# Patient Record
Sex: FEMALE | Race: WHITE | Employment: UNEMPLOYED | URBAN - METROPOLITAN AREA
[De-identification: names, ages, dates, MRNs, and addresses within clinical notes are randomized per-mention and may not be internally consistent; named-entity substitution may affect disease eponyms.]

---

## 2017-01-04 ENCOUNTER — OFFICE VISIT (OUTPATIENT)
Dept: PAIN MANAGEMENT | Age: 57
End: 2017-01-04

## 2017-01-04 VITALS — HEART RATE: 91 BPM | SYSTOLIC BLOOD PRESSURE: 146 MMHG | RESPIRATION RATE: 19 BRPM | DIASTOLIC BLOOD PRESSURE: 77 MMHG

## 2017-01-04 DIAGNOSIS — M25.512 CHRONIC PAIN OF BOTH SHOULDERS: ICD-10-CM

## 2017-01-04 DIAGNOSIS — G89.29 CHRONIC BACK PAIN, UNSPECIFIED BACK LOCATION, UNSPECIFIED BACK PAIN LATERALITY: Primary | ICD-10-CM

## 2017-01-04 DIAGNOSIS — G89.29 CHRONIC MIDLINE LOW BACK PAIN, WITH SCIATICA PRESENCE UNSPECIFIED: ICD-10-CM

## 2017-01-04 DIAGNOSIS — M54.9 CHRONIC BACK PAIN, UNSPECIFIED BACK LOCATION, UNSPECIFIED BACK PAIN LATERALITY: Primary | ICD-10-CM

## 2017-01-04 DIAGNOSIS — M25.562 CHRONIC PAIN OF BOTH KNEES: ICD-10-CM

## 2017-01-04 DIAGNOSIS — M47.816 SPONDYLOSIS OF LUMBAR REGION WITHOUT MYELOPATHY OR RADICULOPATHY: ICD-10-CM

## 2017-01-04 DIAGNOSIS — G89.29 CHRONIC PAIN OF BOTH SHOULDERS: ICD-10-CM

## 2017-01-04 DIAGNOSIS — M77.8 LEFT SHOULDER TENDINITIS: ICD-10-CM

## 2017-01-04 DIAGNOSIS — G89.29 CHRONIC PAIN OF BOTH KNEES: ICD-10-CM

## 2017-01-04 DIAGNOSIS — M25.511 CHRONIC PAIN OF BOTH SHOULDERS: ICD-10-CM

## 2017-01-04 DIAGNOSIS — M54.5 CHRONIC MIDLINE LOW BACK PAIN, WITH SCIATICA PRESENCE UNSPECIFIED: ICD-10-CM

## 2017-01-04 DIAGNOSIS — M25.512 CHRONIC LEFT SHOULDER PAIN: ICD-10-CM

## 2017-01-04 DIAGNOSIS — G89.4 CHRONIC PAIN SYNDROME: ICD-10-CM

## 2017-01-04 DIAGNOSIS — G25.81 RESTLESS LEG SYNDROME: ICD-10-CM

## 2017-01-04 DIAGNOSIS — G89.29 CHRONIC LEFT SHOULDER PAIN: ICD-10-CM

## 2017-01-04 DIAGNOSIS — M25.561 CHRONIC PAIN OF BOTH KNEES: ICD-10-CM

## 2017-01-04 RX ORDER — GABAPENTIN 300 MG/1
300 CAPSULE ORAL
Qty: 30 CAP | Refills: 2 | Status: SHIPPED | OUTPATIENT
Start: 2017-01-04 | End: 2017-03-23

## 2017-01-04 RX ORDER — HYDROCODONE BITARTRATE AND ACETAMINOPHEN 5; 325 MG/1; MG/1
TABLET ORAL
Qty: 30 TAB | Refills: 0 | Status: SHIPPED | OUTPATIENT
Start: 2017-01-04 | End: 2017-01-11 | Stop reason: SDUPTHER

## 2017-01-04 RX ORDER — DULOXETIN HYDROCHLORIDE 60 MG/1
60 CAPSULE, DELAYED RELEASE ORAL DAILY
Qty: 30 CAP | Refills: 1 | Status: SHIPPED | OUTPATIENT
Start: 2017-01-04 | End: 2017-04-05 | Stop reason: SDUPTHER

## 2017-01-04 RX ORDER — HYDROCODONE BITARTRATE AND ACETAMINOPHEN 5; 325 MG/1; MG/1
TABLET ORAL
Qty: 30 TAB | Refills: 0 | Status: SHIPPED | OUTPATIENT
Start: 2017-02-03 | End: 2017-03-23

## 2017-01-04 RX ORDER — BACLOFEN 10 MG/1
TABLET ORAL
Qty: 30 TAB | Refills: 1 | Status: SHIPPED | OUTPATIENT
Start: 2017-01-04 | End: 2017-04-05 | Stop reason: SDUPTHER

## 2017-01-04 RX ORDER — CYCLOBENZAPRINE HCL 10 MG
10 TABLET ORAL
Qty: 90 TAB | Refills: 1 | Status: SHIPPED | OUTPATIENT
Start: 2017-01-04 | End: 2017-03-01

## 2017-01-04 NOTE — PROGRESS NOTES
HISTORY OF PRESENT ILLNESS  Isela Lozano is a 55 y.o. female. HPI Comments: Meds help with pain control and quality of life. No new side effects reported today. No new medical problems reported today. Visit survey reviewed, and will be scanned. Recent Average pain level (out of 10). --  8  Chief complaint, low back pain, left shoulder pain  Chronic pain  Using hydrocodone 5 mg, one half tablet twice a day as needed  Cymbalta, baclofen which she uses just at night, gabapentin, Flexeril 3 times a day as needed  She reports she will have the MRI of her lumbar spine in about a week. I have asked that she then consult with anesthesiology, Dr. Angelica Clark for possible injections after the MRI. I previously prescribed physical therapy and she reports she will make phone calls and look into this. I did remind her to bring in all her pill bottles for each appointment. Review of Systems   Constitutional: Positive for malaise/fatigue. HENT: Negative for hearing loss. Eyes: Negative for blurred vision and double vision. Respiratory: Positive for shortness of breath. Negative for cough. Cardiovascular: Negative for chest pain and leg swelling. Gastrointestinal: Positive for nausea. Negative for vomiting. Genitourinary: Negative for dysuria and urgency. Musculoskeletal: Positive for back pain, falls and joint pain. Skin: Negative for itching and rash. Neurological: Positive for dizziness, weakness and headaches. Negative for seizures. Endo/Heme/Allergies: Positive for environmental allergies. Does not bruise/bleed easily. Psychiatric/Behavioral: Negative for suicidal ideas. The patient is not nervous/anxious and does not have insomnia. Physical Exam   Constitutional: She appears well-developed and well-nourished. She is cooperative. She does not have a sickly appearance. HENT:   Head: Normocephalic and atraumatic. Right Ear: External ear normal. No drainage.    Left Ear: External ear normal. No drainage. Nose: Nose normal.   Eyes: Lids are normal. Right eye exhibits no discharge. Left eye exhibits no discharge. Right conjunctiva has no hemorrhage. Left conjunctiva has no hemorrhage. Neck: Neck supple. No tracheal deviation present. No thyroid mass present. Pulmonary/Chest: Effort normal. No respiratory distress. Neurological: She is alert. No cranial nerve deficit. Skin: Skin is intact. No rash noted. Psychiatric: Her speech is normal. Her affect is not angry. She does not express inappropriate judgment. Nursing note and vitals reviewed. ASSESSMENT and PLAN  Encounter Diagnoses   Name Primary?  Chronic back pain, unspecified back location, unspecified back pain laterality Yes    Chronic left shoulder pain     Left shoulder tendinitis     Spondylosis of lumbar region without myelopathy or radiculopathy     Chronic pain of both shoulders     Chronic pain of both knees     Chronic pain syndrome     Chronic midline low back pain, with sciatica presence unspecified     Restless leg syndrome    No signs of addiction or diversion. The primary Dxes. ,including pain are controlled. Pain/Pain control/Meds and Quality Of Life have been reviewed. Nonpharmacologic therapy and non-opioid pharmacologic therapy are always considered. If opioid therapy is prescribed, this is only if the expected benefits for both pain and function are anticipated to outweigh risks. Possible changes to treatment plan considered. Support/education given as needed. Today-medications are as listed. No significant changes to medications. Follow up -- 2 months.

## 2017-01-04 NOTE — MR AVS SNAPSHOT
Visit Information Date & Time Provider Department Dept. Phone Encounter #  
 1/4/2017  4:15 PM Ivette Infante MD [facility] [phone] Follow-up Instructions Return in about 2 months (around 3/4/2017). Follow-up and Disposition History Your Appointments 1/6/2017  1:00 PM  
Office Visit with [facility] EDUC CLASS [facility] ([facility] SCHEDULING) Appt Note: return edu [address] [facility] [id number]  
[phone] [address]  
  
    
 1/11/2017  9:45 AM  
Follow Up with Angelito Bravo MD  
[facility] Appt Note: Return in about 2 months (around 1/11/2017), or if symptoms worsen or fail to improve, for dm2, chronic pain. [address] [address] [place] [address]  
  
   
 [address] [facility]  
  
    
 1/26/2017  2:00 PM  
CONSULT with Shruthi Salmon MD  
[facility]) Appt Note: Consult Dr Keeley Torrez LB injections [address] [facility] [address]  
[phone] [address] [id number]  
  
    
 2/15/2017 10:50 AM  
Follow Up with Bailey Singh MD  
[address] [facility], [address] [facility] ([facility]) Appt Note: 2 mo fu  
 [address] [facility], [address] [facility]  
[phone] [address] [place] [facility], [facility] [address]  
  
    
 3/1/2017  3:15 PM  
Follow Up with Ivette Infante MD  
[facility]) Appt Note: 2 mth [address] [facility] [address]  
[phone] [id number] Upcoming Health Maintenance Date Due  
 BREAST CANCER SCRN MAMMOGRAM 4/13/2011 FOBT Q 1 YEAR AGE 50-75 4/13/2011 MICROALBUMIN Q1 11/6/2016 EYE EXAM RETINAL OR DILATED Q1 12/11/2016 HEMOGLOBIN A1C Q6M 2/23/2017 LIPID PANEL Q1 3/17/2017 FOOT EXAM Q1 11/11/2017 PAP AKA CERVICAL CYTOLOGY 11/13/2018 DTaP/Tdap/Td series (2 - Td) 11/11/2026 Allergies as of 1/4/2017  Review Complete On: 1/4/2017 By: Zandra López MD  
  
 Severity Noted Reaction Type Reactions Other Food  03/17/2016    Other (comments) Sweeteners- causes headaches Metformin  11/06/2015    Other (comments) Increase pain in feet and swelling in feet Current Immunizations  Never Reviewed No immunizations on file. Not reviewed this visit You Were Diagnosed With   
  
 Codes Comments Chronic back pain, unspecified back location, unspecified back pain laterality    -  Primary ICD-10-CM: M54.9, G89.29 ICD-9-CM: 724.5, 338.29 Chronic left shoulder pain     ICD-10-CM: M25.512, G89.29 ICD-9-CM: 719.41, 338.29 Left shoulder tendinitis     ICD-10-CM: M75.82 ICD-9-CM: 726.10 Spondylosis of lumbar region without myelopathy or radiculopathy     ICD-10-CM: M47.816 ICD-9-CM: 230. 3 Chronic pain of both shoulders     ICD-10-CM: M25.512, G89.29, M25.511 ICD-9-CM: 719.41, 338.29 Chronic pain of both knees     ICD-10-CM: M25.561, M25.562, G89.29 ICD-9-CM: 719.46, 338.29 Chronic pain syndrome     ICD-10-CM: G89.4 ICD-9-CM: 338. 4 Chronic midline low back pain, with sciatica presence unspecified     ICD-10-CM: M54.5, G89.29 ICD-9-CM: 724.2, 338.29 Restless leg syndrome     ICD-10-CM: G25.81 ICD-9-CM: 333.94 Vitals BP Pulse Resp OB Status Smoking Status 146/77 91 19 Hysterectomy Never Smoker Vitals History Preferred Pharmacy Pharmacy Name Phone [address] [facility], [address] [address] Your Updated Medication List  
  
   
This list is accurate as of: 1/4/17  5:02 PM.  Always use your most recent med list.  
  
  
  
  
 * albuterol 90 mcg/actuation inhaler Commonly known as:  PROVENTIL HFA, VENTOLIN HFA, PROAIR HFA Take 2 Puffs by inhalation every four (4) hours as needed for Wheezing. * albuterol 2.5 mg /3 mL (0.083 %) nebulizer solution Commonly known as:  PROVENTIL VENTOLIN  
3 mL by Nebulization route every four (4) hours as needed for Wheezing. aspirin, buffered 81 mg Tab Take  by mouth. atorvastatin 80 mg tablet Commonly known as:  LIPITOR  
take 1 tablet by mouth once daily * bacitracin 500 unit/gram Oint Commonly known as:  BACITRACIN  
  
 * bacitracin 500 unit/gram Oint Commonly known as:  BACITRACIN  
apply affected area twice a day  
  
 baclofen 10 mg tablet Commonly known as:  LIORESAL  
1  qhs  prn * BD INSULIN SYRINGE ULTRA-FINE 1 mL 31 gauge x 5/16 Syrg Generic drug:  Insulin Syringe-Needle U-100  
use as directed twice a day * BD INSULIN SYRINGE ULTRA-FINE 1 mL 31 gauge x 5/16 Syrg Generic drug:  Insulin Syringe-Needle U-100  
use as directed twice a day * cyclobenzaprine 10 mg tablet Commonly known as:  FLEXERIL  
take 1 tablet by mouth three times a day if needed for muscle spasm * cyclobenzaprine 10 mg tablet Commonly known as:  FLEXERIL Take 1 Tab by mouth three (3) times daily as needed for Muscle Spasm(s). * DULoxetine 30 mg capsule Commonly known as:  CYMBALTA Take 1 Cap by mouth daily. Indications: CHRONIC MUSCULOSKELETAL PAIN  
  
 * DULoxetine 60 mg capsule Commonly known as:  CYMBALTA Take 1 Cap by mouth daily. FREESTYLE LITE STRIPS strip Generic drug:  glucose blood VI test strips TEST twice a day as directed  
  
 gabapentin 300 mg capsule Commonly known as:  NEURONTIN Take 1 Cap by mouth nightly. Indications: Restless Legs Syndrome * HYDROcodone-acetaminophen 5-325 mg per tablet Commonly known as:  Herminia Holguin  
1/2 tab  Bid prn  
  
 * HYDROcodone-acetaminophen 5-325 mg per tablet Commonly known as:  Lenon Gauze  
1/2 tab  Bid prn Start taking on:  2/3/2017 LANTUS 100 unit/mL injection Generic drug:  insulin glargine  
inject 90 units subcutaneously every evening at bedtime  
  
 metoprolol succinate 50 mg XL tablet Commonly known as:  TOPROL-XL  
take 1 tablet by mouth once daily VITAMIN B-12 1,000 mcg sublingual tablet Generic drug:  cyanocobalamin Take 1,000 mcg by mouth daily. * Notice: This list has 12 medication(s) that are the same as other medications prescribed for you. Read the directions carefully, and ask your doctor or other care provider to review them with you. Prescriptions Printed Refills HYDROcodone-acetaminophen (NORCO) 5-325 mg per tablet 0 Si/2 tab  Bid prn Class: Print HYDROcodone-acetaminophen (NORCO) 5-325 mg per tablet 0 Starting on: 2/3/2017 Si/2 tab  Bid prn Class: Print Prescriptions Sent to Pharmacy Refills  
 cyclobenzaprine (FLEXERIL) 10 mg tablet 1 Sig: Take 1 Tab by mouth three (3) times daily as needed for Muscle Spasm(s). Class: Normal  
 Pharmacy: [address] [place] [address] Ph #: [phone] Route: Oral  
 baclofen (LIORESAL) 10 mg tablet 1 Si  qhs  prn Class: Normal  
 Pharmacy: [address] [place] [address] Ph #: [phone] DULoxetine (CYMBALTA) 60 mg capsule 1 Sig: Take 1 Cap by mouth daily. Class: Normal  
 Pharmacy: [address] [place] [address] Ph #: [phone] Route: Oral  
 gabapentin (NEURONTIN) 300 mg capsule 2 Sig: Take 1 Cap by mouth nightly. Indications: Restless Legs Syndrome Class: Normal  
 Pharmacy: [address] [place] [address] Ph #: [phone] Route: Oral  
  
Follow-up Instructions Return in about 2 months (around 3/4/2017). To-Do List   
 01/10/2017 1:30 PM  
 Appointment with [facility] MRI RM 1 at [facility] ([phone]) GENERAL INSTRUCTIONS  Bring information (ID card) if you have any medically implanted devices. You will be required to lie still while the procedure is being performed. Remove any jewelry (including body piercing, hairpins) prior to MRI. If you have had a creatinine level drawn within the past 30 days, please bring most recent results to your appt. Bring any films, CD's, and reports related to your study with you on the day of your exam.  This only includes studies done outside of [address] [place] [address], [facility], [place], and [facility]. Bring a complete list of all medications you are currently taking to include prescriptions, over-the-counter meds, herbals, vitamins & any dietary supplements. If you were given medications for claustrophobia or anxiety, please arrange to have someone drive you to your appointment. QUESTIONS  Notify the MRI Department if you have any questions concerning your study. [place] - [phone] [facility] - [address] [place] - [phone] [facility]! Dear Abby Epstein: Thank you for requesting a "[employer] account. Our records indicate that you already have an active "[employer] account. You can access your account anytime at [email or web address] Did you know that you can access your hospital and ER discharge instructions at any time in "[employer]? You can also review all of your test results from your hospital stay or ER visit. Additional Information If you have questions, please visit the Frequently Asked Questions section of the "[employer] website at [email or web address] Remember, "[employer] is NOT to be used for urgent needs. For medical emergencies, dial 911. Now available from your iPhone and Android! Please provide this summary of care documentation to your next provider. Your primary care clinician is listed as Angelito Perkins. If you have any questions after today's visit, please call [phone].

## 2017-01-04 NOTE — PROGRESS NOTES
Nursing Notes    Patient presents to the office today in follow-up. Ms. Julienne Salazar has a reminder for a \"due or due soon\" health maintenance. I have asked that she contact her primary care provider for follow-up on this health maintenance. Comments: did not bring medications. Counseled. Unable to check . Did not start physical therapy. Order was provided to her. She was asked to call for scheduling. POC UDS was not performed in office today    Any new labs or imaging since last appointment? NO    Have you been to an emergency room (ER) or urgent care clinic since your last visit? NO            Have you been hospitalized since your last visit? NO     If yes, where, when, and reason for visit? Have you seen or consulted any other health care providers outside of the [address] [place] [address]  since your last visit? YES     If yes, where, when, and reason for visit?    Endocrinologist. Dr Lia Contreras

## 2017-01-10 ENCOUNTER — HOSPITAL ENCOUNTER (OUTPATIENT)
Age: 57
Discharge: HOME | End: 2017-01-10
Attending: PHYSICAL MEDICINE & REHABILITATION
Payer: MEDICAID

## 2017-01-10 DIAGNOSIS — G89.29 CHRONIC MIDLINE LOW BACK PAIN, WITH SCIATICA PRESENCE UNSPECIFIED: ICD-10-CM

## 2017-01-10 DIAGNOSIS — M54.5 CHRONIC MIDLINE LOW BACK PAIN, WITH SCIATICA PRESENCE UNSPECIFIED: ICD-10-CM

## 2017-01-10 PROCEDURE — 72148 MRI LUMBAR SPINE W/O DYE: CPT

## 2017-01-11 ENCOUNTER — OFFICE VISIT (OUTPATIENT)
Dept: FAMILY MEDICINE CLINIC | Age: 57
End: 2017-01-11

## 2017-01-11 VITALS
HEART RATE: 64 BPM | SYSTOLIC BLOOD PRESSURE: 132 MMHG | OXYGEN SATURATION: 97 % | HEIGHT: 67 IN | TEMPERATURE: 95.7 F | WEIGHT: 257 LBS | RESPIRATION RATE: 18 BRPM | DIASTOLIC BLOOD PRESSURE: 73 MMHG | BODY MASS INDEX: 40.34 KG/M2

## 2017-01-11 DIAGNOSIS — M75.02 ADHESIVE CAPSULITIS OF LEFT SHOULDER: ICD-10-CM

## 2017-01-11 DIAGNOSIS — Z12.11 SCREEN FOR COLON CANCER: ICD-10-CM

## 2017-01-11 LAB — HBA1C MFR BLD HPLC: 11.3 %

## 2017-01-11 RX ORDER — INSULIN GLARGINE 100 [IU]/ML
INJECTION, SOLUTION SUBCUTANEOUS
Qty: 30 VIAL | Refills: 3 | COMMUNITY
Start: 2017-01-11 | End: 2017-05-08 | Stop reason: SDUPTHER

## 2017-01-11 NOTE — MR AVS SNAPSHOT
Visit Information Date & Time Provider Department Dept. Phone Encounter #  
 1/11/2017 12:45 PM Angelito Frausto, [facility] [phone] [id number] Follow-up Instructions Return in about 3 months (around 4/11/2017), or if symptoms worsen or fail to improve, for DM2, Adhesive capsulitis. Your Appointments 1/13/2017  1:00 PM  
Office Visit with [facility] [address] [place] [address] for Pain Management ([facility] SCHEDULING) Appt Note: return edu; return edu 30 [facility] [address]  
[phone] [phone]  
  
    
 1/26/2017  2:00 PM  
CONSULT with Sindhu Landaverde MD  
[address] [place] [address] for Pain Management [facility]) Appt Note: Consult Dr Aminata Cruz LB injections 30 [facility] [address]  
[phone] [phone]  
  
    
 2/15/2017 10:50 AM  
Follow Up with Kelly Au MD  
[address] [place] [address], [address] [facility] ([facility]) Appt Note: 2 mo fu  
 27 [facility], [address] 200 [facility]  
[phone] [address] [place] [address]  
  
    
 3/1/2017  3:15 PM  
Follow Up with Gregorio Mcallister MD  
[address] [place] [address] for Pain Management [facility] Appt Note: 2 mth 30 [facility] [address]  
[phone] [phone] Upcoming Health Maintenance Date Due  
 BREAST CANCER SCRN MAMMOGRAM 4/13/2011 FOBT Q 1 YEAR AGE 50-75 4/13/2011 MICROALBUMIN Q1 11/6/2016 EYE EXAM RETINAL OR DILATED Q1 12/11/2016 HEMOGLOBIN A1C Q6M 2/23/2017 LIPID PANEL Q1 3/17/2017 FOOT EXAM Q1 11/11/2017 PAP AKA CERVICAL CYTOLOGY 11/13/2018 DTaP/Tdap/Td series (2 - Td) 11/11/2026 Allergies as of 1/11/2017  Review Complete On: 1/11/2017 By: Mike Caba MD  
 Severity Noted Reaction Type Reactions Other Food  03/17/2016    Other (comments) Sweeteners- causes headaches Metformin  11/06/2015    Other (comments) Increase pain in feet and swelling in feet Current Immunizations  Never Reviewed No immunizations on file. Not reviewed this visit You Were Diagnosed With   
  
 Codes Comments Uncontrolled type 2 diabetes mellitus with other specified complication [facility])    -  Primary ICD-10-CM: E11.69, E11.65 Adhesive capsulitis of left shoulder     ICD-10-CM: M75.02 
ICD-9-CM: 726.0 Vitals BP Pulse Temp Resp Height(growth percentile) 132/73 (BP 1 Location: Right arm, BP Patient Position: Sitting) 64 95.7 °F (35.4 °C) (Temporal) 18 5' 7\" (1.702 m) Weight(growth percentile) SpO2 BMI OB Status Smoking Status 257 lb (116.6 kg) 97% 40.25 kg/m2 Hysterectomy Never Smoker BMI and BSA Data Body Mass Index Body Surface Area  
 40.25 kg/m 2 2.35 m 2 Preferred Pharmacy Pharmacy Name Phone [address] [facility], [id number] [place] [address] Your Updated Medication List  
  
   
This list is accurate as of: 1/11/17  1:48 PM.  Always use your most recent med list.  
  
  
  
  
 * albuterol 90 mcg/actuation inhaler Commonly known as:  PROVENTIL HFA, VENTOLIN HFA, PROAIR HFA Take 2 Puffs by inhalation every four (4) hours as needed for Wheezing. * albuterol 2.5 mg /3 mL (0.083 %) nebulizer solution Commonly known as:  PROVENTIL VENTOLIN  
3 mL by Nebulization route every four (4) hours as needed for Wheezing. aspirin, buffered 81 mg Tab Take  by mouth. atorvastatin 80 mg tablet Commonly known as:  LIPITOR  
take 1 tablet by mouth once daily  
  
 bacitracin 500 unit/gram Oint Commonly known as:  BACITRACIN  
apply affected area twice a day  
  
 baclofen 10 mg tablet Commonly known as:  LIORESAL  
1  qhs  prn  
  
 BD INSULIN SYRINGE ULTRA-FINE 1 mL 31 gauge x 5/16 Syrg Generic drug:  Insulin Syringe-Needle U-100  
use as directed twice a day  
  
 cyclobenzaprine 10 mg tablet Commonly known as:  FLEXERIL Take 1 Tab by mouth three (3) times daily as needed for Muscle Spasm(s). DULoxetine 60 mg capsule Commonly known as:  CYMBALTA Take 1 Cap by mouth daily. FREESTYLE LITE STRIPS strip Generic drug:  glucose blood VI test strips TEST twice a day as directed  
  
 gabapentin 300 mg capsule Commonly known as:  NEURONTIN Take 1 Cap by mouth nightly. Indications: Restless Legs Syndrome HYDROcodone-acetaminophen 5-325 mg per tablet Commonly known as:  Breanne Aragon  
1/2 tab  Bid prn Start taking on:  2/3/2017 LANTUS 100 unit/mL injection Generic drug:  insulin glargine  
inject 50 units subcutaneously 2 x day  
  
 metoprolol succinate 50 mg XL tablet Commonly known as:  TOPROL-XL  
take 1 tablet by mouth once daily VITAMIN B-12 1,000 mcg sublingual tablet Generic drug:  cyanocobalamin Take 1,000 mcg by mouth daily. * Notice: This list has 2 medication(s) that are the same as other medications prescribed for you. Read the directions carefully, and ask your doctor or other care provider to review them with you. We Performed the Following AMB POC HEMOGLOBIN A1C [77538 CPT(R)] REFERRAL TO ORTHOPEDICS [DBF698 Custom] Comments:  
 Please evaluate patient for adhesive capsulitis left shoulder. Follow-up Instructions Return in about 3 months (around 4/11/2017), or if symptoms worsen or fail to improve, for DM2, Adhesive capsulitis. To-Do List   
 01/11/2017 Lab:  MICROALBUMIN, UR, RAND W/ MICROALBUMIN/CREA RATIO Referral Information Referral ID Referred By Referred To  
  
 [id number] Mavis WASSERMAN Not Available Visits Status Start Date End Date 1 New Request 1/11/17 1/11/18 If your referral has a status of pending review or denied, additional information will be sent to support the outcome of this decision. Patient Instructions Frozen Shoulder: Exercises Your Care Instructions Here are some examples of typical rehabilitation exercises for your condition. Start each exercise slowly. Ease off the exercise if you start to have pain. Your doctor or physical therapist will tell you when you can start these exercises and which ones will work best for you. How to do the exercises Neck stretches 1. Look straight ahead, and tip your right ear to your right shoulder. Do not let your left shoulder rise up as you tip your head to the right. 2. Hold 15 to 30 seconds. 3. Tilt your head to the left. Do not let your right shoulder rise up as you tip your head to the left. 4. Hold for 15 to 30 seconds. 5. Repeat 2 to 4 times to each side. Shoulder rolls 1. Sit comfortably with your feet shoulder-width apart. You can also do this exercise while standing. 2. Roll your shoulders up, then back, and then down in a smooth, circular motion. 3. Repeat 2 to 4 times. Shoulder flexion (lying down) Note: For this exercise, you will need a wand. To make a wand, use a piece of PVC pipe or a broom handle with the broom removed. Make the wand about a foot wider than your shoulders. 1. Lie on your back, holding a wand with your hands. Your palms should face down as you hold the wand. Place your hands slightly wider than your shoulders. 2. Keeping your elbows straight, slowly raise your arms over your head until you feel a stretch in your shoulders, upper back, and chest. 
3. Hold 15 to 30 seconds. 4. Repeat 2 to 4 times. Shoulder rotation (lying down) Note: To make a wand, use a piece of PVC pipe or a broom handle with the broom removed. Make the wand about a foot wider than your shoulders.  
1. Lie on your back and hold a wand in both hands with your elbows bent and your palms up. 2. Keeping your elbows close to your body, move the wand across your body toward the arm that has pain. 3. Hold for 15 to 30 seconds. 4. Repeat 2 to 4 times. Shoulder internal rotation with towel 1. Roll up a towel lengthwise. Hold the towel above and behind your head with the arm that is not sore. 2. With your sore arm, reach behind your back and grasp the towel. 3. Using the arm above your head, pull the towel upward until you feel a stretch on the front and outside of your sore shoulder. 4. Hold 15 to 30 seconds. 5. Relax and move the towel back down to the starting position. 6. Repeat 2 to 4 times. Shoulder blade squeeze 1. While standing with your arms at your sides, squeeze your shoulder blades together. Do not raise your shoulders up as you are squeezing. 2. Hold for 6 seconds. 3. Repeat 8 to 12 times. Follow-up care is a key part of your treatment and safety. Be sure to make and go to all appointments, and call your doctor if you are having problems. It's also a good idea to know your test results and keep a list of the medicines you take. Where can you learn more? Go to http://nadya-irasema.info/. Enter R144 in the search box to learn more about \"Frozen Shoulder: Exercises. \" Current as of: May 23, 2016 Content Version: 11.1 © 1626-1007 [employer]. Care instructions adapted under license by [employer] (which disclaims liability or warranty for this information). If you have questions about a medical condition or this instruction, always ask your healthcare professional. [place] [address] any warranty or liability for your use of this information. Giving a Single-Dose Insulin Shot: Care Instructions Your Care Instructions Insulin is normally made by the pancreas, a gland behind the stomach.  In people with diabetes, the pancreas no longer makes enough insulin or it stops making it. Without insulin, your blood sugar level rises to dangerous levels. When this happens, you need insulin shots to keep your blood sugar in your target range. You may be nervous giving a shot at first. But soon, giving yourself a shot will become routine. It is quite easy to learn how to draw up insulin into a syringe and give the shot. The needles you use to give the insulin injections are very thin, and most people who have diabetes say they do not even feel the needle enter the skin. Even if you do feel the injection, the sting of the shot is not bad and does not last long. More than half a million people do it every day. You can too. Follow-up care is a key part of your treatment and safety. Be sure to make and go to all appointments, and call your doctor if you are having problems. It's also a good idea to know your test results and keep a list of the medicines you take. How can you care for yourself at home? Getting started If you have poor eyesight, have problems using your hands, or cannot prepare a dose of insulin, you may need someone to prepare your insulin injections ahead of time. · Gather your supplies. You will need an insulin syringe, your bottle of insulin, and an alcohol wipe or a cotton ball dipped in alcohol. Keep your supplies in a bag or kit so you can carry the supplies wherever you go. · Check the insulin bottle label and contents. Read and follow all instructions on the label, including how to store the insulin and how long the insulin will last. 
· Wash your hands with soap and running water. Dry them well. Preparing the shot For a single type of insulin shot: 
4. Roll the bottle gently between your hands. This will warm the insulin if you have kept the bottle in the refrigerator. Roll a bottle of cloudy insulin between your hands until the white powder has dissolved and the solution is mixed.  
5. Wipe the rubber lid of the insulin bottle with an alcohol wipe or a cotton ball dipped in alcohol. (If you are using a bottle for the first time, remove the protective cover over the rubber lid.) Let the top dry before you remove any insulin. 6. Remove the plastic cap from the needle on your insulin syringe. Take care not to touch the needle. 7. Pull the plunger of the syringe back, and draw air into the syringe equal to the number of units of insulin to be given. 8. Insert the needle of the syringe into the rubber lid of the insulin bottle. Push the plunger of the syringe to force the air into the bottle. This equalizes the pressure in the bottle when you remove the dose of insulin. Leave the needle in the bottle. 9. Turn the bottle and syringe upside down, and hold them in one hand. Position the tip of the needle so that it is below the surface of insulin in the bottle. Pull back the plunger to fill the syringe with slightly more than the correct number of units of insulin to be given. 10. Tap the outside (barrel) of the syringe so that trapped air bubbles move into the needle area. Push the air bubbles back into the bottle. Make sure you now have the correct number of units of insulin in your syringe. 11. Remove the needle from the bottle. Now you are ready to give the shot. Giving the shot Before giving your shot: 
5. Use alcohol to clean the skin before you give the shot. Let it dry. 6. Slightly pinch a fold of skin between your fingers and thumb of one hand. 7. Hold the syringe like a pencil close to the site, keeping your fingers off the plunger. It is usually recommended to place the syringe at a 90-degree angle to the shot site, standing straight up from the skin. 8. Bend your wrist, and quickly push the needle all the way into the pinched-up area. 9. Push the plunger of the syringe all the way in so the insulin goes into the fatty tissue. 10. Take the needle out at the same angle that you inserted it.  If you bleed a little, apply pressure over the shot area with your finger, a cotton ball, or a piece of gauze. Do not rub the area. 11. Replace the cover over the needle and dispose of the needle safely. Do not use the same needle more than one time. Where to give the shot You can inject insulin into: · The belly, but at least 2 inches from the belly button. This is considered the best place to inject insulin. · The top outer part of the thighs. Insulin usually is absorbed more slowly from this site, unless you exercise soon after giving the shot. · The outside of the upper arms. You may need help giving yourself shots in this area. · The buttocks. You may need help with injections in the buttocks. Your doctor may advise you to give your shots in different places on your body each day. This is called site rotation. If you are going to rotate sites, check with your doctor to make sure you know how to do it right. Use the same site at the same time of each day. For example, each day: · At breakfast, give the shot in one of your arms. · At lunch, give the shot in one of your legs. · At dinner, give the shot in your belly. Slightly change the spot where you give an insulin shot each time you do it. For example, use five different places on the right upper arm, then use five places on the left upper arm. Using the same spot every time can cause bumps or pits in the skin and make the shots hurt more. It may also slow down how the insulin is absorbed into your body. Where can you learn more? Go to http://nadya-irasema.info/. Enter B194 in the search box to learn more about \"Giving a Single-Dose Insulin Shot: Care Instructions. \" Current as of: May 23, 2016 Content Version: 11.1 © 0705-8036 [employer]. Care instructions adapted under license by [employer] (which disclaims liability or warranty for this information).  If you have questions about a medical condition or this instruction, always ask your healthcare professional. [address]  any warranty or liability for your use of this information. Introducing [address]! Dear Freeman Raygoza: Thank you for requesting a [employer] account. Our records indicate that you already have an active [employer] account. You can access your account anytime at [email or web address]. [employer] Did you know that you can access your hospital and ER discharge instructions at any time in [employer]? You can also review all of your test results from your hospital stay or ER visit. Additional Information If you have questions, please visit the Frequently Asked Questions section of the [employer] website at [email or web address] [employer]/. Remember, [employer] is NOT to be used for urgent needs. For medical emergencies, dial 911. Now available from your iPhone and Android! Please provide this summary of care documentation to your next provider. Your primary care clinician is listed as Angelito Perkins. If you have any questions after today's visit, please call [phone].

## 2017-01-11 NOTE — PATIENT INSTRUCTIONS
Frozen Shoulder: Exercises  Your Care Instructions  Here are some examples of typical rehabilitation exercises for your condition. Start each exercise slowly. Ease off the exercise if you start to have pain. Your doctor or physical therapist will tell you when you can start these exercises and which ones will work best for you. How to do the exercises  Neck stretches    1. Look straight ahead, and tip your right ear to your right shoulder. Do not let your left shoulder rise up as you tip your head to the right. 2. Hold 15 to 30 seconds. 3. Tilt your head to the left. Do not let your right shoulder rise up as you tip your head to the left. 4. Hold for 15 to 30 seconds. 5. Repeat 2 to 4 times to each side. Shoulder rolls    1. Sit comfortably with your feet shoulder-width apart. You can also do this exercise while standing. 2. Roll your shoulders up, then back, and then down in a smooth, circular motion. 3. Repeat 2 to 4 times. Shoulder flexion (lying down)    Note: For this exercise, you will need a wand. To make a wand, use a piece of PVC pipe or a broom handle with the broom removed. Make the wand about a foot wider than your shoulders. 1. Lie on your back, holding a wand with your hands. Your palms should face down as you hold the wand. Place your hands slightly wider than your shoulders. 2. Keeping your elbows straight, slowly raise your arms over your head until you feel a stretch in your shoulders, upper back, and chest.  3. Hold 15 to 30 seconds. 4. Repeat 2 to 4 times. Shoulder rotation (lying down)    Note: To make a wand, use a piece of PVC pipe or a broom handle with the broom removed. Make the wand about a foot wider than your shoulders. 1. Lie on your back and hold a wand in both hands with your elbows bent and your palms up. 2. Keeping your elbows close to your body, move the wand across your body toward the arm that has pain. 3. Hold for 15 to 30 seconds.   4. Repeat 2 to 4 times.  Shoulder internal rotation with towel    1. Roll up a towel lengthwise. Hold the towel above and behind your head with the arm that is not sore. 2. With your sore arm, reach behind your back and grasp the towel. 3. Using the arm above your head, pull the towel upward until you feel a stretch on the front and outside of your sore shoulder. 4. Hold 15 to 30 seconds. 5. Relax and move the towel back down to the starting position. 6. Repeat 2 to 4 times. Shoulder blade squeeze    1. While standing with your arms at your sides, squeeze your shoulder blades together. Do not raise your shoulders up as you are squeezing. 2. Hold for 6 seconds. 3. Repeat 8 to 12 times. Follow-up care is a key part of your treatment and safety. Be sure to make and go to all appointments, and call your doctor if you are having problems. It's also a good idea to know your test results and keep a list of the medicines you take. Where can you learn more? Go to http://nadya-irasema.info/. Enter R144 in the search box to learn more about \"Frozen Shoulder: Exercises. \"  Current as of: May 23, 2016  Content Version: 11.1  © 0819-6813 [employer]. Care instructions adapted under license by [employer] (which disclaims liability or warranty for this information). If you have questions about a medical condition or this instruction, always ask your healthcare professional. [place] [address] any warranty or liability for your use of this information. Giving a Single-Dose Insulin Shot: Care Instructions  Your Care Instructions    Insulin is normally made by the pancreas, a gland behind the stomach. In people with diabetes, the pancreas no longer makes enough insulin or it stops making it. Without insulin, your blood sugar level rises to dangerous levels. When this happens, you need insulin shots to keep your blood sugar in your target range.   You may be nervous giving a shot at first. But soon, giving yourself a shot will become routine. It is quite easy to learn how to draw up insulin into a syringe and give the shot. The needles you use to give the insulin injections are very thin, and most people who have diabetes say they do not even feel the needle enter the skin. Even if you do feel the injection, the sting of the shot is not bad and does not last long. More than half a million people do it every day. You can too. Follow-up care is a key part of your treatment and safety. Be sure to make and go to all appointments, and call your doctor if you are having problems. It's also a good idea to know your test results and keep a list of the medicines you take. How can you care for yourself at home? Getting started  If you have poor eyesight, have problems using your hands, or cannot prepare a dose of insulin, you may need someone to prepare your insulin injections ahead of time. · Gather your supplies. You will need an insulin syringe, your bottle of insulin, and an alcohol wipe or a cotton ball dipped in alcohol. Keep your supplies in a bag or kit so you can carry the supplies wherever you go. · Check the insulin bottle label and contents. Read and follow all instructions on the label, including how to store the insulin and how long the insulin will last.  · Wash your hands with soap and running water. Dry them well. Preparing the shot  For a single type of insulin shot:  4. Roll the bottle gently between your hands. This will warm the insulin if you have kept the bottle in the refrigerator. Roll a bottle of cloudy insulin between your hands until the white powder has dissolved and the solution is mixed. 5. Wipe the rubber lid of the insulin bottle with an alcohol wipe or a cotton ball dipped in alcohol. (If you are using a bottle for the first time, remove the protective cover over the rubber lid.) Let the top dry before you remove any insulin.   6. Remove the plastic cap from the needle on your insulin syringe. Take care not to touch the needle. 7. Pull the plunger of the syringe back, and draw air into the syringe equal to the number of units of insulin to be given. 8. Insert the needle of the syringe into the rubber lid of the insulin bottle. Push the plunger of the syringe to force the air into the bottle. This equalizes the pressure in the bottle when you remove the dose of insulin. Leave the needle in the bottle. 9. Turn the bottle and syringe upside down, and hold them in one hand. Position the tip of the needle so that it is below the surface of insulin in the bottle. Pull back the plunger to fill the syringe with slightly more than the correct number of units of insulin to be given. 10. Tap the outside (barrel) of the syringe so that trapped air bubbles move into the needle area. Push the air bubbles back into the bottle. Make sure you now have the correct number of units of insulin in your syringe. 11. Remove the needle from the bottle. Now you are ready to give the shot. Giving the shot  Before giving your shot:  5. Use alcohol to clean the skin before you give the shot. Let it dry. 6. Slightly pinch a fold of skin between your fingers and thumb of one hand. 7. Hold the syringe like a pencil close to the site, keeping your fingers off the plunger. It is usually recommended to place the syringe at a 90-degree angle to the shot site, standing straight up from the skin. 8. Bend your wrist, and quickly push the needle all the way into the pinched-up area. 9. Push the plunger of the syringe all the way in so the insulin goes into the fatty tissue. 10. Take the needle out at the same angle that you inserted it. If you bleed a little, apply pressure over the shot area with your finger, a cotton ball, or a piece of gauze. Do not rub the area. 11. Replace the cover over the needle and dispose of the needle safely. Do not use the same needle more than one time.   Where to give the shot  You can inject insulin into:  · The belly, but at least 2 inches from the belly button. This is considered the best place to inject insulin. · The top outer part of the thighs. Insulin usually is absorbed more slowly from this site, unless you exercise soon after giving the shot. · The outside of the upper arms. You may need help giving yourself shots in this area. · The buttocks. You may need help with injections in the buttocks. Your doctor may advise you to give your shots in different places on your body each day. This is called site rotation. If you are going to rotate sites, check with your doctor to make sure you know how to do it right. Use the same site at the same time of each day. For example, each day:  · At breakfast, give the shot in one of your arms. · At lunch, give the shot in one of your legs. · At dinner, give the shot in your belly. Slightly change the spot where you give an insulin shot each time you do it. For example, use five different places on the right upper arm, then use five places on the left upper arm. Using the same spot every time can cause bumps or pits in the skin and make the shots hurt more. It may also slow down how the insulin is absorbed into your body. Where can you learn more? Go to http://nadya-irasema.info/. Enter A566 in the search box to learn more about \"Giving a Single-Dose Insulin Shot: Care Instructions. \"  Current as of: May 23, 2016  Content Version: 11.1  © 5936-5379 [employer]. Care instructions adapted under license by [employer] (which disclaims liability or warranty for this information). If you have questions about a medical condition or this instruction, always ask your healthcare professional. [address] any warranty or liability for your use of this information.

## 2017-01-11 NOTE — PROGRESS NOTES
Chief Complaint   Patient presents with    Follow-up     Diabetes and Chronic pain- shoulder pain is a little worse, back pain is about the same     1. Have you been to the ER, urgent care clinic since your last visit? Hospitalized since your last visit? No    2. Have you seen or consulted any other health care providers outside of the [address] [place] [address] since your last visit? Include any pap smears or colon screening. No    Patient would like to talk to Dr. Imelda Murphy about getting a shower chair. HPI  Gladys Laughlin comes in for f/u care. 1) DM2: She does see Dr. Vijay Burton, an endocrinologist. Her diabetes remains uncontrolled. We will do microalbumin and check hba1c today. This was done and her value is 11.3. This has worsened from previous. She recently had her medications adjusted with glargine increased to 50 units 2 x day and she is also on glyburide. Will f/u with endocrinologist and I will request the records. Discussed need to control blood glucose and the dangers of chronic elevations of blood sugar and poor DM2 control. 2) Chronic shoulder pain: patient did see the shoulder specialist. He recommended she does PT. She still has pain in the shoulder and limited ROM. She requests to get second opinion. I will refer her to another orthopedist. She has adhesive capsulitis left shoulder. 3) Chronic pain: patient has chronic low back pain and limited ROM shoulders. 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 This limits her movement and worse when she gets int o the shower. Would like to have a shower chair to help when she is taking her shower. Will give her script for this. She is on hydrocodone, baclofen and flexeril. Also had cymbalta increased to 60 mg daily. 4) HM: Patient did have colonoscopy done in [facility] about 3 years ago and was recommended to get recheck in 10 years. We will try and get the records faxed to us.       Past Medical History  Past Medical History   Diagnosis Date    Arthritis      Lower back     Chronic back pain      Lower back pain    Depression     Diabetes (HCC)     Panic attacks     Sleep apnea        Surgical History  Past Surgical History   Procedure Laterality Date    Hx heart valve surgery       Pericardial Tissue Heart Valve    Hx hysterectomy       Partial Hysterectomy - removed ovary    Hx myomectomy          Medications  Current Outpatient Prescriptions   Medication Sig Dispense Refill    insulin glargine (LANTUS) 100 unit/mL injection inject 50 units subcutaneously 2 x day 30 Vial 3    cyclobenzaprine (FLEXERIL) 10 mg tablet Take 1 Tab by mouth three (3) times daily as needed for Muscle Spasm(s). 90 Tab 1    baclofen (LIORESAL) 10 mg tablet 1  qhs  prn 30 Tab 1    DULoxetine (CYMBALTA) 60 mg capsule Take 1 Cap by mouth daily. 30 Cap 1    gabapentin (NEURONTIN) 300 mg capsule Take 1 Cap by mouth nightly. Indications: Restless Legs Syndrome 30 Cap 2    [START ON 2/3/2017] HYDROcodone-acetaminophen (NORCO) 5-325 mg per tablet 1/2 tab  Bid prn 30 Tab 0    bacitracin (BACITRACIN) 500 unit/gram oint apply affected area twice a day 28 g 0    BD INSULIN SYRINGE ULTRA-FINE 1 mL 31 gauge x 5/16 syrg use as directed twice a day 200 Syringe 3    cyanocobalamin (VITAMIN B-12) 1,000 mcg sublingual tablet Take 1,000 mcg by mouth daily.  FREESTYLE LITE STRIPS strip TEST twice a day as directed 100 Strip 11    atorvastatin (LIPITOR) 80 mg tablet take 1 tablet by mouth once daily 30 Tab 11    metoprolol succinate (TOPROL-XL) 50 mg XL tablet take 1 tablet by mouth once daily 30 Tab 3    albuterol (PROVENTIL HFA, VENTOLIN HFA, PROAIR HFA) 90 mcg/actuation inhaler Take 2 Puffs by inhalation every four (4) hours as needed for Wheezing. 1 Inhaler 3    albuterol (PROVENTIL VENTOLIN) 2.5 mg /3 mL (0.083 %) nebulizer solution 3 mL by Nebulization route every four (4) hours as needed for Wheezing. 24 Each 0    Aspirin, Buffered 81 mg tab Take  by mouth. Allergies  Allergies   Allergen Reactions    Other Food Other (comments)     Sweeteners- causes headaches    Metformin Other (comments)     Increase pain in feet and swelling in feet       Family History  Family History   Problem Relation Age of Onset    Heart Disease Mother     Diabetes Mother     Stroke Mother     Diabetes Father     Heart Disease Father        Social History  Social History     Social History    Marital status:      Spouse name: N/A    Number of children: N/A    Years of education: N/A     Occupational History    Not on file. Social History Main Topics    Smoking status: Never Smoker    Smokeless tobacco: Never Used    Alcohol use No    Drug use: No    Sexual activity: Not Currently     Other Topics Concern     Service No    Blood Transfusions No    Caffeine Concern No    Occupational Exposure No    Hobby Hazards No    Sleep Concern Yes     Sleep apnea     Stress Concern Yes     Due to family medical issues.      Weight Concern No    Special Diet No    Back Care Yes     Patient tries to do back care with streches     Exercise No    Bike Helmet Yes    Seat Belt Yes    Self-Exams Yes     Social History Narrative       Review of Systems  Review of Systems - History obtained from chart review and the patient  General ROS: positive for  - fatigue and malaise  negative for - chills or fever  Psychological ROS: negative  Ophthalmic ROS: negative for - photophobia  ENT ROS: negative  Allergy and Immunology ROS: negative  Hematological and Lymphatic ROS: negative for - bleeding problems or bruising  Endocrine ROS: DM2  Respiratory ROS: no cough, shortness of breath, or wheezing  Cardiovascular ROS: no chest pain or dyspnea on exertion  Gastrointestinal ROS: no abdominal pain, change in bowel habits, or black or bloody stools  Genito-Urinary ROS: no dysuria, trouble voiding, or hematuria  Musculoskeletal ROS: positive for - joint pain, muscle pain and pain in back - lower, neck - lower and shoulder - left  Neurological ROS: negative    Vital Signs  Visit Vitals    /73 (BP 1 Location: Right arm, BP Patient Position: Sitting)    Pulse 64    Temp 95.7 °F (35.4 °C) (Temporal)    Resp 18    Ht 5' 7\" (1.702 m)    Wt 257 lb (116.6 kg)    SpO2 97%    BMI 40.25 kg/m2         Physical Exam  Physical Examination: General appearance - oriented to person, place, and time and acyanotic, in no respiratory distress  Mental status - alert, oriented to person, place, and time, affect appropriate to mood  Eyes - sclera anicteric  Ears - hearing grossly normal bilaterally  Nose - normal and patent, no erythema, discharge or polyps and normal nontender sinuses  Mouth - mucous membranes moist, pharynx normal without lesions  Neck - supple, no significant adenopathy  Lymphatics - no palpable lymphadenopathy  Chest - no tachypnea, retractions or cyanosis  Heart - normal rate, regular rhythm, normal S1, S2, no murmurs, rubs, clicks or gallops  Abdomen - no rebound tenderness noted  Back exam - limited range of motion, pain with motion noted during exam, tenderness noted para lumbar muscles  Neurological - alert, oriented, normal speech, no focal findings or movement disorder noted, motor and sensory grossly normal bilaterally  Musculoskeletal - limited ROM left shoulder due to adhesive capsulitis  Extremities - intact peripheral pulses    Diagnostics  Orders Placed This Encounter    MICROALBUMIN, UR, RAND W/ MICROALBUMIN/CREA RATIO     Standing Status:   Future     Standing Expiration Date:   1/12/2018    REFERRAL TO ORTHOPEDICS     Referral Priority:   Routine     Referral Type:   Consultation     Referral Reason:   Specialty Services Required    AMB POC HEMOGLOBIN A1C    insulin glargine (LANTUS) 100 unit/mL injection     Sig: inject 50 units subcutaneously 2 x day     Dispense:  30 Vial     Refill:  3         Results  Results for orders placed or performed in visit on 01/11/17   AMB POC HEMOGLOBIN A1C   Result Value Ref Range    Hemoglobin A1c (POC) 11.3 %     ASSESSMENT and PLAN    ICD-10-CM ICD-9-CM    1. Uncontrolled type 2 diabetes mellitus with other specified complication (HCC) K77.07  AMB POC HEMOGLOBIN A1C    E11.65  MICROALBUMIN, UR, RAND W/ MICROALBUMIN/CREA RATIO      MICROALBUMIN, UR, RAND W/ MICROALBUMIN/CREA RATIO   2. Adhesive capsulitis of left shoulder M75.02 726.0 REFERRAL TO ORTHOPEDICS   3. Screen for colon cancer Z12.11 V76.51 CANCELED: OCCULT BLOOD, IMMUNOASSAY (FIT)     lab results and schedule of future lab studies reviewed with patient  reviewed diet, exercise and weight control  cardiovascular risk and specific lipid/LDL goals reviewed  reviewed medications and side effects in detail  specific diabetic recommendations: low cholesterol diet, weight control and daily exercise discussed, home glucose monitoring emphasized, all medications, side effects and compliance discussed carefully, annual eye examinations at Ophthalmology discussed, glycohemoglobin and other lab monitoring discussed and long term diabetic complications discussed  use of aspirin to prevent MI and TIA's discussed      I have discussed the diagnosis with the patient and the intended plan of care as seen in the above orders. The patient has received an after-visit summary and questions were answered concerning future plans. I have discussed medication, side effects, and warnings with the patient in detail. The patient verbalized understanding and is in agreement with the plan of care. The patient will contact the office with any additional concerns.     Joselyn Pinto MD

## 2017-01-12 LAB
ALBUMIN/CREAT UR: 10.8 MG/G CREAT (ref 0–30)
CREAT UR-MCNC: 110.2 MG/DL
MICROALBUMIN UR-MCNC: 11.9 UG/ML

## 2017-01-23 RX ORDER — PEN NEEDLE, DIABETIC 29 G X1/2"
NEEDLE, DISPOSABLE MISCELLANEOUS
Qty: 200 SYRINGE | Refills: 3 | Status: SHIPPED | OUTPATIENT
Start: 2017-01-23 | End: 2017-05-08 | Stop reason: SDUPTHER

## 2017-01-25 ENCOUNTER — HOSPITAL ENCOUNTER (EMERGENCY)
Age: 57
Discharge: HOME | End: 2017-01-25
Attending: EMERGENCY MEDICINE
Payer: MEDICAID

## 2017-01-25 ENCOUNTER — APPOINTMENT (OUTPATIENT)
Dept: GENERAL RADIOLOGY | Age: 57
End: 2017-01-25
Attending: PHYSICIAN ASSISTANT
Payer: MEDICAID

## 2017-01-25 ENCOUNTER — TELEPHONE (OUTPATIENT)
Dept: FAMILY MEDICINE CLINIC | Age: 57
End: 2017-01-25

## 2017-01-25 VITALS
HEIGHT: 67 IN | WEIGHT: 254 LBS | OXYGEN SATURATION: 97 % | HEART RATE: 75 BPM | SYSTOLIC BLOOD PRESSURE: 144 MMHG | TEMPERATURE: 98.4 F | DIASTOLIC BLOOD PRESSURE: 76 MMHG | RESPIRATION RATE: 18 BRPM | BODY MASS INDEX: 39.87 KG/M2

## 2017-01-25 DIAGNOSIS — M25.512 PAIN IN JOINT OF LEFT SHOULDER: Primary | ICD-10-CM

## 2017-01-25 PROCEDURE — 74011250636 HC RX REV CODE- 250/636: Performed by: PHYSICIAN ASSISTANT

## 2017-01-25 PROCEDURE — 99281 EMR DPT VST MAYX REQ PHY/QHP: CPT

## 2017-01-25 PROCEDURE — 73030 X-RAY EXAM OF SHOULDER: CPT

## 2017-01-25 PROCEDURE — 96372 THER/PROPH/DIAG INJ SC/IM: CPT

## 2017-01-25 RX ORDER — KETOROLAC TROMETHAMINE 10 MG/1
10 TABLET, FILM COATED ORAL
Qty: 20 TAB | Refills: 0 | Status: SHIPPED | OUTPATIENT
Start: 2017-01-25 | End: 2017-01-30

## 2017-01-25 RX ORDER — KETOROLAC TROMETHAMINE 30 MG/ML
30 INJECTION, SOLUTION INTRAMUSCULAR; INTRAVENOUS
Status: COMPLETED | OUTPATIENT
Start: 2017-01-25 | End: 2017-01-25

## 2017-01-25 RX ADMIN — KETOROLAC TROMETHAMINE 30 MG: 30 INJECTION, SOLUTION INTRAMUSCULAR at 22:10

## 2017-01-25 NOTE — TELEPHONE ENCOUNTER
Patient is established with Dr. Avery Mcdonough. Patient has been seeing Dr. Avery Mcdonough for left shoulder pain. Patient was advise to call office to see if she can be seen due to her being establish with specialist. Patient was also advised if she was not able to get in to call our office to schedule an appt.  Patient verbalized understanding and agreed to .

## 2017-01-25 NOTE — TELEPHONE ENCOUNTER
Patient is having pain on her left shoulder. She stated that she believe her pain triggered when she was moving  her couch at her house. She added that she believe that there is a tear on her shoulder down to her elbow. Patient is asking if she should wait for her appointment with pain management tomorrow or be seen today 1/25/17. Asking to be called back.

## 2017-01-26 ENCOUNTER — OFFICE VISIT (OUTPATIENT)
Dept: PAIN MANAGEMENT | Age: 57
End: 2017-01-26

## 2017-01-26 VITALS — HEART RATE: 84 BPM | DIASTOLIC BLOOD PRESSURE: 78 MMHG | SYSTOLIC BLOOD PRESSURE: 137 MMHG

## 2017-01-26 DIAGNOSIS — M51.36 LUMBAR DEGENERATIVE DISC DISEASE: ICD-10-CM

## 2017-01-26 DIAGNOSIS — G89.4 CHRONIC PAIN SYNDROME: ICD-10-CM

## 2017-01-26 DIAGNOSIS — G89.29 CHRONIC BACK PAIN, UNSPECIFIED BACK LOCATION, UNSPECIFIED BACK PAIN LATERALITY: ICD-10-CM

## 2017-01-26 DIAGNOSIS — M54.9 CHRONIC BACK PAIN, UNSPECIFIED BACK LOCATION, UNSPECIFIED BACK PAIN LATERALITY: ICD-10-CM

## 2017-01-26 DIAGNOSIS — M47.816 LUMBAR FACET ARTHROPATHY: ICD-10-CM

## 2017-01-26 DIAGNOSIS — M47.816 SPONDYLOSIS OF LUMBAR REGION WITHOUT MYELOPATHY OR RADICULOPATHY: Primary | ICD-10-CM

## 2017-01-26 NOTE — ED TRIAGE NOTES
Reports L arm, L shoulder pain. States she had MRI in Dec at [facility]. States \"something is torn\". States she was moving furniture and possibly further injured. States she has been taking vicodin w/o relief.

## 2017-01-26 NOTE — DISCHARGE INSTRUCTIONS
Joint Pain: Care Instructions  Your Care Instructions  Many people have small aches and pains from overuse or injury to muscles and joints. Joint injuries often happen during sports or recreation, work tasks, or projects around the home. An overuse injury can happen when you put too much stress on a joint or when you do an activity that stresses the joint over and over, such as using the computer or rowing a boat. You can take action at home to help your muscles and joints get better. You should feel better in 1 to 2 weeks, but it can take 3 months or more to heal completely. Follow-up care is a key part of your treatment and safety. Be sure to make and go to all appointments, and call your doctor if you are having problems. It's also a good idea to know your test results and keep a list of the medicines you take. How can you care for yourself at home? · Do not put weight on the injured joint for at least a day or two. · For the first day or two after an injury, do not take hot showers or baths, and do not use hot packs. The heat could make swelling worse. · Put ice or a cold pack on the sore joint for 10 to 20 minutes at a time. Try to do this every 1 to 2 hours for the next 3 days (when you are awake) or until the swelling goes down. Put a thin cloth between the ice and your skin. · Wrap the injury in an elastic bandage. Do not wrap it too tightly because this can cause more swelling. · Prop up the sore joint on a pillow when you ice it or anytime you sit or lie down during the next 3 days. Try to keep it above the level of your heart. This will help reduce swelling. · Take an over-the-counter pain medicine, such as acetaminophen (Tylenol), ibuprofen (Advil, Motrin), or naproxen (Aleve). Read and follow all instructions on the label. · After 1 or 2 days of rest, begin moving the joint gently.  While the joint is still healing, you can begin to exercise using activities that do not strain or hurt the painful joint. When should you call for help? Call your doctor now or seek immediate medical care if:  · You have signs of infection, such as:  ¨ Increased pain, swelling, warmth, and redness. ¨ Red streaks leading from the joint. ¨ A fever. Watch closely for changes in your health, and be sure to contact your doctor if:  · Your movement or symptoms are not getting better after 1 to 2 weeks of home treatment. Where can you learn more? Go to http://nadya-irasema.info/. Enter P205 in the search box to learn more about \"Joint Pain: Care Instructions. \"  Current as of: May 23, 2016  Content Version: 11.1  © 1652-4825 [employer]. Care instructions adapted under license by [employer] (which disclaims liability or warranty for this information). If you have questions about a medical condition or this instruction, always ask your healthcare professional. [place] [address] any warranty or liability for your use of this information. Shoulder Pain: Care Instructions  Your Care Instructions    You can hurt your shoulder by using it too much during an activity, such as fishing or baseball. It can also happen as part of the everyday wear and tear of getting older. Shoulder injuries can be slow to heal, but your shoulder should get better with time. Your doctor may recommend a sling to rest your shoulder. If you have injured your shoulder, you may need testing and treatment. Follow-up care is a key part of your treatment and safety. Be sure to make and go to all appointments, and call your doctor if you are having problems. It's also a good idea to know your test results and keep a list of the medicines you take. How can you care for yourself at home? · Take pain medicines exactly as directed. ¨ If the doctor gave you a prescription medicine for pain, take it as prescribed.   ¨ If you are not taking a prescription pain medicine, ask your doctor if you can take an over-the-counter medicine. ¨ Do not take two or more pain medicines at the same time unless the doctor told you to. Many pain medicines contain acetaminophen, which is Tylenol. Too much acetaminophen (Tylenol) can be harmful. · If your doctor recommends that you wear a sling, use it as directed. Do not take it off before your doctor tells you to. · Put ice or a cold pack on the sore area for 10 to 20 minutes at a time. Put a thin cloth between the ice and your skin. · If there is no swelling, you can put moist heat, a heating pad, or a warm cloth on your shoulder. Some doctors suggest alternating between hot and cold. · Rest your shoulder for a few days. If your doctor recommends it, you can then begin gentle exercise of the shoulder, but do not lift anything heavy. When should you call for help? Call 911 anytime you think you may need emergency care. For example, call if:  · You have chest pain or pressure. This may occur with:  ¨ Sweating. ¨ Shortness of breath. ¨ Nausea or vomiting. ¨ Pain that spreads from the chest to the neck, jaw, or one or both shoulders or arms. ¨ Dizziness or lightheadedness. ¨ A fast or uneven pulse. After calling 911, chew 1 adult-strength aspirin. Wait for an ambulance. Do not try to drive yourself. · Your arm or hand is cool or pale or changes color. Call your doctor now or seek immediate medical care if:  · You have signs of infection, such as:  ¨ Increased pain, swelling, warmth, or redness in your shoulder. ¨ Red streaks leading from a place on your shoulder. ¨ Pus draining from an area of your shoulder. ¨ Swollen lymph nodes in your neck, armpits, or groin. ¨ A fever. Watch closely for changes in your health, and be sure to contact your doctor if:  · You cannot use your shoulder. · Your shoulder does not get better as expected. Where can you learn more? Go to http://nadya-irasema.info/.   Enter P824 in the search box to learn more about \"Shoulder Pain: Care Instructions. \"  Current as of: May 23, 2016  Content Version: 11.1  © 4105-6280 [employer]. Care instructions adapted under license by [employer] (which disclaims liability or warranty for this information). If you have questions about a medical condition or this instruction, always ask your healthcare professional. [address] any warranty or liability for your use of this information. Shoulder Blade: Exercises  Your Care Instructions  Here are some examples of typical exercises for your condition. Start each exercise slowly. Ease off the exercise if you start to have pain. Your doctor or physical therapist will tell you when you can start these exercises and which ones will work best for you. How to do the exercises  Shoulder roll    1. Stand tall with your chin slightly tucked. Imagine that a string at the top of your head is pulling you straight up. 2. Keep your arms relaxed. All motion will be in your shoulders. 3. Shrug your shoulders up toward your ears, then up and back. [place] your shoulders down and back, like you're sliding your hands down into your back pants pockets. 4. Repeat the circles at least 2 to 4 times. This exercise is also helpful anytime you want to relax. Lower neck and upper back stretch    1. With your arms about shoulder height, clasp your hands in front of you. 2. Drop your chin toward your chest.  3. Reach straight forward so you are rounding your upper back. Think about pulling your shoulder blades apart. [place] feel a stretch across your upper back and shoulders. Hold for at least 6 seconds. 4. Repeat 2 to 4 times. Triceps stretch    1. Reach your arm straight up. 2. Keeping your elbow in place, bend your arm and reach your hand down behind your back. 3. With your other hand, apply gentle pressure to the bent elbow. [place] feel a stretch at the back of your upper arm and shoulder.  Hold about 6 seconds. 4. Repeat 2 to 4 times with each arm. Shoulder stretch    1. Relax your shoulders. 2. Raise one arm to shoulder height, and reach it across your chest.  3. Pull the arm slightly toward you with your other arm. This will help you get a gentle stretch. Hold for about 6 seconds. 4. Repeat 2 to 4 times. Shoulder blade squeeze    1. Sit or stand up tall with your arms at your sides. 2. Keep your shoulders relaxed and down, not shrugged. 3. Squeeze your shoulder blades together. Hold for 6 seconds, then relax. 4. Repeat 8 to 12 times. Straight-arm shoulder blade squeeze    1. Sit or stand tall. Relax your shoulders. 2. With palms down, hold your elastic tubing or band straight out in front of you. 3. Start with slight tension in the tubing or band, with your hands about shoulder-width apart. 4. Slowly pull straight out to the sides, squeezing your shoulder blades together. Keep your arms straight and at shoulder height. Slowly release. 5. Repeat 8 to 12 times. Rowing    1. [place] your elastic tubing or band at about waist height. Take one end in each hand. 2. Sit or stand with your feet hip-width apart. 3. Hold your arms straight in front of you. Adjust your distance to create slight tension in the tubing or band. 4. Slightly tuck your chin. Relax your shoulders. 5. Without shrugging your shoulders, pull straight back. Your elbows will pass alongside your waist.  Pull-downs    1. [place] your elastic tubing or band in the top of a closed door. Take one end in each hand. 2. Either sit or stand, depending on what is more comfortable. If you feel unsteady, sit on a chair. 3. Start with your arms up and comfortably apart, elbows straight. There should be a slight tension in the tubing or band. 4. Slightly tuck your chin, and look straight ahead. 5. Keeping your back straight, slowly pull down and back, bending your elbows.   6. Stop where your hands are level with your chin, in a \"goalpost\" position. 7. Repeat 8 to 12 times. Chest T stretch    1. Lie on your back. Raise your knees so they are bent. Plant your feet on the floor, hip-width apart. 2. Tuck your chin, and relax your shoulders. 3. Reach your arms straight out to the sides. If you don't feel a mild stretch in your shoulders and across your chest, use a foam roll or a tightly rolled blanket under your spine, from your tailbone to your head. 4. Relax in this position for at least 15 to 30 seconds while you breathe normally. Repeat 2 to 4 times. As you get used to this stretch, keep adding a little more time until you are able relax in this position for 2 or 3 minutes. When you can relax for at least 2 minutes, you only need to do the exercise 1 time per session. Chest goalpost stretch    1. Lie on your back. Raise your knees so they are bent. Plant your feet on the floor, hip-width apart. 2. Tuck your chin, and relax your shoulders. 3. Reach your arms straight out to the sides. 4. Bend your arms at the elbows, with your hands pointed toward the top of your head. Your arms should make an L on either side of your head. Your palms should be facing up. 5. If you don't feel a mild stretch in your shoulders and across your chest, use a foam roll or tightly rolled blanket under your spine, from your tailbone to your head. 6. Relax in this position for at least 15 to 30 seconds while you breathe normally. Repeat 2 to 4 times. Each day you do this exercise, add a little more time until you can relax in this position for 2 or 3 minutes. When you can relax for at least 2 minutes, you only need to do the exercise 1 time per session. Follow-up care is a key part of your treatment and safety. Be sure to make and go to all appointments, and call your doctor if you are having problems. It's also a good idea to know your test results and keep a list of the medicines you take. Where can you learn more?   Go to http://darren.info/. Enter [phone] in the search box to learn more about \"Shoulder Blade: Exercises. \"  Current as of: May 23, 2016  Content Version: 11.1  © 1436-3754 [employer]. Care instructions adapted under license by [employer] (which disclaims liability or warranty for this information). If you have questions about a medical condition or this instruction, always ask your healthcare professional. [address] any warranty or liability for your use of this information.

## 2017-01-26 NOTE — PROGRESS NOTES
Patient is here for procedure consult. Patient states that she went to there ER yesterday and was given a toradol injection at the ER and was sent home with a 5 day prescription of toradol tabs to be taken po.

## 2017-01-26 NOTE — ED PROVIDER NOTES
HPI Comments: 50yo female presenting to ED with left shoulder, arm and elbow pain x few days. Hx of chronic pain is this shoulder. MRI has been done in past at [facility] for pain after seeing ortho- diagnosed with \"something torn\". Pt was moving furniture few days ago when this new pain started. Pain worse with ROM of shoulder and elbow. Taken vicodin without relief. Pt denies headache, dizziness, CP, SOB, fever, chills, NVD, abdominal pain, urinary symptoms, vaginal discharge, vaginal bleeding. Pt +smoking, etoh, denies drug use. Patient is a 55 y.o. female presenting with arm pain. The history is provided by the patient. Arm Pain    Pertinent negatives include no back pain and no neck pain. Past Medical History:   Diagnosis Date    Arthritis      Lower back     Chronic back pain      Lower back pain    Depression     Diabetes ([address]     Panic attacks     Sleep apnea        Past Surgical History:   Procedure Laterality Date    Hx heart valve surgery       Pericardial Tissue Heart Valve    Hx hysterectomy       Partial Hysterectomy - removed ovary    Hx myomectomy           Family History:   Problem Relation Age of Onset    Heart Disease Mother     Diabetes Mother     Stroke Mother     Diabetes Father     Heart Disease Father        Social History     Social History    Marital status:      Spouse name: N/A    Number of children: N/A    Years of education: N/A     Occupational History    Not on file. Social History Main Topics    Smoking status: Never Smoker    Smokeless tobacco: Never Used    Alcohol use No    Drug use: No    Sexual activity: Not Currently     Other Topics Concern     Service No    Blood Transfusions No    Caffeine Concern No    Occupational Exposure No    Hobby Hazards No    Sleep Concern Yes     Sleep apnea     Stress Concern Yes     Due to family medical issues.      Weight Concern No    Special Diet No    Back Care Yes     Patient tries to do back care with streches     Exercise No    Bike Helmet Yes    Seat Belt Yes    Self-Exams Yes     Social History Narrative         ALLERGIES: Other food; Metformin; and Morphine    Review of Systems   Constitutional: Negative for chills and fever. HENT: Negative. Negative for congestion and facial swelling. Eyes: Negative for discharge and redness. Respiratory: Negative for cough and shortness of breath. Cardiovascular: Negative for chest pain. Gastrointestinal: Negative for abdominal pain, nausea and vomiting. Endocrine: Negative. Genitourinary: Negative. Musculoskeletal: Positive for joint swelling. Negative for back pain and neck pain. Skin: Negative for rash and wound. Allergic/Immunologic: Negative. Neurological: Negative for dizziness, light-headedness and headaches. Hematological: Negative. Psychiatric/Behavioral: Negative. Vitals:    01/25/17 1915   Resp: 18            Physical Exam   Constitutional: She is oriented to person, place, and time. She appears well-developed and well-nourished. No distress. HENT:   Head: Normocephalic and atraumatic. Mouth/Throat: Oropharynx is clear and moist.   Eyes: Conjunctivae are normal.   Neck: Normal range of motion. Neck supple. Cardiovascular: Normal rate, regular rhythm and normal heart sounds. Pulmonary/Chest: Effort normal and breath sounds normal. No respiratory distress. She has no wheezes. She exhibits no tenderness. Abdominal: Soft. She exhibits no distension. There is no tenderness. Musculoskeletal:   LT shoulder TTP without localization. limited ROM of shoulder due to pain. Pain with passive ROM- very restricted per patient guarding. Normal cap refill, radial/ulnar pulses 2+. No deformity or crepitus. Neurological: She is alert and oriented to person, place, and time. No cranial nerve deficit. Coordination normal.   Skin: Skin is warm and dry. No rash noted. She is not diaphoretic. Psychiatric: She has a normal mood and affect. Nursing note and vitals reviewed. MDM  Number of Diagnoses or Management Options  Diagnosis management comments: Prelim xray unremarkable for acute processes, no fracture or dislocation noted by self. Previous (10/16)MRI of left elder reveals 1. Abnormal signal in the superior labrum, concerning for tear. 2. Mild/moderate supraspinatus and infraspinatus tendinosis. .    Will have patient f/u with ortho.  +toradol for pain. Discussed proper way to take medications. Discussed treatment plan, return precautions, symptomatic relief, and expected time to improvement. All questions answered. Patient is stable for discharge and outpatient management. Amount and/or Complexity of Data Reviewed  Tests in the radiology section of CPT®: ordered and reviewed  Review and summarize past medical records: yes      ED Course       Procedures    Diagnosis:   1. Pain in joint of left shoulder          Disposition: discharge home    Follow-up Information     Follow up With Details Comments Contact Info    Angelito Loo MD In 1 week  [address] [facility]  [phone]      [facility] EMERGENCY DEPT  As needed, If symptoms worsen [address] [facility]    Fahad Gerardo MD In 1 week  [address] [facility] [address]  [address] [facility]ist [address]  [phone]            Patient's Medications   Start Taking    KETOROLAC (TORADOL) 10 MG TABLET    Take 1 Tab by mouth every six (6) hours as needed for Pain for up to 5 days. Continue Taking    ALBUTEROL (PROVENTIL HFA, VENTOLIN HFA, PROAIR HFA) 90 MCG/ACTUATION INHALER    Take 2 Puffs by inhalation every four (4) hours as needed for Wheezing.     ALBUTEROL (PROVENTIL VENTOLIN) 2.5 MG /3 ML (0.083 %) NEBULIZER SOLUTION    3 mL by Nebulization route every four (4) hours as needed for Wheezing. ASPIRIN, BUFFERED 81 MG TAB    Take  by mouth. ATORVASTATIN (LIPITOR) 80 MG TABLET    take 1 tablet by mouth once daily    BACITRACIN (BACITRACIN) 500 UNIT/GRAM OINT    apply affected area twice a day    BACLOFEN (LIORESAL) 10 MG TABLET    1  qhs  prn    BD INSULIN SYRINGE ULTRA-FINE 1 ML 31 GAUGE X 5/16 SYRG    use as directed twice a day    BD INSULIN SYRINGE ULTRA-FINE 1 ML 31 GAUGE X 5/16 SYRG    use as directed twice a day    CYANOCOBALAMIN (VITAMIN B-12) 1,000 MCG SUBLINGUAL TABLET    Take 1,000 mcg by mouth daily. CYCLOBENZAPRINE (FLEXERIL) 10 MG TABLET    Take 1 Tab by mouth three (3) times daily as needed for Muscle Spasm(s). DULOXETINE (CYMBALTA) 60 MG CAPSULE    Take 1 Cap by mouth daily. FREESTYLE LITE STRIPS STRIP    TEST twice a day as directed    GABAPENTIN (NEURONTIN) 300 MG CAPSULE    Take 1 Cap by mouth nightly.  Indications: Restless Legs Syndrome    HYDROCODONE-ACETAMINOPHEN (NORCO) 5-325 MG PER TABLET    1/2 tab  Bid prn    INSULIN GLARGINE (LANTUS) 100 UNIT/ML INJECTION    inject 50 units subcutaneously 2 x day    METOPROLOL SUCCINATE (TOPROL-XL) 50 MG XL TABLET    take 1 tablet by mouth once daily   These Medications have changed    No medications on file   Stop Taking    No medications on file

## 2017-01-26 NOTE — PROGRESS NOTES
[facility]  Interventional Pain Management Consultation History & Physical    PATIENT NAME:  Jerad Morel OF BIRTH:   1960    DATE OF SERVICE:   1/26/2017      CHIEF COMPLAINT:  Back Pain; Knee Pain; Buttocks pain; Leg Pain; Hip Pain; Shoulder Pain; and Arm Pain      HISTORY OF PRESENT ILLNESS:   John Freire presents to the pain clinic today for initial evaluation and to consider interventional pain management options as indicated for the type and location of the pain the patient is presenting with. John Freire patient presents for initial evaluation and consideration for interventional procedures as indicated. She is referred to us by Dr. Lindy Mccray for evaluation and consideration for lumbar interventional pain procedures as indicated. At today's evaluation patient complains of chronic low back pain of several years duration. She was previously managed by Dr. Gwendolynn Koyanagi at [facility] pain clinic. Apparently patient relates that Dr. Parth Cheatham and/or some of his colleagues provided the patient with shots of some sort. I cannot identify exactly what nature the shots were. Patient also received procedures that burned her nerves, I believe this refers to radiofrequency ablation procedures. Apparently the burning of the nerves took away the patient's pain almost entirely for very long periods of time over 8 months. Patient currently endorses aching and throbbing across her low back. She denies radiating leg pain at today's visit. Her current pain is 6/10 in intensity. She endorses her pain is deep aching throbbing pain, constant pain. Pain interferes with her sleep. She has had the pain for years. She denies the use of current blood thinners. She does however take a baby aspirin daily for previous history of cardiac surgery. She has no history of lumbar spine surgery.      By review of available records albeit limited, patient has a history of a pinched nerve in her low back of some sort in the past.  She received spine injections in the past for this. She also has been managed for chronic low back pain with radial frequency procedures that have been very helpful. She has worked also with physical therapy in the past with some benefit. We will try to obtain these records from her pain clinic in [facility]. We reviewed the patient's lumbar MRI using skeleton spine model. MRI was taken January 10, 2017. This is significant for multilevel chronic degenerative changes without significant central canal or neuroforaminal stenoses. Ligamentum flavum thickening and facet arthropathy noted L3-4, L4-5, L5-S1. Trace retrolisthesis L5 and S1 trace anterolisthesis L4 on L5. Mild canal narrowing L4-5. Mild bilateral foraminal stenosis L5-S1. Bilateral renal cystic lesions suspect simple cyst but not evaluated in detail noted by radiologist.     We reviewed options. Patient is presenting with a history of chronic low back pain as her primary pain complaint today. She denies radiating leg pain today she denies aching throbbing constant pain across her back. She is tried physical therapy as well as medications with poor benefit. Her lumbar MRI demonstrates lumbar facet arthropathy and mild degenerative disc disease. She has trace anterolisthesis as well as retrolisthesis on imaging studies noted by the radiologist.  Patient has had treatments at her previous pain clinic in [facility]. These have included injections of some sort which I believe are for radiating leg pain in the past.  She also relates that she had nerve burning which I believe his lumbar frequency neurotomy procedures. This nerve burning helped relieve her pain tremendously, and she would like to repeat this at our pain clinic as soon as possible.       My impression is that the patient has low back pain which is lumbar facet mediated, and due to lumbar facet arthropathy lumbar facet hypertrophy. She apparently did very well with previous lumbar radiofrequency neurotomy procedures at her pain clinic in [facility]. I discussed the nature of this procedure, including risks benefits, indications contraindications and side effects of procedure with the patient. Patient understands and wishes to proceed. She has no further questions. We will also place a consent for review and release of records from the pain clinic at the patient attended in [facility] prior to being here. I will review these records once they arrive. MRI: Lumbar MRI from January 10, 2017 reviewed using skeleton spine model. PROCEDURES: Discussed lumbar radiofrequency neurotomy procedures    MRI Results (most recent):    Results from [place] encounter on 01/10/17   MRI LUMB SPINE WO CONT   Narrative MRI Lumbar spine    History: Chronic low back pain. BMI of 40. Comparison: No prior studies    Technique: Multiplanar multi sequence MR imaging of the lumbar spine was  performed utilizing sagittal T1, T2, STIR, and axial T2 and T1 sequences. Findings:     Normal lumbar spine vertebral alignment is present without subluxation. Osseous  marrow signal is within normal limits. There are a few scattered T1 and T2  hyperintense lesions most consistent with hemangiomata. A pars defect is not  seen. The conus medullaris is located at the L1 level and has a normal  appearance and signal.    L1/2 level: Vertebral body and disc heights are maintained. There is no central  or foraminal stenosis. L2/3 level: Vertebral body and disc heights are maintained. There is no central  or foraminal stenosis. L3/4 level: Minimal ligamentum flavum thickening without no focal posterior disc  pathology . Mild facet arthropathy, mild ligamentum flavum thickening with some  encroachment on the central canal but no significant stenosis. No foraminal  narrowing.     L4/5 level: Trace anterior listhesis of L4 onto L5. Likely degenerative in  origin. No focal disc pathology. Mild facet arthropathy, anterolisthesis combine  to cause mild central canal narrowing. L5/S1 level: Trace retrolisthesis of L5 on S1. There is desiccation of the disc  with disc height loss and mild bilateral foraminal stenosis, right greater than  left. No central canal stenosis. Bilateral renal cystic lesions, suspected simple cysts but not evaluated in  detail. Impression IMPRESSION:    1. Multilevel chronic degenerative changes without significant central canal or  foraminal stenosis. Details in the body of the report. 2. Bilateral renal cystic lesions, favor benign cysts. Note:  I have personally reviewed the images and the final attending  interpretation is in concordance with the above resident report. PAST MEDICAL HISTORY:   The patient  has a past medical history of Arthritis; Chronic back pain; Depression; Diabetes ([address] Panic attacks; and Sleep apnea. PAST SURGICAL HISTORY:   The patient  has a past surgical history that includes heart valve surgery; hysterectomy; and myomectomy. CURRENT MEDICATIONS:   The patient has a current medication list which includes the following prescription(s): ketorolac, bd insulin syringe ultra-fine, insulin glargine, cyclobenzaprine, baclofen, duloxetine, gabapentin, hydrocodone-acetaminophen, bacitracin, bd insulin syringe ultra-fine, cyanocobalamin, freestyle lite strips, atorvastatin, metoprolol succinate, albuterol, albuterol, and aspirin, buffered. ALLERGIES:     Allergies   Allergen Reactions    Other Food Other (comments)     Sweeteners- causes headaches    Metformin Other (comments)     Increase pain in feet and swelling in feet    Morphine Other (comments)     headache       FAMILY HISTORY:   The patient family history includes Diabetes in her father and mother; Heart Disease in her father and mother; Stroke in her mother.     SOCIAL HISTORY:   The patient  reports that she has never smoked. She has never used smokeless tobacco. The patient  reports that she does not drink alcohol. She also  reports that she does not use illicit drugs. REVIEW OF SYSTEMS:   The patient denies fever, chills, weight loss (Constitutional), rash, itching (Skin), tinnitus, congestion (HENT), blurred vision, photophobia (Eyes), palpitations, orthopnea (Cardiovascular), hemoptysis, wheezing (Respiratory), nausea, vomiting, diarrhea (Gastrointestinal), dysuria, hematuria, urgency (Genitourinary), easy bruising, bleeding abnormalities (Hematologic), bowel or bladder incontinence, loss of consciousness (Neurologic), suicidal or homicidal ideation or hallucinations (Psychiatric). PHYSICAL EXAM:  VS:   Visit Vitals    /78 (BP 1 Location: Right arm, BP Patient Position: Sitting)    Pulse 84     General: Well-developed and well-nourished. Body habitus consistent with recorded height and weight and the calculated BMI. Apparent distress due to low back pain   Head: Normocephalic, atraumatic. Skin: Inspection of the skin reveals no rashes, lesions or infection. CV: Regular rate. No murmurs or rubs noted. No peripheral edema noted. Pulm: Respirations are even and unlabored. Extr: No clubbing, cyanosis, or edema noted. Musculoskeletal:  1. Cervical spine - Full ROM. No paraspinous tenderness at any level. There is no scoliosis, asymmetry, or musculoskeletal defect. 2. Thoracic spine - Full ROM. No paraspinous tenderness at any level. There is no scoliosis, asymmetry, or musculoskeletal defect. 3. Lumbar spine -decreased range of motion all axes . Paraspinous tenderness at bilateral lumbar levels . SI joints are nontender bilaterally. There is no scoliosis, asymmetry, or musculoskeletal defect. 4. Right upper extremity - Full ROM. 5/5 muscle strength in all muscle groups. No pain or tenderness in shoulder, elbow, wrist, or hand. 5. Left upper extremity - Full ROM. 5/5 muscle strength in all muscle groups. No pain or tenderness in shoulder, elbow, wrist, or hand. 6. Right lower extremity - Full ROM. 5/5 muscle strength in all muscle groups. No pain, tenderness, or swelling in the hip, knee, ankle or foot. 7. Left lower extremity - Full ROM. 5/5 muscle strength in all muscle groups. No pain, tenderness, or swelling in the hip, knee, ankle or foot. Neurological:  1. Mental Status - Alert, awake and oriented. Speech is clear and appropriate. 2. Cranial Nerves - Extraocular muscles intact bilaterally. Cranial nerves II-XII grossly intact bilaterally. 3. Gait - antalgic   4. Reflexes - 2+ and symmetric throughout. 5. Sensation - Intact to light touch and pin prick. 6. Provocative Tests - Spurlings negative bilaterally. Straight leg raise negative bilaterally. Psychological:  1. Mood and affect - Appropriate. 2. Speech - Appropriate. 3. Though content - Appropriate. 4. Judgment - Appropriate. ASSESSMENT:      ICD-10-CM ICD-9-CM    1. Spondylosis of lumbar region without myelopathy or radiculopathy M47.816 721.3    2. Lumbar facet arthropathy (HCC) M12.88 721.3    3. Chronic back pain, unspecified back location, unspecified back pain laterality M54.9 724.5     G89.29 338.29    4. Chronic pain syndrome G89.4 338.4    5. Lumbar degenerative disc disease M51.36 722. 52            PLAN:    1. Diagnoses/Plan: Lumbar spondylosis, lumbar facet arthropathy, chronic pain syndrome, lumbar degenerative disc disease. We reviewed options. Patient is presenting with a history of chronic low back pain as her primary pain complaint today. She denies radiating leg pain today she denies aching throbbing constant pain across her back. She is tried physical therapy as well as medications with poor benefit. Her lumbar MRI demonstrates lumbar facet arthropathy and mild degenerative disc disease.   She has trace anterolisthesis as well as retrolisthesis on imaging studies noted by the radiologist.  Patient has had treatments at her previous pain clinic in [facility]. These have included injections of some sort which I believe are for radiating leg pain in the past.  She also relates that she had nerve burning which I believe his lumbar frequency neurotomy procedures. This nerve burning helped relieve her pain tremendously, and she would like to repeat this at our pain clinic as soon as possible. My impression is that the patient has low back pain which is lumbar facet mediated, and due to lumbar facet arthropathy lumbar facet hypertrophy. She apparently did very well with previous lumbar radiofrequency neurotomy procedures at her pain clinic in [facility]. I discussed the nature of this procedure, including risks benefits, indications contraindications and side effects of procedure with the patient. Patient understands and wishes to proceed. She has no further questions. We will also place a consent for review and release of records from the pain clinic at the patient attended in [facility] prior to being here. I will review these records once they arrive. 2.    I have thoroughly discussed the risks and benefits, side effects and complications, of any and all procedures that were mentioned at today's patient visit. I have used a skeleton model for added emphasis and patient education. I have answered all questions, and I have obtained verbal confirmation for all procedures planned with the patient. 3.    I have reviewed in great detail today the patient's MRI and other imaging studies with the patient. I have explained to the patient their condition using both actual recent and relevant images. I have used a skeleton model for added emphasis as well as patient education. 4.    I have advised patient to have a primary care provider to continue care for health maintenance and general medical conditions and support for referral to specialty care as needed.   5.    I have reviewed with patient the treatment plan, goals of treatment plan, and limitations of treatment plan, to include the potential for side effects from medications and procedures. If side effects occur, it is the responsibility of the patient to inform the clinic so that a change in the treatment plan can be made in a safe manner. The patient is advised that stopping prescribed medication may cause an increase in symptoms and possible medication withdrawal symptoms. The patient is informed an emergency room evaluation may be necessary if this occurs. DISPOSITION:   The patients condition and plan were discussed at length and all questions were answered. The patient agrees with the plan. A total of 40 minutes was spent with the patient of which over half of the time was spent counseling the patient. Dimas Bunch MD 1/26/2017 4:34 PM    Note: Although these clinic notes were documented by the provider at the time of the exam, they have not been proofed and are subject to transcription variance.

## 2017-01-26 NOTE — ED NOTES
Yudy Caro is a 55 y.o. female that was discharged in good condition. The patients diagnosis, condition and treatment were explained to  patient and aftercare instructions were given. The patient verbalized understanding. Patient armband removed and shredded.

## 2017-02-10 RX ORDER — DIAZEPAM 5 MG/1
15 TABLET ORAL ONCE
Status: CANCELLED | OUTPATIENT
Start: 2017-02-13 | End: 2017-02-14

## 2017-02-10 RX ORDER — SODIUM CHLORIDE 0.9 % (FLUSH) 0.9 %
5-10 SYRINGE (ML) INJECTION AS NEEDED
Status: CANCELLED | OUTPATIENT
Start: 2017-02-13

## 2017-02-13 ENCOUNTER — APPOINTMENT (OUTPATIENT)
Dept: GENERAL RADIOLOGY | Age: 57
End: 2017-02-13
Attending: PHYSICAL MEDICINE & REHABILITATION
Payer: MEDICAID

## 2017-02-13 ENCOUNTER — HOSPITAL ENCOUNTER (OUTPATIENT)
Age: 57
Setting detail: OUTPATIENT SURGERY
Discharge: HOME | End: 2017-02-13
Attending: PHYSICAL MEDICINE & REHABILITATION | Admitting: PHYSICAL MEDICINE & REHABILITATION
Payer: MEDICAID

## 2017-02-13 VITALS
WEIGHT: 254 LBS | BODY MASS INDEX: 39.87 KG/M2 | TEMPERATURE: 98.9 F | RESPIRATION RATE: 18 BRPM | OXYGEN SATURATION: 99 % | HEART RATE: 89 BPM | HEIGHT: 67 IN | DIASTOLIC BLOOD PRESSURE: 72 MMHG | SYSTOLIC BLOOD PRESSURE: 157 MMHG

## 2017-02-13 DIAGNOSIS — L73.9 FOLLICULITIS: ICD-10-CM

## 2017-02-13 LAB — GLUCOSE BLD STRIP.AUTO-MCNC: 278 MG/DL (ref 70–110)

## 2017-02-13 PROCEDURE — 74011250637 HC RX REV CODE- 250/637: Performed by: PHYSICAL MEDICINE & REHABILITATION

## 2017-02-13 PROCEDURE — 82962 GLUCOSE BLOOD TEST: CPT

## 2017-02-13 PROCEDURE — 76010000009 HC PAIN MGT 0 TO 30 MIN PROC: Performed by: PHYSICAL MEDICINE & REHABILITATION

## 2017-02-13 PROCEDURE — 77030003672 HC NDL SPN HALY -A: Performed by: PHYSICAL MEDICINE & REHABILITATION

## 2017-02-13 PROCEDURE — 77030003665 HC NDL SPN BBMI -A: Performed by: PHYSICAL MEDICINE & REHABILITATION

## 2017-02-13 RX ORDER — SODIUM CHLORIDE 0.9 % (FLUSH) 0.9 %
5-10 SYRINGE (ML) INJECTION AS NEEDED
Status: DISCONTINUED | OUTPATIENT
Start: 2017-02-13 | End: 2017-02-13 | Stop reason: HOSPADM

## 2017-02-13 RX ORDER — LIDOCAINE HYDROCHLORIDE 10 MG/ML
INJECTION, SOLUTION EPIDURAL; INFILTRATION; INTRACAUDAL; PERINEURAL AS NEEDED
Status: DISCONTINUED | OUTPATIENT
Start: 2017-02-13 | End: 2017-02-13 | Stop reason: HOSPADM

## 2017-02-13 RX ORDER — DIAZEPAM 5 MG/1
10 TABLET ORAL ONCE
Status: COMPLETED | OUTPATIENT
Start: 2017-02-13 | End: 2017-02-13

## 2017-02-13 RX ORDER — ROPIVACAINE HYDROCHLORIDE 2 MG/ML
INJECTION, SOLUTION EPIDURAL; INFILTRATION; PERINEURAL AS NEEDED
Status: DISCONTINUED | OUTPATIENT
Start: 2017-02-13 | End: 2017-02-13 | Stop reason: HOSPADM

## 2017-02-13 RX ORDER — DIAZEPAM 5 MG/1
15 TABLET ORAL ONCE
Status: DISCONTINUED | OUTPATIENT
Start: 2017-02-13 | End: 2017-02-13

## 2017-02-13 RX ADMIN — DIAZEPAM 10 MG: 5 TABLET ORAL at 13:21

## 2017-02-13 NOTE — INTERVAL H&P NOTE
H&P Update:  Josafat Perales was seen and examined. History and physical has been reviewed. The patient has been examined.  There have been no significant clinical changes since the completion of the originally dated History and Physical.    Signed By: Krystal Mahmood MD     February 13, 2017 2:28 PM

## 2017-02-13 NOTE — DISCHARGE INSTRUCTIONS
[address] [place] [address] for Pain Management      Post Procedures Instructions    *Resume Diet and Activity as tolerated. Rest for the remainder of the day. *You may fell worse before you feel better as the numbing medications wear off before the steroids take effect if used for your procedures. *Do not use affected extremity until numbness or loss of sensation has completely resolved without assistance. *DO NOT DRIVE, operate machinery/heavey equipment for 24 hours. *DO NOT DRINK ALCOHOL for 24 hours as it may interact with the sedation if you received it and also thins your blood and may cause you to bleed. *WAIT 24 hours before starting back ANY Blood thinning medications:   (Heparin, Coumadin, Warfarin, Lovenox, Plavix, Aggrenox)    *Resume Pre-Procedure Medications as prescribed except Blood Thinners unless directed by your Physician or Cardiologist.     *Avoid Hot tubs and Heating pad for 24 hours to prevent dissipation of medications, you may shower to remove bandages and remaining prep residue on the skin. * If you develop a Headache, drink plenty of fluids including beverages with caffeine (Coffee, Mt. Dew etc.) and rest.  If the headache persists longer than 24 hoursor intensifies - Please call Center [facility] ([facility]) [phone]    * If you are DIABETIC, check your blood sugar three times a day for the next three days, the steroids will increase your blood sugar. If your blood sugar is greater than 400 have someone drive you to the nearest [address]. * If you experience any of the following problems, call the Center [facility] [phone] between 8:00 am - 4:30pm or After Hours [phone].     Shortness of breath    Fever of 101 F or higher    Nausea / Vomiting (not normal to you)    Increasing stiffness in the neck    Weakness or numbness in the arms or legs that is not resolving    Prolonged and increasing pain > than 4 days    ANYTHING OUT of the ORDINARY TO YOU    If YOU are experiencing a severe reaction / complication that you have never had before post procedure, call 911 or go to the nearest emergency room! All patients must have a  for transportation [place] regardless if you do or do not receive sedation. DISCHARGE SUMMARY from Nurse      PATIENT INSTRUCTIONS:    After Oral  or intravenous sedation, for 24 hours or while taking prescription Narcotics:  · Limit your activities  · Do not drive and operate hazardous machinery  · Do not make important personal or business decisions  · Do  not drink alcoholic beverages  · If you have not urinated within 8 hours after discharge, please contact your surgeon on call. Report the following to your surgeon:  · Excessive pain, swelling, redness or odor of or around the surgical area  · Temperature over 101  · Nausea and vomiting lasting longer than 4 hours or if unable to take medications  · Any signs of decreased circulation or nerve impairment to extremity: change in color, persistent  numbness, tingling, coldness or increase pain  · Any questions        What to do at Home:  Recommended activity: Activity as tolerated, NO DRIVING FOR 24 Hours post injection          *  Please give a list of your current medications to your Primary Care Provider. *  Please update this list whenever your medications are discontinued, doses are      changed, or new medications (including over-the-counter products) are added. *  Please carry medication information at all times in case of emergency situations. These are general instructions for a healthy lifestyle:    No smoking/ No tobacco products/ Avoid exposure to second hand smoke    Surgeon General's Warning:  Quitting smoking now greatly reduces serious risk to your health.     Obesity, smoking, and sedentary lifestyle greatly increases your risk for illness    A healthy diet, regular physical exercise & weight monitoring are important for maintaining a healthy lifestyle    You may be retaining fluid if you have a history of heart failure or if you experience any of the following symptoms:  Weight gain of 3 pounds or more overnight or 5 pounds in a week, increased swelling in our hands or feet or shortness of breath while lying flat in bed. Please call your doctor as soon as you notice any of these symptoms; do not wait until your next office visit. Recognize signs and symptoms of STROKE:    F-face looks uneven    A-arms unable to move or move unevenly    S-speech slurred or non-existent    T-time-call 911 as soon as signs and symptoms begin-DO NOT go       Back to bed or wait to see if you get better-TIME IS BRAIN.

## 2017-02-13 NOTE — TELEPHONE ENCOUNTER
2/13/2017  2:32 PM    Chief Complaint   Patient presents with    Medication Refill       Noted refill request for Bacitracin- appears to be associated with diagnosis of folliculitis. Forwarded encounter to clinical staff to call and discuss with patient. If she is having continuous issues with a rash or folliculitis, recommend that she come in for appointment. Need more information before refill completed.

## 2017-02-14 RX ORDER — BACITRACIN 500 [USP'U]/G
OINTMENT TOPICAL
Qty: 28 G | Refills: 0 | OUTPATIENT
Start: 2017-02-14

## 2017-02-16 NOTE — PROCEDURES
[facility]    DIAG LUMBAR FACET INJECTIONS  PROCEDURE REPORT      PATIENT:  Ann Mars OF BIRTH:  1960  DATE OF SERVICE:  2/13/2017  SITE:  [facility] Suite    PRE-PROCEDURE DIAGNOSIS:  See Above    POST-PROCEDURE DIAGNOSIS:  See Above                PROCEDURE:  1. Bilateral diagnostic lumbar medial branch blocks via the L3/L4,  L4/L5,  L5/S1 medial branch nerves ( 15994, 50;  14993, 50;  33734, 50 )  2. Fluoroscopic needle guidance (38322)      LEVELS TREATED:  Bilateral sided  L3/L4,  L4/L5,  L5/S1 medial branch nerves     ANESTHESIA:  See Medication Administration Record    COMPLICATIONS: None. PHYSICIAN:  Azalea Ace MD    PRE-PROCEDURE NOTE:  Pre-procedural assessment of the patient was performed including a limited history and physical examination. The details of the procedure were discussed with the patient, including the risks, benefits and alternative options and an informed consent was obtained. The patients NPO status, if necessary for the specific procedure and/or administration of moderate intravenous sedation, if utilized, and availability of a responsible adult to escort the patient following the procedure were confirmed. PROCEDURE NOTE:  The patient was brought to the procedure suite and positioned on the fluoroscopy table in the prone position. Physiologic monitors were applied and supplemental oxygen was administered via nasal cannula. The skin was prepped in the standard surgical fashion and sterile drapes were applied over the procedure site. Please refer to the Flowsheet for documentation of the patients vital signs and the Medication Administration Record for any oral and/or intravenous sedation administered prior to or during the procedure. 1% Lidocaine was utilized for local anesthesia.  Under AP fluoroscopic guidance a 25-gauge, 3-1/2 inch short bevel spinal needle was advanced to the junction of the superior articular process and transverse process of each vertebral level immediately inferior to the above-mentioned dorsal rami medial branch nerves. A needle was also placed along the sacral ala to block the L5 medial branch nerve. After all needles were placed,  0.5 mL of 0.2% ropivacaine was injected at each location after the negative aspiration of blood, air or CSF. The needles were removed and the stilets were replaced. The procedure was performed on the contralateral side in the same fashion and at the same levels using the same volume of local anesthetic following negative aspiration of blood, air or CSF. The needles were removed intact. The area was thoroughly cleaned and sterile bandages applied as necessary. The patient tolerated the procedure well and vital signs remained stable throughout the procedure. The patient was assessed immediately following the procedure and was noted to have greater than 50% reduction in pain (a reduction from 7/10 to 2/10 in severity of lumbar pain). Based on these results, this procedure will be repeated to assess if the patient is an appropriate candidate for radiofrequency ablation of the above-mentioned medial branch nerves. Based on these results, the patient is considered an appropriate candidate for radiofrequency ablation of the above-mentioned medial branch nerves and will be scheduled for that procedure. The total levels successfully blocked were 3 levels, both sides. POST-PROCEDURE COURSE:  The patient was escorted from the procedure suite in satisfactory condition and recovered per facility protocol based on the type of procedure performed and/or the sedation utilized. The patient did not experience any adverse events and remained hemodynamically stable during the post-procedure period.       DISCHARGE NOTE:  Upon discharge, the patient was able to tolerate fluids and was in no acute distress. The patient was oriented to person, place and time and vital signs were stable. Appropriate post-procedure instructions were provided and explained to the patient in detail and all questions were answered.     Boom Brunson MD 2/16/2017 7:54 AM

## 2017-02-21 RX ORDER — METOPROLOL SUCCINATE 50 MG/1
TABLET, EXTENDED RELEASE ORAL
Qty: 30 TAB | Refills: 3 | Status: SHIPPED | OUTPATIENT
Start: 2017-02-21 | End: 2017-06-20 | Stop reason: SDUPTHER

## 2017-02-22 ENCOUNTER — TELEPHONE (OUTPATIENT)
Dept: PAIN MANAGEMENT | Age: 57
End: 2017-02-22

## 2017-02-22 NOTE — TELEPHONE ENCOUNTER
Called patient to get feedback on most recent procedure. A voicemail was left detailing the information being requested. Direct contact information provided.

## 2017-02-24 RX ORDER — DIAZEPAM 5 MG/1
15 TABLET ORAL ONCE
Status: CANCELLED | OUTPATIENT
Start: 2017-02-27 | End: 2017-02-28

## 2017-02-24 RX ORDER — SODIUM CHLORIDE 0.9 % (FLUSH) 0.9 %
5-10 SYRINGE (ML) INJECTION AS NEEDED
Status: CANCELLED | OUTPATIENT
Start: 2017-02-27

## 2017-02-27 ENCOUNTER — APPOINTMENT (OUTPATIENT)
Dept: GENERAL RADIOLOGY | Age: 57
End: 2017-02-27
Attending: PHYSICAL MEDICINE & REHABILITATION
Payer: MEDICAID

## 2017-02-27 ENCOUNTER — SURGERY (OUTPATIENT)
Age: 57
End: 2017-02-27

## 2017-02-27 ENCOUNTER — HOSPITAL ENCOUNTER (OUTPATIENT)
Age: 57
Setting detail: OUTPATIENT SURGERY
Discharge: HOME | End: 2017-02-27
Attending: PHYSICAL MEDICINE & REHABILITATION | Admitting: PHYSICAL MEDICINE & REHABILITATION
Payer: MEDICAID

## 2017-02-27 VITALS
HEIGHT: 67 IN | SYSTOLIC BLOOD PRESSURE: 134 MMHG | DIASTOLIC BLOOD PRESSURE: 82 MMHG | HEART RATE: 83 BPM | BODY MASS INDEX: 39.87 KG/M2 | OXYGEN SATURATION: 95 % | TEMPERATURE: 98.6 F | WEIGHT: 254 LBS | RESPIRATION RATE: 17 BRPM

## 2017-02-27 LAB — GLUCOSE BLD STRIP.AUTO-MCNC: 188 MG/DL (ref 70–110)

## 2017-02-27 PROCEDURE — 76010000009 HC PAIN MGT 0 TO 30 MIN PROC: Performed by: PHYSICAL MEDICINE & REHABILITATION

## 2017-02-27 PROCEDURE — 74011250637 HC RX REV CODE- 250/637: Performed by: PHYSICAL MEDICINE & REHABILITATION

## 2017-02-27 PROCEDURE — 82962 GLUCOSE BLOOD TEST: CPT

## 2017-02-27 PROCEDURE — 77030003665 HC NDL SPN BBMI -A: Performed by: PHYSICAL MEDICINE & REHABILITATION

## 2017-02-27 RX ORDER — SODIUM CHLORIDE 0.9 % (FLUSH) 0.9 %
5-10 SYRINGE (ML) INJECTION AS NEEDED
Status: DISCONTINUED | OUTPATIENT
Start: 2017-02-27 | End: 2017-02-27 | Stop reason: HOSPADM

## 2017-02-27 RX ORDER — DIAZEPAM 5 MG/1
15 TABLET ORAL ONCE
Status: COMPLETED | OUTPATIENT
Start: 2017-02-27 | End: 2017-02-27

## 2017-02-27 RX ADMIN — DIAZEPAM 15 MG: 5 TABLET ORAL at 13:08

## 2017-02-27 NOTE — H&P
27 Feb 2017, 13:00PM. Patient seen and examined prior to procedure. Chart and data reviewed. No significant interval changes from previous evaluation noted. Stable for procedure as intended and discussed.  Formerly [facility]

## 2017-02-27 NOTE — DISCHARGE INSTRUCTIONS
[address] [facility]      Post Procedures Instructions    *Resume Diet and Activity as tolerated. Rest for the remainder of the day. *You may fell worse before you feel better as the numbing medications wear off before the steroids take effect if used for your procedures. *Do not use affected extremity until numbness or loss of sensation has completely resolved without assistance. *DO NOT DRIVE, operate machinery/heavey equipment for 24 hours. *DO NOT DRINK ALCOHOL for 24 hours as it may interact with the sedation if you received it and also thins your blood and may cause you to bleed. *WAIT 24 hours before starting back ANY Blood thinning medications:   (Heparin, Coumadin, Warfarin, Lovenox, Plavix, Aggrenox)    *Resume Pre-Procedure Medications as prescribed except Blood Thinners unless directed by your Physician or Cardiologist.     *Avoid Hot tubs and Heating pad for 24 hours to prevent dissipation of medications, you may shower to remove bandages and remaining prep residue on the skin. * If you develop a Headache, drink plenty of fluids including beverages with caffeine (Coffee, Mt. Dew etc.) and rest.  If the headache persists longer than 24 hoursor intensifies - Please call [facility] ([facility]) [phone]    * If you are DIABETIC, check your blood sugar three times a day for the next three days, the steroids will increase your blood sugar. If your blood sugar is greater than 400 have someone drive you to the nearest [address]. * If you experience any of the following problems, call the [facility] [phone] between 8:00 am - 4:30pm or After Hours [phone].     Shortness of breath    Fever of 101 F or higher    Nausea / Vomiting (not normal to you)    Increasing stiffness in the neck    Weakness or numbness in the arms or legs that is not resolving    Prolonged and increasing pain > than 4 days    ANYTHING OUT of the ORDINARY TO YOU    If YOU are experiencing a severe reaction / complication that you have never had before post procedure, call 911 or go to the nearest emergency room! All patients must have a  for transportation [place] regardless if you do or do not receive sedation. DISCHARGE SUMMARY from Nurse      PATIENT INSTRUCTIONS:    After Oral  or intravenous sedation, for 24 hours or while taking prescription Narcotics:  · Limit your activities  · Do not drive and operate hazardous machinery  · Do not make important personal or business decisions  · Do  not drink alcoholic beverages  · If you have not urinated within 8 hours after discharge, please contact your surgeon on call. Report the following to your surgeon:  · Excessive pain, swelling, redness or odor of or around the surgical area  · Temperature over 101  · Nausea and vomiting lasting longer than 4 hours or if unable to take medications  · Any signs of decreased circulation or nerve impairment to extremity: change in color, persistent  numbness, tingling, coldness or increase pain  · Any questions        What to do at Home:  Recommended activity: Activity as tolerated, NO DRIVING FOR 24 Hours post injection          *  Please give a list of your current medications to your Primary Care Provider. *  Please update this list whenever your medications are discontinued, doses are      changed, or new medications (including over-the-counter products) are added. *  Please carry medication information at all times in case of emergency situations. These are general instructions for a healthy lifestyle:    No smoking/ No tobacco products/ Avoid exposure to second hand smoke    Surgeon General's Warning:  Quitting smoking now greatly reduces serious risk to your health.     Obesity, smoking, and sedentary lifestyle greatly increases your risk for illness    A healthy diet, regular physical exercise & weight monitoring are important for maintaining a healthy lifestyle    You may be retaining fluid if you have a history of heart failure or if you experience any of the following symptoms:  Weight gain of 3 pounds or more overnight or 5 pounds in a week, increased swelling in our hands or feet or shortness of breath while lying flat in bed. Please call your doctor as soon as you notice any of these symptoms; do not wait until your next office visit. Recognize signs and symptoms of STROKE:    F-face looks uneven    A-arms unable to move or move unevenly    S-speech slurred or non-existent    T-time-call 911 as soon as signs and symptoms begin-DO NOT go       Back to bed or wait to see if you get better-TIME IS BRAIN.

## 2017-02-28 ENCOUNTER — DOCUMENTATION ONLY (OUTPATIENT)
Dept: PAIN MANAGEMENT | Age: 57
End: 2017-02-28

## 2017-02-28 NOTE — PROGRESS NOTES
+++++02/28/2017 12:37 pm Several messages have been left for patient to call us to give feddback about her 2 Lumbar Medial Branch Blocks. Nex procedure may have to be cancelled due to no feedback. it is a requirement by [employer] Hari Rivera ++++++++

## 2017-03-01 ENCOUNTER — DOCUMENTATION ONLY (OUTPATIENT)
Dept: PAIN MANAGEMENT | Age: 57
End: 2017-03-01

## 2017-03-01 ENCOUNTER — OFFICE VISIT (OUTPATIENT)
Dept: FAMILY MEDICINE CLINIC | Age: 57
End: 2017-03-01

## 2017-03-01 VITALS
HEART RATE: 80 BPM | RESPIRATION RATE: 18 BRPM | TEMPERATURE: 98.6 F | HEIGHT: 67 IN | BODY MASS INDEX: 41.09 KG/M2 | SYSTOLIC BLOOD PRESSURE: 143 MMHG | WEIGHT: 261.8 LBS | DIASTOLIC BLOOD PRESSURE: 78 MMHG | OXYGEN SATURATION: 95 %

## 2017-03-01 DIAGNOSIS — Z12.11 SCREEN FOR COLON CANCER: ICD-10-CM

## 2017-03-01 DIAGNOSIS — L73.9 FOLLICULITIS: ICD-10-CM

## 2017-03-01 DIAGNOSIS — E78.5 DYSLIPIDEMIA: ICD-10-CM

## 2017-03-01 DIAGNOSIS — R60.0 PEDAL EDEMA: ICD-10-CM

## 2017-03-01 DIAGNOSIS — J45.31 ASTHMATIC BRONCHITIS, MILD PERSISTENT, WITH ACUTE EXACERBATION: Primary | ICD-10-CM

## 2017-03-01 DIAGNOSIS — Z95.2 H/O AORTIC VALVE REPLACEMENT: ICD-10-CM

## 2017-03-01 DIAGNOSIS — I87.2 VENOUS STASIS DERMATITIS OF BOTH LOWER EXTREMITIES: ICD-10-CM

## 2017-03-01 RX ORDER — FUROSEMIDE 40 MG/1
TABLET ORAL
Qty: 60 TAB | Refills: 0 | Status: SHIPPED | OUTPATIENT
Start: 2017-03-01 | End: 2017-05-17 | Stop reason: SDUPTHER

## 2017-03-01 RX ORDER — POTASSIUM CHLORIDE 750 MG/1
10 TABLET, EXTENDED RELEASE ORAL DAILY
Qty: 30 TAB | Refills: 1 | Status: SHIPPED | OUTPATIENT
Start: 2017-03-01 | End: 2017-05-17 | Stop reason: SDUPTHER

## 2017-03-01 RX ORDER — LEVOFLOXACIN 500 MG/1
500 TABLET, FILM COATED ORAL DAILY
Qty: 7 TAB | Refills: 0 | Status: SHIPPED | OUTPATIENT
Start: 2017-03-01 | End: 2017-03-08

## 2017-03-01 RX ORDER — BACITRACIN 500 [USP'U]/G
OINTMENT TOPICAL
Qty: 28 G | Refills: 0 | Status: SHIPPED | OUTPATIENT
Start: 2017-03-01 | End: 2017-09-18

## 2017-03-01 RX ORDER — ALBUTEROL SULFATE 90 UG/1
2 AEROSOL, METERED RESPIRATORY (INHALATION)
Qty: 1 INHALER | Refills: 3 | Status: SHIPPED | OUTPATIENT
Start: 2017-03-01 | End: 2017-07-28 | Stop reason: SDUPTHER

## 2017-03-01 RX ORDER — FLUTICASONE PROPIONATE AND SALMETEROL 250; 50 UG/1; UG/1
1 POWDER RESPIRATORY (INHALATION) EVERY 12 HOURS
Qty: 1 INHALER | Refills: 0 | Status: SHIPPED | OUTPATIENT
Start: 2017-03-01 | End: 2017-03-08 | Stop reason: ALTCHOICE

## 2017-03-01 NOTE — PROGRESS NOTES
Chief Complaint   Patient presents with    Breathing Problem     Shortness of breath, x1 month, getting worse     Wheezing     1. Have you been to the ER, urgent care clinic since your last visit? Hospitalized since your last visit? No    2. Have you seen or consulted any other health care providers outside of the [address] [place] [address] since your last visit? Include any pap smears or colon screening. No     HPI  Miguel Barnett comes in for f/u care. 1) SOB: Patient has SOB and wheeze, ongoing for a month. She has albuterol inhaler and nebulizer that she has been using with initially transient relief but now not helping much. Occasional fever and chills. No hemoptysis or pleuritic chest pain. Has yellow sputum. Did a CXR that seems to have scattered areas of  Congestion especially bases. ? Asthmatic bronchitis, ? Pneumonia, ? Vascular congestion. Will treat with levaquin, advair and ventolin, give furosemide and f/u in 1 week. 2) DM2: Uncontrolled, was referred to endocrinologist but she has not been compliant with treatment regimen. She is on insulin but using sporadically, says her blood glucose levels been in the 200's. Her last hba1c was 11.3. 3) Pedal edema: patient has bilateral pedal edema with stasis dermatitis and areas where there is excoriation from scratching. This has resulted in folliculitis. She has used bactroban in the past with good result. Would like refill of this. Will give furosemide for the pedal edema. 4) Cardiac: she has a h/o valve replacement, give furosemide, check pro BNP and order echocardiogram.  5) Dyslipidemia: check lipid panel.         Past Medical History  Past Medical History:   Diagnosis Date    Arthritis     Lower back     Chronic back pain     Lower back pain    Depression     Diabetes ([facility] [place] [address]     Panic attacks     Sleep apnea        Surgical History  Past Surgical History:   Procedure Laterality Date    HX HEART VALVE SURGERY      Pericardial Tissue Heart Valve  HX HYSTERECTOMY      Partial Hysterectomy - removed ovary    HX MYOMECTOMY          Medications  Current Outpatient Prescriptions   Medication Sig Dispense Refill    bacitracin (BACITRACIN) 500 unit/gram oint apply affected area twice a day 28 g 0    furosemide (LASIX) 40 mg tablet Take 2 x day for 7 days then daily 60 Tab 0    levoFLOXacin (LEVAQUIN) 500 mg tablet Take 1 Tab by mouth daily for 7 days. 7 Tab 0    fluticasone-salmeterol (ADVAIR) 250-50 mcg/dose diskus inhaler Take 1 Puff by inhalation every twelve (12) hours. 1 Inhaler 0    albuterol (PROVENTIL HFA, VENTOLIN HFA, PROAIR HFA) 90 mcg/actuation inhaler Take 2 Puffs by inhalation every four (4) hours as needed for Wheezing. 1 Inhaler 3    potassium chloride (K-DUR, KLOR-CON) 10 mEq tablet Take 1 Tab by mouth daily. 30 Tab 1    metoprolol succinate (TOPROL-XL) 50 mg XL tablet take 1 tablet by mouth once daily 30 Tab 3    BD INSULIN SYRINGE ULTRA-FINE 1 mL 31 gauge x 5/16 syrg use as directed twice a day 200 Syringe 3    insulin glargine (LANTUS) 100 unit/mL injection inject 50 units subcutaneously 2 x day 30 Vial 3    baclofen (LIORESAL) 10 mg tablet 1  qhs  prn 30 Tab 1    DULoxetine (CYMBALTA) 60 mg capsule Take 1 Cap by mouth daily. 30 Cap 1    gabapentin (NEURONTIN) 300 mg capsule Take 1 Cap by mouth nightly. Indications: Restless Legs Syndrome 30 Cap 2    BD INSULIN SYRINGE ULTRA-FINE 1 mL 31 gauge x 5/16 syrg use as directed twice a day 200 Syringe 3    cyanocobalamin (VITAMIN B-12) 1,000 mcg sublingual tablet Take 1,000 mcg by mouth daily.  FREESTYLE LITE STRIPS strip TEST twice a day as directed 100 Strip 11    atorvastatin (LIPITOR) 80 mg tablet take 1 tablet by mouth once daily 30 Tab 11    albuterol (PROVENTIL VENTOLIN) 2.5 mg /3 mL (0.083 %) nebulizer solution 3 mL by Nebulization route every four (4) hours as needed for Wheezing. 24 Each 0    Aspirin, Buffered 81 mg tab Take  by mouth.       HYDROcodone-acetaminophen (NORCO) 5-325 mg per tablet 1/2 tab  Bid prn 30 Tab 0       Allergies  Allergies   Allergen Reactions    Other Food Other (comments)     Sweeteners- causes headaches    Metformin Other (comments)     Increase pain in feet and swelling in feet    Morphine Other (comments)     headache       Family History  Family History   Problem Relation Age of Onset    Heart Disease Mother     Diabetes Mother     Stroke Mother     Diabetes Father     Heart Disease Father        Social History  Social History     Social History    Marital status:      Spouse name: N/A    Number of children: N/A    Years of education: N/A     Occupational History    Not on file. Social History Main Topics    Smoking status: Never Smoker    Smokeless tobacco: Never Used    Alcohol use No    Drug use: No    Sexual activity: Not Currently     Other Topics Concern     Service No    Blood Transfusions No    Caffeine Concern No    Occupational Exposure No    Hobby Hazards No    Sleep Concern Yes     Sleep apnea     Stress Concern Yes     Due to family medical issues.      Weight Concern No    Special Diet No    Back Care Yes     Patient tries to do back care with streches     Exercise No    Bike Helmet Yes    Seat Belt Yes    Self-Exams Yes     Social History Narrative       Review of Systems  Review of Systems - History obtained from chart review and the patient  General ROS: positive for  - fatigue, fever, malaise, sleep disturbance and weight gain  negative for - night sweats  Psychological ROS: positive for - mood swings  Ophthalmic ROS: negative  ENT ROS: positive for - nasal congestion, nasal discharge and sneezing  Allergy and Immunology ROS: positive for - nasal congestion and postnasal drip  Hematological and Lymphatic ROS: negative  Endocrine ROS: DM2 uncontrolled  Respiratory ROS: positive for - cough, shortness of breath, sputum changes and wheezing  negative for - hemoptysis or pleuritic pain  Cardiovascular ROS: positive for - dyspnea on exertion and shortness of breath  Gastrointestinal ROS: no abdominal pain, change in bowel habits, or black or bloody stools  Genito-Urinary ROS: no dysuria, trouble voiding, or hematuria  Musculoskeletal ROS: positive for - joint pain, muscle pain and pain in back - lower, upper and shoulder - bilateral  Neurological ROS: no TIA or stroke symptoms  Dermatological ROS: stasis dermatitis    Vital Signs  Visit Vitals    /78 (BP 1 Location: Right arm, BP Patient Position: Sitting)    Pulse 80    Temp 98.6 °F (37 °C) (Oral)    Resp 18    Ht 5' 7\" (1.702 m)    Wt 261 lb 12.8 oz (118.8 kg)    SpO2 95%    BMI 41 kg/m2         Physical Exam  Physical Examination: General appearance - oriented to person, place, and time, in mild to moderate distress, chronically ill appearing and audible wheeze  Mental status - alert, oriented to person, place, and time, affect appropriate to mood  Mouth - mucous membranes moist, pharynx normal without lesions  Neck - supple, no significant adenopathy  Lymphatics - no palpable lymphadenopathy  Chest - wheezing noted scattered lung fields, decreased air entry noted bilateral bases  Heart - S1 and S2 normal, systolic murmur 3/6 at lower left sternal border, no JVD  Back exam - limited range of motion, pain with motion noted during exam  Neurological - alert, oriented, normal speech, no focal findings or movement disorder noted  Musculoskeletal - bilateral shoulder pain  Extremities - pedal edema 1 +, intact peripheral pulses, venous stasis dermatitis noted, no ulcers, gangrene or atrophic changes, no calf tenderness    Diagnostics  Orders Placed This Encounter    XR CHEST PA LAT     Standing Status:   Future     Number of Occurrences:   1     Standing Expiration Date:   4/1/2018     Order Specific Question:   Reason for Exam     Answer:   wheeze, cough     Order Specific Question:   Is Patient Allergic to Contrast Dye? Answer:   Unknown    CBC WITH AUTOMATED DIFF     Standing Status:   Future     Standing Expiration Date:   8/7/0815    METABOLIC PANEL, COMPREHENSIVE     Standing Status:   Future     Standing Expiration Date:   3/2/2018    PRO-BNP     Standing Status:   Future     Standing Expiration Date:   3/2/2018    LIPID PANEL     Standing Status:   Future     Standing Expiration Date:   3/2/2018    2D ECHO COMPLETE ADULT (TTE) W OR WO CONTR     Standing Status:   Future     Standing Expiration Date:   9/1/2017     Order Specific Question:   Reason for Exam:     Answer:   pedal edema with h/o aortic valve repair     Order Specific Question:   Contrast Enhancement (Bubble Study, Definity, Optison) may be used if criteria listed in established evidence-based protocol has been identified. Answer: Yes    bacitracin (BACITRACIN) 500 unit/gram oint     Sig: apply affected area twice a day     Dispense:  28 g     Refill:  0    furosemide (LASIX) 40 mg tablet     Sig: Take 2 x day for 7 days then daily     Dispense:  60 Tab     Refill:  0    levoFLOXacin (LEVAQUIN) 500 mg tablet     Sig: Take 1 Tab by mouth daily for 7 days. Dispense:  7 Tab     Refill:  0    fluticasone-salmeterol (ADVAIR) 250-50 mcg/dose diskus inhaler     Sig: Take 1 Puff by inhalation every twelve (12) hours. Dispense:  1 Inhaler     Refill:  0    albuterol (PROVENTIL HFA, VENTOLIN HFA, PROAIR HFA) 90 mcg/actuation inhaler     Sig: Take 2 Puffs by inhalation every four (4) hours as needed for Wheezing. Dispense:  1 Inhaler     Refill:  3    potassium chloride (K-DUR, KLOR-CON) 10 mEq tablet     Sig: Take 1 Tab by mouth daily. Dispense:  30 Tab     Refill:  1         Results  Results for orders placed or performed during the hospital encounter of 02/27/17   GLUCOSE, POC   Result Value Ref Range    Glucose (POC) 188 (H) 70 - 110 mg/dL       ASSESSMENT and PLAN    ICD-10-CM ICD-9-CM    1.  Asthmatic bronchitis, mild persistent, with acute exacerbation J45.31 493.92 XR CHEST PA LAT      levoFLOXacin (LEVAQUIN) 500 mg tablet      fluticasone-salmeterol (ADVAIR) 250-50 mcg/dose diskus inhaler      albuterol (PROVENTIL HFA, VENTOLIN HFA, PROAIR HFA) 90 mcg/actuation inhaler   2. Folliculitis P88.3 505.5 bacitracin (BACITRACIN) 500 unit/gram oint   3. Dyslipidemia E78.5 272.4 LIPID PANEL      LIPID PANEL   4. Uncontrolled type 2 diabetes mellitus with other specified complication (HCC) Y07.95  CBC WITH AUTOMATED DIFF    G83.74  METABOLIC PANEL, COMPREHENSIVE      CBC WITH AUTOMATED DIFF      METABOLIC PANEL, COMPREHENSIVE   5. H/O aortic valve replacement Z95.2 V43.3 2D ECHO COMPLETE ADULT (TTE) W OR WO CONTR   6. Pedal edema R60.0 782.3 PRO-BNP      furosemide (LASIX) 40 mg tablet      potassium chloride (K-DUR, KLOR-CON) 10 mEq tablet      PRO-BNP   7. Screen for colon cancer Z12.11 V76.51 OCCULT BLOOD, IMMUNOASSAY (FIT)   8. Venous stasis dermatitis of both lower extremities I83.11 454.1     I83.12       lab results and schedule of future lab studies reviewed with patient  reviewed diet, exercise and weight control  cardiovascular risk and specific lipid/LDL goals reviewed  reviewed medications and side effects in detail  specific diabetic recommendations: low cholesterol diet, weight control and daily exercise discussed, home glucose monitoring emphasized, all medications, side effects and compliance discussed carefully, glycohemoglobin and other lab monitoring discussed and long term diabetic complications discussed  radiology results and schedule of future radiology studies reviewed with patient  Patient declined to have nebulizer treatment despite being advised to get one while she was here. Says she had just done a treatment prior to coming here and that did not help much. I have discussed the diagnosis with the patient and the intended plan of care as seen in the above orders.  The patient has received an after-visit summary and questions were answered concerning future plans. I have discussed medication, side effects, and warnings with the patient in detail. The patient verbalized understanding and is in agreement with the plan of care. The patient will contact the office with any additional concerns.     Madeleine Guevara MD

## 2017-03-01 NOTE — PATIENT INSTRUCTIONS
Asthma: Your Action Plan  Sample Action Plan  Controller medicine action plan  Fill in the blank spaces and boxes that apply for all sections. · Name of your controller medicine:  ¨ ____________________________________________  · How much of this medicine do you take? ¨ ____________________________________________  · How often do you take this medicine? ¨ ____________________________________________  · Other instructions? ¨ ____________________________________________  Quick-relief medicine action plan  · Name of your quick-relief medicine:  ¨ ____________________________________________  · How much of this medicine do you take? ¨ ____________________________________________  · How often do you take this medicine? ¨ ____________________________________________  Asthma Zones  GREEN ZONE: This is where you want to be! Green zone symptoms  · You have no shortness of breath or chest tightness. You are not coughing or wheezing. · You can do all of your usual activities. · You sleep well at night. Green zone peak flow (if you use a peak flow meter)  · ______ or more (80% or more of your personal best)  Green zone actions (Check the boxes and fill in the blank spaces that apply.)  [ ] You take your controller medicine(s) every day. [ ] Kendy Ortiz are staying away from your asthma triggers. [ ] You take quick-relief medicine (called _____________________) ______ minutes before exercise. YELLOW ZONE: Your asthma is getting worse. Yellow zone symptoms  · You are short of breath or have chest tightness. You are coughing or wheezing. · You have symptoms that keep you up at night. · You can do some, but not all, of your usual activities. Yellow zone peak flow (if you use a peak flow meter)  · ______ to ______ (50% to 79% of your personal best)  Yellow zone actions (Check the boxes and fill in the blank spaces that apply.)  [ ] Take _____ puff(s) of quick-relief medicine called ______________________.  Repeat _____ times.  [ ] If your symptoms don't get better or your peak flow has not returned to the green zone in 1 hour, then:  · [ ] Take _____ puff(s) of medicine called ______________________. Take it ____ times a day. · [ ] Begin or increase treatment with corticosteroid pills. Take ______ mg of medicine called ____________________________ every __________. · [ ] Call your doctor at this number: ____________________. RED ZONE: Danger! Red zone symptoms  · You are very short of breath. · You can't do your usual activities. · Quick-relief medicine doesn't help. Or your symptoms don't get better after 24 hours in the yellow zone. Red zone peak flow (if you use a peak flow meter)  · Less than _______ (less than 50% of your personal best)  Red zone actions (Check the boxes and fill in the blank spaces that apply.)  [ ] Take _____ puff(s) of quick-relief medicine called ____________________________. Repeat ______ times. [ ] Begin or increase treatment with corticosteroid pills. Take ________ mg now. [ ] Call your doctor at this number: _________________. If you can't contact your doctor, go to the emergency department. Call 911 or ___________________. [ ] Other numbers you might call are: ___________________________________. When should you call for help? Call 911 anytime you think you may need emergency care. For example, call if:  · You have severe trouble breathing. Call your doctor now or seek immediate medical care if:  · You are in the red zone of your asthma action plan. · You've used your quick-relief medicine but are still having trouble breathing. · You cough up blood. · You have new or worse trouble breathing. · You cough up dark brown or bloody mucus (sputum). Watch closely for changes in your health, and be sure to contact your doctor if:  · You need to use quick-relief medicine more than 2 days each week (unless it's just for exercise). · Your coughing and wheezing get worse.   Follow-up care is a key part of your treatment and safety. Be sure to make and go to all appointments, and call your doctor if you are having problems. It's also a good idea to know your test results and keep a list of the medicines you take. Where can you learn more? Go to http://nadya-irasema.info/. Enter 13 [id number] in the search box to learn more about \"Asthma: Your Action Plan. \"  Current as of: July 29, 2016  Content Version: 11.1  © 2732-8437 [employer]. Care instructions adapted under license by [employer] (which disclaims liability or warranty for this information). If you have questions about a medical condition or this instruction, always ask your healthcare professional. [address] any warranty or liability for your use of this information. Leg and Ankle Edema: Care Instructions  Your Care Instructions  Swelling in the legs, ankles, and feet is called edema. It is common after you sit or stand for a while. Long plane flights or car rides often cause swelling in the legs and feet. You may also have swelling if you have to stand for long periods of time at your job. Problems with the veins in the legs (varicose veins) and changes in hormones can also cause swelling. Sometimes the swelling in the ankles and feet is caused by a more serious problem, such as heart failure, infection, blood clots, or liver or kidney disease. Follow-up care is a key part of your treatment and safety. Be sure to make and go to all appointments, and call your doctor if you are having problems. Its also a good idea to know your test results and keep a list of the medicines you take. How can you care for yourself at home? · If your doctor gave you medicine, take it as prescribed. Call your doctor if you think you are having a problem with your medicine. · Whenever you are resting, raise your legs up. Try to keep the swollen area higher than the level of your heart.   · Take breaks from standing or sitting in one position. ¨ Walk around to increase the blood flow in your lower legs. ¨ Move your feet and ankles often while you stand, or tighten and relax your leg muscles. · Wear support stockings. Put them on in the morning, before swelling gets worse. · Eat a balanced diet. Lose weight if you need to. · Limit the amount of salt (sodium) in your diet. Salt holds fluid in the body and may increase swelling. When should you call for help? Call 911 anytime you think you may need emergency care. For example, call if:  · You have symptoms of a blood clot in your lung (called a pulmonary embolism). These may include:  ¨ Sudden chest pain. ¨ Trouble breathing. ¨ Coughing up blood. Call your doctor now or seek immediate medical care if:  · You have signs of a blood clot, such as:  ¨ Pain in your calf, back of the knee, thigh, or groin. ¨ Redness and swelling in your leg or groin. · You have symptoms of infection, such as:  ¨ Increased pain, swelling, warmth, or redness. ¨ Red streaks or pus. ¨ A fever. Watch closely for changes in your health, and be sure to contact your doctor if:  · Your swelling is getting worse. · You have new or worsening pain in your legs. · You do not get better as expected. Where can you learn more? Go to http://nadya-irasema.info/. Enter Q979 in the search box to learn more about \"Leg and Ankle Edema: Care Instructions. \"  Current as of: May 27, 2016  Content Version: 11.1  © 7000-2629 [employer]. Care instructions adapted under license by [employer] (which disclaims liability or warranty for this information). If you have questions about a medical condition or this instruction, always ask your healthcare professional. [place] [address] any warranty or liability for your use of this information.

## 2017-03-01 NOTE — PROGRESS NOTES
Ms. Sherrie Solomon  was contacted for follow-up status post B/L L MBB  on 02/13/2017  Ms. Garner Priscilla reports:    Pre-procedure numerical pain score: 10/10  Post-procedure numerical pain score immediately after: 3/10  Duration of relief post-procedure (if applicable): 8 hours  Improvement in functional activities (if applicable): Yes  Percentage of overall improvement: 85    COMMENTS: Patient stated that she was able to walk without her cane, was able to  front of her stove and prepare her meal. There was great improvement.  \"like magic\"

## 2017-03-01 NOTE — PROGRESS NOTES
Ms. Sheri Rush  was contacted for follow-up status post B/L L MBB at L3-L5 on  02/27/201  Ms. Sudhir Esqueda reports:    Pre-procedure numerical pain score: 10/10  Post-procedure numerical pain score immediately after: 3/10  Duration of relief post-procedure (if applicable): 6 hours  Improvement in functional activities (if applicable): Yes  Percentage of overall improvement: 80%    COMMENTS: Ms. Sudhir Esqueda stated that as her first Blocks she was able to walk without her cane. Easier to get up and walk around. She is looking to have her RFA.

## 2017-03-01 NOTE — MR AVS SNAPSHOT
Visit Information Date & Time Provider Department Dept. Phone Encounter #  
 3/1/2017  1:30 PM Hesed Ama Quiros MD [facility] [phone] [id number] Follow-up Instructions Return in about 1 week (around 3/8/2017), or if symptoms worsen or fail to improve, for edema, SOB, wheeze, DM2. Your Appointments 3/2/2017 11:30 AM  
Follow Up with Lisseth Tyler MD  
[facility], [address] [facility] ([facility]) Appt Note: lt shoulder 2 mo fu; 2/14/17*ALL NUMBERS ON FILE FOR THE PT. HAVE BEEN CALLED AND VOICEMAILS HAVE BEEN LEFT TO TRY AND RESCHEDULE THIS APPT- DC; 2/15/17: ALL NUMBERS ON FILE FOR THE PT. HAVE BEEN CALLED AND VOICEMAILS HAVE BEEN LEFT TO TRY AND RESCHEDULE THIS APPT- DC; lt shoulder 2 mo fu  
 [address], [address] [facility]  
[phone] [address], [address]  
  
    
 3/6/2017  1:20 PM  
PROCEDURE with Thompson Hamm MD  
[facility] ([facility] SCHEDULING) Appt Note: LT L RFA @ L3-5 per Radha. ... KU (1 of 2 RFA) [address] [facility] [address] [facility]  
[phone]  
  
   
 [facility] [address]  
  
    
 3/8/2017  4:00 PM  
Follow Up with Karissa Pompa MD  
[facility]) Appt Note: 2 mth; +++++02/28/2017 12:37 pm Several messages have been left for patient to call us to give feddback about her 2 Lumbar Medial Branch Blocks. Nex procedure may have to be cancelled due to no feedback. it is a requirement by Ins[employer].... Penny Rivera ++++++++; pt r/s due not feeling well 30 [facility] [id number] [phone] [facility] [address] [id number]  
  
    
 3/20/2017  1:20 PM  
PROCEDURE with MD JULIA Forrest [facility] ([facility] SCHEDULING) Appt Note: RT L RFA @ L3-5 per Radah. ... DOT (2 of 2 RFA) [address] [facility] [address] [place] [address]  
[phone]  
  
    
 3/28/2017  2:30 PM  
Office Visit with Myke Almeida MD  
[facility]) Appt Note: Post procedure f/u  
 [address] [place] [address]  
[phone] Sharon Pina [address] [address] 4/12/2017 10:15 AM  
ROUTINE CARE with Angelito Paz MD  
[facility] ([facility] Appt Note: rov for DM2, adhesive capsulitis. [address]  
  
   
 [address] [facility] Upcoming Health Maintenance Date Due  
 BREAST CANCER SCRN MAMMOGRAM 4/13/2011 FOBT Q 1 YEAR AGE 50-75 4/13/2011 EYE EXAM RETINAL OR DILATED Q1 12/11/2016 LIPID PANEL Q1 3/17/2017 HEMOGLOBIN A1C Q6M 7/11/2017 FOOT EXAM Q1 12/14/2017 MICROALBUMIN Q1 1/11/2018 PAP AKA CERVICAL CYTOLOGY 11/13/2018 DTaP/Tdap/Td series (2 - Td) 11/11/2026 Allergies as of 3/1/2017  Review Complete On: 3/1/2017 By: Shanika Landeros MD  
  
 Severity Noted Reaction Type Reactions Other Food  03/17/2016    Other (comments) Sweeteners- causes headaches Metformin  11/06/2015    Other (comments) Increase pain in feet and swelling in feet Morphine  01/25/2017    Other (comments)  
 headache Current Immunizations  Never Reviewed No immunizations on file. Not reviewed this visit You Were Diagnosed With   
  
 Codes Comments Asthmatic bronchitis, mild persistent, with acute exacerbation    -  Primary ICD-10-CM: J45.31 
ICD-9-CM: 493.92 Folliculitis     AWK-23-YG: L73.9 ICD-9-CM: 704.8 Dyslipidemia     ICD-10-CM: E78.5 ICD-9-CM: 272.4 Uncontrolled type 2 diabetes mellitus with other specified complication [facility])     PCA-72-XC: E11.69, E11.65   
 H/O aortic valve replacement     ICD-10-CM: Z95.2 ICD-9-CM: V43.3 Pedal edema     ICD-10-CM: R60.0 ICD-9-CM: 291. 3 Vitals BP  
  
  
  
  
  
 143/78 (BP 1 Location: Right arm, BP Patient Position: Sitting) BMI and BSA Data Body Mass Index Body Surface Area 41 kg/m 2 2.37 m 2 Preferred Pharmacy Pharmacy Name Phone [phone], [address] Your Updated Medication List  
  
   
This list is accurate as of: 3/1/17  2:33 PM.  Always use your most recent med list.  
  
  
  
  
 * albuterol 2.5 mg /3 mL (0.083 %) nebulizer solution Commonly known as:  PROVENTIL VENTOLIN  
3 mL by Nebulization route every four (4) hours as needed for Wheezing. * albuterol 90 mcg/actuation inhaler Commonly known as:  PROVENTIL HFA, VENTOLIN HFA, PROAIR HFA Take 2 Puffs by inhalation every four (4) hours as needed for Wheezing. aspirin, buffered 81 mg Tab Take  by mouth. atorvastatin 80 mg tablet Commonly known as:  LIPITOR  
take 1 tablet by mouth once daily  
  
 bacitracin 500 unit/gram Oint Commonly known as:  BACITRACIN  
apply affected area twice a day  
  
 baclofen 10 mg tablet Commonly known as:  LIORESAL  
1  qhs  prn * BD INSULIN SYRINGE ULTRA-FINE 1 mL 31 gauge x 5/16 Syrg Generic drug:  Insulin Syringe-Needle U-100  
use as directed twice a day * BD INSULIN SYRINGE ULTRA-FINE 1 mL 31 gauge x 5/16 Syrg Generic drug:  Insulin Syringe-Needle U-100  
use as directed twice a day DULoxetine 60 mg capsule Commonly known as:  CYMBALTA Take 1 Cap by mouth daily. fluticasone-salmeterol 250-50 mcg/dose diskus inhaler Commonly known as:  ADVAIR Take 1 Puff by inhalation every twelve (12) hours. FREESTYLE LITE STRIPS strip Generic drug:  glucose blood VI test strips TEST twice a day as directed  
  
 furosemide 40 mg tablet Commonly known as:  LASIX Take 2 x day for 7 days then daily  
  
 gabapentin 300 mg capsule Commonly known as:  NEURONTIN  
 Take 1 Cap by mouth nightly. Indications: Restless Legs Syndrome HYDROcodone-acetaminophen 5-325 mg per tablet Commonly known as:  [place]  
1/2 tab  Bid prn LANTUS 100 unit/mL injection Generic drug:  insulin glargine  
inject 50 units subcutaneously 2 x day  
  
 levoFLOXacin 500 mg tablet Commonly known as:  Arlyss Rodriguez Take 1 Tab by mouth daily for 7 days. metoprolol succinate 50 mg XL tablet Commonly known as:  TOPROL-XL  
take 1 tablet by mouth once daily  
  
 potassium chloride 10 mEq tablet Commonly known as:  K-DUR, KLOR-CON Take 1 Tab by mouth daily. VITAMIN B-12 1,000 mcg sublingual tablet Generic drug:  cyanocobalamin Take 1,000 mcg by mouth daily. * Notice: This list has 4 medication(s) that are the same as other medications prescribed for you. Read the directions carefully, and ask your doctor or other care provider to review them with you. Prescriptions Sent to Pharmacy Refills  
 bacitracin (BACITRACIN) 500 unit/gram oint 0 Sig: apply affected area twice a day Class: Normal  
 Pharmacy: [address] [place] [address] Ph #: [phone]  
 furosemide (LASIX) 40 mg tablet 0 Sig: Take 2 x day for 7 days then daily Class: Normal  
 Pharmacy: [address] [place] [address] Ph #: [phone]  
 levoFLOXacin (LEVAQUIN) 500 mg tablet 0 Sig: Take 1 Tab by mouth daily for 7 days. Class: Normal  
 Pharmacy: [address] [place] [address] Ph #: [phone] Route: Oral  
 fluticasone-salmeterol (ADVAIR) 250-50 mcg/dose diskus inhaler 0 Sig: Take 1 Puff by inhalation every twelve (12) hours. Class: Normal  
 Pharmacy: [address] [place] [address] Ph #: [phone]  Route: Inhalation  
 albuterol (PROVENTIL HFA, VENTOLIN HFA, PROAIR HFA) 90 mcg/actuation inhaler 3  
 Sig: Take 2 Puffs by inhalation every four (4) hours as needed for Wheezing. Class: Normal  
 Pharmacy: [address] [facility], [address] [facility] Ph #: [phone] Route: Inhalation  
 potassium chloride (K-DUR, KLOR-CON) 10 mEq tablet 1 Sig: Take 1 Tab by mouth daily. Class: Normal  
 Pharmacy: [address] [facility], [address] [facility] Ph #: [phone] Route: Oral  
  
Follow-up Instructions Return in about 1 week (around 3/8/2017), or if symptoms worsen or fail to improve, for edema, SOB, wheeze, DM2. To-Do List   
 03/01/2017 ECHO:  2D ECHO COMPLETE ADULT (TTE) W OR WO CONTR   
  
 03/01/2017 Lab:  CBC WITH AUTOMATED DIFF   
  
 03/01/2017 Lab:  LIPID PANEL   
  
 03/01/2017 Lab:  METABOLIC PANEL, COMPREHENSIVE   
  
 03/01/2017 Lab:  PRO-BNP   
  
 03/01/2017 Imaging:  XR CHEST PA LAT Patient Instructions Asthma: Your Action Plan Sample Action Plan Controller medicine action plan Fill in the blank spaces and boxes that apply for all sections. · Name of your controller medicine: 
¨ ____________________________________________ · How much of this medicine do you take? ¨ ____________________________________________ · How often do you take this medicine? ¨ ____________________________________________ · Other instructions? ¨ ____________________________________________ Quick-relief medicine action plan · Name of your quick-relief medicine: 
¨ ____________________________________________ · How much of this medicine do you take? ¨ ____________________________________________ · How often do you take this medicine? ¨ ____________________________________________ Asthma Zones GREEN ZONE: This is where you want to be! Green zone symptoms · You have no shortness of breath or chest tightness. You are not coughing or wheezing. · You can do all of your usual activities. · You sleep well at night. Green zone peak flow (if you use a peak flow meter) · ______ or more (80% or more of your personal best) Green zone actions (Check the boxes and fill in the blank spaces that apply.) [ ] You take your controller medicine(s) every day. [ ] Arlen Cisneros are staying away from your asthma triggers. [ ] You take quick-relief medicine (called _____________________) ______ minutes before exercise. YELLOW ZONE: Your asthma is getting worse. Yellow zone symptoms · You are short of breath or have chest tightness. You are coughing or wheezing. · You have symptoms that keep you up at night. · You can do some, but not all, of your usual activities. Yellow zone peak flow (if you use a peak flow meter) · ______ to ______ (50% to 79% of your personal best) Yellow zone actions (Check the boxes and fill in the blank spaces that apply.) [ ] Take _____ puff(s) of quick-relief medicine called ______________________. Repeat _____ times. [ ] If your symptoms don't get better or your peak flow has not returned to the green zone in 1 hour, then: · [ ] Take _____ puff(s) of medicine called ______________________. Take it ____ times a day. · [ ] Begin or increase treatment with corticosteroid pills. Take ______ mg of medicine called ____________________________ every __________. · [ ] Call your doctor at this number: ____________________. RED ZONE: Danger! Red zone symptoms · You are very short of breath. · You can't do your usual activities. · Quick-relief medicine doesn't help. Or your symptoms don't get better after 24 hours in the yellow zone. Red zone peak flow (if you use a peak flow meter) · Less than _______ (less than 50% of your personal best) Red zone actions (Check the boxes and fill in the blank spaces that apply.) [ ] Take _____ puff(s) of quick-relief medicine called ____________________________. Repeat ______ times. [ ] Begin or increase treatment with corticosteroid pills. Take ________ mg now. [ ] Call your doctor at this number: _________________. If you can't contact your doctor, go to the emergency department. Call 911 or ___________________. [ ] Other numbers you might call are: ___________________________________. When should you call for help? Call 911 anytime you think you may need emergency care. For example, call if: 
· You have severe trouble breathing. Call your doctor now or seek immediate medical care if: 
· You are in the red zone of your asthma action plan. · You've used your quick-relief medicine but are still having trouble breathing. · You cough up blood. · You have new or worse trouble breathing. · You cough up dark brown or bloody mucus (sputum). Watch closely for changes in your health, and be sure to contact your doctor if: 
· You need to use quick-relief medicine more than 2 days each week (unless it's just for exercise). · Your coughing and wheezing get worse. Follow-up care is a key part of your treatment and safety. Be sure to make and go to all appointments, and call your doctor if you are having problems. It's also a good idea to know your test results and keep a list of the medicines you take. Where can you learn more? Go to http://nadya-irasema.info/. Enter 24 07 99 in the search box to learn more about \"Asthma: Your Action Plan. \" Current as of: July 29, 2016 Content Version: 11.1 © 6445-8252 [employer]. Care instructions adapted under license by [employer] (which disclaims liability or warranty for this information). If you have questions about a medical condition or this instruction, always ask your healthcare professional. [address] any warranty or liability for your use of this information. Leg and Ankle Edema: Care Instructions Your Care Instructions Swelling in the legs, ankles, and feet is called edema.  It is common after you sit or stand for a while. Long plane flights or car rides often cause swelling in the legs and feet. You may also have swelling if you have to stand for long periods of time at your job. Problems with the veins in the legs (varicose veins) and changes in hormones can also cause swelling. Sometimes the swelling in the ankles and feet is caused by a more serious problem, such as heart failure, infection, blood clots, or liver or kidney disease. Follow-up care is a key part of your treatment and safety. Be sure to make and go to all appointments, and call your doctor if you are having problems. Its also a good idea to know your test results and keep a list of the medicines you take. How can you care for yourself at home? · If your doctor gave you medicine, take it as prescribed. Call your doctor if you think you are having a problem with your medicine. · Whenever you are resting, raise your legs up. Try to keep the swollen area higher than the level of your heart. · Take breaks from standing or sitting in one position. ¨ Walk around to increase the blood flow in your lower legs. ¨ Move your feet and ankles often while you stand, or tighten and relax your leg muscles. · Wear support stockings. Put them on in the morning, before swelling gets worse. · Eat a balanced diet. Lose weight if you need to. · Limit the amount of salt (sodium) in your diet. Salt holds fluid in the body and may increase swelling. When should you call for help? Call 911 anytime you think you may need emergency care. For example, call if: 
· You have symptoms of a blood clot in your lung (called a pulmonary embolism). These may include: 
¨ Sudden chest pain. ¨ Trouble breathing. ¨ Coughing up blood. Call your doctor now or seek immediate medical care if: 
· You have signs of a blood clot, such as: 
¨ Pain in your calf, back of the knee, thigh, or groin. ¨ Redness and swelling in your leg or groin. · You have symptoms of infection, such as: 
¨ Increased pain, swelling, warmth, or redness. ¨ Red streaks or pus. ¨ A fever. Watch closely for changes in your health, and be sure to contact your doctor if: 
· Your swelling is getting worse. · You have new or worsening pain in your legs. · You do not get better as expected. Where can you learn more? Go to http://nadya-irasema.info/. Enter Z660 in the search box to learn more about \"Leg and Ankle Edema: Care Instructions. \" Current as of: May 27, 2016 Content Version: 11.1 © 6779-4484 [employer]. Care instructions adapted under license by [employer] (which disclaims liability or warranty for this information). If you have questions about a medical condition or this instruction, always ask your healthcare professional. [address] any warranty or liability for your use of this information. Introducing [facility]! Dear Babar Colby: Thank you for requesting a [employer] account. Our records indicate that you already have an active [employer] account. You can access your account anytime at [email or web address] Did you know that you can access your hospital and ER discharge instructions at any time in [employer]? You can also review all of your test results from your hospital stay or ER visit. Additional Information If you have questions, please visit the Frequently Asked Questions section of the [employer] website at [email or web address] Remember, [employer] is NOT to be used for urgent needs. For medical emergencies, dial 911. Now available from your iPhone and Android! Please provide this summary of care documentation to your next provider. Your primary care clinician is listed as Angelito Perkins. If you have any questions after today's visit, please call [phone].

## 2017-03-03 LAB
ALBUMIN SERPL-MCNC: 3.9 G/DL (ref 3.5–5.5)
ALBUMIN/GLOB SERPL: 1.3 {RATIO} (ref 1.1–2.5)
ALP SERPL-CCNC: 155 IU/L (ref 39–117)
ALT SERPL-CCNC: 21 IU/L (ref 0–32)
AST SERPL-CCNC: 18 IU/L (ref 0–40)
BASOPHILS # BLD AUTO: 0.1 X10E3/UL (ref 0–0.2)
BASOPHILS NFR BLD AUTO: 1 %
BILIRUB SERPL-MCNC: 0.8 MG/DL (ref 0–1.2)
BUN SERPL-MCNC: 12 MG/DL (ref 6–24)
BUN/CREAT SERPL: 17 (ref 9–23)
CALCIUM SERPL-MCNC: 8.7 MG/DL (ref 8.7–10.2)
CHLORIDE SERPL-SCNC: 96 MMOL/L (ref 96–106)
CHOLEST SERPL-MCNC: 109 MG/DL (ref 100–199)
CO2 SERPL-SCNC: 24 MMOL/L (ref 18–29)
CREAT SERPL-MCNC: 0.69 MG/DL (ref 0.57–1)
EOSINOPHIL # BLD AUTO: 0.1 X10E3/UL (ref 0–0.4)
EOSINOPHIL NFR BLD AUTO: 1 %
ERYTHROCYTE [DISTWIDTH] IN BLOOD BY AUTOMATED COUNT: 14.8 % (ref 12.3–15.4)
GLOBULIN SER CALC-MCNC: 3 G/DL (ref 1.5–4.5)
GLUCOSE SERPL-MCNC: 323 MG/DL (ref 65–99)
HCT VFR BLD AUTO: 44.9 % (ref 34–46.6)
HDLC SERPL-MCNC: 29 MG/DL
HGB BLD-MCNC: 14.4 G/DL (ref 11.1–15.9)
IMM GRANULOCYTES # BLD: 0 X10E3/UL (ref 0–0.1)
IMM GRANULOCYTES NFR BLD: 0 %
INTERPRETATION, 910389: NORMAL
LDLC SERPL CALC-MCNC: 25 MG/DL (ref 0–99)
LYMPHOCYTES # BLD AUTO: 2.1 X10E3/UL (ref 0.7–3.1)
LYMPHOCYTES NFR BLD AUTO: 22 %
Lab: NORMAL
MCH RBC QN AUTO: 29.6 PG (ref 26.6–33)
MCHC RBC AUTO-ENTMCNC: 32.1 G/DL (ref 31.5–35.7)
MCV RBC AUTO: 92 FL (ref 79–97)
MONOCYTES # BLD AUTO: 0.5 X10E3/UL (ref 0.1–0.9)
MONOCYTES NFR BLD AUTO: 5 %
NEUTROPHILS # BLD AUTO: 6.8 X10E3/UL (ref 1.4–7)
NEUTROPHILS NFR BLD AUTO: 71 %
NT-PROBNP SERPL-MCNC: 117 PG/ML (ref 0–287)
PLATELET # BLD AUTO: 244 X10E3/UL (ref 150–379)
POTASSIUM SERPL-SCNC: 4 MMOL/L (ref 3.5–5.2)
PROT SERPL-MCNC: 6.9 G/DL (ref 6–8.5)
RBC # BLD AUTO: 4.86 X10E6/UL (ref 3.77–5.28)
SODIUM SERPL-SCNC: 139 MMOL/L (ref 134–144)
TRIGL SERPL-MCNC: 273 MG/DL (ref 0–149)
VLDLC SERPL CALC-MCNC: 55 MG/DL (ref 5–40)
WBC # BLD AUTO: 9.6 X10E3/UL (ref 3.4–10.8)

## 2017-03-06 RX ORDER — SODIUM CHLORIDE 0.9 % (FLUSH) 0.9 %
5-10 SYRINGE (ML) INJECTION AS NEEDED
Status: CANCELLED | OUTPATIENT
Start: 2017-03-06

## 2017-03-06 RX ORDER — GLUCOSAM/CHONDRO/HERB 149/HYAL 750-100 MG
1000 TABLET ORAL 2 TIMES DAILY
Qty: 60 CAP | Refills: 2 | Status: SHIPPED | OUTPATIENT
Start: 2017-03-06 | End: 2017-06-24 | Stop reason: SDUPTHER

## 2017-03-06 RX ORDER — MIDAZOLAM HYDROCHLORIDE 1 MG/ML
.5-6 INJECTION, SOLUTION INTRAMUSCULAR; INTRAVENOUS
Status: CANCELLED | OUTPATIENT
Start: 2017-03-06

## 2017-03-06 NOTE — PROGRESS NOTES
Please let patient know her blood glucose level is elevated and she does need to see the endocrinologist for better control of this. Also may need to do dietary modification to help bring this under better control. Her triglyceride level is elevated and I will have her take omega 3 fish oil tabs 1000 mg 2 x day to help bring this down. Rest of her labs are stable.   Conception MD Abelino

## 2017-03-06 NOTE — PROGRESS NOTES
Spoke with patient regarding her lab results. Patient also spoke with Dr. Addison Rush regarding her X-ray results. Patient voice understanding. Informed patient that MD prescribe omega 3 fish oil tab to help lower triglyceride level.

## 2017-03-06 NOTE — PROGRESS NOTES
Spoke with patient about her lab results. Patient also talked to Dr. Lashanda Castellon regarding her chest x-ray today. Informed patient that Dr. Lashanda Castellon prescribe Omega 3 fish oil tabs to help lower her triglyceride and to pick it up from her pharmacy. Patient voice understanding at this time.

## 2017-03-08 ENCOUNTER — OFFICE VISIT (OUTPATIENT)
Dept: FAMILY MEDICINE CLINIC | Age: 57
End: 2017-03-08

## 2017-03-08 VITALS
HEART RATE: 79 BPM | DIASTOLIC BLOOD PRESSURE: 68 MMHG | WEIGHT: 260 LBS | TEMPERATURE: 98.6 F | SYSTOLIC BLOOD PRESSURE: 121 MMHG | RESPIRATION RATE: 18 BRPM | OXYGEN SATURATION: 95 % | HEIGHT: 67 IN | BODY MASS INDEX: 40.81 KG/M2

## 2017-03-08 DIAGNOSIS — I87.2 VENOUS STASIS DERMATITIS OF BOTH LOWER EXTREMITIES: ICD-10-CM

## 2017-03-08 DIAGNOSIS — J45.31 ASTHMATIC BRONCHITIS, MILD PERSISTENT, WITH ACUTE EXACERBATION: Primary | ICD-10-CM

## 2017-03-08 RX ORDER — BUDESONIDE AND FORMOTEROL FUMARATE DIHYDRATE 160; 4.5 UG/1; UG/1
2 AEROSOL RESPIRATORY (INHALATION) 2 TIMES DAILY
Qty: 1 INHALER | Refills: 1 | Status: SHIPPED | OUTPATIENT
Start: 2017-03-08 | End: 2017-05-17 | Stop reason: SDUPTHER

## 2017-03-08 NOTE — MR AVS SNAPSHOT
Visit Information Date & Time Provider Department Dept. Phone Encounter #  
 3/8/2017 12:00 PM MD Uriel Garcia [phone] [id number] Follow-up Instructions Return in about 1 month (around 4/8/2017), or if symptoms worsen or fail to improve, for stasis dermatitis, bronchospasm. Your Appointments 3/8/2017  4:00 PM  
Follow Up with Karissa Pompa MD  
[facility]) Appt Note: 2 mth; +++++02/28/2017 12:37 pm Several messages have been left for patient to call us to give feddback about her 2 Lumbar Medial Branch Blocks. Nex procedure may have to be cancelled due to no feedback. it is a requirement by Ins. Co.... Penny Rivera ++++++++; pt r/s due not feeling well [address] [facility] [address]  
[phone] Sharon Pina [address] [address]  
  
    
 3/20/2017  1:20 PM  
PROCEDURE with Thompson Hamm MD  
[facility] (KWESI SCHEDULING) Appt Note: RT L RFA @ L3-5 per Radha. ... [facility] (2 of 2 RFA) [address] [place] [address] [address] [place], [place] [id number]  
  
   
 [place] [address]  
  
    
 3/28/2017  2:30 PM  
Office Visit with Thompson Hamm MD  
[facility]) Appt Note: Post procedure f/u  
 [address] [place] [address]  
[phone] [address] [id number] 4/12/2017 10:15 AM  
ROUTINE CARE with MD Uriel Garcia ([facility]) Appt Note: rov for DM2, adhesive capsulitis. [address]  
  
   
 [address] Upcoming Health Maintenance Date Due  
 BREAST CANCER SCRN MAMMOGRAM 4/13/2011 FOBT Q 1 YEAR AGE 50-75 4/13/2011 EYE EXAM RETINAL OR DILATED Q1 12/11/2016 HEMOGLOBIN A1C Q6M 7/11/2017 FOOT EXAM Q1 12/14/2017 MICROALBUMIN Q1 1/11/2018 LIPID PANEL Q1 3/1/2018 PAP AKA CERVICAL CYTOLOGY 11/13/2018 DTaP/Tdap/Td series (2 - Td) 11/11/2026 Allergies as of 3/8/2017  Review Complete On: 3/8/2017 By: Madeleine Guevara MD  
  
 Severity Noted Reaction Type Reactions Other Food  03/17/2016    Other (comments) Sweeteners- causes headaches Metformin  11/06/2015    Other (comments) Increase pain in feet and swelling in feet Morphine  01/25/2017    Other (comments)  
 headache Current Immunizations  Never Reviewed No immunizations on file. Not reviewed this visit You Were Diagnosed With   
  
 Codes Comments Asthmatic bronchitis, mild persistent, with acute exacerbation    -  Primary ICD-10-CM: J45.31 
ICD-9-CM: 493.92 Venous stasis dermatitis of both lower extremities     ICD-10-CM: I83.11, I83.12 
ICD-9-CM: 454.1 Vitals BP Pulse Temp Resp Height(growth percentile) Weight(growth percentile) 121/68 (BP 1 Location: Left arm, BP Patient Position: Sitting) 79 98.6 °F (37 °C) (Oral) 18 5' 7\" (1.702 m) 260 lb (117.9 kg) SpO2 BMI OB Status Smoking Status 95% 40.72 kg/m2 Hysterectomy Never Smoker BMI and BSA Data Body Mass Index Body Surface Area 40.72 kg/m 2 2.36 m 2 Preferred Pharmacy Pharmacy Name Phone [address] [facility] [address] [facility] Your Updated Medication List  
  
   
This list is accurate as of: 3/8/17 12:49 PM.  Always use your most recent med list.  
  
  
  
  
 * albuterol 2.5 mg /3 mL (0.083 %) nebulizer solution Commonly known as:  PROVENTIL VENTOLIN  
3 mL by Nebulization route every four (4) hours as needed for Wheezing. * albuterol 90 mcg/actuation inhaler Commonly known as:  PROVENTIL HFA, VENTOLIN HFA, PROAIR HFA Take 2 Puffs by inhalation every four (4) hours as needed for Wheezing. aspirin, buffered 81 mg Tab Take  by mouth. atorvastatin 80 mg tablet Commonly known as:  LIPITOR  
take 1 tablet by mouth once daily  
  
 bacitracin 500 unit/gram Oint Commonly known as:  BACITRACIN  
apply affected area twice a day  
  
 baclofen 10 mg tablet Commonly known as:  LIORESAL  
1  qhs  prn * BD INSULIN SYRINGE ULTRA-FINE 1 mL 31 gauge x 5/16 Syrg Generic drug:  Insulin Syringe-Needle U-100  
use as directed twice a day * BD INSULIN SYRINGE ULTRA-FINE 1 mL 31 gauge x 5/16 Syrg Generic drug:  Insulin Syringe-Needle U-100  
use as directed twice a day  
  
 budesonide-formoterol 160-4.5 mcg/actuation HFA inhaler Commonly known as:  SYMBICORT Take 2 Puffs by inhalation two (2) times a day. DULoxetine 60 mg capsule Commonly known as:  CYMBALTA Take 1 Cap by mouth daily. FREESTYLE LITE STRIPS strip Generic drug:  glucose blood VI test strips TEST twice a day as directed  
  
 furosemide 40 mg tablet Commonly known as:  LASIX Take 2 x day for 7 days then daily  
  
 gabapentin 300 mg capsule Commonly known as:  NEURONTIN Take 1 Cap by mouth nightly. Indications: Restless Legs Syndrome HYDROcodone-acetaminophen 5-325 mg per tablet Commonly known as:  Dawna Amarilys  
1/2 tab  Bid prn LANTUS 100 unit/mL injection Generic drug:  insulin glargine  
inject 50 units subcutaneously 2 x day  
  
 levoFLOXacin 500 mg tablet Commonly known as:  Alexander Kyree Take 1 Tab by mouth daily for 7 days. metoprolol succinate 50 mg XL tablet Commonly known as:  TOPROL-XL  
take 1 tablet by mouth once daily [place]-3-DHA-EPA-Fish Oil 1,000 mg (120 mg-180 mg) Cap Commonly known as:  FISH OIL Take 1,000 mg by mouth two (2) times a day. potassium chloride 10 mEq tablet Commonly known as:  K-DUR, KLOR-CON Take 1 Tab by mouth daily. VITAMIN B-12 1,000 mcg sublingual tablet Generic drug:  cyanocobalamin Take 1,000 mcg by mouth daily. * Notice: This list has 4 medication(s) that are the same as other medications prescribed for you. Read the directions carefully, and ask your doctor or other care provider to review them with you. Prescriptions Sent to Pharmacy Refills  
 budesonide-formoterol (SYMBICORT) 160-4.5 mcg/actuation HFA inhaler 1 Sig: Take 2 Puffs by inhalation two (2) times a day. Class: Normal  
 Pharmacy: [id number] [facility], [address] [facility] #: [phone] Route: Inhalation We Performed the Following REFERRAL TO PULMONARY DISEASE [[facility]] Comments:  
 Please evaluate patient for recalcitrant wheeze. Follow-up Instructions Return in about 1 month (around 4/8/2017), or if symptoms worsen or fail to improve, for stasis dermatitis, bronchospasm. To-Do List   
 03/15/2017 12:30 PM  
  Appointment with HBV- IE33 MACHINE (WT ) at [facility] NON-INVASIVE CARD ([phone]) Age Limit for ALL Heart procedures @ [facility]: 18 yrs and older only. Under the age of 25, refer to [facility] ([phone]). Wt Limit: 350lbs. This study requires patient to bring a written physician's order or MD office may fax the order to Central Scheduling at [phone]. Patient needs to bring a current list of all medications. No preparation is required for this study. Patients should report 15 minutes prior to their appointment time to the [facility], [address] [place] [address]/[address]. Referral Information Referral ID Referred By Referred To  
  
 [id number] Jessica Persaud N Not Available Visits Status Start Date End Date 1 New Request 3/8/17 3/8/18 If your referral has a status of pending review or denied, additional information will be sent to support the outcome of this decision. Introducing [facility]! Dear Lacy Thompson: Thank you for requesting a [employer] account. Our records indicate that you already have an active [employer] account. You can access your account anytime at [email or web address]/AMT Did you know that you can access your hospital and ER discharge instructions at any time in [employer]? You can also review all of your test results from your hospital stay or ER visit. Additional Information If you have questions, please visit the Frequently Asked Questions section of the [employer] website at [email or web address] Remember, [employer] is NOT to be used for urgent needs. For medical emergencies, dial 911. Now available from your iPhone and Android! Please provide this summary of care documentation to your next provider. Your primary care clinician is listed as Angelito Perkins. If you have any questions after today's visit, please call [phone].

## 2017-03-08 NOTE — PROGRESS NOTES
Chief Complaint   Patient presents with    Breathing Problem    Wheezing     1. Have you been to the ER, urgent care clinic since your last visit? Hospitalized since your last visit? No    2. Have you seen or consulted any other health care providers outside of the [address] [place] [address] since your last visit? Include any pap smears or colon screening. No     Patient here today to discuss her breathing problem that is not improving. Patient also would like to discuss chest x-ray  Results. [facility]  [place] comes in for f/u care. 1) Cough: patient continues to have cough and SOB with wheeze. Did not get the advair. i will send in symbicort. She continues to take albuterol inhaler as needed for wheeze. No fever or chills. CXR report did show area in mid lung suggestive of atelectasis or scarring. She is concerned about this. I did explain to her about atelectasis. Given persistence of the SOB will refer to pulmonary for evaluation and advise. She is a non smoker, denies being around fine dust or asbestos. She has a h/o wheeze in the past and has been diagnosed with asthma in the past.  2) DM2: Her blood glucose numbers have been out of control. She is seen by the endocrinologist. She has been taking insulin once instead of twice a day. I did emphasize need to be compliant with treatment regimen. 3) Stasis dermatitis: has this on both lower extremities. Still has slight pedal edema. Will have her continue to take lasix once a day and f/u in a month.       Past Medical History  Past Medical History:   Diagnosis Date    Arthritis     Lower back     Chronic back pain     Lower back pain    Depression     Diabetes ([facility] [address]     Panic attacks     Sleep apnea        Surgical History  Past Surgical History:   Procedure Laterality Date    HX HEART VALVE SURGERY      Pericardial Tissue Heart Valve    HX HYSTERECTOMY      Partial Hysterectomy - removed ovary    HX MYOMECTOMY          Medications  Current Outpatient Prescriptions   Medication Sig Dispense Refill    budesonide-formoterol (SYMBICORT) 160-4.5 mcg/actuation HFA inhaler Take 2 Puffs by inhalation two (2) times a day. 1 Inhaler 1    Omega-3-DHA-EPA-Fish Oil (FISH OIL) 1,000 mg (120 mg-180 mg) cap Take 1,000 mg by mouth two (2) times a day. 60 Cap 2    bacitracin (BACITRACIN) 500 unit/gram oint apply affected area twice a day 28 g 0    furosemide (LASIX) 40 mg tablet Take 2 x day for 7 days then daily 60 Tab 0    levoFLOXacin (LEVAQUIN) 500 mg tablet Take 1 Tab by mouth daily for 7 days. 7 Tab 0    albuterol (PROVENTIL HFA, VENTOLIN HFA, PROAIR HFA) 90 mcg/actuation inhaler Take 2 Puffs by inhalation every four (4) hours as needed for Wheezing. 1 Inhaler 3    potassium chloride (K-DUR, KLOR-CON) 10 mEq tablet Take 1 Tab by mouth daily. 30 Tab 1    metoprolol succinate (TOPROL-XL) 50 mg XL tablet take 1 tablet by mouth once daily 30 Tab 3    BD INSULIN SYRINGE ULTRA-FINE 1 mL 31 gauge x 5/16 syrg use as directed twice a day 200 Syringe 3    insulin glargine (LANTUS) 100 unit/mL injection inject 50 units subcutaneously 2 x day 30 Vial 3    baclofen (LIORESAL) 10 mg tablet 1  qhs  prn 30 Tab 1    DULoxetine (CYMBALTA) 60 mg capsule Take 1 Cap by mouth daily. 30 Cap 1    gabapentin (NEURONTIN) 300 mg capsule Take 1 Cap by mouth nightly. Indications: Restless Legs Syndrome 30 Cap 2    HYDROcodone-acetaminophen (NORCO) 5-325 mg per tablet 1/2 tab  Bid prn 30 Tab 0    BD INSULIN SYRINGE ULTRA-FINE 1 mL 31 gauge x 5/16 syrg use as directed twice a day 200 Syringe 3    cyanocobalamin (VITAMIN B-12) 1,000 mcg sublingual tablet Take 1,000 mcg by mouth daily.       FREESTYLE LITE STRIPS strip TEST twice a day as directed 100 Strip 11    atorvastatin (LIPITOR) 80 mg tablet take 1 tablet by mouth once daily 30 Tab 11    albuterol (PROVENTIL VENTOLIN) 2.5 mg /3 mL (0.083 %) nebulizer solution 3 mL by Nebulization route every four (4) hours as needed for Wheezing. 24 Each 0    Aspirin, Buffered 81 mg tab Take  by mouth. Allergies  Allergies   Allergen Reactions    Other Food Other (comments)     Sweeteners- causes headaches    Metformin Other (comments)     Increase pain in feet and swelling in feet    Morphine Other (comments)     headache       Family History  Family History   Problem Relation Age of Onset    Heart Disease Mother     Diabetes Mother     Stroke Mother     Diabetes Father     Heart Disease Father        Social History  Social History     Social History    Marital status:      Spouse name: N/A    Number of children: N/A    Years of education: N/A     Occupational History    Not on file. Social History Main Topics    Smoking status: Never Smoker    Smokeless tobacco: Never Used    Alcohol use No    Drug use: No    Sexual activity: Not Currently     Other Topics Concern     Service No    Blood Transfusions No    Caffeine Concern No    Occupational Exposure No    Hobby Hazards No    Sleep Concern Yes     Sleep apnea     Stress Concern Yes     Due to family medical issues.      Weight Concern No    Special Diet No    Back Care Yes     Patient tries to do back care with streches     Exercise No    Bike Helmet Yes    Seat Belt Yes    Self-Exams Yes     Social History Narrative       Review of Systems  Review of Systems - History obtained from chart review and the patient  General ROS: positive for  - fatigue and malaise  Psychological ROS: negative  ENT ROS: positive for - nasal congestion and nasal discharge  Allergy and Immunology ROS: positive for - nasal congestion and postnasal drip  Endocrine ROS: DM2  Respiratory ROS: positive for - cough, shortness of breath and wheezing  negative for - hemoptysis or pleuritic pain  Cardiovascular ROS: negative  Musculoskeletal ROS: positive for - swelling in leg - bilateral  Neurological ROS: no TIA or stroke symptoms  Dermatological ROS: stasis dermatitis    Vital Signs  Visit Vitals    /68 (BP 1 Location: Left arm, BP Patient Position: Sitting)    Pulse 79    Temp 98.6 °F (37 °C) (Oral)    Resp 18    Ht 5' 7\" (1.702 m)    Wt 260 lb (117.9 kg)    SpO2 95%    BMI 40.72 kg/m2         Physical Exam  Physical Examination: General appearance - oriented to person, place, and time, improved, well hydrated and chronically ill appearing  Mental status - alert, oriented to person, place, and time, affect appropriate to mood  Nose - normal nontender sinuses  Mouth - mucous membranes moist, pharynx normal without lesions  Neck - supple, no significant adenopathy, carotids upstroke normal bilaterally, no bruits  Chest - wheezing noted both lung bases, decreased air entry noted bibasilar zones  Heart - S1 and S2 normal, systolic murmur 2/6 at 2nd right intercostal space  Neurological - alert, oriented, normal speech, no focal findings or movement disorder noted  Extremities - pedal edema 1 +, intact peripheral pulses  Skin - stasis dermatitis both legs    Diagnostics  Orders Placed This Encounter    REFERRAL TO PULMONARY DISEASE     Referral Priority:   Routine     Referral Type:   Consultation     Referral Reason:   Specialty Services Required    budesonide-formoterol (SYMBICORT) 160-4.5 mcg/actuation HFA inhaler     Sig: Take 2 Puffs by inhalation two (2) times a day. Dispense:  1 Inhaler     Refill:  1         Results  Results for orders placed or performed in visit on 03/01/17   CBC WITH AUTOMATED DIFF   Result Value Ref Range    WBC 9.6 3.4 - 10.8 x10E3/uL    RBC 4.86 3.77 - 5.28 x10E6/uL    HGB 14.4 11.1 - 15.9 g/dL    HCT 44.9 34.0 - 46.6 %    MCV 92 79 - 97 fL    MCH 29.6 26.6 - 33.0 pg    MCHC 32.1 31.5 - 35.7 g/dL    RDW 14.8 12.3 - 15.4 %    PLATELET 377 625 - 543 x10E3/uL    NEUTROPHILS 71 %    Lymphocytes 22 %    MONOCYTES 5 %    EOSINOPHILS 1 %    BASOPHILS 1 %    ABS.  NEUTROPHILS 6.8 1.4 - 7.0 x10E3/uL    Abs Lymphocytes 2.1 0.7 - 3.1 x10E3/uL    ABS. MONOCYTES 0.5 0.1 - 0.9 x10E3/uL    ABS. EOSINOPHILS 0.1 0.0 - 0.4 x10E3/uL    ABS. BASOPHILS 0.1 0.0 - 0.2 x10E3/uL    IMMATURE GRANULOCYTES 0 %    ABS. IMM. GRANS. 0.0 0.0 - 0.1 L01M1/EH   METABOLIC PANEL, COMPREHENSIVE   Result Value Ref Range    Glucose 323 (H) 65 - 99 mg/dL    BUN 12 6 - 24 mg/dL    Creatinine 0.69 0.57 - 1.00 mg/dL    GFR est non-AA 98 >59 mL/min/1.73    GFR est  >59 mL/min/1.73    BUN/Creatinine ratio 17 9 - 23    Sodium 139 134 - 144 mmol/L    Potassium 4.0 3.5 - 5.2 mmol/L    Chloride 96 96 - 106 mmol/L    CO2 24 18 - 29 mmol/L    Calcium 8.7 8.7 - 10.2 mg/dL    Protein, total 6.9 6.0 - 8.5 g/dL    Albumin 3.9 3.5 - 5.5 g/dL    GLOBULIN, TOTAL 3.0 1.5 - 4.5 g/dL    A-G Ratio 1.3 1.1 - 2.5    Bilirubin, total 0.8 0.0 - 1.2 mg/dL    Alk. phosphatase 155 (H) 39 - 117 IU/L    AST (SGOT) 18 0 - 40 IU/L    ALT (SGPT) 21 0 - 32 IU/L   PRO-BNP   Result Value Ref Range    proBNP 117 0 - 287 pg/mL   LIPID PANEL   Result Value Ref Range    Cholesterol, total 109 100 - 199 mg/dL    Triglyceride 273 (H) 0 - 149 mg/dL    HDL Cholesterol 29 (L) >39 mg/dL    VLDL, calculated 55 (H) 5 - 40 mg/dL    LDL, calculated 25 0 - 99 mg/dL   CVD REPORT   Result Value Ref Range    INTERPRETATION Note    DIABETES PATIENT EDUCATION   Result Value Ref Range    PDF Image Not applicable        ASSESSMENT and PLAN    ICD-10-CM ICD-9-CM    1. Asthmatic bronchitis, mild persistent, with acute exacerbation J45.31 493.92 budesonide-formoterol (SYMBICORT) 160-4.5 mcg/actuation HFA inhaler      REFERRAL TO PULMONARY DISEASE   2. Venous stasis dermatitis of both lower extremities I83.11 454.1     I83.12     3.  Uncontrolled type 2 diabetes mellitus with other specified complication (HCC) F61.70      E11.65       lab results and schedule of future lab studies reviewed with patient  reviewed diet, exercise and weight control  reviewed medications and side effects in detail  specific diabetic recommendations: low cholesterol diet, weight control and daily exercise discussed, home glucose monitoring emphasized, all medications, side effects and compliance discussed carefully, annual eye examinations at Ophthalmology discussed and glycohemoglobin and other lab monitoring discussed  radiology results and schedule of future radiology studies reviewed with patient      I have discussed the diagnosis with the patient and the intended plan of care as seen in the above orders. The patient has received an after-visit summary and questions were answered concerning future plans. I have discussed medication, side effects, and warnings with the patient in detail. The patient verbalized understanding and is in agreement with the plan of care. The patient will contact the office with any additional concerns.     Roslynn Kehr, MD

## 2017-03-13 ENCOUNTER — TELEPHONE (OUTPATIENT)
Dept: PAIN MANAGEMENT | Age: 57
End: 2017-03-13

## 2017-03-13 NOTE — TELEPHONE ENCOUNTER
03/13/2017 Today around 10:00 am Ms. Vignesh Werner called to find out about when her next procedure will be. I stated that I called [facility] on 03/10/2017 to find out the status of this request and that I was told that it is still pending. I explained to patient that we will have to wait until it is processed. Patient understood. .. Toshia Vargas

## 2017-03-17 RX ORDER — SODIUM CHLORIDE 0.9 % (FLUSH) 0.9 %
5-10 SYRINGE (ML) INJECTION AS NEEDED
Status: CANCELLED | OUTPATIENT
Start: 2017-03-20

## 2017-03-17 RX ORDER — MIDAZOLAM HYDROCHLORIDE 1 MG/ML
.5-6 INJECTION, SOLUTION INTRAMUSCULAR; INTRAVENOUS
Status: CANCELLED | OUTPATIENT
Start: 2017-03-20

## 2017-03-20 ENCOUNTER — HOSPITAL ENCOUNTER (OUTPATIENT)
Age: 57
Setting detail: OUTPATIENT SURGERY
Discharge: HOME | End: 2017-03-20
Attending: PHYSICAL MEDICINE & REHABILITATION | Admitting: PHYSICAL MEDICINE & REHABILITATION
Payer: MEDICAID

## 2017-03-20 ENCOUNTER — APPOINTMENT (OUTPATIENT)
Dept: GENERAL RADIOLOGY | Age: 57
End: 2017-03-20
Attending: PHYSICAL MEDICINE & REHABILITATION
Payer: MEDICAID

## 2017-03-20 ENCOUNTER — SURGERY (OUTPATIENT)
Age: 57
End: 2017-03-20

## 2017-03-20 VITALS
RESPIRATION RATE: 18 BRPM | OXYGEN SATURATION: 94 % | HEART RATE: 80 BPM | TEMPERATURE: 99 F | SYSTOLIC BLOOD PRESSURE: 170 MMHG | DIASTOLIC BLOOD PRESSURE: 92 MMHG

## 2017-03-20 LAB — GLUCOSE BLD STRIP.AUTO-MCNC: 160 MG/DL (ref 70–110)

## 2017-03-20 PROCEDURE — 77030029505: Performed by: PHYSICAL MEDICINE & REHABILITATION

## 2017-03-20 PROCEDURE — 74011000250 HC RX REV CODE- 250: Performed by: PHYSICAL MEDICINE & REHABILITATION

## 2017-03-20 PROCEDURE — 77030020508 HC PD GRND GENRTR BAYL -A: Performed by: PHYSICAL MEDICINE & REHABILITATION

## 2017-03-20 PROCEDURE — 74011250636 HC RX REV CODE- 250/636: Performed by: PHYSICAL MEDICINE & REHABILITATION

## 2017-03-20 PROCEDURE — 82962 GLUCOSE BLOOD TEST: CPT

## 2017-03-20 PROCEDURE — 76010000009 HC PAIN MGT 0 TO 30 MIN PROC: Performed by: PHYSICAL MEDICINE & REHABILITATION

## 2017-03-20 PROCEDURE — 74011000250 HC RX REV CODE- 250

## 2017-03-20 PROCEDURE — 99152 MOD SED SAME PHYS/QHP 5/>YRS: CPT | Performed by: PHYSICAL MEDICINE & REHABILITATION

## 2017-03-20 PROCEDURE — 74011250636 HC RX REV CODE- 250/636

## 2017-03-20 RX ORDER — SODIUM CHLORIDE 0.9 % (FLUSH) 0.9 %
5-10 SYRINGE (ML) INJECTION AS NEEDED
Status: DISCONTINUED | OUTPATIENT
Start: 2017-03-20 | End: 2017-03-20 | Stop reason: HOSPADM

## 2017-03-20 RX ORDER — MIDAZOLAM HYDROCHLORIDE 1 MG/ML
.5-6 INJECTION, SOLUTION INTRAMUSCULAR; INTRAVENOUS
Status: DISCONTINUED | OUTPATIENT
Start: 2017-03-20 | End: 2017-03-20 | Stop reason: HOSPADM

## 2017-03-20 RX ORDER — FENTANYL CITRATE 50 UG/ML
INJECTION, SOLUTION INTRAMUSCULAR; INTRAVENOUS AS NEEDED
Status: DISCONTINUED | OUTPATIENT
Start: 2017-03-20 | End: 2017-03-20 | Stop reason: HOSPADM

## 2017-03-20 RX ORDER — LIDOCAINE HYDROCHLORIDE 20 MG/ML
INJECTION, SOLUTION EPIDURAL; INFILTRATION; INTRACAUDAL; PERINEURAL AS NEEDED
Status: DISCONTINUED | OUTPATIENT
Start: 2017-03-20 | End: 2017-03-20 | Stop reason: HOSPADM

## 2017-03-20 RX ORDER — LIDOCAINE HYDROCHLORIDE 10 MG/ML
INJECTION, SOLUTION EPIDURAL; INFILTRATION; INTRACAUDAL; PERINEURAL AS NEEDED
Status: DISCONTINUED | OUTPATIENT
Start: 2017-03-20 | End: 2017-03-20 | Stop reason: HOSPADM

## 2017-03-20 RX ORDER — DEXAMETHASONE SODIUM PHOSPHATE 100 MG/10ML
INJECTION INTRAMUSCULAR; INTRAVENOUS AS NEEDED
Status: DISCONTINUED | OUTPATIENT
Start: 2017-03-20 | End: 2017-03-20 | Stop reason: HOSPADM

## 2017-03-20 RX ADMIN — DEXAMETHASONE SODIUM PHOSPHATE 6 MG: 10 INJECTION INTRAMUSCULAR; INTRAVENOUS at 13:48

## 2017-03-20 RX ADMIN — MIDAZOLAM HYDROCHLORIDE 1 MG: 1 INJECTION, SOLUTION INTRAMUSCULAR; INTRAVENOUS at 13:46

## 2017-03-20 RX ADMIN — LIDOCAINE HYDROCHLORIDE 3 ML: 20 INJECTION, SOLUTION INTRAVENOUS at 13:48

## 2017-03-20 RX ADMIN — LIDOCAINE HYDROCHLORIDE 10 ML: 10 INJECTION, SOLUTION EPIDURAL; INFILTRATION; INTRACAUDAL; PERINEURAL at 13:47

## 2017-03-20 RX ADMIN — FENTANYL CITRATE 50 MCG: 50 INJECTION INTRAMUSCULAR; INTRAVENOUS at 13:46

## 2017-03-20 RX ADMIN — FENTANYL CITRATE 50 MCG: 50 INJECTION INTRAMUSCULAR; INTRAVENOUS at 13:49

## 2017-03-20 NOTE — PROCEDURES
[facility]    THERMOCOAGULATION OF LUMBAR MEDIAL BRANCH  PROCEDURE REPORT      PATIENT:  Keyur Vigil OF BIRTH:  1960  DATE OF SERVICE:  3/20/2017  [facility]    PRE-PROCEDURE DIAGNOSIS:  See Above    POST-PROCEDURE DIAGNOSIS:  See Above    PROCEDURE:      1. Left radiofrequency thermocoagulation of lumbar medial branch nerves, L3/L4, L4/L5, L5/S1 (32942, 64636 x2)  2. Fluoroscopic needle guidance (spinal) (93584)  3. Supervision of moderate sedation (47315)  4. Additional 15 minutes of supervised moderate sedation (03479)    ANESTHESIA:  Local with moderate IV sedation. See Medication Administration Record for specific medications and dosage. COMPLICATIONS: None. PHYSICIAN:  Sindhu Landaverde MD    PRE-PROCEDURE NOTE:  Pre-procedural assessment of the patient was performed including a limited history and physical examination. The details of the procedure were discussed with the patient, including the risks, benefits and alternative options and an informed consent was obtained. The patients NPO status, if necessary for the specific procedure and/or administration of moderate intravenous sedation, if utilized, and availability of a responsible adult to escort the patient following the procedure were confirmed. A peripheral intravenous cannula was placed without difficulty and lactated Ringers solution administered. See nursing notes for details. PROCEDURE NOTE:  The patient was brought to the procedure suite and positioned on the fluoroscopy table in the prone position. Physiologic monitors were applied and supplemental oxygen was administered via nasal cannula. The skin was prepped in the standard surgical fashion and sterile drapes were applied over the procedure site.  Please refer to the Flowsheet for documentation of the patients vital signs and the Medication Administration Record for any oral and/or intravenous sedation administered prior to or during the procedure. 1% Lidocaine was utilized for local anesthesia. Under 10-15 degree ipsilateral oblique fluoroscopic guidance a 15cm 18gauge radiofrequency needle with a 10 mm curved active tip was advanced to the junction of the superior articular process and transverse process of each vertebral level immediately inferior to the above-mentioned dorsal rami medial branch nerves. Under AP fluoroscopic guidance, a similar needle was then placed over the superior margin of the sacral ala to thermocoagulate the L5 medial branch nerve. After each individual needle was placed, sensory and motor testing, at 50 Hz and 2 Hz, respectively, were performed which elicited ipsilateral deep local back discomfort without evidence of motor stimulation in the ipsilateral gluteal muscles or extremity. Following this, 1 mL of lidocaine 2% was injected after the negative aspiration of blood, air or CSF. Final correct needle placement was then confirmed by viewing each needle in AP and lateral fluoroscopic views in addition to repeat sensory and motor testing which, again, elicited ipsilateral deep local back discomfort without evidence of motor stimulation in the ipsilateral gluteal muscles or extremity. Medial branch nerve radiofrequency thermocoagulation was then performed at each level for 120 seconds at 80° centigrade x 1 cycle. Following this, 1/3ml  to 1/2ml of a mixture of lidocaine 1% admixed with dexamethasone 2mg [10mg/ml] was injected through each radiofrequency needle after negative aspiration and before removing each needle. Then, all needles were removed intact. The area was thoroughly cleaned and sterile bandages applied as necessary. The patient tolerated the procedure well without complication and the vital signs remained stable throughout the procedure.     POST-PROCEDURE COURSE:   The patient was escorted from the procedure suite in satisfactory condition and recovered per facility protocol based on the type of procedure performed and/or the sedation utilized. The patient did not experience any adverse events and remained hemodynamically stable during the post-procedure period. DISCHARGE NOTE:  Upon discharge, the patient was able to tolerate fluids and was in no acute distress. The patient was oriented to person, place and time and vital signs were stable. Appropriate post-procedure instructions were provided and explained to the patient in detail and all questions were answered.                     Afia Storey MD 3/20/2017 6:22 PM

## 2017-03-20 NOTE — H&P (VIEW-ONLY)
Chief Complaint   Patient presents with    Breathing Problem    Wheezing     1. Have you been to the ER, urgent care clinic since your last visit? Hospitalized since your last visit? No    2. Have you seen or consulted any other health care providers outside of the [address] [place] [address] since your last visit? Include any pap smears or colon screening. No     Patient here today to discuss her breathing problem that is not improving. Patient also would like to discuss chest x-ray  Results. HPI  Zehra West comes in for f/u care. 1) Cough: patient continues to have cough and SOB with wheeze. Did not get the advair. i will send in symbicort. She continues to take albuterol inhaler as needed for wheeze. No fever or chills. CXR report did show area in mid lung suggestive of atelectasis or scarring. She is concerned about this. I did explain to her about atelectasis. Given persistence of the SOB will refer to pulmonary for evaluation and advise. She is a non smoker, denies being around fine dust or asbestos. She has a h/o wheeze in the past and has been diagnosed with asthma in the past.  2) DM2: Her blood glucose numbers have been out of control. She is seen by the endocrinologist. She has been taking insulin once instead of twice a day. I did emphasize need to be compliant with treatment regimen. 3) Stasis dermatitis: has this on both lower extremities. Still has slight pedal edema. Will have her continue to take lasix once a day and f/u in a month.       Past Medical History  Past Medical History:   Diagnosis Date    Arthritis     Lower back     Chronic back pain     Lower back pain    Depression     Diabetes ([facility] [address]     Panic attacks     Sleep apnea        Surgical History  Past Surgical History:   Procedure Laterality Date    HX HEART VALVE SURGERY      Pericardial Tissue Heart Valve    HX HYSTERECTOMY      Partial Hysterectomy - removed ovary    HX MYOMECTOMY          Medications  Current Outpatient Prescriptions   Medication Sig Dispense Refill    budesonide-formoterol (SYMBICORT) 160-4.5 mcg/actuation HFA inhaler Take 2 Puffs by inhalation two (2) times a day. 1 Inhaler 1    Omega-3-DHA-EPA-Fish Oil (FISH OIL) 1,000 mg (120 mg-180 mg) cap Take 1,000 mg by mouth two (2) times a day. 60 Cap 2    bacitracin (BACITRACIN) 500 unit/gram oint apply affected area twice a day 28 g 0    furosemide (LASIX) 40 mg tablet Take 2 x day for 7 days then daily 60 Tab 0    levoFLOXacin (LEVAQUIN) 500 mg tablet Take 1 Tab by mouth daily for 7 days. 7 Tab 0    albuterol (PROVENTIL HFA, VENTOLIN HFA, PROAIR HFA) 90 mcg/actuation inhaler Take 2 Puffs by inhalation every four (4) hours as needed for Wheezing. 1 Inhaler 3    potassium chloride (K-DUR, KLOR-CON) 10 mEq tablet Take 1 Tab by mouth daily. 30 Tab 1    metoprolol succinate (TOPROL-XL) 50 mg XL tablet take 1 tablet by mouth once daily 30 Tab 3    BD INSULIN SYRINGE ULTRA-FINE 1 mL 31 gauge x 5/16 syrg use as directed twice a day 200 Syringe 3    insulin glargine (LANTUS) 100 unit/mL injection inject 50 units subcutaneously 2 x day 30 Vial 3    baclofen (LIORESAL) 10 mg tablet 1  qhs  prn 30 Tab 1    DULoxetine (CYMBALTA) 60 mg capsule Take 1 Cap by mouth daily. 30 Cap 1    gabapentin (NEURONTIN) 300 mg capsule Take 1 Cap by mouth nightly. Indications: Restless Legs Syndrome 30 Cap 2    HYDROcodone-acetaminophen (NORCO) 5-325 mg per tablet 1/2 tab  Bid prn 30 Tab 0    BD INSULIN SYRINGE ULTRA-FINE 1 mL 31 gauge x 5/16 syrg use as directed twice a day 200 Syringe 3    cyanocobalamin (VITAMIN B-12) 1,000 mcg sublingual tablet Take 1,000 mcg by mouth daily.       FREESTYLE LITE STRIPS strip TEST twice a day as directed 100 Strip 11    atorvastatin (LIPITOR) 80 mg tablet take 1 tablet by mouth once daily 30 Tab 11    albuterol (PROVENTIL VENTOLIN) 2.5 mg /3 mL (0.083 %) nebulizer solution 3 mL by Nebulization route every four (4) hours as needed for Wheezing. 24 Each 0    Aspirin, Buffered 81 mg tab Take  by mouth. Allergies  Allergies   Allergen Reactions    Other Food Other (comments)     Sweeteners- causes headaches    Metformin Other (comments)     Increase pain in feet and swelling in feet    Morphine Other (comments)     headache       Family History  Family History   Problem Relation Age of Onset    Heart Disease Mother     Diabetes Mother     Stroke Mother     Diabetes Father     Heart Disease Father        Social History  Social History     Social History    Marital status:      Spouse name: N/A    Number of children: N/A    Years of education: N/A     Occupational History    Not on file. Social History Main Topics    Smoking status: Never Smoker    Smokeless tobacco: Never Used    Alcohol use No    Drug use: No    Sexual activity: Not Currently     Other Topics Concern     Service No    Blood Transfusions No    Caffeine Concern No    Occupational Exposure No    Hobby Hazards No    Sleep Concern Yes     Sleep apnea     Stress Concern Yes     Due to family medical issues.      Weight Concern No    Special Diet No    Back Care Yes     Patient tries to do back care with streches     Exercise No    Bike Helmet Yes    Seat Belt Yes    Self-Exams Yes     Social History Narrative       Review of Systems  Review of Systems - History obtained from chart review and the patient  General ROS: positive for  - fatigue and malaise  Psychological ROS: negative  ENT ROS: positive for - nasal congestion and nasal discharge  Allergy and Immunology ROS: positive for - nasal congestion and postnasal drip  Endocrine ROS: DM2  Respiratory ROS: positive for - cough, shortness of breath and wheezing  negative for - hemoptysis or pleuritic pain  Cardiovascular ROS: negative  Musculoskeletal ROS: positive for - swelling in leg - bilateral  Neurological ROS: no TIA or stroke symptoms  Dermatological ROS: stasis dermatitis    Vital Signs  Visit Vitals    /68 (BP 1 Location: Left arm, BP Patient Position: Sitting)    Pulse 79    Temp 98.6 °F (37 °C) (Oral)    Resp 18    Ht 5' 7\" (1.702 m)    Wt 260 lb (117.9 kg)    SpO2 95%    BMI 40.72 kg/m2         Physical Exam  Physical Examination: General appearance - oriented to person, place, and time, improved, well hydrated and chronically ill appearing  Mental status - alert, oriented to person, place, and time, affect appropriate to mood  Nose - normal nontender sinuses  Mouth - mucous membranes moist, pharynx normal without lesions  Neck - supple, no significant adenopathy, carotids upstroke normal bilaterally, no bruits  Chest - wheezing noted both lung bases, decreased air entry noted bibasilar zones  Heart - S1 and S2 normal, systolic murmur 2/6 at 2nd right intercostal space  Neurological - alert, oriented, normal speech, no focal findings or movement disorder noted  Extremities - pedal edema 1 +, intact peripheral pulses  Skin - stasis dermatitis both legs    Diagnostics  Orders Placed This Encounter    REFERRAL TO PULMONARY DISEASE     Referral Priority:   Routine     Referral Type:   Consultation     Referral Reason:   Specialty Services Required    budesonide-formoterol (SYMBICORT) 160-4.5 mcg/actuation HFA inhaler     Sig: Take 2 Puffs by inhalation two (2) times a day. Dispense:  1 Inhaler     Refill:  1         Results  Results for orders placed or performed in visit on 03/01/17   CBC WITH AUTOMATED DIFF   Result Value Ref Range    WBC 9.6 3.4 - 10.8 x10E3/uL    RBC 4.86 3.77 - 5.28 x10E6/uL    HGB 14.4 11.1 - 15.9 g/dL    HCT 44.9 34.0 - 46.6 %    MCV 92 79 - 97 fL    MCH 29.6 26.6 - 33.0 pg    MCHC 32.1 31.5 - 35.7 g/dL    RDW 14.8 12.3 - 15.4 %    PLATELET 139 577 - 822 x10E3/uL    NEUTROPHILS 71 %    Lymphocytes 22 %    MONOCYTES 5 %    EOSINOPHILS 1 %    BASOPHILS 1 %    ABS.  NEUTROPHILS 6.8 1.4 - 7.0 x10E3/uL    Abs Lymphocytes 2.1 0.7 - 3.1 x10E3/uL    ABS. MONOCYTES 0.5 0.1 - 0.9 x10E3/uL    ABS. EOSINOPHILS 0.1 0.0 - 0.4 x10E3/uL    ABS. BASOPHILS 0.1 0.0 - 0.2 x10E3/uL    IMMATURE GRANULOCYTES 0 %    ABS. IMM. GRANS. 0.0 0.0 - 0.1 U31L1/ZB   METABOLIC PANEL, COMPREHENSIVE   Result Value Ref Range    Glucose 323 (H) 65 - 99 mg/dL    BUN 12 6 - 24 mg/dL    Creatinine 0.69 0.57 - 1.00 mg/dL    GFR est non-AA 98 >59 mL/min/1.73    GFR est  >59 mL/min/1.73    BUN/Creatinine ratio 17 9 - 23    Sodium 139 134 - 144 mmol/L    Potassium 4.0 3.5 - 5.2 mmol/L    Chloride 96 96 - 106 mmol/L    CO2 24 18 - 29 mmol/L    Calcium 8.7 8.7 - 10.2 mg/dL    Protein, total 6.9 6.0 - 8.5 g/dL    Albumin 3.9 3.5 - 5.5 g/dL    GLOBULIN, TOTAL 3.0 1.5 - 4.5 g/dL    A-G Ratio 1.3 1.1 - 2.5    Bilirubin, total 0.8 0.0 - 1.2 mg/dL    Alk. phosphatase 155 (H) 39 - 117 IU/L    AST (SGOT) 18 0 - 40 IU/L    ALT (SGPT) 21 0 - 32 IU/L   PRO-BNP   Result Value Ref Range    proBNP 117 0 - 287 pg/mL   LIPID PANEL   Result Value Ref Range    Cholesterol, total 109 100 - 199 mg/dL    Triglyceride 273 (H) 0 - 149 mg/dL    HDL Cholesterol 29 (L) >39 mg/dL    VLDL, calculated 55 (H) 5 - 40 mg/dL    LDL, calculated 25 0 - 99 mg/dL   CVD REPORT   Result Value Ref Range    INTERPRETATION Note    DIABETES PATIENT EDUCATION   Result Value Ref Range    PDF Image Not applicable        ASSESSMENT and PLAN    ICD-10-CM ICD-9-CM    1. Asthmatic bronchitis, mild persistent, with acute exacerbation J45.31 493.92 budesonide-formoterol (SYMBICORT) 160-4.5 mcg/actuation HFA inhaler      REFERRAL TO PULMONARY DISEASE   2. Venous stasis dermatitis of both lower extremities I83.11 454.1     I83.12     3.  Uncontrolled type 2 diabetes mellitus with other specified complication (HCC) N93.93      E11.65       lab results and schedule of future lab studies reviewed with patient  reviewed diet, exercise and weight control  reviewed medications and side effects in detail  specific diabetic recommendations: low cholesterol diet, weight control and daily exercise discussed, home glucose monitoring emphasized, all medications, side effects and compliance discussed carefully, annual eye examinations at Ophthalmology discussed and glycohemoglobin and other lab monitoring discussed  radiology results and schedule of future radiology studies reviewed with patient      I have discussed the diagnosis with the patient and the intended plan of care as seen in the above orders. The patient has received an after-visit summary and questions were answered concerning future plans. I have discussed medication, side effects, and warnings with the patient in detail. The patient verbalized understanding and is in agreement with the plan of care. The patient will contact the office with any additional concerns.     Jelani Salazar MD

## 2017-03-20 NOTE — DISCHARGE INSTRUCTIONS
[facility]      Post Procedures Instructions    *Resume Diet and Activity as tolerated. Rest for the remainder of the day. *You may fell worse before you feel better as the numbing medications wear off before the steroids take effect if used for your procedures. *Do not use affected extremity until numbness or loss of sensation has completely resolved without assistance. *DO NOT DRIVE, operate machinery/heavey equipment for 24 hours. *DO NOT DRINK ALCOHOL for 24 hours as it may interact with the sedation if you received it and also thins your blood and may cause you to bleed. *WAIT 24 hours before starting back ANY Blood thinning medications:   (Heparin, Coumadin, Warfarin, Lovenox, Plavix, Aggrenox)    *Resume Pre-Procedure Medications as prescribed except Blood Thinners unless directed by your Physician or Cardiologist.     *Avoid Hot tubs and Heating pad for 24 hours to prevent dissipation of medications, you may shower to remove bandages and remaining prep residue on the skin. * If you develop a Headache, drink plenty of fluids including beverages with caffeine (Coffee, Mt. Dew etc.) and rest.  If the headache persists longer than 24 hoursor intensifies - Please call [facility] ([facility]) [phone]    * If you are DIABETIC, check your blood sugar three times a day for the next three days, the steroids will increase your blood sugar. If your blood sugar is greater than 400 have someone drive you to the nearest [address]. * If you experience any of the following problems, call the [facility] [phone] between 8:00 am - 4:30pm or After Hours [phone].     Shortness of breath    Fever of 101 F or higher    Nausea / Vomiting (not normal to you)    Increasing stiffness in the neck    Weakness or numbness in the arms or legs that is not resolving    Prolonged and increasing pain > than 4 days    ANYTHING OUT of the ORDINARY TO YOU    If YOU are experiencing a severe reaction / complication that you have never had before post procedure, call 911 or go to the nearest emergency room! All patients must have a  for transportation [place] regardless if you do or do not receive sedation. DISCHARGE SUMMARY from Nurse      PATIENT INSTRUCTIONS:    After Oral  or intravenous sedation, for 24 hours or while taking prescription Narcotics:  · Limit your activities  · Do not drive and operate hazardous machinery  · Do not make important personal or business decisions  · Do  not drink alcoholic beverages  · If you have not urinated within 8 hours after discharge, please contact your surgeon on call. Report the following to your surgeon:  · Excessive pain, swelling, redness or odor of or around the surgical area  · Temperature over 101  · Nausea and vomiting lasting longer than 4 hours or if unable to take medications  · Any signs of decreased circulation or nerve impairment to extremity: change in color, persistent  numbness, tingling, coldness or increase pain  · Any questions        What to do at Home:  Recommended activity: Activity as tolerated, NO DRIVING FOR 24 Hours post injection          *  Please give a list of your current medications to your Primary Care Provider. *  Please update this list whenever your medications are discontinued, doses are      changed, or new medications (including over-the-counter products) are added. *  Please carry medication information at all times in case of emergency situations. These are general instructions for a healthy lifestyle:    No smoking/ No tobacco products/ Avoid exposure to second hand smoke    Surgeon General's Warning:  Quitting smoking now greatly reduces serious risk to your health.     Obesity, smoking, and sedentary lifestyle greatly increases your risk for illness    A healthy diet, regular physical exercise & weight monitoring are important for maintaining a healthy lifestyle    You may be retaining fluid if you have a history of heart failure or if you experience any of the following symptoms:  Weight gain of 3 pounds or more overnight or 5 pounds in a week, increased swelling in our hands or feet or shortness of breath while lying flat in bed. Please call your doctor as soon as you notice any of these symptoms; do not wait until your next office visit. Recognize signs and symptoms of STROKE:    F-face looks uneven    A-arms unable to move or move unevenly    S-speech slurred or non-existent    T-time-call 911 as soon as signs and symptoms begin-DO NOT go       Back to bed or wait to see if you get better-TIME IS BRAIN.

## 2017-03-20 NOTE — INTERVAL H&P NOTE
H&P Update:  Herminio Nash was seen and examined. History and physical has been reviewed. The patient has been examined.  There have been no significant clinical changes since the completion of the originally dated History and Physical.    Signed By: Cas Patrick MD     March 20, 2017 5:30 PM

## 2017-03-22 RX ORDER — INSULIN GLARGINE 100 [IU]/ML
INJECTION, SOLUTION SUBCUTANEOUS
Qty: 30 VIAL | Refills: 3 | Status: SHIPPED | OUTPATIENT
Start: 2017-03-22 | End: 2017-07-20 | Stop reason: SDUPTHER

## 2017-03-23 ENCOUNTER — OFFICE VISIT (OUTPATIENT)
Dept: PAIN MANAGEMENT | Age: 57
End: 2017-03-23

## 2017-03-23 VITALS
DIASTOLIC BLOOD PRESSURE: 72 MMHG | HEIGHT: 67 IN | WEIGHT: 260 LBS | BODY MASS INDEX: 40.81 KG/M2 | SYSTOLIC BLOOD PRESSURE: 137 MMHG | HEART RATE: 72 BPM

## 2017-03-23 DIAGNOSIS — M25.561 CHRONIC PAIN OF BOTH KNEES: ICD-10-CM

## 2017-03-23 DIAGNOSIS — G89.29 CHRONIC LEFT SHOULDER PAIN: ICD-10-CM

## 2017-03-23 DIAGNOSIS — G89.4 CHRONIC PAIN SYNDROME: ICD-10-CM

## 2017-03-23 DIAGNOSIS — M25.512 CHRONIC LEFT SHOULDER PAIN: ICD-10-CM

## 2017-03-23 DIAGNOSIS — G25.81 RESTLESS LEG SYNDROME: ICD-10-CM

## 2017-03-23 DIAGNOSIS — M25.512 CHRONIC PAIN OF BOTH SHOULDERS: ICD-10-CM

## 2017-03-23 DIAGNOSIS — M25.562 CHRONIC PAIN OF BOTH KNEES: ICD-10-CM

## 2017-03-23 DIAGNOSIS — M54.9 CHRONIC BACK PAIN, UNSPECIFIED BACK LOCATION, UNSPECIFIED BACK PAIN LATERALITY: Primary | ICD-10-CM

## 2017-03-23 DIAGNOSIS — G89.29 CHRONIC BACK PAIN, UNSPECIFIED BACK LOCATION, UNSPECIFIED BACK PAIN LATERALITY: Primary | ICD-10-CM

## 2017-03-23 DIAGNOSIS — M25.511 CHRONIC PAIN OF BOTH SHOULDERS: ICD-10-CM

## 2017-03-23 DIAGNOSIS — G89.29 CHRONIC PAIN OF BOTH SHOULDERS: ICD-10-CM

## 2017-03-23 DIAGNOSIS — M77.8 LEFT SHOULDER TENDINITIS: ICD-10-CM

## 2017-03-23 DIAGNOSIS — M51.36 LUMBAR DEGENERATIVE DISC DISEASE: ICD-10-CM

## 2017-03-23 DIAGNOSIS — M47.816 LUMBAR FACET ARTHROPATHY: ICD-10-CM

## 2017-03-23 DIAGNOSIS — M47.816 SPONDYLOSIS OF LUMBAR REGION WITHOUT MYELOPATHY OR RADICULOPATHY: ICD-10-CM

## 2017-03-23 DIAGNOSIS — G89.29 CHRONIC PAIN OF BOTH KNEES: ICD-10-CM

## 2017-03-23 RX ORDER — CYCLOBENZAPRINE HCL 10 MG
10 TABLET ORAL
Qty: 90 TAB | Refills: 2 | Status: SHIPPED | OUTPATIENT
Start: 2017-03-23 | End: 2017-12-19

## 2017-03-23 RX ORDER — GABAPENTIN 300 MG/1
300 CAPSULE ORAL
Qty: 30 CAP | Refills: 2 | Status: SHIPPED | OUTPATIENT
Start: 2017-03-23 | End: 2017-08-02 | Stop reason: SDUPTHER

## 2017-03-23 RX ORDER — CYCLOBENZAPRINE HCL 10 MG
10 TABLET ORAL
Qty: 90 TAB | Refills: 2 | Status: SHIPPED | OUTPATIENT
Start: 2017-03-23 | End: 2017-03-23

## 2017-03-23 RX ORDER — GABAPENTIN 300 MG/1
300 CAPSULE ORAL
Qty: 30 CAP | Refills: 2 | Status: SHIPPED | OUTPATIENT
Start: 2017-03-23 | End: 2017-03-23

## 2017-03-23 RX ORDER — BACLOFEN 10 MG/1
TABLET ORAL
Qty: 30 TAB | Refills: 2 | Status: SHIPPED | OUTPATIENT
Start: 2017-03-23 | End: 2017-12-19 | Stop reason: SDDI

## 2017-03-23 RX ORDER — DULOXETIN HYDROCHLORIDE 60 MG/1
60 CAPSULE, DELAYED RELEASE ORAL DAILY
Qty: 30 CAP | Refills: 2 | Status: SHIPPED | OUTPATIENT
Start: 2017-03-23 | End: 2017-03-23

## 2017-03-23 RX ORDER — DULOXETIN HYDROCHLORIDE 60 MG/1
60 CAPSULE, DELAYED RELEASE ORAL DAILY
Qty: 30 CAP | Refills: 2 | Status: SHIPPED | OUTPATIENT
Start: 2017-03-23 | End: 2017-08-02 | Stop reason: SDUPTHER

## 2017-03-23 RX ORDER — BACLOFEN 10 MG/1
TABLET ORAL
Qty: 30 TAB | Refills: 2 | Status: SHIPPED | OUTPATIENT
Start: 2017-03-23 | End: 2017-03-23

## 2017-03-23 NOTE — MR AVS SNAPSHOT
Visit Information Date & Time Provider Department Dept. Phone Encounter #  
 3/23/2017  3:15 PM Eliceo Bills MD [facility] [phone] Follow-up Instructions Return in about 3 months (around 6/23/2017). Follow-up and Disposition History Your Appointments 4/3/2017 11:00 AM  
PROCEDURE with Simran Powell MD  
[facility] (KWESI SCHEDULING) Appt Note: Rt. L RFA @ L3-5 per Radha. ... KU (2 of 2 RFA) [address] [facility] [address] [address] [facility]  
[phone]  
  
   
 [address] [place] [address]  
  
    
 4/5/2017 12:15 PM  
Follow Up with Angelito Santiago MD  
[facility] ([address]) Appt Note: f/u  
 [address] [facility] [address] [facility]  
[phone]  
  
   
 [address]  
  
    
 4/12/2017 10:15 AM  
ROUTINE CARE with Angelito Santiago MD  
[facility] ([address]) Appt Note: rov for DM2, adhesive capsulitis. [address]  
  
   
 [address] [facility] Upcoming Health Maintenance Date Due  
 BREAST CANCER SCRN MAMMOGRAM 4/13/2011 FOBT Q 1 YEAR AGE 50-75 4/13/2011 EYE EXAM RETINAL OR DILATED Q1 12/11/2016 HEMOGLOBIN A1C Q6M 7/11/2017 FOOT EXAM Q1 12/14/2017 MICROALBUMIN Q1 1/11/2018 LIPID PANEL Q1 3/1/2018 PAP AKA CERVICAL CYTOLOGY 11/13/2018 DTaP/Tdap/Td series (2 - Td) 11/11/2026 Allergies as of 3/23/2017  Review Complete On: 3/23/2017 By: Eliceo Bills MD  
  
 Severity Noted Reaction Type Reactions Other Food  03/17/2016    Other (comments) Sweeteners- causes headaches Metformin  11/06/2015    Other (comments) Increase pain in feet and swelling in feet Morphine  01/25/2017    Other (comments)  
 headache Current Immunizations  Never Reviewed No immunizations on file. Not reviewed this visit You Were Diagnosed With   
  
 Codes Comments Chronic back pain, unspecified back location, unspecified back pain laterality    -  Primary ICD-10-CM: M54.9, G89.29 ICD-9-CM: 724.5, 338.29 Restless leg syndrome     ICD-10-CM: G25.81 ICD-9-CM: 333.94 Chronic left shoulder pain     ICD-10-CM: M25.512, G89.29 ICD-9-CM: 719.41, 338.29 Left shoulder tendinitis     ICD-10-CM: M75.82 ICD-9-CM: 726.10 Spondylosis of lumbar region without myelopathy or radiculopathy     ICD-10-CM: M47.816 ICD-9-CM: 903. 3 Chronic pain of both shoulders     ICD-10-CM: M25.512, G89.29, M25.511 ICD-9-CM: 719.41, 338.29 Chronic pain of both knees     ICD-10-CM: M25.561, M25.562, G89.29 ICD-9-CM: 719.46, 338.29 Chronic pain syndrome     ICD-10-CM: G89.4 ICD-9-CM: 338.4 Lumbar facet arthropathy (HCC)     ICD-10-CM: M12.88 ICD-9-CM: 721.3 Lumbar degenerative disc disease     ICD-10-CM: M51.36 
ICD-9-CM: 722.52 Vitals BP Pulse Height(growth percentile) Weight(growth percentile) BMI OB Status 137/72 72 5' 7\" (1.702 m) 260 lb (117.9 kg) 40.72 kg/m2 Hysterectomy Smoking Status Never Smoker BMI and BSA Data Body Mass Index Body Surface Area 40.72 kg/m 2 2.36 m 2 Preferred Pharmacy Pharmacy Name Phone [facility] [facility], [address] Your Updated Medication List  
  
   
This list is accurate as of: 3/23/17  3:59 PM.  Always use your most recent med list.  
  
  
  
  
 * albuterol 2.5 mg /3 mL (0.083 %) nebulizer solution Commonly known as:  PROVENTIL VENTOLIN  
3 mL by Nebulization route every four (4) hours as needed for Wheezing. * albuterol 90 mcg/actuation inhaler Commonly known as:  PROVENTIL HFA, VENTOLIN HFA, PROAIR HFA Take 2 Puffs by inhalation every four (4) hours as needed for Wheezing. aspirin, buffered 81 mg Tab Take  by mouth. atorvastatin 80 mg tablet Commonly known as:  LIPITOR  
take 1 tablet by mouth once daily  
  
 bacitracin 500 unit/gram Oint Commonly known as:  BACITRACIN  
apply affected area twice a day * baclofen 10 mg tablet Commonly known as:  LIORESAL  
1  qhs  prn  
  
 * baclofen 10 mg tablet Commonly known as:  LIORESAL  
1  qhs  prn * BD INSULIN SYRINGE ULTRA-FINE 1 mL 31 gauge x 5/16 Syrg Generic drug:  Insulin Syringe-Needle U-100  
use as directed twice a day * BD INSULIN SYRINGE ULTRA-FINE 1 mL 31 gauge x 5/16 Syrg Generic drug:  Insulin Syringe-Needle U-100  
use as directed twice a day  
  
 budesonide-formoterol 160-4.5 mcg/actuation HFA inhaler Commonly known as:  SYMBICORT Take 2 Puffs by inhalation two (2) times a day. cyclobenzaprine 10 mg tablet Commonly known as:  FLEXERIL Take 1 Tab by mouth three (3) times daily as needed for Muscle Spasm(s). * DULoxetine 60 mg capsule Commonly known as:  CYMBALTA Take 1 Cap by mouth daily. * DULoxetine 60 mg capsule Commonly known as:  CYMBALTA Take 1 Cap by mouth daily. FREESTYLE LITE STRIPS strip Generic drug:  glucose blood VI test strips TEST twice a day as directed  
  
 furosemide 40 mg tablet Commonly known as:  LASIX Take 2 x day for 7 days then daily  
  
 gabapentin 300 mg capsule Commonly known as:  NEURONTIN Take 1 Cap by mouth nightly. Indications: Restless Legs Syndrome * LANTUS 100 unit/mL injection Generic drug:  insulin glargine  
inject 50 units subcutaneously 2 x day * LANTUS 100 unit/mL injection Generic drug:  insulin glargine INJECT 90 UNITS SUBCUTANEOUSLY EVERY EVENING AT BEDTIME  
  
 metoprolol succinate 50 mg XL tablet Commonly known as:  TOPROL-XL  
take 1 tablet by mouth once daily [place]-3-DHA-EPA-Fish Oil 1,000 mg (120 mg-180 mg) Cap Commonly known as:  FISH OIL  
 Take 1,000 mg by mouth two (2) times a day. potassium chloride 10 mEq tablet Commonly known as:  K-DUR, KLOR-CON Take 1 Tab by mouth daily. VITAMIN B-12 1,000 mcg sublingual tablet Generic drug:  cyanocobalamin Take 1,000 mcg by mouth daily. * Notice: This list has 10 medication(s) that are the same as other medications prescribed for you. Read the directions carefully, and ask your doctor or other care provider to review them with you. Prescriptions Printed Refills  
 gabapentin (NEURONTIN) 300 mg capsule 2 Sig: Take 1 Cap by mouth nightly. Indications: Restless Legs Syndrome Class: Print Route: Oral  
 cyclobenzaprine (FLEXERIL) 10 mg tablet 2 Sig: Take 1 Tab by mouth three (3) times daily as needed for Muscle Spasm(s). Class: Print Route: Oral  
 baclofen (LIORESAL) 10 mg tablet 2 Si  qhs  prn Class: Print DULoxetine (CYMBALTA) 60 mg capsule 2 Sig: Take 1 Cap by mouth daily. Class: Print Route: Oral  
  
Follow-up Instructions Return in about 3 months (around 2017). Introducing hospitals & HEALTH SERVICES! Dear Trell Del Cid: Thank you for requesting a [employer] account. Our records indicate that you already have an active [employer] account. You can access your account anytime at [email or web address] Did you know that you can access your hospital and ER discharge instructions at any time in [employer]? You can also review all of your test results from your hospital stay or ER visit. Additional Information If you have questions, please visit the Frequently Asked Questions section of the [employer] website at [email or web address] Remember, [employer] is NOT to be used for urgent needs. For medical emergencies, dial 911. Now available from your iPhone and Android! Please provide this summary of care documentation to your next provider. Your primary care clinician is listed as Angelito Perkins. If you have any questions after today's visit, please call [phone].

## 2017-03-23 NOTE — PROGRESS NOTES
HISTORY OF PRESENT ILLNESS  Lianet Acosta is a 55 y.o. female. HPI Comments: Visit survey reviewed  Chief complaint low back pain  Chronic pain  She is using gabapentin to help with restless leg syndrome  He continues to use cyclobenzaprine, baclofen, Cymbalta. She does not use the cyclobenzaprine and baclofen at the same time. I previously had prescribed hydrocodone. She is no longer using hydrocodone therefore no prescription for this today. Clinical outcomes sheet-total score of 8. This has been scanned. She reports benefit from left-sided radiofrequency and is undergoing radiofrequency on the right side in about 2 weeks. She is quite happy with the benefit in hopes to be able to decrease and possibly stop her other medications in the future. Review of Systems   Constitutional: Positive for malaise/fatigue. HENT: Negative for hearing loss. Eyes: Negative for blurred vision and double vision. Respiratory: Positive for shortness of breath. Negative for cough. Cardiovascular: Negative for chest pain and leg swelling. Gastrointestinal: Positive for nausea. Negative for vomiting. Genitourinary: Negative for dysuria and urgency. Musculoskeletal: Positive for back pain, falls and joint pain. Skin: Negative for itching and rash. Neurological: Positive for dizziness, weakness and headaches. Negative for seizures. Endo/Heme/Allergies: Positive for environmental allergies. Does not bruise/bleed easily. Psychiatric/Behavioral: Negative for suicidal ideas. The patient is not nervous/anxious and does not have insomnia. Physical Exam   Constitutional: She appears well-developed and well-nourished. She is cooperative. She does not have a sickly appearance. HENT:   Head: Normocephalic and atraumatic. Right Ear: External ear normal. No drainage. Left Ear: External ear normal. No drainage. Nose: Nose normal.   Eyes: Lids are normal. Right eye exhibits no discharge.  Left eye exhibits no discharge. Right conjunctiva has no hemorrhage. Left conjunctiva has no hemorrhage. Neck: Neck supple. No tracheal deviation present. No thyroid mass present. Pulmonary/Chest: Effort normal. No respiratory distress. Neurological: She is alert. No cranial nerve deficit. Skin: Skin is intact. No rash noted. Psychiatric: Her speech is normal. Her affect is not angry. She does not express inappropriate judgment. Nursing note and vitals reviewed. ASSESSMENT and PLAN  Encounter Diagnoses   Name Primary?  Chronic back pain, unspecified back location, unspecified back pain laterality Yes    Restless leg syndrome     Chronic left shoulder pain     Left shoulder tendinitis     Spondylosis of lumbar region without myelopathy or radiculopathy     Chronic pain of both shoulders     Chronic pain of both knees     Chronic pain syndrome     Lumbar facet arthropathy (HCC)     Lumbar degenerative disc disease    No indicators for medication misuse. Pain Meds and Quality Of Life have been reviewed. Nonpharmacologic therapy and non-opioid pharmacologic therapy were considered. Possible changes to treatment plan considered. Support/education given as needed. Today-medications are as listed. Follow up --  3 months.

## 2017-03-23 NOTE — PROGRESS NOTES
Nursing Notes    Patient presents to the office today in follow-up. Patient rates her pain at 5/10 on the numerical pain scale. Reviewed medications with counts as follows:    Rx Date filled Qty Dispensed Pill Count Last Dose Short   Pt is not on any controlled medications. POC UDS was not performed in office today    Any new labs or imaging since last appointment? NO    Have you been to an emergency room (ER) or urgent care clinic since your last visit? NO            Have you been hospitalized since your last visit? NO     If yes, where, when, and reason for visit? Have you seen or consulted any other health care providers outside of the [address] [place] [address]  since your last visit? NO     If yes, where, when, and reason for visit? HM deferred to pcp.

## 2017-03-30 RX ORDER — MIDAZOLAM HYDROCHLORIDE 1 MG/ML
.5-6 INJECTION, SOLUTION INTRAMUSCULAR; INTRAVENOUS
Status: CANCELLED | OUTPATIENT
Start: 2017-04-03

## 2017-04-03 ENCOUNTER — APPOINTMENT (OUTPATIENT)
Dept: GENERAL RADIOLOGY | Age: 57
End: 2017-04-03
Attending: PHYSICAL MEDICINE & REHABILITATION
Payer: MEDICAID

## 2017-04-03 ENCOUNTER — HOSPITAL ENCOUNTER (OUTPATIENT)
Age: 57
Setting detail: OUTPATIENT SURGERY
Discharge: HOME | End: 2017-04-03
Attending: PHYSICAL MEDICINE & REHABILITATION | Admitting: PHYSICAL MEDICINE & REHABILITATION
Payer: MEDICAID

## 2017-04-03 VITALS
SYSTOLIC BLOOD PRESSURE: 137 MMHG | DIASTOLIC BLOOD PRESSURE: 85 MMHG | TEMPERATURE: 98.8 F | RESPIRATION RATE: 16 BRPM | HEIGHT: 67 IN | BODY MASS INDEX: 39.55 KG/M2 | OXYGEN SATURATION: 93 % | WEIGHT: 252 LBS | HEART RATE: 74 BPM

## 2017-04-03 LAB — GLUCOSE BLD STRIP.AUTO-MCNC: 224 MG/DL (ref 70–110)

## 2017-04-03 PROCEDURE — 82962 GLUCOSE BLOOD TEST: CPT

## 2017-04-03 PROCEDURE — 74011000250 HC RX REV CODE- 250

## 2017-04-03 PROCEDURE — 99152 MOD SED SAME PHYS/QHP 5/>YRS: CPT | Performed by: PHYSICAL MEDICINE & REHABILITATION

## 2017-04-03 PROCEDURE — 76010000009 HC PAIN MGT 0 TO 30 MIN PROC: Performed by: PHYSICAL MEDICINE & REHABILITATION

## 2017-04-03 PROCEDURE — 77030020268 HC MISC GENERAL SUPPLY: Performed by: PHYSICAL MEDICINE & REHABILITATION

## 2017-04-03 PROCEDURE — 77030029505: Performed by: PHYSICAL MEDICINE & REHABILITATION

## 2017-04-03 PROCEDURE — 74011250636 HC RX REV CODE- 250/636

## 2017-04-03 PROCEDURE — 77030020508 HC PD GRND GENRTR BAYL -A: Performed by: PHYSICAL MEDICINE & REHABILITATION

## 2017-04-03 RX ORDER — DEXAMETHASONE SODIUM PHOSPHATE 100 MG/10ML
INJECTION INTRAMUSCULAR; INTRAVENOUS AS NEEDED
Status: DISCONTINUED | OUTPATIENT
Start: 2017-04-03 | End: 2017-04-03 | Stop reason: HOSPADM

## 2017-04-03 RX ORDER — MIDAZOLAM HYDROCHLORIDE 1 MG/ML
INJECTION, SOLUTION INTRAMUSCULAR; INTRAVENOUS AS NEEDED
Status: DISCONTINUED | OUTPATIENT
Start: 2017-04-03 | End: 2017-04-03 | Stop reason: HOSPADM

## 2017-04-03 RX ORDER — LIDOCAINE HYDROCHLORIDE 10 MG/ML
INJECTION, SOLUTION EPIDURAL; INFILTRATION; INTRACAUDAL; PERINEURAL AS NEEDED
Status: DISCONTINUED | OUTPATIENT
Start: 2017-04-03 | End: 2017-04-03 | Stop reason: HOSPADM

## 2017-04-03 RX ORDER — FENTANYL CITRATE 50 UG/ML
INJECTION, SOLUTION INTRAMUSCULAR; INTRAVENOUS AS NEEDED
Status: DISCONTINUED | OUTPATIENT
Start: 2017-04-03 | End: 2017-04-03 | Stop reason: HOSPADM

## 2017-04-03 RX ORDER — LIDOCAINE HYDROCHLORIDE 20 MG/ML
INJECTION, SOLUTION EPIDURAL; INFILTRATION; INTRACAUDAL; PERINEURAL AS NEEDED
Status: DISCONTINUED | OUTPATIENT
Start: 2017-04-03 | End: 2017-04-03 | Stop reason: HOSPADM

## 2017-04-03 NOTE — H&P (VIEW-ONLY)
Nursing Notes    Patient presents to the office today in follow-up. Patient rates her pain at 5/10 on the numerical pain scale. Reviewed medications with counts as follows:    Rx Date filled Qty Dispensed Pill Count Last Dose Short   Pt is not on any controlled medications. POC UDS was not performed in office today    Any new labs or imaging since last appointment? NO    Have you been to an emergency room (ER) or urgent care clinic since your last visit? NO            Have you been hospitalized since your last visit? NO     If yes, where, when, and reason for visit? Have you seen or consulted any other health care providers outside of the [address] [place] [address]  since your last visit? NO     If yes, where, when, and reason for visit? HM deferred to pcp.

## 2017-04-03 NOTE — PROCEDURES
[facility]    THERMOCOAGULATION OF LUMBAR MEDIAL BRANCH  PROCEDURE REPORT      PATIENT:  Westley Sacks OF BIRTH:  1960  DATE OF SERVICE:  4/3/2017  [facility]    PRE-PROCEDURE DIAGNOSIS:  See Above    POST-PROCEDURE DIAGNOSIS:  See Above    PROCEDURE:      1. Right radiofrequency thermocoagulation of lumbar medial branch nerves, L3/L4, L4/L5, L5/S1 (30196, 64636 x2)  2. Fluoroscopic needle guidance (spinal) (80242)  3. Supervision of moderate sedation (73918)  4. Additional 15 minutes of supervised moderate sedation (39761)    ANESTHESIA:  Local with moderate IV sedation. See Medication Administration Record for specific medications and dosage. COMPLICATIONS: None. PHYSICIAN:  Valerie Rodriguez MD    PRE-PROCEDURE NOTE:  Pre-procedural assessment of the patient was performed including a limited history and physical examination. The details of the procedure were discussed with the patient, including the risks, benefits and alternative options and an informed consent was obtained. The patients NPO status, if necessary for the specific procedure and/or administration of moderate intravenous sedation, if utilized, and availability of a responsible adult to escort the patient following the procedure were confirmed. A peripheral intravenous cannula was placed without difficulty and lactated Ringers solution administered. See nursing notes for details. PROCEDURE NOTE:  The patient was brought to the procedure suite and positioned on the fluoroscopy table in the prone position. Physiologic monitors were applied and supplemental oxygen was administered via nasal cannula. The skin was prepped in the standard surgical fashion and sterile drapes were applied over the procedure site.  Please refer to the Flowsheet for documentation of the patients vital signs and the Medication Administration Record for any oral and/or intravenous sedation administered prior to or during the procedure. 1% Lidocaine was utilized for local anesthesia. Under 10-15 degree ipsilateral oblique fluoroscopic guidance a 15cm 18gauge radiofrequency needle with a 10 mm curved active tip was advanced to the junction of the superior articular process and transverse process of each vertebral level immediately inferior to the above-mentioned dorsal rami medial branch nerves. Under AP fluoroscopic guidance, a similar needle was then placed over the superior margin of the sacral ala to thermocoagulate the L5 medial branch nerve. After each individual needle was placed, sensory and motor testing, at 50 Hz and 2 Hz, respectively, were performed which elicited ipsilateral deep local back discomfort without evidence of motor stimulation in the ipsilateral gluteal muscles or extremity. Following this, 1 mL of lidocaine 2% was injected after the negative aspiration of blood, air or CSF. Final correct needle placement was then confirmed by viewing each needle in AP and lateral fluoroscopic views in addition to repeat sensory and motor testing which, again, elicited ipsilateral deep local back discomfort without evidence of motor stimulation in the ipsilateral gluteal muscles or extremity. Medial branch nerve radiofrequency thermocoagulation was then performed at each level for 120 seconds at 80° centigrade x 1 cycle. Following this, 1/3ml  to 1/2ml of a mixture of lidocaine 1% admixed with dexamethasone 2mg [10mg/ml] was injected through each radiofrequency needle after negative aspiration and before removing each needle. Then, all needles were removed intact. The area was thoroughly cleaned and sterile bandages applied as necessary. The patient tolerated the procedure well without complication and the vital signs remained stable throughout the procedure.     POST-PROCEDURE COURSE:   The patient was escorted from the procedure suite in satisfactory condition and recovered per facility protocol based on the type of procedure performed and/or the sedation utilized. The patient did not experience any adverse events and remained hemodynamically stable during the post-procedure period. DISCHARGE NOTE:  Upon discharge, the patient was able to tolerate fluids and was in no acute distress. The patient was oriented to person, place and time and vital signs were stable. Appropriate post-procedure instructions were provided and explained to the patient in detail and all questions were answered.                     Fernando Frank MD 4/3/2017 3:53 PM

## 2017-04-05 ENCOUNTER — OFFICE VISIT (OUTPATIENT)
Dept: FAMILY MEDICINE CLINIC | Age: 57
End: 2017-04-05

## 2017-04-05 VITALS
TEMPERATURE: 98.4 F | RESPIRATION RATE: 18 BRPM | SYSTOLIC BLOOD PRESSURE: 127 MMHG | HEIGHT: 67 IN | OXYGEN SATURATION: 97 % | WEIGHT: 259.8 LBS | BODY MASS INDEX: 40.78 KG/M2 | DIASTOLIC BLOOD PRESSURE: 72 MMHG | HEART RATE: 61 BPM

## 2017-04-05 DIAGNOSIS — G89.29 CHRONIC PAIN OF BOTH SHOULDERS: ICD-10-CM

## 2017-04-05 DIAGNOSIS — M25.511 CHRONIC PAIN OF BOTH SHOULDERS: ICD-10-CM

## 2017-04-05 DIAGNOSIS — M77.8 LEFT SHOULDER TENDINITIS: ICD-10-CM

## 2017-04-05 DIAGNOSIS — M25.512 CHRONIC PAIN OF BOTH SHOULDERS: ICD-10-CM

## 2017-04-05 DIAGNOSIS — R06.2 WHEEZE: Primary | ICD-10-CM

## 2017-04-05 NOTE — PROGRESS NOTES
Chief Complaint   Patient presents with    Follow-up     Patient not happy with the pulmonologist she went to see      1. Have you been to the ER, urgent care clinic since your last visit? Hospitalized since your last visit? No    2. Have you seen or consulted any other health care providers outside of the [address] [place] [address] since your last visit? Include any pap smears or colon screening. Yes When: March 30, 2017  Where: Pulmonologist- Dr. Diallo Albert Reason for visit: Shira Maher comes in for f/u care. 1) Pulmonary; she has wheezing that is persistent. Seen by the pulmonologist and she requests referral to a different one. Was not satisfied with the previous physician she says. Will place referral. She has atelectasis seen on cxr and I have put her on symbicort and albuterol. These have helped but she is concerned as she has to use the medication frequently. She was also put on prednisone for a while. 2) DM2: Her blood glucose levels been high. Last hba1c was elevated at 11.5. She is yet to go back to see the endocrinologist. We talked about this at length. She does need to get her blood glucose under control as this is an urgent issue. She at times seems lackadaisical about this. She also has a lot of other things going on that may distract from focusing on the diabetes. I did voice my concern about this and the dangers of uncontrolled hyperglycemia. 3) Back and shoulder pain: she is being seen in the spine clinic and has had local injections given recently. Still has trouble raising her arms more so left one to do overhead motions. 4) Cardiac: Stable on medication. On coreg and lasix.         Past Medical History  Past Medical History:   Diagnosis Date    Arthritis     Lower back     Chronic back pain     Lower back pain    Depression     Diabetes ([address]     Panic attacks     Sleep apnea        Surgical History  Past Surgical History:   Procedure Laterality Date    HX HEART VALVE SURGERY  2013    aortic valve repair    HX HYSTERECTOMY      Partial Hysterectomy - removed ovary    HX MYOMECTOMY          Medications  Current Outpatient Prescriptions   Medication Sig Dispense Refill    gabapentin (NEURONTIN) 300 mg capsule Take 1 Cap by mouth nightly. Indications: Restless Legs Syndrome 30 Cap 2    cyclobenzaprine (FLEXERIL) 10 mg tablet Take 1 Tab by mouth three (3) times daily as needed for Muscle Spasm(s). 90 Tab 2    DULoxetine (CYMBALTA) 60 mg capsule Take 1 Cap by mouth daily. 30 Cap 2    baclofen (LIORESAL) 10 mg tablet 1  qhs  prn 30 Tab 2    LANTUS 100 unit/mL injection INJECT 90 UNITS SUBCUTANEOUSLY EVERY EVENING AT BEDTIME 30 Vial 3    budesonide-formoterol (SYMBICORT) 160-4.5 mcg/actuation HFA inhaler Take 2 Puffs by inhalation two (2) times a day. 1 Inhaler 1    Omega-3-DHA-EPA-Fish Oil (FISH OIL) 1,000 mg (120 mg-180 mg) cap Take 1,000 mg by mouth two (2) times a day. 60 Cap 2    bacitracin (BACITRACIN) 500 unit/gram oint apply affected area twice a day 28 g 0    furosemide (LASIX) 40 mg tablet Take 2 x day for 7 days then daily 60 Tab 0    albuterol (PROVENTIL HFA, VENTOLIN HFA, PROAIR HFA) 90 mcg/actuation inhaler Take 2 Puffs by inhalation every four (4) hours as needed for Wheezing. 1 Inhaler 3    potassium chloride (K-DUR, KLOR-CON) 10 mEq tablet Take 1 Tab by mouth daily. 30 Tab 1    metoprolol succinate (TOPROL-XL) 50 mg XL tablet take 1 tablet by mouth once daily 30 Tab 3    BD INSULIN SYRINGE ULTRA-FINE 1 mL 31 gauge x 5/16 syrg use as directed twice a day 200 Syringe 3    insulin glargine (LANTUS) 100 unit/mL injection inject 50 units subcutaneously 2 x day 30 Vial 3    BD INSULIN SYRINGE ULTRA-FINE 1 mL 31 gauge x 5/16 syrg use as directed twice a day 200 Syringe 3    cyanocobalamin (VITAMIN B-12) 1,000 mcg sublingual tablet Take 1,000 mcg by mouth daily.       FREESTYLE LITE STRIPS strip TEST twice a day as directed 100 Strip 11    atorvastatin (LIPITOR) 80 mg tablet take 1 tablet by mouth once daily 30 Tab 11    albuterol (PROVENTIL VENTOLIN) 2.5 mg /3 mL (0.083 %) nebulizer solution 3 mL by Nebulization route every four (4) hours as needed for Wheezing. 24 Each 0    Aspirin, Buffered 81 mg tab Take  by mouth. Allergies  Allergies   Allergen Reactions    Other Food Other (comments)     Sweeteners- causes headaches    Metformin Other (comments)     Increase pain in feet and swelling in feet    Morphine Other (comments)     headache       Family History  Family History   Problem Relation Age of Onset    Heart Disease Mother     Diabetes Mother     Stroke Mother     Diabetes Father     Heart Disease Father        Social History  Social History     Social History    Marital status:      Spouse name: N/A    Number of children: N/A    Years of education: N/A     Occupational History    Not on file. Social History Main Topics    Smoking status: Never Smoker    Smokeless tobacco: Never Used    Alcohol use No    Drug use: No    Sexual activity: Not Currently     Other Topics Concern     Service No    Blood Transfusions No    Caffeine Concern No    Occupational Exposure No    Hobby Hazards No    Sleep Concern Yes     Sleep apnea     Stress Concern Yes     Due to family medical issues.      Weight Concern No    Special Diet No    Back Care Yes     Patient tries to do back care with streches     Exercise No    Bike Helmet Yes    Seat Belt Yes    Self-Exams Yes     Social History Narrative       Review of Systems  Review of Systems - History obtained from chart review and the patient  General ROS: positive for  - fatigue and malaise  Psychological ROS: positive for - behavioral disorder and depression  Ophthalmic ROS: negative  ENT ROS: negative  Allergy and Immunology ROS: positive for - postnasal drip and seasonal allergies  Endocrine ROS: DM2  Respiratory ROS: positive for - cough, shortness of breath and sputum changes  negative for - hemoptysis or pleuritic pain  Cardiovascular ROS: negative  Gastrointestinal ROS: no abdominal pain, change in bowel habits, or black or bloody stools  Genito-Urinary ROS: no dysuria, trouble voiding, or hematuria  Musculoskeletal ROS: positive for - joint pain, joint stiffness and pain in back - lower, upper and shoulder - left  Neurological ROS: negative    Vital Signs  Visit Vitals    /72 (BP 1 Location: Left arm, BP Patient Position: Sitting)    Pulse 61    Temp 98.4 °F (36.9 °C) (Oral)    Resp 18    Ht 5' 7\" (1.702 m)    Wt 259 lb 12.8 oz (117.8 kg)    SpO2 97%    BMI 40.69 kg/m2         Physical Exam  Physical Examination: General appearance - oriented to person, place, and time and acyanotic, in no respiratory distress  Mental status - alert, oriented to person, place, and time, affect appropriate to mood  Mouth - mucous membranes moist, pharynx normal without lesions  Chest - clear to auscultation, no wheezes, rales or rhonchi, symmetric air entry, no tachypnea, retractions or cyanosis  Heart - normal rate and regular rhythm  Back exam - limited range of motion  Neurological - alert, oriented, normal speech, no focal findings  Musculoskeletal - limited ROM left shoulder with tender joint margins  Extremities - intact peripheral pulses    Diagnostics  Orders Placed This Encounter    REFERRAL TO PULMONARY DISEASE     Referral Priority:   Routine     Referral Type:   Consultation     Referral Reason:   Specialty Services Required         Results  Results for orders placed or performed during the hospital encounter of 04/03/17   GLUCOSE, POC   Result Value Ref Range    Glucose (POC) 224 (H) 70 - 110 mg/dL       ASSESSMENT and PLAN    ICD-10-CM ICD-9-CM    1. Wheeze R06.2 786.07 REFERRAL TO PULMONARY DISEASE   2. Uncontrolled type 2 diabetes mellitus with other specified complication (HCC) Y40.95 250.82     E11.65     3.  Chronic pain of both shoulders M25.512 719.41 G89.29 338.29     M25.511     4. Left shoulder tendinitis M75.82 726.10      reviewed diet, exercise and weight control  reviewed medications and side effects in detail  specific diabetic recommendations: low cholesterol diet, weight control and daily exercise discussed, home glucose monitoring emphasized, all medications, side effects and compliance discussed carefully and glycohemoglobin and other lab monitoring discussed      I have discussed the diagnosis with the patient and the intended plan of care as seen in the above orders. The patient has received an after-visit summary and questions were answered concerning future plans. I have discussed medication, side effects, and warnings with the patient in detail. The patient verbalized understanding and is in agreement with the plan of care. The patient will contact the office with any additional concerns.     Davy Tuttle MD

## 2017-04-05 NOTE — PATIENT INSTRUCTIONS
Counting Carbohydrate When You Take Insulin: Care Instructions  Your Care Instructions  You don't have to eat special foods when you take insulin. You just have to be careful to eat healthy foods. And you have to spread throughout the day the carbohydrate you eat. Carbohydrate raises blood sugar higher and more quickly than any other nutrient. It is found in desserts, breads and cereals, and fruit. It's also found in starchy vegetables such as potatoes and corn, grains such as rice and pasta, and milk and yogurt. The more carbohydrate, or carbs, you eat at one time, the higher your blood sugar will rise. Spreading carbs throughout the day helps keep your blood sugar levels within your target range. Counting carbs is one of the best ways to keep your blood sugar under control when you use insulin. It helps you match the right amount of insulin to the number of grams of carbohydrate in a meal. You need to test your blood sugar several times a day to learn how carbs affect you. Then you can change your diet and insulin dose as needed. A registered dietitian or certified diabetes educator can help you plan meals and snacks. Follow-up care is a key part of your treatment and safety. Be sure to make and go to all appointments, and call your doctor if you are having problems. It's also a good idea to know your test results and keep a list of the medicines you take. How can you care for yourself at home? Know your daily amount of carbohydrate  Your daily amount depends on several things, including your weight, how active you are, which diabetes medicines you take, and what your goals are for your blood sugar levels. A registered dietitian or certified diabetes educator can help you plan how much carbohydrate to include in each meal and snack. For most adults, a guideline for the daily amount of carbohydrate is:  · 45 to 60 grams at each meal. That's about the same as 3 to 4 carbohydrate servings.   · 15 to 20 grams at each snack. That's about the same as 1 carbohydrate serving. Count carbs  If you take insulin, you need to know how many grams of carbohydrate are in a meal. This lets you know how much rapid-acting insulin to take before you eat. If you use an insulin pump, you get a constant rate of insulin during the day. So the pump must be programmed at meals to give you extra insulin to cover the rise in blood sugar after meals. When you know how much carbohydrate you will eat, you can take the right amount of insulin. Or, if you always use the same amount of insulin, you need to make sure that you eat the same amount of carbohydrate at meals. · Learn your own insulin-to-carbohydrate ratio. You and your diabetes health professional will figure out the ratio. You can do this by testing your blood sugar after meals. For example, you may need a certain amount of insulin for every 15 grams of carbohydrate. · Add up the carbohydrate grams in a meal. Then you can figure out how many units of insulin to take based on your insulin-to-carbohydrate ratio. · Look at labels on packaged foods. This can tell you how much carbohydrate is in a serving. You can also use guides from the American Diabetes Association. · Be aware of portions, or serving sizes. If a package has two servings and you eat the whole package, you need to double the number of grams of carbohydrate listed for one serving. · Protein, fat, and fiber do not raise blood sugar as much as carbs do. If you eat a lot of these nutrients in a meal, your blood sugar will rise more slowly than it would otherwise. · Exercise lowers blood sugar. You can use less insulin than you would if you were not doing exercise. Keep in mind that timing matters.  If you exercise within 1 hour after a meal, your body may need less insulin for that meal than it would if you exercised 3 hours after the meal. Test your blood sugar to find out how exercise affects your need for insulin. Eat from all food groups  · Eat at least three meals a day. · Plan meals to include food from all the food groups. ¨ Grains: 1 slice of bread (1 ounce), ½ cup of cooked cereal, and 1/3 cup of cooked pasta or rice. These have about 15 grams of carbohydrate in a serving. Choose whole grains. These include whole wheat bread or crackers, oatmeal, and brown rice. Have them more often than refined grains. ¨ Fruit: 1 small fresh fruit, such as an apple or orange; ½ of a banana; ½ cup of chopped, cooked, or canned fruit; ½ cup of fruit juice; 1 cup of melon or raspberries; and 2 tablespoons of dried fruit. These have about 15 grams of carbohydrate in a serving. ¨ Dairy: 1 cup of nonfat or low-fat milk and 2/3 cup of plain yogurt. These have about 15 grams of carbohydrate in a serving. ¨ Protein foods: Beef, chicken, turkey, fish, eggs, tofu, cheese, cottage cheese, and peanut butter. A serving size of meat is 3 ounces. This is about the size of a deck of cards. Examples of meat substitute serving sizes (equal to 1 ounce of meat) are 1/4 cup of cottage cheese, 1 egg, 1 tablespoon of peanut butter, and ½ cup of tofu. These have very little or no carbohydrate per serving. ¨ Vegetables: Starchy vegetables such as ½ cup of cooked beans, peas, potatoes, or corn have about 15 grams of carbohydrate. Nonstarchy vegetables have very little carbohydrate. These include 1 cup of raw leafy vegetables (such as spinach), ½ cup of other vegetables (cooked or chopped), and 3/4 cup of vegetable juice. · Talk to your dietitian or diabetes educator about ways to add limited amounts of sweets into your meal plan. · If you drink alcohol:  ¨ Limit it to no more than 1 drink a day for women and 2 drinks a day for men. (One drink is 12 fl oz of beer, 5 fl oz of wine, or 1.5 fl oz liquor.)  ¨ Make sure to count drink mixers that have sugar in your total carbohydrate count.  These include cola, tonic water, amy mix, and fruit juice.  ¨ Eat a carbohydrate food along with your alcoholic drink. ¨ Check your blood sugar more often. This is because alcohol can lower your blood sugar too much. This may happen even hours later while you sleep. You may want to eat and adjust your insulin dose when you drink alcohol to prevent severe low blood sugar. ¨ Talk to your doctor. Alcohol may not be recommended when you are taking certain diabetes medicines. Where can you learn more? Go to http://nadya-irasema.info/. Enter Q708 in the search box to learn more about \"Counting Carbohydrate When You Take Insulin: Care Instructions. \"  Current as of: July 5, 2016  Content Version: 11.2  © 2138-2615 [employer]. Care instructions adapted under license by [employer] (which disclaims liability or warranty for this information). If you have questions about a medical condition or this instruction, always ask your healthcare professional. [place] [address] any warranty or liability for your use of this information.

## 2017-04-05 NOTE — MR AVS SNAPSHOT
Visit Information Date & Time Provider Department Dept. Phone Encounter #  
 4/5/2017 12:15 PM Veronica Freeman. [address] Archana Chavarria [phone] [id number] Follow-up Instructions Return in about 3 months (around 7/5/2017), or if symptoms worsen or fail to improve, for wheeze, dm2. Your Appointments 4/12/2017 10:15 AM  
ROUTINE CARE with MD Veronica Freeman. [address] Archana Chavarria ([address] [place] [address]) Appt Note: rov for DM2, adhesive capsulitis. [address] [id number] [address]  
  
   
 [address]  
  
    
 6/19/2017  2:00 PM  
Follow Up with Pranav Santamaria MD  
[facility] [address] [place] [address]) Appt Note: Return in about 3 months (around 6/23/2017). ..per GS. ..[facility]  
 [address] [address]  
[phone] [facility] [id number] Upcoming Health Maintenance Date Due  
 BREAST CANCER SCRN MAMMOGRAM 4/13/2011 FOBT Q 1 YEAR AGE 50-75 4/13/2011 EYE EXAM RETINAL OR DILATED Q1 12/11/2016 HEMOGLOBIN A1C Q6M 7/11/2017 FOOT EXAM Q1 12/14/2017 MICROALBUMIN Q1 1/11/2018 LIPID PANEL Q1 3/1/2018 PAP AKA CERVICAL CYTOLOGY 11/13/2018 DTaP/Tdap/Td series (2 - Td) 11/11/2026 Allergies as of 4/5/2017  Review Complete On: 4/5/2017 By: Ammon Pitts MD  
  
 Severity Noted Reaction Type Reactions Other Food  03/17/2016    Other (comments) Sweeteners- causes headaches Metformin  11/06/2015    Other (comments) Increase pain in feet and swelling in feet Morphine  01/25/2017    Other (comments)  
 headache Current Immunizations  Never Reviewed No immunizations on file. Not reviewed this visit You Were Diagnosed With   
  
 Codes Comments Wheeze    -  Primary ICD-10-CM: R06.2 ICD-9-CM: 786.07   
 Uncontrolled type 2 diabetes mellitus with other specified complication [facility]     LHJ-03-LX: E11.69, E11.65 ICD-9-CM: 250.82 Vitals BP Pulse Temp Resp Height(growth percentile) Weight(growth percentile) 127/72 (BP 1 Location: Left arm, BP Patient Position: Sitting) 61 98.4 °F (36.9 °C) (Oral) 18 5' 7\" (1.702 m) 259 lb 12.8 oz (117.8 kg) SpO2 BMI OB Status Smoking Status 97% 40.69 kg/m2 Hysterectomy Never Smoker Vitals History BMI and BSA Data Body Mass Index Body Surface Area  
 40.69 kg/m 2 2.36 m 2 Preferred Pharmacy Pharmacy Name Phone [address] [facility], [address] [address] Your Updated Medication List  
  
   
This list is accurate as of: 4/5/17  1:14 PM.  Always use your most recent med list.  
  
  
  
  
 * albuterol 2.5 mg /3 mL (0.083 %) nebulizer solution Commonly known as:  PROVENTIL VENTOLIN  
3 mL by Nebulization route every four (4) hours as needed for Wheezing. * albuterol 90 mcg/actuation inhaler Commonly known as:  PROVENTIL HFA, VENTOLIN HFA, PROAIR HFA Take 2 Puffs by inhalation every four (4) hours as needed for Wheezing. aspirin, buffered 81 mg Tab Take  by mouth. atorvastatin 80 mg tablet Commonly known as:  LIPITOR  
take 1 tablet by mouth once daily  
  
 bacitracin 500 unit/gram Oint Commonly known as:  BACITRACIN  
apply affected area twice a day * baclofen 10 mg tablet Commonly known as:  LIORESAL  
1  qhs  prn  
  
 * baclofen 10 mg tablet Commonly known as:  LIORESAL  
1  qhs  prn * BD INSULIN SYRINGE ULTRA-FINE 1 mL 31 gauge x 5/16 Syrg Generic drug:  Insulin Syringe-Needle U-100  
use as directed twice a day * BD INSULIN SYRINGE ULTRA-FINE 1 mL 31 gauge x 5/16 Syrg Generic drug:  Insulin Syringe-Needle U-100  
use as directed twice a day  
  
 budesonide-formoterol 160-4.5 mcg/actuation HFA inhaler Commonly known as:  SYMBICORT  
 Take 2 Puffs by inhalation two (2) times a day. cyclobenzaprine 10 mg tablet Commonly known as:  FLEXERIL Take 1 Tab by mouth three (3) times daily as needed for Muscle Spasm(s). * DULoxetine 60 mg capsule Commonly known as:  CYMBALTA Take 1 Cap by mouth daily. * DULoxetine 60 mg capsule Commonly known as:  CYMBALTA Take 1 Cap by mouth daily. FREESTYLE LITE STRIPS strip Generic drug:  glucose blood VI test strips TEST twice a day as directed  
  
 furosemide 40 mg tablet Commonly known as:  LASIX Take 2 x day for 7 days then daily  
  
 gabapentin 300 mg capsule Commonly known as:  NEURONTIN Take 1 Cap by mouth nightly. Indications: Restless Legs Syndrome * LANTUS 100 unit/mL injection Generic drug:  insulin glargine  
inject 50 units subcutaneously 2 x day * LANTUS 100 unit/mL injection Generic drug:  insulin glargine INJECT 90 UNITS SUBCUTANEOUSLY EVERY EVENING AT BEDTIME  
  
 metoprolol succinate 50 mg XL tablet Commonly known as:  TOPROL-XL  
take 1 tablet by mouth once daily [place]-3-DHA-EPA-Fish Oil 1,000 mg (120 mg-180 mg) Cap Commonly known as:  FISH OIL Take 1,000 mg by mouth two (2) times a day. potassium chloride 10 mEq tablet Commonly known as:  K-DUR, KLOR-CON Take 1 Tab by mouth daily. VITAMIN B-12 1,000 mcg sublingual tablet Generic drug:  cyanocobalamin Take 1,000 mcg by mouth daily. * Notice: This list has 10 medication(s) that are the same as other medications prescribed for you. Read the directions carefully, and ask your doctor or other care provider to review them with you. We Performed the Following REFERRAL TO PULMONARY DISEASE [[id number] Custom] Comments:  
 Please evaluate patient for recalcitrant wheeze. Follow-up Instructions Return in about 3 months (around 7/5/2017), or if symptoms worsen or fail to improve, for wheeze, dm2. Referral Information Referral ID Referred By Referred To  
  
 [id number] Ruslan WASSERMAN Not Available Visits Status Start Date End Date 1 New Request 4/5/17 4/5/18 If your referral has a status of pending review or denied, additional information will be sent to support the outcome of this decision. Patient Instructions Counting Carbohydrate When You Take Insulin: Care Instructions Your Care Instructions You don't have to eat special foods when you take insulin. You just have to be careful to eat healthy foods. And you have to spread throughout the day the carbohydrate you eat. Carbohydrate raises blood sugar higher and more quickly than any other nutrient. It is found in desserts, breads and cereals, and fruit. It's also found in starchy vegetables such as potatoes and corn, grains such as rice and pasta, and milk and yogurt. The more carbohydrate, or carbs, you eat at one time, the higher your blood sugar will rise. Spreading carbs throughout the day helps keep your blood sugar levels within your target range. Counting carbs is one of the best ways to keep your blood sugar under control when you use insulin. It helps you match the right amount of insulin to the number of grams of carbohydrate in a meal. You need to test your blood sugar several times a day to learn how carbs affect you. Then you can change your diet and insulin dose as needed. A registered dietitian or certified diabetes educator can help you plan meals and snacks. Follow-up care is a key part of your treatment and safety. Be sure to make and go to all appointments, and call your doctor if you are having problems. It's also a good idea to know your test results and keep a list of the medicines you take. How can you care for yourself at home? Know your daily amount of carbohydrate Your daily amount depends on several things, including your weight, how active you are, which diabetes medicines you take, and what your goals are for your blood sugar levels. A registered dietitian or certified diabetes educator can help you plan how much carbohydrate to include in each meal and snack. For most adults, a guideline for the daily amount of carbohydrate is: · 45 to 60 grams at each meal. That's about the same as 3 to 4 carbohydrate servings. · 15 to 20 grams at each snack. That's about the same as 1 carbohydrate serving. Count carbs If you take insulin, you need to know how many grams of carbohydrate are in a meal. This lets you know how much rapid-acting insulin to take before you eat. If you use an insulin pump, you get a constant rate of insulin during the day. So the pump must be programmed at meals to give you extra insulin to cover the rise in blood sugar after meals. When you know how much carbohydrate you will eat, you can take the right amount of insulin. Or, if you always use the same amount of insulin, you need to make sure that you eat the same amount of carbohydrate at meals. · Learn your own insulin-to-carbohydrate ratio. You and your diabetes health professional will figure out the ratio. You can do this by testing your blood sugar after meals. For example, you may need a certain amount of insulin for every 15 grams of carbohydrate. · Add up the carbohydrate grams in a meal. Then you can figure out how many units of insulin to take based on your insulin-to-carbohydrate ratio. · Look at labels on packaged foods. This can tell you how much carbohydrate is in a serving. You can also use guides from the American Diabetes Association. · Be aware of portions, or serving sizes. If a package has two servings and you eat the whole package, you need to double the number of grams of carbohydrate listed for one serving. · Protein, fat, and fiber do not raise blood sugar as much as carbs do. If you eat a lot of these nutrients in a meal, your blood sugar will rise more slowly than it would otherwise. · Exercise lowers blood sugar. You can use less insulin than you would if you were not doing exercise. Keep in mind that timing matters. If you exercise within 1 hour after a meal, your body may need less insulin for that meal than it would if you exercised 3 hours after the meal. Test your blood sugar to find out how exercise affects your need for insulin. Eat from all food groups · Eat at least three meals a day. · Plan meals to include food from all the food groups. ¨ Grains: 1 slice of bread (1 ounce), ½ cup of cooked cereal, and 1/3 cup of cooked pasta or rice. These have about 15 grams of carbohydrate in a serving. Choose whole grains. These include whole wheat bread or crackers, oatmeal, and brown rice. Have them more often than refined grains. ¨ Fruit: 1 small fresh fruit, such as an apple or orange; ½ of a banana; ½ cup of chopped, cooked, or canned fruit; ½ cup of fruit juice; 1 cup of melon or raspberries; and 2 tablespoons of dried fruit. These have about 15 grams of carbohydrate in a serving. ¨ Dairy: 1 cup of nonfat or low-fat milk and 2/3 cup of plain yogurt. These have about 15 grams of carbohydrate in a serving. ¨ Protein foods: Beef, chicken, turkey, fish, eggs, tofu, cheese, cottage cheese, and peanut butter. A serving size of meat is 3 ounces. This is about the size of a deck of cards. Examples of meat substitute serving sizes (equal to 1 ounce of meat) are 1/4 cup of cottage cheese, 1 egg, 1 tablespoon of peanut butter, and ½ cup of tofu. These have very little or no carbohydrate per serving. ¨ Vegetables: Starchy vegetables such as ½ cup of cooked beans, peas, potatoes, or corn have about 15 grams of carbohydrate. Nonstarchy vegetables have very little carbohydrate. These include 1 cup of raw leafy vegetables (such as spinach), ½ cup of other vegetables (cooked or chopped), and 3/4 cup of vegetable juice.  
· Talk to your dietitian or diabetes educator about ways to add limited amounts of sweets into your meal plan. · If you drink alcohol: ¨ Limit it to no more than 1 drink a day for women and 2 drinks a day for men. (One drink is 12 fl oz of beer, 5 fl oz of wine, or 1.5 fl oz liquor.) ¨ Make sure to count drink mixers that have sugar in your total carbohydrate count. These include cola, tonic water, amy mix, and fruit juice. ¨ Eat a carbohydrate food along with your alcoholic drink. ¨ Check your blood sugar more often. This is because alcohol can lower your blood sugar too much. This may happen even hours later while you sleep. You may want to eat and adjust your insulin dose when you drink alcohol to prevent severe low blood sugar. ¨ Talk to your doctor. Alcohol may not be recommended when you are taking certain diabetes medicines. Where can you learn more? Go to http://nadya-irasema.info/. Enter A708 in the search box to learn more about \"Counting Carbohydrate When You Take Insulin: Care Instructions. \" Current as of: July 5, 2016 Content Version: 11.2 © 0527-1771 [employer]. Care instructions adapted under license by [employer] (which disclaims liability or warranty for this information). If you have questions about a medical condition or this instruction, always ask your healthcare professional. [address] any warranty or liability for your use of this information. Introducing [facility]! Dear Lonnie Tian: Thank you for requesting a [employer] account. Our records indicate that you already have an active Stock[employer] account. You can access your account anytime at [email or web address] Did you know that you can access your hospital and ER discharge instructions at any time in Stock[employer]? You can also review all of your test results from your hospital stay or ER visit. Additional Information If you have questions, please visit the Frequently Asked Questions section of the [employer] website at [email or web address] Remember, [employer] is NOT to be used for urgent needs. For medical emergencies, dial 911. Now available from your iPhone and Android! Please provide this summary of care documentation to your next provider. Your primary care clinician is listed as Angelito Perkins. If you have any questions after today's visit, please call [phone].

## 2017-04-06 ENCOUNTER — DOCUMENTATION ONLY (OUTPATIENT)
Dept: ORTHOPEDIC SURGERY | Facility: CLINIC | Age: 57
End: 2017-04-06

## 2017-04-26 ENCOUNTER — OFFICE VISIT (OUTPATIENT)
Dept: FAMILY MEDICINE CLINIC | Age: 57
End: 2017-04-26

## 2017-04-26 VITALS
SYSTOLIC BLOOD PRESSURE: 118 MMHG | TEMPERATURE: 97.4 F | HEIGHT: 67 IN | OXYGEN SATURATION: 93 % | HEART RATE: 68 BPM | BODY MASS INDEX: 41.47 KG/M2 | DIASTOLIC BLOOD PRESSURE: 70 MMHG | WEIGHT: 264.2 LBS | RESPIRATION RATE: 18 BRPM

## 2017-04-26 DIAGNOSIS — R06.02 SOB (SHORTNESS OF BREATH): Primary | ICD-10-CM

## 2017-04-26 DIAGNOSIS — R05.9 COUGH: ICD-10-CM

## 2017-04-26 DIAGNOSIS — R13.19 ESOPHAGEAL DYSPHAGIA: ICD-10-CM

## 2017-04-26 RX ORDER — MONTELUKAST SODIUM 10 MG/1
10 TABLET ORAL DAILY
Qty: 30 TAB | Refills: 2 | Status: SHIPPED | OUTPATIENT
Start: 2017-04-26 | End: 2017-06-09

## 2017-04-26 NOTE — MR AVS SNAPSHOT
Visit Information Date & Time Provider Department Dept. Phone Encounter #  
 4/26/2017 11:30 AM [facility] [phone] [id number] Follow-up Instructions Return in about 2 weeks (around 5/10/2017), or if symptoms worsen or fail to improve, for sob, wheeze, cough. Your Appointments 6/19/2017  2:00 PM  
Follow Up with Panfilo Fink MD  
[address] [place] [address] for Pain Management [facility]: Return in about 3 months (around 6/23/2017). ..per GS. ..mom  
 [address] [facility] [address]  
[phone] [address]  
  
    
 7/5/2017  1:00 PM  
Follow Up with MD Uriel Rosa ([facility]) Appt Note: 3 month return [address] [id number] [address]  
  
   
 [address] Upcoming Health Maintenance Date Due  
 BREAST CANCER SCRN MAMMOGRAM 4/13/2011 FOBT Q 1 YEAR AGE 50-75 4/13/2011 EYE EXAM RETINAL OR DILATED Q1 12/11/2016 HEMOGLOBIN A1C Q6M 7/11/2017 FOOT EXAM Q1 12/14/2017 MICROALBUMIN Q1 1/11/2018 LIPID PANEL Q1 3/1/2018 PAP AKA CERVICAL CYTOLOGY 11/13/2018 DTaP/Tdap/Td series (2 - Td) 11/11/2026 Allergies as of 4/26/2017  Review Complete On: 4/26/2017 By: To Tubbs MD  
  
 Severity Noted Reaction Type Reactions Other Food  03/17/2016    Other (comments) Sweeteners- causes headaches Metformin  11/06/2015    Other (comments) Increase pain in feet and swelling in feet Morphine  01/25/2017    Other (comments)  
 headache Current Immunizations  Never Reviewed No immunizations on file. Not reviewed this visit You Were Diagnosed With   
  
 Codes Comments SOB (shortness of breath)    -  Primary ICD-10-CM: R06.02 
ICD-9-CM: 786.05 Cough     ICD-10-CM: R05 ICD-9-CM: 786.2 Esophageal dysphagia     ICD-10-CM: R13.14 ICD-9-CM: 787.24 Vitals BP Pulse Temp Resp Height(growth percentile) Weight(growth percentile) 118/70 (BP 1 Location: Left arm, BP Patient Position: Sitting) 68 97.4 °F (36.3 °C) (Oral) 18 5' 7\" (1.702 m) 264 lb 3.2 oz (119.8 kg) SpO2 BMI OB Status Smoking Status 93% 41.38 kg/m2 Hysterectomy Never Smoker BMI and BSA Data Body Mass Index Body Surface Area  
 41.38 kg/m 2 2.38 m 2 Preferred Pharmacy Pharmacy Name Phone [address] [facility], [address] Your Updated Medication List  
  
   
This list is accurate as of: 4/26/17 12:33 PM.  Always use your most recent med list.  
  
  
  
  
 * albuterol 2.5 mg /3 mL (0.083 %) nebulizer solution Commonly known as:  PROVENTIL VENTOLIN  
3 mL by Nebulization route every four (4) hours as needed for Wheezing. * albuterol 90 mcg/actuation inhaler Commonly known as:  PROVENTIL HFA, VENTOLIN HFA, PROAIR HFA Take 2 Puffs by inhalation every four (4) hours as needed for Wheezing. aspirin, buffered 81 mg Tab Take  by mouth. atorvastatin 80 mg tablet Commonly known as:  LIPITOR  
take 1 tablet by mouth once daily  
  
 bacitracin 500 unit/gram Oint Commonly known as:  BACITRACIN  
apply affected area twice a day  
  
 baclofen 10 mg tablet Commonly known as:  LIORESAL  
1  qhs  prn * BD INSULIN SYRINGE ULTRA-FINE 1 mL 31 gauge x 5/16 Syrg Generic drug:  Insulin Syringe-Needle U-100  
use as directed twice a day * BD INSULIN SYRINGE ULTRA-FINE 1 mL 31 gauge x 5/16 Syrg Generic drug:  Insulin Syringe-Needle U-100  
use as directed twice a day  
  
 budesonide-formoterol 160-4.5 mcg/actuation HFA inhaler Commonly known as:  SYMBICORT Take 2 Puffs by inhalation two (2) times a day. cyclobenzaprine 10 mg tablet Commonly known as:  FLEXERIL  
 Take 1 Tab by mouth three (3) times daily as needed for Muscle Spasm(s). DULoxetine 60 mg capsule Commonly known as:  CYMBALTA Take 1 Cap by mouth daily. FREESTYLE LITE STRIPS strip Generic drug:  glucose blood VI test strips TEST twice a day as directed  
  
 furosemide 40 mg tablet Commonly known as:  LASIX Take 2 x day for 7 days then daily  
  
 gabapentin 300 mg capsule Commonly known as:  NEURONTIN Take 1 Cap by mouth nightly. Indications: Restless Legs Syndrome * LANTUS 100 unit/mL injection Generic drug:  insulin glargine  
inject 50 units subcutaneously 2 x day * LANTUS 100 unit/mL injection Generic drug:  insulin glargine INJECT 90 UNITS SUBCUTANEOUSLY EVERY EVENING AT BEDTIME  
  
 metoprolol succinate 50 mg XL tablet Commonly known as:  TOPROL-XL  
take 1 tablet by mouth once daily  
  
 montelukast 10 mg tablet Commonly known as:  SINGULAIR Take 1 Tab by mouth daily. Omega-3-DHA-EPA-Fish Oil 1,000 mg (120 mg-180 mg) Cap Commonly known as:  FISH OIL Take 1,000 mg by mouth two (2) times a day. potassium chloride 10 mEq tablet Commonly known as:  K-DUR, KLOR-CON Take 1 Tab by mouth daily. VITAMIN B-12 1,000 mcg sublingual tablet Generic drug:  cyanocobalamin Take 1,000 mcg by mouth daily. * Notice: This list has 6 medication(s) that are the same as other medications prescribed for you. Read the directions carefully, and ask your doctor or other care provider to review them with you. Prescriptions Sent to Pharmacy Refills  
 montelukast (SINGULAIR) 10 mg tablet 2 Sig: Take 1 Tab by mouth daily. Class: Normal  
 Pharmacy: [id number], [address] [facility] Ph #: [phone] Route: Oral  
  
Follow-up Instructions Return in about 2 weeks (around 5/10/2017), or if symptoms worsen or fail to improve, for sob, wheeze, cough. To-Do List   
 04/26/2017 ECHO:  2D ECHO COMPLETE ADULT (TTE) W OR WO CONTR   
  
 04/26/2017 Imaging:  CT CHEST W CONT Referral Information Referral ID Referred By Referred To  
  
 [id number] Lannie Mohs LISY Not Available Visits Status Start Date End Date 1 New Request 4/26/17 4/26/18 If your referral has a status of pending review or denied, additional information will be sent to support the outcome of this decision. Patient Instructions Shortness of Breath: Care Instructions Your Care Instructions Shortness of breath has many causes. Sometimes conditions such as anxiety can lead to shortness of breath. Some people get mild shortness of breath when they exercise. Trouble breathing also can be a symptom of a serious problem, such as asthma, lung disease, emphysema, heart problems, and pneumonia. If your shortness of breath continues, you may need tests and treatment. Watch for any changes in your breathing and other symptoms. Follow-up care is a key part of your treatment and safety. Be sure to make and go to all appointments, and call your doctor if you are having problems. Its also a good idea to know your test results and keep a list of the medicines you take. How can you care for yourself at home? · Do not smoke or allow others to smoke around you. If you need help quitting, talk to your doctor about stop-smoking programs and medicines. These can increase your chances of quitting for good. · Get plenty of rest and sleep. · Take your medicines exactly as prescribed. Call your doctor if you think you are having a problem with your medicine. · Find healthy ways to deal with stress. ¨ Exercise daily. ¨ Get plenty of sleep. ¨ Eat regularly and well. When should you call for help? Call 911 anytime you think you may need emergency care. For example, call if: 
· You have severe shortness of breath. · You have symptoms of a heart attack. These may include: ¨ Chest pain or pressure, or a strange feeling in the chest. 
¨ Sweating. ¨ Shortness of breath. ¨ Nausea or vomiting. ¨ Pain, pressure, or a strange feeling in the back, neck, jaw, or upper belly or in one or both shoulders or arms. ¨ Lightheadedness or sudden weakness. ¨ A fast or irregular heartbeat. After you call 911, the  may tell you to chew 1 adult-strength or 2 to 4 low-dose aspirin. Wait for an ambulance. Do not try to drive yourself. Call your doctor now or seek immediate medical care if: 
· Your shortness of breath gets worse or you start to wheeze. Wheezing is a high-pitched sound when you breathe. · You wake up at night out of breath or have to prop your head up on several pillows to breathe. · You are short of breath after only light activity or while at rest. 
Watch closely for changes in your health, and be sure to contact your doctor if: 
· You do not get better over the next 1 to 2 days. Where can you learn more? Go to http://nadyaiOculiirasema.info/. Enter S780 in the search box to learn more about \"Shortness of Breath: Care Instructions. \" Current as of: May 23, 2016 Content Version: 11.2 © 9442-8121 [employer]. Care instructions adapted under license by [employer] (which disclaims liability or warranty for this information). If you have questions about a medical condition or this instruction, always ask your healthcare professional. [place] [address] any warranty or liability for your use of this information. Introducing [facility]! Dear José Miguel Doshi: Thank you for requesting a inSelly account. Our records indicate that you already have an active inSelly account. You can access your account anytime at [email or web address] Did you know that you can access your hospital and ER discharge instructions at any time in inSelly?   You can also review all of your test results from your hospital stay or ER visit. Additional Information If you have questions, please visit the Frequently Asked Questions section of the [employer] website at [email or web address] Remember, [employer] is NOT to be used for urgent needs. For medical emergencies, dial 911. Now available from your iPhone and Android! Please provide this summary of care documentation to your next provider. Your primary care clinician is listed as Angelito Perkins. If you have any questions after today's visit, please call [phone].

## 2017-04-26 NOTE — PROGRESS NOTES
Chief Complaint   Patient presents with    Wheezing     SOB condtion not improving     Cough    Headache     1. Have you been to the ER, urgent care clinic since your last visit? Hospitalized since your last visit? No    2. Have you seen or consulted any other health care providers outside of the [facility] since your last visit? Include any pap smears or colon screening. No     HPI  Marty Perdomo comes in for f/u care. 1) SOB: Patient has SOB with wheeze, cough and chest tightness. No fever or chills. At times is SOB even with talking. She is using the inhalers and we discussed the use of symbicort and of proventil. She has completed antibiotic and prednisone taper recently. Had seen a pulmonologist but requested another opinion thus we await this referral. There is a lot of pollen outside and she could also have an element of allergy. She does have chest congestion. I will send for chest CT scan. Previous chest xray did show atelectasis. Given the SOB and HOUSE will also do echocardiogram. She denies fever or chills. Previously I did pro-BNP test that was within normal. She has no signs of failure, no pedal edema. 2) Cough: patient has cough on intake of fluids. Denies aspirating. Likely bronchospastic cough. 3) Dysphagia: patient has sensation of food getting stuck in the esophagus. Discussed need for EGD. Says this was done previously in [facility]. Will request records. She would prefer to hold off on gastroenterologist referral at the moment. 4) DM2: she is yet to go for f/u with endocrinologist. She is on lantus.       Past Medical History  Past Medical History:   Diagnosis Date    Arthritis     Lower back     Chronic back pain     Lower back pain    Depression     Diabetes ([facility] [address]     Panic attacks     Sleep apnea        Surgical History  Past Surgical History:   Procedure Laterality Date    HX HEART VALVE SURGERY  2013    aortic valve repair    HX HYSTERECTOMY      Partial Hysterectomy - removed ovary    HX MYOMECTOMY          Medications  Current Outpatient Prescriptions   Medication Sig Dispense Refill    montelukast (SINGULAIR) 10 mg tablet Take 1 Tab by mouth daily. 30 Tab 2    gabapentin (NEURONTIN) 300 mg capsule Take 1 Cap by mouth nightly. Indications: Restless Legs Syndrome 30 Cap 2    cyclobenzaprine (FLEXERIL) 10 mg tablet Take 1 Tab by mouth three (3) times daily as needed for Muscle Spasm(s). 90 Tab 2    DULoxetine (CYMBALTA) 60 mg capsule Take 1 Cap by mouth daily. 30 Cap 2    baclofen (LIORESAL) 10 mg tablet 1  qhs  prn 30 Tab 2    LANTUS 100 unit/mL injection INJECT 90 UNITS SUBCUTANEOUSLY EVERY EVENING AT BEDTIME 30 Vial 3    budesonide-formoterol (SYMBICORT) 160-4.5 mcg/actuation HFA inhaler Take 2 Puffs by inhalation two (2) times a day. 1 Inhaler 1    Omega-3-DHA-EPA-Fish Oil (FISH OIL) 1,000 mg (120 mg-180 mg) cap Take 1,000 mg by mouth two (2) times a day. 60 Cap 2    bacitracin (BACITRACIN) 500 unit/gram oint apply affected area twice a day 28 g 0    furosemide (LASIX) 40 mg tablet Take 2 x day for 7 days then daily 60 Tab 0    albuterol (PROVENTIL HFA, VENTOLIN HFA, PROAIR HFA) 90 mcg/actuation inhaler Take 2 Puffs by inhalation every four (4) hours as needed for Wheezing. 1 Inhaler 3    potassium chloride (K-DUR, KLOR-CON) 10 mEq tablet Take 1 Tab by mouth daily. 30 Tab 1    metoprolol succinate (TOPROL-XL) 50 mg XL tablet take 1 tablet by mouth once daily 30 Tab 3    BD INSULIN SYRINGE ULTRA-FINE 1 mL 31 gauge x 5/16 syrg use as directed twice a day 200 Syringe 3    insulin glargine (LANTUS) 100 unit/mL injection inject 50 units subcutaneously 2 x day 30 Vial 3    BD INSULIN SYRINGE ULTRA-FINE 1 mL 31 gauge x 5/16 syrg use as directed twice a day 200 Syringe 3    cyanocobalamin (VITAMIN B-12) 1,000 mcg sublingual tablet Take 1,000 mcg by mouth daily.       FREESTYLE LITE STRIPS strip TEST twice a day as directed 100 Strip 11    atorvastatin (LIPITOR) 80 mg tablet take 1 tablet by mouth once daily 30 Tab 11    albuterol (PROVENTIL VENTOLIN) 2.5 mg /3 mL (0.083 %) nebulizer solution 3 mL by Nebulization route every four (4) hours as needed for Wheezing. 24 Each 0    Aspirin, Buffered 81 mg tab Take  by mouth. Allergies  Allergies   Allergen Reactions    Other Food Other (comments)     Sweeteners- causes headaches    Metformin Other (comments)     Increase pain in feet and swelling in feet    Morphine Other (comments)     headache       Family History  Family History   Problem Relation Age of Onset    Heart Disease Mother     Diabetes Mother     Stroke Mother     Diabetes Father     Heart Disease Father        Social History  Social History     Social History    Marital status:      Spouse name: N/A    Number of children: N/A    Years of education: N/A     Occupational History    Not on file. Social History Main Topics    Smoking status: Never Smoker    Smokeless tobacco: Never Used    Alcohol use No    Drug use: No    Sexual activity: Not Currently     Other Topics Concern     Service No    Blood Transfusions No    Caffeine Concern No    Occupational Exposure No    Hobby Hazards No    Sleep Concern Yes     Sleep apnea     Stress Concern Yes     Due to family medical issues.      Weight Concern No    Special Diet No    Back Care Yes     Patient tries to do back care with streches     Exercise No    Bike Helmet Yes    Seat Belt Yes    Self-Exams Yes     Social History Narrative       Review of Systems  Review of Systems - History obtained from chart review and the patient  General ROS: positive for  - fatigue, malaise and sleep disturbance  negative for - chills or fever  Psychological ROS: positive for - mood swings  Ophthalmic ROS: negative  ENT ROS: positive for - headaches, nasal congestion, nasal discharge, sneezing, sore throat and vocal changes  Allergy and Immunology ROS: positive for - postnasal drip and seasonal allergies  Hematological and Lymphatic ROS: negative  Endocrine ROS: DM2  Respiratory ROS: positive for - cough, shortness of breath and wheezing  negative for - hemoptysis, pleuritic pain or sputum changes  Cardiovascular ROS: positive for - dyspnea on exertion and shortness of breath  negative for - chest pain, edema, murmur or palpitations  Gastrointestinal ROS: no abdominal pain, change in bowel habits, or black or bloody stools  Genito-Urinary ROS: no dysuria, trouble voiding, or hematuria  Musculoskeletal ROS: negative  Neurological ROS: no TIA or stroke symptoms    Vital Signs  Visit Vitals    /70 (BP 1 Location: Left arm, BP Patient Position: Sitting)    Pulse 68    Temp 97.4 °F (36.3 °C) (Oral)    Resp 18    Ht 5' 7\" (1.702 m)    Wt 264 lb 3.2 oz (119.8 kg)    SpO2 93%    BMI 41.38 kg/m2         Physical Exam  Physical Examination: General appearance - oriented to person, place, and time and in mild to moderate distress  Mental status - alert, oriented to person, place, and time, affect appropriate to mood  Eyes - sclera anicteric  Ears - hearing grossly normal bilaterally  Nose - mucosal congestion and mucosal erythema  Mouth - mucous membranes moist, pharynx normal without lesions  Neck - supple, no significant adenopathy  Lymphatics - no palpable lymphadenopathy  Chest - no tachypnea, retractions or cyanosis, decreased air entry noted bibasilar zones  Heart - S1 and S2 normal  Back exam - limited range of motion, pain with motion noted during exam  Neurological - motor and sensory grossly normal bilaterally  Extremities - intact peripheral pulses    Diagnostics  Orders Placed This Encounter    CT CHEST W CONT     Standing Status:   Future     Standing Expiration Date:   5/26/2018     Order Specific Question:   Is Patient Allergic to Contrast Dye?      Answer:   Unknown    2D ECHO COMPLETE ADULT (TTE) W OR WO CONTR     Standing Status:   Future     Standing Expiration Date:   10/26/2017     Order Specific Question:   Reason for Exam:     Answer:   shortness of breath     Order Specific Question:   Contrast Enhancement (Bubble Study, Definity, Optison) may be used if criteria listed in established evidence-based protocol has been identified. Answer: Yes    montelukast (SINGULAIR) 10 mg tablet     Sig: Take 1 Tab by mouth daily. Dispense:  30 Tab     Refill:  2         Results  Results for orders placed or performed during the hospital encounter of 04/03/17   GLUCOSE, POC   Result Value Ref Range    Glucose (POC) 224 (H) 70 - 110 mg/dL       ASSESSMENT and PLAN    ICD-10-CM ICD-9-CM    1. SOB (shortness of breath) R06.02 786.05 CT CHEST W CONT      2D ECHO COMPLETE ADULT (TTE) W OR WO CONTR      montelukast (SINGULAIR) 10 mg tablet   2. Cough R05 786.2 CT CHEST W CONT   3. Esophageal dysphagia R13.14 787.24    4. Uncontrolled type 2 diabetes mellitus with other specified complication (HCC) K45.88 250.82     E11.65       lab results and schedule of future lab studies reviewed with patient  reviewed diet, exercise and weight control  reviewed medications and side effects in detail  radiology results and schedule of future radiology studies reviewed with patient      I have discussed the diagnosis with the patient and the intended plan of care as seen in the above orders. The patient has received an after-visit summary and questions were answered concerning future plans. I have discussed medication, side effects, and warnings with the patient in detail. The patient verbalized understanding and is in agreement with the plan of care. The patient will contact the office with any additional concerns.     Ammon Pitts MD

## 2017-04-26 NOTE — PATIENT INSTRUCTIONS

## 2017-04-27 DIAGNOSIS — R06.2 WHEEZING: Primary | ICD-10-CM

## 2017-04-27 NOTE — PROGRESS NOTES
Verbal Order with read back per Dr. Domo Youssef MD  For PFT smart panel. AMB POC PFT complete w/ bronchodilator  AMB POC PFT complete w/o bronchodilator    Dr. Domo Youssef MD will co-sign the orders.

## 2017-05-05 ENCOUNTER — HOSPITAL ENCOUNTER (OUTPATIENT)
Dept: CT IMAGING | Age: 57
Discharge: HOME | End: 2017-05-05
Attending: FAMILY MEDICINE
Payer: MEDICAID

## 2017-05-05 DIAGNOSIS — R06.02 SOB (SHORTNESS OF BREATH): ICD-10-CM

## 2017-05-05 DIAGNOSIS — R05.9 COUGH: ICD-10-CM

## 2017-05-05 LAB — CREAT UR-MCNC: 0.6 MG/DL (ref 0.6–1.3)

## 2017-05-05 PROCEDURE — 74011636320 HC RX REV CODE- 636/320: Performed by: FAMILY MEDICINE

## 2017-05-05 PROCEDURE — 71260 CT THORAX DX C+: CPT

## 2017-05-05 PROCEDURE — 82565 ASSAY OF CREATININE: CPT

## 2017-05-05 RX ADMIN — IOPAMIDOL 75 ML: 612 INJECTION, SOLUTION INTRAVENOUS at 15:15

## 2017-05-08 ENCOUNTER — OFFICE VISIT (OUTPATIENT)
Dept: PULMONOLOGY | Age: 57
End: 2017-05-08

## 2017-05-08 VITALS
TEMPERATURE: 97.6 F | SYSTOLIC BLOOD PRESSURE: 140 MMHG | WEIGHT: 259 LBS | OXYGEN SATURATION: 96 % | HEIGHT: 67 IN | HEART RATE: 80 BPM | RESPIRATION RATE: 21 BRPM | BODY MASS INDEX: 40.65 KG/M2 | DIASTOLIC BLOOD PRESSURE: 82 MMHG

## 2017-05-08 DIAGNOSIS — J45.21 ASTHMATIC BRONCHITIS, MILD INTERMITTENT, WITH ACUTE EXACERBATION: ICD-10-CM

## 2017-05-08 DIAGNOSIS — R60.0 PEDAL EDEMA: ICD-10-CM

## 2017-05-08 DIAGNOSIS — Z99.89 OSA ON CPAP: ICD-10-CM

## 2017-05-08 DIAGNOSIS — E66.01 MORBID OBESITY WITH BMI OF 40.0-44.9, ADULT (HCC): ICD-10-CM

## 2017-05-08 DIAGNOSIS — Z95.2 H/O AORTIC VALVE REPLACEMENT: ICD-10-CM

## 2017-05-08 DIAGNOSIS — R06.02 SOB (SHORTNESS OF BREATH): Primary | ICD-10-CM

## 2017-05-08 DIAGNOSIS — R06.2 WHEEZING: ICD-10-CM

## 2017-05-08 DIAGNOSIS — G47.33 OSA ON CPAP: ICD-10-CM

## 2017-05-08 DIAGNOSIS — R06.01 ORTHOPNEA: ICD-10-CM

## 2017-05-08 RX ORDER — ALBUTEROL SULFATE 0.83 MG/ML
2.5 SOLUTION RESPIRATORY (INHALATION)
Qty: 24 EACH | Refills: 5 | Status: SHIPPED | OUTPATIENT
Start: 2017-05-08 | End: 2019-11-12 | Stop reason: SDUPTHER

## 2017-05-08 NOTE — PROGRESS NOTES
Chief Complaint   Patient presents with    Wheezing     Pt referred by Dr. Azar Ko for wheezing. Pt had CT at [facility] 5/5/17, CXR done 3/1/17 and spirometry today at office. PATIENT'S O2 SATS:   97% Room Air Rest, 82 Heart Rate                                          97% Room Air Activity, 92 Heart Rate - Patient Walked 220 Ft      Albuterol 3mg / Ipratroprium 0.5mg Lot # [id number], Exp date 8/2017, [facility] [id number], [place]  Given VIA nebulizer. Ordered by [facility] LPN.      Alpha One obtained and mailed to [facility] [place]

## 2017-05-08 NOTE — PROGRESS NOTES
[facility]  Pulmonary, Critical Care, and Sleep Medicine      Pulmonary Office Consult  Notes    Name: Josep Root     : 1960     Date: 2017        Subjective:   17    Patient is a 65 y.o. female is being seen for evaluation of shortness of breatht. HPI obtained from pt and records reviewed. She reports hx OVIDIO-on home CPAP( X6 yr) QHS and PRN-followed by Dr Jazmyn Servin, she notes history of asthma when she was young ( grandson with asthma-she used his inhaler w/some relief with wheezing), but was told no asthma when she had the sleep apnea test.  She report chronic bronchitis yearly even when she was living in [facility], moved to [place] 1 1/2 yr ago to be with his children who lived in the area. She report had US of goiter X2 with biopsy while in [facility], told \" nothing to worry. \" She reports no allergy testing. She reports possible GERD by one Dr in [facility] but not dx, she is not interested having UGI at this time. She had AVR , scheduled for Echo this Friday. She report wheezing started since mid December, got better then worsened again in January. She report since then had intermittent worsening started with wheezing, follow by shortness of breath, dizziness and chest congestion \" I feel congested, someone sitting on my chest like I'm having asthma attack. She reports triggers are humidity and some exposure which she cant get identified. She reports SOB sometimes just walking from living room to kitchen or sometimes just talking. + orthopnea using multiple pillows under her head, multiple awakening but CPAP helps to alleviate some symptoms. She reports occasional cough but no mucus. She reports chronic problem with swallowing certain foods such as [employer] and watermelon, no problem swallowing liquids. She have chronic UE/LE edema. She denies hemoptysis, abdominal pain, nausea and vomiting, chills or fever.     She report compliance with Symbicort 2 puff BID and Proventil PRN, she report symptoms improved since starting on 2 inhalers but still not well controlled. She is also on Singulair 10 mg every day which seems to help. Past Medical History:   Diagnosis Date    Arthritis     Lower back     Chronic back pain     Lower back pain    Depression     Diabetes [facility] [address]     Diabetes mellitus ([facility] [address] [facility]     Panic attacks     Sleep apnea      Social History     Social History    Marital status:      Spouse name: N/A    Number of children: N/A    Years of education: N/A     Occupational History    Not on file. Social History Main Topics    Smoking status: Never Smoker    Smokeless tobacco: Never Used    Alcohol use No    Drug use: No    Sexual activity: Not Currently     Other Topics Concern     Service No    Blood Transfusions No    Caffeine Concern No    Occupational Exposure No    Hobby Hazards No    Sleep Concern Yes     Sleep apnea     Stress Concern Yes     Due to family medical issues.  Weight Concern No    Special Diet No    Back Care Yes     Patient tries to do back care with streches     Exercise No    Bike Helmet Yes    Seat Belt Yes    Self-Exams Yes     Social History Narrative      Past Surgical History:   Procedure Laterality Date    HX HEART VALVE SURGERY  2013    aortic valve repair    HX HYSTERECTOMY      Partial Hysterectomy - removed ovary    HX MYOMECTOMY          Allergies   Allergen Reactions    Other Food Other (comments)     Sweeteners- causes headaches    Metformin Other (comments)     Increase pain in feet and swelling in feet    Morphine Other (comments)     headache       Current Outpatient Prescriptions   Medication Sig Dispense Refill    montelukast (SINGULAIR) 10 mg tablet Take 1 Tab by mouth daily. 30 Tab 2    gabapentin (NEURONTIN) 300 mg capsule Take 1 Cap by mouth nightly.  Indications: Restless Legs Syndrome 30 Cap 2    cyclobenzaprine (FLEXERIL) 10 mg tablet Take 1 Tab by mouth three (3) times daily as needed for Muscle Spasm(s). 90 Tab 2    DULoxetine (CYMBALTA) 60 mg capsule Take 1 Cap by mouth daily. 30 Cap 2    baclofen (LIORESAL) 10 mg tablet 1  qhs  prn 30 Tab 2    LANTUS 100 unit/mL injection INJECT 90 UNITS SUBCUTANEOUSLY EVERY EVENING AT BEDTIME 30 Vial 3    budesonide-formoterol (SYMBICORT) 160-4.5 mcg/actuation HFA inhaler Take 2 Puffs by inhalation two (2) times a day. 1 Inhaler 1    Omega-3-DHA-EPA-Fish Oil (FISH OIL) 1,000 mg (120 mg-180 mg) cap Take 1,000 mg by mouth two (2) times a day. 60 Cap 2    bacitracin (BACITRACIN) 500 unit/gram oint apply affected area twice a day 28 g 0    furosemide (LASIX) 40 mg tablet Take 2 x day for 7 days then daily 60 Tab 0    albuterol (PROVENTIL HFA, VENTOLIN HFA, PROAIR HFA) 90 mcg/actuation inhaler Take 2 Puffs by inhalation every four (4) hours as needed for Wheezing. 1 Inhaler 3    potassium chloride (K-DUR, KLOR-CON) 10 mEq tablet Take 1 Tab by mouth daily. 30 Tab 1    metoprolol succinate (TOPROL-XL) 50 mg XL tablet take 1 tablet by mouth once daily 30 Tab 3    BD INSULIN SYRINGE ULTRA-FINE 1 mL 31 gauge x 5/16 syrg use as directed twice a day 200 Syringe 3    cyanocobalamin (VITAMIN B-12) 1,000 mcg sublingual tablet Take 1,000 mcg by mouth daily.  FREESTYLE LITE STRIPS strip TEST twice a day as directed 100 Strip 11    atorvastatin (LIPITOR) 80 mg tablet take 1 tablet by mouth once daily 30 Tab 11    albuterol (PROVENTIL VENTOLIN) 2.5 mg /3 mL (0.083 %) nebulizer solution 3 mL by Nebulization route every four (4) hours as needed for Wheezing. 24 Each 0    Aspirin, Buffered 81 mg tab Take  by mouth.        Patient Active Problem List   Diagnosis Code    Type II diabetes mellitus, uncontrolled ([facility] [address] E11.65    Sleep apnea G47.30    H/O aortic valve replacement Z95.2    History of bicuspid aortic valve Z87.74    Chronic back pain M54.9, G89.29    Chronic left shoulder pain M25.512, G89.29    Mor[facility] E66.01    Left shoulder tendinitis M75.82    Spondylosis of lumbar region without myelopathy or radiculopathy M47.816    Chronic pain of both shoulders M25.512, G89.29, M25.511    Chronic pain of both knees M25.561, M25.562, G89.29    Chronic pain syndrome G89.4    Encounter for long-term (current) use of medications Z79.899    Chronic midline low back pain M54.5, G89.29    Lumbar facet arthropathy (HCC) M12.88    Lumbar degenerative disc disease M51.36    Venous stasis dermatitis of both lower extremities I87.2        Review of Systems:  Constitutional: No fever, no chills, no weight loss, no night sweats   HEENT: No epistaxis, no nasal drainage, + difficulty in swallowing, no redness in eyes  Respiratory: as above  Cardiovascular: chronic LE/UE edema, no chest pain, no palpitations, no syncope  Gastrointestinal: no abd pain, no vomiting, no diarrhea, no bleeding symptoms  Genitourinary: No urinary symptoms or hematuria  Integument/breast: No ulcers or rashes  Musculoskeletal:Neg  Neurological: No focal weakness, no seizures, no headaches  Behvioral/Psych: No anxiety, no depression     Objective:     Visit Vitals    /82 (BP 1 Location: Left arm, BP Patient Position: At rest)    Pulse 80    Temp 97.6 °F (36.4 °C) (Oral)    Resp 21    Ht 5' 7\" (1.702 m)    Wt 117.5 kg (259 lb)    SpO2 96%    BMI 40.57 kg/m2        Physical Exam:   General: Appears comfortable, no acute distress, BMI 40  HEENT: Pupils reactive, sclera anicteric, EOM intact  Neck: No adenopathy or thyroid swelling, no JVD, supple  CVS: Midsternal scar ( AVR 2013) S1S2, no murmurs  RS: Mod AE bilaterally, no tactile fremitus or egophony, no accessory muscle use, bilateral coarse crackles, exp wheezing.   Abd: Obese, soft, non tender, no hepatosplenomegaly  Neuro: Non focal, awake, alert  Extrm: + leg/hand edema, noclubbing or cyanosis  Skin: UE/LE healing sores, warm and dry    Data review:     Hospital Outpatient Visit on 05/05/2017   Component Date Value Ref Range Status    Creatinine (POC) 05/05/2017 0.6  0.6 - 1.3 MG/DL Final    GFR-AA (POC) 05/05/2017 >60  >60 ml/min/1.73m2 Final    GFR, non-AA (POC) 05/05/2017 >60  >60 ml/min/1.73m2 Final    Comment: Estimated GFR is calculated using the IDMS-traceable Modification of Diet in Renal Disease (MDRD) Study equation, reported for both  Americans (GFRAA) and non- Americans (GFRNA), and normalized to 1.73m2 body surface area. The physician must decide which value applies to the patient. The MDRD study equation should only be used in individuals age 25 or older. It has not been validated for the following: pregnant women, patients with serious comorbid conditions, or on certain medications, or persons with extremes of body size, muscle mass, or nutritional status. Admission on 04/03/2017, Discharged on 04/03/2017   Component Date Value Ref Range Status    Glucose (POC) 04/03/2017 224* 70 - 110 mg/dL Final    Comment: Notified RN or MD immediately by   (NOTE)  The FDA has indicated that no capillary point of care blood glucose   monitoring systems are approved for use in \"critically ill\" patients,   however they have not defined this population. The College of   American Pathologists has recommended that these devices should not   be used in cases such as severe hypotension, dehydration, shock, and   hyperglycemic-hyperosmolar state, amongst others. Venous or arterial   collection is the recommended specimen for testing these patients. Admission on 03/20/2017, Discharged on 03/20/2017   Component Date Value Ref Range Status    Glucose (POC) 03/20/2017 160* 70 - 110 mg/dL Final    Comment: (NOTE)  The FDA has indicated that no capillary point of care blood glucose   monitoring systems are approved for use in \"critically ill\" patients,   however they have not defined this population.  The College of   American Pathologists has recommended that these devices should not   be used in cases such as severe hypotension, dehydration, shock, and   hyperglycemic-hyperosmolar state, amongst others. Venous or arterial   collection is the recommended specimen for testing these patients. Office Visit on 03/01/2017   Component Date Value Ref Range Status    WBC 03/01/2017 9.6  3.4 - 10.8 x10E3/uL Final    RBC 03/01/2017 4.86  3.77 - 5.28 x10E6/uL Final    HGB 03/01/2017 14.4  11.1 - 15.9 g/dL Final    HCT 03/01/2017 44.9  34.0 - 46.6 % Final    MCV 03/01/2017 92  79 - 97 fL Final    MCH 03/01/2017 29.6  26.6 - 33.0 pg Final    MCHC 03/01/2017 32.1  31.5 - 35.7 g/dL Final    RDW 03/01/2017 14.8  12.3 - 15.4 % Final    PLATELET 58/41/6766 767  150 - 379 x10E3/uL Final    NEUTROPHILS 03/01/2017 71  % Final    Lymphocytes 03/01/2017 22  % Final    MONOCYTES 03/01/2017 5  % Final    EOSINOPHILS 03/01/2017 1  % Final    BASOPHILS 03/01/2017 1  % Final    ABS. NEUTROPHILS 03/01/2017 6.8  1.4 - 7.0 x10E3/uL Final    Abs Lymphocytes 03/01/2017 2.1  0.7 - 3.1 x10E3/uL Final    ABS. MONOCYTES 03/01/2017 0.5  0.1 - 0.9 x10E3/uL Final    ABS. EOSINOPHILS 03/01/2017 0.1  0.0 - 0.4 x10E3/uL Final    ABS. BASOPHILS 03/01/2017 0.1  0.0 - 0.2 x10E3/uL Final    IMMATURE GRANULOCYTES 03/01/2017 0  % Final    ABS. IMM.  GRANS. 03/01/2017 0.0  0.0 - 0.1 x10E3/uL Final    Glucose 03/01/2017 323* 65 - 99 mg/dL Final    BUN 03/01/2017 12  6 - 24 mg/dL Final    Creatinine 03/01/2017 0.69  0.57 - 1.00 mg/dL Final    GFR est non-AA 03/01/2017 98  >59 mL/min/1.73 Final    GFR est AA 03/01/2017 113  >59 mL/min/1.73 Final    BUN/Creatinine ratio 03/01/2017 17  9 - 23 Final    Sodium 03/01/2017 139  134 - 144 mmol/L Final    Potassium 03/01/2017 4.0  3.5 - 5.2 mmol/L Final    Chloride 03/01/2017 96  96 - 106 mmol/L Final    CO2 03/01/2017 24  18 - 29 mmol/L Final    Calcium 03/01/2017 8.7  8.7 - 10.2 mg/dL Final    Protein, total 03/01/2017 6.9  6.0 - 8.5 g/dL Final    Albumin 03/01/2017 3.9  3.5 - 5.5 g/dL Final    GLOBULIN, TOTAL 03/01/2017 3.0  1.5 - 4.5 g/dL Final    A-G Ratio 03/01/2017 1.3  1.1 - 2.5 Final    Comment: **Effective March 13, 2017 the reference interval**    for A/G Ratio will be changing to: Age                Male          Female            0 -  7 days       1.1 - 2.3       1.1 - 2.3            8 - 30 days       1.2 - 2.8       1.2 - 2.8            1 -  6 months     1.3 - 3.6       1.3 - 3.6     7 months -  5 years      1.5 - 2.6       1.5 - 2.6               > 5 years      1.2 - 2.2       1.2 - 2.2      Bilirubin, total 03/01/2017 0.8  0.0 - 1.2 mg/dL Final    Alk. phosphatase 03/01/2017 155* 39 - 117 IU/L Final    AST (SGOT) 03/01/2017 18  0 - 40 IU/L Final    ALT (SGPT) 03/01/2017 21  0 - 32 IU/L Final    proBNP 03/01/2017 117  0 - 287 pg/mL Final    Comment: The following cut-points have been suggested for the  use of proBNP for the diagnostic evaluation of heart  failure (HF) in patients with acute dyspnea:  Modality                     Age           Optimal Cut                             (years)            Point  ------------------------------------------------------  Diagnosis (rule in HF)        <50            450 pg/mL                            50 - 75            900 pg/mL                                >75           1800 pg/mL  Exclusion (rule out HF)  Age independent     300 pg/mL      Cholesterol, total 03/01/2017 109  100 - 199 mg/dL Final    Triglyceride 03/01/2017 273* 0 - 149 mg/dL Final    HDL Cholesterol 03/01/2017 29* >39 mg/dL Final    VLDL, calculated 03/01/2017 55* 5 - 40 mg/dL Final    LDL, calculated 03/01/2017 25  0 - 99 mg/dL Final    INTERPRETATION 03/01/2017 Note   Final    Supplement report is available.     PDF Image 65/10/9054 Not applicable   Final   Admission on 02/27/2017, Discharged on 02/27/2017   Component Date Value Ref Range Status    Glucose (POC) 02/27/2017 188* 70 - 110 mg/dL Final Comment: (NOTE)  The FDA has indicated that no capillary point of care blood glucose   monitoring systems are approved for use in \"critically ill\" patients,   however they have not defined this population. The College of   American Pathologists has recommended that these devices should not   be used in cases such as severe hypotension, dehydration, shock, and   hyperglycemic-hyperosmolar state, amongst others. Venous or arterial   collection is the recommended specimen for testing these patients. Admission on 02/13/2017, Discharged on 02/13/2017   Component Date Value Ref Range Status    Glucose (POC) 02/13/2017 278* 70 - 110 mg/dL Final    Comment: (NOTE)  The FDA has indicated that no capillary point of care blood glucose   monitoring systems are approved for use in \"critically ill\" patients,   however they have not defined this population. The College of   American Pathologists has recommended that these devices should not   be used in cases such as severe hypotension, dehydration, shock, and   hyperglycemic-hyperosmolar state, amongst others. Venous or arterial   collection is the recommended specimen for testing these patients.      Abstract on 01/25/2017   Component Date Value Ref Range Status    Creatinine, External 12/14/2016 0.64   Final    Hemoglobin A1c, External 12/14/2016 10.7   Final   Office Visit on 01/11/2017   Component Date Value Ref Range Status    Hemoglobin A1c (POC) 01/11/2017 11.3  % Final    Creatinine, urine 01/11/2017 110.2  Not Estab. mg/dL Final    Microalbumin, urine 01/11/2017 11.9  Not Estab. ug/mL Final    Microalb/Creat ratio (ug/mg creat.) 01/11/2017 10.8  0.0 - 30.0 mg/g creat Final   Office Visit on 12/07/2016   Component Date Value Ref Range Status    AMPHETAMINES UR POC 12/07/2016 Negative   Final    COCAINE UR POC 12/07/2016 Negative   Final    MDMA/ECSTASY UR POC 12/07/2016 Negative   Final    METHADONE UR POC 12/07/2016 Negative   Final    METHAMPHETAMINE UR POC 12/07/2016 Negative   Final    METHYLPHENIDATE UR POC 12/07/2016 Negative   Final    OPIATES UR POC 12/07/2016 Negative   Final    OXYCODONE UR POC 12/07/2016 Negative   Final    PHENCYCLIDINE UR POC 12/07/2016 Negative   Final    TRICYCLICS UR POC 34/42/1585 Negative   Final    BARBITURATES UR POC 12/07/2016 Negative   Final    BENZODIAZEPINES UR POC 12/07/2016 Negative   Final    CANNABINOIDS UR POC 12/07/2016 Negative   Final       PFT's:  Date FEV1/FVC FEV1 Best FVC best TLC RV VC DLCO   5/8/17 69 71 72                                         Imaging:  I have personally reviewed the patients radiographs and have reviewed the reports:  XR Results (most recent):    Results from Hospital Encounter encounter on 04/03/17   [facility] XR TECHNOLOGIST SERVICE   Narrative Fluoroscopy was provided for a bundled exam for documentation purposes. Impression IMPRESSION:    Please see above. FLUORO TIME: 00.03    [facility]         CT Results (most recent):    Results from Hospital Encounter encounter on 05/05/17   CT CHEST W CONT   Narrative CT CHEST WITH ENHANCEMENT    INDICATION: Congestion, productive cough, dyspnea, shortness of breath. TECHNIQUE: Axial images obtained from the thoracic inlet to the level of the  diaphragm following uneventful administration of 75 cc Isovue-300 nonionic  intravenous contrast. Coronal and sagittal reformatted images were obtained. All CT scans are performed using dose optimization techniques as appropriate to  the performed exam including the following: Automated exposure control,  adjustment of mA and/or kV according to patient size, and use of iterative  reconstructive technique. COMPARISON: No prior CT. Chest film 3/1/2017. CHEST FINDINGS:     Lungs: Mild atelectasis at the posterior medial left lower lobe. Pleura: No effusion. Pericardium/ Heart: Aortic valve replacement. No significant effusion. Lymph Nodes: Unremarkable. .  Aorta/ Vessels: No aneurysm or dissection. Thyroid: Multinodular heterogeneous thyroid. Bones/soft tissues: Sternotomy with wires and fixation plate. Mild vertebral  osteophytes. Upper Abdomen: Unremarkable. Impression IMPRESSION:    1. Mild atelectasis throughout medial basilar left lower lobe. No other acute  findings. 2. Heterogeneous multinodular thyroid. This can be followed with ultrasound. 3. Aortic valve replacement. IMPRESSION:   · Worsening intermittent wheezing and SOB likely due to possible asthma, OVIDIO/morbid obesity, Differential includes CHF( diastolic/systolic) with chronic LE edema resulting in \"cardiac asthma\". Doubt that goiter contributes to airway problems reva with normal FV loop  · Bronchospasm exacerbation  · OVIDIO, compliant on home CPAP followed by Dr Maribel Corona. · Possible GERD  · Second hand cigarette exposure (ex  and friends). · Morbid Obesity  · History of Bicuspid Ao valve S/p AVR      RECOMMENDATIONS:   · Continue Symbicort 2 puffs BID as previously px. Inhaler technique demonstrated to pt  · Continue Albuterol Q4hr and PRN, encouraged to used nebulizer when wheezing and when she's home   · Alpha 1antitrypsin test done today  · PFTs done today, d/w pt, plan DLCO and volumes on next visit as restrictive lung disease suspected. · Walked 220 ft in the office, SOB but no desaturations, SpO2 remain 97%. No indication for supplemental O2  · Continue Singulair 10 mg every day. · Imaging and record reviewed, d/w pt  · Per pt schedule for Echo this Friday  · Duo neb via nebulizer treatment given X1 in the office, SOB and wheezing improved after tx. · Discussed distinction between quick-relief and controlled medications. · Discussed medication dosage, use, side effects, and goals of treatment in detail. · Further recommendations will be based on the patient's response to recommended treatment and results of the investigation ordered.   · Encouraged wt loss and discussed healthy lifestyle. Balanced meal and activity as tolerated  · Follow-up in 1-2 months, sooner should new symptoms or problems arise.        Mariano Moreno MD

## 2017-05-08 NOTE — MR AVS SNAPSHOT
Visit Information Date & Time Provider Department Dept. Phone Encounter #  
 5/8/2017  9:00 AM Alberto Thacker MD [facility] [place] [id number] Your Appointments 5/10/2017 11:30 AM  
Follow Up with MD Uriel Oconnor ([facility]) Appt Note: f/u for sob, wheeze,cough [address] [address] [address]  
  
   
 [address]  
  
    
 6/19/2017  2:00 PM  
Follow Up with Matthew Cuadra MD  
[address] for Pain Management [facility]) Appt Note: Return in about 3 months (around 6/23/2017). ..per GS. ..mom  
 [address] [place] [address]  
[phone] [address]  
  
    
 7/5/2017  1:00 PM  
Follow Up with MD Uriel Oconnor ([facility]) Appt Note: 3 month return [address] [facility]  
[phone] Upcoming Health Maintenance Date Due  
 BREAST CANCER SCRN MAMMOGRAM 4/13/2011 FOBT Q 1 YEAR AGE 50-75 4/13/2011 EYE EXAM RETINAL OR DILATED Q1 12/11/2016 HEMOGLOBIN A1C Q6M 7/11/2017 INFLUENZA AGE 9 TO ADULT 8/1/2017 FOOT EXAM Q1 12/14/2017 MICROALBUMIN Q1 1/11/2018 LIPID PANEL Q1 3/1/2018 PAP AKA CERVICAL CYTOLOGY 11/13/2018 DTaP/Tdap/Td series (2 - Td) 11/11/2026 Allergies as of 5/8/2017  Review Complete On: 5/8/2017 By: Naeem Fillers, RT Severity Noted Reaction Type Reactions Other Food  03/17/2016    Other (comments) Sweeteners- causes headaches Metformin  11/06/2015    Other (comments) Increase pain in feet and swelling in feet Morphine  01/25/2017    Other (comments)  
 headache Current Immunizations  Never Reviewed No immunizations on file. Not reviewed this visit You Were Diagnosed With   
  
 Codes Comments SOB (shortness of breath)    -  Primary ICD-10-CM: R06.02 
ICD-9-CM: 786.05 Wheezing     ICD-10-CM: R06.2 ICD-9-CM: 786.07   
 OVIDIO on CPAP     ICD-10-CM: G47.33, Z99.89 ICD-9-CM: 327.23, V46.8 Morbid obesity with BMI of 40.0-44.9, adult (HCC)     ICD-10-CM: E66.01, Z68.41 
ICD-9-CM: 278.01, V85.41   
 H/O aortic valve replacement     ICD-10-CM: Z95.2 ICD-9-CM: V43.3 Orthopnea     ICD-10-CM: R06.01 
ICD-9-CM: 786.02 Pedal edema     ICD-10-CM: R60.0 ICD-9-CM: 034. 3 Vitals BP Pulse Temp Resp Height(growth percentile) Weight(growth percentile) 140/82 (BP 1 Location: Left arm, BP Patient Position: At rest) 80 97.6 °F (36.4 °C) (Oral) 21 5' 7\" (1.702 m) 259 lb (117.5 kg) SpO2 BMI OB Status Smoking Status 96% 40.57 kg/m2 Hysterectomy Never Smoker BMI and BSA Data Body Mass Index Body Surface Area 40.57 kg/m 2 2.36 m 2 Preferred Pharmacy Pharmacy Name Phone [address] [facility], [address] Your Updated Medication List  
  
   
This list is accurate as of: 5/8/17 10:38 AM.  Always use your most recent med list.  
  
  
  
  
 * albuterol 2.5 mg /3 mL (0.083 %) nebulizer solution Commonly known as:  PROVENTIL VENTOLIN  
3 mL by Nebulization route every four (4) hours as needed for Wheezing. * albuterol 90 mcg/actuation inhaler Commonly known as:  PROVENTIL HFA, VENTOLIN HFA, PROAIR HFA Take 2 Puffs by inhalation every four (4) hours as needed for Wheezing. aspirin, buffered 81 mg Tab Take  by mouth. atorvastatin 80 mg tablet Commonly known as:  LIPITOR  
take 1 tablet by mouth once daily  
  
 bacitracin 500 unit/gram Oint Commonly known as:  BACITRACIN  
apply affected area twice a day  
  
 baclofen 10 mg tablet Commonly known as:  LIORESAL  
1  qhs  prn  
  
 BD INSULIN SYRINGE ULTRA-FINE 1 mL 31 gauge x 5/16 Syrg Generic drug:  Insulin Syringe-Needle U-100 use as directed twice a day  
  
 budesonide-formoterol 160-4.5 mcg/actuation HFA inhaler Commonly known as:  SYMBICORT Take 2 Puffs by inhalation two (2) times a day. cyclobenzaprine 10 mg tablet Commonly known as:  FLEXERIL Take 1 Tab by mouth three (3) times daily as needed for Muscle Spasm(s). DULoxetine 60 mg capsule Commonly known as:  CYMBALTA Take 1 Cap by mouth daily. FREESTYLE LITE STRIPS strip Generic drug:  glucose blood VI test strips TEST twice a day as directed  
  
 furosemide 40 mg tablet Commonly known as:  LASIX Take 2 x day for 7 days then daily  
  
 gabapentin 300 mg capsule Commonly known as:  NEURONTIN Take 1 Cap by mouth nightly. Indications: Restless Legs Syndrome LANTUS 100 unit/mL injection Generic drug:  insulin glargine INJECT 90 UNITS SUBCUTANEOUSLY EVERY EVENING AT BEDTIME  
  
 metoprolol succinate 50 mg XL tablet Commonly known as:  TOPROL-XL  
take 1 tablet by mouth once daily  
  
 montelukast 10 mg tablet Commonly known as:  SINGULAIR Take 1 Tab by mouth daily. Omega-3-DHA-EPA-Fish Oil 1,000 mg (120 mg-180 mg) Cap Commonly known as:  FISH OIL Take 1,000 mg by mouth two (2) times a day. potassium chloride 10 mEq tablet Commonly known as:  KLOR-CON Take 1 Tab by mouth daily. VITAMIN B-12 1,000 mcg sublingual tablet Generic drug:  cyanocobalamin Take 1,000 mcg by mouth daily. * Notice: This list has 2 medication(s) that are the same as other medications prescribed for you. Read the directions carefully, and ask your doctor or other care provider to review them with you. We Performed the Following AMB POC SPIROMETRY W/BRONCHODILATOR [15865 CPT(R)] To-Do List   
 05/09/2017 Procedures:  DIFFUSING CAPACITY   
  
 05/09/2017   Procedures:  GAS DILUT/WASHOUT LUNG VOL W/WO DISTRIB VENT&VOL   
  
 05/12/2017 2:00 PM  
 Appointment with [facility] [place] [address] at [facility] NON-INVASIVE [address] [facility] ([phone]) Age Limit for ALL Heart procedures @ all [facility] facilities: 18 yrs and older only. Under the age of 25, refer to [facility] ([phone]). This study requires patient to bring a written physician's order or MD office may fax the order to Central Scheduling at [phone]. Patient needs to bring a current list of all medications. No preparation is required for this study. Patient Instructions Learning About Nebulizers What is a nebulizer? A nebulizer is a tool that delivers liquid medicine as a fine mist. You breathe in the medicine through a mouthpiece or face mask. This sends the medicine directly to your airways and lungs. You breathe in the medicine for a few minutes. What is it used for? A nebulizer may be used to treat respiratory problems. These include asthma and chronic obstructive pulmonary disease (COPD). If you have asthma, it can be used with daily controller medicines or with quick-relief medicine during an attack or flare-up. A nebulizer can make inhaling medicines easier. It may be very helpful if it is hard for you to breathe or use an inhaler. How do you use a nebulizer? 1. Put the medicine into the medicine container. Be sure to measure the right amount. 2. Make sure that the container is connected to the mouthpiece or face mask. 3. Turn on the air compressor. 4. Take deep, slow breaths through the mouthpiece or mask. Hold each breath for about 2 seconds. 5. Continue breathing until the medicine is gone from the container. When the medicine is gone, there will be no more mist coming out. This may take about 10 minutes. 6. Shake the container to make sure you get all the medicine. Where can you learn more? Go to http://nadya-irasema.info/. Enter P917 in the search box to learn more about \"Learning About Nebulizers. \" Current as of: May 23, 2016 Content Version: 11.2 © 7173-1595 Healthwise, Incorporated. Care instructions adapted under license by [employer]Q (which disclaims liability or warranty for this information). If you have questions about a medical condition or this instruction, always ask your healthcare professional. [address] any warranty or liability for your use of this information. Introducing [facility] & HEALTH SERVICES! Dear Philippe Fang: Thank you for requesting a [employer] account. Our records indicate that you already have an active [employer] account. You can access your account anytime at [email or web address] Did you know that you can access your hospital and ER discharge instructions at any time in [employer]? You can also review all of your test results from your hospital stay or ER visit. Additional Information If you have questions, please visit the Frequently Asked Questions section of the [employer] website at [email or web address] Remember, [employer] is NOT to be used for urgent needs. For medical emergencies, dial 911. Now available from your iPhone and Android! Please provide this summary of care documentation to your next provider. Your primary care clinician is listed as Angelito Perkins. If you have any questions after today's visit, please call [phone].

## 2017-05-08 NOTE — PATIENT INSTRUCTIONS
Learning About Nebulizers  What is a nebulizer? A nebulizer is a tool that delivers liquid medicine as a fine mist. You breathe in the medicine through a mouthpiece or face mask. This sends the medicine directly to your airways and lungs. You breathe in the medicine for a few minutes. What is it used for? A nebulizer may be used to treat respiratory problems. These include asthma and chronic obstructive pulmonary disease (COPD). If you have asthma, it can be used with daily controller medicines or with quick-relief medicine during an attack or flare-up. A nebulizer can make inhaling medicines easier. It may be very helpful if it is hard for you to breathe or use an inhaler. How do you use a nebulizer? 1. Put the medicine into the medicine container. Be sure to measure the right amount. 2. Make sure that the container is connected to the mouthpiece or face mask. 3. Turn on the air compressor. 4. Take deep, slow breaths through the mouthpiece or mask. Hold each breath for about 2 seconds. 5. Continue breathing until the medicine is gone from the container. When the medicine is gone, there will be no more mist coming out. This may take about 10 minutes. 6. Shake the container to make sure you get all the medicine. Where can you learn more? Go to http://nadya-irasema.info/. Enter W250 in the search box to learn more about \"Learning About Nebulizers. \"  Current as of: May 23, 2016  Content Version: 11.2  © 4180-9995 [employer] Penvision. Care instructions adapted under license by [employer]â„¢ (which disclaims liability or warranty for this information). If you have questions about a medical condition or this instruction, always ask your healthcare professional. [place] [address] any warranty or liability for your use of this information.

## 2017-05-10 ENCOUNTER — OFFICE VISIT (OUTPATIENT)
Dept: FAMILY MEDICINE CLINIC | Age: 57
End: 2017-05-10

## 2017-05-10 VITALS
RESPIRATION RATE: 16 BRPM | TEMPERATURE: 96 F | SYSTOLIC BLOOD PRESSURE: 136 MMHG | BODY MASS INDEX: 40.84 KG/M2 | OXYGEN SATURATION: 97 % | DIASTOLIC BLOOD PRESSURE: 72 MMHG | HEART RATE: 70 BPM | WEIGHT: 260.2 LBS | HEIGHT: 67 IN

## 2017-05-10 DIAGNOSIS — E66.01 MORBID OBESITY, UNSPECIFIED OBESITY TYPE (HCC): ICD-10-CM

## 2017-05-10 DIAGNOSIS — R06.02 SOB (SHORTNESS OF BREATH): Primary | ICD-10-CM

## 2017-05-10 DIAGNOSIS — G89.4 CHRONIC PAIN SYNDROME: ICD-10-CM

## 2017-05-10 DIAGNOSIS — E04.1 THYROID NODULE: ICD-10-CM

## 2017-05-10 DIAGNOSIS — Z95.2 H/O AORTIC VALVE REPLACEMENT: ICD-10-CM

## 2017-05-10 DIAGNOSIS — J98.11 ATELECTASIS: ICD-10-CM

## 2017-05-10 DIAGNOSIS — R13.10 DYSPHAGIA, UNSPECIFIED TYPE: ICD-10-CM

## 2017-05-10 NOTE — MR AVS SNAPSHOT
Visit Information Date & Time Provider Department Dept. Phone Encounter #  
 5/10/2017 11:30 AM MD Uriel Harper [phone] [id number] Follow-up Instructions Return in about 2 months (around 7/10/2017), or if symptoms worsen or fail to improve, for dm2, SOB. Your Appointments 6/19/2017  2:00 PM  
Follow Up with Evelyn Kerr MD  
[facility] for Pain Management [address] [place] [address]) Appt Note: Return in about 3 months (around 6/23/2017). ..per GS. ..mom  
 [address] [facility] [address]  
[phone] [address]  
  
    
 7/5/2017  1:00 PM  
Follow Up with MD Uriel Harper ([facility]) Appt Note: 3 month return [address] [facility]  
  
   
 [address] [facility] Upcoming Health Maintenance Date Due  
 BREAST CANCER SCRN MAMMOGRAM 4/13/2011 FOBT Q 1 YEAR AGE 50-75 4/13/2011 EYE EXAM RETINAL OR DILATED Q1 12/11/2016 HEMOGLOBIN A1C Q6M 7/11/2017 INFLUENZA AGE 9 TO ADULT 8/1/2017 FOOT EXAM Q1 12/14/2017 MICROALBUMIN Q1 1/11/2018 LIPID PANEL Q1 3/1/2018 PAP AKA CERVICAL CYTOLOGY 11/13/2018 DTaP/Tdap/Td series (2 - Td) 11/11/2026 Allergies as of 5/10/2017  Review Complete On: 5/10/2017 By: Indira Eller LPN Severity Noted Reaction Type Reactions Other Food  03/17/2016    Other (comments) Sweeteners- causes headaches Metformin  11/06/2015    Other (comments) Increase pain in feet and swelling in feet Morphine  01/25/2017    Other (comments)  
 headache Current Immunizations  Never Reviewed No immunizations on file. Not reviewed this visit Vitals BP Pulse Temp Resp Height(growth percentile) Weight(growth percentile) 136/72 (BP 1 Location: Left arm, BP Patient Position: Sitting) 70 96 °F (35.6 °C) (Oral) 16 5' 7\" (1.702 m) 260 lb 3.2 oz (118 kg) SpO2 BMI OB Status Smoking Status 97% 40.75 kg/m2 Hysterectomy Never Smoker BMI and BSA Data Body Mass Index Body Surface Area 40.75 kg/m 2 2.36 m 2 Preferred Pharmacy Pharmacy Name Phone [address], [address] Your Updated Medication List  
  
   
This list is accurate as of: 5/10/17 12:12 PM.  Always use your most recent med list.  
  
  
  
  
 * albuterol 90 mcg/actuation inhaler Commonly known as:  PROVENTIL HFA, VENTOLIN HFA, PROAIR HFA Take 2 Puffs by inhalation every four (4) hours as needed for Wheezing. * albuterol 2.5 mg /3 mL (0.083 %) nebulizer solution Commonly known as:  PROVENTIL VENTOLIN  
3 mL by Nebulization route every four (4) hours as needed for Wheezing. aspirin, buffered 81 mg Tab Take  by mouth. atorvastatin 80 mg tablet Commonly known as:  LIPITOR  
take 1 tablet by mouth once daily  
  
 bacitracin 500 unit/gram Oint Commonly known as:  BACITRACIN  
apply affected area twice a day  
  
 baclofen 10 mg tablet Commonly known as:  LIORESAL  
1  qhs  prn  
  
 BD INSULIN SYRINGE ULTRA-FINE 1 mL 31 gauge x 5/16 Syrg Generic drug:  Insulin Syringe-Needle U-100  
use as directed twice a day  
  
 budesonide-formoterol 160-4.5 mcg/actuation HFA inhaler Commonly known as:  SYMBICORT Take 2 Puffs by inhalation two (2) times a day. cyclobenzaprine 10 mg tablet Commonly known as:  FLEXERIL Take 1 Tab by mouth three (3) times daily as needed for Muscle Spasm(s). DULoxetine 60 mg capsule Commonly known as:  CYMBALTA Take 1 Cap by mouth daily. FREESTYLE LITE STRIPS strip Generic drug:  glucose blood VI test strips TEST twice a day as directed  
  
 furosemide 40 mg tablet Commonly known as:  LASIX Take 2 x day for 7 days then daily  
  
 gabapentin 300 mg capsule Commonly known as:  NEURONTIN Take 1 Cap by mouth nightly. Indications: Restless Legs Syndrome LANTUS 100 unit/mL injection Generic drug:  insulin glargine INJECT 90 UNITS SUBCUTANEOUSLY EVERY EVENING AT BEDTIME  
  
 metoprolol succinate 50 mg XL tablet Commonly known as:  TOPROL-XL  
take 1 tablet by mouth once daily  
  
 montelukast 10 mg tablet Commonly known as:  SINGULAIR Take 1 Tab by mouth daily. Omega-3-DHA-EPA-Fish Oil 1,000 mg (120 mg-180 mg) Cap Commonly known as:  FISH OIL Take 1,000 mg by mouth two (2) times a day. potassium chloride 10 mEq tablet Commonly known as:  KLOR-CON Take 1 Tab by mouth daily. VITAMIN B-12 1,000 mcg sublingual tablet Generic drug:  cyanocobalamin Take 1,000 mcg by mouth daily. * Notice: This list has 2 medication(s) that are the same as other medications prescribed for you. Read the directions carefully, and ask your doctor or other care provider to review them with you. Follow-up Instructions Return in about 2 months (around 7/10/2017), or if symptoms worsen or fail to improve, for dm2, SOB. To-Do List   
 05/12/2017 2:00 PM  
  Appointment with [facility] at [facility] NON-INVASIVE [address] [place] [address] ([phone]) Age Limit for ALL Heart procedures @ [facility] facilities: 18 yrs and older only. Under the age of 25, refer to [address] [place] [address] ([phone]). This study requires patient to bring a written physician's order or MD office may fax the order to Central Scheduling at [phone]. Patient needs to bring a current list of all medications. No preparation is required for this study. Patient Instructions Giving a Single-Dose Insulin Shot: Care Instructions Your Care Instructions Insulin is normally made by the pancreas, a gland behind the stomach.  In people with diabetes, the pancreas no longer makes enough insulin or it stops making it. Without insulin, your blood sugar level rises to dangerous levels. When this happens, you need insulin shots to keep your blood sugar in your target range. You may be nervous giving a shot at first. But soon, giving yourself a shot will become routine. It is quite easy to learn how to draw up insulin into a syringe and give the shot. The needles you use to give the insulin injections are very thin, and most people who have diabetes say they do not even feel the needle enter the skin. Even if you do feel the injection, the sting of the shot is not bad and does not last long. More than half a million people do it every day. You can too. Follow-up care is a key part of your treatment and safety. Be sure to make and go to all appointments, and call your doctor if you are having problems. It's also a good idea to know your test results and keep a list of the medicines you take. How can you care for yourself at home? Getting started If you have poor eyesight, have problems using your hands, or cannot prepare a dose of insulin, you may need someone to prepare your insulin injections ahead of time. · Gather your supplies. You will need an insulin syringe, your bottle of insulin, and an alcohol wipe or a cotton ball dipped in alcohol. Keep your supplies in a bag or kit so you can carry the supplies wherever you go. · Check the insulin bottle label and contents. Read and follow all instructions on the label, including how to store the insulin and how long the insulin will last. 
· Wash your hands with soap and running water. Dry them well. Preparing the shot For a single type of insulin shot: 1. Roll the bottle gently between your hands. This will warm the insulin if you have kept the bottle in the refrigerator. Roll a bottle of cloudy insulin between your hands until the white powder has dissolved and the solution is mixed.  
2. Wipe the rubber lid of the insulin bottle with an alcohol wipe or a cotton ball dipped in alcohol. (If you are using a bottle for the first time, remove the protective cover over the rubber lid.) Let the top dry before you remove any insulin. 3. Remove the plastic cap from the needle on your insulin syringe. Take care not to touch the needle. 4. Pull the plunger of the syringe back, and draw air into the syringe equal to the number of units of insulin to be given. 5. Insert the needle of the syringe into the rubber lid of the insulin bottle. Push the plunger of the syringe to force the air into the bottle. This equalizes the pressure in the bottle when you remove the dose of insulin. Leave the needle in the bottle. 6. Turn the bottle and syringe upside down, and hold them in one hand. Position the tip of the needle so that it is below the surface of insulin in the bottle. Pull back the plunger to fill the syringe with slightly more than the correct number of units of insulin to be given. 7. Tap the outside (barrel) of the syringe so that trapped air bubbles move into the needle area. Push the air bubbles back into the bottle. Make sure you now have the correct number of units of insulin in your syringe. 8. Remove the needle from the bottle. Now you are ready to give the shot. Giving the shot Before giving your shot: 
1. Use alcohol to clean the skin before you give the shot. Let it dry. 2. Slightly pinch a fold of skin between your fingers and thumb of one hand. 3. Hold the syringe like a pencil close to the site, keeping your fingers off the plunger. It is usually recommended to place the syringe at a 90-degree angle to the shot site, standing straight up from the skin. 4. Bend your wrist, and quickly push the needle all the way into the pinched-up area. 5. Push the plunger of the syringe all the way in so the insulin goes into the fatty tissue. 6. Take the needle out at the same angle that you inserted it.  If you bleed a little, apply pressure over the shot area with your finger, a cotton ball, or a piece of gauze. Do not rub the area. 7. Replace the cover over the needle and dispose of the needle safely. Do not use the same needle more than one time. Where to give the shot You can inject insulin into: · The belly, but at least 2 inches from the belly button. This is considered the best place to inject insulin. · The top outer part of the thighs. Insulin usually is absorbed more slowly from this site, unless you exercise soon after giving the shot. · The outside of the upper arms. You may need help giving yourself shots in this area. · The buttocks. You may need help with injections in the buttocks. Your doctor may advise you to give your shots in different places on your body each day. This is called site rotation. If you are going to rotate sites, check with your doctor to make sure you know how to do it right. Use the same site at the same time of each day. For example, each day: · At breakfast, give the shot in one of your arms. · At lunch, give the shot in one of your legs. · At dinner, give the shot in your belly. Slightly change the spot where you give an insulin shot each time you do it. For example, use five different places on the right upper arm, then use five places on the left upper arm. Using the same spot every time can cause bumps or pits in the skin and make the shots hurt more. It may also slow down how the insulin is absorbed into your body. Where can you learn more? Go to http://nadya-irasema.info/. Enter X110 in the search box to learn more about \"Giving a Single-Dose Insulin Shot: Care Instructions. \" Current as of: May 23, 2016 Content Version: 11.2 © 9336-5146 [employer] Technologies Inc.. Care instructions adapted under license by Tweekaboo (which disclaims liability or warranty for this information).  If you have questions about a medical condition or this instruction, always ask your healthcare professional. [address] any warranty or liability for your use of this information. Introducing [facility] & HEALTH SERVICES! Dear Azeb Barroso: Thank you for requesting a [employer] account. Our records indicate that you already have an active [employer] account. You can access your account anytime at [email or web address] Did you know that you can access your hospital and ER discharge instructions at any time in [employer]? You can also review all of your test results from your hospital stay or ER visit. Additional Information If you have questions, please visit the Frequently Asked Questions section of the [employer] website at [email or web address] Remember, [employer] is NOT to be used for urgent needs. For medical emergencies, dial 911. Now available from your iPhone and Android! Please provide this summary of care documentation to your next provider. Your primary care clinician is listed as Angelito Perkins. If you have any questions after today's visit, please call [phone].

## 2017-05-10 NOTE — PROGRESS NOTES
Chief Complaint   Patient presents with    Follow-up     SOB, wheezing, cough- better with the medication      1. Have you been to the ER, urgent care clinic since your last visit? Hospitalized since your last visit? No    2. Have you seen or consulted any other health care providers outside of the [address] [place] [address] since your last visit? Include any pap smears or colon screening. Yes When: May 8th, 2017 Where: [facility]  Reason for visit: SOB, Wheeze, cough      HPI  Woodroe Amass comes in fo rf/u care. 1) Pulm/SOB: Patient was sent to see the pulmonologist. Had various tests done and the pulmonologist did discuss medication management and further work up for her symptoms. The SOB and wheeze has improved slightly. She is using her inhalers and singulair. She continues to have easy fatiguability and runs out of breath with simplest of tasks. She still would like an explanation as to why this is happening. I did explain that we are still running tests to see why she has the symptoms. She is scheduled to have an echocardiogram on Friday. She had the chest CT scan done that showed atelectasis. Patient does have wheeze and cough on and off. No fever or chills. No hemoptysis. 2) Cardiac/ALBERT: Patient has dyspnea with slightest exertion. Even talking on the phone makes her breathless and worn out. No chest pain. Will refer to cardiology for opinion. Awaits echocardiogram. She has a h/o aortic valve replacement. No obvious signs of failure like JVD elevation or pedal edema. Patient is on metoprolol. Wonders about this as she was told by the pulmonologist that the metoprolol could aggravate asthma. She will not discontinue the medication. I will however look for a B1 /Cardioselective medication. 3) DM2: This remains uncontrolled. I asked patient what her numbers are.  She is noncommital. Albert snot want to talk about this and prefers to focus on her breathing which she says is her main concern. She has been referred to the endocrinologist but has never gone back for a f/u visit. She is taking insulin 90 units in the evening. Given the lack of clarity with what she is doing for her diabetes she should call her endocrinologist and set up a visit. 4) HM: Discussed pending health maintenance but she has declined these. 5) Gastrointestinal: patient still has cough spells when she eats some foods including water melon or drinks some liquids. Declines referral to GI for now to evaluate for any abnormality or the esophagus. 6) Patient did have CT scan that showed enlarged and multinodular goiter. Patient has had this noted in the past and did have aspiration biopsy done that was normal she says. Will order us of thyroid. Consider referral to ENT. 7) Chronic pain: patient seen in pain management. She has chronic back pain. On medication for this. Past Medical History  Past Medical History:   Diagnosis Date    Arthritis     Lower back     Chronic back pain     Lower back pain    Depression     Diabetes ([address]     Diabetes mellitus ([address]     Panic attacks     Sleep apnea        Surgical History  Past Surgical History:   Procedure Laterality Date    HX HEART VALVE SURGERY  2013    aortic valve repair    HX HYSTERECTOMY      Partial Hysterectomy - removed ovary    HX MYOMECTOMY          Medications  Current Outpatient Prescriptions   Medication Sig Dispense Refill    montelukast (SINGULAIR) 10 mg tablet Take 1 Tab by mouth daily. 30 Tab 2    gabapentin (NEURONTIN) 300 mg capsule Take 1 Cap by mouth nightly. Indications: Restless Legs Syndrome 30 Cap 2    cyclobenzaprine (FLEXERIL) 10 mg tablet Take 1 Tab by mouth three (3) times daily as needed for Muscle Spasm(s). 90 Tab 2    DULoxetine (CYMBALTA) 60 mg capsule Take 1 Cap by mouth daily.  30 Cap 2    baclofen (LIORESAL) 10 mg tablet 1  qhs  prn 30 Tab 2    LANTUS 100 unit/mL injection INJECT 90 UNITS SUBCUTANEOUSLY EVERY EVENING AT BEDTIME 30 Vial 3    budesonide-formoterol (SYMBICORT) 160-4.5 mcg/actuation HFA inhaler Take 2 Puffs by inhalation two (2) times a day. 1 Inhaler 1    Omega-3-DHA-EPA-Fish Oil (FISH OIL) 1,000 mg (120 mg-180 mg) cap Take 1,000 mg by mouth two (2) times a day. 60 Cap 2    bacitracin (BACITRACIN) 500 unit/gram oint apply affected area twice a day 28 g 0    furosemide (LASIX) 40 mg tablet Take 2 x day for 7 days then daily 60 Tab 0    albuterol (PROVENTIL HFA, VENTOLIN HFA, PROAIR HFA) 90 mcg/actuation inhaler Take 2 Puffs by inhalation every four (4) hours as needed for Wheezing. 1 Inhaler 3    potassium chloride (K-DUR, KLOR-CON) 10 mEq tablet Take 1 Tab by mouth daily. 30 Tab 1    metoprolol succinate (TOPROL-XL) 50 mg XL tablet take 1 tablet by mouth once daily 30 Tab 3    BD INSULIN SYRINGE ULTRA-FINE 1 mL 31 gauge x 5/16 syrg use as directed twice a day 200 Syringe 3    cyanocobalamin (VITAMIN B-12) 1,000 mcg sublingual tablet Take 1,000 mcg by mouth daily.  FREESTYLE LITE STRIPS strip TEST twice a day as directed 100 Strip 11    atorvastatin (LIPITOR) 80 mg tablet take 1 tablet by mouth once daily 30 Tab 11    Aspirin, Buffered 81 mg tab Take  by mouth.  albuterol (PROVENTIL VENTOLIN) 2.5 mg /3 mL (0.083 %) nebulizer solution 3 mL by Nebulization route every four (4) hours as needed for Wheezing.  24 Each 5       Allergies  Allergies   Allergen Reactions    Other Food Other (comments)     Sweeteners- causes headaches    Metformin Other (comments)     Increase pain in feet and swelling in feet    Morphine Other (comments)     headache       Family History  Family History   Problem Relation Age of Onset    Heart Disease Mother     Diabetes Mother     Stroke Mother     Diabetes Father     Heart Disease Father        Social History  Social History     Social History    Marital status:      Spouse name: N/A    Number of children: N/A    Years of education: N/A     Occupational History    Not on file. Social History Main Topics    Smoking status: Never Smoker    Smokeless tobacco: Never Used    Alcohol use No    Drug use: No    Sexual activity: Not Currently     Other Topics Concern     Service No    Blood Transfusions No    Caffeine Concern No    Occupational Exposure No    Hobby Hazards No    Sleep Concern Yes     Sleep apnea     Stress Concern Yes     Due to family medical issues.      Weight Concern No    Special Diet No    Back Care Yes     Patient tries to do back care with streches     Exercise No    Bike Helmet Yes    Seat Belt Yes    Self-Exams Yes     Social History Narrative       Review of Systems  Review of Systems - History obtained from chart review and the patient  General ROS: positive for  - fatigue and malaise  Psychological ROS: positive for - anxiety  Ophthalmic ROS: negative  Endocrine ROS: DM2  Respiratory ROS: positive for - cough, shortness of breath and wheezing  negative for - hemoptysis  Cardiovascular ROS: positive for - dyspnea on exertion  Gastrointestinal ROS: positive for - swallowing difficulty/pain  Genito-Urinary ROS: negative  Musculoskeletal ROS: negative  Neurological ROS: negative    Vital Signs  Visit Vitals    /72 (BP 1 Location: Left arm, BP Patient Position: Sitting)    Pulse 70    Temp 96 °F (35.6 °C) (Oral)    Resp 16    Ht 5' 7\" (1.702 m)    Wt 260 lb 3.2 oz (118 kg)    SpO2 97%    BMI 40.75 kg/m2         Physical Exam  Physical Examination: General appearance - oriented to person, place, and time, well hydrated and in mild to moderate distress  Mental status - affect appropriate to mood  Mouth - mucous membranes moist, pharynx normal without lesions  Neck - supple, no significant adenopathy, thyroid exam: thyroid enlarged  Lymphatics - no palpable lymphadenopathy  Chest - wheezing noted scattered bilateral lung fields, decreased air entry noted bibasialr lung zones  Heart - S1 and S2 normal, systolic murmur 2/6 at 2nd left intercostal space  Back exam - limited range of motion, pain with motion noted during exam  Neurological - motor and sensory grossly normal bilaterally  Extremities - no pedal edema noted    Diagnostics  Orders Placed This Encounter    US THYROID/PARATHYROID/SOFT TISS     Standing Status:   Future     Standing Expiration Date:   6/10/2018    REFERRAL TO CARDIOLOGY     Referral Priority:   Routine     Referral Type:   Consultation     Referral Reason:   Specialty Services Required         Results  Results for orders placed or performed during the hospital encounter of 05/05/17   POC CREATININE   Result Value Ref Range    Creatinine (POC) 0.6 0.6 - 1.3 MG/DL    GFR-AA (POC) >60 >60 ml/min/1.73m2    GFR, non-AA (POC) >60 >60 ml/min/1.73m2     ASSESSMENT and PLAN    ICD-10-CM ICD-9-CM    1. SOB (shortness of breath) R06.02 786.05 REFERRAL TO CARDIOLOGY   2. Thyroid nodule E04.1 241.0 US THYROID/PARATHYROID/SOFT TISS   3. Atelectasis J98.11 518.0    4. Uncontrolled type 2 diabetes mellitus with other specified complication (HCC) U06.78 250.82     E11.65     5. H/O aortic valve replacement Z95.2 V43.3 REFERRAL TO CARDIOLOGY   6. Dysphagia, unspecified type R13.10 787.20    7. Morbid obesity, unspecified obesity type E66.01 278.01    8.  Chronic pain syndrome G89.4 338.4      lab results and schedule of future lab studies reviewed with patient  reviewed diet, exercise and weight control  reviewed medications and side effects in detail  specific diabetic recommendations: low cholesterol diet, weight control and daily exercise discussed, home glucose monitoring emphasized, all medications, side effects and compliance discussed carefully, glycohemoglobin and other lab monitoring discussed and Strongly advised to f/u with endocrinologist.  radiology results and schedule of future radiology studies reviewed with patient    I have discussed the diagnosis with the patient and the intended plan of care as seen in the above orders. The patient has received an after-visit summary and questions were answered concerning future plans. I have discussed medication, side effects, and warnings with the patient in detail. The patient verbalized understanding and is in agreement with the plan of care. The patient will contact the office with any additional concerns.     Mohini Urrutia MD

## 2017-05-10 NOTE — PATIENT INSTRUCTIONS
Giving a Single-Dose Insulin Shot: Care Instructions  Your Care Instructions    Insulin is normally made by the pancreas, a gland behind the stomach. In people with diabetes, the pancreas no longer makes enough insulin or it stops making it. Without insulin, your blood sugar level rises to dangerous levels. When this happens, you need insulin shots to keep your blood sugar in your target range. You may be nervous giving a shot at first. But soon, giving yourself a shot will become routine. It is quite easy to learn how to draw up insulin into a syringe and give the shot. The needles you use to give the insulin injections are very thin, and most people who have diabetes say they do not even feel the needle enter the skin. Even if you do feel the injection, the sting of the shot is not bad and does not last long. More than half a million people do it every day. You can too. Follow-up care is a key part of your treatment and safety. Be sure to make and go to all appointments, and call your doctor if you are having problems. It's also a good idea to know your test results and keep a list of the medicines you take. How can you care for yourself at home? Getting started  If you have poor eyesight, have problems using your hands, or cannot prepare a dose of insulin, you may need someone to prepare your insulin injections ahead of time. · Gather your supplies. You will need an insulin syringe, your bottle of insulin, and an alcohol wipe or a cotton ball dipped in alcohol. Keep your supplies in a bag or kit so you can carry the supplies wherever you go. · Check the insulin bottle label and contents. Read and follow all instructions on the label, including how to store the insulin and how long the insulin will last.  · Wash your hands with soap and running water. Dry them well. Preparing the shot  For a single type of insulin shot:  1. Roll the bottle gently between your hands.  This will warm the insulin if you have kept the bottle in the refrigerator. Roll a bottle of cloudy insulin between your hands until the white powder has dissolved and the solution is mixed. 2. Wipe the rubber lid of the insulin bottle with an alcohol wipe or a cotton ball dipped in alcohol. (If you are using a bottle for the first time, remove the protective cover over the rubber lid.) Let the top dry before you remove any insulin. 3. Remove the plastic cap from the needle on your insulin syringe. Take care not to touch the needle. 4. Pull the plunger of the syringe back, and draw air into the syringe equal to the number of units of insulin to be given. 5. Insert the needle of the syringe into the rubber lid of the insulin bottle. Push the plunger of the syringe to force the air into the bottle. This equalizes the pressure in the bottle when you remove the dose of insulin. Leave the needle in the bottle. 6. Turn the bottle and syringe upside down, and hold them in one hand. Position the tip of the needle so that it is below the surface of insulin in the bottle. Pull back the plunger to fill the syringe with slightly more than the correct number of units of insulin to be given. 7. Tap the outside (barrel) of the syringe so that trapped air bubbles move into the needle area. Push the air bubbles back into the bottle. Make sure you now have the correct number of units of insulin in your syringe. 8. Remove the needle from the bottle. Now you are ready to give the shot. Giving the shot  Before giving your shot:  1. Use alcohol to clean the skin before you give the shot. Let it dry. 2. Slightly pinch a fold of skin between your fingers and thumb of one hand. 3. Hold the syringe like a pencil close to the site, keeping your fingers off the plunger. It is usually recommended to place the syringe at a 90-degree angle to the shot site, standing straight up from the skin.   4. Bend your wrist, and quickly push the needle all the way into the pinched-up area. 5. Push the plunger of the syringe all the way in so the insulin goes into the fatty tissue. 6. Take the needle out at the same angle that you inserted it. If you bleed a little, apply pressure over the shot area with your finger, a cotton ball, or a piece of gauze. Do not rub the area. 7. Replace the cover over the needle and dispose of the needle safely. Do not use the same needle more than one time. Where to give the shot  You can inject insulin into:  · The belly, but at least 2 inches from the belly button. This is considered the best place to inject insulin. · The top outer part of the thighs. Insulin usually is absorbed more slowly from this site, unless you exercise soon after giving the shot. · The outside of the upper arms. You may need help giving yourself shots in this area. · The buttocks. You may need help with injections in the buttocks. Your doctor may advise you to give your shots in different places on your body each day. This is called site rotation. If you are going to rotate sites, check with your doctor to make sure you know how to do it right. Use the same site at the same time of each day. For example, each day:  · At breakfast, give the shot in one of your arms. · At lunch, give the shot in one of your legs. · At dinner, give the shot in your belly. Slightly change the spot where you give an insulin shot each time you do it. For example, use five different places on the right upper arm, then use five places on the left upper arm. Using the same spot every time can cause bumps or pits in the skin and make the shots hurt more. It may also slow down how the insulin is absorbed into your body. Where can you learn more? Go to http://nadya-irasema.info/. Enter Q716 in the search box to learn more about \"Giving a Single-Dose Insulin Shot: Care Instructions. \"  Current as of: May 23, 2016  Content Version: 11.2  © 0334-1478 [employer]. Care instructions adapted under license by [employer] (which disclaims liability or warranty for this information). If you have questions about a medical condition or this instruction, always ask your healthcare professional. [address] any warranty or liability for your use of this information.

## 2017-05-12 ENCOUNTER — HOSPITAL ENCOUNTER (OUTPATIENT)
Dept: NON INVASIVE DIAGNOSTICS | Age: 57
Discharge: HOME | End: 2017-05-12
Attending: FAMILY MEDICINE
Payer: MEDICAID

## 2017-05-12 DIAGNOSIS — R06.02 SOB (SHORTNESS OF BREATH): ICD-10-CM

## 2017-05-12 PROCEDURE — 93306 TTE W/DOPPLER COMPLETE: CPT

## 2017-05-12 NOTE — PROGRESS NOTES
Completed 2D Echocardiogram. Report to follow. I removed the band off and placed in shred box.      Harleen Riojas, RCS, RDCS

## 2017-05-17 ENCOUNTER — OFFICE VISIT (OUTPATIENT)
Dept: CARDIOLOGY CLINIC | Age: 57
End: 2017-05-17

## 2017-05-17 VITALS
SYSTOLIC BLOOD PRESSURE: 128 MMHG | HEIGHT: 67 IN | BODY MASS INDEX: 41.75 KG/M2 | WEIGHT: 266 LBS | DIASTOLIC BLOOD PRESSURE: 60 MMHG | HEART RATE: 90 BPM

## 2017-05-17 DIAGNOSIS — E03.4 HYPOTHYROIDISM DUE TO ACQUIRED ATROPHY OF THYROID: ICD-10-CM

## 2017-05-17 DIAGNOSIS — J45.31 ASTHMATIC BRONCHITIS, MILD PERSISTENT, WITH ACUTE EXACERBATION: ICD-10-CM

## 2017-05-17 DIAGNOSIS — E78.5 DYSLIPIDEMIA: ICD-10-CM

## 2017-05-17 DIAGNOSIS — R60.0 PEDAL EDEMA: ICD-10-CM

## 2017-05-17 DIAGNOSIS — R06.02 SOB (SHORTNESS OF BREATH): Primary | ICD-10-CM

## 2017-05-17 DIAGNOSIS — E11.9 TYPE 2 DIABETES MELLITUS WITHOUT COMPLICATION, UNSPECIFIED LONG TERM INSULIN USE STATUS: ICD-10-CM

## 2017-05-17 DIAGNOSIS — Z87.74 HISTORY OF BICUSPID AORTIC VALVE: ICD-10-CM

## 2017-05-17 DIAGNOSIS — I10 ESSENTIAL HYPERTENSION: ICD-10-CM

## 2017-05-17 DIAGNOSIS — G47.33 OBSTRUCTIVE SLEEP APNEA SYNDROME: ICD-10-CM

## 2017-05-17 DIAGNOSIS — Z95.2 H/O AORTIC VALVE REPLACEMENT: ICD-10-CM

## 2017-05-17 NOTE — PROGRESS NOTES
Please let patient know echocardiogram showed no abnormalities. Will await cardiologist evaluation.  She should also f/u with pulmonologist.  Juvenal Doe MD

## 2017-05-17 NOTE — MR AVS SNAPSHOT
Visit Information Date & Time Provider Department Dept. Phone Encounter #  
 5/17/2017  2:00 PM Ann Gentile MD [facility] [place] [phone] Follow-up Instructions Return in about 1 month (around 6/17/2017). Your Appointments 6/19/2017  2:00 PM  
Follow Up with Timothy Trujillo MD  
[facility] Appt Note: Return in about 3 months (around 6/23/2017). ..per GS. ..mom  
 [address] [facility] [address]  
[phone] [address]  
  
    
 7/5/2017  1:00 PM  
Follow Up with Angelito Harris MD  
[facility][1] ([facility]) Appt Note: 3 month return [address] [facility]  
  
   
 [address] [facility] Upcoming Health Maintenance Date Due  
 BREAST CANCER SCRN MAMMOGRAM 4/13/2011 FOBT Q 1 YEAR AGE 50-75 4/13/2011 EYE EXAM RETINAL OR DILATED Q1 12/11/2016 HEMOGLOBIN A1C Q6M 7/11/2017 INFLUENZA AGE 9 TO ADULT 8/1/2017 FOOT EXAM Q1 12/14/2017 MICROALBUMIN Q1 1/11/2018 LIPID PANEL Q1 3/1/2018 PAP AKA CERVICAL CYTOLOGY 11/13/2018 DTaP/Tdap/Td series (2 - Td) 11/11/2026 Allergies as of 5/17/2017  Review Complete On: 5/17/2017 By: Marylene Pollen, LPN Severity Noted Reaction Type Reactions Other Food  03/17/2016    Other (comments) Sweeteners- causes headaches Metformin  11/06/2015    Other (comments) Increase pain in feet and swelling in feet Morphine  01/25/2017    Other (comments)  
 headache Current Immunizations  Never Reviewed No immunizations on file. Not reviewed this visit You Were Diagnosed With   
  
 Codes Comments SOB (shortness of breath)    -  Primary ICD-10-CM: R06.02 
ICD-9-CM: 786.05  Obstructive sleep apnea syndrome     ICD-10-CM: G47.33 
ICD-9-CM: 327.23   
 H/O aortic valve replacement     ICD-10-CM: Z95.2 ICD-9-CM: V43.3 History of bicuspid aortic valve     ICD-10-CM: Z87.74 ICD-9-CM: V13.65 Type 2 diabetes mellitus without complication, unspecified long term insulin use status     ICD-10-CM: E11.9 ICD-9-CM: 250.00 Hypothyroidism due to acquired atrophy of thyroid     ICD-10-CM: E03.4 ICD-9-CM: 244.8, 246.8 Essential hypertension     ICD-10-CM: I10 
ICD-9-CM: 401.9 Dyslipidemia     ICD-10-CM: E78.5 ICD-9-CM: 272.4 Vitals BP Pulse Height(growth percentile) Weight(growth percentile) BMI OB Status 128/60 90 5' 7\" (1.702 m) 266 lb (120.7 kg) 41.66 kg/m2 Hysterectomy Smoking Status Never Smoker BMI and BSA Data Body Mass Index Body Surface Area  
 41.66 kg/m 2 2.39 m 2 Preferred Pharmacy Pharmacy Name Phone [address] [facility], [id number] [place] [address] Your Updated Medication List  
  
   
This list is accurate as of: 5/17/17  2:13 PM.  Always use your most recent med list.  
  
  
  
  
 * albuterol 90 mcg/actuation inhaler Commonly known as:  PROVENTIL HFA, VENTOLIN HFA, PROAIR HFA Take 2 Puffs by inhalation every four (4) hours as needed for Wheezing. * albuterol 2.5 mg /3 mL (0.083 %) nebulizer solution Commonly known as:  PROVENTIL VENTOLIN  
3 mL by Nebulization route every four (4) hours as needed for Wheezing. aspirin, buffered 81 mg Tab Take  by mouth. atorvastatin 80 mg tablet Commonly known as:  LIPITOR  
take 1 tablet by mouth once daily  
  
 bacitracin 500 unit/gram Oint Commonly known as:  BACITRACIN  
apply affected area twice a day  
  
 baclofen 10 mg tablet Commonly known as:  LIORESAL  
1  qhs  prn  
  
 BD INSULIN SYRINGE ULTRA-FINE 1 mL 31 gauge x 5/16 Syrg Generic drug:  Insulin Syringe-Needle U-100  
use as directed twice a day  
  
 budesonide-formoterol 160-4.5 mcg/actuation HFA inhaler Commonly known as:  SYMBICORT Take 2 Puffs by inhalation two (2) times a day. cyclobenzaprine 10 mg tablet Commonly known as:  FLEXERIL Take 1 Tab by mouth three (3) times daily as needed for Muscle Spasm(s). DULoxetine 60 mg capsule Commonly known as:  CYMBALTA Take 1 Cap by mouth daily. FREESTYLE LITE STRIPS strip Generic drug:  glucose blood VI test strips TEST twice a day as directed  
  
 furosemide 40 mg tablet Commonly known as:  LASIX Take 2 x day for 7 days then daily  
  
 gabapentin 300 mg capsule Commonly known as:  NEURONTIN Take 1 Cap by mouth nightly. Indications: Restless Legs Syndrome LANTUS 100 unit/mL injection Generic drug:  insulin glargine INJECT 90 UNITS SUBCUTANEOUSLY EVERY EVENING AT BEDTIME  
  
 metoprolol succinate 50 mg XL tablet Commonly known as:  TOPROL-XL  
take 1 tablet by mouth once daily  
  
 montelukast 10 mg tablet Commonly known as:  SINGULAIR Take 1 Tab by mouth daily. Omega-3-DHA-EPA-Fish Oil 1,000 mg (120 mg-180 mg) Cap Commonly known as:  FISH OIL Take 1,000 mg by mouth two (2) times a day. potassium chloride 10 mEq tablet Commonly known as:  KLOR-CON Take 1 Tab by mouth daily. VITAMIN B-12 1,000 mcg sublingual tablet Generic drug:  cyanocobalamin Take 1,000 mcg by mouth daily. * Notice: This list has 2 medication(s) that are the same as other medications prescribed for you. Read the directions carefully, and ask your doctor or other care provider to review them with you. We Performed the Following AMB POC EKG ROUTINE W/ 12 LEADS, INTER & REP [59538 CPT(R)] Follow-up Instructions Return in about 1 month (around 6/17/2017). Patient Instructions Shortness of Breath: Care Instructions Your Care Instructions Shortness of breath has many causes.  Sometimes conditions such as anxiety can lead to shortness of breath. Some people get mild shortness of breath when they exercise. Trouble breathing also can be a symptom of a serious problem, such as asthma, lung disease, emphysema, heart problems, and pneumonia. If your shortness of breath continues, you may need tests and treatment. Watch for any changes in your breathing and other symptoms. Follow-up care is a key part of your treatment and safety. Be sure to make and go to all appointments, and call your doctor if you are having problems. Its also a good idea to know your test results and keep a list of the medicines you take. How can you care for yourself at home? · Do not smoke or allow others to smoke around you. If you need help quitting, talk to your doctor about stop-smoking programs and medicines. These can increase your chances of quitting for good. · Get plenty of rest and sleep. · Take your medicines exactly as prescribed. Call your doctor if you think you are having a problem with your medicine. · Find healthy ways to deal with stress. ¨ Exercise daily. ¨ Get plenty of sleep. ¨ Eat regularly and well. When should you call for help? Call 911 anytime you think you may need emergency care. For example, call if: 
· You have severe shortness of breath. · You have symptoms of a heart attack. These may include: ¨ Chest pain or pressure, or a strange feeling in the chest. 
¨ Sweating. ¨ Shortness of breath. ¨ Nausea or vomiting. ¨ Pain, pressure, or a strange feeling in the back, neck, jaw, or upper belly or in one or both shoulders or arms. ¨ Lightheadedness or sudden weakness. ¨ A fast or irregular heartbeat. After you call 911, the  may tell you to chew 1 adult-strength or 2 to 4 low-dose aspirin. Wait for an ambulance. Do not try to drive yourself. Call your doctor now or seek immediate medical care if: 
· Your shortness of breath gets worse or you start to wheeze.  Wheezing is a high-pitched sound when you breathe. · You wake up at night out of breath or have to prop your head up on several pillows to breathe. · You are short of breath after only light activity or while at rest. 
Watch closely for changes in your health, and be sure to contact your doctor if: 
· You do not get better over the next 1 to 2 days. Where can you learn more? Go to http://nadya-irasema.info/. Enter S780 in the search box to learn more about \"Shortness of Breath: Care Instructions. \" Current as of: May 23, 2016 Content Version: 11.2 © 7659-9477 [employer]. Care instructions adapted under license by [employer] (which disclaims liability or warranty for this information). If you have questions about a medical condition or this instruction, always ask your healthcare professional. [address] any warranty or liability for your use of this information. Introducing [facility] & HEALTH SERVICES! Dear Shilpa Brenner: Thank you for requesting a [employer] account. Our records indicate that you already have an active [employer] account. You can access your account anytime at [email or web address] Did you know that you can access your hospital and ER discharge instructions at any time in [employer]? You can also review all of your test results from your hospital stay or ER visit. Additional Information If you have questions, please visit the Frequently Asked Questions section of the [employer] website at [email or web address] Remember, [employer] is NOT to be used for urgent needs. For medical emergencies, dial 911. Now available from your iPhone and Android! Please provide this summary of care documentation to your next provider. Your primary care clinician is listed as Angelito Perkins. If you have any questions after today's visit, please call [phone].

## 2017-05-17 NOTE — PROGRESS NOTES
HISTORY OF PRESENT ILLNESS  Marielena Leonard is a 65 y.o. female. New Patient   The history is provided by the patient. This is a chronic problem. The current episode started more than 1 week ago. The problem occurs every several days. The problem has not changed since onset. Associated symptoms include shortness of breath. Pertinent negatives include no chest pain, no abdominal pain and no headaches. Shortness of Breath   The history is provided by the patient. This is a chronic problem. The problem occurs intermittently. The current episode started more than 1 week ago. The problem has been gradually worsening. Associated symptoms include cough, wheezing and leg swelling. Pertinent negatives include no fever, no headaches, no ear pain, no neck pain, no sputum production, no hemoptysis, no PND, no orthopnea, no chest pain, no syncope, no vomiting, no abdominal pain, no rash and no claudication. Associated medical issues include asthma. Associated medical issues do not include CAD or heart failure. Review of Systems   Constitutional: Negative for chills, diaphoresis, fever, malaise/fatigue and weight loss. HENT: Negative for ear discharge, ear pain, hearing loss, nosebleeds and tinnitus. Eyes: Negative for blurred vision. Respiratory: Positive for cough, shortness of breath and wheezing. Negative for hemoptysis, sputum production and stridor. Cardiovascular: Positive for leg swelling. Negative for chest pain, palpitations, orthopnea, claudication, syncope and PND. Gastrointestinal: Negative for abdominal pain, heartburn, nausea and vomiting. Musculoskeletal: Negative for myalgias and neck pain. Skin: Negative for itching and rash. Neurological: Negative for dizziness, tingling, tremors, focal weakness, loss of consciousness, weakness and headaches. Psychiatric/Behavioral: Negative for depression and suicidal ideas.      Family History   Problem Relation Age of Onset    Heart Disease Mother  Diabetes Mother     Stroke Mother     Diabetes Father     Heart Disease Father        Past Medical History:   Diagnosis Date    Arthritis     Lower back     Chronic back pain     Lower back pain    Depression     Diabetes ([address]     Diabetes mellitus ([address]     Panic attacks     Sleep apnea     Thyroid disease        Past Surgical History:   Procedure Laterality Date    HX HEART VALVE SURGERY  2013    aortic valve repair    HX HYSTERECTOMY      Partial Hysterectomy - removed ovary    HX MYOMECTOMY         Social History   Substance Use Topics    Smoking status: Never Smoker    Smokeless tobacco: Never Used    Alcohol use No       Allergies   Allergen Reactions    Other Food Other (comments)     Sweeteners- causes headaches    Metformin Other (comments)     Increase pain in feet and swelling in feet    Morphine Other (comments)     headache       Outpatient Prescriptions Marked as Taking for the 5/17/17 encounter (Office Visit) with Jonathan Harris MD   Medication Sig Dispense Refill    albuterol (PROVENTIL VENTOLIN) 2.5 mg /3 mL (0.083 %) nebulizer solution 3 mL by Nebulization route every four (4) hours as needed for Wheezing. 24 Each 5    montelukast (SINGULAIR) 10 mg tablet Take 1 Tab by mouth daily. 30 Tab 2    gabapentin (NEURONTIN) 300 mg capsule Take 1 Cap by mouth nightly. Indications: Restless Legs Syndrome 30 Cap 2    cyclobenzaprine (FLEXERIL) 10 mg tablet Take 1 Tab by mouth three (3) times daily as needed for Muscle Spasm(s). 90 Tab 2    DULoxetine (CYMBALTA) 60 mg capsule Take 1 Cap by mouth daily. 30 Cap 2    baclofen (LIORESAL) 10 mg tablet 1  qhs  prn 30 Tab 2    LANTUS 100 unit/mL injection INJECT 90 UNITS SUBCUTANEOUSLY EVERY EVENING AT BEDTIME 30 Vial 3    budesonide-formoterol (SYMBICORT) 160-4.5 mcg/actuation HFA inhaler Take 2 Puffs by inhalation two (2) times a day.  1 Inhaler 1    Omega-3-DHA-EPA-Fish Oil (FISH OIL) 1,000 mg (120 mg-180 mg) cap Take 1,000 mg by mouth two (2) times a day. 60 Cap 2    bacitracin (BACITRACIN) 500 unit/gram oint apply affected area twice a day 28 g 0    furosemide (LASIX) 40 mg tablet Take 2 x day for 7 days then daily 60 Tab 0    albuterol (PROVENTIL HFA, VENTOLIN HFA, PROAIR HFA) 90 mcg/actuation inhaler Take 2 Puffs by inhalation every four (4) hours as needed for Wheezing. 1 Inhaler 3    potassium chloride (K-DUR, KLOR-CON) 10 mEq tablet Take 1 Tab by mouth daily. 30 Tab 1    metoprolol succinate (TOPROL-XL) 50 mg XL tablet take 1 tablet by mouth once daily 30 Tab 3    BD INSULIN SYRINGE ULTRA-FINE 1 mL 31 gauge x 5/16 syrg use as directed twice a day 200 Syringe 3    cyanocobalamin (VITAMIN B-12) 1,000 mcg sublingual tablet Take 1,000 mcg by mouth daily.  FREESTYLE LITE STRIPS strip TEST twice a day as directed 100 Strip 11    atorvastatin (LIPITOR) 80 mg tablet take 1 tablet by mouth once daily 30 Tab 11    Aspirin, Buffered 81 mg tab Take  by mouth. Visit Vitals    /60    Pulse 90    Ht 5' 7\" (1.702 m)    Wt 120.7 kg (266 lb)    BMI 41.66 kg/m2       Physical Exam   Constitutional: She is oriented to person, place, and time. She appears well-developed and well-nourished. No distress. obese   HENT:   Head: Atraumatic. Mouth/Throat: No oropharyngeal exudate. Eyes: Conjunctivae are normal. Right eye exhibits no discharge. Left eye exhibits no discharge. No scleral icterus. Neck: Normal range of motion. Neck supple. No tracheal deviation present. No thyromegaly present. Cardiovascular: Normal rate and regular rhythm. Exam reveals no gallop. No murmur heard. Pulmonary/Chest: Effort normal and breath sounds normal. No stridor. No respiratory distress. She has no wheezes. She has no rales. She exhibits no tenderness. Abdominal: Soft. There is no tenderness. There is no rebound and no guarding. Musculoskeletal: Normal range of motion. She exhibits edema (trace edema).  She exhibits no tenderness. Lymphadenopathy:     She has no cervical adenopathy. Neurological: She is alert and oriented to person, place, and time. She exhibits normal muscle tone. Skin: Skin is warm. She is not diaphoretic. Psychiatric: She has a normal mood and affect. Her behavior is normal.     ekg sinus rhythm with no acute st-t changes    Echo 04/2017:  SUMMARY:  Procedure information: Image quality was suboptimal.    Left ventricle: Size was at the upper limits of normal. Systolic function  was at the lower limits of normal by visual assessment. Ejection fraction  was estimated to be 50 %. Suboptimal endocardial visualization limits wall  motion analysis. Wall thickness was mildly increased. Aortic valve: A bioprosthesis was present. No obvious abnormalities. Valve  peak gradient was 13 mmHg. Valve mean gradient was 7 mmHg. Estimated  aortic valve area (by Vmax) was 1.9 cm-sq. ASSESSMENT and PLAN    ICD-10-CM ICD-9-CM    1. SOB (shortness of breath) R06.02 786.05 AMB POC EKG ROUTINE W/ 12 LEADS, INTER & REP   2. Obstructive sleep apnea syndrome G47.33 327.23    3. H/O aortic valve replacement Z95.2 V43.3    4. History of bicuspid aortic valve Z87.74 V13.65    5. Type 2 diabetes mellitus without complication, unspecified long term insulin use status E11.9 250.00    6. Hypothyroidism due to acquired atrophy of thyroid E03.4 244.8      246.8    7. Essential hypertension I10 401.9    8. Dyslipidemia E78.5 272.4      Orders Placed This Encounter    AMB POC EKG ROUTINE W/ 12 LEADS, INTER & REP     Order Specific Question:   Reason for Exam:     Answer:   new patient     Follow-up Disposition:  Return in about 1 month (around 6/17/2017). current treatment plan is effective, no change in therapy  reviewed diet, exercise and weight control  cardiovascular risk and specific lipid/LDL goals reviewed  use of aspirin to prevent MI and TIA's discussed.     Patient with bicuspid aortic valve- s/p valve replacement in 2013 now seen for follow up. Has mild stable dyspnea. Use lasix prn. Continue lopressor for now -- will consider changing after her follow up with pulmonary. If she is diagnosed with asthma and has bronchospasm from BB then will change to CCB.

## 2017-05-17 NOTE — PATIENT INSTRUCTIONS

## 2017-05-17 NOTE — PROGRESS NOTES
1. Have you been to the ER, urgent care clinic since your last visit? Hospitalized since your last visit?    no    2. Have you seen or consulted any other health care providers outside of the [address] [place] [address] since your last visit? Include any pap smears or colon screening. Yes,pcp  3. Since your last visit, have you had any of the following symptoms? Sob, dizziness, swelling in legs         4. Have you had any blood work, X-rays or cardiac testing? Yes, pcp      5. Where do you normally have your labs drawn?   pcp    6. Do you need any refills today?    no

## 2017-05-18 RX ORDER — FUROSEMIDE 40 MG/1
TABLET ORAL
Qty: 60 TAB | Refills: 0 | Status: SHIPPED | OUTPATIENT
Start: 2017-05-18 | End: 2018-02-28

## 2017-05-18 RX ORDER — BUDESONIDE AND FORMOTEROL FUMARATE DIHYDRATE 160; 4.5 UG/1; UG/1
AEROSOL RESPIRATORY (INHALATION)
Qty: 10.2 INHALER | Refills: 1 | Status: SHIPPED | OUTPATIENT
Start: 2017-05-18 | End: 2017-07-27 | Stop reason: SINTOL

## 2017-05-18 RX ORDER — POTASSIUM CHLORIDE 750 MG/1
TABLET, EXTENDED RELEASE ORAL
Qty: 30 TAB | Refills: 1 | Status: SHIPPED | OUTPATIENT
Start: 2017-05-18 | End: 2018-02-28

## 2017-05-24 NOTE — PROGRESS NOTES
Spoke with patient at this time. Informed patient that there was no abnormalities showed at this time.

## 2017-05-30 ENCOUNTER — HOSPITAL ENCOUNTER (OUTPATIENT)
Dept: ULTRASOUND IMAGING | Age: 57
Discharge: HOME | End: 2017-05-30
Attending: FAMILY MEDICINE
Payer: MEDICAID

## 2017-05-30 DIAGNOSIS — E04.1 THYROID NODULE: ICD-10-CM

## 2017-05-30 PROCEDURE — 76536 US EXAM OF HEAD AND NECK: CPT

## 2017-06-01 DIAGNOSIS — E04.2 MULTIPLE THYROID NODULES: Primary | ICD-10-CM

## 2017-06-01 NOTE — PROGRESS NOTES
Please let patient know her thyroid us shows nodules and I will refer to ENT for further evaluation.   Scooby Fernandes MD

## 2017-06-06 NOTE — PROGRESS NOTES
Informed patient her thyroid US shows nodules. Dr. Jessie Joy will refer her to ENT for further evaluation. Patient verbalized understanding.

## 2017-06-09 ENCOUNTER — OFFICE VISIT (OUTPATIENT)
Dept: FAMILY MEDICINE CLINIC | Age: 57
End: 2017-06-09

## 2017-06-09 VITALS
DIASTOLIC BLOOD PRESSURE: 71 MMHG | OXYGEN SATURATION: 97 % | SYSTOLIC BLOOD PRESSURE: 104 MMHG | BODY MASS INDEX: 41.65 KG/M2 | TEMPERATURE: 98.7 F | HEIGHT: 67 IN | HEART RATE: 77 BPM | WEIGHT: 265.4 LBS | RESPIRATION RATE: 18 BRPM

## 2017-06-09 DIAGNOSIS — G47.00 INSOMNIA, UNSPECIFIED TYPE: Primary | ICD-10-CM

## 2017-06-09 DIAGNOSIS — E07.9 THYROID DISORDER: ICD-10-CM

## 2017-06-09 DIAGNOSIS — R41.3 MEMORY DIFFICULTIES: ICD-10-CM

## 2017-06-09 DIAGNOSIS — R06.2 WHEEZING: ICD-10-CM

## 2017-06-09 DIAGNOSIS — G47.33 OBSTRUCTIVE SLEEP APNEA SYNDROME: ICD-10-CM

## 2017-06-09 DIAGNOSIS — I10 ESSENTIAL HYPERTENSION: ICD-10-CM

## 2017-06-09 NOTE — MR AVS SNAPSHOT
Visit Information Date & Time Provider Department Dept. Phone Encounter #  
 6/9/2017  3:15 PM Angelito Hand MD [facility] [phone] [id number] Follow-up Instructions Return if symptoms worsen or fail to improve. Your Appointments 6/19/2017  2:00 PM  
Follow Up with Dacia Kate MD  
[facility] Appt Note: Return in about 3 months (around 6/23/2017). ..per GS. ..mom  
 [address] [facility] [address]  
[phone] [address] [address]  
  
    
 6/28/2017 12:30 PM  
Follow Up with Cam Tubbs MD  
[facility] ([facility]) Appt Note: 1 month follow up  
 [address] [facility] [address]  
  
   
 [address] [address] [address] [address]  
  
    
 7/5/2017  1:00 PM  
Follow Up with MD Uriel Brooks ([facility]) Appt Note: 3 month return [address] [id number] [address]  
  
   
 [address] [facility] Upcoming Health Maintenance Date Due  
 BREAST CANCER SCRN MAMMOGRAM 4/13/2011 FOBT Q 1 YEAR AGE 50-75 4/13/2011 EYE EXAM RETINAL OR DILATED Q1 12/11/2016 HEMOGLOBIN A1C Q6M 7/11/2017 INFLUENZA AGE 9 TO ADULT 8/1/2017 FOOT EXAM Q1 12/14/2017 MICROALBUMIN Q1 1/11/2018 LIPID PANEL Q1 3/1/2018 PAP AKA CERVICAL CYTOLOGY 11/13/2018 DTaP/Tdap/Td series (2 - Td) 11/11/2026 Allergies as of 6/9/2017  Review Complete On: 5/17/2017 By: Elvin Quinteros LPN Severity Noted Reaction Type Reactions Other Food  03/17/2016    Other (comments) Sweeteners- causes headaches Metformin  11/06/2015    Other (comments) Increase pain in feet and swelling in feet Morphine  01/25/2017    Other (comments)  
 headache Current Immunizations  Never Reviewed No immunizations on file. Not reviewed this visit You Were Diagnosed With   
  
 Codes Comments Insomnia, unspecified type    -  Primary ICD-10-CM: G47.00 ICD-9-CM: 780.52 Memory difficulties     ICD-10-CM: R41.3 ICD-9-CM: 780.93 Vitals BP Pulse Temp Resp Height(growth percentile) Weight(growth percentile) 104/71 (BP 1 Location: Left arm, BP Patient Position: Sitting) 77 98.7 °F (37.1 °C) (Oral) 18 5' 7\" (1.702 m) 265 lb 6.4 oz (120.4 kg) SpO2 BMI OB Status Smoking Status 97% 41.57 kg/m2 Hysterectomy Never Smoker BMI and BSA Data Body Mass Index Body Surface Area 41.57 kg/m 2 2.39 m 2 Preferred Pharmacy Pharmacy Name Phone [address] [facility], [address] [address] Your Updated Medication List  
  
   
This list is accurate as of: 6/9/17  3:55 PM.  Always use your most recent med list.  
  
  
  
  
 * albuterol 90 mcg/actuation inhaler Commonly known as:  PROVENTIL HFA, VENTOLIN HFA, PROAIR HFA Take 2 Puffs by inhalation every four (4) hours as needed for Wheezing. * albuterol 2.5 mg /3 mL (0.083 %) nebulizer solution Commonly known as:  PROVENTIL VENTOLIN  
3 mL by Nebulization route every four (4) hours as needed for Wheezing. aspirin, buffered 81 mg Tab Take  by mouth. atorvastatin 80 mg tablet Commonly known as:  LIPITOR  
take 1 tablet by mouth once daily  
  
 bacitracin 500 unit/gram Oint Commonly known as:  BACITRACIN  
apply affected area twice a day  
  
 baclofen 10 mg tablet Commonly known as:  LIORESAL  
1  qhs  prn  
  
 BD INSULIN SYRINGE ULTRA-FINE 1 mL 31 gauge x 5/16 Syrg Generic drug:  Insulin Syringe-Needle U-100  
use as directed twice a day  
  
 cyclobenzaprine 10 mg tablet Commonly known as:  FLEXERIL Take 1 Tab by mouth three (3) times daily as needed for Muscle Spasm(s). DULoxetine 60 mg capsule Commonly known as:  CYMBALTA Take 1 Cap by mouth daily. FREESTYLE LITE STRIPS strip Generic drug:  glucose blood VI test strips TEST twice a day as directed  
  
 furosemide 40 mg tablet Commonly known as:  LASIX  
take 1 tablet by mouth twice a day for 7 days  
  
 gabapentin 300 mg capsule Commonly known as:  NEURONTIN Take 1 Cap by mouth nightly. Indications: Restless Legs Syndrome LANTUS 100 unit/mL injection Generic drug:  insulin glargine INJECT 90 UNITS SUBCUTANEOUSLY EVERY EVENING AT BEDTIME  
  
 metoprolol succinate 50 mg XL tablet Commonly known as:  TOPROL-XL  
take 1 tablet by mouth once daily Henderson-3-DHA-EPA-Fish Oil 1,000 mg (120 mg-180 mg) Cap Commonly known as:  FISH OIL Take 1,000 mg by mouth two (2) times a day. potassium chloride 10 mEq tablet Commonly known as:  KLOR-CON  
take 1 tablet by mouth once daily SYMBICORT 160-4.5 mcg/actuation HFA inhaler Generic drug:  budesonide-formoterol  
inhale 2 puffs by mouth twice a day VITAMIN B-12 1,000 mcg sublingual tablet Generic drug:  cyanocobalamin Take 1,000 mcg by mouth daily. * Notice: This list has 2 medication(s) that are the same as other medications prescribed for you. Read the directions carefully, and ask your doctor or other care provider to review them with you. Follow-up Instructions Return if symptoms worsen or fail to improve. Patient Instructions Insomnia: Care Instructions Your Care Instructions Insomnia is the inability to sleep well. It is a common problem for most people at some time. Insomnia may make it hard for you to get to sleep, stay asleep, or sleep as long as you need to. This can make you tired and grouchy during the day. It can also make you forgetful, less effective at work, and unhappy. Insomnia can be caused by conditions such as depression or anxiety. Pain can also affect your ability to sleep.  When these problems are solved, the insomnia usually clears up. But sometimes bad sleep habits can cause insomnia. If insomnia is affecting your work or your enjoyment of life, you can take steps to improve your sleep. Follow-up care is a key part of your treatment and safety. Be sure to make and go to all appointments, and call your doctor if you are having problems. It's also a good idea to know your test results and keep a list of the medicines you take. How can you care for yourself at home? What to avoid · Do not have drinks with caffeine, such as coffee or black tea, for 8 hours before bed. · Do not smoke or use other types of tobacco near bedtime. Nicotine is a stimulant and can keep you awake. · Avoid drinking alcohol late in the evening, because it can cause you to wake in the middle of the night. · Do not eat a big meal close to bedtime. If you are hungry, eat a light snack. · Do not drink a lot of water close to bedtime, because the need to urinate may wake you up during the night. · Do not read or watch TV in bed. Use the bed only for sleeping and sexual activity. What to try · Go to bed at the same time every night, and wake up at the same time every morning. Do not take naps during the day. · Keep your bedroom quiet, dark, and cool. · Sleep on a comfortable pillow and mattress. · If watching the clock makes you anxious, turn it facing away from you so you cannot see the time. · If you worry when you lie down, start a worry book. Well before bedtime, write down your worries, and then set the book and your concerns aside. · Try meditation or other relaxation techniques before you go to bed. · If you cannot fall asleep, get up and go to another room until you feel sleepy. Do something relaxing. Repeat your bedtime routine before you go to bed again. · Make your house quiet and calm about an hour before bedtime.  Turn down the lights, turn off the TV, log off the computer, and turn down the volume on music. This can help you relax after a busy day. When should you call for help? Watch closely for changes in your health, and be sure to contact your doctor if: 
· Your efforts to improve your sleep do not work. · Your insomnia gets worse. · You have been feeling down, depressed, or hopeless or have lost interest in things that you usually enjoy. Where can you learn more? Go to http://nadya-irasema.info/. Enter P513 in the search box to learn more about \"Insomnia: Care Instructions. \" Current as of: July 26, 2016 Content Version: 11.2 © 9456-8014 [employer]. Care instructions adapted under license by "[employer]" (which disclaims liability or warranty for this information). If you have questions about a medical condition or this instruction, always ask your healthcare professional. [address] any warranty or liability for your use of this information. Introducing [facility]! Dear Ismael García: Thank you for requesting a [employer] account. Our records indicate that you already have an active [employer] account. You can access your account anytime at [email or web address]. [employer] Did you know that you can access your hospital and ER discharge instructions at any time in [employer]? You can also review all of your test results from your hospital stay or ER visit. Additional Information If you have questions, please visit the Frequently Asked Questions section of the [employer] website at [email or web address] Remember, [employer] is NOT to be used for urgent needs. For medical emergencies, dial 911. Now available from your iPhone and Android! Please provide this summary of care documentation to your next provider. Your primary care clinician is listed as Angelito Perkins. If you have any questions after today's visit, please call [phone].

## 2017-06-09 NOTE — PATIENT INSTRUCTIONS
Insomnia: Care Instructions  Your Care Instructions  Insomnia is the inability to sleep well. It is a common problem for most people at some time. Insomnia may make it hard for you to get to sleep, stay asleep, or sleep as long as you need to. This can make you tired and grouchy during the day. It can also make you forgetful, less effective at work, and unhappy. Insomnia can be caused by conditions such as depression or anxiety. Pain can also affect your ability to sleep. When these problems are solved, the insomnia usually clears up. But sometimes bad sleep habits can cause insomnia. If insomnia is affecting your work or your enjoyment of life, you can take steps to improve your sleep. Follow-up care is a key part of your treatment and safety. Be sure to make and go to all appointments, and call your doctor if you are having problems. It's also a good idea to know your test results and keep a list of the medicines you take. How can you care for yourself at home? What to avoid  · Do not have drinks with caffeine, such as coffee or black tea, for 8 hours before bed. · Do not smoke or use other types of tobacco near bedtime. Nicotine is a stimulant and can keep you awake. · Avoid drinking alcohol late in the evening, because it can cause you to wake in the middle of the night. · Do not eat a big meal close to bedtime. If you are hungry, eat a light snack. · Do not drink a lot of water close to bedtime, because the need to urinate may wake you up during the night. · Do not read or watch TV in bed. Use the bed only for sleeping and sexual activity. What to try  · Go to bed at the same time every night, and wake up at the same time every morning. Do not take naps during the day. · Keep your bedroom quiet, dark, and cool. · Sleep on a comfortable pillow and mattress. · If watching the clock makes you anxious, turn it facing away from you so you cannot see the time.   · If you worry when you lie down, start a worry book. Well before bedtime, write down your worries, and then set the book and your concerns aside. · Try meditation or other relaxation techniques before you go to bed. · If you cannot fall asleep, get up and go to another room until you feel sleepy. Do something relaxing. Repeat your bedtime routine before you go to bed again. · Make your house quiet and calm about an hour before bedtime. Turn down the lights, turn off the TV, log off the computer, and turn down the volume on music. This can help you relax after a busy day. When should you call for help? Watch closely for changes in your health, and be sure to contact your doctor if:  · Your efforts to improve your sleep do not work. · Your insomnia gets worse. · You have been feeling down, depressed, or hopeless or have lost interest in things that you usually enjoy. Where can you learn more? Go to http://nadya-irasema.info/. Enter P513 in the search box to learn more about \"Insomnia: Care Instructions. \"  Current as of: July 26, 2016  Content Version: 11.2  © 7578-8694 [employer]. Care instructions adapted under license by [employer] (which disclaims liability or warranty for this information). If you have questions about a medical condition or this instruction, always ask your healthcare professional. [address] any warranty or liability for your use of this information.

## 2017-06-09 NOTE — PROGRESS NOTES
Chief Complaint   Patient presents with    Sleep Problem     trouble falling alseep    Memory Loss     forgetting routine activities      1. Have you been to the ER, urgent care clinic since your last visit? Hospitalized since your last visit? No    2. Have you seen or consulted any other health care providers outside of the [address] [place] [address] since your last visit? Include any pap smears or colon screening. No    HPI  Xiomara Bonilla comes in for f/u care. 1) Memory issues: Patient comes in with complaint of being forgetful. She has been having problems with her memory and the been more forgetful lately. She cannot remember where she has placed things and at times cannot even remember her hospital appointments. This has been ongoing for the past few weeks. She relates this to having started the using Singulair. I did look up some of the reported effects of Singulair and memory loss has been reported in some instances. Patient believes this is what is going on. I discussed about memory loss. May need to have a CT scan of the head done and be seen by the neurologist.  Patient would like to hold off on this for now. She would prefer to discontinue the Singulair and then follow-up in about 2-3 weeks. 2) Insomnia: Patient has insomnia. She relates this also to the Singulair. She does have CPAP machine which she uses for sleep apnea. States despite this has had an alteration in her sleep pattern where she is awake most of the night. Finds herself being more sleepy during the day. At night then she has to stay awake. States this is not usual for her. We discussed ways of trying to alter this. She believes this is due to the Singulair. She will try and not to sleep during the day to see whether or not at night she will get more sleep but will follow up with me at next visit. 3) DM2: Patient's diabetes has been uncontrolled in the past.  She is on insulin.   I have referred her to the endocrinologist but has not been seen for a long time. She is noncompliant with her treatment plan in as far as diabetes is concerned. She is on insulin but I am not sure she is taking this. I did want to do her labs today but she has declined. Would like to have them done at next visit. I did explain that the uncontrolled blood sugar can also cause alterations in her physical well-being and the mentation. 4) Respiratory: Patient has history of long standing wheezing. She is on inhaler medication. She is followed up with the pulmonary clinic. She does not want to take the Singulair as she feels this is causing insomnia and memory loss. She does admit that the singular did make her symptoms improve. For now she will continue with her inhalers until she sees the pulmonologist.  She had an alpha antitrypsin test done. I did look up the results. She has a genotype MM. She should discuss this further with the pulmonologist.  5) Thyroid disorder: Patient has a history of goiter. She also has thyroid nodules. She has been referred to the ENT doctor. She has an appointment to be seen next week. I will follow-up with those results. We last checked her thyroid labs in December and these were normal.  Would benefit from a recheck of this given she is also having memory loss and insomnia. Will recheck next time she comes in.       Past Medical History  Past Medical History:   Diagnosis Date    Arthritis     Lower back     Chronic back pain     Lower back pain    Depression     Diabetes ([address]     Diabetes mellitus ([address]     Panic attacks     Sleep apnea     Thyroid disease        Surgical History  Past Surgical History:   Procedure Laterality Date    HX HEART VALVE SURGERY  2013    aortic valve repair    HX HYSTERECTOMY      Partial Hysterectomy - removed ovary    HX MYOMECTOMY          Medications  Current Outpatient Prescriptions   Medication Sig Dispense Refill    potassium chloride (KLOR-CON) 10 mEq tablet take 1 tablet by mouth once daily 30 Tab 1    SYMBICORT 160-4.5 mcg/actuation HFA inhaler inhale 2 puffs by mouth twice a day 10.2 Inhaler 1    furosemide (LASIX) 40 mg tablet take 1 tablet by mouth twice a day for 7 days 60 Tab 0    albuterol (PROVENTIL VENTOLIN) 2.5 mg /3 mL (0.083 %) nebulizer solution 3 mL by Nebulization route every four (4) hours as needed for Wheezing. 24 Each 5    gabapentin (NEURONTIN) 300 mg capsule Take 1 Cap by mouth nightly. Indications: Restless Legs Syndrome 30 Cap 2    cyclobenzaprine (FLEXERIL) 10 mg tablet Take 1 Tab by mouth three (3) times daily as needed for Muscle Spasm(s). 90 Tab 2    DULoxetine (CYMBALTA) 60 mg capsule Take 1 Cap by mouth daily. 30 Cap 2    baclofen (LIORESAL) 10 mg tablet 1  qhs  prn 30 Tab 2    LANTUS 100 unit/mL injection INJECT 90 UNITS SUBCUTANEOUSLY EVERY EVENING AT BEDTIME 30 Vial 3    Omega-3-DHA-EPA-Fish Oil (FISH OIL) 1,000 mg (120 mg-180 mg) cap Take 1,000 mg by mouth two (2) times a day. 60 Cap 2    bacitracin (BACITRACIN) 500 unit/gram oint apply affected area twice a day 28 g 0    albuterol (PROVENTIL HFA, VENTOLIN HFA, PROAIR HFA) 90 mcg/actuation inhaler Take 2 Puffs by inhalation every four (4) hours as needed for Wheezing. 1 Inhaler 3    metoprolol succinate (TOPROL-XL) 50 mg XL tablet take 1 tablet by mouth once daily 30 Tab 3    BD INSULIN SYRINGE ULTRA-FINE 1 mL 31 gauge x 5/16 syrg use as directed twice a day 200 Syringe 3    cyanocobalamin (VITAMIN B-12) 1,000 mcg sublingual tablet Take 1,000 mcg by mouth daily.  FREESTYLE LITE STRIPS strip TEST twice a day as directed 100 Strip 11    atorvastatin (LIPITOR) 80 mg tablet take 1 tablet by mouth once daily 30 Tab 11    Aspirin, Buffered 81 mg tab Take  by mouth.          Allergies  Allergies   Allergen Reactions    Other Food Other (comments)     Sweeteners- causes headaches    Metformin Other (comments)     Increase pain in feet and swelling in feet    Morphine Other (comments)     headache       Family History  Family History   Problem Relation Age of Onset    Heart Disease Mother     Diabetes Mother     Stroke Mother     Diabetes Father     Heart Disease Father        Social History  Social History     Social History    Marital status:      Spouse name: N/A    Number of children: N/A    Years of education: N/A     Occupational History    Not on file. Social History Main Topics    Smoking status: Never Smoker    Smokeless tobacco: Never Used    Alcohol use No    Drug use: No    Sexual activity: Not Currently     Other Topics Concern     Service No    Blood Transfusions No    Caffeine Concern No    Occupational Exposure No    Hobby Hazards No    Sleep Concern Yes     Sleep apnea     Stress Concern Yes     Due to family medical issues.      Weight Concern No    Special Diet No    Back Care Yes     Patient tries to do back care with streches     Exercise No    Bike Helmet Yes    Seat Belt Yes    Self-Exams Yes     Social History Narrative       Review of Systems  Review of Systems - History obtained from chart review and the patient  General ROS: positive for  - fatigue, malaise and sleep disturbance  negative for - chills or fever  Psychological ROS: positive for - anxiety, behavioral disorder, memory difficulties, mood swings and sleep disturbances  Ophthalmic ROS: positive for - decreased vision  ENT ROS: positive for - nasal congestion  Allergy and Immunology ROS: positive for - nasal congestion  Hematological and Lymphatic ROS: negative  Endocrine ROS: DM2  Respiratory ROS: positive for - cough, shortness of breath and wheezing  negative for - hemoptysis or sputum changes  Cardiovascular ROS: positive for - dyspnea on exertion and shortness of breath  Gastrointestinal ROS: no abdominal pain, change in bowel habits, or black or bloody stools  Genito-Urinary ROS: no dysuria, trouble voiding, or hematuria  Musculoskeletal ROS: positive for - joint pain, joint stiffness and muscle pain  Neurological ROS: positive for - memory loss    Vital Signs  Visit Vitals    /71 (BP 1 Location: Left arm, BP Patient Position: Sitting)    Pulse 77    Temp 98.7 °F (37.1 °C) (Oral)    Resp 18    Ht 5' 7\" (1.702 m)    Wt 265 lb 6.4 oz (120.4 kg)    SpO2 97%    BMI 41.57 kg/m2         Physical Exam  Physical Examination: General appearance - oriented to person, place, and time, overweight, well hydrated and anxious  Mental status - alert, oriented to person, place, and time, affect appropriate to mood  Mouth - mucous membranes moist, pharynx normal without lesions  Neck - supple, no significant adenopathy  Chest - no tachypnea, retractions or cyanosis, wheezing noted bilateral lung fields, decreased air entry noted bibasilar zones  Heart - S1 and S2 normal, systolic murmur 2/6 at lower left sternal border  Back exam - limited range of motion, pain with motion noted during exam  Neurological - alert, oriented, normal speech, no focal findings or movement disorder noted  Musculoskeletal - osteoarthritic changes noted in both hands  Extremities - intact peripheral pulses    Diagnostics  No orders of the defined types were placed in this encounter. Results  Results for orders placed or performed during the hospital encounter of 05/05/17   POC CREATININE   Result Value Ref Range    Creatinine (POC) 0.6 0.6 - 1.3 MG/DL    GFR-AA (POC) >60 >60 ml/min/1.73m2    GFR, non-AA (POC) >60 >60 ml/min/1.73m2       ASSESSMENT and PLAN    ICD-10-CM ICD-9-CM    1. Insomnia, unspecified type G47.00 780.52    2. Memory difficulties R41.3 780.93    3. Uncontrolled type 2 diabetes mellitus with other specified complication (HCC) S14.20 250.82     E11.65     4. Essential hypertension I10 401.9    5. Wheezing R06.2 786.07    6. Obstructive sleep apnea syndrome G47.33 327.23    7.  Thyroid disorder E07.9 246.9      reviewed medications and side effects in detail  specific diabetic recommendations: low cholesterol diet, weight control and daily exercise discussed, home glucose monitoring emphasized, all medications, side effects and compliance discussed carefully, glycohemoglobin and other lab monitoring discussed and long term diabetic complications discussed      I have discussed the diagnosis with the patient and the intended plan of care as seen in the above orders. The patient has received an after-visit summary and questions were answered concerning future plans. I have discussed medication, side effects, and warnings with the patient in detail. The patient verbalized understanding and is in agreement with the plan of care. The patient will contact the office with any additional concerns.     Indira Aragon MD

## 2017-06-20 RX ORDER — METOPROLOL SUCCINATE 50 MG/1
TABLET, EXTENDED RELEASE ORAL
Qty: 30 TAB | Refills: 3 | Status: SHIPPED | OUTPATIENT
Start: 2017-06-20 | End: 2017-10-18 | Stop reason: SDUPTHER

## 2017-06-26 RX ORDER — GLUCOSAM/CHONDRO/HERB 149/HYAL 750-100 MG
TABLET ORAL
Qty: 60 CAP | Refills: 2 | Status: SHIPPED | OUTPATIENT
Start: 2017-06-26 | End: 2018-01-04 | Stop reason: SDUPTHER

## 2017-07-20 RX ORDER — INSULIN GLARGINE 100 [IU]/ML
INJECTION, SOLUTION SUBCUTANEOUS
Qty: 30 VIAL | Refills: 3 | Status: SHIPPED | OUTPATIENT
Start: 2017-07-20 | End: 2017-11-17 | Stop reason: SDUPTHER

## 2017-07-27 ENCOUNTER — OFFICE VISIT (OUTPATIENT)
Dept: PULMONOLOGY | Age: 57
End: 2017-07-27

## 2017-07-27 VITALS
HEIGHT: 67 IN | DIASTOLIC BLOOD PRESSURE: 70 MMHG | BODY MASS INDEX: 41.28 KG/M2 | OXYGEN SATURATION: 97 % | RESPIRATION RATE: 20 BRPM | TEMPERATURE: 98.8 F | WEIGHT: 263 LBS | SYSTOLIC BLOOD PRESSURE: 120 MMHG | HEART RATE: 89 BPM

## 2017-07-27 DIAGNOSIS — G47.33 OSA ON CPAP: ICD-10-CM

## 2017-07-27 DIAGNOSIS — M54.9 CHRONIC BACK PAIN, UNSPECIFIED BACK LOCATION, UNSPECIFIED BACK PAIN LATERALITY: ICD-10-CM

## 2017-07-27 DIAGNOSIS — J45.30 MILD PERSISTENT ASTHMA WITHOUT COMPLICATION: Primary | ICD-10-CM

## 2017-07-27 DIAGNOSIS — G89.29 CHRONIC BACK PAIN, UNSPECIFIED BACK LOCATION, UNSPECIFIED BACK PAIN LATERALITY: ICD-10-CM

## 2017-07-27 DIAGNOSIS — E66.01 MORBID OBESITY WITH BMI OF 40.0-44.9, ADULT (HCC): ICD-10-CM

## 2017-07-27 DIAGNOSIS — T50.905A MEDICATION SIDE EFFECT, INITIAL ENCOUNTER: ICD-10-CM

## 2017-07-27 DIAGNOSIS — Z99.89 OSA ON CPAP: ICD-10-CM

## 2017-07-27 DIAGNOSIS — Z95.2 H/O AORTIC VALVE REPLACEMENT: ICD-10-CM

## 2017-07-27 RX ORDER — FLUTICASONE FUROATE AND VILANTEROL 100; 25 UG/1; UG/1
1 POWDER RESPIRATORY (INHALATION) DAILY
Qty: 1 INHALER | Refills: 5 | Status: SHIPPED | OUTPATIENT
Start: 2017-07-27 | End: 2017-09-18

## 2017-07-27 RX ORDER — FLUTICASONE FUROATE AND VILANTEROL 200; 25 UG/1; UG/1
1 POWDER RESPIRATORY (INHALATION) DAILY
Qty: 1 INHALER | Refills: 0 | Status: SHIPPED | COMMUNITY
Start: 2017-07-27 | End: 2017-09-18 | Stop reason: SDUPTHER

## 2017-07-27 NOTE — MR AVS SNAPSHOT
Visit Information Date & Time Provider Department Dept. Phone Encounter #  
 7/27/2017  2:30 PM [facility] [id number] Follow-up Instructions Return in about 8 months (around 3/27/2018). Your Appointments 8/16/2017 11:30 AM  
Follow Up with Brittany Manuel MD  
[facility] ([address]) Appt Note: 1 month follow up; UTR/aj; 1 month follow up  
 [address] [facility] [address]  
  
   
 [address] [facility] [address] Upcoming Health Maintenance Date Due  
 BREAST CANCER SCRN MAMMOGRAM 4/13/2011 FOBT Q 1 YEAR AGE 50-75 4/13/2011 EYE EXAM RETINAL OR DILATED Q1 12/11/2016 HEMOGLOBIN A1C Q6M 7/11/2017 INFLUENZA AGE 9 TO ADULT 8/1/2017 FOOT EXAM Q1 12/14/2017 MICROALBUMIN Q1 1/11/2018 LIPID PANEL Q1 3/1/2018 PAP AKA CERVICAL CYTOLOGY 11/13/2018 DTaP/Tdap/Td series (2 - Td) 11/11/2026 Allergies as of 7/27/2017  Review Complete On: 7/27/2017 By: Neelima Lopez MD  
  
 Severity Noted Reaction Type Reactions Other Food  03/17/2016    Other (comments) Sweeteners- causes headaches Metformin  11/06/2015    Other (comments) Increase pain in feet and swelling in feet Morphine  01/25/2017    Other (comments)  
 headache Singulair [Montelukast]  07/27/2017    Other (comments)  
 hallucinations Current Immunizations  Never Reviewed No immunizations on file. Not reviewed this visit You Were Diagnosed With   
  
 Codes Comments Mild persistent asthma without complication    -  Primary ICD-10-CM: J45.30 ICD-9-CM: 493.90 Medication side effect, initial encounter     ICD-10-CM: T88. 7XXA ICD-9-CM: E947.9 Morbid obesity with BMI of 40.0-44.9, adult (HCC)     ICD-10-CM: E66.01, Z68.41 
ICD-9-CM: 278.01, V85.41   
 OVIDIO on CPAP     ICD-10-CM: G47.33, Z99.89 ICD-9-CM: 327.23, V46.8 H/O aortic valve replacement     ICD-10-CM: Z95.2 ICD-9-CM: V43.3 Chronic back pain, unspecified back location, unspecified back pain laterality     ICD-10-CM: M54.9, G89.29 ICD-9-CM: 724.5, 338.29 Vitals BP Pulse Temp Resp Height(growth percentile) Weight(growth percentile) 120/70 (BP 1 Location: Left arm, BP Patient Position: Sitting) 89 98.8 °F (37.1 °C) (Oral) 20 5' 7\" (1.702 m) 263 lb (119.3 kg) SpO2 BMI OB Status Smoking Status 97% 41.19 kg/m2 Hysterectomy Never Smoker BMI and BSA Data Body Mass Index Body Surface Area  
 41.19 kg/m 2 2.37 m 2 Preferred Pharmacy Pharmacy Name Phone [phone] [facility], [address] [address] Your Updated Medication List  
  
   
This list is accurate as of: 7/27/17  3:05 PM.  Always use your most recent med list.  
  
  
  
  
 * albuterol 90 mcg/actuation inhaler Commonly known as:  PROVENTIL HFA, VENTOLIN HFA, PROAIR HFA Take 2 Puffs by inhalation every four (4) hours as needed for Wheezing. * albuterol 2.5 mg /3 mL (0.083 %) nebulizer solution Commonly known as:  PROVENTIL VENTOLIN  
3 mL by Nebulization route every four (4) hours as needed for Wheezing. aspirin, buffered 81 mg Tab Take  by mouth. atorvastatin 80 mg tablet Commonly known as:  LIPITOR  
take 1 tablet by mouth once daily  
  
 bacitracin 500 unit/gram Oint Commonly known as:  BACITRACIN  
apply affected area twice a day  
  
 baclofen 10 mg tablet Commonly known as:  LIORESAL  
1  qhs  prn  
  
 BD INSULIN SYRINGE ULTRA-FINE 1 mL 31 gauge x 5/16 Syrg Generic drug:  Insulin Syringe-Needle U-100  
use as directed twice a day  
  
 cyclobenzaprine 10 mg tablet Commonly known as:  FLEXERIL Take 1 Tab by mouth three (3) times daily as needed for Muscle Spasm(s). DULoxetine 60 mg capsule Commonly known as:  CYMBALTA Take 1 Cap by mouth daily. * fluticasone-vilanterol 200-25 mcg/dose inhaler Commonly known as:  BREO ELLIPTA Take 1 Puff by inhalation daily. * fluticasone-vilanterol 100-25 mcg/dose inhaler Commonly known as:  BREO ELLIPTA Take 1 Puff by inhalation daily. FREESTYLE LITE STRIPS strip Generic drug:  glucose blood VI test strips TEST twice a day as directed  
  
 furosemide 40 mg tablet Commonly known as:  LASIX  
take 1 tablet by mouth twice a day for 7 days  
  
 gabapentin 300 mg capsule Commonly known as:  NEURONTIN Take 1 Cap by mouth nightly. Indications: Restless Legs Syndrome LANTUS 100 unit/mL injection Generic drug:  insulin glargine  
inject 90 units subcutaneously every evening at bedtime  
  
 metoprolol succinate 50 mg XL tablet Commonly known as:  TOPROL-XL  
take 1 tablet by mouth once daily [place]-3-DHA-EPA-Fish Oil 1,000 mg (120 mg-180 mg) Cap  
take 1 capsule by mouth twice a day  
  
 potassium chloride 10 mEq tablet Commonly known as:  KLOR-CON  
take 1 tablet by mouth once daily VITAMIN B-12 1,000 mcg sublingual tablet Generic drug:  cyanocobalamin Take 1,000 mcg by mouth daily. * Notice: This list has 4 medication(s) that are the same as other medications prescribed for you. Read the directions carefully, and ask your doctor or other care provider to review them with you. Prescriptions Sent to Pharmacy Refills  
 fluticasone-vilanterol (BREO ELLIPTA) 100-25 mcg/dose inhaler 5 Sig: Take 1 Puff by inhalation daily. Class: Normal  
 Pharmacy: [id number] [facility], [address] [facility] Ph #: [phone] Route: Inhalation Follow-up Instructions Return in about 8 months (around 3/27/2018). Introducing \[facility]\"" & HEALTH SERVICES! Dear Dinora Crowder: Thank you for requesting a [employer] account. Our records indicate that you already have an active [employer] account.   You can access your account anytime at [email or web address] Did you know that you can access your hospital and ER discharge instructions at any time in [employer]? You can also review all of your test results from your hospital stay or ER visit. Additional Information If you have questions, please visit the Frequently Asked Questions section of the [employer] website at [email or web address] Remember, [employer] is NOT to be used for urgent needs. For medical emergencies, dial 911. Now available from your iPhone and Android! Please provide this summary of care documentation to your next provider. Your primary care clinician is listed as Angelito Perkins. If you have any questions after today's visit, please call [phone].

## 2017-07-27 NOTE — PROGRESS NOTES
Chief Complaint   Patient presents with    Follow-up     wheezing     Patient here for follow up for wheezing    Pt had ECHO 5/12/17 at [facility].

## 2017-07-27 NOTE — PROGRESS NOTES
HISTORY OF PRESENT ILLNESS  Annie Baer is a 65 y.o. female. HPI Comments: Follow up for episodic SOB due to bronchospasm. Pt notes initial response to Symbicort with much less shortness of breath and wheezing. However, since being on Singulair pt has been experiencing severe hallucinations and had to stop taking this. No other new respiratory symptoms noted. Pt has OVIDIO and uses her CPAP nightly. She is being followed by an outside sleep MD.      Review of Systems   Constitutional: Positive for malaise/fatigue. Negative for chills, diaphoresis, fever and weight loss. HENT: Negative for congestion, ear discharge, ear pain, hearing loss, nosebleeds, sore throat and tinnitus. Eyes: Negative for blurred vision, double vision, photophobia and pain. Respiratory: Positive for shortness of breath and wheezing. Negative for cough, hemoptysis, sputum production and stridor. Cardiovascular: Negative for chest pain, palpitations, orthopnea, claudication, leg swelling and PND. Gastrointestinal: Negative for abdominal pain, blood in stool, constipation, diarrhea, heartburn, melena, nausea and vomiting. Genitourinary: Negative for dysuria, flank pain, frequency, hematuria and urgency. Musculoskeletal: Positive for back pain and joint pain. Negative for falls and myalgias. Skin: Negative for itching and rash. Neurological: Negative for dizziness, tingling, tremors, sensory change, speech change, focal weakness, seizures, loss of consciousness, weakness and headaches. Endo/Heme/Allergies: Negative for environmental allergies and polydipsia. Does not bruise/bleed easily. Psychiatric/Behavioral: Positive for depression. Negative for memory loss, substance abuse and suicidal ideas. Hallucinations: resolved. The patient is not nervous/anxious and does not have insomnia.       Past Medical History:   Diagnosis Date    Arthritis     Lower back     Chronic back pain     Lower back pain    Depression     Diabetes ([facility] [address] [facility]     Diabetes mellitus ([facility]     Panic attacks     Sleep apnea     Thyroid disease      Past Surgical History:   Procedure Laterality Date    HX HEART VALVE SURGERY  2013    aortic valve repair    HX HYSTERECTOMY      Partial Hysterectomy - removed ovary    HX MYOMECTOMY       Current Outpatient Prescriptions on File Prior to Visit   Medication Sig Dispense Refill    LANTUS 100 unit/mL injection inject 90 units subcutaneously every evening at bedtime 30 Vial 3    Omega-3-DHA-EPA-Fish Oil 1,000 mg (120 mg-180 mg) cap take 1 capsule by mouth twice a day 60 Cap 2    metoprolol succinate (TOPROL-XL) 50 mg XL tablet take 1 tablet by mouth once daily 30 Tab 3    potassium chloride (KLOR-CON) 10 mEq tablet take 1 tablet by mouth once daily 30 Tab 1    furosemide (LASIX) 40 mg tablet take 1 tablet by mouth twice a day for 7 days 60 Tab 0    albuterol (PROVENTIL VENTOLIN) 2.5 mg /3 mL (0.083 %) nebulizer solution 3 mL by Nebulization route every four (4) hours as needed for Wheezing. 24 Each 5    gabapentin (NEURONTIN) 300 mg capsule Take 1 Cap by mouth nightly. Indications: Restless Legs Syndrome 30 Cap 2    cyclobenzaprine (FLEXERIL) 10 mg tablet Take 1 Tab by mouth three (3) times daily as needed for Muscle Spasm(s). 90 Tab 2    DULoxetine (CYMBALTA) 60 mg capsule Take 1 Cap by mouth daily. 30 Cap 2    baclofen (LIORESAL) 10 mg tablet 1  qhs  prn 30 Tab 2    bacitracin (BACITRACIN) 500 unit/gram oint apply affected area twice a day 28 g 0    albuterol (PROVENTIL HFA, VENTOLIN HFA, PROAIR HFA) 90 mcg/actuation inhaler Take 2 Puffs by inhalation every four (4) hours as needed for Wheezing. 1 Inhaler 3    BD INSULIN SYRINGE ULTRA-FINE 1 mL 31 gauge x 5/16 syrg use as directed twice a day 200 Syringe 3    cyanocobalamin (VITAMIN B-12) 1,000 mcg sublingual tablet Take 1,000 mcg by mouth daily.       FREESTYLE LITE STRIPS strip TEST twice a day as directed 100 Strip 11    atorvastatin (LIPITOR) 80 mg tablet take 1 tablet by mouth once daily 30 Tab 11    Aspirin, Buffered 81 mg tab Take  by mouth. No current facility-administered medications on file prior to visit. Allergies   Allergen Reactions    Other Food Other (comments)     Sweeteners- causes headaches    Metformin Other (comments)     Increase pain in feet and swelling in feet    Morphine Other (comments)     headache    Singulair [Montelukast] Other (comments)     hallucinations     Social History     Social History    Marital status:      Spouse name: N/A    Number of children: N/A    Years of education: N/A     Occupational History    Not on file. Social History Main Topics    Smoking status: Never Smoker    Smokeless tobacco: Never Used    Alcohol use No    Drug use: No    Sexual activity: Not Currently     Other Topics Concern     Service No    Blood Transfusions No    Caffeine Concern No    Occupational Exposure No    Hobby Hazards No    Sleep Concern Yes     Sleep apnea     Stress Concern Yes     Due to family medical issues.  Weight Concern No    Special Diet No    Back Care Yes     Patient tries to do back care with streches     Exercise No    Bike Helmet Yes    Seat Belt Yes    Self-Exams Yes     Social History Narrative     Blood pressure 120/70, pulse 89, temperature 98.8 °F (37.1 °C), temperature source Oral, resp. rate 20, height 5' 7\" (1.702 m), weight 119.3 kg (263 lb), SpO2 97 %. Physical Exam   Constitutional: She is oriented to person, place, and time. She appears well-developed. No distress. Obese    HENT:   Head: Normocephalic and atraumatic. Nose: Nose normal.   Mouth/Throat: No oropharyngeal exudate. Eyes: Conjunctivae and EOM are normal. Pupils are equal, round, and reactive to light. Right eye exhibits no discharge. Left eye exhibits no discharge. No scleral icterus. Neck: No JVD present. No tracheal deviation present. No thyromegaly present. Cardiovascular: Normal rate, regular rhythm, normal heart sounds and intact distal pulses. Exam reveals no gallop. No murmur heard. Pulmonary/Chest: Effort normal and breath sounds normal. No stridor. No respiratory distress. She has no wheezes. She has no rales. She exhibits no tenderness. Abdominal: Soft. She exhibits no mass. There is no tenderness. Musculoskeletal: She exhibits no tenderness. Edema: trace. Lymphadenopathy:     She has no cervical adenopathy. Neurological: She is alert and oriented to person, place, and time. Skin: Skin is warm and dry. No rash noted. She is not diaphoretic. No erythema. Psychiatric: She has a normal mood and affect. Her behavior is normal. Judgment and thought content normal.       ASSESSMENT and PLAN  Encounter Diagnoses   Name Primary?  Mild persistent asthma without complication Yes    Medication side effect, initial encounter     Morbid obesity with BMI of 40.0-44.9, adult (HCC)     OVIDIO on CPAP     H/O aortic valve replacement     Chronic back pain, unspecified back location, unspecified back pain laterality      Pt with partial response to LABA/ICS, will try Breo and monitor response. Would consider addition of LAMA if needed. Will d/c Singulair from [facility] Counseled on weight loss and healthy lifestyle, reva as this impacts Asthma and OVIDIO. RTC 8 months.   Flu vaccine per PCP

## 2017-07-28 ENCOUNTER — OFFICE VISIT (OUTPATIENT)
Dept: FAMILY MEDICINE CLINIC | Age: 57
End: 2017-07-28

## 2017-07-28 VITALS
DIASTOLIC BLOOD PRESSURE: 72 MMHG | SYSTOLIC BLOOD PRESSURE: 137 MMHG | HEIGHT: 67 IN | RESPIRATION RATE: 18 BRPM | OXYGEN SATURATION: 96 % | TEMPERATURE: 98.2 F | BODY MASS INDEX: 41.15 KG/M2 | HEART RATE: 77 BPM | WEIGHT: 262.2 LBS

## 2017-07-28 DIAGNOSIS — R06.02 SOB (SHORTNESS OF BREATH): ICD-10-CM

## 2017-07-28 DIAGNOSIS — F39 MOOD DISORDER (HCC): ICD-10-CM

## 2017-07-28 DIAGNOSIS — Z79.4 UNCONTROLLED TYPE 2 DIABETES MELLITUS WITH HYPERGLYCEMIA, WITH LONG-TERM CURRENT USE OF INSULIN (HCC): ICD-10-CM

## 2017-07-28 DIAGNOSIS — J45.31 ASTHMATIC BRONCHITIS, MILD PERSISTENT, WITH ACUTE EXACERBATION: ICD-10-CM

## 2017-07-28 DIAGNOSIS — R21 SKIN RASH: Primary | ICD-10-CM

## 2017-07-28 DIAGNOSIS — E11.65 UNCONTROLLED TYPE 2 DIABETES MELLITUS WITH HYPERGLYCEMIA, WITH LONG-TERM CURRENT USE OF INSULIN (HCC): ICD-10-CM

## 2017-07-28 DIAGNOSIS — Z12.31 ENCOUNTER FOR SCREENING MAMMOGRAM FOR BREAST CANCER: ICD-10-CM

## 2017-07-28 RX ORDER — HYDROXYZINE 25 MG/1
25 TABLET, FILM COATED ORAL
Qty: 30 TAB | Refills: 0 | Status: SHIPPED | OUTPATIENT
Start: 2017-07-28 | End: 2018-07-25 | Stop reason: SDUPTHER

## 2017-07-28 RX ORDER — SULFAMETHOXAZOLE AND TRIMETHOPRIM 800; 160 MG/1; MG/1
1 TABLET ORAL 2 TIMES DAILY
Qty: 14 TAB | Refills: 0 | Status: SHIPPED | OUTPATIENT
Start: 2017-07-28 | End: 2017-08-04

## 2017-07-28 RX ORDER — TRIAMCINOLONE ACETONIDE 0.25 MG/G
CREAM TOPICAL 2 TIMES DAILY
Qty: 80 G | Refills: 0 | Status: SHIPPED | OUTPATIENT
Start: 2017-07-28 | End: 2017-09-18

## 2017-07-28 RX ORDER — ALBUTEROL SULFATE 90 UG/1
AEROSOL, METERED RESPIRATORY (INHALATION)
Qty: 8.5 INHALER | Refills: 3 | Status: SHIPPED | OUTPATIENT
Start: 2017-07-28 | End: 2019-02-08 | Stop reason: SDUPTHER

## 2017-07-28 NOTE — PROGRESS NOTES
Chief Complaint   Patient presents with    Skin Problem     left arm      1. Have you been to the ER, urgent care clinic since your last visit? Hospitalized since your last visit? No    2. Have you seen or consulted any other health care providers outside of the [address] [place] [address] since your last visit? Include any pap smears or colon screening. No     HPI  Bhavana Pedroza comes in for follow-up care. 1) Rash: Patient has rash on her upper and lower extremities. Rash is itchy. It is small patchy areas that she has each to the point of loss of overlying skin. She has spots that healing and others drying out all over her upper and lower extremities. Rash has been noted for some weeks now. These started when the weather became hot and humid. Yesterday she did apply triamcinolone and this made things better. The itching is worse at night. This seems like an urticarial rash. Has had some improvement with triamcinolone. I will prescribe some hydroxyzine for the itching and the refill her triamcinolone cream.  She does not think that she has bedbugs still had to proceed this could be the cause of the rash. We discussed about bedbugs and wants to do especially with clothing and  beddings. 2) DM2: Patient has a history of type 2 diabetes mellitus. She has been noncompliant with treatment plan. I have referred her to the endocrinologist in the past but she has not gone. States that she is not interested in seeing the endocrinologist at the moment since she has wheezing and respiratory problems and would like these addressed first.  She does see a pulmonologist for this. We did want to check her HbA1c today but the unable to do so due to machine antra. We will recheck at next visit. I did advise that she needs to check her blood glucose at home which she is not doing. Did emphasize that she needs to see the endocrinologist.  She may benefit from an insulin pump given previous elevated HbA1c at 11.3. We will refer her for diabetes eye exam.  3) Mood disorder: Patient has been having a hard time at home with her daughter. The daughter does make a lot of comments about patient's weight and the lifestyle. Tends to commence at the patient needs to be more active. The patient is frustrated about this. I did discuss ways of coping with the situation. She is on Cymbalta and will continue with the medication. 4) HM: Patient has had a colonoscopy done in 2015. This was at [facility]. Recheck advised after 10 years. We will get the records faxed to us. She does have  chart access to her [facility] records and the this did show she was recommended to get  next colonoscopy in 10 years. Will refer patient for mammogram.  Last was done in 2014 and this was normal.  5) Respiratory: Patient is followed up by the pulmonologist.  She is on [place]. This she was to use instead of her Symbicort but feels that the [facility] elliptical does not help as much as the Symbicort. She would like to get back on her Symbicort. I did advise her to discuss with the pulmonologist about this. She continues with the rest of her medications. She wonders about a different medication from Singulair. States that this does help her but the would like to try something different that might help better.       Past Medical History  Past Medical History:   Diagnosis Date    Arthritis     Lower back     Chronic back pain     Lower back pain    Depression     Diabetes ([address]     Diabetes mellitus ([address]     Panic attacks     Sleep apnea     Thyroid disease        Surgical History  Past Surgical History:   Procedure Laterality Date    HX HEART VALVE SURGERY  2013    aortic valve repair    HX HYSTERECTOMY      Partial Hysterectomy - removed ovary    HX MYOMECTOMY          Medications  Current Outpatient Prescriptions   Medication Sig Dispense Refill    PROAIR HFA 90 mcg/actuation inhaler inhale 2 puffs by mouth every 4 hours if needed for wheezing 8.5 Inhaler 3    fluticasone-vilanterol (BREO ELLIPTA) 200-25 mcg/dose inhaler Take 1 Puff by inhalation daily. 1 Inhaler 0    fluticasone-vilanterol (BREO ELLIPTA) 100-25 mcg/dose inhaler Take 1 Puff by inhalation daily. 1 Inhaler 5    LANTUS 100 unit/mL injection inject 90 units subcutaneously every evening at bedtime 30 Vial 3    Omega-3-DHA-EPA-Fish Oil 1,000 mg (120 mg-180 mg) cap take 1 capsule by mouth twice a day 60 Cap 2    metoprolol succinate (TOPROL-XL) 50 mg XL tablet take 1 tablet by mouth once daily 30 Tab 3    potassium chloride (KLOR-CON) 10 mEq tablet take 1 tablet by mouth once daily 30 Tab 1    furosemide (LASIX) 40 mg tablet take 1 tablet by mouth twice a day for 7 days 60 Tab 0    albuterol (PROVENTIL VENTOLIN) 2.5 mg /3 mL (0.083 %) nebulizer solution 3 mL by Nebulization route every four (4) hours as needed for Wheezing. 24 Each 5    gabapentin (NEURONTIN) 300 mg capsule Take 1 Cap by mouth nightly. Indications: Restless Legs Syndrome 30 Cap 2    cyclobenzaprine (FLEXERIL) 10 mg tablet Take 1 Tab by mouth three (3) times daily as needed for Muscle Spasm(s). 90 Tab 2    DULoxetine (CYMBALTA) 60 mg capsule Take 1 Cap by mouth daily. 30 Cap 2    baclofen (LIORESAL) 10 mg tablet 1  qhs  prn 30 Tab 2    bacitracin (BACITRACIN) 500 unit/gram oint apply affected area twice a day 28 g 0    BD INSULIN SYRINGE ULTRA-FINE 1 mL 31 gauge x 5/16 syrg use as directed twice a day 200 Syringe 3    cyanocobalamin (VITAMIN B-12) 1,000 mcg sublingual tablet Take 1,000 mcg by mouth daily.  FREESTYLE LITE STRIPS strip TEST twice a day as directed 100 Strip 11    atorvastatin (LIPITOR) 80 mg tablet take 1 tablet by mouth once daily 30 Tab 11    Aspirin, Buffered 81 mg tab Take  by mouth.          Allergies  Allergies   Allergen Reactions    Other Food Other (comments)     Sweeteners- causes headaches    Metformin Other (comments)     Increase pain in feet and swelling in feet    Morphine Other (comments)     headache    Singulair [Montelukast] Other (comments)     hallucinations       Family History  Family History   Problem Relation Age of Onset    Heart Disease Mother     Diabetes Mother     Stroke Mother     Diabetes Father     Heart Disease Father        Social History  Social History     Social History    Marital status:      Spouse name: N/A    Number of children: N/A    Years of education: N/A     Occupational History    Not on file. Social History Main Topics    Smoking status: Never Smoker    Smokeless tobacco: Never Used    Alcohol use No    Drug use: No    Sexual activity: Not Currently     Other Topics Concern     Service No    Blood Transfusions No    Caffeine Concern No    Occupational Exposure No    Hobby Hazards No    Sleep Concern Yes     Sleep apnea     Stress Concern Yes     Due to family medical issues.  Weight Concern No    Special Diet No    Back Care Yes     Patient tries to do back care with streches     Exercise No    Bike Helmet Yes    Seat Belt Yes    Self-Exams Yes     Social History Narrative       Review of Systems  Review of Systems - History obtained from chart review and the patient  General ROS: positive for  - fatigue and malaise  Psychological ROS: positive for - anxiety, behavioral disorder, concentration difficulties and depression  Ophthalmic ROS: positive for - uses glasses  ENT ROS: negative  Allergy and Immunology ROS: positive for - nasal congestion and seasonal allergies  Hematological and Lymphatic ROS: negative  Endocrine ROS: Uncontrolled diabetes mellitus.   Breast ROS: negative for breast lumps  Respiratory ROS: positive for - cough, shortness of breath and wheezing  negative for - hemoptysis or pleuritic pain  Cardiovascular ROS: negative  Gastrointestinal ROS: no abdominal pain, change in bowel habits, or black or bloody stools  Genito-Urinary ROS: no dysuria, trouble voiding, or hematuria  Musculoskeletal ROS: positive for - joint pain and muscle pain  Neurological ROS: no TIA or stroke symptoms  Dermatological ROS: positive for - pruritus and rash    Vital Signs  Visit Vitals    /72 (BP 1 Location: Left arm, BP Patient Position: Sitting)    Pulse 77    Temp 98.2 °F (36.8 °C) (Oral)    Resp 18    Ht 5' 7\" (1.702 m)    Wt 262 lb 3.2 oz (118.9 kg)    SpO2 96%    BMI 41.07 kg/m2         Physical Exam  Physical Examination: General appearance - oriented to person, place, and time, acyanotic, in no respiratory distress and chronically ill appearing  Mental status - alert, oriented to person, place, and time, affect appropriate to mood  Mouth - mucous membranes moist, pharynx normal without lesions  Neck - supple, no significant adenopathy  Lymphatics - no palpable lymphadenopathy  Chest - no tachypnea, retractions or cyanosis  Heart - S1 and S2 normal  Neurological - alert, oriented, normal speech, no focal findings or movement disorder noted  Extremities - no pedal edema noted, intact peripheral pulses  Skin -patient has generalized rash over upper extremities and lower extremities. These are small macular and papular lesions with skin exfoliation and there is some slight darkening. Seems more like an urticarial rash. Diagnostics  No orders of the defined types were placed in this encounter. Results  Results for orders placed or performed during the hospital encounter of 05/05/17   POC CREATININE   Result Value Ref Range    Creatinine, POC 0.6 0.6 - 1.3 MG/DL    GFRAA, POC >60 >60 ml/min/1.73m2    GFRNA, POC >60 >60 ml/min/1.73m2     ASSESSMENT and PLAN    ICD-10-CM ICD-9-CM    1. Skin rash R21 782.1 hydrOXYzine HCl (ATARAX) 25 mg tablet      triamcinolone acetonide (KENALOG) 0.025 % topical cream   2.  Uncontrolled type 2 diabetes mellitus with hyperglycemia, with long-term current use of insulin (HCC) E11.65 250.02 AMB POC HEMOGLOBIN A1C    Z79.4 V58.67 REFERRAL TO OPHTHALMOLOGY   3. Encounter for screening mammogram for breast cancer Z12.31 V76.12 CORNELIO MAMMO BI SCREENING INCL CAD   4. Mood disorder (HCC) F39 296.90    5. SOB (shortness of breath) R06.02 786.05      reviewed diet, exercise and weight control  reviewed medications and side effects in detail    I have discussed the diagnosis with the patient and the intended plan of care as seen in the above orders. The patient has received an after-visit summary and questions were answered concerning future plans. I have discussed medication, side effects, and warnings with the patient in detail. The patient verbalized understanding and is in agreement with the plan of care. The patient will contact the office with any additional concerns.     Armani Iqbal MD

## 2017-07-28 NOTE — PATIENT INSTRUCTIONS
Rash: Care Instructions  Your Care Instructions  A rash is any irritation or inflammation of the skin. Rashes have many possible causes, including allergy, infection, illness, heat, and emotional stress. Follow-up care is a key part of your treatment and safety. Be sure to make and go to all appointments, and call your doctor if you are having problems. Its also a good idea to know your test results and keep a list of the medicines you take. How can you care for yourself at home? · Wash the area with water only. Soap can make dryness and itching worse. Pat dry. · Put cold, wet cloths on the rash to reduce itching. · Keep cool, and stay out of the sun. · Leave the rash open to the air as much of the time as possible. · Sometimes petroleum jelly (Vaseline) can help relieve the discomfort caused by a rash. A moisturizing lotion, such as Cetaphil, also may help. Calamine lotion may help for rashes caused by contact with something (such as a plant or soap) that irritated the skin. Use it 3 or 4 times a day. · If your doctor prescribed a cream, use it as directed. If your doctor prescribed medicine, take it exactly as directed. · If your rash itches so badly that it interferes with your normal activities, take an over-the-counter antihistamine, such as diphenhydramine (Benadryl) or loratadine (Claritin). Read and follow all instructions on the label. When should you call for help? Call your doctor now or seek immediate medical care if:  · You have signs of infection, such as:  ¨ Increased pain, swelling, warmth, or redness. ¨ Red streaks leading from the area. ¨ Pus draining from the area. ¨ A fever. · You have joint pain along with the rash. Watch closely for changes in your health, and be sure to contact your doctor if:  · Your rash is changing or getting worse. For example, call if you have pain along with the rash, the rash is spreading, or you have new blisters.   · You do not get better after 1 week.  Where can you learn more? Go to http://nadya-irasema.info/. Enter Y834 in the search box to learn more about \"Rash: Care Instructions. \"  Current as of: October 13, 2016  Content Version: 11.3  © 7480-8436 [employer] [place]. Care instructions adapted under license by [employer] (which disclaims liability or warranty for this information). If you have questions about a medical condition or this instruction, always ask your healthcare professional. [address] any warranty or liability for your use of this information.

## 2017-07-28 NOTE — MR AVS SNAPSHOT
Visit Information Date & Time Provider Department Dept. Phone Encounter #  
 7/28/2017 12:00 PM Angelito Tompkins MD [address] [place]av[place] [phone] [id number] Follow-up Instructions Return in about 2 months (around 9/28/2017), or if symptoms worsen or fail to improve, for dm2, rash. Your Appointments 8/16/2017 11:30 AM  
Follow Up with Fuad Clemons MD  
[facility] [place] [facility] Appt Note: 1 month follow up; UTR/aj; 1 month follow up  
 [address] [place] [address] [facility] [address]  
  
   
 [address] [place] [address] Upcoming Health Maintenance Date Due  
 BREAST CANCER SCRN MAMMOGRAM 4/13/2011 FOBT Q 1 YEAR AGE 50-75 4/13/2011 EYE EXAM RETINAL OR DILATED Q1 12/11/2016 HEMOGLOBIN A1C Q6M 7/11/2017 INFLUENZA AGE 9 TO ADULT 8/1/2017 FOOT EXAM Q1 12/14/2017 MICROALBUMIN Q1 1/11/2018 LIPID PANEL Q1 3/1/2018 PAP AKA CERVICAL CYTOLOGY 11/13/2018 DTaP/Tdap/Td series (2 - Td) 11/11/2026 Allergies as of 7/28/2017  Review Complete On: 7/28/2017 By: Eugene Oliver MD  
  
 Severity Noted Reaction Type Reactions Other Food  03/17/2016    Other (comments) Sweeteners- causes headaches Metformin  11/06/2015    Other (comments) Increase pain in feet and swelling in feet Morphine  01/25/2017    Other (comments)  
 headache Singulair [Montelukast]  07/27/2017    Other (comments)  
 hallucinations Current Immunizations  Never Reviewed No immunizations on file. Not reviewed this visit You Were Diagnosed With   
  
 Codes Comments Skin rash    -  Primary ICD-10-CM: R21 
ICD-9-CM: 782.1 Uncontrolled type 2 diabetes mellitus with hyperglycemia, with long-term current use of insulin (HCC)     ICD-10-CM: E11.65, Z79.4 ICD-9-CM: 250.02, V58.67 Vitals BP Pulse Temp Resp Height(growth percentile) Weight(growth percentile) 137/72 (BP 1 Location: Left arm, BP Patient Position: Sitting) 77 98.2 °F (36.8 °C) (Oral) 18 5' 7\" (1.702 m) 262 lb 3.2 oz (118.9 kg) SpO2 BMI OB Status Smoking Status 96% 41.07 kg/m2 Hysterectomy Never Smoker BMI and BSA Data Body Mass Index Body Surface Area 41.07 kg/m 2 2.37 m 2 Preferred Pharmacy Pharmacy Name Phone [address] [facility], [address] Your Updated Medication List  
  
   
This list is accurate as of: 7/28/17 12:43 PM.  Always use your most recent med list.  
  
  
  
  
 * albuterol 2.5 mg /3 mL (0.083 %) nebulizer solution Commonly known as:  PROVENTIL VENTOLIN  
3 mL by Nebulization route every four (4) hours as needed for Wheezing. * PROAIR HFA 90 mcg/actuation inhaler Generic drug:  albuterol  
inhale 2 puffs by mouth every 4 hours if needed for wheezing  
  
 aspirin, buffered 81 mg Tab Take  by mouth. atorvastatin 80 mg tablet Commonly known as:  LIPITOR  
take 1 tablet by mouth once daily  
  
 bacitracin 500 unit/gram Oint Commonly known as:  BACITRACIN  
apply affected area twice a day  
  
 baclofen 10 mg tablet Commonly known as:  LIORESAL  
1  qhs  prn  
  
 BD INSULIN SYRINGE ULTRA-FINE 1 mL 31 gauge x 5/16 Syrg Generic drug:  Insulin Syringe-Needle U-100  
use as directed twice a day  
  
 cyclobenzaprine 10 mg tablet Commonly known as:  FLEXERIL Take 1 Tab by mouth three (3) times daily as needed for Muscle Spasm(s). DULoxetine 60 mg capsule Commonly known as:  CYMBALTA Take 1 Cap by mouth daily. * fluticasone-vilanterol 200-25 mcg/dose inhaler Commonly known as:  BREO ELLIPTA Take 1 Puff by inhalation daily. * fluticasone-vilanterol 100-25 mcg/dose inhaler Commonly known as:  BREO ELLIPTA Take 1 Puff by inhalation daily. FREESTYLE LITE STRIPS strip Generic drug:  glucose blood VI test strips TEST twice a day as directed furosemide 40 mg tablet Commonly known as:  LASIX  
take 1 tablet by mouth twice a day for 7 days  
  
 gabapentin 300 mg capsule Commonly known as:  NEURONTIN Take 1 Cap by mouth nightly. Indications: Restless Legs Syndrome  
  
 hydrOXYzine HCl 25 mg tablet Commonly known as:  ATARAX Take 1 Tab by mouth three (3) times daily as needed for Itching for up to 10 days. Indications: PRURITUS OF SKIN  
  
 LANTUS 100 unit/mL injection Generic drug:  insulin glargine  
inject 90 units subcutaneously every evening at bedtime  
  
 metoprolol succinate 50 mg XL tablet Commonly known as:  TOPROL-XL  
take 1 tablet by mouth once daily [place]-3-DHA-EPA-Fish Oil 1,000 mg (120 mg-180 mg) Cap  
take 1 capsule by mouth twice a day  
  
 potassium chloride 10 mEq tablet Commonly known as:  KLOR-CON  
take 1 tablet by mouth once daily  
  
 triamcinolone acetonide 0.025 % topical cream  
Commonly known as:  KENALOG Apply  to affected area two (2) times a day. use thin layer  
  
 trimethoprim-sulfamethoxazole 160-800 mg per tablet Commonly known as:  BACTRIM DS, SEPTRA DS Take 1 Tab by mouth two (2) times a day for 7 days. VITAMIN B-12 1,000 mcg sublingual tablet Generic drug:  cyanocobalamin Take 1,000 mcg by mouth daily. * Notice: This list has 4 medication(s) that are the same as other medications prescribed for you. Read the directions carefully, and ask your doctor or other care provider to review them with you. Prescriptions Sent to Pharmacy Refills  
 hydrOXYzine HCl (ATARAX) 25 mg tablet 0 Sig: Take 1 Tab by mouth three (3) times daily as needed for Itching for up to 10 days. Indications: PRURITUS OF SKIN Class: Normal  
 Pharmacy: [address] [place] [address], [address] [place] [address] Ph #: [phone] Route: Oral  
 triamcinolone acetonide (KENALOG) 0.025 % topical cream 0 Sig: Apply  to affected area two (2) times a day. use thin layer Class: Normal  
 Pharmacy: [address] [facility], [address] [facility] Ph #: [phone] Route: Topical  
 trimethoprim-sulfamethoxazole (BACTRIM DS, SEPTRA DS) 160-800 mg per tablet 0 Sig: Take 1 Tab by mouth two (2) times a day for 7 days. Class: Normal  
 Pharmacy: [address] [facility], [address] [facility] Ph #: [phone] Route: Oral  
  
We Performed the Following AMB POC HEMOGLOBIN A1C [70184 CPT(R)] Follow-up Instructions Return in about 2 months (around 9/28/2017), or if symptoms worsen or fail to improve, for dm2, rash. Patient Instructions Rash: Care Instructions Your Care Instructions A rash is any irritation or inflammation of the skin. Rashes have many possible causes, including allergy, infection, illness, heat, and emotional stress. Follow-up care is a key part of your treatment and safety. Be sure to make and go to all appointments, and call your doctor if you are having problems. Its also a good idea to know your test results and keep a list of the medicines you take. How can you care for yourself at home? · Wash the area with water only. Soap can make dryness and itching worse. Pat dry. · Put cold, wet cloths on the rash to reduce itching. · Keep cool, and stay out of the sun. · Leave the rash open to the air as much of the time as possible. · Sometimes petroleum jelly (Vaseline) can help relieve the discomfort caused by a rash. A moisturizing lotion, such as Cetaphil, also may help. Calamine lotion may help for rashes caused by contact with something (such as a plant or soap) that irritated the skin. Use it 3 or 4 times a day. · If your doctor prescribed a cream, use it as directed. If your doctor prescribed medicine, take it exactly as directed.  
· If your rash itches so badly that it interferes with your normal activities, take an over-the-counter antihistamine, such as diphenhydramine (Benadryl) or loratadine (Claritin). Read and follow all instructions on the label. When should you call for help? Call your doctor now or seek immediate medical care if: 
· You have signs of infection, such as: 
¨ Increased pain, swelling, warmth, or redness. ¨ Red streaks leading from the area. ¨ Pus draining from the area. ¨ A fever. · You have joint pain along with the rash. Watch closely for changes in your health, and be sure to contact your doctor if: 
· Your rash is changing or getting worse. For example, call if you have pain along with the rash, the rash is spreading, or you have new blisters. · You do not get better after 1 week. Where can you learn more? Go to http://nadya-irasema.info/. Enter J744 in the search box to learn more about \"Rash: Care Instructions. \" Current as of: October 13, 2016 Content Version: 11.3 © 3622-9943 [employer]. Care instructions adapted under license by [employer] (which disclaims liability or warranty for this information). If you have questions about a medical condition or this instruction, always ask your healthcare professional. [place] [address] any warranty or liability for your use of this information. Introducing hospitals & HEALTH SERVICES! Dear Sharri Montoya: Thank you for requesting a [employer] account. Our records indicate that you already have an active Sabakat account. You can access your account anytime at [email or web address] Did you know that you can access your hospital and ER discharge instructions at any time in Sabakat? You can also review all of your test results from your hospital stay or ER visit. Additional Information If you have questions, please visit the Frequently Asked Questions section of the [employer]at website at [email or web address] Remember, Sabakat is NOT to be used for urgent needs.  For medical emergencies, dial 911. Now available from your iPhone and Android! Please provide this summary of care documentation to your next provider. Your primary care clinician is listed as Angelito Perkins. If you have any questions after today's visit, please call [phone].

## 2017-08-02 DIAGNOSIS — G25.81 RESTLESS LEG SYNDROME: ICD-10-CM

## 2017-08-07 RX ORDER — GABAPENTIN 300 MG/1
CAPSULE ORAL
Qty: 30 CAP | Refills: 2 | Status: SHIPPED | OUTPATIENT
Start: 2017-08-07 | End: 2017-11-24 | Stop reason: SDUPTHER

## 2017-08-07 RX ORDER — DULOXETIN HYDROCHLORIDE 60 MG/1
CAPSULE, DELAYED RELEASE ORAL
Qty: 30 CAP | Refills: 2 | Status: SHIPPED | OUTPATIENT
Start: 2017-08-07 | End: 2017-11-24 | Stop reason: SDUPTHER

## 2017-08-28 ENCOUNTER — OFFICE VISIT (OUTPATIENT)
Dept: FAMILY MEDICINE CLINIC | Age: 57
End: 2017-08-28

## 2017-08-28 VITALS
SYSTOLIC BLOOD PRESSURE: 136 MMHG | DIASTOLIC BLOOD PRESSURE: 74 MMHG | HEART RATE: 68 BPM | OXYGEN SATURATION: 95 % | RESPIRATION RATE: 18 BRPM | HEIGHT: 67 IN | WEIGHT: 265.2 LBS | BODY MASS INDEX: 41.62 KG/M2 | TEMPERATURE: 98.5 F

## 2017-08-28 DIAGNOSIS — E03.4 HYPOTHYROIDISM DUE TO ACQUIRED ATROPHY OF THYROID: ICD-10-CM

## 2017-08-28 DIAGNOSIS — R06.02 SOB (SHORTNESS OF BREATH): ICD-10-CM

## 2017-08-28 DIAGNOSIS — E11.65 UNCONTROLLED TYPE 2 DIABETES MELLITUS WITH HYPERGLYCEMIA, WITH LONG-TERM CURRENT USE OF INSULIN (HCC): ICD-10-CM

## 2017-08-28 DIAGNOSIS — E55.9 HYPOVITAMINOSIS D: ICD-10-CM

## 2017-08-28 DIAGNOSIS — Z79.4 UNCONTROLLED TYPE 2 DIABETES MELLITUS WITH HYPERGLYCEMIA, WITH LONG-TERM CURRENT USE OF INSULIN (HCC): ICD-10-CM

## 2017-08-28 DIAGNOSIS — R53.83 MALAISE AND FATIGUE: Primary | ICD-10-CM

## 2017-08-28 DIAGNOSIS — Z12.31 ENCOUNTER FOR SCREENING MAMMOGRAM FOR BREAST CANCER: ICD-10-CM

## 2017-08-28 DIAGNOSIS — R53.81 MALAISE AND FATIGUE: Primary | ICD-10-CM

## 2017-08-28 LAB — HBA1C MFR BLD HPLC: 10.2 %

## 2017-08-28 NOTE — PROGRESS NOTES
Chief Complaint   Patient presents with    Shortness of Breath     1. Have you been to the ER, urgent care clinic since your last visit? Hospitalized since your last visit? No    2. Have you seen or consulted any other health care providers outside of the [address] [place] [address] [place] since your last visit? Include any pap smears or colon screening. No   hospitals  Joe Atkinson comes in for follow-up care. Malaise and fatigue: Patient has generalized malaise and fatigue. She gets extremely fatigued with minimal exertion. She can barely walk more than 6 feet without stopping and getting short of breath. This has been ongoing for a long time. She is concerned about this. We will run some labs. She does have a myriad of illnesses including asthma and diabetes mellitus. I suspect part of her malaise and fatigue is multifactorial.  We will review with the results. Diabetes mellitus type 2: Patient's diabetes is poorly controlled. She has been referred to the endocrinologist in the past but has not been compliant with her follow-up. She does not keep a blood glucose log. She is on insulin. We did do an A1c that is 10.2. This is still elevated though improved from previous. Suspect much of the improvement is due to have being unwell and not eating. I discussed my concerns about her poor diabetes control which could be  causing some of her symptoms. I do want her to follow-up with the endocrinologist.  Hypothyroidism: Patient does have thyroid disorder. We will check labs. This could also cause fatigue and malaise. Shortness of breath: Patient has been followed up by the pulmonologist and the noted to have  wheezing that is chronic. She has been put on inhaler medications. She was on Singulair in the past.  She did call the pulmonologist office at some time and stated that she felt she was not tolerating the Singulair. Currently states that the Singulair was helping. This medication was discontinued.   We will have her follow up with the pulmonologist.      Past Medical History  Past Medical History:   Diagnosis Date    Arthritis     Lower back     Chronic back pain     Lower back pain    Depression     Diabetes ([address]     Diabetes mellitus ([address]     Panic attacks     Sleep apnea     Thyroid disease        Surgical History  Past Surgical History:   Procedure Laterality Date    HX HEART VALVE SURGERY  2013    aortic valve repair    HX HYSTERECTOMY      Partial Hysterectomy - removed ovary    HX MYOMECTOMY          Medications  Current Outpatient Prescriptions   Medication Sig Dispense Refill    DULoxetine (CYMBALTA) 60 mg capsule take 1 capsule by mouth once daily 30 Cap 2    gabapentin (NEURONTIN) 300 mg capsule take 1 capsule by mouth at bedtime 30 Cap 2    PROAIR HFA 90 mcg/actuation inhaler inhale 2 puffs by mouth every 4 hours if needed for wheezing 8.5 Inhaler 3    triamcinolone acetonide (KENALOG) 0.025 % topical cream Apply  to affected area two (2) times a day. use thin layer 80 g 0    fluticasone-vilanterol (BREO ELLIPTA) 200-25 mcg/dose inhaler Take 1 Puff by inhalation daily. 1 Inhaler 0    fluticasone-vilanterol (BREO ELLIPTA) 100-25 mcg/dose inhaler Take 1 Puff by inhalation daily. 1 Inhaler 5    LANTUS 100 unit/mL injection inject 90 units subcutaneously every evening at bedtime 30 Vial 3    Omega-3-DHA-EPA-Fish Oil 1,000 mg (120 mg-180 mg) cap take 1 capsule by mouth twice a day 60 Cap 2    metoprolol succinate (TOPROL-XL) 50 mg XL tablet take 1 tablet by mouth once daily 30 Tab 3    potassium chloride (KLOR-CON) 10 mEq tablet take 1 tablet by mouth once daily 30 Tab 1    furosemide (LASIX) 40 mg tablet take 1 tablet by mouth twice a day for 7 days 60 Tab 0    albuterol (PROVENTIL VENTOLIN) 2.5 mg /3 mL (0.083 %) nebulizer solution 3 mL by Nebulization route every four (4) hours as needed for Wheezing.  24 Each 5    cyclobenzaprine (FLEXERIL) 10 mg tablet Take 1 Tab by mouth three (3) times daily as needed for Muscle Spasm(s). 90 Tab 2    baclofen (LIORESAL) 10 mg tablet 1  qhs  prn 30 Tab 2    bacitracin (BACITRACIN) 500 unit/gram oint apply affected area twice a day 28 g 0    BD INSULIN SYRINGE ULTRA-FINE 1 mL 31 gauge x 5/16 syrg use as directed twice a day 200 Syringe 3    cyanocobalamin (VITAMIN B-12) 1,000 mcg sublingual tablet Take 1,000 mcg by mouth daily.  FREESTYLE LITE STRIPS strip TEST twice a day as directed 100 Strip 11    atorvastatin (LIPITOR) 80 mg tablet take 1 tablet by mouth once daily 30 Tab 11    Aspirin, Buffered 81 mg tab Take  by mouth. Allergies  Allergies   Allergen Reactions    Other Food Other (comments)     Sweeteners- causes headaches    Metformin Other (comments)     Increase pain in feet and swelling in feet    Morphine Other (comments)     headache    Singulair [Montelukast] Other (comments)     hallucinations       Family History  Family History   Problem Relation Age of Onset    Heart Disease Mother     Diabetes Mother     Stroke Mother     Diabetes Father     Heart Disease Father        Social History  Social History     Social History    Marital status:      Spouse name: N/A    Number of children: N/A    Years of education: N/A     Occupational History    Not on file. Social History Main Topics    Smoking status: Never Smoker    Smokeless tobacco: Never Used    Alcohol use No    Drug use: No    Sexual activity: Not Currently     Other Topics Concern     Service No    Blood Transfusions No    Caffeine Concern No    Occupational Exposure No    Hobby Hazards No    Sleep Concern Yes     Sleep apnea     Stress Concern Yes     Due to family medical issues.      Weight Concern No    Special Diet No    Back Care Yes     Patient tries to do back care with streches     Exercise No    Bike Helmet Yes    Seat Belt Yes    Self-Exams Yes     Social History Narrative       Review of Systems  Review of Systems - History obtained from chart review and the patient  General ROS: positive for  - fatigue and malaise  negative for - chills or fever  Psychological ROS: positive for - concentration difficulties and mood swings  Ophthalmic ROS: negative  ENT ROS: negative  Allergy and Immunology ROS: positive for - seasonal allergies  Hematological and Lymphatic ROS: negative for - bleeding problems or bruising  Endocrine ROS: DM2, Hypothyroidism  Respiratory ROS: positive for - cough, shortness of breath and wheezing  negative for - hemoptysis or pleuritic pain  Cardiovascular ROS: negative  Gastrointestinal ROS: no abdominal pain, change in bowel habits, or black or bloody stools  Genito-Urinary ROS: negative  Musculoskeletal ROS: positive for - joint pain, joint stiffness and muscle pain  Neurological ROS: no TIA or stroke symptoms    Vital Signs  Visit Vitals    /74 (BP 1 Location: Left arm, BP Patient Position: Sitting)    Pulse 68    Temp 98.5 °F (36.9 °C) (Oral)    Resp 18    Ht 5' 7\" (1.702 m)    Wt 265 lb 3.2 oz (120.3 kg)    SpO2 95%    BMI 41.54 kg/m2         Physical Exam  Physical Examination: General appearance - acyanotic, in no respiratory distress and chronically ill appearing  Mental status - alert, oriented to person, place, and time, affect appropriate to mood  Eyes - sclera anicteric  Mouth - mucous membranes moist, pharynx normal without lesions  Neck - supple, no significant adenopathy  Chest - decreased air entry noted bibasilar zones  Heart - S1 and S2 normal  Neurological - normal muscle tone, no tremors, strength 5/5  Musculoskeletal - osteoarthritic changes noted in both hands  Extremities - no pedal edema noted, intact peripheral pulses    Diagnostics  Orders Placed This Encounter    CBC WITH AUTOMATED DIFF     Standing Status:   Future     Number of Occurrences:   1     Standing Expiration Date:   8/29/2018    LIPID PANEL     Standing Status:   Future     Number of Occurrences: 1     Standing Expiration Date:   7/02/3721    METABOLIC PANEL, COMPREHENSIVE     Standing Status:   Future     Number of Occurrences:   1     Standing Expiration Date:   8/29/2018    TSH 3RD GENERATION     Standing Status:   Future     Number of Occurrences:   1     Standing Expiration Date:   8/29/2018    VITAMIN D, 25 HYDROXY     Standing Status:   Future     Number of Occurrences:   1     Standing Expiration Date:   8/29/2018    REFERRAL TO PULMONARY DISEASE     Referral Priority:   Routine     Referral Type:   Consultation     Referral Reason:   Specialty Services Required    AMB POC HEMOGLOBIN A1C    NV COLLECTION VENOUS BLOOD,VENIPUNCTURE         Results  Results for orders placed or performed in visit on 08/28/17   AMB POC HEMOGLOBIN A1C   Result Value Ref Range    Hemoglobin A1c (POC) 10.2 %     ASSESSMENT and PLAN    ICD-10-CM ICD-9-CM    1. Malaise and fatigue R53.81 780.79 CBC WITH AUTOMATED DIFF    L91.50  METABOLIC PANEL, COMPREHENSIVE      NV COLLECTION VENOUS BLOOD,VENIPUNCTURE      CBC WITH AUTOMATED DIFF      METABOLIC PANEL, COMPREHENSIVE   2. Hypovitaminosis D E55.9 268.9 VITAMIN D, 25 HYDROXY      NV COLLECTION VENOUS BLOOD,VENIPUNCTURE      VITAMIN D, 25 HYDROXY   3. Uncontrolled type 2 diabetes mellitus with hyperglycemia, with long-term current use of insulin (HCC) E11.65 250.02 LIPID PANEL    Y08.9 I91.20 METABOLIC PANEL, COMPREHENSIVE      AMB POC HEMOGLOBIN A1C      NV COLLECTION VENOUS BLOOD,VENIPUNCTURE      LIPID PANEL      METABOLIC PANEL, COMPREHENSIVE      CANCELED: HEMOGLOBIN A1C WITH EAG   4. Hypothyroidism due to acquired atrophy of thyroid E03.4 244.8 TSH 3RD GENERATION     246.8 NV COLLECTION VENOUS BLOOD,VENIPUNCTURE      TSH 3RD GENERATION   5. SOB (shortness of breath) R06.02 786.05 REFERRAL TO PULMONARY DISEASE   6.  Encounter for screening mammogram for breast cancer Z12.31 V76.12 [facility] MAMMO BI SCREENING DIGTL     lab results and schedule of future lab studies reviewed with patient  reviewed diet, exercise and weight control  reviewed medications and side effects in detail  specific diabetic recommendations: low cholesterol diet, weight control and daily exercise discussed, home glucose monitoring emphasized, all medications, side effects and compliance discussed carefully, annual eye examinations at Ophthalmology discussed, glycohemoglobin and other lab monitoring discussed and long term diabetic complications discussed    I have discussed the diagnosis with the patient and the intended plan of care as seen in the above orders. The patient has received an after-visit summary and questions were answered concerning future plans. I have discussed medication, side effects, and warnings with the patient in detail. The patient verbalized understanding and is in agreement with the plan of care. The patient will contact the office with any additional concerns.     Gina Parekh MD

## 2017-08-30 LAB
25(OH)D3+25(OH)D2 SERPL-MCNC: 7.6 NG/ML (ref 30–100)
ALBUMIN SERPL-MCNC: 4 G/DL (ref 3.5–5.5)
ALBUMIN/GLOB SERPL: 1.1 {RATIO} (ref 1.2–2.2)
ALP SERPL-CCNC: 156 IU/L (ref 39–117)
ALT SERPL-CCNC: 18 IU/L (ref 0–32)
AST SERPL-CCNC: 16 IU/L (ref 0–40)
BASOPHILS # BLD AUTO: 0.1 X10E3/UL (ref 0–0.2)
BASOPHILS NFR BLD AUTO: 1 %
BILIRUB SERPL-MCNC: 0.9 MG/DL (ref 0–1.2)
BUN SERPL-MCNC: 14 MG/DL (ref 6–24)
BUN/CREAT SERPL: 20 (ref 9–23)
CALCIUM SERPL-MCNC: 9.4 MG/DL (ref 8.7–10.2)
CHLORIDE SERPL-SCNC: 98 MMOL/L (ref 96–106)
CHOLEST SERPL-MCNC: 131 MG/DL (ref 100–199)
CO2 SERPL-SCNC: 27 MMOL/L (ref 18–29)
CREAT SERPL-MCNC: 0.69 MG/DL (ref 0.57–1)
EOSINOPHIL # BLD AUTO: 0.1 X10E3/UL (ref 0–0.4)
EOSINOPHIL NFR BLD AUTO: 1 %
ERYTHROCYTE [DISTWIDTH] IN BLOOD BY AUTOMATED COUNT: 15.3 % (ref 12.3–15.4)
GLOBULIN SER CALC-MCNC: 3.5 G/DL (ref 1.5–4.5)
GLUCOSE SERPL-MCNC: 251 MG/DL (ref 65–99)
HCT VFR BLD AUTO: 45.4 % (ref 34–46.6)
HDLC SERPL-MCNC: 45 MG/DL
HGB BLD-MCNC: 15.2 G/DL (ref 11.1–15.9)
IMM GRANULOCYTES # BLD: 0 X10E3/UL (ref 0–0.1)
IMM GRANULOCYTES NFR BLD: 0 %
INTERPRETATION, 910389: NORMAL
LDLC SERPL CALC-MCNC: 42 MG/DL (ref 0–99)
LYMPHOCYTES # BLD AUTO: 1.8 X10E3/UL (ref 0.7–3.1)
LYMPHOCYTES NFR BLD AUTO: 16 %
Lab: NORMAL
MCH RBC QN AUTO: 29.6 PG (ref 26.6–33)
MCHC RBC AUTO-ENTMCNC: 33.5 G/DL (ref 31.5–35.7)
MCV RBC AUTO: 89 FL (ref 79–97)
MONOCYTES # BLD AUTO: 0.7 X10E3/UL (ref 0.1–0.9)
MONOCYTES NFR BLD AUTO: 6 %
NEUTROPHILS # BLD AUTO: 8.5 X10E3/UL (ref 1.4–7)
NEUTROPHILS NFR BLD AUTO: 76 %
PLATELET # BLD AUTO: 268 X10E3/UL (ref 150–379)
POTASSIUM SERPL-SCNC: 4.6 MMOL/L (ref 3.5–5.2)
PROT SERPL-MCNC: 7.5 G/DL (ref 6–8.5)
RBC # BLD AUTO: 5.13 X10E6/UL (ref 3.77–5.28)
SODIUM SERPL-SCNC: 141 MMOL/L (ref 134–144)
TRIGL SERPL-MCNC: 221 MG/DL (ref 0–149)
TSH SERPL DL<=0.005 MIU/L-ACNC: 3.06 UIU/ML (ref 0.45–4.5)
VLDLC SERPL CALC-MCNC: 44 MG/DL (ref 5–40)
WBC # BLD AUTO: 11.1 X10E3/UL (ref 3.4–10.8)

## 2017-09-18 ENCOUNTER — OFFICE VISIT (OUTPATIENT)
Dept: FAMILY MEDICINE CLINIC | Age: 57
End: 2017-09-18

## 2017-09-18 VITALS
SYSTOLIC BLOOD PRESSURE: 128 MMHG | RESPIRATION RATE: 16 BRPM | TEMPERATURE: 98.3 F | BODY MASS INDEX: 42.06 KG/M2 | HEART RATE: 70 BPM | OXYGEN SATURATION: 95 % | WEIGHT: 268 LBS | HEIGHT: 67 IN | DIASTOLIC BLOOD PRESSURE: 67 MMHG

## 2017-09-18 DIAGNOSIS — E11.65 UNCONTROLLED TYPE 2 DIABETES MELLITUS WITH HYPERGLYCEMIA, WITH LONG-TERM CURRENT USE OF INSULIN (HCC): Primary | ICD-10-CM

## 2017-09-18 DIAGNOSIS — G47.30 SLEEP APNEA, UNSPECIFIED TYPE: ICD-10-CM

## 2017-09-18 DIAGNOSIS — Z79.4 UNCONTROLLED TYPE 2 DIABETES MELLITUS WITH HYPERGLYCEMIA, WITH LONG-TERM CURRENT USE OF INSULIN (HCC): Primary | ICD-10-CM

## 2017-09-18 DIAGNOSIS — R06.02 SOB (SHORTNESS OF BREATH): ICD-10-CM

## 2017-09-18 DIAGNOSIS — R53.83 MALAISE AND FATIGUE: ICD-10-CM

## 2017-09-18 DIAGNOSIS — R53.81 MALAISE AND FATIGUE: ICD-10-CM

## 2017-09-18 DIAGNOSIS — E55.9 HYPOVITAMINOSIS D: ICD-10-CM

## 2017-09-18 RX ORDER — ERGOCALCIFEROL 1.25 MG/1
50000 CAPSULE ORAL
Qty: 12 CAP | Refills: 0 | Status: SHIPPED | OUTPATIENT
Start: 2017-09-18 | End: 2018-02-19 | Stop reason: SDUPTHER

## 2017-09-18 RX ORDER — BUDESONIDE AND FORMOTEROL FUMARATE DIHYDRATE 160; 4.5 UG/1; UG/1
2 AEROSOL RESPIRATORY (INHALATION) 2 TIMES DAILY
Qty: 1 INHALER | COMMUNITY
Start: 2017-09-18 | End: 2017-12-19 | Stop reason: SDUPTHER

## 2017-09-18 NOTE — PROGRESS NOTES
Chief Complaint   Patient presents with    Diabetes    Shortness of Breath    Hypothyroidism     Patient in today for follow-up. 1. Have you been to the ER, urgent care clinic since your last visit? Hospitalized since your last visit? No    2. Have you seen or consulted any other health care providers outside of the [address] [place] [address] since your last visit? Include any pap smears or colon screening. No     HPI  Channing Dominguez comes in for follow-up care. Diabetes mellitus type 2: Patient's HbA1c is 10.2. Her diabetes still uncontrolled. Her blood sugar remains high. She has been referred to the endocrinologist in the past but that she has not yet been seen by the endocrinologist.  I did emphasize the need to set up an appointment to be seen by the endocrinologist.  Currently she is on 90 units of Lantus daily. She would like to have her test strips sent in. Hypovitaminosis D: Patient had low vitamin D level. Will give replacement therapy and recheck in 3 months. Hypertension: Her blood pressure is stable. We will continue with the current treatment plan. Malaise and fatigue: Patient has persistent malaise and fatigue. She gets very tired 12 point where she is sleepy during the day. At night however she is not able to get to sleep. I suspect this is multifactorial including because of sleep apnea, uncontrolled diabetes, thyroid disorder and a low vitamin D. Currently I will send in a prescription for vitamin D replacement therapy. Sleep disorder: Patient has insomnia at night. During the day however she ends up being very sleepy and having daytime somnolence. She does snore at night. Has tried to use her CPAP machine with no avail. She was scheduled to see the sleep specialist but could not make that appointment. She is rescheduled for a few weeks later. We will await to see their recommendations. May benefit from melatonin.   Shortness of breath: Patient has long-standing shortness of breath with wheezing. Her oxygenation has always been above 90. She has been seen by the pulmonologist.  She was put on inhaler medication. At times feels that the inhalers do not help much. We will have her continue with these inhalers. I will put in a referral for the pulmonologist for her. She does need to get follow-up visit. She has a lot of questions about when to use inhalers and they did try to explain to her but she would like to talk to the pulmonologist about this. Hypothyroidism: Patient thyroid labs are stable. We will continue with the current treatment plan. Past Medical History  Past Medical History:   Diagnosis Date    Arthritis     Lower back     Chronic back pain     Lower back pain    Depression     Diabetes ([address]     Diabetes mellitus ([address]     Panic attacks     Sleep apnea     Thyroid disease        Surgical History  Past Surgical History:   Procedure Laterality Date    HX HEART VALVE SURGERY  2013    aortic valve repair    HX HYSTERECTOMY      Partial Hysterectomy - removed ovary    HX MYOMECTOMY          Medications  Current Outpatient Prescriptions   Medication Sig Dispense Refill    budesonide-formoterol (SYMBICORT) 160-4.5 mcg/actuation HFA inhaler Take 2 Puffs by inhalation two (2) times a day.  1 Inhaler     DULoxetine (CYMBALTA) 60 mg capsule take 1 capsule by mouth once daily 30 Cap 2    gabapentin (NEURONTIN) 300 mg capsule take 1 capsule by mouth at bedtime 30 Cap 2    PROAIR HFA 90 mcg/actuation inhaler inhale 2 puffs by mouth every 4 hours if needed for wheezing 8.5 Inhaler 3    LANTUS 100 unit/mL injection inject 90 units subcutaneously every evening at bedtime 30 Vial 3    Omega-3-DHA-EPA-Fish Oil 1,000 mg (120 mg-180 mg) cap take 1 capsule by mouth twice a day 60 Cap 2    metoprolol succinate (TOPROL-XL) 50 mg XL tablet take 1 tablet by mouth once daily 30 Tab 3    potassium chloride (KLOR-CON) 10 mEq tablet take 1 tablet by mouth once daily 30 Tab 1    furosemide (LASIX) 40 mg tablet take 1 tablet by mouth twice a day for 7 days 60 Tab 0    albuterol (PROVENTIL VENTOLIN) 2.5 mg /3 mL (0.083 %) nebulizer solution 3 mL by Nebulization route every four (4) hours as needed for Wheezing. 24 Each 5    cyclobenzaprine (FLEXERIL) 10 mg tablet Take 1 Tab by mouth three (3) times daily as needed for Muscle Spasm(s). 90 Tab 2    baclofen (LIORESAL) 10 mg tablet 1  qhs  prn 30 Tab 2    BD INSULIN SYRINGE ULTRA-FINE 1 mL 31 gauge x 5/16 syrg use as directed twice a day 200 Syringe 3    cyanocobalamin (VITAMIN B-12) 1,000 mcg sublingual tablet Take 1,000 mcg by mouth daily.  FREESTYLE LITE STRIPS strip TEST twice a day as directed 100 Strip 11    atorvastatin (LIPITOR) 80 mg tablet take 1 tablet by mouth once daily 30 Tab 11    Aspirin, Buffered 81 mg tab Take  by mouth. Allergies  Allergies   Allergen Reactions    Other Food Other (comments)     Sweeteners- causes headaches    Metformin Other (comments)     Increase pain in feet and swelling in feet    Morphine Other (comments)     headache    Singulair [Montelukast] Other (comments)     hallucinations       Family History  Family History   Problem Relation Age of Onset    Heart Disease Mother     Diabetes Mother     Stroke Mother     Diabetes Father     Heart Disease Father        Social History  Social History     Social History    Marital status:      Spouse name: N/A    Number of children: N/A    Years of education: N/A     Occupational History    Not on file. Social History Main Topics    Smoking status: Never Smoker    Smokeless tobacco: Never Used    Alcohol use No    Drug use: No    Sexual activity: Not Currently     Other Topics Concern     Service No    Blood Transfusions No    Caffeine Concern No    Occupational Exposure No    Hobby Hazards No    Sleep Concern Yes     Sleep apnea     Stress Concern Yes     Due to family medical issues.      Weight Concern No    Special Diet No    Back Care Yes     Patient tries to do back care with streches     Exercise No    Bike Helmet Yes    Seat Belt Yes    Self-Exams Yes     Social History Narrative       Review of Systems  Review of Systems - History obtained from chart review and the patient  General ROS: positive for  - fatigue, malaise and sleep disturbance  negative for - chills or fever  Psychological ROS: positive for - concentration difficulties, mood swings and sleep disturbances  Ophthalmic ROS: negative  ENT ROS: negative  Allergy and Immunology ROS: negative  Hematological and Lymphatic ROS: negative  Endocrine ROS: DM2  Respiratory ROS: positive for - cough, shortness of breath and wheezing  negative for - hemoptysis or pleuritic pain  Cardiovascular ROS: no chest pain or dyspnea on exertion  Gastrointestinal ROS: no abdominal pain, change in bowel habits, or black or bloody stools  Genito-Urinary ROS: no dysuria, trouble voiding, or hematuria  Musculoskeletal ROS: positive for - joint pain and pain in back - lower  Neurological ROS: no TIA or stroke symptoms  Dermatological ROS: positive for - pruritus and rash    Vital Signs  Visit Vitals    /67 (BP 1 Location: Left arm, BP Patient Position: Sitting)    Pulse 70    Temp 98.3 °F (36.8 °C) (Oral)    Resp 16    Ht 5' 7\" (1.702 m)    Wt 268 lb (121.6 kg)    SpO2 95%    BMI 41.97 kg/m2         Physical Exam  Physical Examination: General appearance - oriented to person, place, and time, well hydrated and chronically ill appearing  Mental status - alert, oriented to person, place, and time, affect appropriate to mood  Eyes - sclera anicteric  Mouth - mucous membranes moist, pharynx normal without lesions  Neck - supple, no significant adenopathy  Lymphatics - no palpable lymphadenopathy  Chest - no tachypnea, retractions or cyanosis, decreased air entry noted bibasilar zones  Heart - normal rate and regular rhythm, S1 and S2 normal  Back exam - limited range of motion  Neurological - neck supple without rigidity  Musculoskeletal - osteoarthritic changes noted in both hands  Extremities - no pedal edema noted    Diagnostics  Orders Placed This Encounter    budesonide-formoterol (SYMBICORT) 160-4.5 mcg/actuation HFA inhaler     Sig: Take 2 Puffs by inhalation two (2) times a day. Dispense:  1 Inhaler         Results  Results for orders placed or performed in visit on 08/28/17   CBC WITH AUTOMATED DIFF   Result Value Ref Range    WBC 11.1 (H) 3.4 - 10.8 x10E3/uL    RBC 5.13 3.77 - 5.28 x10E6/uL    HGB 15.2 11.1 - 15.9 g/dL    HCT 45.4 34.0 - 46.6 %    MCV 89 79 - 97 fL    MCH 29.6 26.6 - 33.0 pg    MCHC 33.5 31.5 - 35.7 g/dL    RDW 15.3 12.3 - 15.4 %    PLATELET 947 094 - 926 x10E3/uL    NEUTROPHILS 76 %    Lymphocytes 16 %    MONOCYTES 6 %    EOSINOPHILS 1 %    BASOPHILS 1 %    ABS. NEUTROPHILS 8.5 (H) 1.4 - 7.0 x10E3/uL    Abs Lymphocytes 1.8 0.7 - 3.1 x10E3/uL    ABS. MONOCYTES 0.7 0.1 - 0.9 x10E3/uL    ABS. EOSINOPHILS 0.1 0.0 - 0.4 x10E3/uL    ABS. BASOPHILS 0.1 0.0 - 0.2 x10E3/uL    IMMATURE GRANULOCYTES 0 %    ABS. IMM.  GRANS. 0.0 0.0 - 0.1 x10E3/uL   LIPID PANEL   Result Value Ref Range    Cholesterol, total 131 100 - 199 mg/dL    Triglyceride 221 (H) 0 - 149 mg/dL    HDL Cholesterol 45 >39 mg/dL    VLDL, calculated 44 (H) 5 - 40 mg/dL    LDL, calculated 42 0 - 99 mg/dL   METABOLIC PANEL, COMPREHENSIVE   Result Value Ref Range    Glucose 251 (H) 65 - 99 mg/dL    BUN 14 6 - 24 mg/dL    Creatinine 0.69 0.57 - 1.00 mg/dL    GFR est non-AA 98 >59 mL/min/1.73    GFR est  >59 mL/min/1.73    BUN/Creatinine ratio 20 9 - 23    Sodium 141 134 - 144 mmol/L    Potassium 4.6 3.5 - 5.2 mmol/L    Chloride 98 96 - 106 mmol/L    CO2 27 18 - 29 mmol/L    Calcium 9.4 8.7 - 10.2 mg/dL    Protein, total 7.5 6.0 - 8.5 g/dL    Albumin 4.0 3.5 - 5.5 g/dL    GLOBULIN, TOTAL 3.5 1.5 - 4.5 g/dL    A-G Ratio 1.1 (L) 1.2 - 2.2    Bilirubin, total 0.9 0.0 - 1.2 mg/dL    Alk. phosphatase 156 (H) 39 - 117 IU/L    AST (SGOT) 16 0 - 40 IU/L    ALT (SGPT) 18 0 - 32 IU/L   TSH 3RD GENERATION   Result Value Ref Range    TSH 3.060 0.450 - 4.500 uIU/mL   VITAMIN D, 25 HYDROXY   Result Value Ref Range    VITAMIN D, 25-HYDROXY 7.6 (L) 30.0 - 100.0 ng/mL   CVD REPORT   Result Value Ref Range    INTERPRETATION Note    DIABETES PATIENT EDUCATION   Result Value Ref Range    PDF Image Not applicable    AMB POC HEMOGLOBIN A1C   Result Value Ref Range    Hemoglobin A1c (POC) 10.2 %     ASSESSMENT and PLAN    ICD-10-CM ICD-9-CM    1. Uncontrolled type 2 diabetes mellitus with hyperglycemia, with long-term current use of insulin (HCC) E11.65 250.02     Z79.4 V58.67    2. Malaise and fatigue R53.81 780.79     R53.83     3. SOB (shortness of breath) R06.02 786.05 REFERRAL TO PULMONARY DISEASE   4. Hypovitaminosis D E55.9 268.9 ergocalciferol (ERGOCALCIFEROL) 50,000 unit capsule   5. Sleep apnea, unspecified type G47.30 780.57      lab results and schedule of future lab studies reviewed with patient  reviewed diet, exercise and weight control  reviewed medications and side effects in detail  specific diabetic recommendations: low cholesterol diet, weight control and daily exercise discussed, home glucose monitoring emphasized, all medications, side effects and compliance discussed carefully, glycohemoglobin and other lab monitoring discussed and long term diabetic complications discussed  use of aspirin to prevent MI and TIA's discussed    I have discussed the diagnosis with the patient and the intended plan of care as seen in the above orders. The patient has received an after-visit summary and questions were answered concerning future plans. I have discussed medication, side effects, and warnings with the patient in detail. The patient verbalized understanding and is in agreement with the plan of care. The patient will contact the office with any additional concerns.     Angelito WASSERMAN Diogenes Roblero MD

## 2017-09-18 NOTE — MR AVS SNAPSHOT
Visit Information Date & Time Provider Department Dept. Phone Encounter #  
 9/18/2017  1:30 PM Tigreed Xander Mirza MD [facility] [id number] Follow-up Instructions Return in about 3 months (around 12/18/2017), or if symptoms worsen or fail to improve, for SOB, dm2. Your Appointments 9/20/2017 11:30 AM  
Follow Up with Mirna Barrett MD  
[facility] ([address] [facility]) Appt Note: 1 month follow up; UTR/aj; 1 month follow up; 1 month follow up  
 [address] [facility] [address]  
  
   
 [address] [place] [id number] Upcoming Health Maintenance Date Due  
 BREAST CANCER SCRN MAMMOGRAM 4/13/2011 FOBT Q 1 YEAR AGE 50-75 4/13/2011 EYE EXAM RETINAL OR DILATED Q1 12/11/2016 FOOT EXAM Q1 12/14/2017 MICROALBUMIN Q1 1/11/2018 HEMOGLOBIN A1C Q6M 2/28/2018 LIPID PANEL Q1 8/28/2018 PAP AKA CERVICAL CYTOLOGY 11/13/2018 DTaP/Tdap/Td series (2 - Td) 11/11/2026 Allergies as of 9/18/2017  Review Complete On: 9/18/2017 By: Sylvia Rose MD  
  
 Severity Noted Reaction Type Reactions Other Food  03/17/2016    Other (comments) Sweeteners- causes headaches Metformin  11/06/2015    Other (comments) Increase pain in feet and swelling in feet Morphine  01/25/2017    Other (comments)  
 headache Singulair [Montelukast]  07/27/2017    Other (comments)  
 hallucinations Current Immunizations  Never Reviewed No immunizations on file. Not reviewed this visit You Were Diagnosed With   
  
 Codes Comments Malaise and fatigue    -  Primary ICD-10-CM: R53.81, R53.83 ICD-9-CM: 780.79 Uncontrolled type 2 diabetes mellitus with hyperglycemia, with long-term current use of insulin (HCC)     ICD-10-CM: E11.65, Z79.4 ICD-9-CM: 250.02, V58.67   
 SOB (shortness of breath)     ICD-10-CM: R06.02 
ICD-9-CM: 786.05 Vitals BP Pulse Temp Resp Height(growth percentile) Weight(growth percentile) 128/67 (BP 1 Location: Left arm, BP Patient Position: Sitting) 70 98.3 °F (36.8 °C) (Oral) 16 5' 7\" (1.702 m) 268 lb (121.6 kg) SpO2 BMI OB Status Smoking Status 95% 41.97 kg/m2 Hysterectomy Never Smoker BMI and BSA Data Body Mass Index Body Surface Area 41.97 kg/m 2 2.4 m 2 Preferred Pharmacy Pharmacy Name Phone [address] [facility], [address] Your Updated Medication List  
  
   
This list is accurate as of: 9/18/17  2:30 PM.  Always use your most recent med list.  
  
  
  
  
 * albuterol 2.5 mg /3 mL (0.083 %) nebulizer solution Commonly known as:  PROVENTIL VENTOLIN  
3 mL by Nebulization route every four (4) hours as needed for Wheezing. * PROAIR HFA 90 mcg/actuation inhaler Generic drug:  albuterol  
inhale 2 puffs by mouth every 4 hours if needed for wheezing  
  
 aspirin, buffered 81 mg Tab Take  by mouth. atorvastatin 80 mg tablet Commonly known as:  LIPITOR  
take 1 tablet by mouth once daily  
  
 baclofen 10 mg tablet Commonly known as:  LIORESAL  
1  qhs  prn  
  
 BD INSULIN SYRINGE ULTRA-FINE 1 mL 31 gauge x 5/16 Syrg Generic drug:  Insulin Syringe-Needle U-100  
use as directed twice a day  
  
 budesonide-formoterol 160-4.5 mcg/actuation HFA inhaler Commonly known as:  SYMBICORT Take 2 Puffs by inhalation two (2) times a day. cyclobenzaprine 10 mg tablet Commonly known as:  FLEXERIL Take 1 Tab by mouth three (3) times daily as needed for Muscle Spasm(s). DULoxetine 60 mg capsule Commonly known as:  CYMBALTA  
take 1 capsule by mouth once daily FREESTYLE LITE STRIPS strip Generic drug:  glucose blood VI test strips TEST twice a day as directed  
  
 furosemide 40 mg tablet Commonly known as:  LASIX  
take 1 tablet by mouth twice a day for 7 days gabapentin 300 mg capsule Commonly known as:  NEURONTIN  
take 1 capsule by mouth at bedtime LANTUS 100 unit/mL injection Generic drug:  insulin glargine  
inject 90 units subcutaneously every evening at bedtime  
  
 metoprolol succinate 50 mg XL tablet Commonly known as:  TOPROL-XL  
take 1 tablet by mouth once daily Franklin-3-DHA-EPA-Fish Oil 1,000 mg (120 mg-180 mg) Cap  
take 1 capsule by mouth twice a day  
  
 potassium chloride 10 mEq tablet Commonly known as:  KLOR-CON  
take 1 tablet by mouth once daily VITAMIN B-12 1,000 mcg sublingual tablet Generic drug:  cyanocobalamin Take 1,000 mcg by mouth daily. * Notice: This list has 2 medication(s) that are the same as other medications prescribed for you. Read the directions carefully, and ask your doctor or other care provider to review them with you. We Performed the Following REFERRAL TO PULMONARY DISEASE [ZFB00 Custom] Follow-up Instructions Return in about 3 months (around 12/18/2017), or if symptoms worsen or fail to improve, for SOB, dm2. To-Do List   
 10/11/2017 11:15 AM  
  Appointment with TERRI GRIMES  1 at [address] [place] [address] ([phone]) OUTSIDE FILMS  - Any outside films related to the study being scheduled should be brought with you on the day of the exam.  If this cannot be done there may be a delay in the reading of the study. MEDICATIONS  - Patient must bring a complete list of all medications currently taking to include prescriptions, over-the-counter meds, herbals, vitamins & any dietary supplements  GENERAL INSTRUCTIONS  - On the day of your exam do not use any bath powder, deodorant or lotions on the armpit area. -Tenderness of breasts may cause an increase of discomfort during procedure. If you are experiencing breast tenderness on the day of your appointment and would like to reschedule, please call [phone]. Referral Information Referral ID Referred By Referred To  
  
 [id number] Jose Angel Lewis [address] Cora Busby MD   
   [address] [place] [employer] [facility] [place], [address],[place] [address] Phone: [phone] Fax: [phone] Visits Status Start Date End Date 1 New Request 9/18/17 9/18/18 If your referral has a status of pending review or denied, additional information will be sent to support the outcome of this decision. Introducing [facility]! Dear Alejandra Amador: Thank you for requesting a [employer] account. Our records indicate that you already have an active [employer] account. You can access your account anytime at [email or web address] Did you know that you can access your hospital and ER discharge instructions at any time in [employer]RST? You can also review all of your test results from your hospital stay or ER visit. Additional Information If you have questions, please visit the Frequently Asked Questions section of the [employer] website at [email or web address] Remember, [employer] is NOT to be used for urgent needs. For medical emergencies, dial 911. Now available from your iPhone and Android! Please provide this summary of care documentation to your next provider. Your primary care clinician is listed as Angelito Perkins. If you have any questions after today's visit, please call [phone].

## 2017-10-03 ENCOUNTER — OFFICE VISIT (OUTPATIENT)
Dept: PULMONOLOGY | Age: 57
End: 2017-10-03

## 2017-10-03 VITALS
WEIGHT: 268 LBS | DIASTOLIC BLOOD PRESSURE: 62 MMHG | BODY MASS INDEX: 42.06 KG/M2 | HEART RATE: 71 BPM | OXYGEN SATURATION: 98 % | RESPIRATION RATE: 16 BRPM | TEMPERATURE: 98.5 F | SYSTOLIC BLOOD PRESSURE: 108 MMHG | HEIGHT: 67 IN

## 2017-10-03 DIAGNOSIS — J45.31 ASTHMATIC BRONCHITIS, MILD PERSISTENT, WITH ACUTE EXACERBATION: ICD-10-CM

## 2017-10-03 DIAGNOSIS — I10 ESSENTIAL HYPERTENSION: ICD-10-CM

## 2017-10-03 DIAGNOSIS — E11.9 TYPE 2 DIABETES MELLITUS WITHOUT COMPLICATION, WITHOUT LONG-TERM CURRENT USE OF INSULIN (HCC): ICD-10-CM

## 2017-10-03 DIAGNOSIS — J45.40 MODERATE PERSISTENT ASTHMA WITHOUT COMPLICATION: Primary | ICD-10-CM

## 2017-10-03 DIAGNOSIS — Z99.89 OSA ON CPAP: ICD-10-CM

## 2017-10-03 DIAGNOSIS — E66.01 MORBID OBESITY (HCC): ICD-10-CM

## 2017-10-03 DIAGNOSIS — G47.33 OSA ON CPAP: ICD-10-CM

## 2017-10-03 NOTE — PROGRESS NOTES
HISTORY OF PRESENT ILLNESS  Vahe Parry is a 65 y.o. female. HPI Comments: Follow up for episodic SOB due to bronchospasm. Pt notes initial response to Symbicort with much less shortness of breath and wheezing. However, since being on Singulair pt has been experiencing severe hallucinations and had to stop taking this. No other new respiratory symptoms noted. Pt has OVIDIO and uses her CPAP nightly. She is being followed by an outside sleep MD.  Pt with almost daily use of rescue inhaler at home. She also had a bad exacerbation when exposed to ambient cigarette smoke for which she used 7 rescue inhalations. Pt also admits to using Proair every night as a scheduled inhaler. Pt had multiple questions about Asthma and treatment which were answered to her satisfaction. Asthma   Associated symptoms include shortness of breath. Pertinent negatives include no chest pain, no abdominal pain and no headaches. Review of Systems   Constitutional: Positive for malaise/fatigue. Negative for chills, diaphoresis, fever and weight loss. HENT: Negative for congestion, ear discharge, ear pain, hearing loss, nosebleeds, sore throat and tinnitus. Eyes: Negative for blurred vision, double vision, photophobia and pain. Respiratory: Positive for shortness of breath and wheezing. Negative for cough, hemoptysis, sputum production and stridor. Cardiovascular: Negative for chest pain, palpitations, orthopnea, claudication, leg swelling and PND. Gastrointestinal: Negative for abdominal pain, blood in stool, constipation, diarrhea, heartburn, melena, nausea and vomiting. Genitourinary: Negative for dysuria, flank pain, frequency, hematuria and urgency. Musculoskeletal: Positive for back pain and joint pain. Negative for falls and myalgias. Skin: Negative for itching and rash. Neurological: Positive for tremors and weakness.  Negative for dizziness, tingling, sensory change, speech change, focal weakness, seizures, loss of consciousness and headaches. Endo/Heme/Allergies: Negative for environmental allergies and polydipsia. Does not bruise/bleed easily. Psychiatric/Behavioral: Positive for depression. Negative for memory loss, substance abuse and suicidal ideas. Hallucinations: resolved  The patient is not nervous/anxious and does not have insomnia. Past Medical History:   Diagnosis Date    Arthritis     Lower back     Chronic back pain     Lower back pain    Depression     Diabetes [facility]     Diabetes mellitus ([facility]     Panic attacks     Sleep apnea     Thyroid disease      Past Surgical History:   Procedure Laterality Date    HX HEART VALVE SURGERY  2013    aortic valve repair    HX HYSTERECTOMY      Partial Hysterectomy - removed ovary    HX MYOMECTOMY       Current Outpatient Prescriptions on File Prior to Visit   Medication Sig Dispense Refill    budesonide-formoterol (SYMBICORT) 160-4.5 mcg/actuation HFA inhaler Take 2 Puffs by inhalation two (2) times a day. 1 Inhaler     ergocalciferol (ERGOCALCIFEROL) 50,000 unit capsule Take 1 Cap by mouth every seven (7) days.  12 Cap 0    DULoxetine (CYMBALTA) 60 mg capsule take 1 capsule by mouth once daily 30 Cap 2    gabapentin (NEURONTIN) 300 mg capsule take 1 capsule by mouth at bedtime 30 Cap 2    PROAIR HFA 90 mcg/actuation inhaler inhale 2 puffs by mouth every 4 hours if needed for wheezing 8.5 Inhaler 3    LANTUS 100 unit/mL injection inject 90 units subcutaneously every evening at bedtime 30 Vial 3    Omega-3-DHA-EPA-Fish Oil 1,000 mg (120 mg-180 mg) cap take 1 capsule by mouth twice a day 60 Cap 2    metoprolol succinate (TOPROL-XL) 50 mg XL tablet take 1 tablet by mouth once daily 30 Tab 3    potassium chloride (KLOR-CON) 10 mEq tablet take 1 tablet by mouth once daily 30 Tab 1    furosemide (LASIX) 40 mg tablet take 1 tablet by mouth twice a day for 7 days 60 Tab 0    albuterol (PROVENTIL VENTOLIN) 2.5 mg /3 mL (0.083 %) nebulizer solution 3 mL by Nebulization route every four (4) hours as needed for Wheezing. 24 Each 5    cyclobenzaprine (FLEXERIL) 10 mg tablet Take 1 Tab by mouth three (3) times daily as needed for Muscle Spasm(s). 90 Tab 2    baclofen (LIORESAL) 10 mg tablet 1  qhs  prn 30 Tab 2    cyanocobalamin (VITAMIN B-12) 1,000 mcg sublingual tablet Take 1,000 mcg by mouth daily.  atorvastatin (LIPITOR) 80 mg tablet take 1 tablet by mouth once daily 30 Tab 11    Aspirin, Buffered 81 mg tab Take  by mouth.  BD INSULIN SYRINGE ULTRA-FINE 1 mL 31 gauge x 5/16 syrg use as directed twice a day 200 Syringe 3    FREESTYLE LITE STRIPS strip TEST twice a day as directed 100 Strip 11     No current facility-administered medications on file prior to visit. Allergies   Allergen Reactions    Other Food Other (comments)     Sweeteners- causes headaches    Metformin Other (comments)     Increase pain in feet and swelling in feet    Morphine Other (comments)     headache    Singulair [Montelukast] Other (comments)     hallucinations     Social History     Social History    Marital status:      Spouse name: N/A    Number of children: N/A    Years of education: N/A     Occupational History    Not on file. Social History Main Topics    Smoking status: Never Smoker    Smokeless tobacco: Never Used    Alcohol use No    Drug use: No    Sexual activity: Not Currently     Other Topics Concern     Service No    Blood Transfusions No    Caffeine Concern No    Occupational Exposure No    Hobby Hazards No    Sleep Concern Yes     Sleep apnea     Stress Concern Yes     Due to family medical issues.  Weight Concern No    Special Diet No    Back Care Yes     Patient tries to do back care with streches     Exercise No    Bike Helmet Yes    Seat Belt Yes    Self-Exams Yes     Social History Narrative     Blood pressure 108/62, pulse 71, temperature 98.5 °F (36.9 °C), temperature source Oral, resp. rate 16, height 5' 7\" (1.702 m), weight 121.6 kg (268 lb), SpO2 98 %. Physical Exam   Constitutional: She is oriented to person, place, and time. She appears well-developed. No distress. Obese , uses 2 canes to ambulate   HENT:   Head: Normocephalic and atraumatic. Nose: Nose normal.   Mouth/Throat: No oropharyngeal exudate. Eyes: Conjunctivae and EOM are normal. Pupils are equal, round, and reactive to light. Right eye exhibits no discharge. Left eye exhibits no discharge. No scleral icterus. Neck: No JVD present. No tracheal deviation present. No thyromegaly present. Cardiovascular: Normal rate, regular rhythm, normal heart sounds and intact distal pulses. Exam reveals no gallop. No murmur heard. Pulmonary/Chest: Effort normal and breath sounds normal. No stridor. No respiratory distress. She has no wheezes. She has no rales. She exhibits no tenderness. Abdominal: Soft. She exhibits no mass. There is no tenderness. Musculoskeletal: She exhibits no tenderness. Edema: trace. Lymphadenopathy:     She has no cervical adenopathy. Neurological: She is alert and oriented to person, place, and time. Skin: Skin is warm and dry. No rash noted. She is not diaphoretic. No erythema. Psychiatric: She has a normal mood and affect. Her behavior is normal. Judgment and thought content normal.       ASSESSMENT and PLAN  Encounter Diagnoses   Name Primary?  Moderate persistent asthma without complication Yes    OVIDIO on CPAP     Morbid obesity (HCC)     Type 2 diabetes mellitus without complication, without long-term current use of insulin (HCC)     Essential hypertension      Pt with good response to Symbicort, will continue and monitor response. Would consider addition of LAMA if needed. Pt to use Proair only as needed. Check IgE to evaluate for possible Xolair. Pt is not a candidate for Nucala as there is no Eosinophilia.   Counseled on weight loss and healthy lifestyle, reva as this impacts Asthma and OVIDIO. Also answered numerous pt questions and discussed benefits of Pulmonary rehab. RTC 6 months.   Pt refused flu vaccination  More than 50% of this 40 minute visit was spent in face to face counseling

## 2017-10-03 NOTE — MR AVS SNAPSHOT
Visit Information Date & Time Provider Department Dept. Phone Encounter #  
 10/3/2017 11:45 AM [facility] [id number] Your Appointments 10/9/2017 11:00 AM  
Follow Up with Cristine Hassan MD  
[facility] ([address]) Appt Note: 1 month follow up; UTR/aj; 1 month follow up; 1 month follow up; 1 month follow up  
 [address] [facility] [address]  
  
   
 [address] [address] [address] [address]  
  
    
 12/18/2017  1:30 PM  
Follow Up with Angelito Mcgraw MD  
[facility] ([address]) Appt Note: f/u for SOB, dm2  
 [address] [facility] 107 [id number] [address]  
  
   
 [address] [facility] Upcoming Health Maintenance Date Due  
 BREAST CANCER SCRN MAMMOGRAM 4/13/2011 FOBT Q 1 YEAR AGE 50-75 4/13/2011 EYE EXAM RETINAL OR DILATED Q1 12/11/2016 FOOT EXAM Q1 12/14/2017 MICROALBUMIN Q1 1/11/2018 HEMOGLOBIN A1C Q6M 2/28/2018 LIPID PANEL Q1 8/28/2018 PAP AKA CERVICAL CYTOLOGY 11/13/2018 DTaP/Tdap/Td series (2 - Td) 11/11/2026 Allergies as of 10/3/2017  Review Complete On: 10/3/2017 By: Shanna Garland MD  
  
 Severity Noted Reaction Type Reactions Other Food  03/17/2016    Other (comments) Sweeteners- causes headaches Metformin  11/06/2015    Other (comments) Increase pain in feet and swelling in feet Morphine  01/25/2017    Other (comments)  
 headache Singulair [Montelukast]  07/27/2017    Other (comments)  
 hallucinations Current Immunizations  Never Reviewed No immunizations on file. Not reviewed this visit You Were Diagnosed With   
  
 Codes Comments Moderate persistent asthma without complication    -  Primary ICD-10-CM: J45.40 ICD-9-CM: 493.90 OVIDIO on CPAP     ICD-10-CM: G47.33, Z99.89 ICD-9-CM: 327.23, V46.8 Morbid obesity ([facility] [address]     ICD-10-CM: E66.01 
ICD-9-CM: 278.01 Type 2 diabetes mellitus without complication, without long-term current use of insulin (HCC)     ICD-10-CM: E11.9 ICD-9-CM: 250.00 Essential hypertension     ICD-10-CM: I10 
ICD-9-CM: 401.9 Vitals BP Pulse Temp Resp Height(growth percentile) Weight(growth percentile) 108/62 (BP 1 Location: Left arm, BP Patient Position: Sitting) 71 98.5 °F (36.9 °C) (Oral) 16 5' 7\" (1.702 m) 268 lb (121.6 kg) SpO2 BMI OB Status Smoking Status 98% 41.97 kg/m2 Hysterectomy Never Smoker Vitals History BMI and BSA Data Body Mass Index Body Surface Area 41.97 kg/m 2 2.4 m 2 Preferred Pharmacy Pharmacy Name Phone [address] [facility], [address] Your Updated Medication List  
  
   
This list is accurate as of: 10/3/17 12:53 PM.  Always use your most recent med list.  
  
  
  
  
 * albuterol 2.5 mg /3 mL (0.083 %) nebulizer solution Commonly known as:  PROVENTIL VENTOLIN  
3 mL by Nebulization route every four (4) hours as needed for Wheezing. * PROAIR HFA 90 mcg/actuation inhaler Generic drug:  albuterol  
inhale 2 puffs by mouth every 4 hours if needed for wheezing  
  
 aspirin, buffered 81 mg Tab Take  by mouth. atorvastatin 80 mg tablet Commonly known as:  LIPITOR  
take 1 tablet by mouth once daily  
  
 baclofen 10 mg tablet Commonly known as:  LIORESAL  
1  qhs  prn  
  
 BD INSULIN SYRINGE ULTRA-FINE 1 mL 31 gauge x 5/16 Syrg Generic drug:  Insulin Syringe-Needle U-100  
use as directed twice a day  
  
 budesonide-formoterol 160-4.5 mcg/actuation Hfaa Commonly known as:  SYMBICORT Take 2 Puffs by inhalation two (2) times a day. cyclobenzaprine 10 mg tablet Commonly known as:  FLEXERIL Take 1 Tab by mouth three (3) times daily as needed for Muscle Spasm(s). DULoxetine 60 mg capsule Commonly known as:  CYMBALTA  
take 1 capsule by mouth once daily  
  
 ergocalciferol 50,000 unit capsule Commonly known as:  ERGOCALCIFEROL Take 1 Cap by mouth every seven (7) days. FREESTYLE LITE STRIPS strip Generic drug:  glucose blood VI test strips TEST twice a day as directed  
  
 furosemide 40 mg tablet Commonly known as:  LASIX  
take 1 tablet by mouth twice a day for 7 days  
  
 gabapentin 300 mg capsule Commonly known as:  NEURONTIN  
take 1 capsule by mouth at bedtime LANTUS 100 unit/mL injection Generic drug:  insulin glargine  
inject 90 units subcutaneously every evening at bedtime  
  
 metoprolol succinate 50 mg XL tablet Commonly known as:  TOPROL-XL  
take 1 tablet by mouth once daily Ariel-3-DHA-EPA-Fish Oil 1,000 mg (120 mg-180 mg) Cap  
take 1 capsule by mouth twice a day  
  
 potassium chloride 10 mEq tablet Commonly known as:  KLOR-CON  
take 1 tablet by mouth once daily VITAMIN B-12 1,000 mcg sublingual tablet Generic drug:  cyanocobalamin Take 1,000 mcg by mouth daily. * Notice: This list has 2 medication(s) that are the same as other medications prescribed for you. Read the directions carefully, and ask your doctor or other care provider to review them with you. We Performed the Following REFERRAL TO PULMONARY REHAB [EXI127 Custom] To-Do List   
 10/04/2017 Lab:  IMMUNOGLOBULIN E, QT   
  
 10/11/2017 11:15 AM  
  Appointment with TERRI GRIMES  1 at [address] [place] [address] ([phone]) OUTSIDE FILMS  - Any outside films related to the study being scheduled should be brought with you on the day of the exam.  If this cannot be done there may be a delay in the reading of the study.   MEDICATIONS  - Patient must bring a complete list of all medications currently taking to include prescriptions, over-the-counter meds, herbals, vitamins & any dietary supplements  GENERAL INSTRUCTIONS  - On the day of your exam do not use any bath powder, deodorant or lotions on the armpit area. -Tenderness of breasts may cause an increase of discomfort during procedure. If you are experiencing breast tenderness on the day of your appointment and would like to reschedule, please call [phone]. Referral Information Referral ID Referred By Referred To  
  
 [id number] Ab Morrison [place] Not Available Visits Status Start Date End Date 1 New Request 10/3/17 10/3/18 If your referral has a status of pending review or denied, additional information will be sent to support the outcome of this decision. Introducing [address]! Dear Cale Robertson: Thank you for requesting a [employer] account. Our records indicate that you already have an active [employer] account. You can access your account anytime at [email or web address] Did you know that you can access your hospital and ER discharge instructions at any time in [employer]? You can also review all of your test results from your hospital stay or ER visit. Additional Information If you have questions, please visit the Frequently Asked Questions section of the [employer] website at [email or web address] Remember, [employer] is NOT to be used for urgent needs. For medical emergencies, dial 911. Now available from your iPhone and Android! Please provide this summary of care documentation to your next provider. Your primary care clinician is listed as Angelito Perkins. If you have any questions after today's visit, please call [phone].

## 2017-10-04 DIAGNOSIS — J45.40 MODERATE PERSISTENT ASTHMA WITHOUT COMPLICATION: ICD-10-CM

## 2017-10-04 RX ORDER — BUDESONIDE AND FORMOTEROL FUMARATE DIHYDRATE 160; 4.5 UG/1; UG/1
AEROSOL RESPIRATORY (INHALATION)
Qty: 10.2 INHALER | Refills: 1 | Status: SHIPPED | OUTPATIENT
Start: 2017-10-04 | End: 2017-12-29 | Stop reason: SDUPTHER

## 2017-10-09 ENCOUNTER — OFFICE VISIT (OUTPATIENT)
Dept: FAMILY MEDICINE CLINIC | Age: 57
End: 2017-10-09

## 2017-10-09 VITALS
SYSTOLIC BLOOD PRESSURE: 129 MMHG | TEMPERATURE: 96.9 F | RESPIRATION RATE: 18 BRPM | DIASTOLIC BLOOD PRESSURE: 72 MMHG | BODY MASS INDEX: 43 KG/M2 | HEIGHT: 67 IN | HEART RATE: 68 BPM | OXYGEN SATURATION: 95 % | WEIGHT: 274 LBS

## 2017-10-09 DIAGNOSIS — J45.40 MODERATE PERSISTENT ASTHMA WITHOUT COMPLICATION: ICD-10-CM

## 2017-10-09 DIAGNOSIS — E11.65 UNCONTROLLED TYPE 2 DIABETES MELLITUS WITH HYPERGLYCEMIA, WITH LONG-TERM CURRENT USE OF INSULIN (HCC): ICD-10-CM

## 2017-10-09 DIAGNOSIS — Z79.4 UNCONTROLLED TYPE 2 DIABETES MELLITUS WITH HYPERGLYCEMIA, WITH LONG-TERM CURRENT USE OF INSULIN (HCC): ICD-10-CM

## 2017-10-09 DIAGNOSIS — I10 ESSENTIAL HYPERTENSION: ICD-10-CM

## 2017-10-09 DIAGNOSIS — G47.30 SLEEP APNEA, UNSPECIFIED TYPE: ICD-10-CM

## 2017-10-09 DIAGNOSIS — R41.3 MEMORY IMPAIRMENT: ICD-10-CM

## 2017-10-09 DIAGNOSIS — R25.1 TREMORS OF NERVOUS SYSTEM: Primary | ICD-10-CM
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
## 2017-10-09 NOTE — MR AVS SNAPSHOT
Visit Information Date & Time Provider Department Dept. Phone Encounter #  
 10/9/2017 12:00 PM Uriel Orozco [phone] [id number] Follow-up Instructions Return if symptoms worsen or fail to improve. Your Appointments 10/12/2017  2:00 PM  
Follow Up with Deangelo Vega MD  
[facility] ([facility]) Appt Note: 1 month follow up; UTR/aj; 1 month follow up; 1 month follow up; 1 month follow up; 1 month follow up  
 [address] [facility] [address]  
  
   
 [address] [address] [address] [address]  
  
    
 12/18/2017  1:30 PM  
Follow Up with MD Uriel Orozco ([facility]) Appt Note: f/u for SOB, dm2  
 [address] [facility] [address] [id number] [address]  
  
   
 [address] [facility] Upcoming Health Maintenance Date Due  
 BREAST CANCER SCRN MAMMOGRAM 4/13/2011 FOBT Q 1 YEAR AGE 50-75 4/13/2011 EYE EXAM RETINAL OR DILATED Q1 12/11/2016 FOOT EXAM Q1 12/14/2017 MICROALBUMIN Q1 1/11/2018 HEMOGLOBIN A1C Q6M 2/28/2018 LIPID PANEL Q1 8/28/2018 PAP AKA CERVICAL CYTOLOGY 11/13/2018 DTaP/Tdap/Td series (2 - Td) 11/11/2026 Allergies as of 10/9/2017  Review Complete On: 10/9/2017 By: Mike Caba MD  
  
 Severity Noted Reaction Type Reactions Other Food  03/17/2016    Other (comments) Sweeteners- causes headaches Metformin  11/06/2015    Other (comments) Increase pain in feet and swelling in feet Morphine  01/25/2017    Other (comments)  
 headache Singulair [Montelukast]  07/27/2017    Other (comments)  
 hallucinations Current Immunizations  Never Reviewed No immunizations on file. Not reviewed this visit You Were Diagnosed With   
  
 Codes Comments Tremors of nervous system    -  Primary ICD-10-CM: R25.1 ICD-9-CM: 781.0 Memory impairment     ICD-10-CM: R41.3 ICD-9-CM: 780.93 Uncontrolled type 2 diabetes mellitus with hyperglycemia, with long-term current use of insulin (HCC)     ICD-10-CM: E11.65, Z79.4 ICD-9-CM: 250.02, V58.67 Essential hypertension     ICD-10-CM: I10 
ICD-9-CM: 401.9 Sleep apnea, unspecified type     ICD-10-CM: G47.30 ICD-9-CM: 780.57 Vitals BP Pulse Temp Resp Height(growth percentile) Weight(growth percentile) 129/72 (BP 1 Location: Left arm, BP Patient Position: Sitting) 68 96.9 °F (36.1 °C) (Oral) 18 5' 7\" (1.702 m) 274 lb (124.3 kg) SpO2 BMI OB Status Smoking Status 95% 42.91 kg/m2 Hysterectomy Never Smoker BMI and BSA Data Body Mass Index Body Surface Area 42.91 kg/m 2 2.42 m 2 Preferred Pharmacy Pharmacy Name Phone [phone], [address] [place] [address] Your Updated Medication List  
  
   
This list is accurate as of: 10/9/17 12:42 PM.  Always use your most recent med list.  
  
  
  
  
 * albuterol 2.5 mg /3 mL (0.083 %) nebulizer solution Commonly known as:  PROVENTIL VENTOLIN  
3 mL by Nebulization route every four (4) hours as needed for Wheezing. * PROAIR HFA 90 mcg/actuation inhaler Generic drug:  albuterol  
inhale 2 puffs by mouth every 4 hours if needed for wheezing  
  
 aspirin, buffered 81 mg Tab Take  by mouth. atorvastatin 80 mg tablet Commonly known as:  LIPITOR  
take 1 tablet by mouth once daily  
  
 baclofen 10 mg tablet Commonly known as:  LIORESAL  
1  qhs  prn  
  
 BD INSULIN SYRINGE ULTRA-FINE 1 mL 31 gauge x 5/16 Syrg Generic drug:  Insulin Syringe-Needle U-100  
use as directed twice a day * budesonide-formoterol 160-4.5 mcg/actuation Hfaa Commonly known as:  SYMBICORT Take 2 Puffs by inhalation two (2) times a day. * SYMBICORT 160-4.5 mcg/actuation Hfaa Generic drug:  budesonide-formoterol inhale 2 puffs by mouth twice a day  
  
 cyclobenzaprine 10 mg tablet Commonly known as:  FLEXERIL Take 1 Tab by mouth three (3) times daily as needed for Muscle Spasm(s). DULoxetine 60 mg capsule Commonly known as:  CYMBALTA  
take 1 capsule by mouth once daily  
  
 ergocalciferol 50,000 unit capsule Commonly known as:  ERGOCALCIFEROL Take 1 Cap by mouth every seven (7) days. FREESTYLE LITE STRIPS strip Generic drug:  glucose blood VI test strips TEST twice a day as directed  
  
 furosemide 40 mg tablet Commonly known as:  LASIX  
take 1 tablet by mouth twice a day for 7 days  
  
 gabapentin 300 mg capsule Commonly known as:  NEURONTIN  
take 1 capsule by mouth at bedtime LANTUS 100 unit/mL injection Generic drug:  insulin glargine  
inject 90 units subcutaneously every evening at bedtime  
  
 metoprolol succinate 50 mg XL tablet Commonly known as:  TOPROL-XL  
take 1 tablet by mouth once daily [place]-3-DHA-EPA-Fish Oil 1,000 mg (120 mg-180 mg) Cap  
take 1 capsule by mouth twice a day  
  
 potassium chloride 10 mEq tablet Commonly known as:  KLOR-CON  
take 1 tablet by mouth once daily VITAMIN B-12 1,000 mcg sublingual tablet Generic drug:  cyanocobalamin Take 1,000 mcg by mouth daily. * Notice: This list has 4 medication(s) that are the same as other medications prescribed for you. Read the directions carefully, and ask your doctor or other care provider to review them with you. We Performed the Following REFERRAL TO NEUROLOGY [IBK24 Custom] Comments:  
 Patient seen Dr Megan Del Valle. Follow-up Instructions Return if symptoms worsen or fail to improve. To-Do List   
 10/11/2017 11:15 AM  
  Appointment with TERRI GRIMES  1 at [address] [place] [address] ([phone]) OUTSIDE FILMS  - Any outside films related to the study being scheduled should be brought with you on the day of the exam.  If this cannot be done there may be a delay in the reading of the study. MEDICATIONS  - Patient must bring a complete list of all medications currently taking to include prescriptions, over-the-counter meds, herbals, vitamins & any dietary supplements  GENERAL INSTRUCTIONS  - On the day of your exam do not use any bath powder, deodorant or lotions on the armpit area. -Tenderness of breasts may cause an increase of discomfort during procedure. If you are experiencing breast tenderness on the day of your appointment and would like to reschedule, please call [phone]. Referral Information Referral ID Referred By Referred To  
  
 [id number] Ann WASSERMAN Not Available Visits Status Start Date End Date 1 New Request 10/9/17 10/9/18 If your referral has a status of pending review or denied, additional information will be sent to support the outcome of this decision. Introducing [facility] & HEALTH SERVICES! Dear [place] city: Thank you for requesting a A[employer] account. Our records indicate that you already have an active Aviac[employer] account. You can access your account anytime at [email or web address] Did you know that you can access your hospital and ER discharge instructions at any time in [employer]ac[employer]? You can also review all of your test results from your hospital stay or ER visit. Additional Information If you have questions, please visit the Frequently Asked Questions section of the Aviac[employer] website at [email or web address] Remember, Avi[employer]ode is NOT to be used for urgent needs. For medical emergencies, dial 911. Now available from your iPhone and Android! Please provide this summary of care documentation to your next provider. Your primary care clinician is listed as Angelito Perkins. If you have any questions after today's visit, please call [phone].

## 2017-10-17 ENCOUNTER — TELEPHONE (OUTPATIENT)
Dept: PULMONOLOGY | Age: 57
End: 2017-10-17

## 2017-10-17 NOTE — TELEPHONE ENCOUNTER
Pulmonary Rehab called patient and spoke to her about the program. She is interested and would like to know what her insurance covers. Will call back with insurance benefits.      Thank you,  Wilma Sommer

## 2017-10-18 ENCOUNTER — TELEPHONE (OUTPATIENT)
Dept: PULMONOLOGY | Age: 57
End: 2017-10-18

## 2017-10-18 RX ORDER — METOPROLOL SUCCINATE 50 MG/1
TABLET, EXTENDED RELEASE ORAL
Qty: 30 TAB | Refills: 3 | Status: SHIPPED | OUTPATIENT
Start: 2017-10-18 | End: 2018-02-15 | Stop reason: SDUPTHER

## 2017-10-18 NOTE — TELEPHONE ENCOUNTER
Pulmonary Rehab called patient and spoke to her about her insurance benefits. She understands and is ready to begin. Will follow up to schedule.     Thank you,  Mady Fent

## 2017-10-26 ENCOUNTER — HOSPITAL ENCOUNTER (OUTPATIENT)
Dept: MAMMOGRAPHY | Age: 57
Discharge: HOME | End: 2017-10-26
Attending: FAMILY MEDICINE
Payer: MEDICAID

## 2017-10-26 DIAGNOSIS — Z12.31 ENCOUNTER FOR SCREENING MAMMOGRAM FOR BREAST CANCER: ICD-10-CM

## 2017-10-26 PROCEDURE — 77067 SCR MAMMO BI INCL CAD: CPT

## 2017-11-17 ENCOUNTER — HOSPITAL ENCOUNTER (OUTPATIENT)
Dept: PULMONOLOGY | Age: 57
Discharge: HOME | End: 2017-11-17
Payer: MEDICAID

## 2017-11-17 DIAGNOSIS — E78.5 DYSLIPIDEMIA: ICD-10-CM

## 2017-11-17 PROCEDURE — 94620 HC PULMONARY STRESS TEST SIMPLE: CPT

## 2017-11-20 RX ORDER — BLOOD-GLUCOSE METER
KIT MISCELLANEOUS
Qty: 100 STRIP | Refills: 11 | Status: SHIPPED | OUTPATIENT
Start: 2017-11-20

## 2017-11-20 RX ORDER — ATORVASTATIN CALCIUM 80 MG/1
TABLET, FILM COATED ORAL
Qty: 30 TAB | Refills: 11 | Status: SHIPPED | OUTPATIENT
Start: 2017-11-20 | End: 2018-11-28 | Stop reason: SDUPTHER

## 2017-11-20 RX ORDER — PEN NEEDLE, DIABETIC 29 G X1/2"
NEEDLE, DISPOSABLE MISCELLANEOUS
Qty: 200 SYRINGE | Refills: 3 | Status: SHIPPED | OUTPATIENT
Start: 2017-11-20 | End: 2018-09-28 | Stop reason: SDUPTHER

## 2017-11-20 RX ORDER — INSULIN GLARGINE 100 [IU]/ML
INJECTION, SOLUTION SUBCUTANEOUS
Qty: 30 VIAL | Refills: 3 | Status: SHIPPED | OUTPATIENT
Start: 2017-11-20 | End: 2018-02-28 | Stop reason: SDUPTHER

## 2017-11-21 ENCOUNTER — TELEPHONE (OUTPATIENT)
Dept: PULMONOLOGY | Age: 57
End: 2017-11-21

## 2017-11-21 NOTE — TELEPHONE ENCOUNTER
Pulmonary Rehab has called and left a second message on patients voicemail attempting to schedule her Pulmonary Rehab visits. Will continue to follow up.     Thank you,  Nayely Hickey

## 2017-11-24 DIAGNOSIS — G25.81 RESTLESS LEG SYNDROME: ICD-10-CM

## 2017-11-24 RX ORDER — DULOXETIN HYDROCHLORIDE 60 MG/1
CAPSULE, DELAYED RELEASE ORAL
Qty: 30 CAP | Refills: 2 | Status: SHIPPED | OUTPATIENT
Start: 2017-11-24 | End: 2018-02-15 | Stop reason: SDUPTHER

## 2017-11-24 RX ORDER — GABAPENTIN 300 MG/1
CAPSULE ORAL
Qty: 30 CAP | Refills: 2 | Status: SHIPPED | OUTPATIENT
Start: 2017-11-24 | End: 2018-02-28 | Stop reason: SDUPTHER

## 2017-11-27 ENCOUNTER — TELEPHONE (OUTPATIENT)
Dept: FAMILY MEDICINE CLINIC | Age: 57
End: 2017-11-27

## 2017-11-29 ENCOUNTER — HOSPITAL ENCOUNTER (OUTPATIENT)
Dept: PULMONOLOGY | Age: 57
Discharge: HOME | End: 2017-11-29
Payer: MEDICAID

## 2017-11-29 PROCEDURE — G0237 THERAPEUTIC PROCD STRG ENDUR: HCPCS

## 2017-11-29 RX ORDER — INSULIN PUMP SYRINGE, 3 ML
EACH MISCELLANEOUS
Qty: 1 KIT | Refills: 0 | Status: SHIPPED | OUTPATIENT
Start: 2017-11-29 | End: 2017-12-19

## 2017-11-29 NOTE — PROGRESS NOTES
[address] [place] [address] Dr  Respiratory Therapy Flowsheet      Mo Goodman  1960       Patient's carry-over of treatment delivered by: Day one of therapy     Objective Daily Findings    Comments:    Respiratory Muscle Functioning/Exercise Conditioning/Strengthening Session:    Time In:   Time Out::142  Session Number: 1  O2 with Therapy: 0 L/min    Pre SPO2 98  Pre HR:72  Pre BP: 133/83    RR: 16    Wt: 266  Temp: not taken by patient    Post SPO2: 95 Post HR: 80  Post BP: 128/80    Self Care Home Management Instruction/Education    Patient Self Monitored Activity Time In: Time Out:1420     Self Care Home Management Instruction Education: Medication & Side Effects and Metered Dose Inhaler (MDI). Patient's Response to Education: Patient actively participated in education and Able to demonstrate. Comments: Individual Therapy         Goal    Actual                      During Therapy                 Post-Therapy     % SpO2 HR RPD 1 - 4 RPE 6-20 Pain  1 - 10 % SpO2 HR RPD 1 - 4 RPE 6-20 Pain 0 - 10   Treadmill                 Bike               Stepper L1  30 min L1  30 min 97 81 1 12 1 98 80 1 6 1   Arm Ergometer 15 callahan  20 min 10w,15m  15w,15m 96 84 1 12 1 96 82 1 6 1   Rower               Weight Training               Therabands               Weighted DB 2 lb  20 min 2 lb  20 min 98 82 1 12 1 98 82 1 6 1   IMT               IS 2000 ml  25 min 1750 ml  25 min   98 81 2 13 1 98 80 1 6 1                    Patient's response to therapy: Increase fatique, Good effort and Good motivation  Comments  :    Assessment    Patient's response: Patient progressing towardsd LTGs evident by: Increased PLB and Increased DB.     Plan: Continue as written    IIQE:    Billin Minutes of Individualized Treatment ()        Physician supervision provided this date of service by: Dr. Ellen Mayes       Therapist Signature:Lori Karyle Beaver, RT    Therapist PRINTED Name and Credential: Garima Smith RT    11/29/2017  12:30 PM

## 2017-12-01 ENCOUNTER — HOSPITAL ENCOUNTER (OUTPATIENT)
Dept: PULMONOLOGY | Age: 57
Discharge: HOME | End: 2017-12-01
Payer: MEDICAID

## 2017-12-01 PROCEDURE — G0237 THERAPEUTIC PROCD STRG ENDUR: HCPCS

## 2017-12-01 NOTE — PROGRESS NOTES
[address] [place] [address]   Respiratory Therapy Flowsheet      Josafat Perales  1960       Patient's carry-over of treatment delivered by: Patient reports that she felt good after her first day of exercising. She reports some soreness in her shoulders. Objective Daily Findings    Comments:Blodd Sugar pre-exercise, 234      Respiratory Muscle Functioning/Exercise Conditioning/Strengthening Session:    Time In: 1240  Time Out::1430  Session Number: 2  O2 with Therapy: 0 L/min    Pre SPO2 98  Pre HR:88  Pre BP: 110/78    RR: 18    Wt: 272  Temp: 96.4   Post SPO2: 97 Post HR: 89  Post BP: 110/60    Self Care Home Management Instruction/Education    Patient Self Monitored Activity Time In:1245 Time Out:1325     Self Care Home Management Instruction Education: Exercise Concepts and Secretion Clearance and Home Managment. Patient's Response to Education: Patient actively participated in education. Comments: Individual Therapy         Goal    Actual                      During Therapy                 Post-Therapy     % SpO2 HR RPD 1 - 4 RPE 6-20 Pain  1 - 10 % SpO2 HR RPD 1 - 4 RPE 6-20 Pain 0 - 10   Treadmill                 Bike               Stepper L2  30 min L2  25 min 97 87 1 11 1 96 88 1 6 1   Arm Ergometer 15 callahan  30 min 10 callahan  30 min 96 86 2 13 1 97 89 1 6 1   Rower               Weight Training               Therabands               Weighted DB               IMT 7 cm  20 min 6 cm  15 min 98 80 1 11 1 98 86 1 6 1   IS 2000 ml  25 min 1750 ml  20 min 99 81 2 13 1 99 80 1 6 1   PEP 7 cm  20 min 6 cm  10 min 98 84 1 12 1 98 80 1 6 1     Patient's response to therapy: Increase fatique, Good effort and Good motivation  Comments  :    Assessment    Patient's response: Patient progressing towardsd LTGs evident by: Increased PLB.     Plan: Continue as written    RVTB:    [facility] Minutes of Individualized Treatment ()        Physician supervision provided this date of service by: Dr. Suzette Bravo        Therapist Signature:Bruna Tam RT    Therapist PRINTED Name and Credential: RT Virgen    12/1/2017  12:46 PM

## 2017-12-06 ENCOUNTER — TELEPHONE (OUTPATIENT)
Dept: PULMONOLOGY | Age: 57
End: 2017-12-06

## 2017-12-06 NOTE — TELEPHONE ENCOUNTER
Ms. Paola Madsen called and left a message on Pulmonary Rehab's voicemail stating that she was out Monday, 12/4/17 and will be out today, 12/6/17 due to illness.      Thank you,  Ruddy Landaverde

## 2017-12-08 ENCOUNTER — TELEPHONE (OUTPATIENT)
Dept: CARDIAC REHAB | Age: 57
End: 2017-12-08

## 2017-12-08 NOTE — TELEPHONE ENCOUNTER
Ms. Paola Madsen left a message on Pulmonary Rehab voicemail stating she is still sick but plans to return on Monday, 12/11/17.

## 2017-12-11 ENCOUNTER — HOSPITAL ENCOUNTER (OUTPATIENT)
Dept: PULMONOLOGY | Age: 57
Discharge: HOME | End: 2017-12-11
Payer: MEDICAID

## 2017-12-11 PROCEDURE — G0237 THERAPEUTIC PROCD STRG ENDUR: HCPCS

## 2017-12-11 NOTE — PROGRESS NOTES
[address] [place] [address]   Respiratory Therapy Flowsheet      Aliyah Salgueroer  1960       Patient's carry-over of treatment delivered by: Patient reports that she is glad to be back to class after being sick last week. Objective Daily Findings    Comments:Patient arrives on time, ready to exercise    Respiratory Muscle Functioning/Exercise Conditioning/Strengthening Session:    Time In: 1230  Time Out::1425  Session Number: 3  O2 with Therapy: 0 L/min    Pre SPO2 98  Pre HR:75  Pre BP: 120/70    RR: 18    Wt: 272  Temp: 95.5   Post SPO2: 96 Post HR: 80  Post BP: 118/70    Self Care Home Management Instruction/Education    Patient Self Monitored Activity Time In:1235 Time Out:1320     Self Care Home Management Instruction Education: Collaborative Self Managment. Patient's Response to Education: Patient actively participated in education. Comments: Individual Therapy         Goal    Actual                      During Therapy                 Post-Therapy     % SpO2 HR RPD 1 - 4 RPE 6-20 Pain  1 - 10 % SpO2 HR RPD 1 - 4 RPE 6-20 Pain 0 - 10   Treadmill                 Bike               Stepper L3  30 min L2   96 87 1 11 1        Arm Ergometer 10 callahan  30 min 5w,15m  10w,15m 99 76 1 12 2 96 85 1 6 1   Rower               Weight Training               Therabands               Weighted DB               IMT 8 cm  20 min 7 cm  10 cm 98 74 1 11 1 99 71 1 6 1   IS 1500 ml  25 min 1500 ml  20 min 99 72 1 11 1 98 73 1 6 1   PEP 8 cm  15 min 7cm  10 min 99 71 1 12 1 99 72 1 6 1     Patient's response to therapy: Increase fatique, Good effort and Good motivation  Comments  : 30 day PHQ-9 score: 15    Assessment    Patient's response: Patient progressing towardsd LTGs evident by: Increased PLB.     Plan: Continue as written    RKQP:    [facility] Minutes of Individualized Treatment ()        Physician supervision provided this date of service by: Dr. Allyn Aguero       Therapist Signature:Bruna Hammond RT    Therapist PRINTED Name and Credential: RT Elias    12/11/2017  12:36 PM

## 2017-12-13 ENCOUNTER — HOSPITAL ENCOUNTER (OUTPATIENT)
Dept: PULMONOLOGY | Age: 57
Discharge: HOME | End: 2017-12-13
Payer: MEDICAID

## 2017-12-13 PROCEDURE — G0237 THERAPEUTIC PROCD STRG ENDUR: HCPCS

## 2017-12-13 NOTE — PROGRESS NOTES
[facility]  Respiratory Therapy Flowsheet      Josafat Perales  1960       Patient's carry-over of treatment delivered by: Patient reports that feels ready to try doing some low-impact exercises at home. Objective Daily Findings    Comments:    Respiratory Muscle Functioning/Exercise Conditioning/Strengthening Session:    Time In: 1230  Time Out::1425  Session Number: 4  O2 with Therapy: 0 L/min    Pre SPO2 96  Pre HR:77  Pre BP: 136/87    RR: 18    Wt: 276  Temp: 94.3   Post SPO2: 99 Post HR: 70  Post BP: 137/89    Self Care Home Management Instruction/Education    Patient Self Monitored Activity Time In:1330 Time AJZ:[id number]     Self Care Home Management Instruction Education: COPD & Lung Disease. Patient's Response to Education: Patient actively participated in education. Comments: Individual Therapy         Goal    Actual                      During Therapy                 Post-Therapy     % SpO2 HR RPD 1 - 4 RPE 6-20 Pain  1 - 10 % SpO2 HR RPD 1 - 4 RPE 6-20 Pain 0 - 10   Treadmill                 Bike               Stepper L3  30 min L3  30 min 96 73 1 12 1 98 74 1 6 1   Arm Ergometer 15 callahan  30 min 5w,15m  10w,15m 96 79 2 15 1 97 78 1 6 1   Rower               Weight Training               Therabands               Weighted DB 2 lb  10 min 2 lb  10 min 99 71 1 11 1 99 70 1 6 1   IMT 10 cm  20 min 8 cm  15 min 99 70 1 11 1 99 69 1 6 1   IS 1500 ml  25 min 1250 ml  20 min 98 72 1 12 1 98 71 1 6 1   PEP 10 cm  20 min 8 cm  10 min 98 74 2 11 1 98 75 1 6 1     Patient's response to therapy: Good effort and Good motivation  Comments  :    Assessment    Patient's response: Patient progressing towardsd LTGs evident by: Increased PLB.     Plan: Continue as written    FBUE:    [facility] Minutes of Individualized Treatment ()        Physician supervision provided this date of service by: Dr. Annie Mcdonough       Therapist Signature:RT Daniel    Therapist PRINTED Name and Credential: RT Beata    12/13/2017  12:35 PM

## 2017-12-15 ENCOUNTER — HOSPITAL ENCOUNTER (OUTPATIENT)
Dept: PULMONOLOGY | Age: 57
Discharge: HOME | End: 2017-12-15
Payer: MEDICAID

## 2017-12-15 PROCEDURE — G0237 THERAPEUTIC PROCD STRG ENDUR: HCPCS

## 2017-12-15 NOTE — PROGRESS NOTES
[address] [place] [address] Dr  Respiratory Therapy Flowsheet      Thalia Prader  1960       Patient's carry-over of treatment delivered by:     Objective Daily Findings    Comments:    Respiratory Muscle Functioning/Exercise Conditioning/Strengthening Session:    Time In:   Time Out::1425  Session Number: 5  O2 with Therapy: 0 L/min    Pre SPO2 97  Pre HR:78  Pre BP: 115/60    RR: 18    Wt: 274  Temp: 95.4   Post SPO2: 95 Post HR: 75  Post BP: 107/65    Self Care Home Management Instruction/Education    Patient Self Monitored Activity Time In:1335 Time EAW:1529     Self Care Home Management Instruction Education: Exercise Concepts. Patient's Response to Education: Patient actively participated in education. Comments: Individual Therapy         Goal    Actual                      During Therapy                 Post-Therapy     % SpO2 HR RPD 1 - 4 RPE 6-20 Pain  1 - 10 % SpO2 HR RPD 1 - 4 RPE 6-20 Pain 0 - 10   Treadmill                 Bike               Stepper L3  30 min L2,20min  L3,10min 96 77 1 11 1 97 79 1 6 1   Arm Ergometer 15 callahan  30 min 10 callahan  25 min   98 78 1 14 1 98 77 1 7 1   Rower               Weight Training 2 lb  10 min 2 lb  5 min   98 77 2 14 4 98 76 1 6 1   Therabands               Weighted DB               IMT 10 cm  20 min 9 cm  15 min 95 70 1 12 1 96 72 1 6 1   IS 1500 ml  25 min 1000 ml  25 min 96 76 1 12 1 96 79 1 6 1   PEP 10 cm  20 min 9 cm  10 min 96 79 1 12 1 95 82 1 6 1     Patient's response to therapy: Good effort and Good motivation  Comments  :    Assessment    Patient's response: Patient progressing towardsd LTGs evident by: Increased PLB.     Plan: Continue as written    JJBZ:    [facility] Minutes of Individualized Treatment ()        Physician supervision provided this date of service by: Dr. Lupillo Funes       Therapist Signature:Bruna Piña RT    Therapist PRINTED Name and Credential: RT Bryan    12/15/2017  12:37 PM

## 2017-12-18 ENCOUNTER — TELEPHONE (OUTPATIENT)
Dept: PULMONOLOGY | Age: 57
End: 2017-12-18

## 2017-12-18 ENCOUNTER — APPOINTMENT (OUTPATIENT)
Dept: PULMONOLOGY | Age: 57
End: 2017-12-18
Payer: MEDICAID

## 2017-12-18 NOTE — TELEPHONE ENCOUNTER
Ms. Patsy Hollingsworthchucho informed me on Friday that she would not be able to keep her appointment with us for Pulmonary Rehab on 12/18/17 due to her son having surgery on that day. She plans to return to class on 12/20/17.

## 2017-12-18 NOTE — TELEPHONE ENCOUNTER
Ms. Terrence Sommer informs me that her son is having surgery today and she will not be in Pulmonary Rehab today. She plans to return to class on Wednesday.

## 2017-12-19 ENCOUNTER — OFFICE VISIT (OUTPATIENT)
Dept: FAMILY MEDICINE CLINIC | Age: 57
End: 2017-12-19

## 2017-12-19 VITALS
BODY MASS INDEX: 42.53 KG/M2 | OXYGEN SATURATION: 97 % | WEIGHT: 271 LBS | DIASTOLIC BLOOD PRESSURE: 69 MMHG | TEMPERATURE: 98.4 F | HEIGHT: 67 IN | RESPIRATION RATE: 18 BRPM | HEART RATE: 76 BPM | SYSTOLIC BLOOD PRESSURE: 131 MMHG

## 2017-12-19 DIAGNOSIS — E11.65 UNCONTROLLED TYPE 2 DIABETES MELLITUS WITH HYPERGLYCEMIA, WITH LONG-TERM CURRENT USE OF INSULIN (HCC): Primary | ICD-10-CM

## 2017-12-19 DIAGNOSIS — Z79.4 UNCONTROLLED TYPE 2 DIABETES MELLITUS WITH HYPERGLYCEMIA, WITH LONG-TERM CURRENT USE OF INSULIN (HCC): Primary | ICD-10-CM

## 2017-12-19 DIAGNOSIS — J45.901 MODERATE ASTHMA WITH ACUTE EXACERBATION, UNSPECIFIED WHETHER PERSISTENT: ICD-10-CM

## 2017-12-19 DIAGNOSIS — R25.1 TREMORS OF NERVOUS SYSTEM: ICD-10-CM

## 2017-12-19 LAB — GLUCOSE POC: 210 MG/DL

## 2017-12-19 NOTE — PROGRESS NOTES
Chief Complaint   Patient presents with    Referral Request     new referral request to see Neurology (Dr. Xochitl Yoo)   [address] [place] [address] Follow-up     Patient in for follow-up care. She is requesting new neurology referral to see Dr. Xochitl Yoo. 1. Have you been to the ER, urgent care clinic since your last visit? Hospitalized since your last visit? No    2. Have you seen or consulted any other health care providers outside of the [address] [place] [address] since your last visit? Include any pap smears or colon screening. No     HPI  Miguel Barnett comes in for follow-up care. Diabetes mellitus type 2: Patient has type 2 diabetes mellitus that is uncontrolled. She is taking Lantus. Takes 100 units daily. I have referred her to an endocrinologist but she has yet to go. This is due to the uncontrolled nature of her diabetes. We have talked several about things that need to be done to get some of these have not been accomplished. Today we do need to do a foot exam and she will need to have a microalbumin done soon but she declines this. She states she will do this at her next visit. .  She does state that her blood glucose was high yesterday due to dietary indiscretion. She would like to get her blood glucose checked. We did check it and it was 210. I did bring the suggestion of adding another medication like Januvia to her treatment but the patient is reluctant to do this. States that she we can talk about this at the next visit. She does not want to adjust her medications for diabetes. Her HbA1c has been elevated at 10.2. This does need to come down. Patient also does need to see endocrinologist as I am unable to have her work with me on trying to bring the diabetes under better control. Tremors: Patient has tremors of bilateral upper extremities. These are spontaneous and her come on and off. At times has difficulty with using a pen or gripping on objects due to the shaking nature of the tremor.   We had referred her to her neurologist.  She now comes in and states that she was to be referred to Dr. Rachid Tanner. Referral has been placed. Respiratory: Patient has moderate persistent asthma and has been followed up with the pulmonologist.  She is using her inhaler medications. She has been having pulmonary rehab. We will encourage her to continue with these efforts and follow-up with the pulmonologist as scheduled. Past Medical History  Past Medical History:   Diagnosis Date    Arthritis     Lower back     Chronic back pain     Lower back pain    Depression     Diabetes [facility]     Diabetes mellitus ([facility]     Panic attacks     Sleep apnea     Thyroid disease        Surgical History  Past Surgical History:   Procedure Laterality Date    HX HEART VALVE SURGERY  2013    aortic valve repair    HX HYSTERECTOMY      Partial Hysterectomy - removed ovary    HX MYOMECTOMY          Medications  Current Outpatient Prescriptions   Medication Sig Dispense Refill    Blood-Glucose Meter monitoring kit Check blood glucose 2 x day 1 Kit 0    DULoxetine (CYMBALTA) 60 mg capsule take 1 capsule by mouth once daily 30 Cap 2    gabapentin (NEURONTIN) 300 mg capsule take 1 capsule by mouth at bedtime 30 Cap 2    FREESTYLE LITE STRIPS strip TEST twice a day as directed 100 Strip 11    atorvastatin (LIPITOR) 80 mg tablet take 1 tablet by mouth once daily 30 Tab 11    LANTUS 100 unit/mL injection INJECT 90 UNITS SUBCUTANEOUSLY AT BEDTIME 30 Vial 3    BD INSULIN SYRINGE ULTRA-FINE 1 mL 31 gauge x 5/16 syrg use as directed twice a day 200 Syringe 3    metoprolol succinate (TOPROL-XL) 50 mg XL tablet take 1 tablet by mouth once daily 30 Tab 3    SYMBICORT 160-4.5 mcg/actuation HFAA inhale 2 puffs by mouth twice a day 10.2 Inhaler 1    budesonide-formoterol (SYMBICORT) 160-4.5 mcg/actuation HFA inhaler Take 2 Puffs by inhalation two (2) times a day.  1 Inhaler     ergocalciferol (ERGOCALCIFEROL) 50,000 unit capsule Take 1 Cap by mouth every seven (7) days. 12 Cap 0    PROAIR HFA 90 mcg/actuation inhaler inhale 2 puffs by mouth every 4 hours if needed for wheezing 8.5 Inhaler 3    Omega-3-DHA-EPA-Fish Oil 1,000 mg (120 mg-180 mg) cap take 1 capsule by mouth twice a day 60 Cap 2    potassium chloride (KLOR-CON) 10 mEq tablet take 1 tablet by mouth once daily 30 Tab 1    furosemide (LASIX) 40 mg tablet take 1 tablet by mouth twice a day for 7 days 60 Tab 0    albuterol (PROVENTIL VENTOLIN) 2.5 mg /3 mL (0.083 %) nebulizer solution 3 mL by Nebulization route every four (4) hours as needed for Wheezing. 24 Each 5    cyclobenzaprine (FLEXERIL) 10 mg tablet Take 1 Tab by mouth three (3) times daily as needed for Muscle Spasm(s). 90 Tab 2    baclofen (LIORESAL) 10 mg tablet 1  qhs  prn 30 Tab 2    BD INSULIN SYRINGE ULTRA-FINE 1 mL 31 gauge x 5/16 syrg use as directed twice a day 200 Syringe 3    cyanocobalamin (VITAMIN B-12) 1,000 mcg sublingual tablet Take 1,000 mcg by mouth daily.  Aspirin, Buffered 81 mg tab Take  by mouth. Allergies  Allergies   Allergen Reactions    Other Food Other (comments)     Sweeteners- causes headaches    Metformin Other (comments)     Increase pain in feet and swelling in feet    Morphine Other (comments)     headache    Singulair [Montelukast] Other (comments)     hallucinations       Family History  Family History   Problem Relation Age of Onset    Heart Disease Mother     Diabetes Mother     Stroke Mother     Diabetes Father     Heart Disease Father        Social History  Social History     Social History    Marital status:      Spouse name: N/A    Number of children: N/A    Years of education: N/A     Occupational History    Not on file.      Social History Main Topics    Smoking status: Never Smoker    Smokeless tobacco: Never Used    Alcohol use No    Drug use: No    Sexual activity: Not Currently     Other Topics Concern     Service No    Blood Transfusions No    Caffeine Concern No    Occupational Exposure No    Hobby Hazards No    Sleep Concern Yes     Sleep apnea     Stress Concern Yes     Due to family medical issues.      Weight Concern No    Special Diet No    Back Care Yes     Patient tries to do back care with streches     Exercise No    Bike Helmet Yes    Seat Belt Yes    Self-Exams Yes     Social History Narrative       Review of Systems  Review of Systems - History obtained from chart review and the patient  General ROS: positive for  - fatigue, malaise and sleep disturbance  Psychological ROS: positive for - mood swings  Ophthalmic ROS: positive for - decreased vision  ENT ROS: negative  Allergy and Immunology ROS: negative  Hematological and Lymphatic ROS: negative  Endocrine ROS: dm2  Respiratory ROS: positive for - cough, shortness of breath and wheezing  negative for - hemoptysis, pleuritic pain or sputum changes  Cardiovascular ROS: negative  Gastrointestinal ROS: no abdominal pain, change in bowel habits, or black or bloody stools  Genito-Urinary ROS: negative  Musculoskeletal ROS: positive for - joint pain, joint stiffness and muscle pain  Neurological ROS: positive for - gait disturbance, tremors and weakness    Vital Signs  Visit Vitals    /69 (BP 1 Location: Left arm, BP Patient Position: Sitting)    Pulse 76    Temp 98.4 °F (36.9 °C) (Oral)    Resp 18    Ht 5' 7\" (1.702 m)    Wt 271 lb (122.9 kg)    SpO2 97%    BMI 42.44 kg/m2         Physical Exam  Physical Examination: General appearance - oriented to person, place, and time, overweight, acyanotic, in no respiratory distress and well hydrated  Mental status - affect appropriate to mood  Mouth - mucous membranes moist, pharynx normal without lesions  Chest - decreased air entry noted bilateral bases  Heart - S1 and S2 normal  Back exam - limited range of motion  Musculoskeletal - osteoarthritic changes noted in both hands  Extremities - intact peripheral pulses    Results  Results for orders placed or performed in visit on 08/28/17   CBC WITH AUTOMATED DIFF   Result Value Ref Range    WBC 11.1 (H) 3.4 - 10.8 x10E3/uL    RBC 5.13 3.77 - 5.28 x10E6/uL    HGB 15.2 11.1 - 15.9 g/dL    HCT 45.4 34.0 - 46.6 %    MCV 89 79 - 97 fL    MCH 29.6 26.6 - 33.0 pg    MCHC 33.5 31.5 - 35.7 g/dL    RDW 15.3 12.3 - 15.4 %    PLATELET 429 864 - 313 x10E3/uL    NEUTROPHILS 76 %    Lymphocytes 16 %    MONOCYTES 6 %    EOSINOPHILS 1 %    BASOPHILS 1 %    ABS. NEUTROPHILS 8.5 (H) 1.4 - 7.0 x10E3/uL    Abs Lymphocytes 1.8 0.7 - 3.1 x10E3/uL    ABS. MONOCYTES 0.7 0.1 - 0.9 x10E3/uL    ABS. EOSINOPHILS 0.1 0.0 - 0.4 x10E3/uL    ABS. BASOPHILS 0.1 0.0 - 0.2 x10E3/uL    IMMATURE GRANULOCYTES 0 %    ABS. IMM. GRANS. 0.0 0.0 - 0.1 x10E3/uL   LIPID PANEL   Result Value Ref Range    Cholesterol, total 131 100 - 199 mg/dL    Triglyceride 221 (H) 0 - 149 mg/dL    HDL Cholesterol 45 >39 mg/dL    VLDL, calculated 44 (H) 5 - 40 mg/dL    LDL, calculated 42 0 - 99 mg/dL   METABOLIC PANEL, COMPREHENSIVE   Result Value Ref Range    Glucose 251 (H) 65 - 99 mg/dL    BUN 14 6 - 24 mg/dL    Creatinine 0.69 0.57 - 1.00 mg/dL    GFR est non-AA 98 >59 mL/min/1.73    GFR est  >59 mL/min/1.73    BUN/Creatinine ratio 20 9 - 23    Sodium 141 134 - 144 mmol/L    Potassium 4.6 3.5 - 5.2 mmol/L    Chloride 98 96 - 106 mmol/L    CO2 27 18 - 29 mmol/L    Calcium 9.4 8.7 - 10.2 mg/dL    Protein, total 7.5 6.0 - 8.5 g/dL    Albumin 4.0 3.5 - 5.5 g/dL    GLOBULIN, TOTAL 3.5 1.5 - 4.5 g/dL    A-G Ratio 1.1 (L) 1.2 - 2.2    Bilirubin, total 0.9 0.0 - 1.2 mg/dL    Alk.  phosphatase 156 (H) 39 - 117 IU/L    AST (SGOT) 16 0 - 40 IU/L    ALT (SGPT) 18 0 - 32 IU/L   TSH 3RD GENERATION   Result Value Ref Range    TSH 3.060 0.450 - 4.500 uIU/mL   VITAMIN D, 25 HYDROXY   Result Value Ref Range    VITAMIN D, 25-HYDROXY 7.6 (L) 30.0 - 100.0 ng/mL   CVD REPORT   Result Value Ref Range    INTERPRETATION Note    DIABETES PATIENT EDUCATION   Result Value Ref Range    PDF Image Not applicable    AMB POC HEMOGLOBIN A1C   Result Value Ref Range    Hemoglobin A1c (POC) 10.2 %       ASSESSMENT and PLAN  There are no diagnoses linked to this encounter. ICD-10-CM ICD-9-CM    1. Uncontrolled type 2 diabetes mellitus with hyperglycemia, with long-term current use of insulin ([facility]) E11.65 250.02 AMB POC GLUCOSE BLOOD, BY GLUCOSE MONITORING DEVICE    Z79.4 V58.67    2. Tremors of nervous system R25.1 781.0 REFERRAL TO NEUROLOGY     reviewed diet, exercise and weight control  reviewed medications and side effects in detail  specific diabetic recommendations: low cholesterol diet, weight control and daily exercise discussed, home glucose monitoring emphasized, all medications, side effects and compliance discussed carefully, annual eye examinations at Ophthalmology discussed, glycohemoglobin and other lab monitoring discussed and long term diabetic complications discussed    I have discussed the diagnosis with the patient and the intended plan of care as seen in the above orders. The patient has received an after-visit summary and questions were answered concerning future plans. I have discussed medication, side effects, and warnings with the patient in detail. The patient verbalized understanding and is in agreement with the plan of care. The patient will contact the office with any additional concerns.     Bard Nageotte, MD

## 2017-12-19 NOTE — MR AVS SNAPSHOT
Visit Information Date & Time Provider Department Dept. Phone Encounter #  
 12/19/2017 11:00 AM Angelito Martinez, [facility] [phone] [id number] Follow-up Instructions Return in about 2 months (around 2/19/2018), or if symptoms worsen or fail to improve, for dm2, htn. Upcoming Health Maintenance Date Due FOBT Q 1 YEAR AGE 50-75 4/13/2011 EYE EXAM RETINAL OR DILATED Q1 12/11/2016 FOOT EXAM Q1 12/14/2017 MICROALBUMIN Q1 1/11/2018 HEMOGLOBIN A1C Q6M 2/28/2018 LIPID PANEL Q1 8/28/2018 PAP AKA CERVICAL CYTOLOGY 11/13/2018 DTaP/Tdap/Td series (2 - Td) 11/11/2026 Allergies as of 12/19/2017  Review Complete On: 12/19/2017 By: Shira Henriquez MD  
  
 Severity Noted Reaction Type Reactions Other Food  03/17/2016    Other (comments) Sweeteners- causes headaches Metformin  11/06/2015    Other (comments) Increase pain in feet and swelling in feet Morphine  01/25/2017    Other (comments)  
 headache Singulair [Montelukast]  07/27/2017    Other (comments)  
 hallucinations Current Immunizations  Never Reviewed No immunizations on file. Not reviewed this visit You Were Diagnosed With   
  
 Codes Comments Uncontrolled type 2 diabetes mellitus with hyperglycemia, with long-term current use of insulin (HCC)    -  Primary ICD-10-CM: E11.65, Z79.4 ICD-9-CM: 250.02, V58.67 Tremors of nervous system     ICD-10-CM: R25.1 ICD-9-CM: 383. 0 Vitals BP Pulse Temp Resp Height(growth percentile) Weight(growth percentile) 131/69 (BP 1 Location: Left arm, BP Patient Position: Sitting) 76 98.4 °F (36.9 °C) (Oral) 18 5' 7\" (1.702 m) 271 lb (122.9 kg) SpO2 BMI OB Status Smoking Status 97% 42.44 kg/m2 Hysterectomy Never Smoker BMI and BSA Data Body Mass Index Body Surface Area  
 42.44 kg/m 2 2.41 m 2 Preferred Pharmacy Pharmacy Name Phone [address] [facility], [address] Your Updated Medication List  
  
   
This list is accurate as of: 12/19/17 11:34 AM.  Always use your most recent med list.  
  
  
  
  
 * albuterol 2.5 mg /3 mL (0.083 %) nebulizer solution Commonly known as:  PROVENTIL VENTOLIN  
3 mL by Nebulization route every four (4) hours as needed for Wheezing. * PROAIR HFA 90 mcg/actuation inhaler Generic drug:  albuterol  
inhale 2 puffs by mouth every 4 hours if needed for wheezing  
  
 aspirin, buffered 81 mg Tab Take  by mouth. atorvastatin 80 mg tablet Commonly known as:  LIPITOR  
take 1 tablet by mouth once daily * BD INSULIN SYRINGE ULTRA-FINE 1 mL 31 gauge x 5/16 Syrg Generic drug:  Insulin Syringe-Needle U-100  
use as directed twice a day * BD INSULIN SYRINGE ULTRA-FINE 1 mL 31 gauge x 5/16 Syrg Generic drug:  Insulin Syringe-Needle U-100  
use as directed twice a day DULoxetine 60 mg capsule Commonly known as:  CYMBALTA  
take 1 capsule by mouth once daily  
  
 ergocalciferol 50,000 unit capsule Commonly known as:  ERGOCALCIFEROL Take 1 Cap by mouth every seven (7) days. FREESTYLE LITE STRIPS strip Generic drug:  glucose blood VI test strips TEST twice a day as directed  
  
 furosemide 40 mg tablet Commonly known as:  LASIX  
take 1 tablet by mouth twice a day for 7 days  
  
 gabapentin 300 mg capsule Commonly known as:  NEURONTIN  
take 1 capsule by mouth at bedtime LANTUS 100 unit/mL injection Generic drug:  insulin glargine INJECT 90 UNITS SUBCUTANEOUSLY AT BEDTIME  
  
 metoprolol succinate 50 mg XL tablet Commonly known as:  TOPROL-XL  
take 1 tablet by mouth once daily [place]-3-DHA-EPA-Fish Oil 1,000 mg (120 mg-180 mg) Cap  
take 1 capsule by mouth twice a day  
  
 potassium chloride 10 mEq tablet Commonly known as:  KLOR-CON  
take 1 tablet by mouth once daily SYMBICORT 160-4.5 mcg/actuation Hfaa Generic drug:  budesonide-formoterol  
inhale 2 puffs by mouth twice a day VITAMIN B-12 1,000 mcg sublingual tablet Generic drug:  cyanocobalamin Take 1,000 mcg by mouth daily. * Notice: This list has 4 medication(s) that are the same as other medications prescribed for you. Read the directions carefully, and ask your doctor or other care provider to review them with you. We Performed the Following AMB POC GLUCOSE BLOOD, BY GLUCOSE MONITORING DEVICE [91919 CPT(R)] REFERRAL TO NEUROLOGY [LBC36 Custom] Comments:  
 Patient has tremors, requests referral to Dr Aylin Nicole Follow-up Instructions Return in about 2 months (around 2/19/2018), or if symptoms worsen or fail to improve, for dm2, htn. To-Do List   
 12/20/2017 12:30 PM  
  Appointment with [address] at [place] [address] ([phone])  
  
 12/22/2017 12:30 PM  
  Appointment with [address] at [place] [address] ([phone])  
  
 12/25/2017 12:30 PM  
  Appointment with [address] at [place] [address] ([phone])  
  
 12/27/2017 12:30 PM  
  Appointment with [address] at [place] [address] ([phone])  
  
 12/29/2017 12:30 PM  
  Appointment with [address] at [place] [address] ([phone])  
  
 01/01/2018 12:30 PM  
  Appointment with [address] at [place] [address] ([phone])  
  
 01/03/2018 12:30 PM  
  Appointment with [address] at [place] [address] ([phone])  
  
 01/05/2018 12:30 PM  
  Appointment with [address] at [place] [address] ([phone])  01/08/2018 12:30 PM  
  Appointment with [address] at [place] [address] ([phone])  
  
 01/10/2018 12:30 PM  
  Appointment with [address] at [place] [address] ([phone])  
  
 01/12/2018 12:30 PM  
  Appointment with [address] at [place] [address] ([phone])  
  
 01/15/2018 12:30 PM  
 Appointment with [address] at [place] [address] ([phone])  
  
 01/17/2018 12:30 PM  
  Appointment with [address] at [place] [address] ([phone])  
  
 01/19/2018 12:30 PM  
  Appointment with [address] at [place] [address] ([phone])  
  
 01/22/2018 12:30 PM  
  Appointment with [address] at [place] [address] ([phone]) Referral Information Referral ID Referred By Referred To  
  
 [id number] Thad Gonzalez N Not Available Visits Status Start Date End Date 1 New Request 12/19/17 12/19/18 If your referral has a status of pending review or denied, additional information will be sent to support the outcome of this decision. Patient Instructions Benign Essential Tremor: Care Instructions Your Care Instructions Benign essential tremor is a medical term for shaking that you can't control. Your hand or fingers may shake when you lift a cup or point at something. Or your voice may shake when you speak. This type of tremor is not harmful. It is not caused by a stroke or Parkinson's disease. Some things can affect how much you shake. For example, drinking or eating something with caffeine may make tremors worse for a while. Some medicines also can increase tremors. These include antidepressants and too much thyroid replacement. Talk to your doctor if you think one of your medicines makes your tremors worse. If you are self-conscious about your tremors, there are some things you can do to reduce them or make them less noticeable. This includes taking medicine. Follow-up care is a key part of your treatment and safety. Be sure to make and go to all appointments, and call your doctor if you are having problems. It's also a good idea to know your test results and keep a list of the medicines you take. How can you care for yourself at home? · Take your medicines exactly as prescribed.  Call your doctor if you think you are having a problem with your medicine. Some medicines that help control tremors have to be taken every day, even if you are not having tremors. You will get more details on the specific medicines your doctor prescribes. · Get plenty of rest. 
· Eat a balanced, healthy diet. · Try to reduce stress. Regular exercise and massages may help. · Limit alcohol. Heavy drinking can make your tremors worse. · Avoid drinks or foods with caffeine if they make your tremors worse. These include tea, cola, coffee, and chocolate. · Wear a heavy bracelet or watch. This adds a little weight to your hand. The extra weight may reduce tremors. · Drink from cups or glasses that are only half full. You may also want to try drinking with a straw. When should you call for help? Watch closely for changes in your health, and be sure to contact your doctor if: 
? · You notice your tremors are getting worse. ? · You can't do your everyday activities because of your tremors. ? · You are sad and embarrassed about your shaking. Where can you learn more? Go to http://nadya-irasema.info/. Enter B746 in the search box to learn more about \"Benign Essential Tremor: Care Instructions. \" Current as of: October 14, 2016 Content Version: 11.4 © 5940-4136 [employer]. Care instructions adapted under license by [employer] (which disclaims liability or warranty for this information). If you have questions about a medical condition or this instruction, always ask your healthcare professional. [place] [address] any warranty or liability for your use of this information. Introducing [facility]! Dear Jordan Aragon: Thank you for requesting a [employer] account. Our records indicate that you already have an active [employer] account. You can access your account anytime at [email or web address] Did you know that you can access your hospital and ER discharge instructions at any time in [employer]? You can also review all of your test results from your hospital stay or ER visit. Additional Information If you have questions, please visit the Frequently Asked Questions section of the [employer] website at [email or web address] Remember, [employer] is NOT to be used for urgent needs. For medical emergencies, dial 911. Now available from your iPhone and Android! Please provide this summary of care documentation to your next provider. Your primary care clinician is listed as Angelito Perkins. If you have any questions after today's visit, please call [phone].

## 2017-12-20 ENCOUNTER — HOSPITAL ENCOUNTER (OUTPATIENT)
Dept: PULMONOLOGY | Age: 57
Discharge: HOME | End: 2017-12-20
Payer: MEDICAID

## 2017-12-20 NOTE — PROGRESS NOTES
[facility]  Respiratory Therapy Flowsheet      Gladys Laughlin  1960       Patient's carry-over of treatment delivered by: Patient reports that she is not feeling well today and only wishes to participate in Patient Self Monitored Activities. Objective Daily Findings    Comments    Respiratory Muscle Functioning/Exercise Conditioning/Strengthening Session:    Time In: 1300  Time Out::1420  Session Number:   O2 with Therapy: 0 L/min    Pre SPO2 95  Pre HR:74  Pre BP: 142/81    RR: 18    Wt: 272  Temp: 95.6   Post SPO2: 96 Post HR: 72  Post BP: 118/78    Self Care Home Management Instruction/Education    Patient Self Monitored Activity Time In:1305 Time Out:1400     Self Care Home Management Instruction Education: Recognizing signs and symptoms of a lung infection. Patient's Response to Education: Patient actively participated in education. Comments: Individual Therapy         Goal    Actual                      During Therapy                 Post-Therapy     % SpO2 HR RPD 1 - 4 RPE 6-20 Pain  1 - 10 % SpO2 HR RPD 1 - 4 RPE 6-20 Pain 0 - 10   Treadmill                 Bike               Stepper               Arm Ergometer               Rower               Weight Training               Therabands               Weighted DB               IMT 10 cm  20 min 10 cm  20 min 96 80 1 11 1 96 82 1 6 1   IS 1500 ml  25 min   1500 ml  25 min 97 78 1 11 1 97 79 1 6 1   PEP 10 cm  20 min 10 cm  15 min 96 79 1 11 1 96 81 1 6 1     Patient's response to therapy: Good effort and Good motivation  Comments  :    Assessment    Patient's response: Patient progressing towardsd LTGs evident by: Improved ventilatory muscle (diaphragm) strength and endurance.     Plan: Continue as written    Code:none    Billing: non-billable         Physician supervision provided this date of service by: Dr. Jonny Carver       Therapist Signature:RT Viktoriya    Therapist PRINTED Name and Credential: Edita Kebede RT    12/20/2017  1:00 PM

## 2017-12-25 ENCOUNTER — APPOINTMENT (OUTPATIENT)
Dept: PULMONOLOGY | Age: 57
End: 2017-12-25
Payer: MEDICAID

## 2017-12-27 ENCOUNTER — OFFICE VISIT (OUTPATIENT)
Dept: FAMILY MEDICINE CLINIC | Age: 57
End: 2017-12-27

## 2017-12-27 VITALS
OXYGEN SATURATION: 96 % | HEART RATE: 66 BPM | RESPIRATION RATE: 18 BRPM | BODY MASS INDEX: 42.53 KG/M2 | HEIGHT: 67 IN | WEIGHT: 271 LBS | DIASTOLIC BLOOD PRESSURE: 69 MMHG | SYSTOLIC BLOOD PRESSURE: 115 MMHG | TEMPERATURE: 97.5 F

## 2017-12-27 DIAGNOSIS — R42 VERTIGO: Primary | ICD-10-CM

## 2017-12-27 RX ORDER — MECLIZINE HCL 12.5 MG 12.5 MG/1
12.5 TABLET ORAL
Qty: 30 TAB | Refills: 0 | Status: SHIPPED | OUTPATIENT
Start: 2017-12-27 | End: 2018-01-06

## 2017-12-27 NOTE — PROGRESS NOTES
Chief Complaint   Patient presents with    Dizziness     vertigo follow-up. 1. Have you been to the ER, urgent care clinic since your last visit? Hospitalized since your last visit? No    2. Have you seen or consulted any other health care providers outside of the [address] [place] [address] since your last visit? Include any pap smears or colon screening. No     \[facility]\""  Rider Huey in for follow-up care. Vertigo: Patient has been having sensation of vertigo. She feels like the room is spinning around when she gets up from laying down position but also when she just moves her head. At times when she is admitted she does get the sensation. Associated nausea and vomiting. No fever or chills. She does have plugged up sensation both ears with changes in her hearing. Occasional tinnitus. Denies headache. I will put on some meclizine for symptomatic relief. Given the change in hearing sensation plus vertigo I will refer her to an ENT physician for evaluation, management and advice. She may need further workup for Meniere disease.     Past Medical History  Past Medical History:   Diagnosis Date    Arthritis     Lower back     Chronic back pain     Lower back pain    Depression     Diabetes ([address]     Diabetes mellitus ([facility] [address]     Panic attacks     Sleep apnea     Thyroid disease        Surgical History  Past Surgical History:   Procedure Laterality Date    HX HEART VALVE SURGERY  2013    aortic valve repair    HX HYSTERECTOMY      Partial Hysterectomy - removed ovary    HX MYOMECTOMY          Medications  Current Outpatient Prescriptions   Medication Sig Dispense Refill    DULoxetine (CYMBALTA) 60 mg capsule take 1 capsule by mouth once daily 30 Cap 2    gabapentin (NEURONTIN) 300 mg capsule take 1 capsule by mouth at bedtime 30 Cap 2    FREESTYLE LITE STRIPS strip TEST twice a day as directed 100 Strip 11    atorvastatin (LIPITOR) 80 mg tablet take 1 tablet by mouth once daily 30 Tab 11  LANTUS 100 unit/mL injection INJECT 90 UNITS SUBCUTANEOUSLY AT BEDTIME 30 Vial 3    BD INSULIN SYRINGE ULTRA-FINE 1 mL 31 gauge x 5/16 syrg use as directed twice a day 200 Syringe 3    metoprolol succinate (TOPROL-XL) 50 mg XL tablet take 1 tablet by mouth once daily 30 Tab 3    SYMBICORT 160-4.5 mcg/actuation HFAA inhale 2 puffs by mouth twice a day 10.2 Inhaler 1    ergocalciferol (ERGOCALCIFEROL) 50,000 unit capsule Take 1 Cap by mouth every seven (7) days. 12 Cap 0    PROAIR HFA 90 mcg/actuation inhaler inhale 2 puffs by mouth every 4 hours if needed for wheezing 8.5 Inhaler 3    Omega-3-DHA-EPA-Fish Oil 1,000 mg (120 mg-180 mg) cap take 1 capsule by mouth twice a day 60 Cap 2    potassium chloride (KLOR-CON) 10 mEq tablet take 1 tablet by mouth once daily 30 Tab 1    furosemide (LASIX) 40 mg tablet take 1 tablet by mouth twice a day for 7 days 60 Tab 0    albuterol (PROVENTIL VENTOLIN) 2.5 mg /3 mL (0.083 %) nebulizer solution 3 mL by Nebulization route every four (4) hours as needed for Wheezing. 24 Each 5    BD INSULIN SYRINGE ULTRA-FINE 1 mL 31 gauge x 5/16 syrg use as directed twice a day 200 Syringe 3    cyanocobalamin (VITAMIN B-12) 1,000 mcg sublingual tablet Take 1,000 mcg by mouth daily.  Aspirin, Buffered 81 mg tab Take  by mouth.          Allergies  Allergies   Allergen Reactions    Other Food Other (comments)     Sweeteners- causes headaches    Metformin Other (comments)     Increase pain in feet and swelling in feet    Morphine Other (comments)     headache    Singulair [Montelukast] Other (comments)     hallucinations       Family History  Family History   Problem Relation Age of Onset    Heart Disease Mother     Diabetes Mother     Stroke Mother     Diabetes Father     Heart Disease Father        Social History  Social History     Social History    Marital status:      Spouse name: N/A    Number of children: N/A    Years of education: N/A     Occupational History    Not on file. Social History Main Topics    Smoking status: Never Smoker    Smokeless tobacco: Never Used    Alcohol use No    Drug use: No    Sexual activity: Not Currently     Other Topics Concern     Service No    Blood Transfusions No    Caffeine Concern No    Occupational Exposure No    Hobby Hazards No    Sleep Concern Yes     Sleep apnea     Stress Concern Yes     Due to family medical issues.      Weight Concern No    Special Diet No    Back Care Yes     Patient tries to do back care with streches     Exercise No    Bike Helmet Yes    Seat Belt Yes    Self-Exams Yes     Social History Narrative       Review of Systems  Review of Systems - History obtained from chart review and the patient  General ROS: positive for  - fatigue and malaise  Psychological ROS: negative  Ophthalmic ROS: negative  ENT ROS: positive for - hearing change, tinnitus and vertigo  Allergy and Immunology ROS: negative  Hematological and Lymphatic ROS: negative  Endocrine ROS: dm2  Respiratory ROS: no cough, shortness of breath, or wheezing  Cardiovascular ROS: no chest pain or dyspnea on exertion  Gastrointestinal ROS: no abdominal pain, change in bowel habits, or black or bloody stools  Genito-Urinary ROS: negative  Musculoskeletal ROS: positive for - joint pain and muscle pain  Neurological ROS: negative    Vital Signs  Visit Vitals    /69 (BP 1 Location: Left arm, BP Patient Position: Sitting)    Pulse 66    Temp 97.5 °F (36.4 °C) (Oral)    Resp 18    Ht 5' 7\" (1.702 m)    Wt 271 lb (122.9 kg)    SpO2 96%    BMI 42.44 kg/m2         Physical Exam  Physical Examination: General appearance - oriented to person, place, and time and acyanotic, in no respiratory distress  Mental status - alert, oriented to person, place, and time  Eyes - sclera anicteric  Ears - bilateral TM's and external ear canals normal  Nose - normal and patent, no erythema, discharge or polyps and normal nontender sinuses  Mouth - mucous membranes moist, pharynx normal without lesions  Neck - supple, no significant adenopathy  Lymphatics - no palpable lymphadenopathy  Chest - no tachypnea, retractions or cyanosis  Heart - normal rate and regular rhythm, S1 and S2 normal  Back exam - limited range of motion  Neurological - motor and sensory grossly normal bilaterally  Musculoskeletal - osteoarthritic changes noted in both hands  Extremities - intact peripheral pulses    Results  Results for orders placed or performed in visit on 12/19/17   AMB POC GLUCOSE BLOOD, BY GLUCOSE MONITORING DEVICE   Result Value Ref Range    Glucose  mg/dL       ASSESSMENT and PLAN  There are no diagnoses linked to this encounter. ICD-10-CM ICD-9-CM    1. Vertigo R42 780.4 meclizine (ANTIVERT) 12.5 mg tablet      REFERRAL TO ENT-OTOLARYNGOLOGY     reviewed diet, exercise and weight control  reviewed medications and side effects in detail    I have discussed the diagnosis with the patient and the intended plan of care as seen in the above orders. The patient has received an after-visit summary and questions were answered concerning future plans. I have discussed medication, side effects, and warnings with the patient in detail. The patient verbalized understanding and is in agreement with the plan of care. The patient will contact the office with any additional concerns.     Guillermina Peace MD

## 2017-12-27 NOTE — MR AVS SNAPSHOT
Visit Information Date & Time Provider Department Dept. Phone Encounter #  
 12/27/2017 11:30 AM MD Uriel Eaton Junior, Ma [phone] Follow-up Instructions Return if symptoms worsen or fail to improve. Your Appointments 12/27/2017 11:30 AM  
Follow Up with MD Uriel Eaton Junior, Ma ([facility]) Appt Note: vertigo [address]  
  
   
 [address] [facility]  
  
    
 2/20/2018 11:15 AM  
Follow Up with MD Uriel Eaton Junior, Ma ([facility] Appt Note: for dm2, htn  
 [address] [facility] [address] [facility]  
[phone] Upcoming Health Maintenance Date Due FOBT Q 1 YEAR AGE 50-75 4/13/2011 EYE EXAM RETINAL OR DILATED Q1 12/11/2016 FOOT EXAM Q1 12/14/2017 MICROALBUMIN Q1 1/11/2018 HEMOGLOBIN A1C Q6M 2/28/2018 LIPID PANEL Q1 8/28/2018 PAP AKA CERVICAL CYTOLOGY 11/13/2018 DTaP/Tdap/Td series (2 - Td) 11/11/2026 Allergies as of 12/27/2017  Review Complete On: 12/27/2017 By: Candis Bautista MD  
  
 Severity Noted Reaction Type Reactions Other Food  03/17/2016    Other (comments) Sweeteners- causes headaches Metformin  11/06/2015    Other (comments) Increase pain in feet and swelling in feet Morphine  01/25/2017    Other (comments)  
 headache Singulair [Montelukast]  07/27/2017    Other (comments)  
 hallucinations Current Immunizations  Never Reviewed No immunizations on file. Not reviewed this visit You Were Diagnosed With   
  
 Codes Comments Vertigo    -  Primary ICD-10-CM: E50 ICD-9-CM: 780.4 Vitals  BP Pulse Temp Resp Height(growth percentile) Weight(growth percentile)  
 115/69 (BP 1 Location: Left arm, BP Patient Position: Sitting) 66 97.5 °F (36.4 °C) (Oral) 18 5' 7\" (1.702 m) 271 lb (122.9 kg) SpO2 BMI OB Status Smoking Status 96% 42.44 kg/m2 Hysterectomy Never Smoker BMI and BSA Data Body Mass Index Body Surface Area  
 42.44 kg/m 2 2.41 m 2 Preferred Pharmacy Pharmacy Name Phone [address] [facility], [address] Your Updated Medication List  
  
   
This list is accurate as of: 12/27/17 11:10 AM.  Always use your most recent med list.  
  
  
  
  
 * albuterol 2.5 mg /3 mL (0.083 %) nebulizer solution Commonly known as:  PROVENTIL VENTOLIN  
3 mL by Nebulization route every four (4) hours as needed for Wheezing. * PROAIR HFA 90 mcg/actuation inhaler Generic drug:  albuterol  
inhale 2 puffs by mouth every 4 hours if needed for wheezing  
  
 aspirin, buffered 81 mg Tab Take  by mouth. atorvastatin 80 mg tablet Commonly known as:  LIPITOR  
take 1 tablet by mouth once daily * BD INSULIN SYRINGE ULTRA-FINE 1 mL 31 gauge x 5/16 Syrg Generic drug:  Insulin Syringe-Needle U-100  
use as directed twice a day * BD INSULIN SYRINGE ULTRA-FINE 1 mL 31 gauge x 5/16 Syrg Generic drug:  Insulin Syringe-Needle U-100  
use as directed twice a day DULoxetine 60 mg capsule Commonly known as:  CYMBALTA  
take 1 capsule by mouth once daily  
  
 ergocalciferol 50,000 unit capsule Commonly known as:  ERGOCALCIFEROL Take 1 Cap by mouth every seven (7) days. FREESTYLE LITE STRIPS strip Generic drug:  glucose blood VI test strips TEST twice a day as directed  
  
 furosemide 40 mg tablet Commonly known as:  LASIX  
take 1 tablet by mouth twice a day for 7 days  
  
 gabapentin 300 mg capsule Commonly known as:  NEURONTIN  
take 1 capsule by mouth at bedtime LANTUS 100 unit/mL injection Generic drug:  insulin glargine INJECT 90 UNITS SUBCUTANEOUSLY AT BEDTIME  
  
 meclizine 12.5 mg tablet Commonly known as:  ANTIVERT Take 1 Tab by mouth three (3) times daily as needed for up to 10 days. Indications: Vertigo  
  
 metoprolol succinate 50 mg XL tablet Commonly known as:  TOPROL-XL  
take 1 tablet by mouth once daily Genoa-3-DHA-EPA-Fish Oil 1,000 mg (120 mg-180 mg) Cap  
take 1 capsule by mouth twice a day  
  
 potassium chloride 10 mEq tablet Commonly known as:  KLOR-CON  
take 1 tablet by mouth once daily SYMBICORT 160-4.5 mcg/actuation Hfaa Generic drug:  budesonide-formoterol  
inhale 2 puffs by mouth twice a day VITAMIN B-12 1,000 mcg sublingual tablet Generic drug:  cyanocobalamin Take 1,000 mcg by mouth daily. * Notice: This list has 4 medication(s) that are the same as other medications prescribed for you. Read the directions carefully, and ask your doctor or other care provider to review them with you. Prescriptions Sent to Pharmacy Refills  
 meclizine (ANTIVERT) 12.5 mg tablet 0 Sig: Take 1 Tab by mouth three (3) times daily as needed for up to 10 days. Indications: Vertigo Class: Normal  
 Pharmacy: [id number] [facility], [address] [facility] Ph #: [phone] Route: Oral  
  
We Performed the Following REFERRAL TO ENT-OTOLARYNGOLOGY [AXB41 Custom] Comments:  
 Vertigo and hearing changes Follow-up Instructions Return if symptoms worsen or fail to improve. To-Do List   
 12/27/2017 12:30 PM  
  Appointment with [address] at [place] [address] ([phone])  
  
 12/29/2017 12:30 PM  
  Appointment with [address] at [place] [address] ([phone])  
  
 01/03/2018 12:30 PM  
  Appointment with [address] at [place] [address] ([phone])  
  
 01/05/2018 12:30 PM  
  Appointment with [address] [facility] [address] at [place] [address] ([phone])  01/08/2018 12:30 PM  
  Appointment with [address] [facility] [address] [facility] at [place] [address] ([phone])  
  
 01/10/2018 12:30 PM  
 Appointment with [address] at [place] [address] ([phone])  
  
 01/12/2018 12:30 PM  
  Appointment with [address] at [place] [address] ([phone])  
  
 01/15/2018 12:30 PM  
  Appointment with [address] at [place] [address] ([phone])  
  
 01/17/2018 12:30 PM  
  Appointment with [address] at [place] [address] ([phone])  
  
 01/19/2018 12:30 PM  
  Appointment with [address] at [place] [address] ([phone])  
  
 01/22/2018 12:30 PM  
  Appointment with [address] at [place] [address] ([phone]) Referral Information Referral ID Referred By Referred To  
  
 [id number] [facility] [address] [place], [address] Phone: [phone] Fax: [phone] Visits Status Start Date End Date 1 New Request 12/27/17 12/27/18 If your referral has a status of pending review or denied, additional information will be sent to support the outcome of this decision. Introducing [facility]! Dear Alexis Shelley: Thank you for requesting a [employer] account. Our records indicate that you already have an active [employer] account. You can access your account anytime at [email or web address] Did you know that you can access your hospital and ER discharge instructions at any time in [employer]? You can also review all of your test results from your hospital stay or ER visit. Additional Information If you have questions, please visit the Frequently Asked Questions section of the [employer] website at [email or web address] Remember, [employer] is NOT to be used for urgent needs. For medical emergencies, dial 911. Now available from your iPhone and Android! Please provide this summary of care documentation to your next provider. Your primary care clinician is listed as Angelito Perkins.  If you have any questions after today's visit, please call [phone].

## 2017-12-29 ENCOUNTER — TELEPHONE (OUTPATIENT)
Dept: PULMONOLOGY | Age: 57
End: 2017-12-29

## 2017-12-29 DIAGNOSIS — J45.31 ASTHMATIC BRONCHITIS, MILD PERSISTENT, WITH ACUTE EXACERBATION: ICD-10-CM

## 2017-12-29 RX ORDER — BUDESONIDE AND FORMOTEROL FUMARATE DIHYDRATE 160; 4.5 UG/1; UG/1
AEROSOL RESPIRATORY (INHALATION)
Qty: 1 INHALER | Refills: 1 | Status: SHIPPED | OUTPATIENT
Start: 2017-12-29 | End: 2018-02-15 | Stop reason: SDUPTHER

## 2017-12-29 NOTE — TELEPHONE ENCOUNTER
Pulmonary Rehab received a return call from patient. She stated that since her last visit, she has been feeling vertigo and has a specialist visit on 1/3/18. Will follow up after that visit to see if patient can return to rehab.     Thank you,  Elizabeth Campa

## 2017-12-29 NOTE — TELEPHONE ENCOUNTER
Pulmonary Rehab called patient and left a message about attendance. Will follow up if we do not hear from or see patient at her next appointment.      Thank you,  Beryl Chino

## 2018-01-01 ENCOUNTER — APPOINTMENT (OUTPATIENT)
Dept: PULMONOLOGY | Age: 58
End: 2018-01-01

## 2018-01-04 ENCOUNTER — TELEPHONE (OUTPATIENT)
Dept: PULMONOLOGY | Age: 58
End: 2018-01-04

## 2018-01-04 NOTE — TELEPHONE ENCOUNTER
Pulmonary Rehab called patient and spoke to her about her appointment. She is getting set up with a date to get an MRI early next week, call at that time.      Thank you,  Julianna Rai

## 2018-01-05 RX ORDER — GLUCOSAM/CHONDRO/HERB 149/HYAL 750-100 MG
TABLET ORAL
Qty: 60 CAP | Refills: 1 | Status: SHIPPED | OUTPATIENT
Start: 2018-01-05 | End: 2018-02-28 | Stop reason: SDUPTHER

## 2018-01-05 NOTE — TELEPHONE ENCOUNTER
1/5/2018  1:21 PM    Chief Complaint   Patient presents with    Medication Refill       Noted refill request for Fish Oil. PCP Brandon Jin MD. Last office visit 12/2017 and upcoming 2/2018. Refill completed.

## 2018-01-08 ENCOUNTER — TELEPHONE (OUTPATIENT)
Dept: PULMONOLOGY | Age: 58
End: 2018-01-08

## 2018-01-10 ENCOUNTER — TELEPHONE (OUTPATIENT)
Dept: PULMONOLOGY | Age: 58
End: 2018-01-10

## 2018-01-10 NOTE — TELEPHONE ENCOUNTER
Pulmonary Rehab called patient and left a message to follow up with her. Additional attempts to contact will be made.     Thank you,  Dinorah Ruano

## 2018-01-12 ENCOUNTER — TELEPHONE (OUTPATIENT)
Dept: PULMONOLOGY | Age: 58
End: 2018-01-12

## 2018-01-12 NOTE — TELEPHONE ENCOUNTER
Pulmonary Rehab called patient and left a message about d/c from program due to lack of attendance and no return calls.      Thank you,  Yvan Mendieta

## 2018-01-15 ENCOUNTER — APPOINTMENT (OUTPATIENT)
Dept: PULMONOLOGY | Age: 58
End: 2018-01-15

## 2018-01-17 ENCOUNTER — APPOINTMENT (OUTPATIENT)
Dept: PULMONOLOGY | Age: 58
End: 2018-01-17

## 2018-01-19 ENCOUNTER — APPOINTMENT (OUTPATIENT)
Dept: PULMONOLOGY | Age: 58
End: 2018-01-19

## 2018-01-22 ENCOUNTER — APPOINTMENT (OUTPATIENT)
Dept: PULMONOLOGY | Age: 58
End: 2018-01-22

## 2018-02-14 ENCOUNTER — OFFICE VISIT (OUTPATIENT)
Dept: PAIN MANAGEMENT | Age: 58
End: 2018-02-14

## 2018-02-14 VITALS
SYSTOLIC BLOOD PRESSURE: 129 MMHG | HEIGHT: 67 IN | TEMPERATURE: 99.5 F | WEIGHT: 271 LBS | BODY MASS INDEX: 42.53 KG/M2 | RESPIRATION RATE: 14 BRPM | DIASTOLIC BLOOD PRESSURE: 72 MMHG | HEART RATE: 79 BPM

## 2018-02-14 DIAGNOSIS — M54.5 CHRONIC MIDLINE LOW BACK PAIN, WITH SCIATICA PRESENCE UNSPECIFIED: ICD-10-CM

## 2018-02-14 DIAGNOSIS — M51.36 LUMBAR DEGENERATIVE DISC DISEASE: ICD-10-CM

## 2018-02-14 DIAGNOSIS — G89.4 CHRONIC PAIN SYNDROME: ICD-10-CM

## 2018-02-14 DIAGNOSIS — E66.01 MORBID OBESITY (HCC): ICD-10-CM

## 2018-02-14 DIAGNOSIS — M47.816 SPONDYLOSIS OF LUMBAR REGION WITHOUT MYELOPATHY OR RADICULOPATHY: Primary | ICD-10-CM

## 2018-02-14 DIAGNOSIS — Z79.4 UNCONTROLLED TYPE 2 DIABETES MELLITUS WITH HYPERGLYCEMIA, WITH LONG-TERM CURRENT USE OF INSULIN (HCC): ICD-10-CM

## 2018-02-14 DIAGNOSIS — Z95.2 H/O AORTIC VALVE REPLACEMENT: ICD-10-CM

## 2018-02-14 DIAGNOSIS — G89.29 CHRONIC MIDLINE LOW BACK PAIN, WITH SCIATICA PRESENCE UNSPECIFIED: ICD-10-CM

## 2018-02-14 DIAGNOSIS — E11.65 UNCONTROLLED TYPE 2 DIABETES MELLITUS WITH HYPERGLYCEMIA, WITH LONG-TERM CURRENT USE OF INSULIN (HCC): ICD-10-CM

## 2018-02-14 DIAGNOSIS — M47.816 LUMBAR FACET ARTHROPATHY: ICD-10-CM

## 2018-02-14 NOTE — PATIENT INSTRUCTIONS

## 2018-02-15 DIAGNOSIS — J45.31 ASTHMATIC BRONCHITIS, MILD PERSISTENT, WITH ACUTE EXACERBATION: ICD-10-CM

## 2018-02-15 RX ORDER — GLUCOSAM/CHONDRO/HERB 149/HYAL 750-100 MG
TABLET ORAL
Qty: 60 CAP | Refills: 2 | Status: SHIPPED | OUTPATIENT
Start: 2018-02-15 | End: 2018-04-18 | Stop reason: SDUPTHER

## 2018-02-15 RX ORDER — METOPROLOL SUCCINATE 50 MG/1
TABLET, EXTENDED RELEASE ORAL
Qty: 30 TAB | Refills: 3 | Status: SHIPPED | OUTPATIENT
Start: 2018-02-15 | End: 2018-05-16 | Stop reason: SDUPTHER

## 2018-02-15 RX ORDER — BUDESONIDE AND FORMOTEROL FUMARATE DIHYDRATE 160; 4.5 UG/1; UG/1
AEROSOL RESPIRATORY (INHALATION)
Qty: 1 INHALER | Refills: 1 | Status: SHIPPED | OUTPATIENT
Start: 2018-02-15 | End: 2018-03-22 | Stop reason: SDUPTHER

## 2018-02-15 RX ORDER — DULOXETIN HYDROCHLORIDE 60 MG/1
CAPSULE, DELAYED RELEASE ORAL
Qty: 30 CAP | Refills: 2 | Status: SHIPPED | OUTPATIENT
Start: 2018-02-15 | End: 2018-04-18 | Stop reason: SDUPTHER

## 2018-02-16 NOTE — PROGRESS NOTES
[address] [facility]  Interventional Pain Management Consultation History & Physical    PATIENT NAME:  Luke Manzo OF BIRTH:   1960    DATE OF SERVICE:   2/14/2018      CHIEF COMPLAINT:  Back Pain      REASON FOR VISIT:   Isacc Darnell presents to the pain clinic today for follow on evaluation and to consider interventional pain management options as indicated for the type and location of the pain the patient is presenting with. HISTORY OF PRESENT ILLNESS:    Hurtis Little for follow on evaluation and to discuss further interventional procedures as may be indicated. I originally saw this very pleasant lady on January 26, 2017. I found her to have signs and symptoms suggestive of low back pain that was lumbar facet mediated due to lumbar facet hypertrophy and lumbar facet arthropathy. She underwent a series of lumbar radiofrequency neurotomy procedures at bilateral L3-4, L4-5, L5-S1 levels with IV conscious sedation. These helped to relieve her low back pain tremendously, she had almost no back pain whatsoever for many months. Pain has begun to return, it is still nowhere near as bad as what I originally saw her in January 2017. She endorses aching across her low back. She denies radicular or radiating lower extremity symptoms. Pain is increased with lumbar facet loading maneuvers including lumbar twisting and turning, lumbar extension. Again her lumbar MRI imaging from January 10, 2017. This is significant for multilevel chronic degenerative changes without neuroforaminal or central canal impingement or compression. Ligamentum flavum thickening, lumbar facet arthropathy and lumbar facet hypertrophy are noted. ASSESSMENT/OPTIONS: as follows. We discussed options. Patient has recurrence of her low back pain that is again lumbar facet mediated.   I will schedule her for repeat lumbar radiofrequency neurotomy procedures at bilateral L3-4, L4-5, L5-S1 levels with IV conscious sedation. She has had these procedures before, they have helped her significantly with resolution of nearly 100% of her low back pain. She should do well again. I have discussed the risks and benefits, indications, contraindications, and side effects of intended procedure with the patient. I have used skeleton spine model to describe and discuss the procedure with the patient. I have answered all questions relating to the procedure. Patient understands the nature of the procedure and wishes to proceed. Patient has no further questions. From initial encounter January 26, 2017 as follows-   HISTORY OF PRESENT ILLNESS:   Haider Givens presents to the pain clinic today for initial evaluation and to consider interventional pain management options as indicated for the type and location of the pain the patient is presenting with.           Haider Givens patient presents for initial evaluation and consideration for interventional procedures as indicated. She is referred to us by Dr. Sheila López for evaluation and consideration for lumbar interventional pain procedures as indicated. At today's evaluation patient complains of chronic low back pain of several years duration. She was previously managed by Dr. Araceli Arechiga at [facility] pain clinic. Apparently patient relates that Dr. Ladonna Yepez and/or some of his colleagues provided the patient with shots of some sort. I cannot identify exactly what nature the shots were. Patient also received procedures that burned her nerves, I believe this refers to radiofrequency ablation procedures. Apparently the burning of the nerves took away the patient's pain almost entirely for very long periods of time over 8 months. Patient currently endorses aching and throbbing across her low back. She denies radiating leg pain at today's visit. Her current pain is 6/10 in intensity.   She endorses her pain is deep aching throbbing pain, constant pain. Pain interferes with her sleep. She has had the pain for years. She denies the use of current blood thinners. She does however take a baby aspirin daily for previous history of cardiac surgery. She has no history of lumbar spine surgery. By review of available records albeit limited, patient has a history of a pinched nerve in her low back of some sort in the past.  She received spine injections in the past for this. She also has been managed for chronic low back pain with radial frequency procedures that have been very helpful. She has worked also with physical therapy in the past with some benefit. We will try to obtain these records from her pain clinic in [facility]. We reviewed the patient's lumbar MRI using skeleton spine model. MRI was taken January 10, 2017. This is significant for multilevel chronic degenerative changes without significant central canal or neuroforaminal stenoses. Ligamentum flavum thickening and facet arthropathy noted L3-4, L4-5, L5-S1. Trace retrolisthesis L5 and S1 trace anterolisthesis L4 on L5. Mild canal narrowing L4-5. Mild bilateral foraminal stenosis L5-S1. Bilateral renal cystic lesions suspect simple cyst but not evaluated in detail noted by radiologist.     We reviewed options. Patient is presenting with a history of chronic low back pain as her primary pain complaint today. She denies radiating leg pain today she denies aching throbbing constant pain across her back. She is tried physical therapy as well as medications with poor benefit. Her lumbar MRI demonstrates lumbar facet arthropathy and mild degenerative disc disease. She has trace anterolisthesis as well as retrolisthesis on imaging studies noted by the radiologist.  Patient has had treatments at her previous pain clinic in [facility].   These have included injections of some sort which I believe are for radiating leg pain in the past.  She also relates that she had nerve burning which I believe his lumbar frequency neurotomy procedures. This nerve burning helped relieve her pain tremendously, and she would like to repeat this at our pain clinic as soon as possible. My impression is that the patient has low back pain which is lumbar facet mediated, and due to lumbar facet arthropathy lumbar facet hypertrophy. She apparently did very well with previous lumbar radiofrequency neurotomy procedures at her pain clinic in [facility]. I discussed the nature of this procedure, including risks benefits, indications contraindications and side effects of procedure with the patient. Patient understands and wishes to proceed. She has no further questions. We will also place a consent for review and release of records from the pain clinic at the patient attended in [facility] prior to being here. I will review these records once they arrive. MRI Results (most recent):    Results from [place] encounter on 01/10/17   MRI LUMB SPINE WO CONT   Narrative MRI Lumbar spine    History: Chronic low back pain. BMI of 40. Comparison: No prior studies    Technique: Multiplanar multi sequence MR imaging of the lumbar spine was  performed utilizing sagittal T1, T2, STIR, and axial T2 and T1 sequences. Findings:     Normal lumbar spine vertebral alignment is present without subluxation. Osseous  marrow signal is within normal limits. There are a few scattered T1 and T2  hyperintense lesions most consistent with hemangiomata. A pars defect is not  seen. The conus medullaris is located at the L1 level and has a normal  appearance and signal.    L1/2 level: Vertebral body and disc heights are maintained. There is no central  or foraminal stenosis. L2/3 level: Vertebral body and disc heights are maintained. There is no central  or foraminal stenosis. L3/4 level: Minimal ligamentum flavum thickening without no focal posterior disc  pathology .  Mild facet arthropathy, mild ligamentum flavum thickening with some  encroachment on the central canal but no significant stenosis. No foraminal  narrowing. L4/5 level: Trace anterior listhesis of L4 onto L5. Likely degenerative in  origin. No focal disc pathology. Mild facet arthropathy, anterolisthesis combine  to cause mild central canal narrowing. L5/S1 level: Trace retrolisthesis of L5 on S1. There is desiccation of the disc  with disc height loss and mild bilateral foraminal stenosis, right greater than  left. No central canal stenosis. Bilateral renal cystic lesions, suspected simple cysts but not evaluated in  detail. Impression IMPRESSION:    1. Multilevel chronic degenerative changes without significant central canal or  foraminal stenosis. Details in the body of the report. 2. Bilateral renal cystic lesions, favor benign cysts. Note:  I have personally reviewed the images and the final attending  interpretation is in concordance with the above resident report. PAST MEDICAL HISTORY:   The patient  has a past medical history of Arthritis; Chronic back pain; Depression; Diabetes ([address] Diabetes mellitus ([address] Panic attacks; Sleep apnea; and Thyroid disease. PAST SURGICAL HISTORY:   The patient  has a past surgical history that includes hx hysterectomy; hx myomectomy; and hx heart valve surgery (2013). CURRENT MEDICATIONS:   The patient has a current medication list which includes the following prescription(s): omega-3-dha-epa-fish oil, gabapentin, freestyle lite strips, atorvastatin, lantus u-100 insulin, bd insulin syringe ultra-fine, ergocalciferol, proair hfa, potassium chloride, furosemide, albuterol, bd insulin syringe ultra-fine, cyanocobalamin, aspirin, buffered, metoprolol succinate, duloxetine, symbicort, and omega-3-dha-epa-fish oil.     ALLERGIES:     Allergies   Allergen Reactions    Other Food Other (comments)     Sweeteners- causes headaches    Metformin Other (comments)     Increase pain in feet and swelling in feet    Morphine Other (comments)     headache    Singulair [Montelukast] Other (comments)     hallucinations       FAMILY HISTORY:   The patient family history includes Diabetes in her father and mother; Heart Disease in her father and mother; Stroke in her mother. SOCIAL HISTORY:   The patient  reports that she has never smoked. She has never used smokeless tobacco. The patient  reports that she does not drink alcohol. She      REVIEW OF SYSTEMS:    The patient denies fever, chills, weight loss (Constitutional), rash, itching (Skin), tinnitus, congestion (HENT), blurred vision, photophobia (Eyes), palpitations, orthopnea (Cardiovascular), hemoptysis, wheezing (Respiratory), nausea, vomiting, diarrhea (Gastrointestinal), dysuria, hematuria, urgency (Genitourinary), bowel or bladder incontinence, loss of consciousness (Neurologic), suicidal or homicidal ideation or hallucinations (Psychiatric). Denies swelling, axillary or groin masses (Lymphatic). PHYSICAL EXAM:  VS:   Visit Vitals    /72 (BP 1 Location: Left arm, BP Patient Position: Sitting)    Pulse 79    Temp 99.5 °F (37.5 °C) (Oral)    Resp 14    Ht 5' 7\" (1.702 m)    Wt 122.9 kg (271 lb)    BMI 42.44 kg/m2     General: Well-developed and well-nourished. Body habitus consistent with recorded height and weight and the calculated BMI. Apparent distress back pain. Head: Normocephalic, atraumatic. Skin: Inspection of the skin reveals no rashes, lesions or infection. CV: Regular rate. No murmurs or rubs noted. No peripheral edema noted. Pulm: Respirations are even and unlabored. Extr: No clubbing, cyanosis, or edema noted. Musculoskeletal:  1. Cervical spine - Full ROM. No paraspinous tenderness at any level. There is no scoliosis, asymmetry, or musculoskeletal defect. 2. Thoracic spine - Full ROM. No paraspinous tenderness at any level.   There is no scoliosis, asymmetry, or musculoskeletal defect. 3. Lumbar spine -decreased range of motion all axes . Paraspinous tenderness bilaterally . SI joints are nontender bilaterally. There is no scoliosis, asymmetry, or musculoskeletal defect. 4. Right upper extremity - Full ROM. 5/5 muscle strength in all muscle groups. No pain or tenderness in shoulder, elbow, wrist, or hand. 5. Left upper extremity - Full ROM. 5/5 muscle strength in all muscle groups. No pain or tenderness in shoulder, elbow, wrist, or hand. 6. Right lower extremity - Full ROM. 5/5 muscle strength in all muscle groups. No pain, tenderness, or swelling in the hip, knee, ankle or foot. 7. Left lower extremity - Full ROM. 5/5 muscle strength in all muscle groups. No pain, tenderness, or swelling in the hip, knee, ankle or foot. Neurological:  1. Mental Status - Alert, awake and oriented. Speech is clear and appropriate. 2. Cranial Nerves - Extraocular muscles intact bilaterally. Cranial nerves II-XII grossly intact bilaterally. 3. Gait - antalgic   4. Reflexes - 2+ and symmetric throughout. 5. Sensation - Intact to light touch and pin prick. 6. Provocative Tests - Straight leg raise negative bilaterally. Psychological:  1. Mood and affect - Appropriate. 2. Speech - Appropriate. 3. Though content - Appropriate. 4. Judgment - Appropriate. ASSESSMENT:      ICD-10-CM ICD-9-CM    1. Spondylosis of lumbar region without myelopathy or radiculopathy M47.816 721.3    2. Lumbar facet arthropathy M12.88 721.3    3. Lumbar degenerative disc disease M51.36 722.52    4. Chronic pain syndrome G89.4 338.4    5. Chronic midline low back pain, with sciatica presence unspecified M54.5 724.2     G89.29 338.29    6. Morbid obesity ([facility] E66.01 278.01    7. Uncontrolled type 2 diabetes mellitus with hyperglycemia, with long-term current use of insulin ([facility]) E11.65 250.02     Z79.4 V58.67    8.  H/O aortic valve replacement Z95.2 V43.3            PLAN:    1.    I have thoroughly discussed the risks and benefits, indications, contraindications, and side effects of any and procedures that were mentioned at today's patient visit. I have used a skeleton model to explain all procedures, as well as to provide added emphasis regarding procedures and as well for patient education purposes. I have answered all questions in great detail, and I have obtained verbal confirmation for all procedures planned with the patient. 3.    I have reviewed in great detail today, when indicated, the patient's MRI and other imaging studies with the patient. I have explained to the patient, when indicated, their condition using both actual recent and relevant images insofar as I am able to obtain actual images. I have used a skeleton model for added emphasis as well as patient education. 4.    I have advised patient to have a primary care provider continue to care for their health maintenance and general medical conditions. 5,    I have placed appropriate referrals to specialty care providers as I have deemed necessary through today's clinical consultation with the patient. 5.    I have explained to the patient that if any significant side effects, issues, problems, concerns, or perceived complications may arise at around the time of the patient's procedures, they should either call the pain management clinic or go to the emergency room immediately for medical provider evaluation. 6.   I have encouraged all patients to call the pain management clinic with any questions or concerns that they may have pertaining to their procedures. DISPOSITION:   The patients condition and plan were discussed at length and all questions were answered. The patient agrees with the plan. A total of 25 minutes was spent with the patient of which over half of the time was spent counseling the patient.      Tennille Charles MD 2/16/2018 8:36 AM    Note: Although these clinic notes were documented by the provider at the time of the exam, they have not been proofed and are subject to transcription variance.

## 2018-02-19 DIAGNOSIS — E55.9 HYPOVITAMINOSIS D: ICD-10-CM

## 2018-02-19 RX ORDER — ERGOCALCIFEROL 1.25 MG/1
CAPSULE ORAL
Qty: 12 CAP | Refills: 0 | Status: SHIPPED | OUTPATIENT
Start: 2018-02-19 | End: 2018-06-13 | Stop reason: SDUPTHER

## 2018-02-19 RX ORDER — SODIUM CHLORIDE 0.9 % (FLUSH) 0.9 %
5-10 SYRINGE (ML) INJECTION AS NEEDED
Status: CANCELLED | OUTPATIENT
Start: 2018-02-21

## 2018-02-19 RX ORDER — MIDAZOLAM HYDROCHLORIDE 1 MG/ML
.5-6 INJECTION, SOLUTION INTRAMUSCULAR; INTRAVENOUS
Status: CANCELLED | OUTPATIENT
Start: 2018-02-21

## 2018-02-21 ENCOUNTER — HOSPITAL ENCOUNTER (OUTPATIENT)
Age: 58
Setting detail: OUTPATIENT SURGERY
Discharge: HOME | End: 2018-02-21
Attending: PHYSICAL MEDICINE & REHABILITATION | Admitting: PHYSICAL MEDICINE & REHABILITATION
Payer: MEDICAID

## 2018-02-21 ENCOUNTER — APPOINTMENT (OUTPATIENT)
Dept: GENERAL RADIOLOGY | Age: 58
End: 2018-02-21
Attending: PHYSICAL MEDICINE & REHABILITATION
Payer: MEDICAID

## 2018-02-21 VITALS
WEIGHT: 271 LBS | SYSTOLIC BLOOD PRESSURE: 143 MMHG | BODY MASS INDEX: 42.53 KG/M2 | HEART RATE: 82 BPM | RESPIRATION RATE: 18 BRPM | HEIGHT: 67 IN | TEMPERATURE: 99 F | OXYGEN SATURATION: 94 % | DIASTOLIC BLOOD PRESSURE: 84 MMHG

## 2018-02-21 LAB
GLUCOSE BLD STRIP.AUTO-MCNC: 277 MG/DL (ref 70–110)
GLUCOSE BLD STRIP.AUTO-MCNC: 293 MG/DL (ref 70–110)

## 2018-02-21 PROCEDURE — 77030020508 HC PD GRND GENRTR BAYL -A: Performed by: PHYSICAL MEDICINE & REHABILITATION

## 2018-02-21 PROCEDURE — 74011000250 HC RX REV CODE- 250

## 2018-02-21 PROCEDURE — 82962 GLUCOSE BLOOD TEST: CPT

## 2018-02-21 PROCEDURE — 99152 MOD SED SAME PHYS/QHP 5/>YRS: CPT | Performed by: PHYSICAL MEDICINE & REHABILITATION

## 2018-02-21 PROCEDURE — 77030029505: Performed by: PHYSICAL MEDICINE & REHABILITATION

## 2018-02-21 PROCEDURE — 74011250636 HC RX REV CODE- 250/636: Performed by: PHYSICAL MEDICINE & REHABILITATION

## 2018-02-21 PROCEDURE — 76010000009 HC PAIN MGT 0 TO 30 MIN PROC: Performed by: PHYSICAL MEDICINE & REHABILITATION

## 2018-02-21 PROCEDURE — 74011250636 HC RX REV CODE- 250/636

## 2018-02-21 PROCEDURE — 74011000250 HC RX REV CODE- 250: Performed by: PHYSICAL MEDICINE & REHABILITATION

## 2018-02-21 RX ORDER — MIDAZOLAM HYDROCHLORIDE 1 MG/ML
.5-6 INJECTION, SOLUTION INTRAMUSCULAR; INTRAVENOUS
Status: DISCONTINUED | OUTPATIENT
Start: 2018-02-21 | End: 2018-02-21 | Stop reason: HOSPADM

## 2018-02-21 RX ORDER — LIDOCAINE HYDROCHLORIDE 10 MG/ML
INJECTION, SOLUTION EPIDURAL; INFILTRATION; INTRACAUDAL; PERINEURAL AS NEEDED
Status: DISCONTINUED | OUTPATIENT
Start: 2018-02-21 | End: 2018-02-21 | Stop reason: HOSPADM

## 2018-02-21 RX ORDER — FENTANYL CITRATE 50 UG/ML
INJECTION, SOLUTION INTRAMUSCULAR; INTRAVENOUS AS NEEDED
Status: DISCONTINUED | OUTPATIENT
Start: 2018-02-21 | End: 2018-02-21 | Stop reason: HOSPADM

## 2018-02-21 RX ORDER — LIDOCAINE HYDROCHLORIDE 20 MG/ML
INJECTION, SOLUTION EPIDURAL; INFILTRATION; INTRACAUDAL; PERINEURAL AS NEEDED
Status: DISCONTINUED | OUTPATIENT
Start: 2018-02-21 | End: 2018-02-21 | Stop reason: HOSPADM

## 2018-02-21 RX ORDER — SODIUM CHLORIDE 0.9 % (FLUSH) 0.9 %
5-10 SYRINGE (ML) INJECTION AS NEEDED
Status: DISCONTINUED | OUTPATIENT
Start: 2018-02-21 | End: 2018-02-21 | Stop reason: HOSPADM

## 2018-02-21 NOTE — PROCEDURES
[facility] [address] [facility]    THERMOCOAGULATION OF LUMBAR MEDIAL BRANCH  PROCEDURE REPORT      PATIENT:  Syl Hdz OF BIRTH:  1960  DATE OF SERVICE:  2/21/2018  [facility]    PRE-PROCEDURE DIAGNOSIS:  See Above    POST-PROCEDURE DIAGNOSIS:  See Above    PROCEDURE:      1. Left radiofrequency thermocoagulation of lumbar medial branch nerves,  L3/L4, L4/L5, L5/S1 (59016, 64636 x2)  2. Fluoroscopic needle guidance (spinal) (88131)  3. Supervision of moderate sedation (05370)  4. Additional 15 minutes of supervised moderate sedation (88933)    ANESTHESIA:  Local with moderate IV sedation. See Medication Administration Record for specific medications and dosage. COMPLICATIONS: None. PHYSICIAN:  Juventino Prasad MD    PRE-PROCEDURE NOTE:  Pre-procedural assessment of the patient was performed including a limited history and physical examination. The details of the procedure were discussed with the patient, including the risks, benefits and alternative options and an informed consent was obtained. The patients NPO status, if necessary for the specific procedure and/or administration of moderate intravenous sedation, if utilized, and availability of a responsible adult to escort the patient following the procedure were confirmed. A peripheral intravenous cannula was placed without difficulty and lactated Ringers solution administered. See nursing notes for details. PROCEDURE NOTE:  The patient was brought to the procedure suite and positioned on the fluoroscopy table in the prone position. Physiologic monitors were applied and supplemental oxygen was administered via nasal cannula. The skin was prepped in the standard surgical fashion and sterile drapes were applied over the procedure site.  Please refer to the Flowsheet for documentation of the patients vital signs and the Medication Administration Record for any oral and/or intravenous sedation administered prior to or during the procedure. 1% Lidocaine was utilized for local anesthesia. Under 10-15 degree ipsilateral oblique fluoroscopic guidance a 15cm 18gauge radiofrequency needle with a 10 mm curved active tip was advanced to the junction of the superior articular process and transverse process of each vertebral level immediately inferior to the above-mentioned dorsal rami medial branch nerves. Under AP fluoroscopic guidance, a similar needle was then placed over the superior margin of the sacral ala to thermocoagulate the L5 medial branch nerve. After each individual needle was placed, sensory and motor testing, at 50 Hz and 2 Hz, respectively, were performed which elicited ipsilateral deep local back discomfort without evidence of motor stimulation in the ipsilateral gluteal muscles or extremity. Following this, 1 mL of lidocaine 2% was injected after the negative aspiration of blood, air or CSF. Final correct needle placement was then confirmed by viewing each needle in AP and lateral fluoroscopic views in addition to repeat sensory and motor testing which, again, elicited ipsilateral deep local back discomfort without evidence of motor stimulation in the ipsilateral gluteal muscles or extremity. Medial branch nerve radiofrequency thermocoagulation was then performed at each level for 120 seconds at 80° centigrade x 1 cycle. Following this, 1/2ml to 1ml of a mixture of lidocaine 1% was injected through each radiofrequency needle after negative aspiration and before removing each needle. Then, all needles were removed intact. The area was thoroughly cleaned and sterile bandages applied as necessary. The patient tolerated the procedure well without complication and the vital signs remained stable throughout the procedure.     POST-PROCEDURE COURSE:   The patient was escorted from the procedure suite in satisfactory condition and recovered per facility protocol based on the type of procedure performed and/or the sedation utilized. The patient did not experience any adverse events and remained hemodynamically stable during the post-procedure period. DISCHARGE NOTE:  Upon discharge, the patient was able to tolerate fluids and was in no acute distress. The patient was oriented to person, place and time and vital signs were stable. Appropriate post-procedure instructions were provided and explained to the patient in detail and all questions were answered.                     Marce Menjivar MD 2/21/2018 10:40 AM

## 2018-02-21 NOTE — H&P (VIEW-ONLY)
The pt is here today to discuss procedure options with Dr. Sandra Bolanos. Patient rates pain as 8/10.

## 2018-02-21 NOTE — INTERVAL H&P NOTE
H&P Update:  Ana Rosa Smith was seen and examined. History and physical has been reviewed. The patient has been examined.  There have been no significant clinical changes since the completion of the originally dated History and Physical.    Signed By: Eusebio Coronel MD     February 21, 2018 8:27 AM

## 2018-02-21 NOTE — DISCHARGE INSTRUCTIONS
[address] [place] [address] for Pain Management      Post Procedures Instructions    *Resume Diet and Activity as tolerated. Rest for the remainder of the day. *You may fell worse before you feel better as the numbing medications wear off before the steroids take effect if used for your procedures. *Do not use affected extremity until numbness or loss of sensation has completely resolved without assistance. *DO NOT DRIVE, operate machinery/heavey equipment for 24 hours. *DO NOT DRINK ALCOHOL for 24 hours as it may interact with the sedation if you received it and also thins your blood and may cause you to bleed. *WAIT 24 hours before starting back ANY Blood thinning medications:   (Heparin, Coumadin, Warfarin, Lovenox, Plavix, Aggrenox)    *Resume Pre-Procedure Medications as prescribed except Blood Thinners unless directed by your Physician or Cardiologist.     *Avoid Hot tubs and Heating pad for 24 hours to prevent dissipation of medications, you may shower to remove bandages and remaining prep residue on the skin. * If you develop a Headache, drink plenty of fluids including beverages with caffeine (Coffee, Mt. Dew etc.) and rest.  If the headache persists longer than 24 hoursor intensifies - Please call [facility] [phone]    * If you are DIABETIC, check your blood sugar three times a day for the next three days, the steroids will increase your blood sugar. If your blood sugar is greater than 400 have someone drive you to the nearest [address]. * If you experience any of the following problems, call the [facility] [phone] between 8:00 am - 4:30pm or After Hours [phone].     Shortness of breath    Fever of 101 F or higher    Nausea / Vomiting (not normal to you)    Increasing stiffness in the neck    Weakness or numbness in the arms or legs that is not resolving    Prolonged and increasing pain > than 4 days    ANYTHING OUT of the ORDINARY TO YOU    If YOU are experiencing a severe reaction / complication that you have never had before post procedure, call 911 or go to the nearest emergency room! All patients must have a  for transportation [place] regardless if you do or do not receive sedation. DISCHARGE SUMMARY from Nurse      PATIENT INSTRUCTIONS:    After Oral  or intravenous sedation, for 24 hours or while taking prescription Narcotics:  · Limit your activities  · Do not drive and operate hazardous machinery  · Do not make important personal or business decisions  · Do  not drink alcoholic beverages  · If you have not urinated within 8 hours after discharge, please contact your surgeon on call. Report the following to your surgeon:  · Excessive pain, swelling, redness or odor of or around the surgical area  · Temperature over 101  · Nausea and vomiting lasting longer than 4 hours or if unable to take medications  · Any signs of decreased circulation or nerve impairment to extremity: change in color, persistent  numbness, tingling, coldness or increase pain  · Any questions        What to do at Home:  Recommended activity: Activity as tolerated, NO DRIVING FOR 24 Hours post injection          *  Please give a list of your current medications to your Primary Care Provider. *  Please update this list whenever your medications are discontinued, doses are      changed, or new medications (including over-the-counter products) are added. *  Please carry medication information at all times in case of emergency situations. These are general instructions for a healthy lifestyle:    No smoking/ No tobacco products/ Avoid exposure to second hand smoke    Surgeon General's Warning:  Quitting smoking now greatly reduces serious risk to your health.     Obesity, smoking, and sedentary lifestyle greatly increases your risk for illness    A healthy diet, regular physical exercise & weight monitoring are important for maintaining a healthy lifestyle    You may be retaining fluid if you have a history of heart failure or if you experience any of the following symptoms:  Weight gain of 3 pounds or more overnight or 5 pounds in a week, increased swelling in our hands or feet or shortness of breath while lying flat in bed. Please call your doctor as soon as you notice any of these symptoms; do not wait until your next office visit. Recognize signs and symptoms of STROKE:    F-face looks uneven    A-arms unable to move or move unevenly    S-speech slurred or non-existent    T-time-call 911 as soon as signs and symptoms begin-DO NOT go       Back to bed or wait to see if you get better-TIME IS BRAIN.

## 2018-02-26 RX ORDER — SODIUM CHLORIDE 0.9 % (FLUSH) 0.9 %
5-10 SYRINGE (ML) INJECTION AS NEEDED
Status: CANCELLED | OUTPATIENT
Start: 2018-03-07

## 2018-02-26 RX ORDER — MIDAZOLAM HYDROCHLORIDE 1 MG/ML
.5-6 INJECTION, SOLUTION INTRAMUSCULAR; INTRAVENOUS
Status: CANCELLED | OUTPATIENT
Start: 2018-03-07

## 2018-02-28 ENCOUNTER — OFFICE VISIT (OUTPATIENT)
Dept: FAMILY MEDICINE CLINIC | Age: 58
End: 2018-02-28

## 2018-02-28 ENCOUNTER — HOSPITAL ENCOUNTER (OUTPATIENT)
Dept: MRI IMAGING | Age: 58
Discharge: HOME | End: 2018-02-28
Attending: PHYSICIAN ASSISTANT
Payer: MEDICAID

## 2018-02-28 VITALS
RESPIRATION RATE: 20 BRPM | DIASTOLIC BLOOD PRESSURE: 66 MMHG | HEART RATE: 74 BPM | TEMPERATURE: 98 F | BODY MASS INDEX: 43.32 KG/M2 | SYSTOLIC BLOOD PRESSURE: 114 MMHG | WEIGHT: 276 LBS | OXYGEN SATURATION: 95 % | HEIGHT: 67 IN

## 2018-02-28 DIAGNOSIS — G25.81 RESTLESS LEG SYNDROME: ICD-10-CM

## 2018-02-28 DIAGNOSIS — E11.65 UNCONTROLLED TYPE 2 DIABETES MELLITUS WITH HYPERGLYCEMIA, WITH LONG-TERM CURRENT USE OF INSULIN (HCC): Primary | ICD-10-CM

## 2018-02-28 DIAGNOSIS — R42 VERTIGO: ICD-10-CM

## 2018-02-28 DIAGNOSIS — R25.1 TREMOR: ICD-10-CM

## 2018-02-28 DIAGNOSIS — H83.09 LABYRINTHITIS: ICD-10-CM

## 2018-02-28 DIAGNOSIS — Z79.4 UNCONTROLLED TYPE 2 DIABETES MELLITUS WITH HYPERGLYCEMIA, WITH LONG-TERM CURRENT USE OF INSULIN (HCC): Primary | ICD-10-CM

## 2018-02-28 DIAGNOSIS — G62.9 NEUROPATHY: ICD-10-CM

## 2018-02-28 DIAGNOSIS — I10 ESSENTIAL HYPERTENSION: ICD-10-CM

## 2018-02-28 LAB — CREAT UR-MCNC: 0.7 MG/DL (ref 0.6–1.3)

## 2018-02-28 PROCEDURE — A9575 INJ GADOTERATE MEGLUMI 0.1ML: HCPCS | Performed by: RADIOLOGY

## 2018-02-28 PROCEDURE — 70553 MRI BRAIN STEM W/O & W/DYE: CPT

## 2018-02-28 PROCEDURE — 74011636320 HC RX REV CODE- 636/320: Performed by: RADIOLOGY

## 2018-02-28 PROCEDURE — 82565 ASSAY OF CREATININE: CPT

## 2018-02-28 RX ORDER — GABAPENTIN 300 MG/1
CAPSULE ORAL
Qty: 30 CAP | Refills: 2 | Status: SHIPPED | OUTPATIENT
Start: 2018-02-28 | End: 2018-06-13 | Stop reason: SDUPTHER

## 2018-02-28 RX ORDER — INSULIN GLARGINE 100 [IU]/ML
INJECTION, SOLUTION SUBCUTANEOUS
Qty: 30 VIAL | Refills: 3 | COMMUNITY
Start: 2018-02-28 | End: 2018-08-14

## 2018-02-28 RX ORDER — MECLIZINE HCL 12.5 MG 12.5 MG/1
12.5 TABLET ORAL
Qty: 30 TAB | Refills: 1 | Status: SHIPPED | OUTPATIENT
Start: 2018-02-28 | End: 2018-05-12 | Stop reason: SDUPTHER

## 2018-02-28 RX ORDER — INSULIN GLARGINE 100 [IU]/ML
INJECTION, SOLUTION SUBCUTANEOUS
Qty: 30 ML | Refills: 3 | Status: SHIPPED | OUTPATIENT
Start: 2018-02-28 | End: 2018-05-24

## 2018-02-28 RX ADMIN — GADOTERATE MEGLUMINE 20 ML: 376.9 INJECTION INTRAVENOUS at 15:33
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
## 2018-02-28 NOTE — MR AVS SNAPSHOT
[facility] [address] [facility] 
[phone] Patient: Sidney Zapata MRN: [id number] Q[facility]:[id number] Visit Information Date & Time Provider Department Dept. Phone Encounter #  
 2/28/2018 11:00 AM Uriel Oropeza [phone] [id number] Follow-up Instructions Return in about 2 months (around 4/28/2018), or if symptoms worsen or fail to improve. Your Appointments 3/7/2018  9:20 AM  
PROCEDURE with Rosaria Nyhan, MD  
[facility] NEURO (KWESI SCHEDULING) Appt Note: Rt. L RFA @ L3-5 per Radha. Mónica Wharton Mónica Wharton [place] Wharton Repeat . ... Mónica Wharton Mónica Wharton [place] [address] [place] [address]  
[phone]  
  
   
 [address] [place] [address] Upcoming Health Maintenance Date Due FOBT Q 1 YEAR AGE 50-75 4/13/2011 EYE EXAM RETINAL OR DILATED Q1 12/11/2016 FOOT EXAM Q1 12/14/2017 MICROALBUMIN Q1 1/11/2018 HEMOGLOBIN A1C Q6M 2/28/2018 LIPID PANEL Q1 8/28/2018 PAP AKA CERVICAL CYTOLOGY 11/13/2018 BREAST CANCER SCRN MAMMOGRAM 10/26/2019 DTaP/Tdap/Td series (2 - Td) 11/11/2026 Allergies as of 2/28/2018  Review Complete On: 2/28/2018 By: Gil Huerta MD  
  
 Severity Noted Reaction Type Reactions Other Food  03/17/2016    Other (comments) Sweeteners- causes headaches Metformin  11/06/2015    Other (comments) Increase pain in feet and swelling in feet Morphine  01/25/2017    Other (comments)  
 headache Singulair [Montelukast]  07/27/2017    Other (comments)  
 hallucinations Current Immunizations  Never Reviewed No immunizations on file. Not reviewed this visit You Were Diagnosed With   
  
 Codes Comments Uncontrolled type 2 diabetes mellitus with hyperglycemia, with long-term current use of insulin ([facility])    -  Primary ICD-10-CM: E11.65, Z79.4 ICD-9-CM: 250.02, V58.67  Vertigo     ICD-10-CM: M92 
 ICD-9-CM: 780.4 Tremor     ICD-10-CM: R25.1 ICD-9-CM: 886. 0 Vitals BP Pulse Temp Resp Height(growth percentile) Weight(growth percentile) 114/66 (BP 1 Location: Left arm, BP Patient Position: Sitting) 74 98 °F (36.7 °C) (Oral) 20 5' 7\" (1.702 m) 276 lb (125.2 kg) SpO2 BMI OB Status Smoking Status 95% 43.23 kg/m2 Hysterectomy Never Smoker Vitals History BMI and BSA Data Body Mass Index Body Surface Area  
 43.23 kg/m 2 2.43 m 2 Preferred Pharmacy Pharmacy Name Phone [address] [facility], [address] Your Updated Medication List  
  
   
This list is accurate as of 2/28/18 11:59 AM.  Always use your most recent med list.  
  
  
  
  
 * albuterol 2.5 mg /3 mL (0.083 %) nebulizer solution Commonly known as:  PROVENTIL VENTOLIN  
3 mL by Nebulization route every four (4) hours as needed for Wheezing. * PROAIR HFA 90 mcg/actuation inhaler Generic drug:  albuterol  
inhale 2 puffs by mouth every 4 hours if needed for wheezing  
  
 aspirin, buffered 81 mg Tab Take  by mouth. atorvastatin 80 mg tablet Commonly known as:  LIPITOR  
take 1 tablet by mouth once daily BD INSULIN SYRINGE ULTRA-FINE 1 mL 31 gauge x 5/16 Syrg Generic drug:  Insulin Syringe-Needle U-100  
use as directed twice a day DULoxetine 60 mg capsule Commonly known as:  CYMBALTA  
take 1 capsule by mouth once daily FREESTYLE LITE STRIPS strip Generic drug:  glucose blood VI test strips TEST twice a day as directed  
  
 gabapentin 300 mg capsule Commonly known as:  NEURONTIN  
take 1 capsule by mouth at bedtime LANTUS U-100 INSULIN 100 unit/mL injection Generic drug:  insulin glargine INJECT 100 UNITS SUBCUTANEOUSLY AT BEDTIME  
  
 meclizine 12.5 mg tablet Commonly known as:  ANTIVERT Take 1 Tab by mouth three (3) times daily as needed for up to 10 days. metoprolol succinate 50 mg XL tablet Commonly known as:  TOPROL-XL  
take 1 tablet by mouth once daily [place]-3-DHA-EPA-Fish Oil 1,000 mg (120 mg-180 mg) Cap  
take 1 capsule by mouth twice a day SYMBICORT 160-4.5 mcg/actuation Hfaa Generic drug:  budesonide-formoterol  
inhale 2 puffs by mouth twice a day VITAMIN B-12 1,000 mcg sublingual tablet Generic drug:  cyanocobalamin Take 1,000 mcg by mouth daily. VITAMIN D2 50,000 unit capsule Generic drug:  ergocalciferol  
take 1 capsule by mouth 7 days * Notice: This list has 2 medication(s) that are the same as other medications prescribed for you. Read the directions carefully, and ask your doctor or other care provider to review them with you. Prescriptions Sent to Pharmacy Refills  
 meclizine (ANTIVERT) 12.5 mg tablet 1 Sig: Take 1 Tab by mouth three (3) times daily as needed for up to 10 days. Class: Normal  
 Pharmacy: [id number] [facility], [address] [facility] Ph #: [phone] Route: Oral  
  
We Performed the Following AMB POC HEMOGLOBIN A1C [46866 CPT(R)] REFERRAL TO ENDOCRINOLOGY [[facility]27 Custom] Comments:  
 Uncontrolled DM2 Follow-up Instructions Return in about 2 months (around 4/28/2018), or if symptoms worsen or fail to improve. To-Do List   
 02/28/2018 2:30 PM  
  Appointment with [facility] MRI RM 1 at [facility]  Raya Vallejo ([phone]) GENERAL INSTRUCTIONS  Bring information (ID card) if you have any medically implanted devices. You will be required to lie still while the procedure is being performed. Remove any jewelry (including body piercing, hairpins) prior to MRI. If you have had a creatinine level drawn within the past 30 days, please bring most recent results to your appt.   Bring any films, CD's, and reports related to your study with you on the day of your exam.  This only includes studies done outside of [facility], [address], Parviz Patel, and Ellis Pang. Bring a complete list of all medications you are currently taking to include prescriptions, over-the-counter meds, herbals, vitamins & any dietary supplements. If you were given medications for claustrophobia or anxiety, please arrange to have someone drive you to your appointment. QUESTIONS  Notify the MRI Department if you have any questions concerning your study. Parviz Patel - [phone] [facility] [address] [facility] Ellis Pang - [phone] Referral Information Referral ID Referred By Referred To  
  
 [id number] Mnoique Pathak N Not Available Visits Status Start Date End Date 1 New Request 2/28/18 2/28/19 If your referral has a status of pending review or denied, additional information will be sent to support the outcome of this decision. Introducing [facility]! Dear Ricarda Santamaria: Thank you for requesting a [employer] account. Our records indicate that you already have an active [employer] account. You can access your account anytime at [email or web address] Did you know that you can access your hospital and ER discharge instructions at any time in [employer]? You can also review all of your test results from your hospital stay or ER visit. Additional Information If you have questions, please visit the Frequently Asked Questions section of the [employer] website at [email or web address] Remember, [employer] is NOT to be used for urgent needs. For medical emergencies, dial 911. Now available from your iPhone and Android! Please provide this summary of care documentation to your next provider. Your primary care clinician is listed as Angelito Perkins. If you have any questions after today's visit, please call [phone].

## 2018-03-22 DIAGNOSIS — J45.31 ASTHMATIC BRONCHITIS, MILD PERSISTENT, WITH ACUTE EXACERBATION: ICD-10-CM

## 2018-03-23 RX ORDER — BUDESONIDE AND FORMOTEROL FUMARATE DIHYDRATE 160; 4.5 UG/1; UG/1
AEROSOL RESPIRATORY (INHALATION)
Qty: 1 INHALER | Refills: 1 | Status: SHIPPED | OUTPATIENT
Start: 2018-03-23 | End: 2018-07-27 | Stop reason: SDUPTHER

## 2018-03-27 ENCOUNTER — PATIENT OUTREACH (OUTPATIENT)
Dept: FAMILY MEDICINE CLINIC | Age: 58
End: 2018-03-27

## 2018-03-28 ENCOUNTER — TELEPHONE (OUTPATIENT)
Dept: FAMILY MEDICINE CLINIC | Age: 58
End: 2018-03-28

## 2018-03-28 NOTE — TELEPHONE ENCOUNTER
Patient called stating she was recently seen by her Endocrinologist recently. They recommended for her to contact PCP about getting a Vascular referral, as her legs turn purple sometimes. She thinks something may be clogged. Please advise. **When you call patient back, can you find out where she's going for Endo?  No notes in Open referral**

## 2018-03-29 DIAGNOSIS — I73.9 INTERMITTENT CLAUDICATION (HCC): Primary | ICD-10-CM

## 2018-04-10 ENCOUNTER — HOSPITAL ENCOUNTER (OUTPATIENT)
Dept: CT IMAGING | Age: 58
Discharge: HOME | End: 2018-04-10
Attending: PHYSICIAN ASSISTANT
Payer: MEDICAID

## 2018-04-10 DIAGNOSIS — J32.0 CHRONIC MAXILLARY SINUSITIS: ICD-10-CM

## 2018-04-10 PROCEDURE — 70486 CT MAXILLOFACIAL W/O DYE: CPT

## 2018-04-12 ENCOUNTER — PATIENT OUTREACH (OUTPATIENT)
Dept: FAMILY MEDICINE CLINIC | Age: 58
End: 2018-04-12

## 2018-04-12 NOTE — PROGRESS NOTES
health screening:    Ms Lisette Vivas returned my call and stated she had a colonoscopy done in [facility] a few years ago. I noted a medical release form was faxed to [address] [place] [address] in 2016 and again 2017 but still no reports on chart. I will attempt to obtain the reports.

## 2018-04-12 NOTE — PROGRESS NOTES
[facility] health screenings:    I have asked Dr. Ann Velazquez office to fax a medical release form to [facility] @ [phone] for her colonoscopy report.

## 2018-04-19 RX ORDER — DULOXETIN HYDROCHLORIDE 60 MG/1
CAPSULE, DELAYED RELEASE ORAL
Qty: 30 CAP | Refills: 2 | Status: SHIPPED | OUTPATIENT
Start: 2018-04-19 | End: 2018-07-27 | Stop reason: SDUPTHER

## 2018-04-19 RX ORDER — GLUCOSAM/CHONDRO/HERB 149/HYAL 750-100 MG
TABLET ORAL
Qty: 60 CAP | Refills: 2 | Status: SHIPPED | OUTPATIENT
Start: 2018-04-19 | End: 2018-05-24

## 2018-05-12 DIAGNOSIS — R42 VERTIGO: ICD-10-CM

## 2018-05-14 RX ORDER — GLUCOSAM/CHONDRO/HERB 149/HYAL 750-100 MG
TABLET ORAL
Qty: 60 CAP | Refills: 2 | Status: SHIPPED | OUTPATIENT
Start: 2018-05-14 | End: 2018-09-28 | Stop reason: SDUPTHER

## 2018-05-14 RX ORDER — MECLIZINE HCL 12.5 MG 12.5 MG/1
TABLET ORAL
Qty: 30 TAB | Refills: 0 | Status: SHIPPED | OUTPATIENT
Start: 2018-05-14 | End: 2018-07-26 | Stop reason: SDUPTHER

## 2018-05-16 DIAGNOSIS — J45.31 ASTHMATIC BRONCHITIS, MILD PERSISTENT, WITH ACUTE EXACERBATION: ICD-10-CM

## 2018-05-16 RX ORDER — METOPROLOL SUCCINATE 50 MG/1
TABLET, EXTENDED RELEASE ORAL
Qty: 30 TAB | Refills: 0 | Status: SHIPPED | OUTPATIENT
Start: 2018-05-16 | End: 2018-07-12 | Stop reason: SDUPTHER

## 2018-05-16 RX ORDER — GLUCOSAM/CHONDRO/HERB 149/HYAL 750-100 MG
TABLET ORAL
Qty: 60 CAP | Refills: 2 | Status: SHIPPED | OUTPATIENT
Start: 2018-05-16 | End: 2018-05-24 | Stop reason: SDUPTHER

## 2018-05-16 RX ORDER — BUDESONIDE AND FORMOTEROL FUMARATE DIHYDRATE 160; 4.5 UG/1; UG/1
AEROSOL RESPIRATORY (INHALATION)
Qty: 1 INHALER | Refills: 1 | Status: SHIPPED | OUTPATIENT
Start: 2018-05-16 | End: 2018-05-24

## 2018-05-17 ENCOUNTER — PATIENT OUTREACH (OUTPATIENT)
Dept: FAMILY MEDICINE CLINIC | Age: 58
End: 2018-05-17

## 2018-05-24 ENCOUNTER — PATIENT OUTREACH (OUTPATIENT)
Dept: FAMILY MEDICINE CLINIC | Age: 58
End: 2018-05-24

## 2018-05-24 ENCOUNTER — OFFICE VISIT (OUTPATIENT)
Dept: FAMILY MEDICINE CLINIC | Age: 58
End: 2018-05-24

## 2018-05-24 VITALS
HEART RATE: 74 BPM | OXYGEN SATURATION: 93 % | SYSTOLIC BLOOD PRESSURE: 131 MMHG | WEIGHT: 266 LBS | BODY MASS INDEX: 41.75 KG/M2 | TEMPERATURE: 97.2 F | RESPIRATION RATE: 18 BRPM | HEIGHT: 67 IN | DIASTOLIC BLOOD PRESSURE: 77 MMHG

## 2018-05-24 DIAGNOSIS — Z79.4 UNCONTROLLED TYPE 2 DIABETES MELLITUS WITH HYPERGLYCEMIA, WITH LONG-TERM CURRENT USE OF INSULIN (HCC): Primary | ICD-10-CM

## 2018-05-24 DIAGNOSIS — E11.65 UNCONTROLLED TYPE 2 DIABETES MELLITUS WITH HYPERGLYCEMIA, WITH LONG-TERM CURRENT USE OF INSULIN (HCC): Primary | ICD-10-CM

## 2018-05-24 DIAGNOSIS — H57.89 REDNESS, EYE: ICD-10-CM

## 2018-05-24 LAB
GLUCOSE POC: 146 MG/DL
HBA1C MFR BLD HPLC: 9.5 %

## 2018-05-24 RX ORDER — POLYMYXIN B SULFATE AND TRIMETHOPRIM 1; 10000 MG/ML; [USP'U]/ML
1 SOLUTION OPHTHALMIC EVERY 6 HOURS
Qty: 1 BOTTLE | Refills: 0 | Status: SHIPPED | OUTPATIENT
Start: 2018-05-24 | End: 2018-05-29

## 2018-05-24 RX ORDER — CEFUROXIME AXETIL 250 MG/1
250 TABLET ORAL 2 TIMES DAILY
COMMUNITY
End: 2018-08-14

## 2018-05-24 NOTE — PATIENT INSTRUCTIONS
Body Mass Index: Care Instructions  Your Care Instructions    Body mass index (BMI) can help you see if your weight is raising your risk for health problems. It uses a formula to compare how much you weigh with how tall you are. · A BMI lower than 18.5 is considered underweight. · A BMI between 18.5 and 24.9 is considered healthy. · A BMI between 25 and 29.9 is considered overweight. A BMI of 30 or higher is considered obese. If your BMI is in the normal range, it means that you have a lower risk for weight-related health problems. If your BMI is in the overweight or obese range, you may be at increased risk for weight-related health problems, such as high blood pressure, heart disease, stroke, arthritis or joint pain, and diabetes. If your BMI is in the underweight range, you may be at increased risk for health problems such as fatigue, lower protection (immunity) against illness, muscle loss, bone loss, hair loss, and hormone problems. BMI is just one measure of your risk for weight-related health problems. You may be at higher risk for health problems if you are not active, you eat an unhealthy diet, or you drink too much alcohol or use tobacco products. Follow-up care is a key part of your treatment and safety. Be sure to make and go to all appointments, and call your doctor if you are having problems. It's also a good idea to know your test results and keep a list of the medicines you take. How can you care for yourself at home? · Practice healthy eating habits. This includes eating plenty of fruits, vegetables, whole grains, lean protein, and low-fat dairy. · If your doctor recommends it, get more exercise. Walking is a good choice. Bit by bit, increase the amount you walk every day. Try for at least 30 minutes on most days of the week. · Do not smoke. Smoking can increase your risk for health problems. If you need help quitting, talk to your doctor about stop-smoking programs and medicines. These can increase your chances of quitting for good. · Limit alcohol to 2 drinks a day for men and 1 drink a day for women. Too much alcohol can cause health problems. If you have a BMI higher than 25  · Your doctor may do other tests to check your risk for weight-related health problems. This may include measuring the distance around your waist. A waist measurement of more than 40 inches in men or 35 inches in women can increase the risk of weight-related health problems. · Talk with your doctor about steps you can take to stay healthy or improve your health. You may need to make lifestyle changes to lose weight and stay healthy, such as changing your diet and getting regular exercise. If you have a BMI lower than 18.5  · Your doctor may do other tests to check your risk for health problems. · Talk with your doctor about steps you can take to stay healthy or improve your health. You may need to make lifestyle changes to gain or maintain weight and stay healthy, such as getting more healthy foods in your diet and doing exercises to build muscle. Where can you learn more? Go to http://nadya-irasema.info/. Enter S176 in the search box to learn more about \"Body Mass Index: Care Instructions. \"  Current as of: October 13, 2016  Content Version: 11.4  © 2982-9115 [employer]. Care instructions adapted under license by [employer] (which disclaims liability or warranty for this information). If you have questions about a medical condition or this instruction, always ask your healthcare professional. [address] any warranty or liability for your use of this information.

## 2018-05-24 NOTE — PROGRESS NOTES
Chief Complaint   Patient presents with    Red Eye     right eye      1. Have you been to the ER, urgent care clinic since your last visit? Hospitalized since your last visit? No    2. Have you seen or consulted any other health care providers outside of the [address] [place] [address] since your last visit? Include any pap smears or colon screening. No     HPI  Jonah Fuchs comes in for follow-up care. Right eye: Patient has right eye redness. She is here with her grandson. They were interacting a few days ago when he accidentally poked her right eye. It was red and felt irritable and painful. Gradually has improved. The redness is clearing to it is still red. She denies blurry vision or any loss of vision. The pain has improved somewhat. Denies any discharge from the eyes. Now has irritation in both eyes with itchiness in the tearing. Comes in for evaluation. She is due to be seen by the ophthalmologist.  She did have an appointment yesterday but did not make it. I did encourage her to  call her ophthalmologist and reschedule the appointment. I will put her on Polytrim eyedrops. Diabetes mellitus type 2: Patient has type 2 diabetes mellitus. She has been seen by the endocrinologist.  She is taking 100 units of Lantus daily. She is on jardiance. Her HbA1c is 9.5.   have blood glucose today is 146. We will continue with the current treatment plan. Recheck HbA1c in 3 months. She should continue following up with endocrinologist.  Morbid obesity: Patient's BMI is 41.66. She has lost 10 pounds since he was last here. She is doing lifestyle and dietary modification. ENT: Patient was recently seen by the ENT physician for sinus congestion. She did have balloon rhinoplasty done. She is on cefuroxime. I will have her continue with the medication to completion. Health maintenance: Patient has had a colonoscopy done. This was done in 2015.   Our records to have the 2008 colonoscopy that was due for recheck in 5 years. In 2015 she did have another one done in [facility] and this was clear. Was recommended for recheck in 10 years. We will try and obtain the records sent to us for this. Neuropathy: Patient is on gabapentin. This does help her neuropathic pain. We will continue with the current treatment plan. Hypertension: Patient is on metoprolol daily. Stable at this dose. We will continue with the current treatment plan. Respiratory: Patient using her inhalers. She is on Symbicort and pro-air. Stable. No wheeze, shortness of breath or cough. Mood disorder: Patient is on Cymbalta daily. Stable on the medication. We will continue with the current treatment plan. Past Medical History  Past Medical History:   Diagnosis Date    Arthritis     Lower back     Chronic back pain     Lower back pain    Depression     Diabetes ([address]     Diabetes mellitus ([address]     Left ear hearing loss     pt states nerve damage--- 25% hearing loss, described as \"muffled\"    Panic attacks     Sleep apnea     Thyroid disease     Vertigo        Surgical History  Past Surgical History:   Procedure Laterality Date    HX HEART VALVE SURGERY  2013    aortic valve repair    HX HYSTERECTOMY      Partial Hysterectomy - removed ovary    HX MYOMECTOMY      HX ORTHOPAEDIC  02/2018    had nerves burned on her right side of back        Medications  Current Outpatient Prescriptions   Medication Sig Dispense Refill    empagliflozin (JARDIANCE) 25 mg tablet Take  by mouth daily.  cefUROXime (CEFTIN) 250 mg tablet Take 250 mg by mouth two (2) times a day.  trimethoprim-polymyxin b (POLYTRIM) ophthalmic solution Administer 1 Drop to both eyes every six (6) hours for 5 days.  1 Bottle 0    metoprolol succinate (TOPROL-XL) 50 mg XL tablet take 1 tablet by mouth once daily 30 Tab 0    omega 3-DHA-EPA-fish oil 1,000 mg (120 mg-180 mg) capsule take 1 capsule by mouth twice a day 60 Cap 2    meclizine (ANTIVERT) 12.5 mg tablet take 1 tablet by mouth three times a day if needed for 10 days 30 Tab 0    DULoxetine (CYMBALTA) 60 mg capsule take 1 capsule by mouth once daily 30 Cap 2    SYMBICORT 160-4.5 mcg/actuation HFAA inhale 2 puffs by mouth twice a day 1 Inhaler 1    insulin glargine (LANTUS U-100 INSULIN) 100 unit/mL injection INJECT 100 UNITS SUBCUTANEOUSLY AT BEDTIME 30 Vial 3    gabapentin (NEURONTIN) 300 mg capsule take 1 capsule by mouth once daily at bedtime 30 Cap 2    VITAMIN D2 50,000 unit capsule take 1 capsule by mouth 7 days 12 Cap 0    FREESTYLE LITE STRIPS strip TEST twice a day as directed 100 Strip 11    atorvastatin (LIPITOR) 80 mg tablet take 1 tablet by mouth once daily 30 Tab 11    BD INSULIN SYRINGE ULTRA-FINE 1 mL 31 gauge x 5/16 syrg use as directed twice a day 200 Syringe 3    PROAIR HFA 90 mcg/actuation inhaler inhale 2 puffs by mouth every 4 hours if needed for wheezing 8.5 Inhaler 3    albuterol (PROVENTIL VENTOLIN) 2.5 mg /3 mL (0.083 %) nebulizer solution 3 mL by Nebulization route every four (4) hours as needed for Wheezing. 24 Each 5    cyanocobalamin (VITAMIN B-12) 1,000 mcg sublingual tablet Take 1,000 mcg by mouth daily.  Aspirin, Buffered 81 mg tab Take  by mouth. Allergies  Allergies   Allergen Reactions    Other Food Other (comments)     Sweeteners- causes headaches    Metformin Other (comments)     Increase pain in feet and swelling in feet    Morphine Other (comments)     headache    Singulair [Montelukast] Other (comments)     hallucinations       Family History  Family History   Problem Relation Age of Onset    Heart Disease Mother     Diabetes Mother     Stroke Mother     Diabetes Father     Heart Disease Father        Social History  Social History     Social History    Marital status:      Spouse name: N/A    Number of children: N/A    Years of education: N/A     Occupational History    Not on file.      Social History Main Topics    Smoking status: Never Smoker    Smokeless tobacco: Never Used    Alcohol use No    Drug use: No    Sexual activity: Not Currently     Other Topics Concern     Service No    Blood Transfusions No    Caffeine Concern No    Occupational Exposure No    Hobby Hazards No    Sleep Concern Yes     Sleep apnea     Stress Concern Yes     Due to family medical issues.  Weight Concern No    Special Diet No    Back Care Yes     Patient tries to do back care with streches     Exercise No    Bike Helmet Yes    Seat Belt Yes    Self-Exams Yes     Social History Narrative       Review of Systems  Review of Systems - History obtained from chart review and the patient  General ROS: positive for  - fatigue, malaise and weight loss  Psychological ROS: positive for - mood swings  Ophthalmic ROS: positive for - eye pain  ENT ROS: positive for - nasal congestion and sinus pain  Hematological and Lymphatic ROS: negative  Endocrine ROS: dm2  Respiratory ROS: positive for - wheezing  negative for - cough, hemoptysis, pleuritic pain or sputum changes  Cardiovascular ROS: negative  Gastrointestinal ROS: no abdominal pain, change in bowel habits, or black or bloody stools  Genito-Urinary ROS: no dysuria, trouble voiding, or hematuria  Musculoskeletal ROS: positive for - joint pain and muscle pain  Neurological ROS: no TIA or stroke symptoms    Vital Signs  Visit Vitals    /77 (BP 1 Location: Left arm, BP Patient Position: Sitting)    Pulse 74    Temp 97.2 °F (36.2 °C) (Oral)    Resp 18    Ht 5' 7\" (1.702 m)    Wt 266 lb (120.7 kg)    SpO2 93%    BMI 41.66 kg/m2         Physical Exam  Physical Examination: General appearance - oriented to person, place, and time, overweight, acyanotic, in no respiratory distress and well hydrated  Mental status - alert, oriented to person, place, and time, affect appropriate to mood  Eyes -right eye with redness lateral aspect.   Pupils are equal, round, reactive to light bilaterally. Nose - normal and patent, no erythema, discharge or polyps and normal nontender sinuses  Mouth - mucous membranes moist, pharynx normal without lesions  Neck - supple, no significant adenopathy  Lymphatics - no palpable lymphadenopathy  Chest - decreased air entry noted bilateral bases  Heart - normal rate and regular rhythm, S1 and S2 normal  Back exam - limited range of motion  Neurological - neck supple without rigidity  Musculoskeletal - osteoarthritic changes noted in both hands  Extremities - intact peripheral pulses    Results  Results for orders placed or performed in visit on 05/24/18   AMB POC HEMOGLOBIN A1C   Result Value Ref Range    Hemoglobin A1c (POC) 9.5 %   AMB POC GLUCOSE BLOOD, BY GLUCOSE MONITORING DEVICE   Result Value Ref Range    Glucose  mg/dL       ASSESSMENT and PLAN    ICD-10-CM ICD-9-CM    1. Uncontrolled type 2 diabetes mellitus with hyperglycemia, with long-term current use of insulin ([facility]) E11.65 250.02 empagliflozin (JARDIANCE) 25 mg tablet    Z79.4 V58.67 cefUROXime (CEFTIN) 250 mg tablet      AMB POC HEMOGLOBIN A1C      AMB POC GLUCOSE BLOOD, BY GLUCOSE MONITORING DEVICE   2. Redness, eye H57.8 379.93 trimethoprim-polymyxin b (POLYTRIM) ophthalmic solution     lab results and schedule of future lab studies reviewed with patient  reviewed diet, exercise and weight control  cardiovascular risk and specific lipid/LDL goals reviewed  reviewed medications and side effects in detail  specific diabetic recommendations: low cholesterol diet, weight control and daily exercise discussed, home glucose monitoring emphasized, all medications, side effects and compliance discussed carefully, annual eye examinations at Ophthalmology discussed, glycohemoglobin and other lab monitoring discussed and long term diabetic complications discussed  Discussed the patient's BMI with her.   The BMI follow up plan is as follows:   dietary management education, guidance, and counseling  encourage exercise  monitor weight    I have discussed the diagnosis with the patient and the intended plan of care as seen in the above orders. The patient has received an after-visit summary and questions were answered concerning future plans. I have discussed medication, side effects, and warnings with the patient in detail. The patient verbalized understanding and is in agreement with the plan of care. The patient will contact the office with any additional concerns.     Huy Key MD

## 2018-05-24 NOTE — MR AVS SNAPSHOT
[facility] [address] [facility] 
[phone] Patient: Laurel Alvarez MRN: [id number] WMN:[id number] Visit Information Date & Time Provider Department Dept. Phone Encounter #  
 5/24/2018 10:45 AM Angelito Jeronimo MD [facility] [phone] [id number] Follow-up Instructions Return in about 3 months (around 8/24/2018), or if symptoms worsen or fail to improve, for dm2, htn. Upcoming Health Maintenance Date Due FOBT Q 1 YEAR AGE 50-75 4/13/2011 Influenza Age 5 to Adult 8/1/2018 LIPID PANEL Q1 8/28/2018 HEMOGLOBIN A1C Q6M 11/24/2018 EYE EXAM RETINAL OR DILATED Q1 1/24/2019 FOOT EXAM Q1 2/28/2019 MICROALBUMIN Q1 2/28/2019 BREAST CANCER SCRN MAMMOGRAM 10/26/2019 DTaP/Tdap/Td series (2 - Td) 11/11/2026 Allergies as of 5/24/2018  Review Complete On: 5/24/2018 By: Bia Ruvalcaba MD  
  
 Severity Noted Reaction Type Reactions Other Food  03/17/2016    Other (comments) Sweeteners- causes headaches Metformin  11/06/2015    Other (comments) Increase pain in feet and swelling in feet Morphine  01/25/2017    Other (comments)  
 headache Singulair [Montelukast]  07/27/2017    Other (comments)  
 hallucinations Current Immunizations  Never Reviewed No immunizations on file. Not reviewed this visit You Were Diagnosed With   
  
 Codes Comments Uncontrolled type 2 diabetes mellitus with hyperglycemia, with long-term current use of insulin (HCC)    -  Primary ICD-10-CM: E11.65, Z79.4 ICD-9-CM: 250.02, V58.67 Redness, eye     ICD-10-CM: H57.8 ICD-9-CM: 379.93 Vitals BP Pulse Temp Resp Height(growth percentile) Weight(growth percentile) 131/77 (BP 1 Location: Left arm, BP Patient Position: Sitting) 74 97.2 °F (36.2 °C) (Oral) 18 5' 7\" (1.702 m) 266 lb (120.7 kg) SpO2 BMI OB Status Smoking Status To improve blood sugars:  Diet:  Keep total carbohydrates at 45-60 grams per meal.  More fiber, less sugars/sweets.  Exercise DAILY.  Aerobic exercise 4-5 times per week.  Lose weight.  Eat 3 meals a day.  If you are very active have a snack.        Acid reflux, heartburn    Less spicy or acidic foods.  Less tomato based foods.  Less gassy foods, A lot less carbonated beverages.  Avoid late night foods.  Should avoid food for 3 hours before bed.  Consider elevating the head of your bed.  Sleeping with your throat above your stomach will help keep the acid down overnight.  Less caffeine.  Less alcohol  Avoid large meals.    Acid medications:    Antacids (Tums)  Work quickly, strength is lowest.    H2 blockers (Pepcid, Zantac, Ranitidine) Work in about 30 minutes. Good strength. Can be used before eating foods that often cause you trouble.    Allergies    Control the air you are breathing.  Do this with good vacuuming, dusting and mopping. Close the windows in the spring through fall, and maybe air out the house in the winter time.  Avoid fans, they just blow allergens around so you can breathe them in.  Consider air filters; sometimes they help, sometimes they are just expensive.    Control exposure to animals.    Use nasal saline to rinse out the nose.  Sometimes really helps to rinse out allergens after exposure (such as after mowing the lawn).    Antihistamines can help decrease the amount of snot and can help with itching.    Non-sedating antihistamines are Claritin (loratidine), allegra (fexofenadine) and zyrtec (cetirizine).  Zyrtec is the strongest but can be sedating for 1 out of 10 people.    Benadryl (diphenhydramine) is the strongest but is sedating and should be only used in the evening.      Cough  Control the drainage from the nose  Gargling  Lozenges to soothe the throat  Avoid acid reflux  Smaller meals    Call if wheezing or short of breath  Find good stress relieversjg    Voice box issue - speech  93% 41.66 kg/m2 Hysterectomy Never Smoker BMI and BSA Data Body Mass Index Body Surface Area  
 41.66 kg/m 2 2.39 m 2 Preferred Pharmacy Pharmacy Name Phone [address], [address] [place] [address] [phone] Your Updated Medication List  
  
   
This list is accurate as of 5/24/18 11:13 AM.  Always use your most recent med list.  
  
  
  
  
 * albuterol 2.5 mg /3 mL (0.083 %) nebulizer solution Commonly known as:  PROVENTIL VENTOLIN  
3 mL by Nebulization route every four (4) hours as needed for Wheezing. * PROAIR HFA 90 mcg/actuation inhaler Generic drug:  albuterol  
inhale 2 puffs by mouth every 4 hours if needed for wheezing  
  
 aspirin, buffered 81 mg Tab Take  by mouth. atorvastatin 80 mg tablet Commonly known as:  LIPITOR  
take 1 tablet by mouth once daily BD INSULIN SYRINGE ULTRA-FINE 1 mL 31 gauge x 5/16 Syrg Generic drug:  Insulin Syringe-Needle U-100  
use as directed twice a day  
  
 cefUROXime 250 mg tablet Commonly known as:  CEFTIN Take 250 mg by mouth two (2) times a day. DULoxetine 60 mg capsule Commonly known as:  CYMBALTA  
take 1 capsule by mouth once daily FREESTYLE LITE STRIPS strip Generic drug:  glucose blood VI test strips TEST twice a day as directed  
  
 gabapentin 300 mg capsule Commonly known as:  NEURONTIN  
take 1 capsule by mouth once daily at bedtime JARDIANCE 25 mg tablet Generic drug:  empagliflozin Take  by mouth daily. LANTUS U-100 INSULIN 100 unit/mL injection Generic drug:  insulin glargine INJECT 100 UNITS SUBCUTANEOUSLY AT BEDTIME  
  
 meclizine 12.5 mg tablet Commonly known as:  ANTIVERT  
take 1 tablet by mouth three times a day if needed for 10 days  
  
 metoprolol succinate 50 mg XL tablet Commonly known as:  TOPROL-XL  
take 1 tablet by mouth once daily  
  
 omega 3-DHA-EPA-fish oil 1,000 mg (120 mg-180 mg) capsule therapist.         take 1 capsule by mouth twice a day SYMBICORT 160-4.5 mcg/actuation Hfaa Generic drug:  budesonide-formoterol  
inhale 2 puffs by mouth twice a day  
  
 trimethoprim-polymyxin b ophthalmic solution Commonly known as:  POLYTRIM Administer 1 Drop to both eyes every six (6) hours for 5 days. VITAMIN B-12 1,000 mcg sublingual tablet Generic drug:  cyanocobalamin Take 1,000 mcg by mouth daily. VITAMIN D2 50,000 unit capsule Generic drug:  ergocalciferol  
take 1 capsule by mouth 7 days * Notice: This list has 2 medication(s) that are the same as other medications prescribed for you. Read the directions carefully, and ask your doctor or other care provider to review them with you. Prescriptions Sent to Pharmacy Refills  
 trimethoprim-polymyxin b (POLYTRIM) ophthalmic solution 0 Sig: Administer 1 Drop to both eyes every six (6) hours for 5 days. Class: Normal  
 Pharmacy: [facility] #: [phone] Route: Both Eyes We Performed the Following AMB POC GLUCOSE BLOOD, BY GLUCOSE MONITORING DEVICE [62155 CPT(R)] AMB POC HEMOGLOBIN A1C [50047 CPT(R)] Follow-up Instructions Return in about 3 months (around 8/24/2018), or if symptoms worsen or fail to improve, for dm2, htn. Patient Instructions Body Mass Index: Care Instructions Your Care Instructions Body mass index (BMI) can help you see if your weight is raising your risk for health problems. It uses a formula to compare how much you weigh with how tall you are. · A BMI lower than 18.5 is considered underweight. · A BMI between 18.5 and 24.9 is considered healthy. · A BMI between 25 and 29.9 is considered overweight. A BMI of 30 or higher is considered obese. If your BMI is in the normal range, it means that you have a lower risk for weight-related health problems.  If your BMI is in the overweight or obese range, you may be at increased risk for weight-related health problems, such as high blood pressure, heart disease, stroke, arthritis or joint pain, and diabetes. If your BMI is in the underweight range, you may be at increased risk for health problems such as fatigue, lower protection (immunity) against illness, muscle loss, bone loss, hair loss, and hormone problems. BMI is just one measure of your risk for weight-related health problems. You may be at higher risk for health problems if you are not active, you eat an unhealthy diet, or you drink too much alcohol or use tobacco products. Follow-up care is a key part of your treatment and safety. Be sure to make and go to all appointments, and call your doctor if you are having problems. It's also a good idea to know your test results and keep a list of the medicines you take. How can you care for yourself at home? · Practice healthy eating habits. This includes eating plenty of fruits, vegetables, whole grains, lean protein, and low-fat dairy. · If your doctor recommends it, get more exercise. Walking is a good choice. Bit by bit, increase the amount you walk every day. Try for at least 30 minutes on most days of the week. · Do not smoke. Smoking can increase your risk for health problems. If you need help quitting, talk to your doctor about stop-smoking programs and medicines. These can increase your chances of quitting for good. · Limit alcohol to 2 drinks a day for men and 1 drink a day for women. Too much alcohol can cause health problems. If you have a BMI higher than 25 · Your doctor may do other tests to check your risk for weight-related health problems. This may include measuring the distance around your waist. A waist measurement of more than 40 inches in men or 35 inches in women can increase the risk of weight-related health problems.  
· Talk with your doctor about steps you can take to stay healthy or improve your health. You may need to make lifestyle changes to lose weight and stay healthy, such as changing your diet and getting regular exercise. If you have a BMI lower than 18.5 · Your doctor may do other tests to check your risk for health problems. · Talk with your doctor about steps you can take to stay healthy or improve your health. You may need to make lifestyle changes to gain or maintain weight and stay healthy, such as getting more healthy foods in your diet and doing exercises to build muscle. Where can you learn more? Go to http://nadya-irasema.info/. Enter S176 in the search box to learn more about \"Body Mass Index: Care Instructions. \" Current as of: October 13, 2016 Content Version: 11.4 © 6788-5840 "[employer]". Care instructions adapted under license by [employer] (which disclaims liability or warranty for this information). If you have questions about a medical condition or this instruction, always ask your healthcare professional. [place] [address] any warranty or liability for your use of this information. Introducing [facility] & HEALTH SERVICES! Dear Estrellita Handler: Thank you for requesting a Telelogos account. Our records indicate that you already have an active Telelogos account. You can access your account anytime at [email or web address] Did you know that you can access your hospital and ER discharge instructions at any time in Telelogos? You can also review all of your test results from your hospital stay or ER visit. Additional Information If you have questions, please visit the Frequently Asked Questions section of the Telelogos website at [email or web address] Remember, Telelogos is NOT to be used for urgent needs. For medical emergencies, dial 911. Now available from your iPhone and Android! Please provide this summary of care documentation to your next provider. Your primary care clinician is listed as Angelito Perkins. If you have any questions after today's visit, please call [phone].

## 2018-05-31 NOTE — MR AVS SNAPSHOT
Visit Information Date & Time Provider Department Dept. Phone Encounter #  
 8/28/2017  2:00 PM [facility] [phone] [id number] Follow-up Instructions Return in about 3 weeks (around 9/18/2017), or if symptoms worsen or fail to improve, for dm2, sob, hypothyroidism. Your Appointments 9/20/2017 11:30 AM  
Follow Up with Henrry Friend MD  
[facility] [place] ([facility]) Appt Note: 1 month follow up; UTR/aj; 1 month follow up; 1 month follow up  
 [address] [place] [address] [facility] [address]  
  
   
 [address] [address] [address] [address]  
  
    
 9/28/2017 11:00 AM  
Follow Up with MD Uriel Perez ([address]) Appt Note: f/u for dm2, rash [address] [address] [address]  
  
   
 [address] [facility] Upcoming Health Maintenance Date Due  
 BREAST CANCER SCRN MAMMOGRAM 4/13/2011 FOBT Q 1 YEAR AGE 50-75 4/13/2011 EYE EXAM RETINAL OR DILATED Q1 12/11/2016 HEMOGLOBIN A1C Q6M 7/11/2017 FOOT EXAM Q1 12/14/2017 MICROALBUMIN Q1 1/11/2018 LIPID PANEL Q1 3/1/2018 PAP AKA CERVICAL CYTOLOGY 11/13/2018 DTaP/Tdap/Td series (2 - Td) 11/11/2026 Allergies as of 8/28/2017  Review Complete On: 8/28/2017 By: Emiliano Woodson MD  
  
 Severity Noted Reaction Type Reactions Other Food  03/17/2016    Other (comments) Sweeteners- causes headaches Metformin  11/06/2015    Other (comments) Increase pain in feet and swelling in feet Morphine  01/25/2017    Other (comments)  
 headache Singulair [Montelukast]  07/27/2017    Other (comments)  
 hallucinations Current Immunizations  Never Reviewed No immunizations on file. Not reviewed this visit You Were Diagnosed With   
  
 Codes Comments Malaise and fatigue    -  Primary ICD-10-CM: R53.81, R53.83 ICD-9-CM: 780.79 Hypovitaminosis D     ICD-10-CM: E55.9 ICD-9-CM: 268.9 Uncontrolled type 2 diabetes mellitus with hyperglycemia, with long-term current use of insulin (HCC)     ICD-10-CM: E11.65, Z79.4 ICD-9-CM: 250.02, V58.67 Hypothyroidism due to acquired atrophy of thyroid     ICD-10-CM: E03.4 ICD-9-CM: 244.8, 246.8 SOB (shortness of breath)     ICD-10-CM: R06.02 
ICD-9-CM: 786.05 Vitals BP Pulse Temp Resp Height(growth percentile) Weight(growth percentile) 136/74 (BP 1 Location: Left arm, BP Patient Position: Sitting) 68 98.5 °F (36.9 °C) (Oral) 18 5' 7\" (1.702 m) 265 lb 3.2 oz (120.3 kg) SpO2 BMI OB Status Smoking Status 95% 41.54 kg/m2 Hysterectomy Never Smoker BMI and BSA Data Body Mass Index Body Surface Area 41.54 kg/m 2 2.38 m 2 Preferred Pharmacy Pharmacy Name Phone [address] [facility], [address] Your Updated Medication List  
  
   
This list is accurate as of: 8/28/17  3:03 PM.  Always use your most recent med list.  
  
  
  
  
 * albuterol 2.5 mg /3 mL (0.083 %) nebulizer solution Commonly known as:  PROVENTIL VENTOLIN  
3 mL by Nebulization route every four (4) hours as needed for Wheezing. * PROAIR HFA 90 mcg/actuation inhaler Generic drug:  albuterol  
inhale 2 puffs by mouth every 4 hours if needed for wheezing  
  
 aspirin, buffered 81 mg Tab Take  by mouth. atorvastatin 80 mg tablet Commonly known as:  LIPITOR  
take 1 tablet by mouth once daily  
  
 bacitracin 500 unit/gram Oint Commonly known as:  BACITRACIN  
apply affected area twice a day  
  
 baclofen 10 mg tablet Commonly known as:  LIORESAL  
1  qhs  prn  
  
 BD INSULIN SYRINGE ULTRA-FINE 1 mL 31 gauge x 5/16 Syrg Generic drug:  Insulin Syringe-Needle U-100  
use as directed twice a day cyclobenzaprine 10 mg tablet Commonly known as:  FLEXERIL Take 1 Tab by mouth three (3) times daily as needed for Muscle Spasm(s). DULoxetine 60 mg capsule Commonly known as:  CYMBALTA  
take 1 capsule by mouth once daily * fluticasone-vilanterol 200-25 mcg/dose inhaler Commonly known as:  BREO ELLIPTA Take 1 Puff by inhalation daily. * fluticasone-vilanterol 100-25 mcg/dose inhaler Commonly known as:  BREO ELLIPTA Take 1 Puff by inhalation daily. FREESTYLE LITE STRIPS strip Generic drug:  glucose blood VI test strips TEST twice a day as directed  
  
 furosemide 40 mg tablet Commonly known as:  LASIX  
take 1 tablet by mouth twice a day for 7 days  
  
 gabapentin 300 mg capsule Commonly known as:  NEURONTIN  
take 1 capsule by mouth at bedtime LANTUS 100 unit/mL injection Generic drug:  insulin glargine  
inject 90 units subcutaneously every evening at bedtime  
  
 metoprolol succinate 50 mg XL tablet Commonly known as:  TOPROL-XL  
take 1 tablet by mouth once daily [place]-3-DHA-EPA-Fish Oil 1,000 mg (120 mg-180 mg) Cap  
take 1 capsule by mouth twice a day  
  
 potassium chloride 10 mEq tablet Commonly known as:  KLOR-CON  
take 1 tablet by mouth once daily  
  
 triamcinolone acetonide 0.025 % topical cream  
Commonly known as:  KENALOG Apply  to affected area two (2) times a day. use thin layer VITAMIN B-12 1,000 mcg sublingual tablet Generic drug:  cyanocobalamin Take 1,000 mcg by mouth daily. * Notice: This list has 4 medication(s) that are the same as other medications prescribed for you. Read the directions carefully, and ask your doctor or other care provider to review them with you. We Performed the Following AMB POC HEMOGLOBIN A1C [05515 CPT(R)] REFERRAL TO PULMONARY DISEASE [TSE70 Custom] Comments:  
 Please evaluate patient for chronic SOB. Follow-up Instructions Return in about 3 weeks (around 9/18/2017), or if symptoms worsen or fail to improve, for dm2, sob, hypothyroidism. To-Do List   
 08/28/2017 Lab:  CBC WITH AUTOMATED DIFF   
  
 08/28/2017 Lab:  LIPID PANEL   
  
 08/28/2017 Lab:  METABOLIC PANEL, COMPREHENSIVE   
  
 08/28/2017 Lab:  TSH 3RD GENERATION   
  
 08/28/2017 Lab:  VITAMIN D, 25 HYDROXY   
  
 10/11/2017 11:15 AM  
  Appointment with TERRI GRIMES RM 1 at [address] [place] [address] ([phone]) OUTSIDE FILMS  - Any outside films related to the study being scheduled should be brought with you on the day of the exam.  If this cannot be done there may be a delay in the reading of the study. MEDICATIONS  - Patient must bring a complete list of all medications currently taking to include prescriptions, over-the-counter meds, herbals, vitamins & any dietary supplements  GENERAL INSTRUCTIONS  - On the day of your exam do not use any bath powder, deodorant or lotions on the armpit area. -Tenderness of breasts may cause an increase of discomfort during procedure. If you are experiencing breast tenderness on the day of your appointment and would like to reschedule, please call [phone]. Referral Information Referral ID Referred By Referred To  
  
 [id number] Lee WASSERMAN Not Available Visits Status Start Date End Date 1 New Request 8/28/17 8/28/18 If your referral has a status of pending review or denied, additional information will be sent to support the outcome of this decision. Introducing [facility] & HEALTH SERVICES! Dear Rasheeda Pryor: Thank you for requesting a WeTOWNS account. Our records indicate that you already have an active WeTOWNS account. You can access your account anytime at [email or web address] Did you know that you can access your hospital and ER discharge instructions at any time in WeTOWNS?   You can also review all of your test results from your hospital stay or ER visit. Additional Information If you have questions, please visit the Frequently Asked Questions section of the Wistone website at [email or web address] Remember, Wistone is NOT to be used for urgent needs. For medical emergencies, dial 911. Now available from your iPhone and Android! Please provide this summary of care documentation to your next provider. Your primary care clinician is listed as Angelito Perkins. If you have any questions after today's visit, please call [phone]. detailed exam detailed exam detailed exam detailed exam detailed exam detailed exam detailed exam

## 2018-06-13 DIAGNOSIS — G25.81 RESTLESS LEG SYNDROME: ICD-10-CM

## 2018-06-13 DIAGNOSIS — E55.9 HYPOVITAMINOSIS D: ICD-10-CM

## 2018-06-14 RX ORDER — ERGOCALCIFEROL 1.25 MG/1
CAPSULE ORAL
Qty: 8 CAP | Refills: 0 | Status: SHIPPED | OUTPATIENT
Start: 2018-06-14 | End: 2018-08-19 | Stop reason: SDUPTHER

## 2018-06-14 RX ORDER — GABAPENTIN 300 MG/1
CAPSULE ORAL
Qty: 30 CAP | Refills: 1 | Status: SHIPPED | OUTPATIENT
Start: 2018-06-14 | End: 2018-07-27 | Stop reason: SDUPTHER

## 2018-06-14 RX ORDER — GABAPENTIN 300 MG/1
CAPSULE ORAL
Qty: 30 CAP | Refills: 0 | OUTPATIENT
Start: 2018-06-14

## 2018-06-14 NOTE — TELEPHONE ENCOUNTER
6/14/2018  9:02 AM    Chief Complaint   Patient presents with    Medication Refill       Noted refill request for below medications. PCP Angelito Tompkins MD who is currently out of office. Refill on Gabapentin already completed. Vit D refilled.        Requested Prescriptions     Pending Prescriptions Disp Refills    VITAMIN D2 50,000 unit capsule [Pharmacy Med Name: VIT D2 1.25 MG (50,000 UNIT)] 4 Cap 0     Sig: take 1 capsule by mouth every 7 days    gabapentin (NEURONTIN) 300 mg capsule [Pharmacy Med Name: GABAPENTIN 300 MG CAPSULE] 30 Cap 0     Sig: take 1 capsule by mouth at bedtime

## 2018-06-14 NOTE — TELEPHONE ENCOUNTER
6/14/2018  9:01 AM    Chief Complaint   Patient presents with    Medication Refill       Noted refill request for Gabapentin. PCP Angelito Guo MD who is currently out of office. Last office visit 5/2018 and upcoming 8/2018. Refill completed.

## 2018-07-05 ENCOUNTER — PATIENT OUTREACH (OUTPATIENT)
Dept: FAMILY MEDICINE CLINIC | Age: 58
End: 2018-07-05

## 2018-07-05 NOTE — PROGRESS NOTES
NN health screenings:    HM up to date with mammogram. Noted colonoscopy report from 2015 is now in chart and in process of updating HM with the report. Not due again for 10 years. Requested office staff to have conversation regarding pap smear that is overdue as pt wishes to discuss with Dr. Treva Romo before scheduling. She has OV scheduled for August 27th.

## 2018-07-12 RX ORDER — METOPROLOL SUCCINATE 50 MG/1
TABLET, EXTENDED RELEASE ORAL
Qty: 30 TAB | Refills: 0 | Status: SHIPPED | OUTPATIENT
Start: 2018-07-12 | End: 2018-08-18 | Stop reason: SDUPTHER

## 2018-07-16 RX ORDER — INSULIN GLARGINE 100 [IU]/ML
INJECTION, SOLUTION SUBCUTANEOUS
Qty: 30 ML | Refills: 1 | Status: SHIPPED | OUTPATIENT
Start: 2018-07-16 | End: 2018-08-23 | Stop reason: SDUPTHER

## 2018-07-20 NOTE — TELEPHONE ENCOUNTER
Pulmonary Rehab left a message for patient letting her know that we are closed today due to inclement weather.     Thank you,  Korina John
0

## 2018-07-25 DIAGNOSIS — R21 SKIN RASH: ICD-10-CM

## 2018-07-26 DIAGNOSIS — R42 VERTIGO: ICD-10-CM

## 2018-07-26 RX ORDER — MECLIZINE HCL 12.5 MG 12.5 MG/1
TABLET ORAL
Qty: 30 TAB | Refills: 0 | Status: SHIPPED | OUTPATIENT
Start: 2018-07-26 | End: 2018-08-19 | Stop reason: SDUPTHER

## 2018-07-26 RX ORDER — HYDROXYZINE 25 MG/1
TABLET, FILM COATED ORAL
Qty: 30 TAB | Refills: 0 | Status: SHIPPED | OUTPATIENT
Start: 2018-07-26 | End: 2018-08-14

## 2018-07-27 DIAGNOSIS — J45.31 ASTHMATIC BRONCHITIS, MILD PERSISTENT, WITH ACUTE EXACERBATION: ICD-10-CM

## 2018-07-27 DIAGNOSIS — G25.81 RESTLESS LEG SYNDROME: ICD-10-CM

## 2018-07-30 RX ORDER — DULOXETIN HYDROCHLORIDE 60 MG/1
CAPSULE, DELAYED RELEASE ORAL
Qty: 30 CAP | Refills: 0 | Status: SHIPPED | OUTPATIENT
Start: 2018-07-30 | End: 2018-08-23 | Stop reason: SDUPTHER

## 2018-07-30 RX ORDER — BUDESONIDE AND FORMOTEROL FUMARATE DIHYDRATE 160; 4.5 UG/1; UG/1
AEROSOL RESPIRATORY (INHALATION)
Qty: 1 INHALER | Refills: 0 | Status: SHIPPED | OUTPATIENT
Start: 2018-07-30 | End: 2018-08-23 | Stop reason: SDUPTHER

## 2018-07-30 RX ORDER — GABAPENTIN 300 MG/1
CAPSULE ORAL
Qty: 30 CAP | Refills: 0 | Status: SHIPPED | OUTPATIENT
Start: 2018-07-30 | End: 2018-09-28 | Stop reason: SDUPTHER

## 2018-08-03 ENCOUNTER — HOSPITAL ENCOUNTER (OUTPATIENT)
Dept: CT IMAGING | Age: 58
Discharge: HOME | End: 2018-08-03
Attending: PHYSICIAN ASSISTANT
Payer: MEDICAID

## 2018-08-03 ENCOUNTER — HOSPITAL ENCOUNTER (OUTPATIENT)
Dept: ULTRASOUND IMAGING | Age: 58
Discharge: HOME | End: 2018-08-03
Attending: PHYSICIAN ASSISTANT
Payer: MEDICAID

## 2018-08-03 DIAGNOSIS — E04.1 NONTOXIC SINGLE THYROID NODULE: ICD-10-CM

## 2018-08-03 DIAGNOSIS — J32.0 CHRONIC MAXILLARY SINUSITIS: ICD-10-CM

## 2018-08-03 PROCEDURE — 76536 US EXAM OF HEAD AND NECK: CPT

## 2018-08-03 PROCEDURE — 70486 CT MAXILLOFACIAL W/O DYE: CPT

## 2018-08-14 ENCOUNTER — OFFICE VISIT (OUTPATIENT)
Dept: FAMILY MEDICINE CLINIC | Age: 58
End: 2018-08-14

## 2018-08-14 VITALS
HEART RATE: 78 BPM | TEMPERATURE: 98 F | BODY MASS INDEX: 42.85 KG/M2 | HEIGHT: 67 IN | WEIGHT: 273 LBS | SYSTOLIC BLOOD PRESSURE: 121 MMHG | RESPIRATION RATE: 18 BRPM | OXYGEN SATURATION: 97 % | DIASTOLIC BLOOD PRESSURE: 66 MMHG

## 2018-08-14 DIAGNOSIS — R94.31 ABNORMAL EKG: ICD-10-CM

## 2018-08-14 DIAGNOSIS — I10 ESSENTIAL HYPERTENSION: ICD-10-CM

## 2018-08-14 DIAGNOSIS — E11.9 TYPE 2 DIABETES MELLITUS WITHOUT COMPLICATION, WITHOUT LONG-TERM CURRENT USE OF INSULIN (HCC): ICD-10-CM

## 2018-08-14 DIAGNOSIS — E03.4 HYPOTHYROIDISM DUE TO ACQUIRED ATROPHY OF THYROID: ICD-10-CM

## 2018-08-14 DIAGNOSIS — R06.02 SOB (SHORTNESS OF BREATH): Primary | ICD-10-CM

## 2018-08-14 DIAGNOSIS — R60.0 PEDAL EDEMA: ICD-10-CM

## 2018-08-14 LAB — GLUCOSE POC: 126 MG/DL

## 2018-08-14 RX ORDER — FUROSEMIDE 40 MG/1
40 TABLET ORAL 2 TIMES DAILY
Qty: 60 TAB | Refills: 0 | Status: SHIPPED | OUTPATIENT
Start: 2018-08-14 | End: 2019-07-12

## 2018-08-14 RX ORDER — POTASSIUM CHLORIDE 750 MG/1
10 TABLET, EXTENDED RELEASE ORAL DAILY
Qty: 30 TAB | Refills: 0 | Status: SHIPPED | OUTPATIENT
Start: 2018-08-14 | End: 2019-07-12

## 2018-08-14 NOTE — MR AVS SNAPSHOT
[facility] [facility] 
[phone] Patient: Mo Goodman MRN: [id number] DFA:[birth year] Visit Information Date & Time Provider Department Dept. Phone Encounter #  
 8/14/2018 11:15 AM MD Uriel Vaca [id number] Follow-up Instructions Return in about 1 week (around 8/21/2018), or if symptoms worsen or fail to improve. Your Appointments 8/27/2018 11:00 AM  
ROUTINE CARE with MD Uriel Vaca ([address] [facility]) Appt Note: for dm2, htn. [address] [facility] 49  
  
   
 [address] Upcoming Health Maintenance Date Due FOBT Q 1 YEAR AGE 50-75 4/13/2011 Influenza Age 5 to Adult 8/1/2018 LIPID PANEL Q1 8/28/2018 PAP AKA CERVICAL CYTOLOGY 11/13/2018 HEMOGLOBIN A1C Q6M 11/24/2018 EYE EXAM RETINAL OR DILATED Q1 1/24/2019 FOOT EXAM Q1 2/28/2019 MICROALBUMIN Q1 2/28/2019 BREAST CANCER SCRN MAMMOGRAM 10/26/2019 DTaP/Tdap/Td series (2 - Td) 11/11/2026 Allergies as of 8/14/2018  Review Complete On: 8/14/2018 By: Ronda Caballero MD  
  
 Severity Noted Reaction Type Reactions Other Food  03/17/2016    Other (comments) Sweeteners- causes headaches Metformin  11/06/2015    Other (comments) Increase pain in feet and swelling in feet Morphine  01/25/2017    Other (comments)  
 headache Singulair [Montelukast]  07/27/2017    Other (comments)  
 hallucinations Current Immunizations  Never Reviewed No immunizations on file. Not reviewed this visit You Were Diagnosed With   
  
 Codes Comments Pedal edema    -  Primary ICD-10-CM: R60.0 ICD-9-CM: 782.3 Essential hypertension     ICD-10-CM: I10 
ICD-9-CM: 401.9 Type 2 diabetes mellitus without complication, without long-term current use of insulin (HCC)     ICD-10-CM: E11.9 ICD-9-CM: 250.00 SOB (shortness of breath)     ICD-10-CM: R06.02 
ICD-9-CM: 786.05 Hypothyroidism due to acquired atrophy of thyroid     ICD-10-CM: E03.4 ICD-9-CM: 244.8, 246.8 Vitals BP Pulse Temp Resp Height(growth percentile) Weight(growth percentile) 121/66 (BP 1 Location: Left arm, BP Patient Position: Sitting) 78 98 °F (36.7 °C) (Oral) 18 5' 7\" (1.702 m) 273 lb (123.8 kg) SpO2 BMI OB Status Smoking Status 97% 42.76 kg/m2 Hysterectomy Never Smoker BMI and BSA Data Body Mass Index Body Surface Area 42.76 kg/m 2 2.42 m 2 Preferred Pharmacy Pharmacy Name Phone [phone] [facility], [address] Your Updated Medication List  
  
   
This list is accurate as of 8/14/18 11:44 AM.  Always use your most recent med list.  
  
  
  
  
 * albuterol 2.5 mg /3 mL (0.083 %) nebulizer solution Commonly known as:  PROVENTIL VENTOLIN  
3 mL by Nebulization route every four (4) hours as needed for Wheezing. * PROAIR HFA 90 mcg/actuation inhaler Generic drug:  albuterol  
inhale 2 puffs by mouth every 4 hours if needed for wheezing  
  
 aspirin, buffered 81 mg Tab Take  by mouth. atorvastatin 80 mg tablet Commonly known as:  LIPITOR  
take 1 tablet by mouth once daily BD INSULIN SYRINGE ULTRA-FINE 1 mL 31 gauge x 5/16 Syrg Generic drug:  Insulin Syringe-Needle U-100  
use as directed twice a day DULoxetine 60 mg capsule Commonly known as:  CYMBALTA  
take 1 capsule by mouth once daily FREESTYLE LITE STRIPS strip Generic drug:  glucose blood VI test strips TEST twice a day as directed  
  
 furosemide 40 mg tablet Commonly known as:  LASIX Take 1 Tab by mouth two (2) times a day.  
  
 gabapentin 300 mg capsule Commonly known as:  NEURONTIN  
 take 1 capsule by mouth at bedtime JARDIANCE 25 mg tablet Generic drug:  empagliflozin Take  by mouth daily. LANTUS U-100 INSULIN 100 unit/mL injection Generic drug:  insulin glargine INJECT 90 UNITS BENEATH SKIN AT BEDTIME  
  
 meclizine 12.5 mg tablet Commonly known as:  ANTIVERT  
take 1 tablet by mouth three times a day if needed for 10 days  
  
 metoprolol succinate 50 mg XL tablet Commonly known as:  TOPROL-XL  
take 1 tablet by mouth once daily  
  
 omega 3-DHA-EPA-fish oil 1,000 mg (120 mg-180 mg) capsule  
take 1 capsule by mouth twice a day  
  
 potassium chloride 10 mEq tablet Commonly known as:  KLOR-CON Take 1 Tab by mouth daily. SYMBICORT 160-4.5 mcg/actuation Hfaa Generic drug:  budesonide-formoterol  
inhale 2 puffs by mouth twice a day VITAMIN B-12 1,000 mcg sublingual tablet Generic drug:  cyanocobalamin Take 1,000 mcg by mouth daily. VITAMIN D2 50,000 unit capsule Generic drug:  ergocalciferol  
take 1 capsule by mouth every 7 days * Notice: This list has 2 medication(s) that are the same as other medications prescribed for you. Read the directions carefully, and ask your doctor or other care provider to review them with you. Prescriptions Sent to Pharmacy Refills  
 furosemide (LASIX) 40 mg tablet 0 Sig: Take 1 Tab by mouth two (2) times a day. Class: Normal  
 Pharmacy: [address] [place] [address], [address] [place] [address] Ph #: [phone] Route: Oral  
 potassium chloride (KLOR-CON) 10 mEq tablet 0 Sig: Take 1 Tab by mouth daily. Class: Normal  
 Pharmacy: [address] [place] [address] Ph #: [phone] Route: Oral  
  
We Performed the Following AMB POC EKG ROUTINE W/ 12 LEADS, INTER & REP [21551 CPT(R)] Follow-up Instructions Return in about 1 week (around 8/21/2018), or if symptoms worsen or fail to improve.   
  
To-Do List   
 08/14/2018 Lab:  CBC WITH AUTOMATED DIFF   
  
 08/14/2018 Lab:  LIPID PANEL   
  
 08/14/2018 Lab:  METABOLIC PANEL, COMPREHENSIVE   
  
 08/14/2018 Lab:  NT-PRO BNP   
  
 08/14/2018 Lab:  TSH 3RD GENERATION   
  
 08/14/2018 Imaging:  XR CHEST PA LAT Introducing \[facility]\"" & HEALTH SERVICES! Dear Abby Epstein: Thank you for requesting a [employer] account. Our records indicate that you already have an active [employer] account. You can access your account anytime at [email or web address] Did you know that you can access your hospital and ER discharge instructions at any time in [employer]? You can also review all of your test results from your hospital stay or ER visit. Additional Information If you have questions, please visit the Frequently Asked Questions section of the [employer] website at [email or web address] Remember, [employer] is NOT to be used for urgent needs. For medical emergencies, dial 911. Now available from your iPhone and Android! Please provide this summary of care documentation to your next provider. Your primary care clinician is listed as Angelito Perkins. If you have any questions after today's visit, please call [phone].
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
## 2018-08-15 LAB
ALBUMIN SERPL-MCNC: 3.6 G/DL (ref 3.5–5.5)
ALBUMIN/GLOB SERPL: 1.2 {RATIO} (ref 1.2–2.2)
ALP SERPL-CCNC: 131 IU/L (ref 39–117)
ALT SERPL-CCNC: 13 IU/L (ref 0–32)
AST SERPL-CCNC: 26 IU/L (ref 0–40)
BASOPHILS # BLD AUTO: 0.1 X10E3/UL (ref 0–0.2)
BASOPHILS NFR BLD AUTO: 1 %
BILIRUB SERPL-MCNC: 0.9 MG/DL (ref 0–1.2)
BUN SERPL-MCNC: 8 MG/DL (ref 6–24)
BUN/CREAT SERPL: 13 (ref 9–23)
CALCIUM SERPL-MCNC: 8.7 MG/DL (ref 8.7–10.2)
CHLORIDE SERPL-SCNC: 105 MMOL/L (ref 96–106)
CHOLEST SERPL-MCNC: 119 MG/DL (ref 100–199)
CO2 SERPL-SCNC: 24 MMOL/L (ref 20–29)
CREAT SERPL-MCNC: 0.61 MG/DL (ref 0.57–1)
EOSINOPHIL # BLD AUTO: 0.1 X10E3/UL (ref 0–0.4)
EOSINOPHIL NFR BLD AUTO: 1 %
ERYTHROCYTE [DISTWIDTH] IN BLOOD BY AUTOMATED COUNT: 18.2 % (ref 12.3–15.4)
EST. AVERAGE GLUCOSE BLD GHB EST-MCNC: 140 MG/DL
GLOBULIN SER CALC-MCNC: 3.1 G/DL (ref 1.5–4.5)
GLUCOSE SERPL-MCNC: 133 MG/DL (ref 65–99)
HBA1C MFR BLD: 6.5 % (ref 4.8–5.6)
HCT VFR BLD AUTO: 40.2 % (ref 34–46.6)
HDLC SERPL-MCNC: 19 MG/DL
HGB BLD-MCNC: 12.1 G/DL (ref 11.1–15.9)
IMM GRANULOCYTES # BLD: 0 X10E3/UL (ref 0–0.1)
IMM GRANULOCYTES NFR BLD: 0 %
INTERPRETATION, 910389: NORMAL
LDLC SERPL CALC-MCNC: ABNORMAL MG/DL (ref 0–99)
LYMPHOCYTES # BLD AUTO: 3.3 X10E3/UL (ref 0.7–3.1)
LYMPHOCYTES NFR BLD AUTO: 52 %
Lab: NORMAL
MCH RBC QN AUTO: 26.9 PG (ref 26.6–33)
MCHC RBC AUTO-ENTMCNC: 30.1 G/DL (ref 31.5–35.7)
MCV RBC AUTO: 90 FL (ref 79–97)
MONOCYTES # BLD AUTO: 0.5 X10E3/UL (ref 0.1–0.9)
MONOCYTES NFR BLD AUTO: 8 %
NEUTROPHILS # BLD AUTO: 2.5 X10E3/UL (ref 1.4–7)
NEUTROPHILS NFR BLD AUTO: 38 %
NT-PROBNP SERPL-MCNC: 476 PG/ML (ref 0–287)
PLATELET # BLD AUTO: 217 X10E3/UL (ref 150–379)
POTASSIUM SERPL-SCNC: 4.7 MMOL/L (ref 3.5–5.2)
PROT SERPL-MCNC: 6.7 G/DL (ref 6–8.5)
RBC # BLD AUTO: 4.49 X10E6/UL (ref 3.77–5.28)
SODIUM SERPL-SCNC: 141 MMOL/L (ref 134–144)
TRIGL SERPL-MCNC: 505 MG/DL (ref 0–149)
TSH SERPL DL<=0.005 MIU/L-ACNC: 5.12 UIU/ML (ref 0.45–4.5)
VLDLC SERPL CALC-MCNC: ABNORMAL MG/DL (ref 5–40)
WBC # BLD AUTO: 6.4 X10E3/UL (ref 3.4–10.8)

## 2018-08-16 NOTE — PROCEDURES
Pt here for Nutrition Education for Weight Loss, High Cholesterol, and Hypertension.    Time spend: 60 minutes.  Relevant medical history reviewed.  Pt is at office visit today by self, with educator Sharmila Hensley RD and gives permission to discuss personal information during appointment.  Latex allergy: none    Weight Hx:  Gained about 40 lbs while caring for mom about 4 years ago. Goal now is to get down to around 130 lbs.     Meal pattern:  Breakfast: Kashi cereal  with 1% milk. Medium banana if still hungry as snack.  Weekends may have [place] pancakes, eggs and durbin, biscuits in gravy, but once in a blue moon.   Lunch: Carrots, broccoli, tomatoes. More like a snack, if there isn't something there that is appealing to her she won't eat. Normal basis would have a salad, anna, kale, spinach, with vinaigrette dressing. Side of fruit, peaches, plums, nectarines, apples, oranges. Sometimes adds flaxseeds or sunflower seeds to salads. Uses whole grain bread, has been doing lunch meats recently.   Dinner: Pork chops, chicken breast, fish, shrimp, salmon.  Cheeseburgers, brats occasionally. Uses olive oil for cooking. Side of salad or grilled vegetables. Tries not to do a starch side.  Sometimes make a blended smoothie with vegetables and fruit.   Desserts: cakes, treats, cookies, are more rare just for holidays and celebrations. Tries to make desserts     Snacks:  Almonds (6)   Guy bites   Granola bar ([facility])     Dining Out:  Avoids fast food. Tries to pick healthier restaurants.     Beverages:  Unsweetened tea  Aloe water  1 can of regular soda only on holidays and celebrations.     ETOH:  Glass or two of wine, maybe once a month.     Physical activity:  Ride the stable bike once in a while for about 30 minutes. Sits on balance ball while watching TV. Stretches regularly.     Supplements:  Not at the moment    Other:  Currently in the process of moving to [place].      Follow up visit:   Encouraged  [facility]    DIAG LUMBAR FACET INJECTIONS  PROCEDURE REPORT      PATIENT:  Ann Mars OF BIRTH:  1960  DATE OF SERVICE:  2/27/2017  [facility]    PRE-PROCEDURE DIAGNOSIS:  See Above    POST-PROCEDURE DIAGNOSIS:  See Above                PROCEDURE:  1. Bilateral diagnostic lumbar medial branch blocks via the L3/L4,  L4/L5,  L5/S1 medial branch nerves ( 86670, 50;  35405, 50;  30027, 50 )  2. Fluoroscopic needle guidance (79270)      LEVELS TREATED:  Bilateral sided  L3/L4,  L4/L5,  L5/S1 medial branch nerves     ANESTHESIA:  See Medication Administration Record    COMPLICATIONS: None. PHYSICIAN:  Azalea Ace MD    PRE-PROCEDURE NOTE:  Pre-procedural assessment of the patient was performed including a limited history and physical examination. The details of the procedure were discussed with the patient, including the risks, benefits and alternative options and an informed consent was obtained. The patients NPO status, if necessary for the specific procedure and/or administration of moderate intravenous sedation, if utilized, and availability of a responsible adult to escort the patient following the procedure were confirmed. PROCEDURE NOTE:  The patient was brought to the procedure suite and positioned on the fluoroscopy table in the prone position. Physiologic monitors were applied and supplemental oxygen was administered via nasal cannula. The skin was prepped in the standard surgical fashion and sterile drapes were applied over the procedure site. Please refer to the Flowsheet for documentation of the patients vital signs and the Medication Administration Record for any oral and/or intravenous sedation administered prior to or during the procedure. 1% Lidocaine was utilized for local anesthesia.  Under AP fluoroscopic guidance a 25-gauge, 3-1/2 inch short bevel spinal needle was advanced to the junction of the superior articular process and transverse process of each vertebral level immediately inferior to the above-mentioned dorsal rami medial branch nerves. A needle was also placed along the sacral ala to block the L5 medial branch nerve. After all needles were placed,  0.5 mL of 0.2% ropivacaine was injected at each location after the negative aspiration of blood, air or CSF. The needles were removed and the stilets were replaced. The procedure was performed on the contralateral side in the same fashion and at the same levels using the same volume of local anesthetic following negative aspiration of blood, air or CSF. The needles were removed intact. The area was thoroughly cleaned and sterile bandages applied as necessary. The patient tolerated the procedure well and vital signs remained stable throughout the procedure. The patient was assessed immediately following the procedure and was noted to have greater than 50% reduction in pain (a reduction from 7/10 to 3/10 in severity of lumbar pain). Based on these results, the patient is considered an appropriate candidate for radiofrequency ablation of the above-mentioned medial branch nerves and will be scheduled for that procedure. The total levels successfully blocked were 3 levels, both sides. POST-PROCEDURE COURSE:  The patient was escorted from the procedure suite in satisfactory condition and recovered per facility protocol based on the type of procedure performed and/or the sedation utilized. The patient did not experience any adverse events and remained hemodynamically stable during the post-procedure period. DISCHARGE NOTE:  Upon discharge, the patient was able to tolerate fluids and was in no acute distress. The patient was oriented to person, place and time and vital signs were stable.  Appropriate post-procedure instructions were provided and explained to the patient to follow up with a phone call or via [facility] if she had any further questions.       Resources Issued:   Plate Method, Shop Smart with Food Label,  Know Your Fats, Insulin Resistance, Dietary Fiber,   Dietary Fat  - What's Healthy and What's Not, Spice it Up, Ways to cut down on added sodium, AVS,       Nutrition Diagnosis:  Food-and-nutrition-related knowledge deficit  related to inadequate knowledge on dietary recommendation for weight reduction for health and cholesterol, low cholesterol/low saturated fat and reduce sugar intakes as evidenced by patient dietary recall and questions/concerns presented during visit.    Altered nutrition-related laboratory values related to elevated cholesterol and A1C values as evidenced by patient dietary intakes, possible genetic predisposition and history of lack of planned purposeful physical activity.      Nutrition Prescription:   Low cholesterol/low fat diet   [place] Healthy Plate Method for portion control and carbohydrate weight management     Physical Activity Prescription:   Focus on 30 active minutes daily     Interventions:  ? Comprehensive Nutrition Education focused on skill development of identification of food groups, standard portions from each food group, label reading and recommended modifications in current eating habits    Monitoring/Evaluation:  ? Verbalized readiness to change nutrition related behaviors   ? Evaluate weight change of 1-2 pounds/week   patient in detail and all questions were answered.     Willam Russo MD 2/27/2017 6:05 PM

## 2018-08-19 DIAGNOSIS — R42 VERTIGO: ICD-10-CM

## 2018-08-19 DIAGNOSIS — E55.9 HYPOVITAMINOSIS D: ICD-10-CM

## 2018-08-20 RX ORDER — MECLIZINE HCL 12.5 MG 12.5 MG/1
TABLET ORAL
Qty: 30 TAB | Refills: 0 | Status: SHIPPED | OUTPATIENT
Start: 2018-08-20 | End: 2019-02-08 | Stop reason: SDUPTHER

## 2018-08-20 RX ORDER — METOPROLOL SUCCINATE 50 MG/1
TABLET, EXTENDED RELEASE ORAL
Qty: 30 TAB | Refills: 0 | Status: SHIPPED | OUTPATIENT
Start: 2018-08-20 | End: 2018-09-17 | Stop reason: SDUPTHER

## 2018-08-22 ENCOUNTER — HOSPITAL ENCOUNTER (OUTPATIENT)
Dept: NON INVASIVE DIAGNOSTICS | Age: 58
Discharge: HOME | End: 2018-08-22
Attending: FAMILY MEDICINE
Payer: MEDICAID

## 2018-08-22 VITALS — WEIGHT: 273 LBS | HEIGHT: 67 IN | BODY MASS INDEX: 42.85 KG/M2

## 2018-08-22 DIAGNOSIS — R94.31 ABNORMAL EKG: ICD-10-CM

## 2018-08-22 DIAGNOSIS — R06.02 SOB (SHORTNESS OF BREATH): ICD-10-CM

## 2018-08-22 DIAGNOSIS — R60.0 PEDAL EDEMA: ICD-10-CM

## 2018-08-22 LAB
ECHO AO ROOT DIAM: 3.42 CM
ECHO AV AREA PEAK VELOCITY: 1.8 CM2
ECHO AV AREA VTI: 1.9 CM2
ECHO AV AREA/BSA PEAK VELOCITY: 0.7 CM2/M2
ECHO AV AREA/BSA VTI: 0.8 CM2/M2
ECHO AV MEAN GRADIENT: 9.9 MMHG
ECHO AV PEAK GRADIENT: 17.3 MMHG
ECHO AV PEAK VELOCITY: 207.78 CM/S
ECHO AV VTI: 43.67 CM
ECHO LA AREA 4C: 20.5 CM2
ECHO LA VOL 2C: 76.53 ML (ref 22–52)
ECHO LA VOL 4C: 57.28 ML (ref 22–52)
ECHO LA VOL BP: 75.49 ML (ref 22–52)
ECHO LA VOL/BSA BIPLANE: 32.71 ML/M2
ECHO LA VOLUME INDEX A2C: 33.16 ML/M2
ECHO LA VOLUME INDEX A4C: 24.82 ML/M2
ECHO LV INTERNAL DIMENSION DIASTOLIC: 4.3 CM (ref 3.9–5.3)
ECHO LV INTERNAL DIMENSION SYSTOLIC: 2.7 CM
ECHO LV IVSD: 1.22 CM (ref 0.6–0.9)
ECHO LV MASS 2D: 251.3 G (ref 67–162)
ECHO LV MASS INDEX 2D: 108.9 G/M2
ECHO LV POSTERIOR WALL DIASTOLIC: 1.42 CM (ref 0.6–0.9)
ECHO LVOT DIAM: 1.99 CM
ECHO LVOT PEAK GRADIENT: 5.5 MMHG
ECHO LVOT PEAK VELOCITY: 117.7 CM/S
ECHO LVOT VTI: 26.29 CM
ECHO MV A VELOCITY: 84.62 CM/S
ECHO MV E DECELERATION TIME (DT): 221.6 MS
ECHO MV E VELOCITY: 1.2 CM/S
ECHO MV E/A RATIO: 0.01
ECHO PVEIN A DURATION: 140.7 MS
ECHO PVEIN A VELOCITY: 21.25 CM/S
ECHO RV INTERNAL DIMENSION: 2.96 CM

## 2018-08-22 PROCEDURE — 93306 TTE W/DOPPLER COMPLETE: CPT

## 2018-08-23 ENCOUNTER — OFFICE VISIT (OUTPATIENT)
Dept: FAMILY MEDICINE CLINIC | Age: 58
End: 2018-08-23

## 2018-08-23 VITALS
TEMPERATURE: 98.5 F | SYSTOLIC BLOOD PRESSURE: 118 MMHG | HEIGHT: 67 IN | DIASTOLIC BLOOD PRESSURE: 74 MMHG | BODY MASS INDEX: 41.28 KG/M2 | WEIGHT: 263 LBS | RESPIRATION RATE: 18 BRPM | HEART RATE: 66 BPM | OXYGEN SATURATION: 96 %

## 2018-08-23 DIAGNOSIS — I87.2 VENOUS STASIS DERMATITIS OF BOTH LOWER EXTREMITIES: ICD-10-CM

## 2018-08-23 DIAGNOSIS — J45.31 ASTHMATIC BRONCHITIS, MILD PERSISTENT, WITH ACUTE EXACERBATION: ICD-10-CM

## 2018-08-23 DIAGNOSIS — E03.4 HYPOTHYROIDISM DUE TO ACQUIRED ATROPHY OF THYROID: ICD-10-CM

## 2018-08-23 DIAGNOSIS — F39 MOOD DISORDER (HCC): ICD-10-CM

## 2018-08-23 DIAGNOSIS — I10 ESSENTIAL HYPERTENSION: ICD-10-CM

## 2018-08-23 DIAGNOSIS — Z95.2 H/O AORTIC VALVE REPLACEMENT: ICD-10-CM

## 2018-08-23 DIAGNOSIS — R60.0 PEDAL EDEMA: ICD-10-CM

## 2018-08-23 DIAGNOSIS — R06.02 SOB (SHORTNESS OF BREATH): ICD-10-CM

## 2018-08-23 DIAGNOSIS — E11.65 UNCONTROLLED TYPE 2 DIABETES MELLITUS WITH HYPERGLYCEMIA, WITH LONG-TERM CURRENT USE OF INSULIN (HCC): Primary | ICD-10-CM

## 2018-08-23 DIAGNOSIS — Z79.4 UNCONTROLLED TYPE 2 DIABETES MELLITUS WITH HYPERGLYCEMIA, WITH LONG-TERM CURRENT USE OF INSULIN (HCC): Primary | ICD-10-CM

## 2018-08-23 RX ORDER — ERGOCALCIFEROL 1.25 MG/1
CAPSULE ORAL
Qty: 4 CAP | Refills: 0 | Status: SHIPPED | OUTPATIENT
Start: 2018-08-23 | End: 2018-09-26 | Stop reason: SDUPTHER

## 2018-08-23 RX ORDER — LEVOTHYROXINE SODIUM 25 UG/1
25 TABLET ORAL
Qty: 30 TAB | Refills: 2 | Status: SHIPPED | OUTPATIENT
Start: 2018-08-23 | End: 2018-11-23 | Stop reason: SDUPTHER

## 2018-08-23 NOTE — LETTER
8/23/2018 4:43 PM 
 
Ms. John Freire [address] [place] [address] Dear John Freire: 
 
Please find your most recent results below. Resulted Orders XR CHEST PA LAT Narrative  
 TWO VIEW CHEST RADIOGRAPH  
 
CPT CODE: 97149 INDICATION: Dyspnea, shortness of breath. COMPARISON: 3/1/2017 FINDINGS:  
 
Status post median sternotomy and valvuloplasty. Medial apices are secured by 
patient positioning. The cardiomediastinal silhouette is borderline enlarged, 
similar to prior exam.  The pulmonary vascular markings are unremarkable. There 
is no evidence of focal consolidation, pleural effusion or pneumothorax. Degenerative disc disease. Audelia Goldman Impression IMPRESSION: 
 
Borderline enlarged cardiomediastinal silhouette. Results discussed with patient during visit. Please call me if you have any questions: [phone] Sincerely, William Winkler MD
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
## 2018-08-23 NOTE — MR AVS SNAPSHOT
[facility] 107 200 [facility] 
[phone] Patient: Andria Nassar MRN: [id number] HFT:5/35/1245 Visit Information Date & Time Provider Department Dept. Phone Encounter #  
 8/23/2018  5:15 PM Angelito Jaime [facility] [place]. [phone] [id number] Follow-up Instructions Return in about 3 months (around 11/23/2018), or if symptoms worsen or fail to improve, for htn. Your Appointments 8/23/2018  5:15 PM  
Follow Up with Angelito Jaime MD  
[facility] [place] ([address]) Appt Note: follow up visit; lm to confirm; pt r/s; pt r/s fr 08/29/2018  
 148 [facility] 107 [id number] [address]  
  
   
 [facility] [address] [facility]  
  
    
 8/27/2018  1:15 PM  
New Patient with Ailyn Cheema MD  
[facility] ([address] [place] [address]) Appt Note: Per Dr Ramona Pichardo to be seen for Edema/SOB/Abn EKG  
 [address] [facility] [address]  
  
   
 [address] [address] [address] [address] Upcoming Health Maintenance Date Due FOBT Q 1 YEAR AGE 50-75 4/13/2011 Influenza Age 5 to Adult 8/1/2018 PAP AKA CERVICAL CYTOLOGY 11/13/2018 EYE EXAM RETINAL OR DILATED Q1 1/24/2019 HEMOGLOBIN A1C Q6M 2/14/2019 FOOT EXAM Q1 2/28/2019 MICROALBUMIN Q1 2/28/2019 LIPID PANEL Q1 8/14/2019 BREAST CANCER SCRN MAMMOGRAM 10/26/2019 DTaP/Tdap/Td series (2 - Td) 11/11/2026 Allergies as of 8/23/2018  Review Complete On: 8/23/2018 By: Jacobo Miles Severity Noted Reaction Type Reactions Other Food  03/17/2016    Other (comments) Sweeteners- causes headaches Metformin  11/06/2015    Other (comments) Increase pain in feet and swelling in feet Morphine  01/25/2017    Other (comments)  
 headache Singulair [Montelukast]  07/27/2017    Other (comments) hallucinations Current Immunizations  Never Reviewed No immunizations on file. Not reviewed this visit You Were Diagnosed With   
  
 Codes Comments Pedal edema    -  Primary ICD-10-CM: R60.0 ICD-9-CM: 215. 3 Vitals BP Pulse Temp Resp Height(growth percentile) Weight(growth percentile) 118/74 (BP 1 Location: Left arm, BP Patient Position: Sitting) 66 98.5 °F (36.9 °C) (Oral) 18 5' 7\" (1.702 m) 263 lb (119.3 kg) SpO2 BMI OB Status Smoking Status 96% 41.19 kg/m2 Hysterectomy Never Smoker BMI and BSA Data Body Mass Index Body Surface Area  
 41.19 kg/m 2 2.37 m 2 Preferred Pharmacy Pharmacy Name Phone [address] [facility], [address] Your Updated Medication List  
  
   
This list is accurate as of 8/23/18  5:00 PM.  Always use your most recent med list.  
  
  
  
  
 * albuterol 2.5 mg /3 mL (0.083 %) nebulizer solution Commonly known as:  PROVENTIL VENTOLIN  
3 mL by Nebulization route every four (4) hours as needed for Wheezing. * PROAIR HFA 90 mcg/actuation inhaler Generic drug:  albuterol  
inhale 2 puffs by mouth every 4 hours if needed for wheezing  
  
 aspirin, buffered 81 mg Tab Take  by mouth. atorvastatin 80 mg tablet Commonly known as:  LIPITOR  
take 1 tablet by mouth once daily BD INSULIN SYRINGE ULTRA-FINE 1 mL 31 gauge x 5/16 Syrg Generic drug:  Insulin Syringe-Needle U-100  
use as directed twice a day DULoxetine 60 mg capsule Commonly known as:  CYMBALTA  
take 1 capsule by mouth once daily FREESTYLE LITE STRIPS strip Generic drug:  glucose blood VI test strips TEST twice a day as directed  
  
 furosemide 40 mg tablet Commonly known as:  LASIX Take 1 Tab by mouth two (2) times a day.  
  
 gabapentin 300 mg capsule Commonly known as:  NEURONTIN  
take 1 capsule by mouth at bedtime JARDIANCE 25 mg tablet Generic drug:  empagliflozin Take  by mouth daily. LANTUS U-100 INSULIN 100 unit/mL injection Generic drug:  insulin glargine INJECT 90 UNITS BENEATH SKIN AT BEDTIME  
  
 levothyroxine 25 mcg tablet Commonly known as:  SYNTHROID Take 1 Tab by mouth Daily (before breakfast). meclizine 12.5 mg tablet Commonly known as:  ANTIVERT  
take 1 tablet by mouth three times a day if needed for 10 days  
  
 metoprolol succinate 50 mg XL tablet Commonly known as:  TOPROL-XL  
take 1 tablet by mouth once daily  
  
 omega 3-DHA-EPA-fish oil 1,000 mg (120 mg-180 mg) capsule  
take 1 capsule by mouth twice a day  
  
 potassium chloride 10 mEq tablet Commonly known as:  KLOR-CON Take 1 Tab by mouth daily. SYMBICORT 160-4.5 mcg/actuation Hfaa Generic drug:  budesonide-formoterol  
inhale 2 puffs by mouth twice a day VITAMIN B-12 1,000 mcg sublingual tablet Generic drug:  cyanocobalamin Take 1,000 mcg by mouth daily. VITAMIN D2 50,000 unit capsule Generic drug:  ergocalciferol  
take 1 capsule by mouth every 7 days * Notice: This list has 2 medication(s) that are the same as other medications prescribed for you. Read the directions carefully, and ask your doctor or other care provider to review them with you. Prescriptions Sent to Pharmacy Refills  
 levothyroxine (SYNTHROID) 25 mcg tablet 2 Sig: Take 1 Tab by mouth Daily (before breakfast). Class: Normal  
 Pharmacy: [address] [place] [address], [address] [place] [address] #: [phone] Route: Oral  
  
Follow-up Instructions Return in about 3 months (around 11/23/2018), or if symptoms worsen or fail to improve, for htn. Introducing [facility] & HEALTH SERVICES! Dear [place]: Thank you for requesting a [employer] account. Our records indicate that you already have an active [employer] account. You can access your account anytime at [email or web address] Did you know that you can access your hospital and ER discharge instructions at any time in [employer]? You can also review all of your test results from your hospital stay or ER visit. Additional Information If you have questions, please visit the Frequently Asked Questions section of the [employer] website at [email or web address] Remember, [employer] is NOT to be used for urgent needs. For medical emergencies, dial 911. Now available from your iPhone and Android! Please provide this summary of care documentation to your next provider. Your primary care clinician is listed as Angelito Perkins. If you have any questions after today's visit, please call [phone].

## 2018-08-24 PROBLEM — F39 MOOD DISORDER (HCC): Status: ACTIVE | Noted: 2018-08-24

## 2018-08-24 RX ORDER — BUDESONIDE AND FORMOTEROL FUMARATE DIHYDRATE 160; 4.5 UG/1; UG/1
AEROSOL RESPIRATORY (INHALATION)
Qty: 1 INHALER | Refills: 0 | Status: SHIPPED | OUTPATIENT
Start: 2018-08-24 | End: 2018-09-28 | Stop reason: SDUPTHER

## 2018-08-24 RX ORDER — DULOXETIN HYDROCHLORIDE 60 MG/1
CAPSULE, DELAYED RELEASE ORAL
Qty: 30 CAP | Refills: 0 | Status: SHIPPED | OUTPATIENT
Start: 2018-08-24 | End: 2018-09-28 | Stop reason: SDUPTHER

## 2018-08-24 RX ORDER — INSULIN GLARGINE 100 [IU]/ML
INJECTION, SOLUTION SUBCUTANEOUS
Qty: 30 ML | Refills: 1 | Status: SHIPPED | OUTPATIENT
Start: 2018-08-24 | End: 2018-11-28 | Stop reason: SDUPTHER

## 2018-08-27 ENCOUNTER — OFFICE VISIT (OUTPATIENT)
Dept: CARDIOLOGY CLINIC | Age: 58
End: 2018-08-27

## 2018-08-27 VITALS
SYSTOLIC BLOOD PRESSURE: 125 MMHG | DIASTOLIC BLOOD PRESSURE: 52 MMHG | BODY MASS INDEX: 41.28 KG/M2 | HEIGHT: 67 IN | WEIGHT: 263 LBS | HEART RATE: 67 BPM

## 2018-08-27 DIAGNOSIS — E03.4 HYPOTHYROIDISM DUE TO ACQUIRED ATROPHY OF THYROID: ICD-10-CM

## 2018-08-27 DIAGNOSIS — E78.5 DYSLIPIDEMIA: ICD-10-CM

## 2018-08-27 DIAGNOSIS — Z87.74 HISTORY OF BICUSPID AORTIC VALVE: ICD-10-CM

## 2018-08-27 DIAGNOSIS — E11.9 TYPE 2 DIABETES MELLITUS WITHOUT COMPLICATION, UNSPECIFIED WHETHER LONG TERM INSULIN USE (HCC): ICD-10-CM

## 2018-08-27 DIAGNOSIS — Z95.2 H/O AORTIC VALVE REPLACEMENT: ICD-10-CM

## 2018-08-27 DIAGNOSIS — G47.33 OBSTRUCTIVE SLEEP APNEA SYNDROME: ICD-10-CM

## 2018-08-27 DIAGNOSIS — I50.32 CHRONIC DIASTOLIC CONGESTIVE HEART FAILURE (HCC): Primary | ICD-10-CM

## 2018-08-27 DIAGNOSIS — I10 ESSENTIAL HYPERTENSION: ICD-10-CM

## 2018-08-27 NOTE — MR AVS SNAPSHOT
[address] [facility] 
 
 
 [address] [facility] 
[phone] Patient: Franco Trimble MRN: [id number] HPK:[birth year] Visit Information Date & Time Provider Department Dept. Phone Encounter #  
 8/27/2018  1:15 PM Bharathi Lam MD [facility] [phone] [id number] Your Appointments 10/4/2018  1:30 PM  
Follow Up with Angelito Villa MD  
[address] ([address] [facility]) Appt Note: htn  
 148 [facility] 107 200 [facility]  
[phone]  
  
   
 [address]  
  
    
 2/13/2019  1:15 PM  
Office Visit with Bharathi Lam MD  
[facility] ([address] [facility]) Appt Note: 6 month follow up  
 [address] [facility] [address]  
  
   
 [address] [address] [place] [address] Upcoming Health Maintenance Date Due FOBT Q 1 YEAR AGE 50-75 4/13/2011 Influenza Age 5 to Adult 8/1/2018 PAP AKA CERVICAL CYTOLOGY 11/13/2018 EYE EXAM RETINAL OR DILATED Q1 1/24/2019 HEMOGLOBIN A1C Q6M 2/14/2019 FOOT EXAM Q1 2/28/2019 MICROALBUMIN Q1 2/28/2019 LIPID PANEL Q1 8/14/2019 BREAST CANCER SCRN MAMMOGRAM 10/26/2019 DTaP/Tdap/Td series (2 - Td) 11/11/2026 Allergies as of 8/27/2018  Review Complete On: 8/27/2018 By: Armani Carlson Severity Noted Reaction Type Reactions Other Food  03/17/2016    Other (comments) Sweeteners- causes headaches Metformin  11/06/2015    Other (comments) Increase pain in feet and swelling in feet Morphine  01/25/2017    Other (comments)  
 headache Singulair [Montelukast]  07/27/2017    Other (comments)  
 hallucinations Current Immunizations  Never Reviewed No immunizations on file. Not reviewed this visit Vitals BP Pulse Height(growth percentile) Weight(growth percentile) BMI OB Status 125/52 67 5' 7\" (1.702 m) 263 lb (119.3 kg) 41.19 kg/m2 Hysterectomy Smoking Status Never Smoker Vitals History BMI and BSA Data Body Mass Index Body Surface Area  
 41.19 kg/m 2 2.37 m 2 Preferred Pharmacy Pharmacy Name Phone [address], [address] Your Updated Medication List  
  
   
This list is accurate as of 8/27/18  2:17 PM.  Always use your most recent med list.  
  
  
  
  
 * albuterol 2.5 mg /3 mL (0.083 %) nebulizer solution Commonly known as:  PROVENTIL VENTOLIN  
3 mL by Nebulization route every four (4) hours as needed for Wheezing. * PROAIR HFA 90 mcg/actuation inhaler Generic drug:  albuterol  
inhale 2 puffs by mouth every 4 hours if needed for wheezing  
  
 aspirin, buffered 81 mg Tab Take  by mouth. atorvastatin 80 mg tablet Commonly known as:  LIPITOR  
take 1 tablet by mouth once daily BD INSULIN SYRINGE ULTRA-FINE 1 mL 31 gauge x 5/16 Syrg Generic drug:  Insulin Syringe-Needle U-100  
use as directed twice a day DULoxetine 60 mg capsule Commonly known as:  CYMBALTA  
take 1 capsule by mouth once daily FREESTYLE LITE STRIPS strip Generic drug:  glucose blood VI test strips TEST twice a day as directed  
  
 furosemide 40 mg tablet Commonly known as:  LASIX Take 1 Tab by mouth two (2) times a day.  
  
 gabapentin 300 mg capsule Commonly known as:  NEURONTIN  
take 1 capsule by mouth at bedtime JARDIANCE 25 mg tablet Generic drug:  empagliflozin Take  by mouth daily. LANTUS U-100 INSULIN 100 unit/mL injection Generic drug:  insulin glargine  
inject 90 units subcutaneously at bedtime  
  
 levothyroxine 25 mcg tablet Commonly known as:  SYNTHROID Take 1 Tab by mouth Daily (before breakfast). meclizine 12.5 mg tablet Commonly known as:  ANTIVERT  
 take 1 tablet by mouth three times a day if needed for 10 days  
  
 metoprolol succinate 50 mg XL tablet Commonly known as:  TOPROL-XL  
take 1 tablet by mouth once daily  
  
 omega 3-DHA-EPA-fish oil 1,000 mg (120 mg-180 mg) capsule  
take 1 capsule by mouth twice a day  
  
 potassium chloride 10 mEq tablet Commonly known as:  KLOR-CON Take 1 Tab by mouth daily. SYMBICORT 160-4.5 mcg/actuation Hfaa Generic drug:  budesonide-formoterol  
inhale 2 puffs by mouth twice a day VITAMIN B-12 1,000 mcg sublingual tablet Generic drug:  cyanocobalamin Take 1,000 mcg by mouth daily. VITAMIN D2 50,000 unit capsule Generic drug:  ergocalciferol  
take 1 capsule by mouth every 7 days * Notice: This list has 2 medication(s) that are the same as other medications prescribed for you. Read the directions carefully, and ask your doctor or other care provider to review them with you. Introducing [facility] & HEALTH SERVICES! Dear [place] Persons: Thank you for requesting a [employer] account. Our records indicate that you already have an active [employer] account. You can access your account anytime at [email or web address] Did you know that you can access your hospital and ER discharge instructions at any time in [employer]? You can also review all of your test results from your hospital stay or ER visit. Additional Information If you have questions, please visit the Frequently Asked Questions section of the [employer] website at [email or web address] Remember, [employer] is NOT to be used for urgent needs. For medical emergencies, dial 911. Now available from your iPhone and Android! Please provide this summary of care documentation to your next provider. Your primary care clinician is listed as Angelito Perkins. If you have any questions after today's visit, please call [phone].
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
## 2018-08-28 ENCOUNTER — PATIENT OUTREACH (OUTPATIENT)
Dept: FAMILY MEDICINE CLINIC | Age: 58
End: 2018-08-28

## 2018-08-28 NOTE — PROGRESS NOTES
[facility] health screening:    Ms Sho Cummins requested information on scheduling an appointment with vascular services. I informed her there was an order placed in March of this year by Dr. Imelda Murphy, the office of Dr. Diana Bradford had difficulty contacting her so the referral has since been closed. I've given her Dr. Vijaya Hanks phone number so she can f/u on this.

## 2018-09-17 RX ORDER — METOPROLOL SUCCINATE 50 MG/1
TABLET, EXTENDED RELEASE ORAL
Qty: 30 TAB | Refills: 0 | Status: SHIPPED | OUTPATIENT
Start: 2018-09-17 | End: 2018-10-17 | Stop reason: SDUPTHER

## 2018-09-26 DIAGNOSIS — E55.9 HYPOVITAMINOSIS D: ICD-10-CM

## 2018-09-27 RX ORDER — ERGOCALCIFEROL 1.25 MG/1
CAPSULE ORAL
Qty: 4 CAP | Refills: 0 | Status: SHIPPED | OUTPATIENT
Start: 2018-09-27 | End: 2018-10-24 | Stop reason: SDUPTHER

## 2018-09-28 DIAGNOSIS — G25.81 RESTLESS LEG SYNDROME: ICD-10-CM

## 2018-09-28 DIAGNOSIS — J45.31 ASTHMATIC BRONCHITIS, MILD PERSISTENT, WITH ACUTE EXACERBATION: ICD-10-CM

## 2018-09-28 RX ORDER — PEN NEEDLE, DIABETIC 29 G X1/2"
NEEDLE, DISPOSABLE MISCELLANEOUS
Qty: 200 SYRINGE | Refills: 3 | Status: SHIPPED | OUTPATIENT
Start: 2018-09-28 | End: 2018-10-22 | Stop reason: SDUPTHER

## 2018-09-28 RX ORDER — DULOXETIN HYDROCHLORIDE 60 MG/1
CAPSULE, DELAYED RELEASE ORAL
Qty: 30 CAP | Refills: 0 | Status: SHIPPED | OUTPATIENT
Start: 2018-09-28 | End: 2018-11-16 | Stop reason: SDUPTHER

## 2018-09-28 RX ORDER — GLUCOSAM/CHONDRO/HERB 149/HYAL 750-100 MG
TABLET ORAL
Qty: 60 CAP | Refills: 1 | Status: SHIPPED | OUTPATIENT
Start: 2018-09-28 | End: 2018-12-17 | Stop reason: SDUPTHER

## 2018-09-28 RX ORDER — BUDESONIDE AND FORMOTEROL FUMARATE DIHYDRATE 160; 4.5 UG/1; UG/1
AEROSOL RESPIRATORY (INHALATION)
Qty: 1 INHALER | Refills: 0 | Status: SHIPPED | OUTPATIENT
Start: 2018-09-28 | End: 2018-11-28 | Stop reason: SDUPTHER

## 2018-10-01 RX ORDER — GABAPENTIN 300 MG/1
CAPSULE ORAL
Qty: 30 CAP | Refills: 0 | Status: SHIPPED | OUTPATIENT
Start: 2018-10-01 | End: 2018-10-17 | Stop reason: SDUPTHER

## 2018-10-08 ENCOUNTER — OFFICE VISIT (OUTPATIENT)
Dept: VASCULAR SURGERY | Age: 58
End: 2018-10-08

## 2018-10-08 VITALS
WEIGHT: 263 LBS | DIASTOLIC BLOOD PRESSURE: 76 MMHG | RESPIRATION RATE: 18 BRPM | HEART RATE: 80 BPM | BODY MASS INDEX: 41.28 KG/M2 | HEIGHT: 67 IN | SYSTOLIC BLOOD PRESSURE: 120 MMHG

## 2018-10-08 DIAGNOSIS — I87.2 VENOUS STASIS DERMATITIS OF BOTH LOWER EXTREMITIES: Primary | ICD-10-CM

## 2018-10-08 DIAGNOSIS — I50.32 CHRONIC DIASTOLIC CONGESTIVE HEART FAILURE (HCC): ICD-10-CM

## 2018-10-08 DIAGNOSIS — Z95.2 H/O AORTIC VALVE REPLACEMENT: ICD-10-CM

## 2018-10-08 DIAGNOSIS — E66.01 MORBID OBESITY (HCC): ICD-10-CM

## 2018-10-08 NOTE — MR AVS SNAPSHOT
[address] [facility] [address] 
 
 
 [address] [place] [address] 843 200 [facility] [address] 
[phone] Patient: Gregorio Milner MRN: [id number] GC Visit Information Date & Time Provider Department Dept. Phone Encounter #  
 10/8/2018 10:45 AM Vinayak Stone MD [facility] [phone] Follow-up Instructions Return in about 3 months (around 2019). Your Appointments 10/22/2018 12:15 PM  
Follow Up with Angelito Vivar MD  
[facility] ([address] [place] [address]) Appt Note: htn; Left message for reminder of appointment SSmith; pt r/s from 10/4/18  
 148 [facility] 107 [id number] [address]  
  
   
 [address]  
  
    
 2019 12:45 PM  
PROCEDURE with [facility] 2 [facility] ([address] [place] [address]) Appt Note: Caleb Reflux. 3 months. Brielle Sport; 3 months [address] 035 200 [facility] [address]  
[phone] [address] 47 [facility]  
  
    
 2019 11:45 AM  
Follow Up with Vinayak Stone MD  
[address] [place] [address] and [facility]ascular Specialists [address] [place] [address]) Appt Note: 3 months fup with studies [address] 266 200 [facility] [address]  
[phone] [address]  
  
    
 2019  1:15 PM  
Office Visit with Ofelia Valdes MD  
[facility] ([address] [place] [address]) Appt Note: 6 month follow up  
 [address] [facility] [address]  
  
   
 [address] [address] [address] [address] Upcoming Health Maintenance Date Due Shingrix Vaccine Age 50> (1 of 2) 2011 FOBT Q 1 YEAR AGE 50-75 2011 Influenza Age 5 to Adult 2018 EYE EXAM RETINAL OR DILATED Q1 2019 HEMOGLOBIN A1C Q6M 2019 FOOT EXAM Q1 2019 MICROALBUMIN Q1 2/28/2019 LIPID PANEL Q1 8/14/2019 BREAST CANCER SCRN MAMMOGRAM 10/26/2019 DTaP/Tdap/Td series (2 - Td) 11/11/2026 Allergies as of 10/8/2018  Review Complete On: 10/8/2018 By: Carie Breaux MD  
  
 Severity Noted Reaction Type Reactions Other Food  03/17/2016    Other (comments) Sweeteners- causes headaches Metformin  11/06/2015    Other (comments) Increase pain in feet and swelling in feet Morphine  01/25/2017    Other (comments)  
 headache Singulair [Montelukast]  07/27/2017    Other (comments)  
 hallucinations Current Immunizations  Never Reviewed No immunizations on file. Not reviewed this visit You Were Diagnosed With   
  
 Codes Comments Venous stasis dermatitis of both lower extremities    -  Primary ICD-10-CM: I87.2 ICD-9-CM: 454.1 Chronic diastolic congestive heart failure (HCC)     ICD-10-CM: I50.32 
ICD-9-CM: 428.32, 428.0 Morbid obesity (HCC)     ICD-10-CM: E66.01 
ICD-9-CM: 278.01   
 H/O aortic valve replacement     ICD-10-CM: Z95.2 ICD-9-CM: V43.3 Vitals BP Pulse Resp Height(growth percentile) Weight(growth percentile) BMI  
 120/76 (BP 1 Location: Left arm, BP Patient Position: Sitting) 80 18 5' 7\" (1.702 m) 263 lb (119.3 kg) 41.19 kg/m2 OB Status Smoking Status Hysterectomy Never Smoker Vitals History BMI and BSA Data Body Mass Index Body Surface Area  
 41.19 kg/m 2 2.37 m 2 Preferred Pharmacy Pharmacy Name Phone [address], [address] [place] [address] [phone] Your Updated Medication List  
  
   
This list is accurate as of 10/8/18 11:42 AM.  Always use your most recent med list.  
  
  
  
  
 * albuterol 2.5 mg /3 mL (0.083 %) nebulizer solution Commonly known as:  PROVENTIL VENTOLIN  
3 mL by Nebulization route every four (4) hours as needed for Wheezing. * PROAIR HFA 90 mcg/actuation inhaler Generic drug:  albuterol  
inhale 2 puffs by mouth every 4 hours if needed for wheezing  
  
 aspirin, buffered 81 mg Tab Take  by mouth. atorvastatin 80 mg tablet Commonly known as:  LIPITOR  
take 1 tablet by mouth once daily BD INSULIN SYRINGE ULTRA-FINE 1 mL 31 gauge x 5/16 Syrg Generic drug:  Insulin Syringe-Needle U-100  
use as directed twice a day DULoxetine 60 mg capsule Commonly known as:  CYMBALTA  
take 1 capsule by mouth once daily FREESTYLE LITE STRIPS strip Generic drug:  glucose blood VI test strips TEST twice a day as directed  
  
 furosemide 40 mg tablet Commonly known as:  LASIX Take 1 Tab by mouth two (2) times a day.  
  
 gabapentin 300 mg capsule Commonly known as:  NEURONTIN  
take 1 capsule by mouth at bedtime JARDIANCE 25 mg tablet Generic drug:  empagliflozin Take  by mouth daily. LANTUS U-100 INSULIN 100 unit/mL injection Generic drug:  insulin glargine  
inject 90 units subcutaneously at bedtime  
  
 levothyroxine 25 mcg tablet Commonly known as:  SYNTHROID Take 1 Tab by mouth Daily (before breakfast). meclizine 12.5 mg tablet Commonly known as:  ANTIVERT  
take 1 tablet by mouth three times a day if needed for 10 days  
  
 metoprolol succinate 50 mg XL tablet Commonly known as:  TOPROL-XL  
take 1 tablet by mouth once daily  
  
 omega 3-DHA-EPA-fish oil 1,000 mg (120 mg-180 mg) capsule  
take 1 capsule by mouth twice a day  
  
 potassium chloride 10 mEq tablet Commonly known as:  KLOR-CON Take 1 Tab by mouth daily. SYMBICORT 160-4.5 mcg/actuation Hfaa Generic drug:  budesonide-formoterol  
inhale 2 puffs by mouth twice a day Rinse mouth after use VITAMIN B-12 1,000 mcg sublingual tablet Generic drug:  cyanocobalamin Take 1,000 mcg by mouth daily. VITAMIN D2 50,000 unit capsule Generic drug:  ergocalciferol  
take 1 capsule by mouth every week * Notice: This list has 2 medication(s) that are the same as other medications prescribed for you. Read the directions carefully, and ask your doctor or other care provider to review them with you. Follow-up Instructions Return in about 3 months (around 1/8/2019). To-Do List   
 01/08/2019 Vascular/US:  DUPLEX LOWER EXT VENOUS BILAT Introducing [facility]! Dear April Quarles: Thank you for requesting a flaveit account. Our records indicate that you already have an active flaveit account. You can access your account anytime at [email or web address]. [email or web address] Did you know that you can access your hospital and ER discharge instructions at any time in flaveit? You can also review all of your test results from your hospital stay or ER visit. Additional Information If you have questions, please visit the Frequently Asked Questions section of the flaveit website at [email or web address] Remember, flaveit is NOT to be used for urgent needs. For medical emergencies, dial 911. Now available from your iPhone and Android! Please provide this summary of care documentation to your next provider. Your primary care clinician is listed as Angelito Perkins. If you have any questions after today's visit, please call [phone].

## 2018-10-08 NOTE — PROGRESS NOTES
1. Have you been to an emergency room or urgent care clinic since your last visit? no    Hospitalized since your last visit? If yes, where, when, and reason for visit? no  2. Have you seen or consulted any other health care providers outside of the [facility] since your last visit including any procedures, health maintenance items.  If yes, where, when and reason for visit? no
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
## 2018-10-17 DIAGNOSIS — G25.81 RESTLESS LEG SYNDROME: ICD-10-CM

## 2018-10-18 RX ORDER — METOPROLOL SUCCINATE 50 MG/1
TABLET, EXTENDED RELEASE ORAL
Qty: 30 TAB | Refills: 0 | Status: SHIPPED | OUTPATIENT
Start: 2018-10-18 | End: 2018-11-28 | Stop reason: SDUPTHER

## 2018-10-18 RX ORDER — PEN NEEDLE, DIABETIC 29 G X1/2"
NEEDLE, DISPOSABLE MISCELLANEOUS
Qty: 200 SYRINGE | Refills: 3 | Status: SHIPPED | OUTPATIENT
Start: 2018-10-18

## 2018-10-18 RX ORDER — GABAPENTIN 300 MG/1
CAPSULE ORAL
Qty: 30 CAP | Refills: 0 | Status: SHIPPED | OUTPATIENT
Start: 2018-10-18 | End: 2018-11-16 | Stop reason: SDUPTHER

## 2018-10-22 ENCOUNTER — OFFICE VISIT (OUTPATIENT)
Dept: FAMILY MEDICINE CLINIC | Age: 58
End: 2018-10-22

## 2018-10-22 VITALS
RESPIRATION RATE: 18 BRPM | SYSTOLIC BLOOD PRESSURE: 125 MMHG | HEART RATE: 65 BPM | BODY MASS INDEX: 40.81 KG/M2 | WEIGHT: 260 LBS | TEMPERATURE: 97.9 F | OXYGEN SATURATION: 97 % | DIASTOLIC BLOOD PRESSURE: 70 MMHG | HEIGHT: 67 IN

## 2018-10-22 DIAGNOSIS — I10 ESSENTIAL HYPERTENSION: ICD-10-CM

## 2018-10-22 DIAGNOSIS — Z79.4 UNCONTROLLED TYPE 2 DIABETES MELLITUS WITH HYPERGLYCEMIA, WITH LONG-TERM CURRENT USE OF INSULIN (HCC): Primary | ICD-10-CM

## 2018-10-22 DIAGNOSIS — R25.1 TREMOR: ICD-10-CM

## 2018-10-22 DIAGNOSIS — E11.65 UNCONTROLLED TYPE 2 DIABETES MELLITUS WITH HYPERGLYCEMIA, WITH LONG-TERM CURRENT USE OF INSULIN (HCC): Primary | ICD-10-CM

## 2018-10-22 DIAGNOSIS — I87.2 VENOUS STASIS DERMATITIS OF BOTH LOWER EXTREMITIES: ICD-10-CM

## 2018-10-22 NOTE — PROGRESS NOTES
BETHEL  Sumanth  comes in for follow-up care. Patient has diabetes mellitus type 2. She is followed up by the endocrinologist.  Her last HbA1c was good at 6.5. She has not been taking her medications as prescribed given her insurance company does not cover some of her medication. She was on Jardiance and would prefer to continue this medication as it has helped control her blood glucose. However insurance company is not paying for this. She is on insulin. Her endocrinologist try to give a different medication which she felt she could not tolerate. I have advised that she reaches out to her endocrinologist to set up an appointment and discuss any medication adjustments. I also did try to explain to her that at times insurance companies have preferred medications that need to be tried out before a medication that is not covered is given and that might require extra costs usually out-of-pocket. Patient also has peripheral arterial disease. She has been followed up by the vascular specialist.  Advised to buy compression stockings. We discussed this at length. She does need to follow instructions as given by her specialist.  She does get bilateral lower extremity swelling. There is no redness or erythema or tenderness. Patient has tremor. This is a non-intention tremor. It occurs at rest and at times when she is holding objects. States that at times the cup falls from her hand when she is holding objects that she is gripping do fall due to the tremor. Her  strength is slightly diminished. I had referred her to the neurologist.  Will follow up with their recommendations. Patient has neuropathy and takes gabapentin at bedtime. Occasional numbness and tingling of the extremities. We will continue with the current treatment plan. Suspect this is due to diabetes. For a long time she had uncontrolled diabetes mellitus. Patient has history of wheezing.   She has been seen by the pulmonologist. She is on inhaler medication. She has Symbicort, albuterol nebulizer and the pro-air inhaler. Uses these as needed. Continue current treatment plan. This is stable. Patient has hypertension. We will continue with the current treatment plan. Blood pressure is stable. She is on metoprolol.       Past Medical History  Past Medical History:   Diagnosis Date    Arthritis     Lower back     Chronic back pain     Lower back pain    Depression     Diabetes ([address]     Diabetes mellitus ([address]     Left ear hearing loss     pt states nerve damage--- 25% hearing loss, described as \"muffled\"    Panic attacks     Sleep apnea     Thyroid disease     Vertigo        Surgical History  Past Surgical History:   Procedure Laterality Date    HX HEART VALVE SURGERY  2013    aortic valve repair    HX HYSTERECTOMY      Partial Hysterectomy - removed ovary    HX MYOMECTOMY      HX ORTHOPAEDIC  02/2018    had nerves burned on her right side of back        Medications  Current Outpatient Medications   Medication Sig Dispense Refill    metoprolol succinate (TOPROL-XL) 50 mg XL tablet take 1 tablet by mouth once daily 30 Tab 0    gabapentin (NEURONTIN) 300 mg capsule take 1 capsule by mouth at bedtime 30 Cap 0    BD INSULIN SYRINGE ULTRA-FINE 1 mL 31 gauge x 5/16 syrg use as directed twice a day 200 Syringe 3    BD INSULIN SYRINGE ULTRA-FINE 1 mL 31 gauge x 5/16 syrg use as directed twice a day 200 Syringe 3    DULoxetine (CYMBALTA) 60 mg capsule take 1 capsule by mouth once daily 30 Cap 0    SYMBICORT 160-4.5 mcg/actuation HFAA inhale 2 puffs by mouth twice a day Rinse mouth after use 1 Inhaler 0    omega 3-DHA-EPA-fish oil 1,000 mg (120 mg-180 mg) capsule take 1 capsule by mouth twice a day 60 Cap 1    VITAMIN D2 50,000 unit capsule take 1 capsule by mouth every week 4 Cap 0    LANTUS U-100 INSULIN 100 unit/mL injection inject 90 units subcutaneously at bedtime 30 mL 1    levothyroxine (SYNTHROID) 25 mcg tablet Take 1 Tab by mouth Daily (before breakfast). 30 Tab 2    meclizine (ANTIVERT) 12.5 mg tablet take 1 tablet by mouth three times a day if needed for 10 days 30 Tab 0    furosemide (LASIX) 40 mg tablet Take 1 Tab by mouth two (2) times a day. 60 Tab 0    potassium chloride (KLOR-CON) 10 mEq tablet Take 1 Tab by mouth daily. 30 Tab 0    FREESTYLE LITE STRIPS strip TEST twice a day as directed 100 Strip 11    atorvastatin (LIPITOR) 80 mg tablet take 1 tablet by mouth once daily 30 Tab 11    PROAIR HFA 90 mcg/actuation inhaler inhale 2 puffs by mouth every 4 hours if needed for wheezing 8.5 Inhaler 3    albuterol (PROVENTIL VENTOLIN) 2.5 mg /3 mL (0.083 %) nebulizer solution 3 mL by Nebulization route every four (4) hours as needed for Wheezing. 24 Each 5    cyanocobalamin (VITAMIN B-12) 1,000 mcg sublingual tablet Take 1,000 mcg by mouth daily.  Aspirin, Buffered 81 mg tab Take  by mouth.  empagliflozin (JARDIANCE) 25 mg tablet Take  by mouth daily.          Allergies  Allergies   Allergen Reactions    Other Food Other (comments)     Sweeteners- causes headaches    Metformin Other (comments)     Increase pain in feet and swelling in feet    Morphine Other (comments)     headache    Singulair [Montelukast] Other (comments)     hallucinations       Family History  Family History   Problem Relation Age of Onset    Heart Disease Mother     Diabetes Mother     Stroke Mother     Diabetes Father     Heart Disease Father        Social History  Social History     Socioeconomic History    Marital status:      Spouse name: Not on file    Number of children: Not on file    Years of education: Not on file    Highest education level: Not on file   Social Needs    Financial resource strain: Not on file    Food insecurity - worry: Not on file    Food insecurity - inability: Not on file   [employer] needs - medical: Not on file   [employer] needs - non-medical: Not on file Occupational History    Not on file   Tobacco Use    Smoking status: Never Smoker    Smokeless tobacco: Never Used   Substance and Sexual Activity    Alcohol use: No    Drug use: No    Sexual activity: Not Currently   Other Topics Concern     Service No    Blood Transfusions No    Caffeine Concern No    Occupational Exposure No    Hobby Hazards No    Sleep Concern Yes     Comment: Sleep apnea     Stress Concern Yes     Comment: Due to family medical issues.      Weight Concern No    Special Diet No    Back Care Yes     Comment: Patient tries to do back care with streches     Exercise No    Bike Helmet Yes    Seat Belt Yes    Self-Exams Yes   Social History Narrative    Not on file       Review of Systems  Review of Systems - History obtained from chart review and the patient  General ROS: positive for  - fatigue and malaise  Psychological ROS: positive for - mood swings  Ophthalmic ROS: negative  ENT ROS: negative  Allergy and Immunology ROS: negative  Hematological and Lymphatic ROS: negative  Endocrine ROS: dm2  Respiratory ROS: positive for - shortness of breath and wheezing  negative for - hemoptysis or pleuritic pain  Cardiovascular ROS: negative  Gastrointestinal ROS: no abdominal pain, change in bowel habits, or black or bloody stools  Genito-Urinary ROS: negative  Musculoskeletal ROS: positive for - joint pain and muscle pain  Neurological ROS: positive for - numbness/tingling, tremor    Vital Signs  Visit Vitals  /70 (BP 1 Location: Left arm, BP Patient Position: Sitting)   Pulse 65   Temp 97.9 °F (36.6 °C) (Oral)   Resp 18   Ht 5' 7\" (1.702 m)   Wt 260 lb (117.9 kg)   SpO2 97%   BMI 40.72 kg/m²     Physical Exam  Physical Examination: General appearance - oriented to person, place, and time, overweight and acyanotic, in no respiratory distress  Mental status - affect appropriate to mood  Neck - supple, no significant adenopathy  Lymphatics - no palpable lymphadenopathy  Chest - decreased air entry noted bilateral lung bases  Heart - S1 and S2 normal  Neurological - abnormal neurological exam unchanged from prior examinations  Musculoskeletal - osteoarthritic changes noted in both hands  Extremities - intact peripheral pulses    Results  Results for orders placed or performed during the hospital encounter of 08/22/18   ECHO ADULT COMPLETE   Result Value Ref Range    LA Volume 75.49 22 - 52 mL    Ao Root D 3.42 cm    Aortic Valve Systolic Peak Velocity 160.07 cm/s    AoV VTI 43.67 cm    Aortic Valve Area by Continuity of Peak Velocity 1.8 cm2    Aortic Valve Area by Continuity of VTI 1.9 cm2    AoV PG 17.3 mmHg    LVIDd 4.30 3.9 - 5.3 cm    LVPWd 1.42 (A) 0.6 - 0.9 cm    LVIDs 2.70 cm    IVSd 1.22 (A) 0.6 - 0.9 cm    LVOT d 1.99 cm    LVOT Peak Velocity 117.70 cm/s    LVOT Peak Gradient 5.5 mmHg    LVOT VTI 26.29 cm    MV A Jerry 84.62 cm/s    MV E Jerry 1.20 cm/s    MV E/A 0.01     RVIDd 2.96 cm    Aortic Valve Systolic Mean Gradient 9.9 mmHg    LA Vol 4C 57.28 (A) 22 - 52 mL    LA Vol 2C 76.53 (A) 22 - 52 mL    LA Area 4C 20.5 cm2    LV Mass .3 (A) 67 - 162 g    LV Mass AL Index 108.9 g/m2    Mitral Valve E Wave Deceleration Time 221.6 ms    Pulmonary Vein \"A\" Wave Velocity 21.25 cm/s    P Vein A Dur 140.7 ms    LA Vol Index 32.71 ml/m2    LA Vol Index 33.16 ml/m2    LA Vol Index 24.82 ml/m2    LORE/BSA Pk Jerry 0.7 cm2/m2    LORE/BSA VTI 0.8 cm2/m2       ASSESSMENT and PLAN    ICD-10-CM ICD-9-CM    1. Uncontrolled type 2 diabetes mellitus with hyperglycemia, with long-term current use of insulin (HCC) E11.65 250.02     Z79.4 V58.67    2. Venous stasis dermatitis of both lower extremities I87.2 454.1    3.  Tremor R25.1 781.0 REFERRAL TO NEUROLOGY   4. Essential hypertension I10 401.9      lab results and schedule of future lab studies reviewed with patient  reviewed diet, exercise and weight control  reviewed medications and side effects in detail  specific diabetic recommendations: low cholesterol diet, weight control and daily exercise discussed, home glucose monitoring emphasized, all medications, side effects and compliance discussed carefully, glycohemoglobin and other lab monitoring discussed and long term diabetic complications discussed    I have discussed the diagnosis with the patient and the intended plan of care as seen in the above orders. The patient has received an after-visit summary and questions were answered concerning future plans. I have discussed medication, side effects, and warnings with the patient in detail. The patient verbalized understanding and is in agreement with the plan of care. The patient will contact the office with any additional concerns.     Viviana Ansari MD

## 2018-10-24 DIAGNOSIS — E55.9 HYPOVITAMINOSIS D: ICD-10-CM

## 2018-10-24 RX ORDER — ERGOCALCIFEROL 1.25 MG/1
CAPSULE ORAL
Qty: 4 CAP | Refills: 0 | Status: SHIPPED | OUTPATIENT
Start: 2018-10-24 | End: 2018-11-23 | Stop reason: SDUPTHER

## 2018-11-16 DIAGNOSIS — G25.81 RESTLESS LEG SYNDROME: ICD-10-CM

## 2018-11-18 RX ORDER — DULOXETIN HYDROCHLORIDE 60 MG/1
CAPSULE, DELAYED RELEASE ORAL
Qty: 30 CAP | Refills: 0 | Status: SHIPPED | OUTPATIENT
Start: 2018-11-18 | End: 2018-12-16 | Stop reason: SDUPTHER

## 2018-11-18 RX ORDER — GABAPENTIN 300 MG/1
CAPSULE ORAL
Qty: 30 CAP | Refills: 0 | Status: SHIPPED | OUTPATIENT
Start: 2018-11-18 | End: 2018-11-28 | Stop reason: DRUGHIGH

## 2018-11-23 DIAGNOSIS — E55.9 HYPOVITAMINOSIS D: ICD-10-CM

## 2018-11-24 NOTE — INTERVAL H&P NOTE
H&P Update:  Jessenia rGant was seen and examined. History and physical has been reviewed. The patient has been examined.  There have been no significant clinical changes since the completion of the originally dated History and Physical.    Signed By: Nidia Castaneda MD     April 3, 2017 10:06 AM
PAIN SCALE 8 OF 10.

## 2018-11-26 RX ORDER — LEVOTHYROXINE SODIUM 25 UG/1
TABLET ORAL
Qty: 30 TAB | Refills: 0 | Status: SHIPPED | OUTPATIENT
Start: 2018-11-26 | End: 2018-12-30 | Stop reason: SDUPTHER

## 2018-11-26 RX ORDER — ERGOCALCIFEROL 1.25 MG/1
CAPSULE ORAL
Qty: 4 CAP | Refills: 0 | Status: SHIPPED | OUTPATIENT
Start: 2018-11-26 | End: 2018-12-30 | Stop reason: SDUPTHER

## 2018-11-27 ENCOUNTER — DOCUMENTATION ONLY (OUTPATIENT)
Dept: NEUROLOGY | Age: 58
End: 2018-11-27

## 2018-11-27 NOTE — PROGRESS NOTES
Chart review for scheduled new patient visit reveals appointment to discuss tremors; referred by Tamy Lyons MD.

## 2018-11-28 ENCOUNTER — OFFICE VISIT (OUTPATIENT)
Dept: FAMILY MEDICINE CLINIC | Age: 58
End: 2018-11-28

## 2018-11-28 VITALS
RESPIRATION RATE: 18 BRPM | TEMPERATURE: 98.5 F | HEART RATE: 69 BPM | BODY MASS INDEX: 41.44 KG/M2 | SYSTOLIC BLOOD PRESSURE: 131 MMHG | WEIGHT: 264 LBS | HEIGHT: 67 IN | OXYGEN SATURATION: 95 % | DIASTOLIC BLOOD PRESSURE: 72 MMHG

## 2018-11-28 DIAGNOSIS — E04.1 THYROID NODULE: ICD-10-CM

## 2018-11-28 DIAGNOSIS — E66.01 MORBID OBESITY (HCC): ICD-10-CM

## 2018-11-28 DIAGNOSIS — R25.1 TREMOR: ICD-10-CM

## 2018-11-28 DIAGNOSIS — Z79.4 UNCONTROLLED TYPE 2 DIABETES MELLITUS WITH HYPERGLYCEMIA, WITH LONG-TERM CURRENT USE OF INSULIN (HCC): ICD-10-CM

## 2018-11-28 DIAGNOSIS — J45.31 ASTHMATIC BRONCHITIS, MILD PERSISTENT, WITH ACUTE EXACERBATION: ICD-10-CM

## 2018-11-28 DIAGNOSIS — R42 DIZZINESS: Primary | ICD-10-CM

## 2018-11-28 DIAGNOSIS — E78.5 DYSLIPIDEMIA: ICD-10-CM

## 2018-11-28 DIAGNOSIS — J45.901 MODERATE ASTHMA WITH ACUTE EXACERBATION, UNSPECIFIED WHETHER PERSISTENT: ICD-10-CM

## 2018-11-28 DIAGNOSIS — E11.65 UNCONTROLLED TYPE 2 DIABETES MELLITUS WITH HYPERGLYCEMIA, WITH LONG-TERM CURRENT USE OF INSULIN (HCC): ICD-10-CM
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
## 2018-11-28 DIAGNOSIS — I10 ESSENTIAL HYPERTENSION: ICD-10-CM

## 2018-11-28 RX ORDER — GABAPENTIN 400 MG/1
400 CAPSULE ORAL 3 TIMES DAILY
Qty: 30 CAP | Refills: 1 | Status: SHIPPED | OUTPATIENT
Start: 2018-11-28 | End: 2019-02-08 | Stop reason: SDUPTHER

## 2018-11-28 NOTE — PROGRESS NOTES
Chief Complaint   Patient presents with    Follow-up     Balance Issues fell 2 weeks ago    Dizziness       1. Have you been to the ER, urgent care clinic since your last visit? Hospitalized since your last visit? No    2. Have you seen or consulted any other health care providers outside of the [address] [place] [address] since your last visit? Include any pap smears or colon screening. No     HPI  Tonya Acuna comes in for follow-up care. Patient has been having dizziness and balance problems for weeks now. States that she lost balance and fell about 2 weeks ago. Her dizziness occurs sporadically. It can come when she is seated at rest and when she is moving. Feels like she is going to fall or pass out. She denies chest pain, chest tightness or palpitations. Denies tinnitus or ringing of the ears. She takes meclizine but does not notice much of a difference. No fever or chills. She does have occasional tremors. I have referred her to the neurologist and she does have an appointment tomorrow for follow-up care. May also help in the ear abnormality. May need to have a head CT scan done. I will await radiologist recommendation on this. We did do an EKG that is similar to one done previously. She has been seen by the cardiologist.  I will check carotid ultrasound. Patient has diabetes mellitus type 2. Her blood glucose numbers have been stable until she was off the Jardiance. Just restarted this medication. Her blood glucose numbers have gone up since. Her last HbA1c was 6.5. She does take [employer] products which do contain a lot of calories. She is on insulin. I have in the past referred her to the endocrinologist.  Given the dizziness I will do CBC and CMP to check her electrolytes and hemoglobin level. Patient has hypertension. She is on Toprol-XL. Blood pressure has overall been stable. Patient has neuropathy. This is related to diabetes. She is on gabapentin.   Patient has a history of thyroid nodules. We have sent her to an ENT doctor in the past for follow-up of his thyroid nodules. She was referred for biopsy but this was not done due to scheduling problems. She would prefer to be referred to a different ENT physician. I will place referral for follow-up care on this. Patient has a BMI of 41. This is morbid obesity. She will do lifestyle and dietary modification in an effort to bring her weight down. Past Medical History  Past Medical History:   Diagnosis Date    Arthritis     Lower back     Chronic back pain     Lower back pain    Depression     Diabetes ([address]     Diabetes mellitus ([address]     Left ear hearing loss     pt states nerve damage--- 25% hearing loss, described as \"muffled\"    Panic attacks     Sleep apnea     Thyroid disease     Vertigo        Surgical History  Past Surgical History:   Procedure Laterality Date    HX HEART VALVE SURGERY  2013    aortic valve repair    HX HYSTERECTOMY      Partial Hysterectomy - removed ovary    HX MYOMECTOMY      HX ORTHOPAEDIC  02/2018    had nerves burned on her right side of back        Medications  Current Outpatient Medications   Medication Sig Dispense Refill    gabapentin (NEURONTIN) 400 mg capsule Take 1 Cap by mouth three (3) times daily.  30 Cap 1    levothyroxine (SYNTHROID) 25 mcg tablet take 1 tablet by mouth once daily before BREAKFAST 30 Tab 0    VITAMIN D2 50,000 unit capsule take 1 capsule by mouth every week 4 Cap 0    DULoxetine (CYMBALTA) 60 mg capsule take 1 capsule by mouth once daily 30 Cap 0    metoprolol succinate (TOPROL-XL) 50 mg XL tablet take 1 tablet by mouth once daily 30 Tab 0    BD INSULIN SYRINGE ULTRA-FINE 1 mL 31 gauge x 5/16 syrg use as directed twice a day 200 Syringe 3    SYMBICORT 160-4.5 mcg/actuation HFAA inhale 2 puffs by mouth twice a day Rinse mouth after use 1 Inhaler 0    omega 3-DHA-EPA-fish oil 1,000 mg (120 mg-180 mg) capsule take 1 capsule by mouth twice a day 60 Cap 1    LANTUS U-100 INSULIN 100 unit/mL injection inject 90 units subcutaneously at bedtime 30 mL 1    meclizine (ANTIVERT) 12.5 mg tablet take 1 tablet by mouth three times a day if needed for 10 days 30 Tab 0    furosemide (LASIX) 40 mg tablet Take 1 Tab by mouth two (2) times a day. (Patient taking differently: Take 40 mg by mouth daily.) 60 Tab 0    potassium chloride (KLOR-CON) 10 mEq tablet Take 1 Tab by mouth daily. 30 Tab 0    empagliflozin (JARDIANCE) 25 mg tablet Take  by mouth daily.  FREESTYLE LITE STRIPS strip TEST twice a day as directed 100 Strip 11    atorvastatin (LIPITOR) 80 mg tablet take 1 tablet by mouth once daily 30 Tab 11    PROAIR HFA 90 mcg/actuation inhaler inhale 2 puffs by mouth every 4 hours if needed for wheezing 8.5 Inhaler 3    albuterol (PROVENTIL VENTOLIN) 2.5 mg /3 mL (0.083 %) nebulizer solution 3 mL by Nebulization route every four (4) hours as needed for Wheezing. 24 Each 5    cyanocobalamin (VITAMIN B-12) 1,000 mcg sublingual tablet Take 1,000 mcg by mouth daily.  Aspirin, Buffered 81 mg tab Take  by mouth.          Allergies  Allergies   Allergen Reactions    Other Food Other (comments)     Sweeteners- causes headaches    Metformin Other (comments)     Increase pain in feet and swelling in feet    Morphine Other (comments)     headache    Singulair [Montelukast] Other (comments)     hallucinations       Family History  Family History   Problem Relation Age of Onset    Heart Disease Mother     Diabetes Mother     Stroke Mother     Diabetes Father     Heart Disease Father        Social History  Social History     Socioeconomic History    Marital status:      Spouse name: Not on file    Number of children: Not on file    Years of education: Not on file    Highest education level: Not on file   Social Needs    Financial resource strain: Not on file    Food insecurity - worry: Not on file    Food insecurity - inability: Not on file   [employer]arek [employer] needs - medical: Not on file   [employer] needs - non-medical: Not on file   Occupational History    Not on file   Tobacco Use    Smoking status: Never Smoker    Smokeless tobacco: Never Used   Substance and Sexual Activity    Alcohol use: No    Drug use: No    Sexual activity: Not Currently   Other Topics Concern     Service No    Blood Transfusions No    Caffeine Concern No    Occupational Exposure No    Hobby Hazards No    Sleep Concern Yes     Comment: Sleep apnea     Stress Concern Yes     Comment: Due to family medical issues.      Weight Concern No    Special Diet No    Back Care Yes     Comment: Patient tries to do back care with streches     Exercise No    Bike Helmet Yes    Seat Belt Yes    Self-Exams Yes   Social History Narrative    Not on file       Review of Systems  Review of Systems - History obtained from son, chart review and the patient  General ROS: positive for  - fatigue and malaise  Psychological ROS: negative  Ophthalmic ROS: negative  ENT ROS: negative  Allergy and Immunology ROS: negative  Hematological and Lymphatic ROS: negative  Endocrine ROS:  diabetes mellitus type 2  Respiratory ROS: Occasional wheeze  Cardiovascular ROS: negative for - chest pain or dyspnea on exertion  Gastrointestinal ROS: no abdominal pain, change in bowel habits, or black or bloody stools  Genito-Urinary ROS: negative  Musculoskeletal ROS: positive for - joint pain, joint stiffness and muscle pain  Neurological ROS: positive for - dizziness, impaired coordination/balance and tremors    Vital Signs  Visit Vitals  /72 (BP 1 Location: Left arm, BP Patient Position: Sitting)   Pulse 69   Temp 98.5 °F (36.9 °C) (Oral)   Resp 18   Ht 5' 7\" (1.702 m)   Wt 264 lb (119.7 kg)   SpO2 95%   BMI 41.35 kg/m²         Physical Exam  Physical Examination: General appearance - oriented to person, place, and time, overweight and acyanotic, in no respiratory distress  Mental status - alert, oriented to person, place, and time, affect appropriate to mood  Ears - bilateral TM's and external ear canals normal  Nose - normal and patent, no erythema, discharge or polyps  Mouth - mucous membranes moist, pharynx normal without lesions  Neck - supple, no significant adenopathy  Lymphatics - no palpable lymphadenopathy  Chest - clear to auscultation, no wheezes, rales or rhonchi, symmetric air entry  Heart - S1 and S2 normal  Back exam - limited range of motion  Neurological - normal muscle tone, no tremors, strength 5/5  Musculoskeletal - osteoarthritic changes noted in both hands  Extremities - intact peripheral pulses    Results  Results for orders placed or performed during the hospital encounter of 08/22/18   ECHO ADULT COMPLETE   Result Value Ref Range    LA Volume 75.49 22 - 52 mL    Ao Root D 3.42 cm    Aortic Valve Systolic Peak Velocity 581.42 cm/s    AoV VTI 43.67 cm    Aortic Valve Area by Continuity of Peak Velocity 1.8 cm2    Aortic Valve Area by Continuity of VTI 1.9 cm2    AoV PG 17.3 mmHg    LVIDd 4.30 3.9 - 5.3 cm    LVPWd 1.42 (A) 0.6 - 0.9 cm    LVIDs 2.70 cm    IVSd 1.22 (A) 0.6 - 0.9 cm    LVOT d 1.99 cm    LVOT Peak Velocity 117.70 cm/s    LVOT Peak Gradient 5.5 mmHg    LVOT VTI 26.29 cm    MV A Jerry 84.62 cm/s    MV E Jerry 1.20 cm/s    MV E/A 0.01     RVIDd 2.96 cm    Aortic Valve Systolic Mean Gradient 9.9 mmHg    LA Vol 4C 57.28 (A) 22 - 52 mL    LA Vol 2C 76.53 (A) 22 - 52 mL    LA Area 4C 20.5 cm2    LV Mass .3 (A) 67 - 162 g    LV Mass AL Index 108.9 g/m2    Mitral Valve E Wave Deceleration Time 221.6 ms    Pulmonary Vein \"A\" Wave Velocity 21.25 cm/s    P Vein A Dur 140.7 ms    LA Vol Index 32.71 ml/m2    LA Vol Index 33.16 ml/m2    LA Vol Index 24.82 ml/m2    LORE/BSA Pk Jerry 0.7 cm2/m2    LORE/BSA VTI 0.8 cm2/m2       ASSESSMENT and PLAN    ICD-10-CM ICD-9-CM    1. Dizziness R42 780.4 DUPLEX CAROTID BILATERAL      CBC WITH AUTOMATED DIFF      METABOLIC PANEL, COMPREHENSIVE      AMB POC EKG ROUTINE W/ 12 LEADS, INTER & REP   2. Fall, initial encounter [address] XXXA E888.9 DUPLEX CAROTID BILATERAL   3. Thyroid nodule E04.1 241.0 REFERRAL TO GENERAL SURGERY   4. Uncontrolled type 2 diabetes mellitus with hyperglycemia, with long-term current use of insulin (HCC) E11.65 250.02     Z79.4 V58.67    5. Tremor R25.1 781.0    6. Essential hypertension I10 401.9    7. Moderate asthma with acute exacerbation, unspecified whether persistent J45.901 493.92    8. Morbid obesity ([facility] [address] E66.01 278.01    Discussed the patient's BMI with her. The BMI follow up plan is as follows:   dietary management education, guidance, and counseling  encourage exercise  monitor weight  lab results and schedule of future lab studies reviewed with patient  reviewed diet, exercise and weight control  cardiovascular risk and specific lipid/LDL goals reviewed  reviewed medications and side effects in detail  specific diabetic recommendations: low cholesterol diet, weight control and daily exercise discussed, home glucose monitoring emphasized, all medications, side effects and compliance discussed carefully, annual eye examinations at Ophthalmology discussed, glycohemoglobin and other lab monitoring discussed and long term diabetic complications discussed  radiology results and schedule of future radiology studies reviewed with patient    I have discussed the diagnosis with the patient and the intended plan of care as seen in the above orders. The patient has received an after-visit summary and questions were answered concerning future plans. I have discussed medication, side effects, and warnings with the patient in detail. The patient verbalized understanding and is in agreement with the plan of care. The patient will contact the office with any additional concerns.  More than 50% of the 45-minute visit spent counseling and coordinating care with patient face to face on diabetes mellitus, thyroid disorders and dizziness with balance problems. We discussed ways to manage the same. Discussed need for compliance with treatment regimen, follow-up, and taking responsibility for her disease process.     Kelly Darden MD

## 2018-11-29 ENCOUNTER — OFFICE VISIT (OUTPATIENT)
Dept: NEUROLOGY | Age: 58
End: 2018-11-29

## 2018-11-29 VITALS
TEMPERATURE: 98.9 F | HEIGHT: 67 IN | DIASTOLIC BLOOD PRESSURE: 92 MMHG | WEIGHT: 261.8 LBS | HEART RATE: 80 BPM | SYSTOLIC BLOOD PRESSURE: 142 MMHG | BODY MASS INDEX: 41.09 KG/M2 | OXYGEN SATURATION: 97 % | RESPIRATION RATE: 20 BRPM

## 2018-11-29 DIAGNOSIS — H81.90 VERTIGINOUS SYNDROME AND LABYRINTHINE DISORDER: Primary | ICD-10-CM

## 2018-11-29 DIAGNOSIS — R55 VASOVAGAL SYNCOPE: ICD-10-CM

## 2018-11-29 DIAGNOSIS — R25.1 TREMOR: ICD-10-CM

## 2018-11-29 RX ORDER — INSULIN GLARGINE 100 [IU]/ML
INJECTION, SOLUTION SUBCUTANEOUS
Qty: 30 ML | Refills: 1 | Status: SHIPPED | OUTPATIENT
Start: 2018-11-29 | End: 2019-03-11 | Stop reason: SDUPTHER

## 2018-11-29 RX ORDER — BUDESONIDE AND FORMOTEROL FUMARATE DIHYDRATE 160; 4.5 UG/1; UG/1
AEROSOL RESPIRATORY (INHALATION)
Qty: 1 INHALER | Refills: 0 | Status: SHIPPED | OUTPATIENT
Start: 2018-11-29 | End: 2019-01-15 | Stop reason: SDUPTHER

## 2018-11-29 RX ORDER — METOPROLOL SUCCINATE 50 MG/1
TABLET, EXTENDED RELEASE ORAL
Qty: 30 TAB | Refills: 0 | Status: SHIPPED | OUTPATIENT
Start: 2018-11-29 | End: 2019-01-15 | Stop reason: SDUPTHER

## 2018-11-29 RX ORDER — ATORVASTATIN CALCIUM 80 MG/1
TABLET, FILM COATED ORAL
Qty: 30 TAB | Refills: 3 | Status: SHIPPED | OUTPATIENT
Start: 2018-11-29 | End: 2021-02-21 | Stop reason: ALTCHOICE

## 2018-11-29 NOTE — PROGRESS NOTES
Hannah Arango is a 63 y.o. female new patient in today to discuss tremors. Learning assessment previously completed 10/8/2018; primary language is [place].

## 2018-11-29 NOTE — PATIENT INSTRUCTIONS
Body Mass Index: Care Instructions  Your Care Instructions    Body mass index (BMI) can help you see if your weight is raising your risk for health problems. It uses a formula to compare how much you weigh with how tall you are. · A BMI lower than 18.5 is considered underweight. · A BMI between 18.5 and 24.9 is considered healthy. · A BMI between 25 and 29.9 is considered overweight. A BMI of 30 or higher is considered obese. If your BMI is in the normal range, it means that you have a lower risk for weight-related health problems. If your BMI is in the overweight or obese range, you may be at increased risk for weight-related health problems, such as high blood pressure, heart disease, stroke, arthritis or joint pain, and diabetes. If your BMI is in the underweight range, you may be at increased risk for health problems such as fatigue, lower protection (immunity) against illness, muscle loss, bone loss, hair loss, and hormone problems. BMI is just one measure of your risk for weight-related health problems. You may be at higher risk for health problems if you are not active, you eat an unhealthy diet, or you drink too much alcohol or use tobacco products. Follow-up care is a key part of your treatment and safety. Be sure to make and go to all appointments, and call your doctor if you are having problems. It's also a good idea to know your test results and keep a list of the medicines you take. How can you care for yourself at home? · Practice healthy eating habits. This includes eating plenty of fruits, vegetables, whole grains, lean protein, and low-fat dairy. · If your doctor recommends it, get more exercise. Walking is a good choice. Bit by bit, increase the amount you walk every day. Try for at least 30 minutes on most days of the week. · Do not smoke. Smoking can increase your risk for health problems. If you need help quitting, talk to your doctor about stop-smoking programs and medicines. These can increase your chances of quitting for good. · Limit alcohol to 2 drinks a day for men and 1 drink a day for women. Too much alcohol can cause health problems. If you have a BMI higher than 25  · Your doctor may do other tests to check your risk for weight-related health problems. This may include measuring the distance around your waist. A waist measurement of more than 40 inches in men or 35 inches in women can increase the risk of weight-related health problems. · Talk with your doctor about steps you can take to stay healthy or improve your health. You may need to make lifestyle changes to lose weight and stay healthy, such as changing your diet and getting regular exercise. If you have a BMI lower than 18.5  · Your doctor may do other tests to check your risk for health problems. · Talk with your doctor about steps you can take to stay healthy or improve your health. You may need to make lifestyle changes to gain or maintain weight and stay healthy, such as getting more healthy foods in your diet and doing exercises to build muscle. Where can you learn more? Go to http://nadya-irasema.info/. Enter S176 in the search box to learn more about \"Body Mass Index: Care Instructions. \"  Current as of: October 13, 2016  Content Version: 11.4  © 7271-8394 [employer]. Care instructions adapted under license by [employer] (which disclaims liability or warranty for this information). If you have questions about a medical condition or this instruction, always ask your healthcare professional. [address] any warranty or liability for your use of this information.

## 2018-11-29 NOTE — PROGRESS NOTES
Eileen Lam is a 63 y.o., right handed female, with an established history of diabetes, hypercholesterolemia, post tricuspid valve repair 2013 who comes in complaining of tremor. She states that for the last year or more she's noticed a tremor of the hands. She states that when she tries to eat or do anything fine with her hands she shakes. She's also noted some tremor of the lips. There's no shaking of the legs. She uses canes for balance and to help to ambulate because of significant back pain and nerve pain. She's not had any overt difficulty with walking getting worse, but has noted a couple of falls recently. She just seems to lose her balance. She gets lightheaded and nauseated and feels as if she might pass out. She's actually lost consciousness on three occassions. She's been out for up to 10 minutes. 2 times she was sitting and the third she was walking. The spells are associated with significant sweating. She didn't go to the hospital for any of these, but has stopped driving. She gets spells of nausea and lightheadedness every day, but has lost consciousness only on the 3 occassions. She's never been witnessed to have a seizure. She's been getting these spells for greater than 2 years ago. They have been getting more frequent. If she looks to the left too quickly she'll get lightheaded and nauseated. She does get ringing in her ears. She has some hearing lose in the left ear. She saw an ENT doctor who didn't find any acute abnormalities. Social History; she's , lives with her son and brother. She denies tobacco, alcohol or illicit drug use. Used to work as an , currently disabled. Family History; mother  from heart failure, had both diabetes and hypertension. Father cause of death unknown, had diabetes and heart disease.       Current Outpatient Medications   Medication Sig Dispense Refill    LANTUS U-100 INSULIN 100 unit/mL injection inject 90 unit subcutaneously at bedtime 30 mL 1    atorvastatin (LIPITOR) 80 mg tablet take 1 tablet by mouth once daily 30 Tab 3    SYMBICORT 160-4.5 mcg/actuation HFAA inhale 2 puffs by mouth twice a day Rinse mouth after use 1 Inhaler 0    metoprolol succinate (TOPROL-XL) 50 mg XL tablet take 1 tablet by mouth once daily 30 Tab 0    gabapentin (NEURONTIN) 400 mg capsule Take 1 Cap by mouth three (3) times daily. 30 Cap 1    levothyroxine (SYNTHROID) 25 mcg tablet take 1 tablet by mouth once daily before BREAKFAST 30 Tab 0    VITAMIN D2 50,000 unit capsule take 1 capsule by mouth every week 4 Cap 0    DULoxetine (CYMBALTA) 60 mg capsule take 1 capsule by mouth once daily 30 Cap 0    omega 3-DHA-EPA-fish oil 1,000 mg (120 mg-180 mg) capsule take 1 capsule by mouth twice a day 60 Cap 1    furosemide (LASIX) 40 mg tablet Take 1 Tab by mouth two (2) times a day. (Patient taking differently: Take 40 mg by mouth daily.) 60 Tab 0    potassium chloride (KLOR-CON) 10 mEq tablet Take 1 Tab by mouth daily. 30 Tab 0    empagliflozin (JARDIANCE) 25 mg tablet Take  by mouth daily.  PROAIR HFA 90 mcg/actuation inhaler inhale 2 puffs by mouth every 4 hours if needed for wheezing 8.5 Inhaler 3    cyanocobalamin (VITAMIN B-12) 1,000 mcg sublingual tablet Take 1,000 mcg by mouth daily.  Aspirin, Buffered 81 mg tab Take  by mouth.  BD INSULIN SYRINGE ULTRA-FINE 1 mL 31 gauge x 5/16 syrg use as directed twice a day 200 Syringe 3    meclizine (ANTIVERT) 12.5 mg tablet take 1 tablet by mouth three times a day if needed for 10 days 30 Tab 0    FREESTYLE LITE STRIPS strip TEST twice a day as directed 100 Strip 11    albuterol (PROVENTIL VENTOLIN) 2.5 mg /3 mL (0.083 %) nebulizer solution 3 mL by Nebulization route every four (4) hours as needed for Wheezing.  24 Each 5       Past Medical History:   Diagnosis Date    Arthritis     Lower back     Chronic back pain     Lower back pain    Depression  Diabetes [facility]     Diabetes mellitus ([facility]     Left ear hearing loss     pt states nerve damage--- 25% hearing loss, described as \"muffled\"    Panic attacks     Sleep apnea     Thyroid disease     Vertigo        Past Surgical History:   Procedure Laterality Date    HX HEART VALVE SURGERY  2013    aortic valve repair    HX HYSTERECTOMY      Partial Hysterectomy - removed ovary    HX MYOMECTOMY      HX ORTHOPAEDIC  02/2018    had nerves burned on her right side of back       Allergies   Allergen Reactions    Other Food Other (comments)     Sweeteners- causes headaches    Metformin Other (comments)     Increase pain in feet and swelling in feet    Morphine Other (comments)     headache    Singulair [Montelukast] Other (comments)     hallucinations       Patient Active Problem List   Diagnosis Code    [facility], [facility] E11.65    Sleep apnea G47.30    H/O aortic valve replacement Z95.2    History of bicuspid aortic valve Z87.74    Chronic back pain M54.9, G89.29    Chronic left shoulder pain M25.512, G89.29    Morbid obesity ([facility]) E66.01    Left shoulder tendinitis M75.82    Spondylosis of lumbar region without myelopathy or radiculopathy M47.816    Chronic pain of both shoulders M25.511, G89.29, M25.512    Chronic pain of both knees M25.561, M25.562, G89.29    Chronic pain syndrome G89.4    Encounter for long-term (current) use of medications Z79.899    Chronic midline low back pain M54.5, G89.29    Lumbar facet arthropathy M47.816    Lumbar degenerative disc disease M51.36    Venous stasis dermatitis of both lower extremities I87.2    SOB (shortness of breath) R06.02    Type 2 diabetes mellitus without complication ([facility]) K93.2    Hypothyroidism due to acquired atrophy of thyroid E03.4    Essential hypertension I10    Dyslipidemia E78.5    Mood disorder ([facility]) F39    Chronic diastolic congestive heart failure (HCC) I50.32         Review of Systems: As above otherwise 11 point review of systems negative including;   Constitutional no fever or chills  Skin denies rash or itching  HENT  Denies tinnitus, hearing lose  Eyes denies diplopia vision lose  Respiratory denies shortness of breath  Cardiovascular denies chest pain, dyspnea on exertion  Gastrointestinal denies nausea, vomiting, diarrhea, constipation  Genitourinary denies incontinence  Musculoskeletal denies joint pain or swelling  Endocrine denies weight change  Hematology denies easy bruising or bleeding   Neurological as above in HPI      PHYSICAL EXAMINATION:      VITAL SIGNS:    Visit Vitals  /80 (BP 1 Location: Left arm, BP Patient Position: Sitting)   Pulse 72   Temp 98.9 °F (37.2 °C) (Oral)   Resp 20   Ht 5' 7\" (1.702 m)   Wt 118.8 kg (261 lb 12.8 oz)   SpO2 96%   BMI 41.00 kg/m²     She had no orthostatic changes in her BP but was very nauseated after sitting up. She feels extremely lightheaded also. GENERAL: The patient is well developed, well nourished, and in some distress. EXTREMITIES: No clubbing, cyanosis, or edema is identified. Pulses 2+ and symmetrical.  Muscle tone is normal.  HEAD:   Ear, nose, and throat appear to be without trauma. The patient is normocephalic. NEUROLOGIC EXAMINATION    MENTAL STATUS: The patient is awake, alert, and oriented x 4. Fund of knowledge is adequate. Speech is fluent and memory appears to be intact, both long and short term. CRANIAL NERVES: II - Visual fields are full to confrontation. Funduscopic examination reveals flat disks bilaterally. Pupils are both 4 mm and briskly reactive to light and accommodation. III, IV, VI - Extraocular movements are intact and there is  Nystagmus that is prolonged when she looks to the left more than the right. V - Facial sensation is intact to pinprick and light touch. VII - Face is symmetrical.   VIII - Hearing is present. IX, X, XII- Palate rises symmetrically. Gag is present.  Tongue is in the midline. XI - Shoulder shrugging and head turning intact  MOTOR:  The patient is 5/5 in all four limbs without any drift. Fine finger movements are symmetrical.  Isolated motor group testing reveals no focal abnormalities. Tone is normal.  Sensory examination is intact to pinprick, light touch and position sense testing. Reflexes are 2+ and symmetrical. Plantars are down going. Cerebellar examination reveals a significant intention tremor, worse on the right than on the left side. There's no cogwheel rigidity. Gait is normal and the patient can tandem walk without any difficulty. Final result (Exam End: 2/28/2018 15:59) Provider Status: Open   Study Result     MRI of IACs and CP angles and including whole brain without and with  gadolinium  contrast:  ----------------------------------------------------------------------        INDICATIONS:     [    ]     TECHNIQUE:     Sagittal, axial and[coronal] multisequence MR images of brain are obtained using  T1 and T2 contrast informations, diffusion images, FLAIR images,  and gadolinium  contrast enhanced images with intravenous administration of [20]  ml of [Dotarem  contrast.     Additional sequence of imaging:  [Images of IACs and CP angles includes high  resolution precontrast axial and coronal T1 and T2-weighted images,, post  contrast axial and coronal T1-weighted images.  The postcontrast images also  includes axial T1-weighted images with fat suppression of entire brain.     The study also includes precontrast axial 3-D fiesta T2-weighted images of IACs  and CP angles and including internal ear structures.     COMPARISON STUDY: None        --------------------------------------------------------------------------------     --------  FINDINGS:  --------------------------------------------------------------------------------     ---------  [    ]     [     ]     Diffusion images:     [No evidence of acute infarction, acute ischemic process or restricted diffusion  in brain.]  [   ]     Gradient-echo images:     [No evidence of intra-axial or extra-axial hemorrhage.] [     ]     Cerebral cortex:[No definable focal abnormality]. [    ]           Cerebral cortical atrophy:[ Mild cerebral cortical atrophy in the frontal  lobes bilaterally. [    ]     Cerebral ventricles:     Size of lateral ventricles and third ventricle:[  Normal]. [   ]     Midline shift:[None.]     Cerebral white matter:     No focal abnormality. [    ]     Chronic microvascular ischemic changes:   [Mild chronic microvascular ischemic  changes in periventricular and subcortical white matter bilaterally. ]     Bilateral basal ganglia structures:     [No definable abnormality]. [     ].     Bilateral thalami:     [No definable abnormality.]   [     ]     Brainstem:     [No definable abnormality.]  [   ]     Cerebellum:     [No definable abnormality].  [    ]     Sella and suprasellar cistern:     [No  diagnostic finding.] [    By  Partially empty sella.]     Bilateral orbits:     [No demonstrable abnormality.] [   ]  --------------------------------------------------------------------  IACs and CP angles:  [No evidence of mass, abnormal signal or abnormal enhancement in the internal  ear structures, the internal auditory canals and CP angles of both sides.     On 3-D fiesta images, there is no evidence of any mass or nodularity or anatomic  distortion in the internal ear structures, internal auditory canals and CP  angles of both sides.     No definable abnormal signal or abnormal enhancement in the right temporal bone.     Evidence of moderate left-sided mastoiditis, which may be chronic or subacute,  with heterogeneous T2 hyperintense signals and mild to moderate heterogeneous  enhancement with contras.     --------------------------------------------------------            WITH GADOLINIUM CONTRAST:     [No abnormal intra-axial or extra-axial enhancement with IV  gadolinium  contrast.]    [ ]     Any intracranial mass lesion ?:     [No evidence of intracranial mass lesion or mass effect.] [    ]     Intracranial vascular structures:     [Signal voids, indicating vascular flow in all major intracranial arteries.] [    ]     Position of cerebellar tonsils:  [Normal position of cerebellar tonsils]. [   ]     Calvarium , base of skull and scalp:     [No demonstrable abnormality]. [   ]     Soft tissues  at base of skull:     [No demonstrable abnormality.] [   ]     Visualized portions of paranasal sinuses:     [The study demonstrates moderate to marked mucosal thickenings in the right  maxillary sinus and bilateral ethmoidal sinuses. Confluent mucosal thickenings  are present in the frontal sinuses bilaterally. In the left maxillary sinus,  there is large amount of retained fluid with surrounding mucosal thickening,  indicating suspected mucocele involving entire sinus.                 --------------------------------------------------     IMPRESSIONS:        1. Normal MRI of bilateral CP angles, IACs and internal ear structures.     2. Evidence of mastoiditis in the left temporal bone and may be chronic or  subacute.     3.     Evidence of chronic or subacute significant sinusitis in bilateral maxillary  sinuses, bilateral ethmoidal sinuses and bilateral frontal sinuses. Retained  fluid/mucocele in the left maxillary sinus with complete opacification.     4. Only minimal to mild chronic microvascular ischemic changes in white matter  of cerebrum. Otherwise, there is no evidence of intra-axial or extra-axial  abnormality. No focal abnormality in brain.     . On the diffusion images, there is no evidence of acute infarction or acute  ischemic process in brain. I have reviewed the above imagines myself.        CBC:   Lab Results   Component Value Date/Time    WBC 6.4 08/14/2018 01:10 AM    RBC 4.49 08/14/2018 01:10 AM    HGB 12.1 08/14/2018 01:10 AM    HCT 40.2 08/14/2018 01:10 AM    PLATELET 762 08/14/2018 01:10 AM     BMP:   Lab Results   Component Value Date/Time    Glucose 133 (H) 08/14/2018 01:10 AM    Sodium 141 08/14/2018 01:10 AM    Potassium 4.7 08/14/2018 01:10 AM    Chloride 105 08/14/2018 01:10 AM    CO2 24 08/14/2018 01:10 AM    BUN 8 08/14/2018 01:10 AM    Creatinine 0.61 08/14/2018 01:10 AM    Calcium 8.7 08/14/2018 01:10 AM     CMP:   Lab Results   Component Value Date/Time    Glucose 133 (H) 08/14/2018 01:10 AM    Sodium 141 08/14/2018 01:10 AM    Potassium 4.7 08/14/2018 01:10 AM    Chloride 105 08/14/2018 01:10 AM    CO2 24 08/14/2018 01:10 AM    BUN 8 08/14/2018 01:10 AM    Creatinine 0.61 08/14/2018 01:10 AM    Calcium 8.7 08/14/2018 01:10 AM    BUN/Creatinine ratio 13 08/14/2018 01:10 AM    Alk. phosphatase 131 (H) 08/14/2018 01:10 AM    Protein, total 6.7 08/14/2018 01:10 AM    Albumin 3.6 08/14/2018 01:10 AM    A-G Ratio 1.2 08/14/2018 01:10 AM     Coagulation: No results found for: PTP, INR, APTT, PTTT  Cardiac markers: No results found for: CPK, CKND1, SANDY       Impression: This patient seems to have symptomatic vertigo with some tinnitus as well as hearing loss on the left side. It is likely that she has a mild case of Ménière's disease. She has no changes in her orthostatic vital signs and became very symptomatic when she was brought from the lying to the sitting position. She also has significant nystagmus and a negative MRI scan for any mass lesions or strokes in the brainstem. Additionally she has symptomatic tremor right upper extremity worse in the left upper extremity. She does not have any signs or symptoms of Parkinson's disease. I think this is a benign essential tremor. Plan: At this time am going to send her for some vestibular rehabilitation for her vertigo. I am going to get an EEG because on a couple of occasions when she stood up she had syncope.   No seizures witnessed but she did have one episode of syncope without standing up and this is a little worrisome. I am not to treat the tremor area, would like to see how she does with the vertigo first but will definitely work on that as soon as she is less symptomatic from her vertigo. I do believe that she was reassured that this was a benign tremor and that she is not demanding any treatment at this time. I will see her back in about 6 weeks. PLEASE NOTE:   This document has been produced using voice recognition software. Unrecognized errors in transcription may be present.

## 2018-11-29 NOTE — LETTER
11/29/2018 10:05 AM 
 
Patient:  Victoria Allen YOB: 1960 Date of Visit: 11/29/2018 Dear MD Andreas [address] [place] [address] VIA In Basket 
 : Thank you for referring Ms. Aarti Patel to me for evaluation/treatment. Below are the relevant portions of my assessment and plan of care. If you have questions, please do not hesitate to call me. I look forward to following Ms. Luke Quiros along with you.  
 
 
 
Sincerely, 
 
 
Riya Maldonado MD 
 

Statement Selected

## 2018-12-03 ENCOUNTER — HOSPITAL ENCOUNTER (OUTPATIENT)
Dept: VASCULAR SURGERY | Age: 58
Discharge: HOME | End: 2018-12-03
Attending: FAMILY MEDICINE
Payer: MEDICAID
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
## 2018-12-03 DIAGNOSIS — R42 DIZZINESS: ICD-10-CM

## 2018-12-03 PROCEDURE — 93880 EXTRACRANIAL BILAT STUDY: CPT

## 2018-12-04 ENCOUNTER — APPOINTMENT (OUTPATIENT)
Dept: PHYSICAL THERAPY | Age: 58
End: 2018-12-04

## 2018-12-04 LAB
LEFT CCA DIST DIAS: 13.31 CM/S
LEFT CCA DIST SYS: 63.38 CM/S
LEFT CCA MID DIAS: 16.97 CM/S
LEFT CCA MID SYS: 89.02 CM/S
LEFT CCA PROX DIAS: 13.56 CM/S
LEFT CCA PROX SYS: 71.37 CM/S
LEFT ECA DIAS: 4.76 CM/S
LEFT ECA SYS: 76.81 CM/S
LEFT ICA DIST DIAS: 19.67 CM/S
LEFT ICA DIST SYS: 59.24 CM/S
LEFT ICA MID DIAS: 16.98 CM/S
LEFT ICA MID SYS: 78.83 CM/S
LEFT ICA PROX DIAS: 19.42 CM/S
LEFT ICA PROX SYS: 56.05 CM/S
LEFT ICA/CCA SYS: 1.24
LEFT SUBCLAVIAN SYS: 126.4 CM/S
LEFT VERTEBRAL DIAS: 14.18 CM/S
LEFT VERTEBRAL SYS: 64.74 CM/S
RIGHT CCA DIST DIAS: 10.38 CM/S
RIGHT CCA DIST SYS: 56.61 CM/S
RIGHT CCA MID DIAS: 9 CM/S
RIGHT CCA MID SYS: 63.79 CM/S
RIGHT CCA PROX DIAS: 12.91 CM/S
RIGHT CCA PROX SYS: 83.35 CM/S
RIGHT ECA DIAS: 7.92 CM/S
RIGHT ECA SYS: 101.61 CM/S
RIGHT ICA DIST DIAS: 20.58 CM/S
RIGHT ICA DIST SYS: 76.23 CM/S
RIGHT ICA MID DIAS: 16.49 CM/S
RIGHT ICA MID SYS: 49.63 CM/S
RIGHT ICA PROX DIAS: 10.38 CM/S
RIGHT ICA PROX SYS: 46.15 CM/S
RIGHT ICA/CCA SYS: 1.35
RIGHT SUBCLAVIAN SYS: 141.5 CM/S
RIGHT VERTEBRAL DIAS: 18.61 CM/S
RIGHT VERTEBRAL SYS: 73.51 CM/S

## 2018-12-05 ENCOUNTER — HOSPITAL ENCOUNTER (OUTPATIENT)
Dept: PHYSICAL THERAPY | Age: 58
Discharge: HOME | End: 2018-12-05
Payer: MEDICAID

## 2018-12-05 PROCEDURE — 97110 THERAPEUTIC EXERCISES: CPT

## 2018-12-05 PROCEDURE — 97162 PT EVAL MOD COMPLEX 30 MIN: CPT

## 2018-12-05 NOTE — PROGRESS NOTES
PT DAILY TREATMENT NOTE/VESTIBULAR EVAL 10-18    Patient Name: Divya Canseco  Date:2018  : 1960  [x]  Patient  Verified  Payor: [facility] MEDICAID / Plan: [facility] PLUS / Product Type: Managed Care Medicaid /    In time: 10:35  Out time: 11:25  Total Treatment Time (min): 50  Visit #: 1 of 10    Medicare/BCBS Only   Total Timed Codes (min): 8 1:1 Treatment Time:  50     Treatment Area: Dizziness and giddiness [R42]    SUBJECTIVE  Pain Level (0-10 scale):  8/10  []constant []intermittent []improving []worsening []no change since onset    Any medication changes, allergies to medications, adverse drug reactions, diagnosis change, or new procedure performed?: [x] No    [] Yes (see summary sheet for update)  Subjective functional status/changes:     Mechanism of Injury: dizziness began about 3 years ago. Worsened over time. Normally feels light headed but did have spinning dizziness the other day. Has had a couple episodes of fainting beginning 3 years. Has been to cardiologist and neurologist.25% hearing loss in left ear. Symptoms come on randomly. Notices looses balance to left when walking. Turning too far left gets dizzy. Passing trees when driving. Hides eyes when feeling symptoms come on and that helps. Is taking Meclizine as needed. Denies numbness/tingling in feet. . Uses 2 canes for community ambulation and chairs at home. Uses canes more for back vs. Balance. Pt. Reports she has fallen a few times. lives with brother and son. Helps with chores. Ind with getting dressed  Pt Goals: to start feeling better and to stop falling.      OBJECTIVE/EXAMINATION    8 min Therapeutic Exercise:  [] See flow sheet : HEP   Rationale: increase strength, improve coordination and improve balance to improve the patients ability to increase ease of ADLs          With   [x] TE   [] TA   [] neuro   [] other: Patient Education: [x] Review HEP    [] Progressed/Changed HEP based on:   [] positioning   [] body mechanics   [] transfers   [] heat/ice application    [] other:      Physical Therapy Evaluation - Vestibular    C/S ROM: [] WFL    [x] Limited mild decrease in B rotation   Describe:    Oculomotor Tests: (Fixation Not Blocked)       Ocular ROM:   [] WFL    [] Limited    Describe:       Spontaneous Nystag. [x] Neg     [] Pos    [] Left    [] Right       Gaze Holding Nystag. [x] Neg     [] Pos    [] Left    [] Right        Smooth Pursuit  [x] Neg     [] Pos    [] Left    [] Right        Saccades   [x] Neg     [] Pos    [] Left    [] Right  Increased symptoms       VOR - Slow Head Mvmt [x] Neg     [] Pos    [] Left    [] Right  Increased symptoms       VOR - Fast Head Mvmt [x] Neg     [] Pos    [] Left    [] Right  Increased symptoms       Head Thrust  [] Neg     [] Pos    [] Left    [] Right        Static Visual Acuity [] Neg     [] Pos    [] Left    [] Right        Dynamic Visual Acuity [] Neg     [] Pos    [] Left    [] Right     Other Special Tests:       Vertebral Artery Testing [x] Neg     [] Pos    [x] Left    [x] Right  seated       Hallpike-Remy Maneuver [] Neg     [] Pos    [] Left    [] Right       Roll Test   [] Neg     [] Pos    [] Left    [] Right       Ko Balance Scale [] Neg     [] Pos    Score:       Dynamic Gait Index [] Neg     [] Pos    Score:       Functional Gait Assess. [] Neg     [] Pos    Score:    Balance Standard Testing (Eyes Open/Eyes Closed - EO/EC)       Romberg   [] WFL    [x] Pos    Describe: LOB with eyes open LOB with eyes closed and feet apart          Stand on Foam  [] WFL    [] Pos    Describe:        Standing on Rail  [] WFL    [] Pos    Describe:        Sharpened Romberg [] WFL    [] Pos    Describe:        Single Leg Stand  [] WFL    [] Pos    Describe: Motion Sensitivity:  [] Neg     [] Pos    Describe:    Computerized Dynamic Posturography:        [] Not Tested    [] WFL    Score:     Other Tests:  Unable to complete FGA testing secondary to significant difficulty with test         Pain Level (0-10 scale) post treatment: 8/10    ASSESSMENT/Changes in Function:      [x]  See Plan of Care  []  See progress note/recertification  []  See Discharge Summary         Progress towards goals / Updated goals:  See POC    PLAN  []  Upgrade activities as tolerated     [x]  Continue plan of care  []  Update interventions per flow sheet       []  Discharge due to:_  []  Other:_      Nathaniel Calabrese, PT 12/5/2018  10:35 AM

## 2018-12-05 NOTE — PROGRESS NOTES
[facility] [place] [employer] [facility]  [address] [place] [address]  [phone] fax    Plan of Care/ Statement of Necessity for Physical Therapy Services    Patient name: Elisha Kay Start of Care: 2018   Referral source: Roe Dowell MD : 1960    Medical Diagnosis: Dizziness and giddiness [R42]  Payor: [facility] MEDICAID / Plan: Felicita Romero / Product Type: Managed Care Medicaid /  Onset Date: 3 years ago    Treatment Diagnosis: difficulty with ambulation    Prior Hospitalization: see medical history Provider#: [id number]   Medications: Verified on Patient summary List    Comorbidities: diabetes, asthma, arthritis, LBP   Prior Level of Function: Ind with ADLs but assistance with household chores, ambulates with B SPC     The Plan of Care and following information is based on the information from the initial evaluation. Assessment/ key information:  Pt. Is a 63year old female c/o dizziness and decreased balance that began with insidious onset 3 years ago and has gradually worsened. She has also had episodes of fainting during this period. She has seen a cardiovascular physician about this issue as well. She describes dizziness as feeling light headed and randomly occurs. Negative smooth pursuits. Negative saccades and VOR testing but these increased her symptoms. Negative head thrust test. She has significant decrease in her balance with LOB during eyes open Rhomberg. She also had LOB with eyes closed with feet apart. Attempted to perform FGA but had significant limitations with testing so did not complete test. Skilled PT is medically necessary in order to improve balance and dizziness symptoms in order to increase safety during ambulation.      Evaluation Complexity History MEDIUM  Complexity : 1-2 comorbidities / personal factors will impact the outcome/ POC ; Examination MEDIUM Complexity : 3 Standardized tests and measures addressing body structure, function, activity limitation and / or participation in recreation  ;Presentation MEDIUM Complexity : Evolving with changing characteristics  ; Clinical Decision Making MEDIUM Complexity : FOTO score of 26-74  Overall Complexity Rating: MEDIUM  Problem List: pain affecting function, decrease ROM, decrease strength, impaired gait/ balance, decrease ADL/ functional abilitiies, decrease activity tolerance, decrease flexibility/ joint mobility and decrease transfer abilities   Treatment Plan may include any combination of the following: Therapeutic exercise, Therapeutic activities, Neuromuscular re-education, Physical agent/modality, Gait/balance training, Manual therapy, Patient education, Self Care training, Functional mobility training and Home safety training  Patient / Family readiness to learn indicated by: asking questions  Persons(s) to be included in education: patient (P)  Barriers to Learning/Limitations: None  Patient Goal (s): to have less dizziness  Patient Self Reported Health Status: good  Rehabilitation Potential: fair    Short Term Goals: To be accomplished in 1 weeks:  1. Patient will demonstrate compliance with HEP in order to improve balance    Long Term Goals: To be accomplished in 10 treatments:  1. Patient will improve FOTO score by 20 points in order to demonstrate a significant improvement in function. 2. Patient will perform Rhomberg eyes open and eyes closed balance for 30 seconds without LOB in order to demonstrate improving balance. 3. Patient will score a 2 on \"gait with horizontal head turns\" portion of FGA in order to increase ease of ADLs. 4. Patient will report a 50% improvement in dizziness symptoms since [facility] in order to improve quality of life. Frequency / Duration: Patient to be seen 2 times per week for 10 treatments.     Patient/ CarPatient/ Caregiver education and instruction: Diagnosis, prognosis, exercises   [x]  Plan of care has been reviewed with EFRAIN Diallo, PT 12/5/2018 12:44 PM    ________________________________________________________________________    I certify that the above Therapy Services are being furnished while the patient is under my care. I agree with the treatment plan and certify that this therapy is necessary.     Physician's Signature:____________Date:_________TIME:________    ** Signature, Date and Time must be completed for valid certification **    Please sign and return to [facility]  [address] [place] [address]  [phone] fax

## 2018-12-06 NOTE — PROGRESS NOTES
Please let patient know that the carotid ultrasound report is reassuring. Carotid arteries. Mild stenosis of which we would not do anything and continue to monitor.   Shelby Martell MD

## 2018-12-12 ENCOUNTER — HOSPITAL ENCOUNTER (OUTPATIENT)
Dept: PHYSICAL THERAPY | Age: 58
Discharge: HOME | End: 2018-12-12
Payer: MEDICAID

## 2018-12-12 PROCEDURE — 97110 THERAPEUTIC EXERCISES: CPT

## 2018-12-12 PROCEDURE — 97112 NEUROMUSCULAR REEDUCATION: CPT

## 2018-12-12 NOTE — PROGRESS NOTES
PT DAILY TREATMENT NOTE 10-18    Patient Name: Jimmy Johnson  Date:2018  : 1960  [x]  Patient  Verified  Payor: [facility] MEDICAID / Plan: [facility] [employer] PLUS / Product Type: Managed Care Medicaid /    In time: 3:00  Out time: 3:30  Total Treatment Time (min): 30  Visit #: 2 of 10    Medicare/BCBS Only   Total Timed Codes (min):  30 1:1 Treatment Time:  30       Treatment Area: Dizziness and giddiness [R42]    SUBJECTIVE  Pain Level (0-10 scale):  4/10  Any medication changes, allergies to medications, adverse drug reactions, diagnosis change, or new procedure performed?: [x] No    [] Yes (see summary sheet for update)  Subjective functional status/changes:   [] No changes reported  Pt. Reports he balance continues to be off. She tried to do some of her HEP but had trouble with her balance exercises    OBJECTIVE     10 min Therapeutic Exercise:  [x] See flow sheet :   Rationale: increase ROM and increase strength to improve the patients ability to increase ease of ADLs     20 min Neuromuscular Re-education:  []  See flow sheet : standing balance activities, VOR activities   Rationale: increase strength, improve coordination and improve balance  to improve the patients ability to increase ease of ADLs          With   [x] TE   [] TA   [] neuro   [] other: Patient Education: [x] Review HEP    [] Progressed/Changed HEP based on:   [] positioning   [] body mechanics   [] transfers   [] heat/ice application    [] other:      Other Objective/Functional Measures:   Pt. Had dizziness with most activities  She required frequent breaks secondary to back pain and dizziness  She had multiple LOB with eyes closed balance with feet apart     Pain Level (0-10 scale) post treatment:  4/10    ASSESSMENT/Changes in Function:  Pt. Initiated PT and is semi-compliant with her HEP.     Patient will continue to benefit from skilled PT services to modify and progress therapeutic interventions, address functional mobility deficits, address ROM deficits, address strength deficits, analyze and address soft tissue restrictions, analyze and cue movement patterns, analyze and modify body mechanics/ergonomics and address imbalance/dizziness to attain remaining goals. Progress towards goals / Updated goals:  Short Term Goals: To be accomplished in 1 weeks:  1. Patient will demonstrate compliance with HEP in order to improve balance  Not met: semi-compliant (12/12/18)     Long Term Goals: To be accomplished in 10 treatments:  1. Patient will improve FOTO score by 20 points in order to demonstrate a significant improvement in function. 2. Patient will perform Rhomberg eyes open and eyes closed balance for 30 seconds without LOB in order to demonstrate improving balance. 3. Patient will score a 2 on \"gait with horizontal head turns\" portion of FGA in order to increase ease of ADLs. 4. Patient will report a 50% improvement in dizziness symptoms since [facility] in order to improve quality of life.          PLAN  []  Upgrade activities as tolerated     [x]  Continue plan of care  []  Update interventions per flow sheet       []  Discharge due to:_  []  Other:_      Jaylan Adams PT 12/12/2018  3:01 PM    Future Appointments   Date Time Provider Mary Alice Randolph   12/14/2018  9:30 AM [facility] EEG RM 1 MMCEEG [facility]   12/17/2018  2:00 PM Danielito Reyes, PT MMCPTPB [facility]   12/20/2018  2:30 PM Danielito Reyes PT MMCPTPB [facility]   12/24/2018 12:00 PM Danielito Reyes, PT CHBDFOD [facility]   12/27/2018  2:00 PM Chyna Harrington, PT [id number] [facility]   12/27/2018  3:45 PM Анна Perkins MD [facility]ESI SCHED   12/31/2018 10:30 AM Chyna Harrington PT MMCPTPB [facility]   1/2/2019 12:00 PM Danielito Reyes, PT [id number] [facility]   1/7/2019  3:00 PM Chyna Harrington, PT MMCPTPB [facility]   1/9/2019 12:45 PM BSVVS IMAGING 2 2VV K[facility]ES[facility]   1/10/2019 10:30 AM Danielito Reyes, PT [id number] [facility]   1/14/2019 12:00 PM Danielito Reyes, PT [id number] [facility]   1/16/2019  8:40 AM Kavitha Garcia  [address]   1/16/2019 11:45 AM Renetta Mcgarry MD [address] [address]   1/22/2019 12:15 PM Quita Perkins MD [facility]   2/13/2019  1:15 PM Jessica Arthur  [facility]

## 2018-12-14 ENCOUNTER — HOSPITAL ENCOUNTER (OUTPATIENT)
Dept: NEUROLOGY | Age: 58
Discharge: HOME | End: 2018-12-14
Attending: PSYCHIATRY & NEUROLOGY
Payer: MEDICAID

## 2018-12-14 DIAGNOSIS — R55 VASOVAGAL SYNCOPE: ICD-10-CM

## 2018-12-14 DIAGNOSIS — H81.90 VERTIGINOUS SYNDROME AND LABYRINTHINE DISORDER: ICD-10-CM

## 2018-12-14 PROCEDURE — 95819 EEG AWAKE AND ASLEEP: CPT

## 2018-12-15 NOTE — PROCEDURES
[address] [place] [address] Dr PAINTER    Eve Blanco  MR#: [id number]  : 1960  ACCOUNT #: [de-identified]   DATE OF SERVICE: 2018    REFERRING PHYSICIAN:  Nancy Park MD    HISTORY:  A 50-year-old female with history of syncope. MEDICATIONS:  Lantus, atorvastatin, Symbicort, Synthroid, Lasix, Jardiance, aspirin. EEG REPORT:  This is a 16-channel routine EEG was done using the International 10-20 electrode placement system. The predominant background consists of symmetric and regular posterior predominant 9 Hz rhythms which attenuate with eye opening. There were no abnormal photoparoxysmal responses observed. No epileptiform abnormalities were observed. Electroencephalographic sleep was not observed. The patient did feel dizzy during the test at one point after photic stimulation, but no abnormal EEG correlates were observed. IMPRESSION:  This is a normal awake EEG.       MD ARIN Khan / CHRISTINE  D: 2018 14:18     T: 2018 20:12  JOB #: [id number]  CC: Janette Benjamin MD

## 2018-12-16 DIAGNOSIS — G25.81 RESTLESS LEG SYNDROME: ICD-10-CM

## 2018-12-17 ENCOUNTER — HOSPITAL ENCOUNTER (OUTPATIENT)
Dept: PHYSICAL THERAPY | Age: 58
Discharge: HOME | End: 2018-12-17
Payer: MEDICAID

## 2018-12-17 PROCEDURE — 97112 NEUROMUSCULAR REEDUCATION: CPT

## 2018-12-17 PROCEDURE — 97110 THERAPEUTIC EXERCISES: CPT

## 2018-12-17 RX ORDER — DULOXETIN HYDROCHLORIDE 60 MG/1
CAPSULE, DELAYED RELEASE ORAL
Qty: 30 CAP | Refills: 0 | Status: SHIPPED | OUTPATIENT
Start: 2018-12-17 | End: 2019-01-15 | Stop reason: SDUPTHER

## 2018-12-17 RX ORDER — GLUCOSAM/CHONDRO/HERB 149/HYAL 750-100 MG
TABLET ORAL
Qty: 60 CAP | Refills: 0 | Status: SHIPPED | OUTPATIENT
Start: 2018-12-17 | End: 2018-12-30 | Stop reason: SDUPTHER

## 2018-12-17 RX ORDER — GABAPENTIN 300 MG/1
CAPSULE ORAL
Qty: 30 CAP | Refills: 0 | Status: SHIPPED | OUTPATIENT
Start: 2018-12-17 | End: 2019-01-25 | Stop reason: SDUPTHER

## 2018-12-17 NOTE — PROGRESS NOTES
PT DAILY TREATMENT NOTE 10-18    Patient Name: Fabrizio Cavanaugh  Date:2018  : 1960  [x]  Patient  Verified  Payor: [facility] MEDICAID / Plan: [facility] [place] / Product Type: Managed Care Medicaid /    In time: 2:00  Out time: 2:35  Total Treatment Time (min): 35  Visit #: 3 of 10    Medicare/BCBS Only   Total Timed Codes (min):  35 1:1 Treatment Time:  35       Treatment Area: Dizziness and giddiness [R42]    SUBJECTIVE  Pain Level (0-10 scale): 3/10  Any medication changes, allergies to medications, adverse drug reactions, diagnosis change, or new procedure performed?: [x] No    [] Yes (see summary sheet for update)  Subjective functional status/changes:   [] No changes reported  Pt. Reports she has been pretty dizzy over the past couple of days. OBJECTIVE    10 min Therapeutic Exercise:  [x] See flow sheet :   Rationale: increase ROM and increase strength to improve the patients ability to increase ease of ADLs    20 min Neuromuscular Re-education:  []  See flow sheet : VOR activities, standing balance activities, side stepping   Rationale: increase ROM and increase strength  to improve the patients ability to increase ease of ambulation           With   [x] TE   [] TA   [] neuro   [] other: Patient Education: [x] Review HEP    [] Progressed/Changed HEP based on:   [] positioning   [] body mechanics   [] transfers   [] heat/ice application    [] other:      Other Objective/Functional Measures:   Pt. Has increased dizziness with ball toss  She required multiple seated breaks secondary to back pain   She continues to have LOB with Rhomberg eyes open and require feet apart for eyes closed    Pain Level (0-10 scale) post treatment: -6/10    ASSESSMENT/Changes in Function: pt. Is making limited progress towards goals. She continues to have significant dizziness symptoms and has decreased standing balance.  She continues to require B canes for safe ambulation     Patient will continue to benefit from skilled PT services to modify and progress therapeutic interventions, address functional mobility deficits, address ROM deficits, address strength deficits, analyze and address soft tissue restrictions, analyze and cue movement patterns, analyze and modify body mechanics/ergonomics, assess and modify postural abnormalities and address imbalance/dizziness to attain remaining goals. Progress towards goals / Updated goals:  Short Term Goals: To be accomplished in 1 weeks:  1. Patient will demonstrate compliance with HEP in order to improve balance  Not met: semi-compliant (12/12/18)     Long Term Goals: To be accomplished in 10 treatments:  1. Patient will improve FOTO score by 20 points in order to demonstrate a significant improvement in function. 2. Patient will perform Rhomberg eyes open and eyes closed balance for 30 seconds without LOB in order to demonstrate improving balance. Not met: LOB with Rhomberg eyes open (12/17/18)  3. Patient will score a 2 on \"gait with horizontal head turns\" portion of FGA in order to increase ease of ADLs.    4. Patient will report a 50% improvement in dizziness symptoms since [facility] in order to improve quality of life.         PLAN  []  Upgrade activities as tolerated     [x]  Continue plan of care  []  Update interventions per flow sheet       []  Discharge due to:_  []  Other:_      Bill Ding PT 12/17/2018  2:04 PM    Future Appointments   Date Time Provider Mary Alice Randolph   12/20/2018  2:30 PM Kyle Nazario PT MMCPTPB [facility]   12/24/2018 12:00 PM Kyle Nazario PT MMCPTPB [facility]   12/27/2018  2:00 PM Mika Garibay PT VBRIOUO [facility]   12/27/2018  3:45 PM Joshua Elizalde MD [facility] SCHED   12/31/2018 10:30 AM Mika Garibay PT RXEEFEI [facility]   1/2/2019 12:00 PM Kyle Nazario PT MMCPTPB [facility]   1/7/2019  3:00 PM Mika Garibay PT MMCPTPB [facility]   1/9/2019 12:45 PM BSVVS IMAGING 2 2VV [facility]WESI NAMAN   1/10/2019 10:30 AM Amanda Dickson, PT MMCPTPB [facility]   1/14/2019 12:00 PM Amanda Dickson PT MMCPTPB [facility]   1/16/2019  8:40 AM Shannan Jacobson MD [address]   1/16/2019 11:45 AM Ana Vásquez MD [address] [address]   1/22/2019 12:15 PM Jose L Perkins MD [facility] KWESI [facility]   2/13/2019  1:15 PM Ankur Adkins MD [address] [place] [address]

## 2018-12-19 NOTE — PROGRESS NOTES
Spoke with patient (19) and verified name and . Advised of previous message. She expresses understanding.

## 2018-12-20 ENCOUNTER — HOSPITAL ENCOUNTER (OUTPATIENT)
Dept: PHYSICAL THERAPY | Age: 58
Discharge: HOME | End: 2018-12-20
Payer: MEDICAID

## 2018-12-20 PROCEDURE — 97112 NEUROMUSCULAR REEDUCATION: CPT

## 2018-12-20 PROCEDURE — 97110 THERAPEUTIC EXERCISES: CPT

## 2018-12-20 NOTE — PROGRESS NOTES
PT DAILY TREATMENT NOTE 10-18    Patient Name: Ben Garcia  Date:2018  : 1960  [x]  Patient  Verified  Payor: [facility] MEDICAID / Plan: [facility] [employer] PLUS / Product Type: Managed Care Medicaid /    In time: 2:34  Out time: 3:00  Total Treatment Time (min): 26  Visit #: 4 of 10    Treatment Area: Dizziness and giddiness [R42]    SUBJECTIVE  Pain Level (0-10 scale):  3/10  Any medication changes, allergies to medications, adverse drug reactions, diagnosis change, or new procedure performed?: [x] No    [] Yes (see summary sheet for update)  Subjective functional status/changes:   [] No changes reported  Pt. Reports she is feeling about the same. OBJECTIVE    10 min Therapeutic Exercise:  [x] See flow sheet :   Rationale: increase ROM and increase strength to improve the patients ability to increase ease of ADLs     16 min Neuromuscular Re-education:  [x]  See flow sheet : standing balance activities VORx1   Rationale: increase ROM and increase strength  to improve the patients ability to increase ease of ambulation           With   [x] TE   [] TA   [] neuro   [] other: Patient Education: [x] Review HEP    [] Progressed/Changed HEP based on:   [] positioning   [] body mechanics   [] transfers   [] heat/ice application    [] other:      Other Objective/Functional Measures:   Pt. Continues to require frequent breaks secondary to nausea and back pain  She continues to require feet apart for EC Rhomberg   Pt. Was challenged with 2# resistance during hipx3    Pain Level (0-10 scale) post treatment: 3/10    ASSESSMENT/Changes in Function:  Pt. Is progressing slowly towards goals. She continues to have significant dizziness and decreased balance. She has poor tolerance to activities so far, requiring frequent breaks.  She continues to require B canes for ambulation     Patient will continue to benefit from skilled PT services to modify and progress therapeutic interventions, address functional mobility deficits, address ROM deficits, address strength deficits, analyze and address soft tissue restrictions, analyze and cue movement patterns, analyze and modify body mechanics/ergonomics and assess and modify postural abnormalities to attain remaining goals. Progress towards goals / Updated goals:  Short Term Goals: To be accomplished in 1 weeks:  1. Patient will demonstrate compliance with HEP in order to improve balance  Not met: semi-compliant (12/12/18)     Long Term Goals: To be accomplished in 10 treatments:  1. Patient will improve FOTO score by 20 points in order to demonstrate a significant improvement in function. 2. Patient will perform Rhomberg eyes open and eyes closed balance for 30 seconds without LOB in order to demonstrate improving balance. Not met: LOB with Rhomberg eyes open (12/17/18)  3. Patient will score a 2 on \"gait with horizontal head turns\" portion of FGA in order to increase ease of ADLs.    4. Patient will report a 50% improvement in dizziness symptoms since [facility] in order to improve quality of life.         PLAN  []  Upgrade activities as tolerated     [x]  Continue plan of care  []  Update interventions per flow sheet       []  Discharge due to:_  []  Other:_      Magda Matias, LIAT 12/20/2018  2:35 PM    Future Appointments   Date Time Provider Mary Alice Tomasa   12/24/2018 12:00 PM Rabia Mejia PT [facility]   12/27/2018  2:00 PM Chelsie Da Silva PT [facility]   12/27/2018  3:45 PM Edvin Gallegos MD [facility] SCH[facility]   12/31/2018 10:30 AM Chelsie Da Silva PT [facility]   1/2/2019 12:00 PM Rabia Mejia PT [facility]   1/7/2019  3:00 PM Chelsie Da Silva PT [facility]   1/9/2019 12:45 PM [facility] 2 2VV [facility] SCHED   1/10/2019 10:30 AM Rabia Mejia PT [facility]   1/14/2019 12:00 PM Rabia Mejia, PT [facility]   1/16/2019  8:40 AM Loreta Lyons MD [address]   1/16/2019 11:45 AM Michelle Alexander MD 2VV [facility] SCHED   1/22/2019 12:15 PM Shannon Perkins MD [facility] SCHED   2/13/2019  1:15 PM Eileen Cardona MD [id number] [place] [address]

## 2018-12-24 ENCOUNTER — HOSPITAL ENCOUNTER (OUTPATIENT)
Dept: PHYSICAL THERAPY | Age: 58
End: 2018-12-24
Payer: MEDICAID

## 2018-12-27 ENCOUNTER — APPOINTMENT (OUTPATIENT)
Dept: PHYSICAL THERAPY | Age: 58
End: 2018-12-27
Payer: MEDICAID

## 2018-12-30 DIAGNOSIS — E55.9 HYPOVITAMINOSIS D: ICD-10-CM

## 2018-12-31 ENCOUNTER — HOSPITAL ENCOUNTER (OUTPATIENT)
Dept: PHYSICAL THERAPY | Age: 58
Discharge: HOME | End: 2018-12-31
Payer: MEDICAID

## 2018-12-31 PROCEDURE — 97112 NEUROMUSCULAR REEDUCATION: CPT

## 2018-12-31 PROCEDURE — 97110 THERAPEUTIC EXERCISES: CPT

## 2018-12-31 RX ORDER — ERGOCALCIFEROL 1.25 MG/1
CAPSULE ORAL
Qty: 4 CAP | Refills: 0 | Status: SHIPPED | OUTPATIENT
Start: 2018-12-31 | End: 2019-02-08 | Stop reason: SDUPTHER

## 2018-12-31 RX ORDER — GLUCOSAM/CHONDRO/HERB 149/HYAL 750-100 MG
TABLET ORAL
Qty: 60 CAP | Refills: 0 | Status: SHIPPED | OUTPATIENT
Start: 2018-12-31 | End: 2019-02-08 | Stop reason: SDUPTHER

## 2018-12-31 RX ORDER — LEVOTHYROXINE SODIUM 25 UG/1
TABLET ORAL
Qty: 30 TAB | Refills: 0 | Status: SHIPPED | OUTPATIENT
Start: 2018-12-31 | End: 2019-02-08 | Stop reason: SDUPTHER

## 2018-12-31 NOTE — PROGRESS NOTES
PT DAILY TREATMENT NOTE - [facility]     Patient Name: Marlon Rajan  Date:2018  : 1960  [x]  Patient  Verified  Payor: [facility] MEDICAID / Plan: [facility] PL[facility] / Product Type: Managed Care Medicaid /    In time:1030  Out time:1102  Total Treatment Time (min): 32    Visit #: 5 of 10    Treatment Area: Dizziness and giddiness [R42]    SUBJECTIVE   Pain Level (0-10 scale): 4/10  Any medication changes, allergies to medications, adverse drug reactions, diagnosis change, or new procedure performed?: [x] No    [] Yes (see summary sheet for update)  Subjective functional status/changes:   [] No changes reported  Reports the light bothers her a lot and she has difficulty with any activity. Takes meclazine 1-3 x day since this past summer and it helps with nausea and dizziness. OBJECTIVE      12 min Therapeutic Exercise:  [x] See flow sheet :   Rationale: increase ROM and increase strength to improve the patients ability to increase ease of ADLs     18 min Neuromuscular Re-education:  []  See flow sheet : VOR activities, standing balance activities, side stepping   Rationale: increase ROM and increase strength  to improve the patients ability to increase ease of ambulation and lessen dizziness. With   [] TE   [] TA   [] neuro   [] other: Patient Education: [x] Review HEP    [] Progressed/Changed HEP based on:   [] positioning   [] body mechanics   [] transfers   [] heat/ice application    [] other:      Other Objective/Functional Measures: Rest breaks required due to nausea and dizziness. Dizziness comes on with or without head movement. Pain Level (0-10 scale) post treatment: 4/10    ASSESSMENT/Changes in Function: Slow progression. Pt agrees to attempt hallpike gulshan test as appropriate. She reported increased LS pain today and dizziness with not much movement. She looses her balance easily but does not fall.      Patient will continue to benefit from skilled PT services to modify and progress therapeutic interventions, address functional mobility deficits, address ROM deficits, address strength deficits, analyze and address soft tissue restrictions, analyze and cue movement patterns, analyze and modify body mechanics/ergonomics, assess and modify postural abnormalities and address imbalance/dizziness to attain remaining goals. []  See Plan of Care  []  See progress note/recertification  []  See Discharge Summary         Progress towards goals / Updated goals:  1. Patient will demonstrate compliance with HEP in order to improve balance  Not met: semi-compliant (12/12/18)     Long Term Goals: To be accomplished in 10 treatments:  1. Patient will improve FOTO score by 20 points in order to demonstrate a significant improvement in function. 2. Patient will perform Rhomberg eyes open and eyes closed balance for 30 seconds without LOB in order to demonstrate improving balance.   Not met: LOB with Rhomberg eyes open (12/17/18)  3. Patient will score a 2 on \"gait with horizontal head turns\" portion of FGA in order to increase ease of ADLs.    4. Patient will report a 50% improvement in dizziness symptoms since [facility] in order to improve quality of life.         PLAN  [x]  Upgrade activities as tolerated     [x]  Continue plan of care  []  Update interventions per flow sheet       []  Discharge due to:_  []  Other:_      Meghan Heath PT 12/31/2018  10:40 AM    Future Appointments   Date Time Provider Mary Alice Randolph   1/2/2019 12:00 PM Shai Negron, PT [facility]   1/7/2019  3:00 PM Ruslan Reilly PT [facility]   1/9/2019 12:45 PM [facility] 2 2VV [facility] SCHED   1/10/2019 10:30 AM Shai Negron, PT [facility]   1/14/2019 12:00 PM Shai Negron, PT [facility]   1/16/2019  8:40 AM Per Mirza MD [address]   1/16/2019 11:45 AM Ellen Hope MD [id number] [address]   1/22/2019 12:15 PM Maddie Tino Workman MD [facility] SCHED   2/13/2019  1:15 PM Domonique Araya MD [id number] [facility]

## 2019-01-02 ENCOUNTER — HOSPITAL ENCOUNTER (OUTPATIENT)
Dept: PHYSICAL THERAPY | Age: 59
Discharge: HOME | End: 2019-01-02
Payer: MEDICAID

## 2019-01-02 PROCEDURE — 97110 THERAPEUTIC EXERCISES: CPT

## 2019-01-02 NOTE — PROGRESS NOTES
PT DAILY TREATMENT NOTE 10-18    Patient Name: Ed Perez  Date:2019  : 1960  [x]  Patient  Verified  Payor: [facility] MEDICAID / Plan: Carlie Voss / Product Type: Managed Care Medicaid /    In time: 12:03  Out time: 12:30  Total Treatment Time (min): 27  Visit #: 6 of 10    Treatment Area: Dizziness and giddiness [R42]    SUBJECTIVE  Pain Level (0-10 scale): 2-3/10  Any medication changes, allergies to medications, adverse drug reactions, diagnosis change, or new procedure performed?: [x] No    [] Yes (see summary sheet for update)  Subjective functional status/changes:   [] No changes reported  Pt. Reports she is feeling about the same today. OBJECTIVE    27 min Therapeutic Exercise:  [x] See flow sheet :   Rationale: increase strength, improve coordination and improve balance to improve the patients ability to increase ease of ADLs          With   [x] TE   [] TA   [] neuro   [] other: Patient Education: [x] Review HEP    [] Progressed/Changed HEP based on:   [] positioning   [] body mechanics   [] transfers   [] heat/ice application    [] other:      Other Objective/Functional Measures:   Pt. Has significant dizziness following Remy-Hallpike but no nystagmus was noted  She has increased dizziness on left compared to right side  Pt. Continues to be challenged with Rhomberg     Pain Level (0-10 scale) post treatment:  2-310    ASSESSMENT/Changes in Function:  Pt. Is making limited progress towards goals. She continues to have significant dizziness with positional changes and during static standing balance. Patient will continue to benefit from skilled PT services to modify and progress therapeutic interventions, address functional mobility deficits, address ROM deficits, address strength deficits, analyze and address soft tissue restrictions, analyze and cue movement patterns and analyze and modify body mechanics/ergonomics to attain remaining goals.      Progress towards goals / Updated goals:  1. Patient will demonstrate compliance with HEP in order to improve balance  Not met: semi-compliant (12/12/18)     Long Term Goals: To be accomplished in 10 treatments:  1. Patient will improve FOTO score by 20 points in order to demonstrate a significant improvement in function. 2. Patient will perform Rhomberg eyes open and eyes closed balance for 30 seconds without LOB in order to demonstrate improving balance.   Not met: LOB with Rhomberg eyes open (12/17/18)  3. Patient will score a 2 on \"gait with horizontal head turns\" portion of FGA in order to increase ease of ADLs.    4. Patient will report a 50% improvement in dizziness symptoms since [facility] in order to improve quality of life.   Not met: no significant change in dizziness (1/2/19)        PLAN  []  Upgrade activities as tolerated     [x]  Continue plan of care  []  Update interventions per flow sheet       []  Discharge due to:_  []  Other:_      Ness Dunn, PT 1/2/2019  1:42 PM    Future Appointments   Date Time Provider Mary Alice Randolph   1/7/2019  3:00 PM Charleen Mackey PT [facility]   1/9/2019 12:45 PM [facility] 2 2VV [facility]   1/10/2019 10:30 AM Kevin Ling PT [facility]   1/14/2019 12:00 PM [facility] Ling PT [facility]   1/16/2019  8:40 AM Jean Marie López  [address]   1/16/2019 11:45 AM Ming Brambila MD 2VV [facility] SCHED   1/22/2019 12:15 PM Titus Perkins MD [facility] SCHED   2/13/2019  1:15 PM August Couch MD [id number] [place] [address]

## 2019-01-07 ENCOUNTER — HOSPITAL ENCOUNTER (OUTPATIENT)
Dept: PHYSICAL THERAPY | Age: 59
Discharge: HOME | End: 2019-01-07
Payer: MEDICAID

## 2019-01-07 PROCEDURE — 97530 THERAPEUTIC ACTIVITIES: CPT

## 2019-01-07 NOTE — PROGRESS NOTES
PT DAILY TREATMENT NOTE 10-18    Patient Name: Grazyna Brownlee  Date:2019  : 1960  [x]  Patient  Verified  Payor: [facility] MEDICAID / Plan: [facility]  ELITE PLUS / Product Type: Managed Care Medicaid /    In time:3:01  Out time:3:31  Total Treatment Time (min): 31  Visit #: 7 of 10      Treatment Area: Dizziness and giddiness [R42]    SUBJECTIVE  Pain Level (0-10 scale): 5/10 back pain, 7/10 dizzy   Any medication changes, allergies to medications, adverse drug reactions, diagnosis change, or new procedure performed?: [x] No    [] Yes (see summary sheet for update)  Subjective functional status/changes:   [] No changes reported  Pt reports that she is feeling \"off\" today and she is not sure she feels up to therapy. Her BP drops at times. Her MD is aware, and they are not sure why her BP drops per pt. She felt like her BP was really low earlier today. Now, it still feels like her BP is low and her vertigo is acting up. OBJECTIVE      31 min Therapeutic Activity:  []  See flow sheet :   Rationale: Educated patient regarding 3 systems of balance, anatomy/function of inner ear, VOR, purpose of Remy-Hallpike/Roll Test, purpose of therapy, and therapy plan to re-test positional testing next session             With   [] TE   [] TA   [] neuro   [] other: Patient Education: [x] Review HEP    [] Progressed/Changed HEP based on:   [] positioning   [] body mechanics   [] transfers   [] heat/ice application    [] other:      Other Objective/Functional Measures:     Pt had a lot of questions regarding vestibular system and continued dizziness  Educated patient extensively regarding anatomy/function of inner ear and how it contributes to balance    FOTO 40      Pain Level (0-10 scale) post treatment: 5/10 back, 7/10 dizzy     ASSESSMENT/Changes in Function:     Did not perform physical interventions this visit as pt reported she did not feel up to participating in therapy.   Completed FOTO and when pt was asked about feedback regarding her treatment, pt had a lot of questions regarding purpose of therapy interventions and her continued dizzy sx. Pt was extensively educated regarding anatomy/function of inner ear and purpose of therapy interventions. Will resume treatment per POC next session. Patient will continue to benefit from skilled PT services to modify and progress therapeutic interventions, address functional mobility deficits, address ROM deficits, address strength deficits, analyze and address soft tissue restrictions, analyze and cue movement patterns, assess and modify postural abnormalities, address imbalance/dizziness and instruct in home and community integration to attain remaining goals. Progress towards goals / Updated goals:  1. Patient will demonstrate compliance with HEP in order to improve balance  Not met: semi-compliant (18)     Long Term Goals: To be accomplished in 10 treatments:  1. Patient will improve FOTO score by 20 points in order to demonstrate a significant improvement in function. Not met. No change 19  2. Patient will perform Rhomberg eyes open and eyes closed balance for 30 seconds without LOB in order to demonstrate improving balance.   Not met: LOB with Rhomberg eyes open (18)  3. Patient will score a 2 on \"gait with horizontal head turns\" portion of FGA in order to increase ease of ADLs.    4. Patient will report a 50% improvement in dizziness symptoms since [facility] in order to improve quality of life. Not met: no significant change in dizziness ()    PLAN  []  Upgrade activities as tolerated     [x]  Continue plan of care  []  Update interventions per flow sheet       []  Discharge due to:_  []  Other:_      Raza Winter PT 2019  3:08 PM    Future Appointments   Date Time Provider Mary Alice Randolph   2019 12:45 PM [facility] 2 2VV [facility]   1/10/2019 10:30 AM Iván Tirado, PT [facility]   2019 12:00 [facility] [address]   1/16/2019  8:40 AM Gertrude Paul  [address]   1/16/2019 11:45 AM Jerry Carter MD [id number] [address]   1/22/2019 12:15 PM Gloria Perkins MD [facility] SCHED   2/13/2019  1:15 PM Raeann Laws MD [id number] [facility]

## 2019-01-10 ENCOUNTER — HOSPITAL ENCOUNTER (OUTPATIENT)
Dept: PHYSICAL THERAPY | Age: 59
Discharge: HOME | End: 2019-01-10
Payer: MEDICAID

## 2019-01-10 PROCEDURE — 97110 THERAPEUTIC EXERCISES: CPT

## 2019-01-10 PROCEDURE — 97140 MANUAL THERAPY 1/> REGIONS: CPT

## 2019-01-10 NOTE — PROGRESS NOTES
[facility] - Donnal Prim  [address] [facility]  [phone] fax    Physician Update  [x] Progress Note  [x] Discharge Summary  Patient name: Dylan Harris Start of Care:  18   Referral source: Jean Marie López MD : 1960   Medical/Treatment Diagnosis: Dizziness and giddiness [R42]  Payor: [facility] MEDICAID / Plan: [employer] / Product Type: Managed Care Medicaid /  Onset Date: 3 years ago     Prior Hospitalization: see medical history Provider#: [id number]   Medications: Verified on Patient Summary List    Comorbidities:  diabetes, asthma, arthritis, LBP  Prior Level of Function:  Ind with ADLs but assistance with household chores, ambulates with B [address]         Visits from Start of Care: 8    Missed Visits: 1    Status at Evaluation/Last Progress Note: unable to maintain Rhomberg balance. Unable to complete FGA secondary to significant dizziness, ambulation with head turns score was 1/3. FOTO:40 points    Progress towards Goals:  Pt. Has made no significant progress with physical. She has poor tolerance to most activities with significant increase in dizziness and instability. FOTO score remains 40 points. She is unable to maintain Rhomberg balance with eyes open and ambulation with head turns score was 1/3. Remy-Hallpike test was negative for nystagmus but she does having increased dizziness worse on left compared to right. Epley maneuver was performed and she had significant dizziness and instability following this maneuver. Advised pt. To hold PT secondary to lack or progress and to follow up with physician. Goals: to be achieved in 10 visits  1. Patient will improve FOTO score by 20 points in order to demonstrate a significant improvement in function. 2. Patient will perform Rhomberg eyes open and eyes closed balance for 30 seconds without LOB in order to demonstrate improving balance.    3. Patient will score a 2 on \"gait with horizontal head turns\" portion of FGA in order to increase ease of ADLs. 4. Patient will report a 50% improvement in dizziness symptoms since [facility] in order to improve quality of life. ASSESSMENT/RECOMMENDATIONS:  []Continue therapy per initial plan/protocol at a frequency of  2 x per week for 10 v[]Continue therapy with the following recommended changes:_____________________      _____________________________________________________________________  []Discontinue therapy progressing towards or have reached established goals  []Discontinue therapy due to lack of appreciable progress towards goals  []Discontinue therapy due to lack of attendance or compliance  [x]Await Physician's recommendations/decisions regarding therapy  []Other:________________________________________________________________    Thank you for this referral.   Aleisha Acosta, PT 1/10/2019 11:36 AM  NOTE TO PHYSICIAN:  Rhiannon Krishnamurthy 172   FAX TO [facility]: [phone]  If you are unable to process this request in 24 hours please contact our office: [phone] I have read the above report and request that my patient continue as recommended. ? I have read the above report and request that my patient continue therapy with the following changes/special instructions:____________________________________  ? I have read the above report and request that my patient be discharged from therapy.     [de-identified] Signature:____________Date:_________TIME:________    [employer], Date and Time must be completed for valid certification **

## 2019-01-10 NOTE — PROGRESS NOTES
PT DAILY TREATMENT NOTE 10-18    Patient Name: Adelso Bai  Date:1/10/2019  : 1960  [x]  Patient  Verified  Payor: [facility] MEDICAID / Plan: [facility] [employer] PLUS / Product Type: Managed Care Medicaid /    In time: 10:30  Out time: 11:10  Total Treatment Time (min): 40  Visit #: 8 of 10    Treatment Area: Dizziness and giddiness [R42]    SUBJECTIVE  Pain Level (0-10 scale):  10  Any medication changes, allergies to medications, adverse drug reactions, diagnosis change, or new procedure performed?: [x] No    [] Yes (see summary sheet for update)  Subjective functional status/changes:   [] No changes reported  Pt. Reports she feels a little better than last time she was here. OBJECTIVE    15 min Therapeutic Exercise:  [x] See flow sheet :   Rationale: increase ROM and increase strength to improve the patients ability to increase ease of ADLs    8 min Manual Therapy:  Epley manuever   Rationale: decrease pain, increase ROM and increase tissue extensibility to increase ease of ADLs          With   [x] TE   [] TA   [] neuro   [] other: Patient Education: [x] Review HEP    [] Progressed/Changed HEP based on:   [] positioning   [] body mechanics   [] transfers   [] heat/ice application    [] other:      Other Objective/Functional Measures:   Rhomberg eyes open: 25 seconds eyes closed feet apart: 8 seconds  Gait with horizontal head turns: 1 on FGA   Pt. Has dizziness with gulshan-Hallpike maneuver to left side but no nystagmus was noted. Epley maneuver was performed. Pt. Had significant dizziness following Epley maneuver and also had nausea. When she was sitting after maneuver she reports feeling like she was unable to move for a couple minutes but then that sensation went away  Discussed holding PT secondary to lack of progress towards goals.  She has a follow up with neurologist on 19       Pain Level (0-10 scale) post treatment:  5/10    ASSESSMENT/Changes in Function:      []  See Plan of Care  [x]  See progress note/recertification  []  See Discharge Summary         Progress towards goals / Updated goals:  See progress note    PLAN  []  Upgrade activities as tolerated     [x]  Continue plan of care  []  Update interventions per flow sheet       []  Discharge due to:_  []  Other:_      Arben Serna, PT 1/10/2019  10:29 AM    Future Appointments   Date Time Provider Mary Alice Randolph   1/10/2019 10:30 AM Kathy Mahoney, PT [facility]   1/14/2019 12:00 PM Kathy Mahoney PT [facility]   1/16/2019  8:40 AM Kailey Coyle  [facility]   1/16/2019 11:45 AM To Cesar [place] [facility] [place] [address]   1/22/2019 12:15 PM Edith Perkins MD [facility] SCHED   2/13/2019  1:15 PM Claudia Rausch MD [id number] [facility]

## 2019-01-14 ENCOUNTER — APPOINTMENT (OUTPATIENT)
Dept: PHYSICAL THERAPY | Age: 59
End: 2019-01-14
Payer: MEDICAID

## 2019-01-15 DIAGNOSIS — J45.31 ASTHMATIC BRONCHITIS, MILD PERSISTENT, WITH ACUTE EXACERBATION: ICD-10-CM

## 2019-01-15 RX ORDER — BUDESONIDE AND FORMOTEROL FUMARATE DIHYDRATE 160; 4.5 UG/1; UG/1
AEROSOL RESPIRATORY (INHALATION)
Qty: 1 INHALER | Refills: 0 | Status: SHIPPED | OUTPATIENT
Start: 2019-01-15 | End: 2019-02-14 | Stop reason: SDUPTHER

## 2019-01-15 RX ORDER — DULOXETIN HYDROCHLORIDE 60 MG/1
CAPSULE, DELAYED RELEASE ORAL
Qty: 30 CAP | Refills: 0 | Status: SHIPPED | OUTPATIENT
Start: 2019-01-15 | End: 2019-02-14 | Stop reason: SDUPTHER

## 2019-01-15 RX ORDER — METOPROLOL SUCCINATE 50 MG/1
TABLET, EXTENDED RELEASE ORAL
Qty: 30 TAB | Refills: 0 | Status: SHIPPED | OUTPATIENT
Start: 2019-01-15 | End: 2019-02-14 | Stop reason: SDUPTHER

## 2019-01-16 ENCOUNTER — OFFICE VISIT (OUTPATIENT)
Dept: NEUROLOGY | Age: 59
End: 2019-01-16

## 2019-01-16 ENCOUNTER — OFFICE VISIT (OUTPATIENT)
Dept: VASCULAR SURGERY | Age: 59
End: 2019-01-16

## 2019-01-16 VITALS
WEIGHT: 271 LBS | RESPIRATION RATE: 17 BRPM | DIASTOLIC BLOOD PRESSURE: 74 MMHG | BODY MASS INDEX: 42.53 KG/M2 | SYSTOLIC BLOOD PRESSURE: 124 MMHG | HEIGHT: 67 IN | HEART RATE: 70 BPM

## 2019-01-16 VITALS
SYSTOLIC BLOOD PRESSURE: 112 MMHG | RESPIRATION RATE: 22 BRPM | DIASTOLIC BLOOD PRESSURE: 72 MMHG | HEART RATE: 72 BPM | BODY MASS INDEX: 42.63 KG/M2 | HEIGHT: 67 IN | TEMPERATURE: 98.8 F | OXYGEN SATURATION: 95 % | WEIGHT: 271.6 LBS

## 2019-01-16 DIAGNOSIS — R55 VASOVAGAL SYNCOPE: ICD-10-CM

## 2019-01-16 DIAGNOSIS — I50.32 CHRONIC DIASTOLIC CONGESTIVE HEART FAILURE (HCC): Primary | ICD-10-CM

## 2019-01-16 DIAGNOSIS — H81.90 VERTIGINOUS SYNDROME AND LABYRINTHINE DISORDER: Primary | ICD-10-CM

## 2019-01-16 DIAGNOSIS — R25.1 TREMOR: ICD-10-CM

## 2019-01-16 DIAGNOSIS — E66.01 MORBID OBESITY (HCC): ICD-10-CM

## 2019-01-16 DIAGNOSIS — E11.40 TYPE 2 DIABETES MELLITUS WITH DIABETIC NEUROPATHY, UNSPECIFIED WHETHER LONG TERM INSULIN USE (HCC): ICD-10-CM

## 2019-01-16 RX ORDER — PRIMIDONE 50 MG/1
50 TABLET ORAL 3 TIMES DAILY
Qty: 90 TAB | Refills: 5 | Status: SHIPPED | OUTPATIENT
Start: 2019-01-16 | End: 2019-04-02

## 2019-01-16 NOTE — PROGRESS NOTES
Channing Dominguez    Chief Complaint   Patient presents with    Claudication       History and Physical    Channing Dominguez is a 63 y.o. female with bilateral lower extremity swelling. She also has dependent rubor bilaterally. She has known congestive diastolic heart failure as well as diabetes with neuropathy. Although this is not worsened since last time I saw her. She has not been able to use her compression stockings as she has a lot of difficulty with bending over secondary to her back. As well as her obesity. No ulcerations or fevers or chills.     Past Medical History:   Diagnosis Date    Arthritis     Lower back     Asthma     Chronic back pain     Lower back pain    Depression     Diabetes ([address]     Diabetes mellitus ([address]     Hearing loss     Left ear hearing loss     pt states nerve damage--- 25% hearing loss, described as \"muffled\"    Memory difficulty     Panic attacks     Ringing of ears     Severe headache     Sleep apnea     SOB (shortness of breath) on exertion     w and w/out exertion    Stomach pain     Thyroid disease     Vertigo      Past Surgical History:   Procedure Laterality Date    CARDIAC SURG PROCEDURE UNLIST  10/31/2013    open heart    HX HEART VALVE SURGERY  2013    aortic valve repair    HX HYSTERECTOMY      Partial Hysterectomy - removed ovary    HX MYOMECTOMY      HX ORTHOPAEDIC  02/2018    had nerves burned on her right side of back     Patient Active Problem List   Diagnosis Code    Type II diabetes mellitus, uncontrolled ([address] E11.65    Sleep apnea G47.30    H/O aortic valve replacement Z95.2    History of bicuspid aortic valve Z87.74    Chronic back pain M54.9, G89.29    Chronic left shoulder pain M25.512, G89.29    Morbid obesity ([address] E66.01    Left shoulder tendinitis M75.82    Spondylosis of lumbar region without myelopathy or radiculopathy M47.816    Chronic pain of both shoulders M25.511, G89.29, M25.512    Chronic pain of both knees M25.561, M25.562, G89.29    Chronic pain syndrome G89.4    Encounter for long-term (current) use of medications Z79.899    Chronic midline low back pain M54.5, G89.29    Lumbar facet arthropathy M47.816    Lumbar degenerative disc disease M51.36    Venous stasis dermatitis of both lower extremities I87.2    SOB (shortness of breath) R06.02    Type 2 diabetes mellitus without complication ([facility]) Q55.6    Hypothyroidism due to acquired atrophy of thyroid E03.4    Essential hypertension I10    Dyslipidemia E78.5    Mood disorder (HCC) F39    Chronic diastolic congestive heart failure (HCC) I50.32    Type 2 diabetes mellitus with diabetic neuropathy ([facility]) E11.40     Current Outpatient Medications   Medication Sig Dispense Refill    primidone (MYSOLINE) 50 mg tablet Take 1 Tab by mouth three (3) times daily. 90 Tab 5    metoprolol succinate (TOPROL-XL) 50 mg XL tablet take 1 tablet by mouth once daily 30 Tab 0    DULoxetine (CYMBALTA) 60 mg capsule take 1 capsule by mouth once daily 30 Cap 0    SYMBICORT 160-4.5 mcg/actuation HFAA inhale 2 puffs by mouth twice a day Rinse mouth after use 1 Inhaler 0    omega 3-DHA-EPA-fish oil 1,000 mg (120 mg-180 mg) capsule take 1 capsule by mouth twice a day 60 Cap 0    VITAMIN D2 50,000 unit capsule take 1 capsule by mouth every week 4 Cap 0    levothyroxine (SYNTHROID) 25 mcg tablet take 1 tablet by mouth once daily BEFORE BREAKFAST 30 Tab 0    gabapentin (NEURONTIN) 300 mg capsule take 1 capsule by mouth at bedtime 30 Cap 0    LANTUS U-100 INSULIN 100 unit/mL injection inject 90 unit subcutaneously at bedtime 30 mL 1    atorvastatin (LIPITOR) 80 mg tablet take 1 tablet by mouth once daily 30 Tab 3    gabapentin (NEURONTIN) 400 mg capsule Take 1 Cap by mouth three (3) times daily.  30 Cap 1    BD INSULIN SYRINGE ULTRA-FINE 1 mL 31 gauge x 5/16 syrg use as directed twice a day 200 Syringe 3    meclizine (ANTIVERT) 12.5 mg tablet take 1 tablet by mouth three times a day if needed for 10 days 30 Tab 0    furosemide (LASIX) 40 mg tablet Take 1 Tab by mouth two (2) times a day. (Patient taking differently: Take 40 mg by mouth daily.) 60 Tab 0    potassium chloride (KLOR-CON) 10 mEq tablet Take 1 Tab by mouth daily. 30 Tab 0    empagliflozin (JARDIANCE) 25 mg tablet Take  by mouth daily.  FREESTYLE LITE STRIPS strip TEST twice a day as directed 100 Strip 11    PROAIR HFA 90 mcg/actuation inhaler inhale 2 puffs by mouth every 4 hours if needed for wheezing 8.5 Inhaler 3    albuterol (PROVENTIL VENTOLIN) 2.5 mg /3 mL (0.083 %) nebulizer solution 3 mL by Nebulization route every four (4) hours as needed for Wheezing. 24 Each 5    cyanocobalamin (VITAMIN B-12) 1,000 mcg sublingual tablet Take 1,000 mcg by mouth daily.  Aspirin, Buffered 81 mg tab Take  by mouth.        Allergies   Allergen Reactions    Other Food Other (comments)     Sweeteners- causes headaches    Metformin Other (comments)     Increase pain in feet and swelling in feet    Morphine Other (comments)     headache    Singulair [Montelukast] Other (comments)     hallucinations     Social History     Socioeconomic History    Marital status:      Spouse name: Not on file    Number of children: Not on file    Years of education: Not on file    Highest education level: Not on file   Social Needs    Financial resource strain: Not on file    Food insecurity - worry: Not on file    Food insecurity - inability: Not on file   [employer] needs - medical: Not on file   [employer] needs - non-medical: Not on file   Occupational History    Not on file   Tobacco Use    Smoking status: Never Smoker    Smokeless tobacco: Never Used   Substance and Sexual Activity    Alcohol use: No    Drug use: No    Sexual activity: Not Currently   Other Topics Concern     Service No    Blood Transfusions No    Caffeine Concern No    Occupational Exposure No    Hobby Hazards No    Sleep Concern Yes     Comment: Sleep apnea     Stress Concern Yes     Comment: Due to family medical issues.  Weight Concern No    Special Diet No    Back Care Yes     Comment: Patient tries to do back care with streches     Exercise No    Bike Helmet Yes   [address] [facility] Yes    Self-Exams Yes   Social History Narrative    Not on file      Family History   Problem Relation Age of Onset    Heart Disease Mother     Diabetes Mother     Stroke Mother     Hypertension Mother     Diabetes Father     Heart Disease Father     Hypertension Father     Stroke Father     Diabetes Brother     Cancer Brother     Hypertension Brother     Stroke Brother     Heart Disease Brother     Diabetes Brother     Hypertension Brother     Stroke Brother     Heart Disease Brother     Diabetes Brother     Hypertension Brother     Hypertension Brother        Physical Exam:    Visit Vitals  /74 (BP 1 Location: Left arm, BP Patient Position: Sitting)   Pulse 70   Resp 17   Ht 5' 7\" (1.702 m)   Wt 271 lb (122.9 kg)   BMI 42.44 kg/m²      General: Well-appearing female in no acute distress  HEENT: EOMI no scleral icterus is noted  Pulmonary: No increased work of breathing is noted  Extremities: Warm and well-perfused bilaterally patient does have 1-2+ edema of the left lower extremity as well as the right lower extremity although just slightly worse than the left than the right. She has dependent rubor bilaterally but no visible ulcerations. Neuro: Cranial nerves II through XII are grossly intact female with bilateral lower extremity edema. Impression and Plan:  Eleni Yun is a 63 y.o. I believe that the majority of this is secondary to her chronic diastolic heart failure. I reviewed her ultrasounds in clinic today showing no evidence of DVT bilaterally. Her right lower extremity has absolutely no venous reflux.   On the left side newer data points to saying that any reflux within the deep system that is less than 1 second is more normal than not. So I would not necessarily say that she has any true venous reflux within her left common femoral vein. She has half a second reflux within the left greater saphenous vein and it is quite large in diameter but this is not enough to warrant any ablation therapy at this current time for her left lower extremity as I do not believe that that would be extraordinarily beneficial to her swelling. I believe that more than likely her swelling is just secondary to her heart issues. I have recommended continued with compression therapy as best she can and exercise therapy. Otherwise at this current time there is no major needs for vascular surgeon for her currently. Patient can follow-up as needed    We reviewed the plan with the patient and the patient understands. We also gave the patient appropriate instructions on their disease process and when to call back. Greater than 50% of this visit was spent with face to face discussion. Follow-up Disposition:  Return if symptoms worsen or fail to improve. Alexandra Bourne MD    PLEASE NOTE:  This document has been produced using voice recognition software. Unrecognized errors in transcription may be present.

## 2019-01-16 NOTE — PROGRESS NOTES
1. Have you been to an emergency room or urgent care clinic since your last visit? no    Hospitalized since your last visit? If yes, where, when, and reason for visit? no  2. Have you seen or consulted any other health care providers outside of the [facility] since your last visit including any procedures, health maintenance items.  If yes, where, when and reason for visit? no

## 2019-01-16 NOTE — PROGRESS NOTES
Re:  Yumiko Cristina up visit     1/16/2019 8:57 AM    SSN: [id number]    Subjective:   Humberto Hastings returns for follow up. She's had no passing out spells. Her vertigo continues to be persistent as is the tremor. Medications:    Current Outpatient Medications   Medication Sig Dispense Refill    metoprolol succinate (TOPROL-XL) 50 mg XL tablet take 1 tablet by mouth once daily 30 Tab 0    DULoxetine (CYMBALTA) 60 mg capsule take 1 capsule by mouth once daily 30 Cap 0    SYMBICORT 160-4.5 mcg/actuation HFAA inhale 2 puffs by mouth twice a day Rinse mouth after use 1 Inhaler 0    omega 3-DHA-EPA-fish oil 1,000 mg (120 mg-180 mg) capsule take 1 capsule by mouth twice a day 60 Cap 0    VITAMIN D2 50,000 unit capsule take 1 capsule by mouth every week 4 Cap 0    levothyroxine (SYNTHROID) 25 mcg tablet take 1 tablet by mouth once daily BEFORE BREAKFAST 30 Tab 0    LANTUS U-100 INSULIN 100 unit/mL injection inject 90 unit subcutaneously at bedtime 30 mL 1    atorvastatin (LIPITOR) 80 mg tablet take 1 tablet by mouth once daily 30 Tab 3    gabapentin (NEURONTIN) 400 mg capsule Take 1 Cap by mouth three (3) times daily. 30 Cap 1    meclizine (ANTIVERT) 12.5 mg tablet take 1 tablet by mouth three times a day if needed for 10 days 30 Tab 0    furosemide (LASIX) 40 mg tablet Take 1 Tab by mouth two (2) times a day. (Patient taking differently: Take 40 mg by mouth daily.) 60 Tab 0    potassium chloride (KLOR-CON) 10 mEq tablet Take 1 Tab by mouth daily. 30 Tab 0    empagliflozin (JARDIANCE) 25 mg tablet Take  by mouth daily.  PROAIR HFA 90 mcg/actuation inhaler inhale 2 puffs by mouth every 4 hours if needed for wheezing 8.5 Inhaler 3    cyanocobalamin (VITAMIN B-12) 1,000 mcg sublingual tablet Take 1,000 mcg by mouth daily.  Aspirin, Buffered 81 mg tab Take  by mouth.       gabapentin (NEURONTIN) 300 mg capsule take 1 capsule by mouth at bedtime 30 Cap 0    BD INSULIN SYRINGE ULTRA-FINE 1 mL 31 gauge x 5/16 syrg use as directed twice a day 200 Syringe 3    FREESTYLE LITE STRIPS strip TEST twice a day as directed 100 Strip 11    albuterol (PROVENTIL VENTOLIN) 2.5 mg /3 mL (0.083 %) nebulizer solution 3 mL by Nebulization route every four (4) hours as needed for Wheezing.  24 Each 5       Vital signs:    Visit Vitals  /72 (BP 1 Location: Left arm, BP Patient Position: Sitting)   Pulse 72   Temp 98.8 °F (37.1 °C) (Oral)   Resp 22   Ht 5' 7\" (1.702 m)   Wt 123.2 kg (271 lb 9.6 oz)   SpO2 95%   BMI 42.54 kg/m²       Review of Systems:   As above otherwise 11 point review of systems negative including;   Constitutional no fever or chills  Skin denies rash or itching  HEENT  Denies tinnitus, hearing lose  Eyes denies diplopia vision lose  Respiratory denies sortness of breath  Cardiovascular denies chest pain, dyspnea on exertion  Gastrointestinal denies nausea, vomiting, diarrhea, constipation  Genitourinary denies incontinence  Musculoskeletal denies joint pain or swelling  Endocrine denies weight change  Hematology denies easy bruising or bleeding   Neurological as above in HPI      Patient Active Problem List   Diagnosis Code    Type II diabetes mellitus, uncontrolled ([facility]) E11.65    Sleep apnea G47.30    H/O aortic valve replacement Z95.2    History of bicuspid aortic valve Z87.74    Chronic back pain M54.9, G89.29    Chronic left shoulder pain M25.512, G89.29    Morbid obesity ([facility]) E66.01    Left shoulder tendinitis M75.82    Spondylosis of lumbar region without myelopathy or radiculopathy M47.816    Chronic pain of both shoulders M25.511, G89.29, M25.512    Chronic pain of both knees M25.561, M25.562, G89.29    Chronic pain syndrome G89.4    Encounter for long-term (current) use of medications Z79.899    Chronic midline low back pain M54.5, G89.29    Lumbar facet arthropathy M47.816    Lumbar degenerative disc disease M51.36    Venous stasis dermatitis of both lower extremities I87.2    SOB (shortness of breath) R06.02    Type 2 diabetes mellitus without complication ([facility]) G30.6    Hypothyroidism due to acquired atrophy of thyroid E03.4    Essential hypertension I10    Dyslipidemia E78.5    Mood disorder ([facility]) F39    Chronic diastolic congestive heart failure ([facility]) I50.32         Objective: The patient is awake, alert, and oriented x 4. Fund of knowledge is adequate. Speech is fluent and memory is intact. Cranial Nerves: II - Visual fields are full to confrontation. III, IV, VI - Extraocular movements are intact. There is no nystagmus. V - Facial sensation is intact to pinprick. VII - Face is symmetrical.  VIII - Hearing is present. IX, X, XII - Palate is symmetrical.   XI - Shoulder shrugging and head turning intact  Motor: The patient moves all four limbs fairly well and symmetrically. Tone is normal. Reflexes are 2+ and symmetrical. Plantars are down going. Gait is abnormal, she staggers from side to side. She has right greater than left intention tremor. No cog wheel rigidity. [address] [place] [address] Dr INOCENCIO Mayer  MR#: [id number]  : 1960  ACCOUNT #: [de-identified]   DATE OF SERVICE: 2018     REFERRING PHYSICIAN:  Krista Escobedo MD     HISTORY:  A 60-year-old female with history of syncope.     MEDICATIONS:  Lantus, atorvastatin, Symbicort, Synthroid, Lasix, Jardiance, aspirin.       EEG REPORT:  This is a 16-channel routine EEG was done using the International 10-20 electrode placement system. The predominant background consists of symmetric and regular posterior predominant 9 Hz rhythms which attenuate with eye opening. There were no abnormal photoparoxysmal responses observed. No epileptiform abnormalities were observed. Electroencephalographic sleep was not observed.   The patient did feel dizzy during the test at one point after photic stimulation, but no abnormal EEG correlates were observed.     IMPRESSION:  This is a normal awake EEG. Renae Wasserman MD        I have reviewed the above imagines myself. CBC:   Lab Results   Component Value Date/Time    WBC 6.4 08/14/2018 01:10 AM    RBC 4.49 08/14/2018 01:10 AM    HGB 12.1 08/14/2018 01:10 AM    HCT 40.2 08/14/2018 01:10 AM    PLATELET 220 55/63/7644 01:10 AM     BMP:   Lab Results   Component Value Date/Time    Glucose 133 (H) 08/14/2018 01:10 AM    Sodium 141 08/14/2018 01:10 AM    Potassium 4.7 08/14/2018 01:10 AM    Chloride 105 08/14/2018 01:10 AM    CO2 24 08/14/2018 01:10 AM    BUN 8 08/14/2018 01:10 AM    Creatinine 0.61 08/14/2018 01:10 AM    Calcium 8.7 08/14/2018 01:10 AM     CMP:   Lab Results   Component Value Date/Time    Glucose 133 (H) 08/14/2018 01:10 AM    Sodium 141 08/14/2018 01:10 AM    Potassium 4.7 08/14/2018 01:10 AM    Chloride 105 08/14/2018 01:10 AM    CO2 24 08/14/2018 01:10 AM    BUN 8 08/14/2018 01:10 AM    Creatinine 0.61 08/14/2018 01:10 AM    Calcium 8.7 08/14/2018 01:10 AM    BUN/Creatinine ratio 13 08/14/2018 01:10 AM    Alk. phosphatase 131 (H) 08/14/2018 01:10 AM    Protein, total 6.7 08/14/2018 01:10 AM    Albumin 3.6 08/14/2018 01:10 AM    A-G Ratio 1.2 08/14/2018 01:10 AM     Coagulation: No results found for: PTP, INR, APTT, PTTT  Cardiac markers: No results found for: CPK, CKND1, SANDY    Assessment:  Complex situation in this patient with intention tremor, gait instability, syncopal spells, persistent nystagmus and tinnitus as well aa decreased hearing. I suspect she has Meniere's disease in addition to the tremor. Can't increase her beta blocker since her BP is already on the low side. Plan: Will start Mysoline 50 mg BID for tremor. Recommend she bring her syncopal spells to the attention of her cardiologist.  Continue with vestibular therapy. Return here in 1 months. Sincerely,        Hermilo Vital.  Dragan Urrutia M.D.

## 2019-01-25 DIAGNOSIS — G25.81 RESTLESS LEG SYNDROME: ICD-10-CM

## 2019-01-27 RX ORDER — GABAPENTIN 300 MG/1
CAPSULE ORAL
Qty: 30 CAP | Refills: 0 | Status: SHIPPED | OUTPATIENT
Start: 2019-01-27 | End: 2019-03-13 | Stop reason: SDUPTHER

## 2019-02-08 DIAGNOSIS — E55.9 HYPOVITAMINOSIS D: ICD-10-CM

## 2019-02-08 DIAGNOSIS — R42 VERTIGO: ICD-10-CM

## 2019-02-08 DIAGNOSIS — J45.31 ASTHMATIC BRONCHITIS, MILD PERSISTENT, WITH ACUTE EXACERBATION: ICD-10-CM

## 2019-02-08 RX ORDER — GABAPENTIN 400 MG/1
CAPSULE ORAL
Qty: 30 CAP | Refills: 1 | Status: SHIPPED | OUTPATIENT
Start: 2019-02-08 | End: 2019-03-20 | Stop reason: SDUPTHER

## 2019-02-08 RX ORDER — MECLIZINE HCL 12.5 MG 12.5 MG/1
TABLET ORAL
Qty: 30 TAB | Refills: 0 | Status: SHIPPED | OUTPATIENT
Start: 2019-02-08 | End: 2019-03-07 | Stop reason: SDUPTHER

## 2019-02-08 RX ORDER — GLUCOSAM/CHONDRO/HERB 149/HYAL 750-100 MG
TABLET ORAL
Qty: 60 CAP | Refills: 0 | Status: SHIPPED | OUTPATIENT
Start: 2019-02-08 | End: 2019-02-14 | Stop reason: SDUPTHER

## 2019-02-08 RX ORDER — LEVOTHYROXINE SODIUM 25 UG/1
TABLET ORAL
Qty: 30 TAB | Refills: 0 | Status: SHIPPED | OUTPATIENT
Start: 2019-02-08 | End: 2019-03-07 | Stop reason: SDUPTHER

## 2019-02-08 RX ORDER — ERGOCALCIFEROL 1.25 MG/1
CAPSULE ORAL
Qty: 4 CAP | Refills: 0 | Status: SHIPPED | OUTPATIENT
Start: 2019-02-08 | End: 2019-03-07 | Stop reason: SDUPTHER

## 2019-02-11 ENCOUNTER — OFFICE VISIT (OUTPATIENT)
Dept: SURGERY | Age: 59
End: 2019-02-11

## 2019-02-11 VITALS
HEIGHT: 67 IN | DIASTOLIC BLOOD PRESSURE: 60 MMHG | OXYGEN SATURATION: 97 % | SYSTOLIC BLOOD PRESSURE: 120 MMHG | RESPIRATION RATE: 18 BRPM | WEIGHT: 271 LBS | BODY MASS INDEX: 42.53 KG/M2 | HEART RATE: 77 BPM | TEMPERATURE: 98 F

## 2019-02-11 DIAGNOSIS — E04.2 MULTINODULAR GOITER: Primary | ICD-10-CM

## 2019-02-11 RX ORDER — ALBUTEROL SULFATE 90 UG/1
AEROSOL, METERED RESPIRATORY (INHALATION)
Qty: 1 INHALER | Refills: 3 | Status: SHIPPED | OUTPATIENT
Start: 2019-02-11 | End: 2019-07-12

## 2019-02-11 NOTE — PROGRESS NOTES
Progress Note    Patient: Kale Ramirez  MRN: [id number]  SSN: [id number]   YOB: 1960  Age: 63 y.o. Sex: female     Chief Complaint   Patient presents with    Thyroid Problem     thyroid nodules       HPI    Ms. Jl Britt is a morbidly obese 60-year-old woman who presents with at least a 15-year history of a multinodular goiter. This is been biopsied before and was negative. She has 7-10 sizable nodules on each side of her thyroid. She complains of an occasional episode of difficulty swallowing. She describes this with watermelon. She has mild hypothyroidism and is taking a very low dose I think it is 25 mcg of Synthroid a day her latest TSH which was last year was 5.1 which is slightly elevated. She has multiple other modalities including depression diabetes asthma sleep apnea and chronic back pain for which she walks with 2 canes.     Past Medical History:   Diagnosis Date    Arthritis     Lower back     Asthma     Chronic back pain     Lower back pain    Depression     Diabetes ([address]     Diabetes mellitus ([address]     Hearing loss     Left ear hearing loss     pt states nerve damage--- 25% hearing loss, described as \"muffled\"    Memory difficulty     Panic attacks     Ringing of ears     Severe headache     Sleep apnea     SOB (shortness of breath) on exertion     w and w/out exertion    Stomach pain     Thyroid disease     Vertigo      Past Surgical History:   Procedure Laterality Date    CARDIAC SURG PROCEDURE UNLIST  10/31/2013    open heart    HX HEART VALVE SURGERY  2013    aortic valve repair    HX HYSTERECTOMY      Partial Hysterectomy - removed ovary    HX MYOMECTOMY      HX ORTHOPAEDIC  02/2018    had nerves burned on her right side of back     Allergies   Allergen Reactions    Other Food Other (comments)     Sweeteners- causes headaches    Metformin Other (comments)     Increase pain in feet and swelling in feet    Morphine Other (comments)     headache    Singulair [Montelukast] Other (comments)     hallucinations     Current Outpatient Medications   Medication Sig Dispense Refill    meclizine (ANTIVERT) 12.5 mg tablet take 1 tablet by mouth three times a day if needed for 10 days 30 Tab 0    omega 3-DHA-EPA-fish oil 1,000 mg (120 mg-180 mg) capsule take 1 capsule by mouth twice a day 60 Cap 0    gabapentin (NEURONTIN) 400 mg capsule take 1 capsule by mouth three times a day 30 Cap 1    VITAMIN D2 50,000 unit capsule take 1 capsule by mouth every week 4 Cap 0    levothyroxine (SYNTHROID) 25 mcg tablet take 1 tablet by mouth every morning BEFORE BREAKFAST 30 Tab 0    gabapentin (NEURONTIN) 300 mg capsule take 1 capsule by mouth at bedtime 30 Cap 0    metoprolol succinate (TOPROL-XL) 50 mg XL tablet take 1 tablet by mouth once daily 30 Tab 0    DULoxetine (CYMBALTA) 60 mg capsule take 1 capsule by mouth once daily 30 Cap 0    SYMBICORT 160-4.5 mcg/actuation HFAA inhale 2 puffs by mouth twice a day Rinse mouth after use 1 Inhaler 0    LANTUS U-100 INSULIN 100 unit/mL injection inject 90 unit subcutaneously at bedtime 30 mL 1    BD INSULIN SYRINGE ULTRA-FINE 1 mL 31 gauge x 5/16 syrg use as directed twice a day 200 Syringe 3    furosemide (LASIX) 40 mg tablet Take 1 Tab by mouth two (2) times a day. (Patient taking differently: Take 40 mg by mouth daily.) 60 Tab 0    potassium chloride (KLOR-CON) 10 mEq tablet Take 1 Tab by mouth daily. 30 Tab 0    empagliflozin (JARDIANCE) 25 mg tablet Take  by mouth daily.  FREESTYLE LITE STRIPS strip TEST twice a day as directed 100 Strip 11    PROAIR HFA 90 mcg/actuation inhaler inhale 2 puffs by mouth every 4 hours if needed for wheezing 8.5 Inhaler 3    albuterol (PROVENTIL VENTOLIN) 2.5 mg /3 mL (0.083 %) nebulizer solution 3 mL by Nebulization route every four (4) hours as needed for Wheezing. 24 Each 5    cyanocobalamin (VITAMIN B-12) 1,000 mcg sublingual tablet Take 1,000 mcg by mouth daily.       Aspirin, Buffered 81 mg tab Take  by mouth.  primidone (MYSOLINE) 50 mg tablet Take 1 Tab by mouth three (3) times daily. 90 Tab 5    atorvastatin (LIPITOR) 80 mg tablet take 1 tablet by mouth once daily 30 Tab 3     Social History     Socioeconomic History    Marital status:      Spouse name: Not on file    Number of children: Not on file    Years of education: Not on file    Highest education level: Not on file   Social Needs    Financial resource strain: Not on file    Food insecurity - worry: Not on file    Food insecurity - inability: Not on file   [employer] needs - medical: Not on file   [employer] needs - non-medical: Not on file   Occupational History    Not on file   Tobacco Use    Smoking status: Never Smoker    Smokeless tobacco: Never Used   Substance and Sexual Activity    Alcohol use: No    Drug use: No    Sexual activity: Not Currently   Other Topics Concern     Service No    Blood Transfusions No    Caffeine Concern No    Occupational Exposure No    Hobby Hazards No    Sleep Concern Yes     Comment: Sleep apnea     Stress Concern Yes     Comment: Due to family medical issues.      Weight Concern No    Special Diet No    Back Care Yes     Comment: Patient tries to do back care with streches     Exercise No    Bike Helmet Yes   [address] [facility] Yes    Self-Exams Yes   Social History Narrative    Not on file     Family History   Problem Relation Age of Onset    Heart Disease Mother     Diabetes Mother     Stroke Mother     Hypertension Mother     Diabetes Father     Heart Disease Father     Hypertension Father     Stroke Father     Diabetes Brother     Cancer Brother     Hypertension Brother     Stroke Brother     Heart Disease Brother     Diabetes Brother     Hypertension Brother     Stroke Brother     Heart Disease Brother     Diabetes Brother     Hypertension Brother     Hypertension Brother          Review of systems:  Patient denies any reflux, emesis, abdominal pain, change in bowel habits, hematochezia, melena, fever, weight loss, fatigue chills, dermatitis, abnormal moles, change in vision, vertigo, epistaxis, dysphagia, hoarseness, chest pain, palpitations, hypertension, edema, cough, shortness of breath, wheezing, hemoptysis, snoring, hematuria, diabetes, thyroid disease, anemia, bruising, history of blood transfusion, dizziness, headache, or fainting. Physical Examination    Well developed well nourished female in no apparent distress  Visit Vitals  /60   Pulse 77   Temp 98 °F (36.7 °C) (Oral)   Resp 18   Ht 5' 7\" (1.702 m)   Wt 122.9 kg (271 lb)   SpO2 97%   BMI 42.44 kg/m²      Head: normocephalic, atraumatic  Mouth: Clear, no overt lesions, oral mucosa pink and moist  Neck: supple, no masses, no adenopathy or carotid bruits, trachea midline  Resp: clear to auscultation bilaterally, no wheeze, rhonchi or rales, excursions normal and symmetrical  Cardio: Regular rate and rhythm, no murmurs, clicks, gallops or rubs, no edema or varicosities  Abdomen: soft, nontender, nondistended, normoactive bowel sounds, no hernias, no hepatosplenomegaly,   Back: Deferred  Extremeties: warm, well-perfused, no tenderness or swelling, normal gait/station  Neuro: sensation and strength grossly intact and symmetrical  Psych: alert and oriented to person, place and time  Breast exam deferred    IMPRESSION  Nonpalpable but multinodular goiter of long-standing duration. Mild hypothyroidism. PLAN  No orders of the defined types were placed in this encounter.     CT neck with follow-up  Erna Ying MD

## 2019-02-12 ENCOUNTER — DOCUMENTATION ONLY (OUTPATIENT)
Dept: NEUROLOGY | Age: 59
End: 2019-02-12

## 2019-02-14 DIAGNOSIS — J45.31 ASTHMATIC BRONCHITIS, MILD PERSISTENT, WITH ACUTE EXACERBATION: ICD-10-CM

## 2019-02-16 RX ORDER — GLUCOSAM/CHONDRO/HERB 149/HYAL 750-100 MG
TABLET ORAL
Qty: 60 CAP | Refills: 0 | Status: ON HOLD | OUTPATIENT
Start: 2019-02-16 | End: 2019-07-11 | Stop reason: CLARIF

## 2019-02-16 RX ORDER — DULOXETIN HYDROCHLORIDE 60 MG/1
CAPSULE, DELAYED RELEASE ORAL
Qty: 30 CAP | Refills: 0 | Status: SHIPPED | OUTPATIENT
Start: 2019-02-16 | End: 2019-07-12

## 2019-02-16 RX ORDER — BUDESONIDE AND FORMOTEROL FUMARATE DIHYDRATE 160; 4.5 UG/1; UG/1
AEROSOL RESPIRATORY (INHALATION)
Qty: 1 INHALER | Refills: 0 | Status: SHIPPED | OUTPATIENT
Start: 2019-02-16 | End: 2019-07-12

## 2019-02-16 RX ORDER — METOPROLOL SUCCINATE 50 MG/1
TABLET, EXTENDED RELEASE ORAL
Qty: 30 TAB | Refills: 0 | Status: ON HOLD | OUTPATIENT
Start: 2019-02-16 | End: 2019-04-13 | Stop reason: SDUPTHER

## 2019-02-19 ENCOUNTER — OFFICE VISIT (OUTPATIENT)
Dept: NEUROLOGY | Age: 59
End: 2019-02-19

## 2019-02-19 VITALS
SYSTOLIC BLOOD PRESSURE: 120 MMHG | TEMPERATURE: 98.7 F | RESPIRATION RATE: 22 BRPM | HEIGHT: 67 IN | DIASTOLIC BLOOD PRESSURE: 68 MMHG | HEART RATE: 79 BPM | WEIGHT: 265.6 LBS | OXYGEN SATURATION: 96 % | BODY MASS INDEX: 41.69 KG/M2

## 2019-02-19 DIAGNOSIS — R25.1 TREMOR: ICD-10-CM

## 2019-02-19 DIAGNOSIS — H81.90 VERTIGINOUS SYNDROME AND LABYRINTHINE DISORDER: Primary | ICD-10-CM

## 2019-02-19 DIAGNOSIS — R55 VASOVAGAL SYNCOPE: ICD-10-CM

## 2019-02-19 NOTE — LETTER
2/19/2019 10:23 AM 
 
Patient:  Dov Carrillo YOB: 1960 Date of Visit: 2/19/2019 Dear Renetta Robison MD 
[place] [address]. [place] [address] VIA In Basket 
 : Thank you for referring Ms. Mag Perez to me for evaluation/treatment. Below are the relevant portions of my assessment and plan of care. If you have questions, please do not hesitate to call me. I look forward to following Ms. Maty Urrutia along with you.  
 
 
 
Sincerely, 
 
 
Artie Schmitz MD

## 2019-02-19 NOTE — PROGRESS NOTES
Re:  Miguel Rae up visit     2/19/2019 10:17 AM    SSN: [id number]    Subjective:   Annalisa Potter returns for follow up. She's had no passing out spells. Her vertigo continues to be persistent as is the tremor. She didn't start the mysoline, worried about side effects. The tremor has decreased recently to the point where it's not a problem. Medications:    Current Outpatient Medications   Medication Sig Dispense Refill    metoprolol succinate (TOPROL-XL) 50 mg XL tablet take 1 tablet by mouth once daily 30 Tab 0    DULoxetine (CYMBALTA) 60 mg capsule take 1 capsule by mouth once daily 30 Cap 0    SYMBICORT 160-4.5 mcg/actuation HFAA inhale 2 puffs by mouth twice a day RINSE MOUTH AFTER USE 1 Inhaler 0    omega 3-DHA-EPA-fish oil 1,000 mg (120 mg-180 mg) capsule take 1 capsule by mouth twice a day 60 Cap 0    PROAIR HFA 90 mcg/actuation inhaler inhale 2 puffs by mouth every 4 hours if needed for wheezing 1 Inhaler 3    meclizine (ANTIVERT) 12.5 mg tablet take 1 tablet by mouth three times a day if needed for 10 days 30 Tab 0    gabapentin (NEURONTIN) 400 mg capsule take 1 capsule by mouth three times a day 30 Cap 1    VITAMIN D2 50,000 unit capsule take 1 capsule by mouth every week 4 Cap 0    levothyroxine (SYNTHROID) 25 mcg tablet take 1 tablet by mouth every morning BEFORE BREAKFAST 30 Tab 0    LANTUS U-100 INSULIN 100 unit/mL injection inject 90 unit subcutaneously at bedtime 30 mL 1    furosemide (LASIX) 40 mg tablet Take 1 Tab by mouth two (2) times a day. (Patient taking differently: Take 40 mg by mouth daily.) 60 Tab 0    potassium chloride (KLOR-CON) 10 mEq tablet Take 1 Tab by mouth daily. 30 Tab 0    empagliflozin (JARDIANCE) 25 mg tablet Take  by mouth daily.  albuterol (PROVENTIL VENTOLIN) 2.5 mg /3 mL (0.083 %) nebulizer solution 3 mL by Nebulization route every four (4) hours as needed for Wheezing.  24 Each 5    cyanocobalamin (VITAMIN B-12) 1,000 mcg sublingual tablet Take 1,000 mcg by mouth daily.  Aspirin, Buffered 81 mg tab Take  by mouth.  gabapentin (NEURONTIN) 300 mg capsule take 1 capsule by mouth at bedtime 30 Cap 0    primidone (MYSOLINE) 50 mg tablet Take 1 Tab by mouth three (3) times daily.  90 Tab 5    atorvastatin (LIPITOR) 80 mg tablet take 1 tablet by mouth once daily 30 Tab 3    BD INSULIN SYRINGE ULTRA-FINE 1 mL 31 gauge x 5/16 syrg use as directed twice a day 200 Syringe 3    FREESTYLE LITE STRIPS strip TEST twice a day as directed 100 Strip 11       Vital signs:    Visit Vitals  /68 (BP 1 Location: Left arm, BP Patient Position: Sitting)   Pulse 79   Temp 98.7 °F (37.1 °C) (Oral)   Resp 22   Ht 5' 7\" (1.702 m)   Wt 120.5 kg (265 lb 9.6 oz)   SpO2 96%   BMI 41.60 kg/m²       Review of Systems:   As above otherwise 11 point review of systems negative including;   Constitutional no fever or chills  Skin denies rash or itching  HEENT  Denies tinnitus, hearing lose  Eyes denies diplopia vision lose  Respiratory denies sortness of breath  Cardiovascular denies chest pain, dyspnea on exertion  Gastrointestinal denies nausea, vomiting, diarrhea, constipation  Genitourinary denies incontinence  Musculoskeletal denies joint pain or swelling  Endocrine denies weight change  Hematology denies easy bruising or bleeding   Neurological as above in HPI      Patient Active Problem List   Diagnosis Code    Type II diabetes mellitus, uncontrolled ([facility]) E11.65    Sleep apnea G47.30    H/O aortic valve replacement Z95.2    History of bicuspid aortic valve Z87.74    Chronic back pain M54.9, G89.29    Chronic left shoulder pain M25.512, G89.29    Morbid obesity ([facility]) E66.01    Left shoulder tendinitis M75.82    Spondylosis of lumbar region without myelopathy or radiculopathy M47.816    Chronic pain of both shoulders M25.511, G89.29, M25.512    Chronic pain of both knees M25.561, M25.562, G89.29    Chronic pain syndrome G89.4  Encounter for long-term (current) use of medications Z79.899    Chronic midline low back pain M54.5, G89.29    Lumbar facet arthropathy M47.816    Lumbar degenerative disc disease M51.36    Venous stasis dermatitis of both lower extremities I87.2    SOB (shortness of breath) R06.02    Type 2 diabetes mellitus without complication ([facility]) X97.2    Hypothyroidism due to acquired atrophy of thyroid E03.4    Essential hypertension I10    Dyslipidemia E78.5    Mood disorder ([facility]) F39    Chronic diastolic congestive heart failure ([facility]) I50.32    Type 2 diabetes mellitus with diabetic neuropathy ([facility]) E11.40         Objective: The patient is awake, alert, and oriented x 4. Fund of knowledge is adequate. Speech is fluent and memory is intact. Cranial Nerves: II - Visual fields are full to confrontation. III, IV, VI - Extraocular movements are intact. There is no nystagmus. V - Facial sensation is intact to pinprick. VII - Face is symmetrical.  VIII - Hearing is present. IX, X, XII - Palate is symmetrical.   XI - Shoulder shrugging and head turning intact  Motor: The patient moves all four limbs fairly well and symmetrically. Tone is normal. Reflexes are 2+ and symmetrical. Plantars are down going. Gait is abnormal, she staggers from side to side. She has right greater than left intention tremor, much less than previously. No cog wheel rigidity.     CBC:   Lab Results   Component Value Date/Time    WBC 6.4 08/14/2018 01:10 AM    RBC 4.49 08/14/2018 01:10 AM    HGB 12.1 08/14/2018 01:10 AM    HCT 40.2 08/14/2018 01:10 AM    PLATELET 011 88/77/4579 01:10 AM     BMP:   Lab Results   Component Value Date/Time    Glucose 133 (H) 08/14/2018 01:10 AM    Sodium 141 08/14/2018 01:10 AM    Potassium 4.7 08/14/2018 01:10 AM    Chloride 105 08/14/2018 01:10 AM    CO2 24 08/14/2018 01:10 AM    BUN 8 08/14/2018 01:10 AM    Creatinine 0.61 08/14/2018 01:10 AM    Calcium 8.7 08/14/2018 01:10 AM     CMP:   Lab Results Component Value Date/Time    Glucose 133 (H) 08/14/2018 01:10 AM    Sodium 141 08/14/2018 01:10 AM    Potassium 4.7 08/14/2018 01:10 AM    Chloride 105 08/14/2018 01:10 AM    CO2 24 08/14/2018 01:10 AM    BUN 8 08/14/2018 01:10 AM    Creatinine 0.61 08/14/2018 01:10 AM    Calcium 8.7 08/14/2018 01:10 AM    BUN/Creatinine ratio 13 08/14/2018 01:10 AM    Alk. phosphatase 131 (H) 08/14/2018 01:10 AM    Protein, total 6.7 08/14/2018 01:10 AM    Albumin 3.6 08/14/2018 01:10 AM    A-G Ratio 1.2 08/14/2018 01:10 AM     Coagulation: No results found for: PTP, INR, APTT, PTTT  Cardiac markers: No results found for: CPK, CKND1, SANDY    Assessment:  Complex situation in this patient with intention tremor, gait instability, syncopal spells, persistent nystagmus and tinnitus as well aa decreased hearing. I suspect she has Meniere's disease in addition to the tremor. Can't increase her beta blocker since her BP is already on the low side. Plan: Will hold off the Mysoline 50 mg BID for tremor, which seems better. Recommend she bring her syncopal spells to the attention of her cardiologist.  Continue with vestibular therapy. Return here in 6 months. Sincerely,        Celi Smith.  Boyd Luna M.D.

## 2019-02-19 NOTE — PROGRESS NOTES
Dylan Harris is a 63 y.o. female in today for follow-up on tremors. Learning assessment previously completed 2/11/2019; primary language is [place]. 1. Have you been to the ER, urgent care clinic since your last visit? Hospitalized since your last visit? No    2. Have you seen or consulted any other health care providers outside of the [address] [place] [address] since your last visit? Include any pap smears or colon screening.  No

## 2019-02-21 ENCOUNTER — HOSPITAL ENCOUNTER (OUTPATIENT)
Dept: CT IMAGING | Age: 59
Discharge: HOME | End: 2019-02-21
Payer: MEDICAID

## 2019-02-21 DIAGNOSIS — E04.2 MULTINODULAR GOITER: ICD-10-CM

## 2019-02-21 PROCEDURE — 74011636320 HC RX REV CODE- 636/320

## 2019-02-21 PROCEDURE — 82565 ASSAY OF CREATININE: CPT

## 2019-02-21 PROCEDURE — 70491 CT SOFT TISSUE NECK W/DYE: CPT

## 2019-02-21 RX ADMIN — IOPAMIDOL 80 ML: 612 INJECTION, SOLUTION INTRAVENOUS at 15:40

## 2019-02-25 LAB — CREAT UR-MCNC: 0.8 MG/DL (ref 0.6–1.3)

## 2019-02-26 ENCOUNTER — OFFICE VISIT (OUTPATIENT)
Dept: FAMILY MEDICINE CLINIC | Age: 59
End: 2019-02-26

## 2019-02-26 VITALS
DIASTOLIC BLOOD PRESSURE: 76 MMHG | HEART RATE: 72 BPM | HEIGHT: 67 IN | SYSTOLIC BLOOD PRESSURE: 131 MMHG | RESPIRATION RATE: 20 BRPM | WEIGHT: 267 LBS | TEMPERATURE: 97.9 F | BODY MASS INDEX: 41.91 KG/M2 | OXYGEN SATURATION: 95 %

## 2019-02-26 DIAGNOSIS — F39 MOOD DISORDER (HCC): ICD-10-CM

## 2019-02-26 DIAGNOSIS — G47.9 SLEEP DISORDER: ICD-10-CM

## 2019-02-26 DIAGNOSIS — E78.5 DYSLIPIDEMIA: ICD-10-CM

## 2019-02-26 DIAGNOSIS — E03.4 HYPOTHYROIDISM DUE TO ACQUIRED ATROPHY OF THYROID: ICD-10-CM

## 2019-02-26 DIAGNOSIS — E11.9 TYPE 2 DIABETES MELLITUS WITHOUT COMPLICATION, UNSPECIFIED WHETHER LONG TERM INSULIN USE (HCC): ICD-10-CM

## 2019-02-26 DIAGNOSIS — R25.1 OCCASIONAL TREMORS: Primary | ICD-10-CM

## 2019-02-26 DIAGNOSIS — I10 ESSENTIAL HYPERTENSION: ICD-10-CM

## 2019-02-26 LAB
HBA1C MFR BLD HPLC: 8.7 %
MICROALBUMIN UR TEST STR-MCNC: 30 MG/L
MICROALBUMIN/CREAT RATIO POC: NORMAL MG/G

## 2019-02-26 NOTE — PROGRESS NOTES
Please let patient know her HbA1c is up from previous 6.5 to 8.7. She should intensify lifestyle and dietary modification.   She should follow-up with her  Endocrinologist.  Amie Moody MD

## 2019-02-27 LAB
ALBUMIN SERPL-MCNC: 4.1 G/DL (ref 3.5–5.5)
ALBUMIN/GLOB SERPL: 1.3 {RATIO} (ref 1.2–2.2)
ALP SERPL-CCNC: 97 IU/L (ref 39–117)
ALT SERPL-CCNC: 22 IU/L (ref 0–32)
AST SERPL-CCNC: 14 IU/L (ref 0–40)
BASOPHILS # BLD AUTO: 0 X10E3/UL (ref 0–0.2)
BASOPHILS NFR BLD AUTO: 1 %
BILIRUB SERPL-MCNC: 0.4 MG/DL (ref 0–1.2)
BUN SERPL-MCNC: 14 MG/DL (ref 6–24)
BUN/CREAT SERPL: 20 (ref 9–23)
CALCIUM SERPL-MCNC: 9.2 MG/DL (ref 8.7–10.2)
CHLORIDE SERPL-SCNC: 103 MMOL/L (ref 96–106)
CHOLEST SERPL-MCNC: 267 MG/DL (ref 100–199)
CO2 SERPL-SCNC: 23 MMOL/L (ref 20–29)
CREAT SERPL-MCNC: 0.69 MG/DL (ref 0.57–1)
EOSINOPHIL # BLD AUTO: 0.1 X10E3/UL (ref 0–0.4)
EOSINOPHIL NFR BLD AUTO: 1 %
ERYTHROCYTE [DISTWIDTH] IN BLOOD BY AUTOMATED COUNT: 15.8 % (ref 12.3–15.4)
GLOBULIN SER CALC-MCNC: 3.1 G/DL (ref 1.5–4.5)
GLUCOSE SERPL-MCNC: 149 MG/DL (ref 65–99)
HCT VFR BLD AUTO: 46.6 % (ref 34–46.6)
HDLC SERPL-MCNC: 32 MG/DL
HGB BLD-MCNC: 15.6 G/DL (ref 11.1–15.9)
IMM GRANULOCYTES # BLD AUTO: 0 X10E3/UL (ref 0–0.1)
IMM GRANULOCYTES NFR BLD AUTO: 1 %
INTERPRETATION, 910389: NORMAL
LDLC SERPL CALC-MCNC: ABNORMAL MG/DL (ref 0–99)
LYMPHOCYTES # BLD AUTO: 2.1 X10E3/UL (ref 0.7–3.1)
LYMPHOCYTES NFR BLD AUTO: 34 %
MCH RBC QN AUTO: 30.4 PG (ref 26.6–33)
MCHC RBC AUTO-ENTMCNC: 33.5 G/DL (ref 31.5–35.7)
MCV RBC AUTO: 91 FL (ref 79–97)
MONOCYTES # BLD AUTO: 0.5 X10E3/UL (ref 0.1–0.9)
MONOCYTES NFR BLD AUTO: 8 %
NEUTROPHILS # BLD AUTO: 3.5 X10E3/UL (ref 1.4–7)
NEUTROPHILS NFR BLD AUTO: 55 %
PLATELET # BLD AUTO: 223 X10E3/UL (ref 150–379)
POTASSIUM SERPL-SCNC: 4.6 MMOL/L (ref 3.5–5.2)
PROT SERPL-MCNC: 7.2 G/DL (ref 6–8.5)
RBC # BLD AUTO: 5.13 X10E6/UL (ref 3.77–5.28)
SODIUM SERPL-SCNC: 144 MMOL/L (ref 134–144)
T4 FREE SERPL-MCNC: 1.15 NG/DL (ref 0.82–1.77)
TRIGL SERPL-MCNC: 804 MG/DL (ref 0–149)
TSH SERPL DL<=0.005 MIU/L-ACNC: 1.91 UIU/ML (ref 0.45–4.5)
VLDLC SERPL CALC-MCNC: ABNORMAL MG/DL (ref 5–40)
WBC # BLD AUTO: 6.3 X10E3/UL (ref 3.4–10.8)

## 2019-02-27 NOTE — PROGRESS NOTES
BETHEL  Eleni Yun comes in for follow-up care. Patient is concerned about thyroid abnormality and would like to have her thyroid levels checked. States that she has been having tremors and shaking spells. This has been ongoing for days. She has increased appetite. She does feel that she is sweating a lot in the past sleep pattern has changed. She feels restless. States that she has changes in her mood. She denies chest pain or shortness of breath. She is on levothyroxine. States that she does have a previous history of Hashimoto's thyroiditis. She felt the same when this was diagnosed. States also that she had been reading about Hashimoto's thyroiditis and she has all the symptoms. She does would like to have her thyroid levels checked. Patient has diabetes mellitus type 2. She has been off her Jardiance and her blood glucose numbers did go back up. Previously her A1c had come down to 6.5. We did recheck this and it has gone up to 8.7. She is seen by the endocrinologist.  She is now back on her Jardiance and she feels that the this will help her blood sugars come down. She is on insulin taking Lantus 90 units at bedtime. She will continue with lifestyle and dietary modification. Patient has hypertension. Her blood pressure is stable. She is on Toprol-XL. We will continue with this medication. Patient has a history of tremor right upper extremity. This comes on and off. Has been seen by the neurologist who has been running tests. She was put on primidone. Patient has dyslipidemia. She is on atorvastatin. We will check patient's labs today. She declines to have a foot exam done today. We will also do a microalbumin. I will follow-up with her once I get her results.       Past Medical History  Past Medical History:   Diagnosis Date    Arthritis     Lower back     Asthma     Chronic back pain     Lower back pain    Depression     Diabetes ([facility]     Diabetes mellitus ([address]     Hearing loss     Left ear hearing loss     pt states nerve damage--- 25% hearing loss, described as \"muffled\"    Memory difficulty     Panic attacks     Ringing of ears     Severe headache     Sleep apnea     SOB (shortness of breath) on exertion     w and w/out exertion    Stomach pain     Thyroid disease     Vertigo        Surgical History  Past Surgical History:   Procedure Laterality Date    CARDIAC SURG PROCEDURE UNLIST  10/31/2013    open heart    HX HEART VALVE SURGERY  2013    aortic valve repair    HX HYSTERECTOMY      Partial Hysterectomy - removed ovary    HX MYOMECTOMY      HX ORTHOPAEDIC  02/2018    had nerves burned on her right side of back        Medications  Current Outpatient Medications   Medication Sig Dispense Refill    metoprolol succinate (TOPROL-XL) 50 mg XL tablet take 1 tablet by mouth once daily 30 Tab 0    SYMBICORT 160-4.5 mcg/actuation HFAA inhale 2 puffs by mouth twice a day RINSE MOUTH AFTER USE 1 Inhaler 0    omega 3-DHA-EPA-fish oil 1,000 mg (120 mg-180 mg) capsule take 1 capsule by mouth twice a day 60 Cap 0    PROAIR HFA 90 mcg/actuation inhaler inhale 2 puffs by mouth every 4 hours if needed for wheezing 1 Inhaler 3    meclizine (ANTIVERT) 12.5 mg tablet take 1 tablet by mouth three times a day if needed for 10 days 30 Tab 0    VITAMIN D2 50,000 unit capsule take 1 capsule by mouth every week 4 Cap 0    levothyroxine (SYNTHROID) 25 mcg tablet take 1 tablet by mouth every morning BEFORE BREAKFAST 30 Tab 0    primidone (MYSOLINE) 50 mg tablet Take 1 Tab by mouth three (3) times daily. 90 Tab 5    LANTUS U-100 INSULIN 100 unit/mL injection inject 90 unit subcutaneously at bedtime 30 mL 1    BD INSULIN SYRINGE ULTRA-FINE 1 mL 31 gauge x 5/16 syrg use as directed twice a day 200 Syringe 3    potassium chloride (KLOR-CON) 10 mEq tablet Take 1 Tab by mouth daily. 30 Tab 0    empagliflozin (JARDIANCE) 25 mg tablet Take  by mouth daily.       cyanocobalamin (VITAMIN B-12) 1,000 mcg sublingual tablet Take 1,000 mcg by mouth daily.  Aspirin, Buffered 81 mg tab Take  by mouth.  DULoxetine (CYMBALTA) 60 mg capsule take 1 capsule by mouth once daily 30 Cap 0    gabapentin (NEURONTIN) 400 mg capsule take 1 capsule by mouth three times a day 30 Cap 1    gabapentin (NEURONTIN) 300 mg capsule take 1 capsule by mouth at bedtime 30 Cap 0    atorvastatin (LIPITOR) 80 mg tablet take 1 tablet by mouth once daily 30 Tab 3    furosemide (LASIX) 40 mg tablet Take 1 Tab by mouth two (2) times a day. (Patient taking differently: Take 40 mg by mouth daily.) 60 Tab 0    FREESTYLE LITE STRIPS strip TEST twice a day as directed 100 Strip 11    albuterol (PROVENTIL VENTOLIN) 2.5 mg /3 mL (0.083 %) nebulizer solution 3 mL by Nebulization route every four (4) hours as needed for Wheezing. 24 Each 5       Allergies  Allergies   Allergen Reactions    Other Food Other (comments)     Sweeteners- causes headaches    Metformin Other (comments)     Increase pain in feet and swelling in feet  Increased hunger,tired and bilat foot pain    Morphine Other (comments)     headache    Singulair [Montelukast] Other (comments)     hallucinations    Sucrose Other (comments)     Pt. Is not allergic to sucrose. She develops headaches with artificial sweeteners.        Family History  Family History   Problem Relation Age of Onset    Heart Disease Mother     Diabetes Mother     Stroke Mother     Hypertension Mother     Diabetes Father     Heart Disease Father     Hypertension Father     Stroke Father     Diabetes Brother     Cancer Brother     Hypertension Brother     Stroke Brother     Heart Disease Brother     Diabetes Brother     Hypertension Brother     Stroke Brother     Heart Disease Brother     Diabetes Brother     Hypertension Brother     Hypertension Brother        Social History  Social History     Socioeconomic History    Marital status:      Spouse name: Not on file    Number of children: Not on file    Years of education: Not on file    Highest education level: Not on file   Social Needs    Financial resource strain: Not on file    Food insecurity - worry: Not on file    Food insecurity - inability: Not on file    Transportation needs - medical: Not on file   [employer] needs - non-medical: Not on file   Occupational History    Not on file   Tobacco Use    Smoking status: Never Smoker    Smokeless tobacco: Never Used   Substance and Sexual Activity    Alcohol use: No    Drug use: No    Sexual activity: Not Currently   Other Topics Concern     Service No    Blood Transfusions No    Caffeine Concern No    Occupational Exposure No    Hobby Hazards No    Sleep Concern Yes     Comment: Sleep apnea     Stress Concern Yes     Comment: Due to family medical issues.  Weight Concern No    Special Diet No    Back Care Yes     Comment: Patient tries to do back care with streches     Exercise No    Bike Helmet Yes    Seat Belt Yes    Self-Exams Yes   Social History Narrative    Not on file       Review of Systems  Review of Systems - Review of all systems is negative except as noted above in the HPI.     Vital Signs  Visit Vitals  /76 (BP 1 Location: Left arm, BP Patient Position: Sitting)   Pulse 72   Temp 97.9 °F (36.6 °C) (Oral)   Resp 20   Ht 5' 7\" (1.702 m)   Wt 267 lb (121.1 kg)   SpO2 95%   BMI 41.82 kg/m²         Physical Exam  Physical Examination: General appearance - oriented to person, place, and time, overweight, acyanotic, in no respiratory distress and well hydrated  Mental status - alert, oriented to person, place, and time, affect appropriate to mood  Mouth - mucous membranes moist, pharynx normal without lesions  Neck - supple, no significant adenopathy  Lymphatics - no palpable lymphadenopathy  Chest - clear to auscultation, no wheezes, rales or rhonchi, symmetric air entry, no tachypnea, retractions or cyanosis  Heart - normal rate and regular rhythm, S1 and S2 normal  Neurological - abnormal neurological exam unchanged from prior examinations  Musculoskeletal - osteoarthritic changes noted in both hands  Extremities - intact peripheral pulses    Results  Results for orders placed or performed in visit on 02/26/19   AMB POC HEMOGLOBIN A1C   Result Value Ref Range    Hemoglobin A1c (POC) 8.7 %   AMB POC URINE, MICROALBUMIN, SEMIQUANTITATIVE   Result Value Ref Range    Microalbumin urine (POC) 30 MG/L    Microalbumin/creat ratio (POC)  <30 MG/G       ASSESSMENT and PLAN    ICD-10-CM ICD-9-CM    1. Occasional tremors R25.1 781.0    2. Sleep disorder G47.9 780.50    3. Type 2 diabetes mellitus without complication, unspecified whether long term insulin use (HCC) E11.9 250.00 AMB POC HEMOGLOBIN A1C      AMB POC URINE, MICROALBUMIN, SEMIQUANTITATIVE      CBC WITH AUTOMATED DIFF      COLLECTION VENOUS BLOOD,VENIPUNCTURE   4. Essential hypertension I10 401.9 CBC WITH AUTOMATED DIFF      METABOLIC PANEL, COMPREHENSIVE      COLLECTION VENOUS BLOOD,VENIPUNCTURE   5. Dyslipidemia E78.5 272.4 LIPID PANEL      COLLECTION VENOUS BLOOD,VENIPUNCTURE   6. Hypothyroidism due to acquired atrophy of thyroid E03.4 244.8 TSH 3RD GENERATION     246.8 T4, FREE      COLLECTION VENOUS BLOOD,VENIPUNCTURE   7.  Mood disorder ([facility] F39 296.90      lab results and schedule of future lab studies reviewed with patient  reviewed diet, exercise and weight control  cardiovascular risk and specific lipid/LDL goals reviewed  reviewed medications and side effects in detail  specific diabetic recommendations: low cholesterol diet, weight control and daily exercise discussed, home glucose monitoring emphasized, all medications, side effects and compliance discussed carefully, foot care discussed and Podiatry visits discussed, glycohemoglobin and other lab monitoring discussed and long term diabetic complications discussed    I have discussed the diagnosis with the patient and the intended plan of care as seen in the above orders. The patient has received an after-visit summary and questions were answered concerning future plans. I have discussed medication, side effects, and warnings with the patient in detail. The patient verbalized understanding and is in agreement with the plan of care. The patient will contact the office with any additional concerns.     Amie Moody MD

## 2019-03-04 NOTE — PROGRESS NOTES
Please let patient know her thyroid level is within normal.  Her lipid panel is abnormal with an elevated triglyceride level. She is on fish oil and Lipitor. I would recommend recheck for fasting lipid level. She should continue with the current medications. She should follow-up with me in the clinic as previously discussed. Her shaking might be due to the tremors. She is being seen by the neurologist for this.   Robles Sanchez MD

## 2019-03-06 ENCOUNTER — OFFICE VISIT (OUTPATIENT)
Dept: SURGERY | Age: 59
End: 2019-03-06

## 2019-03-06 VITALS
SYSTOLIC BLOOD PRESSURE: 140 MMHG | DIASTOLIC BLOOD PRESSURE: 69 MMHG | WEIGHT: 267 LBS | TEMPERATURE: 97.9 F | BODY MASS INDEX: 41.91 KG/M2 | HEIGHT: 67 IN | HEART RATE: 71 BPM | RESPIRATION RATE: 18 BRPM | OXYGEN SATURATION: 98 %

## 2019-03-06 DIAGNOSIS — E04.9 GOITER: Primary | ICD-10-CM

## 2019-03-06 NOTE — PROGRESS NOTES
Progress Note    Patient: Kale Ramirez  MRN: [id number]  SSN: [id number]   YOB: 1960  Age: 63 y.o. Sex: female     Chief Complaint   Patient presents with   [place] HUGO    Ms. Jl Britt returns with her CT scan of her neck and thyroid. She has multiple bilateral calcified nodules in each lobe. We discussed the risk of cancer for calcified nodules. I told her she really needed a total thyroidectomy and went over how that is done and its risks and benefits including voice change and bilateral neck nerve injury. I have provided her some literature for thyroid disease and given her some websites to read about calcified nodules. She is apparently had 1 of these nodules biopsied in the past and it was negative. It is the calcifications that bother me. She would like to wait till April to do this and I think that is okay, she will be back after April 13.     Past Medical History:   Diagnosis Date    Arthritis     Lower back     Asthma     Chronic back pain     Lower back pain    Depression     Diabetes ([address]     Diabetes mellitus ([address]     Hearing loss     Left ear hearing loss     pt states nerve damage--- 25% hearing loss, described as \"muffled\"    Memory difficulty     Panic attacks     Ringing of ears     Severe headache     Sleep apnea     SOB (shortness of breath) on exertion     w and w/out exertion    Stomach pain     Thyroid disease     Vertigo      Past Surgical History:   Procedure Laterality Date    CARDIAC SURG PROCEDURE UNLIST  10/31/2013    open heart    HX HEART VALVE SURGERY  2013    aortic valve repair    HX HYSTERECTOMY      Partial Hysterectomy - removed ovary    HX MYOMECTOMY      HX ORTHOPAEDIC  02/2018    had nerves burned on her right side of back     Allergies   Allergen Reactions    Other Food Other (comments)     Sweeteners- causes headaches    Metformin Other (comments)     Increase pain in feet and swelling in feet  Increased hunger,tired and bilat foot pain    Morphine Other (comments)     headache    Singulair [Montelukast] Other (comments)     hallucinations    Sucrose Other (comments)     Pt. Is not allergic to sucrose. She develops headaches with artificial sweeteners. Current Outpatient Medications   Medication Sig Dispense Refill    metoprolol succinate (TOPROL-XL) 50 mg XL tablet take 1 tablet by mouth once daily 30 Tab 0    DULoxetine (CYMBALTA) 60 mg capsule take 1 capsule by mouth once daily 30 Cap 0    SYMBICORT 160-4.5 mcg/actuation HFAA inhale 2 puffs by mouth twice a day RINSE MOUTH AFTER USE 1 Inhaler 0    omega 3-DHA-EPA-fish oil 1,000 mg (120 mg-180 mg) capsule take 1 capsule by mouth twice a day 60 Cap 0    PROAIR HFA 90 mcg/actuation inhaler inhale 2 puffs by mouth every 4 hours if needed for wheezing 1 Inhaler 3    meclizine (ANTIVERT) 12.5 mg tablet take 1 tablet by mouth three times a day if needed for 10 days 30 Tab 0    gabapentin (NEURONTIN) 400 mg capsule take 1 capsule by mouth three times a day 30 Cap 1    VITAMIN D2 50,000 unit capsule take 1 capsule by mouth every week 4 Cap 0    levothyroxine (SYNTHROID) 25 mcg tablet take 1 tablet by mouth every morning BEFORE BREAKFAST 30 Tab 0    gabapentin (NEURONTIN) 300 mg capsule take 1 capsule by mouth at bedtime 30 Cap 0    primidone (MYSOLINE) 50 mg tablet Take 1 Tab by mouth three (3) times daily. 90 Tab 5    LANTUS U-100 INSULIN 100 unit/mL injection inject 90 unit subcutaneously at bedtime 30 mL 1    atorvastatin (LIPITOR) 80 mg tablet take 1 tablet by mouth once daily 30 Tab 3    BD INSULIN SYRINGE ULTRA-FINE 1 mL 31 gauge x 5/16 syrg use as directed twice a day 200 Syringe 3    furosemide (LASIX) 40 mg tablet Take 1 Tab by mouth two (2) times a day. (Patient taking differently: Take 40 mg by mouth daily.) 60 Tab 0    potassium chloride (KLOR-CON) 10 mEq tablet Take 1 Tab by mouth daily.  30 Tab 0    empagliflozin (JARDIANCE) 25 mg tablet Take  by mouth daily.  FREESTYLE LITE STRIPS strip TEST twice a day as directed 100 Strip 11    albuterol (PROVENTIL VENTOLIN) 2.5 mg /3 mL (0.083 %) nebulizer solution 3 mL by Nebulization route every four (4) hours as needed for Wheezing. 24 Each 5    cyanocobalamin (VITAMIN B-12) 1,000 mcg sublingual tablet Take 1,000 mcg by mouth daily.  Aspirin, Buffered 81 mg tab Take  by mouth. Social History     Socioeconomic History    Marital status:      Spouse name: Not on file    Number of children: Not on file    Years of education: Not on file    Highest education level: Not on file   Social Needs    Financial resource strain: Not on file    Food insecurity - worry: Not on file    Food insecurity - inability: Not on file    Transportation needs - medical: Not on file   [employer] needs - non-medical: Not on file   Occupational History    Not on file   Tobacco Use    Smoking status: Never Smoker    Smokeless tobacco: Never Used   Substance and Sexual Activity    Alcohol use: No    Drug use: No    Sexual activity: Not Currently   Other Topics Concern     Service No    Blood Transfusions No    Caffeine Concern No    Occupational Exposure No    Hobby Hazards No    Sleep Concern Yes     Comment: Sleep apnea     Stress Concern Yes     Comment: Due to family medical issues.      Weight Concern No    Special Diet No    Back Care Yes     Comment: Patient tries to do back care with streches     Exercise No    Bike Helmet Yes   [address] [facility] Yes    Self-Exams Yes   Social History Narrative    Not on file     Family History   Problem Relation Age of Onset    Heart Disease Mother     Diabetes Mother     Stroke Mother     Hypertension Mother     Diabetes Father     Heart Disease Father     Hypertension Father     Stroke Father     Diabetes Brother     Cancer Brother     Hypertension Brother     Stroke Brother     Heart Disease Brother     Diabetes Brother     Hypertension Brother     Stroke Brother     Heart Disease Brother     Diabetes Brother     Hypertension Brother     Hypertension Brother          Review of systems:  Patient denies any reflux, emesis, abdominal pain, change in bowel habits, hematochezia, melena, fever, weight loss, fatigue chills, dermatitis, abnormal moles, change in vision, vertigo, epistaxis, dysphagia, hoarseness, chest pain, palpitations, hypertension, edema, cough, shortness of breath, wheezing, hemoptysis, snoring, hematuria, diabetes, thyroid disease, anemia, bruising, history of blood transfusion, dizziness, headache, or fainting. Physical Examination    Well developed well nourished female in no apparent distress  Visit Vitals  /69   Pulse 71   Temp 97.9 °F (36.6 °C) (Oral)   Resp 18   Ht 5' 7\" (1.702 m)   Wt 121.1 kg (267 lb)   SpO2 98%   BMI 41.82 kg/m²      Head: normocephalic, atraumatic  Mouth: Clear, no overt lesions, oral mucosa pink and moist  Neck: supple, no masses, no adenopathy or carotid bruits, trachea midline  Resp: clear to auscultation bilaterally, no wheeze, rhonchi or rales, excursions normal and symmetrical  Cardio: Regular rate and rhythm, no murmurs, clicks, gallops or rubs, no edema or varicosities  Abdomen: soft, nontender, nondistended, normoactive bowel sounds, no hernias, no hepatosplenomegaly,   Back: Deferred  Extremeties: warm, well-perfused, no tenderness or swelling, normal gait/station  Neuro: sensation and strength grossly intact and symmetrical  Psych: alert and oriented to person, place and time  Breast exam deferred    IMPRESSION  Multiple bilateral calcified nodules    PLAN  No orders of the defined types were placed in this encounter.     Follow-up in April for workup to total thyroidectomy  Nani Brambila MD

## 2019-03-06 NOTE — PROGRESS NOTES
Chief Complaint   Patient presents with    Follow-up     CT     1. Have you been to the ER, urgent care clinic since your last visit? Hospitalized since your last visit? No    2. Have you seen or consulted any other health care providers outside of the [facility] since your last visit? Include any pap smears or colon screening.  No

## 2019-03-11 NOTE — PROGRESS NOTES
Spoke with patient at this time. Informed patient of lab results. Patient verbalized understanding at this time.

## 2019-03-12 RX ORDER — INSULIN GLARGINE 100 [IU]/ML
INJECTION, SOLUTION SUBCUTANEOUS
Qty: 30 ML | Refills: 0 | Status: ON HOLD | OUTPATIENT
Start: 2019-03-12 | End: 2019-07-11 | Stop reason: SDUPTHER

## 2019-03-13 ENCOUNTER — OFFICE VISIT (OUTPATIENT)
Dept: CARDIOLOGY CLINIC | Age: 59
End: 2019-03-13

## 2019-03-13 VITALS
DIASTOLIC BLOOD PRESSURE: 66 MMHG | HEIGHT: 67 IN | SYSTOLIC BLOOD PRESSURE: 141 MMHG | BODY MASS INDEX: 42.06 KG/M2 | HEART RATE: 82 BPM | WEIGHT: 268 LBS

## 2019-03-13 DIAGNOSIS — E11.9 TYPE 2 DIABETES MELLITUS WITHOUT COMPLICATION, UNSPECIFIED WHETHER LONG TERM INSULIN USE (HCC): ICD-10-CM

## 2019-03-13 DIAGNOSIS — G47.33 OBSTRUCTIVE SLEEP APNEA SYNDROME: ICD-10-CM

## 2019-03-13 DIAGNOSIS — Z95.2 H/O AORTIC VALVE REPLACEMENT: ICD-10-CM

## 2019-03-13 DIAGNOSIS — E03.4 HYPOTHYROIDISM DUE TO ACQUIRED ATROPHY OF THYROID: ICD-10-CM

## 2019-03-13 DIAGNOSIS — Z87.74 HISTORY OF BICUSPID AORTIC VALVE: ICD-10-CM

## 2019-03-13 DIAGNOSIS — E78.5 DYSLIPIDEMIA: ICD-10-CM

## 2019-03-13 DIAGNOSIS — I50.32 CHRONIC DIASTOLIC CONGESTIVE HEART FAILURE (HCC): ICD-10-CM

## 2019-03-13 DIAGNOSIS — I10 ESSENTIAL HYPERTENSION: Primary | ICD-10-CM
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
## 2019-03-13 NOTE — PATIENT INSTRUCTIONS
High Blood Pressure: Care Instructions  Overview    It's normal for blood pressure to go up and down throughout the day. But if it stays up, you have high blood pressure. Another name for high blood pressure is hypertension. Despite what a lot of people think, high blood pressure usually doesn't cause headaches or make you feel dizzy or lightheaded. It usually has no symptoms. But it does increase your risk of stroke, heart attack, and other problems. You and your doctor will talk about your risks of these problems based on your blood pressure. Your doctor will give you a goal for your blood pressure. Your goal will be based on your health and your age. Lifestyle changes, such as eating healthy and being active, are always important to help lower blood pressure. You might also take medicine to reach your blood pressure goal.  Follow-up care is a key part of your treatment and safety. Be sure to make and go to all appointments, and call your doctor if you are having problems. It's also a good idea to know your test results and keep a list of the medicines you take. How can you care for yourself at home? Medical treatment  · If you stop taking your medicine, your blood pressure will go back up. You may take one or more types of medicine to lower your blood pressure. Be safe with medicines. Take your medicine exactly as prescribed. Call your doctor if you think you are having a problem with your medicine. · Talk to your doctor before you start taking aspirin every day. Aspirin can help certain people lower their risk of a heart attack or stroke. But taking aspirin isn't right for everyone, because it can cause serious bleeding. · See your doctor regularly. You may need to see the doctor more often at first or until your blood pressure comes down. · If you are taking blood pressure medicine, talk to your doctor before you take decongestants or anti-inflammatory medicine, such as ibuprofen.  Some of these medicines can raise blood pressure. · Learn how to check your blood pressure at home. Lifestyle changes  · Stay at a healthy weight. This is especially important if you put on weight around the waist. Losing even 10 pounds can help you lower your blood pressure. · If your doctor recommends it, get more exercise. Walking is a good choice. Bit by bit, increase the amount you walk every day. Try for at least 30 minutes on most days of the week. You also may want to swim, bike, or do other activities. · Avoid or limit alcohol. Talk to your doctor about whether you can drink any alcohol. · Try to limit how much sodium you eat to less than 2,300 milligrams (mg) a day. Your doctor may ask you to try to eat less than 1,500 mg a day. · Eat plenty of fruits (such as bananas and oranges), vegetables, legumes, whole grains, and low-fat dairy products. · Lower the amount of saturated fat in your diet. Saturated fat is found in animal products such as milk, cheese, and meat. Limiting these foods may help you lose weight and also lower your risk for heart disease. · Do not smoke. Smoking increases your risk for heart attack and stroke. If you need help quitting, talk to your doctor about stop-smoking programs and medicines. These can increase your chances of quitting for good. When should you call for help? Call 911 anytime you think you may need emergency care. This may mean having symptoms that suggest that your blood pressure is causing a serious heart or blood vessel problem. Your blood pressure may be over 180/120.   For example, call 911 if:    · You have symptoms of a heart attack. These may include:  ? Chest pain or pressure, or a strange feeling in the chest.  ? Sweating. ? Shortness of breath. ? Nausea or vomiting. ? Pain, pressure, or a strange feeling in the back, neck, jaw, or upper belly or in one or both shoulders or arms. ? Lightheadedness or sudden weakness.   ? A fast or irregular heartbeat.     · You have symptoms of a stroke. These may include:  ? Sudden numbness, tingling, weakness, or loss of movement in your face, arm, or leg, especially on only one side of your body. ? Sudden vision changes. ? Sudden trouble speaking. ? Sudden confusion or trouble understanding simple statements. ? Sudden problems with walking or balance. ? A sudden, severe headache that is different from past headaches.     · You have severe back or belly pain.    Do not wait until your blood pressure comes down on its own. Get help right away.   Call your doctor now or seek immediate care if:    · Your blood pressure is much higher than normal (such as 180/120 or higher), but you don't have symptoms.     · You think high blood pressure is causing symptoms, such as:  ? Severe headache.  ? Blurry vision.    Watch closely for changes in your health, and be sure to contact your doctor if:    · Your blood pressure measures higher than your doctor recommends at least 2 times. That means the top number is higher or the bottom number is higher, or both.     · You think you may be having side effects from your blood pressure medicine. Where can you learn more? Go to http://nadya-irasema.info/. Enter N574 in the search box to learn more about \"High Blood Pressure: Care Instructions. \"  Current as of: July 22, 2018  Content Version: 11.9  © 0314-7193 [employer]. Care instructions adapted under license by [employer] (which disclaims liability or warranty for this information). If you have questions about a medical condition or this instruction, always ask your healthcare professional. [place] [address] any warranty or liability for your use of this information.

## 2019-03-13 NOTE — PROGRESS NOTES
1. Have you been to the ER, urgent care clinic since your last visit? Hospitalized since your last visit? No     2. Have you seen or consulted any other health care providers outside of the [address] [place] [address] since your last visit? Include any pap smears or colon screening. Yes Where: pcp/surgeon/neurologist     3. Since your last visit, have you had any of the following symptoms? shortness of breath. 4.  Have you had any blood work, X-rays or cardiac testing? Yes Where: Dr Shiraz Carson Reason for visit: Labs       5. Where do you normally have your labs drawn? Dr Shiraz Carson PCP    6. Do you need any refills today?    No

## 2019-03-15 DIAGNOSIS — E03.4 HYPOTHYROIDISM DUE TO ACQUIRED ATROPHY OF THYROID: ICD-10-CM

## 2019-03-15 DIAGNOSIS — E11.9 TYPE 2 DIABETES MELLITUS WITHOUT COMPLICATION, UNSPECIFIED WHETHER LONG TERM INSULIN USE (HCC): ICD-10-CM

## 2019-03-15 DIAGNOSIS — E78.5 DYSLIPIDEMIA: ICD-10-CM

## 2019-03-15 DIAGNOSIS — I10 ESSENTIAL HYPERTENSION: ICD-10-CM

## 2019-03-20 ENCOUNTER — OFFICE VISIT (OUTPATIENT)
Dept: FAMILY MEDICINE CLINIC | Age: 59
End: 2019-03-20

## 2019-03-20 VITALS
RESPIRATION RATE: 20 BRPM | SYSTOLIC BLOOD PRESSURE: 134 MMHG | HEART RATE: 83 BPM | HEIGHT: 67 IN | DIASTOLIC BLOOD PRESSURE: 68 MMHG | TEMPERATURE: 97.1 F | WEIGHT: 270 LBS | OXYGEN SATURATION: 95 % | BODY MASS INDEX: 42.38 KG/M2

## 2019-03-20 DIAGNOSIS — E04.2 MULTIPLE THYROID NODULES: Primary | ICD-10-CM

## 2019-03-20 DIAGNOSIS — F41.9 ANXIETY: ICD-10-CM

## 2019-03-20 DIAGNOSIS — I10 ESSENTIAL HYPERTENSION: ICD-10-CM

## 2019-03-20 DIAGNOSIS — F39 MOOD DISORDER (HCC): ICD-10-CM

## 2019-03-20 DIAGNOSIS — E11.9 TYPE 2 DIABETES MELLITUS WITHOUT COMPLICATION, UNSPECIFIED WHETHER LONG TERM INSULIN USE (HCC): ICD-10-CM

## 2019-03-20 RX ORDER — GABAPENTIN 400 MG/1
CAPSULE ORAL
Qty: 30 CAP | Refills: 1 | Status: SHIPPED | OUTPATIENT
Start: 2019-03-20 | End: 2019-04-23 | Stop reason: SDUPTHER

## 2019-03-20 NOTE — PROGRESS NOTES
Chief Complaint   Patient presents with    Thyroid Problem     wants to discuss thyroid surgery     1. Have you been to the ER, urgent care clinic since your last visit? Hospitalized since your last visit? No    2. Have you seen or consulted any other health care providers outside of the [address] [place] [address] since your last visit? Include any pap smears or colon screening. Yes, Dr. Alie Tompkins and Dr. Randolph Garcia comes in for f/u care. Patient has diabetes mellitus type 2. Her blood glucose numbers have recently been elevated. States that the she has been taking some of her medication but not taking the evening medications due to stress. Blood glucose numbers sometimes up in the 200s. Advised that she does need to stay compliant with her treatment plan. She is followed up by the endocrinologist.  He is on Lantus and Jardiance. Patient has hypertension. Her blood pressure is stable. She takes metoprolol. We will continue with this medication. She does have a history of chronic lung disease and the previous x-ray did show some atelectasis. She is worried about this getting worse. Has anxiety about the same. I did try and reassure her. She has been followed up by the pulmonologist.  She will continue with her inhaler medications. She denies hemoptysis. She does have occasional wheeze and shortness of breath. Patient has a history of hypothyroidism and in the past did have Hashimoto's thyroiditis. He has multiple thyroid nodules and has been seen by Dr. Alie Tompkins. Recommended to have thyroidectomy. She has been looking at various films on YouTube and what she has seen has scared her. She is tearful about what might happen. This is basically anxiety. I did discuss with her and try to reassure her. She is on Cymbalta and this he takes for mood disorder. She has been taking her medication.   Discussed the need to contact the specialist in case she has any doubts with the procedure. He does not have any doubts but just feels anxious about it. We talked at length and she will follow-up with the surgeon to discuss the procedure further.     Past Medical History  Past Medical History:   Diagnosis Date    Arthritis     Lower back     Asthma     Chronic back pain     Lower back pain    Depression     Diabetes ([address]     Diabetes mellitus ([address]     Hearing loss     Left ear hearing loss     pt states nerve damage--- 25% hearing loss, described as \"muffled\"    Memory difficulty     Panic attacks     Ringing of ears     Severe headache     Sleep apnea     SOB (shortness of breath) on exertion     w and w/out exertion    Stomach pain     Thyroid disease     Vertigo        Surgical History  Past Surgical History:   Procedure Laterality Date    CARDIAC SURG PROCEDURE UNLIST  10/31/2013    open heart    HX HEART VALVE SURGERY  2013    aortic valve repair    HX HYSTERECTOMY      Partial Hysterectomy - removed ovary    HX MYOMECTOMY      HX ORTHOPAEDIC  02/2018    had nerves burned on her right side of back        Medications  Current Outpatient Medications   Medication Sig Dispense Refill    LANTUS U-100 INSULIN 100 unit/mL injection inject 90 units subcutaneously at bedtime 30 mL 0    VITAMIN D2 50,000 unit capsule take 1 capsule by mouth every week 4 Cap 2    levothyroxine (SYNTHROID) 25 mcg tablet take 1 tablet by mouth every morning BEFORE BREAKFAST 30 Tab 2    meclizine (ANTIVERT) 12.5 mg tablet take 1 tablet by mouth three times a day if needed for 10 days 30 Tab 2    metoprolol succinate (TOPROL-XL) 50 mg XL tablet take 1 tablet by mouth once daily 30 Tab 0    DULoxetine (CYMBALTA) 60 mg capsule take 1 capsule by mouth once daily 30 Cap 0    SYMBICORT 160-4.5 mcg/actuation HFAA inhale 2 puffs by mouth twice a day RINSE MOUTH AFTER USE 1 Inhaler 0    omega 3-DHA-EPA-fish oil 1,000 mg (120 mg-180 mg) capsule take 1 capsule by mouth twice a day 60 Cap 0    PROAIR HFA 90 mcg/actuation inhaler inhale 2 puffs by mouth every 4 hours if needed for wheezing 1 Inhaler 3    gabapentin (NEURONTIN) 400 mg capsule take 1 capsule by mouth three times a day 30 Cap 1    atorvastatin (LIPITOR) 80 mg tablet take 1 tablet by mouth once daily 30 Tab 3    BD INSULIN SYRINGE ULTRA-FINE 1 mL 31 gauge x 5/16 syrg use as directed twice a day 200 Syringe 3    furosemide (LASIX) 40 mg tablet Take 1 Tab by mouth two (2) times a day. (Patient taking differently: Take 40 mg by mouth daily.) 60 Tab 0    potassium chloride (KLOR-CON) 10 mEq tablet Take 1 Tab by mouth daily. 30 Tab 0    empagliflozin (JARDIANCE) 25 mg tablet Take  by mouth daily.  FREESTYLE LITE STRIPS strip TEST twice a day as directed 100 Strip 11    albuterol (PROVENTIL VENTOLIN) 2.5 mg /3 mL (0.083 %) nebulizer solution 3 mL by Nebulization route every four (4) hours as needed for Wheezing. 24 Each 5    cyanocobalamin (VITAMIN B-12) 1,000 mcg sublingual tablet Take 1,000 mcg by mouth daily.  Aspirin, Buffered 81 mg tab Take  by mouth.  primidone (MYSOLINE) 50 mg tablet Take 1 Tab by mouth three (3) times daily. 90 Tab 5       Allergies  Allergies   Allergen Reactions    Other Food Other (comments)     Sweeteners- causes headaches    Metformin Other (comments)     Increase pain in feet and swelling in feet  Increased hunger,tired and bilat foot pain    Morphine Other (comments)     headache    Singulair [Montelukast] Other (comments)     hallucinations    Sucrose Other (comments)     Pt. Is not allergic to sucrose. She develops headaches with artificial sweeteners.        Family History  Family History   Problem Relation Age of Onset    Heart Disease Mother     Diabetes Mother     Stroke Mother     Hypertension Mother     Diabetes Father     Heart Disease Father     Hypertension Father     Stroke Father     Diabetes Brother     Cancer Brother     Hypertension Brother     Stroke Brother  Heart Disease Brother     Diabetes Brother     Hypertension Brother     Stroke Brother     Heart Disease Brother     Diabetes Brother     Hypertension Brother     Hypertension Brother        Social History  Social History     Socioeconomic History    Marital status:      Spouse name: Not on file    Number of children: Not on file    Years of education: Not on file    Highest education level: Not on file   Occupational History    Not on file   Social Needs    Financial resource strain: Not on file    Food insecurity:     Worry: Not on file     Inability: Not on file    Transportation needs:     Medical: Not on file     Non-medical: Not on file   Tobacco Use    Smoking status: Never Smoker    Smokeless tobacco: Never Used   Substance and Sexual Activity    Alcohol use: No    Drug use: No    Sexual activity: Not Currently   Lifestyle    Physical activity:     Days per week: Not on file     Minutes per session: Not on file    Stress: Not on file   Relationships    Social connections:     Talks on phone: Not on file     Gets together: Not on file     Attends [employer] service: Not on file     Active member of club or organization: Not on file     Attends meetings of clubs or organizations: Not on file     Relationship status: Not on file    Intimate partner violence:     Fear of current or ex partner: Not on file     Emotionally abused: Not on file     Physically abused: Not on file     Forced sexual activity: Not on file   Other Topics Concern     Service No    Blood Transfusions No    Caffeine Concern No    Occupational Exposure No    Hobby Hazards No    Sleep Concern Yes     Comment: Sleep apnea     Stress Concern Yes     Comment: Due to family medical issues.      Weight Concern No    Special Diet No    Back Care Yes     Comment: Patient tries to do back care with streches     Exercise No    Bike Helmet Yes    Seat Belt Yes    Self-Exams Yes   Social History Narrative    Not on file       Review of Systems  Review of Systems -review of all systems is negative except as noted above in the HPI. Vital Signs  Visit Vitals  /68 (BP 1 Location: Left arm, BP Patient Position: Sitting)   Pulse 83   Temp 97.1 °F (36.2 °C) (Oral)   Resp 20   Ht 5' 7\" (1.702 m)   Wt 270 lb (122.5 kg)   SpO2 95%   BMI 42.29 kg/m²         Physical Exam  Physical Examination: General appearance - oriented to person, place, and time, overweight, acyanotic, in no respiratory distress, anxious and crying  Mental status - anxious  Nose - normal and patent, no erythema, discharge or polyps  Mouth - mucous membranes moist, pharynx normal without lesions  Neck - thyroid exam: multinodular goiter  Lymphatics - no palpable lymphadenopathy  Chest - decreased air entry noted bilateral lung bases  Heart - S1 and S2 normal  Neurological - neck supple without rigidity, normal muscle tone, no tremors, strength 5/5  Musculoskeletal - no muscular tenderness noted  Extremities - intact peripheral pulses    Results  Results for orders placed or performed in visit on 02/26/19   CBC WITH AUTOMATED DIFF   Result Value Ref Range    WBC 6.3 3.4 - 10.8 x10E3/uL    RBC 5.13 3.77 - 5.28 x10E6/uL    HGB 15.6 11.1 - 15.9 g/dL    HCT 46.6 34.0 - 46.6 %    MCV 91 79 - 97 fL    MCH 30.4 26.6 - 33.0 pg    MCHC 33.5 31.5 - 35.7 g/dL    RDW 15.8 (H) 12.3 - 15.4 %    PLATELET 732 805 - 830 x10E3/uL    NEUTROPHILS 55 Not Estab. %    Lymphocytes 34 Not Estab. %    MONOCYTES 8 Not Estab. %    EOSINOPHILS 1 Not Estab. %    BASOPHILS 1 Not Estab. %    ABS. NEUTROPHILS 3.5 1.4 - 7.0 x10E3/uL    Abs Lymphocytes 2.1 0.7 - 3.1 x10E3/uL    ABS. MONOCYTES 0.5 0.1 - 0.9 x10E3/uL    ABS. EOSINOPHILS 0.1 0.0 - 0.4 x10E3/uL    ABS. BASOPHILS 0.0 0.0 - 0.2 x10E3/uL    IMMATURE GRANULOCYTES 1 Not Estab. %    ABS. IMM.  GRANS. 0.0 0.0 - 0.1 [id number]   METABOLIC PANEL, COMPREHENSIVE   Result Value Ref Range    Glucose 149 (H) 65 - 99 mg/dL BUN 14 6 - 24 mg/dL    Creatinine 0.69 0.57 - 1.00 mg/dL    GFR est non-AA 97 >59 mL/min/1.73    GFR est  >59 mL/min/1.73    BUN/Creatinine ratio 20 9 - 23    Sodium 144 134 - 144 mmol/L    Potassium 4.6 3.5 - 5.2 mmol/L    Chloride 103 96 - 106 mmol/L    CO2 23 20 - 29 mmol/L    Calcium 9.2 8.7 - 10.2 mg/dL    Protein, total 7.2 6.0 - 8.5 g/dL    Albumin 4.1 3.5 - 5.5 g/dL    GLOBULIN, TOTAL 3.1 1.5 - 4.5 g/dL    A-G Ratio 1.3 1.2 - 2.2    Bilirubin, total 0.4 0.0 - 1.2 mg/dL    Alk. phosphatase 97 39 - 117 IU/L    AST (SGOT) 14 0 - 40 IU/L    ALT (SGPT) 22 0 - 32 IU/L   LIPID PANEL   Result Value Ref Range    Cholesterol, total 267 (H) 100 - 199 mg/dL    Triglyceride 804 (HH) 0 - 149 mg/dL    HDL Cholesterol 32 (L) >39 mg/dL    VLDL, calculated Comment 5 - 40 mg/dL    LDL, calculated Comment 0 - 99 mg/dL   T4, FREE   Result Value Ref Range    T4, Free 1.15 0.82 - 1.77 ng/dL   TSH 3RD GENERATION   Result Value Ref Range    TSH 1.910 0.450 - 4.500 uIU/mL   CVD REPORT   Result Value Ref Range    INTERPRETATION Note    AMB POC HEMOGLOBIN A1C   Result Value Ref Range    Hemoglobin A1c (POC) 8.7 %   AMB POC URINE, MICROALBUMIN, SEMIQUANTITATIVE   Result Value Ref Range    Microalbumin urine (POC) 30 MG/L    Microalbumin/creat ratio (POC)  <30 MG/G       ASSESSMENT and PLAN    ICD-10-CM ICD-9-CM    1. Multiple thyroid nodules E04.2 241.1    2. Mood disorder (HCC) F39 296.90    3. Anxiety F41.9 300.00    4. Type 2 diabetes mellitus without complication, unspecified whether long term insulin use (HCC) E11.9 250.00    5. Essential hypertension I10 401.9      reviewed diet, exercise and weight control  reviewed medications and side effects in detail  radiology results and schedule of future radiology studies reviewed with patient    I have discussed the diagnosis with the patient and the intended plan of care as seen in the above orders.  The patient has received an after-visit summary and questions were answered concerning future plans. I have discussed medication, side effects, and warnings with the patient in detail. The patient verbalized understanding and is in agreement with the plan of care. The patient will contact the office with any additional concerns. More than 50% of visit spent counseling and coordinating care with patient face to face on anxiety, mood disorder. Patient also discussed what she thought of having her thyroid removed and I talked to her also about this. Did reassure her about the procedure. Discussed need for compliance with treatment regimen, follow-up, and taking responsibility for her disease process.     Cole Meyers MD

## 2019-04-01 ENCOUNTER — OFFICE VISIT (OUTPATIENT)
Dept: SURGERY | Age: 59
End: 2019-04-01

## 2019-04-01 ENCOUNTER — HOSPITAL ENCOUNTER (OUTPATIENT)
Dept: PREADMISSION TESTING | Age: 59
Discharge: HOME | DRG: 404 | End: 2019-04-01
Payer: MEDICAID

## 2019-04-01 VITALS
SYSTOLIC BLOOD PRESSURE: 138 MMHG | RESPIRATION RATE: 18 BRPM | HEART RATE: 80 BPM | DIASTOLIC BLOOD PRESSURE: 80 MMHG | HEIGHT: 67 IN | BODY MASS INDEX: 42.38 KG/M2 | OXYGEN SATURATION: 96 % | WEIGHT: 270 LBS

## 2019-04-01 DIAGNOSIS — E04.9 GOITER: Primary | ICD-10-CM

## 2019-04-01 DIAGNOSIS — E04.9 GOITER: ICD-10-CM

## 2019-04-01 LAB
ANION GAP SERPL CALC-SCNC: 12 MMOL/L (ref 3–18)
BASOPHILS # BLD: 0.1 K/UL (ref 0–0.06)
BASOPHILS NFR BLD: 1 % (ref 0–3)
BUN SERPL-MCNC: 19 MG/DL (ref 7–18)
BUN/CREAT SERPL: 29 (ref 12–20)
CALCIUM SERPL-MCNC: 7.9 MG/DL (ref 8.5–10.1)
CHLORIDE SERPL-SCNC: 95 MMOL/L (ref 100–108)
CO2 SERPL-SCNC: 24 MMOL/L (ref 21–32)
CREAT SERPL-MCNC: 0.65 MG/DL (ref 0.6–1.3)
DIFFERENTIAL METHOD BLD: ABNORMAL
EOSINOPHIL # BLD: 0.1 K/UL (ref 0–0.4)
EOSINOPHIL NFR BLD: 1 % (ref 0–5)
ERYTHROCYTE [DISTWIDTH] IN BLOOD BY AUTOMATED COUNT: 15 % (ref 11.6–14.5)
GLUCOSE SERPL-MCNC: 311 MG/DL (ref 74–99)
HCT VFR BLD AUTO: 48.4 % (ref 35–45)
HGB BLD-MCNC: 16.8 G/DL (ref 12–16)
LYMPHOCYTES # BLD: 2.6 K/UL (ref 0.8–3.5)
LYMPHOCYTES NFR BLD: 38 % (ref 20–51)
MCH RBC QN AUTO: 31.1 PG (ref 24–34)
MCHC RBC AUTO-ENTMCNC: 34.7 G/DL (ref 31–37)
MCV RBC AUTO: 89.6 FL (ref 74–97)
MONOCYTES # BLD: 0.4 K/UL (ref 0–1)
MONOCYTES NFR BLD: 6 % (ref 2–9)
NEUTS SEG # BLD: 3.7 K/UL (ref 1.8–8)
NEUTS SEG NFR BLD: 54 % (ref 42–75)
PLATELET # BLD AUTO: 220 K/UL (ref 135–420)
PLATELET COMMENTS,PCOM: ABNORMAL
PMV BLD AUTO: 10 FL (ref 9.2–11.8)
POTASSIUM SERPL-SCNC: 4.6 MMOL/L (ref 3.5–5.5)
RBC # BLD AUTO: 5.4 M/UL (ref 4.2–5.3)
RBC MORPH BLD: ABNORMAL
SODIUM SERPL-SCNC: 131 MMOL/L (ref 136–145)
WBC # BLD AUTO: 6.9 K/UL (ref 4.6–13.2)

## 2019-04-01 PROCEDURE — 85025 COMPLETE CBC W/AUTO DIFF WBC: CPT

## 2019-04-01 PROCEDURE — 80048 BASIC METABOLIC PNL TOTAL CA: CPT

## 2019-04-01 PROCEDURE — 36415 COLL VENOUS BLD VENIPUNCTURE: CPT

## 2019-04-01 NOTE — PROGRESS NOTES
Chief Complaint   Patient presents with    Follow-up     schedule total thyroidectomy     1. Have you been to the ER, urgent care clinic since your last visit? Hospitalized since your last visit? No    2. Have you seen or consulted any other health care providers outside of the [address] [place] [address] since your last visit? Include any pap smears or colon screening.  No

## 2019-04-01 NOTE — H&P (VIEW-ONLY)
Progress Note    Patient: Andrés Alcazar  MRN: [id number]  SSN: [id number]   YOB: 1960  Age: 63 y.o. Sex: female     Chief Complaint   Patient presents with    Follow-up     schedule total thyroidectomy       HPI    Karyn Negro returns to schedule her total thyroidectomy. She has bilateral multiple calcified nodules highly suspicious for malignancy. These have been biopsied in the past and found to be benign. Nevertheless I think they are at high risk. She understands all this we have again gone over the thyroid surgery as well as the risks of recurrent nerve injury etc. she understands and is anxious to proceed. She has been cleared by cardiology with an ejection fraction of 60%. Past Medical History:   Diagnosis Date    Arthritis     Lower back     Asthma     Chronic back pain     Lower back pain    Depression     Diabetes ([address]     Diabetes mellitus ([address]     Hearing loss     Left ear hearing loss     pt states nerve damage--- 25% hearing loss, described as \"muffled\"    Memory difficulty     Panic attacks     Ringing of ears     Severe headache     Sleep apnea     SOB (shortness of breath) on exertion     w and w/out exertion    Stomach pain     Thyroid disease     Vertigo      Past Surgical History:   Procedure Laterality Date    CARDIAC SURG PROCEDURE UNLIST  10/31/2013    open heart    HX HEART VALVE SURGERY  2013    aortic valve repair    HX HYSTERECTOMY      Partial Hysterectomy - removed ovary    HX MYOMECTOMY      HX ORTHOPAEDIC  02/2018    had nerves burned on her right side of back     Allergies   Allergen Reactions    Other Food Other (comments)     Sweeteners- causes headaches    Metformin Other (comments)     Increase pain in feet and swelling in feet  Increased hunger,tired and bilat foot pain    Morphine Other (comments)     headache    Singulair [Montelukast] Other (comments)     hallucinations    Sucrose Other (comments)     Pt.  Is not allergic to sucrose. She develops headaches with artificial sweeteners. Current Outpatient Medications   Medication Sig Dispense Refill    gabapentin (NEURONTIN) 400 mg capsule take 1 capsule by mouth three times a day 30 Cap 1    LANTUS U-100 INSULIN 100 unit/mL injection inject 90 units subcutaneously at bedtime 30 mL 0    VITAMIN D2 50,000 unit capsule take 1 capsule by mouth every week 4 Cap 2    levothyroxine (SYNTHROID) 25 mcg tablet take 1 tablet by mouth every morning BEFORE BREAKFAST 30 Tab 2    meclizine (ANTIVERT) 12.5 mg tablet take 1 tablet by mouth three times a day if needed for 10 days 30 Tab 2    metoprolol succinate (TOPROL-XL) 50 mg XL tablet take 1 tablet by mouth once daily 30 Tab 0    DULoxetine (CYMBALTA) 60 mg capsule take 1 capsule by mouth once daily 30 Cap 0    SYMBICORT 160-4.5 mcg/actuation HFAA inhale 2 puffs by mouth twice a day RINSE MOUTH AFTER USE 1 Inhaler 0    omega 3-DHA-EPA-fish oil 1,000 mg (120 mg-180 mg) capsule take 1 capsule by mouth twice a day 60 Cap 0    PROAIR HFA 90 mcg/actuation inhaler inhale 2 puffs by mouth every 4 hours if needed for wheezing 1 Inhaler 3    primidone (MYSOLINE) 50 mg tablet Take 1 Tab by mouth three (3) times daily. 90 Tab 5    atorvastatin (LIPITOR) 80 mg tablet take 1 tablet by mouth once daily 30 Tab 3    BD INSULIN SYRINGE ULTRA-FINE 1 mL 31 gauge x 5/16 syrg use as directed twice a day 200 Syringe 3    furosemide (LASIX) 40 mg tablet Take 1 Tab by mouth two (2) times a day. (Patient taking differently: Take 40 mg by mouth daily.) 60 Tab 0    potassium chloride (KLOR-CON) 10 mEq tablet Take 1 Tab by mouth daily. 30 Tab 0    empagliflozin (JARDIANCE) 25 mg tablet Take  by mouth daily.  FREESTYLE LITE STRIPS strip TEST twice a day as directed 100 Strip 11    albuterol (PROVENTIL VENTOLIN) 2.5 mg /3 mL (0.083 %) nebulizer solution 3 mL by Nebulization route every four (4) hours as needed for Wheezing.  24 Each 5    cyanocobalamin (VITAMIN B-12) 1,000 mcg sublingual tablet Take 1,000 mcg by mouth daily.  Aspirin, Buffered 81 mg tab Take  by mouth. Social History     Socioeconomic History    Marital status:      Spouse name: Not on file    Number of children: Not on file    Years of education: Not on file    Highest education level: Not on file   Occupational History    Not on file   Social Needs    Financial resource strain: Not on file    Food insecurity:     Worry: Not on file     Inability: Not on file    Transportation needs:     Medical: Not on file     Non-medical: Not on file   Tobacco Use    Smoking status: Never Smoker    Smokeless tobacco: Never Used   Substance and Sexual Activity    Alcohol use: No    Drug use: No    Sexual activity: Not Currently   Lifestyle    Physical activity:     Days per week: Not on file     Minutes per session: Not on file    Stress: Not on file   Relationships    Social connections:     Talks on phone: Not on file     Gets together: Not on file     Attends Holiness service: Not on file     Active member of club or organization: Not on file     Attends meetings of clubs or organizations: Not on file     Relationship status: Not on file    Intimate partner violence:     Fear of current or ex partner: Not on file     Emotionally abused: Not on file     Physically abused: Not on file     Forced sexual activity: Not on file   Other Topics Concern     Service No    Blood Transfusions No    Caffeine Concern No    Occupational Exposure No    Hobby Hazards No    Sleep Concern Yes     Comment: Sleep apnea     Stress Concern Yes     Comment: Due to family medical issues.      Weight Concern No    Special Diet No    Back Care Yes     Comment: Patient tries to do back care with streches     Exercise No    Bike Helmet Yes    Seat Belt Yes    Self-Exams Yes   Social History Narrative    Not on file     Family History   Problem Relation Age of Onset    Heart Disease Mother     Diabetes Mother     Stroke Mother     Hypertension Mother     Diabetes Father     Heart Disease Father     Hypertension Father     Stroke Father     Diabetes Brother     Cancer Brother     Hypertension Brother     Stroke Brother     Heart Disease Brother     Diabetes Brother     Hypertension Brother     Stroke Brother     Heart Disease Brother     Diabetes Brother     Hypertension Brother     Hypertension Brother          Review of systems:  Patient denies any reflux, emesis, abdominal pain, change in bowel habits, hematochezia, melena, fever, weight loss, fatigue chills, dermatitis, abnormal moles, change in vision, vertigo, epistaxis, dysphagia, hoarseness, chest pain, palpitations, hypertension, edema, cough, shortness of breath, wheezing, hemoptysis, snoring, hematuria, diabetes, thyroid disease, anemia, bruising, history of blood transfusion, dizziness, headache, or fainting.     Physical Examination    Well developed well nourished female in no apparent distress  Visit Vitals  /80   Pulse 80   Resp 18   Ht 5' 7\" (1.702 m)   Wt 122.5 kg (270 lb)   SpO2 96%   BMI 42.29 kg/m²      Head: normocephalic, atraumatic  Mouth: Clear, no overt lesions, oral mucosa pink and moist  Neck: supple, no masses, no adenopathy or carotid bruits, trachea midline  Resp: clear to auscultation bilaterally, no wheeze, rhonchi or rales, excursions normal and symmetrical  Cardio: Regular rate and rhythm, no murmurs, clicks, gallops or rubs, no edema or varicosities  Abdomen: soft, nontender, nondistended, normoactive bowel sounds, no hernias, no hepatosplenomegaly,   Back: Deferred  Extremeties: warm, well-perfused, no tenderness or swelling, normal gait/station  Neuro: sensation and strength grossly intact and symmetrical  Psych: alert and oriented to person, place and time  Breast exam deferred    IMPRESSION  Bilateral multiple calcified nodules    PLAN  No orders of the defined types were placed in this encounter.     Total thyroidectomy  Silvino Burnett MD

## 2019-04-01 NOTE — PROGRESS NOTES
Progress Note    Patient: Nick Law  MRN: [id number]  SSN: [id number]   YOB: 1960  Age: 63 y.o. Sex: female     Chief Complaint   Patient presents with    Follow-up     schedule total thyroidectomy       HPI    Hector Veras returns to schedule her total thyroidectomy. She has bilateral multiple calcified nodules highly suspicious for malignancy. These have been biopsied in the past and found to be benign. Nevertheless I think they are at high risk. She understands all this we have again gone over the thyroid surgery as well as the risks of recurrent nerve injury etc. she understands and is anxious to proceed. She has been cleared by cardiology with an ejection fraction of 60%. Past Medical History:   Diagnosis Date    Arthritis     Lower back     Asthma     Chronic back pain     Lower back pain    Depression     Diabetes ([address]     Diabetes mellitus ([address]     Hearing loss     Left ear hearing loss     pt states nerve damage--- 25% hearing loss, described as \"muffled\"    Memory difficulty     Panic attacks     Ringing of ears     Severe headache     Sleep apnea     SOB (shortness of breath) on exertion     w and w/out exertion    Stomach pain     Thyroid disease     Vertigo      Past Surgical History:   Procedure Laterality Date    CARDIAC SURG PROCEDURE UNLIST  10/31/2013    open heart    HX HEART VALVE SURGERY  2013    aortic valve repair    HX HYSTERECTOMY      Partial Hysterectomy - removed ovary    HX MYOMECTOMY      HX ORTHOPAEDIC  02/2018    had nerves burned on her right side of back     Allergies   Allergen Reactions    Other Food Other (comments)     Sweeteners- causes headaches    Metformin Other (comments)     Increase pain in feet and swelling in feet  Increased hunger,tired and bilat foot pain    Morphine Other (comments)     headache    Singulair [Montelukast] Other (comments)     hallucinations    Sucrose Other (comments)     Pt.  Is not allergic to sucrose. She develops headaches with artificial sweeteners. Current Outpatient Medications   Medication Sig Dispense Refill    gabapentin (NEURONTIN) 400 mg capsule take 1 capsule by mouth three times a day 30 Cap 1    LANTUS U-100 INSULIN 100 unit/mL injection inject 90 units subcutaneously at bedtime 30 mL 0    VITAMIN D2 50,000 unit capsule take 1 capsule by mouth every week 4 Cap 2    levothyroxine (SYNTHROID) 25 mcg tablet take 1 tablet by mouth every morning BEFORE BREAKFAST 30 Tab 2    meclizine (ANTIVERT) 12.5 mg tablet take 1 tablet by mouth three times a day if needed for 10 days 30 Tab 2    metoprolol succinate (TOPROL-XL) 50 mg XL tablet take 1 tablet by mouth once daily 30 Tab 0    DULoxetine (CYMBALTA) 60 mg capsule take 1 capsule by mouth once daily 30 Cap 0    SYMBICORT 160-4.5 mcg/actuation HFAA inhale 2 puffs by mouth twice a day RINSE MOUTH AFTER USE 1 Inhaler 0    omega 3-DHA-EPA-fish oil 1,000 mg (120 mg-180 mg) capsule take 1 capsule by mouth twice a day 60 Cap 0    PROAIR HFA 90 mcg/actuation inhaler inhale 2 puffs by mouth every 4 hours if needed for wheezing 1 Inhaler 3    primidone (MYSOLINE) 50 mg tablet Take 1 Tab by mouth three (3) times daily. 90 Tab 5    atorvastatin (LIPITOR) 80 mg tablet take 1 tablet by mouth once daily 30 Tab 3    BD INSULIN SYRINGE ULTRA-FINE 1 mL 31 gauge x 5/16 syrg use as directed twice a day 200 Syringe 3    furosemide (LASIX) 40 mg tablet Take 1 Tab by mouth two (2) times a day. (Patient taking differently: Take 40 mg by mouth daily.) 60 Tab 0    potassium chloride (KLOR-CON) 10 mEq tablet Take 1 Tab by mouth daily. 30 Tab 0    empagliflozin (JARDIANCE) 25 mg tablet Take  by mouth daily.  FREESTYLE LITE STRIPS strip TEST twice a day as directed 100 Strip 11    albuterol (PROVENTIL VENTOLIN) 2.5 mg /3 mL (0.083 %) nebulizer solution 3 mL by Nebulization route every four (4) hours as needed for Wheezing.  24 Each 5    cyanocobalamin (VITAMIN B-12) 1,000 mcg sublingual tablet Take 1,000 mcg by mouth daily.  Aspirin, Buffered 81 mg tab Take  by mouth. Social History     Socioeconomic History    Marital status:      Spouse name: Not on file    Number of children: Not on file    Years of education: Not on file    Highest education level: Not on file   Occupational History    Not on file   Social Needs    Financial resource strain: Not on file    Food insecurity:     Worry: Not on file     Inability: Not on file    Transportation needs:     Medical: Not on file     Non-medical: Not on file   Tobacco Use    Smoking status: Never Smoker    Smokeless tobacco: Never Used   Substance and Sexual Activity    Alcohol use: No    Drug use: No    Sexual activity: Not Currently   Lifestyle    Physical activity:     Days per week: Not on file     Minutes per session: Not on file    Stress: Not on file   Relationships    Social connections:     Talks on phone: Not on file     Gets together: Not on file     Attends Zoroastrian service: Not on file     Active member of club or organization: Not on file     Attends meetings of clubs or organizations: Not on file     Relationship status: Not on file    Intimate partner violence:     Fear of current or ex partner: Not on file     Emotionally abused: Not on file     Physically abused: Not on file     Forced sexual activity: Not on file   Other Topics Concern     Service No    Blood Transfusions No    Caffeine Concern No    Occupational Exposure No    Hobby Hazards No    Sleep Concern Yes     Comment: Sleep apnea     Stress Concern Yes     Comment: Due to family medical issues.      Weight Concern No    Special Diet No    Back Care Yes     Comment: Patient tries to do back care with streches     Exercise No    Bike Helmet Yes    Seat Belt Yes    Self-Exams Yes   Social History Narrative    Not on file     Family History   Problem Relation Age of Onset    Heart Disease Mother     Diabetes Mother     Stroke Mother     Hypertension Mother     Diabetes Father     Heart Disease Father     Hypertension Father     Stroke Father     Diabetes Brother     Cancer Brother     Hypertension Brother     Stroke Brother     Heart Disease Brother     Diabetes Brother     Hypertension Brother     Stroke Brother     Heart Disease Brother     Diabetes Brother     Hypertension Brother     Hypertension Brother          Review of systems:  Patient denies any reflux, emesis, abdominal pain, change in bowel habits, hematochezia, melena, fever, weight loss, fatigue chills, dermatitis, abnormal moles, change in vision, vertigo, epistaxis, dysphagia, hoarseness, chest pain, palpitations, hypertension, edema, cough, shortness of breath, wheezing, hemoptysis, snoring, hematuria, diabetes, thyroid disease, anemia, bruising, history of blood transfusion, dizziness, headache, or fainting.     Physical Examination    Well developed well nourished female in no apparent distress  Visit Vitals  /80   Pulse 80   Resp 18   Ht 5' 7\" (1.702 m)   Wt 122.5 kg (270 lb)   SpO2 96%   BMI 42.29 kg/m²      Head: normocephalic, atraumatic  Mouth: Clear, no overt lesions, oral mucosa pink and moist  Neck: supple, no masses, no adenopathy or carotid bruits, trachea midline  Resp: clear to auscultation bilaterally, no wheeze, rhonchi or rales, excursions normal and symmetrical  Cardio: Regular rate and rhythm, no murmurs, clicks, gallops or rubs, no edema or varicosities  Abdomen: soft, nontender, nondistended, normoactive bowel sounds, no hernias, no hepatosplenomegaly,   Back: Deferred  Extremeties: warm, well-perfused, no tenderness or swelling, normal gait/station  Neuro: sensation and strength grossly intact and symmetrical  Psych: alert and oriented to person, place and time  Breast exam deferred    IMPRESSION  Bilateral multiple calcified nodules    PLAN  No orders of the defined types were placed in this encounter.     Total thyroidectomy  Anais Scott MD

## 2019-04-03 ENCOUNTER — ANESTHESIA EVENT (OUTPATIENT)
Dept: SURGERY | Age: 59
DRG: 404 | End: 2019-04-03
Payer: MEDICAID

## 2019-04-04 ENCOUNTER — ANESTHESIA (OUTPATIENT)
Dept: SURGERY | Age: 59
DRG: 404 | End: 2019-04-04
Payer: MEDICAID

## 2019-04-04 ENCOUNTER — HOSPITAL ENCOUNTER (INPATIENT)
Age: 59
LOS: 12 days | Discharge: HOME | DRG: 404 | End: 2019-04-16
Payer: MEDICAID

## 2019-04-04 ENCOUNTER — APPOINTMENT (OUTPATIENT)
Dept: GENERAL RADIOLOGY | Age: 59
DRG: 404 | End: 2019-04-04
Attending: PHYSICIAN ASSISTANT
Payer: MEDICAID

## 2019-04-04 DIAGNOSIS — E89.0 S/P THYROIDECTOMY: Primary | ICD-10-CM

## 2019-04-04 LAB
ANION GAP SERPL CALC-SCNC: 13 MMOL/L (ref 3–18)
ARTERIAL PATENCY WRIST A: YES
BASE DEFICIT BLD-SCNC: 3 MMOL/L
BASOPHILS # BLD: 0 K/UL (ref 0–0.1)
BASOPHILS NFR BLD: 0 % (ref 0–2)
BDY SITE: ABNORMAL
BUN SERPL-MCNC: 14 MG/DL (ref 7–18)
BUN/CREAT SERPL: 34 (ref 12–20)
CA-I SERPL-SCNC: 0.88 MMOL/L (ref 1.12–1.32)
CALCIUM SERPL-MCNC: 8 MG/DL (ref 8.5–10.1)
CHLORIDE SERPL-SCNC: 103 MMOL/L (ref 100–108)
CO2 SERPL-SCNC: 21 MMOL/L (ref 21–32)
CREAT SERPL-MCNC: 0.41 MG/DL (ref 0.6–1.3)
DIFFERENTIAL METHOD BLD: ABNORMAL
EOSINOPHIL # BLD: 0 K/UL (ref 0–0.4)
EOSINOPHIL NFR BLD: 0 % (ref 0–5)
ERYTHROCYTE [DISTWIDTH] IN BLOOD BY AUTOMATED COUNT: 15.2 % (ref 11.6–14.5)
EST. AVERAGE GLUCOSE BLD GHB EST-MCNC: 194 MG/DL
GAS FLOW.O2 O2 DELIVERY SYS: ABNORMAL L/MIN
GAS FLOW.O2 SETTING OXYMISER: 14 BPM
GLUCOSE BLD STRIP.AUTO-MCNC: 204 MG/DL (ref 70–110)
GLUCOSE BLD STRIP.AUTO-MCNC: 214 MG/DL (ref 70–110)
GLUCOSE BLD STRIP.AUTO-MCNC: 230 MG/DL (ref 70–110)
GLUCOSE SERPL-MCNC: 226 MG/DL (ref 74–99)
HBA1C MFR BLD: 8.4 % (ref 4.2–5.6)
HCO3 BLD-SCNC: 23.3 MMOL/L (ref 22–26)
HCT VFR BLD AUTO: 44.4 % (ref 35–45)
HGB BLD-MCNC: 15.4 G/DL (ref 12–16)
INSPIRATION.DURATION SETTING TIME VENT: 1 SEC
LYMPHOCYTES # BLD: 0.9 K/UL (ref 0.9–3.6)
LYMPHOCYTES NFR BLD: 9 % (ref 21–52)
MAGNESIUM SERPL-MCNC: 2.2 MG/DL (ref 1.6–2.6)
MCH RBC QN AUTO: 31.2 PG (ref 24–34)
MCHC RBC AUTO-ENTMCNC: 34.7 G/DL (ref 31–37)
MCV RBC AUTO: 90.1 FL (ref 74–97)
MONOCYTES # BLD: 0.2 K/UL (ref 0.05–1.2)
MONOCYTES NFR BLD: 3 % (ref 3–10)
NEUTS SEG # BLD: 8.2 K/UL (ref 1.8–8)
NEUTS SEG NFR BLD: 88 % (ref 40–73)
O2/TOTAL GAS SETTING VFR VENT: 50 %
PCO2 BLD: 45.8 MMHG (ref 35–45)
PEEP RESPIRATORY: 5 CMH2O
PH BLD: 7.31 [PH] (ref 7.35–7.45)
PHOSPHATE SERPL-MCNC: 3.7 MG/DL (ref 2.5–4.9)
PLATELET # BLD AUTO: 234 K/UL (ref 135–420)
PMV BLD AUTO: 11.1 FL (ref 9.2–11.8)
PO2 BLD: 165 MMHG (ref 80–100)
POTASSIUM SERPL-SCNC: 3.9 MMOL/L (ref 3.5–5.5)
RBC # BLD AUTO: 4.93 M/UL (ref 4.2–5.3)
SAO2 % BLD: 99 % (ref 92–97)
SERVICE CMNT-IMP: ABNORMAL
SODIUM SERPL-SCNC: 137 MMOL/L (ref 136–145)
SPECIMEN TYPE: ABNORMAL
TOTAL RESP. RATE, ITRR: 24
VENTILATION MODE VENT: ABNORMAL
VOLUME CONTROL PLUS IVLCP: YES
VT SETTING VENT: 450 ML
WBC # BLD AUTO: 9.4 K/UL (ref 4.6–13.2)

## 2019-04-04 PROCEDURE — 82803 BLOOD GASES ANY COMBINATION: CPT

## 2019-04-04 PROCEDURE — 84100 ASSAY OF PHOSPHORUS: CPT

## 2019-04-04 PROCEDURE — 94640 AIRWAY INHALATION TREATMENT: CPT

## 2019-04-04 PROCEDURE — 77030020782 HC GWN BAIR PAWS FLX 3M -B

## 2019-04-04 PROCEDURE — 77030018836 HC SOL IRR NACL ICUM -A

## 2019-04-04 PROCEDURE — 65610000006 HC RM INTENSIVE CARE

## 2019-04-04 PROCEDURE — 74011250636 HC RX REV CODE- 250/636: Performed by: PHYSICIAN ASSISTANT

## 2019-04-04 PROCEDURE — 77030031753 HC SHR ENDO COAG HARM J&J -E

## 2019-04-04 PROCEDURE — 36600 WITHDRAWAL OF ARTERIAL BLOOD: CPT

## 2019-04-04 PROCEDURE — 93005 ELECTROCARDIOGRAM TRACING: CPT

## 2019-04-04 PROCEDURE — 0GTH0ZZ RESECTION OF RIGHT THYROID GLAND LOBE, OPEN APPROACH: ICD-10-PCS

## 2019-04-04 PROCEDURE — 76010000134 HC OR TIME 3.5 TO 4 HR

## 2019-04-04 PROCEDURE — 77030008467 HC STPLR SKN COVD -B

## 2019-04-04 PROCEDURE — 74011250636 HC RX REV CODE- 250/636

## 2019-04-04 PROCEDURE — 36415 COLL VENOUS BLD VENIPUNCTURE: CPT

## 2019-04-04 PROCEDURE — 85025 COMPLETE CBC W/AUTO DIFF WBC: CPT

## 2019-04-04 PROCEDURE — 82330 ASSAY OF CALCIUM: CPT

## 2019-04-04 PROCEDURE — 77030013079 HC BLNKT BAIR HGGR 3M -A: Performed by: ANESTHESIOLOGY

## 2019-04-04 PROCEDURE — 88311 DECALCIFY TISSUE: CPT

## 2019-04-04 PROCEDURE — 83036 HEMOGLOBIN GLYCOSYLATED A1C: CPT

## 2019-04-04 PROCEDURE — 77030018390 HC SPNG HEMSTAT2 J&J -B

## 2019-04-04 PROCEDURE — 82962 GLUCOSE BLOOD TEST: CPT

## 2019-04-04 PROCEDURE — 0GTG0ZZ RESECTION OF LEFT THYROID GLAND LOBE, OPEN APPROACH: ICD-10-PCS

## 2019-04-04 PROCEDURE — 77030010512 HC APPL CLP LIG J&J -C

## 2019-04-04 PROCEDURE — 77030002933 HC SUT MCRYL J&J -A

## 2019-04-04 PROCEDURE — 77030018846 HC SOL IRR STRL H20 ICUM -A

## 2019-04-04 PROCEDURE — 88307 TISSUE EXAM BY PATHOLOGIST: CPT

## 2019-04-04 PROCEDURE — 74011000250 HC RX REV CODE- 250: Performed by: PHYSICIAN ASSISTANT

## 2019-04-04 PROCEDURE — 77030008683 HC TU ET CUF COVD -A: Performed by: ANESTHESIOLOGY

## 2019-04-04 PROCEDURE — 80048 BASIC METABOLIC PNL TOTAL CA: CPT

## 2019-04-04 PROCEDURE — 74011250637 HC RX REV CODE- 250/637: Performed by: NURSE ANESTHETIST, CERTIFIED REGISTERED

## 2019-04-04 PROCEDURE — 83735 ASSAY OF MAGNESIUM: CPT

## 2019-04-04 PROCEDURE — 77030010514 HC APPL CLP LIG COVD -B

## 2019-04-04 PROCEDURE — 76060000038 HC ANESTHESIA 3.5 TO 4 HR

## 2019-04-04 PROCEDURE — 74011636637 HC RX REV CODE- 636/637: Performed by: PHYSICIAN ASSISTANT

## 2019-04-04 PROCEDURE — 74011636637 HC RX REV CODE- 636/637: Performed by: ANESTHESIOLOGY

## 2019-04-04 PROCEDURE — 76210000000 HC OR PH I REC 2 TO 2.5 HR

## 2019-04-04 PROCEDURE — 77030031139 HC SUT VCRL2 J&J -A

## 2019-04-04 PROCEDURE — 77030002996 HC SUT SLK J&J -A

## 2019-04-04 PROCEDURE — 74011250636 HC RX REV CODE- 250/636: Performed by: NURSE ANESTHETIST, CERTIFIED REGISTERED

## 2019-04-04 PROCEDURE — 77030032490 HC SLV COMPR SCD KNE COVD -B

## 2019-04-04 PROCEDURE — 71045 X-RAY EXAM CHEST 1 VIEW: CPT

## 2019-04-04 PROCEDURE — 77030005515 HC CATH URETH FOL14 BARD -B

## 2019-04-04 PROCEDURE — 74011636637 HC RX REV CODE- 636/637: Performed by: NURSE ANESTHETIST, CERTIFIED REGISTERED

## 2019-04-04 PROCEDURE — 94002 VENT MGMT INPAT INIT DAY: CPT

## 2019-04-04 PROCEDURE — 77030026438 HC STYL ET INTUB CARD -A: Performed by: ANESTHESIOLOGY

## 2019-04-04 PROCEDURE — C1765 ADHESION BARRIER: HCPCS

## 2019-04-04 PROCEDURE — 74011000258 HC RX REV CODE- 258: Performed by: PHYSICIAN ASSISTANT

## 2019-04-04 PROCEDURE — 74011000250 HC RX REV CODE- 250

## 2019-04-04 RX ORDER — SODIUM CHLORIDE, SODIUM LACTATE, POTASSIUM CHLORIDE, CALCIUM CHLORIDE 600; 310; 30; 20 MG/100ML; MG/100ML; MG/100ML; MG/100ML
75 INJECTION, SOLUTION INTRAVENOUS CONTINUOUS
Status: DISCONTINUED | OUTPATIENT
Start: 2019-04-04 | End: 2019-04-04 | Stop reason: HOSPADM

## 2019-04-04 RX ORDER — INSULIN LISPRO 100 [IU]/ML
INJECTION, SOLUTION INTRAVENOUS; SUBCUTANEOUS ONCE
Status: COMPLETED | OUTPATIENT
Start: 2019-04-04 | End: 2019-04-04

## 2019-04-04 RX ORDER — MIDAZOLAM HYDROCHLORIDE 1 MG/ML
2 INJECTION, SOLUTION INTRAMUSCULAR; INTRAVENOUS ONCE
Status: COMPLETED | OUTPATIENT
Start: 2019-04-04 | End: 2019-04-04

## 2019-04-04 RX ORDER — SODIUM CHLORIDE 0.9 % (FLUSH) 0.9 %
5-40 SYRINGE (ML) INJECTION AS NEEDED
Status: DISCONTINUED | OUTPATIENT
Start: 2019-04-04 | End: 2019-04-16 | Stop reason: HOSPADM

## 2019-04-04 RX ORDER — ROCURONIUM BROMIDE 10 MG/ML
INJECTION, SOLUTION INTRAVENOUS AS NEEDED
Status: DISCONTINUED | OUTPATIENT
Start: 2019-04-04 | End: 2019-04-04 | Stop reason: HOSPADM

## 2019-04-04 RX ORDER — CEFAZOLIN SODIUM 2 G/50ML
2 SOLUTION INTRAVENOUS ONCE
Status: COMPLETED | OUTPATIENT
Start: 2019-04-04 | End: 2019-04-04

## 2019-04-04 RX ORDER — MIDAZOLAM HYDROCHLORIDE 1 MG/ML
INJECTION, SOLUTION INTRAMUSCULAR; INTRAVENOUS
Status: COMPLETED
Start: 2019-04-04 | End: 2019-04-04

## 2019-04-04 RX ORDER — SUCCINYLCHOLINE CHLORIDE 20 MG/ML
INJECTION INTRAMUSCULAR; INTRAVENOUS AS NEEDED
Status: DISCONTINUED | OUTPATIENT
Start: 2019-04-04 | End: 2019-04-04 | Stop reason: HOSPADM

## 2019-04-04 RX ORDER — SODIUM CHLORIDE 0.9 % (FLUSH) 0.9 %
5-40 SYRINGE (ML) INJECTION EVERY 8 HOURS
Status: DISCONTINUED | OUTPATIENT
Start: 2019-04-04 | End: 2019-04-04 | Stop reason: HOSPADM

## 2019-04-04 RX ORDER — LIDOCAINE HYDROCHLORIDE 10 MG/ML
0.1 INJECTION, SOLUTION EPIDURAL; INFILTRATION; INTRACAUDAL; PERINEURAL AS NEEDED
Status: DISCONTINUED | OUTPATIENT
Start: 2019-04-04 | End: 2019-04-04 | Stop reason: HOSPADM

## 2019-04-04 RX ORDER — MIDAZOLAM HYDROCHLORIDE 1 MG/ML
INJECTION, SOLUTION INTRAMUSCULAR; INTRAVENOUS AS NEEDED
Status: DISCONTINUED | OUTPATIENT
Start: 2019-04-04 | End: 2019-04-04 | Stop reason: HOSPADM

## 2019-04-04 RX ORDER — FENTANYL CITRATE 50 UG/ML
50 INJECTION, SOLUTION INTRAMUSCULAR; INTRAVENOUS
Status: DISCONTINUED | OUTPATIENT
Start: 2019-04-04 | End: 2019-04-05

## 2019-04-04 RX ORDER — NEOSTIGMINE METHYLSULFATE 5 MG/5 ML
SYRINGE (ML) INTRAVENOUS AS NEEDED
Status: DISCONTINUED | OUTPATIENT
Start: 2019-04-04 | End: 2019-04-04 | Stop reason: HOSPADM

## 2019-04-04 RX ORDER — IPRATROPIUM BROMIDE AND ALBUTEROL SULFATE 2.5; .5 MG/3ML; MG/3ML
3 SOLUTION RESPIRATORY (INHALATION)
Status: DISCONTINUED | OUTPATIENT
Start: 2019-04-04 | End: 2019-04-14

## 2019-04-04 RX ORDER — DEXAMETHASONE SODIUM PHOSPHATE 4 MG/ML
INJECTION, SOLUTION INTRA-ARTICULAR; INTRALESIONAL; INTRAMUSCULAR; INTRAVENOUS; SOFT TISSUE AS NEEDED
Status: DISCONTINUED | OUTPATIENT
Start: 2019-04-04 | End: 2019-04-04 | Stop reason: HOSPADM

## 2019-04-04 RX ORDER — SODIUM CHLORIDE 0.9 % (FLUSH) 0.9 %
5-40 SYRINGE (ML) INJECTION AS NEEDED
Status: DISCONTINUED | OUTPATIENT
Start: 2019-04-04 | End: 2019-04-04 | Stop reason: HOSPADM

## 2019-04-04 RX ORDER — FENTANYL CITRATE 50 UG/ML
INJECTION, SOLUTION INTRAMUSCULAR; INTRAVENOUS AS NEEDED
Status: DISCONTINUED | OUTPATIENT
Start: 2019-04-04 | End: 2019-04-04 | Stop reason: HOSPADM

## 2019-04-04 RX ORDER — DEXTROSE 50 % IN WATER (D50W) INTRAVENOUS SYRINGE
25-50 AS NEEDED
Status: DISCONTINUED | OUTPATIENT
Start: 2019-04-04 | End: 2019-04-16 | Stop reason: HOSPADM

## 2019-04-04 RX ORDER — MIDAZOLAM HYDROCHLORIDE 1 MG/ML
2 INJECTION, SOLUTION INTRAMUSCULAR; INTRAVENOUS
Status: DISCONTINUED | OUTPATIENT
Start: 2019-04-04 | End: 2019-04-05

## 2019-04-04 RX ORDER — GLYCOPYRROLATE 0.2 MG/ML
INJECTION INTRAMUSCULAR; INTRAVENOUS AS NEEDED
Status: DISCONTINUED | OUTPATIENT
Start: 2019-04-04 | End: 2019-04-04 | Stop reason: HOSPADM

## 2019-04-04 RX ORDER — DEXTROSE 50 % IN WATER (D50W) INTRAVENOUS SYRINGE
25-50 AS NEEDED
Status: DISCONTINUED | OUTPATIENT
Start: 2019-04-04 | End: 2019-04-04 | Stop reason: HOSPADM

## 2019-04-04 RX ORDER — CEFAZOLIN SODIUM 2 G/50ML
2 SOLUTION INTRAVENOUS ONCE
Status: DISCONTINUED | OUTPATIENT
Start: 2019-04-04 | End: 2019-04-04 | Stop reason: DRUGHIGH

## 2019-04-04 RX ORDER — CHLORHEXIDINE GLUCONATE 1.2 MG/ML
10 RINSE ORAL EVERY 12 HOURS
Status: DISCONTINUED | OUTPATIENT
Start: 2019-04-04 | End: 2019-04-05

## 2019-04-04 RX ORDER — MAGNESIUM SULFATE 100 %
4 CRYSTALS MISCELLANEOUS AS NEEDED
Status: DISCONTINUED | OUTPATIENT
Start: 2019-04-04 | End: 2019-04-16 | Stop reason: HOSPADM

## 2019-04-04 RX ORDER — SODIUM CHLORIDE 0.9 % (FLUSH) 0.9 %
5-40 SYRINGE (ML) INJECTION EVERY 8 HOURS
Status: DISCONTINUED | OUTPATIENT
Start: 2019-04-04 | End: 2019-04-16 | Stop reason: HOSPADM

## 2019-04-04 RX ORDER — LABETALOL HCL 20 MG/4 ML
10 SYRINGE (ML) INTRAVENOUS
Status: DISCONTINUED | OUTPATIENT
Start: 2019-04-04 | End: 2019-04-04 | Stop reason: HOSPADM

## 2019-04-04 RX ORDER — ONDANSETRON 2 MG/ML
INJECTION INTRAMUSCULAR; INTRAVENOUS AS NEEDED
Status: DISCONTINUED | OUTPATIENT
Start: 2019-04-04 | End: 2019-04-04 | Stop reason: HOSPADM

## 2019-04-04 RX ORDER — FAMOTIDINE 10 MG/ML
20 INJECTION INTRAVENOUS EVERY 12 HOURS
Status: DISCONTINUED | OUTPATIENT
Start: 2019-04-04 | End: 2019-04-08

## 2019-04-04 RX ORDER — ALBUTEROL SULFATE 1.25 MG/3ML
1.25 SOLUTION RESPIRATORY (INHALATION)
Status: DISCONTINUED | OUTPATIENT
Start: 2019-04-04 | End: 2019-04-14

## 2019-04-04 RX ORDER — PROPOFOL 10 MG/ML
INJECTION, EMULSION INTRAVENOUS AS NEEDED
Status: DISCONTINUED | OUTPATIENT
Start: 2019-04-04 | End: 2019-04-04 | Stop reason: HOSPADM

## 2019-04-04 RX ORDER — LABETALOL HCL 20 MG/4 ML
SYRINGE (ML) INTRAVENOUS
Status: COMPLETED
Start: 2019-04-04 | End: 2019-04-04

## 2019-04-04 RX ORDER — FAMOTIDINE 20 MG/1
20 TABLET, FILM COATED ORAL ONCE
Status: COMPLETED | OUTPATIENT
Start: 2019-04-04 | End: 2019-04-04

## 2019-04-04 RX ORDER — INSULIN LISPRO 100 [IU]/ML
INJECTION, SOLUTION INTRAVENOUS; SUBCUTANEOUS EVERY 6 HOURS
Status: DISCONTINUED | OUTPATIENT
Start: 2019-04-04 | End: 2019-04-05

## 2019-04-04 RX ORDER — PROPOFOL 10 MG/ML
0-50 VIAL (ML) INTRAVENOUS
Status: DISCONTINUED | OUTPATIENT
Start: 2019-04-04 | End: 2019-04-05

## 2019-04-04 RX ORDER — MAGNESIUM SULFATE 100 %
4 CRYSTALS MISCELLANEOUS AS NEEDED
Status: DISCONTINUED | OUTPATIENT
Start: 2019-04-04 | End: 2019-04-04 | Stop reason: HOSPADM

## 2019-04-04 RX ADMIN — Medication 3 MG: at 15:49

## 2019-04-04 RX ADMIN — IPRATROPIUM BROMIDE AND ALBUTEROL SULFATE 3 ML: .5; 3 SOLUTION RESPIRATORY (INHALATION) at 23:17

## 2019-04-04 RX ADMIN — IPRATROPIUM BROMIDE AND ALBUTEROL SULFATE 3 ML: .5; 3 SOLUTION RESPIRATORY (INHALATION) at 19:53

## 2019-04-04 RX ADMIN — PROPOFOL 150 MG: 10 INJECTION, EMULSION INTRAVENOUS at 16:25

## 2019-04-04 RX ADMIN — SODIUM CHLORIDE, SODIUM LACTATE, POTASSIUM CHLORIDE, AND CALCIUM CHLORIDE 75 ML/HR: 600; 310; 30; 20 INJECTION, SOLUTION INTRAVENOUS at 11:51

## 2019-04-04 RX ADMIN — SUCCINYLCHOLINE CHLORIDE 200 MG: 20 INJECTION INTRAMUSCULAR; INTRAVENOUS at 13:18

## 2019-04-04 RX ADMIN — Medication 10 MG: at 18:26

## 2019-04-04 RX ADMIN — PROPOFOL 25 MCG/KG/MIN: 10 INJECTION, EMULSION INTRAVENOUS at 20:00

## 2019-04-04 RX ADMIN — GLYCOPYRROLATE 0.1 MG: 0.2 INJECTION INTRAMUSCULAR; INTRAVENOUS at 13:33

## 2019-04-04 RX ADMIN — MIDAZOLAM HYDROCHLORIDE 2 MG: 1 INJECTION, SOLUTION INTRAMUSCULAR; INTRAVENOUS at 18:50

## 2019-04-04 RX ADMIN — SUCCINYLCHOLINE CHLORIDE 140 MG: 20 INJECTION INTRAMUSCULAR; INTRAVENOUS at 16:25

## 2019-04-04 RX ADMIN — INSULIN LISPRO 6 UNITS: 100 INJECTION, SOLUTION INTRAVENOUS; SUBCUTANEOUS at 18:27

## 2019-04-04 RX ADMIN — MIDAZOLAM HYDROCHLORIDE 2 MG: 1 INJECTION, SOLUTION INTRAMUSCULAR; INTRAVENOUS at 22:35

## 2019-04-04 RX ADMIN — PROPOFOL 200 MG: 10 INJECTION, EMULSION INTRAVENOUS at 13:17

## 2019-04-04 RX ADMIN — DEXAMETHASONE SODIUM PHOSPHATE 4 MG: 4 INJECTION, SOLUTION INTRA-ARTICULAR; INTRALESIONAL; INTRAMUSCULAR; INTRAVENOUS; SOFT TISSUE at 13:53

## 2019-04-04 RX ADMIN — ROCURONIUM BROMIDE 20 MG: 10 INJECTION, SOLUTION INTRAVENOUS at 13:20

## 2019-04-04 RX ADMIN — INSULIN LISPRO 6 UNITS: 100 INJECTION, SOLUTION INTRAVENOUS; SUBCUTANEOUS at 11:52

## 2019-04-04 RX ADMIN — FENTANYL CITRATE 100 MCG: 50 INJECTION, SOLUTION INTRAMUSCULAR; INTRAVENOUS at 13:15

## 2019-04-04 RX ADMIN — FENTANYL CITRATE 100 MCG: 50 INJECTION, SOLUTION INTRAMUSCULAR; INTRAVENOUS at 14:02

## 2019-04-04 RX ADMIN — PROPOFOL 50 MCG/KG/MIN: 10 INJECTION, EMULSION INTRAVENOUS at 22:45

## 2019-04-04 RX ADMIN — FAMOTIDINE 20 MG: 20 TABLET ORAL at 11:51

## 2019-04-04 RX ADMIN — PROPOFOL 50 MG: 10 INJECTION, EMULSION INTRAVENOUS at 14:02

## 2019-04-04 RX ADMIN — MIDAZOLAM HYDROCHLORIDE 2 MG: 1 INJECTION, SOLUTION INTRAMUSCULAR; INTRAVENOUS at 17:58

## 2019-04-04 RX ADMIN — GLYCOPYRROLATE 0.4 MG: 0.2 INJECTION INTRAMUSCULAR; INTRAVENOUS at 15:49

## 2019-04-04 RX ADMIN — MIDAZOLAM HYDROCHLORIDE 2 MG: 2 INJECTION, SOLUTION INTRAMUSCULAR; INTRAVENOUS at 22:35

## 2019-04-04 RX ADMIN — Medication 10 ML: at 21:31

## 2019-04-04 RX ADMIN — MIDAZOLAM HYDROCHLORIDE 2 MG: 2 INJECTION, SOLUTION INTRAMUSCULAR; INTRAVENOUS at 17:58

## 2019-04-04 RX ADMIN — ROCURONIUM BROMIDE 20 MG: 10 INJECTION, SOLUTION INTRAVENOUS at 13:30

## 2019-04-04 RX ADMIN — LABETALOL HYDROCHLORIDE 10 MG: 5 INJECTION, SOLUTION INTRAVENOUS at 18:26

## 2019-04-04 RX ADMIN — CHLORHEXIDINE GLUCONATE 0.12% ORAL RINSE 10 ML: 1.2 LIQUID ORAL at 20:11

## 2019-04-04 RX ADMIN — FAMOTIDINE 20 MG: 10 INJECTION INTRAVENOUS at 20:11

## 2019-04-04 RX ADMIN — FENTANYL CITRATE 50 MCG: 50 INJECTION, SOLUTION INTRAMUSCULAR; INTRAVENOUS at 21:57

## 2019-04-04 RX ADMIN — CEFAZOLIN 1 G: 10 INJECTION, POWDER, FOR SOLUTION INTRAVENOUS at 13:40

## 2019-04-04 RX ADMIN — MIDAZOLAM HYDROCHLORIDE 2 MG: 1 INJECTION, SOLUTION INTRAMUSCULAR; INTRAVENOUS at 13:11

## 2019-04-04 RX ADMIN — FENTANYL CITRATE 100 MCG: 50 INJECTION, SOLUTION INTRAMUSCULAR; INTRAVENOUS at 15:23

## 2019-04-04 RX ADMIN — SODIUM CHLORIDE, SODIUM LACTATE, POTASSIUM CHLORIDE, AND CALCIUM CHLORIDE: 600; 310; 30; 20 INJECTION, SOLUTION INTRAVENOUS at 14:11

## 2019-04-04 RX ADMIN — FENTANYL CITRATE 100 MCG: 50 INJECTION, SOLUTION INTRAMUSCULAR; INTRAVENOUS at 13:26

## 2019-04-04 RX ADMIN — CEFAZOLIN 2 G: 10 INJECTION, POWDER, FOR SOLUTION INTRAVENOUS at 13:22

## 2019-04-04 RX ADMIN — INSULIN LISPRO 4 UNITS: 100 INJECTION, SOLUTION INTRAVENOUS; SUBCUTANEOUS at 23:50

## 2019-04-04 RX ADMIN — DEXMEDETOMIDINE HYDROCHLORIDE 0.4 MCG/KG/HR: 100 INJECTION, SOLUTION INTRAVENOUS at 23:24

## 2019-04-04 RX ADMIN — ONDANSETRON 4 MG: 2 INJECTION INTRAMUSCULAR; INTRAVENOUS at 15:49

## 2019-04-04 RX ADMIN — MIDAZOLAM HYDROCHLORIDE 2 MG: 2 INJECTION, SOLUTION INTRAMUSCULAR; INTRAVENOUS at 18:50

## 2019-04-04 NOTE — PROGRESS NOTES
Surgery  Pt required reintubation on emergence from anesthesia for poor SATs. Glide scope exam shows normal cords. No neck hematoma. Will try extubation in RR. If possible.

## 2019-04-04 NOTE — ANESTHESIA PREPROCEDURE EVALUATION
Relevant Problems   No relevant active problems       Anesthetic History   No history of anesthetic complications            Review of Systems / Medical History  Patient summary reviewed and pertinent labs reviewed    Pulmonary        Sleep apnea: No treatment    Asthma : well controlled       Neuro/Psych         Psychiatric history (Depression)     Cardiovascular                  Exercise tolerance: <4 METS: Uses 2 walking sticks due to back issues     GI/Hepatic/Renal  Within defined limits              Endo/Other    Diabetes: well controlled, type 2  Hypothyroidism: well controlled  Morbid obesity and arthritis     Other Findings   Comments: H/O Hashimotos disease           Physical Exam    Airway  Mallampati: III  TM Distance: < 4 cm  Neck ROM: normal range of motion   Mouth opening: Diminished (comment)     Cardiovascular  Regular rate and rhythm,  S1 and S2 normal,  no murmur, click, rub, or gallop             Dental  No notable dental hx       Pulmonary  Breath sounds clear to auscultation               Abdominal  GI exam deferred       Other Findings            Anesthetic Plan    ASA: 3  Anesthesia type: general          Induction: Intravenous  Anesthetic plan and risks discussed with: Patient

## 2019-04-04 NOTE — ANESTHESIA POSTPROCEDURE EVALUATION
Procedure(s):  TOTAL THYROIDECTOMY. general    Anesthesia Post Evaluation      Multimodal analgesia: multimodal analgesia used between 6 hours prior to anesthesia start to PACU discharge  Patient location during evaluation: bedside  Patient participation: complete - patient participated  Level of consciousness: responsive to physical stimuli  Pain management: satisfactory to patient  Airway patency: Intubated. Anesthetic complications: no  Cardiovascular status: stable  Respiratory status: intubated and ETT  Hydration status: acceptable  Comments: Patient became stridorous after extubation in the OR,reintubated. In PACU,responsive intermittently. D/W surgeon will keep patient intubated and transfer to ICU. D/W Dr Mary Lou Crowley. ICU MD on call. Post anesthesia nausea and vomiting:  controlled      Vitals Value Taken Time   /76 4/4/2019  5:52 PM   Temp 36.8 °C (98.2 °F) 4/4/2019  4:41 PM   Pulse 89 4/4/2019  6:00 PM   Resp 21 4/4/2019  6:00 PM   SpO2 96 % 4/4/2019  6:00 PM   Vitals shown include unvalidated device data.

## 2019-04-04 NOTE — BRIEF OP NOTE
BRIEF OPERATIVE NOTE    Date of Procedure: 4/4/2019   Preoperative Diagnosis: E04.9 GOITER  Postoperative Diagnosis: E04.9 GOITER    Procedure(s):  TOTAL THYROIDECTOMY  Surgeon(s) and Role:     Dedra Lowe MD - Primary         Surgical Assistant: Reyes Thakkar    Surgical Staff:  Circ-1: Domingo Packre, RN  Circ-2: Fernando SANDERS  Scrub Tech-1: Barbra Betancourt  Scrub Tech-Relief: Jayashree VERGARA  Surg Asst-1: January Jimenez  Event Time In Time Out   Incision Start 1342    Incision Close       Anesthesia: General   Estimated Blood Loss: 50cc  Specimens:   ID Type Source Tests Collected by Time Destination   1 : right thyroid lobe Preservative Thyroid lobe  Nohelia Bingham MD 4/4/2019 1448 Pathology   2 : left thyroid lobe Preservative Thyroid lobe  Nohelia Bingham MD 4/4/2019 1523 Pathology      Findings: very inflammed stuck thyroid with difficult anatomy     Complications: 0  Implants: * No implants in log *

## 2019-04-04 NOTE — PERIOP NOTES
TRANSFER - OUT REPORT:    Verbal report given to [facility] RN(name) on Yudy Caro  being transferred to ICU(unit) for routine post - op       Report consisted of patients Situation, Background, Assessment and   Recommendations(SBAR). Information from the following report(s) SBAR, OR Summary, Procedure Summary, Intake/Output and MAR was reviewed with the receiving nurse. Lines:   Peripheral IV 04/04/19 Right Arm (Active)   Site Assessment Clean, dry, & intact 4/4/2019  4:41 PM   Phlebitis Assessment 0 4/4/2019  4:41 PM   Infiltration Assessment 0 4/4/2019  4:41 PM   Dressing Status Clean, dry, & intact 4/4/2019  4:41 PM   Dressing Type Transparent;Tape 4/4/2019  4:41 PM   Hub Color/Line Status Pink; Infusing 4/4/2019  4:41 PM   Alcohol Cap Used No 4/4/2019 11:56 AM        Opportunity for questions and clarification was provided.       Patient transported with:   Monitor

## 2019-04-04 NOTE — PROGRESS NOTES
Called to PACU, to set up vent on patient. Pt is sleepy and not waking up a lot. 7.0 Ett, secured with Holister. ABG drawn and shown to Cristofer [facility].   Pt is to be admitted to ICU

## 2019-04-05 ENCOUNTER — APPOINTMENT (OUTPATIENT)
Dept: GENERAL RADIOLOGY | Age: 59
DRG: 404 | End: 2019-04-05
Attending: PHYSICIAN ASSISTANT
Payer: MEDICAID

## 2019-04-05 LAB
ANION GAP SERPL CALC-SCNC: 16 MMOL/L (ref 3–18)
ARTERIAL PATENCY WRIST A: YES
ARTERIAL PATENCY WRIST A: YES
ATRIAL RATE: 69 BPM
ATRIAL RATE: 80 BPM
ATRIAL RATE: 87 BPM
BASE DEFICIT BLD-SCNC: 3 MMOL/L
BASE DEFICIT BLD-SCNC: 8 MMOL/L
BASOPHILS # BLD: 0 K/UL (ref 0–0.1)
BASOPHILS NFR BLD: 0 % (ref 0–2)
BDY SITE: ABNORMAL
BDY SITE: ABNORMAL
BODY TEMPERATURE: 98.6
BUN SERPL-MCNC: 12 MG/DL (ref 7–18)
BUN/CREAT SERPL: 16 (ref 12–20)
CA-I SERPL-SCNC: 1.03 MMOL/L (ref 1.12–1.32)
CA-I SERPL-SCNC: 1.1 MMOL/L (ref 1.12–1.32)
CA-I SERPL-SCNC: 1.1 MMOL/L (ref 1.12–1.32)
CALCIUM SERPL-MCNC: 7.9 MG/DL (ref 8.5–10.1)
CALCULATED P AXIS, ECG09: 49 DEGREES
CALCULATED P AXIS, ECG09: 55 DEGREES
CALCULATED P AXIS, ECG09: 73 DEGREES
CALCULATED R AXIS, ECG10: 102 DEGREES
CALCULATED R AXIS, ECG10: 58 DEGREES
CALCULATED R AXIS, ECG10: 65 DEGREES
CALCULATED T AXIS, ECG11: 17 DEGREES
CALCULATED T AXIS, ECG11: 26 DEGREES
CALCULATED T AXIS, ECG11: 47 DEGREES
CHLORIDE SERPL-SCNC: 101 MMOL/L (ref 100–108)
CHOLEST SERPL-MCNC: 347 MG/DL
CO2 SERPL-SCNC: 17 MMOL/L (ref 21–32)
CREAT SERPL-MCNC: 0.75 MG/DL (ref 0.6–1.3)
DIAGNOSIS, 93000: NORMAL
DIFFERENTIAL METHOD BLD: ABNORMAL
EOSINOPHIL # BLD: 0 K/UL (ref 0–0.4)
EOSINOPHIL NFR BLD: 0 % (ref 0–5)
ERYTHROCYTE [DISTWIDTH] IN BLOOD BY AUTOMATED COUNT: 15 % (ref 11.6–14.5)
GAS FLOW.O2 O2 DELIVERY SYS: ABNORMAL L/MIN
GAS FLOW.O2 O2 DELIVERY SYS: ABNORMAL L/MIN
GAS FLOW.O2 SETTING OXYMISER: 14 BPM
GLUCOSE BLD STRIP.AUTO-MCNC: 176 MG/DL (ref 70–110)
GLUCOSE BLD STRIP.AUTO-MCNC: 186 MG/DL (ref 70–110)
GLUCOSE BLD STRIP.AUTO-MCNC: 225 MG/DL (ref 70–110)
GLUCOSE BLD STRIP.AUTO-MCNC: 252 MG/DL (ref 70–110)
GLUCOSE SERPL-MCNC: 270 MG/DL (ref 74–99)
HCO3 BLD-SCNC: 18.1 MMOL/L (ref 22–26)
HCO3 BLD-SCNC: 22.3 MMOL/L (ref 22–26)
HCT VFR BLD AUTO: 42.2 % (ref 35–45)
HDLC SERPL-MCNC: 26 MG/DL (ref 40–60)
HDLC SERPL: 13.3 {RATIO} (ref 0–5)
HGB BLD-MCNC: 14.6 G/DL (ref 12–16)
INSPIRATION.DURATION SETTING TIME VENT: 1 SEC
LACTATE SERPL-SCNC: NORMAL MMOL/L (ref 0.4–2)
LDLC SERPL CALC-MCNC: ABNORMAL MG/DL (ref 0–100)
LIPASE SERPL-CCNC: 83 U/L (ref 73–393)
LIPID PROFILE,FLP: ABNORMAL
LYMPHOCYTES # BLD: 1.5 K/UL (ref 0.9–3.6)
LYMPHOCYTES NFR BLD: 17 % (ref 21–52)
MAGNESIUM SERPL-MCNC: 2.2 MG/DL (ref 1.6–2.6)
MCH RBC QN AUTO: 31.3 PG (ref 24–34)
MCHC RBC AUTO-ENTMCNC: 34.6 G/DL (ref 31–37)
MCV RBC AUTO: 90.6 FL (ref 74–97)
MONOCYTES # BLD: 0.6 K/UL (ref 0.05–1.2)
MONOCYTES NFR BLD: 6 % (ref 3–10)
NEUTS SEG # BLD: 6.8 K/UL (ref 1.8–8)
NEUTS SEG NFR BLD: 77 % (ref 40–73)
O2/TOTAL GAS SETTING VFR VENT: 35 %
O2/TOTAL GAS SETTING VFR VENT: 35 %
P-R INTERVAL, ECG05: 216 MS
P-R INTERVAL, ECG05: 230 MS
P-R INTERVAL, ECG05: 230 MS
PCO2 BLD: 34 MMHG (ref 35–45)
PCO2 BLD: 38.8 MMHG (ref 35–45)
PEEP RESPIRATORY: 5 CMH2O
PEEP RESPIRATORY: 5 CMH2O
PH BLD: 7.33 [PH] (ref 7.35–7.45)
PH BLD: 7.37 [PH] (ref 7.35–7.45)
PHOSPHATE SERPL-MCNC: 3.4 MG/DL (ref 2.5–4.9)
PLATELET # BLD AUTO: 187 K/UL (ref 135–420)
PMV BLD AUTO: 9.9 FL (ref 9.2–11.8)
PO2 BLD: 91 MMHG (ref 80–100)
PO2 BLD: 99 MMHG (ref 80–100)
POTASSIUM SERPL-SCNC: 4.1 MMOL/L (ref 3.5–5.5)
PRESSURE SUPPORT SETTING VENT: 7 CMH2O
Q-T INTERVAL, ECG07: 402 MS
Q-T INTERVAL, ECG07: 562 MS
Q-T INTERVAL, ECG07: 644 MS
QRS DURATION, ECG06: 86 MS
QRS DURATION, ECG06: 86 MS
QRS DURATION, ECG06: 88 MS
QTC CALCULATION (BEZET), ECG08: 430 MS
QTC CALCULATION (BEZET), ECG08: 676 MS
QTC CALCULATION (BEZET), ECG08: 742 MS
RBC # BLD AUTO: 4.66 M/UL (ref 4.2–5.3)
SAO2 % BLD: 97 % (ref 92–97)
SAO2 % BLD: 98 % (ref 92–97)
SERVICE CMNT-IMP: ABNORMAL
SERVICE CMNT-IMP: ABNORMAL
SODIUM SERPL-SCNC: 134 MMOL/L (ref 136–145)
SPECIMEN TYPE: ABNORMAL
SPECIMEN TYPE: ABNORMAL
TOTAL RESP. RATE, ITRR: 18
TOTAL RESP. RATE, ITRR: 25
TRIGL SERPL-MCNC: 2579 MG/DL (ref ?–150)
VENTILATION MODE VENT: ABNORMAL
VENTILATION MODE VENT: ABNORMAL
VENTRICULAR RATE, ECG03: 69 BPM
VENTRICULAR RATE, ECG03: 80 BPM
VENTRICULAR RATE, ECG03: 87 BPM
VLDLC SERPL CALC-MCNC: ABNORMAL MG/DL
VOLUME CONTROL PLUS IVLCP: YES
VT SETTING VENT: 450 ML
WBC # BLD AUTO: 8.8 K/UL (ref 4.6–13.2)

## 2019-04-05 PROCEDURE — 74011000258 HC RX REV CODE- 258: Performed by: INTERNAL MEDICINE

## 2019-04-05 PROCEDURE — 82962 GLUCOSE BLOOD TEST: CPT

## 2019-04-05 PROCEDURE — 36415 COLL VENOUS BLD VENIPUNCTURE: CPT

## 2019-04-05 PROCEDURE — 74011000250 HC RX REV CODE- 250

## 2019-04-05 PROCEDURE — 82330 ASSAY OF CALCIUM: CPT

## 2019-04-05 PROCEDURE — 84100 ASSAY OF PHOSPHORUS: CPT

## 2019-04-05 PROCEDURE — 94003 VENT MGMT INPAT SUBQ DAY: CPT

## 2019-04-05 PROCEDURE — 74011000258 HC RX REV CODE- 258: Performed by: PHYSICIAN ASSISTANT

## 2019-04-05 PROCEDURE — 80048 BASIC METABOLIC PNL TOTAL CA: CPT

## 2019-04-05 PROCEDURE — 74011636637 HC RX REV CODE- 636/637: Performed by: INTERNAL MEDICINE

## 2019-04-05 PROCEDURE — 80061 LIPID PANEL: CPT

## 2019-04-05 PROCEDURE — 93005 ELECTROCARDIOGRAM TRACING: CPT

## 2019-04-05 PROCEDURE — 74011250636 HC RX REV CODE- 250/636: Performed by: INTERNAL MEDICINE

## 2019-04-05 PROCEDURE — 83690 ASSAY OF LIPASE: CPT

## 2019-04-05 PROCEDURE — 65610000006 HC RM INTENSIVE CARE

## 2019-04-05 PROCEDURE — 5A09557 ASSISTANCE WITH RESPIRATORY VENTILATION, GREATER THAN 96 CONSECUTIVE HOURS, CONTINUOUS POSITIVE AIRWAY PRESSURE: ICD-10-PCS | Performed by: INTERNAL MEDICINE

## 2019-04-05 PROCEDURE — 74011000250 HC RX REV CODE- 250: Performed by: PHYSICIAN ASSISTANT

## 2019-04-05 PROCEDURE — 85025 COMPLETE CBC W/AUTO DIFF WBC: CPT

## 2019-04-05 PROCEDURE — 77030013140 HC MSK NEB VYRM -A

## 2019-04-05 PROCEDURE — 94640 AIRWAY INHALATION TREATMENT: CPT

## 2019-04-05 PROCEDURE — 83605 ASSAY OF LACTIC ACID: CPT

## 2019-04-05 PROCEDURE — 36600 WITHDRAWAL OF ARTERIAL BLOOD: CPT

## 2019-04-05 PROCEDURE — 83735 ASSAY OF MAGNESIUM: CPT

## 2019-04-05 PROCEDURE — 74011250636 HC RX REV CODE- 250/636: Performed by: PHYSICIAN ASSISTANT

## 2019-04-05 PROCEDURE — 82803 BLOOD GASES ANY COMBINATION: CPT

## 2019-04-05 PROCEDURE — 71045 X-RAY EXAM CHEST 1 VIEW: CPT

## 2019-04-05 RX ORDER — MORPHINE SULFATE 10 MG/ML
5 INJECTION, SOLUTION INTRAMUSCULAR; INTRAVENOUS
Status: DISCONTINUED | OUTPATIENT
Start: 2019-04-05 | End: 2019-04-05

## 2019-04-05 RX ORDER — CALCIUM CARBONATE 200(500)MG
400 TABLET,CHEWABLE ORAL
Status: DISCONTINUED | OUTPATIENT
Start: 2019-04-06 | End: 2019-04-16 | Stop reason: HOSPADM

## 2019-04-05 RX ORDER — HYDROMORPHONE HYDROCHLORIDE 1 MG/ML
0.5 INJECTION, SOLUTION INTRAMUSCULAR; INTRAVENOUS; SUBCUTANEOUS ONCE
Status: COMPLETED | OUTPATIENT
Start: 2019-04-05 | End: 2019-04-05

## 2019-04-05 RX ORDER — DEXTROSE, SODIUM CHLORIDE, AND POTASSIUM CHLORIDE 5; .45; .15 G/100ML; G/100ML; G/100ML
75 INJECTION INTRAVENOUS CONTINUOUS
Status: DISCONTINUED | OUTPATIENT
Start: 2019-04-05 | End: 2019-04-05

## 2019-04-05 RX ORDER — INSULIN GLARGINE 100 [IU]/ML
20 INJECTION, SOLUTION SUBCUTANEOUS DAILY
Status: DISCONTINUED | OUTPATIENT
Start: 2019-04-05 | End: 2019-04-06

## 2019-04-05 RX ORDER — ENOXAPARIN SODIUM 100 MG/ML
40 INJECTION SUBCUTANEOUS EVERY 24 HOURS
Status: DISCONTINUED | OUTPATIENT
Start: 2019-04-05 | End: 2019-04-16 | Stop reason: HOSPADM

## 2019-04-05 RX ORDER — HYDROMORPHONE HYDROCHLORIDE 1 MG/ML
0.5 INJECTION, SOLUTION INTRAMUSCULAR; INTRAVENOUS; SUBCUTANEOUS
Status: DISCONTINUED | OUTPATIENT
Start: 2019-04-05 | End: 2019-04-16 | Stop reason: HOSPADM

## 2019-04-05 RX ORDER — GEMFIBROZIL 600 MG/1
600 TABLET, FILM COATED ORAL
Status: DISCONTINUED | OUTPATIENT
Start: 2019-04-05 | End: 2019-04-16 | Stop reason: HOSPADM

## 2019-04-05 RX ORDER — INSULIN LISPRO 100 [IU]/ML
INJECTION, SOLUTION INTRAVENOUS; SUBCUTANEOUS EVERY 6 HOURS
Status: DISCONTINUED | OUTPATIENT
Start: 2019-04-05 | End: 2019-04-11

## 2019-04-05 RX ORDER — OXYCODONE AND ACETAMINOPHEN 5; 325 MG/1; MG/1
1 TABLET ORAL
Status: DISCONTINUED | OUTPATIENT
Start: 2019-04-05 | End: 2019-04-11

## 2019-04-05 RX ORDER — ACETAMINOPHEN 325 MG/1
650 TABLET ORAL
Status: DISCONTINUED | OUTPATIENT
Start: 2019-04-05 | End: 2019-04-16 | Stop reason: HOSPADM

## 2019-04-05 RX ORDER — DEXTROSE MONOHYDRATE AND SODIUM CHLORIDE 5; .45 G/100ML; G/100ML
30 INJECTION, SOLUTION INTRAVENOUS CONTINUOUS
Status: DISCONTINUED | OUTPATIENT
Start: 2019-04-05 | End: 2019-04-11

## 2019-04-05 RX ORDER — ONDANSETRON 2 MG/ML
6 INJECTION INTRAMUSCULAR; INTRAVENOUS
Status: DISCONTINUED | OUTPATIENT
Start: 2019-04-06 | End: 2019-04-16 | Stop reason: HOSPADM

## 2019-04-05 RX ADMIN — DEXMEDETOMIDINE HYDROCHLORIDE 0.5 MCG/KG/HR: 100 INJECTION, SOLUTION INTRAVENOUS at 05:57

## 2019-04-05 RX ADMIN — INSULIN LISPRO 9 UNITS: 100 INJECTION, SOLUTION INTRAVENOUS; SUBCUTANEOUS at 05:20

## 2019-04-05 RX ADMIN — PROPOFOL 20 MCG/KG/MIN: 10 INJECTION, EMULSION INTRAVENOUS at 07:32

## 2019-04-05 RX ADMIN — FAMOTIDINE 20 MG: 10 INJECTION INTRAVENOUS at 08:23

## 2019-04-05 RX ADMIN — IPRATROPIUM BROMIDE AND ALBUTEROL SULFATE 3 ML: .5; 3 SOLUTION RESPIRATORY (INHALATION) at 12:40

## 2019-04-05 RX ADMIN — INSULIN GLARGINE 20 UNITS: 100 INJECTION, SOLUTION SUBCUTANEOUS at 14:58

## 2019-04-05 RX ADMIN — HYDROMORPHONE HYDROCHLORIDE 0.5 MG: 1 INJECTION, SOLUTION INTRAMUSCULAR; INTRAVENOUS; SUBCUTANEOUS at 18:40

## 2019-04-05 RX ADMIN — ENOXAPARIN SODIUM 40 MG: 100 INJECTION SUBCUTANEOUS at 11:49

## 2019-04-05 RX ADMIN — INSULIN LISPRO 6 UNITS: 100 INJECTION, SOLUTION INTRAVENOUS; SUBCUTANEOUS at 11:52

## 2019-04-05 RX ADMIN — PROPOFOL 50 MCG/KG/MIN: 10 INJECTION, EMULSION INTRAVENOUS at 04:10

## 2019-04-05 RX ADMIN — Medication 10 ML: at 05:25

## 2019-04-05 RX ADMIN — HYDROMORPHONE HYDROCHLORIDE 0.5 MG: 1 INJECTION, SOLUTION INTRAMUSCULAR; INTRAVENOUS; SUBCUTANEOUS at 22:54

## 2019-04-05 RX ADMIN — INSULIN LISPRO 3 UNITS: 100 INJECTION, SOLUTION INTRAVENOUS; SUBCUTANEOUS at 18:32

## 2019-04-05 RX ADMIN — CALCIUM GLUCONATE 3 G: 98 INJECTION, SOLUTION INTRAVENOUS at 00:13

## 2019-04-05 RX ADMIN — CHLORHEXIDINE GLUCONATE 0.12% ORAL RINSE 10 ML: 1.2 LIQUID ORAL at 08:23

## 2019-04-05 RX ADMIN — PROPOFOL 50 MCG/KG/MIN: 10 INJECTION, EMULSION INTRAVENOUS at 01:33

## 2019-04-05 RX ADMIN — IPRATROPIUM BROMIDE AND ALBUTEROL SULFATE 3 ML: .5; 3 SOLUTION RESPIRATORY (INHALATION) at 08:00

## 2019-04-05 RX ADMIN — CALCIUM GLUCONATE 3 G: 94 INJECTION, SOLUTION INTRAVENOUS at 06:09

## 2019-04-05 RX ADMIN — IPRATROPIUM BROMIDE AND ALBUTEROL SULFATE 3 ML: .5; 3 SOLUTION RESPIRATORY (INHALATION) at 16:00

## 2019-04-05 RX ADMIN — FAMOTIDINE 20 MG: 10 INJECTION INTRAVENOUS at 20:46

## 2019-04-05 RX ADMIN — RACEPINEPHRINE HYDROCHLORIDE 0.5 ML: 11.25 SOLUTION RESPIRATORY (INHALATION) at 12:51

## 2019-04-05 RX ADMIN — CALCIUM GLUCONATE 2 G: 98 INJECTION, SOLUTION INTRAVENOUS at 19:05

## 2019-04-05 RX ADMIN — Medication 10 ML: at 22:01

## 2019-04-05 RX ADMIN — INSULIN LISPRO 3 UNITS: 100 INJECTION, SOLUTION INTRAVENOUS; SUBCUTANEOUS at 23:30

## 2019-04-05 RX ADMIN — Medication 10 ML: at 14:59

## 2019-04-05 RX ADMIN — IPRATROPIUM BROMIDE AND ALBUTEROL SULFATE 3 ML: .5; 3 SOLUTION RESPIRATORY (INHALATION) at 03:23

## 2019-04-05 RX ADMIN — IPRATROPIUM BROMIDE AND ALBUTEROL SULFATE 3 ML: .5; 3 SOLUTION RESPIRATORY (INHALATION) at 23:02

## 2019-04-05 RX ADMIN — IPRATROPIUM BROMIDE AND ALBUTEROL SULFATE 3 ML: .5; 3 SOLUTION RESPIRATORY (INHALATION) at 20:09

## 2019-04-05 RX ADMIN — DEXTROSE MONOHYDRATE AND SODIUM CHLORIDE 75 ML/HR: 5; .45 INJECTION, SOLUTION INTRAVENOUS at 14:58

## 2019-04-05 NOTE — CDMP QUERY
Pt admitted s/p  thyroidectomy  and developed stridor and hypoxia. If possible, please document in the progress notes and d/c summary if you are evaluating and / or treating any of the following: ? Acute pulmonary insufficiency following surgery ? Acute Respiratory failure due to ?, unrelated to surgery ? Acute Respiratory Failure due to the surgery ? Other, please specify ? Clinically unable to determine ? The medical record reflects the following: 
 
   Risk Factors: post op general anesthesia;  obesity;  
   Clinical Indicators: \" O2 saturations were trending down and stridor was noted on auscultation\" Treatment: required reintubation  soon after   extubation Thank you,   Brit Florentino RN   CCDS   x [phone]

## 2019-04-05 NOTE — PROGRESS NOTES
04/05/19 0944   Weaning Parameters   Spontaneous Breathing Trial Complete Yes   Resp Rate Observed 21   Ve 8      RSBI 23   will continue to monitor

## 2019-04-05 NOTE — ROUTINE PROCESS
1235: Tolerated spontaneous breathing trial without complication. ABG: pH: 7.367, pCO2: 38.8, pO2: 99, HCO3: 22.3, sO2: 98%. Ok to extubate, per Dr. Donald Howard. Airway cart, ambu bag, C-MAC, and RSI kit available at bedside if needed. Dr. Donald Howard, RN, and RT present for extubation. Extubated to 5 LPM NC by RT without complication. No immediate stridor or wheezing noted. Voice weak, whisper like. O2 sat 97%. Respirations even and unlabored. Continue to monitor closely. [id number]: Audible stridor noted. O2 sat 97-98%. RT and Dr. Donald Howard notified. Racemic epinephrine and albuterol treatments administered. Patient placed on BIPAP 12/5, FIO2: 40%. Rapid improvement of stridor noted. Anesthesia and Dr. Kal Denise notified and both assessed patient at bedside. Continue BIPAP therapy for now. Continue to monitor closely.

## 2019-04-05 NOTE — PROGRESS NOTES
NUTRITION    Nutrition Screen      RECOMMENDATIONS / PLAN:     - Monitor readiness for diet initiation versus need for enteral nutrition support. - Continue RD inpatient monitoring and evaluation. NUTRITION INTERVENTIONS & DIAGNOSIS:     [] Meals/snacks: modified composition, NPO   [x] Collaboration and referral of nutrition care: interdisciplinary rounds     Nutrition Diagnosis: Inadequate oral intake related to respiratory status as evidenced by pt NPO, intubated. ASSESSMENT:     S/p thyroidectomy and re-intubated postop for hypoxia, no OG/NGT placed in anticipation for weaning trial and extubation today. Plan for swallow evaluation prior to starting diet once pt extubated per MD. Propofol stopped for elevated triglyceride- 2579 mg/dL and total cholesterol of 347 mg/dL. Average po intake adequate to meet patients estimated nutritional needs:   [] Yes     [x] No   [] Unable to determine at this time    Diet: DIET NPO      Food Allergies: sweeteners and sucrose (cause headaches)  Current Appetite:   [] Good     [] Fair     [] Poor     [x] Other: NPO  Appetite/meal intake prior to admission:   [] Good     [] Fair     [] Poor     [x] Other: unknown  Feeding Limitations:  [] Swallowing difficulty    [] Chewing difficulty    [x] Other: respiratory status  Current Meal Intake: No data found.     BM: 4/3  Skin Integrity: neck surgical incision   Edema:   [x] No     [] Yes   Pertinent Medications: Reviewed: 3 gm Ca, pepcod, SSI, propofol stopped     Recent Labs     04/05/19  0346 04/04/19  1941   * 137   K 4.1 3.9    103   CO2 17* 21   * 226*   BUN 12 14   CREA 0.75 0.41*   CA 7.9* 8.0*   MG 2.2 2.2   PHOS 3.4 3.7       Intake/Output Summary (Last 24 hours) at 4/5/2019 1041  Last data filed at 4/5/2019 0940  Gross per 24 hour   Intake 2250.88 ml   Output 3095 ml   Net -844.12 ml       Anthropometrics:  Ht Readings from Last 1 Encounters:   04/04/19 5' 7\" (1.702 m)     Last 3 Recorded Weights in this Encounter    04/02/19 1517 04/04/19 1152   Weight: 119.3 kg (263 lb) 121 kg (266 lb 12.8 oz)     Body mass index is 41.79 kg/m². Obese, Class III     Weight History:   Weight Metrics 4/4/2019 4/1/2019 3/20/2019 3/13/2019 3/6/2019 2/26/2019 2/19/2019   Weight 266 lb 12.8 oz 270 lb 270 lb 268 lb 267 lb 267 lb 265 lb 9.6 oz   BMI 41.79 kg/m2 42.29 kg/m2 42.29 kg/m2 41.97 kg/m2 41.82 kg/m2 41.82 kg/m2 41.6 kg/m2        Admitting Diagnosis: S/P thyroidectomy [Z98.890]  Pertinent PMHx: DM, HLD, heart failure, asthma, thyroid disease     Education Needs:        [x] None identified  [] Identified - Not appropriate at this time  []  Identified and addressed - refer to education log  Learning Limitations:   [] None identified  [x] Identified: altered mentation   Cultural, Congregation & ethnic food preferences:  [x] None identified    [] Identified and addressed     ESTIMATED NUTRITION NEEDS:     Calories: 9210-3030 kcal (11-14 kcal/kg) based on  [x] Actual  kg     [] IBW   Protein: 153-183 gm (2.5-3 gm/kg) based on  [] Actual BW      [x] IBW 61 kg  Fluid: 1 mL/kcal     MONITORING & EVALUATION:     Nutrition Goal(s):   1. Nutritional needs will be met through adequate oral intake or nutrition support within the next 7 days.   Outcome:  [] Met/Ongoing    [] Progressing towards goal    [] Not progressing towards goal    [x] New/Initial goal     Monitoring:   [] Food and beverage intake   [x] Diet order   [x] Nutrition-focused physical findings   [x] Treatment/therapy   [] Weight   [] Enteral nutrition intake        Previous Recommendations (for follow-up assessments only):     []   Implemented       []   Not Implemented (RD to address)      [] No Longer Appropriate     [] No Recommendation Made     Discharge Planning: pending ability to tolerate oral diet and goals of care   [x] Participated in care planning, discharge planning, & interdisciplinary rounds as appropriate      Blaise Gowers, RD, [address]    Pager: [phone]

## 2019-04-05 NOTE — ROUTINE PROCESS
Bedside and Verbal shift change report given to Manish Palmer RN (oncoming nurse) by Jarad Man RN (offgoing nurse). Report included the following information SBAR, Kardex, ED Summary, OR Summary, Procedure Summary, Intake/Output, MAR, Accordion, Recent Results, Cardiac Rhythm SR, Alarm Parameters  and Quality Measures.

## 2019-04-05 NOTE — PROGRESS NOTES
Surgery  Restless in unit but pulm function good  Requiring sedation for comfort  Calcium pending  Hope to extubate tomorrow

## 2019-04-05 NOTE — PROGRESS NOTES
[facility]. Pulmonary, Critical Care, and Sleep Medicine    Name: Daniel Dominguez MRN: [id number]   : 1960 Hospital: [address] [place] [address] Dr   Date: 2019  Admission Date: 2019     Chart and notes reviewed. Data reviewed. I have evaluated all findings. [x]I have reviewed the flowsheet and previous days notes. [x]The patient is unable to give any meaningful history or review of systems because the patient is:  [x]Intubated [x]Sedated   []Unresponsive      [x]The patient is critically ill on      [x]Mechanical ventilation []Pressors   []BiPAP []       Interval HPI    Patient is a 63 y.o. female w/ pmhx of DM S/P thyroidectomy by Dr. Fransisca Espana, who presented to the ICU S/P procedure on 19. Patient has history of a multinodular goiter suspicious for malignancy, however recent biopsies have been negative. Procedure uneventful. Was extubated in PACU, but developed stridor and hypoxia. Was reintubated at that time without difficulty using the glidescope per chart review. Chords visualized and without signs of edema. Patient transferred to ICU for further management. Upon her arrival to the ICU, notably her QTc was 750 on the monitor. QTc on EKG done at that time was 676 w/ lytes post operatively WNL. Following up with repeat EKG this AM.     19   No events over night. Mentation: sedated. RASS 0 to -1  Respiratory/ Secretions: minimal  Hemodynamics: stable  Urine output: 2L over night  Need for procedures: N/A              ROS:Review of systems not obtained due to patient factors. Events and notes from last 24 hours reviewed. Care plan discussed on multidisciplinary rounds.   Patient Active Problem List   Diagnosis Code    Type II diabetes mellitus, uncontrolled ([facility] [address] E11.65    Sleep apnea G47.30    H/O aortic valve replacement Z95.2    History of bicuspid aortic valve Z87.74    Chronic back pain M54.9, G89.29    Chronic left shoulder pain M25.512, G89.29    Morbid obesity (HCC) E66.01    Left shoulder tendinitis M75.82    Spondylosis of lumbar region without myelopathy or radiculopathy M47.816    Chronic pain of both shoulders M25.511, G89.29, M25.512    Chronic pain of both knees M25.561, M25.562, G89.29    Chronic pain syndrome G89.4    Encounter for long-term (current) use of medications Z79.899    Chronic midline low back pain M54.5, G89.29    Lumbar facet arthropathy M47.816    Lumbar degenerative disc disease M51.36    Venous stasis dermatitis of both lower extremities I87.2    SOB (shortness of breath) R06.02    Type 2 diabetes mellitus without complication ([facility]) G52.6    Hypothyroidism due to acquired atrophy of thyroid E03.4    Essential hypertension I10    Dyslipidemia E78.5    Mood disorder ([facility]) F39    Chronic diastolic congestive heart failure ([facility]) I50.32    Type 2 diabetes mellitus with diabetic neuropathy ([facility]) E11.40    S/P thyroidectomy Z98.890       Vital Signs:  Visit Vitals  /59   Pulse 71   Temp 98.9 °F (37.2 °C)   Resp 19   Ht 5' 7\" (1.702 m)   Wt 121 kg (266 lb 12.8 oz)   SpO2 97%   BMI 41.79 kg/m²       O2 Device: Ventilator, Endotracheal tube       Temp (24hrs), Av.8 °F (37.1 °C), Min:97.8 °F (36.6 °C), Max:99.7 °F (37.6 °C)       Intake/Output:   Last shift:      1901 -  0700  In: 465.1 [I.V.:465.1]  Out: 950 [Urine:950]  Last 3 shifts:  07 -  190  In: 1500 [I.V.:1500]  Out: 750 [Urine:600]    Intake/Output Summary (Last 24 hours) at 2019 0530  Last data filed at 2019 0400  Gross per 24 hour   Intake 1965.08 ml   Output 1700 ml   Net 265.08 ml        Ventilator Settings:  Ventilator Mode: Assist control, VC+  Respiratory Rate  Resp Rate Observed: (Pt just intubated)  Back-Up Rate: 14  Insp Time (sec): 1 sec  I:E Ratio: 1;3.3  Ventilator Volumes  Vt Set (ml): 450 ml  Vt Exhaled (Machine Breath) (ml): 460 ml  Ve Observed (l/min): 10.22 l/min  Ventilator Pressures  PIP Observed (cm H2O): 22 cm H2O  Plateau Pressure (cm H2O): 20 cm H2O  MAP (cm H2O): 10  PEEP/VENT (cm H2O): 5 cm H20  Auto PEEP Observed (cm H2O): 0 cm H2O    Current Facility-Administered Medications   Medication Dose Route Frequency    calcium gluconate 3 g in 0.9% sodium chloride 100 mL IVPB  3 g IntraVENous ONCE    insulin lispro (HUMALOG) injection   SubCUTAneous Q6H    chlorhexidine (PERIDEX) 0.12 % mouthwash 10 mL  10 mL Oral Q12H    propofol (DIPRIVAN) infusion  0-50 mcg/kg/min IntraVENous TITRATE    famotidine (PF) (PEPCID) injection 20 mg  20 mg IntraVENous Q12H    albuterol-ipratropium (DUO-NEB) 2.5 MG-0.5 MG/3 ML  3 mL Nebulization Q4H RT    sodium chloride (NS) flush 5-40 mL  5-40 mL IntraVENous Q8H    dexmedeTOMidine (PRECEDEX) 400 mcg in 0.9% sodium chloride 100 mL infusion  0.2-0.7 mcg/kg/hr IntraVENous TITRATE       Telemetry: NSR    Physical Exam:      General:  Alert, cooperative, no distress. Head:  Normocephalic, without obvious abnormality, atraumatic. Eyes:  Conjunctivae/corneas clear. PERRL,   Throat: Lips, mucosa, and tongue normal. Teeth and gums normal.   Neck: Dressing overlying incision site   Lungs:   Diffuse crackles, diminished breath sounds bibasilarly. Chest wall:  No tenderness or deformity. Heart:  Regular rate and rhythm, S1, S2 normal, no murmur, click, rub or gallop. Abdomen:   Soft, non-tender. Hypoactive bowel sounds. No masses,  No organomegaly. Extremities: Extremities normal, atraumatic, no cyanosis or edema. Pulses: 2+ and symmetric all extremities.    Skin: Skin color, texture, turgor normal. No rashes or lesions   Neurologic: sedated         DATA:  MAR reviewed and pertinent medications noted or modified as needed    Labs:  Recent Labs     04/05/19 0346 04/04/19 1941   WBC 8.8 9.4   HGB 14.6 15.4   HCT 42.2 44.4    234     Recent Labs     04/05/19 0346 04/04/19 1941   NA PENDING 137   K PENDING 3.9   CL PENDING 103   CO2 PENDING 21   * 226*   BUN 12 14   CREA PENDING 0.41*   CA PENDING 8.0*   MG  --  2.2   PHOS  --  3.7     No results for input(s): PH, PCO2, PO2, HCO3, FIO2 in the last 72 hours. Recent Labs     04/05/19  0435 04/04/19  1738   FIO2I 35 50   HCO3I 18.1* 23.3   PCO2I 34.0* 45.8*   PHI 7.333* 7.314*   PO2I 91 165*       Imaging:  [x]   I have personally reviewed the patients radiographs and reports  XR Results (most recent):  Results from Appointment encounter on 08/14/18   XR CHEST PA LAT    Narrative TWO VIEW CHEST RADIOGRAPH     CPT CODE: 86208    INDICATION: Dyspnea, shortness of breath. COMPARISON: 3/1/2017    FINDINGS:     Status post median sternotomy and valvuloplasty. Medial apices are secured by  patient positioning. The cardiomediastinal silhouette is borderline enlarged,  similar to prior exam.  The pulmonary vascular markings are unremarkable. There  is no evidence of focal consolidation, pleural effusion or pneumothorax. Degenerative disc disease. .      Impression IMPRESSION:    Borderline enlarged cardiomediastinal silhouette. CT Results (most recent):  Results from Hospital Encounter encounter on 02/21/19   CT NECK SOFT TISSUE W CONT    Narrative Neck CT With Contrast    CPT CODE: 24934    HISTORY: Multinodular goiter. COMPARISON: Thyroid ultrasound 8/3/2018. CT chest 5/5/2017    FINDINGS:     After the intravenous administration of iodinated IV contrast, a scan was  obtained from low head down to upper chest.  Patient received 80 cc Isovue 300  IV contrast. CT dose reduction was achieved through use of a standardized  protocol tailored for this examination and automatic exposure control for dose  modulation. There is some chronic appearing dental disease with periapical lucency  surrounding right mandibular molar tooth roots. Visualized lower brain appears normal.     Mucosa of the upper aerodigestive tract:  No mass lesion seen in the mucosa of  the upper aerodigestive tract.  4 mm hyperdensity in the vallecula along the  surface of the base of tongue or uvula (axial image 52). More regular than  expected for metal pellet and appears to be a calcification. Lymph Nodes: No enlarged lymph nodes in the cervical chains. Major salivary glands:  Parotid glands are relatively low density, some fatty  replacement. Both parotid glands are relatively enlarged, but there is no mass  lesion. Submandibular glands appear normal.    Thyroid: Thyroid gland is heterogeneous with multiple relatively low-attenuation  nodular densities plus multiple calcifications. Several rim calcified nodules  bilaterally, the largest is on the right, 2.3 x 1.8 cm. Posteriorly, both  thyroid lobes extend into the tracheoesophageal groove. The inferior margin of  the right thyroid lobe is at the level of the top of the right clavicular head. The left thyroid lobe extends to the level of the top of the sternal notch. No  retrosternal extension. The right thyroid lobe measures approximately 4.6 cm AP, 3.8 cm transverse, 7.4  cm SI. The left thyroid lobe is 3.6 cm AP, 3.0 cm transverse and 6.7 cm SI. Upper mediastinum: No lymphadenopathy seen in the upper mediastinum. Visualized lung apices: are clear. Bones: Mild degenerative changes in the cervical spine. Mucus retention cysts in the maxillary sinuses. Impression IMPRESSION:    1. Heterogeneous enlargement of the thyroid gland with multiple low-attenuation  and rim calcified nodules bilaterally. Extension of thyroid tissue posteriorly  into the left and right tracheoesophageal groove. 2. The right thyroid lobe extends to the level of the reticular head. Left  thyroid lobe extends to the level of the sternal notch. 3. No neck masses. No lymphadenopathy. Thank you for this referral.       IMPRESSION:   Acute hypoxic respiratory failure - extubated in PACU however O2 saturations were trending down and stridor was noted on auscultation.  Patient reintubated at that time.  S/P thyroidectomy - for hx of multinodular goiter. Performed by Dr. Vern Wong 4/4/19. QTc prolongation - Noted on EKG yesterday AM to be 742. Electrolytes WNL. Improving. Hypocalcemia - likely secondary to transient hypoparathyroidism S/P thyroidectomy. Multinodular goiter  Hx of DM         Patient Active Problem List   Diagnosis Code    Type II diabetes mellitus, uncontrolled ([facility] [address] E11.65    Sleep apnea G47.30    H/O aortic valve replacement Z95.2    History of bicuspid aortic valve Z87.74    Chronic back pain M54.9, G89.29    Chronic left shoulder pain M25.512, G89.29    Morbid obesity (HCC) E66.01    Left shoulder tendinitis M75.82    Spondylosis of lumbar region without myelopathy or radiculopathy M47.816    Chronic pain of both shoulders M25.511, G89.29, M25.512    Chronic pain of both knees M25.561, M25.562, G89.29    Chronic pain syndrome G89.4    Encounter for long-term (current) use of medications Z79.899    Chronic midline low back pain M54.5, G89.29    Lumbar facet arthropathy M47.816    Lumbar degenerative disc disease M51.36    Venous stasis dermatitis of both lower extremities I87.2    SOB (shortness of breath) R06.02    Type 2 diabetes mellitus without complication ([facility]) F91.4    Hypothyroidism due to acquired atrophy of thyroid E03.4    Essential hypertension I10    Dyslipidemia E78.5    Mood disorder ([facility]) F39    Chronic diastolic congestive heart failure (HCC) I50.32    Type 2 diabetes mellitus with diabetic neuropathy ([facility]) E11.40    S/P thyroidectomy Z98.890          RECOMMENDATIONS:   · Resp: Titrate FiO2 for SpO2 >90%; strict aspiration precautions, HOB >30'. SBT in AM to assess readiness for extubation. · I/D: Afebrile; aleukocytosis; no issues currently. Monitor WBC and temp curve. · Hem/Onc: Daily CBC; H/H, and plts are stable  · CVS: HD stable; not currently requiring vasopressors. Repeat EKG this AM to monitor QTc.  Monitor for signs of cardiac arrhythmia. · Metabolic: Daily BMP; monitor e-lytes; replace PRN. Trend ionized Ca. · Renal: Trend Renal indices; Mueller to BSD  · Endocrine: POC Glucose q6, SSI, avoid hypoglycemia. S/P thyroidectomy, surgery following. · GI: SUP, NPO for now. · Musc/Skin: No acute issues, wound care  · Neuro: propofol and precedex gtt for RASS goal 0 to -1. PRN fentanyl for analgesia. Sedation holiday. · Fluids: N/A  · Code Status: full code     Best practice :    Glycemic control  IHI ICU bundles: Mueller Bundle Followed and Vent Bundle Followed, Vent Day 2    [facility] Vent patients- VAP bundle, aim to keep peak plateau pressure 33-45HI H2O  Sress ulcer prophylaxis. DVT prophylaxis. Need for Lines, mueller assessed. Restraints need. Palliative care evaluation. High complexity decision making was performed during this consultation and evaluation. [x]       Pt is at high risk for further organ failure and dysfunction. Biju Beard PA-C   04/05/19   Pulmonary, Critical Care Medicine  [facility]         [facility] Staff Addendum     I have independently evaluated the patient and reviewed the patient's chart. I have discussed the findings and care plan with ICU Care Team. Care Plan reviewed on ICU milti-D rounds. I agree with the above evaluation, assessment and recommendations along with the following comments and observations. Patient resting comfortably in ICU bed. Cuff leak present. Patient extubated. Initially no stridor present. However, called to bedside to see patient for stridor about 15 minutes post extubation. Racemic epinephrine given. Patient is able to speak softly. Placed on BIPAP 12/5 with good Vts in the 600 range. Respiratory rate is non-labored. Continue with BIPAP for now. Anesthesia and Surgeon, Dr. Jen Durham notified and have both come to bedside to assess patient. Continue ICU status with BIPAP for . NPO.  Further recommendations to follow.       Critical  Care and time spent coordinating care, minus procedure time: 30 min    Deya Gil DO, [facility]  Pulmonary, Sleep and Critical Care Medicine  1:34 PM
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
## 2019-04-05 NOTE — DIABETES MGMT
GLYCEMIC CONTROL AND NUTRITION    Assessment/Recommendations:  Lab glucose this am 270 mg/dl   Noted triglycerides 2579  May benefit from insulin gtt. If gtt not started, recommend starting low dose Lantus insulin. pta med list states she takes Lantus 90 units every night at home. Unable to verify at this time. Will continue inpatient monitoring. Most recent blood glucose values:  Results for Jimmy Bryant (MRN [id number]) as of 4/5/2019 11:20   Ref. Range 4/4/2019 17:15 4/4/2019 21:53 4/4/2019 23:46 4/5/2019 03:46 4/5/2019 05:16   GLUCOSE,FAST - POC Latest Ref Range: 70 - 110 mg/dL 230 (H)  204 (H)  252 (H)     Current A1C of 8.4 % is equivalent to average blood glucose of 194 mg/dl over the past 2-3 months.     Current hospital diabetes medications:   Lispro corrective insulin coverage every 6 hours  Home diabetes medications:  Per pta med list  Lantus 90 units every night  Diet:    NPO  Education:  ____Refer to Diabetes Education Record             __x_Education not indicated at this time  Pt intubated    [facility] CDE  Ext [phone]

## 2019-04-06 LAB
ANION GAP SERPL CALC-SCNC: 9 MMOL/L (ref 3–18)
ANION GAP SERPL CALC-SCNC: 9 MMOL/L (ref 3–18)
ATRIAL RATE: 87 BPM
BASOPHILS # BLD: 0 K/UL (ref 0–0.1)
BASOPHILS NFR BLD: 0 % (ref 0–2)
BUN SERPL-MCNC: 7 MG/DL (ref 7–18)
BUN SERPL-MCNC: 7 MG/DL (ref 7–18)
BUN/CREAT SERPL: 10 (ref 12–20)
BUN/CREAT SERPL: 12 (ref 12–20)
CA-I SERPL-SCNC: 1 MMOL/L (ref 1.12–1.32)
CA-I SERPL-SCNC: 1.11 MMOL/L (ref 1.12–1.32)
CALCIUM SERPL-MCNC: 8.4 MG/DL (ref 8.5–10.1)
CALCIUM SERPL-MCNC: 8.4 MG/DL (ref 8.5–10.1)
CALCULATED P AXIS, ECG09: 65 DEGREES
CALCULATED R AXIS, ECG10: 77 DEGREES
CALCULATED T AXIS, ECG11: -6 DEGREES
CHLORIDE SERPL-SCNC: 103 MMOL/L (ref 100–108)
CHLORIDE SERPL-SCNC: 104 MMOL/L (ref 100–108)
CO2 SERPL-SCNC: 26 MMOL/L (ref 21–32)
CO2 SERPL-SCNC: 26 MMOL/L (ref 21–32)
CREAT SERPL-MCNC: 0.58 MG/DL (ref 0.6–1.3)
CREAT SERPL-MCNC: 0.68 MG/DL (ref 0.6–1.3)
DIAGNOSIS, 93000: NORMAL
DIFFERENTIAL METHOD BLD: ABNORMAL
EOSINOPHIL # BLD: 0 K/UL (ref 0–0.4)
EOSINOPHIL NFR BLD: 0 % (ref 0–5)
ERYTHROCYTE [DISTWIDTH] IN BLOOD BY AUTOMATED COUNT: 15.4 % (ref 11.6–14.5)
GLUCOSE BLD STRIP.AUTO-MCNC: 169 MG/DL (ref 70–110)
GLUCOSE BLD STRIP.AUTO-MCNC: 174 MG/DL (ref 70–110)
GLUCOSE BLD STRIP.AUTO-MCNC: 183 MG/DL (ref 70–110)
GLUCOSE SERPL-MCNC: 189 MG/DL (ref 74–99)
GLUCOSE SERPL-MCNC: 195 MG/DL (ref 74–99)
HCT VFR BLD AUTO: 44 % (ref 35–45)
HGB BLD-MCNC: 14.7 G/DL (ref 12–16)
LIPASE SERPL-CCNC: 50 U/L (ref 73–393)
LYMPHOCYTES # BLD: 1.2 K/UL (ref 0.9–3.6)
LYMPHOCYTES NFR BLD: 13 % (ref 21–52)
MAGNESIUM SERPL-MCNC: 2.1 MG/DL (ref 1.6–2.6)
MCH RBC QN AUTO: 30.8 PG (ref 24–34)
MCHC RBC AUTO-ENTMCNC: 33.4 G/DL (ref 31–37)
MCV RBC AUTO: 92.1 FL (ref 74–97)
MONOCYTES # BLD: 0.9 K/UL (ref 0.05–1.2)
MONOCYTES NFR BLD: 9 % (ref 3–10)
NEUTS SEG # BLD: 7.3 K/UL (ref 1.8–8)
NEUTS SEG NFR BLD: 78 % (ref 40–73)
P-R INTERVAL, ECG05: 212 MS
PHOSPHATE SERPL-MCNC: 3.4 MG/DL (ref 2.5–4.9)
PLATELET # BLD AUTO: 164 K/UL (ref 135–420)
PMV BLD AUTO: 9.5 FL (ref 9.2–11.8)
POTASSIUM SERPL-SCNC: 3.4 MMOL/L (ref 3.5–5.5)
POTASSIUM SERPL-SCNC: 3.6 MMOL/L (ref 3.5–5.5)
Q-T INTERVAL, ECG07: 350 MS
QRS DURATION, ECG06: 84 MS
QTC CALCULATION (BEZET), ECG08: 421 MS
RBC # BLD AUTO: 4.78 M/UL (ref 4.2–5.3)
SODIUM SERPL-SCNC: 138 MMOL/L (ref 136–145)
SODIUM SERPL-SCNC: 139 MMOL/L (ref 136–145)
TRIGL SERPL-MCNC: 1607 MG/DL (ref ?–150)
VENTRICULAR RATE, ECG03: 87 BPM
WBC # BLD AUTO: 9.3 K/UL (ref 4.6–13.2)

## 2019-04-06 PROCEDURE — 74011636637 HC RX REV CODE- 636/637: Performed by: PHYSICIAN ASSISTANT

## 2019-04-06 PROCEDURE — 74011250637 HC RX REV CODE- 250/637

## 2019-04-06 PROCEDURE — 93005 ELECTROCARDIOGRAM TRACING: CPT

## 2019-04-06 PROCEDURE — 94660 CPAP INITIATION&MGMT: CPT

## 2019-04-06 PROCEDURE — 94640 AIRWAY INHALATION TREATMENT: CPT

## 2019-04-06 PROCEDURE — 84478 ASSAY OF TRIGLYCERIDES: CPT

## 2019-04-06 PROCEDURE — 82330 ASSAY OF CALCIUM: CPT

## 2019-04-06 PROCEDURE — 83690 ASSAY OF LIPASE: CPT

## 2019-04-06 PROCEDURE — 74011250636 HC RX REV CODE- 250/636: Performed by: PHYSICIAN ASSISTANT

## 2019-04-06 PROCEDURE — 80048 BASIC METABOLIC PNL TOTAL CA: CPT

## 2019-04-06 PROCEDURE — 74011000250 HC RX REV CODE- 250: Performed by: PHYSICIAN ASSISTANT

## 2019-04-06 PROCEDURE — 83735 ASSAY OF MAGNESIUM: CPT

## 2019-04-06 PROCEDURE — 74011000258 HC RX REV CODE- 258: Performed by: PHYSICIAN ASSISTANT

## 2019-04-06 PROCEDURE — 82962 GLUCOSE BLOOD TEST: CPT

## 2019-04-06 PROCEDURE — 74011000258 HC RX REV CODE- 258: Performed by: INTERNAL MEDICINE

## 2019-04-06 PROCEDURE — 74011636637 HC RX REV CODE- 636/637: Performed by: INTERNAL MEDICINE

## 2019-04-06 PROCEDURE — 85025 COMPLETE CBC W/AUTO DIFF WBC: CPT

## 2019-04-06 PROCEDURE — 65610000006 HC RM INTENSIVE CARE

## 2019-04-06 PROCEDURE — 74011250636 HC RX REV CODE- 250/636: Performed by: INTERNAL MEDICINE

## 2019-04-06 PROCEDURE — 36415 COLL VENOUS BLD VENIPUNCTURE: CPT

## 2019-04-06 PROCEDURE — 84100 ASSAY OF PHOSPHORUS: CPT

## 2019-04-06 PROCEDURE — 92610 EVALUATE SWALLOWING FUNCTION: CPT

## 2019-04-06 PROCEDURE — 74011250637 HC RX REV CODE- 250/637: Performed by: INTERNAL MEDICINE

## 2019-04-06 RX ORDER — INSULIN GLARGINE 100 [IU]/ML
25 INJECTION, SOLUTION SUBCUTANEOUS DAILY
Status: DISCONTINUED | OUTPATIENT
Start: 2019-04-06 | End: 2019-04-16 | Stop reason: HOSPADM

## 2019-04-06 RX ADMIN — HYDROMORPHONE HYDROCHLORIDE 0.5 MG: 1 INJECTION, SOLUTION INTRAMUSCULAR; INTRAVENOUS; SUBCUTANEOUS at 22:28

## 2019-04-06 RX ADMIN — Medication 10 ML: at 05:59

## 2019-04-06 RX ADMIN — POTASSIUM CHLORIDE: 2 INJECTION, SOLUTION, CONCENTRATE INTRAVENOUS at 06:45

## 2019-04-06 RX ADMIN — CALCIUM CARBONATE (ANTACID) CHEW TAB 500 MG 400 MG: 500 CHEW TAB at 08:30

## 2019-04-06 RX ADMIN — ENOXAPARIN SODIUM 40 MG: 100 INJECTION SUBCUTANEOUS at 13:41

## 2019-04-06 RX ADMIN — Medication 10 ML: at 14:00

## 2019-04-06 RX ADMIN — HYDROMORPHONE HYDROCHLORIDE 0.5 MG: 1 INJECTION, SOLUTION INTRAMUSCULAR; INTRAVENOUS; SUBCUTANEOUS at 18:27

## 2019-04-06 RX ADMIN — ONDANSETRON 6 MG: 2 INJECTION INTRAMUSCULAR; INTRAVENOUS at 08:32

## 2019-04-06 RX ADMIN — HYDROMORPHONE HYDROCHLORIDE 0.5 MG: 1 INJECTION, SOLUTION INTRAMUSCULAR; INTRAVENOUS; SUBCUTANEOUS at 04:07

## 2019-04-06 RX ADMIN — DEXTROSE MONOHYDRATE AND SODIUM CHLORIDE 75 ML/HR: 5; .45 INJECTION, SOLUTION INTRAVENOUS at 18:27

## 2019-04-06 RX ADMIN — INSULIN LISPRO 3 UNITS: 100 INJECTION, SOLUTION INTRAVENOUS; SUBCUTANEOUS at 06:02

## 2019-04-06 RX ADMIN — GEMFIBROZIL 600 MG: 600 TABLET ORAL at 08:31

## 2019-04-06 RX ADMIN — HYDROMORPHONE HYDROCHLORIDE 0.5 MG: 1 INJECTION, SOLUTION INTRAMUSCULAR; INTRAVENOUS; SUBCUTANEOUS at 13:40

## 2019-04-06 RX ADMIN — INSULIN LISPRO 3 UNITS: 100 INJECTION, SOLUTION INTRAVENOUS; SUBCUTANEOUS at 18:33

## 2019-04-06 RX ADMIN — INSULIN GLARGINE 25 UNITS: 100 INJECTION, SOLUTION SUBCUTANEOUS at 10:50

## 2019-04-06 RX ADMIN — IPRATROPIUM BROMIDE AND ALBUTEROL SULFATE 3 ML: .5; 3 SOLUTION RESPIRATORY (INHALATION) at 21:39

## 2019-04-06 RX ADMIN — IPRATROPIUM BROMIDE AND ALBUTEROL SULFATE 3 ML: .5; 3 SOLUTION RESPIRATORY (INHALATION) at 08:00

## 2019-04-06 RX ADMIN — POTASSIUM CHLORIDE: 2 INJECTION, SOLUTION, CONCENTRATE INTRAVENOUS at 10:30

## 2019-04-06 RX ADMIN — INSULIN LISPRO 3 UNITS: 100 INJECTION, SOLUTION INTRAVENOUS; SUBCUTANEOUS at 12:34

## 2019-04-06 RX ADMIN — DEXTROSE MONOHYDRATE AND SODIUM CHLORIDE 75 ML/HR: 5; .45 INJECTION, SOLUTION INTRAVENOUS at 04:13

## 2019-04-06 RX ADMIN — CALCIUM GLUCONATE 2 G: 98 INJECTION, SOLUTION INTRAVENOUS at 06:52

## 2019-04-06 RX ADMIN — POTASSIUM CHLORIDE: 2 INJECTION, SOLUTION, CONCENTRATE INTRAVENOUS at 08:38

## 2019-04-06 RX ADMIN — POTASSIUM CHLORIDE: 2 INJECTION, SOLUTION, CONCENTRATE INTRAVENOUS at 07:45

## 2019-04-06 RX ADMIN — FAMOTIDINE 20 MG: 10 INJECTION INTRAVENOUS at 11:29

## 2019-04-06 RX ADMIN — CALCIUM GLUCONATE 2 G: 98 INJECTION, SOLUTION INTRAVENOUS at 00:10

## 2019-04-06 RX ADMIN — ONDANSETRON 6 MG: 2 INJECTION INTRAMUSCULAR; INTRAVENOUS at 04:03

## 2019-04-06 RX ADMIN — POTASSIUM CHLORIDE: 2 INJECTION, SOLUTION, CONCENTRATE INTRAVENOUS at 09:30

## 2019-04-06 RX ADMIN — FAMOTIDINE 20 MG: 10 INJECTION INTRAVENOUS at 22:14

## 2019-04-06 RX ADMIN — HYDROMORPHONE HYDROCHLORIDE 0.5 MG: 1 INJECTION, SOLUTION INTRAMUSCULAR; INTRAVENOUS; SUBCUTANEOUS at 08:33

## 2019-04-06 RX ADMIN — IPRATROPIUM BROMIDE AND ALBUTEROL SULFATE 3 ML: .5; 3 SOLUTION RESPIRATORY (INHALATION) at 03:35

## 2019-04-06 RX ADMIN — Medication 10 ML: at 05:58

## 2019-04-06 RX ADMIN — Medication 10 ML: at 22:00

## 2019-04-06 RX ADMIN — IPRATROPIUM BROMIDE AND ALBUTEROL SULFATE 3 ML: .5; 3 SOLUTION RESPIRATORY (INHALATION) at 12:00

## 2019-04-06 NOTE — PROGRESS NOTES
Surgery  Extubated and improved.  Remains on CPAP which she requires at home  AF VSS  Voice soft   Wound ok  Ca ++ 8.4  Triglycerides 1607  Await path  resp failure likely just slow to wake after propofol not related to surgery  Cords during reintubation normal  S/p Total thyroidectomy, cont CPAP

## 2019-04-06 NOTE — ROUTINE PROCESS
Bedside and Verbal shift change report given to Constanza Shanks RN (oncoming nurse) by Mila Barr RN (offgoing nurse). Report included the following information SBAR, Kardex, Intake/Output, MAR, Recent Results and Cardiac Rhythm is SR, 1st degree AVB on telemetry.

## 2019-04-06 NOTE — ROUTINE PROCESS
Bedside and Verbal shift change report given to [address] (oncoming nurse) by Jabari Neff RN (offgoing nurse). Report included the following information SBAR, Kardex, MAR and Recent Results. SITUATION:    Code Status: Full Code   Reason for Admission: S/P thyroidectomy [Z98.890]    [facility] day: 2   Problem List:       Hospital Problems  Date Reviewed: 4/1/2019          Codes Class Noted POA    S/P thyroidectomy ICD-10-CM: Z98.890  ICD-9-CM: V45.89  4/4/2019 Unknown              BACKGROUND:    Past Medical History:   Past Medical History:   Diagnosis Date    Arthritis     Lower back     Asthma     Chronic back pain     Lower back pain    Depression     Diabetes ([facility]     Diabetes mellitus ([address]     Hearing loss     Heart failure (HCC)     chronic diastolic heart failure    Left ear hearing loss     pt states nerve damage--- 25% hearing loss, described as \"muffled\"    Memory difficulty     Panic attacks     Ringing of ears     Severe headache     Sleep apnea     SOB (shortness of breath) on exertion     w and w/out exertion    Stomach pain     Thyroid disease     Vertigo          Patient taking anticoagulants yes     ASSESSMENT:    Changes in Assessment Throughout Shift: Pt continued on bipap , s/p thyroidectomy , continued on ASP precautions. Pt c/o of nausea new order for Zofran 6 mg Iv .       Patient has Central Line: no Reasons if yes: no   Patient has Barker Cath: yes Reasons if yes: strict I & o      Last Vitals:     Vitals:    04/06/19 0600 04/06/19 0630 04/06/19 0700 04/06/19 0800   BP: 136/62  125/61    Pulse: 89 87 86    Resp: 17 18 16    Temp:    99.9 °F (37.7 °C)   SpO2: 98% 98% 98%    Weight: 121 kg (266 lb 12.1 oz)      Height:            IV and DRAINS (will only show if present)   Peripheral IV 04/04/19 Right Arm-Site Assessment: Clean, dry, & intact  [REMOVED] Airway - Endotracheal Tube 04/04/19 Oral-Site Assessment: Clean, dry, & intact  Peripheral IV 04/04/19 Left;Upper Arm-Site Assessment: Clean, dry, & intact  Peripheral IV 04/04/19 Right; Lower Forearm-Site Assessment: Clean, dry, & intact     WOUND (if present)   Wound Type:  none   Dressing present Dressing Present : Yes   Wound Concerns/Notes:  none     PAIN    Pain Assessment    Pain Intensity 1: 3 (04/06/19 0933)    Pain Location 1: Incisional    Pain Intervention(s) 1: Medication (see MAR)    Patient Stated Pain Goal: 3  o Interventions for Pain: medicated per [facility]  o Intervention effective: yes  o Time of last intervention: 0933   o Reassessment Completed: yes      Last 3 Weights:  Last 3 Recorded Weights in this Encounter    04/02/19 1517 04/04/19 1152 04/06/19 0600   Weight: 119.3 kg (263 lb) 121 kg (266 lb 12.8 oz) 121 kg (266 lb 12.1 oz)     Weight change: -0.02 kg (-0.7 oz)     INTAKE/OUPUT    Current Shift: 04/06 0701 - 04/06 1900  In: 200 [I.V.:200]  Out: -     Last three shifts: 04/04 1901 - 04/06 0700  In: 2281.7 [I.V.:2281.7]  Out: 4850 [Urine:4850]     LAB RESULTS     Recent Labs     04/06/19  0300 04/05/19  0346 04/04/19 1941   WBC 9.3 8.8 9.4   HGB 14.7 14.6 15.4   HCT 44.0 42.2 44.4    187 234        Recent Labs     04/06/19  0300 04/05/19  0346 04/04/19  1941    134* 137   K 3.4* 4.1 3.9   * 270* 226*   BUN 7 12 14   CREA 0.68 0.75 0.41*   CA 8.4* 7.9* 8.0*   MG 2.1 2.2 2.2       RECOMMENDATIONS AND DISCHARGE PLANNING     1. Pending tests/procedures/ Plan of Care or Other Needs: Ionized kathryn q 6 hours     2. Discharge plan for patient and Needs/Barriers: pending    3. Estimated Discharge Date: 04/11/2019 Posted on Whiteboard in [address] [place] [address] Room: yes      4. The patient's care plan was reviewed with the oncoming nurse.        \"HEALS\" SAFETY CHECK      Fall Risk    Total Score: 2    Safety Measures: Safety Measures: Bed/Chair-Wheels locked, Bed in low position, Call light within reach, Drug code sheet, Emergency bedside equipment, Fall prevention (comment), Side rails X 3    A safety check occurred in the patient's room between off going nurse and oncoming nurse listed above. The safety check included the below items  Area Items   H  High Alert Medications - Verify all high alert medication drips (heparin, PCA, etc.)   E  Equipment - Suction is set up for ALL patients (with chaitanya)  - Red plugs utilized for all equipment (IV pumps, etc.)  - WOWs wiped down at end of shift.  - Room stocked with oxygen, suction, and other unit-specific supplies   A  Alarms - Bed alarm is set for fall risk patients  - Ensure chair alarm is in place and activated if patient is up in a chair   L  Lines - Check IV for any infiltration  - Barker bag is empty if patient has a Barker   - Tubing and IV bags are labeled   S  Safety   - Room is clean, patient is clean, and equipment is clean. - Hallways are clear from equipment besides carts. - Fall bracelet on for fall risk patients  - Ensure room is clear and free of clutter  - Suction is set up for ALL patients (with chaitanya)  - Hallways are clear from equipment besides carts.    - Isolation precautions followed, supplies available outside room, sign posted     Pamela Colon RN

## 2019-04-06 NOTE — ROUTINE PROCESS
Bedside shift change report given to [id number] [address] (oncoming nurse) by Nicky Godinez RN (offgoing nurse). Report included the following information SBAR, Kardex, Intake/Output, MAR and Recent Results.
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
## 2019-04-06 NOTE — PROGRESS NOTES
[facility]. Pulmonary, Critical Care, and Sleep Medicine    Name: King Adrien MRN: [id number]   : 1960 Hospital: [facility]   Date: 2019  Admission Date: 2019     Chart and notes reviewed. Data reviewed. I have evaluated all findings. [x]I have reviewed the flowsheet and previous days notes. Interval HPI: Patient is a 62 y. o. female w/ pmhx of DM S/P thyroidectomy, by Dr. Kalani Ray, on 19. Patient has history of a multinodular goiter suspicious for malignancy, however recent biopsies have been negative. Procedure uneventful. Was extubated in PACU, but developed stridor and hypoxia, subsequently re intubated. ICU stay notable for QTc 676 on , repeat 4.5 QTc 430. Triglycerides also noted to be in 2,000's. Patient has history of hypertriglyceridemia. Extubated on  w/ some stridor that resolved w/ racemic epi and bipap. Patient utilizes bipap QHS at baseline. Subjective 19  Hospital Day:3  Overnight events: none. Also c/o of discoloration of left hand since surgery. No pain, pruritus, sensation abnormalities associate w/ color change. Brief c/o nausea. Resolved w/ zofran. Bipap overnight. Mentation/Activity: follows commands. Alert, oriented  Respiratory/ Secretions: protecting airway, managing secretions w/o any issues. Hemodynamics: VSS  Urine output, bowel: 1L UOP overnight  Diet: eval w/ SLP today  Need for procedures: n/a              ROS:Review of systems not obtained due to patient factors. Events and notes from last 24 hours reviewed. Care plan discussed on multidisciplinary rounds.   Patient Active Problem List   Diagnosis Code    Type II diabetes mellitus, uncontrolled ([facility] [address] E11.65    Sleep apnea G47.30    H/O aortic valve replacement Z95.2    History of bicuspid aortic valve Z87.74    Chronic back pain M54.9, G89.29    Chronic left shoulder pain M25.512, G89.29    Morbid obesity (HCC) E66.01    Left shoulder tendinitis M75.82    Spondylosis of lumbar region without myelopathy or radiculopathy M47.816    Chronic pain of both shoulders M25.511, G89.29, M25.512    Chronic pain of both knees M25.561, M25.562, G89.29    Chronic pain syndrome G89.4    Encounter for long-term (current) use of medications Z79.899    Chronic midline low back pain M54.5, G89.29    Lumbar facet arthropathy M47.816    Lumbar degenerative disc disease M51.36    Venous stasis dermatitis of both lower extremities I87.2    SOB (shortness of breath) R06.02    Type 2 diabetes mellitus without complication (HCC) Y32.7    Hypothyroidism due to acquired atrophy of thyroid E03.4    Essential hypertension I10    Dyslipidemia E78.5    Mood disorder (HCC) F39    Chronic diastolic congestive heart failure (HCC) I50.32    Type 2 diabetes mellitus with diabetic neuropathy (HCC) E11.40    S/P thyroidectomy Z98.890       Vital Signs:  Visit Vitals  /64   Pulse 93   Temp 99.4 °F (37.4 °C)   Resp 15   Ht 5' 7\" (1.702 m)   Wt 121 kg (266 lb 12.8 oz)   SpO2 99%   Breastfeeding?  No   BMI 41.79 kg/m²       O2 Device: BIPAP   O2 Flow Rate (L/min): 5 l/min   Temp (24hrs), Av.3 °F (37.4 °C), Min:98.7 °F (37.1 °C), Max:100 °F (37.8 °C)       Intake/Output:   Last shift:      1901 -  0700  In: 100 [I.V.:100]  Out: 1075 [Urine:1075]  Last 3 shifts:  0701 -  1900  In: 2681.7 [I.V.:2681.7]  Out: 4792 [Urine:4025]    Intake/Output Summary (Last 24 hours) at 2019 0409  Last data filed at 2019 0300  Gross per 24 hour   Intake 816.64 ml   Output 3550 ml   Net -2733.36 ml        Ventilator Settings:  Ventilator Mode: Spontaneous  Respiratory Rate  Resp Rate Observed: 21  Back-Up Rate: 14  Insp Time (sec): 1 sec  I:E Ratio: 1;3.3  Ventilator Volumes  Vt Set (ml): 450 ml  Vt Exhaled (Machine Breath) (ml): 589 ml  Vt Spont (ml): 500 ml  Ve Observed (l/min): 8.9 l/min  Ventilator Pressures  Pressure Support (cm H2O): 7 cm H2O  PIP Observed (cm H2O): 21 cm H2O  Plateau Pressure (cm H2O): 20 cm H2O  MAP (cm H2O): 11  PEEP/VENT (cm H2O): 5 cm H20  Auto PEEP Observed (cm H2O): 0 cm H2O    Current Facility-Administered Medications   Medication Dose Route Frequency    calcium carbonate (TUMS) chewable tablet 400 mg [elemental]  400 mg Oral TID WITH MEALS    insulin lispro (HUMALOG) injection   SubCUTAneous Q6H    gemfibrozil (LOPID) tablet 600 mg  600 mg Oral ACB&D    enoxaparin (LOVENOX) injection 40 mg  40 mg SubCUTAneous Q24H    insulin glargine (LANTUS) injection 20 Units  20 Units SubCUTAneous DAILY    dextrose 5 % - 0.45% NaCl infusion  75 mL/hr IntraVENous CONTINUOUS    famotidine (PF) (PEPCID) injection 20 mg  20 mg IntraVENous Q12H    albuterol-ipratropium (DUO-NEB) 2.5 MG-0.5 MG/3 ML  3 mL Nebulization Q4H RT    sodium chloride (NS) flush 5-40 mL  5-40 mL IntraVENous Q8H         Telemetry: [x]Sinus []A-flutter []Paced    []A-fib []Multiple PVCs                  Physical Exam:      General: Awake, alert, NAD, bipap  HEENT:  Anicteric sclerae; pink palpebral conjunctivae; mucosa moist. Surgical site sutured- warm, dry, intact. No significant swelling. Resp:  Symmetrical chest expansion, no accessory muscle use; good airway entry; decreased air movement bibasilar. CV:  S1, S2 present; regular rate and rhythm  GI:  Abdomen soft, non-tender; (+) active bowel sounds. Obese. Extremities:  +2 pulses on all extremities; no edema/ cyanosis/ clubbing noted. Left hand w/ non blanching erythema over dorsal aspect.  Warm, dry, sensation/strength intact  Skin:  Warm; no rashes/ lesions noted, normal turgor/cap refill   Neurologic:  Non-focal  Devices:  mueller      DATA:  MAR reviewed and pertinent medications noted or modified as needed    Labs:  Recent Labs     04/06/19  0300 04/05/19  0346 04/04/19  1941   WBC 9.3 8.8 9.4   HGB 14.7 14.6 15.4   HCT 44.0 42.2 44.4    187 234     Recent Labs     04/06/19  0300 04/05/19  8367 04/04/19  1941    134* 137   K 3.4* 4.1 3.9    101 103   CO2 26 17* 21   * 270* 226*   BUN 7 12 14   CREA 0.68 0.75 0.41*   CA 8.4* 7.9* 8.0*   MG 2.1 2.2 2.2   PHOS 3.4 3.4 3.7     No results for input(s): PH, PCO2, PO2, HCO3, FIO2 in the last 72 hours. Recent Labs     04/05/19  1223 04/05/19  0435 04/04/19  1738   FIO2I 35 35 50   HCO3I 22.3 18.1* 23.3   PCO2I 38.8 34.0* 45.8*   PHI 7.367 7.333* 7.314*   PO2I 99 91 165*       Imaging:  [x]   I have personally reviewed the patients radiographs and reports  XR Results (most recent):  Results from Hospital Encounter encounter on 04/04/19   XR CHEST PORT    Narrative EXAM:  Chest Portable. INDICATION:  ET tube placement     COMPARISON:  04/04/19     TECHNIQUE:  Portable AP chest study    FINDINGS:      - ET tube distal tip observed at 2.4 cm above the phi.   - Both lungs are hypoinflated. - Persistent retrocardiac opacity, similar to recent prior exam.  Subtle streaky  density in the right infrahilar region. Again similar to recent prior exam.   - No pneumothorax. - Moderate cardiac silhouette enlargement with prior CABG and aortic valve  replacement. Impression IMPRESSION:    1. Borderline low-lying ET tube. 2.  No significant interval change in lung aeration pattern, with subtle streaky  densities in the lower lung zones. CT Results (most recent):  Results from Hospital Encounter encounter on 02/21/19   CT NECK SOFT TISSUE W CONT    Narrative Neck CT With Contrast    CPT CODE: 14148    HISTORY: Multinodular goiter. COMPARISON: Thyroid ultrasound 8/3/2018. CT chest 5/5/2017    FINDINGS:     After the intravenous administration of iodinated IV contrast, a scan was  obtained from low head down to upper chest.  Patient received 80 cc Isovue 300  IV contrast. CT dose reduction was achieved through use of a standardized  protocol tailored for this examination and automatic exposure control for dose  modulation.      There is some chronic appearing dental disease with periapical lucency  surrounding right mandibular molar tooth roots. Visualized lower brain appears normal.     Mucosa of the upper aerodigestive tract:  No mass lesion seen in the mucosa of  the upper aerodigestive tract. 4 mm hyperdensity in the vallecula along the  surface of the base of tongue or uvula (axial image 52). More regular than  expected for metal pellet and appears to be a calcification. Lymph Nodes: No enlarged lymph nodes in the cervical chains. Major salivary glands:  Parotid glands are relatively low density, some fatty  replacement. Both parotid glands are relatively enlarged, but there is no mass  lesion. Submandibular glands appear normal.    Thyroid: Thyroid gland is heterogeneous with multiple relatively low-attenuation  nodular densities plus multiple calcifications. Several rim calcified nodules  bilaterally, the largest is on the right, 2.3 x 1.8 cm. Posteriorly, both  thyroid lobes extend into the tracheoesophageal groove. The inferior margin of  the right thyroid lobe is at the level of the top of the right clavicular head. The left thyroid lobe extends to the level of the top of the sternal notch. No  retrosternal extension. The right thyroid lobe measures approximately 4.6 cm AP, 3.8 cm transverse, 7.4  cm SI. The left thyroid lobe is 3.6 cm AP, 3.0 cm transverse and 6.7 cm SI. Upper mediastinum: No lymphadenopathy seen in the upper mediastinum. Visualized lung apices: are clear. Bones: Mild degenerative changes in the cervical spine. Mucus retention cysts in the maxillary sinuses. Impression IMPRESSION:    1. Heterogeneous enlargement of the thyroid gland with multiple low-attenuation  and rim calcified nodules bilaterally. Extension of thyroid tissue posteriorly  into the left and right tracheoesophageal groove. 2. The right thyroid lobe extends to the level of the reticular head.  Left  thyroid lobe extends to the level of the sternal notch. 3. No neck masses. No lymphadenopathy. Thank you for this referral.         IMPRESSION:   · S/p thyroidectomy - for hx of multinodular goiter. Performed by Dr. Zambrano Holding 4/4/19. · Hypoxic respiratory failure now resolved, extubated 4/5  · Stridor- improving/ appears resolved   · QTc prolongation- appears resolved/ transient  · Hypocalcemia- s/p thyroidectomy- on replacement protocol  · Hypertriglyceridemia improving - off Propofol but elevated on prior labs  · Hyperglycemia in the setting of DMII  · OVIDIO- On home BIPAP  · Morbid Obesity: Body mass index is 41.78 kg/m². Patient Active Problem List   Diagnosis Code    Type II diabetes mellitus, uncontrolled ([facility] [address] E11.65    Sleep apnea G47.30    H/O aortic valve replacement Z95.2    History of bicuspid aortic valve Z87.74    Chronic back pain M54.9, G89.29    Chronic left shoulder pain M25.512, G89.29    Morbid obesity ([facility]) E66.01    Left shoulder tendinitis M75.82    Spondylosis of lumbar region without myelopathy or radiculopathy M47.816    Chronic pain of both shoulders M25.511, G89.29, M25.512    Chronic pain of both knees M25.561, M25.562, G89.29    Chronic pain syndrome G89.4    Encounter for long-term (current) use of medications Z79.899    Chronic midline low back pain M54.5, G89.29    Lumbar facet arthropathy M47.816    Lumbar degenerative disc disease M51.36    Venous stasis dermatitis of both lower extremities I87.2    SOB (shortness of breath) R06.02    Type 2 diabetes mellitus without complication (HCC) N67.8    Hypothyroidism due to acquired atrophy of thyroid E03.4    Essential hypertension I10    Dyslipidemia E78.5    Mood disorder (HCC) F39    Chronic diastolic congestive heart failure (HCC) I50.32    Type 2 diabetes mellitus with diabetic neuropathy (HCC) E11.40    S/P thyroidectomy Z98.890        RECOMMENDATIONS:   Neuro:avoid sedating medications.  Patient on  Neurontin, cymbalta at home, eval for restarting   Pulm: Aspiration precautions, HOB>30'. Cont Bipap QHS and PRN. Scheduled DuoNebs   CVS:Monitor HD, MAP goal >65. Patient on daily lasix, toprol-XL at home, eval for restarting when tolerating PO.  GI: NPO. Diet per SLP recs. Renal: Trend Cr, UOP. Mueller- possible voiding trial today. D51/2NS- d/c once patient tolerating PO. Hem/Onc: Trend H/H, monitor for s/o active bleeding. Daily CBC. I/D:Trend WBCs and temperature curve. Metabolic: Daily BMP, mag, phos. Trend lytes and replace per protocol. I kathryn Q6 hours. Endocrine:Q6 glucoses. SSI, lantus- increased today. Avoid hypoglycemia. Lopid for triglycerides. Trend triglycerides. Musc/Skin: (+) wound care, PT/OT  Full Code  Discussed in interdisciplinary rounds         Best practice :    Glycemic control  IHI ICU bundles: Mueller Bundle Followed    [facility] Vent patients- VAP bundle, aim to keep peak plateau pressure 71-30HV H2O  Sress ulcer prophylaxis. DVT prophylaxis. Need for Lines, mueller assessed. Restraints need. Palliative care evaluation. High complexity decision making was performed during this consultation and evaluation. [x]       Pt is at high risk for further organ failure and dysfunction.          [facility]  Pulmonary, Critical Care, and Sleep Medicine              Rationale  Recommendations    DVT Proph:   Is DVT prophylaxis still   required? Ulcer Prophylaxis  PPI / H2 required after transfer? Reason?   lovenox, famotidine      Renal/  hepatic Medications need adjustment:  n/a   Cardiac Pressors DC from [facility]? AMI: (ASA, ACEI/ARB, Statin, B-blocker):  Reviewed- daily lasix, toprol-XL at home   ID Antibiotics listed:          Coverage Appropriate? Candidate for Streamlining? Recommendations for duration of therapy: n/a       Endocrine  Insulin coverage/thyroid appropriate?  Reviewed, restart statin when able    Pain  medications  Are current pain medications appropriate for the floor? reviewed   Home   Medications  Recommendations for revision: Reviewed- important medications listed above, restarting pending SLP eval   Central lines   n/a   Barker   Possible d/c today    Other Devices   reviewed               Quintin Parham PA-C  04/06/19  Pulmonary, Critical Care Medicine  [facility]         [facility] Staff Addendum     I have independently evaluated the patient and reviewed the patient's chart. I have discussed the findings and care plan with ICU Care Team. Care Plan reviewed on ICU milti-D rounds. I agree with the above evaluation, assessment and recommendations along with the following comments and observations. Patient did well overnight. Still on BIPAP. Able to take off intermittently. She is able to speak a few words but voice is weak. Still requiring calcium replacement. PRN Dilaudid for post op pain control. Plan to work more at liberating patient from [address] [place] [address]. Excellent Vts >600 on 10/5. OVIDIO precautions. Monitor opioid use. Once off BIPAP swallowing evaluation with plan to advance PO. Continue ICU for at least one more day.       Critical  Care and time spent coordinating care, minus procedure time:  30min    Deya Gil DO, [facility]  Pulmonary, Sleep and Critical Care Medicine  10:19 AM

## 2019-04-07 LAB
ANION GAP SERPL CALC-SCNC: 8 MMOL/L (ref 3–18)
BASOPHILS # BLD: 0 K/UL (ref 0–0.1)
BASOPHILS NFR BLD: 0 % (ref 0–2)
BUN SERPL-MCNC: 9 MG/DL (ref 7–18)
BUN/CREAT SERPL: 20 (ref 12–20)
CA-I SERPL-SCNC: 0.98 MMOL/L (ref 1.12–1.32)
CA-I SERPL-SCNC: 1.08 MMOL/L (ref 1.12–1.32)
CALCIUM SERPL-MCNC: 8.1 MG/DL (ref 8.5–10.1)
CHLORIDE SERPL-SCNC: 101 MMOL/L (ref 100–108)
CO2 SERPL-SCNC: 25 MMOL/L (ref 21–32)
CREAT SERPL-MCNC: 0.45 MG/DL (ref 0.6–1.3)
DIFFERENTIAL METHOD BLD: ABNORMAL
EOSINOPHIL # BLD: 0 K/UL (ref 0–0.4)
EOSINOPHIL NFR BLD: 1 % (ref 0–5)
ERYTHROCYTE [DISTWIDTH] IN BLOOD BY AUTOMATED COUNT: 15.6 % (ref 11.6–14.5)
GLUCOSE BLD STRIP.AUTO-MCNC: 170 MG/DL (ref 70–110)
GLUCOSE BLD STRIP.AUTO-MCNC: 179 MG/DL (ref 70–110)
GLUCOSE BLD STRIP.AUTO-MCNC: 184 MG/DL (ref 70–110)
GLUCOSE BLD STRIP.AUTO-MCNC: 189 MG/DL (ref 70–110)
GLUCOSE SERPL-MCNC: 194 MG/DL (ref 74–99)
HCT VFR BLD AUTO: 43.1 % (ref 35–45)
HGB BLD-MCNC: 14 G/DL (ref 12–16)
LYMPHOCYTES # BLD: 0.9 K/UL (ref 0.9–3.6)
LYMPHOCYTES NFR BLD: 12 % (ref 21–52)
MAGNESIUM SERPL-MCNC: 2.1 MG/DL (ref 1.6–2.6)
MAGNESIUM SERPL-MCNC: 2.1 MG/DL (ref 1.6–2.6)
MCH RBC QN AUTO: 30.3 PG (ref 24–34)
MCHC RBC AUTO-ENTMCNC: 32.5 G/DL (ref 31–37)
MCV RBC AUTO: 93.3 FL (ref 74–97)
MONOCYTES # BLD: 0.6 K/UL (ref 0.05–1.2)
MONOCYTES NFR BLD: 8 % (ref 3–10)
NEUTS SEG # BLD: 5.9 K/UL (ref 1.8–8)
NEUTS SEG NFR BLD: 79 % (ref 40–73)
PHOSPHATE SERPL-MCNC: 2.5 MG/DL (ref 2.5–4.9)
PHOSPHATE SERPL-MCNC: 2.8 MG/DL (ref 2.5–4.9)
PLATELET # BLD AUTO: 175 K/UL (ref 135–420)
PMV BLD AUTO: 9.5 FL (ref 9.2–11.8)
POTASSIUM SERPL-SCNC: 3.5 MMOL/L (ref 3.5–5.5)
POTASSIUM SERPL-SCNC: 3.7 MMOL/L (ref 3.5–5.5)
RBC # BLD AUTO: 4.62 M/UL (ref 4.2–5.3)
SODIUM SERPL-SCNC: 134 MMOL/L (ref 136–145)
TRIGL SERPL-MCNC: 1051 MG/DL (ref ?–150)
WBC # BLD AUTO: 7.4 K/UL (ref 4.6–13.2)

## 2019-04-07 PROCEDURE — 74011000258 HC RX REV CODE- 258: Performed by: INTERNAL MEDICINE

## 2019-04-07 PROCEDURE — 84100 ASSAY OF PHOSPHORUS: CPT

## 2019-04-07 PROCEDURE — 84132 ASSAY OF SERUM POTASSIUM: CPT

## 2019-04-07 PROCEDURE — 74011250636 HC RX REV CODE- 250/636

## 2019-04-07 PROCEDURE — 74011250636 HC RX REV CODE- 250/636: Performed by: PHYSICIAN ASSISTANT

## 2019-04-07 PROCEDURE — 74011636637 HC RX REV CODE- 636/637: Performed by: INTERNAL MEDICINE

## 2019-04-07 PROCEDURE — 74011000250 HC RX REV CODE- 250: Performed by: PHYSICIAN ASSISTANT

## 2019-04-07 PROCEDURE — 94640 AIRWAY INHALATION TREATMENT: CPT

## 2019-04-07 PROCEDURE — 74011000258 HC RX REV CODE- 258: Performed by: PHYSICIAN ASSISTANT

## 2019-04-07 PROCEDURE — 94660 CPAP INITIATION&MGMT: CPT

## 2019-04-07 PROCEDURE — 74011250636 HC RX REV CODE- 250/636: Performed by: INTERNAL MEDICINE

## 2019-04-07 PROCEDURE — 85025 COMPLETE CBC W/AUTO DIFF WBC: CPT

## 2019-04-07 PROCEDURE — 65610000006 HC RM INTENSIVE CARE

## 2019-04-07 PROCEDURE — 83735 ASSAY OF MAGNESIUM: CPT

## 2019-04-07 PROCEDURE — 74011250637 HC RX REV CODE- 250/637: Performed by: INTERNAL MEDICINE

## 2019-04-07 PROCEDURE — 36415 COLL VENOUS BLD VENIPUNCTURE: CPT

## 2019-04-07 PROCEDURE — 84478 ASSAY OF TRIGLYCERIDES: CPT

## 2019-04-07 PROCEDURE — 82962 GLUCOSE BLOOD TEST: CPT

## 2019-04-07 PROCEDURE — 97162 PT EVAL MOD COMPLEX 30 MIN: CPT

## 2019-04-07 PROCEDURE — 82330 ASSAY OF CALCIUM: CPT

## 2019-04-07 PROCEDURE — 92610 EVALUATE SWALLOWING FUNCTION: CPT

## 2019-04-07 PROCEDURE — 74011636637 HC RX REV CODE- 636/637: Performed by: PHYSICIAN ASSISTANT

## 2019-04-07 PROCEDURE — 92526 ORAL FUNCTION THERAPY: CPT

## 2019-04-07 RX ORDER — DIPHENHYDRAMINE HYDROCHLORIDE 50 MG/ML
INJECTION, SOLUTION INTRAMUSCULAR; INTRAVENOUS
Status: DISPENSED
Start: 2019-04-07 | End: 2019-04-08

## 2019-04-07 RX ORDER — DOCUSATE SODIUM 100 MG/1
100 CAPSULE, LIQUID FILLED ORAL DAILY
Status: DISCONTINUED | OUTPATIENT
Start: 2019-04-08 | End: 2019-04-16 | Stop reason: HOSPADM

## 2019-04-07 RX ORDER — DIPHENHYDRAMINE HYDROCHLORIDE 50 MG/ML
25 INJECTION, SOLUTION INTRAMUSCULAR; INTRAVENOUS ONCE
Status: COMPLETED | OUTPATIENT
Start: 2019-04-07 | End: 2019-04-07

## 2019-04-07 RX ORDER — DIPHENHYDRAMINE HYDROCHLORIDE 50 MG/ML
INJECTION, SOLUTION INTRAMUSCULAR; INTRAVENOUS
Status: COMPLETED
Start: 2019-04-07 | End: 2019-04-07

## 2019-04-07 RX ADMIN — ENOXAPARIN SODIUM 40 MG: 100 INJECTION SUBCUTANEOUS at 13:41

## 2019-04-07 RX ADMIN — HYDROMORPHONE HYDROCHLORIDE 0.5 MG: 1 INJECTION, SOLUTION INTRAMUSCULAR; INTRAVENOUS; SUBCUTANEOUS at 18:56

## 2019-04-07 RX ADMIN — Medication 10 ML: at 21:02

## 2019-04-07 RX ADMIN — CALCIUM GLUCONATE 2 G: 98 INJECTION, SOLUTION INTRAVENOUS at 20:30

## 2019-04-07 RX ADMIN — Medication 10 ML: at 05:54

## 2019-04-07 RX ADMIN — LIDOCAINE HYDROCHLORIDE: 10 INJECTION, SOLUTION EPIDURAL; INFILTRATION; INTRACAUDAL; PERINEURAL at 10:45

## 2019-04-07 RX ADMIN — LIDOCAINE HYDROCHLORIDE: 10 INJECTION, SOLUTION EPIDURAL; INFILTRATION; INTRACAUDAL; PERINEURAL at 09:43

## 2019-04-07 RX ADMIN — INSULIN LISPRO 3 UNITS: 100 INJECTION, SOLUTION INTRAVENOUS; SUBCUTANEOUS at 18:58

## 2019-04-07 RX ADMIN — HYDROMORPHONE HYDROCHLORIDE 0.5 MG: 1 INJECTION, SOLUTION INTRAMUSCULAR; INTRAVENOUS; SUBCUTANEOUS at 09:42

## 2019-04-07 RX ADMIN — DIPHENHYDRAMINE HYDROCHLORIDE 25 MG: 50 INJECTION INTRAMUSCULAR; INTRAVENOUS at 22:48

## 2019-04-07 RX ADMIN — IPRATROPIUM BROMIDE AND ALBUTEROL SULFATE 3 ML: .5; 3 SOLUTION RESPIRATORY (INHALATION) at 12:42

## 2019-04-07 RX ADMIN — LIDOCAINE HYDROCHLORIDE: 10 INJECTION, SOLUTION EPIDURAL; INFILTRATION; INTRACAUDAL; PERINEURAL at 12:00

## 2019-04-07 RX ADMIN — INSULIN LISPRO 3 UNITS: 100 INJECTION, SOLUTION INTRAVENOUS; SUBCUTANEOUS at 06:01

## 2019-04-07 RX ADMIN — IPRATROPIUM BROMIDE AND ALBUTEROL SULFATE 3 ML: .5; 3 SOLUTION RESPIRATORY (INHALATION) at 21:29

## 2019-04-07 RX ADMIN — CALCIUM GLUCONATE 2 G: 98 INJECTION, SOLUTION INTRAVENOUS at 08:32

## 2019-04-07 RX ADMIN — DIPHENHYDRAMINE HYDROCHLORIDE 25 MG: 50 INJECTION INTRAMUSCULAR; INTRAVENOUS at 00:44

## 2019-04-07 RX ADMIN — GEMFIBROZIL 600 MG: 600 TABLET ORAL at 16:30

## 2019-04-07 RX ADMIN — INSULIN LISPRO 3 UNITS: 100 INJECTION, SOLUTION INTRAVENOUS; SUBCUTANEOUS at 11:57

## 2019-04-07 RX ADMIN — HYDROMORPHONE HYDROCHLORIDE 0.5 MG: 1 INJECTION, SOLUTION INTRAMUSCULAR; INTRAVENOUS; SUBCUTANEOUS at 18:58

## 2019-04-07 RX ADMIN — DIPHENHYDRAMINE HYDROCHLORIDE 25 MG: 50 INJECTION, SOLUTION INTRAMUSCULAR; INTRAVENOUS at 00:44

## 2019-04-07 RX ADMIN — DEXTROSE MONOHYDRATE AND SODIUM CHLORIDE 75 ML/HR: 5; .45 INJECTION, SOLUTION INTRAVENOUS at 06:53

## 2019-04-07 RX ADMIN — IPRATROPIUM BROMIDE AND ALBUTEROL SULFATE 3 ML: .5; 3 SOLUTION RESPIRATORY (INHALATION) at 00:12

## 2019-04-07 RX ADMIN — DEXTROSE MONOHYDRATE AND SODIUM CHLORIDE 75 ML/HR: 5; .45 INJECTION, SOLUTION INTRAVENOUS at 22:00

## 2019-04-07 RX ADMIN — INSULIN LISPRO 3 UNITS: 100 INJECTION, SOLUTION INTRAVENOUS; SUBCUTANEOUS at 00:49

## 2019-04-07 RX ADMIN — LIDOCAINE HYDROCHLORIDE: 10 INJECTION, SOLUTION EPIDURAL; INFILTRATION; INTRACAUDAL; PERINEURAL at 12:40

## 2019-04-07 RX ADMIN — HYDROMORPHONE HYDROCHLORIDE 0.5 MG: 1 INJECTION, SOLUTION INTRAMUSCULAR; INTRAVENOUS; SUBCUTANEOUS at 13:39

## 2019-04-07 RX ADMIN — DIPHENHYDRAMINE HYDROCHLORIDE 25 MG: 50 INJECTION, SOLUTION INTRAMUSCULAR; INTRAVENOUS at 05:50

## 2019-04-07 RX ADMIN — IPRATROPIUM BROMIDE AND ALBUTEROL SULFATE 3 ML: .5; 3 SOLUTION RESPIRATORY (INHALATION) at 07:48

## 2019-04-07 RX ADMIN — IPRATROPIUM BROMIDE AND ALBUTEROL SULFATE 3 ML: .5; 3 SOLUTION RESPIRATORY (INHALATION) at 03:17

## 2019-04-07 RX ADMIN — IPRATROPIUM BROMIDE AND ALBUTEROL SULFATE 3 ML: .5; 3 SOLUTION RESPIRATORY (INHALATION) at 16:07

## 2019-04-07 RX ADMIN — IPRATROPIUM BROMIDE AND ALBUTEROL SULFATE 3 ML: .5; 3 SOLUTION RESPIRATORY (INHALATION) at 03:16

## 2019-04-07 RX ADMIN — Medication 10 ML: at 19:00

## 2019-04-07 RX ADMIN — FAMOTIDINE 20 MG: 10 INJECTION INTRAVENOUS at 20:44

## 2019-04-07 RX ADMIN — DIPHENHYDRAMINE HYDROCHLORIDE 25 MG: 50 INJECTION INTRAMUSCULAR; INTRAVENOUS at 05:50

## 2019-04-07 RX ADMIN — FAMOTIDINE 20 MG: 10 INJECTION INTRAVENOUS at 09:57

## 2019-04-07 RX ADMIN — INSULIN LISPRO 3 UNITS: 100 INJECTION, SOLUTION INTRAVENOUS; SUBCUTANEOUS at 23:01

## 2019-04-07 RX ADMIN — INSULIN GLARGINE 25 UNITS: 100 INJECTION, SOLUTION SUBCUTANEOUS at 09:44

## 2019-04-07 NOTE — PROGRESS NOTES
9716 pt again states she's having a panic attack. Requested another fan, request granted. Pt indicates she uses fans & home. Vitals as before and no change in neck size, no drooling, color pink. Benadryl given and pt able to resp with eyes closed resp. Remaining regular.

## 2019-04-07 NOTE — PROGRESS NOTES
1 pt states she is too hot & is having a \"panic attack\". o2 sat 100% resp teens. No change in size of neck, no drooling. Fan placed per pt request near face & benadryl given as ordered.   0100 pt with eyes closed resp regular vitals same

## 2019-04-07 NOTE — PROGRESS NOTES
Surgery  Postop day 3 status post total thyroidectomy. Complicated by somnolence requiring reintubation. Was extubated 2 days ago and has been on BiPAP doing reasonably well.   Afebrile vital signs stable  Voice is stronger and improved this morning  Wound normal without hematoma or seroma  Calciums okay 8.1  Continue BiPAP for now  Diet soon

## 2019-04-07 NOTE — ROUTINE PROCESS
Placed on 4L NC. Oxygen saturation 100%. RR=18/min. Dr. Cervantes Monday present. Pt \"feels like throat is closing. \"  Returned to Bi Pap.

## 2019-04-07 NOTE — ROUTINE PROCESS
Bedside and Verbal shift change report given to lucie palmer rn (oncoming nurse) by Vincent Hastings RN (offgoing nurse). Report included the following information SBAR, Kardex, MAR and Recent Results. SITUATION:    Code Status: Full Code   Reason for Admission: S/P thyroidectomy [Z98.890]    [facility] day: 3   Problem List:       Hospital Problems  Date Reviewed: 4/1/2019          Codes Class Noted POA    S/P thyroidectomy ICD-10-CM: Z98.890  ICD-9-CM: V45.89  4/4/2019 Unknown              BACKGROUND:    Past Medical History:   Past Medical History:   Diagnosis Date    Arthritis     Lower back     Asthma     Chronic back pain     Lower back pain    Depression     Diabetes ([facility] [address]     Diabetes mellitus ([address]     Hearing loss     Heart failure (HCC)     chronic diastolic heart failure    Left ear hearing loss     pt states nerve damage--- 25% hearing loss, described as \"muffled\"    Memory difficulty     Panic attacks     Ringing of ears     Severe headache     Sleep apnea     SOB (shortness of breath) on exertion     w and w/out exertion    Stomach pain     Thyroid disease     Vertigo          Patient taking anticoagulants yes     ASSESSMENT:    Changes in Assessment Throughout Shift: remains on bipap, mueller DC     Patient has Central Line: no Reasons if yes: .  Patient has Mueller Cath: no Reasons if yes: .  Last Vitals:     Vitals:    04/07/19 0316 04/07/19 0330 04/07/19 0400 04/07/19 0748   BP:   153/79    Pulse:  78 79    Resp:  16 18    Temp:   97.8 °F (36.6 °C)    SpO2: 100% 100% 100% 100%   Weight:       Height:            IV and DRAINS (will only show if present)   Peripheral IV 04/04/19 Right Arm-Site Assessment: Clean, dry, & intact  [REMOVED] Airway - Endotracheal Tube 04/04/19 Oral-Site Assessment: Clean, dry, & intact  Peripheral IV 04/04/19 Left;Upper Arm-Site Assessment: Clean, dry, & intact  Peripheral IV 04/04/19 Right; Lower Forearm-Site Assessment: Clean, dry, & intact     WOUND (if present)   Wound Type:  none   Dressing present Dressing Present : Yes   Wound Concerns/Notes:  none     PAIN    Pain Assessment    Pain Intensity 1: 0 (04/07/19 0400)    Pain Location 1: Incisional    Pain Intervention(s) 1: Medication (see MAR)    Patient Stated Pain Goal: 3  o Interventions for Pain:  med  o Intervention effective: yes  o Time of last intervention: 2228   o Reassessment Completed: yes      Last 3 Weights:  Last 3 Recorded Weights in this Encounter    04/04/19 1152 04/06/19 0600 04/06/19 2230   Weight: 121 kg (266 lb 12.8 oz) 121 kg (266 lb 12.1 oz) 125.5 kg (276 lb 10.8 oz)     Weight change: 4.5 kg (9 lb 14.7 oz)     INTAKE/OUPUT    Current Shift: No intake/output data recorded. Last three shifts: 04/05 1901 - 04/07 0700  In: 3100 [I.V.:3100]  Out: 4025 [Urine:4025]     LAB RESULTS     Recent Labs     04/07/19  0225 04/06/19  0300 04/05/19  0346   WBC 7.4 9.3 8.8   HGB 14.0 14.7 14.6   HCT 43.1 44.0 42.2    164 187        Recent Labs     04/07/19  0225 04/06/19  1556 04/06/19  0300 04/05/19  0346   * 139 138 134*   K 3.5 3.6 3.4* 4.1   * 195* 189* 270*   BUN 9 7 7 12   CREA 0.45* 0.58* 0.68 0.75   CA 8.1* 8.4* 8.4* 7.9*   MG 2.1  --  2.1 2.2       RECOMMENDATIONS AND DISCHARGE PLANNING     1. Pending tests/procedures/ Plan of Care or Other Needs: DC bipap, speak and eat without difficulty     2. Discharge plan for patient and Needs/Barriers: home    3. Estimated Discharge Date: pending Posted on Whiteboard in Patients Room: yes      4. The patient's care plan was reviewed with the oncoming nurse. \"HEALS\" SAFETY CHECK      Fall Risk    Total Score: 2    Safety Measures: Safety Measures: Bed/Chair-Wheels locked, Bed in low position, Call light within reach    A safety check occurred in the patient's room between off going nurse and oncoming nurse listed above.     The safety check included the below items  Area Items   H  High Alert Medications - Verify all high alert medication drips (heparin, PCA, etc.)   E  Equipment - Suction is set up for ALL patients (with chaitanya)  - Red plugs utilized for all equipment (IV pumps, etc.)  - WOWs wiped down at end of shift.  - Room stocked with oxygen, suction, and other unit-specific supplies   A  Alarms - Bed alarm is set for fall risk patients  - Ensure chair alarm is in place and activated if patient is up in a chair   L  Lines - Check IV for any infiltration  - Barker bag is empty if patient has a Barker   - Tubing and IV bags are labeled   S  Safety   - Room is clean, patient is clean, and equipment is clean. - Hallways are clear from equipment besides carts. - Fall bracelet on for fall risk patients  - Ensure room is clear and free of clutter  - Suction is set up for ALL patients (with chaitanya)  - Hallways are clear from equipment besides carts.    - Isolation precautions followed, supplies available outside room, sign posted     Cole MARKOS Mcgee

## 2019-04-07 NOTE — ROUTINE PROCESS
Bedside shift change report given to [address] [place] [address] (oncoming nurse) by Shakir Clark (offgoing nurse). Report included the following information SBAR, Kardex, Procedure Summary, Intake/Output, MAR and Recent Results.
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
## 2019-04-07 NOTE — PROGRESS NOTES
[facility]. Pulmonary, Critical Care, and Sleep Medicine    Name: Josafat Perales MRN: [id number]   : 1960 Hospital: [address] [place] [address] Dr   Date: 2019  Admission Date: 2019     Chart and notes reviewed. Data reviewed. I have evaluated all findings. [x]I have reviewed the flowsheet and previous days notes. Interval HPI: Patient is a 62 y. o. female w/ pmhx of DM S/P thyroidectomy, by Dr. Raji Starr, on 19. Patient has history of a multinodular goiter suspicious for malignancy, however recent biopsies have been negative. Procedure uneventful. Was extubated in PACU, but developed stridor and hypoxia, subsequently re intubated. ICU stay notable for QTc 676 on , repeat 4.5 QTc 430. Triglycerides also noted to be in 2,000's. Patient has history of hypertriglyceridemia. Extubated on  w/ some stridor that resolved w/ racemic epi and bipap. Patient utilizes bipap QHS at baseline. Subjective 19  Hospital Day:4  POD 3    Overnight events:  Bipap overnight. Intermittent nausea. \"panic attack\" overnight w/ patient c/o of \"can't breathe\" however patient's VSS and there were no s/o respiratory distress. Mild stridor on exam. Patient settled after a few minutes. Does have RLS. Moderate pain at incision site and slightly increased swelling. 3285- patient w/ worsening feelings of SOB, cont' w/ mild stridor. Patient stating it is getting more difficult to breathe and that she is very hot. Patient is showing no s/o respiratory distress. All VSS. Mentation/Activity: follows commands. Alert, oriented  Respiratory/ Secretions: protecting airway, managing secretions w/o any issues. Mild stridor  Hemodynamics: VSS  Urine output, bowel: 2.150 L / 24 H  Diet: eval w/ SLP today  Need for procedures: n/a              ROS:Review of systems not obtained due to patient factors. Events and notes from last 24 hours reviewed.  Care plan discussed on multidisciplinary rounds. Patient Active Problem List   Diagnosis Code    Type II diabetes mellitus, uncontrolled [facility] E11.65    Sleep apnea G47.30    H/O aortic valve replacement Z95.2    History of bicuspid aortic valve Z87.74    Chronic back pain M54.9, G89.29    Chronic left shoulder pain M25.512, G89.29    Morbid obesity (HCC) E66.01    Left shoulder tendinitis M75.82    Spondylosis of lumbar region without myelopathy or radiculopathy M47.816    Chronic pain of both shoulders M25.511, G89.29, M25.512    Chronic pain of both knees M25.561, M25.562, G89.29    Chronic pain syndrome G89.4    Encounter for long-term (current) use of medications Z79.899    Chronic midline low back pain M54.5, G89.29    Lumbar facet arthropathy M47.816    Lumbar degenerative disc disease M51.36    Venous stasis dermatitis of both lower extremities I87.2    SOB (shortness of breath) R06.02    Type 2 diabetes mellitus without complication ([facility]) G61.3    Hypothyroidism due to acquired atrophy of thyroid E03.4    Essential hypertension I10    Dyslipidemia E78.5    Mood disorder ([facility]) F39    Chronic diastolic congestive heart failure ([facility]) I50.32    Type 2 diabetes mellitus with diabetic neuropathy ([facility]) E11.40    S/P thyroidectomy Z98.890       Vital Signs:  Visit Vitals  /67   Pulse 81   Temp 97.8 °F (36.6 °C)   Resp 16   Ht 5' 7\" (1.702 m)   Wt 125.5 kg (276 lb 10.8 oz)   SpO2 100%   Breastfeeding?  No   BMI 43.33 kg/m²       O2 Device: BIPAP   O2 Flow Rate (L/min): 5 l/min   Temp (24hrs), Av.1 °F (37.3 °C), Min:97.8 °F (36.6 °C), Max:99.9 °F (37.7 °C)       Intake/Output:   Last shift:      1901 -  07  In: -   Out: 600 [Urine:600]  Last 3 shifts: 701 - 1900  In: 1730.8 [I.V.:1730.8]  Out: 8060 [Urine:4375]    Intake/Output Summary (Last 24 hours) at 2019 0329  Last data filed at 2019 0100  Gross per 24 hour   Intake 600 ml   Output 2500 ml   Net -1900 ml        Ventilator Settings:  Ventilator Mode: Spontaneous  Respiratory Rate  Resp Rate Observed: 21  Back-Up Rate: 14  Insp Time (sec): 1 sec  I:E Ratio: 1;3.3  Ventilator Volumes  Vt Set (ml): 450 ml  Vt Exhaled (Machine Breath) (ml): 589 ml  Vt Spont (ml): 431 ml  Ve Observed (l/min): 6.9 l/min  Ventilator Pressures  Pressure Support (cm H2O): 7 cm H2O  PIP Observed (cm H2O): 21 cm H2O  Plateau Pressure (cm H2O): 20 cm H2O  MAP (cm H2O): 11  PEEP/VENT (cm H2O): 5 cm H20  Auto PEEP Observed (cm H2O): 0 cm H2O    Current Facility-Administered Medications   Medication Dose Route Frequency    insulin glargine (LANTUS) injection 25 Units  25 Units SubCUTAneous DAILY    calcium carbonate (TUMS) chewable tablet 400 mg [elemental]  400 mg Oral TID WITH MEALS    insulin lispro (HUMALOG) injection   SubCUTAneous Q6H    gemfibrozil (LOPID) tablet 600 mg  600 mg Oral ACB&D    enoxaparin (LOVENOX) injection 40 mg  40 mg SubCUTAneous Q24H    dextrose 5 % - 0.45% NaCl infusion  75 mL/hr IntraVENous CONTINUOUS    famotidine (PF) (PEPCID) injection 20 mg  20 mg IntraVENous Q12H    albuterol-ipratropium (DUO-NEB) 2.5 MG-0.5 MG/3 ML  3 mL Nebulization Q4H RT    sodium chloride (NS) flush 5-40 mL  5-40 mL IntraVENous Q8H         Telemetry: [x]Sinus []A-flutter []Paced    []A-fib []Multiple PVCs                  Physical Exam:      General: Awake, alert, NAD, bipap  HEENT:  Anicteric sclerae; pink palpebral conjunctivae; mucosa moist. Surgical site sutured- warm, dry, intact. Swelling surrounding incision site and bruising on the the caudal side of incision. Tender to touch. Resp:  Symmetrical chest expansion, no accessory muscle use; diffuse rhonchi. Mild stridor. CV:  S1, S2 present; regular rate and rhythm  GI:  Abdomen soft, non-tender; (+) active bowel sounds. Obese. Extremities:  +2 pulses on all extremities; no edema/ cyanosis/ clubbing noted. Left hand w/ non blanching erythema over dorsal aspect.  Warm, dry, sensation/strength intact  Skin:  Warm; no rashes/ lesions noted, normal turgor/cap refill   Neurologic:  Non-focal   Devices:  mueller      DATA:  MAR reviewed and pertinent medications noted or modified as needed    Labs:  Recent Labs     04/07/19  0225 04/06/19  0300 04/05/19  0346   WBC 7.4 9.3 8.8   HGB 14.0 14.7 14.6   HCT 43.1 44.0 42.2    164 187     Recent Labs     04/06/19  1556 04/06/19  0300 04/05/19  0346 04/04/19  1941    138 134* 137   K 3.6 3.4* 4.1 3.9    103 101 103   CO2 26 26 17* 21   * 189* 270* 226*   BUN 7 7 12 14   CREA 0.58* 0.68 0.75 0.41*   CA 8.4* 8.4* 7.9* 8.0*   MG  --  2.1 2.2 2.2   PHOS  --  3.4 3.4 3.7     No results for input(s): PH, PCO2, PO2, HCO3, FIO2 in the last 72 hours. Recent Labs     04/05/19  1223 04/05/19  0435 04/04/19  1738   FIO2I 35 35 50   HCO3I 22.3 18.1* 23.3   PCO2I 38.8 34.0* 45.8*   PHI 7.367 7.333* 7.314*   PO2I 99 91 165*       Imaging:  [x]   I have personally reviewed the patients radiographs and reports  XR Results (most recent):  Results from Hospital Encounter encounter on 04/04/19   XR CHEST PORT    Narrative EXAM:  Chest Portable. INDICATION:  ET tube placement     COMPARISON:  04/04/19     TECHNIQUE:  Portable AP chest study    FINDINGS:      - ET tube distal tip observed at 2.4 cm above the phi.   - Both lungs are hypoinflated. - Persistent retrocardiac opacity, similar to recent prior exam.  Subtle streaky  density in the right infrahilar region. Again similar to recent prior exam.   - No pneumothorax. - Moderate cardiac silhouette enlargement with prior CABG and aortic valve  replacement. Impression IMPRESSION:    1. Borderline low-lying ET tube. 2.  No significant interval change in lung aeration pattern, with subtle streaky  densities in the lower lung zones.            CT Results (most recent):  Results from Hospital Encounter encounter on 02/21/19   CT NECK SOFT TISSUE W CONT    Narrative Neck CT With Contrast    CPT CODE: 39201    HISTORY: Multinodular goiter. COMPARISON: Thyroid ultrasound 8/3/2018. CT chest 5/5/2017    FINDINGS:     After the intravenous administration of iodinated IV contrast, a scan was  obtained from low head down to upper chest.  Patient received 80 cc Isovue 300  IV contrast. CT dose reduction was achieved through use of a standardized  protocol tailored for this examination and automatic exposure control for dose  modulation. There is some chronic appearing dental disease with periapical lucency  surrounding right mandibular molar tooth roots. Visualized lower brain appears normal.     Mucosa of the upper aerodigestive tract:  No mass lesion seen in the mucosa of  the upper aerodigestive tract. 4 mm hyperdensity in the vallecula along the  surface of the base of tongue or uvula (axial image 52). More regular than  expected for metal pellet and appears to be a calcification. Lymph Nodes: No enlarged lymph nodes in the cervical chains. Major salivary glands:  Parotid glands are relatively low density, some fatty  replacement. Both parotid glands are relatively enlarged, but there is no mass  lesion. Submandibular glands appear normal.    Thyroid: Thyroid gland is heterogeneous with multiple relatively low-attenuation  nodular densities plus multiple calcifications. Several rim calcified nodules  bilaterally, the largest is on the right, 2.3 x 1.8 cm. Posteriorly, both  thyroid lobes extend into the tracheoesophageal groove. The inferior margin of  the right thyroid lobe is at the level of the top of the right clavicular head. The left thyroid lobe extends to the level of the top of the sternal notch. No  retrosternal extension. The right thyroid lobe measures approximately 4.6 cm AP, 3.8 cm transverse, 7.4  cm SI. The left thyroid lobe is 3.6 cm AP, 3.0 cm transverse and 6.7 cm SI. Upper mediastinum: No lymphadenopathy seen in the upper mediastinum.     Visualized lung apices: are clear.    Bones: Mild degenerative changes in the cervical spine. Mucus retention cysts in the maxillary sinuses. Impression IMPRESSION:    1. Heterogeneous enlargement of the thyroid gland with multiple low-attenuation  and rim calcified nodules bilaterally. Extension of thyroid tissue posteriorly  into the left and right tracheoesophageal groove. 2. The right thyroid lobe extends to the level of the reticular head. Left  thyroid lobe extends to the level of the sternal notch. 3. No neck masses. No lymphadenopathy. Thank you for this referral.         IMPRESSION:   · S/p thyroidectomy POD 3- for hx of multinodular goiter. Performed by Dr. Attila Manzo 4/4/19. · Hypoxic respiratory failure now resolved, extubated 4/5/19  · Stridor- improving but still persistent and requiring BIPAP for stenting  · QTc prolongation- appears resolved/ transient  · Hypocalcemia- s/p thyroidectomy- on replacement protocol  · Hypertriglyceridemia improving - off Propofol but elevated on prior labs- monitoring for now  · Hyperglycemia in the setting of DMII  · OVIDIO- On home BIPAP  · Morbid Obesity: Body mass index is 43.33 kg/m².        Patient Active Problem List   Diagnosis Code    Type II diabetes mellitus, uncontrolled ([facility] E11.65    Sleep apnea G47.30    H/O aortic valve replacement Z95.2    History of bicuspid aortic valve Z87.74    Chronic back pain M54.9, G89.29    Chronic left shoulder pain M25.512, G89.29    Morbid obesity (HCC) E66.01    Left shoulder tendinitis M75.82    Spondylosis of lumbar region without myelopathy or radiculopathy M47.816    Chronic pain of both shoulders M25.511, G89.29, M25.512    Chronic pain of both knees M25.561, M25.562, G89.29    Chronic pain syndrome G89.4    Encounter for long-term (current) use of medications Z79.899    Chronic midline low back pain M54.5, G89.29    Lumbar facet arthropathy M47.816    Lumbar degenerative disc disease M51.36    Venous stasis dermatitis of both lower extremities I87.2    SOB (shortness of breath) R06.02    Type 2 diabetes mellitus without complication (HCC) D72.8    Hypothyroidism due to acquired atrophy of thyroid E03.4    Essential hypertension I10    Dyslipidemia E78.5    Mood disorder (HCC) F39    Chronic diastolic congestive heart failure (HCC) I50.32    Type 2 diabetes mellitus with diabetic neuropathy (HCC) E11.40    S/P thyroidectomy Z98.890        RECOMMENDATIONS:   Neuro:avoid sedating medications. Patient on Neurontin, cymbalta at home, eval for restarting once cleared by SLP. Patient is using meclizine at home for anxiety. Not a candidate for benzos. Pulm: Aspiration precautions, HOB>30'. Cont Bipap QHS and PRN. Scheduled DuoNebs. Trial off bipap today. CVS:Monitor HD, MAP goal >65. Patient on daily lasix, toprol-XL at home, eval for restarting when tolerating PO.  GI: NPO. Diet per SLP recs. Renal: Trend Cr, UOP. Mueller- possible voiding trial today. D51/2NS @75mL/hr- d/c once patient tolerating PO. Hem/Onc: Trend H/H, monitor for s/o active bleeding. Daily CBC. I/D: Trend WBCs and temperature curve. Metabolic: Daily BMP, mag, phos. Trend lytes and replace per protocol. Daily ionized calcium. Endocrine:Q6 glucoses. SSI, lantus. Avoid hypoglycemia. Lopid for triglycerides when can take PO. Trend triglycerides. Musc/Skin: (+) wound care, PT/OT  Full Code  Discussed in interdisciplinary rounds         Best practice :    Glycemic control  IHI ICU bundles: Mueller Bundle Followed    [facility] Vent patients- VAP bundle, aim to keep peak plateau pressure 34-02UT H2O  Sress ulcer prophylaxis. DVT prophylaxis. Need for Lines, mueller assessed. Restraints need. Palliative care evaluation. High complexity decision making was performed during this consultation and evaluation.   [x]       Pt is at high risk for further organ failure and dysfunction.          [employer] [facility]  Pulmonary, Critical Care, and Sleep Medicine              Rationale  Recommendations    DVT Proph:   Is DVT prophylaxis still   required? Ulcer Prophylaxis  PPI / H2 required after transfer? Reason?   lovenox, famotidine      Renal/  hepatic Medications need adjustment:  n/a   Cardiac Pressors DC from [facility]? AMI: (ASA, ACEI/ARB, Statin, B-blocker):  Reviewed- daily lasix, toprol-XL at home   ID Antibiotics listed:          Coverage Appropriate? Candidate for Streamlining? Recommendations for duration of therapy: n/a       Endocrine  Insulin coverage/thyroid appropriate? Reviewed, restart statin when able    Pain  medications  Are current pain medications appropriate for the floor? reviewed   Home   Medications  Recommendations for revision: Reviewed- important medications listed above, restarting pending SLP eval   Central lines   n/a   Barker   Possible d/c today    Other Devices   reviewed               Tessy Connors PA-C  04/07/19  Pulmonary, Critical Care Medicine  [facility]           [employer] [facility] Staff Addendum     I have independently evaluated the patient and reviewed the patient's chart. I have discussed the findings and care plan with ICU Care Team.      I agree with the above evaluation, assessment and recommendations along with the following comments and observations. Patient resting comfortably in ICU. Has been able to come off BIPAP for 10-15 minutes but then becomes more stridorous and needs to back on BIPAP. Only requiring low-level BIPAP 10-12/5 to splint open vocal cords. Given Morbidly obese body habitus, prior need for BIPAP as OP for OVIDIO, need for pain meds, stridor, and NPO status will continue to keep patient ICU status. Avoid oversedation. Keep HOB elevated to minimize OVIDIO physiology. Continue with IVF. Monitor TGs. I have discussed case with Dr. Carol Castillo.        Critical Care and time spent coordinating care, minus procedure time:  30 min    Boone Martinez DO [place]  Pulmonary, Sleep and Critical Care Medicine  2:37 PM

## 2019-04-07 NOTE — PROGRESS NOTES
conducted a Follow up consultation and Spiritual Assessment for Thalia Prader, who is a 63 y.o.,female. The  provided the following Interventions:  Continued the relationship of care and support. Listened empathically. Offered prayer and assurance of continued prayer on patients behalf. Chart reviewed. The following outcomes were achieved:  Patient expressed gratitude for 's visit. Assessment:  There are no further spiritual or [employer] issues which require Spiritual Care Services interventions at this time. Plan:  Chaplains will continue to follow and will provide pastoral care on an as needed/requested basis.  recommends bedside caregivers page  on duty if patient shows signs of acute spiritual or emotional distress.        [address]   [phone]

## 2019-04-07 NOTE — PROGRESS NOTES
conducted an initial consultation and Spiritual Assessment for Vahe Parry, who is a 63 y.o.,female. Patients Primary Language is: [place]. According to the patients EMR Jainism Affiliation is: [place]. The reason the Patient came to the hospital is:   Patient Active Problem List    Diagnosis Date Noted    S/P thyroidectomy 04/04/2019    Type 2 diabetes mellitus with diabetic neuropathy ([address] 01/16/2019    Chronic diastolic congestive heart failure ([address] 08/27/2018    Mood disorder ([address] 08/24/2018    SOB (shortness of breath) 05/17/2017    Type 2 diabetes mellitus without complication ([address] 85/02/4446    Hypothyroidism due to acquired atrophy of thyroid 05/17/2017    Essential hypertension 05/17/2017    Dyslipidemia 05/17/2017    Venous stasis dermatitis of both lower extremities 03/01/2017    Lumbar facet arthropathy 01/26/2017    Lumbar degenerative disc disease 01/26/2017    Left shoulder tendinitis 12/07/2016    Spondylosis of lumbar region without myelopathy or radiculopathy 12/07/2016    Chronic pain of both shoulders 12/07/2016    Chronic pain of both knees 12/07/2016    Chronic pain syndrome 12/07/2016    Encounter for long-term (current) use of medications 12/07/2016    Chronic midline low back pain 12/07/2016    Chronic left shoulder pain 11/11/2016    Morbid obesity ([address] 11/11/2016    Type II diabetes mellitus, uncontrolled ([address] 11/06/2015    Sleep apnea 11/06/2015    H/O aortic valve replacement 11/06/2015    History of bicuspid aortic valve 11/06/2015    Chronic back pain 11/06/2015        The  provided the following Interventions:  Initiated a relationship of care and support. Listened empathically. Provided chaplaincy education. Provided information about Spiritual Care Services. Offered assurance of continued prayers on patient's behalf. Chart reviewed.     The following outcomes where achieved:  Patient's daughter shared limited information about her mothers medical narrative. Patient's daughter processed feeling about current hospitalization. Patient's daughter expressed gratitude for 's visit. Assessment:  Patient does not have any Buddhist/cultural needs that will affect patients preferences in health care. There are no spiritual or Buddhist issues which require intervention at this time. Plan:  Chaplains will continue to follow and will provide pastoral care on an as needed/requested basis.  recommends bedside caregivers page  on duty if patient shows signs of acute spiritual or emotional distress.         [address] [facility].   [phone]
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
## 2019-04-08 LAB
ANION GAP SERPL CALC-SCNC: 6 MMOL/L (ref 3–18)
ANION GAP SERPL CALC-SCNC: 9 MMOL/L (ref 3–18)
BASOPHILS # BLD: 0 K/UL (ref 0–0.1)
BASOPHILS NFR BLD: 0 % (ref 0–2)
BUN SERPL-MCNC: 10 MG/DL (ref 7–18)
BUN SERPL-MCNC: 9 MG/DL (ref 7–18)
BUN/CREAT SERPL: 21 (ref 12–20)
BUN/CREAT SERPL: 22 (ref 12–20)
CA-I SERPL-SCNC: 0.94 MMOL/L (ref 1.12–1.32)
CA-I SERPL-SCNC: 0.96 MMOL/L (ref 1.12–1.32)
CALCIUM SERPL-MCNC: 7.9 MG/DL (ref 8.5–10.1)
CALCIUM SERPL-MCNC: 8.2 MG/DL (ref 8.5–10.1)
CHLORIDE SERPL-SCNC: 102 MMOL/L (ref 100–108)
CHLORIDE SERPL-SCNC: 103 MMOL/L (ref 100–108)
CO2 SERPL-SCNC: 27 MMOL/L (ref 21–32)
CO2 SERPL-SCNC: 30 MMOL/L (ref 21–32)
CREAT SERPL-MCNC: 0.43 MG/DL (ref 0.6–1.3)
CREAT SERPL-MCNC: 0.46 MG/DL (ref 0.6–1.3)
DIFFERENTIAL METHOD BLD: ABNORMAL
EOSINOPHIL # BLD: 0.1 K/UL (ref 0–0.4)
EOSINOPHIL NFR BLD: 1 % (ref 0–5)
ERYTHROCYTE [DISTWIDTH] IN BLOOD BY AUTOMATED COUNT: 15.3 % (ref 11.6–14.5)
GLUCOSE BLD STRIP.AUTO-MCNC: 136 MG/DL (ref 70–110)
GLUCOSE BLD STRIP.AUTO-MCNC: 150 MG/DL (ref 70–110)
GLUCOSE BLD STRIP.AUTO-MCNC: 159 MG/DL (ref 70–110)
GLUCOSE BLD STRIP.AUTO-MCNC: 163 MG/DL (ref 70–110)
GLUCOSE BLD STRIP.AUTO-MCNC: 175 MG/DL (ref 70–110)
GLUCOSE SERPL-MCNC: 160 MG/DL (ref 74–99)
GLUCOSE SERPL-MCNC: 170 MG/DL (ref 74–99)
HCT VFR BLD AUTO: 43.2 % (ref 35–45)
HGB BLD-MCNC: 14.1 G/DL (ref 12–16)
LYMPHOCYTES # BLD: 1.6 K/UL (ref 0.9–3.6)
LYMPHOCYTES NFR BLD: 28 % (ref 21–52)
MAGNESIUM SERPL-MCNC: 1.8 MG/DL (ref 1.6–2.6)
MAGNESIUM SERPL-MCNC: 2 MG/DL (ref 1.6–2.6)
MCH RBC QN AUTO: 30.3 PG (ref 24–34)
MCHC RBC AUTO-ENTMCNC: 32.6 G/DL (ref 31–37)
MCV RBC AUTO: 92.7 FL (ref 74–97)
MONOCYTES # BLD: 0.5 K/UL (ref 0.05–1.2)
MONOCYTES NFR BLD: 9 % (ref 3–10)
NEUTS SEG # BLD: 3.6 K/UL (ref 1.8–8)
NEUTS SEG NFR BLD: 62 % (ref 40–73)
PHOSPHATE SERPL-MCNC: 2.9 MG/DL (ref 2.5–4.9)
PLATELET # BLD AUTO: 178 K/UL (ref 135–420)
PMV BLD AUTO: 9.2 FL (ref 9.2–11.8)
POTASSIUM SERPL-SCNC: 3.2 MMOL/L (ref 3.5–5.5)
POTASSIUM SERPL-SCNC: 3.4 MMOL/L (ref 3.5–5.5)
RBC # BLD AUTO: 4.66 M/UL (ref 4.2–5.3)
SODIUM SERPL-SCNC: 138 MMOL/L (ref 136–145)
SODIUM SERPL-SCNC: 139 MMOL/L (ref 136–145)
TRIGL SERPL-MCNC: 734 MG/DL (ref ?–150)
WBC # BLD AUTO: 5.8 K/UL (ref 4.6–13.2)

## 2019-04-08 PROCEDURE — 74011000258 HC RX REV CODE- 258: Performed by: PHYSICIAN ASSISTANT

## 2019-04-08 PROCEDURE — 94762 N-INVAS EAR/PLS OXIMTRY CONT: CPT

## 2019-04-08 PROCEDURE — 94640 AIRWAY INHALATION TREATMENT: CPT

## 2019-04-08 PROCEDURE — 74011000258 HC RX REV CODE- 258: Performed by: INTERNAL MEDICINE

## 2019-04-08 PROCEDURE — 74011250636 HC RX REV CODE- 250/636: Performed by: PHYSICIAN ASSISTANT

## 2019-04-08 PROCEDURE — 77010033678 HC OXYGEN DAILY

## 2019-04-08 PROCEDURE — 80048 BASIC METABOLIC PNL TOTAL CA: CPT

## 2019-04-08 PROCEDURE — 97166 OT EVAL MOD COMPLEX 45 MIN: CPT

## 2019-04-08 PROCEDURE — 84478 ASSAY OF TRIGLYCERIDES: CPT

## 2019-04-08 PROCEDURE — 83735 ASSAY OF MAGNESIUM: CPT

## 2019-04-08 PROCEDURE — 77030038269 HC DRN EXT URIN PURWCK BARD -A

## 2019-04-08 PROCEDURE — 94660 CPAP INITIATION&MGMT: CPT

## 2019-04-08 PROCEDURE — 65610000006 HC RM INTENSIVE CARE

## 2019-04-08 PROCEDURE — 74011000250 HC RX REV CODE- 250: Performed by: INTERNAL MEDICINE

## 2019-04-08 PROCEDURE — 74011636637 HC RX REV CODE- 636/637: Performed by: PHYSICIAN ASSISTANT

## 2019-04-08 PROCEDURE — 84100 ASSAY OF PHOSPHORUS: CPT

## 2019-04-08 PROCEDURE — 74011636637 HC RX REV CODE- 636/637: Performed by: INTERNAL MEDICINE

## 2019-04-08 PROCEDURE — 74011250636 HC RX REV CODE- 250/636: Performed by: INTERNAL MEDICINE

## 2019-04-08 PROCEDURE — 36415 COLL VENOUS BLD VENIPUNCTURE: CPT

## 2019-04-08 PROCEDURE — 82330 ASSAY OF CALCIUM: CPT

## 2019-04-08 PROCEDURE — 74011000250 HC RX REV CODE- 250: Performed by: PHYSICIAN ASSISTANT

## 2019-04-08 PROCEDURE — 82962 GLUCOSE BLOOD TEST: CPT

## 2019-04-08 PROCEDURE — 85025 COMPLETE CBC W/AUTO DIFF WBC: CPT

## 2019-04-08 RX ORDER — MAGNESIUM SULFATE HEPTAHYDRATE 40 MG/ML
2 INJECTION, SOLUTION INTRAVENOUS ONCE
Status: COMPLETED | OUTPATIENT
Start: 2019-04-08 | End: 2019-04-08

## 2019-04-08 RX ORDER — METOPROLOL TARTRATE 5 MG/5ML
INJECTION INTRAVENOUS
Status: DISPENSED
Start: 2019-04-08 | End: 2019-04-09

## 2019-04-08 RX ADMIN — DEXTROSE MONOHYDRATE AND SODIUM CHLORIDE 75 ML/HR: 5; .45 INJECTION, SOLUTION INTRAVENOUS at 17:09

## 2019-04-08 RX ADMIN — INSULIN GLARGINE 25 UNITS: 100 INJECTION, SOLUTION SUBCUTANEOUS at 11:43

## 2019-04-08 RX ADMIN — HYDROMORPHONE HYDROCHLORIDE 0.5 MG: 1 INJECTION, SOLUTION INTRAMUSCULAR; INTRAVENOUS; SUBCUTANEOUS at 17:09

## 2019-04-08 RX ADMIN — HYDROMORPHONE HYDROCHLORIDE 0.5 MG: 1 INJECTION, SOLUTION INTRAMUSCULAR; INTRAVENOUS; SUBCUTANEOUS at 02:21

## 2019-04-08 RX ADMIN — INSULIN LISPRO 3 UNITS: 100 INJECTION, SOLUTION INTRAVENOUS; SUBCUTANEOUS at 05:09

## 2019-04-08 RX ADMIN — ENOXAPARIN SODIUM 40 MG: 100 INJECTION SUBCUTANEOUS at 10:09

## 2019-04-08 RX ADMIN — MAGNESIUM SULFATE HEPTAHYDRATE 2 G: 40 INJECTION, SOLUTION INTRAVENOUS at 21:00

## 2019-04-08 RX ADMIN — HYDROMORPHONE HYDROCHLORIDE 0.5 MG: 1 INJECTION, SOLUTION INTRAMUSCULAR; INTRAVENOUS; SUBCUTANEOUS at 07:55

## 2019-04-08 RX ADMIN — IPRATROPIUM BROMIDE AND ALBUTEROL SULFATE 3 ML: .5; 3 SOLUTION RESPIRATORY (INHALATION) at 16:08

## 2019-04-08 RX ADMIN — IPRATROPIUM BROMIDE AND ALBUTEROL SULFATE 3 ML: .5; 3 SOLUTION RESPIRATORY (INHALATION) at 05:19

## 2019-04-08 RX ADMIN — POTASSIUM CHLORIDE: 2 INJECTION, SOLUTION, CONCENTRATE INTRAVENOUS at 21:57

## 2019-04-08 RX ADMIN — POTASSIUM CHLORIDE: 2 INJECTION, SOLUTION, CONCENTRATE INTRAVENOUS at 07:55

## 2019-04-08 RX ADMIN — POTASSIUM CHLORIDE: 2 INJECTION, SOLUTION, CONCENTRATE INTRAVENOUS at 23:01

## 2019-04-08 RX ADMIN — IPRATROPIUM BROMIDE AND ALBUTEROL SULFATE 3 ML: .5; 3 SOLUTION RESPIRATORY (INHALATION) at 07:57

## 2019-04-08 RX ADMIN — INSULIN LISPRO 3 UNITS: 100 INJECTION, SOLUTION INTRAVENOUS; SUBCUTANEOUS at 11:53

## 2019-04-08 RX ADMIN — FAMOTIDINE 20 MG: 10 INJECTION INTRAVENOUS at 09:09

## 2019-04-08 RX ADMIN — HYDROMORPHONE HYDROCHLORIDE 0.5 MG: 1 INJECTION, SOLUTION INTRAMUSCULAR; INTRAVENOUS; SUBCUTANEOUS at 11:43

## 2019-04-08 RX ADMIN — POTASSIUM CHLORIDE: 2 INJECTION, SOLUTION, CONCENTRATE INTRAVENOUS at 09:08

## 2019-04-08 RX ADMIN — Medication 10 ML: at 21:09

## 2019-04-08 RX ADMIN — CALCIUM GLUCONATE 2 G: 98 INJECTION, SOLUTION INTRAVENOUS at 07:55

## 2019-04-08 RX ADMIN — HYDROMORPHONE HYDROCHLORIDE 0.5 MG: 1 INJECTION, SOLUTION INTRAMUSCULAR; INTRAVENOUS; SUBCUTANEOUS at 21:17

## 2019-04-08 RX ADMIN — Medication 10 ML: at 05:11

## 2019-04-08 RX ADMIN — POTASSIUM CHLORIDE: 2 INJECTION, SOLUTION, CONCENTRATE INTRAVENOUS at 10:04

## 2019-04-08 RX ADMIN — POTASSIUM CHLORIDE: 2 INJECTION, SOLUTION, CONCENTRATE INTRAVENOUS at 11:50

## 2019-04-08 RX ADMIN — CALCIUM GLUCONATE 4 G: 98 INJECTION, SOLUTION INTRAVENOUS at 21:57

## 2019-04-08 RX ADMIN — FAMOTIDINE 20 MG: 10 INJECTION INTRAVENOUS at 20:09

## 2019-04-08 RX ADMIN — IPRATROPIUM BROMIDE AND ALBUTEROL SULFATE 3 ML: .5; 3 SOLUTION RESPIRATORY (INHALATION) at 12:17

## 2019-04-08 RX ADMIN — INSULIN LISPRO 3 UNITS: 100 INJECTION, SOLUTION INTRAVENOUS; SUBCUTANEOUS at 17:15

## 2019-04-08 RX ADMIN — POTASSIUM CHLORIDE: 2 INJECTION, SOLUTION, CONCENTRATE INTRAVENOUS at 13:47

## 2019-04-08 RX ADMIN — Medication 10 ML: at 13:48

## 2019-04-08 RX ADMIN — IPRATROPIUM BROMIDE AND ALBUTEROL SULFATE 3 ML: .5; 3 SOLUTION RESPIRATORY (INHALATION) at 20:29

## 2019-04-08 NOTE — ROUTINE PROCESS
1911:  Rec'd Danielbear Dominguez  from Phuong Simental RN. SBAR reviewed patient-side with two-nurse check-offs done of meds, dressings, wounds, LDA's & revelant assessment findings including neuro status, vent settings, rhythm & pulses, diet. Bed in lowest position with noted SR up, wheels locked and exit alarm on. Tonight:  Occasional \"panic attacks;\" per Ms. O'Chris, diphenhydramine tried to good effect. Staying on BiPAP. Voids. Denies additional concerns. 5320:  Verbal Patient-side shift change report given to Tati Way RN, (oncoming nurse) by Conrad Laguerre RN  (offgoing nurse). Report included the following information SBAR, Kardex, ED Summary, Procedure Summary, Intake/Output, MAR, Recent Results and Med Rec Status. Included:  Intro, hx, two-RN eval of relevant assessment findings, LDAs, skin, diagnostics and infusions.

## 2019-04-08 NOTE — PROGRESS NOTES
Patient off Bipap for a trial, on 4 lpm NC. Pt does still have some upper airway wheezing. Will watch and moniter.

## 2019-04-08 NOTE — PROGRESS NOTES
attended the interdisciplinary rounds for Josafat Perales, who is a 63 y.o.,female. Patients Primary Language is: [place]. According to the patients EMR [place] Affiliation is: [place]. The reason the Patient came to the hospital is:   Patient Active Problem List    Diagnosis Date Noted    S/P thyroidectomy 04/04/2019    Type 2 diabetes mellitus with diabetic neuropathy ([address] 01/16/2019    Chronic diastolic congestive heart failure ([address] 08/27/2018    Mood disorder ([address] 08/24/2018    SOB (shortness of breath) 05/17/2017    Type 2 diabetes mellitus without complication ([address] 21/94/3617    Hypothyroidism due to acquired atrophy of thyroid 05/17/2017    Essential hypertension 05/17/2017    Dyslipidemia 05/17/2017    Venous stasis dermatitis of both lower extremities 03/01/2017    Lumbar facet arthropathy 01/26/2017    Lumbar degenerative disc disease 01/26/2017    Left shoulder tendinitis 12/07/2016    Spondylosis of lumbar region without myelopathy or radiculopathy 12/07/2016    Chronic pain of both shoulders 12/07/2016    Chronic pain of both knees 12/07/2016    Chronic pain syndrome 12/07/2016    Encounter for long-term (current) use of medications 12/07/2016    Chronic midline low back pain 12/07/2016    Chronic left shoulder pain 11/11/2016    Morbid obesity ([address] 11/11/2016    Type II diabetes mellitus, uncontrolled ([address] 11/06/2015    Sleep apnea 11/06/2015    H/O aortic valve replacement 11/06/2015    History of bicuspid aortic valve 11/06/2015    Chronic back pain 11/06/2015        Plan:  Chaplains will continue to follow and will provide pastoral care on an as needed/requested basis.  recommends bedside caregivers page  on duty if patient shows signs of acute spiritual or emotional distress.     [address] [facility]  Board Certified [facility]   [phone]

## 2019-04-08 NOTE — PROGRESS NOTES
Patient still having some issues with upper airway swelling. Pt feels like she needs more air. Bipap was changed to match her home machine. Pt much more comfortable.

## 2019-04-08 NOTE — PROGRESS NOTES
Lucy Warren[facility] left a message with Case Management office that she is able to assist with discharge needs. Called and left message for Lucy Warren.     JACK Soto RN  Care Management  Pager: [phone]
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
## 2019-04-08 NOTE — PROGRESS NOTES
[address] [place] [address] [facility]. Pulmonary, Critical Care, and Sleep Medicine    Name: Carmita Pichardo MRN: [id number]   : 1960 Hospital: [facility]   Date: 2019  Admission Date: 2019     Chart and notes reviewed. Data reviewed. I have evaluated all findings. [x]I have reviewed the flowsheet and previous days notes. Interval HPI: Patient is a 62 y. o. female w/ pmhx of DM S/P thyroidectomy, by Dr. Kanika Hairston, on 19. Patient has history of a multinodular goiter suspicious for malignancy, however recent biopsies have been negative. Procedure uneventful. Was extubated in PACU, but developed stridor and hypoxia, subsequently re intubated. ICU stay notable for QTc 676 on , repeat 4.5 QTc 430. Triglycerides also noted to be in 2,000's. Patient has history of hypertriglyceridemia. Extubated on  w/ some stridor that resolved w/ racemic epi and bipap. Patient utilizes bipap QHS at baseline. Subjective 19  Hospital Day:5  POD 4    Overnight events:  No events overnight, pt still requiring BIPAP, was given benadryl for anxiety. Mentation/Activity: follows commands. Alert, oriented  Respiratory/ Secretions: protecting airway, managing secretions w/o any issues. Mild stridor  Hemodynamics: VSS  Urine output, bowel: 1.4 L / 24 H    ROS:Review of systems not obtained due to patient factors. Events and notes from last 24 hours reviewed. Care plan discussed on multidisciplinary rounds.   Patient Active Problem List   Diagnosis Code    Type II diabetes mellitus, uncontrolled ([facility] [address] [facility] E11.65    Sleep apnea G47.30    H/O aortic valve replacement Z95.2    History of bicuspid aortic valve Z87.74    Chronic back pain M54.9, G89.29    Chronic left shoulder pain M25.512, G89.29    Morbid obesity (HCC) E66.01    Left shoulder tendinitis M75.82    Spondylosis of lumbar region without myelopathy or radiculopathy M47.816    Chronic pain of both shoulders M25.511, G89.29, M25.512  Chronic pain of both knees M25.561, M25.562, G89.29    Chronic pain syndrome G89.4    Encounter for long-term (current) use of medications Z79.899    Chronic midline low back pain M54.5, G89.29    Lumbar facet arthropathy M47.816    Lumbar degenerative disc disease M51.36    Venous stasis dermatitis of both lower extremities I87.2    SOB (shortness of breath) R06.02    Type 2 diabetes mellitus without complication (HCC) P68.0    Hypothyroidism due to acquired atrophy of thyroid E03.4    Essential hypertension I10    Dyslipidemia E78.5    Mood disorder (HCC) F39    Chronic diastolic congestive heart failure (HCC) I50.32    Type 2 diabetes mellitus with diabetic neuropathy ([facility]) E11.40    S/P thyroidectomy Z98.890     Vital Signs:  Visit Vitals  /84 (BP 1 Location: Right arm, BP Patient Position: At rest)   Pulse 72   Temp 98.2 °F (36.8 °C)   Resp 18   Ht 5' 7\" (1.702 m)   Wt 125.4 kg (276 lb 7.3 oz)   SpO2 100%   Breastfeeding?  No   BMI 43.30 kg/m²       O2 Device: BIPAP   O2 Flow Rate (L/min): 5 l/min   Temp (24hrs), Av.3 °F (36.8 °C), Min:97.5 °F (36.4 °C), Max:99 °F (37.2 °C)       Intake/Output:   Last shift:       07 - 1900  In: 3314 [I.V.:1245]  Out: 400 [Urine:400]  Last 3 shifts: 1901 -  0700  In: 3888 [P.O.:60; I.V.:3720]  Out: 2450 [Urine:2450]    Intake/Output Summary (Last 24 hours) at 2019 1730  Last data filed at 2019 1600  Gross per 24 hour   Intake 3165 ml   Output 1400 ml   Net 1765 ml     Current Facility-Administered Medications   Medication Dose Route Frequency    famotidine (PF) (PEPCID) 20 mg in sodium chloride 0.9% 10 mL injection  20 mg IntraVENous Q12H    docusate sodium (COLACE) capsule 100 mg  100 mg Oral DAILY    insulin glargine (LANTUS) injection 25 Units  25 Units SubCUTAneous DAILY    calcium carbonate (TUMS) chewable tablet 400 mg [elemental]  400 mg Oral TID WITH MEALS    insulin lispro (HUMALOG) injection SubCUTAneous Q6H    gemfibrozil (LOPID) tablet 600 mg  600 mg Oral ACB&D    enoxaparin (LOVENOX) injection 40 mg  40 mg SubCUTAneous Q24H    dextrose 5 % - 0.45% NaCl infusion  75 mL/hr IntraVENous CONTINUOUS    albuterol-ipratropium (DUO-NEB) 2.5 MG-0.5 MG/3 ML  3 mL Nebulization Q4H RT    sodium chloride (NS) flush 5-40 mL  5-40 mL IntraVENous Q8H     Telemetry: [x]Sinus []A-flutter []Paced    []A-fib []Multiple PVCs                  Physical Exam:      General: Awake, alert, NAD, bipap  HEENT:  Anicteric sclerae; pink palpebral conjunctivae; mucosa moist. Surgical site sutured- warm, dry, intact. Swelling surrounding incision site and bruising on the the caudal side of incision. Tender to touch. Resp:  Symmetrical chest expansion, no accessory muscle use; diffuse rhonchi. Mild stridor. CV:  S1, S2 present; regular rate and rhythm  GI:  Abdomen soft, non-tender; (+) active bowel sounds. Obese. Extremities:  +2 pulses on all extremities; no edema/ cyanosis/ clubbing noted. Left hand w/ non blanching erythema over dorsal aspect. Warm, dry, sensation/strength intact  Skin:  Warm; no rashes/ lesions noted, normal turgor/cap refill   Neurologic:  Non-focal   Devices:  Purewick, PIVs     DATA:  MAR reviewed and pertinent medications noted or modified as needed    Labs:  Recent Labs     04/08/19 0207 04/07/19 0225 04/06/19  0300   WBC 5.8 7.4 9.3   HGB 14.1 14.0 14.7   HCT 43.2 43.1 44.0    175 164     Recent Labs     04/08/19  0207 04/07/19  1933 04/07/19  1452 04/07/19 0225 04/06/19  1556     --   --  134* 139   K 3.2* 3.7  --  3.5 3.6     --   --  101 104   CO2 27  --   --  25 26   *  --   --  194* 195*   BUN 9  --   --  9 7   CREA 0.43*  --   --  0.45* 0.58*   CA 8.2*  --   --  8.1* 8.4*   MG 2.0  --  2.1 2.1  --    PHOS 2.9  --  2.8 2.5  --      No results for input(s): PH, PCO2, PO2, HCO3, FIO2 in the last 72 hours.   No results for input(s): FIO2I, IFO2, HCO3I, IHCO3, HCOPOC, PCO2I, PCOPOC, IPHI, PHI, PHPOC, PO2I, PO2POC in the last 72 hours. No lab exists for component: IPOC2    Imaging:  [x]   I have personally reviewed the patients radiographs and reports  XR Results (most recent):  Results from Hospital Encounter encounter on 04/04/19   XR CHEST PORT    Narrative EXAM:  Chest Portable. INDICATION:  ET tube placement     COMPARISON:  04/04/19     TECHNIQUE:  Portable AP chest study    FINDINGS:      - ET tube distal tip observed at 2.4 cm above the phi.   - Both lungs are hypoinflated. - Persistent retrocardiac opacity, similar to recent prior exam.  Subtle streaky  density in the right infrahilar region. Again similar to recent prior exam.   - No pneumothorax. - Moderate cardiac silhouette enlargement with prior CABG and aortic valve  replacement. Impression IMPRESSION:    1. Borderline low-lying ET tube. 2.  No significant interval change in lung aeration pattern, with subtle streaky  densities in the lower lung zones. CT Results (most recent):  Results from Hospital Encounter encounter on 02/21/19   CT NECK SOFT TISSUE W CONT    Narrative Neck CT With Contrast    CPT CODE: 78760    HISTORY: Multinodular goiter. COMPARISON: Thyroid ultrasound 8/3/2018. CT chest 5/5/2017    FINDINGS:     After the intravenous administration of iodinated IV contrast, a scan was  obtained from low head down to upper chest.  Patient received 80 cc Isovue 300  IV contrast. CT dose reduction was achieved through use of a standardized  protocol tailored for this examination and automatic exposure control for dose  modulation. There is some chronic appearing dental disease with periapical lucency  surrounding right mandibular molar tooth roots. Visualized lower brain appears normal.     Mucosa of the upper aerodigestive tract:  No mass lesion seen in the mucosa of  the upper aerodigestive tract.  4 mm hyperdensity in the vallecula along the  surface of the base of tongue or uvula (axial image 52). More regular than  expected for metal pellet and appears to be a calcification. Lymph Nodes: No enlarged lymph nodes in the cervical chains. Major salivary glands:  Parotid glands are relatively low density, some fatty  replacement. Both parotid glands are relatively enlarged, but there is no mass  lesion. Submandibular glands appear normal.    Thyroid: Thyroid gland is heterogeneous with multiple relatively low-attenuation  nodular densities plus multiple calcifications. Several rim calcified nodules  bilaterally, the largest is on the right, 2.3 x 1.8 cm. Posteriorly, both  thyroid lobes extend into the tracheoesophageal groove. The inferior margin of  the right thyroid lobe is at the level of the top of the right clavicular head. The left thyroid lobe extends to the level of the top of the sternal notch. No  retrosternal extension. The right thyroid lobe measures approximately 4.6 cm AP, 3.8 cm transverse, 7.4  cm SI. The left thyroid lobe is 3.6 cm AP, 3.0 cm transverse and 6.7 cm SI. Upper mediastinum: No lymphadenopathy seen in the upper mediastinum. Visualized lung apices: are clear. Bones: Mild degenerative changes in the cervical spine. Mucus retention cysts in the maxillary sinuses. Impression IMPRESSION:    1. Heterogeneous enlargement of the thyroid gland with multiple low-attenuation  and rim calcified nodules bilaterally. Extension of thyroid tissue posteriorly  into the left and right tracheoesophageal groove. 2. The right thyroid lobe extends to the level of the reticular head. Left  thyroid lobe extends to the level of the sternal notch. 3. No neck masses. No lymphadenopathy. Thank you for this referral.     IMPRESSION:   · S/p thyroidectomy POD 4- for hx of multinodular goiter. Performed by Dr. Miranda Bai 4/4/19.   · Hypoxic respiratory failure, extubated 4/5/19  · Stridor- improving but still persistent and requiring BIPAP for stenting  · QTc prolongation- improving   · Hypocalcemia- s/p thyroidectomy- on replacement protocol  · Hypertriglyceridemia improving - improving   · Hyperglycemia in the setting of DMII  · OVIDIO- On home BIPAP  · Morbid Obesity: Body mass index is 43.3 kg/m². Patient Active Problem List   Diagnosis Code    [facility], [facility] E11.65    Sleep apnea G47.30    H/O aortic valve replacement Z95.2    History of bicuspid aortic valve Z87.74    Chronic back pain M54.9, G89.29    Chronic left shoulder pain M25.512, G89.29    Morbid obesity ([facility]) E66.01    Left shoulder tendinitis M75.82    Spondylosis of lumbar region without myelopathy or radiculopathy M47.816    Chronic pain of both shoulders M25.511, G89.29, M25.512    Chronic pain of both knees M25.561, M25.562, G89.29    Chronic pain syndrome G89.4    Encounter for long-term (current) use of medications Z79.899    Chronic midline low back pain M54.5, G89.29    Lumbar facet arthropathy M47.816    Lumbar degenerative disc disease M51.36    Venous stasis dermatitis of both lower extremities I87.2    SOB (shortness of breath) R06.02    Type 2 diabetes mellitus without complication ([facility]) E74.8    Hypothyroidism due to acquired atrophy of thyroid E03.4    Essential hypertension I10    Dyslipidemia E78.5    Mood disorder ([facility]) F39    Chronic diastolic congestive heart failure ([facility]) I50.32    Type 2 diabetes mellitus with diabetic neuropathy ([facility]) E11.40    S/P thyroidectomy Z98.890      RECOMMENDATIONS:   Neuro: Avoid sedating medications. Patient on Neurontin, cymbalta at home, eval for restarting once cleared by SLP. Patient is using meclizine at home for anxiety. Not a candidate for benzos. Pulm: Aspiration precautions, HOB > 30'. Cont Bipap QHS and PRN. CVS: Monitor HD, MAP goal >65. Patient on daily lasix, toprol-XL at home, eval for restarting when tolerating PO.  GI: NPO.  D5 ?12 NS @ 75 ml/hr   Renal: Trend Cr, Solmon Barrier. Hem/Onc: Trend H/H, monitor for s/o active bleeding. Daily CBC. I/D: Trend WBCs and temperature curve. Metabolic: Daily BMP, mag, phos. Trend lytes and replace per protocol. Daily ionized calcium. Endocrine: Q6 glucoses. SSI, lantus. Avoid hypoglycemia. Lipid for triglycerides when can take PO. Musc/Skin: (+) wound care, PT/OT  Full Code  Discussed in interdisciplinary rounds         Best practice :  Glycemic control  IHI ICU bundles: Mueller Bundle Followed    [facility] Vent patients- VAP bundle, aim to keep peak plateau pressure 62-90PE H2O  Sress ulcer prophylaxis. DVT prophylaxis. Need for Lines, mueller assessed. Restraints need. Palliative care evaluation. High complexity decision making was performed during this consultation and evaluation. [x]       Pt is at high risk for further organ failure and dysfunction.          [facility]  Pulmonary, Critical Care, and Sleep Medicine             Rationale  Recommendations    DVT Proph:   Is DVT prophylaxis still   required? Ulcer Prophylaxis  PPI / H2 required after transfer? Reason?   lovenox, famotidine      Renal/  hepatic Medications need adjustment:  n/a   Cardiac Pressors DC from [facility]? AMI: (ASA, ACEI/ARB, Statin, B-blocker):  Reviewed- daily lasix, toprol-XL at home   ID Antibiotics listed:          Coverage Appropriate? Candidate for Streamlining? Recommendations for duration of therapy: n/a       Endocrine  Insulin coverage/thyroid appropriate? Reviewed, restart statin when able    Pain  medications  Are current pain medications appropriate for the floor?  reviewed   Home   Medications Recommendations for revision: NPO    Central lines   n/a   Mueller   Purewick    Other Devices   reviewed             Signed By: Angelica Richmond PA-C     April 8, 2019 5:32 PM   Pulmonary, Critical Care Medicine  [employer] [facility]         Late entry for date of service 4/8/19:  I saw and evaluated the patient, performing the key elements of the service. I discussed the findings, assessment and plan with the PA and agree with the PA's findings and plan as documented in the PA's note. All edits and changes made above or are mentioned in my addendum. Total of 37 min critical care time spent at bedside during the course of care providing evaluation,management and care decisions and ordering appropriate treatment related to critical care problems exclusive of procedures. The reason for providing this level of medical care for this critically ill patient was due a critical illness that impaired one or more vital organ systems such that there was a high probability of imminent or life threatening deterioration in the patients condition. This care involved high complexity decision making to assess, manipulate, and support vital system functions, to treat this degree vital organ system failure and to prevent further life threatening deterioration of the patients condition. 36-year-old morbidly obese [facility] of [facility] suspicious for malignancy, status post thyroidectomy by Dr. Missael Monroe on 4/5/19. Immediately postoperatively patient had respiratory failure with stridor, patient was emergently reintubated. A few days later the patient was extubated and had stridor again. Patient was treated with racemic epi, but steroids were held off due to recent surgery and patient's history of diabetes, resulting in concerns for poor wound healing in the setting of surgery. Patient was treated with noninvasive positive pressure ventilation with BiPAP, which the patient has been on for the last few days. She has tolerated BiPAP well, but becomes very anxious and tachypneic when she comes off of positive pressure ventilation. Patient unable to tolerate p.o. due to wearing BiPAP constantly.   Unfortunately the patient has multifactorial reasons for being dyspneic, including OVIDIO with OHV. Patient's home BiPAP settings are 20/12 which we will increase to today, patient was on 10/5. I believe the etiology of the patient's voice hoarseness along with stridor and poor phonation are likely from her recent surgery, suspect they are most likely postoperative but may include possible recurrent laryngeal nerve damage. Treatment includes conservative measures and supportive care, which we will continue. Pathology specimen resulted today, shows no evidence of malignancy. Patient is at high reintubation risk which was discussed with the patient.       Aretha Leyden MD/MPH     Pulmonary, Critical Care Medicine  [facility]

## 2019-04-08 NOTE — PROGRESS NOTES
NUTRITION    Nutrition Screen      RECOMMENDATIONS / PLAN:     - Monitor diet tolerance versus need for enteral nutrition support. - Continue IV fluid as medically appropriate.   - Continue RD inpatient monitoring and evaluation. NUTRITION INTERVENTIONS & DIAGNOSIS:     [x] Meals/snacks: modified composition, full liquids off bipap   [x] IV fluid: D5 1/2NS at 75 mL/hr (90 gm dextrose, 306 kcal per day)  [x] Collaboration and referral of nutrition care: interdisciplinary rounds     Nutrition Diagnosis: Inadequate oral intake related to respiratory status as evidenced by pt on liquid diet, unable to eat while on bipap. ASSESSMENT:     4/8: Pt extubated and started on full liquids after swallow evaluation- SLP following. Requiring bipap and SOB- unable to eat and may require re-intubation per MD. Oral medications held, receiving IV fluid and electrolyte replacement. 4/5: S/p thyroidectomy and re-intubated postop for hypoxia, no OG/NGT placed in anticipation for weaning trial and extubation today. Plan for swallow evaluation prior to starting diet once pt extubated per MD. Propofol stopped for elevated triglyceride- 2579 mg/dL and total cholesterol of 347 mg/dL. Average po intake adequate to meet patients estimated nutritional needs:   [] Yes     [x] No   [] Unable to determine at this time    Diet: DIET FULL LIQUID      Food Allergies: sweeteners and sucrose (cause headaches)  Current Appetite:   [] Good     [] Fair     [] Poor     [x] Other: unable to eat on bipap  Appetite/meal intake prior to admission:   [] Good     [] Fair     [] Poor     [x] Other: unknown  Feeding Limitations:  [x] Swallowing difficulty: SLP following    [] Chewing difficulty    [x] Other: respiratory status  Current Meal Intake: No data found.     BM: 4/3  Skin Integrity: neck surgical incision   Edema:   [x] No     [] Yes   Pertinent Medications: Reviewed: 2 gm Ca, pepcid, SSI, colace, lantus, zofran, 50 mEq KCl, electrolyte replacement protocol      Recent Labs     04/08/19  0207 04/07/19  1933 04/07/19  1452 04/07/19  0225 04/06/19  1556     --   --  134* 139   K 3.2* 3.7  --  3.5 3.6     --   --  101 104   CO2 27  --   --  25 26   *  --   --  194* 195*   BUN 9  --   --  9 7   CREA 0.43*  --   --  0.45* 0.58*   CA 8.2*  --   --  8.1* 8.4*   MG 2.0  --  2.1 2.1  --    PHOS 2.9  --  2.8 2.5  --        Intake/Output Summary (Last 24 hours) at 4/8/2019 1222  Last data filed at 4/8/2019 1200  Gross per 24 hour   Intake 2840 ml   Output 1300 ml   Net 1540 ml       Anthropometrics:  Ht Readings from Last 1 Encounters:   04/04/19 5' 7\" (1.702 m)     Last 3 Recorded Weights in this Encounter    04/06/19 0600 04/06/19 2230 04/08/19 0600   Weight: 121 kg (266 lb 12.1 oz) 125.5 kg (276 lb 10.8 oz) 125.4 kg (276 lb 7.3 oz)     Body mass index is 43.3 kg/m². Obese, Class III     Weight History:   Weight Metrics 4/8/2019 4/4/2019 4/1/2019 3/20/2019 3/13/2019 3/6/2019 2/26/2019   Weight 276 lb 7.3 oz - 270 lb 270 lb 268 lb 267 lb 267 lb   BMI - 43.3 kg/m2 42.29 kg/m2 42.29 kg/m2 41.97 kg/m2 41.82 kg/m2 41.82 kg/m2        Admitting Diagnosis: S/P thyroidectomy [Z98.890]  Pertinent PMHx: DM, HLD, heart failure, asthma, thyroid disease     Education Needs:        [x] None identified  [] Identified - Not appropriate at this time  []  Identified and addressed - refer to education log  Learning Limitations:   [] None identified  [x] Identified: altered mentation   Cultural, Mandaeism & ethnic food preferences:  [x] None identified    [] Identified and addressed     ESTIMATED NUTRITION NEEDS:     Calories: 5459-9767 kcal (11-14 kcal/kg) based on  [x] Actual  kg     [] IBW   Protein: 153-183 gm (2.5-3 gm/kg) based on  [] Actual BW      [x] IBW 61 kg  Fluid: 1 mL/kcal     MONITORING & EVALUATION:     Nutrition Goal(s):   1. Nutritional needs will be met through adequate oral intake or nutrition support within the next 7 days. Outcome:  [] Met/Ongoing    [] Progressing towards goal    [x] Not progressing towards goal    [] New/Initial goal     Monitoring:   [x] Food and beverage intake   [x] Diet order   [x] Nutrition-focused physical findings   [x] Treatment/therapy   [] Weight   [] Enteral nutrition intake        Previous Recommendations (for follow-up assessments only):     []   Implemented       []   Not Implemented (RD to address)      [x] No Longer Appropriate     [] No Recommendation Made     Discharge Planning: pending ability to tolerate oral diet and goals of care   [x] Participated in care planning, discharge planning, & interdisciplinary rounds as appropriate      Anne Porras, [address]    Pager: [phone]

## 2019-04-08 NOTE — ROUTINE PROCESS
Bedside and Verbal shift change report given to Julien Hernandez RN  (oncoming nurse) by Kim Waddell (offgoing nurse). Report included the following information SBAR, Kardex, MAR and Recent Results.     SITUATION:    Code Status: Full Code   Reason for Admission: S/P thyroidectomy [Z98.890]    [facility] day: 4   Problem List:       Hospital Problems  Date Reviewed: 4/1/2019          Codes Class Noted POA    S/P thyroidectomy ICD-10-CM: Z98.890  ICD-9-CM: V45.89  4/4/2019 Unknown              BACKGROUND:    Past Medical History:   Past Medical History:   Diagnosis Date    Arthritis     Lower back     Asthma     Chronic back pain     Lower back pain    Depression     Diabetes ([facility] [address]     Diabetes mellitus ([address]     Hearing loss     Heart failure (HCC)     chronic diastolic heart failure    Left ear hearing loss     pt states nerve damage--- 25% hearing loss, described as \"muffled\"    Memory difficulty     Panic attacks     Ringing of ears     Severe headache     Sleep apnea     SOB (shortness of breath) on exertion     w and w/out exertion    Stomach pain     Thyroid disease     Vertigo          Patient taking anticoagulants yes     ASSESSMENT:    Changes in Assessment Throughout Shift: none     Patient has Central Line: no Reasons if yes: n/a   Patient has Barker Cath: no Reasons if yes: n/a      Last Vitals:     Vitals:    04/08/19 1600 04/08/19 1609 04/08/19 1700 04/08/19 1800   BP: 137/84  (!) 146/91 138/70   Pulse: 72  74 78   Resp: 18  21 14   Temp: 98.2 °F (36.8 °C)      SpO2: 100% 100% 100% 100%   Weight:       Height:            IV and DRAINS (will only show if present)   External Female Catheter 04/08/19-Site Assessment: Clean, dry, & intact  Peripheral IV 04/04/19 Right Arm-Site Assessment: Clean, dry, & intact  [REMOVED] Airway - Endotracheal Tube 04/04/19 Oral-Site Assessment: Clean, dry, & intact  Peripheral IV 04/04/19 Left;Upper Arm-Site Assessment: Clean, dry, & intact  [REMOVED] Peripheral IV 04/04/19 Right; Lower Forearm-Site Assessment: Clean, dry, & intact     WOUND (if present)   Wound Type:  none   Dressing present Dressing Present : Yes   Wound Concerns/Notes:  none     PAIN    Pain Assessment    Pain Intensity 1: 0 (04/08/19 1730)    Pain Location 1: Neck, Throat    Pain Intervention(s) 1: Medication (see MAR)    Patient Stated Pain Goal: 0  o Interventions for Pain:  yes  o Intervention effective: yes  o Time of last intervention: See MAR   o Reassessment Completed: yes      Last 3 Weights:  Last 3 Recorded Weights in this Encounter    04/06/19 0600 04/06/19 2230 04/08/19 0600   Weight: 121 kg (266 lb 12.1 oz) 125.5 kg (276 lb 10.8 oz) 125.4 kg (276 lb 7.3 oz)     Weight change: -0.1 kg (-3.5 oz)     INTAKE/OUPUT    Current Shift: 04/08 0701 - 04/08 1900  In: 3650 [I.V.:1245]  Out: 400 [Urine:400]    Last three shifts: 04/06 1901 - 04/08 0700  In: 3780 [P.O.:60; I.V.:3720]  Out: 2450 [Urine:2450]     LAB RESULTS     Recent Labs     04/08/19  0207 04/07/19  0225 04/06/19  0300   WBC 5.8 7.4 9.3   HGB 14.1 14.0 14.7   HCT 43.2 43.1 44.0    175 164        Recent Labs     04/08/19  0207 04/07/19  1933 04/07/19  1452 04/07/19  0225 04/06/19  1556     --   --  134* 139   K 3.2* 3.7  --  3.5 3.6   *  --   --  194* 195*   BUN 9  --   --  9 7   CREA 0.43*  --   --  0.45* 0.58*   CA 8.2*  --   --  8.1* 8.4*   MG 2.0  --  2.1 2.1  --        RECOMMENDATIONS AND DISCHARGE PLANNING     1. Pending tests/procedures/ Plan of Care or Other Needs: continue to monitor     2. Discharge plan for patient and Needs/Barriers: TBD    3. Estimated Discharge Date: TBD Posted on Whiteboard in Patients Room: no      4. The patient's care plan was reviewed with the oncoming nurse.        \"HEALS\" SAFETY CHECK      Fall Risk    Total Score: 2    Safety Measures: Safety Measures: Bed/Chair-Wheels locked, Bed in low position, Call light within reach    A safety check occurred in the patient's room between off going nurse and oncoming nurse listed above. The safety check included the below items  Area Items   H  High Alert Medications - Verify all high alert medication drips (heparin, PCA, etc.)   E  Equipment - Suction is set up for ALL patients (with chaitanya)  - Red plugs utilized for all equipment (IV pumps, etc.)  - WOWs wiped down at end of shift.  - Room stocked with oxygen, suction, and other unit-specific supplies   A  Alarms - Bed alarm is set for fall risk patients  - Ensure chair alarm is in place and activated if patient is up in a chair   L  Lines - Check IV for any infiltration  - Barker bag is empty if patient has a Barker   - Tubing and IV bags are labeled   S  Safety   - Room is clean, patient is clean, and equipment is clean. - Hallways are clear from equipment besides carts. - Fall bracelet on for fall risk patients  - Ensure room is clear and free of clutter  - Suction is set up for ALL patients (with chaitanya)  - Hallways are clear from equipment besides carts.    - Isolation precautions followed, supplies available outside room, sign posted     Shirley Joel

## 2019-04-08 NOTE — PROGRESS NOTES
Surgery  Stable in ICU, tolerating BiPAP, voice hoarse but moving air without stridor  Afebrile vital signs stable  Neck wound okay  Needs to sit up in bed today and try some p.o.  Nutrition  Labs reviewed, calcium okay  Status post total thyroidectomy, slowly improving voice, no stridor

## 2019-04-08 NOTE — PROGRESS NOTES
Speech Therapy:    0902: Pt currently on BiPAP and inappropriate for PO.     1350: Second attempt. Pt continues to be on BiPAP. Per RN: she is at a high risk for re-intubation. Would hold off on any type of feeding until respiratory status is improved. ST will attempt next date or as medical condition permits. Will continue to follow.      Thank you for this referral.    Judy Mayberry M.S. CCC-SLP/L  Speech-Language Pathologist

## 2019-04-08 NOTE — PROGRESS NOTES
Problem: Pressure Injury - Risk of  Goal: *Prevention of pressure injury  Description  Document Omer Scale and appropriate interventions in the flowsheet.   Outcome: Progressing Towards Goal  Note:   Pressure Injury Interventions:  Sensory Interventions: Assess changes in LOC, Keep linens dry and wrinkle-free, Maintain/enhance activity level, Minimize linen layers         Activity Interventions: Pressure redistribution bed/mattress(bed type), PT/OT evaluation    Mobility Interventions: PT/OT evaluation, Pressure redistribution bed/mattress (bed type)    Nutrition Interventions: Document food/fluid/supplement intake, Discuss nutritional consult with provider    Friction and Shear Interventions: Foam dressings/transparent film/skin sealants, Lift sheet, Minimize layers

## 2019-04-09 ENCOUNTER — TELEPHONE (OUTPATIENT)
Dept: FAMILY MEDICINE CLINIC | Age: 59
End: 2019-04-09

## 2019-04-09 LAB
ANION GAP SERPL CALC-SCNC: 10 MMOL/L (ref 3–18)
BASOPHILS # BLD: 0 K/UL (ref 0–0.1)
BASOPHILS NFR BLD: 0 % (ref 0–2)
BUN SERPL-MCNC: 10 MG/DL (ref 7–18)
BUN/CREAT SERPL: 24 (ref 12–20)
CA-I SERPL-SCNC: 0.98 MMOL/L (ref 1.12–1.32)
CA-I SERPL-SCNC: 0.98 MMOL/L (ref 1.12–1.32)
CA-I SERPL-SCNC: 1.01 MMOL/L (ref 1.12–1.32)
CALCIUM SERPL-MCNC: 8.3 MG/DL (ref 8.5–10.1)
CHLORIDE SERPL-SCNC: 104 MMOL/L (ref 100–108)
CO2 SERPL-SCNC: 26 MMOL/L (ref 21–32)
CREAT SERPL-MCNC: 0.41 MG/DL (ref 0.6–1.3)
DIFFERENTIAL METHOD BLD: ABNORMAL
EOSINOPHIL # BLD: 0.1 K/UL (ref 0–0.4)
EOSINOPHIL NFR BLD: 2 % (ref 0–5)
ERYTHROCYTE [DISTWIDTH] IN BLOOD BY AUTOMATED COUNT: 15.2 % (ref 11.6–14.5)
GLUCOSE BLD STRIP.AUTO-MCNC: 172 MG/DL (ref 70–110)
GLUCOSE BLD STRIP.AUTO-MCNC: 177 MG/DL (ref 70–110)
GLUCOSE BLD STRIP.AUTO-MCNC: 199 MG/DL (ref 70–110)
GLUCOSE SERPL-MCNC: 150 MG/DL (ref 74–99)
HCT VFR BLD AUTO: 44.3 % (ref 35–45)
HGB BLD-MCNC: 14.5 G/DL (ref 12–16)
LYMPHOCYTES # BLD: 1.3 K/UL (ref 0.9–3.6)
LYMPHOCYTES NFR BLD: 26 % (ref 21–52)
MAGNESIUM SERPL-MCNC: 2.1 MG/DL (ref 1.6–2.6)
MCH RBC QN AUTO: 30.4 PG (ref 24–34)
MCHC RBC AUTO-ENTMCNC: 32.7 G/DL (ref 31–37)
MCV RBC AUTO: 92.9 FL (ref 74–97)
MONOCYTES # BLD: 0.5 K/UL (ref 0.05–1.2)
MONOCYTES NFR BLD: 9 % (ref 3–10)
NEUTS SEG # BLD: 3.2 K/UL (ref 1.8–8)
NEUTS SEG NFR BLD: 63 % (ref 40–73)
PHOSPHATE SERPL-MCNC: 3.4 MG/DL (ref 2.5–4.9)
PLATELET # BLD AUTO: 167 K/UL (ref 135–420)
PMV BLD AUTO: 9 FL (ref 9.2–11.8)
POTASSIUM SERPL-SCNC: 3.6 MMOL/L (ref 3.5–5.5)
POTASSIUM SERPL-SCNC: 3.7 MMOL/L (ref 3.5–5.5)
RBC # BLD AUTO: 4.77 M/UL (ref 4.2–5.3)
SODIUM SERPL-SCNC: 140 MMOL/L (ref 136–145)
TRIGL SERPL-MCNC: 678 MG/DL (ref ?–150)
WBC # BLD AUTO: 5.1 K/UL (ref 4.6–13.2)

## 2019-04-09 PROCEDURE — 84132 ASSAY OF SERUM POTASSIUM: CPT

## 2019-04-09 PROCEDURE — 74011250637 HC RX REV CODE- 250/637

## 2019-04-09 PROCEDURE — 74011250636 HC RX REV CODE- 250/636: Performed by: INTERNAL MEDICINE

## 2019-04-09 PROCEDURE — 82330 ASSAY OF CALCIUM: CPT

## 2019-04-09 PROCEDURE — 65610000006 HC RM INTENSIVE CARE

## 2019-04-09 PROCEDURE — 36415 COLL VENOUS BLD VENIPUNCTURE: CPT

## 2019-04-09 PROCEDURE — 77010033711 HC HIGH FLOW OXYGEN

## 2019-04-09 PROCEDURE — 97535 SELF CARE MNGMENT TRAINING: CPT

## 2019-04-09 PROCEDURE — 82962 GLUCOSE BLOOD TEST: CPT

## 2019-04-09 PROCEDURE — 74011000258 HC RX REV CODE- 258: Performed by: INTERNAL MEDICINE

## 2019-04-09 PROCEDURE — 97530 THERAPEUTIC ACTIVITIES: CPT

## 2019-04-09 PROCEDURE — 74011636637 HC RX REV CODE- 636/637: Performed by: INTERNAL MEDICINE

## 2019-04-09 PROCEDURE — 74011636637 HC RX REV CODE- 636/637: Performed by: PHYSICIAN ASSISTANT

## 2019-04-09 PROCEDURE — 85025 COMPLETE CBC W/AUTO DIFF WBC: CPT

## 2019-04-09 PROCEDURE — 74011000250 HC RX REV CODE- 250: Performed by: INTERNAL MEDICINE

## 2019-04-09 PROCEDURE — 97116 GAIT TRAINING THERAPY: CPT

## 2019-04-09 PROCEDURE — 74011250637 HC RX REV CODE- 250/637: Performed by: INTERNAL MEDICINE

## 2019-04-09 PROCEDURE — 74011250636 HC RX REV CODE- 250/636: Performed by: PHYSICIAN ASSISTANT

## 2019-04-09 PROCEDURE — 74011000250 HC RX REV CODE- 250: Performed by: PHYSICIAN ASSISTANT

## 2019-04-09 PROCEDURE — 94640 AIRWAY INHALATION TREATMENT: CPT

## 2019-04-09 PROCEDURE — 83735 ASSAY OF MAGNESIUM: CPT

## 2019-04-09 PROCEDURE — 74011000258 HC RX REV CODE- 258: Performed by: PHYSICIAN ASSISTANT

## 2019-04-09 PROCEDURE — 77030037870 HC GLD SHT PREVALON SAGE -B

## 2019-04-09 PROCEDURE — 94660 CPAP INITIATION&MGMT: CPT

## 2019-04-09 PROCEDURE — 84478 ASSAY OF TRIGLYCERIDES: CPT

## 2019-04-09 PROCEDURE — 84100 ASSAY OF PHOSPHORUS: CPT

## 2019-04-09 RX ORDER — LORAZEPAM 2 MG/ML
0.5 INJECTION INTRAMUSCULAR
Status: COMPLETED | OUTPATIENT
Start: 2019-04-09 | End: 2019-04-09

## 2019-04-09 RX ADMIN — HYDROMORPHONE HYDROCHLORIDE 0.5 MG: 1 INJECTION, SOLUTION INTRAMUSCULAR; INTRAVENOUS; SUBCUTANEOUS at 05:00

## 2019-04-09 RX ADMIN — INSULIN LISPRO 3 UNITS: 100 INJECTION, SOLUTION INTRAVENOUS; SUBCUTANEOUS at 12:48

## 2019-04-09 RX ADMIN — Medication 10 ML: at 21:00

## 2019-04-09 RX ADMIN — Medication 10 ML: at 17:14

## 2019-04-09 RX ADMIN — HYDROMORPHONE HYDROCHLORIDE 0.5 MG: 1 INJECTION, SOLUTION INTRAMUSCULAR; INTRAVENOUS; SUBCUTANEOUS at 21:04

## 2019-04-09 RX ADMIN — IPRATROPIUM BROMIDE AND ALBUTEROL SULFATE 3 ML: .5; 3 SOLUTION RESPIRATORY (INHALATION) at 07:09

## 2019-04-09 RX ADMIN — ENOXAPARIN SODIUM 40 MG: 100 INJECTION SUBCUTANEOUS at 12:49

## 2019-04-09 RX ADMIN — Medication 10 ML: at 05:59

## 2019-04-09 RX ADMIN — LORAZEPAM 0.5 MG: 2 INJECTION INTRAMUSCULAR; INTRAVENOUS at 17:14

## 2019-04-09 RX ADMIN — HYDROMORPHONE HYDROCHLORIDE 0.5 MG: 1 INJECTION, SOLUTION INTRAMUSCULAR; INTRAVENOUS; SUBCUTANEOUS at 12:49

## 2019-04-09 RX ADMIN — IPRATROPIUM BROMIDE AND ALBUTEROL SULFATE 3 ML: .5; 3 SOLUTION RESPIRATORY (INHALATION) at 19:54

## 2019-04-09 RX ADMIN — GEMFIBROZIL 600 MG: 600 TABLET ORAL at 17:15

## 2019-04-09 RX ADMIN — POTASSIUM CHLORIDE: 2 INJECTION, SOLUTION, CONCENTRATE INTRAVENOUS at 01:01

## 2019-04-09 RX ADMIN — POTASSIUM CHLORIDE: 2 INJECTION, SOLUTION, CONCENTRATE INTRAVENOUS at 06:51

## 2019-04-09 RX ADMIN — CALCIUM CARBONATE (ANTACID) CHEW TAB 500 MG 400 MG: 500 CHEW TAB at 17:15

## 2019-04-09 RX ADMIN — INSULIN LISPRO 3 UNITS: 100 INJECTION, SOLUTION INTRAVENOUS; SUBCUTANEOUS at 05:57

## 2019-04-09 RX ADMIN — IPRATROPIUM BROMIDE AND ALBUTEROL SULFATE 3 ML: .5; 3 SOLUTION RESPIRATORY (INHALATION) at 04:17

## 2019-04-09 RX ADMIN — POTASSIUM CHLORIDE: 2 INJECTION, SOLUTION, CONCENTRATE INTRAVENOUS at 00:01

## 2019-04-09 RX ADMIN — CALCIUM GLUCONATE 2 G: 98 INJECTION, SOLUTION INTRAVENOUS at 17:13

## 2019-04-09 RX ADMIN — FAMOTIDINE 20 MG: 10 INJECTION INTRAVENOUS at 10:06

## 2019-04-09 RX ADMIN — DEXTROSE MONOHYDRATE AND SODIUM CHLORIDE 75 ML/HR: 5; .45 INJECTION, SOLUTION INTRAVENOUS at 09:56

## 2019-04-09 RX ADMIN — IPRATROPIUM BROMIDE AND ALBUTEROL SULFATE 3 ML: .5; 3 SOLUTION RESPIRATORY (INHALATION) at 23:22

## 2019-04-09 RX ADMIN — IPRATROPIUM BROMIDE AND ALBUTEROL SULFATE 3 ML: .5; 3 SOLUTION RESPIRATORY (INHALATION) at 00:00

## 2019-04-09 RX ADMIN — POTASSIUM CHLORIDE: 2 INJECTION, SOLUTION, CONCENTRATE INTRAVENOUS at 05:30

## 2019-04-09 RX ADMIN — INSULIN LISPRO 3 UNITS: 100 INJECTION, SOLUTION INTRAVENOUS; SUBCUTANEOUS at 18:00

## 2019-04-09 RX ADMIN — FAMOTIDINE 20 MG: 10 INJECTION INTRAVENOUS at 21:00

## 2019-04-09 RX ADMIN — CALCIUM GLUCONATE 2 G: 98 INJECTION, SOLUTION INTRAVENOUS at 05:30

## 2019-04-09 RX ADMIN — IPRATROPIUM BROMIDE AND ALBUTEROL SULFATE 3 ML: .5; 3 SOLUTION RESPIRATORY (INHALATION) at 15:00

## 2019-04-09 RX ADMIN — HYDROMORPHONE HYDROCHLORIDE 0.5 MG: 1 INJECTION, SOLUTION INTRAMUSCULAR; INTRAVENOUS; SUBCUTANEOUS at 01:56

## 2019-04-09 RX ADMIN — INSULIN GLARGINE 25 UNITS: 100 INJECTION, SOLUTION SUBCUTANEOUS at 09:55

## 2019-04-09 NOTE — PROGRESS NOTES
[facility]. Pulmonary, Critical Care, and Sleep Medicine    Name: Daniel Dominguez MRN: [id number]   : 1960 Hospital: [facility]   Date: 2019  Admission Date: 2019     Chart and notes reviewed. Data reviewed. I have evaluated all findings. [x]I have reviewed the flowsheet and previous days notes. Interval HPI: Patient is a 62 y. o. female w/ pmhx of DM S/P thyroidectomy, by Dr. Fransisca Espana, on 19. Patient has history of a multinodular goiter suspicious for malignancy, however recent biopsies have been negative. Procedure uneventful. Was extubated in PACU, but developed stridor and hypoxia, subsequently re intubated. ICU stay notable for QTc 676 on , repeat 4.5 QTc 430. Triglycerides also noted to be in 2,000's. Patient has history of hypertriglyceridemia. Extubated on  w/ some stridor that resolved w/ racemic epi and bipap. Patient utilizes bipap QHS at baseline. Subjective 19  Hospital Day:6  POD 5    Overnight events:  No events overnight, pt still requiring BIPAP, pt was transitioned to HiFlow NC for two hours today, but then insisted on being placed back in BIPAP due to discomfort. Mentation/Activity: follows commands. Alert, oriented, out of bed to chair today. Respiratory/ Secretions: protecting airway, managing secretions w/o any issues  Hemodynamics: VSS  Urine output, bowel: 1.3 L / 24 H    ROS:Review of systems not obtained due to patient factors. Events and notes from last 24 hours reviewed. Care plan discussed on multidisciplinary rounds.   Patient Active Problem List   Diagnosis Code    Type II diabetes mellitus, uncontrolled ([facility] [address] E11.65    Sleep apnea G47.30    H/O aortic valve replacement Z95.2    History of bicuspid aortic valve Z87.74    Chronic back pain M54.9, G89.29    Chronic left shoulder pain M25.512, G89.29    Morbid obesity (HCC) E66.01    Left shoulder tendinitis M75.82    Spondylosis of lumbar region without myelopathy or radiculopathy M47.816    Chronic pain of both shoulders M25.511, G89.29, M25.512    Chronic pain of both knees M25.561, M25.562, G89.29    Chronic pain syndrome G89.4    Encounter for long-term (current) use of medications Z79.899    Chronic midline low back pain M54.5, G89.29    Lumbar facet arthropathy M47.816    Lumbar degenerative disc disease M51.36    Venous stasis dermatitis of both lower extremities I87.2    SOB (shortness of breath) R06.02    Type 2 diabetes mellitus without complication (HCC) W98.8    Hypothyroidism due to acquired atrophy of thyroid E03.4    Essential hypertension I10    Dyslipidemia E78.5    Mood disorder (HCC) F39    Chronic diastolic congestive heart failure (HCC) I50.32    Type 2 diabetes mellitus with diabetic neuropathy (HCC) E11.40    S/P thyroidectomy Z98.890     Vital Signs:  Visit Vitals  BP (!) 124/101   Pulse 76   Temp 97.8 °F (36.6 °C)   Resp 10   Ht 5' 7\" (1.702 m)   Wt 128.1 kg (282 lb 6.6 oz)   SpO2 100%   Breastfeeding?  No   BMI 44.23 kg/m²       O2 Device: BIPAP   O2 Flow Rate (L/min): 50 l/min   Temp (24hrs), Av.3 °F (36.8 °C), Min:97.8 °F (36.6 °C), Max:99 °F (37.2 °C)       Intake/Output:   Last shift:      701 - 1900  In: 75 [I.V.:75]  Out: 400 [Urine:400]  Last 3 shifts: 1901 -  0700  In: 4405 [I.V.:3860]  Out: 2300 [Urine:2300]    Intake/Output Summary (Last 24 hours) at 2019 1431  Last data filed at 2019 0800  Gross per 24 hour   Intake 1820 ml   Output 1400 ml   Net 420 ml     Current Facility-Administered Medications   Medication Dose Route Frequency    famotidine (PF) (PEPCID) 20 mg in sodium chloride 0.9% 10 mL injection  20 mg IntraVENous Q12H    docusate sodium (COLACE) capsule 100 mg  100 mg Oral DAILY    insulin glargine (LANTUS) injection 25 Units  25 Units SubCUTAneous DAILY    calcium carbonate (TUMS) chewable tablet 400 mg [elemental]  400 mg Oral TID WITH MEALS    insulin lispro (HUMALOG) injection   SubCUTAneous Q6H    gemfibrozil (LOPID) tablet 600 mg  600 mg Oral ACB&D    enoxaparin (LOVENOX) injection 40 mg  40 mg SubCUTAneous Q24H    dextrose 5 % - 0.45% NaCl infusion  75 mL/hr IntraVENous CONTINUOUS    albuterol-ipratropium (DUO-NEB) 2.5 MG-0.5 MG/3 ML  3 mL Nebulization Q4H RT    sodium chloride (NS) flush 5-40 mL  5-40 mL IntraVENous Q8H     Telemetry: [x]Sinus []A-flutter []Paced    []A-fib []Multiple PVCs                  Physical Exam:      General: Awake, alert, NAD, bipap  HEENT:  Anicteric sclerae; pink palpebral conjunctivae; mucosa moist. Surgical site sutured- warm, dry, intact. Swelling surrounding incision site with dried blood. Tender to touch. Resp:  Symmetrical chest expansion, no accessory muscle use while on BIPAP, soft upper airway expiratory wheezing   CV:  S1, S2 present; regular rate and rhythm  GI:  Abdomen soft, non-tender; (+) active bowel sounds. Obese. Extremities:  +2 pulses on all extremities; no edema/ cyanosis/ clubbing noted. Left hand w/ non blanching erythema over dorsal aspect. Warm, dry, sensation/strength intact  Skin:  Warm; no rashes/ lesions noted, normal turgor/cap refill   Neurologic:  Non-focal   Devices:  Purewick, PIVs     DATA:  MAR reviewed and pertinent medications noted or modified as needed    Labs:  Recent Labs     04/09/19  0235 04/08/19  0207 04/07/19  0225   WBC 5.1 5.8 7.4   HGB 14.5 14.1 14.0   HCT 44.3 43.2 43.1    178 175     Recent Labs     04/09/19  1230 04/09/19  0235 04/08/19  1921 04/08/19  0207  04/07/19  1452   NA  --  140 138 139  --   --    K 3.6 3.7 3.4* 3.2*   < >  --    CL  --  104 102 103  --   --    CO2  --  26 30 27  --   --    GLU  --  150* 170* 160*  --   --    BUN  --  10 10 9  --   --    CREA  --  0.41* 0.46* 0.43*  --   --    CA  --  8.3* 7.9* 8.2*  --   --    MG  --  2.1 1.8 2.0  --  2.1   PHOS  --  3.4  --  2.9  --  2.8    < > = values in this interval not displayed.      No results for input(s): PH, PCO2, PO2, HCO3, FIO2 in the last 72 hours. No results for input(s): FIO2I, IFO2, HCO3I, IHCO3, HCOPOC, PCO2I, PCOPOC, IPHI, PHI, PHPOC, PO2I, PO2POC in the last 72 hours. No lab exists for component: IPOC2    Imaging:  [x]   I have personally reviewed the patients radiographs and reports  XR Results (most recent):  Results from Hospital Encounter encounter on 04/04/19   XR CHEST PORT    Narrative EXAM:  Chest Portable. INDICATION:  ET tube placement     COMPARISON:  04/04/19     TECHNIQUE:  Portable AP chest study    FINDINGS:      - ET tube distal tip observed at 2.4 cm above the phi.   - Both lungs are hypoinflated. - Persistent retrocardiac opacity, similar to recent prior exam.  Subtle streaky  density in the right infrahilar region. Again similar to recent prior exam.   - No pneumothorax. - Moderate cardiac silhouette enlargement with prior CABG and aortic valve  replacement. Impression IMPRESSION:    1. Borderline low-lying ET tube. 2.  No significant interval change in lung aeration pattern, with subtle streaky  densities in the lower lung zones. CT Results (most recent):  Results from Hospital Encounter encounter on 02/21/19   CT NECK SOFT TISSUE W CONT    Narrative Neck CT With Contrast    CPT CODE: 58550    HISTORY: Multinodular goiter. COMPARISON: Thyroid ultrasound 8/3/2018. CT chest 5/5/2017    FINDINGS:     After the intravenous administration of iodinated IV contrast, a scan was  obtained from low head down to upper chest.  Patient received 80 cc Isovue 300  IV contrast. CT dose reduction was achieved through use of a standardized  protocol tailored for this examination and automatic exposure control for dose  modulation. There is some chronic appearing dental disease with periapical lucency  surrounding right mandibular molar tooth roots.     Visualized lower brain appears normal.     Mucosa of the upper aerodigestive tract:  No mass lesion seen in the mucosa of  the upper aerodigestive tract. 4 mm hyperdensity in the vallecula along the  surface of the base of tongue or uvula (axial image 52). More regular than  expected for metal pellet and appears to be a calcification. Lymph Nodes: No enlarged lymph nodes in the cervical chains. Major salivary glands:  Parotid glands are relatively low density, some fatty  replacement. Both parotid glands are relatively enlarged, but there is no mass  lesion. Submandibular glands appear normal.    Thyroid: Thyroid gland is heterogeneous with multiple relatively low-attenuation  nodular densities plus multiple calcifications. Several rim calcified nodules  bilaterally, the largest is on the right, 2.3 x 1.8 cm. Posteriorly, both  thyroid lobes extend into the tracheoesophageal groove. The inferior margin of  the right thyroid lobe is at the level of the top of the right clavicular head. The left thyroid lobe extends to the level of the top of the sternal notch. No  retrosternal extension. The right thyroid lobe measures approximately 4.6 cm AP, 3.8 cm transverse, 7.4  cm SI. The left thyroid lobe is 3.6 cm AP, 3.0 cm transverse and 6.7 cm SI. Upper mediastinum: No lymphadenopathy seen in the upper mediastinum. Visualized lung apices: are clear. Bones: Mild degenerative changes in the cervical spine. Mucus retention cysts in the maxillary sinuses. Impression IMPRESSION:    1. Heterogeneous enlargement of the thyroid gland with multiple low-attenuation  and rim calcified nodules bilaterally. Extension of thyroid tissue posteriorly  into the left and right tracheoesophageal groove. 2. The right thyroid lobe extends to the level of the reticular head. Left  thyroid lobe extends to the level of the sternal notch. 3. No neck masses. No lymphadenopathy. Thank you for this referral.     IMPRESSION:   · S/p thyroidectomy POD 5- for hx of multinodular goiter.  Performed by Dr. Guillermo Montez 4/4/19. · Acute Hypoxic Respiratory Failure, extubated 4/5/19  · Stridor- improving but still persistent and requiring BIPAP for stenting  · QTc prolongation- improving   · Hypocalcemia- s/p thyroidectomy- on replacement protocol  · Hypertriglyceridemia improving - improving   · Hyperglycemia in the setting of DMII  · OVIDIO- On home BIPAP 20/12   · Morbid Obesity: Body mass index is 44.23 kg/m². Patient Active Problem List   Diagnosis Code    [facility], [facility] E11.65    Sleep apnea G47.30    H/O aortic valve replacement Z95.2    History of bicuspid aortic valve Z87.74    Chronic back pain M54.9, G89.29    Chronic left shoulder pain M25.512, G89.29    Morbid obesity ([facility]) E66.01    Left shoulder tendinitis M75.82    Spondylosis of lumbar region without myelopathy or radiculopathy M47.816    Chronic pain of both shoulders M25.511, G89.29, M25.512    Chronic pain of both knees M25.561, M25.562, G89.29    Chronic pain syndrome G89.4    Encounter for long-term (current) use of medications Z79.899    Chronic midline low back pain M54.5, G89.29    Lumbar facet arthropathy M47.816    Lumbar degenerative disc disease M51.36    Venous stasis dermatitis of both lower extremities I87.2    SOB (shortness of breath) R06.02    Type 2 diabetes mellitus without complication ([facility]) O31.0    Hypothyroidism due to acquired atrophy of thyroid E03.4    Essential hypertension I10    Dyslipidemia E78.5    Mood disorder ([facility]) F39    Chronic diastolic congestive heart failure ([facility]) I50.32    Type 2 diabetes mellitus with diabetic neuropathy ([facility]) E11.40    S/P thyroidectomy Z98.890      RECOMMENDATIONS:   Neuro: Avoid sedating medications. Patient on Neurontin, cymbalta at home, eval for restarting once cleared by SLP. Patient is using meclizine at home for anxiety. Pulm: Aspiration precautions, HOB > 30'. Cont Bipap QHS 20/12 (home settings) and PRN. CVS: Monitor HD, MAP goal >65. Pt on daily lasix, toprol-XL at home, eval for restarting when tolerating PO.  GI: NPO. D5 1/2 NS @ 75 ml/hr   Renal: Trend Cr, UOP. Purewick. Hem/Onc: Trend H/H, monitor for s/o active bleeding. Daily CBC. I/D: Trend WBCs and temperature curve. Metabolic: Daily BMP, mag, phos. Trend lytes and replace per protocol. Daily ionized calcium. Endocrine: Q6 glucoses. SSI, lantus. Avoid hypoglycemia. Lipid for triglycerides when can take PO. Musc/Skin: (+) wound care, PT/OT  Full Code  Discussed in interdisciplinary rounds       Best practice :  Glycemic control  IHI ICU bundles: Mueller Bundle Followed    [facility] Vent patients- VAP bundle, aim to keep peak plateau pressure 54-10KZ H2O  Sress ulcer prophylaxis. DVT prophylaxis. Need for Lines, mueller assessed. Restraints need. Palliative care evaluation. High complexity decision making was performed during this consultation and evaluation. [x]       Pt is at high risk for further organ failure and dysfunction.          [facility]  Pulmonary, Critical Care, and Sleep Medicine        Rationale  Recommendations    DVT Proph:   Is DVT prophylaxis still   required? Ulcer Prophylaxis  PPI / H2 required after transfer? Reason?   lovenox, famotidine      Renal/  hepatic Medications need adjustment:  n/a   Cardiac Pressors DC from [facility]? AMI: (ASA, ACEI/ARB, Statin, B-blocker):  Reviewed- daily lasix, toprol-XL at home   ID Antibiotics listed:          Coverage Appropriate? Candidate for Streamlining? Recommendations for duration of therapy: n/a       Endocrine  Insulin coverage/thyroid appropriate? Reviewed, restart statin when able    Pain  medications  Are current pain medications appropriate for the floor?  reviewed   Home   Medications Recommendations for revision: NPO    Central lines   n/a   Mueller   Purewick    Other Devices   reviewed           Signed By: Magnolia Durham PA-C     April 9, 2019 2:31 PM   Pulmonary, Critical Care [facility]       Late entry for date of service is 4/9/19:  I saw and evaluated the patient, performing the key elements of the service. I discussed the findings, assessment and plan with the PA and agree with the PA's findings and plan as documented in the PA's note. All edits and changes made above or are mentioned in my addendum. Total of 33 min critical care time spent at bedside during the course of care providing evaluation,management and care decisions and ordering appropriate treatment related to critical care problems exclusive of procedures. The reason for providing this level of medical care for this critically ill patient was due a critical illness that impaired one or more vital organ systems such that there was a high probability of imminent or life threatening deterioration in the patients condition. This care involved high complexity decision making to assess, manipulate, and support vital system functions, to treat this degree vital organ system failure and to prevent further life threatening deterioration of the patients condition.     52-year-old morbidly obese female who [facility] for history of multinodular goiter suspicious for malignancy, status post thyroidectomy by Dr. Jeanmarie Luciano on 4/5/19. Immediately postoperatively patient had respiratory failure with stridor, patient was emergently reintubated. A few days later the patient was extubated and had stridor again. Patient was treated with racemic epi, but steroids were held off due to recent surgery and patient's history of diabetes, resulting in concerns for poor wound healing in the setting of surgery. Patient was treated with noninvasive positive pressure ventilation with BiPAP, which the patient has been on for the last few days since her second extubation.   She has tolerated BiPAP well, but becomes very anxious and tachypneic when she comes off of positive pressure ventilation. Patient unable to tolerate p.o. due to wearing BiPAP constantly. Unfortunately the patient has multifactorial reasons for being dyspneic, including OVIDIO with OHV, and I suspect the main contributor is VCD. I believe the etiology of the patient's voice hoarseness along with stridor and poor phonation are likely from her recent surgery, suspect they are most likely postoperative but may include possible recurrent laryngeal nerve damage. Treatment includes conservative measures and supportive care, which we will continue. Patient was able to tolerate nasal cannula at 4 L and sat 100%. We will start the patient on anti-anxiolytics as needed. We will also consult speech-language pathology for assistance with VCD exercises. Pathology specimen shows no evidence of malignancy. Patient is at high reintubation risk which was discussed with the patient. Also of note the patient has had hypertriglyceridemia which was elevated up to 2500, trended down to 600 by yesterday.       Hannah Ramsey MD/MPH     Pulmonary, Critical Care Medicine  [facility]

## 2019-04-09 NOTE — PROGRESS NOTES
Bedside and Verbal shift change report given to Susie Nava RN (oncoming nurse) by Emma Zavala (offgoing nurse). Report included the following information SBAR, ED Summary, Procedure Summary, Intake/Output, MAR and Recent Results.

## 2019-04-09 NOTE — PROGRESS NOTES
Problem: Self Care Deficits Care Plan (Adult)  Goal: *Acute Goals and Plan of Care (Insert Text)  Description  Occupational Therapy Goals  Initiated 4/8/2019 within 7 day(s). 1.  Patient will perform grooming with supervision/set-up while seated on EOB with supervision for balance. 2.  Patient will perform upper body dressing with supervision/set-up. 3.  Patient will perform lower body dressing with minimal assistance/contact guard assist.  4.  Patient will perform toilet transfers with minimal assistance/contact guard assist.  5.  Patient will participate in upper extremity therapeutic exercise/activities with supervision/set-up for 8 minutes to increase strength/endurance for ADLs. Outcome: Progressing Towards Goal   OCCUPATIONAL THERAPY TREATMENT    Patient: Miguel Barnett (58 y.o. female)  Date: 4/9/2019  Diagnosis: S/P thyroidectomy [Z98.890] <principal problem not specified>  Procedure(s) (LRB):  TOTAL THYROIDECTOMY (N/A) 5 Days Post-Op  Precautions: Fall(BiPAP)  PLOF: Pt was modified independent with basic self care tasks and used two canes for functional mobility PTA. She lives with her brother and son at home. Chart, occupational therapy assessment, plan of care, and goals were reviewed. ASSESSMENT:  Pt is requesting to use BSC, was able to maneuver to EOB, followed by [facility] txfr w/CGA for safety w/o AD. Following urinating pt was able to perform her toileting hygiene w/CGA for std balance during task. Pt assisted to EOB, then to the chair. Pt participated in ADL grooming task seated in the chair w/set-up. Pt communicates using smart-phone, reports feeling upset that she is not able to vocalize her wishes. Pt is notably anxious during all functional mobility, pt shares that she is afraid she would have difficulty breathing.  Therapeutic use of self provided for the pt to acknowledge the importance of cont to get OOB and to participate in therapy to improve strength and endurance for carryover w/ADLs, pt demonstrated understanding. Pt educated on mult energy conservation techniques to utilize in home environment, including pacing and deep breathing to prevent SOB and fatigue, and increase activity tolerance and safety w/ADLs and functional mobility, pt demos understanding, required Min VCs to follow throughout tasks. Progression toward goals:  ?          Improving appropriately and progressing toward goals  ? Improving slowly and progressing toward goals  ? Not making progress toward goals and plan of care will be adjusted     PLAN:  Patient continues to benefit from skilled intervention to address the above impairments. Continue treatment per established plan of care. Discharge Recommendations:  Home Health w/Supervision   Further Equipment Recommendations for Discharge:  BSC     SUBJECTIVE:   Patient typed  I can move I just can't breathe very well    OBJECTIVE DATA SUMMARY:   Cognitive/Behavioral Status:  Neurologic State: Alert  Orientation Level: Oriented X4  Cognition: Follows commands  Safety/Judgement: Fall prevention    Functional Mobility and Transfers for ADLs:   Bed Mobility:     Supine to Sit: Stand-by assistance    Transfers:  Sit to Stand: Contact guard assistance    Toilet Transfer : Contact guard assistance(to [facility])      Balance:  Sitting: Intact  Standing: Impaired; Without support  Standing - Static: Fair  Standing - Dynamic : Fair    ADL Intervention:   Grooming  Grooming Assistance: Set-up(seated)  Washing Hands: Supervision/set-up     Lower Body Dressing Assistance  Socks: Stand-by assistance    Toileting  Bladder Hygiene: Contact guard assistance  Bowel Hygiene: Contact guard assistance std  Pain:  Pt didn't c/o pain    Activity Tolerance:    Fair  Please refer to the flowsheet for vital signs taken during this treatment. After treatment:   ?  Patient left in no apparent distress sitting up in chair  ? Patient left in no apparent distress in bed  ? Call bell left within reach  ? Nursing notified  ? Caregiver present  ? Bed alarm activated    COMMUNICATION/EDUCATION:   ? Role of Occupational Therapy in the acute care setting  ? Home safety education was provided and the patient/caregiver indicated understanding. ? Patient/family have participated as able in working towards goals and plan of care. ? Patient/family agree to work toward stated goals and plan of care. ? Patient understands intent and goals of therapy, but is neutral about his/her participation. ? Patient is unable to participate in goal setting and plan of care.       Thank you for this referral.  VANDANA Wade  Time Calculation: 31 mins

## 2019-04-09 NOTE — PROGRESS NOTES
Surgery  Resting comfortable on CPAP  AF VSS  Wound fine  Must get up and eat today, needs to wear compression devices  Push po and OOB if poss.   Path neg for malignancy  Post Total Thyroid

## 2019-04-09 NOTE — PROGRESS NOTES
Bedside and Verbal shift change report given to [facility] RICHARD (oncoming nurse) by Katharina Lopez (offgoing nurse). Report included the following information SBAR, ED Summary, Procedure Summary, Intake/Output, MAR and Recent Results.

## 2019-04-09 NOTE — PROGRESS NOTES
Encouraged coughing, deep breathing, and utilizing the nasal cannula per Dr. Dottie Garcia. Despite encouragement, verbal redirection, and explanation pt. Was resistant to therapy and non-compliant to reccomendations. Pt. Verbalized feeling like the hospital staff was \"out to get her\"- despite encouragement and support from staff throughout day. When asked to further explain what she meant, the patient rolled her eyes and motioned for nurse to leave room.

## 2019-04-09 NOTE — PROGRESS NOTES
Pt. Up in chair resting. Pt. Currently using BIPAP. High-flow nasal cannula encouraged- Pt. Refused.

## 2019-04-09 NOTE — OP NOTES
[address] [place] [address]   OPERATIVE REPORT    Name:  Shannan Leung  MR#:   [id number]  :  1960  ACCOUNT #:  [de-identified]  DATE OF SERVICE:  2019    PREOPERATIVE DIAGNOSIS:  Bilateral multinodular goiter with calcified nodules. POSTOPERATIVE DIAGNOSIS:  Bilateral multinodular goiter with calcified nodules. PROCEDURE PERFORMED:  Total thyroidectomy. SURGEON:  Jonatan Kilpatrick MD    ASSISTANT:  .    ANESTHESIA:  General.    COMPLICATIONS:  None. SPECIMENS REMOVED:  Right and left thyroid lobes. IMPLANTS:  None. ESTIMATED BLOOD LOSS:  100. INDICATION:  The patient is a 60-year-old woman who is morbidly obese and has a reasonably large thyroid; however, more importantly, she has multiple bilateral calcified nodules. She is here for total thyroidectomy. PROCEDURE:  After appropriate antibiotics and sequential compression stockings, the patient was taken to the operating room, placed in the supine position on the OR table. After adequate general anesthesia, a shoulder roll was placed, and her head was placed in extension and her neck taped up. She was placed in a semi-sitting position. A standard thyroidectomy incision was created, and Bovie electrocautery was used to take down the subcutaneous tissue. Multiple large veins were encountered, and they were tied with 2-0 silk. A subplatysmal flap was formed, which was superiorly based by grasping the platysma with Allis clamps and using the Bovie and blunt dissection to form the flap. An inferior-based subplatysmal flap was also handled in the same manner. Gelpi self-retaining retractors were placed bilaterally, and using harmonic scalpel the right strap muscles were taken down in their mid section. This freed the anterior wall of the thyroid, which was somewhat stuck and fibrous. Using the harmonic scalpel, the anterior wall was completely freed, and dissection laterally was performed both digitally and with [employer]. No energy device was used in the tracheoesophageal groove. Pushing the tissue posteriorly, the thyroid was mobilized anteriorly, and some low level harmonic scalpel was used in the medial aspect of the superior lobe. Once the medial aspect was freed up, blunt dissection laterally isolated the superior lobe, and using right angle clamps, the superior thyroidal artery and vein was isolated in continuity, tied with 2-0 silks and a clip on the patient's side and cut. This freed up the superior lobe. Laterally, the lobe further down into the thoracic inlet was discovered, and this was digitally dissected pushing the tissue posteriorly and elevating the thyroid anteriorly. The thyroid had a rubbery consistency and it was quite firm. The inferior vessels was then dissected with a tonsil clamp and taken in continuity with 2-0 silk suture and a clip. Harmonic scalpel was used on top of the trachea to take down the ligament of Abron Chimes and further free the thyroid. Again, tissue was pushed inferiorly making every effort to stay out of the tracheoesophageal groove, and this finally freed the thyroid and it was removed finally with the harmonic scalpel on top of the trachea. It as marked for superior lobe with a single silk stitch. That side of the neck was irrigated and then packed with a 4x4. Attention was then turned to the left side, and in the same manner the thyroid was freed using a harmonic scalpel medially at the superior lobe as well as laterally. Tissue was pushed off the thyroid using Kittner pushers down into the side of the trachea, and the thyroid was slowly mobilized. Small clips were placed on the middle thyroid vein, and this was taken in continuity with the tenotomy scissors. This significantly freed the thyroid, and it was rolled anteriorly. A very difficult dissection here due to the extensive adhesions and inflammatory state of the thyroid.   The inferior lobe vessels were isolated with a right-angle clamp and taken in continuity with 2-0 silk. A clip was placed on the patient's side. This allowed the thyroid to roll on top of the trachea, and it was taken off the trachea using harmonic scalpel. Hemostasis was good bilaterally. Copious irrigation was carried out. During these irrigation, the patient was given a Valsalva, and no bubbling was noted. The esophagus was inspected, and no injury was noted. At that point, the strap muscles were reapproximated with 3-0 Vicryl bilaterally. The platysma muscles were reapproximated with 3-0 Vicryl bilaterally, and the skin was closed with 3-0 Vicryl and 4-0 Monocryl. Steri-Strips and sterile dressings were applied. 0.25% Marcaine with epinephrine was used around the wound. She tolerated the procedure well, taken to ICU in stable condition because of her poor awakening.       Ginger Hammond MD      [id number]  D:  04/08/2019 16:21  T:  04/08/2019 16:26  JOB #:  [id number]

## 2019-04-09 NOTE — TELEPHONE ENCOUNTER
Patient brother alona called to inform Dr Yuki Chau that she is in ICU still and is complaining about the treatment she is given, the brother thinks they are doing an ok job but thinks that Dr Yuki Chau can calm her down.  So the brother is requesting for Dr Yuki Chau to contact her son Jose Mares at [phone]

## 2019-04-09 NOTE — PROGRESS NOTES
attended the interdisciplinary rounds for Rhona Austin, who is a 63 y.o.,female. Patients Primary Language is: [place]. According to the patients EMR Roman Catholic Affiliation is: [place]. The reason the Patient came to the hospital is:   Patient Active Problem List    Diagnosis Date Noted    S/P thyroidectomy 04/04/2019    Type 2 diabetes mellitus with diabetic neuropathy ([address] 01/16/2019    Chronic diastolic congestive heart failure ([address] 08/27/2018    Mood disorder ([address] 08/24/2018    SOB (shortness of breath) 05/17/2017    Type 2 diabetes mellitus without complication ([address] 35/34/9671    Hypothyroidism due to acquired atrophy of thyroid 05/17/2017    Essential hypertension 05/17/2017    Dyslipidemia 05/17/2017    Venous stasis dermatitis of both lower extremities 03/01/2017    Lumbar facet arthropathy 01/26/2017    Lumbar degenerative disc disease 01/26/2017    Left shoulder tendinitis 12/07/2016    Spondylosis of lumbar region without myelopathy or radiculopathy 12/07/2016    Chronic pain of both shoulders 12/07/2016    Chronic pain of both knees 12/07/2016    Chronic pain syndrome 12/07/2016    Encounter for long-term (current) use of medications 12/07/2016    Chronic midline low back pain 12/07/2016    Chronic left shoulder pain 11/11/2016    Morbid obesity ([address] 11/11/2016    Type II diabetes mellitus, uncontrolled ([address] 11/06/2015    Sleep apnea 11/06/2015    H/O aortic valve replacement 11/06/2015    History of bicuspid aortic valve 11/06/2015    Chronic back pain 11/06/2015          Plan:  Chaplains will continue to follow and will provide pastoral care on an as needed/requested basis.  recommends bedside caregivers page  on duty if patient shows signs of acute spiritual or emotional distress.     [address]  Board Certified [facility]   [phone]

## 2019-04-09 NOTE — PROGRESS NOTES
Speech Therapy:     0930: Pt taken off of CPAP. Will wait ~1 hr for PO trials with pt requesting chicken bullion. 1045: Second attempt. Brought pt multiple PO trials including chicken bullion. She refused PO despite max encouragement from SLP stating, \"Can't you see, I can't breathe. \" Pt educated that O2 was remaining at 100%. She asked SLP to leave room.       Thank you for this referral.    Mario Mathis M.S. CCC-SLP/L  Speech-Language Pathologist

## 2019-04-09 NOTE — ROUTINE PROCESS
1911:  Rec'd John Freire  from Nancie Valadez RN. SBAR reviewed patient-side with two-nurse check-offs done of meds, dressings, wounds, LDA's & revelant assessment findings including neuro status, vent settings, rhythm & pulses, diet. Bed in lowest position with noted SR up, wheels locked and exit alarm on. Tonight:  Awake all night typing on phone. States she can't breathe without BiPAP so is leaving it on.  'lytes replaced. Later, able to rest.  More frequent requests for analgesic. Took off BiPAP x 10 minutes, said \"I need it,\" put mask back on. Also, unable to void with Purewick, tried bedpan:  200ml into purewick, 800+ into bedpan plus overflow. Refused to have purewick replaced. 2657:  Verbal Patient-side shift change report given to BRANDON Moreno RN, (oncoming nurse) by Pratik Hernandez RN  (offgoing nurse). Report included the following information SBAR, Kardex, ED Summary, Procedure Summary, Intake/Output, MAR, Recent Results and Med Rec Status. Included:  Intro, hx, two-RN eval of relevant assessment findings, LDAs, skin, diagnostics and infusions.

## 2019-04-09 NOTE — PROGRESS NOTES
Pt. Transitioned from CPAP to high flow nasal cannula. Dr. Gila Ventura at bedside during this time. Due to CPAP pt. Unable to take morning PO meds- Dr. Gila Ventura notified. 0950: Pt. Banging on siderail requesting CPAP be put back on. No signs of respiratory distress noted as evidenced by SpO2 100% and respirations 18. Verbal redirection and coaching used to calm patient down. Nurse at bedside to monitor patient.

## 2019-04-09 NOTE — PROGRESS NOTES
Reason for Admission:   S/P thyroidectomy [Z98.890]               RRAT Score:     20             Resources/supports as identified by patient/family:   Patient has family support system. Top Challenges facing patient (as identified by patient/family and CM): Finances/Medication cost?    NO    Transportation      Patient's brother Darion Jefferson will transport home. Support system or lack thereof? Patient has family supports. Living arrangements? Patient resides with her brother Darion Jefferson and her son Yamilka Flor. Self-care/ADLs/Cognition? Alert and oriented. Communicating through typing on her cell phone. Current Advanced Directive/Advance Care Plan:   no                          Plan for utilizing home health:   Patient has no home health orders in place at this time. Care manager will continue to monitor for potential home health needs and orders. Likelihood of readmission:   HIGH    Transition of Care Plan:         Patient will return home with help from her family. Her brother Darion Jefferson will transport home at the time of discharge. Initial assessment completed with patient and her brother Darion Jefferson. Cognitive status of patient: Alert and oriented. She is communicating through typing on her phone. Face sheet information confirmed:  yes. The patient designates her brother Darion Jefferson and her son Yamilka Flor to participate in her discharge plan and to receive any needed information. This patient lives in a house with her brother Darion Jefferson and her son Yamilka Flor. Patient is able to navigate steps as needed. Prior to hospitalization, patient was considered to be independent with ADLs/IADLS : yes . Patient has a current ACP document on file: no     Patient's brother Darion Jefferson will be available to transport patient home upon discharge. The patient already has 2 canes and a BIPAP available in the home. Patient is not currently active with home health.  Patient has not stayed in a skilled nursing facility or rehab. Currently, the discharge plan is to return home with help from her family. Family will assist with care, as needed, post discharge. Patient's brother Melvin Holder) will transport home at the time of discharge. Patient's brother is (Dotty Pichardo, SADE#[phone]). Patient's son is Yina Chakraborty, [place]#[phone]). Patient's PCP is Angelito Perkins. Patient is insured through [email or web address]. The patient states that she can obtain her medications from the pharmacy, and take her medications as directed. Care manager will continue to closely monitor for discharge planning to ensure a safe discharge home from [facility]. Care Management Interventions  PCP Verified by CM: Yes(Angelito Perkins)  Palliative Care Criteria Met (RRAT>21 & CHF Dx)?: No  Mode of Transport at Discharge: Other (see comment)(Brother Melvin Holder) will transport home.)  Transition of Care Consult (CM Consult): Discharge Planning  Current Support Network: Own Home(Brother and her son both reside with her.)  Confirm Follow Up Transport: Family  Plan discussed with Pt/Family/Caregiver: Yes  Discharge Location  Discharge Placement: Home with family assistance        Iain Ta MSW  Care Manager  Pager#: [phone]

## 2019-04-10 LAB
ANION GAP SERPL CALC-SCNC: 8 MMOL/L (ref 3–18)
BASOPHILS # BLD: 0 K/UL (ref 0–0.1)
BASOPHILS NFR BLD: 1 % (ref 0–2)
BUN SERPL-MCNC: 8 MG/DL (ref 7–18)
BUN/CREAT SERPL: 17 (ref 12–20)
CA-I SERPL-SCNC: 0.99 MMOL/L (ref 1.12–1.32)
CA-I SERPL-SCNC: 1.05 MMOL/L (ref 1.12–1.32)
CALCIUM SERPL-MCNC: 8.1 MG/DL (ref 8.5–10.1)
CHLORIDE SERPL-SCNC: 102 MMOL/L (ref 100–108)
CO2 SERPL-SCNC: 28 MMOL/L (ref 21–32)
CREAT SERPL-MCNC: 0.47 MG/DL (ref 0.6–1.3)
DIFFERENTIAL METHOD BLD: ABNORMAL
EOSINOPHIL # BLD: 0.1 K/UL (ref 0–0.4)
EOSINOPHIL NFR BLD: 3 % (ref 0–5)
ERYTHROCYTE [DISTWIDTH] IN BLOOD BY AUTOMATED COUNT: 14.8 % (ref 11.6–14.5)
GLUCOSE BLD STRIP.AUTO-MCNC: 156 MG/DL (ref 70–110)
GLUCOSE BLD STRIP.AUTO-MCNC: 157 MG/DL (ref 70–110)
GLUCOSE BLD STRIP.AUTO-MCNC: 159 MG/DL (ref 70–110)
GLUCOSE BLD STRIP.AUTO-MCNC: 163 MG/DL (ref 70–110)
GLUCOSE SERPL-MCNC: 144 MG/DL (ref 74–99)
HCT VFR BLD AUTO: 44.3 % (ref 35–45)
HGB BLD-MCNC: 14.1 G/DL (ref 12–16)
LYMPHOCYTES # BLD: 1.5 K/UL (ref 0.9–3.6)
LYMPHOCYTES NFR BLD: 34 % (ref 21–52)
MAGNESIUM SERPL-MCNC: 1.9 MG/DL (ref 1.6–2.6)
MCH RBC QN AUTO: 29.3 PG (ref 24–34)
MCHC RBC AUTO-ENTMCNC: 31.8 G/DL (ref 31–37)
MCV RBC AUTO: 92.1 FL (ref 74–97)
MONOCYTES # BLD: 0.4 K/UL (ref 0.05–1.2)
MONOCYTES NFR BLD: 8 % (ref 3–10)
NEUTS SEG # BLD: 2.5 K/UL (ref 1.8–8)
NEUTS SEG NFR BLD: 54 % (ref 40–73)
PHOSPHATE SERPL-MCNC: 3.8 MG/DL (ref 2.5–4.9)
PLATELET # BLD AUTO: 181 K/UL (ref 135–420)
PMV BLD AUTO: 8.8 FL (ref 9.2–11.8)
POTASSIUM SERPL-SCNC: 3.1 MMOL/L (ref 3.5–5.5)
RBC # BLD AUTO: 4.81 M/UL (ref 4.2–5.3)
SODIUM SERPL-SCNC: 138 MMOL/L (ref 136–145)
TRIGL SERPL-MCNC: 598 MG/DL (ref ?–150)
WBC # BLD AUTO: 4.5 K/UL (ref 4.6–13.2)

## 2019-04-10 PROCEDURE — 97530 THERAPEUTIC ACTIVITIES: CPT

## 2019-04-10 PROCEDURE — 83735 ASSAY OF MAGNESIUM: CPT

## 2019-04-10 PROCEDURE — 80048 BASIC METABOLIC PNL TOTAL CA: CPT

## 2019-04-10 PROCEDURE — 74011250637 HC RX REV CODE- 250/637

## 2019-04-10 PROCEDURE — 74011250637 HC RX REV CODE- 250/637: Performed by: PHYSICIAN ASSISTANT

## 2019-04-10 PROCEDURE — 74011250636 HC RX REV CODE- 250/636: Performed by: PHYSICIAN ASSISTANT

## 2019-04-10 PROCEDURE — 65610000006 HC RM INTENSIVE CARE

## 2019-04-10 PROCEDURE — 97116 GAIT TRAINING THERAPY: CPT

## 2019-04-10 PROCEDURE — 36415 COLL VENOUS BLD VENIPUNCTURE: CPT

## 2019-04-10 PROCEDURE — 94640 AIRWAY INHALATION TREATMENT: CPT

## 2019-04-10 PROCEDURE — 74011250636 HC RX REV CODE- 250/636: Performed by: INTERNAL MEDICINE

## 2019-04-10 PROCEDURE — 74011000250 HC RX REV CODE- 250: Performed by: INTERNAL MEDICINE

## 2019-04-10 PROCEDURE — 85025 COMPLETE CBC W/AUTO DIFF WBC: CPT

## 2019-04-10 PROCEDURE — 94660 CPAP INITIATION&MGMT: CPT

## 2019-04-10 PROCEDURE — 84478 ASSAY OF TRIGLYCERIDES: CPT

## 2019-04-10 PROCEDURE — 74011000250 HC RX REV CODE- 250: Performed by: PHYSICIAN ASSISTANT

## 2019-04-10 PROCEDURE — 74011250637 HC RX REV CODE- 250/637: Performed by: INTERNAL MEDICINE

## 2019-04-10 PROCEDURE — 84100 ASSAY OF PHOSPHORUS: CPT

## 2019-04-10 PROCEDURE — 74011636637 HC RX REV CODE- 636/637: Performed by: INTERNAL MEDICINE

## 2019-04-10 PROCEDURE — 92526 ORAL FUNCTION THERAPY: CPT

## 2019-04-10 PROCEDURE — 74011000258 HC RX REV CODE- 258: Performed by: PHYSICIAN ASSISTANT

## 2019-04-10 PROCEDURE — 74011636637 HC RX REV CODE- 636/637: Performed by: PHYSICIAN ASSISTANT

## 2019-04-10 PROCEDURE — 74011000258 HC RX REV CODE- 258: Performed by: INTERNAL MEDICINE

## 2019-04-10 PROCEDURE — 92610 EVALUATE SWALLOWING FUNCTION: CPT

## 2019-04-10 PROCEDURE — 97535 SELF CARE MNGMENT TRAINING: CPT

## 2019-04-10 PROCEDURE — 82330 ASSAY OF CALCIUM: CPT

## 2019-04-10 PROCEDURE — 82962 GLUCOSE BLOOD TEST: CPT

## 2019-04-10 RX ORDER — OXYMETAZOLINE HCL 0.05 %
2 SPRAY, NON-AEROSOL (ML) NASAL 2 TIMES DAILY
Status: COMPLETED | OUTPATIENT
Start: 2019-04-10 | End: 2019-04-12

## 2019-04-10 RX ADMIN — Medication 10 ML: at 06:37

## 2019-04-10 RX ADMIN — GEMFIBROZIL 600 MG: 600 TABLET ORAL at 17:00

## 2019-04-10 RX ADMIN — INSULIN LISPRO 3 UNITS: 100 INJECTION, SOLUTION INTRAVENOUS; SUBCUTANEOUS at 02:13

## 2019-04-10 RX ADMIN — POTASSIUM CHLORIDE: 2 INJECTION, SOLUTION, CONCENTRATE INTRAVENOUS at 12:38

## 2019-04-10 RX ADMIN — CALCIUM CHLORIDE 0.6 G: 100 INJECTION, SOLUTION INTRAVENOUS at 19:30

## 2019-04-10 RX ADMIN — IPRATROPIUM BROMIDE AND ALBUTEROL SULFATE 3 ML: .5; 3 SOLUTION RESPIRATORY (INHALATION) at 20:11

## 2019-04-10 RX ADMIN — HYDROMORPHONE HYDROCHLORIDE 0.5 MG: 1 INJECTION, SOLUTION INTRAMUSCULAR; INTRAVENOUS; SUBCUTANEOUS at 02:12

## 2019-04-10 RX ADMIN — SODIUM CHLORIDE 1 G: 900 INJECTION, SOLUTION INTRAVENOUS at 08:58

## 2019-04-10 RX ADMIN — OXYMETAZOLINE HYDROCHLORIDE 2 SPRAY: 5 SPRAY NASAL at 17:00

## 2019-04-10 RX ADMIN — IPRATROPIUM BROMIDE AND ALBUTEROL SULFATE 3 ML: .5; 3 SOLUTION RESPIRATORY (INHALATION) at 16:15

## 2019-04-10 RX ADMIN — Medication 10 ML: at 21:41

## 2019-04-10 RX ADMIN — INSULIN LISPRO 3 UNITS: 100 INJECTION, SOLUTION INTRAVENOUS; SUBCUTANEOUS at 23:26

## 2019-04-10 RX ADMIN — Medication 10 ML: at 14:30

## 2019-04-10 RX ADMIN — ENOXAPARIN SODIUM 40 MG: 100 INJECTION SUBCUTANEOUS at 10:42

## 2019-04-10 RX ADMIN — GEMFIBROZIL 600 MG: 600 TABLET ORAL at 10:19

## 2019-04-10 RX ADMIN — POTASSIUM CHLORIDE: 2 INJECTION, SOLUTION, CONCENTRATE INTRAVENOUS at 10:38

## 2019-04-10 RX ADMIN — HYDROMORPHONE HYDROCHLORIDE 0.5 MG: 1 INJECTION, SOLUTION INTRAMUSCULAR; INTRAVENOUS; SUBCUTANEOUS at 09:54

## 2019-04-10 RX ADMIN — DEXTROSE MONOHYDRATE AND SODIUM CHLORIDE 75 ML/HR: 5; .45 INJECTION, SOLUTION INTRAVENOUS at 02:23

## 2019-04-10 RX ADMIN — IPRATROPIUM BROMIDE AND ALBUTEROL SULFATE 3 ML: .5; 3 SOLUTION RESPIRATORY (INHALATION) at 07:11

## 2019-04-10 RX ADMIN — IPRATROPIUM BROMIDE AND ALBUTEROL SULFATE 3 ML: .5; 3 SOLUTION RESPIRATORY (INHALATION) at 04:01

## 2019-04-10 RX ADMIN — POTASSIUM CHLORIDE: 2 INJECTION, SOLUTION, CONCENTRATE INTRAVENOUS at 14:38

## 2019-04-10 RX ADMIN — INSULIN LISPRO 3 UNITS: 100 INJECTION, SOLUTION INTRAVENOUS; SUBCUTANEOUS at 18:00

## 2019-04-10 RX ADMIN — POTASSIUM CHLORIDE: 2 INJECTION, SOLUTION, CONCENTRATE INTRAVENOUS at 15:38

## 2019-04-10 RX ADMIN — POTASSIUM CHLORIDE: 2 INJECTION, SOLUTION, CONCENTRATE INTRAVENOUS at 11:38

## 2019-04-10 RX ADMIN — POTASSIUM CHLORIDE: 2 INJECTION, SOLUTION, CONCENTRATE INTRAVENOUS at 13:38

## 2019-04-10 RX ADMIN — FAMOTIDINE 20 MG: 10 INJECTION INTRAVENOUS at 08:57

## 2019-04-10 RX ADMIN — INSULIN GLARGINE 25 UNITS: 100 INJECTION, SOLUTION SUBCUTANEOUS at 08:58

## 2019-04-10 RX ADMIN — OXYCODONE HYDROCHLORIDE AND ACETAMINOPHEN 1 TABLET: 5; 325 TABLET ORAL at 23:30

## 2019-04-10 NOTE — PROGRESS NOTES
Surgery  Feels better voice unchanged  AF VSS  Wound ok  Needs oob today with feeding trial  S/p total Thyroidectomy, slowly improving

## 2019-04-10 NOTE — PROGRESS NOTES
Problem: Mobility Impaired (Adult and Pediatric)  Goal: *Acute Goals and Plan of Care (Insert Text)  Description  Physical Therapy Goals  Initiated 4/7/2019 and to be accomplished within 7 day(s)  1. Patient will move from supine to sit and sit to supine , scoot up and down and roll side to side in bed with supervision/set-up. 2.  Patient will transfer from bed to chair and chair to bed with supervision/set-up using the least restrictive device. 3.  Patient will perform sit to stand with supervision/set-up. 4.  Patient will ambulate with supervision/set-up for  feet with the least restrictive device. 5.  Patient will ascend/descend 4 stairs with 1-2 handrail(s) with minimal assistance/contact guard assist.     Prior Level of Function: Independent with mobility including gait using 2 canes. Outcome: Progressing Towards Goal   PHYSICAL THERAPY TREATMENT    Patient: Herminio Nash (8 y.o. female)  Date: 4/9/2019  Diagnosis: S/P thyroidectomy [Z98.890] <principal problem not specified>  Procedure(s) (LRB):  TOTAL THYROIDECTOMY (N/A) 5 Days Post-Op  Precautions: Fall(BiPAP)    ASSESSMENT:  Patient is cleared by nursing for PT, and patient consents to therapy. Performed co-treatment with occupational therapy to increase safety and functional mobility. Pt performing supine to sit with [facility] riased standby assistance. Sit to stands from bed and [facility] contact guard assistance for safety. Gait in room total 6 feet to/from [facility] and to the chair contact guard assistance. Pt kept BIPAP donned throughout therapy with SpO2 at 100%. Pt is very fearful of having the BIPAP off at this time. Educated pt on role and importance of therapy. Educated on mobility with breathing. Pt ended therapy sitting in recliner with all needs met. Pt reports she feels like people are rushing with her. She would like people to go slower with her and listen to her. She verbalizes through writing at this time.      Progression toward goals:    ?      Improving appropriately and progressing toward goals  ? Improving slowly and progressing toward goals  ? Not making progress toward goals and plan of care will be adjusted     PLAN:  Patient continues to benefit from skilled intervention to address the above impairments. Continue treatment per established plan of care. Discharge Recommendations:  Home Health  Further Equipment Recommendations for Discharge:  N/A     SUBJECTIVE:   Patient stated I can't breath through my nose.     OBJECTIVE DATA SUMMARY:   Critical Behavior:  Neurologic State: Alert  Orientation Level: Oriented X4  Cognition: Follows commands  Safety/Judgement: Fall prevention  Functional Mobility Training:  Bed Mobility:     Supine to Sit: Stand-by assistance    Transfers:  Sit to Stand: Contact guard assistance  Stand to Sit: Contact guard assistance          Balance:  Sitting: Intact  Standing: Impaired; Without support  Standing - Static: Fair  Standing - Dynamic : Fair     Ambulation/Gait Training:  Distance (ft): 6 Feet (ft)  Assistive Device: (none but at home 2 canes may benefit from trial of walker)  Ambulation - Level of Assistance: Contact guard assistance  Gait Abnormalities: Decreased step clearance  Base of Support: Center of gravity altered; Widened  Speed/Yolis: Fluctuations    Therapeutic Exercises:   Reviewed and performed ankle pumps. Pain:  No pain noted before, during, or at end of session. Activity Tolerance:   fair  Please refer to the flowsheet for vital signs taken during this treatment. After treatment:   ? Patient left in no apparent distress sitting up in chair  ? Patient left in no apparent distress in bed  ? Call bell left within reach  ? Nursing notified   ? Personal items in reach  ? Caregiver present  ? Bed alarm activated  ? SCDs applied      COMMUNICATION/EDUCATION:   ?         Role of Physical Therapy in the acute care setting.   ?         Fall prevention education was provided and the patient/caregiver indicated understanding. ? Patient/family have participated as able in working toward goals and plan of care. ?         Patient/family agree to work toward stated goals and plan of care. ?         Patient understands intent and goals of therapy, but is neutral about his/her participation. ? Patient is unable to participate in stated goals/plan of care: ongoing with therapy staff. ?         Out of bed at least 3-5 times a day with nursing assistance. ?          Other:        Neville Flower, PT, DPT   Time Calculation: 34 mins

## 2019-04-10 NOTE — PROGRESS NOTES
NUTRITION    Nutrition Screen      RECOMMENDATIONS / PLAN:     - Add supplements: Magic Cup BID.   - Monitor po intake and diet tolerance. - Continue IV fluid as medically appropriate.   - Continue RD inpatient monitoring and evaluation. NUTRITION INTERVENTIONS & DIAGNOSIS:     [x] Meals/snacks: modified composition  [x] Medical food supplement therapy: initiate   [x] IV fluid: D5 1/2NS at 75 mL/hr (90 gm dextrose, 306 kcal per day)  [x] Collaboration and referral of nutrition care: interdisciplinary rounds     Nutrition Diagnosis: Inadequate oral intake related to respiratory status and decreased appetite as evidenced by pt on liquid diet, unable to eat while on bipap, not hungry and not motivated to eat postop. ASSESSMENT:     4/10: Honey thick liquid diet started after follow up swallow evaluation today. Pt off bipap with poor appetite and no motivation to eat. Encouraged po intake and discussed importance of nutrition for postop healing and recovery. 4/8: Pt extubated and started on full liquids after swallow evaluation- SLP following. Requiring bipap and SOB- unable to eat and may require re-intubation per MD. Oral medications held, receiving IV fluid and electrolyte replacement. 4/5: S/p thyroidectomy and re-intubated postop for hypoxia, no OG/NGT placed in anticipation for weaning trial and extubation today. Plan for swallow evaluation prior to starting diet once pt extubated per MD. Propofol stopped for elevated triglyceride- 2579 mg/dL and total cholesterol of 347 mg/dL.      Average po intake adequate to meet patients estimated nutritional needs:   [] Yes     [x] No   [] Unable to determine at this time    Diet: DIET FULL LIQUID 3 Honey/3 Moderately Thick      Food Allergies: sweeteners and sucrose (cause headaches)  Current Appetite:   [] Good     [] Fair     [x] Poor     [] Other:   Appetite/meal intake prior to admission:   [x] Good     [] Fair     [] Poor     [] Other:   Feeding Limitations:  [x] Swallowing difficulty: SLP following    [] Chewing difficulty    [x] Other: SLP following   Current Meal Intake:   Patient Vitals for the past 100 hrs:   % Diet Eaten   04/10/19 0858 0 %       BM: 4/3  Skin Integrity: neck surgical incision   Edema:   [x] No     [] Yes   Pertinent Medications: Reviewed: pepcid, SSI, colace, lantus, zofran, 60 mEq KCl, 1 gm Ca, electrolyte replacement protocol      Recent Labs     04/10/19  0529 04/09/19  1230 04/09/19  0235 04/08/19  1921 04/08/19  0207     --  140 138 139   K 3.1* 3.6 3.7 3.4* 3.2*     --  104 102 103   CO2 28  --  26 30 27   *  --  150* 170* 160*   BUN 8  --  10 10 9   CREA 0.47*  --  0.41* 0.46* 0.43*   CA 8.1*  --  8.3* 7.9* 8.2*   MG 1.9  --  2.1 1.8 2.0   PHOS 3.8  --  3.4  --  2.9       Intake/Output Summary (Last 24 hours) at 4/10/2019 1453  Last data filed at 4/10/2019 0858  Gross per 24 hour   Intake 1300.5 ml   Output 950 ml   Net 350.5 ml       Anthropometrics:  Ht Readings from Last 1 Encounters:   04/04/19 5' 7\" (1.702 m)     Last 3 Recorded Weights in this Encounter    04/08/19 0600 04/09/19 0600 04/10/19 0600   Weight: 125.4 kg (276 lb 7.3 oz) 128.1 kg (282 lb 6.6 oz) 125.5 kg (276 lb 10.8 oz)     Body mass index is 43.33 kg/m².   Obese, Class III     Weight History: patient denies change in weight PTA   Weight Metrics 4/10/2019 4/4/2019 4/1/2019 3/20/2019 3/13/2019 3/6/2019 2/26/2019   Weight 276 lb 10.8 oz - 270 lb 270 lb 268 lb 267 lb 267 lb   BMI - 43.33 kg/m2 42.29 kg/m2 42.29 kg/m2 41.97 kg/m2 41.82 kg/m2 41.82 kg/m2        Admitting Diagnosis: S/P thyroidectomy [Z98.890]  Pertinent PMHx: DM, HLD, heart failure, asthma, thyroid disease     Education Needs:        [x] None identified  [] Identified - Not appropriate at this time  []  Identified and addressed - refer to education log  Learning Limitations:   [x] None identified  [] Identified:   Cultural, Muslim & ethnic food preferences:  [x] None identified    [] Identified and addressed     ESTIMATED NUTRITION NEEDS:     Calories: 2998-7987 kcal (11-14 kcal/kg) based on  [x] Actual  kg     [] IBW   Protein: 153-183 gm (2.5-3 gm/kg) based on  [] Actual BW      [x] IBW 61 kg  Fluid: 1 mL/kcal     MONITORING & EVALUATION:     Nutrition Goal(s): goal modified   1. Po intake of meals will meet >75% of patient estimated nutritional needs within the next 7 days.   Outcome:  [] Met/Ongoing    [] Progressing towards goal    [] Not progressing towards goal    [x] New/Initial goal      Monitoring:   [x] Food and beverage intake   [x] Diet order   [x] Nutrition-focused physical findings   [x] Treatment/therapy   [] Weight   [] Enteral nutrition intake        Previous Recommendations (for follow-up assessments only):     [x]   Implemented       []   Not Implemented (RD to address)      [] No Longer Appropriate     [] No Recommendation Made     Discharge Planning: diabetic diet as tolerated, consistency per SLP   [x] Participated in care planning, discharge planning, & interdisciplinary rounds as appropriate      [facility] Staff, [address] [place] [address]    Pager: [phone]

## 2019-04-10 NOTE — PROGRESS NOTES
[address] [place] [address] [facility]. Pulmonary, Critical Care, and Sleep Medicine    Name: Vahe Parry MRN: [id number]   : 1960 Hospital: [address] [place] [address] Dr   Date: 4/10/2019  Admission Date: 2019     Chart and notes reviewed. Data reviewed. I have evaluated all findings. [x]I have reviewed the flowsheet and previous days notes. [x]The patient is unable to give any meaningful history or review of systems because the patient is:  []Intubated []Sedated   []Unresponsive      [x]The patient is critically ill on      []Mechanical ventilation []Pressors   [x]BiPAP []     HPI  Patient is a 62 y. o. female w/ pmhx of DM S/P thyroidectomy, by Dr. Monty Bearden, on 19. Patient has history of a multinodular goiter suspicious for malignancy, however recent biopsies have been negative. Procedure uneventful. Was extubated in PACU, but developed stridor and hypoxia, subsequently re intubated. ICU stay notable for QTc 676 on , repeat 4.5 QTc 430. Triglycerides also noted to be in 2,000's. Patient has history of hypertriglyceridemia. Extubated on  w/ some stridor that resolved w/ racemic epi and bipap. Patient utilizes bipap QHS at baseline. 04/10/19  Hospital Day: 7  POD total thyroidectomy:  6    No acute events overnight, Pt seems to be anxious when she comes off BiPAP for SLP or when switched to High flow for PT/OT. Pt has periodic episodes of anxiety when off BIPAP. Mentation: AOX3 RASS 0- 1   Respiratory/ Secretions: normal respiratory effort when off BiPAP, no issues while on BiPAP. Scant secretions not issues. Hemodynamics: Stable   Urine output: 1.4 L UOP  Need for procedures: none                 ROS:Review of systems not obtained due to patient factors. Events and notes from last 24 hours reviewed. Care plan discussed on multidisciplinary rounds.   Patient Active Problem List   Diagnosis Code    Type II diabetes mellitus, uncontrolled ([facility] [address] E11.65    Sleep apnea G47.30    H/O aortic valve replacement Z95.2    History of bicuspid aortic valve Z87.74    Chronic back pain M54.9, G89.29    Chronic left shoulder pain M25.512, G89.29    Morbid obesity (HCC) E66.01    Left shoulder tendinitis M75.82    Spondylosis of lumbar region without myelopathy or radiculopathy M47.816    Chronic pain of both shoulders M25.511, G89.29, M25.512    Chronic pain of both knees M25.561, M25.562, G89.29    Chronic pain syndrome G89.4    Encounter for long-term (current) use of medications Z79.899    Chronic midline low back pain M54.5, G89.29    Lumbar facet arthropathy M47.816    Lumbar degenerative disc disease M51.36    Venous stasis dermatitis of both lower extremities I87.2    SOB (shortness of breath) R06.02    Type 2 diabetes mellitus without complication ([facility]) Y25.3    Hypothyroidism due to acquired atrophy of thyroid E03.4    Essential hypertension I10    Dyslipidemia E78.5    Mood disorder ([facility]) F39    Chronic diastolic congestive heart failure ([facility]) I50.32    Type 2 diabetes mellitus with diabetic neuropathy ([facility]) E11.40    S/P thyroidectomy Z98.890     Vital Signs:  Visit Vitals  /81 (BP 1 Location: Left arm, BP Patient Position: At rest;Sitting)   Pulse 70   Temp 97.7 °F (36.5 °C)   Resp 16   Ht 5' 7\" (1.702 m)   Wt 125.5 kg (276 lb 10.8 oz)   SpO2 98%   Breastfeeding? No   BMI 43.33 kg/m²       O2 Device: Room air   O2 Flow Rate (L/min): 4 l/min   Temp (24hrs), Av.8 °F (36.6 °C), Min:97.6 °F (36.4 °C), Max:98.1 °F (36.7 °C)       Intake/Output:   Last shift:      No intake/output data recorded.   Last 3 shifts:  1901 - 04/10 0700  In: 3045.5 [I.V.:3045.5]  Out: 2350 [Urine:2350]    Intake/Output Summary (Last 24 hours) at 4/10/2019 1335  Last data filed at 4/10/2019 0858  Gross per 24 hour   Intake 1300.5 ml   Output 950 ml   Net 350.5 ml      Current Facility-Administered Medications   Medication Dose Route Frequency    potassium chloride 10 mEq, lidocaine (PF) (XYLOCAINE) 10 mg/mL (1 %) 1 mL in 0.9% sodium chloride 100 mL IVPB   IntraVENous Q1H    docusate sodium (COLACE) capsule 100 mg  100 mg Oral DAILY    insulin glargine (LANTUS) injection 25 Units  25 Units SubCUTAneous DAILY    [Held by provider] calcium carbonate (TUMS) chewable tablet 400 mg [elemental]  400 mg Oral TID WITH MEALS    insulin lispro (HUMALOG) injection   SubCUTAneous Q6H    gemfibrozil (LOPID) tablet 600 mg  600 mg Oral ACB&D    enoxaparin (LOVENOX) injection 40 mg  40 mg SubCUTAneous Q24H    dextrose 5 % - 0.45% NaCl infusion  30 mL/hr IntraVENous CONTINUOUS    albuterol-ipratropium (DUO-NEB) 2.5 MG-0.5 MG/3 ML  3 mL Nebulization Q4H RT    sodium chloride (NS) flush 5-40 mL  5-40 mL IntraVENous Q8H     Telemetry: NSR    Physical Exam:    General: in no apparent distress, well developed and well nourished, in no respiratory distress and acyanotic, alert, oriented times 3, normal vitals, cooperative and improved   HEENT: Normal, PERRLA, EOMI, fundi benign   Neck: large neck with incision and steri-strips, some blood, but otherwise clean and dry   Chest: normal equal bilateral chest wall movement. Lungs: normal air entry and mild inspiratory and expiratory stridor in upper airway only, otherwise CTAB   Heart: Regular rate and rhythm, S1S2 present or without murmur or extra heart sounds   Abdomen: protuberant.  non distended, bowel sounds normoactive, tympanic, abdomen is soft without significant tenderness, masses, organomegaly or guarding   Extremity: 2+ edema   Neuro: alert   Skin: Skin color, texture, turgor normal. No rashes or lesions     DATA:  MAR reviewed and pertinent medications noted or modified as needed    Labs:  Recent Labs     04/10/19  0529 04/09/19  0235 04/08/19  0207   WBC 4.5* 5.1 5.8   HGB 14.1 14.5 14.1   HCT 44.3 44.3 43.2    167 178     Recent Labs     04/10/19  0529 04/09/19  1230 04/09/19  0235 04/08/19  1921 04/08/19  0207     --  140 138 139 K 3.1* 3.6 3.7 3.4* 3.2*     --  104 102 103   CO2 28  --  26 30 27   *  --  150* 170* 160*   BUN 8  --  10 10 9   CREA 0.47*  --  0.41* 0.46* 0.43*   CA 8.1*  --  8.3* 7.9* 8.2*   MG 1.9  --  2.1 1.8 2.0   PHOS 3.8  --  3.4  --  2.9     No results for input(s): PH, PCO2, PO2, HCO3, FIO2 in the last 72 hours. No results for input(s): FIO2I, IFO2, HCO3I, IHCO3, HCOPOC, PCO2I, PCOPOC, IPHI, PHI, PHPOC, PO2I, PO2POC in the last 72 hours. No lab exists for component: IPOC2    Imaging:  [x]   I have personally reviewed the patients radiographs and reports  XR Results (most recent):  Results from Hospital Encounter encounter on 04/04/19   XR CHEST PORT    Narrative EXAM:  Chest Portable. INDICATION:  ET tube placement     COMPARISON:  04/04/19     TECHNIQUE:  Portable AP chest study    FINDINGS:      - ET tube distal tip observed at 2.4 cm above the phi.   - Both lungs are hypoinflated. - Persistent retrocardiac opacity, similar to recent prior exam.  Subtle streaky  density in the right infrahilar region. Again similar to recent prior exam.   - No pneumothorax. - Moderate cardiac silhouette enlargement with prior CABG and aortic valve  replacement. Impression IMPRESSION:    1. Borderline low-lying ET tube. 2.  No significant interval change in lung aeration pattern, with subtle streaky  densities in the lower lung zones. CT Results (most recent):  Results from Hospital Encounter encounter on 02/21/19   CT NECK SOFT TISSUE W CONT    Narrative Neck CT With Contrast    CPT CODE: 79614    HISTORY: Multinodular goiter. COMPARISON: Thyroid ultrasound 8/3/2018.  CT chest 5/5/2017    FINDINGS:     After the intravenous administration of iodinated IV contrast, a scan was  obtained from low head down to upper chest.  Patient received 80 cc Isovue 300  IV contrast. CT dose reduction was achieved through use of a standardized  protocol tailored for this examination and automatic exposure control for dose  modulation. There is some chronic appearing dental disease with periapical lucency  surrounding right mandibular molar tooth roots. Visualized lower brain appears normal.     Mucosa of the upper aerodigestive tract:  No mass lesion seen in the mucosa of  the upper aerodigestive tract. 4 mm hyperdensity in the vallecula along the  surface of the base of tongue or uvula (axial image 52). More regular than  expected for metal pellet and appears to be a calcification. Lymph Nodes: No enlarged lymph nodes in the cervical chains. Major salivary glands:  Parotid glands are relatively low density, some fatty  replacement. Both parotid glands are relatively enlarged, but there is no mass  lesion. Submandibular glands appear normal.    Thyroid: Thyroid gland is heterogeneous with multiple relatively low-attenuation  nodular densities plus multiple calcifications. Several rim calcified nodules  bilaterally, the largest is on the right, 2.3 x 1.8 cm. Posteriorly, both  thyroid lobes extend into the tracheoesophageal groove. The inferior margin of  the right thyroid lobe is at the level of the top of the right clavicular head. The left thyroid lobe extends to the level of the top of the sternal notch. No  retrosternal extension. The right thyroid lobe measures approximately 4.6 cm AP, 3.8 cm transverse, 7.4  cm SI. The left thyroid lobe is 3.6 cm AP, 3.0 cm transverse and 6.7 cm SI. Upper mediastinum: No lymphadenopathy seen in the upper mediastinum. Visualized lung apices: are clear. Bones: Mild degenerative changes in the cervical spine. Mucus retention cysts in the maxillary sinuses. Impression IMPRESSION:    1. Heterogeneous enlargement of the thyroid gland with multiple low-attenuation  and rim calcified nodules bilaterally. Extension of thyroid tissue posteriorly  into the left and right tracheoesophageal groove.   2. The right thyroid lobe extends to the level of the reticular head. Left  thyroid lobe extends to the level of the sternal notch. 3. No neck masses. No lymphadenopathy. Thank you for this referral.       IMPRESSION:   · S/p thyroidectomy POD 6- for hx of multinodular goiter. Performed by Dr. Gila Ventura 4/4/19. · Acute Hypoxic Respiratory Failure, extubated 4/5/19  · Stridor- improving but still persistent and requiring BIPAP for stenting  · QTc prolongation- resolved   · Hypocalcemia- s/p thyroidectomy- on replacement protocol  · Hypertriglyceridemia - improving   · Hyperglycemia in the setting of DMII - on Sliding scale insulin   · OVIDIO- On home BIPAP 20/12   · Morbid Obesity: Body mass index is 44.23 kg/m².      Patient Active Problem List   Diagnosis Code    Type II diabetes mellitus, uncontrolled ([facility] [address] E11.65    Sleep apnea G47.30    H/O aortic valve replacement Z95.2    History of bicuspid aortic valve Z87.74    Chronic back pain M54.9, G89.29    Chronic left shoulder pain M25.512, G89.29    Morbid obesity (HCC) E66.01    Left shoulder tendinitis M75.82    Spondylosis of lumbar region without myelopathy or radiculopathy M47.816    Chronic pain of both shoulders M25.511, G89.29, M25.512    Chronic pain of both knees M25.561, M25.562, G89.29    Chronic pain syndrome G89.4    Encounter for long-term (current) use of medications Z79.899    Chronic midline low back pain M54.5, G89.29    Lumbar facet arthropathy M47.816    Lumbar degenerative disc disease M51.36    Venous stasis dermatitis of both lower extremities I87.2    SOB (shortness of breath) R06.02    Type 2 diabetes mellitus without complication ([facility]) P00.6    Hypothyroidism due to acquired atrophy of thyroid E03.4    Essential hypertension I10    Dyslipidemia E78.5    Mood disorder (HCC) F39    Chronic diastolic congestive heart failure (HCC) I50.32    Type 2 diabetes mellitus with diabetic neuropathy (HCC) E11.40    S/P thyroidectomy Z98.890      RECOMMENDATIONS:   Neuro: Avoid sedating medications. Patient on Neurontin, cymbalta at home. Patient is using meclizine at home for anxiety. PRN Dilaudid while in the hospital.   Pulm: Aspiration precautions, HOB > 30'. Cont Bipap QHS and PRN. CVS: Monitor HD, MAP goal >65. Hold PO meds. GI: thick honey liquids, meds still IV  Renal: Trend Cr, UOP. Purewick. Hem/Onc: Trend H/H, monitor for s/o active bleeding. Daily CBC. I/D: Trend WBCs and temperature curve. Metabolic: Daily BMP, mag, phos. Trend lytes and replace per protocol. ionized calcium every 12 hours. Replete calcium aggressive-pt unable to tolerate PO at this time. Endocrine: Q6 glucoses. SSI, lantus. Avoid hypoglycemia. Lipid for triglycerides when can take PO. Musc/Skin: (+) wound care, PT/OT, Out of Bed   Full Code     Best practice :  Glycemic control  IHI ICU bundles:     Mech Vent patients- VAP bundle, aim to keep peak plateau pressure 10-83PB H2O  Sress ulcer prophylaxis. DVT prophylaxis. Need for Lines, mueller assessed. Restraints need. Palliative care evaluation. High complexity decision making was performed during this consultation and evaluation. [x]       Pt is at high risk for further organ failure and dysfunction. Signed By: Carly Knowles PA-C     April 10, 2019 1:44 PM       I saw and evaluated the patient, performing the key elements of the service. I discussed the findings, assessment and plan with the PA and agree with the PA's findings and plan as documented in the PA's note. All edits and changes made above or are mentioned in my addendum. Total of 31 min critical care time spent at bedside during the course of care providing evaluation,management and care decisions and ordering appropriate treatment related to critical care problems exclusive of procedures.   The reason for providing this level of medical care for this critically ill patient was due a critical illness that impaired one or more vital organ systems such that there was a high probability of imminent or life threatening deterioration in the patients condition. This care involved high complexity decision making to assess, manipulate, and support vital system functions, to treat this degree vital organ system failure and to prevent further life threatening deterioration of the patients condition.     45-year-old morbidly obese female [facility] of [facility] suspicious for malignancy, status post thyroidectomy by Dr. Beronica Padgett on 4/5/19.  Immediately postoperatively patient had respiratory failure with stridor, patient was emergently reintubated.  A few days later the patient was extubated and had stridor again. Delroy Dumont was treated with racemic epi, but steroids were held off due to recent surgery and patient's history of diabetes, resulting in concerns for poor wound healing in the setting of surgery.  Patient was treated with noninvasive positive pressure ventilation with BiPAP, which the patient has been on for the last few days since her second extubation.  She has tolerated BiPAP well, but became very anxious and tachypneic when she came off of positive pressure ventilation.  Patient unable to tolerate p.o. due to wearing BiPAP constantly until today.  Unfortunately the patient has multifactorial reasons for being dyspneic, including OVIDIO with OHV, and I suspect the main contributor is VCD. I believe the etiology of the patient's voice hoarseness along with stridor and poor phonation are likely from her recent surgery, suspect they are most likely postoperative but may include possible recurrent laryngeal nerve damage.  I discussed the case with Dr. Sangeeta Holguin, no laryngoscopy needed. Treatment includes conservative measures and supportive care, which we will continue.    Patient today weaned to room air by nursing and RT. Patient initially refusing to work with speech therapy as well as respiratory therapy and physical therapy. Patient later worked with speech therapy and they recommended honey thickened liquids. Speech therapy also consulted for assistance with VCD exercises.  Patient is at high reintubation risk which was discussed with the patient. Also of note the patient has had hypertriglyceridemia which was elevated up to 2500, trended down to 600.         Hannah Ramsey MD/MPH     Pulmonary, Critical Care Medicine  [facility]

## 2019-04-10 NOTE — ROUTINE PROCESS
Bedside shift change report given to Jenna (oncoming nurse) by Deangelo Pichardo RN   (offgoing nurse).  Report included the following information SBAR, ED Summary, Procedure Summary, Intake/Output, MAR, Recent Results and Cardiac Rhythm NSR. ] PO/OT at bedside at this time

## 2019-04-10 NOTE — PROGRESS NOTES
Problem: Self Care Deficits Care Plan (Adult)  Goal: *Acute Goals and Plan of Care (Insert Text)  Description  Occupational Therapy Goals  Initiated 4/8/2019 within 7 day(s). 1.  Patient will perform grooming with supervision/set-up while seated on EOB with supervision for balance. 2.  Patient will perform upper body dressing with supervision/set-up. 3.  Patient will perform lower body dressing with minimal assistance/contact guard assist.  4.  Patient will perform toilet transfers with minimal assistance/contact guard assist.  5.  Patient will participate in upper extremity therapeutic exercise/activities with supervision/set-up for 8 minutes to increase strength/endurance for ADLs. Outcome: Progressing Towards Goal   OCCUPATIONAL THERAPY TREATMENT    Patient: Andria Nassar (85 y.o. female)  Date: 4/10/2019  Diagnosis: S/P thyroidectomy [Z98.890] <principal problem not specified>  Procedure(s) (LRB):  TOTAL THYROIDECTOMY (N/A) 6 Days Post-Op  Precautions: Fall(BiPAP)  Chart, occupational therapy assessment, plan of care, and goals were reviewed. PLOF: Independent    ASSESSMENT:  Pt co-treated w/PT to maximize participation 2/2 increase anxiety and decrease activity tolerance. Pt requires encouragement for EOB/OOB activity w/o bi-pap. O2 sats > 97% on RA at rest. Pt requires seated rest break at EOB. Pt demonstrates good dynamic standing balance w/toileting ADL. Pt does not tolerate standing for hand hygiene and requires seated rest break s/p toileting ADL. Pt w/very brief desat to 89% on RA w/conversation. Pt appears brighter at chair level, SpO2%  on RA s/p activity. Reinforced importance of calling for assistance. EDUCATION Pt educated on importance of pacing w/transitions and energy conservation techniques w/ADLs. Progression toward goals:  ?          Improving appropriately and progressing toward goals  ? Improving slowly and progressing toward goals  ?           Not making progress toward goals and plan of care will be adjusted     PLAN:  Patient continues to benefit from skilled intervention to address the above impairments. Continue treatment per established plan of care. Discharge Recommendations:  Home Health  Further Equipment Recommendations for Discharge:  N/A, pt reports she has DME     SUBJECTIVE:   Patient stated ? I get panic attacks. ?    OBJECTIVE DATA SUMMARY:       Cognitive/Behavioral Status:  Neurologic State: Alert  Orientation Level: Oriented X4  Cognition: Follows commands  Safety/Judgement: Fall prevention  Functional Mobility and Transfers for ADLs:   Bed Mobility:  Supine to Sit: Stand-by assistance; Additional time   Transfers:  Sit to Stand: Stand-by assistance  Bed to Chair: Stand-by assistance   Toilet Transfer : Stand-by assistance(EOB--> BSC)   Balance:  Sitting: Intact  Standing: Intact  ADL Intervention:  Grooming  Washing Hands: Supervision/set-up    Toileting  Toileting Assistance: Stand-by assistance  Bladder Hygiene: Stand-by assistance  Bowel Hygiene: Stand-by assistance  Clothing Management: Stand-by assistance    Pain:  Pre Treatment:  Post Treatment:  Pain Scale 1: Numeric (0 - 10)  Pain Intensity 1: 5  Pain Location 1: Incisional;Neck  Pain Intervention(s) 1: Rest    Activity Tolerance:    Fair 2/2 increase anxiety    Please refer to the flowsheet for vital signs taken during this treatment. After treatment:   ?  Patient left in no apparent distress sitting up in chair  ? Patient left in no apparent distress in bed  ? Call bell left within reach  ? Nursing notified  ? Caregiver present  ?   Bed alarm activated    SHIVANI Ortiz  Time Calculation: 32 mins
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
## 2019-04-10 NOTE — PROGRESS NOTES
Problem: Mobility Impaired (Adult and Pediatric)  Goal: *Acute Goals and Plan of Care (Insert Text)  Description  Physical Therapy Goals  Initiated 4/7/2019 and to be accomplished within 7 day(s)  1. Patient will move from supine to sit and sit to supine , scoot up and down and roll side to side in bed with supervision/set-up. 2.  Patient will transfer from bed to chair and chair to bed with supervision/set-up using the least restrictive device. 3.  Patient will perform sit to stand with supervision/set-up. 4.  Patient will ambulate with supervision/set-up for  feet with the least restrictive device. 5.  Patient will ascend/descend 4 stairs with 1-2 handrail(s) with minimal assistance/contact guard assist.     Prior Level of Function: Independent with mobility including gait using 2 canes. 4/10/2019 1011 by Cristy White, PT  Outcome: Progressing Towards Goal  4/9/2019 2115 by Cristy White, PT  Outcome: Progressing Towards Goal   PHYSICAL THERAPY TREATMENT    Patient: Herminio Nash (30 y.o. female)  Date: 4/10/2019  Diagnosis: S/P thyroidectomy [Z98.890] <principal problem not specified>  Procedure(s) (LRB):  TOTAL THYROIDECTOMY (N/A) 6 Days Post-Op  Precautions: Fall(BiPAP)    ASSESSMENT:  Patient is cleared by nursing for PT, and patient consents to therapy. Performed co-treatment with occupational therapy to increase safety and functional mobility. Nursing reports pt has still been using BIPAP and not using hiflow O2. Nursing present in beginning of session due to switching pt from BIPAP to hiflow. Pt refused to use hiflow and wanted to use BIPAP or possible regular nasal canal for O2. BIPAP was turned off and pt participate in therapy with no O2 donned. Pt's SpO2 throughout session mostly >95% during transfers, bed mobility, standing, and gait. Pt had one decrease to 89% when she was talking sitting in the chair but quickly recovered back to 99%.  Pt required standby assistance with bed mobility and transfers. Needed contact guard assistance for gait 8 feet in room. Discussed plan for increasing gait next visit and asked for her family/friends to bring in her 2 canes to use. Pt ended therapy sitting in recliner with all needs met. Progression toward goals:    ?      Improving appropriately and progressing toward goals  ? Improving slowly and progressing toward goals  ? Not making progress toward goals and plan of care will be adjusted     PLAN:  Patient continues to benefit from skilled intervention to address the above impairments. Continue treatment per established plan of care. Discharge Recommendations:  Home Health  Further Equipment Recommendations for Discharge:  N/A     SUBJECTIVE:   Patient stated ? I'm not using that (hiflow. ?    OBJECTIVE DATA SUMMARY:   Critical Behavior:  Neurologic State: Alert  Orientation Level: Oriented X4  Cognition: Follows commands  Safety/Judgement: Fall prevention  Functional Mobility Training:  Bed Mobility:     Supine to Sit: Stand-by assistance; Additional time               Transfers:  Sit to Stand: Stand-by assistance  Bed to Chair: Stand-by assistance       Balance:  Sitting: Intact  Standing: Intact     Ambulation/Gait Training:  Distance (ft): 8 Feet (ft)  Assistive Device: (none, pt uses 2 canes at home)  Ambulation - Level of Assistance: Contact guard assistance  Gait Abnormalities: Decreased step clearance  Base of Support: Center of gravity altered; Widened  Speed/[place]: Slow  Step Length: Right shortened;Left shortened    Therapeutic Exercises:   Reviewed and performed ankle pumps. Pain:  No pain noted before, during, or at end of session. Activity Tolerance:   fair  Please refer to the flowsheet for vital signs taken during this treatment. After treatment:   ? Patient left in no apparent distress sitting up in chair  ? Patient left in no apparent distress in bed  ? Call bell left within reach  ?  Nursing notified Sherry  ? Personal items in reach  ? Caregiver present  ? Bed alarm activated  ? SCDs applied      COMMUNICATION/EDUCATION:   ?         Role of Physical Therapy in the acute care setting. ?         Fall prevention education was provided and the patient/caregiver indicated understanding. ? Patient/family have participated as able in working toward goals and plan of care. ?         Patient/family agree to work toward stated goals and plan of care. ?         Patient understands intent and goals of therapy, but is neutral about his/her participation. ? Patient is unable to participate in stated goals/plan of care: ongoing with therapy staff. ?         Out of bed at least 3-5 times a day with nursing assistance. ?          Other:        Chaparro Osborn, PT, DPT   Time Calculation: 31 mins

## 2019-04-10 NOTE — PROGRESS NOTES
*ATTENTION: This note has been created by a medical student for educational purposes only. Please do not refer to the content of this note for clinical decision-making, billing, or other purposes. Please see attending physicians note to obtain clinical information on this patient. *          [facility]. Pulmonary, Critical Care, and Sleep Medicine    Name: Thalia Prader MRN: [id number]   : 1960 Hospital: [address] [place] [address] Dr   Date: 4/10/2019  Admission Date: 2019     Chart and notes reviewed. Data reviewed. I have evaluated all findings. [x]I have reviewed the flowsheet and previous days notes. [x]The patient is unable to give any meaningful history or review of systems because the patient is:  []Intubated []Sedated   []Unresponsive      [x]The patient is critically ill on      []Mechanical ventilation []Pressors   [x]BiPAP []     HPI  Patient is a 62 y. o. female w/ pmhx of DM S/P thyroidectomy, by Dr. Marilu Tilley, on 19. Patient has history of a multinodular goiter suspicious for malignancy, however recent biopsies have been negative. Procedure uneventful. Was extubated in PACU, but developed stridor and hypoxia, subsequently re intubated. ICU stay notable for QTc 676 on , repeat 4.5 QTc 430. Triglycerides also noted to be in 2,000's. Patient has history of hypertriglyceridemia. Extubated on  w/ some stridor that resolved w/ racemic epi and bipap. Patient utilizes bipap QHS at baseline. 04/10/19  Hospital Day: 7  POD total thyroidectomy:  6    No acute events overnight, Pt seems to be anxious when she comes off BiPAP for SLP or when switched to High flow for PT/OT. Mentation: AOX3 RASS 0- 1   Respiratory/ Secretions: normal respiratory effort when off BiPAP, no issues while on BiPAP. Scant secretions not issues.    Hemodynamics: Stable   Urine output: 280 ml   Need for procedures: none                 ROS:Review of systems not obtained due to patient factors. Events and notes from last 24 hours reviewed. Care plan discussed on multidisciplinary rounds. Patient Active Problem List   Diagnosis Code    [facility] E11.65    Sleep apnea G47.30    H/O aortic valve replacement Z95.2    History of bicuspid aortic valve Z87.74    Chronic back pain M54.9, G89.29    Chronic left shoulder pain M25.512, G89.29    Morbid obesity (HCC) E66.01    Left shoulder tendinitis M75.82    Spondylosis of lumbar region without myelopathy or radiculopathy M47.816    Chronic pain of both shoulders M25.511, G89.29, M25.512    Chronic pain of both knees M25.561, M25.562, G89.29    Chronic pain syndrome G89.4    Encounter for long-term (current) use of medications Z79.899    Chronic midline low back pain M54.5, G89.29    Lumbar facet arthropathy M47.816    Lumbar degenerative disc disease M51.36    Venous stasis dermatitis of both lower extremities I87.2    SOB (shortness of breath) R06.02    Type 2 diabetes mellitus without complication ([facility]) Q70.4    Hypothyroidism due to acquired atrophy of thyroid E03.4    Essential hypertension I10    Dyslipidemia E78.5    Mood disorder ([facility]) F39    Chronic diastolic congestive heart failure ([facility]) I50.32    Type 2 diabetes mellitus with diabetic neuropathy ([facility]) E11.40    S/P thyroidectomy Z98.890       Vital Signs:  Visit Vitals  /69   Pulse 65   Temp 97.9 °F (36.6 °C)   Resp 12   Ht 5' 7\" (1.702 m)   Wt 125.5 kg (276 lb 10.8 oz)   SpO2 99%   Breastfeeding? No   BMI 43.33 kg/m²       O2 Device: BIPAP   O2 Flow Rate (L/min): 4 l/min   Temp (24hrs), Av.9 °F (36.6 °C), Min:97.6 °F (36.4 °C), Max:98.1 °F (36.7 °C)       Intake/Output:   Last shift:      No intake/output data recorded.   Last 3 shifts:  1901 - 04/10 0700  In: 3045.5 [I.V.:3045.5]  Out: 2350 [Urine:2350]    Intake/Output Summary (Last 24 hours) at 4/10/2019 1122  Last data filed at 4/10/2019 0858  Gross per 24 hour Intake 1600.5 ml   Output 950 ml   Net 650.5 ml        Ventilator Settings:  Ventilator Mode: Spontaneous  Respiratory Rate  Resp Rate Observed: 21  Back-Up Rate: 14  Insp Time (sec): 1 sec  I:E Ratio: 1;3.3  Ventilator Volumes  Vt Set (ml): 450 ml  Vt Exhaled (Machine Breath) (ml): 589 ml  Vt Spont (ml): 572 ml  Ve Observed (l/min): 7.4 l/min  Ventilator Pressures  Pressure Support (cm H2O): 7 cm H2O  PIP Observed (cm H2O): 21 cm H2O  Plateau Pressure (cm H2O): 20 cm H2O  MAP (cm H2O): 11  PEEP/VENT (cm H2O): 5 cm H20  Auto PEEP Observed (cm H2O): 0 cm H2O    Current Facility-Administered Medications   Medication Dose Route Frequency    potassium chloride 10 mEq, lidocaine (PF) (XYLOCAINE) 10 mg/mL (1 %) 1 mL in 0.9% sodium chloride 100 mL IVPB   IntraVENous Q1H    docusate sodium (COLACE) capsule 100 mg  100 mg Oral DAILY    insulin glargine (LANTUS) injection 25 Units  25 Units SubCUTAneous DAILY    [Held by provider] calcium carbonate (TUMS) chewable tablet 400 mg [elemental]  400 mg Oral TID WITH MEALS    insulin lispro (HUMALOG) injection   SubCUTAneous Q6H    gemfibrozil (LOPID) tablet 600 mg  600 mg Oral ACB&D    enoxaparin (LOVENOX) injection 40 mg  40 mg SubCUTAneous Q24H    dextrose 5 % - 0.45% NaCl infusion  30 mL/hr IntraVENous CONTINUOUS    albuterol-ipratropium (DUO-NEB) 2.5 MG-0.5 MG/3 ML  3 mL Nebulization Q4H RT    sodium chloride (NS) flush 5-40 mL  5-40 mL IntraVENous Q8H       Telemetry:     Physical Exam:    General: in no apparent distress, well developed and well nourished, in no respiratory distress and acyanotic, alert, oriented times 3, normal vitals, cooperative and improved   HEENT: Normal, PERRLA, EOMI, fundi benign   Neck: No abnormally enlarged lymph nodes. Trachea midline. Chest: normal equal bilateral chest wall movement.     Lungs: normal air entry and mild stridor, no dullness/ tenderness/ rash   Heart: Regular rate and rhythm, S1S2 present or without murmur or extra heart sounds   Abdomen: non distended, bowel sounds normoactive, tympanic, abdomen is soft without significant tenderness, masses, organomegaly or guarding   Extremity: 2+ edema   Neuro: alert   Skin: Skin color, texture, turgor normal. No rashes or lesions       DATA:  MAR reviewed and pertinent medications noted or modified as needed    Labs:  Recent Labs     04/10/19  0529 04/09/19  0235 04/08/19  0207   WBC 4.5* 5.1 5.8   HGB 14.1 14.5 14.1   HCT 44.3 44.3 43.2    167 178     Recent Labs     04/10/19  0529 04/09/19  1230 04/09/19  0235 04/08/19  1921 04/08/19  0207     --  140 138 139   K 3.1* 3.6 3.7 3.4* 3.2*     --  104 102 103   CO2 28  --  26 30 27   *  --  150* 170* 160*   BUN 8  --  10 10 9   CREA 0.47*  --  0.41* 0.46* 0.43*   CA 8.1*  --  8.3* 7.9* 8.2*   MG 1.9  --  2.1 1.8 2.0   PHOS 3.8  --  3.4  --  2.9     No results for input(s): PH, PCO2, PO2, HCO3, FIO2 in the last 72 hours. No results for input(s): FIO2I, IFO2, HCO3I, IHCO3, HCOPOC, PCO2I, PCOPOC, IPHI, PHI, PHPOC, PO2I, PO2POC in the last 72 hours. No lab exists for component: IPOC2    Imaging:  [x]   I have personally reviewed the patients radiographs and reports  XR Results (most recent):  Results from Hospital Encounter encounter on 04/04/19   XR CHEST PORT    Narrative EXAM:  Chest Portable. INDICATION:  ET tube placement     COMPARISON:  04/04/19     TECHNIQUE:  Portable AP chest study    FINDINGS:      - ET tube distal tip observed at 2.4 cm above the phi.   - Both lungs are hypoinflated. - Persistent retrocardiac opacity, similar to recent prior exam.  Subtle streaky  density in the right infrahilar region. Again similar to recent prior exam.   - No pneumothorax. - Moderate cardiac silhouette enlargement with prior CABG and aortic valve  replacement. Impression IMPRESSION:    1. Borderline low-lying ET tube.   2.  No significant interval change in lung aeration pattern, with subtle streaky  densities in the lower lung zones. CT Results (most recent):  Results from Hospital Encounter encounter on 02/21/19   CT NECK SOFT TISSUE W CONT    Narrative Neck CT With Contrast    CPT CODE: 64232    HISTORY: Multinodular goiter. COMPARISON: Thyroid ultrasound 8/3/2018. CT chest 5/5/2017    FINDINGS:     After the intravenous administration of iodinated IV contrast, a scan was  obtained from low head down to upper chest.  Patient received 80 cc Isovue 300  IV contrast. CT dose reduction was achieved through use of a standardized  protocol tailored for this examination and automatic exposure control for dose  modulation. There is some chronic appearing dental disease with periapical lucency  surrounding right mandibular molar tooth roots. Visualized lower brain appears normal.     Mucosa of the upper aerodigestive tract:  No mass lesion seen in the mucosa of  the upper aerodigestive tract. 4 mm hyperdensity in the vallecula along the  surface of the base of tongue or uvula (axial image 52). More regular than  expected for metal pellet and appears to be a calcification. Lymph Nodes: No enlarged lymph nodes in the cervical chains. Major salivary glands:  Parotid glands are relatively low density, some fatty  replacement. Both parotid glands are relatively enlarged, but there is no mass  lesion. Submandibular glands appear normal.    Thyroid: Thyroid gland is heterogeneous with multiple relatively low-attenuation  nodular densities plus multiple calcifications. Several rim calcified nodules  bilaterally, the largest is on the right, 2.3 x 1.8 cm. Posteriorly, both  thyroid lobes extend into the tracheoesophageal groove. The inferior margin of  the right thyroid lobe is at the level of the top of the right clavicular head. The left thyroid lobe extends to the level of the top of the sternal notch. No  retrosternal extension.   The right thyroid lobe measures approximately 4.6 cm AP, 3.8 cm transverse, 7.4  cm SI. The left thyroid lobe is 3.6 cm AP, 3.0 cm transverse and 6.7 cm SI. Upper mediastinum: No lymphadenopathy seen in the upper mediastinum. Visualized lung apices: are clear. Bones: Mild degenerative changes in the cervical spine. Mucus retention cysts in the maxillary sinuses. Impression IMPRESSION:    1. Heterogeneous enlargement of the thyroid gland with multiple low-attenuation  and rim calcified nodules bilaterally. Extension of thyroid tissue posteriorly  into the left and right tracheoesophageal groove. 2. The right thyroid lobe extends to the level of the reticular head. Left  thyroid lobe extends to the level of the sternal notch. 3. No neck masses. No lymphadenopathy. Thank you for this referral.         IMPRESSION:   · S/p thyroidectomy POD 6- for hx of multinodular goiter. Performed by Dr. Zambrano Holding 4/4/19. · Acute Hypoxic Respiratory Failure, extubated 4/5/19  · Stridor- improving but still persistent and requiring BIPAP for stenting  · QTc prolongation- resolved   · Hypocalcemia- s/p thyroidectomy- on replacement protocol  · Hypertriglyceridemia - improving   · Hyperglycemia in the setting of DMII - on Sliding scale insulin   · OVIDIO- On home BIPAP 20/12   · Morbid Obesity: Body mass index is 44.23 kg/m².      Patient Active Problem List   Diagnosis Code    Type II diabetes mellitus, uncontrolled ([facility] E11.65    Sleep apnea G47.30    H/O aortic valve replacement Z95.2    History of bicuspid aortic valve Z87.74    Chronic back pain M54.9, G89.29    Chronic left shoulder pain M25.512, G89.29    Morbid obesity (HCC) E66.01    Left shoulder tendinitis M75.82    Spondylosis of lumbar region without myelopathy or radiculopathy M47.816    Chronic pain of both shoulders M25.511, G89.29, M25.512    Chronic pain of both knees M25.561, M25.562, G89.29    Chronic pain syndrome G89.4    Encounter for long-term (current) use of medications Z79.894    Chronic midline low back pain M54.5, G89.29    Lumbar facet arthropathy M47.816    Lumbar degenerative disc disease M51.36    Venous stasis dermatitis of both lower extremities I87.2    SOB (shortness of breath) R06.02    Type 2 diabetes mellitus without complication (HCC) A70.7    Hypothyroidism due to acquired atrophy of thyroid E03.4    Essential hypertension I10    Dyslipidemia E78.5    Mood disorder (HCC) F39    Chronic diastolic congestive heart failure (HCC) I50.32    Type 2 diabetes mellitus with diabetic neuropathy (HCC) E11.40    S/P thyroidectomy Z98.890        RECOMMENDATIONS:   Neuro: Avoid sedating medications. Patient on Neurontin, cymbalta at home, eval for restarting once cleared by SLP. Patient is using meclizine at home for anxiety. PRN Dilaudid while in the hospital.   Pulm: Aspiration precautions, HOB > 30'. Cont Bipap QHS and PRN. CVS: Monitor HD, MAP goal >65. Pt on daily lasix, toprol-XL at home, eval for restarting when tolerating PO.  GI: thick honey liquids, meds still IV  Renal: Trend Cr, UOP. Purewick. Hem/Onc: Trend H/H, monitor for s/o active bleeding. Daily CBC. I/D: Trend WBCs and temperature curve. Metabolic: Daily BMP, mag, phos. Trend lytes and replace per protocol. Daily ionized calcium. Replete calcium with calcium chloride 1 G IV. Replete potassium  10 mEq x 6 doses. Endocrine: Q6 glucoses. SSI, lantus. Avoid hypoglycemia. Lipid for triglycerides when can take PO. Musc/Skin: (+) wound care, PT/OT, Out of Bed   Full Code     Best practice :    Glycemic control  IHI ICU bundles:     [facility] Vent patients- VAP bundle, aim to keep peak plateau pressure [id number] H2O  Sress ulcer prophylaxis. DVT prophylaxis. Need for Lines, mueller assessed. Restraints need. Palliative care evaluation. High complexity decision making was performed during this consultation and evaluation. [x]       Pt is at high risk for further organ failure and dysfunction.      Critical care time spent:    minutes with patient exclusive of procedures.       Andres Rodriguez, PA-S

## 2019-04-11 LAB
ANION GAP SERPL CALC-SCNC: 8 MMOL/L (ref 3–18)
BASOPHILS # BLD: 0 K/UL (ref 0–0.1)
BASOPHILS NFR BLD: 1 % (ref 0–2)
BUN SERPL-MCNC: 8 MG/DL (ref 7–18)
BUN/CREAT SERPL: 15 (ref 12–20)
CA-I SERPL-SCNC: 1.04 MMOL/L (ref 1.12–1.32)
CA-I SERPL-SCNC: 1.1 MMOL/L (ref 1.12–1.32)
CALCIUM SERPL-MCNC: 8.6 MG/DL (ref 8.5–10.1)
CHLORIDE SERPL-SCNC: 102 MMOL/L (ref 100–108)
CO2 SERPL-SCNC: 27 MMOL/L (ref 21–32)
CREAT SERPL-MCNC: 0.52 MG/DL (ref 0.6–1.3)
DIFFERENTIAL METHOD BLD: ABNORMAL
EOSINOPHIL # BLD: 0.1 K/UL (ref 0–0.4)
EOSINOPHIL NFR BLD: 2 % (ref 0–5)
ERYTHROCYTE [DISTWIDTH] IN BLOOD BY AUTOMATED COUNT: 14.5 % (ref 11.6–14.5)
GLUCOSE BLD STRIP.AUTO-MCNC: 157 MG/DL (ref 70–110)
GLUCOSE BLD STRIP.AUTO-MCNC: 178 MG/DL (ref 70–110)
GLUCOSE BLD STRIP.AUTO-MCNC: 194 MG/DL (ref 70–110)
GLUCOSE BLD STRIP.AUTO-MCNC: 206 MG/DL (ref 70–110)
GLUCOSE BLD STRIP.AUTO-MCNC: 210 MG/DL (ref 70–110)
GLUCOSE SERPL-MCNC: 126 MG/DL (ref 74–99)
HCT VFR BLD AUTO: 44.5 % (ref 35–45)
HGB BLD-MCNC: 14.8 G/DL (ref 12–16)
LYMPHOCYTES # BLD: 2.1 K/UL (ref 0.9–3.6)
LYMPHOCYTES NFR BLD: 36 % (ref 21–52)
MAGNESIUM SERPL-MCNC: 1.9 MG/DL (ref 1.6–2.6)
MCH RBC QN AUTO: 30.2 PG (ref 24–34)
MCHC RBC AUTO-ENTMCNC: 33.3 G/DL (ref 31–37)
MCV RBC AUTO: 90.8 FL (ref 74–97)
MONOCYTES # BLD: 0.5 K/UL (ref 0.05–1.2)
MONOCYTES NFR BLD: 8 % (ref 3–10)
NEUTS SEG # BLD: 3.2 K/UL (ref 1.8–8)
NEUTS SEG NFR BLD: 53 % (ref 40–73)
PHOSPHATE SERPL-MCNC: 4.1 MG/DL (ref 2.5–4.9)
PLATELET # BLD AUTO: 190 K/UL (ref 135–420)
PMV BLD AUTO: 8.8 FL (ref 9.2–11.8)
POTASSIUM SERPL-SCNC: 3.2 MMOL/L (ref 3.5–5.5)
RBC # BLD AUTO: 4.9 M/UL (ref 4.2–5.3)
SODIUM SERPL-SCNC: 137 MMOL/L (ref 136–145)
TRIGL SERPL-MCNC: 550 MG/DL (ref ?–150)
WBC # BLD AUTO: 5.9 K/UL (ref 4.6–13.2)

## 2019-04-11 PROCEDURE — 82330 ASSAY OF CALCIUM: CPT

## 2019-04-11 PROCEDURE — 74011250636 HC RX REV CODE- 250/636: Performed by: INTERNAL MEDICINE

## 2019-04-11 PROCEDURE — 84478 ASSAY OF TRIGLYCERIDES: CPT

## 2019-04-11 PROCEDURE — 74011250637 HC RX REV CODE- 250/637: Performed by: PHYSICIAN ASSISTANT

## 2019-04-11 PROCEDURE — 97116 GAIT TRAINING THERAPY: CPT

## 2019-04-11 PROCEDURE — 94640 AIRWAY INHALATION TREATMENT: CPT

## 2019-04-11 PROCEDURE — 74011636637 HC RX REV CODE- 636/637: Performed by: INTERNAL MEDICINE

## 2019-04-11 PROCEDURE — 74011250636 HC RX REV CODE- 250/636: Performed by: PHYSICIAN ASSISTANT

## 2019-04-11 PROCEDURE — 92526 ORAL FUNCTION THERAPY: CPT

## 2019-04-11 PROCEDURE — 36415 COLL VENOUS BLD VENIPUNCTURE: CPT

## 2019-04-11 PROCEDURE — 65660000004 HC RM CVT STEPDOWN

## 2019-04-11 PROCEDURE — 80048 BASIC METABOLIC PNL TOTAL CA: CPT

## 2019-04-11 PROCEDURE — 74011250637 HC RX REV CODE- 250/637: Performed by: INTERNAL MEDICINE

## 2019-04-11 PROCEDURE — 94660 CPAP INITIATION&MGMT: CPT

## 2019-04-11 PROCEDURE — 74011000258 HC RX REV CODE- 258: Performed by: PHYSICIAN ASSISTANT

## 2019-04-11 PROCEDURE — 85025 COMPLETE CBC W/AUTO DIFF WBC: CPT

## 2019-04-11 PROCEDURE — 77030027138 HC INCENT SPIROMETER -A

## 2019-04-11 PROCEDURE — 74011250637 HC RX REV CODE- 250/637

## 2019-04-11 PROCEDURE — 83735 ASSAY OF MAGNESIUM: CPT

## 2019-04-11 PROCEDURE — 74011636637 HC RX REV CODE- 636/637

## 2019-04-11 PROCEDURE — 84100 ASSAY OF PHOSPHORUS: CPT

## 2019-04-11 PROCEDURE — 74011000250 HC RX REV CODE- 250: Performed by: PHYSICIAN ASSISTANT

## 2019-04-11 PROCEDURE — 82962 GLUCOSE BLOOD TEST: CPT

## 2019-04-11 PROCEDURE — 74011636637 HC RX REV CODE- 636/637: Performed by: PHYSICIAN ASSISTANT

## 2019-04-11 RX ORDER — CALCIUM CHLORIDE INJECTION 100 MG/ML
1 INJECTION, SOLUTION INTRAVENOUS ONCE
Status: DISCONTINUED | OUTPATIENT
Start: 2019-04-11 | End: 2019-04-11

## 2019-04-11 RX ORDER — POTASSIUM CHLORIDE 1.5 G/1.77G
40 POWDER, FOR SOLUTION ORAL
Status: DISCONTINUED | OUTPATIENT
Start: 2019-04-11 | End: 2019-04-11

## 2019-04-11 RX ORDER — INSULIN LISPRO 100 [IU]/ML
INJECTION, SOLUTION INTRAVENOUS; SUBCUTANEOUS
Status: DISCONTINUED | OUTPATIENT
Start: 2019-04-11 | End: 2019-04-16 | Stop reason: HOSPADM

## 2019-04-11 RX ORDER — BUDESONIDE AND FORMOTEROL FUMARATE DIHYDRATE 160; 4.5 UG/1; UG/1
2 AEROSOL RESPIRATORY (INHALATION)
Status: DISCONTINUED | OUTPATIENT
Start: 2019-04-11 | End: 2019-04-13

## 2019-04-11 RX ORDER — POTASSIUM CHLORIDE 20 MEQ/1
40 TABLET, EXTENDED RELEASE ORAL
Status: COMPLETED | OUTPATIENT
Start: 2019-04-11 | End: 2019-04-11

## 2019-04-11 RX ORDER — GABAPENTIN 400 MG/1
400 CAPSULE ORAL 3 TIMES DAILY
Status: DISCONTINUED | OUTPATIENT
Start: 2019-04-11 | End: 2019-04-16 | Stop reason: HOSPADM

## 2019-04-11 RX ORDER — MECLIZINE HCL 12.5 MG 12.5 MG/1
12.5 TABLET ORAL
Status: DISCONTINUED | OUTPATIENT
Start: 2019-04-11 | End: 2019-04-16 | Stop reason: HOSPADM

## 2019-04-11 RX ORDER — FLUTICASONE PROPIONATE 50 MCG
2 SPRAY, SUSPENSION (ML) NASAL DAILY
Status: DISCONTINUED | OUTPATIENT
Start: 2019-04-11 | End: 2019-04-16 | Stop reason: HOSPADM

## 2019-04-11 RX ORDER — DULOXETIN HYDROCHLORIDE 30 MG/1
60 CAPSULE, DELAYED RELEASE ORAL DAILY
Status: DISCONTINUED | OUTPATIENT
Start: 2019-04-11 | End: 2019-04-11

## 2019-04-11 RX ADMIN — IPRATROPIUM BROMIDE AND ALBUTEROL SULFATE 3 ML: .5; 3 SOLUTION RESPIRATORY (INHALATION) at 07:36

## 2019-04-11 RX ADMIN — INSULIN LISPRO 3 UNITS: 100 INJECTION, SOLUTION INTRAVENOUS; SUBCUTANEOUS at 05:16

## 2019-04-11 RX ADMIN — ACETAMINOPHEN 650 MG: 650 SOLUTION ORAL at 16:39

## 2019-04-11 RX ADMIN — IPRATROPIUM BROMIDE AND ALBUTEROL SULFATE 3 ML: .5; 3 SOLUTION RESPIRATORY (INHALATION) at 16:05

## 2019-04-11 RX ADMIN — IPRATROPIUM BROMIDE AND ALBUTEROL SULFATE 3 ML: .5; 3 SOLUTION RESPIRATORY (INHALATION) at 01:13

## 2019-04-11 RX ADMIN — GEMFIBROZIL 600 MG: 600 TABLET ORAL at 16:39

## 2019-04-11 RX ADMIN — GEMFIBROZIL 600 MG: 600 TABLET ORAL at 07:51

## 2019-04-11 RX ADMIN — ONDANSETRON 6 MG: 2 INJECTION INTRAMUSCULAR; INTRAVENOUS at 04:30

## 2019-04-11 RX ADMIN — GABAPENTIN 400 MG: 400 CAPSULE ORAL at 10:54

## 2019-04-11 RX ADMIN — DULOXETINE HYDROCHLORIDE 60 MG: 30 CAPSULE, DELAYED RELEASE ORAL at 10:54

## 2019-04-11 RX ADMIN — Medication 10 ML: at 22:00

## 2019-04-11 RX ADMIN — INSULIN LISPRO 3 UNITS: 100 INJECTION, SOLUTION INTRAVENOUS; SUBCUTANEOUS at 12:42

## 2019-04-11 RX ADMIN — ONDANSETRON 6 MG: 2 INJECTION INTRAMUSCULAR; INTRAVENOUS at 14:05

## 2019-04-11 RX ADMIN — OXYMETAZOLINE HYDROCHLORIDE 2 SPRAY: 5 SPRAY NASAL at 09:32

## 2019-04-11 RX ADMIN — INSULIN LISPRO 3 UNITS: 100 INJECTION, SOLUTION INTRAVENOUS; SUBCUTANEOUS at 23:06

## 2019-04-11 RX ADMIN — ACETAMINOPHEN 650 MG: 650 SOLUTION ORAL at 12:44

## 2019-04-11 RX ADMIN — OXYCODONE HYDROCHLORIDE AND ACETAMINOPHEN 1 TABLET: 5; 325 TABLET ORAL at 05:11

## 2019-04-11 RX ADMIN — Medication 10 ML: at 14:05

## 2019-04-11 RX ADMIN — IPRATROPIUM BROMIDE AND ALBUTEROL SULFATE 3 ML: .5; 3 SOLUTION RESPIRATORY (INHALATION) at 22:59

## 2019-04-11 RX ADMIN — DOCUSATE SODIUM 100 MG: 100 CAPSULE, LIQUID FILLED ORAL at 09:32

## 2019-04-11 RX ADMIN — ACETAMINOPHEN 650 MG: 650 SOLUTION ORAL at 20:42

## 2019-04-11 RX ADMIN — IPRATROPIUM BROMIDE AND ALBUTEROL SULFATE 3 ML: .5; 3 SOLUTION RESPIRATORY (INHALATION) at 04:08

## 2019-04-11 RX ADMIN — HYDROMORPHONE HYDROCHLORIDE 0.5 MG: 1 INJECTION, SOLUTION INTRAMUSCULAR; INTRAVENOUS; SUBCUTANEOUS at 12:44

## 2019-04-11 RX ADMIN — CALCIUM CARBONATE (ANTACID) CHEW TAB 500 MG 400 MG: 500 CHEW TAB at 16:40

## 2019-04-11 RX ADMIN — IPRATROPIUM BROMIDE AND ALBUTEROL SULFATE 3 ML: .5; 3 SOLUTION RESPIRATORY (INHALATION) at 11:53

## 2019-04-11 RX ADMIN — Medication 10 ML: at 05:17

## 2019-04-11 RX ADMIN — OXYMETAZOLINE HYDROCHLORIDE 2 SPRAY: 5 SPRAY NASAL at 17:01

## 2019-04-11 RX ADMIN — FLUTICASONE PROPIONATE 2 SPRAY: 50 SPRAY, METERED NASAL at 10:54

## 2019-04-11 RX ADMIN — INSULIN LISPRO 6 UNITS: 100 INJECTION, SOLUTION INTRAVENOUS; SUBCUTANEOUS at 17:01

## 2019-04-11 RX ADMIN — ENOXAPARIN SODIUM 40 MG: 100 INJECTION SUBCUTANEOUS at 10:54

## 2019-04-11 RX ADMIN — INSULIN GLARGINE 25 UNITS: 100 INJECTION, SOLUTION SUBCUTANEOUS at 09:32

## 2019-04-11 RX ADMIN — CALCIUM CARBONATE (ANTACID) CHEW TAB 500 MG 400 MG: 500 CHEW TAB at 12:44

## 2019-04-11 RX ADMIN — POTASSIUM CHLORIDE 40 MEQ: 1500 TABLET, EXTENDED RELEASE ORAL at 06:36

## 2019-04-11 RX ADMIN — CALCIUM GLUCONATE 3 G: 98 INJECTION, SOLUTION INTRAVENOUS at 12:45

## 2019-04-11 NOTE — PROGRESS NOTES
Problem: Dysphagia (Adult)  Goal: *Acute Goals and Plan of Care (Insert Text)  Description  Patient will:  1. Tolerate diet upgrade without overt s/sx of aspiration under SLP supervision. 2. Utilize compensatory swallow strategies of small bite/sip, alternate liquid/solid with min cues in 4/5 trials. 3. Perform oral-motor/laryngeal elevation exercises 10 reps/each to increase oropharyngeal swallow function with min cues. 4. Complete an objective swallow study (i.e., MBSS) to assess swallow integrity, r/o aspiration, and determine of safest LRD, min A.      Rec:   Puree diet with nectar thick liquids  Meds crushed in puree  Strict Aspiration precautions  Oral care TID        Outcome: Progressing Towards Goal  SPEECH LANGUAGE PATHOLOGY DYSPHAGIA TREATMENT    Patient: Mary Lou Cheema (89 y.o. female)  Date: 4/11/2019  Diagnosis: S/P thyroidectomy [Z98.890] <principal problem not specified>  Procedure(s) (LRB):  TOTAL THYROIDECTOMY (N/A) 7 Days Post-Op  Precautions: Aspiration, Fall(BiPAP)  PLOF: As per H&P     ASSESSMENT:  Pt was seen at bedside for follow up dysphagia management. Pt alert and able to follow all commands. Pt with complaints of breathing difficulty, specifically not being able to breathe through her nose. She noted eating only a couple bites of her dinner/breakfast (preferred applesauce), but not reporting any difficulty with PO, with the exception of pills getting stuck when taken with applesauce. Pt tolerated honey thick and nectar thick liquid via cup sip with slightly delayed swallow initiation, however, no overt s/sx of aspiration. Pt tolerated magic cup with adequate bolus manipulation, A-P transfer, slight swallow initiation delay, and no overt s/sx of aspiration, however, pt gagged to the taste. Rec advancement to puree diet with nectar thick liquids, meds crushed in puree, aspiration precautions, and oral care TID. Discussed with pt, verbalized understanding. SLP to follow.      Progression toward goals:  ?         Improving appropriately and progressing toward goals  ? Improving slowly and progressing toward goals  ? Not making progress toward goals and plan of care will be adjusted     PLAN:  Recommendations and Planned Interventions: See above  Patient continues to benefit from skilled intervention to address the above impairments. Continue treatment per established plan of care. Discharge Recommendations: To Be Determined     SUBJECTIVE:   Patient stated ? I would love some mashed potatoes and gravy? .    OBJECTIVE:   Cognitive and Communication Status:  Neurologic State: Alert  Orientation Level: Appropriate for age  Cognition: Follows commands  Perception: Appears intact  Perseveration: No perseveration noted  Safety/Judgement: Fall prevention  Dysphagia Treatment:  Oral Assessment:  Oral Assessment  Labial: No impairment  Dentition: Natural, Intact  Oral Hygiene: adequate  Lingual: No impairment  Velum: No impairment  Mandible: No impairment  P.O. Trials:   Patient Position: 90 in chair   Vocal quality prior to P.O.: Low volume, Hoarse   Consistency Presented: Honey thick liquid, Nectar thick liquid   How Presented: Self-fed/presented   How Much: 6   Bolus Acceptance: No impairment   Bolus Formation/Control: No impairment   Type of Impairment: Anterior, Delayed   Propulsion: No impairment   Oral Residue: None   Initiation of Swallow: Delayed (# of seconds)   Laryngeal Elevation: (unable to palpate due to complete thyroidectomy)   Aspiration Signs/Symptoms: None   Pharyngeal Phase Characteristics: Feeling of discomfort   Effective Modifications: Small sips and bites   Cues for Modifications: None   Oral Phase Severity: No impairment   Pharyngeal Phase Severity : Mild      PAIN:  Start of Tx: 0  End of Tx: 0     After treatment:   ?              Patient left in no apparent distress sitting up in chair  ? Patient left in no apparent distress in bed  ?               Call agosto left within reach  ? Nursing notified  ? Family present  ? Caregiver present  ? Bed alarm activated      COMMUNICATION/EDUCATION:   ? Aspiration precautions; swallow safety; compensatory techniques  ? Patient unable to participate in education; education ongoing with staff  ? Posted safety precautions in patient's room.   ? Oral-motor/laryngeal strengthening exercises      Babita Tatum, SLP intern

## 2019-04-11 NOTE — ROUTINE PROCESS
TRANSFER - OUT REPORT:    Verbal report given to Josef Chaney RN(name) on Jovanna Andujar  being transferred to CVT Stepdown(unit) for routine progression of care       Report consisted of patients Situation, Background, Assessment and   Recommendations(SBAR). Information from the following report(s) SBAR, Kardex, Intake/Output, MAR, Recent Results, Cardiac Rhythm NSR and Quality Measures was reviewed with the receiving nurse. Lines:   Peripheral IV 04/11/19 Left;Upper Cephalic (Active)   Site Assessment Clean, dry, & intact 4/11/2019  4:00 PM   Phlebitis Assessment 0 4/11/2019  4:00 PM   Infiltration Assessment 0 4/11/2019  4:00 PM   Dressing Status Clean, dry, & intact 4/11/2019  4:00 PM   Dressing Type Transparent;Tape 4/11/2019  4:00 PM   Hub Color/Line Status Flushed;Patent;Capped;Green 4/11/2019  4:00 PM   Action Taken Open ports on tubing capped 4/11/2019  4:00 PM   Alcohol Cap Used Yes 4/11/2019  4:00 PM        Opportunity for questions and clarification was provided.       Patient transported with:   Monitor  Registered Nurse   Patient Transport  Bipap

## 2019-04-11 NOTE — PROGRESS NOTES
Occupational Therapy Note    Patient: Juan Shearer (89 y.o. female)  Date: 4/11/2019  Diagnosis: S/P thyroidectomy [Z98.890] <principal problem not specified>  Procedure(s) (LRB):  TOTAL THYROIDECTOMY (N/A) 7 Days Post-Op  Precautions: Fall(BiPAP)  Chart, occupational therapy assessment, plan of care, and goals were reviewed. Occupational Therapy treatment attempted. Patient is unable to participate due to:  []  Nausea/vomiting  []  Eating  []  Pain  []  Pt lethargic  []  Off Unit  [x] Other:  Attempt: [id number]- Pt in deep sleep    Will f/u later as schedule allows. Thank you. Meron Simental.  SHIVANI Mckeon/CAROLEE

## 2019-04-11 NOTE — PROGRESS NOTES
Problem: Mobility Impaired (Adult and Pediatric)  Goal: *Acute Goals and Plan of Care (Insert Text)  Description  Physical Therapy Goals  Initiated 4/7/2019 and to be accomplished within 7 day(s)  1. Patient will move from supine to sit and sit to supine , scoot up and down and roll side to side in bed with supervision/set-up. 2.  Patient will transfer from bed to chair and chair to bed with supervision/set-up using the least restrictive device. 3.  Patient will perform sit to stand with supervision/set-up. 4.  Patient will ambulate with supervision/set-up for  feet with the least restrictive device. 5.  Patient will ascend/descend 4 stairs with 1-2 handrail(s) with minimal assistance/contact guard assist.     Prior Level of Function: Independent with mobility including gait using 2 canes. Outcome: Progressing Towards Goal   PHYSICAL THERAPY TREATMENT    Patient: Daniel Dominguez (50 y.o. female)  Date: 4/11/2019  Diagnosis: S/P thyroidectomy [Z98.890] <principal problem not specified>  Procedure(s) (LRB):  TOTAL THYROIDECTOMY (N/A) 7 Days Post-Op  Precautions: Fall(BiPAP)    ASSESSMENT:  Patient is cleared by nursing for PT, and patient consents to therapy. PT was asked by MD PATY, and nursing about seeing pt for mobility today. Pt performed sit to stands standby assistance. Gait training today 75 feet with 1 seated rest break using 2 canes with contact guard assistance and chair follow for initial gait. Pt has good balance using canes and good balance ambulating in room. Pt continues to progress toward goals. Pt did have pain medication prior to therapy with PT providing contact guard assistance for increased safety. Pt reported some vertigo and dizziness at times as well as hot flashes. Pt did very well with gait even with some of her symptoms with increased time and rest breaks. Pt ended therapy sitting in the recliner with all needs met.      Progression toward goals:    ?      Improving appropriately and progressing toward goals  ? Improving slowly and progressing toward goals  ? Not making progress toward goals and plan of care will be adjusted     PLAN:  Patient continues to benefit from skilled intervention to address the above impairments. Continue treatment per established plan of care. Discharge Recommendations:  Home Health  Further Equipment Recommendations for Discharge:  N/A     SUBJECTIVE:   Patient stated ? I just had pain medication. ?    OBJECTIVE DATA SUMMARY:   Critical Behavior:  Neurologic State: Alert  Orientation Level: Appropriate for age  Cognition: Follows commands  Safety/Judgement: Fall prevention  Functional Mobility Training:  Transfers:  Sit to Stand: Stand-by assistance  Stand to Sit: Stand-by assistance  Bed to Chair: Stand-by assistance          Balance:  Sitting: Intact  Standing: Intact; With support     Ambulation/Gait Training:  Distance (ft): 75 Feet (ft)  Assistive Device: Cane, straight(2 canes)  Ambulation - Level of Assistance: Contact guard assistance  Gait Abnormalities: Decreased step clearance  Base of Support: Widened  Speed/Yolis: Pace decreased (<100 feet/min)  Step Length: Right shortened;Left shortened    Therapeutic Exercises:   Reviewed and performed ankle pumps. Pain:  Pain level pre-treatment: mild/moderate  Pain level post-treatment: mild/moderate  Pain Intervention(s): Medication (see MAR); Rest, Ice, Repositioning   Response to intervention: Nurse notified, See doc flow    Activity Tolerance:   fair  Please refer to the flowsheet for vital signs taken during this treatment. After treatment:   ? Patient left in no apparent distress sitting up in chair  ? Patient left in no apparent distress in bed  ? Call bell left within reach  ? Nursing notified Frank Gorman  ? Personal items in reach  ? Caregiver present  ? Bed alarm activated  ?  SCDs applied      COMMUNICATION/EDUCATION:   ?         Role of Physical Therapy in the acute care setting. ?         Fall prevention education was provided and the patient/caregiver indicated understanding. ? Patient/family have participated as able in working toward goals and plan of care. ?         Patient/family agree to work toward stated goals and plan of care. ?         Patient understands intent and goals of therapy, but is neutral about his/her participation. ? Patient is unable to participate in stated goals/plan of care: ongoing with therapy staff. ?         Out of bed at least 3-5 times a day with nursing assistance. ?          Other:        Paresh Nix, PT, DPT   Time Calculation: 29 mins

## 2019-04-11 NOTE — ROUTINE PROCESS
Bedside and Verbal shift change report given to Afia Currie RN (oncoming nurse) by Vini Hayden RN   (offgoing nurse). Report included the following information SBAR, ED Summary, Procedure Summary, Intake/Output, MAR, Recent Results, Med Rec Status and Cardiac Rhythm NSR.

## 2019-04-11 NOTE — ROUTINE PROCESS
1930:  Rec'd Nick Law  from [facility] Cuevas RN. SBAR reviewed patient-side with two-nurse check-offs done of meds, dressings, wounds, LDA's & revelant assessment findings including neuro status, vent settings, rhythm & pulses, diet. Bed in lowest position with noted SR up, wheels locked and exit alarm on. Tonight:  Up in chair, used BSC, got self in bed without assist.  Put self on BiPAP then took self off shortly thereafter, comfortable on RA.      0000: After analgesic, put self back on BiPAP, now asleep. On BiPAP most of the night. Less stridor. Per Ms. Jack, will not replete Ca at this time. 0715:  Verbal Patient-side shift change report given to TONY Avilez, (oncoming nurse) by Ibrahima Ralph RN  (offgoing nurse). Report included the following information SBAR, Kardex, ED Summary, Procedure Summary, Intake/Output, MAR, Recent Results and Med Rec Status. Included:  Intro, hx, two-RN eval of relevant assessment findings, LDAs, skin, diagnostics and infusions.

## 2019-04-11 NOTE — PROGRESS NOTES
Pt concerned about breathing through nose and cannot breathe when she swallows, states she cannot, lung sounds clear/diminished, o2 100% RA, Dr Consuelo Doll at bedside, made aware, provided with education by Dr Consuelo Doll

## 2019-04-11 NOTE — PROGRESS NOTES
[facility]. Pulmonary, Critical Care, and Sleep Medicine    Name: King Adrien MRN: [id number]   : 1960 Hospital: [address] [place] [address] Dr   Date: 2019  Admission Date: 2019     Chart and notes reviewed. Data reviewed. I have evaluated all findings. [x]I have reviewed the flowsheet and previous days notes. HPI  Patient is a 62 y. o. female w/ pmhx of DM S/P thyroidectomy, by Dr. Kalani Ray, on 19. Patient has history of a multinodular goiter suspicious for malignancy, however recent biopsies have been negative. Procedure uneventful. Was extubated in PACU, but developed stridor and hypoxia, subsequently re intubated. ICU stay notable for QTc 676 on , repeat 4.5 QTc 430. Triglycerides also noted to be in 2,000's. Patient has history of hypertriglyceridemia. Extubated on  w/ some stridor that resolved w/ racemic epi and bipap. Patient utilizes bipap QHS at baseline. 19  Hospital Day: 7  POD total thyroidectomy:  6    - No acute events overnight, pt a little less anxious, eating more than she was able to tolerate RA, only going on BIPAP when she sleeps. Pt to be transferred to [facility]. Mentation: AOX3 complaining of intermittent throat pain  Respiratory/ Secretions: normal respiratory effort when off BiPAP, no issues while on BiPAP. Scant secretions not issues. Hemodynamics: Stable   Urine output: 1.9 L UOP  Need for procedures: none                 ROS:Review of systems not obtained due to patient factors. Events and notes from last 24 hours reviewed. Care plan discussed on multidisciplinary rounds.   Patient Active Problem List   Diagnosis Code    Type II diabetes mellitus, uncontrolled ([facility] [address] E11.65    Sleep apnea G47.30    H/O aortic valve replacement Z95.2    History of bicuspid aortic valve Z87.74    Chronic back pain M54.9, G89.29    Chronic left shoulder pain M25.512, G89.29    Morbid obesity (HCC) E66.01    Left shoulder tendinitis M75.82    Spondylosis of lumbar region without myelopathy or radiculopathy M47.816    Chronic pain of both shoulders M25.511, G89.29, M25.512    Chronic pain of both knees M25.561, M25.562, G89.29    Chronic pain syndrome G89.4    Encounter for long-term (current) use of medications Z79.899    Chronic midline low back pain M54.5, G89.29    Lumbar facet arthropathy M47.816    Lumbar degenerative disc disease M51.36    Venous stasis dermatitis of both lower extremities I87.2    SOB (shortness of breath) R06.02    Type 2 diabetes mellitus without complication (HCC) I73.1    Hypothyroidism due to acquired atrophy of thyroid E03.4    Essential hypertension I10    Dyslipidemia E78.5    Mood disorder (HCC) F39    Chronic diastolic congestive heart failure (HCC) I50.32    Type 2 diabetes mellitus with diabetic neuropathy (HCC) E11.40    S/P thyroidectomy Z98.890     Vital Signs:  Visit Vitals  /60   Pulse 73   Temp 98 °F (36.7 °C)   Resp 15   Ht 5' 7\" (1.702 m)   Wt 125.9 kg (277 lb 9 oz)   SpO2 97%   Breastfeeding? No   BMI 43.47 kg/m²       O2 Device: Room air   O2 Flow Rate (L/min): 5 l/min   Temp (24hrs), Av.9 °F (36.6 °C), Min:97.5 °F (36.4 °C), Max:98.2 °F (36.8 °C)       Intake/Output:   Last shift:      701 - 1900  In: -   Out: 200 [Urine:200]  Last 3 shifts: 1901 -  07  In: 1555.5 [P.O.:200;  I.V.:1355.5]  Out: 2500 [Urine:2500]    Intake/Output Summary (Last 24 hours) at 2019 1327  Last data filed at 2019 1200  Gross per 24 hour   Intake 900 ml   Output 2100 ml   Net -1200 ml      Current Facility-Administered Medications   Medication Dose Route Frequency    fluticasone propionate (FLONASE) 50 mcg/actuation nasal spray 2 Spray  2 Spray Both Nostrils DAILY    gabapentin (NEURONTIN) capsule 400 mg  400 mg Oral TID    budesonide-formoterol (SYMBICORT) 160-4.5 mcg/actuation HFA inhaler 2 Puff  2 Puff Inhalation BID RT    acetaminophen (TYLENOL) solution 650 mg  650 mg Oral Q4H    calcium gluconate 3 g in 0.9% sodium chloride 100 mL IVPB  3 g IntraVENous ONCE    oxymetazoline (AFRIN) 0.05 % nasal spray 2 Spray  2 Spray Both Nostrils BID    docusate sodium (COLACE) capsule 100 mg  100 mg Oral DAILY    insulin glargine (LANTUS) injection 25 Units  25 Units SubCUTAneous DAILY    calcium carbonate (TUMS) chewable tablet 400 mg [elemental]  400 mg Oral TID WITH MEALS    insulin lispro (HUMALOG) injection   SubCUTAneous Q6H    gemfibrozil (LOPID) tablet 600 mg  600 mg Oral ACB&D    enoxaparin (LOVENOX) injection 40 mg  40 mg SubCUTAneous Q24H    albuterol-ipratropium (DUO-NEB) 2.5 MG-0.5 MG/3 ML  3 mL Nebulization Q4H RT    sodium chloride (NS) flush 5-40 mL  5-40 mL IntraVENous Q8H     Telemetry: NSR    Physical Exam:    General: in no apparent distress, well developed and well nourished, in no respiratory distress and acyanotic, alert, oriented times 3, normal vitals, cooperative and improved   HEENT: Normal, PERRLA, EOMI, fundi benign   Neck: large neck with incision and steri-strips, some blood, but otherwise clean and dry   Chest: normal equal bilateral chest wall movement. Lungs: normal air entry and mild inspiratory and expiratory stridor in upper airway only, scattered inspiratory rhonchi   Heart: Regular rate and rhythm, S1S2 present or without murmur or extra heart sounds   Abdomen: protuberant.  non distended, bowel sounds normoactive, tympanic, abdomen is soft without significant tenderness, masses, organomegaly or guarding   Extremity: 2+ edema   Neuro: alert   Skin: Skin color, texture, turgor normal. No rashes or lesions     DATA:  MAR reviewed and pertinent medications noted or modified as needed    Labs:  Recent Labs     04/11/19  0400 04/10/19  0529 04/09/19  0235   WBC 5.9 4.5* 5.1   HGB 14.8 14.1 14.5   HCT 44.5 44.3 44.3    181 167     Recent Labs     04/11/19  0400 04/10/19  0529 04/09/19  1230 04/09/19  0235  138  --  140   K 3.2* 3.1* 3.6 3.7    102  --  104   CO2 27 28  --  26   * 144*  --  150*   BUN 8 8  --  10   CREA 0.52* 0.47*  --  0.41*   CA 8.6 8.1*  --  8.3*   MG 1.9 1.9  --  2.1   PHOS 4.1 3.8  --  3.4     No results for input(s): PH, PCO2, PO2, HCO3, FIO2 in the last 72 hours. No results for input(s): FIO2I, IFO2, HCO3I, IHCO3, HCOPOC, PCO2I, PCOPOC, IPHI, PHI, PHPOC, PO2I, PO2POC in the last 72 hours. No lab exists for component: IPOC2    Imaging:  [x]   I have personally reviewed the patients radiographs and reports  XR Results (most recent):  Results from Hospital Encounter encounter on 04/04/19   XR CHEST PORT    Narrative EXAM:  Chest Portable. INDICATION:  ET tube placement     COMPARISON:  04/04/19     TECHNIQUE:  Portable AP chest study    FINDINGS:      - ET tube distal tip observed at 2.4 cm above the phi.   - Both lungs are hypoinflated. - Persistent retrocardiac opacity, similar to recent prior exam.  Subtle streaky  density in the right infrahilar region. Again similar to recent prior exam.   - No pneumothorax. - Moderate cardiac silhouette enlargement with prior CABG and aortic valve  replacement. Impression IMPRESSION:    1. Borderline low-lying ET tube. 2.  No significant interval change in lung aeration pattern, with subtle streaky  densities in the lower lung zones. CT Results (most recent):  Results from Hospital Encounter encounter on 02/21/19   CT NECK SOFT TISSUE W CONT    Narrative Neck CT With Contrast    CPT CODE: 21489    HISTORY: Multinodular goiter. COMPARISON: Thyroid ultrasound 8/3/2018.  CT chest 5/5/2017    FINDINGS:     After the intravenous administration of iodinated IV contrast, a scan was  obtained from low head down to upper chest.  Patient received 80 cc Isovue 300  IV contrast. CT dose reduction was achieved through use of a standardized  protocol tailored for this examination and automatic exposure control for dose  modulation. There is some chronic appearing dental disease with periapical lucency  surrounding right mandibular molar tooth roots. Visualized lower brain appears normal.     Mucosa of the upper aerodigestive tract:  No mass lesion seen in the mucosa of  the upper aerodigestive tract. 4 mm hyperdensity in the vallecula along the  surface of the base of tongue or uvula (axial image 52). More regular than  expected for metal pellet and appears to be a calcification. Lymph Nodes: No enlarged lymph nodes in the cervical chains. Major salivary glands:  Parotid glands are relatively low density, some fatty  replacement. Both parotid glands are relatively enlarged, but there is no mass  lesion. Submandibular glands appear normal.    Thyroid: Thyroid gland is heterogeneous with multiple relatively low-attenuation  nodular densities plus multiple calcifications. Several rim calcified nodules  bilaterally, the largest is on the right, 2.3 x 1.8 cm. Posteriorly, both  thyroid lobes extend into the tracheoesophageal groove. The inferior margin of  the right thyroid lobe is at the level of the top of the right clavicular head. The left thyroid lobe extends to the level of the top of the sternal notch. No  retrosternal extension. The right thyroid lobe measures approximately 4.6 cm AP, 3.8 cm transverse, 7.4  cm SI. The left thyroid lobe is 3.6 cm AP, 3.0 cm transverse and 6.7 cm SI. Upper mediastinum: No lymphadenopathy seen in the upper mediastinum. Visualized lung apices: are clear. Bones: Mild degenerative changes in the cervical spine. Mucus retention cysts in the maxillary sinuses. Impression IMPRESSION:    1. Heterogeneous enlargement of the thyroid gland with multiple low-attenuation  and rim calcified nodules bilaterally. Extension of thyroid tissue posteriorly  into the left and right tracheoesophageal groove.   2. The right thyroid lobe extends to the level of the reticular head. Left  thyroid lobe extends to the level of the sternal notch. 3. No neck masses. No lymphadenopathy. Thank you for this referral.     IMPRESSION:   · S/p thyroidectomy POD 7- for hx of multinodular goiter. Performed by Dr. Viola Shearer 4/4/19. · Acute Hypoxic Respiratory Failure, resolved   · Stridor- improving, but still present   · QTc prolongation- resolved   · Hypocalcemia- s/p thyroidectomy- on replacement protocol  · Hypertriglyceridemia - improving   · Hyperglycemia in the setting of DMII - on Sliding scale insulin   · OVIDIO- On home BIPAP 20/12   · Morbid Obesity: Body mass index is 44.23 kg/m². Patient Active Problem List   Diagnosis Code    Type II diabetes mellitus, uncontrolled ([facility] [address] E11.65    Sleep apnea G47.30    H/O aortic valve replacement Z95.2    History of bicuspid aortic valve Z87.74    Chronic back pain M54.9, G89.29    Chronic left shoulder pain M25.512, G89.29    Morbid obesity (HCC) E66.01    Left shoulder tendinitis M75.82    Spondylosis of lumbar region without myelopathy or radiculopathy M47.816    Chronic pain of both shoulders M25.511, G89.29, M25.512    Chronic pain of both knees M25.561, M25.562, G89.29    Chronic pain syndrome G89.4    Encounter for long-term (current) use of medications Z79.899    Chronic midline low back pain M54.5, G89.29    Lumbar facet arthropathy M47.816    Lumbar degenerative disc disease M51.36    Venous stasis dermatitis of both lower extremities I87.2    SOB (shortness of breath) R06.02    Type 2 diabetes mellitus without complication (HCC) X25.3    Hypothyroidism due to acquired atrophy of thyroid E03.4    Essential hypertension I10    Dyslipidemia E78.5    Mood disorder (HCC) F39    Chronic diastolic congestive heart failure (HCC) I50.32    Type 2 diabetes mellitus with diabetic neuropathy (HCC) E11.40    S/P thyroidectomy Z98.890      RECOMMENDATIONS:   Neuro: Restart Neurontin, Cymbalta.  PRN Dilaudid for pain and PRN meclizine for anxiety. Pulm: Aggressive Incentive Spirometry. Symbicort, Flonase, Duo-nebz. Aspiration precautions, HOB > 30'. Cont Bipap QHS and PRN. CVS: Monitor HD, MAP goal >65. Triglyceride binders. GI: Dysphagia Diet/Pureed. Renal: Trend Cr, UOP. Hem/Onc: Trend H/H, monitor for s/o active bleeding. Daily CBC. I/D: Trend WBCs and temperature curve. Metabolic: Daily BMP, mag, phos. Trend lytes and replace per protocol. ionized calcium every 12 hours. Replete calcium aggressive-pt unable to tolerate PO at this time. Endocrine: Q6 glucoses. SSI, lantus. Musc/Skin: (+) wound care, PT/OT, Out of Bed   Full Code  Transfer to Stepdown with Pulmonary to Follow, okay'd per Surgery. Best practice :  Glycemic control  IHI ICU bundles:     [facility]- VAP bundle, aim to keep peak plateau pressure 37-96OP H2O  Sress ulcer prophylaxis. DVT prophylaxis. Need for Lines, mueller assessed. Restraints need. Palliative care evaluation. High complexity decision making was performed during this consultation and evaluation. [x]       Pt is at high risk for further organ failure and dysfunction. Signed By: Chidi Cobb PA-C     April 11, 2019 1:43 PM       Late entry for date of service 4/11/19  I saw and evaluated the patient, performing the key elements of the service. I discussed the findings, assessment and plan with the PA and agree with the PA's findings and plan as documented in the PA's note. All edits and changes made above or are mentioned in my addendum. Total of 30 min critical care time spent at bedside during the course of care providing evaluation,management and care decisions and ordering appropriate treatment related to critical care problems exclusive of procedures.   The reason for providing this level of medical care for this critically ill patient was due a critical illness that impaired one or more vital organ systems such that there was a high probability of imminent or life threatening deterioration in the patients condition. This care involved high complexity decision making to assess, manipulate, and support vital system functions, to treat this degree vital organ system failure and to prevent further life threatening deterioration of the patients condition. 60-year-old morbidly obese female who presented to [facility] for history of multinodular goiter suspicious for malignancy, status post thyroidectomy by Dr. Karli Mckeon on 4/5/19.  Immediately postoperatively patient had respiratory failure with stridor, patient was emergently reintubated.  A few days later the patient was extubated and had stridor again. Matheus Cava was treated with racemic epi, but steroids were held off due to recent surgery and patient's history of diabetes, resulting in concerns for poor wound healing in the setting of surgery.  Patient was treated with noninvasive positive pressure ventilation with BiPAP, which the patient has been on for the last few days since her second extubation.  She has tolerated BiPAP well, but became very anxious and tachypneic when she came off of positive pressure ventilation.  During the course of the week, patient was taken off BiPAP intermittently and tolerated high flow and later nasal cannula and later room air intermittently. She was taken to room air by longer longer periods. Patient continues to have voice hoarseness and some dysphagia.  Unfortunately the patient has multifactorial reasons for being dyspneic, including OVIDIO with OHV, and I suspect the main contributor is VCD. I believe the etiology of the patient's voice hoarseness along with stridor and poor phonation are likely from her recent surgery, suspect they are most likely postoperative but may include possible recurrent laryngeal nerve damage.  I discussed the case with Dr. Watson Madsen, no laryngoscopy needed.   Treatment includes conservative measures and supportive care, which we will continue.    Patient weaned to room air by nursing and RT.   patient now able to eat diet, working with speech therapy. Patient also up out of bed in a chair, and moved to bedside commode with a lot of encouragement from ancillary staff. Unfortunately patient continues to have intermittent stridor which is likely secondary to her VCD. Patient remains a high risk for reintubation due to the severity of her VCD.  Also of note the patient has had hypertriglyceridemia which was elevated up to 2500, trended down to 600.     Is  Stephanie Daniels MD/MPH     Pulmonary, Critical Care Medicine  [facility]

## 2019-04-11 NOTE — PROGRESS NOTES
Surgery  Better today, eating finally. Voice improved but not normal  AF VSS  Wound ok  D/w pt her anxiety and improvement  C/p inability to breath trough nose.  Consider nasal spray  No sign of sinusitis  Improving after total thyroid

## 2019-04-11 NOTE — PROGRESS NOTES
Patient removed from night time BiPAP and put on room air.      04/11/19 0759   Oxygen Therapy   O2 Sat (%) 99 %   Pulse via Oximetry 74 beats per minute   O2 Device Room air

## 2019-04-12 LAB
ANION GAP SERPL CALC-SCNC: 7 MMOL/L (ref 3–18)
BASOPHILS # BLD: 0 K/UL (ref 0–0.1)
BASOPHILS NFR BLD: 1 % (ref 0–2)
BUN SERPL-MCNC: 9 MG/DL (ref 7–18)
BUN/CREAT SERPL: 15 (ref 12–20)
CA-I SERPL-SCNC: 1.05 MMOL/L (ref 1.12–1.32)
CA-I SERPL-SCNC: 1.08 MMOL/L (ref 1.12–1.32)
CALCIUM SERPL-MCNC: 8.4 MG/DL (ref 8.5–10.1)
CHLORIDE SERPL-SCNC: 101 MMOL/L (ref 100–108)
CO2 SERPL-SCNC: 30 MMOL/L (ref 21–32)
CREAT SERPL-MCNC: 0.6 MG/DL (ref 0.6–1.3)
DIFFERENTIAL METHOD BLD: ABNORMAL
EOSINOPHIL # BLD: 0.1 K/UL (ref 0–0.4)
EOSINOPHIL NFR BLD: 2 % (ref 0–5)
ERYTHROCYTE [DISTWIDTH] IN BLOOD BY AUTOMATED COUNT: 14.8 % (ref 11.6–14.5)
GLUCOSE BLD STRIP.AUTO-MCNC: 185 MG/DL (ref 70–110)
GLUCOSE BLD STRIP.AUTO-MCNC: 189 MG/DL (ref 70–110)
GLUCOSE BLD STRIP.AUTO-MCNC: 192 MG/DL (ref 70–110)
GLUCOSE BLD STRIP.AUTO-MCNC: 228 MG/DL (ref 70–110)
GLUCOSE SERPL-MCNC: 178 MG/DL (ref 74–99)
HCT VFR BLD AUTO: 44.2 % (ref 35–45)
HGB BLD-MCNC: 14.1 G/DL (ref 12–16)
LYMPHOCYTES # BLD: 1.6 K/UL (ref 0.9–3.6)
LYMPHOCYTES NFR BLD: 29 % (ref 21–52)
MAGNESIUM SERPL-MCNC: 1.8 MG/DL (ref 1.6–2.6)
MCH RBC QN AUTO: 29.4 PG (ref 24–34)
MCHC RBC AUTO-ENTMCNC: 31.9 G/DL (ref 31–37)
MCV RBC AUTO: 92.1 FL (ref 74–97)
MONOCYTES # BLD: 0.6 K/UL (ref 0.05–1.2)
MONOCYTES NFR BLD: 11 % (ref 3–10)
NEUTS SEG # BLD: 3.2 K/UL (ref 1.8–8)
NEUTS SEG NFR BLD: 57 % (ref 40–73)
PHOSPHATE SERPL-MCNC: 4.7 MG/DL (ref 2.5–4.9)
PLATELET # BLD AUTO: 203 K/UL (ref 135–420)
PMV BLD AUTO: 9.2 FL (ref 9.2–11.8)
POTASSIUM SERPL-SCNC: 3.5 MMOL/L (ref 3.5–5.5)
RBC # BLD AUTO: 4.8 M/UL (ref 4.2–5.3)
SODIUM SERPL-SCNC: 138 MMOL/L (ref 136–145)
TRIGL SERPL-MCNC: 518 MG/DL (ref ?–150)
WBC # BLD AUTO: 5.6 K/UL (ref 4.6–13.2)

## 2019-04-12 PROCEDURE — 97535 SELF CARE MNGMENT TRAINING: CPT

## 2019-04-12 PROCEDURE — 74011000250 HC RX REV CODE- 250: Performed by: PHYSICIAN ASSISTANT

## 2019-04-12 PROCEDURE — 82330 ASSAY OF CALCIUM: CPT

## 2019-04-12 PROCEDURE — 92526 ORAL FUNCTION THERAPY: CPT

## 2019-04-12 PROCEDURE — 74011250637 HC RX REV CODE- 250/637: Performed by: PHYSICIAN ASSISTANT

## 2019-04-12 PROCEDURE — 94762 N-INVAS EAR/PLS OXIMTRY CONT: CPT

## 2019-04-12 PROCEDURE — 36415 COLL VENOUS BLD VENIPUNCTURE: CPT

## 2019-04-12 PROCEDURE — 83735 ASSAY OF MAGNESIUM: CPT

## 2019-04-12 PROCEDURE — 92507 TX SP LANG VOICE COMM INDIV: CPT

## 2019-04-12 PROCEDURE — 97530 THERAPEUTIC ACTIVITIES: CPT

## 2019-04-12 PROCEDURE — 74011250637 HC RX REV CODE- 250/637

## 2019-04-12 PROCEDURE — 74011250636 HC RX REV CODE- 250/636: Performed by: PHYSICIAN ASSISTANT

## 2019-04-12 PROCEDURE — 74011636637 HC RX REV CODE- 636/637: Performed by: PHYSICIAN ASSISTANT

## 2019-04-12 PROCEDURE — 74011636637 HC RX REV CODE- 636/637

## 2019-04-12 PROCEDURE — 84478 ASSAY OF TRIGLYCERIDES: CPT

## 2019-04-12 PROCEDURE — 74011250636 HC RX REV CODE- 250/636: Performed by: INTERNAL MEDICINE

## 2019-04-12 PROCEDURE — 74011250637 HC RX REV CODE- 250/637: Performed by: INTERNAL MEDICINE

## 2019-04-12 PROCEDURE — 84100 ASSAY OF PHOSPHORUS: CPT

## 2019-04-12 PROCEDURE — 82962 GLUCOSE BLOOD TEST: CPT

## 2019-04-12 PROCEDURE — 97116 GAIT TRAINING THERAPY: CPT

## 2019-04-12 PROCEDURE — 85025 COMPLETE CBC W/AUTO DIFF WBC: CPT

## 2019-04-12 PROCEDURE — 94660 CPAP INITIATION&MGMT: CPT

## 2019-04-12 PROCEDURE — 65660000004 HC RM CVT STEPDOWN

## 2019-04-12 PROCEDURE — 80048 BASIC METABOLIC PNL TOTAL CA: CPT

## 2019-04-12 PROCEDURE — 94640 AIRWAY INHALATION TREATMENT: CPT

## 2019-04-12 RX ORDER — ADHESIVE BANDAGE
30 BANDAGE TOPICAL DAILY PRN
Status: DISCONTINUED | OUTPATIENT
Start: 2019-04-12 | End: 2019-04-16 | Stop reason: HOSPADM

## 2019-04-12 RX ADMIN — FLUTICASONE PROPIONATE 2 SPRAY: 50 SPRAY, METERED NASAL at 08:55

## 2019-04-12 RX ADMIN — DOCUSATE SODIUM 100 MG: 100 CAPSULE, LIQUID FILLED ORAL at 08:53

## 2019-04-12 RX ADMIN — CALCIUM CARBONATE (ANTACID) CHEW TAB 500 MG 400 MG: 500 CHEW TAB at 08:53

## 2019-04-12 RX ADMIN — IPRATROPIUM BROMIDE AND ALBUTEROL SULFATE 3 ML: .5; 3 SOLUTION RESPIRATORY (INHALATION) at 04:29

## 2019-04-12 RX ADMIN — Medication 10 ML: at 22:00

## 2019-04-12 RX ADMIN — INSULIN GLARGINE 25 UNITS: 100 INJECTION, SOLUTION SUBCUTANEOUS at 08:51

## 2019-04-12 RX ADMIN — INSULIN LISPRO 6 UNITS: 100 INJECTION, SOLUTION INTRAVENOUS; SUBCUTANEOUS at 23:13

## 2019-04-12 RX ADMIN — INSULIN LISPRO 3 UNITS: 100 INJECTION, SOLUTION INTRAVENOUS; SUBCUTANEOUS at 08:15

## 2019-04-12 RX ADMIN — ACETAMINOPHEN 650 MG: 650 SOLUTION ORAL at 23:13

## 2019-04-12 RX ADMIN — CALCIUM CARBONATE (ANTACID) CHEW TAB 500 MG 400 MG: 500 CHEW TAB at 16:32

## 2019-04-12 RX ADMIN — MAGNESIUM HYDROXIDE 30 ML: 400 SUSPENSION ORAL at 17:28

## 2019-04-12 RX ADMIN — GEMFIBROZIL 600 MG: 600 TABLET ORAL at 08:51

## 2019-04-12 RX ADMIN — HYDROMORPHONE HYDROCHLORIDE 0.5 MG: 1 INJECTION, SOLUTION INTRAMUSCULAR; INTRAVENOUS; SUBCUTANEOUS at 23:14

## 2019-04-12 RX ADMIN — ACETAMINOPHEN 650 MG: 650 SOLUTION ORAL at 08:52

## 2019-04-12 RX ADMIN — INSULIN LISPRO 3 UNITS: 100 INJECTION, SOLUTION INTRAVENOUS; SUBCUTANEOUS at 16:32

## 2019-04-12 RX ADMIN — BUDESONIDE AND FORMOTEROL FUMARATE DIHYDRATE 2 PUFF: 160; 4.5 AEROSOL RESPIRATORY (INHALATION) at 08:55

## 2019-04-12 RX ADMIN — GABAPENTIN 400 MG: 400 CAPSULE ORAL at 23:13

## 2019-04-12 RX ADMIN — ENOXAPARIN SODIUM 40 MG: 100 INJECTION SUBCUTANEOUS at 12:02

## 2019-04-12 RX ADMIN — CALCIUM CARBONATE (ANTACID) CHEW TAB 500 MG 400 MG: 500 CHEW TAB at 12:02

## 2019-04-12 RX ADMIN — IPRATROPIUM BROMIDE AND ALBUTEROL SULFATE 3 ML: .5; 3 SOLUTION RESPIRATORY (INHALATION) at 16:33

## 2019-04-12 RX ADMIN — IPRATROPIUM BROMIDE AND ALBUTEROL SULFATE 3 ML: .5; 3 SOLUTION RESPIRATORY (INHALATION) at 19:34

## 2019-04-12 RX ADMIN — ONDANSETRON 6 MG: 2 INJECTION INTRAMUSCULAR; INTRAVENOUS at 06:30

## 2019-04-12 RX ADMIN — HYDROMORPHONE HYDROCHLORIDE 0.5 MG: 1 INJECTION, SOLUTION INTRAMUSCULAR; INTRAVENOUS; SUBCUTANEOUS at 06:30

## 2019-04-12 RX ADMIN — ACETAMINOPHEN 650 MG: 650 SOLUTION ORAL at 15:00

## 2019-04-12 RX ADMIN — IPRATROPIUM BROMIDE AND ALBUTEROL SULFATE 3 ML: .5; 3 SOLUTION RESPIRATORY (INHALATION) at 08:58

## 2019-04-12 RX ADMIN — INSULIN LISPRO 3 UNITS: 100 INJECTION, SOLUTION INTRAVENOUS; SUBCUTANEOUS at 11:59

## 2019-04-12 RX ADMIN — ACETAMINOPHEN 650 MG: 650 SOLUTION ORAL at 20:17

## 2019-04-12 RX ADMIN — Medication 10 ML: at 05:02

## 2019-04-12 RX ADMIN — OXYMETAZOLINE HYDROCHLORIDE 2 SPRAY: 5 SPRAY NASAL at 08:55

## 2019-04-12 RX ADMIN — ACETAMINOPHEN 650 MG: 650 SOLUTION ORAL at 04:00

## 2019-04-12 NOTE — ROUTINE PROCESS
Bedside shift change report given to Enrique Barrett RN (oncoming nurse) by Cecelia Mcdowell RN (offgoing nurse). Report included the following information SBAR, Intake/Output and MAR.

## 2019-04-12 NOTE — PROGRESS NOTES
Bedside and Verbal shift change report given to Dimitri Rodriguez (oncoming nurse) by Chey Mi (offgoing nurse). Report included the following information SBAR and Kardex.

## 2019-04-12 NOTE — PROGRESS NOTES
NUTRITION    Nutrition Screen      RECOMMENDATIONS / PLAN:     - Update food preferences. Add prune juice daily for promotion of BM  - Monitor po intake and diet tolerance. - More aggressive bowel regimen started; milk of magnesia  - Continue RD inpatient monitoring and evaluation. NUTRITION INTERVENTIONS & DIAGNOSIS:     [x] Meals/snacks: modified composition  [x] Medical food supplement therapy: Magic Cup BID  [x] Nutrition related medication management: milk of magnesia added  [x] Collaboration and referral of nutrition care: pt and last BM discussed with Dr Danielito Post. Recommended starting more aggressive bowel regimen; MD agreed with plan    Nutrition Diagnosis: Inadequate oral intake related to respiratory status and decreased appetite as evidenced by pt on liquid diet, unable to eat while on bipap, not hungry and not motivated to eat postop. ASSESSMENT:     4/12: Pt reported poor/fair appetite. Tolerating some po. Said that she did not eat breakfast today due to having various interruptions during her meal. Working with SLP during lunch. Food preferences discussed. No BM since 4/3; colace daily. Per RN, pt passing gas. 4/10: Honey thick liquid diet started after follow up swallow evaluation today. Pt off bipap with poor appetite and no motivation to eat. Encouraged po intake and discussed importance of nutrition for postop healing and recovery. 4/8: Pt extubated and started on full liquids after swallow evaluation- SLP following. Requiring bipap and SOB- unable to eat and may require re-intubation per MD. Oral medications held, receiving IV fluid and electrolyte replacement. 4/5: S/p thyroidectomy and re-intubated postop for hypoxia, no OG/NGT placed in anticipation for weaning trial and extubation today. Plan for swallow evaluation prior to starting diet once pt extubated per MD. Propofol stopped for elevated triglyceride- 2579 mg/dL and total cholesterol of 347 mg/dL.      Average po intake adequate to meet patients estimated nutritional needs:   [] Yes     [x] No   [] Unable to determine at this time    Diet: DIET NUTRITIONAL SUPPLEMENTS Dinner, Breakfast; MAGIC CUPS  DIET DYSPHAGIA PUREED (NDD1)  2 [place]/2 Mildly Thick      Food Allergies: sweeteners and sucrose (cause headaches)  Current Appetite:   [] Good     [] Fair     [x] Poor     [] Other:   Appetite/meal intake prior to admission:   [x] Good     [] Fair     [] Poor     [] Other:   Feeding Limitations:  [x] Swallowing difficulty: SLP following    [] Chewing difficulty    [x] Other: SLP following   Current Meal Intake:   Patient Vitals for the past 100 hrs:   % Diet Eaten   04/11/19 1230 25 %   04/10/19 1853 5 %   04/10/19 0858 0 %       BM: 4/3; passing gas  Skin Integrity: neck surgical incision   Edema:   [x] No     [] Yes   Pertinent Medications: Reviewed: tums, colace, electrolyte replacement protocol, lantus, SSI, zofran    Recent Labs     04/12/19  0600 04/11/19  0400 04/10/19  0529    137 138   K 3.5 3.2* 3.1*    102 102   CO2 30 27 28   * 126* 144*   BUN 9 8 8   CREA 0.60 0.52* 0.47*   CA 8.4* 8.6 8.1*   MG 1.8 1.9 1.9   PHOS 4.7 4.1 3.8       Intake/Output Summary (Last 24 hours) at 4/12/2019 1421  Last data filed at 4/12/2019 1100  Gross per 24 hour   Intake 100 ml   Output 600 ml   Net -500 ml       Anthropometrics:  Ht Readings from Last 1 Encounters:   04/04/19 5' 7\" (1.702 m)     Last 3 Recorded Weights in this Encounter    04/10/19 0600 04/11/19 0600 04/12/19 0400   Weight: 125.5 kg (276 lb 10.8 oz) 125.9 kg (277 lb 9 oz) 119.4 kg (263 lb 3.2 oz)     Body mass index is 41.22 kg/m².   Obese, Class III     Weight History: patient denies change in weight PTA   Weight Metrics 4/12/2019 4/4/2019 4/1/2019 3/20/2019 3/13/2019 3/6/2019 2/26/2019   Weight 263 lb 3.2 oz - 270 lb 270 lb 268 lb 267 lb 267 lb   BMI - 41.22 kg/m2 42.29 kg/m2 42.29 kg/m2 41.97 kg/m2 41.82 kg/m2 41.82 kg/m2        Admitting Diagnosis: S/P thyroidectomy [Z98.890]  Pertinent PMHx: DM, HLD, heart failure, asthma, thyroid disease     Education Needs:        [x] None identified  [] Identified - Not appropriate at this time  []  Identified and addressed - refer to education log  Learning Limitations:   [x] None identified  [] Identified:   Cultural, Confucianism & ethnic food preferences:  [x] None identified    [] Identified and addressed     ESTIMATED NUTRITION NEEDS:     Calories: 1934-1836 kcal (MSJx1-1.2) based on  [x] Actual  kg     [] IBW   Protein:  gm (0.8-1 gm/kg) based on  [x] Actual BW      [] IBW   Fluid: 1 mL/kcal     MONITORING & EVALUATION:     Nutrition Goal(s): goal modified   1. Po intake of meals will meet >75% of patient estimated nutritional needs within the next 7 days.   Outcome:  [] Met/Ongoing    [x] Progressing towards goal    [] Not progressing towards goal    [] New/Initial goal      Monitoring:   [x] Food and beverage intake   [x] Diet order   [x] Nutrition-focused physical findings   [x] Treatment/therapy   [] Weight   [] Enteral nutrition intake        Previous Recommendations (for follow-up assessments only):     [x]   Implemented       []   Not Implemented (RD to address)      [] No Longer Appropriate     [] No Recommendation Made     Discharge Planning: diabetic diet as tolerated, consistency per SLP   [x] Participated in care planning, discharge planning, & interdisciplinary rounds as appropriate      Lisa Lyman, [address] [place] [address]  Pager: [phone]

## 2019-04-12 NOTE — PROGRESS NOTES
Surgery  Looks better today sitting up in bed. Voice slowly improving not having to wear CPAP  Afebrile vital signs stable  Wound looks good  Labs reviewed  Overall doing much better last 24-36 hours. Push p.o. and ambulation.   PT OT for deconditioning

## 2019-04-12 NOTE — PROGRESS NOTES
Problem: Mobility Impaired (Adult and Pediatric)  Goal: *Acute Goals and Plan of Care (Insert Text)  Description  Physical Therapy Goals  Initiated 4/7/2019 and to be accomplished within 7 day(s)  1. Patient will move from supine to sit and sit to supine , scoot up and down and roll side to side in bed with supervision/set-up. 2.  Patient will transfer from bed to chair and chair to bed with supervision/set-up using the least restrictive device. 3.  Patient will perform sit to stand with supervision/set-up. 4.  Patient will ambulate with supervision/set-up for  feet with the least restrictive device. 5.  Patient will ascend/descend 4 stairs with 1-2 handrail(s) with minimal assistance/contact guard assist.     Prior Level of Function: Independent with mobility including gait using 2 canes. Outcome: Progressing Towards Goal    PHYSICAL THERAPY TREATMENT    Patient: Sherman Gutierrez (5 y.o. female)  Date: 4/12/2019  Diagnosis: S/P thyroidectomy [Z98.890] <principal problem not specified>  Procedure(s) (LRB):  TOTAL THYROIDECTOMY (N/A) 8 Days Post-Op  Precautions: Fall(BiPAP)  PLOF: see goals section above    ASSESSMENT:  Pt was cleared by nursing to participate in therapy. Pt was received sitting up in recliner, agreeable to work with PT with participation. Pt performed sit-stand transfer with SBA and was able to ambulate 80 feet at slow pace with bilateral SPCs. Pt noted to ambulate with increased ant/post trunk sway and appeared to require increased effort to breathe, with audible \"rasping,\" but when asked, pt stated she felt fine, and was not in distress. SPO2 sats remained 95-96% on room air. Upon return to room, pt requested to use bathroom and transferred onto [facility] with supervision. Pt sat in no distress using bathroom and then performed hygiene independently. Pt transferred [address] [facility] with supervision.   At conclusion of session, pt left sitting comfortably in recliner with needs met, call bell and bi-pap in reach, nurse notified. Progression toward goals:   ?      Improving appropriately and progressing toward goals  ? Improving slowly and progressing toward goals  ? Not making progress toward goals and plan of care will be adjusted     PLAN:  Patient continues to benefit from skilled intervention to address the above impairments. Continue treatment per established plan of care. Discharge Recommendations:  Home Health  Further Equipment Recommendations for Discharge:  N/A     SUBJECTIVE:   Patient stated ?i'm so exhausted today. ?    OBJECTIVE DATA SUMMARY:   Critical Behavior:  Neurologic State: Alert  Orientation Level: Oriented X4  Cognition: Appropriate for age attention/concentration, Appropriate decision making  Safety/Judgement: Awareness of environment  Functional Mobility Training:  Transfers:  Sit to Stand: Stand-by assistance  Stand to Sit: Supervision    Balance:  Sitting: Intact  Standing: Impaired; With support(HHA)  Standing - Static: Good  Standing - Dynamic : Fair     Ambulation/Gait Training:  Distance (ft): 80 Feet (ft)  Assistive Device: (bilateral canes)  Ambulation - Level of Assistance: Stand-by assistance  Gait Abnormalities: Trunk sway increased  Base of Support: Widened  Speed/Yolis: Pace decreased (<100 feet/min)      Pain:  Pain level pre-treatment: 0/10  Pain level post-treatment: 0/10   Pain Intervention(s): Medication (see MAR); Rest, Ice, Repositioning   Response to intervention: Nurse notified, See doc flow    Activity Tolerance:   Fair  Please refer to the flowsheet for vital signs taken during this treatment. After treatment:   ? Patient left in no apparent distress sitting up in chair  ? Patient left in no apparent distress in bed  ? Call bell left within reach  ? Nursing notified  ? Caregiver present  ? Bed alarm activated  ? SCDs applied      COMMUNICATION/EDUCATION:   ?         Role of Physical Therapy in the acute care setting.   ? Fall prevention education was provided and the patient/caregiver indicated understanding. ? Patient/family have participated as able in working toward goals and plan of care. ?         Patient/family agree to work toward stated goals and plan of care. ?         Patient understands intent and goals of therapy, but is neutral about his/her participation. ? Patient is unable to participate in stated goals/plan of care: ongoing with therapy staff.   ?         Other:        Yanique Madera, PT   Time Calculation: 23 mins

## 2019-04-12 NOTE — PROGRESS NOTES
Problem: Self Care Deficits Care Plan (Adult)  Goal: *Acute Goals and Plan of Care (Insert Text)  Description  Occupational Therapy Goals  Initiated 4/8/2019 within 7 day(s). 1.  Patient will perform grooming with supervision/set-up while seated on EOB with supervision for balance. 2.  Patient will perform upper body dressing with supervision/set-up. 3.  Patient will perform lower body dressing with minimal assistance/contact guard assist.  4.  Patient will perform toilet transfers with minimal assistance/contact guard assist.  5.  Patient will participate in upper extremity therapeutic exercise/activities with supervision/set-up for 8 minutes to increase strength/endurance for ADLs. Outcome: Progressing Towards Goal   OCCUPATIONAL THERAPY TREATMENT    Patient: Rhona Austin (89 y.o. female)  Date: 4/12/2019  Diagnosis: S/P thyroidectomy [Z98.890] <principal problem not specified>  Procedure(s) (LRB):  TOTAL THYROIDECTOMY (N/A) 8 Days Post-Op  Precautions: Fall(BiPAP)  PLOF: Pt was modified independent with basic self care tasks and used two canes for functional mobility PTA. She lives with her brother and son at home. Chart, occupational therapy assessment, plan of care, and goals were reviewed. ASSESSMENT:  Pt is sitting at EOB upon entry, finishing working with RT. Pt reports she wanted to sit up in the recliner. Retrieved recliner for the pt, prior to txfr pt was able to complete mult grooming tasks seated EOB w/Set-up and RBs for breathing. Pt maneuvered to [facility] and then to the chair w/HHA. Immediately following txfr pt reports fatigue. Pt educated on EC techniques and PLB to prevent SOB and decrease fatigue. Pt verbalized and demonstrated understanding. Pt educated on scapular strengthening TherEx to improve endurance and activity tolerance for carryover w/ADLs, pt verbalized understanding. Pt was set up to finish lunch in the chair. Pt's nurse notified. Progression toward goals:  ? Improving appropriately and progressing toward goals  ? Improving slowly and progressing toward goals  ? Not making progress toward goals and plan of care will be adjusted     PLAN:  Patient continues to benefit from skilled intervention to address the above impairments. Continue treatment per established plan of care. Discharge Recommendations:  Home Health  Further Equipment Recommendations for Discharge:  bedside commode and shower chair     SUBJECTIVE:   Patient stated ? I can't wait to start breathing better. ?    OBJECTIVE DATA SUMMARY:   Cognitive/Behavioral Status:  Neurologic State: Alert  Orientation Level: Oriented X4  Cognition: Follows commands  Safety/Judgement: Fall prevention    Functional Mobility and Transfers for ADLs:    Transfers:  Sit to Stand: Stand-by assistance    Toilet Transfer : Contact guard assistance(bed-BSC-chair)      Balance:  Sitting: Intact  Standing: Impaired; With support(HHA)  Standing - Static: Good  Standing - Dynamic : Fair    ADL Intervention:  Feeding  Feeding Assistance: Set-up    Grooming  Grooming Assistance: Supervision(seated EOB)  Washing Face: Supervision/set-up  Washing Hands: Supervision/set-up  Brushing/Combing Hair: Supervision/set-up(simulated)     Lower Body Dressing Assistance  Dressing Assistance: Stand-by assistance  Socks: Stand-by assistance  UE Therapeutic Exercises:   Educated on scapular program    Pain:  Pain level pre-treatment: 0/10   Pain level post-treatment: 0/10    Activity Tolerance:    Fair  Please refer to the flowsheet for vital signs taken during this treatment. After treatment:   ?  Patient left in no apparent distress sitting up in chair  ? Patient left in no apparent distress in bed  ? Call bell left within reach  ? Nursing notified  ? Caregiver present  ? Bed alarm activated    COMMUNICATION/EDUCATION:   ? Role of Occupational Therapy in the acute care setting  ?  Home safety education was provided and the patient/caregiver indicated understanding. ? Patient/family have participated as able in working towards goals and plan of care. ? Patient/family agree to work toward stated goals and plan of care. ? Patient understands intent and goals of therapy, but is neutral about his/her participation. ? Patient is unable to participate in goal setting and plan of care.       Thank you for this referral.  VANDANA Mccoy  Time Calculation: 23 mins

## 2019-04-12 NOTE — PROGRESS NOTES
Acknowledged new PT eval order; pt currently already on caseload.   Thank you for the referral.    Genia Woodruff, PT, DPT

## 2019-04-12 NOTE — PROGRESS NOTES
Problem: Dysphagia (Adult)  Goal: *Acute Goals and Plan of Care (Insert Text)  Description  Patient will:  1. Tolerate diet upgrade without overt s/sx of aspiration under SLP supervision. 2. Utilize compensatory swallow strategies of small bite/sip, alternate liquid/solid with min cues in 4/5 trials. 3. Perform oral-motor/laryngeal elevation exercises 10 reps/each to increase oropharyngeal swallow function with min cues. 4. Complete an objective swallow study (i.e., MBSS) to assess swallow integrity, r/o aspiration, and determine of safest LRD, min A.      Rec:   Puree diet with nectar thick liquids, ice chips between meals, mechanical soft snack upon request  Meds crushed in puree  Strict Aspiration precautions  Oral care TID         Outcome: Progressing Towards Goal    SPEECH LANGUAGE PATHOLOGY DYSPHAGIA/ VOICE TREATMENT    Patient: Yudy Caro (9 y.o. female)  Date: 4/12/2019  Diagnosis: S/P thyroidectomy [Z98.890] <principal problem not specified>  Procedure(s) (LRB):  TOTAL THYROIDECTOMY (N/A) 8 Days Post-Op  Precautions: aspiration Fall(BiPAP)    ASSESSMENT:  Pt seen in her room for dysphagia and voice tx. Pt up in mansi chair, awake, alert, breathy vocal quality with improved volume, audible inhalation. Pt stated globus sensation of larger bites of puree which eventually go down with repeat swallows, denied odynophagia, or overt s/sx aspiration with meals. Pt accepted ice chips, thin via straw/ cup, puree, and mechanical soft solid. Pt apprehensive about po feedings in general but more so with soft solids. Pt eventually agreeable to try single, tiny presentation of soft solids.   Pt demo functional mastication of soft solids, intermittent oral holding of trials thin and soft solid due to fear, functional with puree solids, fairly timely- mildly delayed swallow response, suspect decreased laryngeal elevation s/p surgery and body habitus, strong cough immediately following straw sip thin, no overt s/sx aspiration following ice chip, thin via cup, puree or soft solids. Pt did report larger bite of pudding was stuck in her throat, cleared with repeat swallows. Educated pt to utilize chin tuck/ chin down posture to aid in pharyngeal clearance, breath hold with trials thin due to inability to inhale through nares. Pt verbalized understanding about chin tuck but never attempted. Educated pt to basic swallow physiology and effects surgery could have resulting in dysphagia, pt again verbalized understanding. Rec: continue puree solids with nectar thick liquids (pt feels safest on this diet), add mechanical soft snack upon pt request (nothing sugar free due to allergies). Pt able to have ice chips between meals only after cleaning mouth after each meal and waiting 30 minutes to have ice chips. Voice: Pt with breathy vocal quality s/p thyroidectomy, audible inspiration. Educated pt to belly breathing to increase breath support for which pt required min visual/ verbal cues, easy onset voice, effects of intubation and increasing vocal intensity on vocal quality, and vocal hygiene. Pt verbalized understanding. SLP will continue to follow for dysphagia and voice tx. Progression toward goals:  ?         Improving appropriately and progressing toward goals  ? Improving slowly and progressing toward goals  ? Not making progress toward goals and plan of care will be adjusted     PLAN:  Recommendations and Planned Interventions:  continue puree solids with nectar thick liquids (pt feels safest on this diet), add mechanical soft snack upon pt request (nothing sugar free due to allergies). Pt able to have ice chips between meals only after cleaning mouth after each meal and waiting 30 minutes to have ice chips. Patient continues to benefit from skilled intervention to address the above impairments. Continue treatment per established plan of care. Discharge Recommendations:   To Be Determined     SUBJECTIVE:   Patient stated ? There, it's stuck right here (pointing to her throat)? .    OBJECTIVE:   Cognitive and Communication Status:  Neurologic State: Alert  Orientation Level: Oriented X4  Cognition: Appropriate for age attention/concentration, Appropriate decision making  Perception: Appears intact  Perseveration: No perseveration noted  Safety/Judgement: Awareness of environment  Dysphagia Treatment:  Oral Assessment:  Oral Assessment  Labial: No impairment  Dentition: Natural, Intact  Oral Hygiene: adequate  Lingual: No impairment  Velum: Other (comment)(weak)  Mandible: No impairment  P.O. Trials:   Patient Position: b/s chair   Vocal quality prior to P.O.: Breathy   Consistency Presented: Thin liquid, Puree, Mechanical soft, Ice chips   How Presented: Self-fed/presented, Cup/sip, Spoon, Straw   How Much: 6   Bolus Acceptance: No impairment   Bolus Formation/Control: No impairment   Type of Impairment: Anterior, Delayed   Propulsion: No impairment   Oral Residue: None   Initiation of Swallow: Delayed (# of seconds)   Laryngeal Elevation: Other (comment)(unable to palpate due to body habitus)   Aspiration Signs/Symptoms: Strong cough(following straw sips only)   Pharyngeal Phase Characteristics: Suspected pharyngeal residue, Foreign body sensation, Multiple swallows, Poor endurance, Easily fatigued , Effortful swallow   Effective Modifications: Double swallow, Cup/sip, Small sips and bites   Cues for Modifications: Moderate         Oral Phase Severity: No impairment   Pharyngeal Phase Severity : Moderate       PAIN:  Start of Tx: 0  End of Tx: 0     After treatment:   ?              Patient left in no apparent distress sitting up in chair  ? Patient left in no apparent distress in bed  ? Call bell left within reach  ? Nursing notified  ? Family present  ? Caregiver present  ?               Bed alarm activated      COMMUNICATION/EDUCATION:   ? Aspiration precautions; swallow safety; compensatory techniques, breathing techniques  ? Patient unable to participate in education; education ongoing with staff  ? Posted safety precautions in patient's room.   ? Oral-motor/laryngeal strengthening exercises      Alex Holcomb MS, CCC/SLP  Time Calculation: 63 mins:  Dysphagia: 40 minutes  Voice: 23 minutes

## 2019-04-13 LAB
ANION GAP SERPL CALC-SCNC: 6 MMOL/L (ref 3–18)
BASOPHILS # BLD: 0 K/UL (ref 0–0.1)
BASOPHILS NFR BLD: 0 % (ref 0–2)
BUN SERPL-MCNC: 10 MG/DL (ref 7–18)
BUN/CREAT SERPL: 13 (ref 12–20)
CA-I SERPL-SCNC: 1.06 MMOL/L (ref 1.12–1.32)
CA-I SERPL-SCNC: 1.06 MMOL/L (ref 1.12–1.32)
CALCIUM SERPL-MCNC: 8.7 MG/DL (ref 8.5–10.1)
CHLORIDE SERPL-SCNC: 100 MMOL/L (ref 100–108)
CO2 SERPL-SCNC: 33 MMOL/L (ref 21–32)
CREAT SERPL-MCNC: 0.79 MG/DL (ref 0.6–1.3)
DIFFERENTIAL METHOD BLD: ABNORMAL
EOSINOPHIL # BLD: 0.1 K/UL (ref 0–0.4)
EOSINOPHIL NFR BLD: 1 % (ref 0–5)
ERYTHROCYTE [DISTWIDTH] IN BLOOD BY AUTOMATED COUNT: 15.1 % (ref 11.6–14.5)
GLUCOSE BLD STRIP.AUTO-MCNC: 221 MG/DL (ref 70–110)
GLUCOSE BLD STRIP.AUTO-MCNC: 246 MG/DL (ref 70–110)
GLUCOSE BLD STRIP.AUTO-MCNC: 286 MG/DL (ref 70–110)
GLUCOSE BLD STRIP.AUTO-MCNC: 290 MG/DL (ref 70–110)
GLUCOSE SERPL-MCNC: 242 MG/DL (ref 74–99)
HCT VFR BLD AUTO: 44 % (ref 35–45)
HGB BLD-MCNC: 13.9 G/DL (ref 12–16)
LYMPHOCYTES # BLD: 2 K/UL (ref 0.9–3.6)
LYMPHOCYTES NFR BLD: 37 % (ref 21–52)
MAGNESIUM SERPL-MCNC: 2.3 MG/DL (ref 1.6–2.6)
MCH RBC QN AUTO: 29.4 PG (ref 24–34)
MCHC RBC AUTO-ENTMCNC: 31.6 G/DL (ref 31–37)
MCV RBC AUTO: 93 FL (ref 74–97)
MONOCYTES # BLD: 0.5 K/UL (ref 0.05–1.2)
MONOCYTES NFR BLD: 9 % (ref 3–10)
NEUTS SEG # BLD: 3 K/UL (ref 1.8–8)
NEUTS SEG NFR BLD: 53 % (ref 40–73)
PHOSPHATE SERPL-MCNC: 4.4 MG/DL (ref 2.5–4.9)
PLATELET # BLD AUTO: 183 K/UL (ref 135–420)
PMV BLD AUTO: 9.2 FL (ref 9.2–11.8)
POTASSIUM SERPL-SCNC: 3.8 MMOL/L (ref 3.5–5.5)
RBC # BLD AUTO: 4.73 M/UL (ref 4.2–5.3)
SODIUM SERPL-SCNC: 139 MMOL/L (ref 136–145)
TRIGL SERPL-MCNC: 640 MG/DL (ref ?–150)
WBC # BLD AUTO: 5.6 K/UL (ref 4.6–13.2)

## 2019-04-13 PROCEDURE — 74011250637 HC RX REV CODE- 250/637: Performed by: PHYSICIAN ASSISTANT

## 2019-04-13 PROCEDURE — 82962 GLUCOSE BLOOD TEST: CPT

## 2019-04-13 PROCEDURE — 74011250636 HC RX REV CODE- 250/636: Performed by: INTERNAL MEDICINE

## 2019-04-13 PROCEDURE — 74011000250 HC RX REV CODE- 250: Performed by: INTERNAL MEDICINE

## 2019-04-13 PROCEDURE — 84478 ASSAY OF TRIGLYCERIDES: CPT

## 2019-04-13 PROCEDURE — 74011250637 HC RX REV CODE- 250/637: Performed by: INTERNAL MEDICINE

## 2019-04-13 PROCEDURE — 83735 ASSAY OF MAGNESIUM: CPT

## 2019-04-13 PROCEDURE — 74011250637 HC RX REV CODE- 250/637

## 2019-04-13 PROCEDURE — 74011000250 HC RX REV CODE- 250: Performed by: PHYSICIAN ASSISTANT

## 2019-04-13 PROCEDURE — 97535 SELF CARE MNGMENT TRAINING: CPT

## 2019-04-13 PROCEDURE — 36415 COLL VENOUS BLD VENIPUNCTURE: CPT

## 2019-04-13 PROCEDURE — 85025 COMPLETE CBC W/AUTO DIFF WBC: CPT

## 2019-04-13 PROCEDURE — 74011636637 HC RX REV CODE- 636/637

## 2019-04-13 PROCEDURE — 65660000004 HC RM CVT STEPDOWN

## 2019-04-13 PROCEDURE — 84100 ASSAY OF PHOSPHORUS: CPT

## 2019-04-13 PROCEDURE — 94640 AIRWAY INHALATION TREATMENT: CPT

## 2019-04-13 PROCEDURE — 74011250636 HC RX REV CODE- 250/636: Performed by: PHYSICIAN ASSISTANT

## 2019-04-13 PROCEDURE — 82330 ASSAY OF CALCIUM: CPT

## 2019-04-13 PROCEDURE — 94762 N-INVAS EAR/PLS OXIMTRY CONT: CPT

## 2019-04-13 PROCEDURE — 74011636637 HC RX REV CODE- 636/637: Performed by: PHYSICIAN ASSISTANT

## 2019-04-13 PROCEDURE — 97110 THERAPEUTIC EXERCISES: CPT

## 2019-04-13 PROCEDURE — 80048 BASIC METABOLIC PNL TOTAL CA: CPT

## 2019-04-13 RX ORDER — BUDESONIDE 0.5 MG/2ML
500 INHALANT ORAL
Status: DISCONTINUED | OUTPATIENT
Start: 2019-04-13 | End: 2019-04-16 | Stop reason: HOSPADM

## 2019-04-13 RX ORDER — ARFORMOTEROL TARTRATE 15 UG/2ML
15 SOLUTION RESPIRATORY (INHALATION)
Status: DISCONTINUED | OUTPATIENT
Start: 2019-04-13 | End: 2019-04-16 | Stop reason: HOSPADM

## 2019-04-13 RX ADMIN — IPRATROPIUM BROMIDE AND ALBUTEROL SULFATE 3 ML: .5; 3 SOLUTION RESPIRATORY (INHALATION) at 11:57

## 2019-04-13 RX ADMIN — GABAPENTIN 400 MG: 400 CAPSULE ORAL at 08:45

## 2019-04-13 RX ADMIN — CALCIUM CARBONATE (ANTACID) CHEW TAB 500 MG 400 MG: 500 CHEW TAB at 08:45

## 2019-04-13 RX ADMIN — ACETAMINOPHEN 650 MG: 650 SOLUTION ORAL at 11:05

## 2019-04-13 RX ADMIN — IPRATROPIUM BROMIDE AND ALBUTEROL SULFATE 3 ML: .5; 3 SOLUTION RESPIRATORY (INHALATION) at 08:31

## 2019-04-13 RX ADMIN — INSULIN GLARGINE 25 UNITS: 100 INJECTION, SOLUTION SUBCUTANEOUS at 09:00

## 2019-04-13 RX ADMIN — BUDESONIDE 500 MCG: 0.5 INHALANT RESPIRATORY (INHALATION) at 11:57

## 2019-04-13 RX ADMIN — ARFORMOTEROL TARTRATE 15 MCG: 15 SOLUTION RESPIRATORY (INHALATION) at 11:57

## 2019-04-13 RX ADMIN — Medication 10 ML: at 22:00

## 2019-04-13 RX ADMIN — INSULIN LISPRO 9 UNITS: 100 INJECTION, SOLUTION INTRAVENOUS; SUBCUTANEOUS at 22:19

## 2019-04-13 RX ADMIN — BUDESONIDE AND FORMOTEROL FUMARATE DIHYDRATE 2 PUFF: 160; 4.5 AEROSOL RESPIRATORY (INHALATION) at 08:00

## 2019-04-13 RX ADMIN — INSULIN LISPRO 9 UNITS: 100 INJECTION, SOLUTION INTRAVENOUS; SUBCUTANEOUS at 07:30

## 2019-04-13 RX ADMIN — GEMFIBROZIL 600 MG: 600 TABLET ORAL at 08:45

## 2019-04-13 RX ADMIN — CALCIUM CARBONATE (ANTACID) CHEW TAB 500 MG 400 MG: 500 CHEW TAB at 12:00

## 2019-04-13 RX ADMIN — IPRATROPIUM BROMIDE AND ALBUTEROL SULFATE 3 ML: .5; 3 SOLUTION RESPIRATORY (INHALATION) at 19:16

## 2019-04-13 RX ADMIN — ENOXAPARIN SODIUM 40 MG: 100 INJECTION SUBCUTANEOUS at 11:00

## 2019-04-13 RX ADMIN — CALCIUM CARBONATE (ANTACID) CHEW TAB 500 MG 400 MG: 500 CHEW TAB at 17:31

## 2019-04-13 RX ADMIN — INSULIN LISPRO 6 UNITS: 100 INJECTION, SOLUTION INTRAVENOUS; SUBCUTANEOUS at 17:30

## 2019-04-13 RX ADMIN — Medication 10 ML: at 05:52

## 2019-04-13 RX ADMIN — Medication 10 ML: at 14:00

## 2019-04-13 RX ADMIN — FLUTICASONE PROPIONATE 2 SPRAY: 50 SPRAY, METERED NASAL at 09:00

## 2019-04-13 RX ADMIN — ARFORMOTEROL TARTRATE 15 MCG: 15 SOLUTION RESPIRATORY (INHALATION) at 19:16

## 2019-04-13 RX ADMIN — ACETAMINOPHEN 650 MG: 650 SOLUTION ORAL at 05:50

## 2019-04-13 RX ADMIN — IPRATROPIUM BROMIDE AND ALBUTEROL SULFATE 3 ML: .5; 3 SOLUTION RESPIRATORY (INHALATION) at 00:50

## 2019-04-13 RX ADMIN — IPRATROPIUM BROMIDE AND ALBUTEROL SULFATE 3 ML: .5; 3 SOLUTION RESPIRATORY (INHALATION) at 15:32

## 2019-04-13 RX ADMIN — INSULIN LISPRO 6 UNITS: 100 INJECTION, SOLUTION INTRAVENOUS; SUBCUTANEOUS at 11:30

## 2019-04-13 RX ADMIN — HYDROMORPHONE HYDROCHLORIDE 0.5 MG: 1 INJECTION, SOLUTION INTRAMUSCULAR; INTRAVENOUS; SUBCUTANEOUS at 22:18

## 2019-04-13 RX ADMIN — IPRATROPIUM BROMIDE AND ALBUTEROL SULFATE 3 ML: .5; 3 SOLUTION RESPIRATORY (INHALATION) at 23:05

## 2019-04-13 RX ADMIN — GABAPENTIN 400 MG: 400 CAPSULE ORAL at 17:31

## 2019-04-13 RX ADMIN — GEMFIBROZIL 600 MG: 600 TABLET ORAL at 17:31

## 2019-04-13 RX ADMIN — IPRATROPIUM BROMIDE AND ALBUTEROL SULFATE 3 ML: .5; 3 SOLUTION RESPIRATORY (INHALATION) at 03:50

## 2019-04-13 RX ADMIN — BUDESONIDE 500 MCG: 0.5 INHALANT RESPIRATORY (INHALATION) at 19:16

## 2019-04-13 RX ADMIN — ACETAMINOPHEN 650 MG: 650 SOLUTION ORAL at 18:00

## 2019-04-13 RX ADMIN — ACETAMINOPHEN 650 MG: 650 SOLUTION ORAL at 14:00

## 2019-04-13 NOTE — PROGRESS NOTES
Problem: Self Care Deficits Care Plan (Adult)  Goal: *Acute Goals and Plan of Care (Insert Text)  Description  Occupational Therapy Goals  Initiated 4/8/2019 within 7 day(s). 1.  Patient will perform grooming with supervision/set-up while seated on EOB with supervision for balance. 2.  Patient will perform upper body dressing with supervision/set-up. 3.  Patient will perform lower body dressing with minimal assistance/contact guard assist.  4.  Patient will perform toilet transfers with minimal assistance/contact guard assist.  5.  Patient will participate in upper extremity therapeutic exercise/activities with supervision/set-up for 8 minutes to increase strength/endurance for ADLs. Outcome: Progressing Towards Goal   OCCUPATIONAL THERAPY TREATMENT    Patient: Zehra West (41 y.o. female)  Date: 4/13/2019  Diagnosis: S/P thyroidectomy [Z98.890] <principal problem not specified>  Procedure(s) (LRB):  TOTAL THYROIDECTOMY (N/A) 9 Days Post-Op  Precautions: Fall(BiPAP)  PLOF:Pt was modified independent with basic self care tasks and used two canes for functional mobility PTA. She lives with her brother and son at home. Chart, occupational therapy assessment, plan of care, and goals were reviewed. ASSESSMENT:  Pt is sitting up in the chair upon entry, reports feeling better today. Pt is agreeable to participate in UE TherEx to improve endurance and activity tolerance for carryover with ADLs. Pt issued yellow Thera-band. Following education and demonstration pt completed 10x TherEx in mult planes w/mult RBs to recover from SOB. Pt educated on mult energy conservation techniques to utilize in home environment, including pacing and deep breathing to prevent SOB and fatigue, and increase activity tolerance and safety w/ADLs and functional mobility, pt verbalized understanding, required Min VCs to follow throughout tasks.  Pt issued reacher and educated on use for LB ADLs and to  small household items w/o bending fwd, pt verbalized understanding. Pt was able to perform LB dressing w/SBA w/AE and is very pleased with equipment. Progression toward goals:  ?          Improving appropriately and progressing toward goals  ? Improving slowly and progressing toward goals  ? Not making progress toward goals and plan of care will be adjusted     PLAN:  Patient continues to benefit from skilled intervention to address the above impairments. Continue treatment per established plan of care. Discharge Recommendations:  Home Health  Further Equipment Recommendations for Discharge:  bedside commode and shower chair     SUBJECTIVE:   Patient stated ? [birth year]. ?    OBJECTIVE DATA SUMMARY:   Cognitive/Behavioral Status:  Neurologic State: Alert  Orientation Level: Oriented X4  Cognition: Follows commands  Safety/Judgement: Awareness of environment  ADL Intervention:       Grooming  Grooming Assistance: Supervision  Washing Face: Supervision/set-up  Washing Hands: Supervision/set-up    Upper Body [address]: Supervision/ set-up    Lower Body Dressing Assistance  Socks: Stand-by assistance  Adaptive Equipment Used: Reacher    UE Therapeutic Exercises:   See above    Pain:  Pain level pre-treatment: 0/10   Pain level post-treatment: 0/10      Activity Tolerance:    Fair  Please refer to the flowsheet for vital signs taken during this treatment. After treatment:   ?  Patient left in no apparent distress sitting up in chair  ? Patient left in no apparent distress in bed  ? Call bell left within reach  ? Nursing notified  ? Caregiver present  ? Bed alarm activated    COMMUNICATION/EDUCATION:   ? Role of Occupational Therapy in the acute care setting  ? Home safety education was provided and the patient/caregiver indicated understanding. ? Patient/family have participated as able in working towards goals and plan of care.   ? Patient/family agree to work toward stated goals and plan of care. ? Patient understands intent and goals of therapy, but is neutral about his/her participation. ? Patient is unable to participate in goal setting and plan of care.       Thank you for this referral.  VANDANA Morales  Time Calculation: 24 mins

## 2019-04-13 NOTE — PROGRESS NOTES
POD#9  Pt continues to slowly improve day by day, CPAP only at night, breathing a little easier (still with some difficulty with nasal inspiration)  AVSS    Incision c/d/i  No resp distress  Ion CA++ 1.08, stable    Stable, slowly improving s/p thyroidectomy  Cont PT/OT  OOB

## 2019-04-13 NOTE — PROGRESS NOTES
[facility]. Pulmonary, Critical Care, and Sleep Medicine    Name: Kade Renteria MRN: [id number]   : 1960 Hospital: [address] [place] [address] Dr   Date: 2019  Admission Date: 2019     Chart and notes reviewed. Data reviewed. I have evaluated all findings. [x]I have reviewed the flowsheet and previous days notes. HPI  Patient is a 62 y. o. female w/ pmhx of DM S/P thyroidectomy, by Dr. Hair Jackson, on 19. Patient has history of a multinodular goiter suspicious for malignancy, however recent biopsies have been negative. Procedure uneventful. Was extubated in PACU, but developed stridor and hypoxia, subsequently re intubated. ICU stay notable for QTc 676 on , repeat 4.5 QTc 430. Triglycerides also noted to be in 2,000's. Patient has history of hypertriglyceridemia. Extubated on  w/ some stridor that resolved w/ racemic epi and bipap. Patient utilizes bipap QHS at baseline. 19  Hospital Day: 9  POD total thyroidectomy:  8    · Complains of inability of inhalation through mouth. · Breathing is same or maybe batter compared to few days ago. · Stridor noted during conversation. Patient Vitals for the past 24 hrs:   Temp Pulse Resp BP SpO2   19 0733 97.5 °F (36.4 °C) 69 15 130/84 97 %   19 0351 -- -- -- -- 98 %   19 0330 98.7 °F (37.1 °C) 72 18 138/83 96 %   19 0051 -- -- -- -- 95 %   19 0000 99.1 °F (37.3 °C) 88 16 156/74 93 %   19 2000 99.3 °F (37.4 °C) 89 13 145/55 93 %   19 1939 -- -- -- -- 94 %   19 1900 99.3 °F (37.4 °C) -- -- -- --   19 1800 -- 77 13 148/75 93 %   19 1700 97.8 °F (36.6 °C) 81 15 152/75 95 %   19 1553 97.8 °F (36.6 °C) -- -- -- --   19 1501 -- -- -- -- 96 %   19 1400 -- 80 16 126/60 93 %   19 1114 97.7 °F (36.5 °C) 96 19 157/72 95 %   19 1100 -- 69 11 152/71 95 %                 ROS:Pertinent items are noted in HPI.     Events and notes from last 24 hours reviewed. Care plan discussed on multidisciplinary rounds. Patient Active Problem List   Diagnosis Code    Type II diabetes mellitus, uncontrolled ([facility] E11.65    Sleep apnea G47.30    H/O aortic valve replacement Z95.2    History of bicuspid aortic valve Z87.74    Chronic back pain M54.9, G89.29    Chronic left shoulder pain M25.512, G89.29    Morbid obesity (HCC) E66.01    Left shoulder tendinitis M75.82    Spondylosis of lumbar region without myelopathy or radiculopathy M47.816    Chronic pain of both shoulders M25.511, G89.29, M25.512    Chronic pain of both knees M25.561, M25.562, G89.29    Chronic pain syndrome G89.4    Encounter for long-term (current) use of medications Z79.899    Chronic midline low back pain M54.5, G89.29    Lumbar facet arthropathy M47.816    Lumbar degenerative disc disease M51.36    Venous stasis dermatitis of both lower extremities I87.2    SOB (shortness of breath) R06.02    Type 2 diabetes mellitus without complication ([facility]) A01.9    Hypothyroidism due to acquired atrophy of thyroid E03.4    Essential hypertension I10    Dyslipidemia E78.5    Mood disorder ([facility]) F39    Chronic diastolic congestive heart failure ([facility]) I50.32    Type 2 diabetes mellitus with diabetic neuropathy ([facility]) E11.40    S/P thyroidectomy Z98.890     Vital Signs:  Visit Vitals  /84   Pulse 69   Temp 97.5 °F (36.4 °C)   Resp 15   Ht 5' 7\" (1.702 m)   Wt 126.5 kg (278 lb 14.4 oz)   SpO2 97%   Breastfeeding? No   BMI 43.68 kg/m²       O2 Device: CPAP mask   O2 Flow Rate (L/min): 0 l/min   Temp (24hrs), Av.4 °F (36.9 °C), Min:97.5 °F (36.4 °C), Max:99.3 °F (37.4 °C)       Intake/Output:   Last shift:      No intake/output data recorded.   Last 3 shifts:  1901 -  0700  In: 720 [P.O.:720]  Out: 1900 [Urine:1900]    Intake/Output Summary (Last 24 hours) at 2019 1023  Last data filed at 2019 0634  Gross per 24 hour   Intake 720 ml   Output 1500 ml Net -780 ml      Current Facility-Administered Medications   Medication Dose Route Frequency    arformoterol (BROVANA) neb solution 15 mcg  15 mcg Nebulization BID RT    budesonide (PULMICORT) 500 mcg/2 ml nebulizer suspension  500 mcg Nebulization BID RT    fluticasone propionate (FLONASE) 50 mcg/actuation nasal spray 2 Spray  2 Spray Both Nostrils DAILY    gabapentin (NEURONTIN) capsule 400 mg  400 mg Oral TID    acetaminophen (TYLENOL) solution 650 mg  650 mg Oral Q4H    insulin lispro (HUMALOG) injection   SubCUTAneous AC&HS    docusate sodium (COLACE) capsule 100 mg  100 mg Oral DAILY    insulin glargine (LANTUS) injection 25 Units  25 Units SubCUTAneous DAILY    calcium carbonate (TUMS) chewable tablet 400 mg [elemental]  400 mg Oral TID WITH MEALS    gemfibrozil (LOPID) tablet 600 mg  600 mg Oral ACB&D    enoxaparin (LOVENOX) injection 40 mg  40 mg SubCUTAneous Q24H    albuterol-ipratropium (DUO-NEB) 2.5 MG-0.5 MG/3 ML  3 mL Nebulization Q4H RT    sodium chloride (NS) flush 5-40 mL  5-40 mL IntraVENous Q8H     Telemetry: NSR    Physical Exam:    General: in no apparent distress, well developed, obese   HEENT: Normal, PERRL, Small oral opening, tongue large and midline, crowded posterior oropharynx- no exudates seen   Neck: large neck with incision and steri-strips, some blood, but otherwise clean and dry   Chest: normal equal bilateral chest wall movement. Lungs: Mild wheeze bilaterally.     Heart: Regular rate and rhythm, S1S2 present or without murmur or extra heart sounds   Abdomen: Obese  non distended, bowel sounds normoactive, tympanic, abdomen is soft without significant tenderness, masses, organomegaly or guarding   Extremity: 2+ edema   Neuro: alert, oriented, answers questions appropriately   Skin: Skin color, texture, turgor normal. No rashes or lesions     DATA:  MAR reviewed and pertinent medications noted or modified as needed    Labs:  Recent Labs     04/13/19  4658 04/12/19  0600 04/11/19  0400   WBC 5.6 5.6 5.9   HGB 13.9 14.1 14.8   HCT 44.0 44.2 44.5    203 190     Recent Labs     04/13/19  0535 04/12/19  0600 04/11/19  0400    138 137   K 3.8 3.5 3.2*    101 102   CO2 33* 30 27   * 178* 126*   BUN 10 9 8   CREA 0.79 0.60 0.52*   CA 8.7 8.4* 8.6   MG 2.3 1.8 1.9   PHOS 4.4 4.7 4.1     No results for input(s): PH, PCO2, PO2, HCO3, FIO2 in the last 72 hours. No results for input(s): FIO2I, IFO2, HCO3I, IHCO3, HCOPOC, PCO2I, PCOPOC, IPHI, PHI, PHPOC, PO2I, PO2POC in the last 72 hours. No lab exists for component: IPOC2    Imaging:  [x]   I have personally reviewed the patients radiographs and reports  XR Results (most recent):  Results from Hospital Encounter encounter on 04/04/19   XR CHEST PORT    Narrative EXAM:  Chest Portable. INDICATION:  ET tube placement     COMPARISON:  04/04/19     TECHNIQUE:  Portable AP chest study    FINDINGS:      - ET tube distal tip observed at 2.4 cm above the phi.   - Both lungs are hypoinflated. - Persistent retrocardiac opacity, similar to recent prior exam.  Subtle streaky  density in the right infrahilar region. Again similar to recent prior exam.   - No pneumothorax. - Moderate cardiac silhouette enlargement with prior CABG and aortic valve  replacement. Impression IMPRESSION:    1. Borderline low-lying ET tube. 2.  No significant interval change in lung aeration pattern, with subtle streaky  densities in the lower lung zones. CT Results (most recent):  Results from Hospital Encounter encounter on 02/21/19   CT NECK SOFT TISSUE W CONT    Narrative Neck CT With Contrast    CPT CODE: 79451    HISTORY: Multinodular goiter. COMPARISON: Thyroid ultrasound 8/3/2018.  CT chest 5/5/2017    FINDINGS:     After the intravenous administration of iodinated IV contrast, a scan was  obtained from low head down to upper chest.  Patient received 80 cc Isovue 300  IV contrast. CT dose reduction was achieved through use of a standardized  protocol tailored for this examination and automatic exposure control for dose  modulation. There is some chronic appearing dental disease with periapical lucency  surrounding right mandibular molar tooth roots. Visualized lower brain appears normal.     Mucosa of the upper aerodigestive tract:  No mass lesion seen in the mucosa of  the upper aerodigestive tract. 4 mm hyperdensity in the vallecula along the  surface of the base of tongue or uvula (axial image 52). More regular than  expected for metal pellet and appears to be a calcification. Lymph Nodes: No enlarged lymph nodes in the cervical chains. Major salivary glands:  Parotid glands are relatively low density, some fatty  replacement. Both parotid glands are relatively enlarged, but there is no mass  lesion. Submandibular glands appear normal.    Thyroid: Thyroid gland is heterogeneous with multiple relatively low-attenuation  nodular densities plus multiple calcifications. Several rim calcified nodules  bilaterally, the largest is on the right, 2.3 x 1.8 cm. Posteriorly, both  thyroid lobes extend into the tracheoesophageal groove. The inferior margin of  the right thyroid lobe is at the level of the top of the right clavicular head. The left thyroid lobe extends to the level of the top of the sternal notch. No  retrosternal extension. The right thyroid lobe measures approximately 4.6 cm AP, 3.8 cm transverse, 7.4  cm SI. The left thyroid lobe is 3.6 cm AP, 3.0 cm transverse and 6.7 cm SI. Upper mediastinum: No lymphadenopathy seen in the upper mediastinum. Visualized lung apices: are clear. Bones: Mild degenerative changes in the cervical spine. Mucus retention cysts in the maxillary sinuses. Impression IMPRESSION:    1. Heterogeneous enlargement of the thyroid gland with multiple low-attenuation  and rim calcified nodules bilaterally.  Extension of thyroid tissue posteriorly  into the left and right tracheoesophageal groove. 2. The right thyroid lobe extends to the level of the reticular head. Left  thyroid lobe extends to the level of the sternal notch. 3. No neck masses. No lymphadenopathy. Thank you for this referral.     IMPRESSION:   · S/p thyroidectomy POD 8- for hx of multinodular goiter. Performed by Dr. Dedrick Rausch 4/4/19. · Acute Hypoxic Respiratory Failure, resolved   · Stridor- improving, but still present   · QTc prolongation- resolved   · Hypocalcemia- s/p thyroidectomy- on replacement protocol  · Hypertriglyceridemia - improving   · Hyperglycemia in the setting of DMII - on Sliding scale insulin   · OVIDIO- On home BIPAP 20/12   · Morbid Obesity: Body mass index is 44.23 kg/m².      Patient Active Problem List   Diagnosis Code    Type II diabetes mellitus, uncontrolled ([facility] E11.65    Sleep apnea G47.30    H/O aortic valve replacement Z95.2    History of bicuspid aortic valve Z87.74    Chronic back pain M54.9, G89.29    Chronic left shoulder pain M25.512, G89.29    Morbid obesity (HCC) E66.01    Left shoulder tendinitis M75.82    Spondylosis of lumbar region without myelopathy or radiculopathy M47.816    Chronic pain of both shoulders M25.511, G89.29, M25.512    Chronic pain of both knees M25.561, M25.562, G89.29    Chronic pain syndrome G89.4    Encounter for long-term (current) use of medications Z79.899    Chronic midline low back pain M54.5, G89.29    Lumbar facet arthropathy M47.816    Lumbar degenerative disc disease M51.36    Venous stasis dermatitis of both lower extremities I87.2    SOB (shortness of breath) R06.02    Type 2 diabetes mellitus without complication (HCC) T16.5    Hypothyroidism due to acquired atrophy of thyroid E03.4    Essential hypertension I10    Dyslipidemia E78.5    Mood disorder (HCC) F39    Chronic diastolic congestive heart failure (HCC) I50.32    Type 2 diabetes mellitus with diabetic neuropathy ([facility]) E11.40    S/P thyroidectomy Z98.890      RECOMMENDATIONS:   · Keep HOB elevated; Strict Aspiration and OVIDIO precautions  · SpO2 goal>92. Currently on room air. · Speech therapy to continue to work with patient  · Avoid conditions that can aggravate vocal cord dysfunction (VCD): airway inflammation: post nasal drip, GERD, and/or anxiety. Dr Bogdan Lyles discussed with speech therapy about working on vocal exercises for patient. · Continue BIPAP QHS and PRN  · Continue to monitor patient in the SDU  · IS at bedside  · Pending clinical course may consider NP scope to inspect vocal cords.   · Prophylaxis and post op management per primary team  · Will continue to follow       Clinical care, chart review and time spent coordinating care: 41 min    Kira Choudhary MD    [facility]  Pulmonary, Critical Care, and Sleep Medicine

## 2019-04-13 NOTE — PROGRESS NOTES
Bedside and verbal report given to Alida Messina RN, with use of kardex. Rounded on pt Q1 hour throughout shift, no s/s distress nor discomfort noted, call bell in reach, emergency trach tray at bedside.

## 2019-04-14 LAB
ANION GAP SERPL CALC-SCNC: 7 MMOL/L (ref 3–18)
BASOPHILS # BLD: 0 K/UL (ref 0–0.1)
BASOPHILS NFR BLD: 1 % (ref 0–2)
BUN SERPL-MCNC: 9 MG/DL (ref 7–18)
BUN/CREAT SERPL: 12 (ref 12–20)
CA-I SERPL-SCNC: 1.02 MMOL/L (ref 1.12–1.32)
CA-I SERPL-SCNC: 1.1 MMOL/L (ref 1.12–1.32)
CALCIUM SERPL-MCNC: 8.2 MG/DL (ref 8.5–10.1)
CHLORIDE SERPL-SCNC: 103 MMOL/L (ref 100–108)
CO2 SERPL-SCNC: 30 MMOL/L (ref 21–32)
CREAT SERPL-MCNC: 0.73 MG/DL (ref 0.6–1.3)
DIFFERENTIAL METHOD BLD: ABNORMAL
EOSINOPHIL # BLD: 0.1 K/UL (ref 0–0.4)
EOSINOPHIL NFR BLD: 2 % (ref 0–5)
ERYTHROCYTE [DISTWIDTH] IN BLOOD BY AUTOMATED COUNT: 15 % (ref 11.6–14.5)
GLUCOSE BLD STRIP.AUTO-MCNC: 198 MG/DL (ref 70–110)
GLUCOSE BLD STRIP.AUTO-MCNC: 261 MG/DL (ref 70–110)
GLUCOSE BLD STRIP.AUTO-MCNC: 277 MG/DL (ref 70–110)
GLUCOSE SERPL-MCNC: 193 MG/DL (ref 74–99)
HCT VFR BLD AUTO: 41.6 % (ref 35–45)
HGB BLD-MCNC: 13.6 G/DL (ref 12–16)
LYMPHOCYTES # BLD: 2 K/UL (ref 0.9–3.6)
LYMPHOCYTES NFR BLD: 38 % (ref 21–52)
MAGNESIUM SERPL-MCNC: 2.1 MG/DL (ref 1.6–2.6)
MCH RBC QN AUTO: 30.2 PG (ref 24–34)
MCHC RBC AUTO-ENTMCNC: 32.7 G/DL (ref 31–37)
MCV RBC AUTO: 92.4 FL (ref 74–97)
MONOCYTES # BLD: 0.7 K/UL (ref 0.05–1.2)
MONOCYTES NFR BLD: 12 % (ref 3–10)
NEUTS SEG # BLD: 2.5 K/UL (ref 1.8–8)
NEUTS SEG NFR BLD: 47 % (ref 40–73)
PHOSPHATE SERPL-MCNC: 3.7 MG/DL (ref 2.5–4.9)
PLATELET # BLD AUTO: 172 K/UL (ref 135–420)
PMV BLD AUTO: 9.2 FL (ref 9.2–11.8)
POTASSIUM SERPL-SCNC: 3.9 MMOL/L (ref 3.5–5.5)
RBC # BLD AUTO: 4.5 M/UL (ref 4.2–5.3)
SODIUM SERPL-SCNC: 140 MMOL/L (ref 136–145)
TRIGL SERPL-MCNC: 527 MG/DL (ref ?–150)
WBC # BLD AUTO: 5.3 K/UL (ref 4.6–13.2)

## 2019-04-14 PROCEDURE — 65660000004 HC RM CVT STEPDOWN

## 2019-04-14 PROCEDURE — 82330 ASSAY OF CALCIUM: CPT

## 2019-04-14 PROCEDURE — 74011250637 HC RX REV CODE- 250/637: Performed by: SURGERY

## 2019-04-14 PROCEDURE — 74011636637 HC RX REV CODE- 636/637: Performed by: PHYSICIAN ASSISTANT

## 2019-04-14 PROCEDURE — 74011000250 HC RX REV CODE- 250: Performed by: PHYSICIAN ASSISTANT

## 2019-04-14 PROCEDURE — 94640 AIRWAY INHALATION TREATMENT: CPT

## 2019-04-14 PROCEDURE — 74011250636 HC RX REV CODE- 250/636: Performed by: PHYSICIAN ASSISTANT

## 2019-04-14 PROCEDURE — 74011250637 HC RX REV CODE- 250/637: Performed by: PHYSICIAN ASSISTANT

## 2019-04-14 PROCEDURE — 82962 GLUCOSE BLOOD TEST: CPT

## 2019-04-14 PROCEDURE — 74011636637 HC RX REV CODE- 636/637

## 2019-04-14 PROCEDURE — 74011250636 HC RX REV CODE- 250/636: Performed by: INTERNAL MEDICINE

## 2019-04-14 PROCEDURE — 83735 ASSAY OF MAGNESIUM: CPT

## 2019-04-14 PROCEDURE — 74011250637 HC RX REV CODE- 250/637: Performed by: INTERNAL MEDICINE

## 2019-04-14 PROCEDURE — 36415 COLL VENOUS BLD VENIPUNCTURE: CPT

## 2019-04-14 PROCEDURE — 74011250637 HC RX REV CODE- 250/637

## 2019-04-14 PROCEDURE — 74011000250 HC RX REV CODE- 250: Performed by: INTERNAL MEDICINE

## 2019-04-14 PROCEDURE — 80048 BASIC METABOLIC PNL TOTAL CA: CPT

## 2019-04-14 PROCEDURE — 84100 ASSAY OF PHOSPHORUS: CPT

## 2019-04-14 PROCEDURE — 84478 ASSAY OF TRIGLYCERIDES: CPT

## 2019-04-14 PROCEDURE — 85025 COMPLETE CBC W/AUTO DIFF WBC: CPT

## 2019-04-14 RX ORDER — MAGNESIUM CITRATE
296 SOLUTION, ORAL ORAL
Status: DISPENSED | OUTPATIENT
Start: 2019-04-14 | End: 2019-04-15

## 2019-04-14 RX ORDER — METOPROLOL SUCCINATE 50 MG/1
TABLET, EXTENDED RELEASE ORAL
Qty: 30 TAB | Refills: 0 | Status: SHIPPED | OUTPATIENT
Start: 2019-04-14 | End: 2019-05-15 | Stop reason: SDUPTHER

## 2019-04-14 RX ORDER — LEVOTHYROXINE SODIUM 75 UG/1
75 TABLET ORAL
Status: DISCONTINUED | OUTPATIENT
Start: 2019-04-14 | End: 2019-04-14

## 2019-04-14 RX ORDER — IPRATROPIUM BROMIDE AND ALBUTEROL SULFATE 2.5; .5 MG/3ML; MG/3ML
3 SOLUTION RESPIRATORY (INHALATION)
Status: DISCONTINUED | OUTPATIENT
Start: 2019-04-14 | End: 2019-04-16 | Stop reason: HOSPADM

## 2019-04-14 RX ORDER — ALBUTEROL SULFATE 1.25 MG/3ML
1.25 SOLUTION RESPIRATORY (INHALATION)
Status: DISCONTINUED | OUTPATIENT
Start: 2019-04-14 | End: 2019-04-16 | Stop reason: HOSPADM

## 2019-04-14 RX ORDER — LEVOTHYROXINE SODIUM 75 UG/1
75 TABLET ORAL
Status: COMPLETED | OUTPATIENT
Start: 2019-04-14 | End: 2019-04-15

## 2019-04-14 RX ORDER — FACIAL-BODY WIPES
10 EACH TOPICAL DAILY PRN
Status: DISCONTINUED | OUTPATIENT
Start: 2019-04-14 | End: 2019-04-16 | Stop reason: HOSPADM

## 2019-04-14 RX ORDER — LEVOTHYROXINE SODIUM 100 UG/1
100 TABLET ORAL
Status: DISCONTINUED | OUTPATIENT
Start: 2019-04-16 | End: 2019-04-15

## 2019-04-14 RX ADMIN — CALCIUM CARBONATE (ANTACID) CHEW TAB 500 MG 400 MG: 500 CHEW TAB at 17:20

## 2019-04-14 RX ADMIN — GEMFIBROZIL 600 MG: 600 TABLET ORAL at 10:58

## 2019-04-14 RX ADMIN — INSULIN LISPRO 9 UNITS: 100 INJECTION, SOLUTION INTRAVENOUS; SUBCUTANEOUS at 17:19

## 2019-04-14 RX ADMIN — LEVOTHYROXINE SODIUM 75 MCG: 100 TABLET ORAL at 10:59

## 2019-04-14 RX ADMIN — IPRATROPIUM BROMIDE AND ALBUTEROL SULFATE 3 ML: .5; 3 SOLUTION RESPIRATORY (INHALATION) at 09:26

## 2019-04-14 RX ADMIN — INSULIN GLARGINE 25 UNITS: 100 INJECTION, SOLUTION SUBCUTANEOUS at 10:54

## 2019-04-14 RX ADMIN — GEMFIBROZIL 600 MG: 600 TABLET ORAL at 17:19

## 2019-04-14 RX ADMIN — IPRATROPIUM BROMIDE AND ALBUTEROL SULFATE 3 ML: .5; 3 SOLUTION RESPIRATORY (INHALATION) at 14:00

## 2019-04-14 RX ADMIN — Medication 10 ML: at 22:00

## 2019-04-14 RX ADMIN — GABAPENTIN 400 MG: 400 CAPSULE ORAL at 10:58

## 2019-04-14 RX ADMIN — BUDESONIDE 500 MCG: 0.5 INHALANT RESPIRATORY (INHALATION) at 23:41

## 2019-04-14 RX ADMIN — ARFORMOTEROL TARTRATE 15 MCG: 15 SOLUTION RESPIRATORY (INHALATION) at 23:41

## 2019-04-14 RX ADMIN — Medication 10 MG: at 20:34

## 2019-04-14 RX ADMIN — BUDESONIDE 500 MCG: 0.5 INHALANT RESPIRATORY (INHALATION) at 09:26

## 2019-04-14 RX ADMIN — HYDROMORPHONE HYDROCHLORIDE 0.5 MG: 1 INJECTION, SOLUTION INTRAMUSCULAR; INTRAVENOUS; SUBCUTANEOUS at 12:00

## 2019-04-14 RX ADMIN — Medication 10 ML: at 17:21

## 2019-04-14 RX ADMIN — INSULIN LISPRO 9 UNITS: 100 INJECTION, SOLUTION INTRAVENOUS; SUBCUTANEOUS at 12:02

## 2019-04-14 RX ADMIN — IPRATROPIUM BROMIDE AND ALBUTEROL SULFATE 3 ML: .5; 3 SOLUTION RESPIRATORY (INHALATION) at 23:41

## 2019-04-14 RX ADMIN — FLUTICASONE PROPIONATE 2 SPRAY: 50 SPRAY, METERED NASAL at 11:16

## 2019-04-14 RX ADMIN — Medication 10 ML: at 06:00

## 2019-04-14 RX ADMIN — ENOXAPARIN SODIUM 40 MG: 100 INJECTION SUBCUTANEOUS at 10:56

## 2019-04-14 RX ADMIN — ACETAMINOPHEN 650 MG: 650 SOLUTION ORAL at 17:19

## 2019-04-14 RX ADMIN — CALCIUM CARBONATE (ANTACID) CHEW TAB 500 MG 400 MG: 500 CHEW TAB at 10:58

## 2019-04-14 RX ADMIN — ACETAMINOPHEN 650 MG: 650 SOLUTION ORAL at 10:57

## 2019-04-14 RX ADMIN — DOCUSATE SODIUM 100 MG: 100 CAPSULE, LIQUID FILLED ORAL at 10:58

## 2019-04-14 RX ADMIN — ACETAMINOPHEN 650 MG: 650 SOLUTION ORAL at 23:41

## 2019-04-14 RX ADMIN — CALCIUM CARBONATE (ANTACID) CHEW TAB 500 MG 400 MG: 500 CHEW TAB at 12:01

## 2019-04-14 NOTE — PROGRESS NOTES
[facility]. Pulmonary, Critical Care, and Sleep Medicine    Name: Ailyn Leon MRN: [id number]   : 1960 Hospital: [address] [place] [address] Dr   Date: 2019  Admission Date: 2019     Chart and notes reviewed. Data reviewed. I have evaluated all findings. [x]I have reviewed the flowsheet and previous days notes. HPI  Patient is a 62 y. o. female w/ pmhx of DM S/P thyroidectomy, by Dr. Guillermo Montez, on 19. Patient has history of a multinodular goiter suspicious for malignancy, however recent biopsies have been negative. Procedure uneventful. Was extubated in PACU, but developed stridor and hypoxia, subsequently re intubated. ICU stay notable for QTc 676 on , repeat 4.5 QTc 430. Triglycerides also noted to be in 2,000's. Patient has history of hypertriglyceridemia. Extubated on  w/ some stridor that resolved w/ racemic epi and bipap. Patient utilizes bipap QHS at baseline. 19  Hospital Day: 9  POD total thyroidectomy:  8    · Complains of increasing SOB. · No wheeze. · HDS. · No overnight issues.    ·     Patient Vitals for the past 24 hrs:   Temp Pulse Resp BP SpO2   19 1300 -- 72 15 150/79 96 %   19 1200 -- 70 15 153/70 97 %   19 1132 98.1 °F (36.7 °C) 73 15 151/65 95 %   19 1100 -- 67 14 151/65 96 %   19 0900 -- 76 14 126/65 96 %   19 0800 -- 68 13 128/65 97 %   19 0701 97.8 °F (36.6 °C) 75 16 126/71 99 %   19 0700 -- 73 -- 126/71 97 %   19 0400 -- 68 12 117/53 97 %   19 0320 97.7 °F (36.5 °C) -- -- -- --   19 0300 97.7 °F (36.5 °C) 71 15 113/69 97 %   19 0100 97.1 °F (36.2 °C) 76 14 111/55 96 %   19 2331 97.1 °F (36.2 °C) -- -- -- --   19 2307 -- -- -- -- 97 %   19 2300 -- 86 19 (!) 128/95 95 %   19 1945 97.1 °F (36.2 °C) 93 18 (!) 122/96 96 %   19 1918 -- -- -- -- 96 %   19 1630 -- -- -- 122/73 --   19 1626 98.3 °F (36.8 °C) 81 17 (!) 138/97 95 %                 ROS:Pertinent items are noted in HPI. Events and notes from last 24 hours reviewed. Care plan discussed on multidisciplinary rounds. Patient Active Problem List   Diagnosis Code    Type [facility] mellitus, uncontrolled [facility] E11.65    Sleep apnea G47.30    H/O aortic valve replacement Z95.2    History of bicuspid aortic valve Z87.74    Chronic back pain M54.9, G89.29    Chronic left shoulder pain M25.512, G89.29    Morbid obesity (HCC) E66.01    Left shoulder tendinitis M75.82    Spondylosis of lumbar region without myelopathy or radiculopathy M47.816    Chronic pain of both shoulders M25.511, G89.29, M25.512    Chronic pain of both knees M25.561, M25.562, G89.29    Chronic pain syndrome G89.4    Encounter for long-term (current) use of medications Z79.899    Chronic midline low back pain M54.5, G89.29    Lumbar facet arthropathy M47.816    Lumbar degenerative disc disease M51.36    Venous stasis dermatitis of both lower extremities I87.2    SOB (shortness of breath) R06.02    Type 2 diabetes mellitus without complication ([facility]) M99.6    Hypothyroidism due to acquired atrophy of thyroid E03.4    Essential hypertension I10    Dyslipidemia E78.5    Mood disorder ([facility]) F39    Chronic diastolic congestive heart failure ([facility]) I50.32    Type 2 diabetes mellitus with diabetic neuropathy ([facility]) E11.40    S/P thyroidectomy Z98.890     Vital Signs:  Visit Vitals  /79   Pulse 72   Temp 98.1 °F (36.7 °C)   Resp 15   Ht 5' 7\" (1.702 m)   Wt 122.5 kg (270 lb)   SpO2 96%   Breastfeeding?  No   BMI 42.29 kg/m²       O2 Device: Room air   O2 Flow Rate (L/min): 0 l/min   Temp (24hrs), Av.6 °F (36.4 °C), Min:97.1 °F (36.2 °C), Max:98.3 °F (36.8 °C)       Intake/Output:   Last shift:      701 - 1900  In: 120 [P.O.:120]  Out: 300 [Urine:300]  Last 3 shifts:  1901 -  0700  In: 840 [P.O.:840]  Out: 2900 [Urine:2900]    Intake/Output Summary (Last 24 hours) at 4/14/2019 1336  Last data filed at 4/14/2019 1259  Gross per 24 hour   Intake 240 ml   Output 1000 ml   Net -760 ml      Current Facility-Administered Medications   Medication Dose Route Frequency    levothyroxine (SYNTHROID) tablet 75 mcg  75 mcg Oral 6am    [START ON 4/16/2019] levothyroxine (SYNTHROID) tablet 100 mcg  100 mcg Oral 6am    arformoterol (BROVANA) neb solution 15 mcg  15 mcg Nebulization BID RT    budesonide (PULMICORT) 500 mcg/2 ml nebulizer suspension  500 mcg Nebulization BID RT    fluticasone propionate (FLONASE) 50 mcg/actuation nasal spray 2 Spray  2 Spray Both Nostrils DAILY    gabapentin (NEURONTIN) capsule 400 mg  400 mg Oral TID    acetaminophen (TYLENOL) solution 650 mg  650 mg Oral Q4H    insulin lispro (HUMALOG) injection   SubCUTAneous AC&HS    docusate sodium (COLACE) capsule 100 mg  100 mg Oral DAILY    insulin glargine (LANTUS) injection 25 Units  25 Units SubCUTAneous DAILY    calcium carbonate (TUMS) chewable tablet 400 mg [elemental]  400 mg Oral TID WITH MEALS    gemfibrozil (LOPID) tablet 600 mg  600 mg Oral ACB&D    enoxaparin (LOVENOX) injection 40 mg  40 mg SubCUTAneous Q24H    albuterol-ipratropium (DUO-NEB) 2.5 MG-0.5 MG/3 ML  3 mL Nebulization Q4H RT    sodium chloride (NS) flush 5-40 mL  5-40 mL IntraVENous Q8H     Telemetry: NSR    Physical Exam:    General: in no apparent distress, well developed, obese   HEENT: Normal, PERRL, Small oral opening, tongue large and midline, crowded posterior oropharynx- no exudates seen   Neck: large neck with incision and steri-strips, some blood, but otherwise clean and dry   Chest: normal equal bilateral chest wall movement. Lungs: Mild wheeze bilaterally.     Heart: Regular rate and rhythm, S1S2 present or without murmur or extra heart sounds   Abdomen: Obese  non distended, bowel sounds normoactive, tympanic, abdomen is soft without significant tenderness, masses, organomegaly or guarding   Extremity: 2+ edema   Neuro: alert, oriented, answers questions appropriately   Skin: Skin color, texture, turgor normal. No rashes or lesions     DATA:  MAR reviewed and pertinent medications noted or modified as needed    Labs:  Recent Labs     04/14/19  0714 04/13/19  0535 04/12/19  0600   WBC 5.3 5.6 5.6   HGB 13.6 13.9 14.1   HCT 41.6 44.0 44.2    183 203     Recent Labs     04/14/19  0542 04/13/19  0535 04/12/19  0600    139 138   K 3.9 3.8 3.5    100 101   CO2 30 33* 30   * 242* 178*   BUN 9 10 9   CREA 0.73 0.79 0.60   CA 8.2* 8.7 8.4*   MG 2.1 2.3 1.8   PHOS 3.7 4.4 4.7     No results for input(s): PH, PCO2, PO2, HCO3, FIO2 in the last 72 hours. No results for input(s): FIO2I, IFO2, HCO3I, IHCO3, HCOPOC, PCO2I, PCOPOC, IPHI, PHI, PHPOC, PO2I, PO2POC in the last 72 hours. No lab exists for component: IPOC2    Imaging:  [x]   I have personally reviewed the patients radiographs and reports  XR Results (most recent):  Results from Hospital Encounter encounter on 04/04/19   XR CHEST PORT    Narrative EXAM:  Chest Portable. INDICATION:  ET tube placement     COMPARISON:  04/04/19     TECHNIQUE:  Portable AP chest study    FINDINGS:      - ET tube distal tip observed at 2.4 cm above the phi.   - Both lungs are hypoinflated. - Persistent retrocardiac opacity, similar to recent prior exam.  Subtle streaky  density in the right infrahilar region. Again similar to recent prior exam.   - No pneumothorax. - Moderate cardiac silhouette enlargement with prior CABG and aortic valve  replacement. Impression IMPRESSION:    1. Borderline low-lying ET tube. 2.  No significant interval change in lung aeration pattern, with subtle streaky  densities in the lower lung zones. CT Results (most recent):  Results from Hospital Encounter encounter on 02/21/19   CT NECK SOFT TISSUE W CONT    Narrative Neck CT With Contrast    CPT CODE: 32490    HISTORY: Multinodular goiter.     COMPARISON: Thyroid ultrasound 8/3/2018. CT chest 5/5/2017    FINDINGS:     After the intravenous administration of iodinated IV contrast, a scan was  obtained from low head down to upper chest.  Patient received 80 cc Isovue 300  IV contrast. CT dose reduction was achieved through use of a standardized  protocol tailored for this examination and automatic exposure control for dose  modulation. There is some chronic appearing dental disease with periapical lucency  surrounding right mandibular molar tooth roots. Visualized lower brain appears normal.     Mucosa of the upper aerodigestive tract:  No mass lesion seen in the mucosa of  the upper aerodigestive tract. 4 mm hyperdensity in the vallecula along the  surface of the base of tongue or uvula (axial image 52). More regular than  expected for metal pellet and appears to be a calcification. Lymph Nodes: No enlarged lymph nodes in the cervical chains. Major salivary glands:  Parotid glands are relatively low density, some fatty  replacement. Both parotid glands are relatively enlarged, but there is no mass  lesion. Submandibular glands appear normal.    Thyroid: Thyroid gland is heterogeneous with multiple relatively low-attenuation  nodular densities plus multiple calcifications. Several rim calcified nodules  bilaterally, the largest is on the right, 2.3 x 1.8 cm. Posteriorly, both  thyroid lobes extend into the tracheoesophageal groove. The inferior margin of  the right thyroid lobe is at the level of the top of the right clavicular head. The left thyroid lobe extends to the level of the top of the sternal notch. No  retrosternal extension. The right thyroid lobe measures approximately 4.6 cm AP, 3.8 cm transverse, 7.4  cm SI. The left thyroid lobe is 3.6 cm AP, 3.0 cm transverse and 6.7 cm SI. Upper mediastinum: No lymphadenopathy seen in the upper mediastinum. Visualized lung apices: are clear.     Bones: Mild degenerative changes in the cervical spine.    Mucus retention cysts in the maxillary sinuses. Impression IMPRESSION:    1. Heterogeneous enlargement of the thyroid gland with multiple low-attenuation  and rim calcified nodules bilaterally. Extension of thyroid tissue posteriorly  into the left and right tracheoesophageal groove. 2. The right thyroid lobe extends to the level of the reticular head. Left  thyroid lobe extends to the level of the sternal notch. 3. No neck masses. No lymphadenopathy. Thank you for this referral.     IMPRESSION:   · S/p thyroidectomy POD 8- for hx of multinodular goiter. Performed by Dr. Gila Ventura 4/4/19. · Acute Hypoxic Respiratory Failure, resolved   · Stridor- improving, but still present. Did not notice any stridor while patient is asleep. Become prominent when awake and examined. · QTc prolongation- resolved   · Hypocalcemia- s/p thyroidectomy- on replacement protocol  · Hypertriglyceridemia - improving   · Hyperglycemia in the setting of DMII - on Sliding scale insulin   · OVIDIO- On home BIPAP 20/12   · Morbid Obesity: Body mass index is 44.23 kg/m².      Patient Active Problem List   Diagnosis Code    Type II diabetes mellitus, uncontrolled ([facility] E11.65    Sleep apnea G47.30    H/O aortic valve replacement Z95.2    History of bicuspid aortic valve Z87.74    Chronic back pain M54.9, G89.29    Chronic left shoulder pain M25.512, G89.29    Morbid obesity (HCC) E66.01    Left shoulder tendinitis M75.82    Spondylosis of lumbar region without myelopathy or radiculopathy M47.816    Chronic pain of both shoulders M25.511, G89.29, M25.512    Chronic pain of both knees M25.561, M25.562, G89.29    Chronic pain syndrome G89.4    Encounter for long-term (current) use of medications Z79.899    Chronic midline low back pain M54.5, G89.29    Lumbar facet arthropathy M47.816    Lumbar degenerative disc disease M51.36    Venous stasis dermatitis of both lower extremities I87.2    SOB (shortness of breath) R06.02    Type 2 diabetes mellitus without complication (HCC) H93.2    Hypothyroidism due to acquired atrophy of thyroid E03.4    Essential hypertension I10    Dyslipidemia E78.5    Mood disorder (HCC) F39    Chronic diastolic congestive heart failure (HCC) I50.32    Type 2 diabetes mellitus with diabetic neuropathy (HCC) E11.40    S/P thyroidectomy Z98.890      RECOMMENDATIONS:   · Keep HOB elevated; Strict Aspiration and OVIDIO precautions  · SpO2 goal>92. Currently on room air. · Speech therapy to continue to work with patient  · Avoid conditions that can aggravate vocal cord dysfunction (VCD): airway inflammation: post nasal drip, GERD, and/or anxiety. Dr Tish Fletcher discussed with speech therapy about working on vocal exercises for patient. · Continue BIPAP QHS and PRN  · Restarted levothyroxine. · Check TSH after 6 weeks. · IS at bedside  · Pending clinical course may consider NP scope to inspect vocal cords.   · Prophylaxis and post op management per primary team  · Will continue to follow       Clinical care, chart review and time spent coordinating care: 32 min    Gloria Humphries MD    [facility]  Pulmonary, Critical Care, and Sleep Medicine

## 2019-04-14 NOTE — PROGRESS NOTES
[facility]   ADULT PROTOCOL: JET AEROSOL ASSESSMENT    Patient  Ailyn Leon     63 y.o.   female     4/14/2019  2:25 PM    Breath Sounds Pre Procedure:  Breath Sounds Bilateral: Diminished                                            Breath Sounds Post Procedure: Breath Sounds Bilateral: Diminished                                               Breathing pattern: Pre procedure  Breathing Pattern: Regular          Post procedure  Breathing Pattern: Regular    Cough: Pre procedure  Cough: Non-productive               Post procedure Cough: Non-productive    Heart Rate: Pre procedure Pulse: 72           Post procedure Pulse: 76    Resp Rate: Pre procedure  Respirations: 16           Post procedure          Nebulizer Therapy: Current medications Aerosolized Medications: DuoNeb       Problem List:   Patient Active Problem List   Diagnosis Code    Type [facility] mellitus, uncontrolled [facility] E11.65    Sleep apnea G47.30    H/O aortic valve replacement Z95.2    History of bicuspid aortic valve Z87.74    Chronic back pain M54.9, G89.29    Chronic left shoulder pain M25.512, G89.29    Morbid obesity (HCC) E66.01    Left shoulder tendinitis M75.82    Spondylosis of lumbar region without myelopathy or radiculopathy M47.816    Chronic pain of both shoulders M25.511, G89.29, M25.512    Chronic pain of both knees M25.561, M25.562, G89.29    Chronic pain syndrome G89.4    Encounter for long-term (current) use of medications Z79.899    Chronic midline low back pain M54.5, G89.29    Lumbar facet arthropathy M47.816    Lumbar degenerative disc disease M51.36    Venous stasis dermatitis of both lower extremities I87.2    SOB (shortness of breath) R06.02    Type 2 diabetes mellitus without complication (HCC) A21.9    Hypothyroidism due to acquired atrophy of thyroid E03.4    Essential hypertension I10    Dyslipidemia E78.5    Mood disorder (HCC) F39    Chronic diastolic congestive heart failure (HCC) I50.32    Type 2 diabetes mellitus with diabetic neuropathy (HCC) E11.40    S/P thyroidectomy Z98.890       Patient alert and cooperative to use MDI: Yes    Home Respiratory Therapy Regimen/Frequency:  YES  Medication Proventil  Device Nebulizer  Frequency Q4 PRN    SEVERITY INDEX:    ITEM 0 1 2 3 4 Score   Respiratory Pattern and or Rate Regular  10-19 Regular  20-24   24-30    30-34 Severe SOB or   Greater than 35 0   Breath Sounds Clear Occasional Wheeze Mild Wheezing Moderate Wheezing  wheezing/Absent breath sounds 1   Shortness of Breath None Dyspnea on Exertion Dyspnea at Rest Moderate Shortness of Breath at Rest Severe Shortness of Breath - Limited Speech 0       Total Score:  1    * Scoring Guidelines  0-4 pts:  PRN-BID   5-7 pts:  BID, TID, QID  8-9 pts:  TID, QID, Q6  10-12 pts:  Q4-Q6  * - Guidelines used with clinical judgement. PRN Treatments can be ordered to supplement scheduled treatments. Regardless of score, frequency should not be less than normal home regimen.     Recommended Order/Frequency: Q6 WA Duoneb  Comments:          Respiratory Therapist: Cheyanne Aguilar RT

## 2019-04-14 NOTE — PROGRESS NOTES
POD#10  Pt continues to slowly improve day by day, CPAP only at night, breathing a little easier (still with some difficulty with nasal inspiration)  AVSS     Incision c/d/i  No resp distress  Ion CA++ 1.02, stable, WBC nl     Stable, slowly improving s/p thyroidectomy  Cont PT/OT  OOB

## 2019-04-15 LAB
ANION GAP SERPL CALC-SCNC: 9 MMOL/L (ref 3–18)
BASOPHILS # BLD: 0 K/UL (ref 0–0.1)
BASOPHILS NFR BLD: 1 % (ref 0–2)
BUN SERPL-MCNC: 7 MG/DL (ref 7–18)
BUN/CREAT SERPL: 10 (ref 12–20)
CA-I SERPL-SCNC: 1.07 MMOL/L (ref 1.12–1.32)
CA-I SERPL-SCNC: 1.07 MMOL/L (ref 1.12–1.32)
CALCIUM SERPL-MCNC: 8.6 MG/DL (ref 8.5–10.1)
CHLORIDE SERPL-SCNC: 102 MMOL/L (ref 100–108)
CO2 SERPL-SCNC: 27 MMOL/L (ref 21–32)
CREAT SERPL-MCNC: 0.72 MG/DL (ref 0.6–1.3)
DIFFERENTIAL METHOD BLD: ABNORMAL
EOSINOPHIL # BLD: 0.1 K/UL (ref 0–0.4)
EOSINOPHIL NFR BLD: 1 % (ref 0–5)
ERYTHROCYTE [DISTWIDTH] IN BLOOD BY AUTOMATED COUNT: 15 % (ref 11.6–14.5)
GLUCOSE BLD STRIP.AUTO-MCNC: 145 MG/DL (ref 70–110)
GLUCOSE BLD STRIP.AUTO-MCNC: 216 MG/DL (ref 70–110)
GLUCOSE BLD STRIP.AUTO-MCNC: 225 MG/DL (ref 70–110)
GLUCOSE BLD STRIP.AUTO-MCNC: 228 MG/DL (ref 70–110)
GLUCOSE BLD STRIP.AUTO-MCNC: 268 MG/DL (ref 70–110)
GLUCOSE SERPL-MCNC: 159 MG/DL (ref 74–99)
HCT VFR BLD AUTO: 42.5 % (ref 35–45)
HGB BLD-MCNC: 13.5 G/DL (ref 12–16)
LYMPHOCYTES # BLD: 2.3 K/UL (ref 0.9–3.6)
LYMPHOCYTES NFR BLD: 27 % (ref 21–52)
MAGNESIUM SERPL-MCNC: 2.1 MG/DL (ref 1.6–2.6)
MCH RBC QN AUTO: 29.5 PG (ref 24–34)
MCHC RBC AUTO-ENTMCNC: 31.8 G/DL (ref 31–37)
MCV RBC AUTO: 92.8 FL (ref 74–97)
MONOCYTES # BLD: 0.6 K/UL (ref 0.05–1.2)
MONOCYTES NFR BLD: 7 % (ref 3–10)
NEUTS SEG # BLD: 5.7 K/UL (ref 1.8–8)
NEUTS SEG NFR BLD: 64 % (ref 40–73)
PHOSPHATE SERPL-MCNC: 3.2 MG/DL (ref 2.5–4.9)
PLATELET # BLD AUTO: 206 K/UL (ref 135–420)
PMV BLD AUTO: 9.2 FL (ref 9.2–11.8)
POTASSIUM SERPL-SCNC: 3.8 MMOL/L (ref 3.5–5.5)
RBC # BLD AUTO: 4.58 M/UL (ref 4.2–5.3)
SODIUM SERPL-SCNC: 138 MMOL/L (ref 136–145)
TRIGL SERPL-MCNC: 514 MG/DL (ref ?–150)
WBC # BLD AUTO: 8.8 K/UL (ref 4.6–13.2)

## 2019-04-15 PROCEDURE — 74011636637 HC RX REV CODE- 636/637

## 2019-04-15 PROCEDURE — 82330 ASSAY OF CALCIUM: CPT

## 2019-04-15 PROCEDURE — 74011250637 HC RX REV CODE- 250/637: Performed by: PHYSICIAN ASSISTANT

## 2019-04-15 PROCEDURE — 65660000000 HC RM CCU STEPDOWN

## 2019-04-15 PROCEDURE — 36415 COLL VENOUS BLD VENIPUNCTURE: CPT

## 2019-04-15 PROCEDURE — 74011250637 HC RX REV CODE- 250/637: Performed by: INTERNAL MEDICINE

## 2019-04-15 PROCEDURE — 82962 GLUCOSE BLOOD TEST: CPT

## 2019-04-15 PROCEDURE — 74011000250 HC RX REV CODE- 250: Performed by: INTERNAL MEDICINE

## 2019-04-15 PROCEDURE — 92507 TX SP LANG VOICE COMM INDIV: CPT

## 2019-04-15 PROCEDURE — 85025 COMPLETE CBC W/AUTO DIFF WBC: CPT

## 2019-04-15 PROCEDURE — 83735 ASSAY OF MAGNESIUM: CPT

## 2019-04-15 PROCEDURE — 74011636637 HC RX REV CODE- 636/637: Performed by: PHYSICIAN ASSISTANT

## 2019-04-15 PROCEDURE — 74011250636 HC RX REV CODE- 250/636: Performed by: INTERNAL MEDICINE

## 2019-04-15 PROCEDURE — 84100 ASSAY OF PHOSPHORUS: CPT

## 2019-04-15 PROCEDURE — 92526 ORAL FUNCTION THERAPY: CPT

## 2019-04-15 PROCEDURE — 97116 GAIT TRAINING THERAPY: CPT

## 2019-04-15 PROCEDURE — 80048 BASIC METABOLIC PNL TOTAL CA: CPT

## 2019-04-15 PROCEDURE — 74011250637 HC RX REV CODE- 250/637

## 2019-04-15 PROCEDURE — 94640 AIRWAY INHALATION TREATMENT: CPT

## 2019-04-15 PROCEDURE — 84478 ASSAY OF TRIGLYCERIDES: CPT

## 2019-04-15 PROCEDURE — 74011250636 HC RX REV CODE- 250/636: Performed by: PHYSICIAN ASSISTANT

## 2019-04-15 RX ORDER — LEVOTHYROXINE SODIUM 100 UG/1
100 TABLET ORAL
Status: DISCONTINUED | OUTPATIENT
Start: 2019-04-16 | End: 2019-04-16 | Stop reason: HOSPADM

## 2019-04-15 RX ORDER — LEVOTHYROXINE SODIUM 100 UG/1
100 TABLET ORAL
Status: DISCONTINUED | OUTPATIENT
Start: 2019-04-15 | End: 2019-04-15

## 2019-04-15 RX ADMIN — LEVOTHYROXINE SODIUM 75 MCG: 100 TABLET ORAL at 08:55

## 2019-04-15 RX ADMIN — BUDESONIDE 500 MCG: 0.5 INHALANT RESPIRATORY (INHALATION) at 08:18

## 2019-04-15 RX ADMIN — Medication 10 ML: at 06:00

## 2019-04-15 RX ADMIN — GEMFIBROZIL 600 MG: 600 TABLET ORAL at 18:01

## 2019-04-15 RX ADMIN — ACETAMINOPHEN 650 MG: 650 SOLUTION ORAL at 13:37

## 2019-04-15 RX ADMIN — HYDROMORPHONE HYDROCHLORIDE 0.5 MG: 1 INJECTION, SOLUTION INTRAMUSCULAR; INTRAVENOUS; SUBCUTANEOUS at 00:42

## 2019-04-15 RX ADMIN — HYDROMORPHONE HYDROCHLORIDE 0.5 MG: 1 INJECTION, SOLUTION INTRAMUSCULAR; INTRAVENOUS; SUBCUTANEOUS at 21:50

## 2019-04-15 RX ADMIN — ENOXAPARIN SODIUM 40 MG: 100 INJECTION SUBCUTANEOUS at 10:27

## 2019-04-15 RX ADMIN — IPRATROPIUM BROMIDE AND ALBUTEROL SULFATE 3 ML: .5; 3 SOLUTION RESPIRATORY (INHALATION) at 08:18

## 2019-04-15 RX ADMIN — INSULIN LISPRO 9 UNITS: 100 INJECTION, SOLUTION INTRAVENOUS; SUBCUTANEOUS at 13:23

## 2019-04-15 RX ADMIN — Medication 10 ML: at 21:44

## 2019-04-15 RX ADMIN — BUDESONIDE 500 MCG: 0.5 INHALANT RESPIRATORY (INHALATION) at 20:16

## 2019-04-15 RX ADMIN — ACETAMINOPHEN 650 MG: 650 SOLUTION ORAL at 21:44

## 2019-04-15 RX ADMIN — ARFORMOTEROL TARTRATE 15 MCG: 15 SOLUTION RESPIRATORY (INHALATION) at 08:18

## 2019-04-15 RX ADMIN — ACETAMINOPHEN 650 MG: 650 SOLUTION ORAL at 10:29

## 2019-04-15 RX ADMIN — DOCUSATE SODIUM 100 MG: 100 CAPSULE, LIQUID FILLED ORAL at 08:56

## 2019-04-15 RX ADMIN — FLUTICASONE PROPIONATE 2 SPRAY: 50 SPRAY, METERED NASAL at 09:10

## 2019-04-15 RX ADMIN — GEMFIBROZIL 600 MG: 600 TABLET ORAL at 08:56

## 2019-04-15 RX ADMIN — GABAPENTIN 400 MG: 400 CAPSULE ORAL at 21:43

## 2019-04-15 RX ADMIN — IPRATROPIUM BROMIDE AND ALBUTEROL SULFATE 3 ML: .5; 3 SOLUTION RESPIRATORY (INHALATION) at 14:11

## 2019-04-15 RX ADMIN — ACETAMINOPHEN 650 MG: 650 SOLUTION ORAL at 18:01

## 2019-04-15 RX ADMIN — IPRATROPIUM BROMIDE AND ALBUTEROL SULFATE 3 ML: .5; 3 SOLUTION RESPIRATORY (INHALATION) at 20:16

## 2019-04-15 RX ADMIN — INSULIN LISPRO 6 UNITS: 100 INJECTION, SOLUTION INTRAVENOUS; SUBCUTANEOUS at 21:43

## 2019-04-15 RX ADMIN — INSULIN GLARGINE 25 UNITS: 100 INJECTION, SOLUTION SUBCUTANEOUS at 10:26

## 2019-04-15 RX ADMIN — Medication 10 ML: at 18:02

## 2019-04-15 RX ADMIN — CALCIUM CARBONATE (ANTACID) CHEW TAB 500 MG 400 MG: 500 CHEW TAB at 18:06

## 2019-04-15 RX ADMIN — CALCIUM CARBONATE (ANTACID) CHEW TAB 500 MG 400 MG: 500 CHEW TAB at 08:55

## 2019-04-15 RX ADMIN — ARFORMOTEROL TARTRATE 15 MCG: 15 SOLUTION RESPIRATORY (INHALATION) at 20:16

## 2019-04-15 RX ADMIN — CALCIUM CARBONATE (ANTACID) CHEW TAB 500 MG 400 MG: 500 CHEW TAB at 13:37

## 2019-04-15 RX ADMIN — INSULIN LISPRO 9 UNITS: 100 INJECTION, SOLUTION INTRAVENOUS; SUBCUTANEOUS at 18:02

## 2019-04-15 RX ADMIN — INSULIN LISPRO 6 UNITS: 100 INJECTION, SOLUTION INTRAVENOUS; SUBCUTANEOUS at 00:41

## 2019-04-15 NOTE — PROGRESS NOTES
Problem: Mobility Impaired (Adult and Pediatric)  Goal: *Acute Goals and Plan of Care (Insert Text)  Description  Physical Therapy Goals  Initiated 4/7/2019 and to be accomplished within 7 day(s)  1. Patient will move from supine to sit and sit to supine , scoot up and down and roll side to side in bed with supervision/set-up. 2.  Patient will transfer from bed to chair and chair to bed with supervision/set-up using the least restrictive device. 3.  Patient will perform sit to stand with supervision/set-up. 4.  Patient will ambulate with supervision/set-up for  feet with the least restrictive device. 5.  Patient will ascend/descend 4 stairs with 1-2 handrail(s) with minimal assistance/contact guard assist.     Prior Level of Function: Independent with mobility including gait using 2 canes. Outcome: Progressing Towards Goal   PHYSICAL THERAPY TREATMENT    Patient: Aliyah Junior (88 y.o. female)  Date: 4/15/2019  Diagnosis: S/P thyroidectomy [Z98.890] <principal problem not specified>  Procedure(s) (LRB):  TOTAL THYROIDECTOMY (N/A) 11 Days Post-Op  Precautions: Fall(BiPAP)    ASSESSMENT:  Nursing cleared pt to participate with PT. Pt found seated in recliner chair willing to work with PT. Pt requested to use bathroom prior to OOB activity. Pt stood from recliner SBA and ambulated 20ft to bathroom with 2 SPC's. Pt demonstrated an increased anterior/posterior trunk sway and widened shraddha. Pt performed toileting with Supervision. Pt then ambulated into hallway 40ft CGA. Pt became very SOB with audible rasping breath sounds. Pt was instructed to turn around and return to recliner chair in room. Pt's SaO2 upon returning 99%. Educated pt on importance of activity pacing. Pt stated that she was able to walk much further last tx with less SOB. Pt was positioned to comfort in recliner chair and was left seated with all needs in reach and nursing (RN, Jenene Leyden) notified. Progression toward goals:   ? Improving appropriately and progressing toward goals  ? Improving slowly and progressing toward goals  ? Not making progress toward goals and plan of care will be adjusted     PLAN:  Patient continues to benefit from skilled intervention to address the above impairments. Continue treatment per established plan of care. Discharge Recommendations:  Home Health with assistance  Further Equipment Recommendations for Discharge:  none     SUBJECTIVE:   Patient stated ? I was able to walk further last time? OBJECTIVE DATA SUMMARY:   Critical Behavior:  Neurologic State: Alert  Orientation Level: Appropriate for age  Cognition: Follows commands  Safety/Judgement: Awareness of environment  Functional Mobility Training:  Transfers:  Sit to Stand: Stand-by assistance  Stand to Sit: Stand-by assistance  Balance:  Sitting: Intact  Standing: Impaired; With support  Standing - Static: Good  Standing - Dynamic : Fair  Ambulation/Gait Training:  Distance (ft): 60 Feet (ft)(40ft and 20ft)  Assistive Device: Cane, straight(bilateral SPC)  Ambulation - Level of Assistance: Contact guard assistance;Stand-by assistance  Gait Abnormalities: Trunk sway increased  Base of Support: Center of gravity altered; Widened  Speed/Yolis: Pace decreased (<100 feet/min); Slow  Step Length: Right shortened;Left shortened  Interventions: Safety awareness training;Verbal cues  Pain:  Pain level pre-treatment: 5/10  Pain level post-treatment: 5/10   Activity Tolerance:   Fair-  Please refer to the flowsheet for vital signs taken during this treatment. After treatment:   ? Patient left in no apparent distress sitting up in chair  ? Patient left in no apparent distress in bed  ? Call bell left within reach  ? Nursing notified  ? Caregiver present  ? Bed alarm activated  ? SCDs applied      COMMUNICATION/EDUCATION:   ?         Role of Physical Therapy in the acute care setting.   ?         Fall prevention education was provided and the patient/caregiver indicated understanding. ? Patient/family have participated as able in working toward goals and plan of care. ?         Patient/family agree to work toward stated goals and plan of care. ?         Patient understands intent and goals of therapy, but is neutral about his/her participation. ? Patient is unable to participate in stated goals/plan of care: ongoing with therapy staff.   ?         Other:        Taisha Pham, PTA   Time Calculation: 23 mins

## 2019-04-15 NOTE — PROGRESS NOTES
Bedside and Verbal shift change report given to Kelly Chapman RN (oncoming nurse) by Luana Piper RN (offgoing nurse). Report included the following information SBAR, Kardex, MAR and Recent Results. 2002- Report to [facility] of [address] [place] [address]. Report included the following information SBAR, Kardex, MAR and Recent Results.  Night shift CVT stepdown will transfer patient to floor.

## 2019-04-15 NOTE — PROGRESS NOTES
[facility]. Pulmonary, Critical Care, and Sleep Medicine    Name: Ailyn Leon MRN: [id number]   : 1960 Hospital: [facility]   Date: 4/15/2019  Admission Date: 2019     Chart and notes reviewed. Data reviewed. I have evaluated all findings. [x]I have reviewed the flowsheet and previous days notes. HPI  Patient is a 62 y. o. female w/ pmhx of DM S/P thyroidectomy, by Dr. Guillermo Montez, on 19. Patient has history of a multinodular goiter suspicious for malignancy, however recent biopsies have been negative. Procedure uneventful. Was extubated in PACU, but developed stridor and hypoxia, subsequently re intubated. ICU stay notable for QTc 676 on , repeat 4.5 QTc 430. Triglycerides also noted to be in 2,000's. Patient has history of hypertriglyceridemia. Extubated on  w/ some stridor that resolved w/ racemic epi and bipap. Patient utilizes bipap QHS at baseline. 04/15/19  Hospital Day: 9  POD total thyroidectomy:  8    · Complains of issues with sniffing   · Noted some anxiety. · No stridor or wheeze with the CPAP  · No overnight issues. Patient Vitals for the past 24 hrs:   Temp Pulse Resp BP SpO2   04/15/19 0000 97.7 °F (36.5 °C) 88 16 151/88 100 %   19 98.5 °F (36.9 °C) 73 16 164/69 95 %   19 1621 98.5 °F (36.9 °C) 80 20 139/80 97 %   19 1600 -- 85 21 139/80 96 %   19 1500 -- 72 16 146/63 97 %   19 1400 -- 70 12 149/58 96 %   19 1300 -- 72 15 150/79 96 %   19 1200 -- 70 15 153/70 97 %   19 1132 98.1 °F (36.7 °C) 73 15 151/65 95 %   19 1100 -- 67 14 151/65 96 %                 ROS:Pertinent items are noted in HPI. Events and notes from last 24 hours reviewed. Care plan discussed on multidisciplinary rounds.   Patient Active Problem List   Diagnosis Code    [facility], [facility] E11.65    Sleep apnea G47.30    H/O aortic valve replacement Z95.2    History of bicuspid aortic valve Z87.74    Chronic back pain M54.9, G89.29    Chronic left shoulder pain M25.512, G89.29    Morbid obesity ([facility]) E66.01    Left shoulder tendinitis M75.82    Spondylosis of lumbar region without myelopathy or radiculopathy M47.816    Chronic pain of both shoulders M25.511, G89.29, M25.512    Chronic pain of both knees M25.561, M25.562, G89.29    Chronic pain syndrome G89.4    Encounter for long-term (current) use of medications Z79.899    Chronic midline low back pain M54.5, G89.29    Lumbar facet arthropathy M47.816    Lumbar degenerative disc disease M51.36    Venous stasis dermatitis of both lower extremities I87.2    SOB (shortness of breath) R06.02    Type 2 diabetes mellitus without complication ([facility]) H87.7    Hypothyroidism due to acquired atrophy of thyroid E03.4    Essential hypertension I10    Dyslipidemia E78.5    Mood disorder ([facility]) F39    Chronic diastolic congestive heart failure ([facility]) I50.32    Type 2 diabetes mellitus with diabetic neuropathy ([facility]) E11.40    S/P thyroidectomy Z98.890     Vital Signs:  Visit Vitals  /88 (BP 1 Location: Right arm, BP Patient Position: At rest)   Pulse 88   Temp 97.7 °F (36.5 °C)   Resp 16   Ht 5' 7\" (1.702 m)   Wt 122.5 kg (270 lb)   SpO2 100%   Breastfeeding? No   BMI 42.29 kg/m²       O2 Device: Room air   O2 Flow Rate (L/min): 0 l/min   Temp (24hrs), Av.2 °F (36.8 °C), Min:97.7 °F (36.5 °C), Max:98.5 °F (36.9 °C)       Intake/Output:   Last shift:      No intake/output data recorded.   Last 3 shifts:  1901 - 04/15 0700  In: 240 [P.O.:240]  Out: 700 [Urine:700]    Intake/Output Summary (Last 24 hours) at 4/15/2019 0926  Last data filed at 2019 1818  Gross per 24 hour   Intake 240 ml   Output --   Net 240 ml      Current Facility-Administered Medications   Medication Dose Route Frequency    levothyroxine (SYNTHROID) tablet 100 mcg  100 mcg Oral ACB    albuterol-ipratropium (DUO-NEB) 2.5 MG-0.5 MG/3 ML  3 mL Nebulization Q6HWA RT    magnesium citrate solution 296 mL  296 mL Oral NOW    arformoterol (BROVANA) neb solution 15 mcg  15 mcg Nebulization BID RT    budesonide (PULMICORT) 500 mcg/2 ml nebulizer suspension  500 mcg Nebulization BID RT    fluticasone propionate (FLONASE) 50 mcg/actuation nasal spray 2 Spray  2 Spray Both Nostrils DAILY    gabapentin (NEURONTIN) capsule 400 mg  400 mg Oral TID    acetaminophen (TYLENOL) solution 650 mg  650 mg Oral Q4H    insulin lispro (HUMALOG) injection   SubCUTAneous AC&HS    docusate sodium (COLACE) capsule 100 mg  100 mg Oral DAILY    insulin glargine (LANTUS) injection 25 Units  25 Units SubCUTAneous DAILY    calcium carbonate (TUMS) chewable tablet 400 mg [elemental]  400 mg Oral TID WITH MEALS    gemfibrozil (LOPID) tablet 600 mg  600 mg Oral ACB&D    enoxaparin (LOVENOX) injection 40 mg  40 mg SubCUTAneous Q24H    sodium chloride (NS) flush 5-40 mL  5-40 mL IntraVENous Q8H     Telemetry: NSR    Physical Exam:    General: in no apparent distress, well developed, obese   HEENT: Normal, PERRL, Small oral opening, tongue large and midline, crowded posterior oropharynx- no exudates seen   Neck: large neck with incision and steri-strips, some blood, but otherwise clean and dry; stridor noted. Chest: normal equal bilateral chest wall movement. Lungs: CTAB; no wheeze.     Heart: Regular rate and rhythm, S1S2 present or without murmur or extra heart sounds   Abdomen: Obese  non distended, bowel sounds normoactive, tympanic, abdomen is soft without significant tenderness, masses, organomegaly or guarding   Extremity: 2+ edema   Neuro: alert, oriented, answers questions appropriately   Skin: Skin color, texture, turgor normal. No rashes or lesions     DATA:  MAR reviewed and pertinent medications noted or modified as needed    Labs:  Recent Labs     04/15/19  0544 04/14/19  0714 04/13/19  0535   WBC 8.8 5.3 5.6   HGB 13.5 13.6 13.9   HCT 42.5 41.6 44.0   PLT 206 172 183     Recent Labs     04/15/19  0544 04/14/19  0542 04/13/19  0535    140 139   K 3.8 3.9 3.8    103 100   CO2 27 30 33*   * 193* 242*   BUN 7 9 10   CREA 0.72 0.73 0.79   CA 8.6 8.2* 8.7   MG 2.1 2.1 2.3   PHOS 3.2 3.7 4.4     No results for input(s): PH, PCO2, PO2, HCO3, FIO2 in the last 72 hours. No results for input(s): FIO2I, IFO2, HCO3I, IHCO3, HCOPOC, PCO2I, PCOPOC, IPHI, PHI, PHPOC, PO2I, PO2POC in the last 72 hours. No lab exists for component: IPOC2    Imaging:  [x]   I have personally reviewed the patients radiographs and reports  XR Results (most recent):  Results from Hospital Encounter encounter on 04/04/19   XR CHEST PORT    Narrative EXAM:  Chest Portable. INDICATION:  ET tube placement     COMPARISON:  04/04/19     TECHNIQUE:  Portable AP chest study    FINDINGS:      - ET tube distal tip observed at 2.4 cm above the phi.   - Both lungs are hypoinflated. - Persistent retrocardiac opacity, similar to recent prior exam.  Subtle streaky  density in the right infrahilar region. Again similar to recent prior exam.   - No pneumothorax. - Moderate cardiac silhouette enlargement with prior CABG and aortic valve  replacement. Impression IMPRESSION:    1. Borderline low-lying ET tube. 2.  No significant interval change in lung aeration pattern, with subtle streaky  densities in the lower lung zones. CT Results (most recent):  Results from Hospital Encounter encounter on 02/21/19   CT NECK SOFT TISSUE W CONT    Narrative Neck CT With Contrast    CPT CODE: 09152    HISTORY: Multinodular goiter. COMPARISON: Thyroid ultrasound 8/3/2018.  CT chest 5/5/2017    FINDINGS:     After the intravenous administration of iodinated IV contrast, a scan was  obtained from low head down to upper chest.  Patient received 80 cc Isovue 300  IV contrast. CT dose reduction was achieved through use of a standardized  protocol tailored for this examination and automatic exposure control for dose  modulation. There is some chronic appearing dental disease with periapical lucency  surrounding right mandibular molar tooth roots. Visualized lower brain appears normal.     Mucosa of the upper aerodigestive tract:  No mass lesion seen in the mucosa of  the upper aerodigestive tract. 4 mm hyperdensity in the vallecula along the  surface of the base of tongue or uvula (axial image 52). More regular than  expected for metal pellet and appears to be a calcification. Lymph Nodes: No enlarged lymph nodes in the cervical chains. Major salivary glands:  Parotid glands are relatively low density, some fatty  replacement. Both parotid glands are relatively enlarged, but there is no mass  lesion. Submandibular glands appear normal.    Thyroid: Thyroid gland is heterogeneous with multiple relatively low-attenuation  nodular densities plus multiple calcifications. Several rim calcified nodules  bilaterally, the largest is on the right, 2.3 x 1.8 cm. Posteriorly, both  thyroid lobes extend into the tracheoesophageal groove. The inferior margin of  the right thyroid lobe is at the level of the top of the right clavicular head. The left thyroid lobe extends to the level of the top of the sternal notch. No  retrosternal extension. The right thyroid lobe measures approximately 4.6 cm AP, 3.8 cm transverse, 7.4  cm SI. The left thyroid lobe is 3.6 cm AP, 3.0 cm transverse and 6.7 cm SI. Upper mediastinum: No lymphadenopathy seen in the upper mediastinum. Visualized lung apices: are clear. Bones: Mild degenerative changes in the cervical spine. Mucus retention cysts in the maxillary sinuses. Impression IMPRESSION:    1. Heterogeneous enlargement of the thyroid gland with multiple low-attenuation  and rim calcified nodules bilaterally. Extension of thyroid tissue posteriorly  into the left and right tracheoesophageal groove.   2. The right thyroid lobe extends to the level of the reticular head. Left  thyroid lobe extends to the level of the sternal notch. 3. No neck masses. No lymphadenopathy. Thank you for this referral.     IMPRESSION:   · S/p thyroidectomy POD 10- for hx of multinodular goiter. Performed by Dr. Emmy Penaloza 4/4/19. · Acute Hypoxic Respiratory Failure, resolved   · Stridor- improving, but still present. Did not notice any stridor while patient is asleep and on positive pressure. Become prominent when awake without positive pressure. · QTc prolongation- resolved   · Hypocalcemia- s/p thyroidectomy- on replacement protocol  · Hypertriglyceridemia - improving   · Hyperglycemia in the setting of DMII - on Sliding scale insulin   · OVIDIO- On home BIPAP 20/12   · Morbid Obesity: Body mass index is 44.23 kg/m².      Patient Active Problem List   Diagnosis Code    Type II diabetes mellitus, uncontrolled ([facility] E11.65    Sleep apnea G47.30    H/O aortic valve replacement Z95.2    History of bicuspid aortic valve Z87.74    Chronic back pain M54.9, G89.29    Chronic left shoulder pain M25.512, G89.29    Morbid obesity ([facility]) E66.01    Left shoulder tendinitis M75.82    Spondylosis of lumbar region without myelopathy or radiculopathy M47.816    Chronic pain of both shoulders M25.511, G89.29, M25.512    Chronic pain of both knees M25.561, M25.562, G89.29    Chronic pain syndrome G89.4    Encounter for long-term (current) use of medications Z79.899    Chronic midline low back pain M54.5, G89.29    Lumbar facet arthropathy M47.816    Lumbar degenerative disc disease M51.36    Venous stasis dermatitis of both lower extremities I87.2    SOB (shortness of breath) R06.02    Type 2 diabetes mellitus without complication ([facility]) M55.9    Hypothyroidism due to acquired atrophy of thyroid E03.4    Essential hypertension I10    Dyslipidemia E78.5    Mood disorder ([facility]) F39    Chronic diastolic congestive heart failure ([facility]) I50.32    Type 2 diabetes mellitus with diabetic neuropathy ([facility]) E11.40    S/P thyroidectomy Z98.890      RECOMMENDATIONS:   · Keep HOB elevated; Strict Aspiration and OVIDIO precautions  · SpO2 goal>92. Currently on room air. · Speech therapy to continue to work with patient  · Avoid conditions that can aggravate vocal cord dysfunction (VCD): airway inflammation: post nasal drip, GERD, and/or anxiety. Dr Claire Paris discussed with speech therapy about working on vocal exercises for patient. · Continue positive pressure QHS and PRN  · Started levothyroxine. Check TSH after 6 weeks once the steady state achieved. · IS at bedside  · Continue calcium replacement per protocol. · Prophylaxis and post op management per primary team  · Can be transferred out to the floor. · Needs PT/OT to work with her.    · Will continue to follow       Clinical care, chart review and time spent coordinating care: 31 min    Arturo Lam MD    [facility]  Pulmonary, Critical Care, and Sleep Medicine

## 2019-04-15 NOTE — DIABETES MGMT
Glycemic Control Plan of Care    T2DM with current A1c of 8.4% (2/26/2019). See separate notes, 4/15/2019, for assessment of home diabetes management and education. Home diabetes medications: Patient reported on 4/15/2019:  Lantus insulin 90 units daily at bedtime. Jardiance 25 mg once daily every morning. POC BG range on 4/14/2019: 198-277 mg/dL. POC BG report on 4/15/2019 at time of review: 216, 145 mg/dL. Recommendation(s):  1.) Continue inpatient glucemic monitoring and current insulin orders: basal lantus and very resistant dose of correctional lispro insulin ACHS. Assessment:  Patient is 63year old with past medical history including type 2 diabetes mellitus with neuropathy, sleep apnea, morbid obesity, hypertension, dyslipidemia, and chronic diastolic CHF - was admitted on 4/05/2019 after surgery. Noted:  Goiter. Status post total thyroidectomy on 4/04/2019. Acute respiratory failure, resolved. OVIDIO. Morbid obesity. T2DM with current A1c of 8.4% (2/26/2019). Most recent blood glucose values:    Results for Alejandra Sarabia (MRN [id number]) as of 4/15/2019 11:18   Ref. Range 4/14/2019 07:02 4/14/2019 11:34 4/14/2019 16:18 4/14/2019 16:22   GLUCOSE,FAST - POC Latest Ref Range: 70 - 110 mg/dL 198 (H) 261 (H)  277 (H)     Results for Alejandra Sarabia (MRN [id number]) as of 4/15/2019 11:18   Ref. Range 4/15/2019 00:04 4/15/2019 05:44 4/15/2019 08:53 4/15/2019 11:13   GLUCOSE,FAST - POC Latest Ref Range: 70 - 110 mg/dL 216 (H)  145 (H) 268 (H)     Current A1C: 8.4% (2/26/2019) which is equivalent to estimated average blood glucose of 203 mg/dL during the past 2-3 months. Current hospital diabetes medications:  Basal lantus insulin 25 units daily. Correctional lispro insulin ACHS. Very resistant dose.     Total daily dose insulin requirement previous day: 4/14/2019:  Lantus: 25 units  Lispro: 18 units  TDD insulin: 43 units    Home diabetes medications: Patient reported on 4/15/2019:  Lantus insulin 90 units daily at bedtime. Jardiance 25 mg once daily every morning. Diet:  Dysphagia mech altered 2 nectar/2 mildly thick. Goals:  Blood glucose will be within target range of  mg/dL by 4/18/2019.     Education:  _X__  Refer to Diabetes Education Record: 4/15/2019             ___  Education not indicated at this time    Ansley Aviles RN [facility]  Pager: [phone]

## 2019-04-15 NOTE — PROGRESS NOTES
NUTRITION    Nutrition Screen      RECOMMENDATIONS / PLAN:     - Discontinue oral nutritional supplements. - Continue RD inpatient monitoring and evaluation. NUTRITION INTERVENTIONS & DIAGNOSIS:     [x] Meals/snacks: modified composition  [x] Medical food supplement therapy: Magic Cup BID- discontinue     Nutrition Diagnosis: No nutrition diagnosis at this time. ASSESSMENT:     4/15: Tolerating diet with good appetite, consuming most of meals. Denies nausea/vomiting, constipation resolved after BM yesterday. Noted plan for MBS per SLP.      4/12: Pt reported poor/fair appetite. Tolerating some po. Said that she did not eat breakfast today due to having various interruptions during her meal. Working with SLP during lunch. Food preferences discussed. No BM since 4/3; colace daily. Per RN, pt passing gas. 4/10: Honey thick liquid diet started after follow up swallow evaluation today. Pt off bipap with poor appetite and no motivation to eat. Encouraged po intake and discussed importance of nutrition for postop healing and recovery. 4/8: Pt extubated and started on full liquids after swallow evaluation- SLP following. Requiring bipap and SOB- unable to eat and may require re-intubation per MD. Oral medications held, receiving IV fluid and electrolyte replacement. 4/5: S/p thyroidectomy and re-intubated postop for hypoxia, no OG/NGT placed in anticipation for weaning trial and extubation today. Plan for swallow evaluation prior to starting diet once pt extubated per MD. Propofol stopped for elevated triglyceride- 2579 mg/dL and total cholesterol of 347 mg/dL.      Average po intake adequate to meet patients estimated nutritional needs:   [x] Yes     [] No   [] Unable to determine at this time    Diet: DIET NUTRITIONAL SUPPLEMENTS Dinner, Breakfast; MAGIC CUPS  DIET DYSPHAGIA MECH ALTERED (NDD2) 2 [place]/2 Mildly Thick      Food Allergies: sweeteners and sucrose (cause headaches)  Current Appetite:   [x] Good     [] Fair     [] Poor     [] Other:   Appetite/meal intake prior to admission:   [x] Good     [] Fair     [] Poor     [] Other:   Feeding Limitations:  [x] Swallowing difficulty: dysphagia    [] Chewing difficulty    [x] Other: SLP following   Current Meal Intake:   Patient Vitals for the past 100 hrs:   % Diet Eaten   04/14/19 1818 100 %   04/14/19 1259 98 %   04/13/19 1725 98 %   04/11/19 1230 25 %       BM: 4/14  Skin Integrity: neck surgical incision   Edema:   [] No     [x] Yes   Pertinent Medications: Reviewed: tums, colace, electrolyte replacement protocol, lantus, SSI, zofran, milk of magnesia     Recent Labs     04/15/19  0544 04/14/19  0542 04/13/19  0535    140 139   K 3.8 3.9 3.8    103 100   CO2 27 30 33*   * 193* 242*   BUN 7 9 10   CREA 0.72 0.73 0.79   CA 8.6 8.2* 8.7   MG 2.1 2.1 2.3   PHOS 3.2 3.7 4.4       Intake/Output Summary (Last 24 hours) at 4/15/2019 1451  Last data filed at 4/14/2019 1818  Gross per 24 hour   Intake 120 ml   Output --   Net 120 ml       Anthropometrics:  Ht Readings from Last 1 Encounters:   04/04/19 5' 7\" (1.702 m)     Last 3 Recorded Weights in this Encounter    04/12/19 0400 04/13/19 0330 04/14/19 0320   Weight: 119.4 kg (263 lb 3.2 oz) 126.5 kg (278 lb 14.4 oz) 122.5 kg (270 lb)     Body mass index is 42.29 kg/m².   Obese, Class III     Weight History: patient denies change in weight PTA   Weight Metrics 4/14/2019 4/4/2019 4/1/2019 3/20/2019 3/13/2019 3/6/2019 2/26/2019   Weight 270 lb - 270 lb 270 lb 268 lb 267 lb 267 lb   BMI - 42.29 kg/m2 42.29 kg/m2 42.29 kg/m2 41.97 kg/m2 41.82 kg/m2 41.82 kg/m2        Admitting Diagnosis: S/P thyroidectomy [Z98.890]  Pertinent PMHx: DM, HLD, heart failure, asthma, thyroid disease     Education Needs:        [x] None identified  [] Identified - Not appropriate at this time  []  Identified and addressed - refer to education log  Learning Limitations:   [x] None identified  [] Identified:   Cultural, Evangelical & ethnic food preferences:  [x] None identified    [] Identified and addressed     ESTIMATED NUTRITION NEEDS:     Calories: 3558-4571 kcal (MSJx1-1.2) based on  [x] Actual  kg     [] IBW   Protein:  gm (0.8-1 gm/kg) based on  [x] Actual BW      [] IBW   Fluid: 1 mL/kcal     MONITORING & EVALUATION:     Nutrition Goal(s):   1. Po intake of meals will meet >75% of patient estimated nutritional needs within the next 7 days.   Outcome:  [x] Met/Ongoing    [] Progressing towards goal    [] Not progressing towards goal    [] New/Initial goal      Monitoring:   [x] Food and beverage intake   [x] Diet order   [x] Nutrition-focused physical findings   [x] Treatment/therapy   [] Weight   [] Enteral nutrition intake        Previous Recommendations (for follow-up assessments only):     [x]   Implemented       []   Not Implemented (RD to address)      [] No Longer Appropriate     [] No Recommendation Made     Discharge Planning: diabetic diet as tolerated, consistency per SLP   [x] Participated in care planning, discharge planning, & interdisciplinary rounds as appropriate      Iftikhar Major, [address]   Pager: [phone]

## 2019-04-15 NOTE — PROGRESS NOTES
Problem: Dysphagia (Adult)  Goal: *Acute Goals and Plan of Care (Insert Text)  Description  Patient will:  1. Tolerate diet upgrade without overt s/sx of aspiration under SLP supervision. 2. Utilize compensatory swallow strategies of small bite/sip, alternate liquid/solid with min cues in 4/5 trials. 3. Perform oral-motor/laryngeal elevation exercises 10 reps/each to increase oropharyngeal swallow function with min cues. 4. Complete an objective swallow study (i.e., MBSS) to assess swallow integrity, r/o aspiration, and determine of safest LRD, min A.      Rec:   [facility] soft diet with nectar thick liquids, ice chips between meals  Meds crushed in puree  Strict Aspiration precautions  Oral care TID  MBS to further assess oropharyngeal function and rule out silent aspiration          4/15/2019 1059 by Andrés Barrios  Outcome: [address] TREATMENT    Patient: Josselyn Andrews (83 y.o. female)  Date: 4/15/2019  Primary Diagnosis: S/P thyroidectomy [Z98.890]  Procedure(s) (LRB):  TOTAL THYROIDECTOMY (N/A) 11 Days Post-Op   Precautions: Aspiration  Fall(BiPAP)    PLOF: As per H&P    ASSESSMENT :  Pt was seen at bedside for follow up dysphagia treatment. Pt sitting in chair, alert and able to follow all commands. Pt ate 100% of breakfast tray, no noted difficulty. Pt states she is still having difficulty breathing through nose, which she feels impacts her swallowing. Pt also notes globus sensation when taking larger pills. Pt tolerated nectar thick liquid +straw and ice chips with timely swallow initiation, and no overt s/sx of aspiration, unable to palpate due to complete thyroidectomy scar. Pt states she coughs and chokes when she drinks thin liquids. Pt given trial of solid food, demo mildly prolonged mastication with delayed A-P transfer.  Pt noted feeling of cracker getting stuck, and able to wash down with NTL wash, no overt s/sx of aspiration noted. Rec advancement to [facility] soft diet with nectar thick liquids, meds in puree, aspiration precautions, oral care TID, and an MBS when cleared by MD to further assess oropharyngeal function and rule out aspiration. Discussed with pt, verbalized understanding. SLP to follow. Voice Tx:  Pt seen at bedside for voice tx. Pt educated on use of diaphragmatic breathing and vocal hygiene strategies, verbalized understanding. Pt demonstrated mindful diaphragmatic breathing with minimal verbal cues from SLP for ~2 minutes. SLP to follow. Progression toward goals:  ?         Improving appropriately and progressing toward goals  ? Improving slowly and progressing toward goals  ? Not making progress toward goals and plan of care will be adjusted      PLAN:  Recommendations and Planned Interventions: See above  Patient continues to benefit from skilled intervention to address the above impairments. Continue treatment per established plan of care. Discharge Recommendations:  Home Health, Outpatient and To Be Determined     SUBJECTIVE:   Patient stated no one believes me that things get stuck.     OBJECTIVE:     Past Medical History:   Diagnosis Date    Arthritis     Lower back     Asthma     Chronic back pain     Lower back pain    Depression     Diabetes ([address]     Diabetes mellitus ([address]     Hearing loss     Heart failure (HCC)     chronic diastolic heart failure    Left ear hearing loss     pt states nerve damage--- 25% hearing loss, described as \"muffled\"    Memory difficulty     Panic attacks     Ringing of ears     Severe headache     Sleep apnea     SOB (shortness of breath) on exertion     w and w/out exertion    Stomach pain     Thyroid disease     Vertigo      Past Surgical History:   Procedure Laterality Date    CARDIAC SURG PROCEDURE UNLIST  10/31/2013    open heart    HX HEART VALVE SURGERY  2013    aortic valve repair    HX HYSTERECTOMY      Partial Hysterectomy - removed ovary    HX MYOMECTOMY      HX ORTHOPAEDIC  02/2018    had nerves burned on her right side of back    THYROIDECTOMY Bilateral 04/04/2019    Dr. Pinzon Counter Situation:   [place]: Private residence  [place] to Enter: Yes  One/Two Story Residence: One story  Living Alone: No  Support Systems: Family member(s)  Patient Expects to be Discharged to[de-identified] Private residence  Current DME Used/Available at Home: [place], straight, Grab bars, Shower chair  Tub or Shower Type: Tub/Shower combination(with seat)    Cognitive and Communication Status:  Neurologic State: Alert  Orientation Level: Appropriate for age  Cognition: Follows commands  Perception: Appears intact  Perseveration: No perseveration noted  Safety/Judgement: Awareness of environment    Motor Speech:  Oral-Motor Structure/Motor Speech  Labial: No impairment  Dentition: Natural;Intact  Oral Hygiene: adequate  Lingual: No impairment  Velum: No impairment  Mandible: No impairment    Voice:  Vocal Quality: Low volume;Hoarse    Oral Assessment:  Oral Assessment  Labial: No impairment  Dentition: Natural;Intact  Oral Hygiene: adequate  Lingual: No impairment  Velum: No impairment  Mandible: No impairment  P.O. Trials:  Patient Position: 90 in chair  Vocal quality prior to P.O.: Low volume;Hoarse  Consistency Presented: Ice chips; Solid; Nectar thick liquid  How Presented: Self-fed/presented;Straw     Bolus Acceptance: No impairment  Bolus Formation/Control: Impaired  Type of Impairment: Mastication  Propulsion: Delayed (# of seconds)  Oral Residue: None  Initiation of Swallow: No impairment  Laryngeal Elevation: Other (comment)(unable to palpate)  Aspiration Signs/Symptoms: None  Pharyngeal Phase Characteristics: No impairment, issues, or problems   Effective Modifications:  Alternate liquids/solids;Small sips and bites  Cues for Modifications: Minimal  Oral Phase Severity: Mild-moderate  Pharyngeal Phase Severity : Mild    PAIN:  Pain level pre-treatment: 0/10   Pain level post-treatment: 0/10     After treatment:   ?            Patient left in no apparent distress sitting up in chair  ? Patient left in no apparent distress in bed  ? Call bell left within reach  ? Nursing notified  ? Family present  ? Caregiver present  ? Bed alarm activated    COMMUNICATION/EDUCATION:   ?            Aspiration precautions; swallow safety; compensatory techniques. ?            Receptive/Expressive communication strategies  ? Patient/family have participated as able in goal setting and plan of care. ?            Patient/family agree to work toward stated goals and plan of care. ?            Patient understands intent and goals of therapy; neutral about participation. ? Patient unable to participate in goal setting/plan of care; educ ongoing with interdisciplinary staff  ? Posted safety precautions in patient's room.     Thank you for this referral,  Hasmukh Mustafa  Time Calculation: 27 mins  Speech Treatment Time: 8 minutes  Swallow Treatment Time: 19 minutes    Cosign:   Candance Dotter, M.S., [address] [facility]  Speech-Language Pathologist

## 2019-04-15 NOTE — DIABETES MGMT
Diabetes Patient/Family Education Record  Factors That  May Influence Patients Ability  to Learn or  Comply with Recommendations   []   Language barrier    []   Cultural needs   []   Motivation    []   Cognitive limitation    []   Physical   [x]   Education    []   Physiological factors   []   Hearing/vision/speaking impairment   []   Advent beliefs    []   Financial factors   []  Other:   []  No factors identified at this time. Person Instructed:   [x]   Patient   []   Family   []  Other     Preference for Learning:   [x]   Verbal: patient whispered   [x]   Written: diab educ packet   []  Demonstration     Level of Comprehension & Competence:    [x]  Good                                      [] Fair                                     []  Poor                             []  Needs Reinforcement   [x]  Teachback completed    Education Component:   [x]  Medication management, including how to administer insulin (if appropriate) and potential medication interactions: Yes. Patient reported the list of her home diabetes medications:  Lantus insulin 90 units daily at bedtime. Jardiance 25 mg daily every morning. [x]  Nutritional management -obtain usual meal pattern: Yes. Patient reported eating 3 meals daily. She's allergic to any artificial sweetener therefore she's only using very small amount of sugar once daily for coffee or tea. [x]  Exercise: Yes. Patient verbalized understanding of staying active. She's not able to tolerate walking activities. Educated patient about exercise activities in sitting position such as light weight by using un opened food can. Discussed the importance of listening to her body and take rest periods to prevent exacerbation and injury. [x]  Signs, symptoms, and treatment of hyperglycemia and hypoglycemia: Yes. [x] Prevention, recognition and treatment of hyperglycemia and hypoglycemia: Yes.    [x]  Importance of blood glucose monitoring and how to obtain a blood glucose meter: Yes.    []  Instruction on use of the blood glucose meter   [x]  Discuss the importance of HbA1C monitoring: Yes. Discussed with patient and she verbalized understanding that her current A1c level of 8.4% (2/26/2019) is equivalent to estimated average blood glucose of 203 mg/dL during the past 2-3 months.    []  Sick day guidelines   []  Proper use and disposal of lancets, needles, syringes or insulin pens (if appropriate)   []  Potential long-term complications (retinopathy, kidney disease, neuropathy, foot care)   [x] Information about whom to contact in case of emergency or for more information    [x]  Goal:  Patient/family will demonstrate understanding of Diabetes Self Management Skills by: 4/22/2019  Plan for post-discharge education or self-management support:    [x] Outpatient class schedule provided            [] Patient Declined    [] Scheduled for outpatient classes (date) _______  Verify:  Does patient understand how diabetes medications work? Yes. Does patient know what their most recent A1c is? Yes. Does patient monitor glucose at home? Yes. Does patient have difficulty obtaining diabetes medications and testing supplies?  No.     Savita Fraser, RN  pgr [phone]

## 2019-04-15 NOTE — ROUTINE PROCESS
Bedside and Verbal shift change report given to [address] [place] [address] (oncoming nurse) by Anshul Kennedy RN (offgoing nurse). Report included the following information SBAR, Kardex, MAR and Cardiac Rhythm . Keith Peters

## 2019-04-16 VITALS
HEART RATE: 72 BPM | BODY MASS INDEX: 40.9 KG/M2 | RESPIRATION RATE: 19 BRPM | SYSTOLIC BLOOD PRESSURE: 128 MMHG | HEIGHT: 67 IN | TEMPERATURE: 98.3 F | DIASTOLIC BLOOD PRESSURE: 76 MMHG | OXYGEN SATURATION: 97 % | WEIGHT: 260.6 LBS

## 2019-04-16 LAB
ANION GAP SERPL CALC-SCNC: 6 MMOL/L (ref 3–18)
BASOPHILS # BLD: 0 K/UL (ref 0–0.1)
BASOPHILS NFR BLD: 1 % (ref 0–2)
BUN SERPL-MCNC: 9 MG/DL (ref 7–18)
BUN/CREAT SERPL: 11 (ref 12–20)
CA-I SERPL-SCNC: 1 MMOL/L (ref 1.12–1.32)
CA-I SERPL-SCNC: 1.17 MMOL/L (ref 1.12–1.32)
CALCIUM SERPL-MCNC: 9.1 MG/DL (ref 8.5–10.1)
CHLORIDE SERPL-SCNC: 103 MMOL/L (ref 100–108)
CO2 SERPL-SCNC: 31 MMOL/L (ref 21–32)
CREAT SERPL-MCNC: 0.81 MG/DL (ref 0.6–1.3)
DIFFERENTIAL METHOD BLD: ABNORMAL
EOSINOPHIL # BLD: 0.1 K/UL (ref 0–0.4)
EOSINOPHIL NFR BLD: 2 % (ref 0–5)
ERYTHROCYTE [DISTWIDTH] IN BLOOD BY AUTOMATED COUNT: 15.4 % (ref 11.6–14.5)
GLUCOSE BLD STRIP.AUTO-MCNC: 180 MG/DL (ref 70–110)
GLUCOSE BLD STRIP.AUTO-MCNC: 201 MG/DL (ref 70–110)
GLUCOSE SERPL-MCNC: 210 MG/DL (ref 74–99)
HCT VFR BLD AUTO: 45.4 % (ref 35–45)
HGB BLD-MCNC: 14 G/DL (ref 12–16)
LYMPHOCYTES # BLD: 2.2 K/UL (ref 0.9–3.6)
LYMPHOCYTES NFR BLD: 38 % (ref 21–52)
MAGNESIUM SERPL-MCNC: 2.1 MG/DL (ref 1.6–2.6)
MCH RBC QN AUTO: 29.1 PG (ref 24–34)
MCHC RBC AUTO-ENTMCNC: 30.8 G/DL (ref 31–37)
MCV RBC AUTO: 94.4 FL (ref 74–97)
MONOCYTES # BLD: 0.4 K/UL (ref 0.05–1.2)
MONOCYTES NFR BLD: 7 % (ref 3–10)
NEUTS SEG # BLD: 3.1 K/UL (ref 1.8–8)
NEUTS SEG NFR BLD: 52 % (ref 40–73)
PHOSPHATE SERPL-MCNC: 4.3 MG/DL (ref 2.5–4.9)
PLATELET # BLD AUTO: 192 K/UL (ref 135–420)
PMV BLD AUTO: 9.5 FL (ref 9.2–11.8)
POTASSIUM SERPL-SCNC: 4.4 MMOL/L (ref 3.5–5.5)
RBC # BLD AUTO: 4.81 M/UL (ref 4.2–5.3)
SODIUM SERPL-SCNC: 140 MMOL/L (ref 136–145)
TRIGL SERPL-MCNC: 511 MG/DL (ref ?–150)
WBC # BLD AUTO: 5.8 K/UL (ref 4.6–13.2)

## 2019-04-16 PROCEDURE — 80048 BASIC METABOLIC PNL TOTAL CA: CPT

## 2019-04-16 PROCEDURE — 97164 PT RE-EVAL EST PLAN CARE: CPT

## 2019-04-16 PROCEDURE — 97168 OT RE-EVAL EST PLAN CARE: CPT

## 2019-04-16 PROCEDURE — 82330 ASSAY OF CALCIUM: CPT

## 2019-04-16 PROCEDURE — 85025 COMPLETE CBC W/AUTO DIFF WBC: CPT

## 2019-04-16 PROCEDURE — 94640 AIRWAY INHALATION TREATMENT: CPT

## 2019-04-16 PROCEDURE — 74011250636 HC RX REV CODE- 250/636: Performed by: INTERNAL MEDICINE

## 2019-04-16 PROCEDURE — 94761 N-INVAS EAR/PLS OXIMETRY MLT: CPT

## 2019-04-16 PROCEDURE — 74011636637 HC RX REV CODE- 636/637: Performed by: PHYSICIAN ASSISTANT

## 2019-04-16 PROCEDURE — 84478 ASSAY OF TRIGLYCERIDES: CPT

## 2019-04-16 PROCEDURE — 74011636637 HC RX REV CODE- 636/637

## 2019-04-16 PROCEDURE — 84100 ASSAY OF PHOSPHORUS: CPT

## 2019-04-16 PROCEDURE — 97116 GAIT TRAINING THERAPY: CPT

## 2019-04-16 PROCEDURE — 82962 GLUCOSE BLOOD TEST: CPT

## 2019-04-16 PROCEDURE — 74011250637 HC RX REV CODE- 250/637

## 2019-04-16 PROCEDURE — 74011250637 HC RX REV CODE- 250/637: Performed by: INTERNAL MEDICINE

## 2019-04-16 PROCEDURE — 92507 TX SP LANG VOICE COMM INDIV: CPT

## 2019-04-16 PROCEDURE — 74011000250 HC RX REV CODE- 250: Performed by: INTERNAL MEDICINE

## 2019-04-16 PROCEDURE — 74011250637 HC RX REV CODE- 250/637: Performed by: PHYSICIAN ASSISTANT

## 2019-04-16 PROCEDURE — 97535 SELF CARE MNGMENT TRAINING: CPT

## 2019-04-16 PROCEDURE — 92526 ORAL FUNCTION THERAPY: CPT

## 2019-04-16 PROCEDURE — 83735 ASSAY OF MAGNESIUM: CPT

## 2019-04-16 PROCEDURE — 36415 COLL VENOUS BLD VENIPUNCTURE: CPT

## 2019-04-16 RX ORDER — LEVOTHYROXINE SODIUM 100 UG/1
100 TABLET ORAL
Qty: 30 TAB | Refills: 1 | Status: ON HOLD | OUTPATIENT
Start: 2019-04-16 | End: 2019-07-11 | Stop reason: SDUPTHER

## 2019-04-16 RX ORDER — OXYCODONE AND ACETAMINOPHEN 5; 325 MG/1; MG/1
1 TABLET ORAL
Qty: 30 TAB | Refills: 0 | Status: SHIPPED | OUTPATIENT
Start: 2019-04-16 | End: 2019-04-19

## 2019-04-16 RX ADMIN — ACETAMINOPHEN 650 MG: 650 SOLUTION ORAL at 16:33

## 2019-04-16 RX ADMIN — ACETAMINOPHEN 650 MG: 650 SOLUTION ORAL at 07:00

## 2019-04-16 RX ADMIN — IPRATROPIUM BROMIDE AND ALBUTEROL SULFATE 3 ML: .5; 3 SOLUTION RESPIRATORY (INHALATION) at 11:19

## 2019-04-16 RX ADMIN — INSULIN LISPRO 6 UNITS: 100 INJECTION, SOLUTION INTRAVENOUS; SUBCUTANEOUS at 12:39

## 2019-04-16 RX ADMIN — Medication 10 ML: at 07:01

## 2019-04-16 RX ADMIN — LEVOTHYROXINE SODIUM 100 MCG: 100 TABLET ORAL at 06:59

## 2019-04-16 RX ADMIN — CALCIUM CARBONATE (ANTACID) CHEW TAB 500 MG 400 MG: 500 CHEW TAB at 12:40

## 2019-04-16 RX ADMIN — FLUTICASONE PROPIONATE 2 SPRAY: 50 SPRAY, METERED NASAL at 09:50

## 2019-04-16 RX ADMIN — BUDESONIDE 500 MCG: 0.5 INHALANT RESPIRATORY (INHALATION) at 11:19

## 2019-04-16 RX ADMIN — ENOXAPARIN SODIUM 40 MG: 100 INJECTION SUBCUTANEOUS at 10:53

## 2019-04-16 RX ADMIN — INSULIN LISPRO 3 UNITS: 100 INJECTION, SOLUTION INTRAVENOUS; SUBCUTANEOUS at 07:00

## 2019-04-16 RX ADMIN — GEMFIBROZIL 600 MG: 600 TABLET ORAL at 06:59

## 2019-04-16 RX ADMIN — GEMFIBROZIL 600 MG: 600 TABLET ORAL at 16:34

## 2019-04-16 RX ADMIN — ARFORMOTEROL TARTRATE 15 MCG: 15 SOLUTION RESPIRATORY (INHALATION) at 11:19

## 2019-04-16 RX ADMIN — ACETAMINOPHEN 650 MG: 650 SOLUTION ORAL at 10:51

## 2019-04-16 RX ADMIN — INSULIN GLARGINE 25 UNITS: 100 INJECTION, SOLUTION SUBCUTANEOUS at 09:39

## 2019-04-16 RX ADMIN — CALCIUM CARBONATE (ANTACID) CHEW TAB 500 MG 400 MG: 500 CHEW TAB at 09:42

## 2019-04-16 NOTE — ROUTINE PROCESS
Bedside and Verbal shift change report given to Antonino Regalado (oncoming nurse) by IFTIKHAR Martines RN (offgoing nurse). Report given with SBAR, Kardex, MAR and Recent Results.

## 2019-04-16 NOTE — PROGRESS NOTES
Problem: Dysphagia (Adult)  Goal: *Acute Goals and Plan of Care (Insert Text)  Description  Patient will:  1. Tolerate diet upgrade without overt s/sx of aspiration under SLP supervision. 2. Utilize compensatory swallow strategies of small bite/sip, alternate liquid/solid with min cues in 4/5 trials. 3. Perform oral-motor/laryngeal elevation exercises 10 reps/each to increase oropharyngeal swallow function with min cues. 4. Complete an objective swallow study (i.e., MBSS) to assess swallow integrity, r/o aspiration, and determine of safest LRD, min A.      Rec:   [employer] soft diet with nectar thick liquids, ice chips between meals  Meds crushed in puree  Strict Aspiration precautions  Oral care TID  MBS to further assess oropharyngeal function and rule out silent aspiration          Outcome: Progressing Towards Goal  SPEECH LANGUAGE PATHOLOGY DYSPHAGIA TREATMENT AND SPEECH THERAPY TREATMENT     Patient: Gerson Bateman (89 y.o. female)  Date: 4/16/2019  Diagnosis: S/P thyroidectomy [Z98.890] <principal problem not specified>  Procedure(s) (LRB):  TOTAL THYROIDECTOMY (N/A) 12 Days Post-Op  Precautions: Aspiration Fall(BiPAP)  PLOF:As per H&P     ASSESSMENT:  Pt was seen at bedside for follow up dysphagia treatment. Pt sitting in chair upon SLP arrival. Pt alert and able to follow all commands. Pt with low volume, breathy vocal quality throughout session. Pt states she has had no difficulty with previous meals since upgrading diet to mechanical soft. Pt continue to state she feels anxiety when drinking liquids, says she sometimes chokes on them. Pt demo strong cough and facial redness during trial of 1/2 tsp of thin liquid, and demo delayed cough and facial redness with ice chip trial. These symptoms resolved with tsp trials of nectar thick liquid, pt demo timely swallow and no overt s/sx of aspiration. Pt stating it \"felt much better going down. \" Pt with new complaints of difficulty swallow secretions, pt educated on completing the effortful swallow maneuver, verbalized understanding. Rec continued [facility] soft diet with nectar thick liquids, meds in puree, aspiration precautions, and oral care TID. Pt may benefit from OP skilled speech services when d/c from this facility. Discussed with pt, verbalized understanding. SLP to follow. Voice Tx:   Pt able to return demo understanding of breathing exercises, continuing to complain of breathy vocal quality and decreased breath support. Pt encouraged to continue completion of exercise program at home and to follow up with SLP upon discharge to continue to address deficits. Able to verbalize understanding. Progression toward goals:  ?         Improving appropriately and progressing toward goals  ? Improving slowly and progressing toward goals  ? Not making progress toward goals and plan of care will be adjusted     PLAN:  Recommendations and Planned Interventions: See above  Patient continues to benefit from skilled intervention to address the above impairments. Continue treatment per established plan of care. Discharge Recommendations:  Home Health, Outpatient and To Be Determined     SUBJECTIVE:   Patient stated I have so much more saliva today and its sometimes hard to swallow. OBJECTIVE:   Cognitive and Communication Status:  Neurologic State: Alert  Orientation Level: Appropriate for age  Cognition: Follows commands  Perception: Appears intact  Perseveration: No perseveration noted  Safety/Judgement: Awareness of environment  Dysphagia Treatment:  Oral Assessment:  Oral Assessment  Labial: No impairment  Dentition: Natural, Intact  Oral Hygiene: adequate  Lingual: No impairment  Velum: No impairment  Mandible: No impairment  P.O.  Trials:   Patient Position: 90 in chair   Vocal quality prior to P.O.: Low volume, Breathy   Consistency Presented: Ice chips, Thin liquid, Nectar thick liquid   How Presented: Self-fed/presented, Spoon   How Much: 6   Bolus Acceptance: No impairment   Bolus Formation/Control: No impairment   Type of Impairment: Mastication   Propulsion: No impairment   Oral Residue: None   Initiation of Swallow: Delayed (# of seconds)   Laryngeal Elevation: (unable to palpate due to complete thyroidectomy)   Aspiration Signs/Symptoms: Facial redness, Weak cough, Delayed cough/throat clear   Pharyngeal Phase Characteristics: Feeling of discomfort   Effective Modifications: Small sips and bites   Cues for Modifications: None   Oral Phase Severity: Mild-moderate   Pharyngeal Phase Severity : Mild-moderate    PAIN:  Pain level pre-treatment: 3/10   Pain level post-treatment: 3/10 (pointing to scar from thyroidectomy)      After treatment:   ?              Patient left in no apparent distress sitting up in chair  ? Patient left in no apparent distress in bed  ? Call bell left within reach  ? Nursing notified  ? Family present  ? Caregiver present  ? Bed alarm activated      COMMUNICATION/EDUCATION:   ? Aspiration precautions; swallow safety; compensatory techniques  ? Patient unable to participate in education; education ongoing with staff  ? Posted safety precautions in patient's room.   ? Oral-motor/laryngeal strengthening exercises      [employer] Craft intern    Time Calculation: 25 mins

## 2019-04-16 NOTE — PROGRESS NOTES
Problem: Self Care Deficits Care Plan (Adult)  Goal: *Acute Goals and Plan of Care (Insert Text)  Description  Occupational Therapy Goals  Re-evaluated 4/16/2019 pt has met all goals as stated and goals will be upgraded as follows:  1. Patient will perform grooming with MI standing at sink with G balance and appropriate energy conservation techniques. 2. Patient will perform bathroom mobility with MI and good safety awareness with RW for support. 3. Patient will perform HEP theraband exercise program with MI. Initiated 4/8/2019 within 7 day(s). 1.  Patient will perform grooming with supervision/set-up while seated on EOB with supervision for balance. 2.  Patient will perform upper body dressing with supervision/set-up. 3.  Patient will perform lower body dressing with minimal assistance/contact guard assist.  4.  Patient will perform toilet transfers with minimal assistance/contact guard assist.  5.  Patient will participate in upper extremity therapeutic exercise/activities with supervision/set-up for 8 minutes to increase strength/endurance for ADLs. Outcome: Progressing Towards Goal   OCCUPATIONAL THERAPY RE-EVALUATION    Patient: Sherman Gutierrez (38 y.o. female)  Date: 4/16/2019  Primary Diagnosis: S/P thyroidectomy [Z98.890]  Procedure(s) (LRB):  TOTAL THYROIDECTOMY (N/A) 12 Days Post-Op   Precautions:   Fall(BiPAP)  PLOF: Pt was MI BADL and ambulated with 2 canes, living with son and brother    ASSESSMENT :  Based on the objective data described below, the patient presents with continued impairment in activity tolerance, UB strength, standing balance and functional mobility impacting independence in ADL/IADLs. Pt laying supine in bed with fan stating she had to move back to bed from chair due to needing air. Pt agreeable to sit in arm chair if fan aligned with chair.  Pt performed supine>sit with MI and performed functional ambulation to bathroom with SBA and no AD with pt demonstrating decreased balance and short shuffling gait. Pt educated on possible benefits of RW and she states PT has recommended. Following MI toileting, pt performed hand hygiene at sink with encouragement for PLB, with pt stating she has trouble with this due to inability to breath through nose. Pt returned to arm chair and required short rest break. Pt then educated on use of LH sponge and sock aide demonstrating SUP. Pt pleased with AE and states this will make her more independent. Pt is motivated and receptive to all education. Pt encouraged to ask for assistance if needing to get out of chair. Patient will benefit from skilled intervention to address the above impairments. Patient's rehabilitation potential is considered to be Good  Factors which may influence rehabilitation potential include:   ? None noted  ? Mental ability/status  ? Medical condition  ? Home/family situation and support systems  ? Safety awareness  ? Pain tolerance/management  ? Other:      PLAN :  Recommendations and Planned Interventions:   ?               Self Care Training                  ? Therapeutic Activities  ? Functional Mobility Training   ? Cognitive Retraining  ? Therapeutic Exercises           ? Endurance Activities  ? Balance Training                    ? Neuromuscular Re-Education  ? Visual/Perceptual Training     ? Home Safety Training  ? Patient Education                   ? Family Training/Education  ? Other (comment):    Frequency/Duration: Patient will be followed by occupational therapy 1-2 times per day/4-7 days per week to address goals. Discharge Recommendations: Home Health  Further Equipment Recommendations for Discharge: BSC, pt states she has shower chair     SUBJECTIVE:   Patient stated ? I don't want people to think I'm lazy.? Pt educated on use of AE as a tool, not something to make her look \"lazy\"    OBJECTIVE DATA SUMMARY:   Hospital course since last seen and reason for reevaluation: Pt has participated in OT sessions for increased self care independence, energy conservation, functional transfer training, there ex to return to OF. Pt has made significant improvements and rec is now to return home with [facility]. Past Medical History:   Diagnosis Date    Arthritis     Lower back     Asthma     Chronic back pain     Lower back pain    Depression     Diabetes ([address]     Diabetes mellitus ([address]     Hearing loss     Heart failure (HCC)     chronic diastolic heart failure    Left ear hearing loss     pt states nerve damage--- 25% hearing loss, described as \"muffled\"    Memory difficulty     Panic attacks     Ringing of ears     Severe headache     Sleep apnea     SOB (shortness of breath) on exertion     w and w/out exertion    Stomach pain     Thyroid disease     Vertigo      Past Surgical History:   Procedure Laterality Date    CARDIAC SURG PROCEDURE UNLIST  10/31/2013    open heart    HX HEART VALVE SURGERY  2013    aortic valve repair    HX HYSTERECTOMY      Partial Hysterectomy - removed ovary    HX MYOMECTOMY      HX ORTHOPAEDIC  02/2018    had nerves burned on her right side of back    THYROIDECTOMY Bilateral 04/04/2019    Dr. Kanika Hairston     Barriers to Learning/Limitations: None  Compensate with: visual, verbal, tactile, kinesthetic cues/model    Home Situation:   Home Situation  Home Environment: Private residence  [place] to Enter: Yes  One/Two Story Residence: One story  Living Alone: No  Support Systems: Family member(s)  Patient Expects to be Discharged to[de-identified] Private residence  Current DME Used/Available at Home: [place], straight, Grab bars, Shower chair  Tub or Shower Type: Tub/Shower combination(with seat)  ? Right hand dominant   ?   Left hand dominant    Cognitive/Behavioral Status:  Neurologic State: Alert  Orientation Level: Oriented X4  Cognition: Follows commands  Safety/Judgement: Fall prevention;Home safety  Skin: intact  Edema: none BUEs  Coordination: BUE  Coordination: Within functional limits  Fine Motor Skills-Upper: Left Intact; Right Intact    Gross Motor Skills-Upper: Left Intact; Right Intact  Balance:  Sitting: Intact  Standing: Impaired; Without support  Standing - Static: Good  Standing - Dynamic : Fair  Strength: BUE  Strength: Generally decreased, functional   Tone & Sensation: BUE  Tone: Normal  Sensation: Intact   Range of Motion: BUE  AROM: Within functional limits   Functional Mobility and Transfers for ADLs:  Bed Mobility:  Supine to Sit: Supervision  Scooting: Supervision  Transfers:  Sit to Stand: Supervision    Toilet Transfer : Modified independent     Bathroom Mobility: Stand-by assistance  ADL Assessment:   Feeding: Modified independent  Oral Facial Hygiene/Grooming: Modified Independent  Bathing: Supervision  Upper Body Dressing: Modified independent  Lower Body Dressing: Supervision  Toileting: Modified independent   ADL Intervention:     Cognitive Retraining  Safety/Judgement: Fall prevention;Home safety  Pain:  Pain level pre-treatment: 0/10   Pain level post-treatment: 0/10  Pain Intervention(s): Medication (see MAR); Rest, Repositioning   Response to intervention: Nurse notified, See doc flow    Activity Tolerance:   Fair+  Please refer to the flowsheet for vital signs taken during this treatment. After treatment:   ? Patient left in no apparent distress sitting up in chair  ? Patient left in no apparent distress in bed  ? Call bell left within reach  ? Nursing notified  ? Caregiver present  ? Bed alarm activated    COMMUNICATION/EDUCATION:   ? Role of Occupational Therapy in the acute care setting  ? Home safety education was provided and the patient/caregiver indicated understanding. ? Patient/family have participated as able in goal setting and plan of care.   ? Patient/family agree to work toward stated goals and plan of care. ? Patient understands intent and goals of therapy, but is neutral about his/her participation. ? Patient is unable to participate in goal setting and plan of care.     Thank you for this referral.  Mirna Avila OT  Time Calculation: 38 mins

## 2019-04-16 NOTE — ROUTINE PROCESS
TRANSFER - IN REPORT:    Verbal report received from CHANTEL BRANDT [facility]) on Yolande Richardson  being received from CVT Stepdown(unit) for routine progression of care      Report consisted of patients Situation, Background, Assessment and   Recommendations(SBAR). Information from the following report(s) SBAR, Kardex, Intake/Output, MAR and Cardiac Rhythm NSR was reviewed with the receiving nurse. Opportunity for questions and clarification was provided. Assessment completed upon patients arrival to unit and care assumed.
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
## 2019-04-16 NOTE — PROGRESS NOTES
D/C order noted for today. Orders reviewed. No needs identified at this time. CM remains available if needed.  Kandy Velazquez, [phone]

## 2019-04-16 NOTE — ROUTINE PROCESS
Primary Nurse Dot Ruiz, RN and Mo Ruiz RN, RN performed a dual skin assessment on this patient No impairment noted  Omer score is 20

## 2019-04-16 NOTE — DISCHARGE INSTRUCTIONS
Patient Education        Thyroidectomy: What to Expect at Home  Your Recovery    Thyroidectomy is the removal of the thyroid gland, which is shaped like a butterfly and lies across the windpipe (trachea). The gland makes hormones that control how your body makes and uses energy (metabolism). A doctor removes the gland when a tumor is present. The doctor may also remove the gland if you have an enlarged thyroid that is causing symptoms that bother you. Most tumors that grow in the thyroid gland are benign, meaning they are not cancer. You may leave the hospital with stitches in the cut (incision) the doctor made. Your doctor will tell you if you need to come back to have these removed. You may still have a tube called a drain in your neck. Your doctor will take this out a few days after your surgery. You may have some trouble chewing and swallowing after you go home. Your voice probably will be hoarse, and you may have trouble talking. For most people, these problems get better within 3 to 4 months, but it can take as long as a year. In some cases, this surgery causes permanent problems with chewing, speaking, or swallowing. This care sheet gives you a general idea about how long it will take for you to recover. But each person recovers at a different pace. Follow the steps below to get better as quickly as possible. How can you care for yourself at home? Activity    · Rest when you feel tired. Getting enough sleep will help you recover. When you lie down, put two or three pillows under your head to keep it raised.     · Try to walk each day. Start by walking a little more than you did the day before. Bit by bit, increase the amount you walk. Walking boosts blood flow and helps prevent pneumonia and constipation.     · Avoid strenuous physical activity and lifting heavy objects for 3 weeks after surgery or until your doctor says it is okay.     · Do not over-extend your neck backwards for 2 weeks after surgery.   · Ask your doctor when you can drive again.     · You may take a shower, unless you still have a drain near your incision. Pat the incision dry. If you have a drain, follow your doctor's instructions to care for it. Diet    · If it is painful to swallow, start out with cold drinks, flavored ice pops, and ice cream. Next, try soft foods like pudding, yogurt, canned or cooked fruit, scrambled eggs, and mashed potatoes. Avoid eating hard or scratchy foods like chips or raw vegetables. Avoid orange or tomato juice and other acidic foods that can sting the throat.     · If you cough right after drinking, try drinking thicker liquids, such as a smoothie.     · You may notice that your bowel movements are not regular right after your surgery. This is common. Try to avoid constipation and straining with bowel movements. You may want to take a fiber supplement every day. If you have not had a bowel movement after a couple of days, ask your doctor about taking a mild laxative. Medicines    · Your doctor will tell you if and when you can restart your medicines. He or she will also give you instructions about taking any new medicines.     · If you take blood thinners, such as warfarin (Coumadin), clopidogrel (Plavix), or aspirin, be sure to talk to your doctor. He or she will tell you if and when to start taking those medicines again. Make sure that you understand exactly what your doctor wants you to do.     · Be safe with medicines. Take pain medicines exactly as directed. ? If the doctor gave you a prescription medicine for pain, take it as prescribed. ? If you are not taking a prescription pain medicine, ask your doctor if you can take an over-the-counter medicine.     · If you think your pain medicine is making you sick to your stomach:  ? Take your medicine after meals (unless your doctor has told you not to). ?  Ask your doctor for a different pain medicine.     · Your doctor may prescribe calcium to prevent problems after surgery from low calcium. Not having enough calcium can cause symptoms such as tingling around your mouth or in your hands and feet.     · Your doctor may have prescribed antibiotics. Take them as directed. Do not stop taking them just because you feel better. You need to take the full course of antibiotics. Incision care    · If your doctor told you how to care for your incision, follow your doctor's instructions. If you did not get instructions, follow this general advice:  ? After the first 24 to 48 hours, wash around the wound with clean water 2 times a day. Don't use hydrogen peroxide or alcohol, which can slow healing.     · You may have a drain near your incision. Your doctor will tell you how to take care of it. Follow-up care is a key part of your treatment and safety. Be sure to make and go to all appointments, and call your doctor if you are having problems. It's also a good idea to know your test results and keep a list of the medicines you take. When should you call for help? Call 911 anytime you think you may need emergency care. For example, call if:    · You passed out (lost consciousness).     · You have sudden chest pain and shortness of breath, or you cough up blood.     · You have severe trouble breathing.    Call your doctor now or seek immediate medical care if:    · You have loose stitches, or your incision comes open.     · Bleeding from your incision soaks through your bandages.     · You have signs of infection, such as:  ? Increased pain, swelling, warmth, or redness. ? Red streaks leading from the incision. ? Pus draining from the incision. ?  A fever.     · You have a tingling feeling around your mouth.     · You have cramping or tingling in your hands and feet.    Watch closely for changes in your health, and be sure to contact your doctor if:    · You have trouble talking.     · You are sick to your stomach or cannot keep fluids down.     · You do not have a bowel movement after taking a laxative. Where can you learn more? Go to http://nadya-irasema.info/. Enter [id number] in the search box to learn more about \"Thyroidectomy: What to Expect at Home. \"  Current as of: March 14, 2018  Content Version: 11.9  © 0025-8469 [employer]. Care instructions adapted under license by [employer] (which disclaims liability or warranty for this information). If you have questions about a medical condition or this instruction, always ask your healthcare professional. [address] any warranty or liability for your use of this information. DISCHARGE SUMMARY from Nurse    PATIENT INSTRUCTIONS:    After general anesthesia or intravenous sedation, for 24 hours or while taking prescription Narcotics:  · Limit your activities  · Do not drive and operate hazardous machinery  · Do not make important personal or business decisions  · Do  not drink alcoholic beverages  · If you have not urinated within 8 hours after discharge, please contact your surgeon on call. Report the following to your surgeon:  · Excessive pain, swelling, redness or odor of or around the surgical area  · Temperature over 100.5  · Nausea and vomiting lasting longer than 4 hours or if unable to take medications  · Any signs of decreased circulation or nerve impairment to extremity: change in color, persistent  numbness, tingling, coldness or increase pain  · Any questions    What to do at Home:  Recommended activity: Activity as tolerated and No lifting, Driving, or Strenuous exercise until your doctor says its okay. If you experience any of the following symptoms: signs of infection such as fever, redness, swelling, or foul-smelling drainage from the incision site. Any nausea or vomiting that lasts longer than 4 hours, pain that does not get better with pain medication, difficulty swallowing, please follow up with Dr. Marilu Tilley.     *  Please give a list of your current medications to your Primary Care Provider. *  Please update this list whenever your medications are discontinued, doses are      changed, or new medications (including over-the-counter products) are added. *  Please carry medication information at all times in case of emergency situations. These are general instructions for a healthy lifestyle:    No smoking/ No tobacco products/ Avoid exposure to second hand smoke  Surgeon General's Warning:  Quitting smoking now greatly reduces serious risk to your health. Obesity, smoking, and sedentary lifestyle greatly increases your risk for illness    A healthy diet, regular physical exercise & weight monitoring are important for maintaining a healthy lifestyle    You may be retaining fluid if you have a history of heart failure or if you experience any of the following symptoms:  Weight gain of 3 pounds or more overnight or 5 pounds in a week, increased swelling in our hands or feet or shortness of breath while lying flat in bed. Please call your doctor as soon as you notice any of these symptoms; do not wait until your next office visit. Recognize signs and symptoms of STROKE:    F-face looks uneven    A-arms unable to move or move unevenly    S-speech slurred or non-existent    T-time-call 911 as soon as signs and symptoms begin-DO NOT go       Back to bed or wait to see if you get better-TIME IS BRAIN. Warning Signs of HEART ATTACK     Call 911 if you have these symptoms:   Chest discomfort. Most heart attacks involve discomfort in the center of the chest that lasts more than a few minutes, or that goes away and comes back. It can feel like uncomfortable pressure, squeezing, fullness, or pain.  Discomfort in other areas of the upper body. Symptoms can include pain or discomfort in one or both arms, the back, neck, jaw, or stomach.  Shortness of breath with or without chest discomfort.    Other signs may include breaking out in a cold sweat, nausea, or lightheadedness. Don't wait more than five minutes to call 911 - MINUTES MATTER! Fast action can save your life. Calling 911 is almost always the fastest way to get lifesaving treatment. Emergency Medical Services staff can begin treatment when they arrive -- up to an hour sooner than if someone gets to the hospital by car. The discharge information has been reviewed with the patient. The patient verbalized understanding. Discharge medications reviewed with the patient and appropriate educational materials and side effects teaching were provided. ___________________________________________________________________________________________________________________________________    [employer] Activation    Thank you for requesting access to [facility]. Please follow the instructions below to securely access and download your online medical record. [facility] allows you to send messages to your doctor, view your test results, renew your prescriptions, schedule appointments, and more. How Do I Sign Up? 1. In your internet browser, go to [email or web address]  2. Click on the First Time User? Click Here link in the Sign In box. You will be redirect to the New Member Sign Up page. 3. Enter your [employer] Access Code exactly as it appears below. You will not need to use this code after youve completed the sign-up process. If you do not sign up before the expiration date, you must request a new code. [employer] Access Code: Activation code not generated  Current [facility] Status: Active (This is the date your [employer] access code will )    4. Enter the last four digits of your Social Security Number (xxxx) and Date of Birth (mm/dd/yyyy) as indicated and click Submit. You will be taken to the next sign-up page. 5. Create a Fe[employer]vo ID. This will be your [facility] login ID and cannot be changed, so think of one that is secure and easy to remember. 6. Create a [facility] password.  You can change your password at any time. 7. Enter your Password Reset Question and Answer. This can be used at a later time if you forget your password. 8. Enter your e-mail address. You will receive e-mail notification when new information is available in [address] 9. Click Sign Up. You can now view and download portions of your medical record. 10. Click the Download Summary menu link to download a portable copy of your medical information. Additional Information    If you have questions, please visit the Frequently Asked Questions section of the [employer] website at [email or web address] Remember, [employer] is NOT to be used for urgent needs. For medical emergencies, dial 911.       Patient armband removed and shredded

## 2019-04-16 NOTE — PROGRESS NOTES
[facility]. Pulmonary, Critical Care, and Sleep Medicine    Name: Leigh Washington MRN: [id number]   : 1960 Hospital: [address] [place] [address] Dr   Date: 2019  Admission Date: 2019     Chart and notes reviewed. Data reviewed. I have evaluated all findings. [x]I have reviewed the flowsheet and previous days notes. HPI  Patient is a 62 y. o. female w/ pmhx of DM S/P thyroidectomy, by Dr. Maty Hollingsworth, on 19. Patient has history of a multinodular goiter suspicious for malignancy, however recent biopsies have been negative. Procedure uneventful. Was extubated in PACU, but developed stridor and hypoxia, subsequently re intubated. ICU stay notable for QTc 676 on , repeat 4.5 QTc 430. Triglycerides also noted to be in 2,000's. Patient has history of hypertriglyceridemia. Extubated on  w/ some stridor that resolved w/ racemic epi and bipap. Patient utilizes bipap QHS at baseline. 19      · Transferred from CVT step down to 4 V Ale 267. · States that breathing is same. · No issues overnight. · States that Dr Maty Hollingsworth seen her earlier and noted plans for discharge. · Answered all the questions. Patient Vitals for the past 24 hrs:   Temp Pulse Resp BP SpO2   19 0724 97.6 °F (36.4 °C) 74 18 128/79 99 %   19 0400 97.5 °F (36.4 °C) 70 17 117/65 98 %   04/15/19 2300 97.8 °F (36.6 °C) 74 18 137/70 95 %   04/15/19 2019 -- -- -- -- 98 %   04/15/19 2000 97.5 °F (36.4 °C) 76 16 125/63 97 %   04/15/19 1411 97.4 °F (36.3 °C) -- -- -- --   04/15/19 1124 97.1 °F (36.2 °C) 88 18 135/73 93 %                 ROS:Pertinent items are noted in HPI. Events and notes from last 24 hours reviewed. Care plan discussed on multidisciplinary rounds.   Patient Active Problem List   Diagnosis Code    Type II diabetes mellitus, uncontrolled ([facility] [address] E11.65    Sleep apnea G47.30    H/O aortic valve replacement Z95.2    History of bicuspid aortic valve Z87.74    Chronic back pain M54.9, G89.29    Chronic left shoulder pain M25.512, G89.29    Morbid obesity (formerly [facility]) E66.01    Left shoulder tendinitis M75.82    Spondylosis of lumbar region without myelopathy or radiculopathy M47.816    Chronic pain of both shoulders M25.511, G89.29, M25.512    Chronic pain of both knees M25.561, M25.562, G89.29    Chronic pain syndrome G89.4    Encounter for long-term (current) use of medications Z79.899    Chronic midline low back pain M54.5, G89.29    Lumbar facet arthropathy M47.816    Lumbar degenerative disc disease M51.36    Venous stasis dermatitis of both lower extremities I87.2    SOB (shortness of breath) R06.02    Type 2 diabetes mellitus without complication (formerly [facility]) T54.7    Hypothyroidism due to acquired atrophy of thyroid E03.4    Essential hypertension I10    Dyslipidemia E78.5    Mood disorder (formerly [facility]) F39    Chronic diastolic congestive heart failure (formerly [facility]) I50.32    Type 2 diabetes mellitus with diabetic neuropathy (formerly [facility]) E11.40    S/P thyroidectomy Z98.890     Vital Signs:  Visit Vitals  /79 (BP 1 Location: Right arm, BP Patient Position: At rest)   Pulse 74   Temp 97.6 °F (36.4 °C)   Resp 18   Ht 5' 7\" (1.702 m)   Wt 118.2 kg (260 lb 9.6 oz)   SpO2 99%   Breastfeeding? No   BMI 40.82 kg/m²       O2 Device: Room air   O2 Flow Rate (L/min): 0 l/min   Temp (24hrs), Av.5 °F (36.4 °C), Min:97.1 °F (36.2 °C), Max:97.8 °F (36.6 °C)       Intake/Output:   Last shift:      No intake/output data recorded.   Last 3 shifts:  1901 -  0700  In: 240 [P.O.:240]  Out: 280 [Urine:280]    Intake/Output Summary (Last 24 hours) at 2019 0950  Last data filed at 4/15/2019 2000  Gross per 24 hour   Intake 240 ml   Output 280 ml   Net -40 ml      Current Facility-Administered Medications   Medication Dose Route Frequency    levothyroxine (SYNTHROID) tablet 100 mcg  100 mcg Oral ACB    albuterol-ipratropium (DUO-NEB) 2.5 MG-0.5 MG/3 ML  3 mL Nebulization Q6HWA RT    arformoterol (BROVANA) neb solution 15 mcg  15 mcg Nebulization BID RT    budesonide (PULMICORT) 500 mcg/2 ml nebulizer suspension  500 mcg Nebulization BID RT    fluticasone propionate (FLONASE) 50 mcg/actuation nasal spray 2 Spray  2 Spray Both Nostrils DAILY    gabapentin (NEURONTIN) capsule 400 mg  400 mg Oral TID    acetaminophen (TYLENOL) solution 650 mg  650 mg Oral Q4H    insulin lispro (HUMALOG) injection   SubCUTAneous AC&HS    docusate sodium (COLACE) capsule 100 mg  100 mg Oral DAILY    insulin glargine (LANTUS) injection 25 Units  25 Units SubCUTAneous DAILY    calcium carbonate (TUMS) chewable tablet 400 mg [elemental]  400 mg Oral TID WITH MEALS    gemfibrozil (LOPID) tablet 600 mg  600 mg Oral ACB&D    enoxaparin (LOVENOX) injection 40 mg  40 mg SubCUTAneous Q24H    sodium chloride (NS) flush 5-40 mL  5-40 mL IntraVENous Q8H     Telemetry: NSR    Physical Exam:    General: in no apparent distress, well developed, obese   HEENT: Normal, PERRL, Small oral opening, tongue large and midline, crowded posterior oropharynx- no exudates seen   Neck: large neck with incision and steri-strips, some blood, but otherwise clean and dry; stridor noted. Chest: normal equal bilateral chest wall movement. Lungs: CTAB; no wheeze.     Heart: Regular rate and rhythm, S1S2 present or without murmur or extra heart sounds   Abdomen: Obese  non distended, bowel sounds normoactive, tympanic, abdomen is soft without significant tenderness, masses, organomegaly or guarding   Extremity: 2+ edema   Neuro: alert, oriented, answers questions appropriately   Skin: Skin color, texture, turgor normal. No rashes or lesions     DATA:  MAR reviewed and pertinent medications noted or modified as needed    Labs:  Recent Labs     04/16/19  0423 04/15/19  0544 04/14/19  0714   WBC 5.8 8.8 5.3   HGB 14.0 13.5 13.6   HCT 45.4* 42.5 41.6    206 172     Recent Labs     04/16/19  0423 04/15/19  0544 04/14/19  0542    138 140   K 4.4 3.8 3.9    102 103   CO2 31 27 30   * 159* 193*   BUN 9 7 9   CREA 0.81 0.72 0.73   CA 9.1 8.6 8.2*   MG 2.1 2.1 2.1   PHOS 4.3 3.2 3.7     No results for input(s): PH, PCO2, PO2, HCO3, FIO2 in the last 72 hours. No results for input(s): FIO2I, IFO2, HCO3I, IHCO3, HCOPOC, PCO2I, PCOPOC, IPHI, PHI, PHPOC, PO2I, PO2POC in the last 72 hours. No lab exists for component: IPOC2    Imaging:  [x]   I have personally reviewed the patients radiographs and reports  XR Results (most recent):  Results from Hospital Encounter encounter on 04/04/19   XR CHEST PORT    Narrative EXAM:  Chest Portable. INDICATION:  ET tube placement     COMPARISON:  04/04/19     TECHNIQUE:  Portable AP chest study    FINDINGS:      - ET tube distal tip observed at 2.4 cm above the phi.   - Both lungs are hypoinflated. - Persistent retrocardiac opacity, similar to recent prior exam.  Subtle streaky  density in the right infrahilar region. Again similar to recent prior exam.   - No pneumothorax. - Moderate cardiac silhouette enlargement with prior CABG and aortic valve  replacement. Impression IMPRESSION:    1. Borderline low-lying ET tube. 2.  No significant interval change in lung aeration pattern, with subtle streaky  densities in the lower lung zones. CT Results (most recent):  Results from Hospital Encounter encounter on 02/21/19   CT NECK SOFT TISSUE W CONT    Narrative Neck CT With Contrast    CPT CODE: 23424    HISTORY: Multinodular goiter. COMPARISON: Thyroid ultrasound 8/3/2018. CT chest 5/5/2017    FINDINGS:     After the intravenous administration of iodinated IV contrast, a scan was  obtained from low head down to upper chest.  Patient received 80 cc Isovue 300  IV contrast. CT dose reduction was achieved through use of a standardized  protocol tailored for this examination and automatic exposure control for dose  modulation.      There is some chronic appearing dental disease with periapical lucency  surrounding right mandibular molar tooth roots. Visualized lower brain appears normal.     Mucosa of the upper aerodigestive tract:  No mass lesion seen in the mucosa of  the upper aerodigestive tract. 4 mm hyperdensity in the vallecula along the  surface of the base of tongue or uvula (axial image 52). More regular than  expected for metal pellet and appears to be a calcification. Lymph Nodes: No enlarged lymph nodes in the cervical chains. Major salivary glands:  Parotid glands are relatively low density, some fatty  replacement. Both parotid glands are relatively enlarged, but there is no mass  lesion. Submandibular glands appear normal.    Thyroid: Thyroid gland is heterogeneous with multiple relatively low-attenuation  nodular densities plus multiple calcifications. Several rim calcified nodules  bilaterally, the largest is on the right, 2.3 x 1.8 cm. Posteriorly, both  thyroid lobes extend into the tracheoesophageal groove. The inferior margin of  the right thyroid lobe is at the level of the top of the right clavicular head. The left thyroid lobe extends to the level of the top of the sternal notch. No  retrosternal extension. The right thyroid lobe measures approximately 4.6 cm AP, 3.8 cm transverse, 7.4  cm SI. The left thyroid lobe is 3.6 cm AP, 3.0 cm transverse and 6.7 cm SI. Upper mediastinum: No lymphadenopathy seen in the upper mediastinum. Visualized lung apices: are clear. Bones: Mild degenerative changes in the cervical spine. Mucus retention cysts in the maxillary sinuses. Impression IMPRESSION:    1. Heterogeneous enlargement of the thyroid gland with multiple low-attenuation  and rim calcified nodules bilaterally. Extension of thyroid tissue posteriorly  into the left and right tracheoesophageal groove. 2. The right thyroid lobe extends to the level of the reticular head. Left  thyroid lobe extends to the level of the sternal notch.   3. No neck masses. No lymphadenopathy. Thank you for this referral.     IMPRESSION:   · S/p thyroidectomy POD 11- for hx of multinodular goiter. Performed by Dr. Dave Martinez 4/4/19. · Acute Hypoxic Respiratory Failure, resolved   · Stridor- improving, but still present. Did not notice any stridor while patient is asleep and on positive pressure. Become prominent when awake without positive pressure. · QTc prolongation- resolved   · Hypocalcemia- s/p thyroidectomy- on replacement protocol  · Hypertriglyceridemia - improving   · Hyperglycemia in the setting of DMII - on Sliding scale insulin   · OVIDIO- On home BIPAP 20/12   · Morbid Obesity: Body mass index is 44.23 kg/m².      Patient Active Problem List   Diagnosis Code    Type II diabetes mellitus, uncontrolled ([facility] [address] E11.65    Sleep apnea G47.30    H/O aortic valve replacement Z95.2    History of bicuspid aortic valve Z87.74    Chronic back pain M54.9, G89.29    Chronic left shoulder pain M25.512, G89.29    Morbid obesity ([facility]) E66.01    Left shoulder tendinitis M75.82    Spondylosis of lumbar region without myelopathy or radiculopathy M47.816    Chronic pain of both shoulders M25.511, G89.29, M25.512    Chronic pain of both knees M25.561, M25.562, G89.29    Chronic pain syndrome G89.4    Encounter for long-term (current) use of medications Z79.899    Chronic midline low back pain M54.5, G89.29    Lumbar facet arthropathy M47.816    Lumbar degenerative disc disease M51.36    Venous stasis dermatitis of both lower extremities I87.2    SOB (shortness of breath) R06.02    Type 2 diabetes mellitus without complication ([facility]) D57.3    Hypothyroidism due to acquired atrophy of thyroid E03.4    Essential hypertension I10    Dyslipidemia E78.5    Mood disorder ([facility]) F39    Chronic diastolic congestive heart failure ([facility]) I50.32    Type 2 diabetes mellitus with diabetic neuropathy ([facility]) E11.40    S/P thyroidectomy Z98.890      RECOMMENDATIONS:   · Keep HOB elevated; Strict Aspiration and OVIDIO precautions  · SpO2 goal>92. Currently on room air. · Speech therapy to continue to work with patient. Noted plans for modified barium swallow. · Avoid conditions that can aggravate vocal cord dysfunction (VCD): airway inflammation: post nasal drip, GERD, and/or anxiety. · Dr Mahendra Smart previously discussed with speech therapy about working on vocal exercises for patient. · Continue positive pressure QHS and PRN  · Started levothyroxine. Check TSH after 6 weeks once the steady state achieved. · IS at bedside  · Continue calcium replacement per protocol. · Prophylaxis and post op management per primary team  · Needs PT/OT to work with her. · Needs to follow with pulmonary clinic as OP to have a repeat polysomnogram given changes in neck tissue structure post surgery.         Clinical care, chart review and time spent coordinating care: 32 min    [facility]  Pulmonary, Critical Care, and Sleep Medicine

## 2019-04-16 NOTE — PROGRESS NOTES
Surgery  Good spirits up in chair and has walked the halls  AF VSS  Voice remains hourse but min stridor  roger po well  Calcium stable now 9.1  Over all doing great home today, precautions given

## 2019-04-22 ENCOUNTER — OFFICE VISIT (OUTPATIENT)
Dept: FAMILY MEDICINE CLINIC | Age: 59
End: 2019-04-22

## 2019-04-22 DIAGNOSIS — E89.0 S/P COMPLETE THYROIDECTOMY: ICD-10-CM

## 2019-04-22 DIAGNOSIS — J02.9 SORE THROAT: ICD-10-CM

## 2019-04-22 DIAGNOSIS — I10 ESSENTIAL HYPERTENSION: ICD-10-CM

## 2019-04-22 DIAGNOSIS — R06.02 SOB (SHORTNESS OF BREATH): ICD-10-CM

## 2019-04-22 DIAGNOSIS — R06.1 STRIDOR: Primary | ICD-10-CM

## 2019-04-22 DIAGNOSIS — G47.30 SLEEP APNEA, UNSPECIFIED TYPE: ICD-10-CM

## 2019-04-22 DIAGNOSIS — F39 MOOD DISORDER (HCC): ICD-10-CM

## 2019-04-22 LAB
S PYO AG THROAT QL: NEGATIVE
VALID INTERNAL CONTROL?: YES

## 2019-04-22 NOTE — PROGRESS NOTES
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 1.12 - 1.32 MMOL/L   MAGNESIUM   Result Value Ref Range    Magnesium 1.8 1.6 - 2.6 mg/dL   PHOSPHORUS   Result Value Ref Range    Phosphorus 4.7 2.5 - 4.9 MG/DL   TRIGLYCERIDE   Result Value Ref Range    Triglyceride 518 (H) <150 MG/DL   CBC WITH AUTOMATED DIFF   Result Value Ref Range    WBC 5.6 4.6 - 13.2 K/uL    RBC 4.80 4.20 - 5.30 M/uL    HGB 14.1 12.0 - 16.0 g/dL    HCT 44.2 35.0 - 45.0 %    MCV 92.1 74.0 - 97.0 FL    MCH 29.4 24.0 - 34.0 PG    MCHC 31.9 31.0 - 37.0 g/dL    RDW 14.8 (H) 11.6 - 14.5 %    PLATELET 557 954 - 505 K/uL    MPV 9.2 9.2 - 11.8 FL    NEUTROPHILS 57 40 - 73 %    LYMPHOCYTES 29 21 - 52 %    MONOCYTES 11 (H) 3 - 10 %    EOSINOPHILS 2 0 - 5 %    BASOPHILS 1 0 - 2 %    ABS. NEUTROPHILS 3.2 1.8 - 8.0 K/UL    ABS. LYMPHOCYTES 1.6 0.9 - 3.6 K/UL    ABS. MONOCYTES 0.6 0.05 - 1.2 K/UL    ABS. EOSINOPHILS 0.1 0.0 - 0.4 K/UL    ABS.  BASOPHILS 0.0 0.0 - 0.1 K/UL    DF AUTOMATED     METABOLIC PANEL, BASIC   Result Value Ref Range    Sodium 138 136 - 145 mmol/L    Potassium 3.5 3.5 - 5.5 mmol/L    Chloride 101 100 - 108 mmol/L    CO2 30 21 - 32 mmol/L    Anion gap 7 3.0 - 18 mmol/L    Glucose 178 (H) 74 - 99 mg/dL    BUN 9 7.0 - 18 MG/DL    Creatinine 0.60 0.6 - 1.3 MG/DL    BUN/Creatinine ratio 15 12 - 20      GFR est AA >60 >60 ml/min/1.73m2 GFR est non-AA >60 >60 ml/min/1.73m2    Calcium 8.4 (L) 8.5 - 10.1 MG/DL   CALCIUM, IONIZED   Result Value Ref Range    Ionized Calcium 1.05 (L) 1.12 - 1.32 MMOL/L   CALCIUM, IONIZED   Result Value Ref Range    Ionized Calcium 1.08 (L) 1.12 - 1.32 MMOL/L   MAGNESIUM   Result Value Ref Range    Magnesium 2.3 1.6 - 2.6 mg/dL   PHOSPHORUS   Result Value Ref Range    Phosphorus 4.4 2.5 - 4.9 MG/DL   TRIGLYCERIDE   Result Value Ref Range    Triglyceride 640 (H) <150 MG/DL   CBC WITH AUTOMATED DIFF   Result Value Ref Range    WBC 5.6 4.6 - 13.2 K/uL    RBC 4.73 4.20 - 5.30 M/uL    HGB 13.9 12.0 - 16.0 g/dL    HCT 44.0 35.0 - 45.0 %    MCV 93.0 74.0 - 97.0 FL    MCH 29.4 24.0 - 34.0 PG    MCHC 31.6 31.0 - 37.0 g/dL    RDW 15.1 (H) 11.6 - 14.5 %    PLATELET 370 013 - 172 K/uL    MPV 9.2 9.2 - 11.8 FL    NEUTROPHILS 53 40 - 73 %    LYMPHOCYTES 37 21 - 52 %    MONOCYTES 9 3 - 10 %    EOSINOPHILS 1 0 - 5 %    BASOPHILS 0 0 - 2 %    ABS. NEUTROPHILS 3.0 1.8 - 8.0 K/UL    ABS. LYMPHOCYTES 2.0 0.9 - 3.6 K/UL    ABS. MONOCYTES 0.5 0.05 - 1.2 K/UL    ABS. EOSINOPHILS 0.1 0.0 - 0.4 K/UL    ABS.  BASOPHILS 0.0 0.0 - 0.1 K/UL    DF AUTOMATED     METABOLIC PANEL, BASIC   Result Value Ref Range    Sodium 139 136 - 145 mmol/L    Potassium 3.8 3.5 - 5.5 mmol/L    Chloride 100 100 - 108 mmol/L    CO2 33 (H) 21 - 32 mmol/L    Anion gap 6 3.0 - 18 mmol/L    Glucose 242 (H) 74 - 99 mg/dL    BUN 10 7.0 - 18 MG/DL    Creatinine 0.79 0.6 - 1.3 MG/DL    BUN/Creatinine ratio 13 12 - 20      GFR est AA >60 >60 ml/min/1.73m2    GFR est non-AA >60 >60 ml/min/1.73m2    Calcium 8.7 8.5 - 10.1 MG/DL   CALCIUM, IONIZED   Result Value Ref Range    Ionized Calcium 1.06 (L) 1.12 - 1.32 MMOL/L   CALCIUM, IONIZED   Result Value Ref Range    Ionized Calcium 1.06 (L) 1.12 - 1.32 MMOL/L   MAGNESIUM   Result Value Ref Range    Magnesium 2.1 1.6 - 2.6 mg/dL   PHOSPHORUS   Result Value Ref Range    Phosphorus 3.7 2.5 - 4.9 MG/DL   TRIGLYCERIDE   Result Value Ref Range    Triglyceride 527 (H) <574 MG/DL   METABOLIC PANEL, BASIC   Result Value Ref Range    Sodium 140 136 - 145 mmol/L    Potassium 3.9 3.5 - 5.5 mmol/L    Chloride 103 100 - 108 mmol/L    CO2 30 21 - 32 mmol/L    Anion gap 7 3.0 - 18 mmol/L    Glucose 193 (H) 74 - 99 mg/dL    BUN 9 7.0 - 18 MG/DL    Creatinine 0.73 0.6 - 1.3 MG/DL    BUN/Creatinine ratio 12 12 - 20      GFR est AA >60 >60 ml/min/1.73m2    GFR est non-AA >60 >60 ml/min/1.73m2    Calcium 8.2 (L) 8.5 - 10.1 MG/DL   CALCIUM, IONIZED   Result Value Ref Range    Ionized Calcium 1.02 (L) 1.12 - 1.32 MMOL/L   CBC WITH AUTOMATED DIFF   Result Value Ref Range    WBC 5.3 4.6 - 13.2 K/uL    RBC 4.50 4. 20 - 5.30 M/uL    HGB 13.6 12.0 - 16.0 g/dL    HCT 41.6 35.0 - 45.0 %    MCV 92.4 74.0 - 97.0 FL    MCH 30.2 24.0 - 34.0 PG    MCHC 32.7 31.0 - 37.0 g/dL    RDW 15.0 (H) 11.6 - 14.5 %    PLATELET 409 945 - 678 K/uL    MPV 9.2 9.2 - 11.8 FL    NEUTROPHILS 47 40 - 73 %    LYMPHOCYTES 38 21 - 52 %    MONOCYTES 12 (H) 3 - 10 %    EOSINOPHILS 2 0 - 5 %    BASOPHILS 1 0 - 2 %    ABS. NEUTROPHILS 2.5 1.8 - 8.0 K/UL    ABS. LYMPHOCYTES 2.0 0.9 - 3.6 K/UL    ABS. MONOCYTES 0.7 0.05 - 1.2 K/UL    ABS. EOSINOPHILS 0.1 0.0 - 0.4 K/UL    ABS.  BASOPHILS 0.0 0.0 - 0.1 K/UL    DF AUTOMATED     CALCIUM, IONIZED   Result Value Ref Range    Ionized Calcium 1.10 (L) 1.12 - 1.32 MMOL/L   MAGNESIUM   Result Value Ref Range    Magnesium 2.1 1.6 - 2.6 mg/dL   PHOSPHORUS   Result Value Ref Range    Phosphorus 3.2 2.5 - 4.9 MG/DL   TRIGLYCERIDE   Result Value Ref Range    Triglyceride 514 (H) <150 MG/DL   CBC WITH AUTOMATED DIFF   Result Value Ref Range    WBC 8.8 4.6 - 13.2 K/uL    RBC 4.58 4.20 - 5.30 M/uL    HGB 13.5 12.0 - 16.0 g/dL    HCT 42.5 35.0 - 45.0 %    MCV 92.8 74.0 - 97.0 FL    MCH 29.5 24.0 - 34.0 PG    MCHC 31.8 31.0 - 37.0 g/dL    RDW 15.0 (H) 11.6 - 14.5 %    PLATELET 052 862 - 463 K/uL    MPV 9.2 9.2 - 11.8 FL    NEUTROPHILS 64 40 - 73 %    LYMPHOCYTES 27 21 - 52 % MONOCYTES 7 3 - 10 %    EOSINOPHILS 1 0 - 5 %    BASOPHILS 1 0 - 2 %    ABS. NEUTROPHILS 5.7 1.8 - 8.0 K/UL    ABS. LYMPHOCYTES 2.3 0.9 - 3.6 K/UL    ABS. MONOCYTES 0.6 0.05 - 1.2 K/UL    ABS. EOSINOPHILS 0.1 0.0 - 0.4 K/UL    ABS. BASOPHILS 0.0 0.0 - 0.1 K/UL    DF AUTOMATED     METABOLIC PANEL, BASIC   Result Value Ref Range    Sodium 138 136 - 145 mmol/L    Potassium 3.8 3.5 - 5.5 mmol/L    Chloride 102 100 - 108 mmol/L    CO2 27 21 - 32 mmol/L    Anion gap 9 3.0 - 18 mmol/L    Glucose 159 (H) 74 - 99 mg/dL    BUN 7 7.0 - 18 MG/DL    Creatinine 0.72 0.6 - 1.3 MG/DL    BUN/Creatinine ratio 10 (L) 12 - 20      GFR est AA >60 >60 ml/min/1.73m2    GFR est non-AA >60 >60 ml/min/1.73m2    Calcium 8.6 8.5 - 10.1 MG/DL   CALCIUM, IONIZED   Result Value Ref Range    Ionized Calcium 1.07 (L) 1.12 - 1.32 MMOL/L   CALCIUM, IONIZED   Result Value Ref Range    Ionized Calcium 1.07 (L) 1.12 - 1.32 MMOL/L   MAGNESIUM   Result Value Ref Range    Magnesium 2.1 1.6 - 2.6 mg/dL   PHOSPHORUS   Result Value Ref Range    Phosphorus 4.3 2.5 - 4.9 MG/DL   TRIGLYCERIDE   Result Value Ref Range    Triglyceride 511 (H) <150 MG/DL   CBC WITH AUTOMATED DIFF   Result Value Ref Range    WBC 5.8 4.6 - 13.2 K/uL    RBC 4.81 4.20 - 5.30 M/uL    HGB 14.0 12.0 - 16.0 g/dL    HCT 45.4 (H) 35.0 - 45.0 %    MCV 94.4 74.0 - 97.0 FL    MCH 29.1 24.0 - 34.0 PG    MCHC 30.8 (L) 31.0 - 37.0 g/dL    RDW 15.4 (H) 11.6 - 14.5 %    PLATELET 989 927 - 942 K/uL    MPV 9.5 9.2 - 11.8 FL    NEUTROPHILS 52 40 - 73 %    LYMPHOCYTES 38 21 - 52 %    MONOCYTES 7 3 - 10 %    EOSINOPHILS 2 0 - 5 %    BASOPHILS 1 0 - 2 %    ABS. NEUTROPHILS 3.1 1.8 - 8.0 K/UL    ABS. LYMPHOCYTES 2.2 0.9 - 3.6 K/UL    ABS. MONOCYTES 0.4 0.05 - 1.2 K/UL    ABS. EOSINOPHILS 0.1 0.0 - 0.4 K/UL    ABS.  BASOPHILS 0.0 0.0 - 0.1 K/UL    DF AUTOMATED     METABOLIC PANEL, BASIC   Result Value Ref Range    Sodium 140 136 - 145 mmol/L    Potassium 4.4 3.5 - 5.5 mmol/L    Chloride 103 100 - 108 mmol/L    CO2 31 21 - 32 mmol/L    Anion gap 6 3.0 - 18 mmol/L    Glucose 210 (H) 74 - 99 mg/dL    BUN 9 7.0 - 18 MG/DL    Creatinine 0.81 0.6 - 1.3 MG/DL    BUN/Creatinine ratio 11 (L) 12 - 20      GFR est AA >60 >60 ml/min/1.73m2    GFR est non-AA >60 >60 ml/min/1.73m2    Calcium 9.1 8.5 - 10.1 MG/DL   CALCIUM, IONIZED   Result Value Ref Range    Ionized Calcium 1.17 1.12 - 1.32 MMOL/L   CALCIUM, IONIZED   Result Value Ref Range    Ionized Calcium 1.00 (L) 1.12 - 1.32 MMOL/L   GLUCOSE, POC   Result Value Ref Range    Glucose (POC) 214 (H) 70 - 110 mg/dL   GLUCOSE, POC   Result Value Ref Range    Glucose (POC) 230 (H) 70 - 110 mg/dL   POC G3   Result Value Ref Range    Device: VENT      FIO2 (POC) 50 %    pH (POC) 7.314 (L) 7.35 - 7.45      pCO2 (POC) 45.8 (H) 35.0 - 45.0 MMHG    pO2 (POC) 165 (H) 80 - 100 MMHG    HCO3 (POC) 23.3 22 - 26 MMOL/L    sO2 (POC) 99 (H) 92 - 97 %    Base deficit (POC) 3 mmol/L    Mode ASSIST CONTROL      Tidal volume 450 ml    Set Rate 14 bpm    PEEP/CPAP (POC) 5 cmH2O    Allens test (POC) YES      Inspiratory Time 1 sec    Total resp. rate 24      Site RIGHT RADIAL      Specimen type (POC) ARTERIAL      Performed by Keesha Fu     Volume control plus YES     GLUCOSE, POC   Result Value Ref Range    Glucose (POC) 204 (H) 70 - 110 mg/dL   POC G3   Result Value Ref Range    Device: VENT      FIO2 (POC) 35 %    pH (POC) 7.333 (L) 7.35 - 7.45      pCO2 (POC) 34.0 (L) 35.0 - 45.0 MMHG    pO2 (POC) 91 80 - 100 MMHG    HCO3 (POC) 18.1 (L) 22 - 26 MMOL/L    sO2 (POC) 97 92 - 97 %    Base deficit (POC) 8 mmol/L    Mode ASSIST CONTROL      Tidal volume 450 ml    Set Rate 14 bpm    PEEP/CPAP (POC) 5 cmH2O    Allens test (POC) YES      Inspiratory Time 1 sec    Total resp. rate 18      Site LEFT RADIAL      Patient temp.  98.6      Specimen type (POC) ARTERIAL      Performed by Lis Rogers     Volume control plus YES     GLUCOSE, POC   Result Value Ref Range    Glucose (POC) 252 (H) 70 - 110 mg/dL   GLUCOSE, POC   Result Value Ref Range    Glucose (POC) 225 (H) 70 - 110 mg/dL   POC G3   Result Value Ref Range    Device: VENT      FIO2 (POC) 35 %    pH (POC) 7.367 7.35 - 7.45      pCO2 (POC) 38.8 35.0 - 45.0 MMHG    pO2 (POC) 99 80 - 100 MMHG    HCO3 (POC) 22.3 22 - 26 MMOL/L    sO2 (POC) 98 (H) 92 - 97 %    Base deficit (POC) 3 mmol/L    Mode CPAP/SPON      PEEP/CPAP (POC) 5 cmH2O    Pressure support 7 cmH2O    Allens test (POC) YES      Total resp.  rate 25      Site LEFT RADIAL      Specimen type (POC) ARTERIAL      Performed by Lincoln Norris    GLUCOSE, POC   Result Value Ref Range    Glucose (POC) 186 (H) 70 - 110 mg/dL   GLUCOSE, POC   Result Value Ref Range    Glucose (POC) 176 (H) 70 - 110 mg/dL   GLUCOSE, POC   Result Value Ref Range    Glucose (POC) 174 (H) 70 - 110 mg/dL   GLUCOSE, POC   Result Value Ref Range    Glucose (POC) 183 (H) 70 - 110 mg/dL   GLUCOSE, POC   Result Value Ref Range    Glucose (POC) 169 (H) 70 - 110 mg/dL   GLUCOSE, POC   Result Value Ref Range    Glucose (POC) 179 (H) 70 - 110 mg/dL   GLUCOSE, POC   Result Value Ref Range    Glucose (POC) 184 (H) 70 - 110 mg/dL   GLUCOSE, POC   Result Value Ref Range    Glucose (POC) 189 (H) 70 - 110 mg/dL   GLUCOSE, POC   Result Value Ref Range    Glucose (POC) 170 (H) 70 - 110 mg/dL   GLUCOSE, POC   Result Value Ref Range    Glucose (POC) 175 (H) 70 - 110 mg/dL   GLUCOSE, POC   Result Value Ref Range    Glucose (POC) 150 (H) 70 - 110 mg/dL   GLUCOSE, POC   Result Value Ref Range    Glucose (POC) 163 (H) 70 - 110 mg/dL   GLUCOSE, POC   Result Value Ref Range    Glucose (POC) 159 (H) 70 - 110 mg/dL   GLUCOSE, POC   Result Value Ref Range    Glucose (POC) 136 (H) 70 - 110 mg/dL   GLUCOSE, POC   Result Value Ref Range    Glucose (POC) 172 (H) 70 - 110 mg/dL   GLUCOSE, POC   Result Value Ref Range    Glucose (POC) 199 (H) 70 - 110 mg/dL   GLUCOSE, POC   Result Value Ref Range    Glucose (POC) 177 (H) 70 - 110 mg/dL   GLUCOSE, POC   Result Value Ref Range    Glucose (POC) 163 (H) 70 - 110 mg/dL   GLUCOSE, POC   Result Value Ref Range    Glucose (POC) 159 (H) 70 - 110 mg/dL   GLUCOSE, POC   Result Value Ref Range    Glucose (POC) 156 (H) 70 - 110 mg/dL   GLUCOSE, POC   Result Value Ref Range    Glucose (POC) 157 (H) 70 - 110 mg/dL   GLUCOSE, POC   Result Value Ref Range    Glucose (POC) 157 (H) 70 - 110 mg/dL   GLUCOSE, POC   Result Value Ref Range    Glucose (POC) 178 (H) 70 - 110 mg/dL   GLUCOSE, POC   Result Value Ref Range    Glucose (POC) 210 (H) 70 - 110 mg/dL   GLUCOSE, POC   Result Value Ref Range    Glucose (POC) 206 (H) 70 - 110 mg/dL   GLUCOSE, POC   Result Value Ref Range    Glucose (POC) 194 (H) 70 - 110 mg/dL   GLUCOSE, POC   Result Value Ref Range    Glucose (POC) 189 (H) 70 - 110 mg/dL   GLUCOSE, POC   Result Value Ref Range    Glucose (POC) 192 (H) 70 - 110 mg/dL   GLUCOSE, POC   Result Value Ref Range    Glucose (POC) 185 (H) 70 - 110 mg/dL   GLUCOSE, POC   Result Value Ref Range    Glucose (POC) 228 (H) 70 - 110 mg/dL   GLUCOSE, POC   Result Value Ref Range    Glucose (POC) 290 (H) 70 - 110 mg/dL   GLUCOSE, POC   Result Value Ref Range    Glucose (POC) 246 (H) 70 - 110 mg/dL   GLUCOSE, POC   Result Value Ref Range    Glucose (POC) 221 (H) 70 - 110 mg/dL   GLUCOSE, POC   Result Value Ref Range    Glucose (POC) 286 (H) 70 - 110 mg/dL   GLUCOSE, POC   Result Value Ref Range    Glucose (POC) 198 (H) 70 - 110 mg/dL   GLUCOSE, POC   Result Value Ref Range    Glucose (POC) 261 (H) 70 - 110 mg/dL   GLUCOSE, POC   Result Value Ref Range    Glucose (POC) 277 (H) 70 - 110 mg/dL   GLUCOSE, POC   Result Value Ref Range    Glucose (POC) 216 (H) 70 - 110 mg/dL   GLUCOSE, POC   Result Value Ref Range    Glucose (POC) 145 (H) 70 - 110 mg/dL   GLUCOSE, POC   Result Value Ref Range    Glucose (POC) 268 (H) 70 - 110 mg/dL   GLUCOSE, POC   Result Value Ref Range    Glucose (POC) 225 (H) 70 - 110 mg/dL   GLUCOSE, POC   Result Value Ref Range    Glucose (POC) 228 (H) 70 - 110 mg/dL   GLUCOSE, POC   Result Value Ref Range    Glucose (POC) 180 (H) 70 - 110 mg/dL   GLUCOSE, POC   Result Value Ref Range    Glucose (POC) 201 (H) 70 - 110 mg/dL   EKG, 12 LEAD, INITIAL   Result Value Ref Range    Ventricular Rate 80 BPM    Atrial Rate 80 BPM    P-R Interval 230 ms    QRS Duration 86 ms    Q-T Interval 644 ms    QTC Calculation (Bezet) 742 ms    Calculated P Axis 49 degrees    Calculated R Axis 58 degrees    Calculated T Axis 17 degrees    Diagnosis       Sinus rhythm with 1st degree AV block  Anterior infarct , age undetermined  Prolonged QT  Abnormal ECG  No previous ECGs available  Confirmed by Mark Welsh MD, [place] [id number]) on 4/5/2019 4:08:31 PM     EKG, 12 LEAD, SUBSEQUENT   Result Value Ref Range    Ventricular Rate 69 BPM    Atrial Rate 69 BPM    P-R Interval 230 ms    QRS Duration 88 ms    Q-T Interval 402 ms    QTC Calculation (Bezet) 430 ms    Calculated P Axis 73 degrees    Calculated R Axis 102 degrees    Calculated T Axis 47 degrees    Diagnosis       Sinus rhythm with 1st degree AV block  Rightward axis  Low voltage QRS  Cannot rule out Anterior infarct (cited on or before 04-APR-2019)  Abnormal ECG  Confirmed by Mark Welsh MD, [place] ([id number]) on 4/5/2019 9:06:50 AM     EKG, 12 LEAD, SUBSEQUENT   Result Value Ref Range    Ventricular Rate 87 BPM    Atrial Rate 87 BPM    P-R Interval 216 ms    QRS Duration 86 ms    Q-T Interval 562 ms    QTC Calculation (Bezet) 676 ms    Calculated P Axis 55 degrees    Calculated R Axis 65 degrees    Calculated T Axis 26 degrees    Diagnosis       Sinus rhythm with 1st degree AV block  Anterior infarct (cited on or before 04-APR-2019)  Prolonged QT  Abnormal ECG    Confirmed by Mark Welsh MD, [place] ([id number]) on 4/5/2019 9:10:33 AM     EKG, 12 LEAD, SUBSEQUENT   Result Value Ref Range    Ventricular Rate 87 BPM    Atrial Rate 87 BPM    P-R Interval 212 ms    QRS Duration 84 ms    Q-T Interval 350 ms    QTC Calculation (Bezet) 421 ms Calculated P Axis 65 degrees    Calculated R Axis 77 degrees    Calculated T Axis -6 degrees    Diagnosis       Sinus rhythm with 1st degree AV block  Anterolateral infarct (cited on or before 04-APR-2019)  Abnormal ECG  When compared with ECG of 05-APR-2019 07:11,  Nonspecific T wave abnormality now evident in Inferior leads  Confirmed by Alayna Magallanes MD, Mercy Grey [id number]) on 4/6/2019 8:55:37 AM         ASSESSMENT and PLAN    ICD-10-CM ICD-9-CM    1. Stridor R06.1 786.1 REFERRAL TO PULMONARY DISEASE      XR CHEST PA LAT   2. Sore throat J02.9 462 AMB POC RAPID STREP A   3. Sleep apnea, unspecified type G47.30 780.57 REFERRAL TO SLEEP STUDIES   4. SOB (shortness of breath) R06.02 786.05 REFERRAL TO PULMONARY DISEASE      XR CHEST PA LAT   5. Essential hypertension I10 401.9    6. S/P complete thyroidectomy E89.0 V45.89    7. [facility] F39 296.90      lab results and schedule of future lab studies reviewed with patient  reviewed diet, exercise and weight control  reviewed medications and side effects in detail  specific diabetic recommendations: low cholesterol diet, weight control and daily exercise discussed, home glucose monitoring emphasized, all medications, side effects and compliance discussed carefully and glycohemoglobin and other lab monitoring discussed  radiology results and schedule of future radiology studies reviewed with patient    I have discussed the diagnosis with the patient and the intended plan of care as seen in the above orders. The patient has received an after-visit summary and questions were answered concerning future plans. I have discussed medication, side effects, and warnings with the patient in detail. The patient verbalized understanding and is in agreement with the plan of care. The patient will contact the office with any additional concerns.  More than 50% of the 30-minute visit spent counseling and coordinating care with patient face to face on anxiety, stridor and postsurgical course following thyroidectomy. Discussed need for compliance with treatment regimen, follow-up, and taking responsibility for her disease process.     Tessy Metz MD

## 2019-04-23 RX ORDER — GABAPENTIN 400 MG/1
CAPSULE ORAL
Qty: 30 CAP | Refills: 1 | Status: SHIPPED | OUTPATIENT
Start: 2019-04-23 | End: 2019-05-25 | Stop reason: SDUPTHER

## 2019-04-24 VITALS
DIASTOLIC BLOOD PRESSURE: 80 MMHG | HEART RATE: 98 BPM | RESPIRATION RATE: 18 BRPM | SYSTOLIC BLOOD PRESSURE: 138 MMHG | OXYGEN SATURATION: 96 %

## 2019-04-26 ENCOUNTER — OFFICE VISIT (OUTPATIENT)
Dept: SURGERY | Age: 59
End: 2019-04-26

## 2019-04-26 VITALS
OXYGEN SATURATION: 99 % | TEMPERATURE: 97.7 F | BODY MASS INDEX: 40.18 KG/M2 | HEART RATE: 84 BPM | DIASTOLIC BLOOD PRESSURE: 68 MMHG | WEIGHT: 256 LBS | RESPIRATION RATE: 20 BRPM | SYSTOLIC BLOOD PRESSURE: 133 MMHG | HEIGHT: 67 IN

## 2019-04-26 DIAGNOSIS — E04.9 GOITER: Primary | ICD-10-CM

## 2019-04-26 NOTE — PROGRESS NOTES
Progress Note    Patient: Kam Brown  MRN: [id number]  SSN: [id number]   YOB: 1960  Age: 63 y.o. Sex: female     Chief Complaint   Patient presents with    Post OP Follow Up     thyroidectomy april 2019       HPI    Ms. Paulette Rai returns after her total thyroidectomy for goiter as well as multiple calcified nodules. She remains hoarse with occasional stridor on exertion. She feels well she has had no parathyroid type symptoms. We discussed her pathology again as well as her hoarseness. She is considerably improved since her early postop days and I am hopeful that she will continue should she not, I would consider a wedge for her cord.     Past Medical History:   Diagnosis Date    Arthritis     Lower back     Asthma     Chronic back pain     Lower back pain    Depression     Diabetes ([address]     Diabetes mellitus ([address]     Hearing loss     Heart failure (HCC)     chronic diastolic heart failure    Left ear hearing loss     pt states nerve damage--- 25% hearing loss, described as \"muffled\"    Memory difficulty     Panic attacks     Ringing of ears     Severe headache     Sleep apnea     SOB (shortness of breath) on exertion     w and w/out exertion    Stomach pain     Thyroid disease     Vertigo      Past Surgical History:   Procedure Laterality Date    CARDIAC SURG PROCEDURE UNLIST  10/31/2013    open heart    HX HEART VALVE SURGERY  2013    aortic valve repair    HX HYSTERECTOMY      Partial Hysterectomy - removed ovary    HX MYOMECTOMY      HX ORTHOPAEDIC  02/2018    had nerves burned on her right side of back    THYROIDECTOMY Bilateral 04/04/2019    Dr. Ron Manzano Other (comments)     Sweeteners- causes headaches    Metformin Other (comments)     Increase pain in feet and swelling in feet  Increased hunger,tired and bilat foot pain    Morphine Other (comments)     headache    Singulair [Montelukast] Other (comments) hallucinations    Sucrose Other (comments)     Pt. Is not allergic to sucrose. She develops headaches with artificial sweeteners. Current Outpatient Medications   Medication Sig Dispense Refill    gabapentin (NEURONTIN) 400 mg capsule take 1 capsule by mouth three times a day 30 Cap 1    levothyroxine (SYNTHROID) 100 mcg tablet Take 1 Tab by mouth Daily (before breakfast). 30 Tab 1    metoprolol succinate (TOPROL-XL) 50 mg XL tablet take 1 tablet by mouth once daily 30 Tab 0    LANTUS U-100 INSULIN 100 unit/mL injection inject 90 units subcutaneously at bedtime 30 mL 0    VITAMIN D2 50,000 unit capsule take 1 capsule by mouth every week 4 Cap 2    meclizine (ANTIVERT) 12.5 mg tablet take 1 tablet by mouth three times a day if needed for 10 days 30 Tab 2    SYMBICORT 160-4.5 mcg/actuation HFAA inhale 2 puffs by mouth twice a day RINSE MOUTH AFTER USE 1 Inhaler 0    omega 3-DHA-EPA-fish oil 1,000 mg (120 mg-180 mg) capsule take 1 capsule by mouth twice a day 60 Cap 0    PROAIR HFA 90 mcg/actuation inhaler inhale 2 puffs by mouth every 4 hours if needed for wheezing 1 Inhaler 3    atorvastatin (LIPITOR) 80 mg tablet take 1 tablet by mouth once daily 30 Tab 3    BD INSULIN SYRINGE ULTRA-FINE 1 mL 31 gauge x 5/16 syrg use as directed twice a day 200 Syringe 3    furosemide (LASIX) 40 mg tablet Take 1 Tab by mouth two (2) times a day. (Patient taking differently: Take 40 mg by mouth daily as needed.) 60 Tab 0    potassium chloride (KLOR-CON) 10 mEq tablet Take 1 Tab by mouth daily. 30 Tab 0    empagliflozin (JARDIANCE) 25 mg tablet Take  by mouth daily.  FREESTYLE LITE STRIPS strip TEST twice a day as directed 100 Strip 11    albuterol (PROVENTIL VENTOLIN) 2.5 mg /3 mL (0.083 %) nebulizer solution 3 mL by Nebulization route every four (4) hours as needed for Wheezing. 24 Each 5    cyanocobalamin (VITAMIN B-12) 1,000 mcg sublingual tablet Take 1,000 mcg by mouth daily.       Aspirin, Buffered 81 mg tab Take  by mouth daily.  levothyroxine (SYNTHROID) 25 mcg tablet take 1 tablet by mouth every morning BEFORE BREAKFAST 30 Tab 2    DULoxetine (CYMBALTA) 60 mg capsule take 1 capsule by mouth once daily 30 Cap 0     Social History     Socioeconomic History    Marital status:      Spouse name: Not on file    Number of children: Not on file    Years of education: Not on file    Highest education level: Not on file   Occupational History    Not on file   Social Needs    Financial resource strain: Not on file    Food insecurity:     Worry: Not on file     Inability: Not on file    Transportation needs:     Medical: Not on file     Non-medical: Not on file   Tobacco Use    Smoking status: Never Smoker    Smokeless tobacco: Never Used   Substance and Sexual Activity    Alcohol use: No    Drug use: No    Sexual activity: Not Currently   Lifestyle    Physical activity:     Days per week: Not on file     Minutes per session: Not on file    Stress: Not on file   Relationships    Social connections:     Talks on phone: Not on file     Gets together: Not on file     Attends [employer] service: Not on file     Active member of club or organization: Not on file     Attends meetings of clubs or organizations: Not on file     Relationship status: Not on file    Intimate partner violence:     Fear of current or ex partner: Not on file     Emotionally abused: Not on file     Physically abused: Not on file     Forced sexual activity: Not on file   Other Topics Concern     Service No    Blood Transfusions No    Caffeine Concern No    Occupational Exposure No    Hobby Hazards No    Sleep Concern Yes     Comment: Sleep apnea     Stress Concern Yes     Comment: Due to family medical issues.      Weight Concern No    Special Diet No    Back Care Yes     Comment: Patient tries to do back care with streches     Exercise No    Bike Helmet Yes    Seat Belt Yes    Self-Exams Yes   Social History Narrative    Not on file     Family History   Problem Relation Age of Onset    Heart Disease Mother     Diabetes Mother     Stroke Mother     Hypertension Mother     Diabetes Father     Heart Disease Father     Hypertension Father     Stroke Father     Diabetes Brother     Cancer Brother     Hypertension Brother     Stroke Brother     Heart Disease Brother     Diabetes Brother     Hypertension Brother     Stroke Brother     Heart Disease Brother     Diabetes Brother     Hypertension Brother     Hypertension Brother          Review of systems:  Patient denies any reflux, emesis, abdominal pain, change in bowel habits, hematochezia, melena, fever, weight loss, fatigue chills, dermatitis, abnormal moles, change in vision, vertigo, epistaxis, dysphagia, hoarseness, chest pain, palpitations, hypertension, edema, cough, shortness of breath, wheezing, hemoptysis, snoring, hematuria, diabetes, thyroid disease, anemia, bruising, history of blood transfusion, dizziness, headache, or fainting.     Physical Examination    Well developed well nourished female in no apparent distress  Visit Vitals  /68   Pulse 84   Temp 97.7 °F (36.5 °C) (Oral)   Resp 20   Ht 5' 7\" (1.702 m)   Wt 116.1 kg (256 lb)   SpO2 99%   BMI 40.10 kg/m²      Head: normocephalic, atraumatic  Mouth: Clear, no overt lesions, oral mucosa pink and moist  Neck: supple, no masses, no adenopathy or carotid bruits, trachea midline well-healed thyroidal incision  resp: clear to auscultation bilaterally, no wheeze, rhonchi or rales, excursions normal and symmetrical  Cardio: Regular rate and rhythm, no murmurs, clicks, gallops or rubs, no edema or varicosities  Abdomen: soft, nontender, nondistended, normoactive bowel sounds, no hernias, no hepatosplenomegaly,   Back: Deferred  Extremeties: warm, well-perfused, no tenderness or swelling, normal gait/station  Neuro: sensation and strength grossly intact and symmetrical  Psych: alert and oriented to person, place and time  Breast exam deferred    IMPRESSION  Status post total thyroidectomy for inflammatory thyroiditis and goiter. Ongoing vocal cord dysfunction    PLAN  No orders of the defined types were placed in this encounter.     Follow-up in 1 month  Griselda Leahy MD

## 2019-04-30 ENCOUNTER — TELEPHONE (OUTPATIENT)
Dept: FAMILY MEDICINE CLINIC | Age: 59
End: 2019-04-30

## 2019-04-30 NOTE — TELEPHONE ENCOUNTER
----- Message from Shanika Landeros MD sent at 4/23/2019  6:31 PM EDT -----  Please let patient know chest x-ray is negative for any acute findings.   Shanika Landeros MD

## 2019-05-03 NOTE — DISCHARGE SUMMARY
[address] [place] [address]    Name:  China Harris  MR#:   [id number]  :  1960  ACCOUNT #:  [de-identified]  ADMIT DATE:  2019  DISCHARGE DATE:  2019    This is a second discharge summary dictated on this patient. She was in the hospital from 2019 to 2019. DIAGNOSIS:  Goiter. HISTORY AND HOSPITAL COURSE:  The patient is a 52-year-old morbidly obese woman who presented with a goiter with multiple bilateral calcified nodules. She was brought to the hospital on  for a total thyroidectomy. During this operation, it was noted that her gland was quite rubbery and highly inflamed. The operation went well, but the patient required reintubation in the recovery room for desaturations. She was intubated and taken to the ICU. Hemodynamically, she was quite stable; however, fears of airway issues kept her intubated for several days. She then went to CPAP, which she normally wears at home. She stayed in the ICU for approximately five days. Her diet was started there. She began to improve significantly, and her voice while hoarse improved dramatically. Ultimately, she was transferred to step-down where she tolerated the true diet. She walked in the hallways and in the room. Her voice stayed hoarse, but she was able to tolerate this. She maintained good saturations. Her pathology came back negative for malignancy. She was tolerating a regular diet, up out of bed ambulating well on the 15th of the month and was sent home on the 16 of the month on Tums with calcium four p.o. b.i.d., and follow up in a week.       Boo Sood MD [id number]  D:  2019 12:07  T:  2019 12:16  JOB #:  [id number]

## 2019-05-16 ENCOUNTER — OFFICE VISIT (OUTPATIENT)
Dept: PULMONOLOGY | Age: 59
End: 2019-05-16

## 2019-05-16 VITALS
BODY MASS INDEX: 40.49 KG/M2 | DIASTOLIC BLOOD PRESSURE: 76 MMHG | RESPIRATION RATE: 20 BRPM | WEIGHT: 258 LBS | TEMPERATURE: 97.7 F | OXYGEN SATURATION: 98 % | HEART RATE: 76 BPM | SYSTOLIC BLOOD PRESSURE: 116 MMHG | HEIGHT: 67 IN

## 2019-05-16 DIAGNOSIS — G47.33 OSA TREATED WITH BIPAP: ICD-10-CM

## 2019-05-16 DIAGNOSIS — J38.00 VOCAL CORD PARALYSIS: ICD-10-CM

## 2019-05-16 DIAGNOSIS — J95.89 POSTEXTUBATION STRIDOR: ICD-10-CM

## 2019-05-16 DIAGNOSIS — J45.40 MODERATE PERSISTENT ASTHMA WITHOUT COMPLICATION: Primary | ICD-10-CM

## 2019-05-16 DIAGNOSIS — E66.01 MORBID OBESITY WITH BMI OF 40.0-44.9, ADULT (HCC): ICD-10-CM

## 2019-05-16 NOTE — PROGRESS NOTES
HISTORY OF PRESENT ILLNESS  King Adrien is a 63 y.o. female. Follow up for episodic SOB due to bronchospasm, last seen in 2017. Pt with initial response to Symbicort with much less shortness of breath and wheezing. Interval history includes recent complicated thyroidectomy. Pt was reintubated for stridor and required intubation for several days post op. Since then she has had severe stridor and shortness of breath. Activity is severely limited by shortness of breath and stridor with relief but with significant relief while on BIPAP. Pt has OVIDIO and recently was changed to BIPAP by Dr. Jamey Trevizo. Pt denies orthopnea or pedal edema. No recent ED visits or admissions for Asthma exacerbation. Asthma   The history is provided by the patient and spouse. This is a chronic problem. Episode onset: years. The problem occurs every several days. The problem has been gradually improving. Associated symptoms include shortness of breath. Pertinent negatives include no chest pain, no abdominal pain and no headaches. Review of Systems   Constitutional: Positive for malaise/fatigue. Negative for chills, diaphoresis, fever and weight loss. HENT: Negative for congestion, ear discharge, ear pain, hearing loss, nosebleeds, sinus pain, sore throat and tinnitus. Eyes: Negative for blurred vision, double vision, photophobia, pain, discharge and redness. Respiratory: Positive for shortness of breath, wheezing and stridor. Negative for cough, hemoptysis and sputum production. Cardiovascular: Negative for chest pain, palpitations, orthopnea, claudication, leg swelling and PND. Gastrointestinal: Negative for abdominal pain, blood in stool, constipation, diarrhea, heartburn, melena, nausea and vomiting. Genitourinary: Negative for dysuria, flank pain, frequency, hematuria and urgency. Musculoskeletal: Positive for back pain and joint pain. Negative for falls, myalgias and neck pain.    Skin: Negative for itching and rash. Neurological: Positive for tremors and weakness. Negative for dizziness, tingling, sensory change, speech change, focal weakness, seizures, loss of consciousness and headaches. Endo/Heme/Allergies: Negative for environmental allergies and polydipsia. Does not bruise/bleed easily. Psychiatric/Behavioral: Positive for depression. Negative for hallucinations, memory loss, substance abuse and suicidal ideas. The patient is not nervous/anxious and does not have insomnia. Past Medical History:   Diagnosis Date    Arthritis     Lower back     Asthma     Chronic back pain     Lower back pain    Depression     Diabetes ([facility]     Diabetes mellitus ([facility]     Hearing loss     Heart failure (HCC)     chronic diastolic heart failure    Left ear hearing loss     pt states nerve damage--- 25% hearing loss, described as \"muffled\"    Memory difficulty     Panic attacks     Ringing of ears     Severe headache     Sleep apnea     SOB (shortness of breath) on exertion     w and w/out exertion    Stomach pain     Thyroid disease     Vertigo      Past Surgical History:   Procedure Laterality Date    CARDIAC SURG PROCEDURE UNLIST  10/31/2013    open heart    HX HEART VALVE SURGERY  2013    aortic valve repair    HX HYSTERECTOMY      Partial Hysterectomy - removed ovary    HX MYOMECTOMY      HX ORTHOPAEDIC  02/2018    had nerves burned on her right side of back    THYROIDECTOMY Bilateral 04/04/2019    Dr. Marilu Tilley     Current Outpatient Medications on File Prior to Visit   Medication Sig Dispense Refill    gabapentin (NEURONTIN) 400 mg capsule take 1 capsule by mouth three times a day 30 Cap 1    levothyroxine (SYNTHROID) 100 mcg tablet Take 1 Tab by mouth Daily (before breakfast).  30 Tab 1    metoprolol succinate (TOPROL-XL) 50 mg XL tablet take 1 tablet by mouth once daily 30 Tab 0    LANTUS U-100 INSULIN 100 unit/mL injection inject 90 units subcutaneously at bedtime 30 mL 0    VITAMIN D2 50,000 unit capsule take 1 capsule by mouth every week 4 Cap 2    omega 3-DHA-EPA-fish oil 1,000 mg (120 mg-180 mg) capsule take 1 capsule by mouth twice a day 60 Cap 0    BD INSULIN SYRINGE ULTRA-FINE 1 mL 31 gauge x 5/16 syrg use as directed twice a day 200 Syringe 3    potassium chloride (KLOR-CON) 10 mEq tablet Take 1 Tab by mouth daily. 30 Tab 0    empagliflozin (JARDIANCE) 25 mg tablet Take  by mouth daily.  FREESTYLE LITE STRIPS strip TEST twice a day as directed 100 Strip 11    cyanocobalamin (VITAMIN B-12) 1,000 mcg sublingual tablet Take 1,000 mcg by mouth daily.  Aspirin, Buffered 81 mg tab Take  by mouth daily.  levothyroxine (SYNTHROID) 25 mcg tablet take 1 tablet by mouth every morning BEFORE BREAKFAST 30 Tab 2    meclizine (ANTIVERT) 12.5 mg tablet take 1 tablet by mouth three times a day if needed for 10 days 30 Tab 2    DULoxetine (CYMBALTA) 60 mg capsule take 1 capsule by mouth once daily 30 Cap 0    SYMBICORT 160-4.5 mcg/actuation HFAA inhale 2 puffs by mouth twice a day RINSE MOUTH AFTER USE 1 Inhaler 0    PROAIR HFA 90 mcg/actuation inhaler inhale 2 puffs by mouth every 4 hours if needed for wheezing 1 Inhaler 3    atorvastatin (LIPITOR) 80 mg tablet take 1 tablet by mouth once daily 30 Tab 3    furosemide (LASIX) 40 mg tablet Take 1 Tab by mouth two (2) times a day. (Patient taking differently: Take 40 mg by mouth daily as needed.) 60 Tab 0    albuterol (PROVENTIL VENTOLIN) 2.5 mg /3 mL (0.083 %) nebulizer solution 3 mL by Nebulization route every four (4) hours as needed for Wheezing. 24 Each 5     No current facility-administered medications on file prior to visit.       Allergies   Allergen Reactions    Other Food Other (comments)     Sweeteners- causes headaches    Metformin Other (comments)     Increase pain in feet and swelling in feet  Increased hunger,tired and bilat foot pain    Morphine Other (comments)     headache    Singulair [Montelukast] Other (comments)     hallucinations    Sucrose Other (comments)     Pt. Is not allergic to sucrose. She develops headaches with artificial sweeteners. Social History     Socioeconomic History    Marital status:      Spouse name: Not on file    Number of children: Not on file    Years of education: Not on file    Highest education level: Not on file   Occupational History    Not on file   Social Needs    Financial resource strain: Not on file    Food insecurity:     Worry: Not on file     Inability: Not on file    Transportation needs:     Medical: Not on file     Non-medical: Not on file   Tobacco Use    Smoking status: Never Smoker    Smokeless tobacco: Never Used   Substance and Sexual Activity    Alcohol use: No    Drug use: No    Sexual activity: Not Currently   Lifestyle    Physical activity:     Days per week: Not on file     Minutes per session: Not on file    Stress: Not on file   Relationships    Social connections:     Talks on phone: Not on file     Gets together: Not on file     Attends [employer] service: Not on file     Active member of club or organization: Not on file     Attends meetings of clubs or organizations: Not on file     Relationship status: Not on file    Intimate partner violence:     Fear of current or ex partner: Not on file     Emotionally abused: Not on file     Physically abused: Not on file     Forced sexual activity: Not on file   Other Topics Concern     Service No    Blood Transfusions No    Caffeine Concern No    Occupational Exposure No    Hobby Hazards No    Sleep Concern Yes     Comment: Sleep apnea     Stress Concern Yes     Comment: Due to family medical issues.      Weight Concern No    Special Diet No    Back Care Yes     Comment: Patient tries to do back care with streches     Exercise No    Bike Helmet Yes    Seat Belt Yes    Self-Exams Yes   Social History Narrative    Not on file     Blood pressure 116/76, pulse 76, temperature 97.7 °F (36.5 °C), temperature source Oral, resp. rate 20, height 5' 7\" (1.702 m), weight 117 kg (258 lb), SpO2 98 %. Physical Exam   Constitutional: She is oriented to person, place, and time. She appears well-developed. No distress. Obese , uses 2 canes to ambulate   HENT:   Head: Normocephalic and atraumatic. Nose: Nose normal.   Mouth/Throat: No oropharyngeal exudate. Healed thyroidectomy scar   Eyes: Pupils are equal, round, and reactive to light. Conjunctivae and EOM are normal. Right eye exhibits no discharge. Left eye exhibits no discharge. No scleral icterus. Neck: No JVD present. No tracheal deviation present. No thyromegaly present. Cardiovascular: Normal rate, regular rhythm, normal heart sounds and intact distal pulses. Exam reveals no gallop. No murmur heard. Pulmonary/Chest: Effort normal and breath sounds normal. Stridor present. No respiratory distress. She has no wheezes. She has no rales. She exhibits no tenderness. Loud stridor   Abdominal: Soft. She exhibits no mass. There is no tenderness. There is no rebound. Musculoskeletal: She exhibits no tenderness or deformity. Edema: trace. Lymphadenopathy:     She has no cervical adenopathy. Neurological: She is alert and oriented to person, place, and time. Coordination normal.   Wide based gait   Skin: Skin is warm and dry. No rash noted. She is not diaphoretic. No erythema. No pallor. Psychiatric: She has a normal mood and affect. Her behavior is normal. Judgment and thought content normal.     Spirometry: extrathoracic airway obstruction with severely flattened inspiratory loop. ASSESSMENT and PLAN  Encounter Diagnoses   Name Primary?  Moderate persistent asthma without complication Yes    OVIDIO treated with BiPAP     Postextubation stridor     Morbid obesity with BMI of 40.0-44.9, adult (HCC)      Pt with prior good response to Symbicort, will continue and monitor response.     Post op stridor is concerning for vocal cord paralysis due to recurrent laryngeal nerve injury during thyroidectomy, less likely vocal cord injury post extubation. Spirometry and FV loop reviewed with pt and , see above. Refer to ENT stat for laryngoscopy and further management. Pt instructed to use BIPAP HS and prn. I believe this would relieve dyspnea and stridor. Discussed possible scenarios with pt and  including possible tracheostomy if Sx not relieved with time. RTC 2 months.

## 2019-05-16 NOTE — PROGRESS NOTES
Andrés Alcazar presents today for   Chief Complaint   Patient presents with   [facility] Follow Up     CXR done 4/22       Is someone accompanying this pt? Brother Barry Shen    Is the patient using any DME equipment during [address]? No     -DME Company n/a     Depression Screening:  3 most recent PHQ Screens 10/22/2018   Little interest or pleasure in doing things Not at all   Feeling down, depressed, irritable, or hopeless Not at all   Total Score PHQ 2 0   Trouble falling or staying asleep, or sleeping too much -   Feeling tired or having little energy -   Poor appetite, weight loss, or overeating -   Feeling bad about yourself - or that you are a failure or have let yourself or your family down -   Trouble concentrating on things such as school, work, reading, or watching TV -   Moving or speaking so slowly that other people could have noticed; or the opposite being so fidgety that others notice -   How difficult have these problems made it for you to do your work, take care of your home and get along with others -       Learning Assessment:  Learning Assessment 2/11/2019   PRIMARY LEARNER Patient   HIGHEST LEVEL OF EDUCATION - PRIMARY LEARNER  -   BARRIERS PRIMARY LEARNER NONE   CO-LEARNER CAREGIVER -   PRIMARY LANGUAGE ENGLISH   LEARNER PREFERENCE PRIMARY DEMONSTRATION   ANSWERED BY self   RELATIONSHIP SELF       Abuse Screening:  Abuse Screening Questionnaire 10/22/2018   Do you ever feel afraid of your partner? -   Are you in a relationship with someone who physically or mentally threatens you? N   Is it safe for you to go home? Y       Fall Risk  Fall Risk Assessment, last 12 mths 10/22/2018   Able to walk? Yes   Fall in past 12 months? Yes   Fall with injury? No   Number of falls in past 12 months 1   Fall Risk Score 1         Coordination of Care:  1. Have you been to the ER, urgent care clinic since your last visit? Hospitalized since your last visit? Yes; Name: [facility]    2.  Have you seen or consulted any other health care providers outside of the [address] [place] [address] since your last visit? Include any pap smears or colon screening.  Dr Luke Lazar PCP

## 2019-05-17 RX ORDER — OMEGA-3-ACID ETHYL ESTERS 1 G/1
CAPSULE, LIQUID FILLED ORAL
Qty: 60 CAP | Refills: 0 | Status: SHIPPED | OUTPATIENT
Start: 2019-05-17 | End: 2020-04-15 | Stop reason: SDUPTHER

## 2019-05-17 RX ORDER — METOPROLOL SUCCINATE 50 MG/1
TABLET, EXTENDED RELEASE ORAL
Qty: 30 TAB | Refills: 0 | Status: SHIPPED | OUTPATIENT
Start: 2019-05-17 | End: 2019-07-12

## 2019-05-22 ENCOUNTER — APPOINTMENT (OUTPATIENT)
Dept: CT IMAGING | Age: 59
DRG: 115 | End: 2019-05-22
Attending: PHYSICIAN ASSISTANT
Payer: MEDICAID

## 2019-05-22 ENCOUNTER — HOSPITAL ENCOUNTER (INPATIENT)
Age: 59
LOS: 1 days | Discharge: HOME | DRG: 115 | End: 2019-05-23
Attending: EMERGENCY MEDICINE
Payer: MEDICAID

## 2019-05-22 DIAGNOSIS — E89.0 S/P THYROIDECTOMY: ICD-10-CM

## 2019-05-22 DIAGNOSIS — R06.1 STRIDOR: Primary | ICD-10-CM

## 2019-05-22 LAB
ANION GAP SERPL CALC-SCNC: 5 MMOL/L (ref 3–18)
BASOPHILS # BLD: 0 K/UL (ref 0–0.1)
BASOPHILS NFR BLD: 0 % (ref 0–2)
BUN SERPL-MCNC: 18 MG/DL (ref 7–18)
BUN/CREAT SERPL: 23 (ref 12–20)
CALCIUM SERPL-MCNC: 9.5 MG/DL (ref 8.5–10.1)
CHLORIDE SERPL-SCNC: 105 MMOL/L (ref 100–108)
CO2 SERPL-SCNC: 29 MMOL/L (ref 21–32)
CREAT SERPL-MCNC: 0.78 MG/DL (ref 0.6–1.3)
DIFFERENTIAL METHOD BLD: ABNORMAL
EOSINOPHIL # BLD: 0.1 K/UL (ref 0–0.4)
EOSINOPHIL NFR BLD: 1 % (ref 0–5)
ERYTHROCYTE [DISTWIDTH] IN BLOOD BY AUTOMATED COUNT: 16.5 % (ref 11.6–14.5)
GLUCOSE SERPL-MCNC: 132 MG/DL (ref 74–99)
HCT VFR BLD AUTO: 45.5 % (ref 35–45)
HGB BLD-MCNC: 15.3 G/DL (ref 12–16)
LYMPHOCYTES # BLD: 3.2 K/UL (ref 0.9–3.6)
LYMPHOCYTES NFR BLD: 40 % (ref 21–52)
MCH RBC QN AUTO: 31 PG (ref 24–34)
MCHC RBC AUTO-ENTMCNC: 33.6 G/DL (ref 31–37)
MCV RBC AUTO: 92.3 FL (ref 74–97)
MONOCYTES # BLD: 0.5 K/UL (ref 0.05–1.2)
MONOCYTES NFR BLD: 6 % (ref 3–10)
NEUTS SEG # BLD: 4.2 K/UL (ref 1.8–8)
NEUTS SEG NFR BLD: 53 % (ref 40–73)
PLATELET # BLD AUTO: 200 K/UL (ref 135–420)
PMV BLD AUTO: 9.3 FL (ref 9.2–11.8)
POTASSIUM SERPL-SCNC: 4.4 MMOL/L (ref 3.5–5.5)
RBC # BLD AUTO: 4.93 M/UL (ref 4.2–5.3)
SODIUM SERPL-SCNC: 139 MMOL/L (ref 136–145)
TSH SERPL DL<=0.05 MIU/L-ACNC: 37.7 UIU/ML (ref 0.36–3.74)
WBC # BLD AUTO: 7.9 K/UL (ref 4.6–13.2)

## 2019-05-22 PROCEDURE — 65660000000 HC RM CCU STEPDOWN

## 2019-05-22 PROCEDURE — 85025 COMPLETE CBC W/AUTO DIFF WBC: CPT

## 2019-05-22 PROCEDURE — 99282 EMERGENCY DEPT VISIT SF MDM: CPT

## 2019-05-22 PROCEDURE — 84443 ASSAY THYROID STIM HORMONE: CPT

## 2019-05-22 PROCEDURE — 80048 BASIC METABOLIC PNL TOTAL CA: CPT

## 2019-05-22 PROCEDURE — 70490 CT SOFT TISSUE NECK W/O DYE: CPT

## 2019-05-22 PROCEDURE — 96374 THER/PROPH/DIAG INJ IV PUSH: CPT

## 2019-05-22 PROCEDURE — 0CJY8ZZ INSPECTION OF MOUTH AND THROAT, VIA NATURAL OR ARTIFICIAL OPENING ENDOSCOPIC: ICD-10-PCS | Performed by: OTOLARYNGOLOGY

## 2019-05-22 PROCEDURE — 74011250636 HC RX REV CODE- 250/636: Performed by: EMERGENCY MEDICINE

## 2019-05-22 RX ADMIN — METHYLPREDNISOLONE SODIUM SUCCINATE 125 MG: 125 INJECTION, POWDER, FOR SOLUTION INTRAMUSCULAR; INTRAVENOUS at 17:22

## 2019-05-22 NOTE — CONSULTS
Hawa Perkins MD   Physician   Surgery   Progress Notes      Signed   Encounter Date:  4/26/2019                        []Hide copied text    []Nabil for details      Consult Note     Patient: Kade Renteria                     MRN: [id number]         SSN: [id number]   YOB: 1960          Age: 63 y.o. Sex: female           Chief Complaint   Patient presents with    Post OP Follow Up       thyroidectomy april 2019      Ms. Luh Kline is well-known to me after a total thyroidectomy last month for bilateral calcified nodules. Postoperatively she had voice changes and stridor and I been following her for that. She is been doing relatively well with her BiPAP at home which she normally takes even before her operation. She presents with stridor about the same as she has had before. She has 100% oxygen saturation but has noisy ventilation. A CT scan shows a tight airway. ENT will examine her cords tonight. I suspect she has a least one cord that is out.   I discussed this with ENT and ED staff.          Past Medical History:   Diagnosis Date    Arthritis       Lower back     Asthma      Chronic back pain       Lower back pain    Depression      Diabetes ([address]      Diabetes mellitus ([address]      Hearing loss      Heart failure (HCC)       chronic diastolic heart failure    Left ear hearing loss       pt states nerve damage--- 25% hearing loss, described as \"muffled\"    Memory difficulty      Panic attacks      Ringing of ears      Severe headache      Sleep apnea      SOB (shortness of breath) on exertion       w and w/out exertion    Stomach pain      Thyroid disease      Vertigo              Past Surgical History:   Procedure Laterality Date    CARDIAC SURG PROCEDURE UNLIST   10/31/2013     open heart    HX HEART VALVE SURGERY   2013     aortic valve repair    HX HYSTERECTOMY         Partial Hysterectomy - removed ovary    HX MYOMECTOMY        HX ORTHOPAEDIC   02/2018     had nerves burned on her right side of back    THYROIDECTOMY Bilateral 04/04/2019     Dr. Graciela Aiken Other (comments)       Sweeteners- causes headaches    Metformin Other (comments)       Increase pain in feet and swelling in feet  Increased hunger,tired and bilat foot pain    Morphine Other (comments)       headache    Singulair [Montelukast] Other (comments)       hallucinations    Sucrose Other (comments)       Pt. Is not allergic to sucrose. She develops headaches with artificial sweeteners.             Current Outpatient Medications   Medication Sig Dispense Refill    gabapentin (NEURONTIN) 400 mg capsule take 1 capsule by mouth three times a day 30 Cap 1    levothyroxine (SYNTHROID) 100 mcg tablet Take 1 Tab by mouth Daily (before breakfast). 30 Tab 1    metoprolol succinate (TOPROL-XL) 50 mg XL tablet take 1 tablet by mouth once daily 30 Tab 0    LANTUS U-100 INSULIN 100 unit/mL injection inject 90 units subcutaneously at bedtime 30 mL 0    VITAMIN D2 50,000 unit capsule take 1 capsule by mouth every week 4 Cap 2    meclizine (ANTIVERT) 12.5 mg tablet take 1 tablet by mouth three times a day if needed for 10 days 30 Tab 2    SYMBICORT 160-4.5 mcg/actuation HFAA inhale 2 puffs by mouth twice a day RINSE MOUTH AFTER USE 1 Inhaler 0    omega 3-DHA-EPA-fish oil 1,000 mg (120 mg-180 mg) capsule take 1 capsule by mouth twice a day 60 Cap 0    PROAIR HFA 90 mcg/actuation inhaler inhale 2 puffs by mouth every 4 hours if needed for wheezing 1 Inhaler 3    atorvastatin (LIPITOR) 80 mg tablet take 1 tablet by mouth once daily 30 Tab 3    BD INSULIN SYRINGE ULTRA-FINE 1 mL 31 gauge x 5/16 syrg use as directed twice a day 200 Syringe 3    furosemide (LASIX) 40 mg tablet Take 1 Tab by mouth two (2) times a day.  (Patient taking differently: Take 40 mg by mouth daily as needed.) 60 Tab 0    potassium chloride (KLOR-CON) 10 mEq tablet Take 1 Tab by mouth daily. 30 Tab 0    empagliflozin (JARDIANCE) 25 mg tablet Take  by mouth daily.        FREESTYLE LITE STRIPS strip TEST twice a day as directed 100 Strip 11    albuterol (PROVENTIL VENTOLIN) 2.5 mg /3 mL (0.083 %) nebulizer solution 3 mL by Nebulization route every four (4) hours as needed for Wheezing.  24 Each 5    cyanocobalamin (VITAMIN B-12) 1,000 mcg sublingual tablet Take 1,000 mcg by mouth daily.        Aspirin, Buffered 81 mg tab Take  by mouth daily.        levothyroxine (SYNTHROID) 25 mcg tablet take 1 tablet by mouth every morning BEFORE BREAKFAST 30 Tab 2    DULoxetine (CYMBALTA) 60 mg capsule take 1 capsule by mouth once daily 30 Cap 0      Social History            Socioeconomic History    Marital status:        Spouse name: Not on file    Number of children: Not on file    Years of education: Not on file    Highest education level: Not on file   Occupational History    Not on file   Social Needs    Financial resource strain: Not on file    Food insecurity:       Worry: Not on file       Inability: Not on file    Transportation needs:       Medical: Not on file       Non-medical: Not on file   Tobacco Use    Smoking status: Never Smoker    Smokeless tobacco: Never Used   Substance and Sexual Activity    Alcohol use: No    Drug use: No    Sexual activity: Not Currently   Lifestyle    Physical activity:       Days per week: Not on file       Minutes per session: Not on file    Stress: Not on file   Relationships    Social connections:       Talks on phone: Not on file       Gets together: Not on file       Attends [employer] service: Not on file       Active member of club or organization: Not on file       Attends meetings of clubs or organizations: Not on file       Relationship status: Not on file    Intimate partner violence:       Fear of current or ex partner: Not on file       Emotionally abused: Not on file       Physically abused: Not on file       Forced sexual activity: Not on file   Other Topics Concern     Service No    Blood Transfusions No    Caffeine Concern No    Occupational Exposure No    Hobby Hazards No    Sleep Concern Yes       Comment: Sleep apnea     Stress Concern Yes       Comment: Due to family medical issues.      Weight Concern No    Special Diet No    Back Care Yes       Comment: Patient tries to do back care with streches     Exercise No    Bike Helmet Yes   [address] [facility] Yes    Self-Exams Yes   Social History Narrative    Not on file            Family History   Problem Relation Age of Onset    Heart Disease Mother      Diabetes Mother      Stroke Mother      Hypertension Mother      Diabetes Father      Heart Disease Father      Hypertension Father      Stroke Father      Diabetes Brother      Cancer Brother      Hypertension Brother      Stroke Brother      Heart Disease Brother      Diabetes Brother      Hypertension Brother      Stroke Brother      Heart Disease Brother      Diabetes Brother      Hypertension Brother      Hypertension Brother              Review of systems:  Patient denies any reflux, emesis, abdominal pain, change in bowel habits, hematochezia, melena, fever, weight loss, fatigue chills, dermatitis, abnormal moles, change in vision, vertigo, epistaxis, dysphagia, hoarseness, chest pain, palpitations, hypertension, edema, cough, shortness of breath, wheezing, hemoptysis, snoring, hematuria, diabetes, thyroid disease, anemia, bruising, history of blood transfusion, dizziness, headache, or fainting.     Physical Examination     Well developed well nourished female in no apparent distress  Visit Vitals  /68   Pulse 84   Temp 97.7 °F (36.5 °C) (Oral)   Resp 20   Ht 5' 7\" (1.702 m)   Wt 116.1 kg (256 lb)   SpO2 99%   BMI 40.10 kg/m²      Head: normocephalic, atraumatic  Mouth: Clear, no overt lesions, oral mucosa pink and moist  Neck: supple, no masses, no adenopathy or carotid bruits, trachea midline well-healed thyroidal incision  resp: clear to auscultation bilaterally, no wheeze, rhonchi or rales, excursions normal and symmetrical  Cardio: Regular rate and rhythm, no murmurs, clicks, gallops or rubs, no edema or varicosities  Abdomen: soft, nontender, nondistended, normoactive bowel sounds, no hernias, no hepatosplenomegaly,   Back: Deferred  Extremeties: warm, well-perfused, no tenderness or swelling, normal gait/station  Neuro: sensation and strength grossly intact and symmetrical  Psych: alert and oriented to person, place and time  Breast exam deferred     IMPRESSION  Status post total thyroidectomy for inflammatory thyroiditis and goiter. Ongoing vocal cord dysfunction.     PLAN  No orders of the defined types were placed in this encounter.     Exam by endoscopy this evening.   Should she require admission I be happy to admit her  Inez Estrada MD                               Note Details     Author Seth Mccray MD File Time 04/26/19 1675   Author Type Physician Status Signed   Last  Seth Mccray MD Specialty Surgery       Office Visit on 4/26/2019            Detailed Report           Note shared with patient

## 2019-05-22 NOTE — ED PROVIDER NOTES
EMERGENCY DEPARTMENT HISTORY AND PHYSICAL EXAM  This was created with voice recognition software and transcription errors may be present. 5:26 PM  Date: 5/22/2019  Patient Name: Sherman Gutierrez    History of Presenting Illness     Chief Complaint:    History Provided By:     HPI: Sherman Gutierrez is a 63 y.o. female who presents with difficulty breathing. Patient has had respiratory expiratory stridor since she was intubated. She was seen by her pulmonologist today and from pulmonary function tests she was sent into the emergency department for further evaluation. No fever no chills no chest pain.     PCP: Daljit Parisi MD      Past History     Past Medical History:  Past Medical History:   Diagnosis Date    Arthritis     Lower back     Asthma     Chronic back pain     Lower back pain    Depression     Diabetes ([address]     Diabetes mellitus ([address]     Hearing loss     Heart failure (HCC)     chronic diastolic heart failure    Left ear hearing loss     pt states nerve damage--- 25% hearing loss, described as \"muffled\"    Memory difficulty     Panic attacks     Ringing of ears     Severe headache     Sleep apnea     SOB (shortness of breath) on exertion     w and w/out exertion    Stomach pain     Thyroid disease     Vertigo        Past Surgical History:  Past Surgical History:   Procedure Laterality Date    CARDIAC SURG PROCEDURE UNLIST  10/31/2013    open heart    HX HEART VALVE SURGERY  2013    aortic valve repair    HX HYSTERECTOMY      Partial Hysterectomy - removed ovary    HX MYOMECTOMY      HX ORTHOPAEDIC  02/2018    had nerves burned on her right side of back    THYROIDECTOMY Bilateral 04/04/2019    Dr. Attila Manzo       Family History:  Family History   Problem Relation Age of Onset    Heart Disease Mother     Diabetes Mother     Stroke Mother     Hypertension Mother     Diabetes Father     Heart Disease Father     Hypertension Father     Stroke Father     Diabetes Brother  Cancer Brother     Hypertension Brother     Stroke Brother     Heart Disease Brother     Diabetes Brother     Hypertension Brother     Stroke Brother     Heart Disease Brother     Diabetes Brother     Hypertension Brother     Hypertension Brother        Social History:  Social History     Tobacco Use    Smoking status: Never Smoker    Smokeless tobacco: Never Used   Substance Use Topics    Alcohol use: No    Drug use: No       Allergies: Allergies   Allergen Reactions    Other Food Other (comments)     Sweeteners- causes headaches    Metformin Other (comments)     Increase pain in feet and swelling in feet  Increased hunger,tired and bilat foot pain    Morphine Other (comments)     headache    Singulair [Montelukast] Other (comments)     hallucinations    Sucrose Other (comments)     Pt. Is not allergic to sucrose. She develops headaches with artificial sweeteners. Review of Systems     Review of Systems   Constitutional: Negative for chills and fever. Respiratory: Positive for stridor. All other systems reviewed and are negative. 10 point review of systems otherwise negative unless noted in HPI. Physical Exam       Physical Exam   Constitutional: She is oriented to person, place, and time. She appears well-developed. HENT:   Head: Normocephalic and atraumatic. Eyes: Pupils are equal, round, and reactive to light. EOM are normal.   Neck: Normal range of motion. Neck supple. Cardiovascular: Normal rate, regular rhythm and normal heart sounds. Exam reveals no friction rub. No murmur heard. Pulmonary/Chest: Effort normal and breath sounds normal. No respiratory distress. She has no wheezes. Really no wheezing she predominantly has stridor. Abdominal: Soft. She exhibits no distension. There is no tenderness. There is no rebound and no guarding. Musculoskeletal: Normal range of motion. Neurological: She is alert and oriented to person, place, and time.    Skin: Skin is warm and dry. Psychiatric: She has a normal mood and affect. Her behavior is normal. Thought content normal.       Diagnostic Study Results     Vital Signs   Visit Vitals  /74 (BP 1 Location: Right arm, BP Patient Position: At rest)   Pulse 72   Temp 98.1 °F (36.7 °C)   Resp 26   Ht 5' 7\" (1.702 m)   Wt 117.9 kg (260 lb)   SpO2 98%   BMI 40.72 kg/m²       EKG:  Labs:   Imaging:     Medical Decision Making     ED Course: Progress Notes, Reevaluation, and Consults:      Provider Notes (Medical Decision Making): Patient with worsening stridor. CT of her soft tissue neck is noted to be having worsening with looks like airway diameter. Will discuss with pulmonary to determine if they had an initial plan. Overall she is fairly comfortable at rest she was supposed to follow-up with an ENT but she may need to be admitted. D/w ENT; will admit here ; may need trach. D/w pulm dr. Noa Hernandez. Will dw/ Dr Raji Starr. D/w Dr Marylee Eng. Accepts. Diagnosis     Clinical Impression: No diagnosis found. Disposition:    Patient's Medications   Start Taking    No medications on file   Continue Taking    ALBUTEROL (PROVENTIL VENTOLIN) 2.5 MG /3 ML (0.083 %) NEBULIZER SOLUTION    3 mL by Nebulization route every four (4) hours as needed for Wheezing. ASPIRIN, BUFFERED 81 MG TAB    Take  by mouth daily. ATORVASTATIN (LIPITOR) 80 MG TABLET    take 1 tablet by mouth once daily    BD INSULIN SYRINGE ULTRA-FINE 1 ML 31 GAUGE X 5/16 SYRG    use as directed twice a day    CYANOCOBALAMIN (VITAMIN B-12) 1,000 MCG SUBLINGUAL TABLET    Take 1,000 mcg by mouth daily. DULOXETINE (CYMBALTA) 60 MG CAPSULE    take 1 capsule by mouth once daily    EMPAGLIFLOZIN (JARDIANCE) 25 MG TABLET    Take  by mouth daily. FREESTYLE LITE STRIPS STRIP    TEST twice a day as directed    FUROSEMIDE (LASIX) 40 MG TABLET    Take 1 Tab by mouth two (2) times a day.     GABAPENTIN (NEURONTIN) 400 MG CAPSULE    take 1 capsule by mouth three times a day    LANTUS U-100 INSULIN 100 UNIT/ML INJECTION    inject 90 units subcutaneously at bedtime    LEVOTHYROXINE (SYNTHROID) 100 MCG TABLET    Take 1 Tab by mouth Daily (before breakfast). LEVOTHYROXINE (SYNTHROID) 25 MCG TABLET    take 1 tablet by mouth every morning BEFORE BREAKFAST    MECLIZINE (ANTIVERT) 12.5 MG TABLET    take 1 tablet by mouth three times a day if needed for 10 days    METOPROLOL SUCCINATE (TOPROL-XL) 50 MG XL TABLET    take 1 tablet by mouth once daily    OMEGA 3-DHA-EPA-FISH OIL 1,000 MG (120 MG-180 MG) CAPSULE    take 1 capsule by mouth twice a day    OMEGA-3 ACID ETHYL ESTERS (LOVAZA) 1 GRAM CAPSULE    take 1 capsule by mouth twice a day    POTASSIUM CHLORIDE (KLOR-CON) 10 MEQ TABLET    Take 1 Tab by mouth daily.     PROAIR HFA 90 MCG/ACTUATION INHALER    inhale 2 puffs by mouth every 4 hours if needed for wheezing    SYMBICORT 160-4.5 MCG/ACTUATION HFAA    inhale 2 puffs by mouth twice a day RINSE MOUTH AFTER USE    VITAMIN D2 50,000 UNIT CAPSULE    take 1 capsule by mouth every week   These Medications have changed    No medications on file   Stop Taking    No medications on file

## 2019-05-22 NOTE — ED NOTES
Assumed care of patient at this time. Pt is on the monitor, ENT at bedside for assessment. Pt states April 4 had thyroid surgery and 2 vocal cords were affected. Pt having shortness of breath and only able to speak a few words before having inspiratory stridor. Pt on room air stats 99%. Provided warm blanket for comfort. Will continue to monitor while awaiting step-down bed.

## 2019-05-22 NOTE — PROGRESS NOTES
Surgery  Patient well-known to me now about a month status post a total thyroidectomy for multiple and bilateral calcified nodules. She has been hoarse and stridorous since her operation which was quite difficult. She had a prolonged hospital stay but did well on BiPAP. I seen her in the office is recently is approximately 10 days ago and tonight she seems about the same. Currently she has an O2 sat of 100% with stable vital signs and is not anxious. Dr. Ray Mathis from ENT will see her tonight and do an endoscopy to identify cord stability. We will also check a TSH on her.

## 2019-05-22 NOTE — ED TRIAGE NOTES
Pt arrived through triage with c/o shortness of breath. Pt states she had thyroid removed in beginning of April and has been breathing like this and SOB since surgery. Surgeon and pulmonologist is aware of pt and situation.

## 2019-05-22 NOTE — ED NOTES
I performed a brief evaluation, including history and physical, of the patient here in triage and I have determined that pt will need further treatment and evaluation from the main side ER physician. I have placed initial orders to help in expediting patients care.      May 22, 2019 at 2:32 PM - Leanne Angel PA-C        Visit Vitals  /74 (BP 1 Location: Right arm, BP Patient Position: At rest)   Pulse 72   Temp 98.1 °F (36.7 °C)   Resp 26   Ht 5' 7\" (1.702 m)   Wt 117.9 kg (260 lb)   SpO2 98%   BMI 40.72 kg/m²

## 2019-05-23 VITALS
BODY MASS INDEX: 40.87 KG/M2 | DIASTOLIC BLOOD PRESSURE: 66 MMHG | HEART RATE: 84 BPM | TEMPERATURE: 97.9 F | WEIGHT: 260.4 LBS | OXYGEN SATURATION: 99 % | RESPIRATION RATE: 20 BRPM | SYSTOLIC BLOOD PRESSURE: 117 MMHG | HEIGHT: 67 IN

## 2019-05-23 LAB
GLUCOSE BLD STRIP.AUTO-MCNC: 223 MG/DL (ref 70–110)
GLUCOSE BLD STRIP.AUTO-MCNC: 277 MG/DL (ref 70–110)

## 2019-05-23 PROCEDURE — 94660 CPAP INITIATION&MGMT: CPT

## 2019-05-23 PROCEDURE — 31575 DIAGNOSTIC LARYNGOSCOPY: CPT

## 2019-05-23 PROCEDURE — 82962 GLUCOSE BLOOD TEST: CPT

## 2019-05-23 PROCEDURE — 74011250637 HC RX REV CODE- 250/637: Performed by: SURGERY

## 2019-05-23 RX ORDER — INSULIN LISPRO 100 [IU]/ML
INJECTION, SOLUTION INTRAVENOUS; SUBCUTANEOUS
Status: DISCONTINUED | OUTPATIENT
Start: 2019-05-23 | End: 2019-05-23

## 2019-05-23 RX ORDER — LEVOTHYROXINE SODIUM 100 UG/1
100 TABLET ORAL
Status: DISCONTINUED | OUTPATIENT
Start: 2019-05-23 | End: 2019-05-23 | Stop reason: HOSPADM

## 2019-05-23 RX ORDER — MAGNESIUM SULFATE 100 %
4 CRYSTALS MISCELLANEOUS AS NEEDED
Status: DISCONTINUED | OUTPATIENT
Start: 2019-05-23 | End: 2019-05-23 | Stop reason: HOSPADM

## 2019-05-23 RX ORDER — DEXTROSE 50 % IN WATER (D50W) INTRAVENOUS SYRINGE
25-50 AS NEEDED
Status: DISCONTINUED | OUTPATIENT
Start: 2019-05-23 | End: 2019-05-23 | Stop reason: HOSPADM

## 2019-05-23 RX ORDER — INSULIN LISPRO 100 [IU]/ML
INJECTION, SOLUTION INTRAVENOUS; SUBCUTANEOUS
Status: DISCONTINUED | OUTPATIENT
Start: 2019-05-23 | End: 2019-05-23 | Stop reason: HOSPADM

## 2019-05-23 RX ORDER — CALCIUM CARBONATE 200(500)MG
1 TABLET,CHEWABLE ORAL DAILY
COMMUNITY
End: 2019-07-12

## 2019-05-23 RX ADMIN — LEVOTHYROXINE SODIUM 100 MCG: 100 TABLET ORAL at 05:56

## 2019-05-23 NOTE — PROGRESS NOTES
Surgery  Pt admitted yesterday for stridor that has been stable for 6 weeks. 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 She is sitting in reclining chair without distress and able to talk and eat. We have discussed at length a trach vs watchful waiting. She refuses trach at present. Resume diet and Synthroid.  F/u in office next week

## 2019-05-23 NOTE — PROGRESS NOTES
DISCHARGE PLANNING:    Reason for Admission:   STRIDOR                  RRAT Score:    21              Do you (patient/family) have any concerns for transition/discharge? NO REQUEST PER PT              Plan for utilizing home health:   NOT ORDERED    Current Advanced Directive/Advance Care Plan:  NO    Likelihood of readmission? MEDIUM            Transition of Care Plan:   HOME WITH FAMILY SUPPORT    Care Management Interventions  PCP Verified by CM: Yes(DR. CHIN)  Palliative Care Criteria Met (RRAT>21 & CHF Dx)?: No  Mode of Transport at Discharge:  Other (see comment)(FAMILY WILL TRANSPORT)  Transition of Care Consult (CM Consult): Discharge Planning  Current Support Network: Own Home, Family Lives Nearby(PT RESIDES WITH HER TEENAGE SON)  Confirm Follow Up Transport: Self  Plan discussed with Pt/Family/Caregiver: Yes(PT WILL RETURN WITH FAMILY SUPPORT)  Discharge Location  Discharge Placement: Home with family assistance    [address]

## 2019-05-23 NOTE — DIABETES MGMT
Diabetes Patient/Family Education Record  Factors That  May Influence Patients Ability  to Learn or  Comply with Recommendations   []   Language barrier    []   Cultural needs   []   Motivation    []   Cognitive limitation    []   Physical   [x]   Education    []   Physiological factors   []   Hearing/vision/speaking impairment   []   Catholic beliefs    []   Financial factors   []  Other:   []  No factors identified at this time. Person Instructed:   [x]   Patient   []   Family   []  Other     Preference for Learning:   [x]   Verbal   [x]   Written   []  Demonstration     Level of Comprehension & Competence:    [x]  Good                                      [] Fair                                     []  Poor                             []  Needs Reinforcement   [x]  Teachback completed    Education Component:   [x]  Medication management, including how to administer insulin (if appropriate) and potential medication interactions based on her morning blood glucose reading, pt administers between 65 and 90 units of lantus every evening. She also takes jardiance 25 mg every morning. []  Nutritional management -obtain usual meal pattern   [x]  Exercise limited related to her breathing. States she is conflicted about getting a trach related to the inability to talk with her grandchildren. She states she has discussed her concerns with her physician. She says one physician says to get the trach, and another says she can wait.    [x]  Signs, symptoms, and treatment of hyperglycemia and hypoglycemia   [x] Prevention, recognition and treatment of hyperglycemia and hypoglycemia   [x]  Importance of blood glucose monitoring and how to obtain a blood glucose meter    [x]  Instruction on use of the blood glucose meter   [x]  Discuss the importance of HbA1C monitoring    [x]  Sick day guidelines   [x]  Proper use and disposal of lancets, needles, syringes or insulin pens (if appropriate)   [x] Potential long-term complications (retinopathy, kidney disease, neuropathy, foot care)   [x] Information about whom to contact in case of emergency or for more information    [x]  Goal:  Patient/family will demonstrate understanding of Diabetes Self Management Skills by: (date) _______  Plan for post-discharge education or self-management support:    [x] Outpatient class schedule provided            [] Patient Declined    [] Scheduled for outpatient classes (date) _______  Verify:  Does patient understand how diabetes medications work? yes________________________  Does patient know what their most recent A1c is? yes_____________________________  Does patient monitor glucose at home? yes______________________________________  Does patient have difficulty obtaining diabetes medications or testing supplies? No issues voiced.

## 2019-05-23 NOTE — DISCHARGE INSTRUCTIONS
Patient Education        Shortness of Breath: Care Instructions  Your Care Instructions  Shortness of breath has many causes. Sometimes conditions such as anxiety can lead to shortness of breath. Some people get mild shortness of breath when they exercise. Trouble breathing also can be a symptom of a serious problem, such as asthma, lung disease, emphysema, heart problems, and pneumonia. If your shortness of breath continues, you may need tests and treatment. Watch for any changes in your breathing and other symptoms. Follow-up care is a key part of your treatment and safety. Be sure to make and go to all appointments, and call your doctor if you are having problems. It's also a good idea to know your test results and keep a list of the medicines you take. How can you care for yourself at home? · Do not smoke or allow others to smoke around you. If you need help quitting, talk to your doctor about stop-smoking programs and medicines. These can increase your chances of quitting for good. · Get plenty of rest and sleep. · Take your medicines exactly as prescribed. Call your doctor if you think you are having a problem with your medicine. · Find healthy ways to deal with stress. ? Exercise daily. ? Get plenty of sleep. ? Eat regularly and well. When should you call for help? Call 911 anytime you think you may need emergency care. For example, call if:    · You have severe shortness of breath.     · You have symptoms of a heart attack. These may include:  ? Chest pain or pressure, or a strange feeling in the chest.  ? Sweating. ? Shortness of breath. ? Nausea or vomiting. ? Pain, pressure, or a strange feeling in the back, neck, jaw, or upper belly or in one or both shoulders or arms. ? Lightheadedness or sudden weakness. ? A fast or irregular heartbeat. After you call 911, the  may tell you to chew 1 adult-strength or 2 to 4 low-dose aspirin. Wait for an ambulance.  Do not try to drive yourself.    Call your doctor now or seek immediate medical care if:    · Your shortness of breath gets worse or you start to wheeze. Wheezing is a high-pitched sound when you breathe.     · You wake up at night out of breath or have to prop your head up on several pillows to breathe.     · You are short of breath after only light activity or while at rest.    Watch closely for changes in your health, and be sure to contact your doctor if:    · You do not get better over the next 1 to 2 days. Where can you learn more? Go to http://nadya-irasema.info/. Enter S780 in the search box to learn more about \"Shortness of Breath: Care Instructions. \"  Current as of: September 5, 2018  Content Version: 11.9  © 8778-3933 [employer]. Care instructions adapted under license by [employer] (which disclaims liability or warranty for this information). If you have questions about a medical condition or this instruction, always ask your healthcare professional. [place] [address] any warranty or liability for your use of this information. Patient armband removed and shredded    [email or web address] Activation    Thank you for requesting access to [employer]. Please follow the instructions below to securely access and download your online medical record. [employer] allows you to send messages to your doctor, view your test results, renew your prescriptions, schedule appointments, and more. How Do I Sign Up? 1. In your internet browser, go to [email or web address]  2. Click on the First Time User? Click Here link in the Sign In box. You will be redirect to the New Member Sign Up page. 3. Enter your [employer] Access Code exactly as it appears below. You will not need to use this code after youve completed the sign-up process. If you do not sign up before the expiration date, you must request a new code. [employer] Access Code:  Activation code not generated  Current [employer] Status: Active (This is the date your [employer] access code will )    4. Enter the last four digits of your Social Security Number (xxxx) and Date of Birth (mm/dd/yyyy) as indicated and click Submit. You will be taken to the next sign-up page. 5. Create a Ne[employer] ID. This will be your [employer] login ID and cannot be changed, so think of one that is secure and easy to remember. 6. Create a [employer] password. You can change your password at any time. 7. Enter your Password Reset Question and Answer. This can be used at a later time if you forget your password. 8. Enter your e-mail address. You will receive e-mail notification when new information is available in [address] 9. Click Sign Up. You can now view and download portions of your medical record. 10. Click the Download Summary menu link to download a portable copy of your medical information. Additional Information    If you have questions, please visit the Frequently Asked Questions section of the [employer] website at [email or web address] Remember, [employer] is NOT to be used for urgent needs. For medical emergencies, dial 911.

## 2019-05-23 NOTE — PROGRESS NOTES
0050. [facility] paged hospitalist for orders    [phone]. [facility] per Dr Eduar Deluna, hospitalist was asked NOT to see pt. Per notes, MD states Lina Echevarria accepted admission. Will page Dr. Jose L Crowe. 1302. .called answering service for Dr. Jose L Crowe, advised Dr. Sharonda Bass on call. Will await call back. 1065. ..called answering service re: the above. Dr. Sharonda Bass paged again. 0311. .,spoke with Dr. Sharonda Bass; however, he is unfamiliar with pt and hx. Per Dr. Sharonda Bass, leave pt NPO except meds with sips and ice chips. Also, place order for Synthroid.

## 2019-05-23 NOTE — ED NOTES
TRANSFER - OUT REPORT:    Verbal report given to 4N RN(name) on Lianet Acosta  being transferred to 4N(unit) for routine progression of care       Report consisted of patients Situation, Background, Assessment and   Recommendations(SBAR). Information from the following report(s) SBAR, ED Summary and MAR was reviewed with the receiving nurse. Lines:   Peripheral IV 05/22/19 Right Antecubital (Active)   Site Assessment Clean, dry, & intact 5/22/2019  3:58 PM   Phlebitis Assessment 0 5/22/2019  3:58 PM   Infiltration Assessment 0 5/22/2019  3:58 PM   Dressing Status Clean, dry, & intact 5/22/2019  3:58 PM   Dressing Type 4 X 4;Tape;Transparent 5/22/2019  3:58 PM   Hub Color/Line Status Pink;Patent; Flushed 5/22/2019  3:58 PM        Opportunity for questions and clarification was provided.       Patient transported with:   [employer]

## 2019-05-23 NOTE — CONSULTS
[address] [facility]    Name:  Ginny Rodriguez  MR#:   [id number]  :  1960  ACCOUNT #:  [de-identified]  DATE OF SERVICE:  2019    REASON FOR CONSULTATION:  Stridor. HISTORY OF PRESENT ILLNESS:  The patient is a very pleasant 52-year-old female, who has had a past medical history significant for a total thyroidectomy performed in 2019. Postoperatively, the patient had some difficulty with voice treatment as well as some significant stridor. She however had been doing well; however, does admit to somewhat diminished exercise tolerance as well as some inability to perform any strenuous activity. The patient apparently was seen by Pulmonology today, who is concerned regarding the stridor. She was subsequently referred to the emergency room. In the emergency room, a CT scan was obtained. CT scan of neck reveals a narrow proximal trachea with decreased airflow through the larynx. Consultation now requested for evaluation regarding the above. PAST MEDICAL HISTORY:  Significant for arthritis, asthma, lower back pain, diabetes, chronic diastolic heart failure, sensorineural hearing loss, panic attacks, tinnitus, headaches, obstructive sleep apnea, stomach pain, and occasional vertigo. PAST SURGICAL HISTORY:  Includes a cardiac open heart surgery performed in , the patient has also had an aortic valve repair in , partial hysterectomy, myomectomy, as well as thyroidectomy performed by Dr. Guillermo Montez in 2019. MEDICATIONS:  Medication use as an outpatient includes Neurontin, levothyroxine, Toprol, Lantus insulin, Antivert, Symbicort, atorvastatin, K-Lynda, Jardiance, Proventil, Cymbalta. ALLERGIES:  INCLUDE FOOD SWEETENERS, METFORMIN, MORPHINE, SINGULAIR AND SUCROSE. SOCIAL HISTORY:  The patient is . Denies any alcohol or tobacco use. FAMILY HISTORY:  Noncontributory. REVIEW OF SYSTEMS:  Unremarkable.     PHYSICAL EXAMINATION:  GENERAL:  Reveals a well-developed, well-nourished 42-year-old female. HEENT:  Ear exam is within normal limits. The oral cavity and oropharynx are unremarkable. The intranasal exam reveals no evidence of any mucopurulent discharge. NECK:  Reveals a well-healing anterior neck wound without any evidence of any abnormality. Flexible fiberoptic nasolaryngoscopy was performed. The patient's nasopharynx, oropharynx, hypopharynx, and larynx were all inspected. The larynx reveals vocal cords meeting in the midline, both are adducted. No movement is noted at the cords or supraglottic tissue. No evidence of any pooling or secretions is seen. No evidence of any other significant findings is noted. IMPRESSION:  Bilateral vocal cord paralysis. PLAN:  This is causing the patient some stridor. I have discussed with the ER physician as well as Dr. Monty Bearden. I do normally feel that in such cases a tracheotomy may be performed. The patient however has been ongoing with her present symptomatology for the last six-eight weeks with some stridor; however, able to tolerate some activities of living. Consideration certainly may be given for a trach; however, I will defer this to the patient's doctor, Dr. Monty Bearden. If trach is performed, I would strongly recommend the use of a fenestrated trach so that a Passy-Malinda valve may be utilized. We will follow up with the patient should there be any further difficulties. Thank you for the consult.       MD JAGUAR [id number]  D:  05/22/2019 20:37  T:  05/23/2019 0:06  JOB #:  [id number]

## 2019-05-23 NOTE — PROGRESS NOTES
Problem: Falls - Risk of  Goal: *Absence of Falls  Description  Document Clide Failing Fall Risk and appropriate interventions in the flowsheet.   Outcome: Progressing Towards Goal     Problem: Patient Education: Go to Patient Education Activity  Goal: Patient/Family Education  Outcome: Progressing Towards Goal

## 2019-05-24 NOTE — H&P
[address] [place] [address]   HISTORY AND PHYSICAL    Name:  Praveen Solares  MR#:   [id number]  :  1960  ACCOUNT #:  [de-identified]  ADMIT DATE:  2019    CHIEF COMPLAINT:  Stridor. HISTORY OF PRESENT ILLNESS:  The patient is a 60-year-old woman who is well-known to me after a total thyroidectomy for bilateral calcified nodules who has had stridor postoperatively. She has made progress, but still has considerable stridor, and she was seen in a local physician's office and sent to the emergency room. She is essentially at her baseline. However, I have asked Dr. Renie Hatchet to do an endoscopy and evaluate her vocal cords. She is satting at 100%, able to sit up, and have conversation. She is in no apparent distress. ALLERGIES:  TO METFORMIN, MORPHINE, SINGULAIR AND SUCROSE. PAST MEDICAL HISTORY:  Positive for arthritis, asthma, depression, diabetes, morbid obesity, hearing loss, heart failure, panic attacks, headache, shortness of breath, abdominal pain, and vertigo. SURGICAL HISTORY:  Open heart surgery with valvular repair, hysterectomy, myomectomy, back surgery, and thyroidectomy. CURRENT MEDICATIONS:  Include Neurontin 400 mg t.i.d., Synthroid 100 mg daily, Toprol 50 mg daily, Lantus 90 units subcutaneous at bedtime, vitamin D, Antivert 12.5 mg p.r.n., Symbicort 160 - 4.5 inhaler two puffs p.r.n., ProAir HFA 90 two puffs every four hours for wheezing, Lipitor 80 mg daily, Lasix 40 mg daily, Klor-Con 10 mEq daily, Jardiance 25 mg daily, albuterol 3 mL nebulization every four hours p.r.n., vitamin B12, and Cymbalta 60 mg daily. SOCIAL HISTORY:  She is , lives at home with her family. She is a nondrinker and nonsmoker. FAMILY HISTORY:  Positive for heart disease, diabetes, stroke, hypertension. REVIEW OF SYSTEMS:  Negative for constitutional complaints. Negative for HEENT complaints. Negative for respiratory complaints except for stridor. Negative for cardiac complaints. Negative for abdominal complaints. Negative for musculoskeletal complaints. Negative for GI complaints. Negative for  complaints. Negative for neurologic complaints. Negative for psychiatric complaints. PHYSICAL EXAMINATION:  GENERAL:  She is awake, alert, and oriented x3 in no apparent distress. HEAD AND NECK:  Normocephalic, atraumatic. Her pupils are equal.  Sclerae is anicteric. Nose and throat are clear. CHEST:  Clear bilaterally. HEART:  Has regular rate and rhythm. ABDOMEN:  Soft and nontender. EXTREMITIES:  Warm and dry. NEUROLOGICAL:  She is intact. RESPIRATORY:  She does have stridor that is stable for six weeks. IMPRESSION:  Status post total thyroidectomy for bilateral calcified nodules with cord paresthesias and dysfunction. PLAN:  ENT evaluation with laryngoscopy and observation.       Roby Roach MD      [id number]  D:  05/23/2019 16:53  T:  05/23/2019 17:03  JOB #:  [id number]

## 2019-05-29 RX ORDER — GABAPENTIN 400 MG/1
CAPSULE ORAL
Qty: 30 CAP | Refills: 1 | Status: SHIPPED | OUTPATIENT
Start: 2019-05-29 | End: 2019-07-12

## 2019-06-03 ENCOUNTER — APPOINTMENT (OUTPATIENT)
Dept: GENERAL RADIOLOGY | Age: 59
DRG: 098 | End: 2019-06-03
Attending: STUDENT IN AN ORGANIZED HEALTH CARE EDUCATION/TRAINING PROGRAM
Payer: MEDICAID

## 2019-06-03 ENCOUNTER — ANESTHESIA EVENT (OUTPATIENT)
Dept: SURGERY | Age: 59
DRG: 098 | End: 2019-06-03
Payer: MEDICAID

## 2019-06-03 ENCOUNTER — ANESTHESIA (OUTPATIENT)
Dept: SURGERY | Age: 59
DRG: 098 | End: 2019-06-03
Payer: MEDICAID

## 2019-06-03 ENCOUNTER — HOSPITAL ENCOUNTER (INPATIENT)
Age: 59
LOS: 22 days | Discharge: NURSING, REHAB OR LONG-TERM CARE | DRG: 098 | End: 2019-06-25
Attending: EMERGENCY MEDICINE
Payer: MEDICAID

## 2019-06-03 DIAGNOSIS — R06.1 STRIDOR: Primary | ICD-10-CM

## 2019-06-03 LAB
ALBUMIN SERPL-MCNC: 3.9 G/DL (ref 3.4–5)
ALBUMIN/GLOB SERPL: 1 {RATIO} (ref 0.8–1.7)
ALP SERPL-CCNC: 111 U/L (ref 45–117)
ALT SERPL-CCNC: 28 U/L (ref 13–56)
ANION GAP SERPL CALC-SCNC: 9 MMOL/L (ref 3–18)
ARTERIAL PATENCY WRIST A: YES
AST SERPL-CCNC: 13 U/L (ref 15–37)
BASE EXCESS BLD CALC-SCNC: 2 MMOL/L
BASOPHILS # BLD: 0 K/UL (ref 0–0.1)
BASOPHILS # BLD: 0 K/UL (ref 0–0.1)
BASOPHILS NFR BLD: 0 % (ref 0–2)
BASOPHILS NFR BLD: 0 % (ref 0–2)
BDY SITE: ABNORMAL
BILIRUB SERPL-MCNC: 0.8 MG/DL (ref 0.2–1)
BUN SERPL-MCNC: 13 MG/DL (ref 7–18)
BUN/CREAT SERPL: 18 (ref 12–20)
CALCIUM SERPL-MCNC: 8.9 MG/DL (ref 8.5–10.1)
CHLORIDE SERPL-SCNC: 104 MMOL/L (ref 100–108)
CO2 SERPL-SCNC: 27 MMOL/L (ref 21–32)
CREAT SERPL-MCNC: 0.74 MG/DL (ref 0.6–1.3)
DIFFERENTIAL METHOD BLD: ABNORMAL
DIFFERENTIAL METHOD BLD: ABNORMAL
EOSINOPHIL # BLD: 0.1 K/UL (ref 0–0.4)
EOSINOPHIL # BLD: 0.1 K/UL (ref 0–0.4)
EOSINOPHIL NFR BLD: 1 % (ref 0–5)
EOSINOPHIL NFR BLD: 1 % (ref 0–5)
ERYTHROCYTE [DISTWIDTH] IN BLOOD BY AUTOMATED COUNT: 15.8 % (ref 11.6–14.5)
ERYTHROCYTE [DISTWIDTH] IN BLOOD BY AUTOMATED COUNT: 16 % (ref 11.6–14.5)
GAS FLOW.O2 O2 DELIVERY SYS: ABNORMAL L/MIN
GAS FLOW.O2 SETTING OXYMISER: 14 BPM
GLOBULIN SER CALC-MCNC: 4 G/DL (ref 2–4)
GLUCOSE BLD STRIP.AUTO-MCNC: 142 MG/DL (ref 70–110)
GLUCOSE BLD STRIP.AUTO-MCNC: 173 MG/DL (ref 70–110)
GLUCOSE SERPL-MCNC: 196 MG/DL (ref 74–99)
HCO3 BLD-SCNC: 26.2 MMOL/L (ref 22–26)
HCT VFR BLD AUTO: 42.2 % (ref 35–45)
HCT VFR BLD AUTO: 45.3 % (ref 35–45)
HGB BLD-MCNC: 14.4 G/DL (ref 12–16)
HGB BLD-MCNC: 15.4 G/DL (ref 12–16)
INR PPP: 0.9 (ref 0.8–1.2)
LYMPHOCYTES # BLD: 1.2 K/UL (ref 0.9–3.6)
LYMPHOCYTES # BLD: 2.8 K/UL (ref 0.9–3.6)
LYMPHOCYTES NFR BLD: 12 % (ref 21–52)
LYMPHOCYTES NFR BLD: 32 % (ref 21–52)
MAGNESIUM SERPL-MCNC: 2.1 MG/DL (ref 1.6–2.6)
MCH RBC QN AUTO: 31 PG (ref 24–34)
MCH RBC QN AUTO: 31.1 PG (ref 24–34)
MCHC RBC AUTO-ENTMCNC: 34 G/DL (ref 31–37)
MCHC RBC AUTO-ENTMCNC: 34.1 G/DL (ref 31–37)
MCV RBC AUTO: 91.1 FL (ref 74–97)
MCV RBC AUTO: 91.3 FL (ref 74–97)
MONOCYTES # BLD: 0.6 K/UL (ref 0.05–1.2)
MONOCYTES # BLD: 0.6 K/UL (ref 0.05–1.2)
MONOCYTES NFR BLD: 7 % (ref 3–10)
MONOCYTES NFR BLD: 7 % (ref 3–10)
NEUTS SEG # BLD: 5.1 K/UL (ref 1.8–8)
NEUTS SEG # BLD: 7.9 K/UL (ref 1.8–8)
NEUTS SEG NFR BLD: 60 % (ref 40–73)
NEUTS SEG NFR BLD: 80 % (ref 40–73)
O2/TOTAL GAS SETTING VFR VENT: 100 %
PCO2 BLD: 41.4 MMHG (ref 35–45)
PEEP RESPIRATORY: 5 CMH2O
PH BLD: 7.41 [PH] (ref 7.35–7.45)
PHOSPHATE SERPL-MCNC: 3.2 MG/DL (ref 2.5–4.9)
PLATELET # BLD AUTO: 159 K/UL (ref 135–420)
PLATELET # BLD AUTO: 174 K/UL (ref 135–420)
PMV BLD AUTO: 9.4 FL (ref 9.2–11.8)
PMV BLD AUTO: 9.5 FL (ref 9.2–11.8)
PO2 BLD: 464 MMHG (ref 80–100)
POTASSIUM SERPL-SCNC: 3.7 MMOL/L (ref 3.5–5.5)
PROT SERPL-MCNC: 7.9 G/DL (ref 6.4–8.2)
PROTHROMBIN TIME: 11.8 SEC (ref 11.5–15.2)
RBC # BLD AUTO: 4.63 M/UL (ref 4.2–5.3)
RBC # BLD AUTO: 4.96 M/UL (ref 4.2–5.3)
SAO2 % BLD: 100 % (ref 92–97)
SERVICE CMNT-IMP: ABNORMAL
SODIUM SERPL-SCNC: 140 MMOL/L (ref 136–145)
SPECIMEN TYPE: ABNORMAL
VENTILATION MODE VENT: ABNORMAL
VOLUME CONTROL PLUS IVLCP: YES
VT SETTING VENT: 400 ML
WBC # BLD AUTO: 8.6 K/UL (ref 4.6–13.2)
WBC # BLD AUTO: 9.8 K/UL (ref 4.6–13.2)

## 2019-06-03 PROCEDURE — 71045 X-RAY EXAM CHEST 1 VIEW: CPT

## 2019-06-03 PROCEDURE — 77030008808 HC TU TRACH UNCUF SIMS -B

## 2019-06-03 PROCEDURE — 74011250636 HC RX REV CODE- 250/636: Performed by: NURSE PRACTITIONER

## 2019-06-03 PROCEDURE — 94002 VENT MGMT INPAT INIT DAY: CPT

## 2019-06-03 PROCEDURE — 74011250636 HC RX REV CODE- 250/636

## 2019-06-03 PROCEDURE — 5A1945Z RESPIRATORY VENTILATION, 24-96 CONSECUTIVE HOURS: ICD-10-PCS

## 2019-06-03 PROCEDURE — 74011250636 HC RX REV CODE- 250/636: Performed by: INTERNAL MEDICINE

## 2019-06-03 PROCEDURE — 77030018390 HC SPNG HEMSTAT2 J&J -B

## 2019-06-03 PROCEDURE — 77030009834 HC MSK O2 TRACH VYRM -A

## 2019-06-03 PROCEDURE — 82803 BLOOD GASES ANY COMBINATION: CPT

## 2019-06-03 PROCEDURE — 77030038269 HC DRN EXT URIN PURWCK BARD -A

## 2019-06-03 PROCEDURE — 77030018836 HC SOL IRR NACL ICUM -A

## 2019-06-03 PROCEDURE — 65610000006 HC RM INTENSIVE CARE

## 2019-06-03 PROCEDURE — 77030002986 HC SUT PROL J&J -A

## 2019-06-03 PROCEDURE — 0B110F4 BYPASS TRACHEA TO CUTANEOUS WITH TRACHEOSTOMY DEVICE, OPEN APPROACH: ICD-10-PCS

## 2019-06-03 PROCEDURE — 74011000250 HC RX REV CODE- 250: Performed by: NURSE PRACTITIONER

## 2019-06-03 PROCEDURE — 82962 GLUCOSE BLOOD TEST: CPT

## 2019-06-03 PROCEDURE — 77030031139 HC SUT VCRL2 J&J -A

## 2019-06-03 PROCEDURE — 76060000033 HC ANESTHESIA 1 TO 1.5 HR

## 2019-06-03 PROCEDURE — 83735 ASSAY OF MAGNESIUM: CPT

## 2019-06-03 PROCEDURE — 80053 COMPREHEN METABOLIC PANEL: CPT

## 2019-06-03 PROCEDURE — 74011000258 HC RX REV CODE- 258: Performed by: INTERNAL MEDICINE

## 2019-06-03 PROCEDURE — 77010033678 HC OXYGEN DAILY

## 2019-06-03 PROCEDURE — 77030020782 HC GWN BAIR PAWS FLX 3M -B

## 2019-06-03 PROCEDURE — 94762 N-INVAS EAR/PLS OXIMTRY CONT: CPT

## 2019-06-03 PROCEDURE — 36600 WITHDRAWAL OF ARTERIAL BLOOD: CPT

## 2019-06-03 PROCEDURE — 84100 ASSAY OF PHOSPHORUS: CPT

## 2019-06-03 PROCEDURE — 85025 COMPLETE CBC W/AUTO DIFF WBC: CPT

## 2019-06-03 PROCEDURE — 99283 EMERGENCY DEPT VISIT LOW MDM: CPT

## 2019-06-03 PROCEDURE — 85610 PROTHROMBIN TIME: CPT

## 2019-06-03 PROCEDURE — 74011636637 HC RX REV CODE- 636/637: Performed by: NURSE PRACTITIONER

## 2019-06-03 PROCEDURE — 76010000149 HC OR TIME 1 TO 1.5 HR

## 2019-06-03 PROCEDURE — 36415 COLL VENOUS BLD VENIPUNCTURE: CPT

## 2019-06-03 DEVICE — IMPLANTABLE DEVICE
Type: IMPLANTABLE DEVICE | Site: TRACHEA | Status: FUNCTIONAL
Brand: PORTEX

## 2019-06-03 RX ORDER — DEXTROSE, SODIUM CHLORIDE, AND POTASSIUM CHLORIDE 5; .45; .15 G/100ML; G/100ML; G/100ML
50 INJECTION INTRAVENOUS CONTINUOUS
Status: DISCONTINUED | OUTPATIENT
Start: 2019-06-03 | End: 2019-06-03

## 2019-06-03 RX ORDER — MIDAZOLAM HYDROCHLORIDE 1 MG/ML
INJECTION, SOLUTION INTRAMUSCULAR; INTRAVENOUS AS NEEDED
Status: DISCONTINUED | OUTPATIENT
Start: 2019-06-03 | End: 2019-06-03 | Stop reason: HOSPADM

## 2019-06-03 RX ORDER — MAGNESIUM SULFATE 100 %
4 CRYSTALS MISCELLANEOUS AS NEEDED
Status: DISCONTINUED | OUTPATIENT
Start: 2019-06-03 | End: 2019-06-25 | Stop reason: HOSPADM

## 2019-06-03 RX ORDER — LIDOCAINE HYDROCHLORIDE 20 MG/ML
INJECTION, SOLUTION EPIDURAL; INFILTRATION; INTRACAUDAL; PERINEURAL AS NEEDED
Status: DISCONTINUED | OUTPATIENT
Start: 2019-06-03 | End: 2019-06-03 | Stop reason: HOSPADM

## 2019-06-03 RX ORDER — PROPOFOL 10 MG/ML
INJECTION, EMULSION INTRAVENOUS AS NEEDED
Status: DISCONTINUED | OUTPATIENT
Start: 2019-06-03 | End: 2019-06-03 | Stop reason: HOSPADM

## 2019-06-03 RX ORDER — INSULIN LISPRO 100 [IU]/ML
INJECTION, SOLUTION INTRAVENOUS; SUBCUTANEOUS EVERY 6 HOURS
Status: DISCONTINUED | OUTPATIENT
Start: 2019-06-03 | End: 2019-06-08

## 2019-06-03 RX ORDER — FENTANYL CITRATE 50 UG/ML
INJECTION, SOLUTION INTRAMUSCULAR; INTRAVENOUS AS NEEDED
Status: DISCONTINUED | OUTPATIENT
Start: 2019-06-03 | End: 2019-06-03 | Stop reason: HOSPADM

## 2019-06-03 RX ORDER — ONDANSETRON 2 MG/ML
4 INJECTION INTRAMUSCULAR; INTRAVENOUS
Status: DISCONTINUED | OUTPATIENT
Start: 2019-06-03 | End: 2019-06-25 | Stop reason: HOSPADM

## 2019-06-03 RX ORDER — SODIUM CHLORIDE, SODIUM LACTATE, POTASSIUM CHLORIDE, CALCIUM CHLORIDE 600; 310; 30; 20 MG/100ML; MG/100ML; MG/100ML; MG/100ML
INJECTION, SOLUTION INTRAVENOUS
Status: DISCONTINUED | OUTPATIENT
Start: 2019-06-03 | End: 2019-06-03 | Stop reason: HOSPADM

## 2019-06-03 RX ORDER — FAMOTIDINE 10 MG/ML
20 INJECTION INTRAVENOUS EVERY 12 HOURS
Status: DISCONTINUED | OUTPATIENT
Start: 2019-06-03 | End: 2019-06-11 | Stop reason: CLARIF

## 2019-06-03 RX ORDER — HYDROMORPHONE HYDROCHLORIDE 1 MG/ML
1 INJECTION, SOLUTION INTRAMUSCULAR; INTRAVENOUS; SUBCUTANEOUS
Status: DISCONTINUED | OUTPATIENT
Start: 2019-06-03 | End: 2019-06-03

## 2019-06-03 RX ORDER — CHLORHEXIDINE GLUCONATE 1.2 MG/ML
10 RINSE ORAL EVERY 12 HOURS
Status: DISCONTINUED | OUTPATIENT
Start: 2019-06-03 | End: 2019-06-20

## 2019-06-03 RX ORDER — HYDROMORPHONE HYDROCHLORIDE 1 MG/ML
.5-1 INJECTION, SOLUTION INTRAMUSCULAR; INTRAVENOUS; SUBCUTANEOUS
Status: DISPENSED | OUTPATIENT
Start: 2019-06-03 | End: 2019-06-04

## 2019-06-03 RX ORDER — HYDROMORPHONE HYDROCHLORIDE 2 MG/ML
.5-1 INJECTION, SOLUTION INTRAMUSCULAR; INTRAVENOUS; SUBCUTANEOUS
Status: DISCONTINUED | OUTPATIENT
Start: 2019-06-03 | End: 2019-06-03

## 2019-06-03 RX ORDER — DEXTROSE 50 % IN WATER (D50W) INTRAVENOUS SYRINGE
25-50 AS NEEDED
Status: DISCONTINUED | OUTPATIENT
Start: 2019-06-03 | End: 2019-06-11 | Stop reason: RX

## 2019-06-03 RX ORDER — SUCCINYLCHOLINE CHLORIDE 20 MG/ML
INJECTION INTRAMUSCULAR; INTRAVENOUS AS NEEDED
Status: DISCONTINUED | OUTPATIENT
Start: 2019-06-03 | End: 2019-06-03 | Stop reason: HOSPADM

## 2019-06-03 RX ADMIN — FENTANYL CITRATE 50 MCG: 50 INJECTION, SOLUTION INTRAMUSCULAR; INTRAVENOUS at 09:06

## 2019-06-03 RX ADMIN — MIDAZOLAM HYDROCHLORIDE 1 MG: 1 INJECTION, SOLUTION INTRAMUSCULAR; INTRAVENOUS at 08:42

## 2019-06-03 RX ADMIN — FAMOTIDINE 20 MG: 10 INJECTION INTRAVENOUS at 21:25

## 2019-06-03 RX ADMIN — FAMOTIDINE 20 MG: 10 INJECTION INTRAVENOUS at 12:23

## 2019-06-03 RX ADMIN — SODIUM CHLORIDE, SODIUM LACTATE, POTASSIUM CHLORIDE, CALCIUM CHLORIDE: 600; 310; 30; 20 INJECTION, SOLUTION INTRAVENOUS at 08:33

## 2019-06-03 RX ADMIN — ONDANSETRON 4 MG: 2 INJECTION INTRAMUSCULAR; INTRAVENOUS at 19:14

## 2019-06-03 RX ADMIN — CHLORHEXIDINE GLUCONATE 0.12% ORAL RINSE 10 ML: 1.2 LIQUID ORAL at 12:22

## 2019-06-03 RX ADMIN — SODIUM CHLORIDE, SODIUM ACETATE ANHYDROUS, SODIUM GLUCONATE, POTASSIUM CHLORIDE, AND MAGNESIUM CHLORIDE: 526; 222; 502; 37; 30 INJECTION, SOLUTION INTRAVENOUS at 18:45

## 2019-06-03 RX ADMIN — CHLORHEXIDINE GLUCONATE 0.12% ORAL RINSE 10 ML: 1.2 LIQUID ORAL at 21:29

## 2019-06-03 RX ADMIN — INSULIN LISPRO 2 UNITS: 100 INJECTION, SOLUTION INTRAVENOUS; SUBCUTANEOUS at 12:48

## 2019-06-03 RX ADMIN — SUCCINYLCHOLINE CHLORIDE 100 MG: 20 INJECTION INTRAMUSCULAR; INTRAVENOUS at 08:43

## 2019-06-03 RX ADMIN — HYDROMORPHONE HYDROCHLORIDE 1 MG: 1 INJECTION, SOLUTION INTRAMUSCULAR; INTRAVENOUS; SUBCUTANEOUS at 21:25

## 2019-06-03 RX ADMIN — HYDROMORPHONE HYDROCHLORIDE 1 MG: 2 INJECTION INTRAMUSCULAR; INTRAVENOUS; SUBCUTANEOUS at 16:10

## 2019-06-03 RX ADMIN — DEXTROSE, SODIUM CHLORIDE, AND POTASSIUM CHLORIDE 50 ML/HR: 5; .45; .15 INJECTION INTRAVENOUS at 12:27

## 2019-06-03 RX ADMIN — MIDAZOLAM HYDROCHLORIDE 1 MG: 1 INJECTION, SOLUTION INTRAMUSCULAR; INTRAVENOUS at 08:33

## 2019-06-03 RX ADMIN — PROPOFOL 100 MG: 10 INJECTION, EMULSION INTRAVENOUS at 09:36

## 2019-06-03 RX ADMIN — LIDOCAINE HYDROCHLORIDE 60 MG: 20 INJECTION, SOLUTION EPIDURAL; INFILTRATION; INTRACAUDAL; PERINEURAL at 08:42

## 2019-06-03 RX ADMIN — HYDROMORPHONE HYDROCHLORIDE 1 MG: 2 INJECTION INTRAMUSCULAR; INTRAVENOUS; SUBCUTANEOUS at 12:23

## 2019-06-03 RX ADMIN — PROPOFOL 200 MG: 10 INJECTION, EMULSION INTRAVENOUS at 08:42

## 2019-06-03 NOTE — ROUTINE PROCESS
TRANSFER - OUT REPORT:    Verbal report given to  [facility] RN on Latonya Garcia  being transferred to ICU #[id number] for routine post - op       Report consisted of patients Situation, Background, Assessment and   Recommendations(SBAR). Information from the following report(s) OR Summary was reviewed with the receiving nurse. Lines:   Peripheral IV 06/03/19 Left Antecubital (Active)   Site Assessment Clean, dry, & intact 6/3/2019  8:26 AM   Phlebitis Assessment 0 6/3/2019  8:26 AM   Infiltration Assessment 0 6/3/2019  8:26 AM   Dressing Status Clean, dry, & intact 6/3/2019  8:26 AM        Opportunity for questions and clarification was provided. Patient transported with:   O2 @ 15 liters via Ambu bag monitored with portable EKG and pulse ox by Dolly Araiza. Telephone report given before leaving OR by CRNA and bedside report given by Joel Holcomb RN.

## 2019-06-03 NOTE — PROGRESS NOTES
attended the interdisciplinary rounds for Jc Davis, who is a 63 y.o.,female. Patients Primary Language is: [place]. According to the patients EMR Mandaeism Affiliation is: [place]. The reason the Patient came to the hospital is:   Patient Active Problem List    Diagnosis Date Noted    Stridor 05/22/2019    S/P thyroidectomy 04/04/2019    Type 2 diabetes mellitus with diabetic neuropathy ([address] 01/16/2019    Chronic diastolic congestive heart failure ([address] 08/27/2018    Mood disorder ([address] 08/24/2018    SOB (shortness of breath) 05/17/2017    Type 2 diabetes mellitus without complication ([address] 64/81/1275    Hypothyroidism due to acquired atrophy of thyroid 05/17/2017    Essential hypertension 05/17/2017    Dyslipidemia 05/17/2017    Venous stasis dermatitis of both lower extremities 03/01/2017    Lumbar facet arthropathy 01/26/2017    Lumbar degenerative disc disease 01/26/2017    Left shoulder tendinitis 12/07/2016    Spondylosis of lumbar region without myelopathy or radiculopathy 12/07/2016    Chronic pain of both shoulders 12/07/2016    Chronic pain of both knees 12/07/2016    Chronic pain syndrome 12/07/2016    Encounter for long-term (current) use of medications 12/07/2016    Chronic midline low back pain 12/07/2016    Chronic left shoulder pain 11/11/2016    Morbid obesity ([address] 11/11/2016    Type II diabetes mellitus, uncontrolled ([address] 11/06/2015    Sleep apnea 11/06/2015    H/O aortic valve replacement 11/06/2015    History of bicuspid aortic valve 11/06/2015    Chronic back pain 11/06/2015      Plan:  Chaplains will continue to follow and will provide pastoral care on an as needed/requested basis.  recommends bedside caregivers page  on duty if patient shows signs of acute spiritual or emotional distress.     [facility] Certified [address] [facility]   [phone]

## 2019-06-03 NOTE — ROUTINE PROCESS
Bedside and Verbal shift change report given to Leatha Cuellar RN (oncoming nurse) by Cecil Ybarra RN (offgoing nurse). Report included the following information SBAR, OR Summary, Intake/Output, MAR, Recent Results, Cardiac Rhythm SR and Quality Measures.
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
## 2019-06-03 NOTE — ANESTHESIA PREPROCEDURE EVALUATION
Relevant Problems   No relevant active problems       Anesthetic History               Review of Systems / Medical History  Patient summary reviewed and pertinent labs reviewed    Pulmonary        Sleep apnea: BiPAP    Asthma : poorly controlled       Neuro/Psych         Psychiatric history     Cardiovascular    Hypertension: poorly controlled              Exercise tolerance: <4 METS     GI/Hepatic/Renal  Within defined limits              Endo/Other    Diabetes: well controlled, type 2  Hypothyroidism  Morbid obesity and arthritis     Other Findings              Physical Exam    Airway  Mallampati: III  TM Distance: 4 - 6 cm  Neck ROM: decreased range of motion   Mouth opening: Normal     Cardiovascular    Rhythm: regular  Rate: normal         Dental    Dentition: Poor dentition     Pulmonary  Breath sounds clear to auscultation               Abdominal  GI exam deferred       Other Findings            Anesthetic Plan    ASA: 3, emergent  Anesthesia type: general          Induction: Intravenous  Anesthetic plan and risks discussed with: Patient

## 2019-06-03 NOTE — ROUTINE PROCESS
Patient vomit moderate amt of clear emesis. dr Pricila Cherry made aware.  Zofran 4 mg IV given for N/V

## 2019-06-03 NOTE — ANESTHESIA POSTPROCEDURE EVALUATION
Procedure(s):  TRACHEOSTOMY. general    Anesthesia Post Evaluation      Multimodal analgesia: multimodal analgesia used between 6 hours prior to anesthesia start to PACU discharge  Patient location during evaluation: ICU  Patient participation: complete - patient participated  Level of consciousness: awake  Pain score: 1  Pain management: adequate  Airway patency: patent (Patient has a Tracheostomy)  Anesthetic complications: no  Cardiovascular status: stable  Respiratory status: acceptable  Hydration status: acceptable  Post anesthesia nausea and vomiting:  controlled      Vitals Value Taken Time   BP     Temp     Pulse 75 6/3/2019  6:25 PM   Resp 14 6/3/2019  6:25 PM   SpO2 99 % 6/3/2019  6:25 PM   Vitals shown include unvalidated device data.

## 2019-06-03 NOTE — ROUTINE PROCESS
Dilaudid 1 mg IVP given for neck pain from trach. Bloody drainage noted around site. Split gauze applied. Patient on vent in spontanious mode.  IVF of D5 1/2 NS with 20 KCL hung as ordered at 50 ml;/hr.

## 2019-06-03 NOTE — ROUTINE PROCESS
Tempted x 2 to insert NGT. Was unsuccessful. Patient anxious and bending head backwards instead of chin to chest. PCXRay done at bedside. Will attemp again after pain med and possible anxiety med.

## 2019-06-03 NOTE — BRIEF OP NOTE
BRIEF OPERATIVE NOTE    Date of Procedure: 6/3/2019   Preoperative Diagnosis: vocal cord dysfunction   Postoperative Diagnosis: same  Procedure(s):  TRACHEOSTOMY  Surgeon(s) and Role:     Misael Barrow MD - Primary     * Jeff Dawson MD - Assisting         Surgical Assistant:     Surgical Staff:  Circ-1: Oziel Kumari RN  Circ-Relief: Therman [place]  Scrub Tech-1: Mayra Glass  Surg Asst-1: Kayleigh Nicole  Event Time In Time Out   Incision Start 0901    Incision Close 8982      Anesthesia: General   Estimated Blood Loss: 25cc   Specimens: * No specimens in log *   Findings: dense scaring in old Thyroid wound   Complications: 0  Implants:   Implant Name Type Inv.  Item Serial No.  Lot No. LRB No. Used Action   TUBE TRACH ULTRA CUF RADHA 9MM --  - [id number]  TUBE TRACH ULTRA CUF RADHA 9MM --   [address] [id number] N/A 1 Implanted

## 2019-06-03 NOTE — ROUTINE PROCESS
Received patient from OR bagged via trach. Patient at present sedated and non-resposive. Patient connected to vent by respiratory tech [facility], report was given at bedside. Patient was connected to station monitor and initial V/S obtained. Patient was given a chlorohexadine bath and bath wipes. Patient skin intact to sacral area. Meplex applied. Noted red raised rash to left shin area and at right groin area. Number of dark spots noted on torso. No open areas seen. Noted bloody drainage from around trach site. Split gauze applied on top. 1025 Patient awaken and is alert and following commands. Patient oriented x 2-3. Patient writing on paper with pen for communication. 13 daughter and son in for bedside visit.
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
## 2019-06-03 NOTE — ROUTINE PROCESS
Patient C/O neck pain. Dilaudid 1 mg IVP given. Otto Ordonez made aware of patient being relaxed with use of pain med and that precedex was not started unless needed per her request..

## 2019-06-04 ENCOUNTER — APPOINTMENT (OUTPATIENT)
Dept: GENERAL RADIOLOGY | Age: 59
DRG: 098 | End: 2019-06-04
Payer: MEDICAID

## 2019-06-04 LAB
ANION GAP SERPL CALC-SCNC: 9 MMOL/L (ref 3–18)
BASOPHILS # BLD: 0 K/UL (ref 0–0.1)
BASOPHILS NFR BLD: 0 % (ref 0–2)
BUN SERPL-MCNC: 9 MG/DL (ref 7–18)
BUN/CREAT SERPL: 13 (ref 12–20)
CA-I SERPL-SCNC: 1.03 MMOL/L (ref 1.12–1.32)
CA-I SERPL-SCNC: 1.03 MMOL/L (ref 1.12–1.32)
CALCIUM SERPL-MCNC: 8 MG/DL (ref 8.5–10.1)
CHLORIDE SERPL-SCNC: 103 MMOL/L (ref 100–108)
CO2 SERPL-SCNC: 25 MMOL/L (ref 21–32)
CREAT SERPL-MCNC: 0.7 MG/DL (ref 0.6–1.3)
DIFFERENTIAL METHOD BLD: ABNORMAL
EOSINOPHIL # BLD: 0 K/UL (ref 0–0.4)
EOSINOPHIL NFR BLD: 0 % (ref 0–5)
ERYTHROCYTE [DISTWIDTH] IN BLOOD BY AUTOMATED COUNT: 16.2 % (ref 11.6–14.5)
GLUCOSE BLD STRIP.AUTO-MCNC: 152 MG/DL (ref 70–110)
GLUCOSE BLD STRIP.AUTO-MCNC: 185 MG/DL (ref 70–110)
GLUCOSE BLD STRIP.AUTO-MCNC: 185 MG/DL (ref 70–110)
GLUCOSE BLD STRIP.AUTO-MCNC: 236 MG/DL (ref 70–110)
GLUCOSE SERPL-MCNC: 224 MG/DL (ref 74–99)
HCT VFR BLD AUTO: 43.2 % (ref 35–45)
HGB BLD-MCNC: 13.9 G/DL (ref 12–16)
LYMPHOCYTES # BLD: 1.2 K/UL (ref 0.9–3.6)
LYMPHOCYTES NFR BLD: 13 % (ref 21–52)
MAGNESIUM SERPL-MCNC: 2.1 MG/DL (ref 1.6–2.6)
MCH RBC QN AUTO: 30.5 PG (ref 24–34)
MCHC RBC AUTO-ENTMCNC: 32.2 G/DL (ref 31–37)
MCV RBC AUTO: 94.9 FL (ref 74–97)
MONOCYTES # BLD: 0.7 K/UL (ref 0.05–1.2)
MONOCYTES NFR BLD: 7 % (ref 3–10)
NEUTS SEG # BLD: 7.8 K/UL (ref 1.8–8)
NEUTS SEG NFR BLD: 80 % (ref 40–73)
PHOSPHATE SERPL-MCNC: 3.5 MG/DL (ref 2.5–4.9)
PLATELET # BLD AUTO: 170 K/UL (ref 135–420)
PMV BLD AUTO: 9.4 FL (ref 9.2–11.8)
POTASSIUM SERPL-SCNC: 3.5 MMOL/L (ref 3.5–5.5)
POTASSIUM SERPL-SCNC: 3.5 MMOL/L (ref 3.5–5.5)
RBC # BLD AUTO: 4.55 M/UL (ref 4.2–5.3)
SODIUM SERPL-SCNC: 137 MMOL/L (ref 136–145)
WBC # BLD AUTO: 9.7 K/UL (ref 4.6–13.2)

## 2019-06-04 PROCEDURE — 77030038269 HC DRN EXT URIN PURWCK BARD -A

## 2019-06-04 PROCEDURE — 84100 ASSAY OF PHOSPHORUS: CPT

## 2019-06-04 PROCEDURE — 74230 X-RAY XM SWLNG FUNCJ C+: CPT

## 2019-06-04 PROCEDURE — 82330 ASSAY OF CALCIUM: CPT

## 2019-06-04 PROCEDURE — 74011636637 HC RX REV CODE- 636/637: Performed by: NURSE PRACTITIONER

## 2019-06-04 PROCEDURE — 82962 GLUCOSE BLOOD TEST: CPT

## 2019-06-04 PROCEDURE — 74011250636 HC RX REV CODE- 250/636: Performed by: NURSE PRACTITIONER

## 2019-06-04 PROCEDURE — 74011250636 HC RX REV CODE- 250/636

## 2019-06-04 PROCEDURE — 74011250636 HC RX REV CODE- 250/636: Performed by: PHYSICIAN ASSISTANT

## 2019-06-04 PROCEDURE — 92611 MOTION FLUOROSCOPY/SWALLOW: CPT

## 2019-06-04 PROCEDURE — 84132 ASSAY OF SERUM POTASSIUM: CPT

## 2019-06-04 PROCEDURE — 77010033678 HC OXYGEN DAILY

## 2019-06-04 PROCEDURE — 65610000006 HC RM INTENSIVE CARE

## 2019-06-04 PROCEDURE — 74011000255 HC RX REV CODE- 255

## 2019-06-04 PROCEDURE — 85025 COMPLETE CBC W/AUTO DIFF WBC: CPT

## 2019-06-04 PROCEDURE — 94762 N-INVAS EAR/PLS OXIMTRY CONT: CPT

## 2019-06-04 PROCEDURE — 92610 EVALUATE SWALLOWING FUNCTION: CPT

## 2019-06-04 PROCEDURE — 74011000258 HC RX REV CODE- 258: Performed by: PHYSICIAN ASSISTANT

## 2019-06-04 PROCEDURE — 92526 ORAL FUNCTION THERAPY: CPT

## 2019-06-04 PROCEDURE — 36415 COLL VENOUS BLD VENIPUNCTURE: CPT

## 2019-06-04 PROCEDURE — 83735 ASSAY OF MAGNESIUM: CPT

## 2019-06-04 PROCEDURE — 74011000250 HC RX REV CODE- 250: Performed by: NURSE PRACTITIONER

## 2019-06-04 PROCEDURE — 74011000258 HC RX REV CODE- 258: Performed by: NURSE PRACTITIONER

## 2019-06-04 RX ORDER — HYDROMORPHONE HYDROCHLORIDE 1 MG/ML
.5-1 INJECTION, SOLUTION INTRAMUSCULAR; INTRAVENOUS; SUBCUTANEOUS AS NEEDED
Status: DISCONTINUED | OUTPATIENT
Start: 2019-06-04 | End: 2019-06-07

## 2019-06-04 RX ORDER — LIDOCAINE HYDROCHLORIDE 20 MG/ML
JELLY TOPICAL AS NEEDED
Status: DISCONTINUED | OUTPATIENT
Start: 2019-06-04 | End: 2019-06-04

## 2019-06-04 RX ORDER — PROMETHAZINE HYDROCHLORIDE 25 MG/ML
12.5 INJECTION, SOLUTION INTRAMUSCULAR; INTRAVENOUS
Status: DISCONTINUED | OUTPATIENT
Start: 2019-06-04 | End: 2019-06-05

## 2019-06-04 RX ADMIN — HYDROMORPHONE HYDROCHLORIDE 1 MG: 1 INJECTION, SOLUTION INTRAMUSCULAR; INTRAVENOUS; SUBCUTANEOUS at 18:54

## 2019-06-04 RX ADMIN — LIDOCAINE HYDROCHLORIDE: 10 INJECTION, SOLUTION EPIDURAL; INFILTRATION; INTRACAUDAL; PERINEURAL at 13:58

## 2019-06-04 RX ADMIN — ONDANSETRON 4 MG: 2 INJECTION INTRAMUSCULAR; INTRAVENOUS at 01:10

## 2019-06-04 RX ADMIN — LIDOCAINE HYDROCHLORIDE: 10 INJECTION, SOLUTION EPIDURAL; INFILTRATION; INTRACAUDAL; PERINEURAL at 10:47

## 2019-06-04 RX ADMIN — SODIUM CHLORIDE 0.2 MCG/KG/HR: 900 INJECTION, SOLUTION INTRAVENOUS at 03:35

## 2019-06-04 RX ADMIN — FAMOTIDINE 20 MG: 10 INJECTION INTRAVENOUS at 20:29

## 2019-06-04 RX ADMIN — BARIUM SULFATE 30 ML: 400 SUSPENSION ORAL at 14:50

## 2019-06-04 RX ADMIN — CHLORHEXIDINE GLUCONATE 0.12% ORAL RINSE 10 ML: 1.2 LIQUID ORAL at 20:29

## 2019-06-04 RX ADMIN — INSULIN LISPRO 2 UNITS: 100 INJECTION, SOLUTION INTRAVENOUS; SUBCUTANEOUS at 00:56

## 2019-06-04 RX ADMIN — INSULIN LISPRO 2 UNITS: 100 INJECTION, SOLUTION INTRAVENOUS; SUBCUTANEOUS at 12:43

## 2019-06-04 RX ADMIN — CALCIUM GLUCONATE 1 G: 98 INJECTION, SOLUTION INTRAVENOUS at 14:06

## 2019-06-04 RX ADMIN — INSULIN LISPRO 2 UNITS: 100 INJECTION, SOLUTION INTRAVENOUS; SUBCUTANEOUS at 18:52

## 2019-06-04 RX ADMIN — INSULIN LISPRO 4 UNITS: 100 INJECTION, SOLUTION INTRAVENOUS; SUBCUTANEOUS at 06:42

## 2019-06-04 RX ADMIN — FAMOTIDINE 20 MG: 10 INJECTION INTRAVENOUS at 10:47

## 2019-06-04 RX ADMIN — SODIUM CHLORIDE 0.4 MCG/KG/HR: 900 INJECTION, SOLUTION INTRAVENOUS at 01:56

## 2019-06-04 RX ADMIN — CHLORHEXIDINE GLUCONATE 0.12% ORAL RINSE 10 ML: 1.2 LIQUID ORAL at 10:47

## 2019-06-04 RX ADMIN — LIDOCAINE HYDROCHLORIDE: 10 INJECTION, SOLUTION EPIDURAL; INFILTRATION; INTRACAUDAL; PERINEURAL at 12:21

## 2019-06-04 RX ADMIN — CALCIUM GLUCONATE 1 G: 94 INJECTION, SOLUTION INTRAVENOUS at 22:00

## 2019-06-04 RX ADMIN — SODIUM CHLORIDE 0.2 MCG/KG/HR: 900 INJECTION, SOLUTION INTRAVENOUS at 20:30

## 2019-06-04 RX ADMIN — ONDANSETRON 4 MG: 2 INJECTION INTRAMUSCULAR; INTRAVENOUS at 20:29

## 2019-06-04 RX ADMIN — BARIUM SULFATE 30 G: 960 POWDER, FOR SUSPENSION ORAL at 14:50

## 2019-06-04 RX ADMIN — BARIUM SULFATE 15 ML: 400 PASTE ORAL at 14:50

## 2019-06-04 NOTE — PROGRESS NOTES
[facility]. Pulmonary, Critical Care, and Sleep Medicine    Name: Shree Mckeon MRN: [id number]   : 1960 Hospital: [address] [place] [address] Dr   Date: 2019  Admission Date: 6/3/2019     Chart and notes reviewed. Data reviewed. I have evaluated all findings. [x]I have reviewed the flowsheet and previous days notes. []The patient is unable to give any meaningful history or review of systems because the patient is:  []Intubated []Sedated   []Unresponsive      [x]The patient is critically ill on      [x]Mechanical ventilation (Trach Collar) []Pressors   []BiPAP []       19    Mentation: AAOx3, MAEW, anxiety improved   Respiratory/ Secretions: on trach collar, thick tan secretions, no blood  Hemodynamics: HDS not on pressors  Urine output: purewick  PT and OT eval  ST eval:   Case Mgmt to assist with home trach equipment  KEEP TRACH CUFF INFLATED  [address]  Transfer to Pul Step Down               ROS:  Constitutional: negative for fevers, chills and sweats  Eyes: negative for visual disturbance, irritation and redness  Ears, nose, mouth, throat, and face: negative for epistaxis   Respiratory: negative for cough or stridor, positive for sputum  Cardiovascular: negative for chest pain  Gastrointestinal: negative for abdominal pain, passing flatus   Genitourinary:negative for dysuria   Hematologic/lymphatic: negative for bruising   Neurological:negative for dizziness, HA  Behavioral/Psych: anxiety chronic and improved     Events and notes from last 24 hours reviewed. Care plan discussed on multidisciplinary rounds.     Patient Active Problem List   Diagnosis Code    Type II diabetes mellitus, uncontrolled ([facility] [address] E11.65    Sleep apnea G47.30    H/O aortic valve replacement Z95.2    History of bicuspid aortic valve Z87.74    Chronic back pain M54.9, G89.29    Chronic left shoulder pain M25.512, G89.29    Morbid obesity (HCC) E66.01  Left shoulder tendinitis M75.82    Spondylosis of lumbar region without myelopathy or radiculopathy M47.816    Chronic pain of both shoulders M25.511, G89.29, M25.512    Chronic pain of both knees M25.561, M25.562, G89.29    Chronic pain syndrome G89.4    Encounter for long-term (current) use of medications Z79.899    Chronic midline low back pain M54.5, G89.29    Lumbar facet arthropathy M47.816    Lumbar degenerative disc disease M51.36    Venous stasis dermatitis of both lower extremities I87.2    SOB (shortness of breath) R06.02    Type 2 diabetes mellitus without complication (HCC) K79.5    Hypothyroidism due to acquired atrophy of thyroid E03.4    Essential hypertension I10    Dyslipidemia E78.5    Mood disorder (HCC) F39    Chronic diastolic congestive heart failure (HCC) I50.32    Type 2 diabetes mellitus with diabetic neuropathy ([facility]) E11.40    S/P thyroidectomy Z98.890    Stridor R06.1       Vital Signs:  Visit Vitals  /82   Pulse 61   Temp 98.4 °F (36.9 °C)   Resp 18   Ht 5' 7\" (1.702 m)   Wt 120.8 kg (266 lb 5.1 oz)   SpO2 100%   Breastfeeding?  No   BMI 41.71 kg/m²       O2 Device: Tracheostomy, Tracheal collar, Humidifier   O2 Flow Rate (L/min): 10 l/min   Temp (24hrs), Av.5 °F (36.9 °C), Min:97.8 °F (36.6 °C), Max:98.8 °F (37.1 °C)       Intake/Output:   Last shift:      701 - 1900  In: 306.1 [I.V.:306.1]  Out: -   Last 3 shifts: 1901 -  0700  In: 3901 [I.V.:1497]  Out: 960 [Urine:950]    Intake/Output Summary (Last 24 hours) at 2019 1220  Last data filed at 2019 1128  Gross per 24 hour   Intake 903.1 ml   Output 950 ml   Net -46.9 ml        Ventilator Settings:  Ventilator Mode: Spontaneous  Respiratory Rate  Back-Up Rate: 14  Insp Time (sec): 1 sec  Ventilator Volumes  Vt Set (ml): 400 ml  Vt Exhaled (Machine Breath) (ml): 366 ml  Vt Spont (ml): 583 ml  Ve Observed (l/min): 15 l/min  Ventilator Pressures  Pressure Support (cm H2O): 7 cm H2O  PIP Observed (cm H2O): 22 cm H2O  MAP (cm H2O): 10  PEEP/VENT (cm H2O): 5 cm H20    Current Facility-Administered Medications   Medication Dose Route Frequency    calcium gluconate 1 g in 0.9% sodium chloride 100 mL IVPB  1 g IntraVENous ONCE    famotidine (PF) (PEPCID) injection 20 mg  20 mg IntraVENous Q12H    chlorhexidine (PERIDEX) 0.12 % mouthwash 10 mL  10 mL Oral Q12H    insulin lispro (HUMALOG) injection   SubCUTAneous Q6H    electrolyte-r (NORMOSOL R) infusion   IntraVENous CONTINUOUS       Telemetry: Sinus Roby and Sinus Rhythm     Physical Exam:    General: in no apparent distress, oriented times 3 and afebrile   HEENT:  NCAT, sclera anicteric, Pupils 4mm-->3mm brisk and equal   Neck: tracheostomy midline, minimal swelling, no crepitus, no erythema around site, no bleeding   Chest: normal, no tenderness    Lungs: normal air entry and CTA in all fields, symmetrical chest rise    Heart: Regular rate and rhythm or S1S2 present   Abdomen: protuberant, active bowel sounds,  S/ND/NT, no guarding  Extremity: No pitting edema,  bilat feet and anterior shins with non-specific diffuse sores and areas of excoriation, no e/o cellulitis, 2+ radial and DP pulses bilat    Neuro: grossly normal, AAOx3, communicating via writing on paper, Strength is 5/5 in all 4 extremities    Skin: Skin color, texture, turgor normal. Multiple hyperpigmented areas on chest and abdomen per pt from prior skin lesions and picking, no e/o infection, no raised        DATA:  MAR reviewed and pertinent medications noted or modified as needed    Labs:  Recent Labs     06/04/19 0305 06/03/19  1305 06/03/19  0748   WBC 9.7 9.8 8.6   HGB 13.9 14.4 15.4   HCT 43.2 42.2 45.3*    174 159     Recent Labs     06/04/19 0305 06/03/19  1305 06/03/19  0748     --  140   K 3.5  --  3.7     --  104   CO2 25  --  27   *  --  196*   BUN 9  --  13   CREA 0.70  --  0.74   CA 8.0*  --  8.9   MG 2.1 2.1  --    PHOS 3.5 3.2  -- ALB  --   --  3.9   SGOT  --   --  13*   ALT  --   --  28   INR  --   --  0.9       Imaging:  [x]   I have personally reviewed the patients radiographs and reports  XR Results (most recent):  Results from Hospital Encounter encounter on 06/03/19   XR CHEST PORT    Narrative PORTABLE CHEST RADIOGRAPH     CPT CODE: 74469     INDICATION: Status post tracheostomy. COMPARISON: 4/22/2019. FINDINGS:    Frontal view of the chest obtained at 1152 hours. Interval tracheostomy  placement. Is approximately 3.1 cm proximal to the clavicles. Status post median  sternotomy and valvuloplasty. Lungs are hypoinflated. Silhouette appears enlarged but this is emphasized by AP portable technique and  hypoinflation. Pulmonary vasculature is normal. No focal opacity, significant  pleural effusion or pneumothorax, though the apices are obscured by patient  positioning. Impression IMPRESSION:    Appropriately positioned tracheostomy.          IMPRESSION:   · Vocal Cord Dysfunction         - s/p tracheostomy 06/03/2019 by Dr. Simon Martines   · Stridor 2/to above   · Hypothyroidism        - s/p total thyroidectomy 04/11/2019 by Roe File  · Type II Diabetes Mellitus  · OVIDIO  · Morbid Obesity   · HTN     Patient Active Problem List   Diagnosis Code    Type II diabetes mellitus, uncontrolled ([facility] [address] E11.65    Sleep apnea G47.30    H/O aortic valve replacement Z95.2    History of bicuspid aortic valve Z87.74    Chronic back pain M54.9, G89.29    Chronic left shoulder pain M25.512, G89.29    Morbid obesity ([facility] [address] E66.01    Left shoulder tendinitis M75.82    Spondylosis of lumbar region without myelopathy or radiculopathy M47.816    Chronic pain of both shoulders M25.511, G89.29, M25.512    Chronic pain of both knees M25.561, M25.562, G89.29    Chronic pain syndrome G89.4    Encounter for long-term (current) use of medications Z79.899    Chronic midline low back pain M54.5, G89.29    Lumbar facet arthropathy M47.816    Lumbar degenerative disc disease M51.36    Venous stasis dermatitis of both lower extremities I87.2    SOB (shortness of breath) R06.02    Type 2 diabetes mellitus without complication (HCC) Z87.4    Hypothyroidism due to acquired atrophy of thyroid E03.4    Essential hypertension I10    Dyslipidemia E78.5    Mood disorder (HCC) F39    Chronic diastolic congestive heart failure (HCC) I50.32    Type 2 diabetes mellitus with diabetic neuropathy (HCC) E11.40    S/P thyroidectomy Z98.890    Stridor R06.1        RECOMMENDATIONS:   · NEURO:  MANOLO  · CV: HD stable, routine vitals   · RESP: tolerating trach collar well FiO2 40%   · RENAL/: purewick   · METABOLIC: Daily BMP, Mg, Phos, monitor e-lytes; replace PRN  · ENDOCRINE: POC Glucose q6; SSI; keep Gluc < 180, avoid hypoglycemia, will start PO synthroid after passes swallow test  · GI: NPO until cleared by SLT- MBS today;  KEEP TRACH CUFF INFLATED AT ALL TIMES - ONLY EXCEPTION IS WHEN ACTIVELY EATING Pepcid 20 mg Q12 hrs, Zofran 4 mg Q6 hrs PRN; at this time pt is unwilling to have NGT placed, if doesn't pass swallow screen will need to revisit as a source of nutrition and medication administration, consider bowel regimen once taking PO   · I/D: No acute issues   · HEME/ONC: Daily CBC; H/H, and plts are stable  · MSK/INTEGUMENT: No acute issues; PT and OT eval   · Fluids: Normosol-R @ 50cc/hr    · Code Status: FULL  · Transfer to pulmonary Step Down      Best practice :    · Glycemic control; avoid Hypoglycemia  · Stress ulcer prophylaxis. Pepcid   · DVT prophylaxis.  SCD  · Need for Lines - PIVs only   · Restraints not needed     Have discussed all of the above on interdisciplinary round and with my attending Dr. Anitha Gil \"Tristin\" Feliciano James MD PGY-1  [facility] Dept of Emergency Medicine  Pulmonary Critical Care Medicine

## 2019-06-04 NOTE — PROGRESS NOTES
SLP note:    MBS completed; full report to follow. Recommend NPO with alternative means of nutrition.       Consuelo Barrett M.S., [address] [facility]  Speech-Language Pathologist

## 2019-06-04 NOTE — ROUTINE PROCESS
Dr Juan Del Cid notified of failed results of barium swallow. And that patient still C/O throat pain. Medication for pain . MD ok to renew and VO for doboff insertion. MD was informed of IR to insert, MD V/O for insertion. Speech office was notified and message was left to call Dr Juan Del Cid in regards to patient.

## 2019-06-04 NOTE — ROUTINE PROCESS
Patient suctioned for thick tan secretions, old blood tinged. Trach dressing changed. . Inc care given with pericare.care.

## 2019-06-04 NOTE — ROUTINE PROCESS
Patient C/O neck pain that it hurts. Dilaudid 1 mg IVP given for pain. Patient has tears with anxiety also.
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
## 2019-06-04 NOTE — PROGRESS NOTES
Surgery  Stable in unit  AF VSS  Trach in position with good sats  Speech tx for swallowing  If anxiety is controlled can go out

## 2019-06-04 NOTE — PROGRESS NOTES
NUTRITION     Nutrition Screen      RECOMMENDATIONS / PLAN:     - Evaluate ability to tolerate oral diet and start as medically appropriate per SLP recommendations. - Continue RD inpatient monitoring and evaluation. NUTRITION INTERVENTIONS & DIAGNOSIS:     [] Meals/snacks: modify composition, NPO until evaluated by SLP  [x] Collaboration and referral of nutrition care: interdisciplinary rounds, verbal order from MD to consult SLP for swallow evaluation      Nutrition Diagnosis: Inadequate oral intake related to dysphagia, s/p thyroidectomy and recent trach placement as evidenced by pt NPO, awaiting swallow evaluation. ASSESSMENT:     Admitted with stridor and vocal cord dysfunction s/p tracheostomy placement on 6/3/19, tolerating trach collar and able to eat if passes swallow evaluation per MD- may deflate cuff for meals but must re-inflate cuff when pt is not eating per Surgery. Pt refused NGT placement. Average po intake adequate to meet patients estimated nutritional needs:   [] Yes     [x] No   [] Unable to determine at this time    Diet: DIET NPO      Food Allergies: artificial sweeteners cause headache per pt   Current Appetite:   [] Good     [] Fair     [] Poor     [x] Other: NPO, thirsty   Appetite/meal intake prior to admission:   [x] Good     [] Fair     [] Poor     [] Other:  Feeding Limitations:  [] Swallowing difficulty    [] Chewing difficulty    [x] Other: hx of dysphagia s/p thyroidectomy PTA; s/p trach placement   Current Meal Intake: No data found.     BM: PTA  Skin Integrity: neck surgical incision/tracheostomy placement 6/3/19   Edema:   [x] No     [] Yes   Pertinent Medications: Reviewed: electrolyte replacement protocol, Normosol at 50 mL/hr, pepcid, SSI, zofran, 30 mEq KCl    Recent Labs     06/04/19  0305 06/03/19  1305 06/03/19  0748     --  140   K 3.5  --  3.7     --  104   CO2 25  --  27   *  --  196*   BUN 9  --  13   CREA 0.70  --  0.74   CA 8.0*  --  8.9 MG 2.1 2.1  --    PHOS 3.5 3.2  --    ALB  --   --  3.9   SGOT  --   --  13*   ALT  --   --  28       Intake/Output Summary (Last 24 hours) at 6/4/2019 1035  Last data filed at 6/4/2019 0600  Gross per 24 hour   Intake 596.97 ml   Output 950 ml   Net -353.03 ml       Anthropometrics:  Ht Readings from Last 1 Encounters:   06/03/19 5' 7\" (1.702 m)     Last 3 Recorded Weights in this Encounter    06/03/19 1250   Weight: 120.8 kg (266 lb 5.1 oz)     Body mass index is 41.71 kg/m². Obese, Class III     Weight History: patient denies change in weight PTA, stable     Weight Metrics 6/3/2019 5/23/2019 5/22/2019 5/16/2019 4/26/2019 4/16/2019 4/4/2019   Weight 266 lb 5.1 oz 260 lb 6.4 oz - 258 lb 256 lb 260 lb 9.6 oz -   BMI 41.71 kg/m2 - 40.78 kg/m2 40.41 kg/m2 40.1 kg/m2 - 40.82 kg/m2        Admitting Diagnosis: Stridor [R06.1]  Stridor [R06.1]  Pertinent PMHx: DM, HTN, OVIDIO, hypothyroidism s/p thyroidectomy 4/4/19    Education Needs:        [x] None identified  [] Identified - Not appropriate at this time  []  Identified and addressed - refer to education log  Learning Limitations:   [x] None identified  [] Identified    Cultural, Quaker & ethnic food preferences:  [x] None identified    [] Identified and addressed     ESTIMATED NUTRITION NEEDS:     Calories: 7654-3626 kcal (MSJx1-1.2) based on  [x] Actual  kg     [] IBW   Protein:  gm (0.8-1.2 gm/kg) based on  [x] Actual BW      [] IBW   Fluid: 1 mL/kcal     MONITORING & EVALUATION:     Nutrition Goal(s):   1. Nutritional needs will be met through adequate oral intake or nutrition support within the next 7 days.   Outcome:  [] Met/Ongoing    [] Progressing towards goal    [] Not progressing towards goal    [x] New/Initial goal      Monitoring:   [x] Food and beverage intake   [x] Diet order   [x] Nutrition-focused physical findings   [x] Treatment/therapy   [] Weight   [] Enteral nutrition intake    Previous Recommendations (for follow-up assessments only):     []   Implemented       []   Not Implemented (RD to address)      [] No Longer Appropriate     [] No Recommendation Made     Discharge Planning: pending ability to tolerate oral diet and goals of care; diabetic diet, consistency as tolerated per SLP    [x] Participated in care planning, discharge planning, & interdisciplinary rounds as appropriate      Carol Ann Coulter, [address] [place]    Pager: [phone]

## 2019-06-04 NOTE — ROUTINE PROCESS
Bedside and Verbal shift change report given to Jai Sánchez RN (oncoming nurse) by Jose Francisco Hendrix RN (offgoing nurse). Report included the following information SBAR, Intake/Output, MAR, Recent Results, Cardiac Rhythm SR and Quality Measures.

## 2019-06-04 NOTE — PROGRESS NOTES
Pt failed MBS with SLT and rec continue NPO. Ordered NGT and Lidocaine Gel for placement for temporary nutrition and medication administration.     Kareen Winchester \"Tristin\" Mariah Patiño MD PGY-1  [facility] Dept of Emergency Medicine  Pulmonary Critical Care Medicine
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
## 2019-06-05 ENCOUNTER — APPOINTMENT (OUTPATIENT)
Dept: GENERAL RADIOLOGY | Age: 59
DRG: 098 | End: 2019-06-05
Attending: PHYSICIAN ASSISTANT
Payer: MEDICAID

## 2019-06-05 LAB
ANION GAP SERPL CALC-SCNC: 8 MMOL/L (ref 3–18)
BASOPHILS # BLD: 0 K/UL (ref 0–0.1)
BASOPHILS NFR BLD: 0 % (ref 0–2)
BUN SERPL-MCNC: 11 MG/DL (ref 7–18)
BUN/CREAT SERPL: 14 (ref 12–20)
CALCIUM SERPL-MCNC: 8.8 MG/DL (ref 8.5–10.1)
CHLORIDE SERPL-SCNC: 103 MMOL/L (ref 100–108)
CO2 SERPL-SCNC: 28 MMOL/L (ref 21–32)
CREAT SERPL-MCNC: 0.81 MG/DL (ref 0.6–1.3)
DIFFERENTIAL METHOD BLD: ABNORMAL
EOSINOPHIL # BLD: 0.1 K/UL (ref 0–0.4)
EOSINOPHIL NFR BLD: 1 % (ref 0–5)
ERYTHROCYTE [DISTWIDTH] IN BLOOD BY AUTOMATED COUNT: 16.7 % (ref 11.6–14.5)
GLUCOSE BLD STRIP.AUTO-MCNC: 144 MG/DL (ref 70–110)
GLUCOSE BLD STRIP.AUTO-MCNC: 146 MG/DL (ref 70–110)
GLUCOSE BLD STRIP.AUTO-MCNC: 169 MG/DL (ref 70–110)
GLUCOSE BLD STRIP.AUTO-MCNC: 175 MG/DL (ref 70–110)
GLUCOSE BLD STRIP.AUTO-MCNC: 186 MG/DL (ref 70–110)
GLUCOSE SERPL-MCNC: 175 MG/DL (ref 74–99)
HCT VFR BLD AUTO: 44.5 % (ref 35–45)
HGB BLD-MCNC: 14.1 G/DL (ref 12–16)
LYMPHOCYTES # BLD: 1.7 K/UL (ref 0.9–3.6)
LYMPHOCYTES NFR BLD: 18 % (ref 21–52)
MAGNESIUM SERPL-MCNC: 2.4 MG/DL (ref 1.6–2.6)
MCH RBC QN AUTO: 30.1 PG (ref 24–34)
MCHC RBC AUTO-ENTMCNC: 31.7 G/DL (ref 31–37)
MCV RBC AUTO: 95.1 FL (ref 74–97)
MONOCYTES # BLD: 0.7 K/UL (ref 0.05–1.2)
MONOCYTES NFR BLD: 8 % (ref 3–10)
NEUTS SEG # BLD: 6.8 K/UL (ref 1.8–8)
NEUTS SEG NFR BLD: 73 % (ref 40–73)
PHOSPHATE SERPL-MCNC: 2.9 MG/DL (ref 2.5–4.9)
PLATELET # BLD AUTO: 195 K/UL (ref 135–420)
PMV BLD AUTO: 9.4 FL (ref 9.2–11.8)
POTASSIUM SERPL-SCNC: 3.5 MMOL/L (ref 3.5–5.5)
RBC # BLD AUTO: 4.68 M/UL (ref 4.2–5.3)
SODIUM SERPL-SCNC: 139 MMOL/L (ref 136–145)
WBC # BLD AUTO: 9.4 K/UL (ref 4.6–13.2)

## 2019-06-05 PROCEDURE — 97530 THERAPEUTIC ACTIVITIES: CPT

## 2019-06-05 PROCEDURE — 74011000258 HC RX REV CODE- 258: Performed by: NURSE PRACTITIONER

## 2019-06-05 PROCEDURE — 85025 COMPLETE CBC W/AUTO DIFF WBC: CPT

## 2019-06-05 PROCEDURE — 0DH67UZ INSERTION OF FEEDING DEVICE INTO STOMACH, VIA NATURAL OR ARTIFICIAL OPENING: ICD-10-PCS | Performed by: PHYSICIAN ASSISTANT

## 2019-06-05 PROCEDURE — 97163 PT EVAL HIGH COMPLEX 45 MIN: CPT

## 2019-06-05 PROCEDURE — 36415 COLL VENOUS BLD VENIPUNCTURE: CPT

## 2019-06-05 PROCEDURE — 74011250637 HC RX REV CODE- 250/637: Performed by: NURSE PRACTITIONER

## 2019-06-05 PROCEDURE — 43752 NASAL/OROGASTRIC W/TUBE PLMT: CPT

## 2019-06-05 PROCEDURE — 74011250636 HC RX REV CODE- 250/636

## 2019-06-05 PROCEDURE — 74011250636 HC RX REV CODE- 250/636: Performed by: PHYSICIAN ASSISTANT

## 2019-06-05 PROCEDURE — 3E0G76Z INTRODUCTION OF NUTRITIONAL SUBSTANCE INTO UPPER GI, VIA NATURAL OR ARTIFICIAL OPENING: ICD-10-PCS | Performed by: PHYSICIAN ASSISTANT

## 2019-06-05 PROCEDURE — 83735 ASSAY OF MAGNESIUM: CPT

## 2019-06-05 PROCEDURE — 74011636637 HC RX REV CODE- 636/637: Performed by: NURSE PRACTITIONER

## 2019-06-05 PROCEDURE — 74011636320 HC RX REV CODE- 636/320

## 2019-06-05 PROCEDURE — 80048 BASIC METABOLIC PNL TOTAL CA: CPT

## 2019-06-05 PROCEDURE — 65270000029 HC RM PRIVATE

## 2019-06-05 PROCEDURE — 74011000250 HC RX REV CODE- 250: Performed by: NURSE PRACTITIONER

## 2019-06-05 PROCEDURE — 97535 SELF CARE MNGMENT TRAINING: CPT

## 2019-06-05 PROCEDURE — 82962 GLUCOSE BLOOD TEST: CPT

## 2019-06-05 PROCEDURE — 74011250636 HC RX REV CODE- 250/636: Performed by: NURSE PRACTITIONER

## 2019-06-05 PROCEDURE — 97165 OT EVAL LOW COMPLEX 30 MIN: CPT

## 2019-06-05 PROCEDURE — 84100 ASSAY OF PHOSPHORUS: CPT

## 2019-06-05 PROCEDURE — 92526 ORAL FUNCTION THERAPY: CPT

## 2019-06-05 RX ORDER — POTASSIUM CHLORIDE 1.5 G/1.77G
40 POWDER, FOR SOLUTION ORAL
Status: DISCONTINUED | OUTPATIENT
Start: 2019-06-05 | End: 2019-06-05 | Stop reason: CLARIF

## 2019-06-05 RX ORDER — LORAZEPAM 2 MG/ML
1 INJECTION INTRAMUSCULAR
Status: DISCONTINUED | OUTPATIENT
Start: 2019-06-05 | End: 2019-06-18

## 2019-06-05 RX ORDER — POTASSIUM CHLORIDE 1.5 G/1.77G
40 POWDER, FOR SOLUTION ORAL
Status: COMPLETED | OUTPATIENT
Start: 2019-06-05 | End: 2019-06-05

## 2019-06-05 RX ADMIN — INSULIN LISPRO 2 UNITS: 100 INJECTION, SOLUTION INTRAVENOUS; SUBCUTANEOUS at 12:33

## 2019-06-05 RX ADMIN — LORAZEPAM 1 MG: 2 INJECTION INTRAMUSCULAR at 23:54

## 2019-06-05 RX ADMIN — DIATRIZOATE MEGLUMINE AND DIATRIZOATE SODIUM 40 ML: 660; 100 LIQUID ORAL; RECTAL at 11:21

## 2019-06-05 RX ADMIN — POTASSIUM CHLORIDE 40 MEQ: 1.5 POWDER, FOR SOLUTION ORAL at 14:49

## 2019-06-05 RX ADMIN — HYDROMORPHONE HYDROCHLORIDE 1 MG: 1 INJECTION, SOLUTION INTRAMUSCULAR; INTRAVENOUS; SUBCUTANEOUS at 10:18

## 2019-06-05 RX ADMIN — PROMETHAZINE HYDROCHLORIDE 12.5 MG: 25 INJECTION, SOLUTION INTRAMUSCULAR; INTRAVENOUS at 17:23

## 2019-06-05 RX ADMIN — CHLORHEXIDINE GLUCONATE 0.12% ORAL RINSE 10 ML: 1.2 LIQUID ORAL at 23:03

## 2019-06-05 RX ADMIN — SODIUM CHLORIDE 0.2 MCG/KG/HR: 900 INJECTION, SOLUTION INTRAVENOUS at 12:00

## 2019-06-05 RX ADMIN — FAMOTIDINE 20 MG: 10 INJECTION INTRAVENOUS at 23:03

## 2019-06-05 RX ADMIN — INSULIN LISPRO 2 UNITS: 100 INJECTION, SOLUTION INTRAVENOUS; SUBCUTANEOUS at 00:48

## 2019-06-05 RX ADMIN — PROMETHAZINE HYDROCHLORIDE 12.5 MG: 25 INJECTION, SOLUTION INTRAMUSCULAR; INTRAVENOUS at 06:36

## 2019-06-05 RX ADMIN — ONDANSETRON 4 MG: 2 INJECTION INTRAMUSCULAR; INTRAVENOUS at 23:03

## 2019-06-05 RX ADMIN — FAMOTIDINE 20 MG: 10 INJECTION INTRAVENOUS at 09:16

## 2019-06-05 NOTE — PROGRESS NOTES
Problem: Dysphagia (Adult)  Goal: *Acute Goals and Plan of Care (Insert Text)  Description  Patient will:  1. Tolerate PO trials with 0 s/s overt distress in 4/5 trials  2. Utilize compensatory swallow strategies/maneuvers (decrease bite/sip, size/rate, alt. liq/sol) with min cues in 4/5 trials  3. Perform oral-motor/laryngeal exercises to increase oropharyngeal swallow function with min cues  4. Complete an objective swallow study (i.e., MBSS) to assess swallow integrity, r/o aspiration, and determine of safest LRD, min A as indicated/ordered by MD     Recommend:   NPO with dobhoff   Strict aspiration precautions (HOB >30 degrees at all times, Oral care TID)  MBS next date       Outcome: Progressing Towards Goal     SPEECH [address] TREATMENT    Patient: Yandy Cates (6 y.o. female)  Date: 6/5/2019  Diagnosis: Stridor [R06.1]  Stridor [R06.1] <principal problem not specified>  Procedure(s) (LRB):  TRACHEOSTOMY (N/A) 2 Days Post-Op  Precautions: Aspiration    PLOF: Uknown     ASSESSMENT:  Pt seen for follow up dysphagia tx with family member present at bedside. Cuff remains inflated; not appropriate for speaking valve evaluation or po with plan for dobhoff placement later this am.  Reviewed results of MBS including silent airway compromise with all consistencies, high risk of aspiration, aspiration precautions, oral care and positioning with pt who nodded head. Pt communicating via gestures and pen/paper. Discussed results of MBS with Dr. Cortes Forward; plan to place dobhoff for feedings with repeat MBS next date. Results/recommendations and POC discussed with pt, RN and family member at bedside. Will continue to follow for further dysphagia management. Progression toward goals:  ?         Improving appropriately and progressing toward goals  ? Improving slowly and progressing toward goals  ?          Not making progress toward goals and plan of care will be adjusted PLAN:  Recommendations and Planned Interventions:  MBS next date   Patient continues to benefit from skilled intervention to address the above impairments. Continue treatment per established plan of care. Discharge Recommendations: To Be Determined     SUBJECTIVE:   Patient wrote \"I can't get that put in without some medication\". OBJECTIVE:   Cognitive and Communication Status:  Alert, oriented x4, following commands  Dysphagia Treatment:  See above    PAIN:  No pain reported prior to or following     After treatment:   ?              Patient left in no apparent distress sitting up in chair  ? Patient left in no apparent distress in bed  ? Call bell left within reach  ? Nursing notified  ? Family present  ? Caregiver present  ? Bed alarm activated      COMMUNICATION/EDUCATION:   ? Aspiration precautions; swallow safety; compensatory techniques  ? Patient/family able to participate in training and education   ? Patient unable to participate in training and education, education ongoing with staff   ?  Patient understands goals and intent of therapy; neutral about participation     Rolanda Irwin M.S., [id number] [facility]  Speech-Language Pathologist

## 2019-06-05 NOTE — PROGRESS NOTES
NUTRITION     Nutrition Screen      RECOMMENDATIONS / PLAN:     - Recommend NGT placement and start tube feeding of Glucerna 1.5 at 20 mL/hr and advance as tolerate dby 10 mL q 4 hours to goal rate of 55 mL/hr with 150 mL q 4 hour water flushes.   - Continue RD inpatient monitoring and evaluation. Goal Regimen: Glucerna 1.5 at 55 mL/hr + 150 mL q 4 hour water flushes to provide: 1980 kcal, 109 gm protein, 99 gm fat, 176 gm CHO, 21 gm fiber, 1002 mL free water, 1902 mL total water, 100% RDIs     NUTRITION INTERVENTIONS & DIAGNOSIS:     [x] Enteral nutrition: recommendations above    Nutrition Diagnosis: Inadequate oral intake related to dysphagia, s/p thyroidectomy and recent trach placement as evidenced by pt NPO per SLP s/p MBS. ASSESSMENT:     6/5: MBS yesterday and SLP recommending NPO.   6/4: Admitted with stridor and vocal cord dysfunction s/p tracheostomy placement on 6/3/19, tolerating trach collar and able to eat if passes swallow evaluation per MD- may deflate cuff for meals but must re-inflate cuff when pt is not eating per Surgery. Pt refused NGT placement. Average po intake adequate to meet patients estimated nutritional needs:   [] Yes     [x] No   [] Unable to determine at this time    Diet: DIET NPO      Food Allergies: artificial sweeteners cause headache per pt   Current Appetite:   [] Good     [] Fair     [] Poor     [x] Other: NPO, thirsty   Appetite/meal intake prior to admission:   [x] Good     [] Fair     [] Poor     [] Other:  Feeding Limitations:  [] Swallowing difficulty    [] Chewing difficulty    [x] Other: hx of dysphagia s/p thyroidectomy PTA; s/p trach placement   Current Meal Intake: No data found.     BM: PTA  Skin Integrity: neck surgical incision/tracheostomy placement 6/3/19   Edema:   [x] No     [] Yes   Pertinent Medications: Reviewed: electrolyte replacement protocol, pepcid, SSI, zofran    Recent Labs     06/05/19  0337 06/04/19  2020 06/04/19  0305 06/03/19  1305 06/03/19  0748     --  137  --  140   K 3.5 3.5 3.5  --  3.7     --  103  --  104   CO2 28  --  25  --  27   *  --  224*  --  196*   BUN 11  --  9  --  13   CREA 0.81  --  0.70  --  0.74   CA 8.8  --  8.0*  --  8.9   MG 2.4  --  2.1 2.1  --    PHOS 2.9  --  3.5 3.2  --    ALB  --   --   --   --  3.9   SGOT  --   --   --   --  13*   ALT  --   --   --   --  28       Intake/Output Summary (Last 24 hours) at 6/5/2019 0911  Last data filed at 6/5/2019 0400  Gross per 24 hour   Intake 366.23 ml   Output 700 ml   Net -333.77 ml       Anthropometrics:  Ht Readings from Last 1 Encounters:   06/03/19 5' 7\" (1.702 m)     Last 3 Recorded Weights in this Encounter    06/03/19 1250 06/04/19 1357 06/05/19 0400   Weight: 120.8 kg (266 lb 5.1 oz) 119.2 kg (262 lb 12.6 oz) 116.8 kg (257 lb 8 oz)     Body mass index is 40.33 kg/m². Obese, Class III     Weight History: patient denies change in weight PTA, stable     Weight Metrics 6/5/2019 6/3/2019 5/23/2019 5/22/2019 5/16/2019 4/26/2019 4/16/2019   Weight 257 lb 8 oz - 260 lb 6.4 oz - 258 lb 256 lb 260 lb 9.6 oz   BMI - 40.33 kg/m2 - 40.78 kg/m2 40.41 kg/m2 40.1 kg/m2 -        Admitting Diagnosis: Stridor [R06.1]  Stridor [R06.1]  Pertinent PMHx: DM, HTN, OVIDIO, hypothyroidism s/p thyroidectomy 4/4/19    Education Needs:        [x] None identified  [] Identified - Not appropriate at this time  []  Identified and addressed - refer to education log  Learning Limitations:   [x] None identified  [] Identified    Cultural, Congregation & ethnic food preferences:  [x] None identified    [] Identified and addressed     ESTIMATED NUTRITION NEEDS:     Calories: 8275-3398 kcal (MSJx1-1.2) based on  [x] Actual  kg     [] IBW   Protein:  gm (0.8-1.2 gm/kg) based on  [x] Actual BW      [] IBW   Fluid: 1 mL/kcal     MONITORING & EVALUATION:     Nutrition Goal(s):   1. Nutritional needs will be met through adequate oral intake or nutrition support within the next 7 days. Outcome:  [] Met/Ongoing    [] Progressing towards goal    [x] Not progressing towards goal    [] New/Initial goal      Monitoring:   [x] Food and beverage intake   [x] Diet order   [x] Nutrition-focused physical findings   [x] Treatment/therapy   [] Weight   [] Enteral nutrition intake    Previous Recommendations (for follow-up assessments only):     []   Implemented       [x]   Not Implemented (RD to address)      [x] No Longer Appropriate     [] No Recommendation Made     Discharge Planning: pending ability to tolerate oral diet and goals of care  [x] Participated in care planning, discharge planning, & interdisciplinary rounds as appropriate      Sidra Chan, [address]    Pager: [phone]
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
## 2019-06-05 NOTE — PROGRESS NOTES
Spoke with [id number] [employer] [phone] for [employer] supplies, will need to fax order, trach sizing, clinicals, and demographics to [phone]. Spoke with [facility] , [place],  [phone],  [facility] and personal care at d/c, agreed on using [employer], daughter yielded FOC to decision to [facility]. Matched in [place], awaiting [facility] orders. [employer] completed and faxed to [address] [place] [address],  [facility], and .          EWDIN Orourke  Case Management  [phone]

## 2019-06-05 NOTE — PROGRESS NOTES
Surgery  Ms. Koffi Olson is now postoperative day 2 status post tracheostomy for vocal cord dysfunction. AF VSS  sats good on trach collar  She is only mildly anxious today  She unfortunately failed her swallowing eval yesterday  I will have interventional radiology place a Dobbhoff tube for feeds  I am hoping that she is able to sit up and tolerate regular diet soon.

## 2019-06-05 NOTE — PROGRESS NOTES
Reason for Admission:   Stridor [R06.1]  Stridor [R06.1]               RRAT Score:     21             Resources/supports as identified by patient/family:   Patient's 3 children are involved in her care, sonJuan lives with pt. Daughter present in room, have stayed over night with pt each night. Brother also lives with pt. Top Challenges facing patient (as identified by patient/family and CM):     ADLs, IADLs    Finances/Medication cost?     n/a  Transportation      Can use medicaid transport, has involved family as well. May require BLS upon d/c. Support system or lack thereof?   n/a  Living arrangements?        n/a   Self-care/ADLs/Cognition? Discussed with daughter the use of home health and personal care, will coordinate with [facility]  to set these up. Will also be setting up trach supplies for pt at home. Current Advanced Directive/Advance Care Plan:   no                          Plan for utilizing home health:    yes                      Likelihood of readmission:   HIGH    Transition of Care Plan:                    Initial assessment completed with patient and daughter. Cognitive status of patient: patient in and out of sleep during conversation, appeared to be following conversation when awake. Face sheet information confirmed:  yes. The patient designates son, daughter, brother to participate in her discharge plan and to receive any needed information. This patient lives in a single family home with son and brother. Patient is able to navigate steps as needed. Prior to hospitalization, patient was considered to be independent with ADLs/IADLS : yes . Patient was indpendent prior to admission, according to daughter patient walked to commode today with nursing assistance and did well. Patient has a current ACP document on file: no  The BLS/family will be available to transport patient home upon discharge.    The patient already has Cane, and BIPAP medical equipment available in the home. Patient is not currently active with home health. Patient has not stayed in a skilled nursing facility or rehab. This patient is on dialysis :no    List of available Home Health agencies were provided and reviewed with the patient prior to discharge. Freedom of choice signed: no, daughter wants CM to coordinate with Salty otero  to set up [facility] and [address] [place] [address],   [phone] ext. [phone]. Currently, the discharge plan is Home with [address] [place] [address] Venkata Pettit. The patient states that she can obtain her medications from the pharmacy, and take her medications as directed. Patient's current insurance is studentSN. Care Management Interventions  PCP Verified by CM:  Yes  Palliative Care Criteria Met (RRAT>21 & CHF Dx)?: No  Mode of Transport at Discharge: [facility]  Transition of Care Consult (CM Consult): Home Health  Physical Therapy Consult: Yes  Occupational Therapy Consult: Yes  Speech Therapy Consult: Yes  Current Support Network: (lives with son and brother, daughter lives nearby)  Confirm Follow Up Transport: Family  Plan discussed with Pt/Family/Caregiver: Yes  [place] Resource Information Provided?: No  Discharge Location  Discharge Placement: Home with home health        EDWIN Jacobs  Case Management  [phone]

## 2019-06-05 NOTE — PROGRESS NOTES
[facility]. Pulmonary, Critical Care, and Sleep Medicine    Name: Cynthia Santiago MRN: [id number]   : 1960 Hospital: [facility]   Date: 2019  Admission Date: 6/3/2019     Chart and notes reviewed. Data reviewed. I have evaluated all findings. [x]I have reviewed the flowsheet and previous days notes. []The patient is unable to give any meaningful history or review of systems because the patient is:  []Intubated []Sedated   []Unresponsive      []The patient is critically ill on      []Mechanical ventilation []Pressors   []BiPAP []       19  Doing well, failed SLP evaluation/modified swallow eval, DHT to be placed by IR today. ROS:Review of systems not obtained due to patient factors. Events and notes from last 24 hours reviewed. Care plan discussed on multidisciplinary rounds.   Patient Active Problem List   Diagnosis Code    Type II diabetes mellitus, uncontrolled ([address] E11.65    Sleep apnea G47.30    H/O aortic valve replacement Z95.2    History of bicuspid aortic valve Z87.74    Chronic back pain M54.9, G89.29    Chronic left shoulder pain M25.512, G89.29    Morbid obesity (HCC) E66.01    Left shoulder tendinitis M75.82    Spondylosis of lumbar region without myelopathy or radiculopathy M47.816    Chronic pain of both shoulders M25.511, G89.29, M25.512    Chronic pain of both knees M25.561, M25.562, G89.29    Chronic pain syndrome G89.4    Encounter for long-term (current) use of medications Z79.899    Chronic midline low back pain M54.5, G89.29    Lumbar facet arthropathy M47.816    Lumbar degenerative disc disease M51.36    Venous stasis dermatitis of both lower extremities I87.2    SOB (shortness of breath) R06.02    Type 2 diabetes mellitus without complication (HCC) M41.9    Hypothyroidism due to acquired atrophy of thyroid E03.4    Essential hypertension I10    Dyslipidemia E78.5    Mood disorder ([address] F39    Chronic diastolic congestive heart failure (HCC) I50.32    Type 2 diabetes mellitus with diabetic neuropathy (HCC) E11.40    S/P thyroidectomy Z98.890    Stridor R06.1       Vital Signs:  Visit Vitals  /67   Pulse 67   Temp 98.9 °F (37.2 °C)   Resp 16   Ht 5' 7\" (1.702 m)   Wt 116.8 kg (257 lb 8 oz)   SpO2 96%   Breastfeeding? No   BMI 40.33 kg/m²       O2 Device: T-tube   O2 Flow Rate (L/min): 10 l/min   Temp (24hrs), Av.7 °F (37.1 °C), Min:97.5 °F (36.4 °C), Max:99.3 °F (37.4 °C)       Intake/Output:   Last shift:      No intake/output data recorded.   Last 3 shifts:  1901 -  0700  In: 950.7 [I.V.:950.7]  Out: 1500 [Urine:1500]    Intake/Output Summary (Last 24 hours) at 2019 1312  Last data filed at 2019 0400  Gross per 24 hour   Intake 60.1 ml   Output 700 ml   Net -639.9 ml        Ventilator Settings:  Ventilator Mode: Spontaneous  Respiratory Rate  Back-Up Rate: 14  Insp Time (sec): 1 sec  Ventilator Volumes  Vt Set (ml): 400 ml  Vt Exhaled (Machine Breath) (ml): 366 ml  Vt Spont (ml): 583 ml  Ve Observed (l/min): 15 l/min  Ventilator Pressures  Pressure Support (cm H2O): 7 cm H2O  PIP Observed (cm H2O): 22 cm H2O  MAP (cm H2O): 10  PEEP/VENT (cm H2O): 5 cm H20    Current Facility-Administered Medications   Medication Dose Route Frequency    dexmedeTOMidine (PRECEDEX) 400 mcg in 0.9% sodium chloride 100 mL infusion  0.2-0.7 mcg/kg/hr IntraVENous TITRATE    famotidine (PF) (PEPCID) injection 20 mg  20 mg IntraVENous Q12H    chlorhexidine (PERIDEX) 0.12 % mouthwash 10 mL  10 mL Oral Q12H    insulin lispro (HUMALOG) injection   SubCUTAneous Q6H       Telemetry:     Physical Exam:    General: in no apparent distress   HEENT: NCAT   Neck: tracheostomy present, no bleeding   Chest: normal   Lungs: normal air entry, no dullness/ tenderness/ rash   Heart: S1S2 present   Abdomen: obese, non distended, abdomen is soft without significant tenderness   Extremity: negative   Neuro: alert   Skin: Skin color, texture, turgor normal. No rashes or lesions       DATA:  MAR reviewed and pertinent medications noted or modified as needed    Labs:  Recent Labs     06/05/19 0337 06/04/19  0305 06/03/19  1305   WBC 9.4 9.7 9.8   HGB 14.1 13.9 14.4   HCT 44.5 43.2 42.2    170 174     Recent Labs     06/05/19  0337 06/04/19 2020 06/04/19  0305 06/03/19  1305 06/03/19  0748     --  137  --  140   K 3.5 3.5 3.5  --  3.7     --  103  --  104   CO2 28  --  25  --  27   *  --  224*  --  196*   BUN 11  --  9  --  13   CREA 0.81  --  0.70  --  0.74   CA 8.8  --  8.0*  --  8.9   MG 2.4  --  2.1 2.1  --    PHOS 2.9  --  3.5 3.2  --    ALB  --   --   --   --  3.9   SGOT  --   --   --   --  13*   ALT  --   --   --   --  28   INR  --   --   --   --  0.9     No results for input(s): PH, PCO2, PO2, HCO3, FIO2 in the last 72 hours. Recent Labs     06/03/19  1055   FIO2I 100   HCO3I 26.2*   PCO2I 41.4   PHI 7.409   PO2I 464*       Imaging:  [x]   I have personally reviewed the patients radiographs and reports  XR Results (most recent):  Results from Hospital Encounter encounter on 06/03/19   XR SWALLOW [facility] VIDEO    Narrative MODIFIED BARIUM SWALLOW. CLINICAL INDICATION/HISTORY: Dysphagia. History of total thyroidectomy 11 April 2019. Vocal cord dysfunction. Status post tracheostomy 3 Elizabeth 2019. Bedside exam  with moderate oropharyngeal dysphagia with suspected silent aspiration. Full  MBS felt to be needed by the bedside exam. Assessment for aspiration. Evaluation  for diet placement. COMPARISON: None. TECHNIQUE: A video radiographic swallowing function study was performed in  conjunction with the speech therapist. The video is available in the department  for review.        Fluoroscopy time: 2 minutes 36 seconds     Fluoroscopic images: 0    Electronic capture cine loops: 14    FINDINGS:    Patient swallowed multiple consistencies of barium mixtures including thin  liquids, nectar, honey and pudding consistencies. There is penetration to the level cords with all tested consistencies. Premature spillage and vallecular and piriform residuals with all tested  consistencies. Impression IMPRESSION:    Abnormal swallow with penetration to the vocal cords with all tested  consistencies. Please see speech pathologist report for additional details and recommendations. CT Results (most recent):  Results from Hospital Encounter encounter on 05/22/19   CT NECK SOFT TISSUE WO CONT    Narrative EXAM: CT soft tissues of the neck    CLINICAL HISTORY/INDICATION: Stridor. COMPARISON: Comparison made with a prior study dated February 21, 2019. [facility] TECHNIQUE: All CT scans at this facility are performed using dose optimization  technique as appropriate to a performed exam, to include automated exposure  control, adjustment of the mA and/or kV according to patient's size (including  appropriate matching for site-specific examinations), or use of iterative  reconstruction technique. CT.    FINDINGS:    A CT scan of the patient's soft tissues the neck is performed without  intravenous contrast. When compared with the previous study there is been a  prior thyroidectomy since the comparison study. The axial images show  particularly image #55 and 56 a more narrowed proximal trachea than the  comparison study from the previous exam. It cannot be ascertained whether the  patient was bearing down or doing a Valsalva during the exam which would account  for this narrowing. If this was not the case then the soft tissue on each side  of the air column is more prominent on the current exam than the previous  study. .      Impression Narrowed tracheal air column in the area of the vocal cords. This area represents a distinct decrease in air column when compared with the  same level from the previous study.    No evidence for a soft tissue mass or fluid collection accounting for the  pranav. Tomy Maher          IMPRESSION:   · Vocal Cord Dysfunction         - s/p tracheostomy 06/03/2019 by Dr. Graham Arm likely related to total thyroidectomy 04/11/2019  · Type II Diabetes Mellitus  · OVIDIO  · Morbid Obesity   · HTN     Patient Active Problem List   Diagnosis Code    Type II diabetes mellitus, uncontrolled ([facility] E11.65    Sleep apnea G47.30    H/O aortic valve replacement Z95.2    History of bicuspid aortic valve Z87.74    Chronic back pain M54.9, G89.29    Chronic left shoulder pain M25.512, G89.29    Morbid obesity ([facility] E66.01    Left shoulder tendinitis M75.82    Spondylosis of lumbar region without myelopathy or radiculopathy M47.816    Chronic pain of both shoulders M25.511, G89.29, M25.512    Chronic pain of both knees M25.561, M25.562, G89.29    Chronic pain syndrome G89.4    Encounter for long-term (current) use of medications Z79.899    Chronic midline low back pain M54.5, G89.29    Lumbar facet arthropathy M47.816    Lumbar degenerative disc disease M51.36    Venous stasis dermatitis of both lower extremities I87.2    SOB (shortness of breath) R06.02    Type 2 diabetes mellitus without complication (HCC) X05.7    Hypothyroidism due to acquired atrophy of thyroid E03.4    Essential hypertension I10    Dyslipidemia E78.5    Mood disorder (HCC) F39    Chronic diastolic congestive heart failure (HCC) I50.32    Type 2 diabetes mellitus with diabetic neuropathy (HCC) E11.40    S/P thyroidectomy Z98.890    Stridor R06.1        RECOMMENDATIONS:   · NEURO: No acute issues  · CV: HD stable, routine vitals   · RESP: tolerating trach collar well, keep tracheostomy tube cuff inflated per surgery  · RENAL/: purewick   · METABOLIC: Daily BMP, Mg, Phos, monitor e-lytes; replace PRN  · ENDOCRINE: POC Glucose q6; SSI; keep Gluc < 180, avoid hypoglycemia, will start PO synthroid after passes swallow test  · GI: DHT placement on 6/5/19  · I/D: No acute issues   · HEME/ONC: Daily CBC; H/H, and plts are stable  · MSK/INTEGUMENT: No acute issues; PT and OT eval   · Fluids: Normosol-R @ 50cc/hr, discontinue after starting TF with [facility]  · Code Status: FULL  · Transfer to surgical floor with pulmonary team following     Best practice :    Glycemic control  IHI ICU bundles: Tolerating TC  Sress ulcer prophylaxis. DVT prophylaxis. Need for Lines, mueller assessed. High complexity decision making was performed during this consultation and evaluation. [x]       Pt is at high risk for further organ failure and dysfunction. Critical care time spent: 30 minutes with patient exclusive of procedures.       Aaron Loaiza MD

## 2019-06-05 NOTE — ROUTINE PROCESS
Bedside and Verbal shift change report given to [id number]ve[address] Galeas (oncoming nurse) by Rochelle Knight RN   (offgoing nurse). Report given with SBAR, Kardex, Intake/Output, MAR, Accordion and Recent Results.

## 2019-06-06 ENCOUNTER — APPOINTMENT (OUTPATIENT)
Dept: GENERAL RADIOLOGY | Age: 59
DRG: 098 | End: 2019-06-06
Payer: MEDICAID

## 2019-06-06 LAB
ANION GAP SERPL CALC-SCNC: 8 MMOL/L (ref 3–18)
BASOPHILS # BLD: 0 K/UL (ref 0–0.06)
BASOPHILS NFR BLD: 0 % (ref 0–3)
BUN SERPL-MCNC: 15 MG/DL (ref 7–18)
BUN/CREAT SERPL: 20 (ref 12–20)
CALCIUM SERPL-MCNC: 8.7 MG/DL (ref 8.5–10.1)
CHLORIDE SERPL-SCNC: 107 MMOL/L (ref 100–108)
CO2 SERPL-SCNC: 27 MMOL/L (ref 21–32)
CREAT SERPL-MCNC: 0.75 MG/DL (ref 0.6–1.3)
DIFFERENTIAL METHOD BLD: ABNORMAL
EOSINOPHIL # BLD: 0.1 K/UL (ref 0–0.4)
EOSINOPHIL NFR BLD: 1 % (ref 0–5)
ERYTHROCYTE [DISTWIDTH] IN BLOOD BY AUTOMATED COUNT: 16.7 % (ref 11.6–14.5)
GLUCOSE BLD STRIP.AUTO-MCNC: 148 MG/DL (ref 70–110)
GLUCOSE BLD STRIP.AUTO-MCNC: 181 MG/DL (ref 70–110)
GLUCOSE BLD STRIP.AUTO-MCNC: 211 MG/DL (ref 70–110)
GLUCOSE SERPL-MCNC: 157 MG/DL (ref 74–99)
HCT VFR BLD AUTO: 46.6 % (ref 35–45)
HGB BLD-MCNC: 14.9 G/DL (ref 12–16)
LYMPHOCYTES # BLD: 3 K/UL (ref 0.8–3.5)
LYMPHOCYTES NFR BLD: 31 % (ref 20–51)
MAGNESIUM SERPL-MCNC: 2.6 MG/DL (ref 1.6–2.6)
MCH RBC QN AUTO: 30.7 PG (ref 24–34)
MCHC RBC AUTO-ENTMCNC: 32 G/DL (ref 31–37)
MCV RBC AUTO: 95.9 FL (ref 74–97)
MONOCYTES # BLD: 0.8 K/UL (ref 0–1)
MONOCYTES NFR BLD: 8 % (ref 2–9)
NEUTS BAND NFR BLD MANUAL: 2 % (ref 0–5)
NEUTS SEG # BLD: 5.9 K/UL (ref 1.8–8)
NEUTS SEG NFR BLD: 58 % (ref 42–75)
PHOSPHATE SERPL-MCNC: 2.7 MG/DL (ref 2.5–4.9)
PLATELET # BLD AUTO: 182 K/UL (ref 135–420)
PLATELET COMMENTS,PCOM: ABNORMAL
PMV BLD AUTO: 9.4 FL (ref 9.2–11.8)
POTASSIUM SERPL-SCNC: 4.4 MMOL/L (ref 3.5–5.5)
RBC # BLD AUTO: 4.86 M/UL (ref 4.2–5.3)
RBC MORPH BLD: ABNORMAL
SODIUM SERPL-SCNC: 142 MMOL/L (ref 136–145)
WBC # BLD AUTO: 9.8 K/UL (ref 4.6–13.2)

## 2019-06-06 PROCEDURE — 77030018729 HC ELECTRD DEFIB PAD CARD -B

## 2019-06-06 PROCEDURE — 84100 ASSAY OF PHOSPHORUS: CPT

## 2019-06-06 PROCEDURE — 36415 COLL VENOUS BLD VENIPUNCTURE: CPT

## 2019-06-06 PROCEDURE — 74011636637 HC RX REV CODE- 636/637: Performed by: NURSE PRACTITIONER

## 2019-06-06 PROCEDURE — 92526 ORAL FUNCTION THERAPY: CPT

## 2019-06-06 PROCEDURE — 74011636637 HC RX REV CODE- 636/637

## 2019-06-06 PROCEDURE — 77030037870 HC GLD SHT PREVALON SAGE -B

## 2019-06-06 PROCEDURE — 77030018836 HC SOL IRR NACL ICUM -A

## 2019-06-06 PROCEDURE — 80048 BASIC METABOLIC PNL TOTAL CA: CPT

## 2019-06-06 PROCEDURE — 65270000029 HC RM PRIVATE

## 2019-06-06 PROCEDURE — 77010033678 HC OXYGEN DAILY

## 2019-06-06 PROCEDURE — 74011250636 HC RX REV CODE- 250/636

## 2019-06-06 PROCEDURE — 74230 X-RAY XM SWLNG FUNCJ C+: CPT

## 2019-06-06 PROCEDURE — 85025 COMPLETE CBC W/AUTO DIFF WBC: CPT

## 2019-06-06 PROCEDURE — 82962 GLUCOSE BLOOD TEST: CPT

## 2019-06-06 PROCEDURE — 74011000250 HC RX REV CODE- 250: Performed by: NURSE PRACTITIONER

## 2019-06-06 PROCEDURE — 94762 N-INVAS EAR/PLS OXIMTRY CONT: CPT

## 2019-06-06 PROCEDURE — 83735 ASSAY OF MAGNESIUM: CPT

## 2019-06-06 PROCEDURE — 74011000255 HC RX REV CODE- 255

## 2019-06-06 PROCEDURE — 92611 MOTION FLUOROSCOPY/SWALLOW: CPT

## 2019-06-06 PROCEDURE — 74011250636 HC RX REV CODE- 250/636: Performed by: NURSE PRACTITIONER

## 2019-06-06 RX ORDER — INSULIN GLARGINE 100 [IU]/ML
10 INJECTION, SOLUTION SUBCUTANEOUS DAILY
Status: DISCONTINUED | OUTPATIENT
Start: 2019-06-06 | End: 2019-06-07

## 2019-06-06 RX ORDER — DEXTROSE, SODIUM CHLORIDE, AND POTASSIUM CHLORIDE 5; .45; .15 G/100ML; G/100ML; G/100ML
75 INJECTION INTRAVENOUS CONTINUOUS
Status: DISCONTINUED | OUTPATIENT
Start: 2019-06-06 | End: 2019-06-07

## 2019-06-06 RX ORDER — HYDROMORPHONE HYDROCHLORIDE 1 MG/ML
INJECTION, SOLUTION INTRAMUSCULAR; INTRAVENOUS; SUBCUTANEOUS
Status: COMPLETED
Start: 2019-06-06 | End: 2019-06-06

## 2019-06-06 RX ADMIN — DEXTROSE MONOHYDRATE, SODIUM CHLORIDE, AND POTASSIUM CHLORIDE 75 ML/HR: 50; 4.5; 1.49 INJECTION, SOLUTION INTRAVENOUS at 08:33

## 2019-06-06 RX ADMIN — LORAZEPAM 1 MG: 2 INJECTION INTRAMUSCULAR at 21:04

## 2019-06-06 RX ADMIN — INSULIN GLARGINE 10 UNITS: 100 INJECTION, SOLUTION SUBCUTANEOUS at 17:00

## 2019-06-06 RX ADMIN — INSULIN LISPRO 2 UNITS: 100 INJECTION, SOLUTION INTRAVENOUS; SUBCUTANEOUS at 18:45

## 2019-06-06 RX ADMIN — BARIUM SULFATE 45 ML: 400 PASTE ORAL at 13:50

## 2019-06-06 RX ADMIN — INSULIN LISPRO 4 UNITS: 100 INJECTION, SOLUTION INTRAVENOUS; SUBCUTANEOUS at 12:39

## 2019-06-06 RX ADMIN — FAMOTIDINE 20 MG: 10 INJECTION INTRAVENOUS at 20:56

## 2019-06-06 RX ADMIN — CHLORHEXIDINE GLUCONATE 0.12% ORAL RINSE 10 ML: 1.2 LIQUID ORAL at 08:30

## 2019-06-06 RX ADMIN — BARIUM SULFATE 30 ML: 400 SUSPENSION ORAL at 13:50

## 2019-06-06 RX ADMIN — HYDROMORPHONE HYDROCHLORIDE 1 MG: 1 INJECTION, SOLUTION INTRAMUSCULAR; INTRAVENOUS; SUBCUTANEOUS at 15:06

## 2019-06-06 RX ADMIN — FAMOTIDINE 20 MG: 10 INJECTION INTRAVENOUS at 08:32

## 2019-06-06 RX ADMIN — LORAZEPAM 1 MG: 2 INJECTION INTRAMUSCULAR at 09:21

## 2019-06-06 RX ADMIN — ONDANSETRON 4 MG: 2 INJECTION INTRAMUSCULAR; INTRAVENOUS at 19:38

## 2019-06-06 RX ADMIN — HYDROMORPHONE HYDROCHLORIDE 0.5 MG: 1 INJECTION, SOLUTION INTRAMUSCULAR; INTRAVENOUS; SUBCUTANEOUS at 12:11
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
## 2019-06-06 NOTE — ROUTINE PROCESS
Pt transferred to room 504 on [address] at [address] [place] [address]. Vital sign stable. All belongings removed with pt. Pt A&O x 4.

## 2019-06-06 NOTE — PROGRESS NOTES
PT treatment attempted at 1309. Nursing in room administering trach care. Will follow up at a later date/time if appropriate.       Thank you,     CAMILA Loera

## 2019-06-06 NOTE — DIABETES MGMT
Glycemic Control Plan of Care    T2DM with current A1c level of 8.4% (4/04/2019). Pending assessment of home diabetes management and educational needs. Patient is s/p trach on 6/03/2019. She was able to nod head when I asked about home diabetes medication and dose. She also nodded head when I asked if she has meter and regularly monitor her blood sugar at home. Home diabetes medication: Lantus insulin (vial) 90 units daily at bedtime. POC BG range on 6/05/2019: 144-186 mg/dL. POC BG report on 6/06/2019 at time of review: 148 mg/dL. Correction: Patient was just started on NG tube feeding today per RD - Glucerna 1.5. Current IVF order: D51/2 NS with 20 mEq KCL at 75 ml/hr, cont infusion until d/c. Patient is currently on correctional lispro insulin. Recommendation(s):  1.) Consider adding 10 units basal lantus insulin. Called Dr. Marcy Claudio and obtained order. 2.) Cont to monitor in order to determine need for lantus insulin dose adjustment to keep BG target range. 3.) Consider d/c dextrose from IVF when medically appropriate. Discussed with Dr. Marcy Claudio. Noted plan to evaluate if patient able to tolerate TF and then d/c dextrose IVF. Assessment:  Patient is 63year old with past medical history including type 2 diabetes mellitus with neuropathy, sleep apnea, s/p aortic valve replacement, morbid obesity, hypothyroidism, hypertension, chronic CHF and s/p thyroidectomy - was admitted on 6/03/2019 with report of stridor. She was transferred from ICU on 6/06/2019 at 2:38 AM to      Noted:  Vocal cord dysfunction. Status post tracheostomy on 6/03/2019. Hypothyroidism. Recent thyroidectomy on 4/11/2019. Morbid obesity. T2DM with current A1c of 8.4% (4/04/2019). Most recent blood glucose values:    Results for Anam Ching (MRN [id number]) as of 6/6/2019 10:35   Ref.  Range 6/5/2019 00:50 6/5/2019 05:51 6/5/2019 12:20 6/5/2019 17:57 6/5/2019 23:55   GLUCOSE,FAST - POC Latest Ref Range: 70 - 110 mg/dL 186 (H) 146 (H) 175 (H) 169 (H) 144 (H)     Results for Bacilio Luciano (MRN [id number]) as of 6/6/2019 10:35   Ref. Range 6/6/2019 06:12   GLUCOSE,FAST - POC Latest Ref Range: 70 - 110 mg/dL 148 (H)     Current A1C: 8.4% (4/04/2019) which is equivalent to estimated average blood glucose of 194 mg/dL during the past 2-3 months. Current hospital diabetes medications:  Correctional lispro insulin every 6 hours. Normal sensitivity dose. Total daily dose insulin requirement previous day: 6/05/2019:  Lispro: 4 units    Home diabetes medications: Obtained from patient on 6/06/2019\"  Lantus insulin (trach, patient unable to speak but nodded head when I asked if she's on vial insulin). She nodded again when I asked if she's on 90 units of lantus daily at bedtime and she has meter monitoring her blood sugar. Diet: NPO. Patient is on NG tube feeding Glucerna 1.5    Goals:  Blood glucose will be within target range of  mg/dL by 6/09/2019. Education:  Attempted on 6/06/2019 but she's resting. Obtained information about home diabetes medication and she has BG meter. Patient is resting at this time but agreed for f/u visit for assessment of home diabetes management and educational needs another time.   ___  Refer to Diabetes Education Record  ___  Education not indicated at this time    Emeka Arnold RN  Pager: [phone]

## 2019-06-06 NOTE — PROGRESS NOTES
i[employer] RILEY to clarify orders. New order received: Start D5 0.45% NS K 20 meq IV 75cc/hr per Dr. Pérez Failing.

## 2019-06-06 NOTE — PROGRESS NOTES
Progress Note  Pulmonary, Critical Care, and Sleep Medicine    Name: Jeramie Brunson MRN: [id number]   : 1960 Hospital: [address] [place] [address]   Date: 2019        IMPRESSION:   · S/p tracheostomy for vocal cord dysfunction following thyroidectomy  · Stridor and worsening SOB due to above  · Morbid obesity  · Dysphagia- mild oral and mod to severe pharyngeal. S/p Dobhoff tube placement  · Sleep apnea  · DM type 2  · HTN      PLAN:   · Tolerating trache collar  · Management of trache per Surgery. Will keep cuff inflated for now  · Glycemic control with correctional scale insulin  · Continue IV hydration until TF started  · SLP following for dysphagia  · DVT prophylaxis SCD's  · Mobilize pt  · Pain control per surgery     Subjective/Interval History:   I have reviewed the flowsheet and previous days notes. Reviewed interval history. Discussed management with nursing staff. Pt with worsening stridor and exercise tolerance following a difficult thyroidectomy for bilateral multinodular goiter. Pt went to the OR for trache placement and was admitted to ICU on vent support. She was quickly weaned off this and is on trache collar. She was transferred to the floor. Pt denies chest pain. Main complaint is thirst and pain at the operative site. She also complains of inability to pass gas. Afebrile overnight and oxygenating well on TC. She is non verbal      ROS:Review of systems not obtained due to patient factors. Orders reviewed including medications. Changes made if indicated. Telemetry monitor reviewed at the bedside. Objective:   Vital Signs:    Visit Vitals  /78 (BP 1 Location: Right arm, BP Patient Position: At rest)   Pulse 80   Temp 98.1 °F (36.7 °C)   Resp 14   Ht 5' 7\" (1.702 m)   Wt 116.8 kg (257 lb 8 oz)   SpO2 98%   Breastfeeding?  No   BMI 40.33 kg/m²       O2 Device: Tracheal collar   O2 Flow Rate (L/min): 10 l/min   Temp (24hrs), Av.8 °F (37.1 °C), Min:98.1 °F (36.7 °C), Max:99.4 °F (37.4 °C)       Intake/Output:   Last shift:      06/06 0701 - 06/06 1900  In: -   Out: 500 [Urine:500]  Last 3 shifts: 06/04 1901 - 06/06 0700  In: 204.4 [I.V.:124.4]  Out: 650 [Urine:650]    Intake/Output Summary (Last 24 hours) at 6/6/2019 1205  Last data filed at 6/6/2019 1147  Gross per 24 hour   Intake 144.3 ml   Output 700 ml   Net -555.7 ml        Physical Exam:    General:  Alert, cooperative, in no distress, appears stated age. Obese    Head:  Normocephalic, without obvious abnormality, atraumatic. Eyes:  ANicteric, PERRL,   Nose: Nares normal.  Mucosa normal. No drainage or sinus tenderness. Throat: Lips, mucosa, and tongue normal. Teeth and gums normal. NO Thrush   Neck: Supple, symmetrical, trachea midline, no adenopathy, thyroid: no enlargment/tenderness/nodules , trache in place, no bleeding   Back:   Symmetric, no curvature. ROM normal. NO spine tenderness   Lungs:   Bilateral auscultation no rales or wheezes   Chest wall:  No tenderness or deformity. NO intercostal retractions   Heart:  Regular rate and rhythm, S1, S2 normal, no murmur, click, rub or gallop. Abdomen:   Soft, non-tender. PROTUBERANT; Bowel sounds normal. No masses,  No organomegaly. NO paradoxical motion   Extremities: normal, atraumatic, no cyanosis, trace edema   Pulses: 2+ and symmetric all extremities. Skin: Skin color, texture, turgor normal. No rashes or lesions.  NO clubbing   Lymph nodes: Cervical, supraclavicular nodes normal.   Neurologic: Grossly nonfocal      :        Devices:             Drips:    DATA:  Labs:  Recent Labs     06/06/19  0254 06/05/19  0337 06/04/19  0305   WBC 9.8 9.4 9.7   HGB 14.9 14.1 13.9   HCT 46.6* 44.5 43.2    195 170     Recent Labs     06/06/19  0254 06/05/19  0337 06/04/19 2020 06/04/19  0305    139  --  137   K 4.4 3.5 3.5 3.5    103  --  103   CO2 27 28  --  25   * 175*  --  224*   BUN 15 11  --  9   CREA 0.75 0.81  --  0.70   CA 8.7 8.8  -- 8.0*   MG 2.6 2.4  --  2.1   PHOS 2.7 2.9  --  3.5     No results for input(s): PH, PCO2, PO2, HCO3, FIO2 in the last 72 hours. Imaging:  []        I have personally reviewed the patients radiographs and reports  []         []        No change from prior, tubes and lines in adequate position  []        Improved   []        Worsening  High complexity decision making was performed during this consultation and evaluation.  Critical care time exclusive of procedures spent managing the patient:      minutes    Art Massey MD

## 2019-06-06 NOTE — PROGRESS NOTES
PT orders received, chart reviewed. Pt unable to participate with PT due to:  []  Nausea/vomiting  []  Eating  []  Pain  []  Pt lethargic  []  Off Unit  []  Pt refused  [x]  Other pt was sleeping. Pt was able to be aroused but declined PT at this time. She has been using the bathroom with nursing assistance today. Educated pt on importance of OOB. Pt also reports being sad about not passing her MBS earlier. Will f/u later as patient's schedule allows.  Thank you for this referral.  Madelyn Lesch, PT, DPT

## 2019-06-06 NOTE — PROGRESS NOTES
TRANSFER - IN REPORT:    Verbal report received from [address] RN (name) on Guzman Kunz  being received from ICU(unit) for routine progression of care      Report consisted of patients Situation, Background, Assessment and   Recommendations(SBAR). Information from the following report(s) SBAR, Kardex, Procedure Summary, Intake/Output and MAR was reviewed with the receiving nurse. Opportunity for questions and clarification was provided. Assessment completed upon patients arrival to unit and care assumed.

## 2019-06-06 NOTE — PROGRESS NOTES
NUTRITION    Nutrition Screen      RECOMMENDATIONS / PLAN:     - Recommend starting tube feeding of Glucerna 1.5 at 20 mL/hr and advance as tolerate by 10 mL q 4 hours to goal rate of 55 mL/hr with 150 mL q 4 hour water flushes if pt to remain NPO.  - Continue IVF until adequate oral vs enteral nutrition intake. - Continue RD inpatient monitoring and evaluation. Addendum [phone]: Obtained telephone order from surgery to start tube feeding. Goal Regimen: Glucerna 1.5 at 55 mL/hr + 150 mL q 4 hour water flushes to provide: 1980 kcal, 109 gm protein, 99 gm fat, 176 gm CHO, 21 gm fiber, 1002 mL free water, 1902 mL total water, 100% RDIs     NUTRITION INTERVENTIONS & DIAGNOSIS:     [x] Meals/snacks: NPO, pending repeat MBS  [x] Enteral Nutrition: initiate  [x] IV Fluids: D5 1/2NS with 20 mEq/L KCl at 75 mL/hr (90 gm dextrose, 306 kcal)  [x] Collaboration and referral of nutrition care: message left with surgeon regarding tube feeding, discussed with nurse as well. Nutrition Diagnosis: Swallowing difficulty related to dysphagia s/p tracheostomy as evidenced by pt NPO after SLP evaluation/MBS. ASSESSMENT:     6/6: Failed swallow evaluation/MBS; repeat MBS ordered for today. C/o gas pains, but is passing flatus, and +BM. Some abdominal distension noted. Asking for water. DHT placed by IR, no tube feeding ordered at this time. 6/5: Admitted with stridor and vocal cord dysfunction s/p tracheostomy placement on 6/3/19, tolerating trach collar and able to eat if passes swallow evaluation per MD- may deflate cuff for meals but must re-inflate cuff when pt is not eating per Surgery. Pt refused NGT placement.      Average po intake adequate to meet patients estimated nutritional needs:   [] Yes     [x] No   [] Unable to determine at this time    Diet: DIET NPO      Food Allergies: sweeteners, sucrose  Current Appetite:   [] Good     [] Fair     [] Poor     [x] Other: NPO  Appetite/meal intake prior to admission: [x] Good     [] Fair     [] Poor     [] Other:  Feeding Limitations:  [x] Swallowing difficulty: SLP following    [] Chewing difficulty    [x] Other: hx of dysphagia s/p thyroidectomy PTA; s/p trach placement   Current Meal Intake: No data found. BM: 6/5  Skin Integrity: surgical incision to neck  Edema:   [x] No     [] Yes   Pertinent Medications: Reviewed: pepcid, SSI, zofran, phenergan    Recent Labs     06/06/19  0254 06/05/19  0337 06/04/19 2020 06/04/19  0305    139  --  137   K 4.4 3.5 3.5 3.5    103  --  103   CO2 27 28  --  25   * 175*  --  224*   BUN 15 11  --  9   CREA 0.75 0.81  --  0.70   CA 8.7 8.8  --  8.0*   MG 2.6 2.4  --  2.1   PHOS 2.7 2.9  --  3.5       Intake/Output Summary (Last 24 hours) at 6/6/2019 1207  Last data filed at 6/6/2019 1147  Gross per 24 hour   Intake 144.3 ml   Output 700 ml   Net -555.7 ml       Anthropometrics:  Ht Readings from Last 1 Encounters:   06/03/19 5' 7\" (1.702 m)     Last 3 Recorded Weights in this Encounter    06/03/19 1250 06/04/19 1357 06/05/19 0400   Weight: 120.8 kg (266 lb 5.1 oz) 119.2 kg (262 lb 12.6 oz) 116.8 kg (257 lb 8 oz)     Body mass index is 40.33 kg/m².  Obese, class III    Weight History:   Weight Metrics 6/5/2019 6/3/2019 5/23/2019 5/22/2019 5/16/2019 4/26/2019 4/16/2019   Weight 257 lb 8 oz - 260 lb 6.4 oz - 258 lb 256 lb 260 lb 9.6 oz   BMI - 40.33 kg/m2 - 40.78 kg/m2 40.41 kg/m2 40.1 kg/m2 -        Admitting Diagnosis: Stridor [R06.1]  Stridor [R06.1]  Pertinent PMHx: T2DM with diabetic neuropathy, HTN, dyslipidemia, CHF    Education Needs:        [x] None identified  [] Identified - Not appropriate at this time  []  Identified and addressed - refer to education log  Learning Limitations:   [] None identified  [x] Identified: non-verbal    Cultural, [employer]ism & ethnic food preferences:  [x] None identified    [] Identified and addressed     ESTIMATED NUTRITION NEEDS:     Calories: 9666-7629 kcal (MSJx1-1.2) based on  [x] Actual  kg     [] IBW   Protein:  gm (0.8-1.2 gm/kg) based on  [x] Actual BW      [] IBW   Fluid: 1 mL/kcal     MONITORING & EVALUATION:     Nutrition Goal(s):   1. Nutritional needs will be met through adequate oral intake or nutrition support within the next 5 days.   Outcome:  [] Met/Ongoing    [] Progressing towards goal    [x] Not progressing towards goal    [] New/Initial goal      Monitoring:   [x] Food and beverage intake   [x] Diet order   [x] Nutrition-focused physical findings   [x] Treatment/therapy   [] Weight   [x] Enteral nutrition intake        Previous Recommendations (for follow-up assessments only):     []   Implemented       [x]   Not Implemented (RD to address)      [] No Longer Appropriate     [] No Recommendation Made     Discharge Planning: pending plan of care, po diet vs enteral nutrition   [x] Participated in care planning, discharge planning, & interdisciplinary rounds as appropriate      Mariangel Carrillo RD, [address]   Pager: [phone]

## 2019-06-06 NOTE — PROGRESS NOTES
Spoke to RD and will start Jevity 1.5 due to patients sucrose allergy and we will like to verify the ingredients.

## 2019-06-06 NOTE — ROUTINE PROCESS
TRANSFER - OUT REPORT:    Verbal report given to Angie Marti (name) on Иван Murcia  being transferred to Room 504 on [address](unit) for routine progression of care       Report consisted of patients Situation, Background, Assessment and   Recommendations(SBAR). Information from the following report(s) SBAR, Kardex, Intake/Output, Recent Results and Cardiac Rhythm SR was reviewed with the receiving nurse. Lines:   Peripheral IV 06/03/19 Left Antecubital (Active)   Site Assessment Clean, dry, & intact 6/6/2019 12:00 AM   Phlebitis Assessment 0 6/5/2019  8:00 PM   Infiltration Assessment 0 6/5/2019  8:00 PM   Dressing Status Clean, dry, & intact 6/5/2019  8:00 PM   Dressing Type Transparent;Tape 6/5/2019  8:00 PM   Hub Color/Line Status Green;Patent; Flushed;Capped 6/5/2019  8:00 PM   Action Taken Open ports on tubing capped 6/5/2019  8:00 PM   Alcohol Cap Used Yes 6/5/2019  8:00 PM       Peripheral IV 06/05/19 Left;Posterior Hand (Active)   Site Assessment Clean, dry, & intact 6/6/2019 12:00 AM   Phlebitis Assessment 0 6/5/2019  8:00 PM   Infiltration Assessment 0 6/5/2019  8:00 PM   Dressing Status Clean, dry, & intact 6/5/2019  8:00 PM   Dressing Type Transparent;Tape 6/5/2019  8:00 PM   Hub Color/Line Status Yellow;Patent; Flushed;Capped 6/5/2019  8:00 PM   Action Taken Open ports on tubing capped 6/5/2019  8:00 PM   Alcohol Cap Used Yes 6/5/2019  8:00 PM        Opportunity for questions and clarification was provided.       Patient transported with:   O2 @ 8 liters  Registered Nurse

## 2019-06-06 NOTE — PROGRESS NOTES
Surgery  Swallowing eval witnessed and reviewed with radiology. Some aspiration with thinner liquids. Solids went better but not completely normal.  Had coughing with liquids prior to the surgery but tolerated solids okay without complication or pneumonia.

## 2019-06-07 LAB
ANION GAP SERPL CALC-SCNC: 7 MMOL/L (ref 3–18)
BASOPHILS # BLD: 0 K/UL (ref 0–0.1)
BASOPHILS NFR BLD: 0 % (ref 0–2)
BUN SERPL-MCNC: 11 MG/DL (ref 7–18)
BUN/CREAT SERPL: 15 (ref 12–20)
CALCIUM SERPL-MCNC: 8.5 MG/DL (ref 8.5–10.1)
CHLORIDE SERPL-SCNC: 105 MMOL/L (ref 100–108)
CO2 SERPL-SCNC: 26 MMOL/L (ref 21–32)
CREAT SERPL-MCNC: 0.74 MG/DL (ref 0.6–1.3)
DIFFERENTIAL METHOD BLD: ABNORMAL
EOSINOPHIL # BLD: 0.1 K/UL (ref 0–0.4)
EOSINOPHIL NFR BLD: 1 % (ref 0–5)
ERYTHROCYTE [DISTWIDTH] IN BLOOD BY AUTOMATED COUNT: 16.3 % (ref 11.6–14.5)
GLUCOSE BLD STRIP.AUTO-MCNC: 200 MG/DL (ref 70–110)
GLUCOSE BLD STRIP.AUTO-MCNC: 248 MG/DL (ref 70–110)
GLUCOSE BLD STRIP.AUTO-MCNC: 282 MG/DL (ref 70–110)
GLUCOSE BLD STRIP.AUTO-MCNC: 288 MG/DL (ref 70–110)
GLUCOSE SERPL-MCNC: 208 MG/DL (ref 74–99)
HCT VFR BLD AUTO: 43.3 % (ref 35–45)
HGB BLD-MCNC: 13.9 G/DL (ref 12–16)
LYMPHOCYTES # BLD: 1.3 K/UL (ref 0.9–3.6)
LYMPHOCYTES NFR BLD: 16 % (ref 21–52)
MAGNESIUM SERPL-MCNC: 2.1 MG/DL (ref 1.6–2.6)
MCH RBC QN AUTO: 30.3 PG (ref 24–34)
MCHC RBC AUTO-ENTMCNC: 32.1 G/DL (ref 31–37)
MCV RBC AUTO: 94.3 FL (ref 74–97)
MONOCYTES # BLD: 0.9 K/UL (ref 0.05–1.2)
MONOCYTES NFR BLD: 11 % (ref 3–10)
NEUTS SEG # BLD: 5.7 K/UL (ref 1.8–8)
NEUTS SEG NFR BLD: 72 % (ref 40–73)
PHOSPHATE SERPL-MCNC: 2.3 MG/DL (ref 2.5–4.9)
PLATELET # BLD AUTO: 203 K/UL (ref 135–420)
PMV BLD AUTO: 9.2 FL (ref 9.2–11.8)
POTASSIUM SERPL-SCNC: 3.5 MMOL/L (ref 3.5–5.5)
RBC # BLD AUTO: 4.59 M/UL (ref 4.2–5.3)
SODIUM SERPL-SCNC: 138 MMOL/L (ref 136–145)
WBC # BLD AUTO: 7.9 K/UL (ref 4.6–13.2)

## 2019-06-07 PROCEDURE — 74011250636 HC RX REV CODE- 250/636

## 2019-06-07 PROCEDURE — 85025 COMPLETE CBC W/AUTO DIFF WBC: CPT

## 2019-06-07 PROCEDURE — 97530 THERAPEUTIC ACTIVITIES: CPT

## 2019-06-07 PROCEDURE — 97116 GAIT TRAINING THERAPY: CPT

## 2019-06-07 PROCEDURE — 65270000029 HC RM PRIVATE

## 2019-06-07 PROCEDURE — 36415 COLL VENOUS BLD VENIPUNCTURE: CPT

## 2019-06-07 PROCEDURE — 80048 BASIC METABOLIC PNL TOTAL CA: CPT

## 2019-06-07 PROCEDURE — 82962 GLUCOSE BLOOD TEST: CPT

## 2019-06-07 PROCEDURE — 77030018846 HC SOL IRR STRL H20 ICUM -A

## 2019-06-07 PROCEDURE — 74011250636 HC RX REV CODE- 250/636: Performed by: NURSE PRACTITIONER

## 2019-06-07 PROCEDURE — 74011636637 HC RX REV CODE- 636/637

## 2019-06-07 PROCEDURE — 94640 AIRWAY INHALATION TREATMENT: CPT

## 2019-06-07 PROCEDURE — 97535 SELF CARE MNGMENT TRAINING: CPT

## 2019-06-07 PROCEDURE — 74011636637 HC RX REV CODE- 636/637: Performed by: NURSE PRACTITIONER

## 2019-06-07 PROCEDURE — 74011000250 HC RX REV CODE- 250

## 2019-06-07 PROCEDURE — 84100 ASSAY OF PHOSPHORUS: CPT

## 2019-06-07 PROCEDURE — 83735 ASSAY OF MAGNESIUM: CPT

## 2019-06-07 RX ORDER — ACETAMINOPHEN 650 MG/1
650 SUPPOSITORY RECTAL
Status: DISCONTINUED | OUTPATIENT
Start: 2019-06-07 | End: 2019-06-25 | Stop reason: HOSPADM

## 2019-06-07 RX ORDER — INSULIN GLARGINE 100 [IU]/ML
15 INJECTION, SOLUTION SUBCUTANEOUS DAILY
Status: DISCONTINUED | OUTPATIENT
Start: 2019-06-07 | End: 2019-06-08

## 2019-06-07 RX ORDER — ALBUTEROL SULFATE 0.83 MG/ML
2.5 SOLUTION RESPIRATORY (INHALATION)
Status: DISCONTINUED | OUTPATIENT
Start: 2019-06-07 | End: 2019-06-25 | Stop reason: HOSPADM

## 2019-06-07 RX ORDER — INSULIN GLARGINE 100 [IU]/ML
15 INJECTION, SOLUTION SUBCUTANEOUS DAILY
Status: DISCONTINUED | OUTPATIENT
Start: 2019-06-08 | End: 2019-06-07

## 2019-06-07 RX ORDER — HYDROMORPHONE HYDROCHLORIDE 2 MG/ML
.5-1 INJECTION, SOLUTION INTRAMUSCULAR; INTRAVENOUS; SUBCUTANEOUS
Status: DISCONTINUED | OUTPATIENT
Start: 2019-06-07 | End: 2019-06-24

## 2019-06-07 RX ADMIN — LORAZEPAM 1 MG: 2 INJECTION INTRAMUSCULAR at 05:08

## 2019-06-07 RX ADMIN — HYDROMORPHONE HYDROCHLORIDE 0.5 MG: 2 INJECTION INTRAMUSCULAR; INTRAVENOUS; SUBCUTANEOUS at 22:33

## 2019-06-07 RX ADMIN — ALBUTEROL SULFATE 2.5 MG: 2.5 SOLUTION RESPIRATORY (INHALATION) at 13:19

## 2019-06-07 RX ADMIN — ONDANSETRON 4 MG: 2 INJECTION INTRAMUSCULAR; INTRAVENOUS at 11:29

## 2019-06-07 RX ADMIN — INSULIN LISPRO 4 UNITS: 100 INJECTION, SOLUTION INTRAVENOUS; SUBCUTANEOUS at 06:37

## 2019-06-07 RX ADMIN — FAMOTIDINE 20 MG: 10 INJECTION INTRAVENOUS at 10:04

## 2019-06-07 RX ADMIN — HYDROMORPHONE HYDROCHLORIDE 1 MG: 2 INJECTION INTRAMUSCULAR; INTRAVENOUS; SUBCUTANEOUS at 13:14

## 2019-06-07 RX ADMIN — INSULIN LISPRO 9 UNITS: 100 INJECTION, SOLUTION INTRAVENOUS; SUBCUTANEOUS at 18:42

## 2019-06-07 RX ADMIN — INSULIN GLARGINE 15 UNITS: 100 INJECTION, SOLUTION SUBCUTANEOUS at 10:04

## 2019-06-07 RX ADMIN — INSULIN LISPRO 4 UNITS: 100 INJECTION, SOLUTION INTRAVENOUS; SUBCUTANEOUS at 00:55

## 2019-06-07 RX ADMIN — INSULIN LISPRO 9 UNITS: 100 INJECTION, SOLUTION INTRAVENOUS; SUBCUTANEOUS at 12:51

## 2019-06-07 RX ADMIN — LORAZEPAM 1 MG: 2 INJECTION INTRAMUSCULAR at 20:04

## 2019-06-07 RX ADMIN — FAMOTIDINE 20 MG: 10 INJECTION INTRAVENOUS at 21:19

## 2019-06-07 NOTE — OP NOTES
[address] [place] [address]   OPERATIVE REPORT    Name:  Melvin Carver  MR#:   [id number]  :  1960  ACCOUNT #:  [de-identified]  DATE OF SERVICE:  2019    PREOPERATIVE DIAGNOSIS:  Vocal cord dysfunction. POSTOPERATIVE DIAGNOSIS:  Vocal cord dysfunction. PROCEDURE PERFORMED:  Tracheostomy. SURGEON:  Ted Egan MD.    ASSISTANT:  .    ANESTHESIA:  General.    COMPLICATIONS:  None. SPECIMENS REMOVED:  None. IMPLANTS:  Trach tube Portex #9. ESTIMATED BLOOD LOSS:  Minimal.    INDICATIONS:  The patient is a 60-year-old woman who has had significant cord dysfunction after total thyroidectomy. She is about 2 months out from that operation, but presented to the emergency room having breathing difficulties and quite stridorous. She was taken urgently to the operating room for this tracheostomy. Cristofer Dockery PROCEDURE:  After appropriate antibiotics and sequential compression stockings, the patient was taken to the operating room, placed in a supine position on the OR table. After adequate general anesthesia, relatively using intubation with a #6 endotracheal tube, she was placed in a semi-sitting position with her head in extension. Middle portion of her old thyroidectomy wound was reopened and blunt and sharp dissection down to the trachea was difficult. Standing in the midline, abundant dense scar was encountered and her anterior tracheal wall was eventually dissected and exposed. A \"bomb bay door\" incision was created and 2-0 Prolene sutures were used to hold the doors apart. A #9 Portex endotracheal tube was placed in position. She never had a desaturation and her CO2 was immediately detected. Hemostasis was achieved with the Bovie and the trach was sutured in with 2-0 Prolene. Several pieces of Surgicel were placed beside the trach. She tolerated the procedure well and was taken to the ICU in stable condition.       Altamease Olszewski, MD      [id number]  D:  2019 13:07  T:  06/06/2019 13:41  JOB #:  [id number]

## 2019-06-07 NOTE — PROGRESS NOTES
Surgery  Awake and alert, communicative. Wants to eat  T-max 99 vital signs stable  Working with PT OT  Tolerating Dobbhoff feedings well  Hope to repeat swallowing eval Monday  Trach jaycob Surgicel was responsible for brown drainage around the trach.   Push for out of bed and increasing activity

## 2019-06-07 NOTE — PROGRESS NOTES
NUTRITION    Nutrition Consult: Management of Tube Feeding     RECOMMENDATIONS / PLAN:     - Change tube feeding formula to Jevity 1.5, continue current rate. - Add prosource 1 packet daily to meet protein needs. - Continue RD inpatient monitoring and evaluation. Goal Regimen: Jevity 1.5 at 55 mL/hr + 150 mL q 4 hour water flushes to provide: 1980 kcal, 84 gm protein, 66 gm fat, 285 gm CHO, 29 gm fiber, 1003 mL free water, 1903 total water, 100% RDIs  TF + prosource provides: 2040 kcal, 99 gm protein     NUTRITION INTERVENTIONS & DIAGNOSIS:     [x] Meals/snacks: NPO, pending repeat MBS  [x] Enteral Nutrition: modify order to continue current formula  [x] Collaboration and referral of nutrition care: discussed tube feeding formula with nurse, MD and glycemic control nurse    Nutrition Diagnosis: Swallowing difficulty related to dysphagia s/p tracheostomy as evidenced by pt NPO after SLP evaluation/MBS. ASSESSMENT:     6/7: Formula changed to Jevity 1.5 prior to initiation 2/2 concern for sucrose sensitivity. Will continue current product due to questionable sensitivity of ingredients in Glucerna tube feeding. Tolerating rate at goal.  Elevated BG levels, but expected 2/2 formula needing to be given, discussed with MD and glycemin control, will attempt to manage BG with medication due to formula limitations. IVF discontinued. 6/6: Failed swallow evaluation/MBS; repeat MBS ordered for today. C/o gas pains, but is passing flatus, and +BM. Some abdominal distension noted. Asking for water. DHT placed by IR, no tube feeding ordered at this time. 6/5: Admitted with stridor and vocal cord dysfunction s/p tracheostomy placement on 6/3/19, tolerating trach collar and able to eat if passes swallow evaluation per MD- may deflate cuff for meals but must re-inflate cuff when pt is not eating per Surgery. Pt refused NGT placement.      Enteral Nutrition intake adequate to meet patients estimated nutritional needs:   [x] Yes     [] No   [] Unable to determine at this time    Tube Feeding: Jevity 1.5 at 55 mL/hr  Water Flushes: 150 mL q 4 hours  Residuals: none per nurse  Diet: DIET NPO  DIET TUBE FEEDING      Food Allergies: sweeteners, sucrose  Current Appetite:   [] Good     [] Fair     [] Poor     [x] Other: NPO  Appetite/meal intake prior to admission:   [x] Good     [] Fair     [] Poor     [] Other:  Feeding Limitations:  [x] Swallowing difficulty: SLP following    [] Chewing difficulty    [x] Other: hx of dysphagia s/p thyroidectomy PTA; s/p trach placement   Current Meal Intake: No data found. BM: 6/5  Skin Integrity: surgical incision to neck  Edema:   [x] No     [] Yes   Pertinent Medications: Reviewed: pepcid, SSI, zofran, phenergan, lantus    Recent Labs     06/07/19  0450 06/06/19  0254 06/05/19  0337    142 139   K 3.5 4.4 3.5    107 103   CO2 26 27 28   * 157* 175*   BUN 11 15 11   CREA 0.74 0.75 0.81   CA 8.5 8.7 8.8   MG 2.1 2.6 2.4   PHOS 2.3* 2.7 2.9       Intake/Output Summary (Last 24 hours) at 6/7/2019 1231  Last data filed at 6/7/2019 0725  Gross per 24 hour   Intake 455 ml   Output 925 ml   Net -470 ml       Anthropometrics:  Ht Readings from Last 1 Encounters:   06/03/19 5' 7\" (1.702 m)     Last 3 Recorded Weights in this Encounter    06/04/19 1357 06/05/19 0400 06/06/19 1401   Weight: 119.2 kg (262 lb 12.6 oz) 116.8 kg (257 lb 8 oz) 116.8 kg (257 lb 8 oz)     Body mass index is 40.33 kg/m².  Obese, class III    Weight History:   Weight Metrics 6/6/2019 6/3/2019 5/23/2019 5/22/2019 5/16/2019 4/26/2019 4/16/2019   Weight 257 lb 8 oz - 260 lb 6.4 oz - 258 lb 256 lb 260 lb 9.6 oz   BMI - 40.33 kg/m2 - 40.78 kg/m2 40.41 kg/m2 40.1 kg/m2 -        Admitting Diagnosis: Stridor [R06.1]  Stridor [R06.1]  Pertinent PMHx: T2DM with diabetic neuropathy, HTN, dyslipidemia, CHF    Education Needs:        [x] None identified  [] Identified - Not appropriate at this time  []  Identified and addressed - refer to education log  Learning Limitations:   [] None identified  [x] Identified: non-verbal    Cultural, Holiness & ethnic food preferences:  [x] None identified    [] Identified and addressed     ESTIMATED NUTRITION NEEDS:     Calories: 0620-0874 kcal (MSJx1-1.2) based on  [x] Actual  kg     [] IBW   Protein:  gm (0.8-1.2 gm/kg) based on  [x] Actual BW      [] IBW   Fluid: 1 mL/kcal     MONITORING & EVALUATION:     Nutrition Goal(s):   1. Nutritional needs will be met through adequate oral intake or nutrition support within the next 5 days.   Outcome:  [x] Met/Ongoing    [] Progressing towards goal    [] Not progressing towards goal    [] New/Initial goal      Monitoring:   [] Food and beverage intake   [x] Diet order   [x] Nutrition-focused physical findings   [x] Treatment/therapy   [] Weight   [x] Enteral nutrition intake        Previous Recommendations (for follow-up assessments only):     [x]   Implemented       []   Not Implemented (RD to address)      [] No Longer Appropriate     [] No Recommendation Made     Discharge Planning: pending plan of care, po diet vs enteral nutrition   [x] Participated in care planning, discharge planning, & interdisciplinary rounds as appropriate      Jens Hays RD, [address]   Pager: [phone]

## 2019-06-07 NOTE — PROGRESS NOTES
Informed RN that mediplex bandage on bottom needs to be changed. Replaced current ice packs with two fresh ice packs.

## 2019-06-07 NOTE — PROGRESS NOTES
Discharge planning      Information faxed to [address] [place] [address] ([phone]) for  trach supplies for home when discharged.      GONZALEZ LoredoN, RN  Pager # [phone]  Care Manager

## 2019-06-07 NOTE — PROGRESS NOTES
Problem: Mobility Impaired (Adult and Pediatric)  Goal: *Acute Goals and Plan of Care (Insert Text)  Description  Physical Therapy Goals  Initiated 6/5/2019 and to be accomplished within 7 day(s)  1. Patient will move from supine to sit and sit to supine , scoot up and down and roll side to side in bed with modified independence. 2.  Patient will transfer from bed to chair and chair to bed with modified independence using the least restrictive device. 3.  Patient will perform sit to stand with modified independence. 4.  Patient will ambulate with modified independence for >100 feet with the least restrictive device. 5.  Patient will ascend/descend 3 stairs with 1-2 handrail(s) with supervision/set-up. Prior Level of Function: Independent with mobility including gait using 2 canes. Pt lives with her brother and son in a single story home with 3 steps to enter B HRA. Outcome: Progressing Towards Goal   PHYSICAL THERAPY TREATMENT    Patient: Bebo Ramires (6 y.o. female)  Date: 6/7/2019  Diagnosis: Stridor [R06.1]  Stridor [R06.1] <principal problem not specified>  Procedure(s) (LRB):  TRACHEOSTOMY (N/A) 4 Days Post-Op  Precautions: Fall    ASSESSMENT:  Pt found supine HOB elevated willing to participate w/ therapy. Tx performed w/ OT to maximize safety of pt and staff. Pt able to maintain/improve in mobility status this visit req cueing t/o for sequencing and safety w/ NG tube as pt's main complaint during tx proved to be discomfort w/ NG tube. Pt performed sup to sit w/ min A from assistance of therapy staff and MD. Pt sat w/ good balance at EOB for approx 10 min performing functional tasks and w/ intermittent instances of coughing and sputum production req suction. Pt performed sit <> stand from EOB and amb w/o AD w/ fair + balance w/ HHA to recliner. Pt req cueing for sequencing and positioned safely in recliner w/ all needs in reach.  Pt became slightly lethargic t/o tx, however SPO2 levels remained w/n safe limits and was alert and responsive w/ verbal stimulation. Pt left in chair w/ all needs in reach. Nurse notified. Progression toward goals: good   ? Improving appropriately and progressing toward goals  ? Improving slowly and progressing toward goals  ? Not making progress toward goals and plan of care will be adjusted     PLAN:  Patient continues to benefit from skilled intervention to address the above impairments. Continue treatment per established plan of care. Discharge Recommendations:  Home Health  Further Equipment Recommendations for Discharge:  rolling walker     SUBJECTIVE:   Patient stated Unable to verbalize statements. OBJECTIVE DATA SUMMARY:   Critical Behavior:  Neurologic State: Alert  Orientation Level: Oriented X4  Cognition: Follows commands  Safety/Judgement: Awareness of environment, Fall prevention  Functional Mobility Training:  Bed Mobility:  Supine to Sit: Contact guard assistance  Transfers:  Sit to Stand: Minimum assistance  Stand to Sit: Contact guard assistance  Balance:  Sitting: Intact  Standing: Impaired; Without support  Standing - Static: Fair(+)  Standing - Dynamic : Fair  Ambulation/Gait Training:  Distance (ft): 2 Feet (ft)  Assistive Device: (hha)  Ambulation - Level of Assistance: Contact guard assistance  Gait Abnormalities: Decreased step clearance  Base of Support: Center of gravity altered  Speed/Yolis: Slow  Step Length: Right shortened;Left shortened      Pain:  Did not verbalize pain     Activity Tolerance:   Fair   Please refer to the flowsheet for vital signs taken during this treatment. After treatment:   ? Patient left in no apparent distress sitting up in chair  ? Patient left in no apparent distress in bed  ? Call bell left within reach  ? Nursing notified  ? Caregiver present  ? Bed alarm activated  ? SCDs applied      COMMUNICATION/EDUCATION:   ?         Role of Physical Therapy in the acute care setting.   ? Fall prevention education was provided and the patient/caregiver indicated understanding. ? Patient/family have participated as able in working toward goals and plan of care. ?         Patient/family agree to work toward stated goals and plan of care. ?         Patient understands intent and goals of therapy, but is neutral about his/her participation. ? Patient is unable to participate in stated goals/plan of care: ongoing with therapy staff.   ?         Other:        Salazar Gist, PTA   Time Calculation: 30 mins

## 2019-06-07 NOTE — PROGRESS NOTES
Patient is not available to be assessed at this time.  attempted to visit the patient twice. The patient was resting.       Chaplain Resident [address]   [phone]

## 2019-06-07 NOTE — PROGRESS NOTES
Problem: Self Care Deficits Care Plan (Adult)  Goal: *Acute Goals and Plan of Care (Insert Text)  Description  Occupational Therapy Goals  Initiated 6/5/2019 within 7 day(s). 1.  Patient will perform lower body dressing with supervision/set-up. 2.  Patient will perform functional task in standing for 3 minutes with supervision for balance. 3.  Patient will perform toilet transfers with supervision/set-up. 4.  Patient will participate in upper extremity therapeutic exercise/activities with supervision/set-up for 8 minutes to increase strength/endurance for ADLs. Prior Level of Function: Pt was modified independent with basic self care tasks and used two canes for functional mobility PTA. Outcome: Progressing Towards Goal   OCCUPATIONAL THERAPY TREATMENT    Patient: Michi Yeung (83 y.o. female)  Date: 6/7/2019  Diagnosis: Stridor [R06.1]  Stridor [R06.1] <principal problem not specified>  Procedure(s) (LRB):  TRACHEOSTOMY (N/A) 4 Days Post-Op  Precautions: Fall    Chart, occupational therapy assessment, plan of care, and goals were reviewed. ASSESSMENT:  Pt required Min encouragement to participate in OT this am. Pt is seen with PT to increase safety of the pt and staff during functional mobility and ADLs. MD is in  and assisted pt w/sup-sit. Upon sit EOB pt performed mult tasks simulating grooming activities, and required min A to change gown and to re-adjust socks. Pt is w/productive coughing upon sit EOB and required VCs for pacing and upright positioning. Pt required Min A to maneuver to the chair (w/HHA x2). Pt is occasionally tearful, and appears drowsy, however follows commands well when spoken to and denies dizziness. Pt was positioned comfortable in the recliner, requesting ice-chips. Educated pt on strict NPO, pt verbalized understanding. Progression toward goals:  ?          Improving appropriately and progressing toward goals  ?           Improving slowly and progressing toward goals  ?          Not making progress toward goals and plan of care will be adjusted     PLAN:  Patient continues to benefit from skilled intervention to address the above impairments. Continue treatment per established plan of care. Discharge Recommendations:  Home Health w/Supervision  Further Equipment Recommendations for Discharge:  bedside commode     SUBJECTIVE:   Patient stated ? I am sad.?    OBJECTIVE DATA SUMMARY:   Cognitive/Behavioral Status:  Neurologic State: Alert  Orientation Level: Oriented X4  Cognition: Follows commands  Safety/Judgement: Awareness of environment, Fall prevention    Functional Mobility and Transfers for ADLs:   Bed Mobility:     Supine to Sit: Contact guard assistance    Transfers:  Sit to Stand: Minimum assistance  Stand to Sit: Contact guard assistance   Toilet Transfer : Minimum assistance(SPT)    Balance:  Sitting: Intact  Standing: Impaired; Without support  Standing - Static: Fair(+)  Standing - Dynamic : Fair    ADL Intervention:   Lower Body Dressing Assistance  Socks: Minimum assistance  Pain:  Pain level pre-treatment: 0/10   Pain level post-treatment: 0/10  Pt reports no pain, but discomfort from NG tube    Activity Tolerance:    Fair  Please refer to the flowsheet for vital signs taken during this treatment. After treatment:   ?  Patient left in no apparent distress sitting up in chair  ? Patient left in no apparent distress in bed  ? Call bell left within reach  ? Nursing notified  ? Caregiver present  ? Bed alarm activated    COMMUNICATION/EDUCATION:   ? Role of Occupational Therapy in the acute care setting  ? Home safety education was provided and the patient/caregiver indicated understanding. ? Patient/family have participated as able in working towards goals and plan of care. ? Patient/family agree to work toward stated goals and plan of care. ? Patient understands intent and goals of therapy, but is neutral about his/her participation. ?  Patient is unable to participate in goal setting and plan of care.       Thank you for this referral.  SHIVANI Wong/L  Time Calculation: 30 mins

## 2019-06-07 NOTE — PROGRESS NOTES
Patient had restless night, very little sleep, requested Dubbhoff tube to be taken out multiple times. Trach site with copious amounts of secretion, Trach incision site draining brown secretions and patient coughing up yellow to green secretions. Received ativan X 2 times for anxiety with little relief. Tube feeding increased per orders and target goal of 55 was completed this am at 0600. Remains continent of urine using BSC.

## 2019-06-07 NOTE — PROGRESS NOTES
Progress Note  Pulmonary, Critical Care, and Sleep Medicine    Name: Cynthia Santiago MRN: [id number]   : 1960 Hospital: [address] [place] [address]    Date: 2019        IMPRESSION:   · S/p tracheostomy for vocal cord dysfunction following thyroidectomy  · Stridor and worsening SOB due to above- resolved s/p trach  · Morbid obesity  · Dysphagia- mild oral and mod to severe pharyngeal. S/p Dobhoff tube placement  · Sleep apnea- now resolved s/p trach  · DM type 2  · HTN      PLAN:   · Keep HOB elevated  · Monitor temperature- no signs of infection at this time  · Continue with trache collar  · Management of trache per Surgery. Will keep cuff inflated for now  · Trach care per protocol  · Glycemic control with correctional scale insulin  · Continue IV hydration until TF started  · SLP following for dysphagia  · DVT prophylaxis SCD's  · Mobilize pt  · Pain control per surgery  · Other medical management and prophylaxis per primary service     Subjective/Interval History:   I have reviewed the flowsheet and previous days notes. Reviewed interval history. Discussed management with nursing staff. Pt with worsening stridor and exercise tolerance following a difficult thyroidectomy for bilateral multinodular goiter. Pt went to the OR for trache placement and was admitted to ICU on vent support. She was quickly weaned off this and is on trache collar. She was transferred to the floor. 19  · Patient sitting up in bed  · Is able to write to converse with hospital staff  · Patient wrote that she sleep well last night and she was happy about that  · Nurse reported suctioning patient in the morning and has not required suctioning after that  · Patient with some brown, oozing at sights. Spoke with Dr. Brenda Colunga who feels it is related to to surgicel.   · Patient being fed via feeding tube  · Not vocalizing as cuff is inflated- on humidification  · Writes she has an intermittent dry cough  · Low grade temp noted on vital review      Patient Vitals for the past 24 hrs:   Temp Pulse Resp BP SpO2   19 1353 99.5 °F (37.5 °C) 80 12 155/86 96 %   19 1320 99.1 °F (37.3 °C) -- -- -- 100 %   19 1319 -- -- -- -- 98 %   19 1131 100.4 °F (38 °C) 80 22 -- 98 %   19 1030 (!) 100.8 °F (38.2 °C) 76 22 132/66 99 %   19 0608 99.2 °F (37.3 °C) 88 20 171/84 92 %   19 2106 99.6 °F (37.6 °C) 76 17 144/71 100 %   19 1756 99.7 °F (37.6 °C) 82 15 135/80 100 %         ROS:Review of systems not obtained due to patient factors. Orders reviewed including medications. Changes made if indicated. Telemetry monitor reviewed at the bedside. Objective:   Vital Signs:    Visit Vitals  /86 (BP 1 Location: Right arm, BP Patient Position: At rest)   Pulse 80   Temp 99.5 °F (37.5 °C) Comment: Informed RN   Resp 12   Ht 5' 7\" (1.702 m)   Wt 116.8 kg (257 lb 8 oz)   SpO2 96%   Breastfeeding? No   BMI 40.33 kg/m²       O2 Device: Tracheostomy, Tracheal collar   O2 Flow Rate (L/min): 10 l/min   Temp (24hrs), Av.8 °F (37.7 °C), Min:99.1 °F (37.3 °C), Max:100.8 °F (38.2 °C)       Intake/Output:   Last shift:      701 - 1900  In: 150   Out: 375 [Urine:375]  Last 3 shifts: 1901 -  07  In: 455 [I.V.:445]  Out: 1250 [Urine:1250]    Intake/Output Summary (Last 24 hours) at 2019 1643  Last data filed at 2019 1000  Gross per 24 hour   Intake 160 ml   Output 925 ml   Net -765 ml        Physical Exam:    General:  Alert, cooperative, in no distress, appears stated age. Obese body habitus   Head:  Normocephalic, without obvious abnormality, atraumatic. Eyes:  ANicteric, PERRL,   Nose: Nares normal.  Mucosa normal. No drainage or sinus tenderness. Throat: Lips, mucosa, and tongue normal. Teeth and gums normal. NO Thrush   Neck: Supple, symmetrical, trachea midline, no adenopathy, thyroid: no enlargment/tenderness/nodules , trache in place, no bleeding, some oozing around site. Back:   Symmetric, no curvature. ROM normal. NO spine tenderness   Lungs:   Bilateral auscultation no rales or wheezes   Chest wall:  No tenderness or deformity. NO intercostal retractions   Heart:  Regular rate and rhythm, S1, S2 normal, no murmur, click, rub or gallop. Abdomen:   Soft, non-tender. Obese Bowel sounds normal. No masses,  No organomegaly. NO paradoxical motion   Extremities: normal, atraumatic, no cyanosis, trace edema   Pulses: 2+ and symmetric all extremities. Skin: Skin color, texture, turgor normal. No rashes - several old areas of excoriations in various stages of healing- looks like from picking at skin. NO clubbing   Lymph nodes: Cervical, supraclavicular nodes normal.   Neurologic: Grossly nonfocal      :        Devices:             Drips:    DATA:  Labs:  Recent Labs     06/07/19  0450 06/06/19  0254 06/05/19  0337   WBC 7.9 9.8 9.4   HGB 13.9 14.9 14.1   HCT 43.3 46.6* 44.5    182 195     Recent Labs     06/07/19  0450 06/06/19  0254 06/05/19  0337    142 139   K 3.5 4.4 3.5    107 103   CO2 26 27 28   * 157* 175*   BUN 11 15 11   CREA 0.74 0.75 0.81   CA 8.5 8.7 8.8   MG 2.1 2.6 2.4   PHOS 2.3* 2.7 2.9     No results for input(s): PH, PCO2, PO2, HCO3, FIO2 in the last 72 hours.     Imaging:     CXR Results  (Last 48 hours)    None        Jose Foster DO, LEILA    [facility]  Pulmonary, Critical Care, and Sleep Medicine    Clinical care, chart review and time spent coordinating care:25 min

## 2019-06-07 NOTE — DIABETES MGMT
Glycemic Control Plan of Care    T2DM with current A1c level of 8.4% (4/04/2019).  6/06/2019: Pending assessment of home diabetes management and educational needs. Patient is s/p trach on 6/03/2019. She was able to nod head when I asked about home diabetes medication and dose. She also nodded head when I asked if she has meter and regularly monitor her blood sugar at home. 6/07/2019: Seen patient this morning. She's feeling too tired for diab assessment. Will cont to follow. Home diabetes medication: Lantus insulin (vial) 90 units daily at bedtime. POC BG range on 6/06/2019: 148-211 mg/dL. Patient on correctional lispro insulin and added basal lantus insulin 10 units daily. POC BG report on 6/07/2019 at time of review: 200, 248 mg/dL. Modified correctional lispro insulin to very resistant dose. Patient on tube feeding Jevity 1.5 at 50 ml/hr. Recommendation(s):  1.) Consider increasing basal lantus insulin dose from 10 units to 15 units daily. Called Dr. Pratik Gross and obtained order. Also obtained order to discontinue IVF with dextrose. Notified Alexa Koehler RN. Dr. Pratik Gross also plan to consult hospitalist.  2.) Cont to monitor in order to determine need for lantus insulin dose adjustment to achieve BG target range. Assessment:  Patient is 63year old with past medical history including type 2 diabetes mellitus with neuropathy, sleep apnea, s/p aortic valve replacement, morbid obesity, hypothyroidism, hypertension, chronic CHF and s/p thyroidectomy - was admitted on 6/03/2019 with report of stridor. She was transferred from ICU on 6/06/2019 at 2:38 AM to      Noted:  Vocal cord dysfunction. Status post tracheostomy on 6/03/2019. Hypothyroidism. Recent thyroidectomy on 4/11/2019. Morbid obesity. T2DM with current A1c of 8.4% (4/04/2019). Most recent blood glucose values:    Results for Sherry Petersen (MRN [id number]) as of 6/7/2019 09:29   Ref.  Range 6/6/2019 06:12 6/6/2019 11:35 6/6/2019 17:58 GLUCOSE,FAST - POC Latest Ref Range: 70 - 110 mg/dL 148 (H) 211 (H) 181 (H)     Results for Ofelia Epley (MRN [id number]) as of 6/7/2019 09:29   Ref. Range 6/7/2019 00:28 6/7/2019 06:07   GLUCOSE,FAST - POC Latest Ref Range: 70 - 110 mg/dL 200 (H) 248 (H)     Current A1C: 8.4% (4/04/2019) which is equivalent to estimated average blood glucose of 194 mg/dL during the past 2-3 months. Current hospital diabetes medications:  Basal lantus insulin 15 units daily. Correctional lispro insulin every 6 hours. Very resistant dose. Total daily dose insulin requirement previous day: 6/06/2019:  Lispro: 4 units    Home diabetes medications: Obtained from patient on 6/06/2019\"  Lantus insulin (trach, patient unable to speak but nodded head when I asked if she's on vial insulin). She nodded again when I asked if she's on 90 units of lantus daily at bedtime and she has meter monitoring her blood sugar. Diet: NPO. Patient is on NG tube feeding Jevity 1.5    Goals:  Blood glucose will be within target range of  mg/dL by 6/10/2019. Education:  Re attempted assessment of home diabetes management and educational need on 6/07/2019 but she's feeling too tired.  Plan to f/u.  ___  Refer to Diabetes Education Record  ___  Education not indicated at this time    Louis Schneider RN  Pager: [phone]

## 2019-06-08 ENCOUNTER — APPOINTMENT (OUTPATIENT)
Dept: GENERAL RADIOLOGY | Age: 59
DRG: 098 | End: 2019-06-08
Attending: INTERNAL MEDICINE
Payer: MEDICAID

## 2019-06-08 PROBLEM — R50.9 FEVER: Status: ACTIVE | Noted: 2019-06-08

## 2019-06-08 PROBLEM — E11.65 TYPE 2 DIABETES MELLITUS WITH HYPERGLYCEMIA (HCC): Status: ACTIVE | Noted: 2019-06-08

## 2019-06-08 LAB
ANION GAP SERPL CALC-SCNC: 8 MMOL/L (ref 3–18)
BASOPHILS # BLD: 0 K/UL (ref 0–0.1)
BASOPHILS NFR BLD: 0 % (ref 0–2)
BUN SERPL-MCNC: 10 MG/DL (ref 7–18)
BUN/CREAT SERPL: 17 (ref 12–20)
CALCIUM SERPL-MCNC: 9.2 MG/DL (ref 8.5–10.1)
CHLORIDE SERPL-SCNC: 104 MMOL/L (ref 100–108)
CO2 SERPL-SCNC: 27 MMOL/L (ref 21–32)
CREAT SERPL-MCNC: 0.6 MG/DL (ref 0.6–1.3)
DIFFERENTIAL METHOD BLD: ABNORMAL
EOSINOPHIL # BLD: 0.1 K/UL (ref 0–0.4)
EOSINOPHIL NFR BLD: 1 % (ref 0–5)
ERYTHROCYTE [DISTWIDTH] IN BLOOD BY AUTOMATED COUNT: 16 % (ref 11.6–14.5)
GLUCOSE BLD STRIP.AUTO-MCNC: 156 MG/DL (ref 70–110)
GLUCOSE BLD STRIP.AUTO-MCNC: 201 MG/DL (ref 70–110)
GLUCOSE BLD STRIP.AUTO-MCNC: 226 MG/DL (ref 70–110)
GLUCOSE BLD STRIP.AUTO-MCNC: 278 MG/DL (ref 70–110)
GLUCOSE SERPL-MCNC: 210 MG/DL (ref 74–99)
HCT VFR BLD AUTO: 44.4 % (ref 35–45)
HGB BLD-MCNC: 14.5 G/DL (ref 12–16)
LYMPHOCYTES # BLD: 1.2 K/UL (ref 0.9–3.6)
LYMPHOCYTES NFR BLD: 16 % (ref 21–52)
MAGNESIUM SERPL-MCNC: 2.3 MG/DL (ref 1.6–2.6)
MCH RBC QN AUTO: 30.5 PG (ref 24–34)
MCHC RBC AUTO-ENTMCNC: 32.7 G/DL (ref 31–37)
MCV RBC AUTO: 93.3 FL (ref 74–97)
MONOCYTES # BLD: 0.9 K/UL (ref 0.05–1.2)
MONOCYTES NFR BLD: 12 % (ref 3–10)
NEUTS SEG # BLD: 5.4 K/UL (ref 1.8–8)
NEUTS SEG NFR BLD: 71 % (ref 40–73)
PHOSPHATE SERPL-MCNC: 2.8 MG/DL (ref 2.5–4.9)
PLATELET # BLD AUTO: 209 K/UL (ref 135–420)
PMV BLD AUTO: 9.2 FL (ref 9.2–11.8)
POTASSIUM SERPL-SCNC: 3.4 MMOL/L (ref 3.5–5.5)
RBC # BLD AUTO: 4.76 M/UL (ref 4.2–5.3)
SODIUM SERPL-SCNC: 139 MMOL/L (ref 136–145)
WBC # BLD AUTO: 7.7 K/UL (ref 4.6–13.2)

## 2019-06-08 PROCEDURE — 74011250636 HC RX REV CODE- 250/636

## 2019-06-08 PROCEDURE — 74011636637 HC RX REV CODE- 636/637: Performed by: INTERNAL MEDICINE

## 2019-06-08 PROCEDURE — 85025 COMPLETE CBC W/AUTO DIFF WBC: CPT

## 2019-06-08 PROCEDURE — 74011250636 HC RX REV CODE- 250/636: Performed by: EMERGENCY MEDICINE

## 2019-06-08 PROCEDURE — 77030018836 HC SOL IRR NACL ICUM -A

## 2019-06-08 PROCEDURE — 83735 ASSAY OF MAGNESIUM: CPT

## 2019-06-08 PROCEDURE — 36415 COLL VENOUS BLD VENIPUNCTURE: CPT

## 2019-06-08 PROCEDURE — 87040 BLOOD CULTURE FOR BACTERIA: CPT

## 2019-06-08 PROCEDURE — 77010033678 HC OXYGEN DAILY

## 2019-06-08 PROCEDURE — 74011000258 HC RX REV CODE- 258: Performed by: EMERGENCY MEDICINE

## 2019-06-08 PROCEDURE — 82962 GLUCOSE BLOOD TEST: CPT

## 2019-06-08 PROCEDURE — 74011636637 HC RX REV CODE- 636/637

## 2019-06-08 PROCEDURE — 80048 BASIC METABOLIC PNL TOTAL CA: CPT

## 2019-06-08 PROCEDURE — 71045 X-RAY EXAM CHEST 1 VIEW: CPT

## 2019-06-08 PROCEDURE — 65270000029 HC RM PRIVATE

## 2019-06-08 PROCEDURE — 84100 ASSAY OF PHOSPHORUS: CPT

## 2019-06-08 PROCEDURE — 74011000250 HC RX REV CODE- 250: Performed by: NURSE PRACTITIONER

## 2019-06-08 PROCEDURE — 77030037875 HC DRSG MEPILEX <16IN BORD MOLN -A

## 2019-06-08 RX ORDER — INSULIN GLARGINE 100 [IU]/ML
18 INJECTION, SOLUTION SUBCUTANEOUS DAILY
Status: DISCONTINUED | OUTPATIENT
Start: 2019-06-09 | End: 2019-06-14

## 2019-06-08 RX ORDER — ENOXAPARIN SODIUM 100 MG/ML
40 INJECTION SUBCUTANEOUS EVERY 24 HOURS
Status: DISCONTINUED | OUTPATIENT
Start: 2019-06-08 | End: 2019-06-25 | Stop reason: HOSPADM

## 2019-06-08 RX ORDER — HYDRALAZINE HYDROCHLORIDE 20 MG/ML
10 INJECTION INTRAMUSCULAR; INTRAVENOUS
Status: DISCONTINUED | OUTPATIENT
Start: 2019-06-08 | End: 2019-06-25 | Stop reason: HOSPADM

## 2019-06-08 RX ORDER — INSULIN LISPRO 100 [IU]/ML
INJECTION, SOLUTION INTRAVENOUS; SUBCUTANEOUS EVERY 4 HOURS
Status: DISCONTINUED | OUTPATIENT
Start: 2019-06-08 | End: 2019-06-10

## 2019-06-08 RX ORDER — METOPROLOL TARTRATE 5 MG/5ML
5 INJECTION INTRAVENOUS
Status: DISCONTINUED | OUTPATIENT
Start: 2019-06-08 | End: 2019-06-25 | Stop reason: HOSPADM

## 2019-06-08 RX ORDER — SODIUM CHLORIDE 9 MG/ML
75 INJECTION, SOLUTION INTRAVENOUS CONTINUOUS
Status: DISPENSED | OUTPATIENT
Start: 2019-06-09 | End: 2019-06-09

## 2019-06-08 RX ADMIN — LORAZEPAM 1 MG: 2 INJECTION INTRAMUSCULAR at 03:38

## 2019-06-08 RX ADMIN — LORAZEPAM 1 MG: 2 INJECTION INTRAMUSCULAR at 09:15

## 2019-06-08 RX ADMIN — LIDOCAINE HYDROCHLORIDE: 10 INJECTION, SOLUTION EPIDURAL; INFILTRATION; INTRACAUDAL; PERINEURAL at 20:44

## 2019-06-08 RX ADMIN — LIDOCAINE HYDROCHLORIDE: 10 INJECTION, SOLUTION EPIDURAL; INFILTRATION; INTRACAUDAL; PERINEURAL at 19:30

## 2019-06-08 RX ADMIN — INSULIN LISPRO 9 UNITS: 100 INJECTION, SOLUTION INTRAVENOUS; SUBCUTANEOUS at 00:33

## 2019-06-08 RX ADMIN — ENOXAPARIN SODIUM 40 MG: 40 INJECTION SUBCUTANEOUS at 17:41

## 2019-06-08 RX ADMIN — LORAZEPAM 1 MG: 2 INJECTION INTRAMUSCULAR at 16:28

## 2019-06-08 RX ADMIN — CHLORHEXIDINE GLUCONATE 0.12% ORAL RINSE 10 ML: 1.2 LIQUID ORAL at 21:19

## 2019-06-08 RX ADMIN — INSULIN LISPRO 6 UNITS: 100 INJECTION, SOLUTION INTRAVENOUS; SUBCUTANEOUS at 06:41

## 2019-06-08 RX ADMIN — INSULIN LISPRO 6 UNITS: 100 INJECTION, SOLUTION INTRAVENOUS; SUBCUTANEOUS at 17:40

## 2019-06-08 RX ADMIN — LORAZEPAM 1 MG: 2 INJECTION INTRAMUSCULAR at 21:20

## 2019-06-08 RX ADMIN — LIDOCAINE HYDROCHLORIDE: 10 INJECTION, SOLUTION EPIDURAL; INFILTRATION; INTRACAUDAL; PERINEURAL at 18:23

## 2019-06-08 RX ADMIN — INSULIN LISPRO 3 UNITS: 100 INJECTION, SOLUTION INTRAVENOUS; SUBCUTANEOUS at 22:18

## 2019-06-08 RX ADMIN — FAMOTIDINE 20 MG: 10 INJECTION INTRAVENOUS at 21:20

## 2019-06-08 NOTE — PROGRESS NOTES
Consulted by Dr Rakan Lobo  For medical MGT including High BP and DM with hyperglycemia  Pt lost her NGT  SLP trial later today  BB, hydralazine PRN  SSI Q 4 and hold lantus if Pt is NPO     Pt had  A fever of 100.8 yesterday and 100.3 last night   Trach site has some grayish and suppurative discharge   Will send Blood Cx, UA and get CXR     Full note to follow

## 2019-06-08 NOTE — PROGRESS NOTES
Patient continues to be restless and anxious. Synetta Prime continues to have copious amount of foul brown to red secretion draining on outer side of Trach tube. And greenish-yellow secretion are being coughed out. Appears that patient may be pulling at trach.  Remained Dubbhoff tube early this am. MD is aware

## 2019-06-08 NOTE — ROUTINE PROCESS
Bedside shift change report given to Erna Wall (oncoming nurse) by Latasha Blankenship (offgoing nurse). Report included the following information SBAR, Kardex, Intake/Output, MAR and Recent Results.

## 2019-06-08 NOTE — PROGRESS NOTES
Surgery  S/p Trach for vocal cord dysfunction  Tmax 100.8 VSS  Trach in place with normal secretions   Pt pulled her Dobbhoff out this AM.  This is a difficult situation as I would like her to get po calories. Her swallow showed aspiration however she has always tolerated her secretions without a Pneumonia. Will try some ice chips with pt sitting up and cuff up. If she roger these I will think about advancing to solids. There is some risk to this I understand but what's the end point to tube feeds when she has been tolerating po feeds before. I have asked the hospitalist to see her for Glucose and B/p control. Her WBC is 7.7 without shift. Her low grade temp is likely from lying in bed. She is not septic. She is not in distress. OOB. Lovenox as she is not wearing her compressors. Hold on blood cultures for now. I will check her CXR.

## 2019-06-08 NOTE — PROGRESS NOTES
Progress Note  Pulmonary, Critical Care, and Sleep Medicine    Name: Kristie Leblanc MRN: [id number]   : 1960 Hospital: [address] [place] [address] Dr   Date: 2019        IMPRESSION:   · S/p tracheostomy for vocal cord dysfunction following thyroidectomy  · Stridor and worsening SOB due to above- resolved s/p trach  · Morbid obesity  · Dysphagia- mild oral and mod to severe pharyngeal. S/p Dobhoff tube placement  · Sleep apnea- now resolved s/p trach  · DM type 2  · HTN      PLAN:   · Keep HOB elevated  · Speech therapy  · Monitor temperature- and for signs of evolving infectious process  · Continue with trache collar  · Management of trach per Surgery. · Trach care per protocol  · Glycemic control with correctional scale insulin  · DVT prophylaxis SCD's  · Mobilize pt  · Pain control per surgery  · Other medical management and prophylaxis per primary service     Subjective/Interval History:   I have reviewed the flowsheet and previous days notes. Reviewed interval history. Discussed management with nursing staff. Pt with worsening stridor and exercise tolerance following a difficult thyroidectomy for bilateral multinodular goiter. Pt went to the OR for trache placement and was admitted to ICU on vent support. She was quickly weaned off this and is on trache collar. She was transferred to the floor. 19  · Patient sitting up in chair  · Is able to write to converse with hospital staff  · Drainage around trach site appears improved. · Patient affirmed with head nod that she slept well. No complaints at this time  · Patient being fed via feeding tube  · Not vocalizing as cuff is inflated- on humidification  · Persistent low grade temp- normal WBC  · CXR this morning with probable LLL atelectasis- unable to exclude infiltrate.  - Will monitor for signs of any infectious process         Patient Vitals for the past 24 hrs:   Temp Pulse Resp BP SpO2   19 0526 98.1 °F (36.7 °C) 80 21 171/82 91 % 19 2109 99.9 °F (37.7 °C) 81 20 147/82 97 %   19 1902 100.3 °F (37.9 °C) 84 16 150/77 99 %   19 1353 99.5 °F (37.5 °C) 80 12 155/86 96 %   19 1320 99.1 °F (37.3 °C) -- -- -- 100 %   19 1319 -- -- -- -- 98 %         ROS:Review of systems not obtained due to patient factors. Orders reviewed including medications. Changes made if indicated. Telemetry monitor reviewed at the bedside. Objective:   Vital Signs:    Visit Vitals  /82 (BP 1 Location: Right arm, BP Patient Position: At rest)   Pulse 80   Temp 98.1 °F (36.7 °C)   Resp 21   Ht 5' 7\" (1.702 m)   Wt 116.8 kg (257 lb 8 oz)   SpO2 91%   Breastfeeding? No   BMI 40.33 kg/m²       O2 Device: Tracheostomy, Tracheal collar   O2 Flow Rate (L/min): 10 l/min   Temp (24hrs), Av.4 °F (37.4 °C), Min:98.1 °F (36.7 °C), Max:100.3 °F (37.9 °C)       Intake/Output:   Last shift:      No intake/output data recorded. Last 3 shifts:  1901 -  0700  In: 450   Out: 1225 [Urine:1225]    Intake/Output Summary (Last 24 hours) at 2019 1156  Last data filed at 2019 1800  Gross per 24 hour   Intake 300 ml   Output 300 ml   Net 0 ml        Physical Exam:    General:  Alert, cooperative, in no distress, appears stated age. Obese body habitus   Head:  Normocephalic, without obvious abnormality, atraumatic. Eyes:  ANicteric, PERRL,   Nose: Nares normal.  Mucosa normal. No drainage or sinus tenderness. Throat: Lips, mucosa, and tongue normal. Teeth and gums normal. NO Thrush   Neck: Supple, symmetrical, trachea midline, no adenopathy, thyroid: no enlargment/tenderness/nodules , trache in place, no bleeding, some oozing around site- appears less than yesterday   Back:   Symmetric, no curvature. ROM normal. NO spine tenderness   Lungs:   Bilateral auscultation no rales or wheezes   Chest wall:  No tenderness or deformity.  NO intercostal retractions   Heart:  Regular rate and rhythm, S1, S2 normal, no murmur, click, rub or gallop. Abdomen:   Soft, non-tender. Obese Bowel sounds normal. No masses,  No organomegaly. NO paradoxical motion   Extremities: normal, atraumatic, no cyanosis, trace edema   Pulses: 2+ and symmetric all extremities. Skin: Skin color, texture, turgor normal. No rashes - several old areas of excoriations in various stages of healing- looks like from picking at skin. NO clubbing   Lymph nodes: Cervical, supraclavicular nodes normal.   Neurologic: Grossly nonfocal      :        Devices:             Drips:    DATA:  Labs:  Recent Labs     06/08/19 0528 06/07/19 0450 06/06/19 0254   WBC 7.7 7.9 9.8   HGB 14.5 13.9 14.9   HCT 44.4 43.3 46.6*    203 182     Recent Labs     06/08/19 0528 06/07/19 0450 06/06/19 0254    138 142   K 3.4* 3.5 4.4    105 107   CO2 27 26 27   * 208* 157*   BUN 10 11 15   CREA 0.60 0.74 0.75   CA 9.2 8.5 8.7   MG 2.3 2.1 2.6   PHOS 2.8 2.3* 2.7     No results for input(s): PH, PCO2, PO2, HCO3, FIO2 in the last 72 hours. Imaging:     CXR Results  (Last 48 hours)               06/08/19 0831  XR CHEST SNGL V Final result    Impression:  Impression:        1. Stable mildly enlarged cardiac silhouette. Diffuse prominence of the   bronchovascular markings, possibly pulmonary vascular congestion or nonspecific   bronchitis. -Appearance may be exaggerated due to hypoinflation. 2. Streaky/hazy opacity at the left lung base, favors atelectasis. Subtle   infiltrate/edema not excluded. 3. Questionable trace pleural effusions. Narrative:  AP CHEST, PORTABLE       INDICATION: Sepsis       COMPARISON: Prior chest x-rays, most recent 6/3/2019       FINDINGS:  Tracheostomy present. Median sternotomy wires/closure devices are   again noted. Prosthetic cardiac valve noted. Stable enlargement of the cardiac silhouette. Diffuse prominence of the   bronchovascular markings. Mild streaky/hazy opacity at the left lung base. Questionable trace pleural effusions. No evidence for pneumothorax. . Lungs are   hypoinflated. No acute osseous abnormalities are identified.                Jose Foster DO, [facility]  Pulmonary, Critical Care, and Sleep Medicine    Clinical care, chart review and time spent coordinating care:25 min

## 2019-06-09 DIAGNOSIS — E55.9 HYPOVITAMINOSIS D: ICD-10-CM

## 2019-06-09 LAB
ANION GAP SERPL CALC-SCNC: 7 MMOL/L (ref 3–18)
BASOPHILS # BLD: 0 K/UL (ref 0–0.1)
BASOPHILS NFR BLD: 0 % (ref 0–2)
BUN SERPL-MCNC: 12 MG/DL (ref 7–18)
BUN/CREAT SERPL: 15 (ref 12–20)
CALCIUM SERPL-MCNC: 8.9 MG/DL (ref 8.5–10.1)
CHLORIDE SERPL-SCNC: 105 MMOL/L (ref 100–108)
CO2 SERPL-SCNC: 30 MMOL/L (ref 21–32)
CREAT SERPL-MCNC: 0.8 MG/DL (ref 0.6–1.3)
DIFFERENTIAL METHOD BLD: ABNORMAL
EOSINOPHIL # BLD: 0.1 K/UL (ref 0–0.4)
EOSINOPHIL NFR BLD: 2 % (ref 0–5)
ERYTHROCYTE [DISTWIDTH] IN BLOOD BY AUTOMATED COUNT: 16 % (ref 11.6–14.5)
GLUCOSE BLD STRIP.AUTO-MCNC: 135 MG/DL (ref 70–110)
GLUCOSE BLD STRIP.AUTO-MCNC: 135 MG/DL (ref 70–110)
GLUCOSE BLD STRIP.AUTO-MCNC: 156 MG/DL (ref 70–110)
GLUCOSE BLD STRIP.AUTO-MCNC: 160 MG/DL (ref 70–110)
GLUCOSE BLD STRIP.AUTO-MCNC: 176 MG/DL (ref 70–110)
GLUCOSE BLD STRIP.AUTO-MCNC: 183 MG/DL (ref 70–110)
GLUCOSE SERPL-MCNC: 188 MG/DL (ref 74–99)
HBA1C MFR BLD: 7.1 % (ref 4.2–5.6)
HCT VFR BLD AUTO: 42.3 % (ref 35–45)
HGB BLD-MCNC: 13.3 G/DL (ref 12–16)
LYMPHOCYTES # BLD: 1.5 K/UL (ref 0.9–3.6)
LYMPHOCYTES NFR BLD: 25 % (ref 21–52)
MAGNESIUM SERPL-MCNC: 2.2 MG/DL (ref 1.6–2.6)
MCH RBC QN AUTO: 30 PG (ref 24–34)
MCHC RBC AUTO-ENTMCNC: 31.4 G/DL (ref 31–37)
MCV RBC AUTO: 95.5 FL (ref 74–97)
MONOCYTES # BLD: 0.7 K/UL (ref 0.05–1.2)
MONOCYTES NFR BLD: 11 % (ref 3–10)
NEUTS SEG # BLD: 3.9 K/UL (ref 1.8–8)
NEUTS SEG NFR BLD: 62 % (ref 40–73)
PHOSPHATE SERPL-MCNC: 3.2 MG/DL (ref 2.5–4.9)
PLATELET # BLD AUTO: 220 K/UL (ref 135–420)
PMV BLD AUTO: 9 FL (ref 9.2–11.8)
POTASSIUM SERPL-SCNC: 4.2 MMOL/L (ref 3.5–5.5)
RBC # BLD AUTO: 4.43 M/UL (ref 4.2–5.3)
SODIUM SERPL-SCNC: 142 MMOL/L (ref 136–145)
WBC # BLD AUTO: 6.3 K/UL (ref 4.6–13.2)

## 2019-06-09 PROCEDURE — 74011250636 HC RX REV CODE- 250/636

## 2019-06-09 PROCEDURE — 82962 GLUCOSE BLOOD TEST: CPT

## 2019-06-09 PROCEDURE — 83735 ASSAY OF MAGNESIUM: CPT

## 2019-06-09 PROCEDURE — 74011636637 HC RX REV CODE- 636/637: Performed by: EMERGENCY MEDICINE

## 2019-06-09 PROCEDURE — 74011000250 HC RX REV CODE- 250: Performed by: NURSE PRACTITIONER

## 2019-06-09 PROCEDURE — 84100 ASSAY OF PHOSPHORUS: CPT

## 2019-06-09 PROCEDURE — 74011636637 HC RX REV CODE- 636/637: Performed by: INTERNAL MEDICINE

## 2019-06-09 PROCEDURE — 83036 HEMOGLOBIN GLYCOSYLATED A1C: CPT

## 2019-06-09 PROCEDURE — 36415 COLL VENOUS BLD VENIPUNCTURE: CPT

## 2019-06-09 PROCEDURE — 80048 BASIC METABOLIC PNL TOTAL CA: CPT

## 2019-06-09 PROCEDURE — 74011250636 HC RX REV CODE- 250/636: Performed by: INTERNAL MEDICINE

## 2019-06-09 PROCEDURE — 85025 COMPLETE CBC W/AUTO DIFF WBC: CPT

## 2019-06-09 PROCEDURE — 74011250636 HC RX REV CODE- 250/636: Performed by: NURSE PRACTITIONER

## 2019-06-09 PROCEDURE — 65270000029 HC RM PRIVATE

## 2019-06-09 RX ORDER — ERGOCALCIFEROL 1.25 MG/1
CAPSULE ORAL
Qty: 4 CAP | Refills: 2 | Status: SHIPPED | OUTPATIENT
Start: 2019-06-09 | End: 2019-07-12

## 2019-06-09 RX ADMIN — ONDANSETRON 4 MG: 2 INJECTION INTRAMUSCULAR; INTRAVENOUS at 12:22

## 2019-06-09 RX ADMIN — HYDROMORPHONE HYDROCHLORIDE 0.5 MG: 2 INJECTION INTRAMUSCULAR; INTRAVENOUS; SUBCUTANEOUS at 16:28

## 2019-06-09 RX ADMIN — SODIUM CHLORIDE 75 ML/HR: 900 INJECTION, SOLUTION INTRAVENOUS at 01:36

## 2019-06-09 RX ADMIN — CHLORHEXIDINE GLUCONATE 0.12% ORAL RINSE 10 ML: 1.2 LIQUID ORAL at 20:40

## 2019-06-09 RX ADMIN — INSULIN LISPRO 3 UNITS: 100 INJECTION, SOLUTION INTRAVENOUS; SUBCUTANEOUS at 10:16

## 2019-06-09 RX ADMIN — FAMOTIDINE 20 MG: 10 INJECTION INTRAVENOUS at 20:22

## 2019-06-09 RX ADMIN — FAMOTIDINE 20 MG: 10 INJECTION INTRAVENOUS at 09:28

## 2019-06-09 RX ADMIN — HYDROMORPHONE HYDROCHLORIDE 0.5 MG: 2 INJECTION INTRAMUSCULAR; INTRAVENOUS; SUBCUTANEOUS at 01:34

## 2019-06-09 RX ADMIN — CHLORHEXIDINE GLUCONATE 0.12% ORAL RINSE 10 ML: 1.2 LIQUID ORAL at 09:26

## 2019-06-09 RX ADMIN — INSULIN GLARGINE 18 UNITS: 100 INJECTION, SOLUTION SUBCUTANEOUS at 10:17

## 2019-06-09 RX ADMIN — HYDROMORPHONE HYDROCHLORIDE 1 MG: 2 INJECTION INTRAMUSCULAR; INTRAVENOUS; SUBCUTANEOUS at 21:10

## 2019-06-09 RX ADMIN — LORAZEPAM 1 MG: 2 INJECTION INTRAMUSCULAR at 10:12

## 2019-06-09 RX ADMIN — INSULIN LISPRO 3 UNITS: 100 INJECTION, SOLUTION INTRAVENOUS; SUBCUTANEOUS at 01:40

## 2019-06-09 RX ADMIN — ENOXAPARIN SODIUM 40 MG: 40 INJECTION SUBCUTANEOUS at 18:31

## 2019-06-09 RX ADMIN — INSULIN LISPRO 3 UNITS: 100 INJECTION, SOLUTION INTRAVENOUS; SUBCUTANEOUS at 06:12

## 2019-06-09 NOTE — ROUTINE PROCESS
Bedside and Verbal shift change report given to Mirna Finley RN (oncoming nurse) by US Brito RN (offgoing nurse). Report included the following information SBAR, Kardex, Intake/Output and MAR.

## 2019-06-09 NOTE — PROGRESS NOTES
Received in bed awake and alert. in no acute resp distress. Sats=95%. trach collar in place. Pt has a strong cough. Lungs sound course when auscultated. H.O.B at 30%vital signs stable. Denies pain or discomfort.

## 2019-06-09 NOTE — ROUTINE PROCESS
Bedside and Verbal shift change report given to MERVIN RN (oncoming nurse) by Elida Dakins RN (offgoing nurse). Report included the following information SBAR, Kardex and MAR.

## 2019-06-09 NOTE — PROGRESS NOTES
Surgery  Better over last 24 hours, roger ice chips  AF VSS  Trach ok with min drainage  CXR reviewed and other than hypoinflation ok  Cont ice chips try solid food but no thin liquids and only when up in chair

## 2019-06-09 NOTE — PROGRESS NOTES
New PT orders received with pt on caseload. Will follow up as appropriate. Acknowledged new orders.      Thank you,   Tiana Napoles, PT, DPT

## 2019-06-09 NOTE — WOUND CARE
Patient seen by wound care with Yana RN, bedside nurse. At this time split gauze removed that had bloody mucous drainage. Skin lightly cleansed with saline moist gauze. New antimicrobial split gauze applied. Trach insertion clean, no s/s of infection or irritation. Nursing to keep skin clean and gauze dry. Instructed patient to begin using yaunker for mucous collection. Emergency kit in medication room and suction sets in room. No wound care intervention required. Patient left resting with HOB elevated.

## 2019-06-09 NOTE — PROGRESS NOTES
Received report on pt.from off going RN. Resting quietly in bed on rounds. Denies c/o pain or SOB at this time. trach present. Copious amts of secretions drained down  front of gown. Cleaned and split sponge applied. pt noted to have a strong cough and able to clear secretions most of the time with cough. 02 per trach collar at 35%. HOB elevated. call bell at side. Bed alarm on. No acute distress noted. Will cont to monitor for any changes in status. 0830 assisted into chair. Small cup od ice chips given as ordered. DR Monk aware of drainage from trach site. 1100 suctioned via trach and mod amt of yellowish /green mixed w some brown secretions obtained. Cont to have freq bloody/brownish drainage drain from trach area onto gown. requirs split gauze changes frequently. Chest area reddened, shiny and tender from constant secretions. Barrier cream applied and covered with mepilex. 1400 remains in chair. Bathed and linens changed. 1600 assisted back to bed. Frequently asks for ativan but is often drowsy. Explained to pt that she had to be able to take deep breaths in and not be over sedated. 1800 amt of drainage from trach site has lessened but still present. Suctioned via trach and trach care done. Inner cannula changed. 1915 Bedside and Verbal shift change report given to Girish Foster RN (oncoming nurse) by Antony Woods RN (offgoing nurse). Report given with TEVIN, Kelvin and MAR.

## 2019-06-09 NOTE — PROGRESS NOTES
Patients is sitting up on the [facility] alert awake and oriented, with trach in place no s/s of distress or sob.

## 2019-06-09 NOTE — PROGRESS NOTES
Called Dr. Pratik Gross to verify patients diet, patients is on ice chips at this time and tolerated well.

## 2019-06-09 NOTE — PROGRESS NOTES
Progress Note  Pulmonary, Critical Care, and Sleep Medicine    Name: Jeramie Brunson MRN: [id number]   : 1960 Hospital: [address] [place] [address]   Date: 2019        IMPRESSION:   · S/p tracheostomy for vocal cord dysfunction following thyroidectomy  · Stridor and worsening SOB due to above- resolved s/p trach  · Morbid obesity  · Dysphagia- mild oral and mod to severe pharyngeal. S/p Dobhoff tube placement  · Sleep apnea- now resolved s/p trach  · DM type 2  · HTN      PLAN:   · Keep HOB elevated  · Speech therapy  · Monitor temperature- and for signs of evolving infectious process  · Continue with trache collar  · Management of trach per Surgery. · Trach care per protocol  · Glycemic control with correctional scale insulin  · DVT prophylaxis SCD's  · Mobilize pt  · Pain control per surgery  · Other medical management and prophylaxis per primary service     Subjective/Interval History:   I have reviewed the flowsheet and previous days notes. Reviewed interval history. Discussed management with nursing staff. Pt with worsening stridor and exercise tolerance following a difficult thyroidectomy for bilateral multinodular goiter. Pt went to the OR for trache placement and was admitted to ICU on vent support. She was quickly weaned off this and is on trache collar. She was transferred to the floor. 19  · Patient laying on side in bed, continues on trach collar with humidification  · Had some nausea earlier today, feels a little nauseatic but no emesis  · Requested ativan earlier to anxiety- feeling better now  · Continues with good cough- able to cough secretions out of trach  · Some persistent drainage from trach site but decreased compared to 48 hour sago  · Communicates with facial expressions, mouthing words, and writing  · Patient being fed via feeding tube  · Persistent low grade temp- normal WBC  · CXR  19 with probable LLL atelectasis- unable to exclude infiltrate.  - Will monitor for signs of any infectious process         Patient Vitals for the past 24 hrs:   Temp Pulse Resp BP SpO2   19 1438 99 °F (37.2 °C) 75 20 102/66 100 %   19 1012 99.2 °F (37.3 °C) 80 20 127/58 100 %   19 0952 -- -- -- -- 98 %   19 2000 98.4 °F (36.9 °C) 86 20 125/67 95 %         ROS:Review of systems not obtained due to patient factors. Orders reviewed including medications. Changes made if indicated. Telemetry monitor reviewed at the bedside. Objective:   Vital Signs:    Visit Vitals  /66 (BP 1 Location: Left arm, BP Patient Position: At rest)   Pulse 75   Temp 99 °F (37.2 °C)   Resp 20   Ht 5' 7\" (1.702 m)   Wt 116.8 kg (257 lb 8 oz)   SpO2 100%   Breastfeeding? No   BMI 40.33 kg/m²       O2 Device: Tracheostomy, Tracheal collar, Humidifier   O2 Flow Rate (L/min): 6 l/min(Weaned from 8)   Temp (24hrs), Av.9 °F (37.2 °C), Min:98.4 °F (36.9 °C), Max:99.2 °F (37.3 °C)       Intake/Output:   Last shift:      No intake/output data recorded. Last 3 shifts:  1901 -  0700  In: 375 [I.V.:375]  Out: 1150 [Urine:1150]    Intake/Output Summary (Last 24 hours) at 2019 1522  Last data filed at 2019 0636  Gross per 24 hour   Intake 375 ml   Output 650 ml   Net -275 ml        Physical Exam:    General:  Alert, cooperative, in no distress, appears stated age. Obese body habitus   Head:  Normocephalic, without obvious abnormality, atraumatic. Eyes:  ANicteric, PERRL,   Nose: Nares normal.  Mucosa normal. No drainage or sinus tenderness. Throat: Lips, mucosa, and tongue normal. Teeth and gums normal. NO Thrush   Neck: Supple, symmetrical, trachea midline, no adenopathy, thyroid: no enlargment/tenderness/nodules , trache in place, no bleeding, some oozing around site- appears similar to slightly improved from yesterday   Back:   Symmetric, no curvature.  ROM normal. NO spine tenderness   Lungs:   Bilateral auscultation no rales or wheezes   Chest wall:  No tenderness or deformity. NO intercostal retractions   Heart:  Regular rate and rhythm, S1, S2 normal, no murmur, click, rub or gallop. Abdomen:   Soft, non-tender. Obese Bowel sounds normal. No masses,  No organomegaly. NO paradoxical motion   Extremities: normal, atraumatic, no cyanosis, trace edema   Pulses: 2+ and symmetric all extremities. Skin: Skin color, texture, turgor normal. No rashes - several old areas of excoriations in various stages of healing- looks like from picking at skin. NO clubbing   Lymph nodes: Cervical, supraclavicular nodes normal.   Neurologic: Grossly nonfocal      :        Devices:             Drips:    DATA:  Labs:  Recent Labs     06/09/19  0616 06/08/19  0528 06/07/19  0450   WBC 6.3 7.7 7.9   HGB 13.3 14.5 13.9   HCT 42.3 44.4 43.3    209 203     Recent Labs     06/09/19  0616 06/08/19  0528 06/07/19  0450    139 138   K 4.2 3.4* 3.5    104 105   CO2 30 27 26   * 210* 208*   BUN 12 10 11   CREA 0.80 0.60 0.74   CA 8.9 9.2 8.5   MG 2.2 2.3 2.1   PHOS 3.2 2.8 2.3*     No results for input(s): PH, PCO2, PO2, HCO3, FIO2 in the last 72 hours. Imaging:     CXR Results  (Last 48 hours)               06/08/19 0831  XR CHEST SNGL V Final result    Impression:  Impression:        1. Stable mildly enlarged cardiac silhouette. Diffuse prominence of the   bronchovascular markings, possibly pulmonary vascular congestion or nonspecific   bronchitis. -Appearance may be exaggerated due to hypoinflation. 2. Streaky/hazy opacity at the left lung base, favors atelectasis. Subtle   infiltrate/edema not excluded. 3. Questionable trace pleural effusions. Narrative:  AP CHEST, PORTABLE       INDICATION: Sepsis       COMPARISON: Prior chest x-rays, most recent 6/3/2019       FINDINGS:  Tracheostomy present. Median sternotomy wires/closure devices are   again noted. Prosthetic cardiac valve noted.        Stable enlargement of the cardiac silhouette. Diffuse prominence of the   bronchovascular markings. Mild streaky/hazy opacity at the left lung base. Questionable trace pleural effusions. No evidence for pneumothorax. . Lungs are   hypoinflated. No acute osseous abnormalities are identified.                Maurisio Melendez DO, [facility]  Pulmonary, Critical Care, and Sleep Medicine    Clinical care, chart review and time spent coordinating care:20 min

## 2019-06-09 NOTE — PROGRESS NOTES
[facility]  Progress Note    Patient: Cristhian De Leon Age: 63 y.o. : 1960 MR#: [id number] SSN: [id number]  Date/Time: 2019    Subjective:     I personally saw and evaluated this patient on 19. Patient sitting in bed in NAD, has tracheostomy    Assessment/Plan:     -DM2  - s/p Tracheostomy for vocal cord paralysis  - HTN  - Dysphagia    PLAN  Management of tracheostomy as per surgeon and pulm  On lantus, ssi, monitor  Diet as per speech  Monitor BP  D/w patient    Case discussed with:  [x]Patient  []Family  [x]Nursing  []Case Management  DVT Prophylaxis:  [x]Lovenox  []Hep SQ  []SCDs  []Coumadin   []On Heparin gtt    Objective:   VS:   Visit Vitals  /80 (BP 1 Location: Left arm, BP Patient Position: At rest)   Pulse 70   Temp 98.9 °F (37.2 °C)   Resp 20   Ht 5' 7\" (1.702 m)   Wt 116.8 kg (257 lb 8 oz)   SpO2 96%   Breastfeeding?  No   BMI 40.33 kg/m²      Tmax/24hrs: Temp (24hrs), Av.9 °F (37.2 °C), Min:98.4 °F (36.9 °C), Max:99.2 °F (37.3 °C)    Input/Output:     Intake/Output Summary (Last 24 hours) at 2019 1910  Last data filed at 2019 0636  Gross per 24 hour   Intake 375 ml   Output 350 ml   Net 25 ml       General:  Awake, alert  Cardiovascular:  S1S2+, RRR  Pulmonary:  CTA b/l  GI:  Soft, BS+, NT, ND, obese  Extremities:  No edema  + trach    Labs:    Recent Results (from the past 24 hour(s))   GLUCOSE, POC    Collection Time: 19 10:16 PM   Result Value Ref Range    Glucose (POC) 156 (H) 70 - 110 mg/dL   GLUCOSE, POC    Collection Time: 19  1:38 AM   Result Value Ref Range    Glucose (POC) 176 (H) 70 - 110 mg/dL   GLUCOSE, POC    Collection Time: 19  6:09 AM   Result Value Ref Range    Glucose (POC) 183 (H) 70 - 637 mg/dL   METABOLIC PANEL, BASIC    Collection Time: 19  6:16 AM   Result Value Ref Range    Sodium 142 136 - 145 mmol/L    Potassium 4.2 3.5 - 5.5 mmol/L    Chloride 105 100 - 108 mmol/L    CO2 30 21 - 32 mmol/L    Anion gap 7 3.0 - 18 mmol/L    Glucose 188 (H) 74 - 99 mg/dL    BUN 12 7.0 - 18 MG/DL    Creatinine 0.80 0.6 - 1.3 MG/DL    BUN/Creatinine ratio 15 12 - 20      GFR est AA >60 >60 ml/min/1.73m2    GFR est non-AA >60 >60 ml/min/1.73m2    Calcium 8.9 8.5 - 10.1 MG/DL   MAGNESIUM    Collection Time: 06/09/19  6:16 AM   Result Value Ref Range    Magnesium 2.2 1.6 - 2.6 mg/dL   PHOSPHORUS    Collection Time: 06/09/19  6:16 AM   Result Value Ref Range    Phosphorus 3.2 2.5 - 4.9 MG/DL   CBC WITH AUTOMATED DIFF    Collection Time: 06/09/19  6:16 AM   Result Value Ref Range    WBC 6.3 4.6 - 13.2 K/uL    RBC 4.43 4.20 - 5.30 M/uL    HGB 13.3 12.0 - 16.0 g/dL    HCT 42.3 35.0 - 45.0 %    MCV 95.5 74.0 - 97.0 FL    MCH 30.0 24.0 - 34.0 PG    MCHC 31.4 31.0 - 37.0 g/dL    RDW 16.0 (H) 11.6 - 14.5 %    PLATELET 172 982 - 174 K/uL    MPV 9.0 (L) 9.2 - 11.8 FL    NEUTROPHILS 62 40 - 73 %    LYMPHOCYTES 25 21 - 52 %    MONOCYTES 11 (H) 3 - 10 %    EOSINOPHILS 2 0 - 5 %    BASOPHILS 0 0 - 2 %    ABS. NEUTROPHILS 3.9 1.8 - 8.0 K/UL    ABS. LYMPHOCYTES 1.5 0.9 - 3.6 K/UL    ABS. MONOCYTES 0.7 0.05 - 1.2 K/UL    ABS. EOSINOPHILS 0.1 0.0 - 0.4 K/UL    ABS.  BASOPHILS 0.0 0.0 - 0.1 K/UL    DF AUTOMATED     HEMOGLOBIN A1C W/O EAG    Collection Time: 06/09/19  6:16 AM   Result Value Ref Range    Hemoglobin A1c 7.1 (H) 4.2 - 5.6 %   GLUCOSE, POC    Collection Time: 06/09/19 10:07 AM   Result Value Ref Range    Glucose (POC) 160 (H) 70 - 110 mg/dL   GLUCOSE, POC    Collection Time: 06/09/19  2:21 PM   Result Value Ref Range    Glucose (POC) 135 (H) 70 - 110 mg/dL   GLUCOSE, POC    Collection Time: 06/09/19  5:40 PM   Result Value Ref Range    Glucose (POC) 156 (H) 70 - 110 mg/dL     Additional Data Reviewed:      Signed By: Cj Whittaker MD     June 9, 2019 7:10 PM

## 2019-06-09 NOTE — CONSULTS
Consult Note    Patient: Frank Campoverde MRN: [id number]  CSN: [id number]    YOB: 1960  Age: 63 y.o. Sex: female    DOA: 6/3/2019 LOS:  LOS: 5 days        Requesting Physician: Lisseth Christine MD    Reason for Consultation: medical MGT of HTN and DM    HPI:     Frank Campoverde is a 63 y.o.  female who has been seen for medial MGT  Pt was admitted to ICU for Vocal cords paralysis s/p thyroidectomy with stridor and impending respiratory failure. Pt is s/p emergent tracheostomy placement and transferred to floor. Asked to consult for uncontrolled HTN and DM  Pt is seen and examined . Alert and oriented. Writes for communication. Has abdominal distension and lost her NGT earlier today. Low grade fever overnight and frustrated and anxious at times . Denies SOB, CP and or abdominal pain.  Failed her SLP after her trach placement    Past Medical History:   Diagnosis Date    Arthritis     Lower back     Asthma     Chronic back pain     Lower back pain    Depression     Diabetes ([address]     Diabetes mellitus ([address]     Hearing loss     Heart failure (HCC)     chronic diastolic heart failure    Left ear hearing loss     pt states nerve damage--- 25% hearing loss, described as \"muffled\"    Memory difficulty     Panic attacks     Ringing of ears     Severe headache     Sleep apnea     SOB (shortness of breath) on exertion     w and w/out exertion    Stomach pain     Thyroid disease     Vertigo        Past Surgical History:   Procedure Laterality Date    CARDIAC SURG PROCEDURE UNLIST  10/31/2013    open heart    HX HEART VALVE SURGERY  2013    aortic valve repair    HX HYSTERECTOMY      Partial Hysterectomy - removed ovary    HX MYOMECTOMY      HX ORTHOPAEDIC  02/2018    had nerves burned on her right side of back    THYROIDECTOMY Bilateral 04/04/2019    Dr. Fernando Jiménez       Family History   Problem Relation Age of Onset    Heart Disease Mother     Diabetes Mother     Stroke Mother  Hypertension Mother     Diabetes Father     Heart Disease Father     Hypertension Father     Stroke Father     Diabetes Brother     Cancer Brother     Hypertension Brother     Stroke Brother     Heart Disease Brother     Diabetes Brother     Hypertension Brother     Stroke Brother     Heart Disease Brother     Diabetes Brother     Hypertension Brother     Hypertension Brother        Social History     Socioeconomic History    Marital status:      Spouse name: Not on file    Number of children: Not on file    Years of education: Not on file    Highest education level: Not on file   Tobacco Use    Smoking status: Never Smoker    Smokeless tobacco: Never Used   Substance and Sexual Activity    Alcohol use: No    Drug use: No    Sexual activity: Not Currently   Other Topics Concern     Service No    Blood Transfusions No    Caffeine Concern No    Occupational Exposure No    Hobby Hazards No    Sleep Concern Yes     Comment: Sleep apnea     Stress Concern Yes     Comment: Due to family medical issues.  Weight Concern No    Special Diet No    Back Care Yes     Comment: Patient tries to do back care with streches     Exercise No    Bike Helmet Yes    Seat Belt Yes    Self-Exams Yes       Prior to Admission medications    Medication Sig Start Date End Date Taking? Authorizing Provider   gabapentin (NEURONTIN) 400 mg capsule take 1 capsule by mouth three times a day 5/29/19   Angelito Perkins MD   calcium carbonate (TUMS) 200 mg calcium (500 mg) chew Take 1 Tab by mouth daily.  Indications: Prevention of a Low Amount of Calcium in the Blood    Provider, Historical   metoprolol succinate (TOPROL-XL) 50 mg XL tablet take 1 tablet by mouth once daily 5/17/19   Murray TORIBIO NP   omega-3 acid ethyl esters (LOVAZA) 1 gram capsule take 1 capsule by mouth twice a day 5/17/19   Altamease Kaleb TORIBIO NP   levothyroxine (SYNTHROID) 100 mcg tablet Take 1 Tab by mouth Daily (before breakfast). 4/16/19   Dava Bloch, MD   LANTUS U-100 INSULIN 100 unit/mL injection inject 90 units subcutaneously at bedtime 3/12/19   Angelito Perkins MD   VITAMIN D2 50,000 unit capsule take 1 capsule by mouth every week 3/11/19   Angelito Perkins MD   levothyroxine (SYNTHROID) 25 mcg tablet take 1 tablet by mouth every morning BEFORE BREAKFAST 3/11/19   Angelito Perkins MD   meclizine (ANTIVERT) 12.5 mg tablet take 1 tablet by mouth three times a day if needed for 10 days 3/11/19   Sharri Dowling MD   DULoxetine (CYMBALTA) 60 mg capsule take 1 capsule by mouth once daily 2/16/19   Sharri Dowling MD   SYMBICORT 160-4.5 mcg/actuation HFAA inhale 2 puffs by mouth twice a day RINSE MOUTH AFTER USE 2/16/19   Angelito Lopez MD   omega 6-GNV-CFL-fish oil 1,000 mg (120 mg-180 mg) capsule take 1 capsule by mouth twice a day 2/16/19   Angelito Perkins MD   PROAIR HFA 90 mcg/actuation inhaler inhale 2 puffs by mouth every 4 hours if needed for wheezing 2/11/19   Angelito Perkins MD   atorvastatin (LIPITOR) 80 mg tablet take 1 tablet by mouth once daily 11/29/18   Angelito Perkins MD   BD INSULIN SYRINGE ULTRA-FINE 1 mL 31 gauge x 5/16 syrg use as directed twice a day 10/18/18   Angelito Perkins MD   furosemide (LASIX) 40 mg tablet Take 1 Tab by mouth two (2) times a day. Patient taking differently: Take 40 mg by mouth daily as needed. 8/14/18   Angelito Perkins MD   potassium chloride (KLOR-CON) 10 mEq tablet Take 1 Tab by mouth daily. 8/14/18   Angelito Perkins MD   empagliflozin (JARDIANCE) 25 mg tablet Take  by mouth daily. Provider, Historical   FREESTYLE LITE STRIPS strip TEST twice a day as directed 11/20/17   Angelito Perkins MD   albuterol (PROVENTIL VENTOLIN) 2.5 mg /3 mL (0.083 %) nebulizer solution 3 mL by Nebulization route every four (4) hours as needed for Wheezing.  5/8/17   Rudy BANUELOS NP   cyanocobalamin (VITAMIN B-12) 1,000 mcg sublingual tablet Take 1,000 mcg by mouth daily. Provider, Historical   Aspirin, Buffered 81 mg tab Take  by mouth daily. Provider, Historical       Allergies   Allergen Reactions    Other Food Other (comments)     Sweeteners- causes headaches    Metformin Other (comments)     Increase pain in feet and swelling in feet  Increased hunger,tired and bilat foot pain    Morphine Other (comments)     headache    Singulair [Montelukast] Other (comments)     hallucinations    Sucrose Other (comments)     Pt. Is not allergic to sucrose. She develops headaches with artificial sweeteners. Review of Systems  GENERAL: Patient alert, awake and oriented times 3, aphasic but communicates via writing   HEENT: No change in vision, no earache, tinnitus, sore throat or sinus congestion. NECK: s/p trach placement . CARDIOVASCULAR: No chest pain or pressure. No palpitations. PULMONARY: No shortness of breath, cough or wheeze. GASTROINTESTINAL: No abdominal pain, nausea, vomiting or diarrhea, melena or       bright red blood per rectum. GENITOURINARY: No urinary frequency, urgency, hesitancy or dysuria. MUSCULOSKELETAL: No joint or muscle pain, no back pain, no recent trauma. DERMATOLOGIC: No rash, no itching, no lesions. ENDOCRINE: No polyuria, polydipsia, no heat or cold intolerance. No recent change in   weight. HEMATOLOGICAL: No anemia or easy bruising or bleeding. NEUROLOGIC: No headache, seizures, numbness, tingling or weakness. PSYCHIATRIC: No depression, anxiety, mood disorder, no loss of interest in normal       activity or change in sleep pattern. Physical Exam:      Visit Vitals  /68 (BP 1 Location: Right arm, BP Patient Position: Sitting)   Pulse 88   Temp 98.6 °F (37 °C)   Resp 22   Ht 5' 7\" (1.702 m)   Wt 116.8 kg (257 lb 8 oz)   SpO2 94%   Breastfeeding?  No   BMI 40.33 kg/m²    O2 Flow Rate (L/min): 8 l/min O2 Device: Tracheal collar    Temp (24hrs), Av.4 °F (36.9 °C), Min:98.1 °F (36.7 °C), Max:98.6 °F (37 °C)    No intake/output data recorded. 06/07 0701 - 06/08 1900  In: 450   Out: 0220 [Urine:1475]     General:  Alert, cooperative, no acute distress, Morbidly obese   HEENT:  S/p trach placement. PERRLA, anicteric sclerae. Pulmonary:  decrease BS with scattered rales   Cardiovascular:  Regular  rhythm,  No murmur, No Rubs, No Gallops  GI:  Soft, Non distended, Non tender.  +Bowel sounds, no HSM  Extremities:  No edema, cyanosis, clubbing. No calf tenderness. Psych:  Good insight. but anxious   Neurologic:  CN 2-12 grossly intact, Alert and oriented X 3. No acute neuro deficits. Labs Reviewed:  Lab results reviewed. For significant abnormal values and values requiring intervention, see assessment and plan. Procedures/imaging: see electronic medical records for all procedures/Xrays and details which were not copied into this note but were reviewed prior to creation of Plan        Assessment/Plan     Active Problems:    Essential hypertension (5/17/2017)      Stridor (5/22/2019)      Fever (6/8/2019)      Type 2 diabetes mellitus with hyperglycemia ([facility] [address] (6/8/2019)        Pt is s/p trach placement 2 ry to vocal cord paralysis with stridor and impending respiratory failure  Failed SLP and lost her NGT this AM  Uncontrolled HTN and DM  Fever and abdominal distension    Pulmonary condition >> pulmonary is following  Abdominal distension and fever as per surgery >> chronic abdominal distension  No work up fever as per surgey at this time    Will start Pt on lopressor IV PRN SBP > 150  Hydralazine IV Q6 PRN SBP > 180  Lantus and SSI Q4 >> adjust dosing as needed  Will add IVF     Hospitalist team will follow     Thank you       Bradly Iqbal MD  6/8/2019 10:26 PM    Please call me if questions [phone].

## 2019-06-10 LAB
ANION GAP SERPL CALC-SCNC: 8 MMOL/L (ref 3–18)
BASOPHILS # BLD: 0 K/UL (ref 0–0.1)
BASOPHILS NFR BLD: 0 % (ref 0–2)
BUN SERPL-MCNC: 11 MG/DL (ref 7–18)
BUN/CREAT SERPL: 20 (ref 12–20)
CALCIUM SERPL-MCNC: 8.5 MG/DL (ref 8.5–10.1)
CHLORIDE SERPL-SCNC: 105 MMOL/L (ref 100–108)
CO2 SERPL-SCNC: 27 MMOL/L (ref 21–32)
CREAT SERPL-MCNC: 0.56 MG/DL (ref 0.6–1.3)
DIFFERENTIAL METHOD BLD: ABNORMAL
EOSINOPHIL # BLD: 0.1 K/UL (ref 0–0.4)
EOSINOPHIL NFR BLD: 1 % (ref 0–5)
ERYTHROCYTE [DISTWIDTH] IN BLOOD BY AUTOMATED COUNT: 16 % (ref 11.6–14.5)
GLUCOSE BLD STRIP.AUTO-MCNC: 154 MG/DL (ref 70–110)
GLUCOSE BLD STRIP.AUTO-MCNC: 164 MG/DL (ref 70–110)
GLUCOSE BLD STRIP.AUTO-MCNC: 167 MG/DL (ref 70–110)
GLUCOSE BLD STRIP.AUTO-MCNC: 169 MG/DL (ref 70–110)
GLUCOSE BLD STRIP.AUTO-MCNC: 170 MG/DL (ref 70–110)
GLUCOSE SERPL-MCNC: 164 MG/DL (ref 74–99)
HCT VFR BLD AUTO: 41.7 % (ref 35–45)
HGB BLD-MCNC: 13.1 G/DL (ref 12–16)
LYMPHOCYTES # BLD: 1.2 K/UL (ref 0.9–3.6)
LYMPHOCYTES NFR BLD: 19 % (ref 21–52)
MAGNESIUM SERPL-MCNC: 2 MG/DL (ref 1.6–2.6)
MCH RBC QN AUTO: 29.9 PG (ref 24–34)
MCHC RBC AUTO-ENTMCNC: 31.4 G/DL (ref 31–37)
MCV RBC AUTO: 95.2 FL (ref 74–97)
MONOCYTES # BLD: 0.6 K/UL (ref 0.05–1.2)
MONOCYTES NFR BLD: 10 % (ref 3–10)
NEUTS SEG # BLD: 4.4 K/UL (ref 1.8–8)
NEUTS SEG NFR BLD: 70 % (ref 40–73)
PHOSPHATE SERPL-MCNC: 2.8 MG/DL (ref 2.5–4.9)
PLATELET # BLD AUTO: 216 K/UL (ref 135–420)
PMV BLD AUTO: 9.3 FL (ref 9.2–11.8)
POTASSIUM SERPL-SCNC: 3.4 MMOL/L (ref 3.5–5.5)
RBC # BLD AUTO: 4.38 M/UL (ref 4.2–5.3)
SODIUM SERPL-SCNC: 140 MMOL/L (ref 136–145)
WBC # BLD AUTO: 6.3 K/UL (ref 4.6–13.2)

## 2019-06-10 PROCEDURE — 65270000029 HC RM PRIVATE

## 2019-06-10 PROCEDURE — 84100 ASSAY OF PHOSPHORUS: CPT

## 2019-06-10 PROCEDURE — 74011636637 HC RX REV CODE- 636/637

## 2019-06-10 PROCEDURE — 83735 ASSAY OF MAGNESIUM: CPT

## 2019-06-10 PROCEDURE — 36415 COLL VENOUS BLD VENIPUNCTURE: CPT

## 2019-06-10 PROCEDURE — 74011636637 HC RX REV CODE- 636/637: Performed by: INTERNAL MEDICINE

## 2019-06-10 PROCEDURE — 74011000250 HC RX REV CODE- 250

## 2019-06-10 PROCEDURE — 97116 GAIT TRAINING THERAPY: CPT

## 2019-06-10 PROCEDURE — 97535 SELF CARE MNGMENT TRAINING: CPT

## 2019-06-10 PROCEDURE — 74011636637 HC RX REV CODE- 636/637: Performed by: HOSPITALIST

## 2019-06-10 PROCEDURE — 77030037875 HC DRSG MEPILEX <16IN BORD MOLN -A

## 2019-06-10 PROCEDURE — 77010033678 HC OXYGEN DAILY

## 2019-06-10 PROCEDURE — 82962 GLUCOSE BLOOD TEST: CPT

## 2019-06-10 PROCEDURE — 80048 BASIC METABOLIC PNL TOTAL CA: CPT

## 2019-06-10 PROCEDURE — 74011250636 HC RX REV CODE- 250/636

## 2019-06-10 PROCEDURE — 94640 AIRWAY INHALATION TREATMENT: CPT

## 2019-06-10 PROCEDURE — 85025 COMPLETE CBC W/AUTO DIFF WBC: CPT

## 2019-06-10 PROCEDURE — 97530 THERAPEUTIC ACTIVITIES: CPT

## 2019-06-10 PROCEDURE — 94761 N-INVAS EAR/PLS OXIMETRY MLT: CPT

## 2019-06-10 PROCEDURE — 74011000250 HC RX REV CODE- 250: Performed by: NURSE PRACTITIONER

## 2019-06-10 PROCEDURE — 74011636637 HC RX REV CODE- 636/637: Performed by: EMERGENCY MEDICINE

## 2019-06-10 RX ORDER — INSULIN LISPRO 100 [IU]/ML
INJECTION, SOLUTION INTRAVENOUS; SUBCUTANEOUS EVERY 6 HOURS
Status: DISCONTINUED | OUTPATIENT
Start: 2019-06-10 | End: 2019-06-10

## 2019-06-10 RX ORDER — INSULIN LISPRO 100 [IU]/ML
INJECTION, SOLUTION INTRAVENOUS; SUBCUTANEOUS
Status: DISCONTINUED | OUTPATIENT
Start: 2019-06-10 | End: 2019-06-16

## 2019-06-10 RX ADMIN — ENOXAPARIN SODIUM 40 MG: 40 INJECTION SUBCUTANEOUS at 17:10

## 2019-06-10 RX ADMIN — CHLORHEXIDINE GLUCONATE 0.12% ORAL RINSE 10 ML: 1.2 LIQUID ORAL at 22:47

## 2019-06-10 RX ADMIN — CHLORHEXIDINE GLUCONATE 0.12% ORAL RINSE 10 ML: 1.2 LIQUID ORAL at 09:00

## 2019-06-10 RX ADMIN — FAMOTIDINE 20 MG: 10 INJECTION INTRAVENOUS at 08:16

## 2019-06-10 RX ADMIN — LORAZEPAM 1 MG: 2 INJECTION INTRAMUSCULAR at 22:46

## 2019-06-10 RX ADMIN — INSULIN LISPRO 3 UNITS: 100 INJECTION, SOLUTION INTRAVENOUS; SUBCUTANEOUS at 22:44

## 2019-06-10 RX ADMIN — HYDROMORPHONE HYDROCHLORIDE 1 MG: 2 INJECTION INTRAMUSCULAR; INTRAVENOUS; SUBCUTANEOUS at 11:52

## 2019-06-10 RX ADMIN — HYDROMORPHONE HYDROCHLORIDE 1 MG: 2 INJECTION INTRAMUSCULAR; INTRAVENOUS; SUBCUTANEOUS at 03:57

## 2019-06-10 RX ADMIN — HYDROMORPHONE HYDROCHLORIDE 1 MG: 2 INJECTION INTRAMUSCULAR; INTRAVENOUS; SUBCUTANEOUS at 23:14

## 2019-06-10 RX ADMIN — FAMOTIDINE 20 MG: 10 INJECTION INTRAVENOUS at 22:45

## 2019-06-10 RX ADMIN — ALBUTEROL SULFATE 2.5 MG: 2.5 SOLUTION RESPIRATORY (INHALATION) at 09:07

## 2019-06-10 RX ADMIN — ALBUTEROL SULFATE 2.5 MG: 2.5 SOLUTION RESPIRATORY (INHALATION) at 18:32

## 2019-06-10 RX ADMIN — INSULIN LISPRO 3 UNITS: 100 INJECTION, SOLUTION INTRAVENOUS; SUBCUTANEOUS at 11:53

## 2019-06-10 RX ADMIN — ALBUTEROL SULFATE 2.5 MG: 2.5 SOLUTION RESPIRATORY (INHALATION) at 01:00

## 2019-06-10 RX ADMIN — INSULIN LISPRO 3 UNITS: 100 INJECTION, SOLUTION INTRAVENOUS; SUBCUTANEOUS at 16:30

## 2019-06-10 RX ADMIN — INSULIN LISPRO 3 UNITS: 100 INJECTION, SOLUTION INTRAVENOUS; SUBCUTANEOUS at 06:13

## 2019-06-10 RX ADMIN — INSULIN GLARGINE 18 UNITS: 100 INJECTION, SOLUTION SUBCUTANEOUS at 08:15

## 2019-06-10 RX ADMIN — HYDROMORPHONE HYDROCHLORIDE 1 MG: 2 INJECTION INTRAMUSCULAR; INTRAVENOUS; SUBCUTANEOUS at 08:16

## 2019-06-10 NOTE — PROGRESS NOTES
NUTRITION    Nutrition Consult: Management of Tube Feeding     RECOMMENDATIONS / PLAN:     - Discontinue tube feeding and prosource. - Monitor BG levels and need for consistent carbohydrate diet. - Continue RD inpatient monitoring and evaluation. NUTRITION INTERVENTIONS & DIAGNOSIS:     [x] Meals/snacks: general, healthy diet, monitor need to modify composition  [x] Enteral Nutrition: discontinue    Nutrition Diagnosis: Swallowing difficulty related to dysphagia s/p tracheostomy as evidenced by pt NPO after SLP evaluation/MBS. ASSESSMENT:     6/10: Was tolerating tube feeds until pt pulled NGT 6/8, receiving IVF since then. Diet started today by MD.  Pt reports being hungry. Pt with intolerance to artificial sweeteners, monitor BG levels and assess need for diabetic diet. 6/7: Formula changed to Jevity 1.5 prior to initiation 2/2 concern for sucrose sensitivity. Will continue current product due to questionable sensitivity of ingredients in Glucerna tube feeding. Tolerating rate at goal.  Elevated BG levels, but expected 2/2 formula needing to be given, discussed with MD and glycemin control, will attempt to manage BG with medication due to formula limitations. IVF discontinued. 6/6: Failed swallow evaluation/MBS; repeat MBS ordered for today. C/o gas pains, but is passing flatus, and +BM. Some abdominal distension noted. Asking for water. DHT placed by IR, no tube feeding ordered at this time. 6/5: Admitted with stridor and vocal cord dysfunction s/p tracheostomy placement on 6/3/19, tolerating trach collar and able to eat if passes swallow evaluation per MD- may deflate cuff for meals but must re-inflate cuff when pt is not eating per Surgery. Pt refused NGT placement.      Enteral Nutrition intake adequate to meet patients estimated nutritional needs:   [x] Yes     [x] No   [] Unable to determine at this time    Diet: DIET TUBE FEEDING  DIET NUTRITIONAL SUPPLEMENTS Breakfast; PROSOURCE  DIET REGULAR      Food Allergies: sweeteners, sucrose  Current Appetite:   [] Good     [x] Fair     [] Poor     [] Other:   Appetite/meal intake prior to admission:   [x] Good     [] Fair     [] Poor     [] Other:  Feeding Limitations:  [x] Swallowing difficulty: SLP following    [] Chewing difficulty    [x] Other: hx of dysphagia s/p thyroidectomy PTA; s/p trach placement   Current Meal Intake: No data found. BM: 6/8  Skin Integrity: surgical incision to neck  Edema:   [] No     [x] Yes   Pertinent Medications: Reviewed: pepcid, SSI, zofran, phenergan, lantus, NS at 75 mL/hr    Recent Labs     06/10/19  0419 06/09/19  0616 06/08/19  0528    142 139   K 3.4* 4.2 3.4*    105 104   CO2 27 30 27   * 188* 210*   BUN 11 12 10   CREA 0.56* 0.80 0.60   CA 8.5 8.9 9.2   MG 2.0 2.2 2.3   PHOS 2.8 3.2 2.8       Intake/Output Summary (Last 24 hours) at 6/10/2019 1134  Last data filed at 6/10/2019 1057  Gross per 24 hour   Intake --   Output 300 ml   Net -300 ml       Anthropometrics:  Ht Readings from Last 1 Encounters:   06/03/19 5' 7\" (1.702 m)     Last 3 Recorded Weights in this Encounter    06/05/19 0400 06/06/19 1401 06/10/19 0815   Weight: 116.8 kg (257 lb 8 oz) 116.8 kg (257 lb 8 oz) 119.5 kg (263 lb 8 oz)     Body mass index is 41.27 kg/m².  Obese, class III    Weight History:   Weight Metrics 6/10/2019 6/3/2019 5/23/2019 5/22/2019 5/16/2019 4/26/2019 4/16/2019   Weight 263 lb 8 oz - 260 lb 6.4 oz - 258 lb 256 lb 260 lb 9.6 oz   BMI - 41.27 kg/m2 - 40.78 kg/m2 40.41 kg/m2 40.1 kg/m2 -        Admitting Diagnosis: Stridor [R06.1]  Stridor [R06.1]  Pertinent PMHx: T2DM with diabetic neuropathy, HTN, dyslipidemia, CHF    Education Needs:        [x] None identified  [] Identified - Not appropriate at this time  []  Identified and addressed - refer to education log  Learning Limitations:   [] None identified  [x] Identified: non-verbal    Cultural, Orthodoxy & ethnic food preferences:  [x] None identified    [] Identified and addressed     ESTIMATED NUTRITION NEEDS:     Calories: 2947-1992 kcal (MSJx1-1.2) based on  [x] Actual  kg     [] IBW   Protein:  gm (0.8-1.2 gm/kg) based on  [x] Actual BW      [] IBW   Fluid: 1 mL/kcal     MONITORING & EVALUATION:     Nutrition Goal(s):   1. Nutritional needs will be met through adequate oral intake or nutrition support within the next 5 days.   Outcome:  [] Met/Ongoing    [x] Progressing towards goal    [] Not progressing towards goal    [] New/Initial goal      Monitoring:   [x] Food and beverage intake   [x] Diet order   [x] Nutrition-focused physical findings   [x] Treatment/therapy   [] Weight   [] Enteral nutrition intake        Previous Recommendations (for follow-up assessments only):     [x]   Implemented       []   Not Implemented (RD to address)      [x] No Longer Appropriate     [] No Recommendation Made     Discharge Planning: regular diet, pending plan of care   [x] Participated in care planning, discharge planning, & interdisciplinary rounds as appropriate      Jesus Alberto Hays RD, [address]   Pager: [phone]

## 2019-06-10 NOTE — PROGRESS NOTES
Progress Note  Pulmonary, Critical Care, and Sleep Medicine    Name: Michi Yeung MRN: [id number]   : 1960 Hospital: [facility]   Date: 6/10/2019        IMPRESSION:   · S/p tracheostomy for vocal cord dysfunction following thyroidectomy  · Stridor and worsening SOB due to above- resolved s/p trach  · Morbid obesity  · Dysphagia- mild oral and mod to severe pharyngeal. S/p Dobhoff tube placement  · Sleep apnea- now resolved s/p trach  · Left lower lobe atelectasis  · DM type 2  · HTN      PLAN:   · Keep HOB elevated  · Speech therapy  · Monitor temperature- and for signs of evolving infectious process  · Continue with trach collar  · Management of trach per Surgery. · Trach care per protocol  · Glycemic control with correctional scale insulin  · My biggest concern with her is relative \"immobility\". I have encouraged her to move out of bed. Discussed with RT and nursing. There were no issues from their perspective. Will consult PT/OT again if not already. · DVT prophylaxis SCD's  · Mobilize pt  · Pain control per surgery  · Other medical management and prophylaxis per primary service     Subjective/Interval History:   I have reviewed the flowsheet and previous days notes. Reviewed interval history. Discussed management with nursing staff. I have known this patient previously. Pt with worsening stridor and exercise tolerance following a difficult thyroidectomy for bilateral multinodular goiter. Pt went to the OR for trache placement and was admitted to ICU on vent support. She was quickly weaned off this and is on trache collar. She was transferred to the floor. 06/10/19  · Patient is currently communicating with me using writing on a piece of paper. · I have discussed with her re issues with \"relative\" immobility etc.   · Patient being fed via feeding tube  · No temp last 24 hours. T max 99. WBC stable. · CXR  19 LLL atelectasis.    · States that \"ice helps her move secretions\"         Patient Vitals for the past 24 hrs:   Temp Pulse Resp BP SpO2   06/10/19 0911 -- -- -- -- 97 %   06/10/19 0602 98.2 °F (36.8 °C) 79 18 119/61 98 %   19 2051 97.6 °F (36.4 °C) 69 18 139/74 --   19 1935 -- -- -- -- 95 %   19 1739 98.9 °F (37.2 °C) 70 20 154/80 96 %   19 1438 99 °F (37.2 °C) 75 20 102/66 100 %   19 1012 99.2 °F (37.3 °C) 80 20 127/58 100 %   19 0952 -- -- -- -- 98 %         ROS:Review of systems not obtained due to patient factors. Orders reviewed including medications. Changes made if indicated. Telemetry monitor reviewed at the bedside. Objective:   Vital Signs:    Visit Vitals  /61   Pulse 79   Temp 98.2 °F (36.8 °C)   Resp 18   Ht 5' 7\" (1.702 m)   Wt 119.5 kg (263 lb 8 oz)   SpO2 97%   Breastfeeding? No   BMI 41.27 kg/m²       O2 Device: Tracheal collar   O2 Flow Rate (L/min): 8 l/min   Temp (24hrs), Av.6 °F (37 °C), Min:97.6 °F (36.4 °C), Max:99.2 °F (37.3 °C)       Intake/Output:   Last shift:      No intake/output data recorded. Last 3 shifts: 1901 - 06/10 07  In: 375 [I.V.:375]  Out: 350 [Urine:350]  No intake or output data in the 24 hours ending 06/10/19 0916     Physical Exam:    General:  Alert, cooperative, in no distress, appears stated age. Obese body habitus   Head:  Normocephalic, without obvious abnormality, atraumatic. Eyes:  ANicteric, PERRL,   Nose: Nares normal.  Mucosa normal. No drainage or sinus tenderness. Throat: Lips, mucosa, and tongue normal. Teeth and gums normal. NO Thrush   Neck: Supple, symmetrical, trachea midline, no adenopathy, thyroid: no enlargment/tenderness/nodules , Trach site seen. Mild oozing around. No active signs of infection. Back:   Symmetric, no curvature. ROM normal. NO spine tenderness   Lungs:   Coarse breath sounds bilaterally. Decreased air entry at the lung bases. Chest wall:  No tenderness or deformity.  NO intercostal retractions   Heart:  Regular rate and rhythm, S1, S2 normal, no murmur, click, rub or gallop. Abdomen:   Soft, non-tender. Obese Bowel sounds normal. No masses,  No organomegaly. NO paradoxical motion   Extremities: normal, atraumatic, no cyanosis, trace edema   Pulses: 2+ and symmetric all extremities. Skin: Skin color, texture, turgor normal. No rashes - several old areas of excoriations in various stages of healing- looks like from picking at skin. NO clubbing   Lymph nodes: Cervical, supraclavicular nodes normal.   Neurologic: Grossly nonfocal      :        Devices:             Drips:    DATA:  Labs:  Recent Labs     06/10/19  0419 06/09/19  0616 06/08/19  0528   WBC 6.3 6.3 7.7   HGB 13.1 13.3 14.5   HCT 41.7 42.3 44.4    220 209     Recent Labs     06/10/19  0419 06/09/19  0616 06/08/19  0528    142 139   K 3.4* 4.2 3.4*    105 104   CO2 27 30 27   * 188* 210*   BUN 11 12 10   CREA 0.56* 0.80 0.60   CA 8.5 8.9 9.2   MG 2.0 2.2 2.3   PHOS 2.8 3.2 2.8     No results for input(s): PH, PCO2, PO2, HCO3, FIO2 in the last 72 hours.     Imaging:     CXR Results  (Last 48 hours)    None        Madina Daley MD    [facility]  Pulmonary, Critical Care, and Sleep Medicine

## 2019-06-10 NOTE — DIABETES MGMT
Glycemic Control Plan of Care    T2DM with current A1c level of 8.4% (4/04/2019).  6/06/2019: Pending assessment of home diabetes management and educational needs. Patient is s/p trach on 6/03/2019. She was able to nod head when I asked about home diabetes medication and dose. She also nodded head when I asked if she has meter and regularly monitor her blood sugar at home. 6/07/2019: Seen patient this morning. She's feeling too tired for diab assessment. Will cont to follow. 6/10/2019: Still pending assessment of home diabetes management and educational needs. Patient resting this morning. Home diabetes medication: Lantus insulin (vial) 90 units daily at bedtime. POC BG range on 6/09/2019: 135-183 mg/dL. Noted patient pulled out dobbhoff tube feeding on 6/08/2019. POC BG report on 6/10/2019 at time of review: 164 mg/dL. TF is still out. Pending decision/order for po feed. RD to cont to follow. Recommendation(s):  1.) Cont BG monitoring and insulin orders: basal lantus and correctional. Modified frequency of correctional insulin from every 4 hours to every 6 hours. Assessment:  Patient is 63year old with past medical history including type 2 diabetes mellitus with neuropathy, sleep apnea, s/p aortic valve replacement, morbid obesity, hypothyroidism, hypertension, chronic CHF and s/p thyroidectomy - was admitted on 6/03/2019 with report of stridor. Noted:  Vocal cord dysfunction. Status post tracheostomy on 6/03/2019. Hypothyroidism. Recent thyroidectomy on 4/11/2019. Morbid obesity. T2DM with current A1c of 8.4% (4/04/2019). Most recent blood glucose values:    Results for Mat Sans (MRN [id number]) as of 6/10/2019 10:02   Ref.  Range 6/9/2019 01:38 6/9/2019 06:09 6/9/2019 10:07 6/9/2019 14:21 6/9/2019 17:40 6/9/2019 20:49   GLUCOSE,FAST - POC Latest Ref Range: 70 - 110 mg/dL 176 (H) 183 (H) 160 (H) 135 (H) 156 (H) 135 (H)     Results for Mat Sans (MRN [id number]) as of 6/10/2019 10:02   Ref. Range 6/10/2019 06:10   GLUCOSE,FAST - POC Latest Ref Range: 70 - 110 mg/dL 164 (H)     Current A1C: 8.4% (4/04/2019) which is equivalent to estimated average blood glucose of 194 mg/dL during the past 2-3 months. Current hospital diabetes medications:  Basal lantus insulin 18 units daily. Correctional lispro insulin every 6 hours. Very resistant dose. Total daily dose insulin requirement previous day: 6/09/2019:  Lantus: 18 units  Lispro: 9 units  TDD insulin: 27 units    Home diabetes medications: Obtained from patient on 6/06/2019:  Lantus insulin (trach, patient unable to speak but nodded head when I asked if she's on vial insulin). She nodded again when I asked if she's on 90 units of lantus daily at bedtime and she has meter monitoring her blood sugar. Diet: NPO. Goals:  Blood glucose will be within target range of  mg/dL by 6/13/2019.     Education:  Re attempted assessment of home diabetes management and educational need on 6/10/2019 but she's resting.  ___  Refer to Diabetes Education Record  ___  Education not indicated at this time    Magaly Hernandez RN  Pager: [phone]

## 2019-06-10 NOTE — DIABETES MGMT
GLYCEMIC CONTROL:    Noted patient was placed today on regular diet. I called Dr. Partha Lainez and obtained order by changing it to diabetic consistent carb. I saw the patient this afternoon. She did not eat her lunch. Patient wrote that she's not able to swallow it including mashed potatoes. She did not eat the grapes for the same reason. Patient wrote request for food like broth or creamed soup. She would like apple juice. Patient will not eat pudding unless she can read the food label because she cannot tolerate artificial sweeteners. Requested for kitchen staff to see patient.     Paulina Gibson, RN  Pager: [phone]

## 2019-06-10 NOTE — PROGRESS NOTES
Surgery  No complaints   afebrile with stable vital signs  Trach okay  Advance diet, observe for coughing or aspiration  Must be sitting in chair or on the bed for p.o.  Intake  Ambulate in hallway

## 2019-06-10 NOTE — ROUTINE PROCESS
Bedside shift change report given to Glenda  (oncoming nurse) by Camelia Baez  (offgoing nurse). Report included the following information SBAR.

## 2019-06-10 NOTE — ROUTINE PROCESS
Bedside and Verbal shift change report given to Erica JENNINGS (oncoming nurse) by Alejo Bob RN (offgoing nurse). Report included the following information SBAR, Kardex, OR Summary, Intake/Output, MAR and Recent Results.

## 2019-06-10 NOTE — ROUTINE PROCESS
2100 Patient in bed asleep but easily aroused. Tracheostomy split gauze wet  with bloody mucus  drainage, removed and changed it. New split gauze applied.

## 2019-06-10 NOTE — PROGRESS NOTES
Problem: Self Care Deficits Care Plan (Adult)  Goal: *Acute Goals and Plan of Care (Insert Text)  Description  Occupational Therapy Goals  Initiated 6/5/2019 within 7 day(s). 1.  Patient will perform lower body dressing with supervision/set-up. 2.  Patient will perform functional task in standing for 3 minutes with supervision for balance. 3.  Patient will perform toilet transfers with supervision/set-up. 4.  Patient will participate in upper extremity therapeutic exercise/activities with supervision/set-up for 8 minutes to increase strength/endurance for ADLs. Prior Level of Function: Pt was modified independent with basic self care tasks and used two canes for functional mobility PTA. Outcome: Progressing Towards Goal   OCCUPATIONAL THERAPY TREATMENT    Patient: Stephan Gordon (85 y.o. female)  Date: 6/10/2019  Diagnosis: Stridor [R06.1]  Stridor [R06.1] <principal problem not specified>  Procedure(s) (LRB):  TRACHEOSTOMY (N/A) 7 Days Post-Op  Precautions: Fall  PLOF: Independent    Chart, occupational therapy assessment, plan of care, and goals were reviewed. ASSESSMENT:  Pt requires encouragement for OOB activity. Increase anxiety requires increase time w/all tasks. Pt maneuvers to EOB w/increase time and requires seated rest break in prep for functional transfer to [facility]. Verbal cues for hand placement and stand pivot technique. Pt requires CGA w/clothing mgt and toileting ADL. Pt performs simple ADL grooming tasks at chair level w/set-up. Pt c/o pain 6/10 at trach site. Pt w/multiple mucous secretions. Progression toward goals:  ?          Improving appropriately and progressing toward goals  ? Improving slowly and progressing toward goals  ? Not making progress toward goals and plan of care will be adjusted     PLAN:  Patient continues to benefit from skilled intervention to address the above impairments. Continue treatment per established plan of care.   Discharge Recommendations:  Home Health w/24 hr supervision  Further Equipment Recommendations for Discharge:  bedside commode, rolling walker and wheelchair for community re-entry      SUBJECTIVE:   Patient nonverbal. Effectively communicated mouthing words and written word. OBJECTIVE DATA SUMMARY:   Cognitive/Behavioral Status:  Neurologic State: Alert  Orientation Level: Oriented X4  Cognition: Follows commands, Impulsive  Safety/Judgement: Awareness of environment, Fall prevention    Functional Mobility and Transfers for ADLs:   Bed Mobility:  Supine to Sit: Stand-by assistance; Additional time(w/HOB raised and SR)   Transfers:  Sit to Stand: Stand-by assistance  Stand to Sit: Contact guard assistance   Toilet Transfer : Contact guard assistance(EOB --> BSC xfer w/SPT)   Balance:  Sitting: Intact  Standing: Intact; With support  Standing - Static: Good  Standing - Dynamic : Fair    ADL Intervention:  Grooming  Washing Face: Set-up  Washing Hands: Set-up    Toileting  Toileting Assistance: Contact guard assistance  Bladder Hygiene: Set-up(seated)  Clothing Management: Contact guard assistance    Pain:  Pain level pre-treatment: 6/10   Pain level post-treatment: 6/10  Alerted NSG    Activity Tolerance:    Fair minus    Please refer to the flowsheet for vital signs taken during this treatment. After treatment:   ?  Patient left in no apparent distress sitting up in chair  ? Patient left in no apparent distress in bed  ? Call bell left within reach  ? Nursing notified  ? Caregiver present  ? Bed alarm activated    COMMUNICATION/EDUCATION:   ? Role of Occupational Therapy in the acute care setting  ? Home safety education was provided and the patient/caregiver indicated understanding. ? Patient/family have participated as able in working towards goals and plan of care. ? Patient/family agree to work toward stated goals and plan of care.   ? Patient understands intent and goals of therapy, but is neutral about his/her participation. ? Patient is unable to participate in goal setting and plan of care.       Thank you for this referral.  SHIVANI Mason  Time Calculation: 30 mins

## 2019-06-10 NOTE — PROGRESS NOTES
New PT evaluation orders received and acknowledged. Pt was evaluated on 6/5/19 and is currently on caseload. Will continue to follow.     Thank you for the referral.    Jasmeet Joseph, PT, DPT

## 2019-06-10 NOTE — DIABETES MGMT
Diabetes Patient/Family Education Record  Factors That  May Influence Patients Ability  to Learn or  Comply with Recommendations   []   Language barrier    []   Cultural needs   []   Motivation    []   Cognitive limitation    []   Physical   [x]   Education    []   Physiological factors   [x]   Hearing/vision/speaking impairment: patient is status post tracheostomy. She is communicating by writing. []   Restorationist beliefs    []   Financial factors   []  Other:   []  No factors identified at this time. Person Instructed:   [x]   Patient   []   Family   []  Other     Preference for Learning:   [x]   Verbal   []   Written   []  Demonstration     Level of Comprehension & Competence:    [x]  Good                                      [] Fair                                     []  Poor                             []  Needs Reinforcement   [x]  Teachback completed    Education Component:   [x]  Medication management, including how to administer insulin (if appropriate) and potential medication interactions: Yes. Lantus insulin (vial) 90 units daily at bedtiime. [x]  Nutritional management -obtain usual meal pattern: Yes. Patient cannot tolerate artificial sweeteners. She read food labels. []  Exercise   [x]  Signs, symptoms, and treatment of hyperglycemia and hypoglycemia: Yes. [x] Prevention, recognition and treatment of hyperglycemia and hypoglycemia: Yes. [x]  Importance of blood glucose monitoring and how to obtain a blood glucose meter: Yes.    []  Instruction on use of the blood glucose meter   [x]  Discuss the importance of HbA1C monitoring: Yes.  Patient's current A1c level is 8.4% (4/04/2019) which is equivalent to estimated average blood glucose of 194 mg/dL during the past 2-3 months.   []  Sick day guidelines   []  Proper use and disposal of lancets, needles, syringes or insulin pens (if appropriate)   []  Potential long-term complications (retinopathy, kidney disease, neuropathy, foot care)   [] Information about whom to contact in case of emergency or for more information    [x]  Goal:  Patient/family will demonstrate understanding of Diabetes Self Management Skills by: (date) 6/17/2019  Plan for post-discharge education or self-management support:    [] Outpatient class schedule provided            [x] Patient Declined: Patient is not interested. [] Scheduled for outpatient classes (date) _______  Verify:  Does patient understand how diabetes medications work? Yes. Does patient know what their most recent A1c is? Yes. Does patient monitor glucose at home? Yes. Does patient have difficulty obtaining diabetes medications and testing supplies?  Markus Juan RN  Pager: [phone]

## 2019-06-10 NOTE — PROGRESS NOTES
Problem: Mobility Impaired (Adult and Pediatric)  Goal: *Acute Goals and Plan of Care (Insert Text)  Description  Physical Therapy Goals  Initiated 6/5/2019 and to be accomplished within 7 day(s)  1. Patient will move from supine to sit and sit to supine , scoot up and down and roll side to side in bed with modified independence. 2.  Patient will transfer from bed to chair and chair to bed with modified independence using the least restrictive device. 3.  Patient will perform sit to stand with modified independence. 4.  Patient will ambulate with modified independence for >100 feet with the least restrictive device. 5.  Patient will ascend/descend 3 stairs with 1-2 handrail(s) with supervision/set-up. Prior Level of Function: Independent with mobility including gait using 2 canes. Pt lives with her brother and son in a single story home with 3 steps to enter B HRA. Outcome: Progressing Towards Goal  PHYSICAL THERAPY TREATMENT    Patient: Jose Al (82 y.o. female)  Date: 6/10/2019  Diagnosis: Stridor [R06.1]  Stridor [R06.1] <principal problem not specified>  Procedure(s) (LRB):  TRACHEOSTOMY (N/A) 7 Days Post-Op  Precautions: Fall  PLOF: See goals section above    ASSESSMENT:  Pt was cleared to work with PT by nursing. Pt was finishing OT treatment when clinician entered room and pt was sitting on BSC, O2 via trach collar. Pt sat on [facility] for several minutes, requiring reassurance during coughing. Pt became tearful as well. Able to mouth words quite well, but states it makes her anxious not to be able to vocalize. Pt performed hygiene with supervision and then performed sit-stand transfer with CGA , verbal cues for safe hand placement. Pt then ambulated 12 feet from [facility] to [facility] with RW , demonstrating good balance when ambulating with support of walker. Pt was assisted to manage O2 tubing.   Pt sat with CGA , requiring verbal cues for safe hand placement but not complying with instructions. Pt tends to move quickly. Pt coughed for several minutes after ambulating, and was reassured that she was safe. Pt was made comfortable in recliner with needs met, trach collar in place, call bell in reach, and nurse attending to pt. Progression toward goals:   ?      Improving appropriately and progressing toward goals  ? Improving slowly and progressing toward goals  ? Not making progress toward goals and plan of care will be adjusted     PLAN:  Patient continues to benefit from skilled intervention to address the above impairments. Continue treatment per established plan of care. Discharge Recommendations:  Home Health  Further Equipment Recommendations for Discharge:  N/A     SUBJECTIVE:   Patient stated ? Not being able to communicate makes me anxious. ?    OBJECTIVE DATA SUMMARY:   Critical Behavior:  Neurologic State: Alert  Orientation Level: Oriented X4  Cognition: Follows commands, Impulsive  Safety/Judgement: Awareness of environment, Fall prevention  Functional Mobility Training:  Bed Mobility:  Supine to Sit: Stand-by assistance; Additional time(w/HOB raised and SR)  Transfers:  Sit to Stand: Stand-by assistance  Stand to Sit: Contact guard assistance  Balance:  Sitting: Intact  Standing: Intact; With support  Standing - Static: Good  Standing - Dynamic : Fair    Ambulation/Gait Training:  Distance (ft): 12 Feet (ft)(12)  Assistive Device: Walker, rolling  Ambulation - Level of Assistance: Setup(assistance to manage trach tubing)  Gait Description (WDL): Exceptions to WDL  Gait Abnormalities: Decreased step clearance    Base of Support: Widened    Pain:  Pain level pre-treatment: 6/10 (tracheostomy site)  Pain level post-treatment: 6/10 (tracheostomy site)  Pain Intervention(s): Medication (see MAR);  Rest, Ice, Repositioning   Response to intervention: Nurse notified, See doc flow    Activity Tolerance:   Poor  Please refer to the flowsheet for vital signs taken during this treatment. After treatment:   ? Patient left in no apparent distress sitting up in chair  ? Patient left in no apparent distress in bed  ? Call bell left within reach  ? Nursing notified  ? Caregiver present  ? Bed alarm activated  ? SCDs applied      COMMUNICATION/EDUCATION:   ?         Role of Physical Therapy in the acute care setting. ?         Fall prevention education was provided and the patient/caregiver indicated understanding. ? Patient/family have participated as able in working toward goals and plan of care. ?         Patient/family agree to work toward stated goals and plan of care. ?         Patient understands intent and goals of therapy, but is neutral about his/her participation. ? Patient is unable to participate in stated goals/plan of care: ongoing with therapy staff.   ?         Other:        Geoff Mix, PT   Time Calculation: 23 mins

## 2019-06-11 LAB
ANION GAP SERPL CALC-SCNC: 11 MMOL/L (ref 3–18)
BASOPHILS # BLD: 0 K/UL (ref 0–0.1)
BASOPHILS NFR BLD: 0 % (ref 0–2)
BUN SERPL-MCNC: 9 MG/DL (ref 7–18)
BUN/CREAT SERPL: 15 (ref 12–20)
CALCIUM SERPL-MCNC: 8.7 MG/DL (ref 8.5–10.1)
CHLORIDE SERPL-SCNC: 101 MMOL/L (ref 100–108)
CO2 SERPL-SCNC: 28 MMOL/L (ref 21–32)
CREAT SERPL-MCNC: 0.59 MG/DL (ref 0.6–1.3)
DIFFERENTIAL METHOD BLD: ABNORMAL
EOSINOPHIL # BLD: 0.1 K/UL (ref 0–0.4)
EOSINOPHIL NFR BLD: 2 % (ref 0–5)
ERYTHROCYTE [DISTWIDTH] IN BLOOD BY AUTOMATED COUNT: 15.4 % (ref 11.6–14.5)
GLUCOSE BLD STRIP.AUTO-MCNC: 133 MG/DL (ref 70–110)
GLUCOSE BLD STRIP.AUTO-MCNC: 134 MG/DL (ref 70–110)
GLUCOSE BLD STRIP.AUTO-MCNC: 154 MG/DL (ref 70–110)
GLUCOSE BLD STRIP.AUTO-MCNC: 174 MG/DL (ref 70–110)
GLUCOSE SERPL-MCNC: 120 MG/DL (ref 74–99)
HCT VFR BLD AUTO: 44.4 % (ref 35–45)
HGB BLD-MCNC: 14.2 G/DL (ref 12–16)
LYMPHOCYTES # BLD: 1.6 K/UL (ref 0.9–3.6)
LYMPHOCYTES NFR BLD: 27 % (ref 21–52)
MAGNESIUM SERPL-MCNC: 2 MG/DL (ref 1.6–2.6)
MCH RBC QN AUTO: 29.9 PG (ref 24–34)
MCHC RBC AUTO-ENTMCNC: 32 G/DL (ref 31–37)
MCV RBC AUTO: 93.5 FL (ref 74–97)
MONOCYTES # BLD: 0.7 K/UL (ref 0.05–1.2)
MONOCYTES NFR BLD: 12 % (ref 3–10)
NEUTS SEG # BLD: 3.5 K/UL (ref 1.8–8)
NEUTS SEG NFR BLD: 59 % (ref 40–73)
PHOSPHATE SERPL-MCNC: 2.6 MG/DL (ref 2.5–4.9)
PLATELET # BLD AUTO: 246 K/UL (ref 135–420)
PMV BLD AUTO: 9.2 FL (ref 9.2–11.8)
POTASSIUM SERPL-SCNC: 3.3 MMOL/L (ref 3.5–5.5)
RBC # BLD AUTO: 4.75 M/UL (ref 4.2–5.3)
SODIUM SERPL-SCNC: 140 MMOL/L (ref 136–145)
T4 FREE SERPL-MCNC: 0.5 NG/DL (ref 0.7–1.5)
TSH SERPL DL<=0.05 MIU/L-ACNC: 42.5 UIU/ML (ref 0.36–3.74)
WBC # BLD AUTO: 5.9 K/UL (ref 4.6–13.2)

## 2019-06-11 PROCEDURE — 85025 COMPLETE CBC W/AUTO DIFF WBC: CPT

## 2019-06-11 PROCEDURE — 84443 ASSAY THYROID STIM HORMONE: CPT

## 2019-06-11 PROCEDURE — 65270000029 HC RM PRIVATE

## 2019-06-11 PROCEDURE — 82962 GLUCOSE BLOOD TEST: CPT

## 2019-06-11 PROCEDURE — 77010033678 HC OXYGEN DAILY

## 2019-06-11 PROCEDURE — 80048 BASIC METABOLIC PNL TOTAL CA: CPT

## 2019-06-11 PROCEDURE — 74011000250 HC RX REV CODE- 250: Performed by: NURSE PRACTITIONER

## 2019-06-11 PROCEDURE — 97110 THERAPEUTIC EXERCISES: CPT

## 2019-06-11 PROCEDURE — 74011636637 HC RX REV CODE- 636/637: Performed by: EMERGENCY MEDICINE

## 2019-06-11 PROCEDURE — 74011250637 HC RX REV CODE- 250/637: Performed by: INTERNAL MEDICINE

## 2019-06-11 PROCEDURE — 97530 THERAPEUTIC ACTIVITIES: CPT

## 2019-06-11 PROCEDURE — 74011636637 HC RX REV CODE- 636/637

## 2019-06-11 PROCEDURE — 83735 ASSAY OF MAGNESIUM: CPT

## 2019-06-11 PROCEDURE — 74011250636 HC RX REV CODE- 250/636

## 2019-06-11 PROCEDURE — 74011250636 HC RX REV CODE- 250/636: Performed by: NURSE PRACTITIONER

## 2019-06-11 PROCEDURE — 84100 ASSAY OF PHOSPHORUS: CPT

## 2019-06-11 PROCEDURE — 77030037875 HC DRSG MEPILEX <16IN BORD MOLN -A

## 2019-06-11 PROCEDURE — 36415 COLL VENOUS BLD VENIPUNCTURE: CPT

## 2019-06-11 PROCEDURE — 97535 SELF CARE MNGMENT TRAINING: CPT

## 2019-06-11 PROCEDURE — 74011250637 HC RX REV CODE- 250/637

## 2019-06-11 PROCEDURE — 84439 ASSAY OF FREE THYROXINE: CPT

## 2019-06-11 PROCEDURE — 97116 GAIT TRAINING THERAPY: CPT

## 2019-06-11 RX ORDER — FAMOTIDINE 20 MG/1
20 TABLET, FILM COATED ORAL 2 TIMES DAILY
Status: DISCONTINUED | OUTPATIENT
Start: 2019-06-11 | End: 2019-06-13

## 2019-06-11 RX ORDER — LEVOTHYROXINE SODIUM ANHYDROUS 200 UG/5ML
50 INJECTION, POWDER, LYOPHILIZED, FOR SOLUTION INTRAVENOUS DAILY
Status: DISCONTINUED | OUTPATIENT
Start: 2019-06-12 | End: 2019-06-11

## 2019-06-11 RX ORDER — DEXTROSE MONOHYDRATE 100 MG/ML
125-250 INJECTION, SOLUTION INTRAVENOUS AS NEEDED
Status: DISCONTINUED | OUTPATIENT
Start: 2019-06-11 | End: 2019-06-25 | Stop reason: HOSPADM

## 2019-06-11 RX ORDER — ACETAMINOPHEN 325 MG/1
650 TABLET ORAL
Status: DISCONTINUED | OUTPATIENT
Start: 2019-06-11 | End: 2019-06-25 | Stop reason: HOSPADM

## 2019-06-11 RX ORDER — LEVOTHYROXINE SODIUM 100 UG/1
100 TABLET ORAL
Status: DISCONTINUED | OUTPATIENT
Start: 2019-06-12 | End: 2019-06-13

## 2019-06-11 RX ORDER — LEVOTHYROXINE SODIUM ANHYDROUS 200 UG/5ML
37.5 INJECTION, POWDER, LYOPHILIZED, FOR SOLUTION INTRAVENOUS DAILY
Status: DISCONTINUED | OUTPATIENT
Start: 2019-06-18 | End: 2019-06-11

## 2019-06-11 RX ORDER — LEVOTHYROXINE SODIUM 50 UG/1
50 TABLET ORAL
Status: DISCONTINUED | OUTPATIENT
Start: 2019-06-11 | End: 2019-06-11

## 2019-06-11 RX ADMIN — HYDROMORPHONE HYDROCHLORIDE 1 MG: 2 INJECTION INTRAMUSCULAR; INTRAVENOUS; SUBCUTANEOUS at 17:09

## 2019-06-11 RX ADMIN — INSULIN GLARGINE 18 UNITS: 100 INJECTION, SOLUTION SUBCUTANEOUS at 10:09

## 2019-06-11 RX ADMIN — INSULIN LISPRO 3 UNITS: 100 INJECTION, SOLUTION INTRAVENOUS; SUBCUTANEOUS at 21:52

## 2019-06-11 RX ADMIN — ACETAMINOPHEN 650 MG: 325 TABLET ORAL at 17:09

## 2019-06-11 RX ADMIN — LEVOTHYROXINE SODIUM 50 MCG: 50 TABLET ORAL at 10:07

## 2019-06-11 RX ADMIN — FAMOTIDINE 20 MG: 10 INJECTION INTRAVENOUS at 10:08

## 2019-06-11 RX ADMIN — HYDROMORPHONE HYDROCHLORIDE 1 MG: 2 INJECTION INTRAMUSCULAR; INTRAVENOUS; SUBCUTANEOUS at 10:24

## 2019-06-11 RX ADMIN — FAMOTIDINE 20 MG: 20 TABLET, FILM COATED ORAL at 17:09

## 2019-06-11 RX ADMIN — INSULIN LISPRO 3 UNITS: 100 INJECTION, SOLUTION INTRAVENOUS; SUBCUTANEOUS at 12:40

## 2019-06-11 RX ADMIN — CHLORHEXIDINE GLUCONATE 0.12% ORAL RINSE 10 ML: 1.2 LIQUID ORAL at 20:19

## 2019-06-11 RX ADMIN — ENOXAPARIN SODIUM 40 MG: 40 INJECTION SUBCUTANEOUS at 17:04

## 2019-06-11 RX ADMIN — ONDANSETRON 4 MG: 2 INJECTION INTRAMUSCULAR; INTRAVENOUS at 21:56

## 2019-06-11 RX ADMIN — LORAZEPAM 1 MG: 2 INJECTION INTRAMUSCULAR at 19:59

## 2019-06-11 NOTE — PROGRESS NOTES
Nutrition:   Pt with variable meal intake. Reports some difficulty tolerating certain foods, but ate 50% of breakfast this AM.  Encourage pt to ask for soft foods as desired. Diet changed to diabetic, BG in better control. Progressing towards nutrition goals, will continue to follow.      Paramjit Chavez, TOM

## 2019-06-11 NOTE — ROUTINE PROCESS
Bedside and Verbal shift change report given to Jose Antonio Singer 134 (oncoming nurse) by Frantz Holguin, RN   (offgoing nurse). Report included the following information SBAR, Kardex, Intake/Output, MAR and Recent Results.

## 2019-06-11 NOTE — PROGRESS NOTES
[facility]  Progress Note    Patient: Onetha Session Age: 63 y.o. : 1960 MR#: [id number] SSN: [id number]  Date/Time: 6/10/2019    Subjective:     Patient sitting in recliner in NAD, has tracheostomy    Assessment/Plan:     -DM2  - s/p Tracheostomy for vocal cord paralysis  - HTN  - Dysphagia  - Morbid Obesity     PLAN  Management of tracheostomy as per surgeon and pulm  Per Pulm - encouraged OOB to chair   On lantus, ssi, monitor  Diet as per speech  Monitor BP  D/w patient    Case discussed with:  [x]Patient  []Family  [x]Nursing  []Case Management  DVT Prophylaxis:  [x]Lovenox  []Hep SQ  []SCDs  []Coumadin   []On Heparin gtt    Objective:   VS:   Visit Vitals  /74 (BP 1 Location: Left arm)   Pulse 70   Temp 99.2 °F (37.3 °C)   Resp 16   Ht 5' 7\" (1.702 m)   Wt 119.5 kg (263 lb 8 oz)   SpO2 97%   Breastfeeding?  No   BMI 41.27 kg/m²      Tmax/24hrs: Temp (24hrs), Av.5 °F (36.9 °C), Min:97.6 °F (36.4 °C), Max:99.2 °F (37.3 °C)    Input/Output:     Intake/Output Summary (Last 24 hours) at 6/10/2019 2002  Last data filed at 6/10/2019 1057  Gross per 24 hour   Intake --   Output 300 ml   Net -300 ml       General:  Awake, alert  Cardiovascular:  S1S2+, RRR  Pulmonary:  CTA b/l  GI:  Soft, BS+, NT, ND, obese  Extremities:  No edema  + trach    Labs:    Recent Results (from the past 24 hour(s))   GLUCOSE, POC    Collection Time: 19  8:49 PM   Result Value Ref Range    Glucose (POC) 135 (H) 70 - 396 mg/dL   METABOLIC PANEL, BASIC    Collection Time: 06/10/19  4:19 AM   Result Value Ref Range    Sodium 140 136 - 145 mmol/L    Potassium 3.4 (L) 3.5 - 5.5 mmol/L    Chloride 105 100 - 108 mmol/L    CO2 27 21 - 32 mmol/L    Anion gap 8 3.0 - 18 mmol/L    Glucose 164 (H) 74 - 99 mg/dL    BUN 11 7.0 - 18 MG/DL    Creatinine 0.56 (L) 0.6 - 1.3 MG/DL    BUN/Creatinine ratio 20 12 - 20      GFR est AA >60 >60 ml/min/1.73m2    GFR est non-AA >60 >60 ml/min/1.73m2 Calcium 8.5 8.5 - 10.1 MG/DL   MAGNESIUM    Collection Time: 06/10/19  4:19 AM   Result Value Ref Range    Magnesium 2.0 1.6 - 2.6 mg/dL   PHOSPHORUS    Collection Time: 06/10/19  4:19 AM   Result Value Ref Range    Phosphorus 2.8 2.5 - 4.9 MG/DL   CBC WITH AUTOMATED DIFF    Collection Time: 06/10/19  4:19 AM   Result Value Ref Range    WBC 6.3 4.6 - 13.2 K/uL    RBC 4.38 4.20 - 5.30 M/uL    HGB 13.1 12.0 - 16.0 g/dL    HCT 41.7 35.0 - 45.0 %    MCV 95.2 74.0 - 97.0 FL    MCH 29.9 24.0 - 34.0 PG    MCHC 31.4 31.0 - 37.0 g/dL    RDW 16.0 (H) 11.6 - 14.5 %    PLATELET 914 030 - 783 K/uL    MPV 9.3 9.2 - 11.8 FL    NEUTROPHILS 70 40 - 73 %    LYMPHOCYTES 19 (L) 21 - 52 %    MONOCYTES 10 3 - 10 %    EOSINOPHILS 1 0 - 5 %    BASOPHILS 0 0 - 2 %    ABS. NEUTROPHILS 4.4 1.8 - 8.0 K/UL    ABS. LYMPHOCYTES 1.2 0.9 - 3.6 K/UL    ABS. MONOCYTES 0.6 0.05 - 1.2 K/UL    ABS. EOSINOPHILS 0.1 0.0 - 0.4 K/UL    ABS.  BASOPHILS 0.0 0.0 - 0.1 K/UL    DF AUTOMATED     GLUCOSE, POC    Collection Time: 06/10/19  6:10 AM   Result Value Ref Range    Glucose (POC) 164 (H) 70 - 110 mg/dL   GLUCOSE, POC    Collection Time: 06/10/19 11:31 AM   Result Value Ref Range    Glucose (POC) 167 (H) 70 - 110 mg/dL   GLUCOSE, POC    Collection Time: 06/10/19  5:46 PM   Result Value Ref Range    Glucose (POC) 154 (H) 70 - 110 mg/dL   GLUCOSE, POC    Collection Time: 06/10/19  7:41 PM   Result Value Ref Range    Glucose (POC) 170 (H) 70 - 110 mg/dL     Additional Data Reviewed:      Signed By: Yeyo Bustos MD     Elizabeth 10, 2019 7:10 PM

## 2019-06-11 NOTE — PROGRESS NOTES
Problem: Self Care Deficits Care Plan (Adult)  Goal: *Acute Goals and Plan of Care (Insert Text)  Description  Occupational Therapy Goals  Initiated 6/5/2019 within 7 day(s). 1.  Patient will perform lower body dressing with supervision/set-up. 2.  Patient will perform functional task in standing for 3 minutes with supervision for balance. 3.  Patient will perform toilet transfers with supervision/set-up. 4.  Patient will participate in upper extremity therapeutic exercise/activities with supervision/set-up for 8 minutes to increase strength/endurance for ADLs. Prior Level of Function: Pt was modified independent with basic self care tasks and used two canes for functional mobility PTA. Outcome: Progressing Towards Goal   OCCUPATIONAL THERAPY TREATMENT    Patient: Cristhian De Leon (33 y.o. female)  Date: 6/11/2019  Diagnosis: Stridor [R06.1]  Stridor [R06.1] <principal problem not specified>  Procedure(s) (LRB):  TRACHEOSTOMY (N/A) 8 Days Post-Op  Precautions: Fall    Chart, occupational therapy assessment, plan of care, and goals were reviewed. ASSESSMENT:  Pt required Max encouragement to participate in OT. Pt is seen with PT to increase safety of the pt and staff during functional mobility and ADLs due to pt's decreased motivation and endurance. Pt requesting to use BSC, maneuvered to the EOB and then to [facility] w/additional time and CGA. Following toileting pt completed toileting hygiene w/Set-up. Pt required SBA to change gowns while seated on the BSC and CGA to pull up socks for safety. Pt assisted to the chair at the end of the session and was set-up for comfort. Pt noted to have decreased coughing when upright, encouraged pt to sit up for meals and ADLs to cont to improve endurance and activity tolerance for carryover w/ADLs. Pt verbalized understanding and agreed with this recommendations. Progression toward goals:  ?          Improving appropriately and progressing toward goals  ? Improving slowly and progressing toward goals  ? Not making progress toward goals and plan of care will be adjusted     PLAN:  Patient continues to benefit from skilled intervention to address the above impairments. Continue treatment per established plan of care. Discharge Recommendations:  Home Health w/Supervision  Further Equipment Recommendations for Discharge:  N/A     SUBJECTIVE:   Patient wrote ? I need to get up.?    OBJECTIVE DATA SUMMARY:   Cognitive/Behavioral Status:  Neurologic State: Alert  Orientation Level: Oriented X4  Cognition: Follows commands  Safety/Judgement: Awareness of environment, Fall prevention    Functional Mobility and Transfers for ADLs:   Bed Mobility:     Supine to Sit: Stand-by assistance; Additional time   Transfers:  Sit to Stand: Stand-by assistance  Stand to Sit: Stand-by assistance   Toilet Transfer : Contact guard assistance(to [facility])    Balance:  Sitting: Intact  Standing: Impaired; With support(HHA)  Standing - Static: Good  Standing - Dynamic : Fair    ADL Intervention:       Grooming  Grooming Assistance: Set-up  Upper [address]: Stand-by assistance    Lower Body Dressing Assistance  Socks: Contact guard assistance(to pull up)    Toileting  Toileting Assistance: Supervision  Bladder Hygiene: Supervision  Bowel Hygiene: Supervision  Clothing Management: Supervision  Pain:  Pain level pre-treatment: not rated, c/o pain in trach area   Pain level post-treatment: not rated    Activity Tolerance:    Fair  Please refer to the flowsheet for vital signs taken during this treatment. After treatment:   ?  Patient left in no apparent distress sitting up in chair  ? Patient left in no apparent distress in bed  ? Call bell left within reach  ? Nursing notified  ? Caregiver present  ? Bed alarm activated    COMMUNICATION/EDUCATION:   ? Role of Occupational Therapy in the acute care setting  ?  Home safety education was provided and the patient/caregiver indicated understanding. ? Patient/family have participated as able in working towards goals and plan of care. ? Patient/family agree to work toward stated goals and plan of care. ? Patient understands intent and goals of therapy, but is neutral about his/her participation. ? Patient is unable to participate in goal setting and plan of care.       Thank you for this referral.  VANDANA Armas  Time Calculation: 39 mins

## 2019-06-11 NOTE — ROUTINE PROCESS
Bedside and Verbal shift change report given to Delia Horne RN (oncoming nurse) by Laurel Hobbs RN (offgoing nurse). Report included the following information SBAR, Kardex and MAR.

## 2019-06-11 NOTE — PROGRESS NOTES
Problem: Mobility Impaired (Adult and Pediatric)  Goal: *Acute Goals and Plan of Care (Insert Text)  Description  Physical Therapy Goals  Initiated 6/5/2019 and to be accomplished within 7 day(s)  1. Patient will move from supine to sit and sit to supine , scoot up and down and roll side to side in bed with modified independence. 2.  Patient will transfer from bed to chair and chair to bed with modified independence using the least restrictive device. 3.  Patient will perform sit to stand with modified independence. 4.  Patient will ambulate with modified independence for >100 feet with the least restrictive device. 5.  Patient will ascend/descend 3 stairs with 1-2 handrail(s) with supervision/set-up. Prior Level of Function: Independent with mobility including gait using 2 canes. Pt lives with her brother and son in a single story home with 3 steps to enter B HRA. Outcome: Progressing Towards Goal   PHYSICAL THERAPY TREATMENT    Patient: Kristie Leblanc (65 y.o. female)  Date: 6/11/2019  Diagnosis: Stridor [R06.1]  Stridor [R06.1] <principal problem not specified>  Procedure(s) (LRB):  TRACHEOSTOMY (N/A) 8 Days Post-Op  Precautions: Fall   Chart, physical therapy assessment, plan of care and goals were reviewed. OBJECTIVE/ ASSESSMENT:  Patient found supine in bed willing to work with PT when encouraged. Patient seen with OT to maximize safety of patient and staff. Pt quickly performed supine therex through limited range bilaterally. Pt educated on importance of slow movements to assist with muscle building during eccentric contraction. Pt communicated via pen and paper this tx asking to sit on b/s commode. Pt stood and sat on commode to have BM. Once done on toilet pt stood and ambulated to b/s chair with no AD. Pt was left sitting with needs in reach. Pt encouraged to perform therex on her own in b/s chair and all therex 3x per day. Pt crying many times throughout tx.  Pt is upset by her circumstances and reports increased pain at trach site especially when coughing. Progression toward goals:  ?      Improving appropriately and progressing toward goals  ? Improving slowly and progressing toward goals  ? Not making progress toward goals and plan of care will be adjusted     PLAN:  Patient continues to benefit from skilled intervention to address the above impairments. Continue treatment per established plan of care. Discharge Recommendations:  Skilled Nursing Facility  Further Equipment Recommendations for Discharge:  rolling walker     SUBJECTIVE:   Patient wrote I just hurts so badly.     OBJECTIVE DATA SUMMARY:   Critical Behavior:  Neurologic State: Alert  Orientation Level: Oriented X4  Cognition: Follows commands  Safety/Judgement: Awareness of environment, Fall prevention  Functional Mobility Training:  Bed Mobility:  Supine to Sit: Stand-by assistance; Additional time  Transfers:  Sit to Stand: Stand-by assistance  Stand to Sit: Stand-by assistance  Balance:  Sitting: Intact  Standing: Impaired; With support(HHA)  Standing - Static: Good  Standing - Dynamic : Fair  Ambulation/Gait Training:  Distance (ft): 6 Feet (ft)  Assistive Device: (None)  Ambulation - Level of Assistance: Stand-by assistance  Gait Abnormalities: Decreased step clearance  Base of Support: Widened  Stance: Weight shift  Speed/Yolis: Slow;Pace decreased (<100 feet/min)  Step Length: Left shortened;Right shortened  Therapeutic Exercises:       EXERCISE   Sets   Reps   Active Active Assist   Passive Self- assited ROM   Comments   Ankle Pumps   ? ? ? ?    Quad Sets   ? ? ? ? Glut Sets   ? ? ? ? Short Arc Quads   ? ? ? ? Heel Slides   ? ? ? ? Straight Leg Raises 1 15 ? ? ? ? Bilaterally   Hip Abd   ? ? ? ? Long Arc Quads   ? ? ? ? Reviewed   Seated Marching   ? ? ? ? Reviewed   Seated Knee Flexion   ? ? ? ? Standing Marching   ? ? ? ?       Pain:  Pain level pre-treatment: Not rated  Pain level post-treatment: Not rated  Pain Intervention(s): Rest    Activity Tolerance:   Fair  Please refer to the flowsheet for vital signs taken during this treatment. After treatment:   ? Patient left in no apparent distress sitting up in chair  ? Patient left in no apparent distress in bed  ? Call bell left within reach  ? Nursing notified  ? Caregiver present  ? Bed alarm activated  ? SCDs applied    COMMUNICATION/EDUCATION:   ?         Role of Physical Therapy  ? Fall prevention  ? Bed mobility  ? Transfers  ? Ambulation / gait  ? Assistive device management  ? Stairs  ? Body mechanics  ? Position change   ? Therapeutic exercise  ? Activity pacing / energy conservation  ?          Other:      Kika Said, PTA   Time Calculation: 39 mins

## 2019-06-11 NOTE — PROGRESS NOTES
SLP Note:    Attempted follow up dysphagia tx; however, pt sleeping soundly. Per RN, pt requesting to nap. Will continue to follow per POC as indicated.      Erin Toro M.S., [address] [facility], [place], operated by [facility]  Speech-Language Pathologist

## 2019-06-11 NOTE — PROGRESS NOTES
Shift Summary Summary:  Assumed care of patient in chair asleep & quiet, returned to bed without difficulty. Trach care given with patient continuing to have dark bloody contents from trach site. No change in saturations with good cough No s/s of acute distress or discomfort.   Patient Vitals for the past 12 hrs:   Temp Pulse Resp BP SpO2   06/11/19 0514 99.2 °F (37.3 °C) 82 17 115/70 99 %   06/10/19 2122 99.4 °F (37.4 °C) 78 17 115/72 99 %

## 2019-06-11 NOTE — PROGRESS NOTES
Bedside and Verbal shift change report given to this RN (oncoming nurse) by Jessica Horne (offgoing nurse). Report included the following information SBAR and Kardex. Continue to reinforce gauze on patient anterior neck due to excessive drainage from trach. Patient has tolerated her diet well today. 1700- Verbal report given to [address] [place] [address] (within unit) will continue to monitor.

## 2019-06-11 NOTE — DIABETES MGMT
Glycemic Control Plan of Care    T2DM with current A1c level of 7.1% (6/09/2019). See separate notes, 6/10/2019, for assessment of home diabetes management and education. Patient is s/p trach on 6/03/2019. She was able to communicate by writing. Home diabetes medication: Lantus insulin (vial) 90 units daily at bedtime. POC BG range on 6/10/2019: 154-169 mg/dL. Noted patient pulled out dobbhoff tube feeding on 6/08/2019. She was started on diabetic diet regular on 6/10/2019. Called for kitchen staff to assist patient with food order. She would like soup type, liquid for now. POC BG report on 6/11/2019 at time of review: 133, 154 mg/dL. Noted report of variable food intake. Attempted to see patient but she was sound asleep. Patient still to eat lunch. Noted soup and other liquids on her tray. Recommendation(s):  1.) Cont BG monitoring and insulin orders/dose: basal lantus and correctional.     Assessment:  Patient is 63year old with past medical history including type 2 diabetes mellitus with neuropathy, sleep apnea, s/p aortic valve replacement, morbid obesity, hypothyroidism, hypertension, chronic CHF and s/p thyroidectomy - was admitted on 6/03/2019 with report of stridor. Noted:  Vocal cord dysfunction. Status post tracheostomy on 6/03/2019. Hypothyroidism. Recent thyroidectomy on 4/11/2019. Morbid obesity. T2DM with current A1c of 8.4% (4/04/2019). Most recent blood glucose values:    Results for Elidia Mar (MRN [id number]) as of 6/11/2019 12:59   Ref. Range 6/10/2019 06:10 6/10/2019 11:31 6/10/2019 17:46 6/10/2019 19:41 6/10/2019 21:36   GLUCOSE,FAST - POC Latest Ref Range: 70 - 110 mg/dL 164 (H) 167 (H) 154 (H) 170 (H) 169 (H)     Results for Elidia Mar (MRN [id number]) as of 6/11/2019 12:59   Ref.  Range 6/11/2019 05:16 6/11/2019 11:15   GLUCOSE,FAST - POC Latest Ref Range: 70 - 110 mg/dL 133 (H) 154 (H)     Current A1C: 7.1% (6/09/2019) which is equivalent to estimated average blood glucose of 153 mg/dL during the past 2-3 months. Current hospital diabetes medications:  Basal lantus insulin 18 units daily. Correctional lispro insulin ACHS. Very resistant dose. Total daily dose insulin requirement previous day: 6/10/2019:  Lantus: 18 units  Lispro: 12 units  TDD insulin: 30 units    Home diabetes medications: Obtained from patient on 6/06/2019:  Lantus insulin (trach, patient unable to speak but nodded head when I asked if she's on vial insulin). She nodded again when I asked if she's on 90 units of lantus daily at bedtime and she has meter monitoring her blood sugar. Diet: Diabetic  Consistent carb regular. Goals:  Blood glucose will be within target range of  mg/dL by 6/13/2019.     Education:    _X__  Refer to Diabetes Education Record: 6/10/2019  ___  Education not indicated at this time    Magaly Hernandez RN  Pager: [phone]

## 2019-06-11 NOTE — PROGRESS NOTES
Progress Note  Pulmonary, Critical Care, and Sleep Medicine    Name: Jc Davis MRN: [id number]   : 1960 Hospital: [facility]   Date: 2019        IMPRESSION:   · S/p tracheostomy for vocal cord dysfunction following thyroidectomy  · Stridor and worsening SOB due to above- resolved s/p trach  · Morbid obesity  · Dysphagia- mild oral and mod to severe pharyngeal. S/p Dobhoff tube placement  · Sleep apnea- now resolved s/p trach  · Left lower lobe atelectasis  · ? Anxiety and possible depression  · DM type 2  · HTN      PLAN:   · Keep HOB elevated  · Speech therapy  · Monitor temperature- and for signs of evolving infectious process  · Continue with trach collar  · Management of trach per Surgery. · Trach care per protocol. · Discussed about levothyroxine and restarted at 50 mcg. Discussed with pharmacy. Consider checking TSH after 4 weeks. Increase as indicated. · Glycemic control with correctional scale insulin  · Continue with PT/OT  · Continue levothyroxine. · Concerns for underlying anxiety and depression - ?psych consult may help. Will discuss with hospitalist team.   · DVT prophylaxis SCD's  · Mobilize pt  · Pain control per surgery  · Other medical management and prophylaxis per primary service     Subjective/Interval History:   I have reviewed the flowsheet and previous days notes. Reviewed interval history. Discussed management with nursing staff. I have known this patient previously. Pt with worsening stridor and exercise tolerance following a difficult thyroidectomy for bilateral multinodular goiter. Pt went to the OR for trache placement and was admitted to ICU on vent support. She was quickly weaned off this and is on trache collar. She was transferred to the floor. 19  · Discussed about levothyroxine and restarted at 50 mcg. Discussed with pharmacy. · Discussed in the presence of MARKOS Mcdonald. · No other issues by the patient. · Remained afebrile. · Labs stayed normal.             Patient Vitals for the past 24 hrs:   Temp Pulse Resp BP SpO2   19 0514 99.2 °F (37.3 °C) 82 17 115/70 99 %   06/10/19 2122 99.4 °F (37.4 °C) 78 17 115/72 99 %   06/10/19 1949 99.2 °F (37.3 °C) 70 16 124/74 97 %   06/10/19 1833 -- -- -- -- 96 %   06/10/19 1515 98.9 °F (37.2 °C) 67 20 129/63 97 %   06/10/19 1020 98.4 °F (36.9 °C) 69 18 131/72 97 %         ROS:Review of systems not obtained due to patient factors. Orders reviewed including medications. Changes made if indicated. Telemetry monitor reviewed at the bedside. Objective:   Vital Signs:    Visit Vitals  /70 (BP 1 Location: Left arm, BP Patient Position: At rest)   Pulse 82   Temp 99.2 °F (37.3 °C)   Resp 17   Ht 5' 7\" (1.702 m)   Wt 119.5 kg (263 lb 8 oz)   SpO2 99%   Breastfeeding? No   BMI 41.27 kg/m²       O2 Device: Tracheal collar, Tracheostomy   O2 Flow Rate (L/min): 8 l/min   Temp (24hrs), Av °F (37.2 °C), Min:98.4 °F (36.9 °C), Max:99.4 °F (37.4 °C)       Intake/Output:   Last shift:      701 - 1900  In: 120 [P.O.:120]  Out: -   Last 3 shifts: 1901 -  0700  In: -   Out: 550 [Urine:550]    Intake/Output Summary (Last 24 hours) at 2019 0935  Last data filed at 2019 7183  Gross per 24 hour   Intake 120 ml   Output 550 ml   Net -430 ml        Physical Exam:    General:  Alert, cooperative, in no distress, appears stated age. Obese body habitus   Head:  Normocephalic, without obvious abnormality, atraumatic. Eyes:  ANicteric, PERRL,   Nose: Nares normal.  Mucosa normal. No drainage or sinus tenderness. Throat: Lips, mucosa, and tongue normal. Teeth and gums normal. NO Thrush   Neck: Supple, symmetrical, trachea midline, no adenopathy, thyroid: no enlargment/tenderness/nodules , Trach site seen. Mild oozing around. No active signs of infection. Back:   Symmetric, no curvature. ROM normal. NO spine tenderness   Lungs:   Coarse breath sounds bilaterally. Decreased air entry at the lung bases. Chest wall:  No tenderness or deformity. NO intercostal retractions   Heart:  Regular rate and rhythm, S1, S2 normal, no murmur, click, rub or gallop. Abdomen:   Soft, non-tender. Obese Bowel sounds normal. No masses,  No organomegaly. NO paradoxical motion   Extremities: normal, atraumatic, no cyanosis, trace edema   Pulses: 2+ and symmetric all extremities. Skin: Skin color, texture, turgor normal. No rashes - several old areas of excoriations in various stages of healing- looks like from picking at skin. NO clubbing   Lymph nodes: Cervical, supraclavicular nodes normal.   Neurologic: Grossly nonfocal      :        Devices:             Drips:    DATA:  Labs:  Recent Labs     06/11/19  0306 06/10/19  0419 06/09/19  0616   WBC 5.9 6.3 6.3   HGB 14.2 13.1 13.3   HCT 44.4 41.7 42.3    216 220     Recent Labs     06/11/19  0306 06/10/19  0419 06/09/19  0616    140 142   K 3.3* 3.4* 4.2    105 105   CO2 28 27 30   * 164* 188*   BUN 9 11 12   CREA 0.59* 0.56* 0.80   CA 8.7 8.5 8.9   MG 2.0 2.0 2.2   PHOS 2.6 2.8 3.2     No results for input(s): PH, PCO2, PO2, HCO3, FIO2 in the last 72 hours.     Imaging:     CXR Results  (Last 48 hours)    None        Tacos Dawson MD    [facility]  Pulmonary, Critical Care, and Sleep Medicine

## 2019-06-12 LAB
ANION GAP SERPL CALC-SCNC: 7 MMOL/L (ref 3–18)
BASOPHILS # BLD: 0 K/UL (ref 0–0.1)
BASOPHILS NFR BLD: 0 % (ref 0–2)
BUN SERPL-MCNC: 6 MG/DL (ref 7–18)
BUN/CREAT SERPL: 11 (ref 12–20)
CALCIUM SERPL-MCNC: 8.5 MG/DL (ref 8.5–10.1)
CHLORIDE SERPL-SCNC: 101 MMOL/L (ref 100–108)
CO2 SERPL-SCNC: 30 MMOL/L (ref 21–32)
CREAT SERPL-MCNC: 0.54 MG/DL (ref 0.6–1.3)
DIFFERENTIAL METHOD BLD: ABNORMAL
EOSINOPHIL # BLD: 0.1 K/UL (ref 0–0.4)
EOSINOPHIL NFR BLD: 2 % (ref 0–5)
ERYTHROCYTE [DISTWIDTH] IN BLOOD BY AUTOMATED COUNT: 15.5 % (ref 11.6–14.5)
GLUCOSE BLD STRIP.AUTO-MCNC: 121 MG/DL (ref 70–110)
GLUCOSE BLD STRIP.AUTO-MCNC: 125 MG/DL (ref 70–110)
GLUCOSE BLD STRIP.AUTO-MCNC: 128 MG/DL (ref 70–110)
GLUCOSE BLD STRIP.AUTO-MCNC: 141 MG/DL (ref 70–110)
GLUCOSE SERPL-MCNC: 131 MG/DL (ref 74–99)
HCT VFR BLD AUTO: 40.9 % (ref 35–45)
HGB BLD-MCNC: 13.2 G/DL (ref 12–16)
LYMPHOCYTES # BLD: 1.4 K/UL (ref 0.9–3.6)
LYMPHOCYTES NFR BLD: 21 % (ref 21–52)
MAGNESIUM SERPL-MCNC: 2 MG/DL (ref 1.6–2.6)
MCH RBC QN AUTO: 29.9 PG (ref 24–34)
MCHC RBC AUTO-ENTMCNC: 32.3 G/DL (ref 31–37)
MCV RBC AUTO: 92.7 FL (ref 74–97)
MONOCYTES # BLD: 0.7 K/UL (ref 0.05–1.2)
MONOCYTES NFR BLD: 11 % (ref 3–10)
NEUTS SEG # BLD: 4.5 K/UL (ref 1.8–8)
NEUTS SEG NFR BLD: 66 % (ref 40–73)
PHOSPHATE SERPL-MCNC: 3.5 MG/DL (ref 2.5–4.9)
PLATELET # BLD AUTO: 242 K/UL (ref 135–420)
PMV BLD AUTO: 8.7 FL (ref 9.2–11.8)
POTASSIUM SERPL-SCNC: 3.1 MMOL/L (ref 3.5–5.5)
RBC # BLD AUTO: 4.41 M/UL (ref 4.2–5.3)
SODIUM SERPL-SCNC: 138 MMOL/L (ref 136–145)
WBC # BLD AUTO: 6.7 K/UL (ref 4.6–13.2)

## 2019-06-12 PROCEDURE — 85025 COMPLETE CBC W/AUTO DIFF WBC: CPT

## 2019-06-12 PROCEDURE — 77030018836 HC SOL IRR NACL ICUM -A

## 2019-06-12 PROCEDURE — 77030020186 HC BOOT HL PROTCT SAGE -B

## 2019-06-12 PROCEDURE — 84100 ASSAY OF PHOSPHORUS: CPT

## 2019-06-12 PROCEDURE — 36415 COLL VENOUS BLD VENIPUNCTURE: CPT

## 2019-06-12 PROCEDURE — 74011250637 HC RX REV CODE- 250/637: Performed by: HOSPITALIST

## 2019-06-12 PROCEDURE — 74011000250 HC RX REV CODE- 250: Performed by: NURSE PRACTITIONER

## 2019-06-12 PROCEDURE — 65270000029 HC RM PRIVATE

## 2019-06-12 PROCEDURE — 74011250636 HC RX REV CODE- 250/636: Performed by: HOSPITALIST

## 2019-06-12 PROCEDURE — 83735 ASSAY OF MAGNESIUM: CPT

## 2019-06-12 PROCEDURE — 82962 GLUCOSE BLOOD TEST: CPT

## 2019-06-12 PROCEDURE — 74011250637 HC RX REV CODE- 250/637

## 2019-06-12 PROCEDURE — 74011000258 HC RX REV CODE- 258: Performed by: HOSPITALIST

## 2019-06-12 PROCEDURE — 80048 BASIC METABOLIC PNL TOTAL CA: CPT

## 2019-06-12 PROCEDURE — 74011250636 HC RX REV CODE- 250/636

## 2019-06-12 PROCEDURE — 74011636637 HC RX REV CODE- 636/637: Performed by: EMERGENCY MEDICINE

## 2019-06-12 RX ORDER — POTASSIUM CHLORIDE 7.45 MG/ML
10 INJECTION INTRAVENOUS
Status: DISCONTINUED | OUTPATIENT
Start: 2019-06-12 | End: 2019-06-12

## 2019-06-12 RX ORDER — LEVOTHYROXINE SODIUM 25 UG/1
TABLET ORAL
Qty: 30 TAB | Refills: 2 | Status: ON HOLD | OUTPATIENT
Start: 2019-06-12 | End: 2019-07-11 | Stop reason: CLARIF

## 2019-06-12 RX ADMIN — LIDOCAINE HYDROCHLORIDE: 10 INJECTION, SOLUTION EPIDURAL; INFILTRATION; INTRACAUDAL; PERINEURAL at 23:15

## 2019-06-12 RX ADMIN — FAMOTIDINE 20 MG: 20 TABLET, FILM COATED ORAL at 18:18

## 2019-06-12 RX ADMIN — LIDOCAINE HYDROCHLORIDE: 10 INJECTION, SOLUTION EPIDURAL; INFILTRATION; INTRACAUDAL; PERINEURAL at 22:11

## 2019-06-12 RX ADMIN — HYDROMORPHONE HYDROCHLORIDE 1 MG: 2 INJECTION INTRAMUSCULAR; INTRAVENOUS; SUBCUTANEOUS at 21:28

## 2019-06-12 RX ADMIN — LEVOTHYROXINE SODIUM 100 MCG: 100 TABLET ORAL at 05:33

## 2019-06-12 RX ADMIN — CHLORHEXIDINE GLUCONATE 0.12% ORAL RINSE 10 ML: 1.2 LIQUID ORAL at 09:44

## 2019-06-12 RX ADMIN — LORAZEPAM 1 MG: 2 INJECTION INTRAMUSCULAR at 00:49

## 2019-06-12 RX ADMIN — LORAZEPAM 1 MG: 2 INJECTION INTRAMUSCULAR at 01:00

## 2019-06-12 RX ADMIN — FAMOTIDINE 20 MG: 20 TABLET, FILM COATED ORAL at 09:44

## 2019-06-12 RX ADMIN — POTASSIUM CHLORIDE 10 MEQ: 10 INJECTION, SOLUTION INTRAVENOUS at 17:41

## 2019-06-12 RX ADMIN — CHLORHEXIDINE GLUCONATE 0.12% ORAL RINSE 10 ML: 1.2 LIQUID ORAL at 21:30

## 2019-06-12 RX ADMIN — INSULIN GLARGINE 18 UNITS: 100 INJECTION, SOLUTION SUBCUTANEOUS at 09:44

## 2019-06-12 RX ADMIN — ENOXAPARIN SODIUM 40 MG: 40 INJECTION SUBCUTANEOUS at 17:44

## 2019-06-12 RX ADMIN — HYDROMORPHONE HYDROCHLORIDE: 2 INJECTION INTRAMUSCULAR; INTRAVENOUS; SUBCUTANEOUS at 17:42

## 2019-06-12 RX ADMIN — HYDROMORPHONE HYDROCHLORIDE 1 MG: 2 INJECTION INTRAMUSCULAR; INTRAVENOUS; SUBCUTANEOUS at 04:57

## 2019-06-12 NOTE — PROGRESS NOTES
Bedside and Verbal shift change report received from  Kira Jauregui RN (off going nurse). Report included the following information SBAR, Kardex, MAR and Recent Results. Assumed care patient in bed resting comfortably. Patient on trache to room air. Fall prevention program maintained,call bell and bedside table within reach. No problems identified at this time,will continue care. - Helped patient used the bedside commode then transfer to recliner chair. Call bell within reach. Patient resting well. - Patient will have PICC line insertion sometime. Waiting for IR schedule.  Will update patient.

## 2019-06-12 NOTE — PROGRESS NOTES
Surgery  About the same, seems depressed. T-max 100 vital signs stable  Trach wound okay, tracheal secretions significant but nonpurulent  For pick soon and TPN if not eating  Labs reviewed white blood cell count 6.7 mild hypokalemia, will correct with TPN and fluids  Overall moving slowly towards independence. Stressed out of bed and walking in the halls. Must sit up and sit upright to eat.

## 2019-06-12 NOTE — PROGRESS NOTES
[facility] Hospitalist [facility]  Progress Note    Patient: Donaciano Lesches Age: 63 y.o. : 1960 MR#: [id number] SSN: [id number]  Date/Time: 2019    Subjective:     Patient sitting in recliner in NAD, has tracheostomy    Assessment/Plan:     -DM2  - s/p Tracheostomy for vocal cord paralysis  - HTN  - Dysphagia  - Morbid Obesity      PLAN    Hypothyroidism - Labs reordered - TSH elevated at 42.5 - has had total thyroidectomy recently - started on synthroid 100mcg   HypoK+ - repleting - check labs in AM   Management of tracheostomy as per surgeon and pulm  Per Pulm - encouraged OOB to chair   On lantus, ssi, monitor  Diet as per speech & swallow   Monitor BP  D/w patient  Spoke with daughter & updated her     Case discussed with:  [x]Patient  []Family  [x]Nursing  []Case Management  DVT Prophylaxis:  [x]Lovenox  []Hep SQ  []SCDs  []Coumadin   []On Heparin gtt    Objective:   VS:   Visit Vitals  /71 (BP 1 Location: Left arm, BP Patient Position: At rest)   Pulse 73   Temp 99.3 °F (37.4 °C)   Resp 17   Ht 5' 7\" (1.702 m)   Wt 119.5 kg (263 lb 8 oz)   SpO2 96%   Breastfeeding?  No   BMI 41.27 kg/m²      Tmax/24hrs: Temp (24hrs), Av.6 °F (37 °C), Min:97.5 °F (36.4 °C), Max:99.3 °F (37.4 °C)    Input/Output:     Intake/Output Summary (Last 24 hours) at 2019 1729  Last data filed at 2019 1140  Gross per 24 hour   Intake 1340 ml   Output --   Net 1340 ml       General:  Awake, alert  Cardiovascular:  S1S2+, RRR  Pulmonary:  CTA b/l  GI:  Soft, BS+, NT, ND, obese  Extremities:  No edema  + trach    Labs:    Recent Results (from the past 24 hour(s))   GLUCOSE, POC    Collection Time: 19  9:16 PM   Result Value Ref Range    Glucose (POC) 174 (H) 70 - 110 mg/dL   GLUCOSE, POC    Collection Time: 06/12/19  5:18 AM   Result Value Ref Range    Glucose (POC) 121 (H) 70 - 867 mg/dL   METABOLIC PANEL, BASIC    Collection Time: 19  6:24 AM   Result Value Ref Range    Sodium 138 136 - 145 mmol/L    Potassium 3.1 (L) 3.5 - 5.5 mmol/L    Chloride 101 100 - 108 mmol/L    CO2 30 21 - 32 mmol/L    Anion gap 7 3.0 - 18 mmol/L    Glucose 131 (H) 74 - 99 mg/dL    BUN 6 (L) 7.0 - 18 MG/DL    Creatinine 0.54 (L) 0.6 - 1.3 MG/DL    BUN/Creatinine ratio 11 (L) 12 - 20      GFR est AA >60 >60 ml/min/1.73m2    GFR est non-AA >60 >60 ml/min/1.73m2    Calcium 8.5 8.5 - 10.1 MG/DL   MAGNESIUM    Collection Time: 06/12/19  6:24 AM   Result Value Ref Range    Magnesium 2.0 1.6 - 2.6 mg/dL   PHOSPHORUS    Collection Time: 06/12/19  6:24 AM   Result Value Ref Range    Phosphorus 3.5 2.5 - 4.9 MG/DL   CBC WITH AUTOMATED DIFF    Collection Time: 06/12/19  6:24 AM   Result Value Ref Range    WBC 6.7 4.6 - 13.2 K/uL    RBC 4.41 4.20 - 5.30 M/uL    HGB 13.2 12.0 - 16.0 g/dL    HCT 40.9 35.0 - 45.0 %    MCV 92.7 74.0 - 97.0 FL    MCH 29.9 24.0 - 34.0 PG    MCHC 32.3 31.0 - 37.0 g/dL    RDW 15.5 (H) 11.6 - 14.5 %    PLATELET 150 998 - 724 K/uL    MPV 8.7 (L) 9.2 - 11.8 FL    NEUTROPHILS 66 40 - 73 %    LYMPHOCYTES 21 21 - 52 %    MONOCYTES 11 (H) 3 - 10 %    EOSINOPHILS 2 0 - 5 %    BASOPHILS 0 0 - 2 %    ABS. NEUTROPHILS 4.5 1.8 - 8.0 K/UL    ABS. LYMPHOCYTES 1.4 0.9 - 3.6 K/UL    ABS. MONOCYTES 0.7 0.05 - 1.2 K/UL    ABS. EOSINOPHILS 0.1 0.0 - 0.4 K/UL    ABS.  BASOPHILS 0.0 0.0 - 0.1 K/UL    DF AUTOMATED     GLUCOSE, POC    Collection Time: 06/12/19 11:03 AM   Result Value Ref Range    Glucose (POC) 128 (H) 70 - 110 mg/dL   GLUCOSE, POC    Collection Time: 06/12/19  3:54 PM   Result Value Ref Range    Glucose (POC) 141 (H) 70 - 110 mg/dL     Additional Data Reviewed:      Signed By: Cindy Cabral MD     June 12, 2019 7:10 PM

## 2019-06-12 NOTE — PROGRESS NOTES
Bedside and Verbal shift change report given to Nando Moss RN (oncoming nurse) by Hieu Hauser RN (offgoing nurse).  Report included the following information SBAR, Kardex, MAR and Recent Results.

## 2019-06-12 NOTE — ROUTINE PROCESS
Patient remains alert and oriented times three with no signs or symptoms of distress. Patient is peacefully sleeping.

## 2019-06-12 NOTE — ROUTINE PROCESS
Patient is alert and oriented times three with no signs or symptoms of distress. Trach care preformed dressing changed inner cannula changed. Patient able to cough secretions up on her own. They are thick and grey tinged with blood. Coarse breath sounds.

## 2019-06-12 NOTE — PROGRESS NOTES
Confirmed with [employer], that trach supply order has been received and they are working on it. Spoke with April respiratory therapist and informed her pt has not yet left the hospital, but provided children's contact information, she would like to give education/follow up on trach care at home. Awaiting HH order to send to [facility],   is setting up personal care aid. Children very involved in pt's care.        EDWIN Orourke  Case Management  [phone]

## 2019-06-12 NOTE — PROGRESS NOTES
Trache care done with saline using aseptic technique. Trache collar change. Some coughing & SOB noticed but tolerated. Sats maintained to 95% on trache mist.Patient resting after.

## 2019-06-12 NOTE — ROUTINE PROCESS
Bedside and Verbal shift change report given to Nettie Brothers (oncoming nurse) by Amie Parker RN (offgoing nurse). Report included the following information SBAR, Kardex and MAR.

## 2019-06-12 NOTE — PROGRESS NOTES
Progress Note  Pulmonary, Critical Care, and Sleep Medicine    Name: Vasu Blair MRN: [id number]   : 1960 Hospital: [facility]   Date: 2019        IMPRESSION:   · S/p tracheostomy for vocal cord dysfunction following thyroidectomy  · Stridor and worsening SOB due to above- resolved s/p trach  · Morbid obesity  · Dysphagia- mild oral and mod to severe pharyngeal. S/p Dobhoff tube placement  · Hypokalemia. · Sleep apnea- now resolved s/p trach  · Left lower lobe atelectasis  · ? Anxiety and possible depression  · DM type 2  · HTN      PLAN:   · Keep HOB elevated  · Continue Speech therapy  · Monitor temperature- and for signs of evolving infectious process  · Continue with trach collar. Management of trach per Surgery. · Trach care per protocol. · Discussed with Dr Kate Chopra yesterday about levothyroxine. Dose increased to 100 mcg. · Will defer electrolyte management to the primary team. Noted low K. Informed Dr Kate Chopra. · Consider checking TSH after 4 weeks. Increase as indicated. · Glycemic control with correctional scale insulin  · Continue with PT/OT  · Concerns for underlying anxiety and depression - ?psych consult may help. · DVT prophylaxis SCD's  · Mobilize pt  · Pain control per surgery  · Other medical management and prophylaxis per primary service     Subjective/Interval History:   I have reviewed the flowsheet and previous days notes. Reviewed interval history. Discussed management with nursing staff. I have known this patient previously. Pt with worsening stridor and exercise tolerance following a difficult thyroidectomy for bilateral multinodular goiter. Pt went to the OR for trache placement and was admitted to ICU on vent support. She was quickly weaned off this and is on trache collar. She was transferred to the floor. 19  · No new issues by the patient. · Remained afebrile. · Noted elevated TSH.    ·             Patient Vitals for the past 24 hrs:   Temp Pulse Resp BP SpO2   19 1054 98.9 °F (37.2 °C) 64 15 116/72 97 %   19 0739 -- -- -- -- 98 %   19 0514 98.5 °F (36.9 °C) 67 18 127/73 95 %   19 2110 97.5 °F (36.4 °C) 64 17 140/78 99 %   19 1835 98.6 °F (37 °C) 74 20 137/73 100 %   19 1608 100 °F (37.8 °C) 72 16 118/70 97 %         ROS:Review of systems not obtained due to patient factors. Orders reviewed including medications. Changes made if indicated. Telemetry monitor reviewed at the bedside. Objective:   Vital Signs:    Visit Vitals  /72 (BP 1 Location: Left arm, BP Patient Position: At rest)   Pulse 64   Temp 98.9 °F (37.2 °C)   Resp 15   Ht 5' 7\" (1.702 m)   Wt 119.5 kg (263 lb 8 oz)   SpO2 97%   Breastfeeding? No   BMI 41.27 kg/m²       O2 Device: Tracheal collar   O2 Flow Rate (L/min): 8 l/min   Temp (24hrs), Av.7 °F (37.1 °C), Min:97.5 °F (36.4 °C), Max:100 °F (37.8 °C)       Intake/Output:   Last shift:       07 -  190  In: 380 [P.O.:380]  Out: -   Last 3 shifts: 06/10 1901 -  0700  In: 1280 [P.O.:1280]  Out: 250 [Urine:250]    Intake/Output Summary (Last 24 hours) at 2019 1344  Last data filed at 2019 1140  Gross per 24 hour   Intake 1540 ml   Output --   Net 1540 ml        Physical Exam:    General:  Alert, cooperative, in no distress, appears stated age. Obese body habitus   Head:  Normocephalic, without obvious abnormality, atraumatic. Eyes:  ANicteric, PERRL,   Nose: Nares normal.  Mucosa normal. No drainage or sinus tenderness. Throat: Lips, mucosa, and tongue normal. Teeth and gums normal. NO Thrush   Neck: Supple, symmetrical, trachea midline, no adenopathy, thyroid: no enlargment/tenderness/nodules , Trach site seen. Mild oozing around. No active signs of infection. Back:   Symmetric, no curvature. ROM normal. NO spine tenderness   Lungs:   Coarse breath sounds bilaterally. Chest wall:  No tenderness or deformity.  NO intercostal retractions Heart:  Regular rate and rhythm, S1, S2 normal, no murmur, click, rub or gallop. Abdomen:   Soft, non-tender. Obese Bowel sounds normal. No masses,  No organomegaly. NO paradoxical motion   Extremities: normal, atraumatic, no cyanosis, trace edema   Pulses: 2+ and symmetric all extremities. Skin: Skin color, texture, turgor normal. No rashes - several old areas of excoriations in various stages of healing- looks like from picking at skin. NO clubbing   Lymph nodes: Cervical, supraclavicular nodes normal.   Neurologic: Grossly nonfocal      :        Devices:             Drips:    DATA:  Labs:  Recent Labs     06/12/19  0624 06/11/19  0306 06/10/19  0419   WBC 6.7 5.9 6.3   HGB 13.2 14.2 13.1   HCT 40.9 44.4 41.7    246 216     Recent Labs     06/12/19 0624 06/11/19  0306 06/10/19  0419    140 140   K 3.1* 3.3* 3.4*    101 105   CO2 30 28 27   * 120* 164*   BUN 6* 9 11   CREA 0.54* 0.59* 0.56*   CA 8.5 8.7 8.5   MG 2.0 2.0 2.0   PHOS 3.5 2.6 2.8     No results for input(s): PH, PCO2, PO2, HCO3, FIO2 in the last 72 hours.     Imaging:     CXR Results  (Last 48 hours)    None        Manish Vargas [facility]  Pulmonary, Critical Care, and Sleep Medicine

## 2019-06-12 NOTE — ROUTINE PROCESS
Patient is alert and oriented times three with no signs or symptoms of distress.  Patient is coughing but refusing suction at this time

## 2019-06-12 NOTE — PROGRESS NOTES
[facility] Hospitalist Group  Progress Note    Patient: Toy Osler Age: 63 y.o. : 1960 MR#: [id number] SSN: [id number]  Date/Time: 2019    Subjective:     Patient sitting in recliner in NAD, has tracheostomy    Assessment/Plan:     -DM2  - s/p Tracheostomy for vocal cord paralysis  - HTN  - Dysphagia  - Morbid Obesity      PLAN    Hypothyroidism - Labs reordered - TSH elevated at 42.5 - has had total thyroidectomy recently - started on synthroid 100mcg   Management of tracheostomy as per surgeon and pulm  Per Pulm - encouraged OOB to chair   On lantus, ssi, monitor  Diet as per speech  Monitor BP  D/w patient    Case discussed with:  [x]Patient  []Family  [x]Nursing  []Case Management  DVT Prophylaxis:  [x]Lovenox  []Hep SQ  []SCDs  []Coumadin   []On Heparin gtt    Objective:   VS:   Visit Vitals  /78 (BP 1 Location: Left arm, BP Patient Position: At rest)   Pulse 64   Temp 97.5 °F (36.4 °C)   Resp 17   Ht 5' 7\" (1.702 m)   Wt 119.5 kg (263 lb 8 oz)   SpO2 99%   Breastfeeding?  No   BMI 41.27 kg/m²      Tmax/24hrs: Temp (24hrs), Av.8 °F (37.1 °C), Min:97.5 °F (36.4 °C), Max:100 °F (37.8 °C)    Input/Output:     Intake/Output Summary (Last 24 hours) at 2019  Last data filed at 2019  Gross per 24 hour   Intake 680 ml   Output --   Net 680 ml       General:  Awake, alert  Cardiovascular:  S1S2+, RRR  Pulmonary:  CTA b/l  GI:  Soft, BS+, NT, ND, obese  Extremities:  No edema  + trach    Labs:    Recent Results (from the past 24 hour(s))   METABOLIC PANEL, BASIC    Collection Time: 19  3:06 AM   Result Value Ref Range    Sodium 140 136 - 145 mmol/L    Potassium 3.3 (L) 3.5 - 5.5 mmol/L    Chloride 101 100 - 108 mmol/L    CO2 28 21 - 32 mmol/L    Anion gap 11 3.0 - 18 mmol/L    Glucose 120 (H) 74 - 99 mg/dL    BUN 9 7.0 - 18 MG/DL    Creatinine 0.59 (L) 0.6 - 1.3 MG/DL    BUN/Creatinine ratio 15 12 - 20      GFR est AA >60 >60 ml/min/1.73m2 GFR est non-AA >60 >60 ml/min/1.73m2    Calcium 8.7 8.5 - 10.1 MG/DL   MAGNESIUM    Collection Time: 06/11/19  3:06 AM   Result Value Ref Range    Magnesium 2.0 1.6 - 2.6 mg/dL   PHOSPHORUS    Collection Time: 06/11/19  3:06 AM   Result Value Ref Range    Phosphorus 2.6 2.5 - 4.9 MG/DL   CBC WITH AUTOMATED DIFF    Collection Time: 06/11/19  3:06 AM   Result Value Ref Range    WBC 5.9 4.6 - 13.2 K/uL    RBC 4.75 4.20 - 5.30 M/uL    HGB 14.2 12.0 - 16.0 g/dL    HCT 44.4 35.0 - 45.0 %    MCV 93.5 74.0 - 97.0 FL    MCH 29.9 24.0 - 34.0 PG    MCHC 32.0 31.0 - 37.0 g/dL    RDW 15.4 (H) 11.6 - 14.5 %    PLATELET 939 939 - 539 K/uL    MPV 9.2 9.2 - 11.8 FL    NEUTROPHILS 59 40 - 73 %    LYMPHOCYTES 27 21 - 52 %    MONOCYTES 12 (H) 3 - 10 %    EOSINOPHILS 2 0 - 5 %    BASOPHILS 0 0 - 2 %    ABS. NEUTROPHILS 3.5 1.8 - 8.0 K/UL    ABS. LYMPHOCYTES 1.6 0.9 - 3.6 K/UL    ABS. MONOCYTES 0.7 0.05 - 1.2 K/UL    ABS. EOSINOPHILS 0.1 0.0 - 0.4 K/UL    ABS.  BASOPHILS 0.0 0.0 - 0.1 K/UL    DF AUTOMATED     GLUCOSE, POC    Collection Time: 06/11/19  5:16 AM   Result Value Ref Range    Glucose (POC) 133 (H) 70 - 110 mg/dL   GLUCOSE, POC    Collection Time: 06/11/19 11:15 AM   Result Value Ref Range    Glucose (POC) 154 (H) 70 - 110 mg/dL   GLUCOSE, POC    Collection Time: 06/11/19  4:24 PM   Result Value Ref Range    Glucose (POC) 134 (H) 70 - 110 mg/dL   TSH 3RD GENERATION    Collection Time: 06/11/19  5:02 PM   Result Value Ref Range    TSH 42.50 (H) 0.36 - 3.74 uIU/mL   T4, FREE    Collection Time: 06/11/19  5:02 PM   Result Value Ref Range    T4, Free 0.5 (L) 0.7 - 1.5 NG/DL   GLUCOSE, POC    Collection Time: 06/11/19  9:16 PM   Result Value Ref Range    Glucose (POC) 174 (H) 70 - 110 mg/dL     Additional Data Reviewed:      Signed By: Devin Marie MD     June 11, 2019 7:10 PM

## 2019-06-12 NOTE — ROUTINE PROCESS
Patient coughing asked it she wanted to be deep suctioned she said no. She asked for her ativan.  She has some bleeding around the trach after her coughing episode

## 2019-06-12 NOTE — PROGRESS NOTES
SLP Note:    Attempted follow up dysphagia tx; however, RN performing trach care. Will continue to follow per POC.      TOMMY Vazquez.S., [address] [facility]  Speech-Language Pathologist

## 2019-06-12 NOTE — PROGRESS NOTES
Problem: Falls - Risk of  Goal: *Absence of Falls  Description  Document Cherylle Reny Fall Risk and appropriate interventions in the flowsheet.   Outcome: Progressing Towards Goal     Problem: Nutrition Deficit  Goal: *Optimize nutritional status  Outcome: Progressing Towards Goal

## 2019-06-12 NOTE — PROGRESS NOTES
attempted to visit patient for two days and was not able. Patient was appeared to be asleep both times. Will follow up with patient as needed.   Nilam Smith, [address] [place] [address] Care Department  [phone]

## 2019-06-12 NOTE — DIABETES MGMT
Glycemic Control Plan of Care    T2DM with current A1c level of 7.1% (6/09/2019). See separate notes, 6/10/2019, for assessment of home diabetes management and education. Patient is s/p trach on 6/03/2019. She was able to communicate by writing. Home diabetes medication: Lantus insulin (vial) 90 units daily at bedtime. POC BG range on 6/11/2019: 133-174 mg/dL. POC BG report on 6/12/2019 at time of review: 121, 128 mg/dL. Patient sleeping when I attempted to see her. Inquired PO intake with Merlin Heman, RN, and received information that patient had pureed meal today. Current Meal Intake:  Patient Vitals for the past 100 hrs:   % Diet Eaten   06/12/19 1140 50 %   06/12/19 0841 25 %   06/11/19 1728 100 %   06/11/19 0922 50 %       Recommendation(s):  1.) Cont BG monitoring and insulin orders/dose: basal lantus and correctional.     Assessment:  Patient is 63year old with past medical history including type 2 diabetes mellitus with neuropathy, sleep apnea, s/p aortic valve replacement, morbid obesity, hypothyroidism, hypertension, chronic CHF and s/p thyroidectomy - was admitted on 6/03/2019 with report of stridor. Noted:  Vocal cord dysfunction. Status post tracheostomy on 6/03/2019. Hypothyroidism. Recent thyroidectomy on 4/11/2019. Morbid obesity. T2DM with current A1c of 8.4% (4/04/2019). Most recent blood glucose values:    Results for Steven Mehta (MRN [id number]) as of 6/12/2019 15:38   Ref. Range 6/11/2019 05:16 6/11/2019 11:15 6/11/2019 16:24 6/11/2019 21:16   GLUCOSE,FAST - POC Latest Ref Range: 70 - 110 mg/dL 133 (H) 154 (H) 134 (H) 174 (H)     Results for Steven Mehta (MRN [id number]) as of 6/12/2019 15:38   Ref. Range 6/12/2019 05:18 6/12/2019 11:03   GLUCOSE,FAST - POC Latest Ref Range: 70 - 110 mg/dL 121 (H) 128 (H)     Current A1C: 7.1% (6/09/2019) which is equivalent to estimated average blood glucose of 153 mg/dL during the past 2-3 months.     Current hospital diabetes medications:  Basal lantus insulin 18 units daily. Correctional lispro insulin ACHS. Very resistant dose. Total daily dose insulin requirement previous day: 6/11/2019:  Lantus: 18 units  Lispro: 6 units  TDD insulin: 24 units    Home diabetes medications: Obtained from patient on 6/06/2019:  Lantus insulin (trach, patient unable to speak but nodded head when I asked if she's on vial insulin). She nodded again when I asked if she's on 90 units of lantus daily at bedtime and she has meter monitoring her blood sugar. Diet: Diabetic  Consistent carb regular. Goals:  Blood glucose will be within target range of  mg/dL by 6/15/2019.     Education:    _X__  Refer to Diabetes Education Record: 6/10/2019  ___  Education not indicated at this time    Jennifer Will RN  Pager: [phone]

## 2019-06-13 ENCOUNTER — HOSPITAL ENCOUNTER (INPATIENT)
Dept: INTERVENTIONAL RADIOLOGY/VASCULAR | Age: 59
Discharge: HOME | DRG: 098 | End: 2019-06-13
Attending: RADIOLOGY
Payer: MEDICAID

## 2019-06-13 LAB
ANION GAP SERPL CALC-SCNC: 6 MMOL/L (ref 3–18)
BASOPHILS # BLD: 0 K/UL (ref 0–0.1)
BASOPHILS NFR BLD: 0 % (ref 0–2)
BUN SERPL-MCNC: 5 MG/DL (ref 7–18)
BUN/CREAT SERPL: 7 (ref 12–20)
CALCIUM SERPL-MCNC: 8.8 MG/DL (ref 8.5–10.1)
CHLORIDE SERPL-SCNC: 101 MMOL/L (ref 100–108)
CO2 SERPL-SCNC: 31 MMOL/L (ref 21–32)
CREAT SERPL-MCNC: 0.67 MG/DL (ref 0.6–1.3)
DIFFERENTIAL METHOD BLD: ABNORMAL
EOSINOPHIL # BLD: 0.1 K/UL (ref 0–0.4)
EOSINOPHIL NFR BLD: 1 % (ref 0–5)
ERYTHROCYTE [DISTWIDTH] IN BLOOD BY AUTOMATED COUNT: 15.3 % (ref 11.6–14.5)
GLUCOSE BLD STRIP.AUTO-MCNC: 124 MG/DL (ref 70–110)
GLUCOSE BLD STRIP.AUTO-MCNC: 124 MG/DL (ref 70–110)
GLUCOSE BLD STRIP.AUTO-MCNC: 139 MG/DL (ref 70–110)
GLUCOSE BLD STRIP.AUTO-MCNC: 164 MG/DL (ref 70–110)
GLUCOSE SERPL-MCNC: 130 MG/DL (ref 74–99)
HCT VFR BLD AUTO: 43.5 % (ref 35–45)
HGB BLD-MCNC: 14.7 G/DL (ref 12–16)
LYMPHOCYTES # BLD: 2.4 K/UL (ref 0.9–3.6)
LYMPHOCYTES NFR BLD: 31 % (ref 21–52)
MAGNESIUM SERPL-MCNC: 2 MG/DL (ref 1.6–2.6)
MCH RBC QN AUTO: 31.1 PG (ref 24–34)
MCHC RBC AUTO-ENTMCNC: 33.8 G/DL (ref 31–37)
MCV RBC AUTO: 92 FL (ref 74–97)
MONOCYTES # BLD: 0.7 K/UL (ref 0.05–1.2)
MONOCYTES NFR BLD: 9 % (ref 3–10)
NEUTS SEG # BLD: 4.7 K/UL (ref 1.8–8)
NEUTS SEG NFR BLD: 59 % (ref 40–73)
PHOSPHATE SERPL-MCNC: 3.1 MG/DL (ref 2.5–4.9)
PLATELET # BLD AUTO: 271 K/UL (ref 135–420)
PMV BLD AUTO: 9.1 FL (ref 9.2–11.8)
POTASSIUM SERPL-SCNC: 3.5 MMOL/L (ref 3.5–5.5)
RBC # BLD AUTO: 4.73 M/UL (ref 4.2–5.3)
SODIUM SERPL-SCNC: 138 MMOL/L (ref 136–145)
WBC # BLD AUTO: 7.9 K/UL (ref 4.6–13.2)

## 2019-06-13 PROCEDURE — 74011250637 HC RX REV CODE- 250/637: Performed by: HOSPITALIST

## 2019-06-13 PROCEDURE — 74011000250 HC RX REV CODE- 250

## 2019-06-13 PROCEDURE — 02HV33Z INSERTION OF INFUSION DEVICE INTO SUPERIOR VENA CAVA, PERCUTANEOUS APPROACH: ICD-10-PCS | Performed by: RADIOLOGY

## 2019-06-13 PROCEDURE — 92526 ORAL FUNCTION THERAPY: CPT

## 2019-06-13 PROCEDURE — 74011000258 HC RX REV CODE- 258

## 2019-06-13 PROCEDURE — 74011636637 HC RX REV CODE- 636/637

## 2019-06-13 PROCEDURE — 77030018719 IR PICC INSERT WO PORT OVER 5 YEARS WO IMG

## 2019-06-13 PROCEDURE — 84100 ASSAY OF PHOSPHORUS: CPT

## 2019-06-13 PROCEDURE — 85025 COMPLETE CBC W/AUTO DIFF WBC: CPT

## 2019-06-13 PROCEDURE — 74011636637 HC RX REV CODE- 636/637: Performed by: EMERGENCY MEDICINE

## 2019-06-13 PROCEDURE — 65270000029 HC RM PRIVATE

## 2019-06-13 PROCEDURE — 74011250636 HC RX REV CODE- 250/636: Performed by: NURSE PRACTITIONER

## 2019-06-13 PROCEDURE — 74011250636 HC RX REV CODE- 250/636: Performed by: HOSPITALIST

## 2019-06-13 PROCEDURE — 83735 ASSAY OF MAGNESIUM: CPT

## 2019-06-13 PROCEDURE — 82962 GLUCOSE BLOOD TEST: CPT

## 2019-06-13 PROCEDURE — 80048 BASIC METABOLIC PNL TOTAL CA: CPT

## 2019-06-13 PROCEDURE — 36415 COLL VENOUS BLD VENIPUNCTURE: CPT

## 2019-06-13 PROCEDURE — 74011000258 HC RX REV CODE- 258: Performed by: HOSPITALIST

## 2019-06-13 PROCEDURE — 3E0436Z INTRODUCTION OF NUTRITIONAL SUBSTANCE INTO CENTRAL VEIN, PERCUTANEOUS APPROACH: ICD-10-PCS | Performed by: RADIOLOGY

## 2019-06-13 PROCEDURE — 74011250636 HC RX REV CODE- 250/636

## 2019-06-13 RX ORDER — FAMOTIDINE 10 MG/ML
20 INJECTION INTRAVENOUS EVERY 12 HOURS
Status: DISCONTINUED | OUTPATIENT
Start: 2019-06-13 | End: 2019-06-21

## 2019-06-13 RX ADMIN — CALCIUM GLUCONATE: 94 INJECTION, SOLUTION INTRAVENOUS at 20:09

## 2019-06-13 RX ADMIN — LORAZEPAM 1 MG: 2 INJECTION INTRAMUSCULAR at 08:30

## 2019-06-13 RX ADMIN — LEVOTHYROXINE SODIUM 100 MCG: 100 TABLET ORAL at 06:31

## 2019-06-13 RX ADMIN — HYDROMORPHONE HYDROCHLORIDE 0.5 MG: 2 INJECTION INTRAMUSCULAR; INTRAVENOUS; SUBCUTANEOUS at 06:42

## 2019-06-13 RX ADMIN — HYDROMORPHONE HYDROCHLORIDE 1 MG: 2 INJECTION INTRAMUSCULAR; INTRAVENOUS; SUBCUTANEOUS at 14:51

## 2019-06-13 RX ADMIN — HYDROMORPHONE HYDROCHLORIDE 1 MG: 2 INJECTION INTRAMUSCULAR; INTRAVENOUS; SUBCUTANEOUS at 01:33

## 2019-06-13 RX ADMIN — LIDOCAINE HYDROCHLORIDE: 10 INJECTION, SOLUTION EPIDURAL; INFILTRATION; INTRACAUDAL; PERINEURAL at 00:20

## 2019-06-13 RX ADMIN — INSULIN LISPRO 3 UNITS: 100 INJECTION, SOLUTION INTRAVENOUS; SUBCUTANEOUS at 12:35

## 2019-06-13 RX ADMIN — ENOXAPARIN SODIUM 40 MG: 40 INJECTION SUBCUTANEOUS at 17:51

## 2019-06-13 RX ADMIN — FAMOTIDINE 20 MG: 10 INJECTION INTRAVENOUS at 20:38

## 2019-06-13 RX ADMIN — ONDANSETRON 4 MG: 2 INJECTION INTRAMUSCULAR; INTRAVENOUS at 00:44

## 2019-06-13 RX ADMIN — HYDROMORPHONE HYDROCHLORIDE 1 MG: 2 INJECTION INTRAMUSCULAR; INTRAVENOUS; SUBCUTANEOUS at 20:39

## 2019-06-13 RX ADMIN — INSULIN GLARGINE 18 UNITS: 100 INJECTION, SOLUTION SUBCUTANEOUS at 11:00

## 2019-06-13 NOTE — ROUTINE PROCESS
Bedside and Verbal shift change report given to Keith Wallis Rn (oncoming nurse) by Sepideh Shepherd RN (offgoing nurse). Report included the following information SBAR, Kardex, Procedure Summary, Intake/Output, MAR and Recent Results.

## 2019-06-13 NOTE — PROGRESS NOTES
[facility] Hospitalist Group  Progress Note    Patient: Jhony Herrmann Age: 63 y.o. : 1960 MR#: [id number] SSN: [id number]  Date/Time: 2019    Subjective:     Patient sitting in recliner in NAD, has tracheostomy    Assessment/Plan:     -DM2  - s/p Tracheostomy for vocal cord paralysis  - HTN  - Dysphagia  - Morbid Obesity      PLAN    Surg - PICC line to start TPN - nutrition to start TPN   Hypothyroidism - TSH elevated at 42.5 - has had total thyroidectomy recently - started on synthroid 100mcg - is likely aspirating - will switch to IV - discussed with Pharmacy   HypoK+ - repleting - check labs in AM   Management of tracheostomy as per surgeon , pulm signed off    Per Pulm - encouraged OOB to chair    On lantus, ssi, monitor  Diet as per speech & swallow   Monitor BP  D/w patient  Spoke with daughter & updated her      Case discussed with:  [x]Patient  []Family  [x]Nursing  []Case Management  DVT Prophylaxis:  [x]Lovenox  []Hep SQ  []SCDs  []Coumadin   []On Heparin gtt    Objective:   VS:   Visit Vitals  /72 (BP 1 Location: Left arm)   Pulse 68   Temp 98.1 °F (36.7 °C)   Resp 16   Ht 5' 7\" (1.702 m)   Wt 121.1 kg (267 lb)   SpO2 98%   Breastfeeding?  No   BMI 41.82 kg/m²      Tmax/24hrs: Temp (24hrs), Av.8 °F (37.1 °C), Min:98.1 °F (36.7 °C), Max:99.6 °F (37.6 °C)    Input/Output:     Intake/Output Summary (Last 24 hours) at 2019 194  Last data filed at 2019 1236  Gross per 24 hour   Intake 150 ml   Output 500 ml   Net -350 ml       General:  Awake, alert  Cardiovascular:  S1S2+, RRR  Pulmonary:  CTA b/l  GI:  Soft, BS+, NT, ND, obese  Extremities:  No edema  + trach    Labs:    Recent Results (from the past 24 hour(s))   GLUCOSE, POC    Collection Time: 19  9:37 PM   Result Value Ref Range    Glucose (POC) 125 (H) 70 - 695 mg/dL   METABOLIC PANEL, BASIC    Collection Time: 19 12:54 AM   Result Value Ref Range    Sodium 138 136 - 145 mmol/L Potassium 3.5 3.5 - 5.5 mmol/L    Chloride 101 100 - 108 mmol/L    CO2 31 21 - 32 mmol/L    Anion gap 6 3.0 - 18 mmol/L    Glucose 130 (H) 74 - 99 mg/dL    BUN 5 (L) 7.0 - 18 MG/DL    Creatinine 0.67 0.6 - 1.3 MG/DL    BUN/Creatinine ratio 7 (L) 12 - 20      GFR est AA >60 >60 ml/min/1.73m2    GFR est non-AA >60 >60 ml/min/1.73m2    Calcium 8.8 8.5 - 10.1 MG/DL   MAGNESIUM    Collection Time: 06/13/19 12:54 AM   Result Value Ref Range    Magnesium 2.0 1.6 - 2.6 mg/dL   PHOSPHORUS    Collection Time: 06/13/19 12:54 AM   Result Value Ref Range    Phosphorus 3.1 2.5 - 4.9 MG/DL   CBC WITH AUTOMATED DIFF    Collection Time: 06/13/19 12:54 AM   Result Value Ref Range    WBC 7.9 4.6 - 13.2 K/uL    RBC 4.73 4.20 - 5.30 M/uL    HGB 14.7 12.0 - 16.0 g/dL    HCT 43.5 35.0 - 45.0 %    MCV 92.0 74.0 - 97.0 FL    MCH 31.1 24.0 - 34.0 PG    MCHC 33.8 31.0 - 37.0 g/dL    RDW 15.3 (H) 11.6 - 14.5 %    PLATELET 159 051 - 619 K/uL    MPV 9.1 (L) 9.2 - 11.8 FL    NEUTROPHILS 59 40 - 73 %    LYMPHOCYTES 31 21 - 52 %    MONOCYTES 9 3 - 10 %    EOSINOPHILS 1 0 - 5 %    BASOPHILS 0 0 - 2 %    ABS. NEUTROPHILS 4.7 1.8 - 8.0 K/UL    ABS. LYMPHOCYTES 2.4 0.9 - 3.6 K/UL    ABS. MONOCYTES 0.7 0.05 - 1.2 K/UL    ABS. EOSINOPHILS 0.1 0.0 - 0.4 K/UL    ABS.  BASOPHILS 0.0 0.0 - 0.1 K/UL    DF AUTOMATED     GLUCOSE, POC    Collection Time: 06/13/19  6:42 AM   Result Value Ref Range    Glucose (POC) 124 (H) 70 - 110 mg/dL   GLUCOSE, POC    Collection Time: 06/13/19 11:38 AM   Result Value Ref Range    Glucose (POC) 164 (H) 70 - 110 mg/dL   GLUCOSE, POC    Collection Time: 06/13/19  3:55 PM   Result Value Ref Range    Glucose (POC) 139 (H) 70 - 110 mg/dL     Additional Data Reviewed:      Signed By: Gwen Bueno MD     June 13, 2019 7:10 PM

## 2019-06-13 NOTE — PROGRESS NOTES
Surgery  About the same, seems depressed.    AF VSS  PICC placed will start TPN  Pushed OOB and ISS  Needs motivation considering rehab vs SNF

## 2019-06-13 NOTE — PROGRESS NOTES
Problem: Dysphagia (Adult)  Goal: *Acute Goals and Plan of Care (Insert Text)  Description  Patient will:  1. Tolerate PO trials with 0 s/s overt distress in 4/5 trials  2. Utilize compensatory swallow strategies/maneuvers (decrease bite/sip, size/rate, alt. liq/sol) with min cues in 4/5 trials  3. Perform oral-motor/laryngeal exercises to increase oropharyngeal swallow function with min cues  4. Complete an objective swallow study (i.e., MBSS) to assess swallow integrity, r/o aspiration, and determine of safest LRD, min A as indicated/ordered by MD     Recommend:   NPO; noted plan for TPN  Strict aspiration precautions (HOB >30 degrees at all times, Oral care TID)  *Recommend consider cuff deflation and passy margie valve evaluation   Outcome: Not Progressing Towards Goal     SPEECH LANGUAGE PATHOLOGY DYSPHAGIA TREATMENT    Patient: Stephan Gordon (81 y.o. female)  Date: 6/13/2019  Diagnosis: Stridor [R06.1]  Stridor [R06.1] <principal problem not specified>  Procedure(s) (LRB):  TRACHEOSTOMY (N/A) 10 Days Post-Op  Precautions: Aspiration, Fall  PLOF: Regular diet with thin liquids    ASSESSMENT:  Pt seen for follow up dysphagia tx. Pt with Portex #9 in place, cuff remains inflated per MD.  Pt communicating via pen/paper that foods continue to \"get me choked\" and reports \"only eating because I have to\". Pt agreeable to trials of NTL by tsp, demo immediate weak cough and vocal quality change. Laryngeal elevation weakened/decreased upon observation. Pt remains at high risk of aspiration with oral intake. Recommend NPO; noted plan for TPN initiation. Further recommend consideration of cuff deflation to faciltitate possible use of passy margie valve for improved ability to communicate basic wants/needs/ideas. Results/recommendations discussed with pt and RN. Will follow for further dysphagia management as indicated. Progression toward goals:  ?         Improving appropriately and progressing toward goals  ? Improving slowly and progressing toward goals  ? Not making progress toward goals and plan of care will be adjusted     PLAN:  Recommendations and Planned Interventions:  Cuff deflation for passy margie valve evaluation  Patient continues to benefit from skilled intervention to address the above impairments. Continue treatment per established plan of care. Discharge Recommendations: To Be Determined     SUBJECTIVE:   Patient stated ? I feel so alone? . (via pen/paper)    OBJECTIVE:   Cognitive and Communication Status:  Neurologic State: Alert  Orientation Level: Oriented X4  Cognition: Appropriate for age attention/concentration  Perception: Appears intact  Perseveration: No perseveration noted  Safety/Judgement: Awareness of environment, Fall prevention  Dysphagia Treatment:  P.O. Trials:   Consistency Presented: Nectar thick liquid   How Presented: Self-fed/presented, Spoon   How Much: (x1 tsp)   Bolus Acceptance: No impairment   Bolus Formation/Control: Impaired   Type of Impairment: Delayed   Propulsion: Delayed (# of seconds)   Oral Residue: None   Initiation of Swallow: Delayed (# of seconds)   Laryngeal Elevation: Decreased, Weak   Aspiration Signs/Symptoms: Weak cough, Delayed cough/throat clear, Change vocal quality   Pharyngeal Phase Characteristics: Feeling of discomfort, Poor endurance, Altered vocal quality   Effective Modifications: None     PAIN:  No pain reported prior to or following tx     After treatment:   ?              Patient left in no apparent distress sitting up in chair  ? Patient left in no apparent distress in bed  ? Call bell left within reach  ? Nursing notified  ? Family present  ? Caregiver present  ? Bed alarm activated      COMMUNICATION/EDUCATION:   ? Aspiration precautions; swallow safety; compensatory techniques  ? Patient/family able to participate in training and education   ? Patient unable to participate in training and education, education ongoing with staff   ?  Patient understands goals and intent of therapy; neutral about participation     Monika Zuleta M.S., [address] [facility], [place]  Speech-Language Pathologist

## 2019-06-13 NOTE — PROGRESS NOTES
Attempted to see patient for PT treatment however nursing and MD are in with patient. Will continue to follow.   Eleanor Washington, PT

## 2019-06-13 NOTE — PROGRESS NOTES
OT chart reviewed for today's OT re-evaluation/tx session. Pt not seen for skilled OT as pt is about to be bathed by nursing. Will f/u at a later time as pt's schedule allows.     Thank you for this referral.  Mary Willson MS, OTR/L

## 2019-06-13 NOTE — PROCEDURES
INTERVENTIONAL RADIOLOGY POST PICC LINE NOTE     June 13, 2019       9:04 AM     Preoperative Diagnosis:   IV Access for TPN    Postoperative Diagnosis:  Same. :  Claudia Murray PA-C    Assistant:  None. Attending Physician: Dr Vesta Cesar    Type of Anesthesia:  1% Lidocaine locally    Procedure/Description: Right upper extremity PICC Line. Findings: Successful placement of a 6 Fr dual lumen power injectable PICC line in the basilic vein. Tip at SVC/RA junction. OK to use. Length 44 cm. Estimated blood Loss: Minimal    Specimen Removed: None    Drains: None     Complications:  None. Condition:  Stable.     Discharge Plan: Return to nursing unit    Levar Masterson

## 2019-06-13 NOTE — PROGRESS NOTES
Bedside and Verbal shift change report given to Dustin Blanchard RN (oncoming nurse) by Mauro Allen RN (offgoing nurse). Report included the following information SBAR and Kardex.

## 2019-06-13 NOTE — PROGRESS NOTES
63year old lady who is S/p tracheostomy for vocal cord dysfunction following thyroidectomy. Also had dysphagia- mild oral and mod to severe pharyngeal.  Surgery seen the patient and recommended placement of PICC line and recommended TPN. Surgery also managing the tracheostomy. Hospitalist team managing active medical issues. No new recommendations from pulmonary perspective. Pulmonary medicine will sign off. However will be happy to see her again if needed. Discussed with Dr Abdias Barillas.

## 2019-06-13 NOTE — DIABETES MGMT
Glycemic Control Plan of Care    T2DM with current A1c level of 7.1% (6/09/2019). See separate notes, 6/10/2019, for assessment of home diabetes management and education. Patient is s/p trach on 6/03/2019. She was able to communicate by writing. Home diabetes medication: Lantus insulin (vial) 90 units daily at bedtime. Current hospital diabetes medications: Lantus insulin 18 units and very resistant dose of correctional lispro insulin. POC BG range on 6/12/2019: 121-141 mg/dL. POC BG report on 6/13/2019 at time of review: 124, 164 mg/dL. Current Meal Intake:  Patient Vitals for the past 100 hrs:   % Diet Eaten   06/12/19 1140 50 %   06/12/19 0841 25 %   06/11/19 1728 100 %   06/11/19 0922 50 %     Noted PICC line placed today and plan for TPN. Patient communicate by writing. She's currently on diabetic consistent carb diet but eating very little amount mostly liquid. She cannot tolerate anything with artificial sweeteners because it gives her headache. Recommendation(s):  1.) Cont BG monitoring and insulin orders/dose: basal lantus and correctional.     Assessment:  Patient is 63year old with past medical history including type 2 diabetes mellitus with neuropathy, sleep apnea, s/p aortic valve replacement, morbid obesity, hypothyroidism, hypertension, chronic CHF and s/p thyroidectomy - was admitted on 6/03/2019 with report of stridor. Noted:  Vocal cord dysfunction. Status post tracheostomy on 6/03/2019. Hypothyroidism. Recent thyroidectomy on 4/11/2019. Morbid obesity. T2DM with current A1c of 8.4% (4/04/2019). Most recent blood glucose values:    Results for Mckenna Sine (MRN [id number]) as of 6/13/2019 11:37   Ref. Range 6/12/2019 05:18 6/12/2019 11:03 6/12/2019 15:54 6/12/2019 21:37   GLUCOSE,FAST - POC Latest Ref Range: 70 - 110 mg/dL 121 (H) 128 (H) 141 (H) 125 (H)     Results for Mckenna Sine (MRN [id number]) as of 6/13/2019 11:37   Ref.  Range 6/13/2019 06:42 6/13/2019 11:38   GLUCOSE,FAST - POC Latest Ref Range: 70 - 110 mg/dL 124 (H) 164 (H)     Current A1C: 7.1% (6/09/2019) which is equivalent to estimated average blood glucose of 153 mg/dL during the past 2-3 months. Current hospital diabetes medications:  Basal lantus insulin 18 units daily. Correctional lispro insulin ACHS. Very resistant dose. Total daily dose insulin requirement previous day: 6/12/2019:  Lantus: 18 units  Lispro: None. Patient did not require correctional coverage. TDD insulin: 18 units    Home diabetes medications: Obtained from patient on 6/06/2019:  Lantus insulin (trach, patient unable to speak but nodded head when I asked if she's on vial insulin). She nodded again when I asked if she's on 90 units of lantus daily at bedtime and she has meter monitoring her blood sugar. Diet: Diabetic  Consistent carb regular. Goals:  Blood glucose will be within target range of  mg/dL by 6/16/2019.     Education:    _X__  Refer to Diabetes Education Record: 6/10/2019  ___  Education not indicated at this time    Gurdeep Berg, RN  Pager: [phone]

## 2019-06-13 NOTE — PROGRESS NOTES
Unable to see patient at this time as patient is currently requesting therapy come back tomorrow. Pt wrote on her notepad \"I had pain medicine after they cleaned out my throat. I am still kind of loopy\". Will continue to follow and see patient as schedule allows.         Thank you for the referral.    Pinky Marie MS OTR/L

## 2019-06-13 NOTE — PROGRESS NOTES
NUTRITION CONSULT    Nutrition Consult: Management of Tube Feeding, TPN: RD to Manage     RECOMMENDATIONS / PLAN:     - Plan to provide parenteral nutrition support, monitor labs and adjust electrolytes daily. Check CMP, prealbumin and triglyceride.   - Consider long term feeding tube placement to provide enteral nutrition until pt able to tolerate adequate intake of oral diet. - Continue RD inpatient monitoring and evaluation. Please Note:   Parenteral nutrition support to be provided using custom product, allowing for modification of electrolyte and macronutrient content day to day. Lipids included in PN on MWF. Macronutrient Goal (to meet 100% of estimated nutrition needs): 129 gm amino acids, 310 gm dextrose, 250 mL lipids (1784 kcal weekly average). PARENTERAL NUTRITION ORDER:     Electrolyte Adjustments: not applicable, first PN bag to start tonight     Day 1 PN to provide: 140 mEq Na, 59 mEq K, 15 mEq Ca, 18 mEq Mg, 30 mmol Phos, 910 kcal, 100 gm amino acids, 150 gm dextrose, 10 mL MVI, 2.5 mL trace elements    PN Rate: 100 mL/hr   Glucose Infusion Rate: 0.86 mg/kg/min    Osmolarity: 922 mOsm   Parenteral Nutrition Access Device: right basilic double lumen PICC (placed 6/13/19)  Indication for PN: per MD request, diet intolerance 2/2 dysphagia s/p tracheostomy   Refeeding Risk:      []  Yes  [x] No     NUTRITION DIAGNOSIS & INTERVENTIONS:     [x] Parenteral nutrition: initiate   [x] Meals/snacks: modified composition, diet as tolerated per MD   [x] Collaboration and referral of nutrition care: MD to review PN order and note daily; will not call to verify content and changes, per MD request. RD obtained order for consult to initiate TPN from Dr Antonia Collazo. Nutrition Diagnosis: Swallowing difficulty related to dysphagia s/p tracheostomy as evidenced by pt NPO after SLP evaluation/MBS. ASSESSMENT:     6/13: PICC placed to initiate TPN per MD request.   6/11: Pt with variable meal intake.  Reports some difficulty tolerating certain foods, but ate 50% of breakfast this AM.  Encourage pt to ask for soft foods as desired. Diet changed to diabetic, BG in better control. 6/10: Was tolerating tube feeds until pt pulled NGT 6/8, receiving IVF since then. Diet started today by MD.  Pt reports being hungry. Pt with intolerance to artificial sweeteners, monitor BG levels and assess need for diabetic diet. 6/7: Formula changed to Jevity 1.5 prior to initiation 2/2 concern for sucrose sensitivity. Will continue current product due to questionable sensitivity of ingredients in Glucerna tube feeding. Tolerating rate at goal.  Elevated BG levels, but expected 2/2 formula needing to be given, discussed with MD and glycemin control, will attempt to manage BG with medication due to formula limitations. IVF discontinued. 6/6: Failed swallow evaluation/MBS; repeat MBS ordered for today. C/o gas pains, but is passing flatus, and +BM. Some abdominal distension noted. Asking for water. DHT placed by IR, no tube feeding ordered at this time. 6/5: Admitted with stridor and vocal cord dysfunction s/p tracheostomy placement on 6/3/19, tolerating trach collar and able to eat if passes swallow evaluation per MD- may deflate cuff for meals but must re-inflate cuff when pt is not eating per Surgery. Pt refused NGT placement.     Average po intake adequate to meet patients estimated nutritional needs:   [] Yes     [x] No   [] Unable to determine at this time    PN infusion adequate to meet patients estimated nutritional needs:   [] Yes     [x] No   [] Unable to determine at this time    Diet: DIET DIABETIC CONSISTENT CARB Regular  TPN ADULT - CENTRAL      Food Allergies: sweeteners, sucrose  Current Appetite:   [] Good     [x] Fair     [] Poor     [] Other:  Appetite/meal intake prior to admission:   [x] Good     [] Fair     [] Poor     [] Other:  Feeding Limitations:  [x] Swallowing difficulty: SLP following    [] Chewing difficulty    [x] Other: hx of dysphagia s/p thyroidectomy PTA and trach placement 6/3/19  Current Meal Intake:   Patient Vitals for the past 100 hrs:   % Diet Eaten   06/13/19 1236 0 %   06/13/19 0830 0 %   06/12/19 1140 50 %   06/12/19 0841 25 %   06/11/19 1728 100 %   06/11/19 0922 50 %       BM: 6/11, loose    Skin Integrity: surgical incision to neck  Edema:  [] No     [x] Yes   Pertinent Medications: Reviewed: zofran, phenergan, pepcid, SSI ACHS very resistant scale, 18 units lantus      Labs: BMP, MG, Phos ordered daily; CMP, Triglyceride, Prealbumin ordered initially and weekly  Recent Labs     06/13/19  0054 06/12/19  0624 06/11/19  0306    138 140   K 3.5 3.1* 3.3*    101 101   CO2 31 30 28   * 131* 120*   BUN 5* 6* 9   CREA 0.67 0.54* 0.59*   CA 8.8 8.5 8.7   MG 2.0 2.0 2.0   PHOS 3.1 3.5 2.6     Prealbumin: ordered   Triglyceride: ordered, 511 mg/dL (4/16/19)  Recent Labs     06/13/19  0054 06/12/19  0624 06/11/19  0306   WBC 7.9 6.7 5.9   HGB 14.7 13.2 14.2   HCT 43.5 40.9 44.4    242 246         Intake/Output Summary (Last 24 hours) at 6/13/2019 1337  Last data filed at 6/13/2019 1236  Gross per 24 hour   Intake 150 ml   Output 500 ml   Net -350 ml       Anthropometrics:   Ht Readings from Last 1 Encounters:   06/03/19 5' 7\" (1.702 m)     Last 3 Recorded Weights in this Encounter    06/06/19 1401 06/10/19 0815 06/12/19 1740   Weight: 116.8 kg (257 lb 8 oz) 119.5 kg (263 lb 8 oz) 121.1 kg (267 lb)     Body mass index is 41.82 kg/m².    Obese, Class III    Weight History: patient denies change in weight PTA, stable     Weight Metrics 6/12/2019 6/3/2019 5/23/2019 5/22/2019 5/16/2019 4/26/2019 4/16/2019   Weight 267 lb - 260 lb 6.4 oz - 258 lb 256 lb 260 lb 9.6 oz   BMI - 41.82 kg/m2 - 40.78 kg/m2 40.41 kg/m2 40.1 kg/m2 -          Admitting Diagnosis: Stridor [R06.1]  Stridor [R06.1]  Pertinent PMHx: T2DM with diabetic neuropathy, HTN, dyslipidemia, CHF, hypothyroidism s/p thyroidectomy 4/4/19    Education Needs:        [x] None identified  [] Identified - Not appropriate at this time  []  Identified and addressed - refer to education log  Learning Limitations:   [] None identified  [x] Identified: nonverbal     Cultural, Holiness & ethnic food preferences:  [x] None identified    [] Identified and addressed     ESTIMATED NUTRITION NEEDS:     Calories: 9025-7952 kcal (MSJx1-1.3) based on   [x] Actual  kg     [] IBW:   Protein:  gm (0.8-1.2 gm/kg) based on   [x] Actual BW      [] IBW:   Fluid: 1 mL/kcal     MONITORING & EVALUATION:     Nutrition Goals:   1. Nutritional needs will be met through adequate oral intake or nutrition support within the next 5 days. Outcome:  [] Met/Ongoing    [x] Progressing towards goal    [] Not progressing towards goal    [] New/Initial goal      Monitoring:  [x] Parenteral nutrition intake and administration  [x] Biochemical data, medical tests, and procedures   [x] Fluid intake   [x] Nutrition-focused physical findings   [x] Treatment/therapy   [x] Food and beverage intake   [x] Diet order      [] Weight        Previous Recommendations (for follow-up assessments only):     []   Implemented       []   Not Implemented (RD to address)      [x] No Longer Appropriate     [] No Recommendation Made        Discharge Planning: Nutrition recommendations pending patient's ability to tolerate po intake/enteral nutrition.    [x]  Participated in care planning, discharge planning, & interdisciplinary rounds as appropriate      Steve Tavarez, [address], [address] [place] [address]   Pager: [phone]

## 2019-06-13 NOTE — ROUTINE PROCESS
2128 Patient in bed AAOx4 , requesting pain medication for neck/throat pain ,gave dilaudid 1 mg iv. Tracheostomy and mouth care provided. suctioned as needed. No respiratory distress noted. 0518 Tracheostomy inner canula changed. Kwasi Hu

## 2019-06-14 LAB
ALBUMIN SERPL-MCNC: 2.7 G/DL (ref 3.4–5)
ALBUMIN/GLOB SERPL: 0.6 {RATIO} (ref 0.8–1.7)
ALP SERPL-CCNC: 72 U/L (ref 45–117)
ALT SERPL-CCNC: 12 U/L (ref 13–56)
ANION GAP SERPL CALC-SCNC: 8 MMOL/L (ref 3–18)
AST SERPL-CCNC: 11 U/L (ref 15–37)
BACTERIA SPEC CULT: NORMAL
BACTERIA SPEC CULT: NORMAL
BASOPHILS # BLD: 0 K/UL (ref 0–0.1)
BASOPHILS NFR BLD: 0 % (ref 0–2)
BILIRUB SERPL-MCNC: 0.7 MG/DL (ref 0.2–1)
BUN SERPL-MCNC: 8 MG/DL (ref 7–18)
BUN/CREAT SERPL: 13 (ref 12–20)
CALCIUM SERPL-MCNC: 8.4 MG/DL (ref 8.5–10.1)
CHLORIDE SERPL-SCNC: 99 MMOL/L (ref 100–108)
CO2 SERPL-SCNC: 32 MMOL/L (ref 21–32)
CREAT SERPL-MCNC: 0.64 MG/DL (ref 0.6–1.3)
DIFFERENTIAL METHOD BLD: ABNORMAL
EOSINOPHIL # BLD: 0.1 K/UL (ref 0–0.4)
EOSINOPHIL NFR BLD: 2 % (ref 0–5)
ERYTHROCYTE [DISTWIDTH] IN BLOOD BY AUTOMATED COUNT: 15.5 % (ref 11.6–14.5)
GLOBULIN SER CALC-MCNC: 4.2 G/DL (ref 2–4)
GLUCOSE BLD STRIP.AUTO-MCNC: 182 MG/DL (ref 70–110)
GLUCOSE BLD STRIP.AUTO-MCNC: 211 MG/DL (ref 70–110)
GLUCOSE BLD STRIP.AUTO-MCNC: 240 MG/DL (ref 70–110)
GLUCOSE BLD STRIP.AUTO-MCNC: 242 MG/DL (ref 70–110)
GLUCOSE SERPL-MCNC: 197 MG/DL (ref 74–99)
HCT VFR BLD AUTO: 40 % (ref 35–45)
HGB BLD-MCNC: 12.9 G/DL (ref 12–16)
LYMPHOCYTES # BLD: 1.4 K/UL (ref 0.9–3.6)
LYMPHOCYTES NFR BLD: 23 % (ref 21–52)
MAGNESIUM SERPL-MCNC: 2.1 MG/DL (ref 1.6–2.6)
MCH RBC QN AUTO: 29.7 PG (ref 24–34)
MCHC RBC AUTO-ENTMCNC: 32.3 G/DL (ref 31–37)
MCV RBC AUTO: 92.2 FL (ref 74–97)
MONOCYTES # BLD: 0.5 K/UL (ref 0.05–1.2)
MONOCYTES NFR BLD: 8 % (ref 3–10)
NEUTS SEG # BLD: 4.2 K/UL (ref 1.8–8)
NEUTS SEG NFR BLD: 67 % (ref 40–73)
PHOSPHATE SERPL-MCNC: 3.2 MG/DL (ref 2.5–4.9)
PLATELET # BLD AUTO: 239 K/UL (ref 135–420)
PMV BLD AUTO: 8.7 FL (ref 9.2–11.8)
POTASSIUM SERPL-SCNC: 3.2 MMOL/L (ref 3.5–5.5)
PREALB SERPL-MCNC: 12.9 MG/DL (ref 20–40)
PROT SERPL-MCNC: 6.9 G/DL (ref 6.4–8.2)
RBC # BLD AUTO: 4.34 M/UL (ref 4.2–5.3)
SERVICE CMNT-IMP: NORMAL
SERVICE CMNT-IMP: NORMAL
SODIUM SERPL-SCNC: 139 MMOL/L (ref 136–145)
TRIGL SERPL-MCNC: 316 MG/DL (ref ?–150)
WBC # BLD AUTO: 6.2 K/UL (ref 4.6–13.2)

## 2019-06-14 PROCEDURE — 65270000029 HC RM PRIVATE

## 2019-06-14 PROCEDURE — 74011636637 HC RX REV CODE- 636/637

## 2019-06-14 PROCEDURE — 94761 N-INVAS EAR/PLS OXIMETRY MLT: CPT

## 2019-06-14 PROCEDURE — 74011000250 HC RX REV CODE- 250: Performed by: HOSPITALIST

## 2019-06-14 PROCEDURE — 84100 ASSAY OF PHOSPHORUS: CPT

## 2019-06-14 PROCEDURE — 74011636637 HC RX REV CODE- 636/637: Performed by: EMERGENCY MEDICINE

## 2019-06-14 PROCEDURE — 77030027138 HC INCENT SPIROMETER -A

## 2019-06-14 PROCEDURE — 97168 OT RE-EVAL EST PLAN CARE: CPT

## 2019-06-14 PROCEDURE — 74011000258 HC RX REV CODE- 258

## 2019-06-14 PROCEDURE — 80053 COMPREHEN METABOLIC PANEL: CPT

## 2019-06-14 PROCEDURE — 83735 ASSAY OF MAGNESIUM: CPT

## 2019-06-14 PROCEDURE — 97116 GAIT TRAINING THERAPY: CPT

## 2019-06-14 PROCEDURE — 77010033678 HC OXYGEN DAILY

## 2019-06-14 PROCEDURE — 82962 GLUCOSE BLOOD TEST: CPT

## 2019-06-14 PROCEDURE — 97535 SELF CARE MNGMENT TRAINING: CPT

## 2019-06-14 PROCEDURE — 84134 ASSAY OF PREALBUMIN: CPT

## 2019-06-14 PROCEDURE — 74011000250 HC RX REV CODE- 250

## 2019-06-14 PROCEDURE — 97164 PT RE-EVAL EST PLAN CARE: CPT

## 2019-06-14 PROCEDURE — 74011250636 HC RX REV CODE- 250/636: Performed by: HOSPITALIST

## 2019-06-14 PROCEDURE — 84478 ASSAY OF TRIGLYCERIDES: CPT

## 2019-06-14 PROCEDURE — 85025 COMPLETE CBC W/AUTO DIFF WBC: CPT

## 2019-06-14 PROCEDURE — 74011250636 HC RX REV CODE- 250/636

## 2019-06-14 PROCEDURE — 97530 THERAPEUTIC ACTIVITIES: CPT

## 2019-06-14 RX ORDER — INSULIN GLARGINE 100 [IU]/ML
22 INJECTION, SOLUTION SUBCUTANEOUS DAILY
Status: DISCONTINUED | OUTPATIENT
Start: 2019-06-15 | End: 2019-06-21

## 2019-06-14 RX ORDER — POTASSIUM CHLORIDE 7.45 MG/ML
10 INJECTION INTRAVENOUS
Status: COMPLETED | OUTPATIENT
Start: 2019-06-14 | End: 2019-06-14

## 2019-06-14 RX ORDER — POTASSIUM CHLORIDE 7.45 MG/ML
10 INJECTION INTRAVENOUS
Status: CANCELLED | OUTPATIENT
Start: 2019-06-14 | End: 2019-06-14

## 2019-06-14 RX ADMIN — FAMOTIDINE 20 MG: 10 INJECTION INTRAVENOUS at 08:56

## 2019-06-14 RX ADMIN — LORAZEPAM 1 MG: 2 INJECTION INTRAMUSCULAR at 09:08

## 2019-06-14 RX ADMIN — INSULIN GLARGINE 18 UNITS: 100 INJECTION, SOLUTION SUBCUTANEOUS at 08:56

## 2019-06-14 RX ADMIN — HYDROMORPHONE HYDROCHLORIDE 1 MG: 2 INJECTION INTRAMUSCULAR; INTRAVENOUS; SUBCUTANEOUS at 04:09

## 2019-06-14 RX ADMIN — INSULIN LISPRO 6 UNITS: 100 INJECTION, SOLUTION INTRAVENOUS; SUBCUTANEOUS at 16:38

## 2019-06-14 RX ADMIN — INSULIN LISPRO 6 UNITS: 100 INJECTION, SOLUTION INTRAVENOUS; SUBCUTANEOUS at 23:47

## 2019-06-14 RX ADMIN — HYDROMORPHONE HYDROCHLORIDE 1 MG: 2 INJECTION INTRAMUSCULAR; INTRAVENOUS; SUBCUTANEOUS at 15:03

## 2019-06-14 RX ADMIN — HYDROMORPHONE HYDROCHLORIDE 1 MG: 2 INJECTION INTRAMUSCULAR; INTRAVENOUS; SUBCUTANEOUS at 23:50

## 2019-06-14 RX ADMIN — INSULIN LISPRO 3 UNITS: 100 INJECTION, SOLUTION INTRAVENOUS; SUBCUTANEOUS at 05:58

## 2019-06-14 RX ADMIN — LEVOTHYROXINE SODIUM 75 MCG: 100 INJECTION, POWDER, LYOPHILIZED, FOR SOLUTION INTRAVENOUS at 08:57

## 2019-06-14 RX ADMIN — POTASSIUM CHLORIDE 10 MEQ: 10 INJECTION, SOLUTION INTRAVENOUS at 15:39

## 2019-06-14 RX ADMIN — ENOXAPARIN SODIUM 40 MG: 40 INJECTION SUBCUTANEOUS at 17:34

## 2019-06-14 RX ADMIN — POTASSIUM CHLORIDE 10 MEQ: 10 INJECTION, SOLUTION INTRAVENOUS at 14:00

## 2019-06-14 RX ADMIN — HYDROMORPHONE HYDROCHLORIDE 1 MG: 2 INJECTION INTRAMUSCULAR; INTRAVENOUS; SUBCUTANEOUS at 20:00

## 2019-06-14 RX ADMIN — FAMOTIDINE 20 MG: 10 INJECTION INTRAVENOUS at 21:00

## 2019-06-14 RX ADMIN — INSULIN LISPRO 6 UNITS: 100 INJECTION, SOLUTION INTRAVENOUS; SUBCUTANEOUS at 11:33

## 2019-06-14 RX ADMIN — CALCIUM GLUCONATE: 94 INJECTION, SOLUTION INTRAVENOUS at 19:45

## 2019-06-14 RX ADMIN — HYDROMORPHONE HYDROCHLORIDE 1 MG: 2 INJECTION INTRAMUSCULAR; INTRAVENOUS; SUBCUTANEOUS at 07:41

## 2019-06-14 NOTE — ROUTINE PROCESS
2100 Tracheostomy gauze split changed, no respiratory distress noted. TPN stated via PICC line. Patient requesting pain medication dilaudid 1 mg given. 0010 Patient resting quietly. 0400 Traque Inner canula and gauze split changed , patient tolerated procedure. Assisted to [facility] voided 400 ml of devorah urine.

## 2019-06-14 NOTE — DIABETES MGMT
Glycemic Control Plan of Care    T2DM with current A1c level of 7.1% (6/09/2019). See separate notes, 6/10/2019, for assessment of home diabetes management and education. Patient is s/p trach on 6/03/2019. She was able to communicate by writing. Home diabetes medication: Lantus insulin (vial) 90 units daily at bedtime. Current hospital diabetes medications: Lantus insulin 18 units and very resistant dose of correctional lispro insulin. POC BG range on 6/13/2019: 124-164 mg/dL. POC BG report on 6/14/2019 at time of review: 182, 211 mg/dL. Day #2 TPN via PICC line. Noted report that patient did not eat breakfast but did not have problem eating lunch. She tolerated thick liquid soup and mashed potatoes. She cannot tolerate anything with artificial sweeteners because it causes headache. Current Meal Intake:  Patient Vitals for the past 100 hrs:   % Diet Eaten   06/14/19 0741 0 %   06/13/19 1236 0 %   06/13/19 0830 0 %   06/12/19 1140 50 %   06/12/19 0841 25 %   06/11/19 1728 100 %   06/11/19 0922 50 %     Recommendation(s):  1.) Consider increasing basal lantus insulin dose from 18 to 22 units daily. Cont on very resistant dose of correctional lispro insulin. 2.) Titrate basal lantus insulin dose daily until BG target range is achieved. Assessment:  Patient is 63year old with past medical history including type 2 diabetes mellitus with neuropathy, sleep apnea, s/p aortic valve replacement, morbid obesity, hypothyroidism, hypertension, chronic CHF and s/p thyroidectomy - was admitted on 6/03/2019 with report of stridor. Noted:  Vocal cord dysfunction. Status post tracheostomy on 6/03/2019. Hypothyroidism. Recent thyroidectomy on 4/11/2019. Morbid obesity. T2DM with current A1c of 8.4% (4/04/2019). Most recent blood glucose values:    Results for Anam Ching (MRN [id number]) as of 6/14/2019 15:33   Ref.  Range 6/13/2019 06:42 6/13/2019 11:38 6/13/2019 15:55 6/13/2019 20:47 GLUCOSE,FAST - POC Latest Ref Range: 70 - 110 mg/dL 124 (H) 164 (H) 139 (H) 124 (H)     Results for Ritu Carvalho (MRN [id number]) as of 6/14/2019 15:33   Ref. Range 6/14/2019 05:44 6/14/2019 11:31   GLUCOSE,FAST - POC Latest Ref Range: 70 - 110 mg/dL 182 (H) 211 (H)     Current A1C: 7.1% (6/09/2019) which is equivalent to estimated average blood glucose of 153 mg/dL during the past 2-3 months. Current hospital diabetes medications:  Basal lantus insulin 18 units daily. Correctional lispro insulin ACHS. Very resistant dose. Total daily dose insulin requirement previous day: 6/13/2019:  Lantus: 18 units  Lispro: 3 units  TDD insulin: 21 units    Home diabetes medications: Obtained from patient on 6/06/2019:  Lantus insulin (trach, patient unable to speak but nodded head when I asked if she's on vial insulin). She nodded again when I asked if she's on 90 units of lantus daily at bedtime and she has meter monitoring her blood sugar. Diet: Diabetic  Consistent carb regular. Goals:  Blood glucose will be within target range of  mg/dL by 6/17/2019.     Education:    _X__  Refer to Diabetes Education Record: 6/10/2019  ___  Education not indicated at this time    Khushboo Gonzalez RN  Pager: [phone]
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
## 2019-06-14 NOTE — PROGRESS NOTES
Surgery  Remains somewhat depressed but looks better today  Afebrile vital signs stable  Trachs okay  Not eating much, TPN started. Long discussion about her disposition and where she would go for care post hospital.  I would like to send her home and I think she is certainly capable of that but she does not seem to respond to pushing for more physical activity. Glucose control under 200. Push p.o. out of bed. Discussed with PT that she must walk in the [place] today.

## 2019-06-14 NOTE — ROUTINE PROCESS
Bedside and Verbal shift change report given to Erica Mistry (oncoming nurse) by Sonia Herrera RN (offgoing nurse). Report included the following information Kardex, OR Summary, Procedure Summary, Intake/Output, MAR and Recent Results.

## 2019-06-14 NOTE — PROGRESS NOTES
Problem: Mobility Impaired (Adult and Pediatric)  Goal: *Acute Goals and Plan of Care (Insert Text)  Description  Physical Therapy Goals  Initiated 6/5/2019 and to be accomplished within 7 day(s)  1. Patient will move from supine to sit and sit to supine , scoot up and down and roll side to side in bed with modified independence. 2.  Patient will transfer from bed to chair and chair to bed with modified independence using the least restrictive device. 3.  Patient will perform sit to stand with modified independence. 4.  Patient will ambulate with modified independence for >100 feet with the least restrictive device. 5.  Patient will ascend/descend 3 stairs with 1-2 handrail(s) with supervision/set-up. Prior Level of Function: Independent with mobility including gait using 2 canes. Pt lives with her brother and son in a single story home with 3 steps to enter B HRA. Outcome: Progressing Towards Goal     PHYSICAL THERAPY REEVALUATION     Patient: Tabitha Rico (55 y.o. female)  Date: 6/14/2019  Diagnosis: Stridor [R06.1]  Stridor [R06.1] <principal problem not specified>  Procedure(s) (LRB):  TRACHEOSTOMY (N/A) 11 Days Post-Op  Precautions: Fall  PLOF: see goals section above    ASSESSMENT:  Based on the objective data described below, the patient presents with decreased strength, decreased balance reactions and decreased independence in functional mobility. Pt seen with OT to maximize safety and mobility. Pt has been seen after trach placement and complications related to the surgery. Pt cleared to participate by RN, and pt agreeable to session. PT with trach collar with humidified air at 9L O2. Supine to sit transfer from semi-reclined bed with CGA and increased time to complete. SPT from bed to [facility] with CGA. Independent sitting balance on commode with supervision, increased coughing. RN changed from wall O2, reducing O2 to 6L. SPO2 taken throughout at 99%. Ambulated x80 feet with CGA and RW. Returned to sitting in recliner with RN for bathing. Pt left with all needs met. Current goals continue to be appropriate for this patient. Progression toward goals:   ?      Improving appropriately and progressing toward goals  x? Improving slowly and progressing toward goals  ? Not making progress toward goals and plan of care will be adjusted           Patient will benefit from skilled intervention to address the above impairments. Patient's rehabilitation potential is considered to be Fair  Factors which may influence rehabilitation potential include:   []         None noted  [x]         Mental ability/status  []         Medical condition  [x]         Home/family situation and support systems  []         Safety awareness  []         Pain tolerance/management  []         Other:      PLAN :  Recommendations and Planned Interventions:  [x]           Bed Mobility Training             []    Neuromuscular Re-Education  [x]           Transfer Training                   []    Orthotic/Prosthetic Training  [x]           Gait Training                          []    Modalities  [x]           Therapeutic Exercises           []    Edema Management/Control  [x]           Therapeutic Activities            [x]    Family Training/Education  [x]           Patient Education  []           Other (comment):    Frequency/Duration: Patient will be followed by physical therapy 1-2 times per day/4-7 days per week to address goals. Discharge Recommendations: home with 24 hour assist  Further Equipment Recommendations for Discharge: rolling walker and N/A     PLAN:  Patient continues to benefit from skilled intervention to address the above impairments. Continue treatment per established plan of care.   Discharge Recommendations:  Darrius Godinez  Further Equipment Recommendations for Discharge:  rolling walker and N/A     SUBJECTIVE:   Patient stated Unable to speak through trach, mouthed \"I need to go to the bathroom\".     OBJECTIVE DATA SUMMARY:   Critical Behavior:  Neurologic State: Alert  Orientation Level: Oriented X4  Cognition: Appropriate decision making  Safety/Judgement: Awareness of environment, Fall prevention  Functional Mobility Training:  Bed Mobility:     Supine to Sit: Contact guard assistance(increased time to complete )               Transfers:  Sit to Stand: Contact guard assistance  Stand to Sit: Contact guard assistance  Stand Pivot Transfers: Contact guard assistance(to BSC)                          Balance:  Sitting: Intact  Standing: Impaired  Standing - Static: Fair  Standing - Dynamic : Fair   Range Of Motion:                                  Ambulation/Gait Training:  Distance (ft): 80 Feet (ft)  Assistive Device: Walker, rolling  Ambulation - Level of Assistance: Contact guard assistance;Minimal assistance        Gait Abnormalities: Decreased step clearance        Base of Support: Center of gravity altered; Widened     Speed/Yolis: Slow;Shuffled  Step Length: Right shortened;Left shortened                   Stairs:                Pain:  Pain level pre-treatment: 0/10  Pain level post-treatment: 0/10       Activity Tolerance:   Fair activity tolerance with increased distance for ambulation without SPO2 drop with 6L O2 donned on trach collar    Please refer to the flowsheet for vital signs taken during this treatment. After treatment:   xPatient left in no apparent distress sitting up in chair  ? Patient left in no apparent distress in bed  ? Call bell left within reach  x? Nursing notified  ? Caregiver present  ? Bed alarm activated  ? SCDs applied      COMMUNICATION/EDUCATION:   x? Role of Physical Therapy in the acute care setting. ?         Fall prevention education was provided and the patient/caregiver indicated understanding. ? Patient/family have participated as able in working toward goals and plan of care.   ?         Patient/family agree to work toward stated goals and plan of care. ?         Patient understands intent and goals of therapy, but is neutral about his/her participation. ? Patient is unable to participate in stated goals/plan of care: ongoing with therapy staff.   ?         Other:        Charly Hurley, PT   Time Calculation: 41 mins

## 2019-06-14 NOTE — PROGRESS NOTES
NUTRITION CONSULT    Nutrition Consult: Management of Tube Feeding, TPN: RD to Manage     RECOMMENDATIONS / PLAN:     - Plan to provide parenteral nutrition support, monitor labs and adjust electrolytes daily. Lipids held 2/2 elevated TG and oral intake. - Consider long term feeding tube placement to provide enteral nutrition until pt able to tolerate adequate intake of oral diet.   - Okay for pt to receive thickener as desired with meals, kitchen aware. - Continue RD inpatient monitoring and evaluation. Please Note:   Parenteral nutrition support to be provided using custom product, allowing for modification of electrolyte and macronutrient content day to day. Lipids included in PN on MWF. Macronutrient Goal (to meet 100% of estimated nutrition needs): 129 gm amino acids, 310 gm dextrose, 250 mL lipids (1784 kcal weekly average). PARENTERAL NUTRITION ORDER:     Electrolyte Adjustments: K increased  Additional replacement ordered: 20 mEq KCl    Day 2 PN to provide: 140 mEq Na, 79 mEq K, 15 mEq Ca, 18 mEq Mg, 30 mmol Phos, 910 kcal, 100 gm amino acids, 150 gm dextrose, 10 mL MVI, 2.5 mL trace elements    PN Rate: 100 mL/hr   Glucose Infusion Rate: 0.86 mg/kg/min    Osmolarity: 939 mOsm   Parenteral Nutrition Access Device: right basilic double lumen PICC (placed 6/13/19)  Indication for PN: per MD request, diet intolerance 2/2 dysphagia s/p tracheostomy   Refeeding Risk:      []  Yes  [x] No     NUTRITION DIAGNOSIS & INTERVENTIONS:     [x] Parenteral nutrition: continue, modify contents  [x] Meals/snacks: modified composition, diet as tolerated per MD   [x] Collaboration and referral of nutrition care: MD to review PN order and note daily; will not call to verify content and changes, per MD request. Discussed proving thickener with meals as pt requests with Dr. Antonia Collazo. BG levels and lantus discussed with glycemic control.     Nutrition Diagnosis: Swallowing difficulty related to dysphagia s/p tracheostomy as evidenced by pt NPO after SLP evaluation/MBS. ASSESSMENT:     6/14: Tolerating PN. Did not eat breakfast, but consumed most of lunch, which consisted of cream soup, mashed potatoes and pureed peas. Pt requesting to thicken liquids herself, despite order for thin liquids. BG levels elevated. 6/13: PICC placed to initiate TPN per MD request.   6/11: Pt with variable meal intake. Reports some difficulty tolerating certain foods, but ate 50% of breakfast this AM.  Encourage pt to ask for soft foods as desired. Diet changed to diabetic, BG in better control. 6/10: Was tolerating tube feeds until pt pulled NGT 6/8, receiving IVF since then. Diet started today by MD.  Pt reports being hungry. Pt with intolerance to artificial sweeteners, monitor BG levels and assess need for diabetic diet. 6/7: Formula changed to Jevity 1.5 prior to initiation 2/2 concern for sucrose sensitivity. Will continue current product due to questionable sensitivity of ingredients in Glucerna tube feeding. Tolerating rate at goal.  Elevated BG levels, but expected 2/2 formula needing to be given, discussed with MD and glycemin control, will attempt to manage BG with medication due to formula limitations. IVF discontinued. 6/6: Failed swallow evaluation/MBS; repeat MBS ordered for today. C/o gas pains, but is passing flatus, and +BM. Some abdominal distension noted. Asking for water. DHT placed by IR, no tube feeding ordered at this time. 6/5: Admitted with stridor and vocal cord dysfunction s/p tracheostomy placement on 6/3/19, tolerating trach collar and able to eat if passes swallow evaluation per MD- may deflate cuff for meals but must re-inflate cuff when pt is not eating per Surgery. Pt refused NGT placement.     Average po intake adequate to meet patients estimated nutritional needs:   [] Yes     [x] No   [] Unable to determine at this time    PN infusion adequate to meet patients estimated nutritional needs: [] Yes     [x] No   [] Unable to determine at this time    Diet: DIET DIABETIC CONSISTENT CARB Regular  TPN ADULT - CENTRAL      Food Allergies: sweeteners, sucrose  Current Appetite:   [] Good     [x] Fair     [] Poor     [] Other:  Appetite/meal intake prior to admission:   [x] Good     [] Fair     [] Poor     [] Other:  Feeding Limitations:  [x] Swallowing difficulty: SLP following    [] Chewing difficulty    [x] Other: hx of dysphagia s/p thyroidectomy PTA and trach placement 6/3/19  Current Meal Intake: Patient Vitals for the past 100 hrs:   % Diet Eaten   06/14/19 0741 0 %   06/13/19 1236 0 %   06/13/19 0830 0 %   06/12/19 1140 50 %   06/12/19 0841 25 %   06/11/19 1728 100 %   06/11/19 0922 50 %       BM: 6/13    Skin Integrity: surgical incision to neck  Edema:  [] No     [x] Yes   Pertinent Medications: Reviewed: zofran, phenergan, pepcid, SSI ACHS very resistant scale, 18 units lantus      Labs: BMP, MG, Phos ordered daily; CMP, Triglyceride, Prealbumin ordered initially and weekly  Recent Labs     06/14/19  0550 06/13/19  0054 06/12/19  0624    138 138   K 3.2* 3.5 3.1*   CL 99* 101 101   CO2 32 31 30   * 130* 131*   BUN 8 5* 6*   CREA 0.64 0.67 0.54*   CA 8.4* 8.8 8.5   MG 2.1 2.0 2.0   PHOS 3.2 3.1 3.5   ALB 2.7*  --   --    SGOT 11*  --   --    ALT 12*  --   --    Prealbumin: pending  Triglyceride: 316 mg/dL (6/13/19), 511 mg/dL (4/16/19)  Recent Labs     06/14/19  0550 06/13/19  0054 06/12/19  0624   WBC 6.2 7.9 6.7   HGB 12.9 14.7 13.2   HCT 40.0 43.5 40.9    271 242         Intake/Output Summary (Last 24 hours) at 6/14/2019 1257  Last data filed at 6/14/2019 0741  Gross per 24 hour   Intake 0 ml   Output 400 ml   Net -400 ml       Anthropometrics:   Ht Readings from Last 1 Encounters:   06/03/19 5' 7\" (1.702 m)     Last 3 Recorded Weights in this Encounter    06/06/19 1401 06/10/19 0815 06/12/19 1740   Weight: 116.8 kg (257 lb 8 oz) 119.5 kg (263 lb 8 oz) 121.1 kg (267 lb) Body mass index is 41.82 kg/m². Obese, Class III    Weight History: patient denies change in weight PTA, stable     Weight Metrics 6/12/2019 6/3/2019 5/23/2019 5/22/2019 5/16/2019 4/26/2019 4/16/2019   Weight 267 lb - 260 lb 6.4 oz - 258 lb 256 lb 260 lb 9.6 oz   BMI - 41.82 kg/m2 - 40.78 kg/m2 40.41 kg/m2 40.1 kg/m2 -          Admitting Diagnosis: Stridor [R06.1]  Stridor [R06.1]  Pertinent PMHx: T2DM with diabetic neuropathy, HTN, dyslipidemia, CHF, hypothyroidism s/p thyroidectomy 4/4/19    Education Needs:        [x] None identified  [] Identified - Not appropriate at this time  []  Identified and addressed - refer to education log  Learning Limitations:   [] None identified  [x] Identified: nonverbal     Cultural, Sikhism & ethnic food preferences:  [x] None identified    [] Identified and addressed     ESTIMATED NUTRITION NEEDS:     Calories: 2783-1653 kcal (MSJx1-1.3) based on   [x] Actual  kg     [] IBW:   Protein:  gm (0.8-1.2 gm/kg) based on   [x] Actual BW      [] IBW:   Fluid: 1 mL/kcal     MONITORING & EVALUATION:     Nutrition Goals:   1. Nutritional needs will be met through adequate oral intake or nutrition support within the next 5 days. Outcome:  [] Met/Ongoing    [x] Progressing towards goal    [] Not progressing towards goal    [] New/Initial goal      Monitoring:  [x] Parenteral nutrition intake and administration  [x] Biochemical data, medical tests, and procedures   [x] Fluid intake   [x] Nutrition-focused physical findings   [x] Treatment/therapy   [x] Food and beverage intake   [x] Diet order      [] Weight        Previous Recommendations (for follow-up assessments only):     [x]   Implemented       []   Not Implemented (RD to address)      [] No Longer Appropriate     [] No Recommendation Made        Discharge Planning: Nutrition recommendations pending patient's ability to tolerate po intake/enteral nutrition.    [x]  Participated in care planning, discharge planning, & interdisciplinary rounds as appropriate      Sara Hauser RD, [address]   Pager: [phone]

## 2019-06-14 NOTE — PROGRESS NOTES
Problem: Falls - Risk of  Goal: *Absence of Falls  Description  Document [place] Fall Risk and appropriate interventions in the flowsheet. Outcome: Progressing Towards Goal     Problem: Patient Education: Go to Patient Education Activity  Goal: Patient/Family Education  Outcome: Progressing Towards Goal     Problem: Pressure Injury - Risk of  Goal: *Prevention of pressure injury  Description  Document Omer Scale and appropriate interventions in the flowsheet. Outcome: Progressing Towards Goal     Problem: Patient Education: Go to Patient Education Activity  Goal: Patient/Family Education  Outcome: Progressing Towards Goal     Problem: Nutrition Deficit  Goal: *Optimize nutritional status  Outcome: Progressing Towards Goal     Problem: Patient Education: Go to Patient Education Activity  Goal: Patient/Family Education  Outcome: Progressing Towards Goal     Problem: Patient Education: Go to Patient Education Activity  Goal: Patient/Family Education  Outcome: Progressing Towards Goal     Problem: Patient Education: Go to Patient Education Activity  Goal: Patient/Family Education  Outcome: Progressing Towards Goal     Problem: Diabetes Self-Management  Goal: *Disease process and treatment process  Description  Define diabetes and identify own type of diabetes; list 3 options for treating diabetes. Outcome: Progressing Towards Goal  Goal: *Incorporating nutritional management into lifestyle  Description  Describe effect of type, amount and timing of food on blood glucose; list 3 methods for planning meals. Outcome: Progressing Towards Goal  Goal: *Incorporating physical activity into lifestyle  Description  State effect of exercise on blood glucose levels. Outcome: Progressing Towards Goal  Goal: *Developing strategies to promote health/change behavior  Description  Define the ABC's of diabetes; identify appropriate screenings, schedule and personal plan for screenings.   Outcome: Progressing Towards Goal  Goal: *Using medications safely  Description  State effect of diabetes medications on diabetes; name diabetes medication taking, action and side effects. Outcome: Progressing Towards Goal  Goal: *Monitoring blood glucose, interpreting and using results  Description  Identify recommended blood glucose targets  and personal targets. Outcome: Progressing Towards Goal  Goal: *Prevention, detection, treatment of acute complications  Description  List symptoms of hyper- and hypoglycemia; describe how to treat low blood sugar and actions for lowering  high blood glucose level. Outcome: Progressing Towards Goal  Goal: *Prevention, detection and treatment of chronic complications  Description  Define the natural course of diabetes and describe the relationship of blood glucose levels to long term complications of diabetes.   Outcome: Progressing Towards Goal  Goal: *Developing strategies to address psychosocial issues  Description  Describe feelings about living with diabetes; identify support needed and support network  Outcome: Progressing Towards Goal  Goal: *Insulin pump training  Outcome: Progressing Towards Goal  Goal: *Sick day guidelines  Outcome: Progressing Towards Goal  Goal: *Patient Specific Goal (EDIT GOAL, INSERT TEXT)  Outcome: Progressing Towards Goal     Problem: Patient Education: Go to Patient Education Activity  Goal: Patient/Family Education  Outcome: Progressing Towards Goal     Problem: Diabetes Maintenance:Admission  Goal: *Blood glucose 80 to 180 mg/dl  Outcome: Progressing Towards Goal

## 2019-06-14 NOTE — PROGRESS NOTES
[facility] Hospitalist Group  Progress Note    Patient: Vasu Blair Age: 63 y.o. : 1960 MR#: [id number] SSN: [id number]  Date/Time: 2019    Subjective:     Patient sitting in recliner in NAD, has tracheostomy    Assessment/Plan:     -DM2  - s/p Tracheostomy for vocal cord paralysis  - HTN  - Dysphagia  - Morbid Obesity      PLAN    Surg - PICC line to start TPN - nutrition to start TPN - Nutrition started - BS elevated 2y to TPN   Hypothyroidism - TSH elevated at 42.5 - has had total thyroidectomy recently - started on synthroid 100mcg - is likely aspirating - will switch to IV - discussed with Pharmacy   HypoK+ - repleting - check labs in AM   Management of tracheostomy as per surgeon , pulm signed off    Lantus increased to 22 units from 18 units + SS insulin , will follow   Per Speech & swallow - keep pt NPO   Monitor BP  D/w patient       Case discussed with:  [x]Patient  []Family  [x]Nursing  []Case Management  DVT Prophylaxis:  [x]Lovenox  []Hep SQ  []SCDs  []Coumadin   []On Heparin gtt    Objective:   VS:   Visit Vitals  /79   Pulse 74   Temp 98.7 °F (37.1 °C)   Resp 17   Ht 5' 7\" (1.702 m)   Wt 121.1 kg (267 lb)   SpO2 99%   Breastfeeding?  No   BMI 41.82 kg/m²      Tmax/24hrs: Temp (24hrs), Av.6 °F (37 °C), Min:98 °F (36.7 °C), Max:99.3 °F (37.4 °C)    Input/Output:     Intake/Output Summary (Last 24 hours) at 2019 1814  Last data filed at 2019 0741  Gross per 24 hour   Intake 0 ml   Output 400 ml   Net -400 ml       General:  Awake, alert  Cardiovascular:  S1S2+, RRR  Pulmonary:  CTA b/l  GI:  Soft, BS+, NT, ND, obese  Extremities:  No edema  + trach    Labs:    Recent Results (from the past 24 hour(s))   GLUCOSE, POC    Collection Time: 19  8:47 PM   Result Value Ref Range    Glucose (POC) 124 (H) 70 - 110 mg/dL   GLUCOSE, POC    Collection Time: 19  5:44 AM   Result Value Ref Range    Glucose (POC) 182 (H) 70 - 110 mg/dL   CBC WITH AUTOMATED DIFF    Collection Time: 06/14/19  5:50 AM   Result Value Ref Range    WBC 6.2 4.6 - 13.2 K/uL    RBC 4.34 4.20 - 5.30 M/uL    HGB 12.9 12.0 - 16.0 g/dL    HCT 40.0 35.0 - 45.0 %    MCV 92.2 74.0 - 97.0 FL    MCH 29.7 24.0 - 34.0 PG    MCHC 32.3 31.0 - 37.0 g/dL    RDW 15.5 (H) 11.6 - 14.5 %    PLATELET 635 367 - 137 K/uL    MPV 8.7 (L) 9.2 - 11.8 FL    NEUTROPHILS 67 40 - 73 %    LYMPHOCYTES 23 21 - 52 %    MONOCYTES 8 3 - 10 %    EOSINOPHILS 2 0 - 5 %    BASOPHILS 0 0 - 2 %    ABS. NEUTROPHILS 4.2 1.8 - 8.0 K/UL    ABS. LYMPHOCYTES 1.4 0.9 - 3.6 K/UL    ABS. MONOCYTES 0.5 0.05 - 1.2 K/UL    ABS. EOSINOPHILS 0.1 0.0 - 0.4 K/UL    ABS. BASOPHILS 0.0 0.0 - 0.1 K/UL    DF AUTOMATED     METABOLIC PANEL, COMPREHENSIVE    Collection Time: 06/14/19  5:50 AM   Result Value Ref Range    Sodium 139 136 - 145 mmol/L    Potassium 3.2 (L) 3.5 - 5.5 mmol/L    Chloride 99 (L) 100 - 108 mmol/L    CO2 32 21 - 32 mmol/L    Anion gap 8 3.0 - 18 mmol/L    Glucose 197 (H) 74 - 99 mg/dL    BUN 8 7.0 - 18 MG/DL    Creatinine 0.64 0.6 - 1.3 MG/DL    BUN/Creatinine ratio 13 12 - 20      GFR est AA >60 >60 ml/min/1.73m2    GFR est non-AA >60 >60 ml/min/1.73m2    Calcium 8.4 (L) 8.5 - 10.1 MG/DL    Bilirubin, total 0.7 0.2 - 1.0 MG/DL    ALT (SGPT) 12 (L) 13 - 56 U/L    AST (SGOT) 11 (L) 15 - 37 U/L    Alk.  phosphatase 72 45 - 117 U/L    Protein, total 6.9 6.4 - 8.2 g/dL    Albumin 2.7 (L) 3.4 - 5.0 g/dL    Globulin 4.2 (H) 2.0 - 4.0 g/dL    A-G Ratio 0.6 (L) 0.8 - 1.7     TRIGLYCERIDE    Collection Time: 06/14/19  5:50 AM   Result Value Ref Range    Triglyceride 316 (H) <150 MG/DL   MAGNESIUM    Collection Time: 06/14/19  5:50 AM   Result Value Ref Range    Magnesium 2.1 1.6 - 2.6 mg/dL   PHOSPHORUS    Collection Time: 06/14/19  5:50 AM   Result Value Ref Range    Phosphorus 3.2 2.5 - 4.9 MG/DL   GLUCOSE, POC    Collection Time: 06/14/19 11:31 AM   Result Value Ref Range    Glucose (POC) 211 (H) 70 - 110 mg/dL   GLUCOSE, POC Collection Time: 06/14/19  4:36 PM   Result Value Ref Range    Glucose (POC) 242 (H) 70 - 110 mg/dL     Additional Data Reviewed:      Signed By: Isabel Cates MD     June 14, 2019 7:10 PM

## 2019-06-14 NOTE — PROGRESS NOTES
Problem: Falls - Risk of  Goal: *Absence of Falls  Description  Document Travis Boeck Fall Risk and appropriate interventions in the flowsheet. 6/14/2019 1622 by Korina Posada RN  Outcome: Progressing Towards Goal  6/14/2019 1039 by Korina Psoada RN  Outcome: Progressing Towards Goal     Problem: Patient Education: Go to Patient Education Activity  Goal: Patient/Family Education  6/14/2019 1622 by Korina Posada RN  Outcome: Progressing Towards Goal  6/14/2019 1039 by Korina Posada RN  Outcome: Progressing Towards Goal     Problem: Pressure Injury - Risk of  Goal: *Prevention of pressure injury  Description  Document Omer Scale and appropriate interventions in the flowsheet.   6/14/2019 1622 by Korina Posada RN  Outcome: Progressing Towards Goal  6/14/2019 1039 by Korina Posada RN  Outcome: Progressing Towards Goal     Problem: Patient Education: Go to Patient Education Activity  Goal: Patient/Family Education  6/14/2019 1622 by Korina Posada RN  Outcome: Progressing Towards Goal  6/14/2019 1039 by Korina Posada RN  Outcome: Progressing Towards Goal     Problem: Nutrition Deficit  Goal: *Optimize nutritional status  6/14/2019 1622 by Korina Posada RN  Outcome: Progressing Towards Goal  6/14/2019 1039 by Korina Posada RN  Outcome: Progressing Towards Goal     Problem: Patient Education: Go to Patient Education Activity  Goal: Patient/Family Education  6/14/2019 1622 by oKrina Posada RN  Outcome: Progressing Towards Goal  6/14/2019 1039 by Korina Posada RN  Outcome: Progressing Towards Goal     Problem: Patient Education: Go to Patient Education Activity  Goal: Patient/Family Education  6/14/2019 1622 by Korina Posada RN  Outcome: Progressing Towards Goal  6/14/2019 1039 by Korina Posada RN  Outcome: Progressing Towards Goal     Problem: Patient Education: Go to Patient Education Activity  Goal: Patient/Family Education  6/14/2019 1622 by Korina Posada RN  Outcome: Progressing Towards Goal  6/14/2019 1039 by Les Marquez RN  Outcome: Progressing Towards Goal     Problem: Diabetes Self-Management  Goal: *Disease process and treatment process  Description  Define diabetes and identify own type of diabetes; list 3 options for treating diabetes. 6/14/2019 1622 by Les Marquez RN  Outcome: Progressing Towards Goal  6/14/2019 1039 by Les Marquez RN  Outcome: Progressing Towards Goal  Goal: *Incorporating nutritional management into lifestyle  Description  Describe effect of type, amount and timing of food on blood glucose; list 3 methods for planning meals. 6/14/2019 1622 by Les Marquez RN  Outcome: Progressing Towards Goal  6/14/2019 1039 by Les Marquez RN  Outcome: Progressing Towards Goal  Goal: *Incorporating physical activity into lifestyle  Description  State effect of exercise on blood glucose levels. 6/14/2019 1622 by Les Marquez RN  Outcome: Progressing Towards Goal  6/14/2019 1039 by Les Marquez RN  Outcome: Progressing Towards Goal  Goal: *Developing strategies to promote health/change behavior  Description  Define the ABC's of diabetes; identify appropriate screenings, schedule and personal plan for screenings. 6/14/2019 1622 by Les Marquez RN  Outcome: Progressing Towards Goal  6/14/2019 1039 by Les Marquez RN  Outcome: Progressing Towards Goal  Goal: *Using medications safely  Description  State effect of diabetes medications on diabetes; name diabetes medication taking, action and side effects. 6/14/2019 1622 by Les Marquez RN  Outcome: Progressing Towards Goal  6/14/2019 1039 by Les Marquez RN  Outcome: Progressing Towards Goal  Goal: *Monitoring blood glucose, interpreting and using results  Description  Identify recommended blood glucose targets  and personal targets.   6/14/2019 1622 by Les Marquez RN  Outcome: Progressing Towards Goal  6/14/2019 1039 by Les Marquez RN  Outcome: Progressing Towards Goal  Goal: *Prevention, detection, treatment of acute complications  Description  List symptoms of hyper- and hypoglycemia; describe how to treat low blood sugar and actions for lowering  high blood glucose level. 6/14/2019 1622 by Park Diallo RN  Outcome: Progressing Towards Goal  6/14/2019 1039 by Park Diallo RN  Outcome: Progressing Towards Goal  Goal: *Prevention, detection and treatment of chronic complications  Description  Define the natural course of diabetes and describe the relationship of blood glucose levels to long term complications of diabetes.   6/14/2019 1622 by Park Diallo RN  Outcome: Progressing Towards Goal  6/14/2019 1039 by Park Diallo RN  Outcome: Progressing Towards Goal  Goal: *Developing strategies to address psychosocial issues  Description  Describe feelings about living with diabetes; identify support needed and support network  6/14/2019 1622 by Park Diallo RN  Outcome: Progressing Towards Goal  6/14/2019 1039 by Park Diallo RN  Outcome: Progressing Towards Goal  Goal: *Insulin pump training  6/14/2019 1622 by Park Diallo RN  Outcome: Progressing Towards Goal  6/14/2019 1039 by Park Diallo RN  Outcome: Progressing Towards Goal  Goal: *Sick day guidelines  6/14/2019 1622 by Park Diallo RN  Outcome: Progressing Towards Goal  6/14/2019 1039 by Park Diallo RN  Outcome: Progressing Towards Goal  Goal: *Patient Specific Goal (EDIT GOAL, INSERT TEXT)  6/14/2019 1622 by Park Diallo RN  Outcome: Progressing Towards Goal  6/14/2019 1039 by Park Diallo RN  Outcome: Progressing Towards Goal     Problem: Patient Education: Go to Patient Education Activity  Goal: Patient/Family Education  6/14/2019 1622 by Park Diallo RN  Outcome: Progressing Towards Goal  6/14/2019 1039 by Park Diallo RN  Outcome: Progressing Towards Goal     Problem: Diabetes Maintenance:Admission  Goal: *Blood glucose 80 to 180 mg/dl  6/14/2019 1622 by Kristin Momin RN  Outcome: Progressing Towards Goal  6/14/2019 1039 by Kristin Momin RN  Outcome: Progressing Towards Goal

## 2019-06-15 LAB
ANION GAP SERPL CALC-SCNC: 5 MMOL/L (ref 3–18)
BASOPHILS # BLD: 0 K/UL (ref 0–0.1)
BASOPHILS NFR BLD: 0 % (ref 0–2)
BUN SERPL-MCNC: 11 MG/DL (ref 7–18)
BUN/CREAT SERPL: 17 (ref 12–20)
CALCIUM SERPL-MCNC: 8.1 MG/DL (ref 8.5–10.1)
CHLORIDE SERPL-SCNC: 100 MMOL/L (ref 100–108)
CO2 SERPL-SCNC: 34 MMOL/L (ref 21–32)
CREAT SERPL-MCNC: 0.65 MG/DL (ref 0.6–1.3)
DIFFERENTIAL METHOD BLD: ABNORMAL
EOSINOPHIL # BLD: 0.1 K/UL (ref 0–0.4)
EOSINOPHIL NFR BLD: 1 % (ref 0–5)
ERYTHROCYTE [DISTWIDTH] IN BLOOD BY AUTOMATED COUNT: 15.5 % (ref 11.6–14.5)
GLUCOSE BLD STRIP.AUTO-MCNC: 241 MG/DL (ref 70–110)
GLUCOSE BLD STRIP.AUTO-MCNC: 266 MG/DL (ref 70–110)
GLUCOSE BLD STRIP.AUTO-MCNC: 268 MG/DL (ref 70–110)
GLUCOSE BLD STRIP.AUTO-MCNC: 316 MG/DL (ref 70–110)
GLUCOSE SERPL-MCNC: 255 MG/DL (ref 74–99)
HCT VFR BLD AUTO: 41.4 % (ref 35–45)
HGB BLD-MCNC: 13.5 G/DL (ref 12–16)
LYMPHOCYTES # BLD: 1.4 K/UL (ref 0.9–3.6)
LYMPHOCYTES NFR BLD: 15 % (ref 21–52)
MAGNESIUM SERPL-MCNC: 2.3 MG/DL (ref 1.6–2.6)
MCH RBC QN AUTO: 29.9 PG (ref 24–34)
MCHC RBC AUTO-ENTMCNC: 32.6 G/DL (ref 31–37)
MCV RBC AUTO: 91.6 FL (ref 74–97)
MONOCYTES # BLD: 0.7 K/UL (ref 0.05–1.2)
MONOCYTES NFR BLD: 8 % (ref 3–10)
NEUTS SEG # BLD: 6.8 K/UL (ref 1.8–8)
NEUTS SEG NFR BLD: 76 % (ref 40–73)
PHOSPHATE SERPL-MCNC: 2.6 MG/DL (ref 2.5–4.9)
PLATELET # BLD AUTO: 262 K/UL (ref 135–420)
PMV BLD AUTO: 8.9 FL (ref 9.2–11.8)
POTASSIUM SERPL-SCNC: 3.4 MMOL/L (ref 3.5–5.5)
RBC # BLD AUTO: 4.52 M/UL (ref 4.2–5.3)
SODIUM SERPL-SCNC: 139 MMOL/L (ref 136–145)
WBC # BLD AUTO: 8.9 K/UL (ref 4.6–13.2)

## 2019-06-15 PROCEDURE — 74011000258 HC RX REV CODE- 258

## 2019-06-15 PROCEDURE — 74011636637 HC RX REV CODE- 636/637

## 2019-06-15 PROCEDURE — 77010033678 HC OXYGEN DAILY

## 2019-06-15 PROCEDURE — 83735 ASSAY OF MAGNESIUM: CPT

## 2019-06-15 PROCEDURE — 74011000250 HC RX REV CODE- 250: Performed by: HOSPITALIST

## 2019-06-15 PROCEDURE — 80048 BASIC METABOLIC PNL TOTAL CA: CPT

## 2019-06-15 PROCEDURE — 74011000250 HC RX REV CODE- 250

## 2019-06-15 PROCEDURE — 74011636637 HC RX REV CODE- 636/637: Performed by: HOSPITALIST

## 2019-06-15 PROCEDURE — 82962 GLUCOSE BLOOD TEST: CPT

## 2019-06-15 PROCEDURE — 97110 THERAPEUTIC EXERCISES: CPT

## 2019-06-15 PROCEDURE — 65270000029 HC RM PRIVATE

## 2019-06-15 PROCEDURE — 84100 ASSAY OF PHOSPHORUS: CPT

## 2019-06-15 PROCEDURE — 74011250636 HC RX REV CODE- 250/636

## 2019-06-15 PROCEDURE — 74011250636 HC RX REV CODE- 250/636: Performed by: HOSPITALIST

## 2019-06-15 PROCEDURE — 85025 COMPLETE CBC W/AUTO DIFF WBC: CPT

## 2019-06-15 RX ADMIN — HYDROMORPHONE HYDROCHLORIDE 1 MG: 2 INJECTION INTRAMUSCULAR; INTRAVENOUS; SUBCUTANEOUS at 12:23

## 2019-06-15 RX ADMIN — HYDROMORPHONE HYDROCHLORIDE 1 MG: 2 INJECTION INTRAMUSCULAR; INTRAVENOUS; SUBCUTANEOUS at 22:49

## 2019-06-15 RX ADMIN — INSULIN LISPRO 6 UNITS: 100 INJECTION, SOLUTION INTRAVENOUS; SUBCUTANEOUS at 23:58

## 2019-06-15 RX ADMIN — INSULIN GLARGINE 22 UNITS: 100 INJECTION, SOLUTION SUBCUTANEOUS at 09:04

## 2019-06-15 RX ADMIN — INSULIN LISPRO 12 UNITS: 100 INJECTION, SOLUTION INTRAVENOUS; SUBCUTANEOUS at 07:30

## 2019-06-15 RX ADMIN — INSULIN LISPRO 6 UNITS: 100 INJECTION, SOLUTION INTRAVENOUS; SUBCUTANEOUS at 14:06

## 2019-06-15 RX ADMIN — CALCIUM GLUCONATE: 94 INJECTION, SOLUTION INTRAVENOUS at 21:36

## 2019-06-15 RX ADMIN — HYDROMORPHONE HYDROCHLORIDE 1 MG: 2 INJECTION INTRAMUSCULAR; INTRAVENOUS; SUBCUTANEOUS at 17:03

## 2019-06-15 RX ADMIN — ENOXAPARIN SODIUM 40 MG: 40 INJECTION SUBCUTANEOUS at 17:08

## 2019-06-15 RX ADMIN — LORAZEPAM 1 MG: 2 INJECTION INTRAMUSCULAR at 13:56

## 2019-06-15 RX ADMIN — FAMOTIDINE 20 MG: 10 INJECTION INTRAVENOUS at 22:40

## 2019-06-15 RX ADMIN — LORAZEPAM 1 MG: 2 INJECTION INTRAMUSCULAR at 20:55

## 2019-06-15 RX ADMIN — LEVOTHYROXINE SODIUM 75 MCG: 100 INJECTION, POWDER, LYOPHILIZED, FOR SOLUTION INTRAVENOUS at 09:00

## 2019-06-15 RX ADMIN — LORAZEPAM 1 MG: 2 INJECTION INTRAMUSCULAR at 09:16

## 2019-06-15 RX ADMIN — FAMOTIDINE 20 MG: 10 INJECTION INTRAVENOUS at 09:10

## 2019-06-15 RX ADMIN — HYDROMORPHONE HYDROCHLORIDE 1 MG: 2 INJECTION INTRAMUSCULAR; INTRAVENOUS; SUBCUTANEOUS at 07:51

## 2019-06-15 RX ADMIN — LORAZEPAM 1 MG: 2 INJECTION INTRAMUSCULAR at 04:24

## 2019-06-15 NOTE — PROGRESS NOTES
Patient sitting up on [facility] alert awake and oriented, dressings to Lknee dry and intact. CMS+ no s/s of distress or sob.

## 2019-06-15 NOTE — PROGRESS NOTES
Hourly Rounding. Assisted patient from bedside commode back to bed. Changed patient's gown. Informed RN that patient is complaining of shortness of breath. Left patient with RN in room.

## 2019-06-15 NOTE — PROGRESS NOTES
No sig new issues  Pt still not getting OOB  Minimal PO intake    AVSS    Trach site clean    Again encouraged pt to get OOB into [place], increase PO intake.

## 2019-06-15 NOTE — PROGRESS NOTES
Problem: Self Care Deficits Care Plan (Adult)  Goal: *Acute Goals and Plan of Care (Insert Text)  Description  Occupational Therapy Goals  Initiated 6/5/2019, re-evaluated on 6/14/19 within 7 day(s). Continue all previously set goals    1. Patient will perform lower body dressing with supervision/set-up. 2.  Patient will perform functional task in standing for 3 minutes with supervision for balance. 3.  Patient will perform toilet transfers with supervision/set-up. 4.  Patient will participate in upper extremity therapeutic exercise/activities with supervision/set-up for 8 minutes to increase strength/endurance for ADLs. Prior Level of Function: Pt was modified independent with basic self care tasks and used two canes for functional mobility PTA. Outcome: Progressing Towards Goal   OCCUPATIONAL THERAPY TREATMENT    Patient: Hellen Kyle (24 y.o. female)  Date: 6/15/2019  Diagnosis: Stridor [R06.1]  Stridor [R06.1] <principal problem not specified>  Procedure(s) (LRB):  TRACHEOSTOMY (N/A) 12 Days Post-Op  Precautions: Fall(trach collar)    Chart, occupational therapy assessment, plan of care, and goals were reviewed. ASSESSMENT:  Pt reports increased anxiety this am, per nurse her prn Ativan is due. Pt reports she has been up to the [facility] earlier and will get back up to the chair later today. Pt educated on Scapular setting/strengthening program to improve tolerance of the upright position and to be able to tolerate OOB. Pt was able to demonstrate understanding of the program. Pt educated on BUE TherEx AROM in mult planes, pt required mult visual cues and additional time to perform, however, noted improvement in breathing pattern this session as pt with decreased coughing and sputum. Progression toward goals:  ?          Improving appropriately and progressing toward goals  ? Improving slowly and progressing toward goals  ?           Not making progress toward goals and plan of care will be adjusted     PLAN:  Patient continues to benefit from skilled intervention to address the above impairments. Continue treatment per established plan of care. Discharge Recommendations:  Home Health w/Supervision  Further Equipment Recommendations for Discharge:  N/A     SUBJECTIVE:   Patient mouthed \" I am anxious\"    OBJECTIVE DATA SUMMARY:   Cognitive/Behavioral Status:  Neurologic State: Alert  Orientation Level: Oriented X4  Cognition: Follows commands  Safety/Judgement: Awareness of environment, Fall prevention    ADL Intervention:     Grooming  Grooming Assistance: Set-up(in bed)    UE Therapeutic Exercises:   Scapular program to increase endurance and activity tolerance  BUE AROM TherEx x10 shd flex/ext, horiz abd/add    Pain:  Pain level pre-treatment: 5/10   Pain level post-treatment: 5/10    Activity Tolerance:    Fair  Please refer to the flowsheet for vital signs taken during this treatment. After treatment:   ?  Patient left in no apparent distress sitting up in chair  ? Patient left in no apparent distress in bed  ? Call bell left within reach  ? Nursing notified  ? Caregiver present  ? Bed alarm activated    COMMUNICATION/EDUCATION:   ? Role of Occupational Therapy in the acute care setting  ? Home safety education was provided and the patient/caregiver indicated understanding. ? Patient/family have participated as able in working towards goals and plan of care. ? Patient/family agree to work toward stated goals and plan of care. ? Patient understands intent and goals of therapy, but is neutral about his/her participation. ? Patient is unable to participate in goal setting and plan of care.       Thank you for this referral.  VANDANA Zavala  Time Calculation: 16 mins

## 2019-06-15 NOTE — PROGRESS NOTES
NUTRITION CONSULT    Nutrition Consult: Management of Tube Feeding, TPN: RD to Manage     RECOMMENDATIONS / PLAN:     - Plan to provide parenteral nutrition support, monitor labs and adjust electrolytes daily. Lipids held 2/2 elevated TG and oral intake. - Consider long term feeding tube placement to provide enteral nutrition until pt able to tolerate adequate intake of oral diet.   - Okay for pt to receive thickener as desired with meals, kitchen aware. - Continue RD inpatient monitoring and evaluation. Please Note:   Parenteral nutrition support to be provided using custom product, allowing for modification of electrolyte and macronutrient content day to day. Lipids included in PN on MWF. Macronutrient Goal (to meet 100% of estimated nutrition needs): 129 gm amino acids, 310 gm dextrose, 250 mL lipids (1784 kcal weekly average). PARENTERAL NUTRITION ORDER:     Electrolyte Adjustments: K increased, Mg decreased, Phos increased  Additional replacement ordered: none    Day 3 PN to provide: 140 mEq Na, 89 mEq K, 15 mEq Ca, 16 mEq Mg, 35 mmol Phos, 910 kcal, 100 gm amino acids, 150 gm dextrose, 10 mL MVI, 2.5 mL trace elements, 10 units insulin    PN Rate: 100 mL/hr   Glucose Infusion Rate: 0.86 mg/kg/min    Osmolarity: 946 mOsm   Parenteral Nutrition Access Device: right basilic double lumen PICC (placed 6/13/19)  Indication for PN: per MD request, diet intolerance 2/2 dysphagia s/p tracheostomy   Refeeding Risk:      []  Yes  [x] No     NUTRITION DIAGNOSIS & INTERVENTIONS:     [x] Parenteral nutrition: continue, modify contents  [x] Meals/snacks: modified composition, diet as tolerated per MD   [x] Collaboration and referral of nutrition care: MD to review PN order and note daily; will not call to verify content and changes, per MD request. RD discussed proving thickener with meals as pt requests with Dr. Antonia Collazo on 6/14.  Discussed BG levels and either continuing with insulin regimen outside PN versus adding insulin to PN as well with Dr Chas Rodriguez; per MD, will add insulin in PN along with current regimen, recommendations discussed. Nutrition Diagnosis: Swallowing difficulty related to dysphagia s/p tracheostomy as evidenced by pt NPO after SLP evaluation/MBS. ASSESSMENT:     6/15: Tolerating PN. Did not eat breakfast or lunch today because afraid to eat. Also c/o gassiness. BG levels remain elevated  6/14: Tolerating PN. Did not eat breakfast, but consumed most of lunch, which consisted of cream soup, mashed potatoes and pureed peas. Pt requesting to thicken liquids herself, despite order for thin liquids. BG levels elevated. 6/13: PICC placed to initiate TPN per MD request.   6/11: Pt with variable meal intake. Reports some difficulty tolerating certain foods, but ate 50% of breakfast this AM.  Encourage pt to ask for soft foods as desired. Diet changed to diabetic, BG in better control. 6/10: Was tolerating tube feeds until pt pulled NGT 6/8, receiving IVF since then. Diet started today by MD.  Pt reports being hungry. Pt with intolerance to artificial sweeteners, monitor BG levels and assess need for diabetic diet. 6/7: Formula changed to Jevity 1.5 prior to initiation 2/2 concern for sucrose sensitivity. Will continue current product due to questionable sensitivity of ingredients in Glucerna tube feeding. Tolerating rate at goal.  Elevated BG levels, but expected 2/2 formula needing to be given, discussed with MD and glycemin control, will attempt to manage BG with medication due to formula limitations. IVF discontinued. 6/6: Failed swallow evaluation/MBS; repeat MBS ordered for today. C/o gas pains, but is passing flatus, and +BM. Some abdominal distension noted. Asking for water. DHT placed by IR, no tube feeding ordered at this time.    6/5: Admitted with stridor and vocal cord dysfunction s/p tracheostomy placement on 6/3/19, tolerating trach collar and able to eat if passes swallow evaluation per MD- may deflate cuff for meals but must re-inflate cuff when pt is not eating per Surgery. Pt refused NGT placement.     Average po intake adequate to meet patients estimated nutritional needs:   [] Yes     [x] No   [] Unable to determine at this time    PN infusion adequate to meet patients estimated nutritional needs:   [] Yes     [x] No   [] Unable to determine at this time    Diet: DIET DIABETIC CONSISTENT CARB Regular  TPN ADULT - CENTRAL  TPN ADULT - CENTRAL      Food Allergies: sweeteners, sucrose  Current Appetite:   [] Good     [x] Fair     [] Poor     [] Other:  Appetite/meal intake prior to admission:   [x] Good     [] Fair     [] Poor     [] Other:  Feeding Limitations:  [x] Swallowing difficulty: SLP following    [] Chewing difficulty    [x] Other: hx of dysphagia s/p thyroidectomy PTA and trach placement 6/3/19  Current Meal Intake:   Patient Vitals for the past 100 hrs:   % Diet Eaten   06/15/19 1158 0 %   06/15/19 0829 0 %   06/14/19 0741 0 %   06/13/19 1236 0 %   06/13/19 0830 0 %   06/12/19 1140 50 %   06/12/19 0841 25 %   06/11/19 1728 100 %       BM: 6/14  Skin Integrity: surgical incision to neck  Edema:  [] No     [x] Yes   Pertinent Medications: Reviewed: zofran, phenergan, pepcid, SSI ACHS very resistant scale, 22 units lantus      Labs: BMP, MG, Phos ordered daily; CMP, Triglyceride, Prealbumin ordered initially and weekly  Recent Labs     06/15/19  0245 06/14/19  0550 06/13/19  0054    139 138   K 3.4* 3.2* 3.5    99* 101   CO2 34* 32 31   * 197* 130*   BUN 11 8 5*   CREA 0.65 0.64 0.67   CA 8.1* 8.4* 8.8   MG 2.3 2.1 2.0   PHOS 2.6 3.2 3.1   ALB  --  2.7*  --    SGOT  --  11*  --    ALT  --  12*  --    Prealbumin: 12.9 mg/dL (6/14/19)  Triglyceride: 316 mg/dL (6/13/19), 511 mg/dL (4/16/19)  Recent Labs     06/15/19  0245 06/14/19  0550 06/13/19  0054   WBC 8.9 6.2 7.9   HGB 13.5 12.9 14.7   HCT 41.4 40.0 43.5    239 271         Intake/Output Summary (Last 24 hours) at 6/15/2019 1439  Last data filed at 6/15/2019 1158  Gross per 24 hour   Intake 0 ml   Output 1900 ml   Net -1900 ml       Anthropometrics:   Ht Readings from Last 1 Encounters:   06/03/19 5' 7\" (1.702 m)     Last 3 Recorded Weights in this Encounter    06/06/19 1401 06/10/19 0815 06/12/19 1740   Weight: 116.8 kg (257 lb 8 oz) 119.5 kg (263 lb 8 oz) 121.1 kg (267 lb)     Body mass index is 41.82 kg/m². Obese, Class III    Weight History: patient denies change in weight PTA, stable     Weight Metrics 6/12/2019 6/3/2019 5/23/2019 5/22/2019 5/16/2019 4/26/2019 4/16/2019   Weight 267 lb - 260 lb 6.4 oz - 258 lb 256 lb 260 lb 9.6 oz   BMI - 41.82 kg/m2 - 40.78 kg/m2 40.41 kg/m2 40.1 kg/m2 -          Admitting Diagnosis: Stridor [R06.1]  Stridor [R06.1]  Pertinent PMHx: T2DM with diabetic neuropathy, HTN, dyslipidemia, CHF, hypothyroidism s/p thyroidectomy 4/4/19    Education Needs:        [x] None identified  [] Identified - Not appropriate at this time  []  Identified and addressed - refer to education log  Learning Limitations:   [] None identified  [x] Identified: nonverbal     Cultural, Yazidi & ethnic food preferences:  [x] None identified    [] Identified and addressed     ESTIMATED NUTRITION NEEDS:     Calories: 4579-8469 kcal (MSJx1-1.3) based on   [x] Actual  kg     [] IBW:   Protein:  gm (0.8-1.2 gm/kg) based on   [x] Actual BW      [] IBW:   Fluid: 1 mL/kcal     MONITORING & EVALUATION:     Nutrition Goals:   1. Nutritional needs will be met through adequate oral intake or nutrition support within the next 5 days.   Outcome:  [] Met/Ongoing    [x] Progressing towards goal    [] Not progressing towards goal    [] New/Initial goal      Monitoring:  [x] Parenteral nutrition intake and administration  [x] Biochemical data, medical tests, and procedures   [x] Fluid intake   [x] Nutrition-focused physical findings   [x] Treatment/therapy   [x] Food and beverage intake   [x] Diet order      [] Weight        Previous Recommendations (for follow-up assessments only):     [x]   Implemented       []   Not Implemented (RD to address)      [] No Longer Appropriate     [] No Recommendation Made        Discharge Planning: Nutrition recommendations pending patient's ability to tolerate po intake/enteral nutrition.    [x]  Participated in care planning, discharge planning, & interdisciplinary rounds as appropriate      Howard Sanches, [address] [place] [address]  Pager: [phone]

## 2019-06-15 NOTE — ROUTINE PROCESS
Patients is sitting up on the [facility] alert awake and oriented. Trach on patent patients communicate in [facility] 430. No s/s of distress or sob.

## 2019-06-15 NOTE — PROGRESS NOTES
[facility]  Progress Note    Patient: Janette Murillo Age: 63 y.o. : 1960 MR#: [id number] SSN: [id number]  Date/Time: 6/15/2019    Subjective:     Patient sitting in bed in NAD, awake, follows commands    Assessment/Plan:     -DM2, uncontrolled with hyperglycemia  - s/p Tracheostomy for vocal cord paralysis  - HTN  - Dysphagia  - Morbid Obesity   - Hypothyroidism     PLAN    NPO as per speech  TPN as per surgeon, trach care  Lantus, insulin in TPN  Monitor BP  IV synthyroid  D/w patient, d/w RN    Case discussed with:  [x]Patient  []Family  [x]Nursing  []Case Management  DVT Prophylaxis:  [x]Lovenox  []Hep SQ  []SCDs  []Coumadin   []On Heparin gtt    Objective:   VS:   Visit Vitals  /65 (BP 1 Location: Left arm, BP Patient Position: At rest)   Pulse 71   Temp 98.7 °F (37.1 °C)   Resp 16   Ht 5' 7\" (1.702 m)   Wt 121.1 kg (267 lb)   SpO2 93%   Breastfeeding?  No   BMI 41.82 kg/m²      Tmax/24hrs: Temp (24hrs), Av.1 °F (36.7 °C), Min:97.7 °F (36.5 °C), Max:98.7 °F (37.1 °C)    Input/Output:     Intake/Output Summary (Last 24 hours) at 6/15/2019 1827  Last data filed at 6/15/2019 1158  Gross per 24 hour   Intake 0 ml   Output 1650 ml   Net -1650 ml       General:  Awake, alert  Cardiovascular:  S1S2+, RRR  Pulmonary:  Coarse bs b/l  GI:  Soft, BS+, NT, ND, obeses  Extremities:  No edema  + trach    Labs:    Recent Results (from the past 24 hour(s))   GLUCOSE, POC    Collection Time: 19 11:01 PM   Result Value Ref Range    Glucose (POC) 240 (H) 70 - 110 mg/dL   CBC WITH AUTOMATED DIFF    Collection Time: 06/15/19  2:45 AM   Result Value Ref Range    WBC 8.9 4.6 - 13.2 K/uL    RBC 4.52 4.20 - 5.30 M/uL    HGB 13.5 12.0 - 16.0 g/dL    HCT 41.4 35.0 - 45.0 %    MCV 91.6 74.0 - 97.0 FL    MCH 29.9 24.0 - 34.0 PG    MCHC 32.6 31.0 - 37.0 g/dL    RDW 15.5 (H) 11.6 - 14.5 %    PLATELET 384 418 - 540 K/uL    MPV 8.9 (L) 9.2 - 11.8 FL    NEUTROPHILS 76 (H) 40 - 73 %    LYMPHOCYTES 15 (L) 21 - 52 %    MONOCYTES 8 3 - 10 %    EOSINOPHILS 1 0 - 5 %    BASOPHILS 0 0 - 2 %    ABS. NEUTROPHILS 6.8 1.8 - 8.0 K/UL    ABS. LYMPHOCYTES 1.4 0.9 - 3.6 K/UL    ABS. MONOCYTES 0.7 0.05 - 1.2 K/UL    ABS. EOSINOPHILS 0.1 0.0 - 0.4 K/UL    ABS.  BASOPHILS 0.0 0.0 - 0.1 K/UL    DF AUTOMATED     METABOLIC PANEL, BASIC    Collection Time: 06/15/19  2:45 AM   Result Value Ref Range    Sodium 139 136 - 145 mmol/L    Potassium 3.4 (L) 3.5 - 5.5 mmol/L    Chloride 100 100 - 108 mmol/L    CO2 34 (H) 21 - 32 mmol/L    Anion gap 5 3.0 - 18 mmol/L    Glucose 255 (H) 74 - 99 mg/dL    BUN 11 7.0 - 18 MG/DL    Creatinine 0.65 0.6 - 1.3 MG/DL    BUN/Creatinine ratio 17 12 - 20      GFR est AA >60 >60 ml/min/1.73m2    GFR est non-AA >60 >60 ml/min/1.73m2    Calcium 8.1 (L) 8.5 - 10.1 MG/DL   MAGNESIUM    Collection Time: 06/15/19  2:45 AM   Result Value Ref Range    Magnesium 2.3 1.6 - 2.6 mg/dL   PHOSPHORUS    Collection Time: 06/15/19  2:45 AM   Result Value Ref Range    Phosphorus 2.6 2.5 - 4.9 MG/DL   GLUCOSE, POC    Collection Time: 06/15/19  5:37 AM   Result Value Ref Range    Glucose (POC) 316 (H) 70 - 110 mg/dL   GLUCOSE, POC    Collection Time: 06/15/19  2:06 PM   Result Value Ref Range    Glucose (POC) 266 (H) 70 - 110 mg/dL   GLUCOSE, POC    Collection Time: 06/15/19  4:10 PM   Result Value Ref Range    Glucose (POC) 268 (H) 70 - 110 mg/dL     Additional Data Reviewed:      Signed By: Tank Turpin MD     Elizabeth 15, 2019

## 2019-06-15 NOTE — ROUTINE PROCESS
Bedside and Verbal shift change report given to Ced Mccain (oncoming nurse) by Sonia Herrera RN (offgoing nurse). Report included the following information SBAR, Kardex, Intake/Output, MAR and Recent Results.

## 2019-06-16 LAB
ANION GAP SERPL CALC-SCNC: 7 MMOL/L (ref 3–18)
BASOPHILS # BLD: 0 K/UL (ref 0–0.1)
BASOPHILS NFR BLD: 0 % (ref 0–2)
BUN SERPL-MCNC: 13 MG/DL (ref 7–18)
BUN/CREAT SERPL: 20 (ref 12–20)
CALCIUM SERPL-MCNC: 8.2 MG/DL (ref 8.5–10.1)
CHLORIDE SERPL-SCNC: 103 MMOL/L (ref 100–108)
CO2 SERPL-SCNC: 29 MMOL/L (ref 21–32)
CREAT SERPL-MCNC: 0.65 MG/DL (ref 0.6–1.3)
DIFFERENTIAL METHOD BLD: ABNORMAL
EOSINOPHIL # BLD: 0.1 K/UL (ref 0–0.4)
EOSINOPHIL NFR BLD: 1 % (ref 0–5)
ERYTHROCYTE [DISTWIDTH] IN BLOOD BY AUTOMATED COUNT: 15.6 % (ref 11.6–14.5)
GLUCOSE BLD STRIP.AUTO-MCNC: 189 MG/DL (ref 70–110)
GLUCOSE BLD STRIP.AUTO-MCNC: 191 MG/DL (ref 70–110)
GLUCOSE BLD STRIP.AUTO-MCNC: 211 MG/DL (ref 70–110)
GLUCOSE BLD STRIP.AUTO-MCNC: 216 MG/DL (ref 70–110)
GLUCOSE BLD STRIP.AUTO-MCNC: 281 MG/DL (ref 70–110)
GLUCOSE SERPL-MCNC: 242 MG/DL (ref 74–99)
HCT VFR BLD AUTO: 37.8 % (ref 35–45)
HGB BLD-MCNC: 12.6 G/DL (ref 12–16)
LYMPHOCYTES # BLD: 1.6 K/UL (ref 0.9–3.6)
LYMPHOCYTES NFR BLD: 22 % (ref 21–52)
MAGNESIUM SERPL-MCNC: 2.2 MG/DL (ref 1.6–2.6)
MCH RBC QN AUTO: 30.1 PG (ref 24–34)
MCHC RBC AUTO-ENTMCNC: 33.3 G/DL (ref 31–37)
MCV RBC AUTO: 90.4 FL (ref 74–97)
MONOCYTES # BLD: 0.6 K/UL (ref 0.05–1.2)
MONOCYTES NFR BLD: 8 % (ref 3–10)
NEUTS SEG # BLD: 5.2 K/UL (ref 1.8–8)
NEUTS SEG NFR BLD: 69 % (ref 40–73)
PHOSPHATE SERPL-MCNC: 2.8 MG/DL (ref 2.5–4.9)
PLATELET # BLD AUTO: 231 K/UL (ref 135–420)
PMV BLD AUTO: 8.7 FL (ref 9.2–11.8)
POTASSIUM SERPL-SCNC: 3.4 MMOL/L (ref 3.5–5.5)
RBC # BLD AUTO: 4.18 M/UL (ref 4.2–5.3)
SODIUM SERPL-SCNC: 139 MMOL/L (ref 136–145)
WBC # BLD AUTO: 7.5 K/UL (ref 4.6–13.2)

## 2019-06-16 PROCEDURE — 74011250636 HC RX REV CODE- 250/636: Performed by: HOSPITALIST

## 2019-06-16 PROCEDURE — 36592 COLLECT BLOOD FROM PICC: CPT

## 2019-06-16 PROCEDURE — 74011636637 HC RX REV CODE- 636/637: Performed by: HOSPITALIST

## 2019-06-16 PROCEDURE — 84100 ASSAY OF PHOSPHORUS: CPT

## 2019-06-16 PROCEDURE — 74011636637 HC RX REV CODE- 636/637: Performed by: SURGERY

## 2019-06-16 PROCEDURE — 74011000258 HC RX REV CODE- 258: Performed by: SURGERY

## 2019-06-16 PROCEDURE — 74011250636 HC RX REV CODE- 250/636: Performed by: SURGERY

## 2019-06-16 PROCEDURE — 74011000250 HC RX REV CODE- 250: Performed by: SURGERY

## 2019-06-16 PROCEDURE — 85025 COMPLETE CBC W/AUTO DIFF WBC: CPT

## 2019-06-16 PROCEDURE — 74011000250 HC RX REV CODE- 250: Performed by: HOSPITALIST

## 2019-06-16 PROCEDURE — 83735 ASSAY OF MAGNESIUM: CPT

## 2019-06-16 PROCEDURE — 65270000029 HC RM PRIVATE

## 2019-06-16 PROCEDURE — 74011250636 HC RX REV CODE- 250/636

## 2019-06-16 PROCEDURE — 97116 GAIT TRAINING THERAPY: CPT

## 2019-06-16 PROCEDURE — 77010033678 HC OXYGEN DAILY

## 2019-06-16 PROCEDURE — 97530 THERAPEUTIC ACTIVITIES: CPT

## 2019-06-16 PROCEDURE — 80048 BASIC METABOLIC PNL TOTAL CA: CPT

## 2019-06-16 PROCEDURE — 82962 GLUCOSE BLOOD TEST: CPT

## 2019-06-16 PROCEDURE — 74011000250 HC RX REV CODE- 250: Performed by: NURSE PRACTITIONER

## 2019-06-16 RX ORDER — INSULIN LISPRO 100 [IU]/ML
INJECTION, SOLUTION INTRAVENOUS; SUBCUTANEOUS EVERY 6 HOURS
Status: DISCONTINUED | OUTPATIENT
Start: 2019-06-16 | End: 2019-06-25 | Stop reason: HOSPADM

## 2019-06-16 RX ADMIN — INSULIN LISPRO 6 UNITS: 100 INJECTION, SOLUTION INTRAVENOUS; SUBCUTANEOUS at 18:45

## 2019-06-16 RX ADMIN — INSULIN LISPRO 6 UNITS: 100 INJECTION, SOLUTION INTRAVENOUS; SUBCUTANEOUS at 05:49

## 2019-06-16 RX ADMIN — HYDROMORPHONE HYDROCHLORIDE 1 MG: 2 INJECTION INTRAMUSCULAR; INTRAVENOUS; SUBCUTANEOUS at 05:03

## 2019-06-16 RX ADMIN — ENOXAPARIN SODIUM 40 MG: 40 INJECTION SUBCUTANEOUS at 18:42

## 2019-06-16 RX ADMIN — HYDROMORPHONE HYDROCHLORIDE 1 MG: 2 INJECTION INTRAMUSCULAR; INTRAVENOUS; SUBCUTANEOUS at 08:33

## 2019-06-16 RX ADMIN — LEVOTHYROXINE SODIUM 75 MCG: 100 INJECTION, POWDER, LYOPHILIZED, FOR SOLUTION INTRAVENOUS at 08:05

## 2019-06-16 RX ADMIN — HYDROMORPHONE HYDROCHLORIDE 1 MG: 2 INJECTION INTRAMUSCULAR; INTRAVENOUS; SUBCUTANEOUS at 19:54

## 2019-06-16 RX ADMIN — CHLORHEXIDINE GLUCONATE 0.12% ORAL RINSE 10 ML: 1.2 LIQUID ORAL at 22:38

## 2019-06-16 RX ADMIN — POTASSIUM CHLORIDE: 2 INJECTION, SOLUTION, CONCENTRATE INTRAVENOUS at 19:37

## 2019-06-16 RX ADMIN — LORAZEPAM 1 MG: 2 INJECTION INTRAMUSCULAR at 15:17

## 2019-06-16 RX ADMIN — FAMOTIDINE 20 MG: 10 INJECTION INTRAVENOUS at 08:04

## 2019-06-16 RX ADMIN — LORAZEPAM 1 MG: 2 INJECTION INTRAMUSCULAR at 06:34

## 2019-06-16 RX ADMIN — FAMOTIDINE 20 MG: 10 INJECTION INTRAVENOUS at 22:39

## 2019-06-16 RX ADMIN — HYDROMORPHONE HYDROCHLORIDE 1 MG: 2 INJECTION INTRAMUSCULAR; INTRAVENOUS; SUBCUTANEOUS at 12:37

## 2019-06-16 RX ADMIN — INSULIN GLARGINE 22 UNITS: 100 INJECTION, SOLUTION SUBCUTANEOUS at 08:04

## 2019-06-16 RX ADMIN — INSULIN LISPRO 9 UNITS: 100 INJECTION, SOLUTION INTRAVENOUS; SUBCUTANEOUS at 11:32

## 2019-06-16 NOTE — ROUTINE PROCESS
Bedside and Verbal shift change report given to Renetta Ndiaye RN (oncoming nurse) by Phi Vivar RN (offgoing nurse). Report included the following information SBAR, Kardex, Intake/Output and MAR.

## 2019-06-16 NOTE — PROGRESS NOTES
NUTRITION CONSULT    Nutrition Consult: Management of Tube Feeding, TPN: RD to Manage     RECOMMENDATIONS / PLAN:     - Plan to provide parenteral nutrition support, monitor labs and adjust electrolytes daily. Lipids held 2/2 elevated TG and oral intake. - Consider long term feeding tube placement to provide enteral nutrition until pt able to tolerate po and adequate intake of oral diet. - Continue RD inpatient monitoring and evaluation. Please Note:   Parenteral nutrition support to be provided using custom product, allowing for modification of electrolyte and macronutrient content day to day. Lipids included in PN on MWF. Macronutrient Goal (to meet 100% of estimated nutrition needs): 129 gm amino acids, 310 gm dextrose, 250 mL lipids (1784 kcal weekly average). PARENTERAL NUTRITION ORDER:     Electrolyte Adjustments: K increased, Phos increased    Day 4 PN to provide: 140 mEq Na, 104 mEq K, 15 mEq Ca, 16 mEq Mg, 39 mmol Phos, 910 kcal, 100 gm amino acids, 150 gm dextrose, 10 mL MVI, 2.5 mL trace elements, 10 units insulin    PN Rate: 100 mL/hr   Glucose Infusion Rate: 0.86 mg/kg/min    Osmolarity: 958 mOsm   Parenteral Nutrition Access Device: right basilic double lumen PICC (placed 6/13/19)  Indication for PN: per MD request, diet intolerance 2/2 dysphagia s/p tracheostomy   Refeeding Risk:      []  Yes  [x] No     NUTRITION DIAGNOSIS & INTERVENTIONS:     [x] Parenteral nutrition: continue, modify contents  [x] Collaboration and referral of nutrition care: MD to review PN order and note daily; will not call to verify content and changes, per MD request. Pt discussed with Dr [address]. BG and insulin in PN discussed with Dr Nikolay Castillo; will continue with same amount today per MD. Insulin not provided in PN last night; discussed with Pharmacy. Nutrition Diagnosis: Swallowing difficulty related to dysphagia s/p tracheostomy as evidenced by pt NPO after SLP evaluation/MBS.     ASSESSMENT:     6/16: BG levels trending down, but remain high. Made NPO yesterday per MD  6/15: Tolerating PN. Did not eat breakfast or lunch today because afraid to eat. Also c/o gassiness. BG levels remain elevated  6/14: Tolerating PN. Did not eat breakfast, but consumed most of lunch, which consisted of cream soup, mashed potatoes and pureed peas. Pt requesting to thicken liquids herself, despite order for thin liquids. BG levels elevated. 6/13: PICC placed to initiate TPN per MD request.   6/11: Pt with variable meal intake. Reports some difficulty tolerating certain foods, but ate 50% of breakfast this AM.  Encourage pt to ask for soft foods as desired. Diet changed to diabetic, BG in better control. 6/10: Was tolerating tube feeds until pt pulled NGT 6/8, receiving IVF since then. Diet started today by MD.  Pt reports being hungry. Pt with intolerance to artificial sweeteners, monitor BG levels and assess need for diabetic diet. 6/7: Formula changed to Jevity 1.5 prior to initiation 2/2 concern for sucrose sensitivity. Will continue current product due to questionable sensitivity of ingredients in Glucerna tube feeding. Tolerating rate at goal.  Elevated BG levels, but expected 2/2 formula needing to be given, discussed with MD and glycemin control, will attempt to manage BG with medication due to formula limitations. IVF discontinued. 6/6: Failed swallow evaluation/MBS; repeat MBS ordered for today. C/o gas pains, but is passing flatus, and +BM. Some abdominal distension noted. Asking for water. DHT placed by IR, no tube feeding ordered at this time. 6/5: Admitted with stridor and vocal cord dysfunction s/p tracheostomy placement on 6/3/19, tolerating trach collar and able to eat if passes swallow evaluation per MD- may deflate cuff for meals but must re-inflate cuff when pt is not eating per Surgery. Pt refused NGT placement.     Average po intake adequate to meet patients estimated nutritional needs:   [] Yes [x] No   [] Unable to determine at this time    PN infusion adequate to meet patients estimated nutritional needs:   [] Yes     [x] No   [] Unable to determine at this time    Diet: TPN ADULT - CENTRAL  DIET NPO  TPN ADULT - CENTRAL      Food Allergies: sweeteners, sucrose  Current Appetite:   [] Good     [] Fair     [] Poor     [x] Other: NPO  Appetite/meal intake prior to admission:   [x] Good     [] Fair     [] Poor     [] Other:  Feeding Limitations:  [x] Swallowing difficulty: SLP following    [] Chewing difficulty    [x] Other: hx of dysphagia s/p thyroidectomy PTA and trach placement 6/3/19  Current Meal Intake:   Patient Vitals for the past 100 hrs:   % Diet Eaten   06/15/19 1158 0 %   06/15/19 0829 0 %   06/14/19 0741 0 %   06/13/19 1236 0 %   06/13/19 0830 0 %   06/12/19 1140 50 %   06/12/19 0841 25 %       BM: 6/14  Skin Integrity: surgical incision to neck  Edema:  [] No     [x] Yes   Pertinent Medications: Reviewed: zofran, phenergan, pepcid, SSI ACHS very resistant scale, 22 units lantus      Labs: BMP, MG, Phos ordered daily; CMP, Triglyceride, Prealbumin ordered initially and weekly  Recent Labs     06/16/19  0505 06/15/19  0245 06/14/19  0550    139 139   K 3.4* 3.4* 3.2*    100 99*   CO2 29 34* 32   * 255* 197*   BUN 13 11 8   CREA 0.65 0.65 0.64   CA 8.2* 8.1* 8.4*   MG 2.2 2.3 2.1   PHOS 2.8 2.6 3.2   ALB  --   --  2.7*   SGOT  --   --  11*   ALT  --   --  12*   Prealbumin: 12.9 mg/dL (6/14/19)  Triglyceride: 316 mg/dL (6/13/19), 511 mg/dL (4/16/19)  Recent Labs     06/16/19  0505 06/15/19  0245 06/14/19  0550   WBC 7.5 8.9 6.2   HGB 12.6 13.5 12.9   HCT 37.8 41.4 40.0    262 239         Intake/Output Summary (Last 24 hours) at 6/16/2019 1124  Last data filed at 6/16/2019 0659  Gross per 24 hour   Intake 938.33 ml   Output 1150 ml   Net -211.67 ml       Anthropometrics:   Ht Readings from Last 1 Encounters:   06/03/19 5' 7\" (1.702 m)     Last 3 Recorded Weights in this Encounter    06/10/19 0815 06/12/19 1740 06/16/19 0040   Weight: 119.5 kg (263 lb 8 oz) 121.1 kg (267 lb) 120.7 kg (266 lb 1.6 oz)     Body mass index is 41.68 kg/m². Obese, Class III    Weight History: patient denies change in weight PTA, stable     Weight Metrics 6/16/2019 6/3/2019 5/23/2019 5/22/2019 5/16/2019 4/26/2019 4/16/2019   Weight 266 lb 1.6 oz - 260 lb 6.4 oz - 258 lb 256 lb 260 lb 9.6 oz   BMI - 41.68 kg/m2 - 40.78 kg/m2 40.41 kg/m2 40.1 kg/m2 -          Admitting Diagnosis: Stridor [R06.1]  Stridor [R06.1]  Pertinent PMHx: T2DM with diabetic neuropathy, HTN, dyslipidemia, CHF, hypothyroidism s/p thyroidectomy 4/4/19    Education Needs:        [x] None identified  [] Identified - Not appropriate at this time  []  Identified and addressed - refer to education log  Learning Limitations:   [] None identified  [x] Identified: nonverbal. Communicates via pen/paper    Cultural, Amish & ethnic food preferences:  [x] None identified    [] Identified and addressed     ESTIMATED NUTRITION NEEDS:     Calories: 6442-6314 kcal (MSJx1-1.3) based on   [x] Actual  kg     [] IBW:   Protein:  gm (0.8-1.2 gm/kg) based on   [x] Actual BW      [] IBW:   Fluid: 1 mL/kcal     MONITORING & EVALUATION:     Nutrition Goals:   1. Nutritional needs will be met through adequate oral intake or nutrition support within the next 5 days.   Outcome:  [] Met/Ongoing    [x] Progressing towards goal    [] Not progressing towards goal    [] New/Initial goal      Monitoring:  [x] Parenteral nutrition intake and administration  [x] Biochemical data, medical tests, and procedures   [x] Fluid intake   [x] Nutrition-focused physical findings   [x] Treatment/therapy   [] Food and beverage intake   [x] Diet order      [] Weight        Previous Recommendations (for follow-up assessments only):     [x]   Implemented       []   Not Implemented (RD to address)      [] No Longer Appropriate     [] No Recommendation Made Discharge Planning: Nutrition recommendations pending patient's ability to tolerate po intake/enteral nutrition.    [x]  Participated in care planning, discharge planning, & interdisciplinary rounds as appropriate      Carol Ann Champagne  Pager: [phone]

## 2019-06-16 NOTE — PROGRESS NOTES
Patient has a size 9 Portex trach    No note found to justify why trach cuff is inflated, so patient suctioning and cuff deflated. Suction produced much coughing and small, white amount of secretions.

## 2019-06-16 NOTE — PROGRESS NOTES
Problem: Mobility Impaired (Adult and Pediatric)  Goal: *Acute Goals and Plan of Care (Insert Text)  Description  Physical Therapy Goals  Initiated 6/5/2019 and to be accomplished within 7 day(s)  1. Patient will move from supine to sit and sit to supine , scoot up and down and roll side to side in bed with modified independence. 2.  Patient will transfer from bed to chair and chair to bed with modified independence using the least restrictive device. 3.  Patient will perform sit to stand with modified independence. 4.  Patient will ambulate with modified independence for >100 feet with the least restrictive device. 5.  Patient will ascend/descend 3 stairs with 1-2 handrail(s) with supervision/set-up. Prior Level of Function: Independent with mobility including gait using 2 canes. Pt lives with her brother and son in a single story home with 3 steps to enter B HRA. Outcome: Progressing Towards Goal     PHYSICAL THERAPY TREATMENT    Patient: Victoria Dutton (40 y.o. female)  Date: 6/16/2019  Diagnosis: Stridor [R06.1]  Stridor [R06.1] <principal problem not specified>  Procedure(s) (LRB):  TRACHEOSTOMY (N/A) 13 Days Post-Op  Precautions: Fall(trach collar)  Chart, physical therapy assessment, plan of care and goals were reviewed. ASSESSMENT:  Pt asleep upon entering room. Rouses to VC's. Nursing present to change O2 from wall to portable. Treatment performed with pt on 6L NC O2. Unable to communicate verbally, pt writes on notepad to convey messages. Pt demonstrates good ability with bed mobility and transfer to standing. Ambulation with (B) SPC at pt's request.  100' in hallway with SBA/CGA. Returned to EOB and supine in bed. Needs left within reach, nursing notified to return pt to wall O2. SpO2 levels did not decrease below 95% throughout session. Progression toward goals:  ?      Improving appropriately and progressing toward goals  ?       Improving slowly and progressing toward goals  ?      Not making progress toward goals and plan of care will be adjusted     PLAN:  Patient continues to benefit from skilled intervention to address the above impairments. Continue treatment per established plan of care. Discharge Recommendations:  Home with 24 hour assist   Further Equipment Recommendations for Discharge:  rolling walker and N/A     SUBJECTIVE:   Patient stated ?n/a.? OBJECTIVE DATA SUMMARY:   Critical Behavior:  Neurologic State: Alert, Appropriate for age  Orientation Level: Oriented X4  Cognition: Appropriate decision making, Appropriate for age attention/concentration, Follows commands  Safety/Judgement: Awareness of environment, Fall prevention  Functional Mobility Training:        Transfers:  Sit to Stand: Stand-by assistance  Stand to Sit: Stand-by assistance                             Balance:  Sitting: Intact  Standing: Impaired; With support  Standing - Static: Good  Standing - Dynamic : Fair  Ambulation/Gait Training:  Distance (ft): 100 Feet (ft)  Assistive Device: Cane, straight(x2)  Ambulation - Level of Assistance: Stand-by assistance;Contact guard assistance        Gait Abnormalities: Decreased step clearance        Base of Support: Center of gravity altered     Speed/Yolis: Slow;Shuffled  Step Length: Right shortened;Left shortened    Pain:  Pt pain was reported as  na  pre-treatment. Pt pain was reported as na  post-treatment. Activity Tolerance:   Good  Please refer to the flowsheet for vital signs taken during this treatment. After treatment:   ? Patient left in no apparent distress sitting up in chair  ? Patient left in no apparent distress in bed  ? Call bell left within reach  ? Nursing notified  ? Caregiver present  ?  Bed alarm activated      Marychuy Au PTA   Time Calculation: 42 mins

## 2019-06-16 NOTE — ROUTINE PROCESS
Received report from BECERRALISY INMAN. Pt AAOx3, NAD, breathing non labored, on O2 via trach collar. Trach in place , HOB up. PICC site clean, dry and intact. TPN going at 100cc/h. Bed at the lowest level on lock position, call bell w/i reach. Bedside and Verbal shift change report given to [facility] (oncoming nurse) by me (offgoing nurse). Report included the following information SBAR, Kardex, Procedure Summary, Intake/Output, MAR and Recent Results.

## 2019-06-17 LAB
ANION GAP SERPL CALC-SCNC: 4 MMOL/L (ref 3–18)
BASOPHILS # BLD: 0 K/UL (ref 0–0.1)
BASOPHILS NFR BLD: 0 % (ref 0–2)
BUN SERPL-MCNC: 10 MG/DL (ref 7–18)
BUN/CREAT SERPL: 17 (ref 12–20)
CALCIUM SERPL-MCNC: 7.2 MG/DL (ref 8.5–10.1)
CHLORIDE SERPL-SCNC: 104 MMOL/L (ref 100–108)
CO2 SERPL-SCNC: 29 MMOL/L (ref 21–32)
CREAT SERPL-MCNC: 0.59 MG/DL (ref 0.6–1.3)
DIFFERENTIAL METHOD BLD: ABNORMAL
EOSINOPHIL # BLD: 0.1 K/UL (ref 0–0.4)
EOSINOPHIL NFR BLD: 2 % (ref 0–5)
ERYTHROCYTE [DISTWIDTH] IN BLOOD BY AUTOMATED COUNT: 15.6 % (ref 11.6–14.5)
GLUCOSE BLD STRIP.AUTO-MCNC: 143 MG/DL (ref 70–110)
GLUCOSE BLD STRIP.AUTO-MCNC: 195 MG/DL (ref 70–110)
GLUCOSE BLD STRIP.AUTO-MCNC: 210 MG/DL (ref 70–110)
GLUCOSE BLD STRIP.AUTO-MCNC: 226 MG/DL (ref 70–110)
GLUCOSE SERPL-MCNC: 252 MG/DL (ref 74–99)
HCT VFR BLD AUTO: 35.9 % (ref 35–45)
HGB BLD-MCNC: 11.9 G/DL (ref 12–16)
LYMPHOCYTES # BLD: 1.8 K/UL (ref 0.9–3.6)
LYMPHOCYTES NFR BLD: 30 % (ref 21–52)
MAGNESIUM SERPL-MCNC: 2.2 MG/DL (ref 1.6–2.6)
MCH RBC QN AUTO: 30.1 PG (ref 24–34)
MCHC RBC AUTO-ENTMCNC: 33.1 G/DL (ref 31–37)
MCV RBC AUTO: 90.9 FL (ref 74–97)
MONOCYTES # BLD: 0.4 K/UL (ref 0.05–1.2)
MONOCYTES NFR BLD: 7 % (ref 3–10)
NEUTS SEG # BLD: 3.6 K/UL (ref 1.8–8)
NEUTS SEG NFR BLD: 61 % (ref 40–73)
PHOSPHATE SERPL-MCNC: 3 MG/DL (ref 2.5–4.9)
PLATELET # BLD AUTO: 215 K/UL (ref 135–420)
PMV BLD AUTO: 8.7 FL (ref 9.2–11.8)
POTASSIUM SERPL-SCNC: 4.4 MMOL/L (ref 3.5–5.5)
RBC # BLD AUTO: 3.95 M/UL (ref 4.2–5.3)
SODIUM SERPL-SCNC: 137 MMOL/L (ref 136–145)
WBC # BLD AUTO: 5.8 K/UL (ref 4.6–13.2)

## 2019-06-17 PROCEDURE — 74011250636 HC RX REV CODE- 250/636

## 2019-06-17 PROCEDURE — 74011000258 HC RX REV CODE- 258

## 2019-06-17 PROCEDURE — 84100 ASSAY OF PHOSPHORUS: CPT

## 2019-06-17 PROCEDURE — 74011000250 HC RX REV CODE- 250

## 2019-06-17 PROCEDURE — 74011000258 HC RX REV CODE- 258: Performed by: NURSE PRACTITIONER

## 2019-06-17 PROCEDURE — 82962 GLUCOSE BLOOD TEST: CPT

## 2019-06-17 PROCEDURE — 65270000029 HC RM PRIVATE

## 2019-06-17 PROCEDURE — 92522 EVALUATE SPEECH PRODUCTION: CPT

## 2019-06-17 PROCEDURE — 97110 THERAPEUTIC EXERCISES: CPT

## 2019-06-17 PROCEDURE — 74011250636 HC RX REV CODE- 250/636: Performed by: NURSE PRACTITIONER

## 2019-06-17 PROCEDURE — 74011250636 HC RX REV CODE- 250/636: Performed by: HOSPITALIST

## 2019-06-17 PROCEDURE — 80048 BASIC METABOLIC PNL TOTAL CA: CPT

## 2019-06-17 PROCEDURE — 85025 COMPLETE CBC W/AUTO DIFF WBC: CPT

## 2019-06-17 PROCEDURE — 36592 COLLECT BLOOD FROM PICC: CPT

## 2019-06-17 PROCEDURE — 74011636637 HC RX REV CODE- 636/637

## 2019-06-17 PROCEDURE — 74011000250 HC RX REV CODE- 250: Performed by: HOSPITALIST

## 2019-06-17 PROCEDURE — 74011000250 HC RX REV CODE- 250: Performed by: NURSE PRACTITIONER

## 2019-06-17 PROCEDURE — 92507 TX SP LANG VOICE COMM INDIV: CPT

## 2019-06-17 PROCEDURE — 83735 ASSAY OF MAGNESIUM: CPT

## 2019-06-17 PROCEDURE — 77010033678 HC OXYGEN DAILY

## 2019-06-17 PROCEDURE — 97530 THERAPEUTIC ACTIVITIES: CPT

## 2019-06-17 PROCEDURE — 74011636637 HC RX REV CODE- 636/637: Performed by: HOSPITALIST

## 2019-06-17 RX ADMIN — FAMOTIDINE 20 MG: 10 INJECTION INTRAVENOUS at 08:30

## 2019-06-17 RX ADMIN — HYDROMORPHONE HYDROCHLORIDE 1 MG: 2 INJECTION INTRAMUSCULAR; INTRAVENOUS; SUBCUTANEOUS at 05:37

## 2019-06-17 RX ADMIN — INSULIN LISPRO 6 UNITS: 100 INJECTION, SOLUTION INTRAVENOUS; SUBCUTANEOUS at 17:01

## 2019-06-17 RX ADMIN — FAMOTIDINE 20 MG: 10 INJECTION INTRAVENOUS at 21:25

## 2019-06-17 RX ADMIN — LORAZEPAM 1 MG: 2 INJECTION INTRAMUSCULAR at 15:16

## 2019-06-17 RX ADMIN — HYDROMORPHONE HYDROCHLORIDE 1 MG: 2 INJECTION INTRAMUSCULAR; INTRAVENOUS; SUBCUTANEOUS at 11:38

## 2019-06-17 RX ADMIN — PROMETHAZINE HYDROCHLORIDE 12.5 MG: 25 INJECTION, SOLUTION INTRAMUSCULAR; INTRAVENOUS at 16:59

## 2019-06-17 RX ADMIN — ENOXAPARIN SODIUM 40 MG: 40 INJECTION SUBCUTANEOUS at 17:01

## 2019-06-17 RX ADMIN — HYDROMORPHONE HYDROCHLORIDE 1 MG: 2 INJECTION INTRAMUSCULAR; INTRAVENOUS; SUBCUTANEOUS at 22:56

## 2019-06-17 RX ADMIN — INSULIN LISPRO 6 UNITS: 100 INJECTION, SOLUTION INTRAVENOUS; SUBCUTANEOUS at 05:35

## 2019-06-17 RX ADMIN — INSULIN LISPRO 3 UNITS: 100 INJECTION, SOLUTION INTRAVENOUS; SUBCUTANEOUS at 11:52

## 2019-06-17 RX ADMIN — CHLORHEXIDINE GLUCONATE 0.12% ORAL RINSE 10 ML: 1.2 LIQUID ORAL at 08:29

## 2019-06-17 RX ADMIN — CALCIUM GLUCONATE: 94 INJECTION, SOLUTION INTRAVENOUS at 21:25

## 2019-06-17 RX ADMIN — HYDROMORPHONE HYDROCHLORIDE 1 MG: 2 INJECTION INTRAMUSCULAR; INTRAVENOUS; SUBCUTANEOUS at 01:30

## 2019-06-17 RX ADMIN — ONDANSETRON 4 MG: 2 INJECTION INTRAMUSCULAR; INTRAVENOUS at 15:17

## 2019-06-17 RX ADMIN — LORAZEPAM 1 MG: 2 INJECTION INTRAMUSCULAR at 21:25

## 2019-06-17 RX ADMIN — INSULIN GLARGINE 22 UNITS: 100 INJECTION, SOLUTION SUBCUTANEOUS at 09:04

## 2019-06-17 RX ADMIN — LEVOTHYROXINE SODIUM 75 MCG: 100 INJECTION, POWDER, LYOPHILIZED, FOR SOLUTION INTRAVENOUS at 08:30

## 2019-06-17 RX ADMIN — HYDROMORPHONE HYDROCHLORIDE 1 MG: 2 INJECTION INTRAMUSCULAR; INTRAVENOUS; SUBCUTANEOUS at 17:01

## 2019-06-17 NOTE — ROUTINE PROCESS
Patient is alert and oriented times three with no signs or symptoms of distress. Jason Fire is intact cleaned around it and replaced inner cannula. Changed water for humidying the trach. Medicated patient for pain with prescribed pain medication.

## 2019-06-17 NOTE — PROGRESS NOTES
Case management consulted for rehab screening. Contacted Skylar at [facility], will screen and inform CM of decision.        EDWIN Dominguez  Case Management  [phone]

## 2019-06-17 NOTE — ROUTINE PROCESS
Bedside shift change report given to [address] [place] [address] Bo (oncoming nurse) by Kassie Muniz (offgoing nurse). Report included the following information SBAR, Kardex, Intake/Output, MAR and Recent Results.

## 2019-06-17 NOTE — ROUTINE PROCESS
Bedside and Verbal shift change report given to Carolynn RN (oncoming nurse) by Jorge Rodriguez RN   (offgoing nurse). Report included the following information SBAR, Kardex, Intake/Output, MAR and Recent Results.

## 2019-06-17 NOTE — PROGRESS NOTES
Assumed care of patient in bed asleep & quiet. Remains on trach collar @ 30 % on 9 lpm with humidification. PICC line continues to infuse TPN as ordered. Asleep & quiet, no s/s of distress, call bell within reach.

## 2019-06-17 NOTE — PROGRESS NOTES
NUTRITION CONSULT    Nutrition Consult: Management of Tube Feeding, TPN: RD to Manage     RECOMMENDATIONS / PLAN:     - Plan to provide parenteral nutrition support, monitor labs and adjust electrolytes daily. Check CRP and triglyceride (lipids left out 2/2 elevated). - Consider long term feeding tube placement to provide enteral nutrition until pt able to tolerate po and adequate intake of oral diet. - Continue RD inpatient monitoring and evaluation. Please Note:   Parenteral nutrition support to be provided using custom product, allowing for modification of electrolyte and macronutrient content day to day. Lipids included in PN on MWF. Macronutrient Goal (to meet 100% of estimated nutrition needs): 129 gm amino acids, 310 gm dextrose, 250 mL lipids (1784 kcal weekly average). PARENTERAL NUTRITION ORDER:     Electrolyte Adjustments: K decreased    Day 5 PN to provide: 140 mEq Na, 78 mEq K, 15 mEq Ca, 16 mEq Mg, 39 mmol Phos, 1026 kcal, 129 gm amino acids, 150 gm dextrose, 10 mL MVI, 2.5 mL trace elements, 12 units regular insulin    PN Rate: 100 mL/hr   Glucose Infusion Rate: 0.86 mg/kg/min    Osmolarity: 1058 mOsm   Parenteral Nutrition Access Device: right basilic double lumen PICC (placed 6/13/19)  Indication for PN: per MD request, diet intolerance 2/2 dysphagia s/p tracheostomy   Refeeding Risk:      []  Yes  [x] No     NUTRITION DIAGNOSIS & INTERVENTIONS:     [x] Parenteral nutrition: continue, modify contents  [x] Collaboration and referral of nutrition care: MD to review PN order and note daily; will not call to verify content and changes, per MD request. Insulin discussed with Dr Rema Muir.      Nutrition Diagnosis: Swallowing difficulty related to dysphagia s/p tracheostomy as evidenced by pt NPO after SLP evaluation/MBS. ASSESSMENT:     6/17: BG remains elevated and pt NPO.   6/16: BG levels trending down, but remain high. Made NPO yesterday per MD  6/15: Tolerating PN.  Did not eat breakfast or lunch today because afraid to eat. Also c/o gassiness. BG levels remain elevated  6/14: Tolerating PN. Did not eat breakfast, but consumed most of lunch, which consisted of cream soup, mashed potatoes and pureed peas. Pt requesting to thicken liquids herself, despite order for thin liquids. BG levels elevated. 6/13: PICC placed to initiate TPN per MD request.   6/11: Pt with variable meal intake. Reports some difficulty tolerating certain foods, but ate 50% of breakfast this AM.  Encourage pt to ask for soft foods as desired. Diet changed to diabetic, BG in better control. 6/10: Was tolerating tube feeds until pt pulled NGT 6/8, receiving IVF since then. Diet started today by MD.  Pt reports being hungry. Pt with intolerance to artificial sweeteners, monitor BG levels and assess need for diabetic diet. 6/7: Formula changed to Jevity 1.5 prior to initiation 2/2 concern for sucrose sensitivity. Will continue current product due to questionable sensitivity of ingredients in Glucerna tube feeding. Tolerating rate at goal.  Elevated BG levels, but expected 2/2 formula needing to be given, discussed with MD and glycemin control, will attempt to manage BG with medication due to formula limitations. IVF discontinued. 6/6: Failed swallow evaluation/MBS; repeat MBS ordered for today. C/o gas pains, but is passing flatus, and +BM. Some abdominal distension noted. Asking for water. DHT placed by IR, no tube feeding ordered at this time. 6/5: Admitted with stridor and vocal cord dysfunction s/p tracheostomy placement on 6/3/19, tolerating trach collar and able to eat if passes swallow evaluation per MD- may deflate cuff for meals but must re-inflate cuff when pt is not eating per Surgery. Pt refused NGT placement.     Average po intake adequate to meet patients estimated nutritional needs:   [] Yes     [x] No   [] Unable to determine at this time    PN infusion adequate to meet patients estimated nutritional needs:   [] Yes     [x] No   [] Unable to determine at this time    Diet: DIET NPO  TPN ADULT - CENTRAL  TPN ADULT - CENTRAL      Food Allergies: sweeteners, sucrose  Current Appetite:   [] Good     [] Fair     [] Poor     [x] Other: NPO  Appetite/meal intake prior to admission:   [x] Good     [] Fair     [] Poor     [] Other:  Feeding Limitations:  [x] Swallowing difficulty: SLP following    [] Chewing difficulty    [x] Other: hx of dysphagia s/p thyroidectomy PTA and trach placement 6/3/19  Current Meal Intake:   Patient Vitals for the past 100 hrs:   % Diet Eaten   06/17/19 1235 0 %   06/15/19 1158 0 %   06/15/19 0829 0 %   06/14/19 0741 0 %   06/13/19 1236 0 %       BM: 6/13  Skin Integrity: surgical incision to neck  Edema:  [] No     [x] Yes   Pertinent Medications: Reviewed: zofran, phenergan, pepcid, SSI q 6 hours very resistant scale, 22 units lantus      Labs: BMP, MG, Phos ordered daily; CMP, Triglyceride, Prealbumin ordered initially and weekly  Recent Labs     06/17/19  0500 06/16/19  0505 06/15/19  0245    139 139   K 4.4 3.4* 3.4*    103 100   CO2 29 29 34*   * 242* 255*   BUN 10 13 11   CREA 0.59* 0.65 0.65   CA 7.2* 8.2* 8.1*   MG 2.2 2.2 2.3   PHOS 3.0 2.8 2.6   Prealbumin: 12.9 mg/dL (6/14/19)  Triglyceride: 316 mg/dL (6/13/19), 511 mg/dL (4/16/19)  Recent Labs     06/17/19  0500 06/16/19  0505 06/15/19  0245   WBC 5.8 7.5 8.9   HGB 11.9* 12.6 13.5   HCT 35.9 37.8 41.4    231 262         Intake/Output Summary (Last 24 hours) at 6/17/2019 1349  Last data filed at 6/17/2019 1235  Gross per 24 hour   Intake 1136.66 ml   Output 2475 ml   Net -1338.34 ml       Anthropometrics:   Ht Readings from Last 1 Encounters:   06/03/19 5' 7\" (1.702 m)     Last 3 Recorded Weights in this Encounter    06/10/19 0815 06/12/19 1740 06/16/19 0040   Weight: 119.5 kg (263 lb 8 oz) 121.1 kg (267 lb) 120.7 kg (266 lb 1.6 oz)     Body mass index is 41.68 kg/m².    Obese, Class III    Weight History: patient denies change in weight PTA, stable     Weight Metrics 6/16/2019 6/3/2019 5/23/2019 5/22/2019 5/16/2019 4/26/2019 4/16/2019   Weight 266 lb 1.6 oz - 260 lb 6.4 oz - 258 lb 256 lb 260 lb 9.6 oz   BMI - 41.68 kg/m2 - 40.78 kg/m2 40.41 kg/m2 40.1 kg/m2 -          Admitting Diagnosis: Stridor [R06.1]  Stridor [R06.1]  Pertinent PMHx: T2DM with diabetic neuropathy, HTN, dyslipidemia, CHF, hypothyroidism s/p thyroidectomy 4/4/19    Education Needs:        [x] None identified  [] Identified - Not appropriate at this time  []  Identified and addressed - refer to education log  Learning Limitations:   [] None identified  [x] Identified: nonverbal. Communicates via pen/paper    Cultural, Orthodox & ethnic food preferences:  [x] None identified    [] Identified and addressed     ESTIMATED NUTRITION NEEDS:     Calories: 3746-1230 kcal (MSJx1-1.3) based on   [x] Actual  kg     [] IBW:   Protein:  gm (0.8-1.2 gm/kg) based on   [x] Actual BW      [] IBW:   Fluid: 1 mL/kcal     MONITORING & EVALUATION:     Nutrition Goals:   1. Nutritional needs will be met through adequate oral intake or nutrition support within the next 5 days. Outcome:  [] Met/Ongoing    [x] Progressing towards goal    [] Not progressing towards goal    [] New/Initial goal      Monitoring:  [x] Parenteral nutrition intake and administration  [x] Biochemical data, medical tests, and procedures   [x] Fluid intake   [x] Nutrition-focused physical findings   [x] Treatment/therapy   [] Food and beverage intake   [x] Diet order      [] Weight        Previous Recommendations (for follow-up assessments only):     [x]   Implemented       []   Not Implemented (RD to address)      [] No Longer Appropriate     [] No Recommendation Made        Discharge Planning: Nutrition recommendations pending patient's ability to tolerate po intake/enteral nutrition.    [x]  Participated in care planning, discharge planning, & interdisciplinary rounds as appropriate      Iverson Councilman, TOM, [address]   Pager: [phone]

## 2019-06-17 NOTE — PROGRESS NOTES
Problem: Mobility Impaired (Adult and Pediatric)  Goal: *Acute Goals and Plan of Care (Insert Text)  Description  Physical Therapy Goals  Initiated 6/5/2019 and to be accomplished within 7 day(s)  1. Patient will move from supine to sit and sit to supine , scoot up and down and roll side to side in bed with modified independence. 2.  Patient will transfer from bed to chair and chair to bed with modified independence using the least restrictive device. 3.  Patient will perform sit to stand with modified independence. 4.  Patient will ambulate with modified independence for >100 feet with the least restrictive device. 5.  Patient will ascend/descend 3 stairs with 1-2 handrail(s) with supervision/set-up. Prior Level of Function: Independent with mobility including gait using 2 canes. Pt lives with her brother and son in a single story home with 3 steps to enter B HRA. Outcome: Progressing Towards Goal     PHYSICAL THERAPY TREATMENT    Patient: Carlita Rueda (55 y.o. female)  Date: 6/17/2019  Diagnosis: Stridor [R06.1]  Stridor [R06.1] <principal problem not specified>  Procedure(s) (LRB):  TRACHEOSTOMY (N/A) 14 Days Post-Op  Precautions: Fall(trach collar)  PLOF: see above      ASSESSMENT:  Pt received sitting up in bedside recliner with family at bedside. Cleared by nursing to participate in PT session and nursing present to change O2 to portable tank. SPO2 remained at 100% on 3 L throughout session. Pt nonverbal due to trach placement and uses writing on a notepad for primary communication and will occasionally mouth words. As nursing was changing to portable tank, pt began coughing excessively. When asked about the coughing, pt wrote that she was feeling very anxious and nauseous and it caused her to lose her breath/cough. After coughing subsided, pt agreeable for attempts at transfers/mobility, however, pt experienced another bout of coughing/breathlessness induced by anxiety and nausea.  Pt began to cry and mouthed that she was sorry and didn't want to disappoint. Pt educated on breathing techniques and the nature of progress in this setting. Pt instructed in TE to increase strength for future transfers and mobility. After TE, pt declines further treatment and requesting nursing to administer her medication for anxiety. Nursing notified of request and made aware to reattach her to wall O2. Pt left sitting up in bedside recliner, beginning to cry again with nursing and family at bedside, and all needs met/within reach. Progression toward goals: Good  ? Improving appropriately and progressing toward goals  ? Improving slowly and progressing toward goals  ? Not making progress toward goals and plan of care will be adjusted     PLAN:  Patient continues to benefit from skilled intervention to address the above impairments. Continue treatment per established plan of care. Discharge Recommendations:  Rehab vs Home with assist pending progress  Further Equipment Recommendations for Discharge:  N/A, pt owns and ambulates with 2 SPC at baseline     SUBJECTIVE:   Patient mouthed I'm sorry, I don't want to disappoint. Pt nonverbal due to trach placement but wrote down that her anxiety was causing her to lose her breath/cough excessively and that her nausea is very bad. OBJECTIVE DATA SUMMARY:   Critical Behavior:  Neurologic State: Alert  Orientation Level: Oriented X4  Cognition: Appropriate decision making, Follows commands  Safety/Judgement: Awareness of environment, Fall prevention  Functional Mobility Training:  Attempted and ultimately deferred secondary to pt's increasing anxiety, c/o nausea, and pt beginning to cry. Therapeutic Exercises:   See flowsheet below. TE performed to increase strength for future transfers and ambulation. EXERCISE   Sets   Reps   Active Active Assist   Passive Self ROM   Comments   Ankle Pumps 1 10  ? ? ? ?    Quad Sets/Glut Sets    ? ? ? ?  Hold for 5 secs   Hamstring Sets   ? ? ? ? Short Arc Quads   ? ? ? ? Heel Slides   ? ? ? ? Straight Leg Raises   ? ? ? ? Hip Add   ? ? ? ? Hold for 5 secs, w/ pillow squeeze   Long Arc Quads 1 10 ? ? ? ? Seated Marching 1 10 ? ? ? ? Standing Marching   ? ? ? ?       ? ? ? ? Pain:  Pain level pre-treatment: Not verbalized/rated, but pt rubbed R side of abdomen when asked if pain present  Pain level post-treatment: Not verbalized/rated    Activity Tolerance:   Good-, secondary to increased anxiety/nausea this visit. Please refer to the flowsheet for vital signs taken during this treatment. After treatment:   ? Patient left in no apparent distress sitting up in chair  ? Patient left in no apparent distress in bed  ? Call bell left within reach  ? Nursing notified  ? Caregiver present: Nursing at bedside  ? Bed alarm activated  ? SCDs applied      COMMUNICATION/EDUCATION:   ?         Role of Physical Therapy in the acute care setting. ?         Fall prevention education was provided and the patient/caregiver indicated understanding. ? Patient/family have participated as able in working toward goals and plan of care. ?         Patient/family agree to work toward stated goals and plan of care. ?         Patient understands intent and goals of therapy, but is neutral about his/her participation. ? Patient is unable to participate in stated goals/plan of care: ongoing with therapy staff. ?         Other:  The nature of progress in this setting        CAMILA Pollard   Time Calculation: 27 mins

## 2019-06-17 NOTE — PROGRESS NOTES
Surgery  Depressed  AF VSS  Trach clean, good airflow  Plan rehab consult  D//w pt pushing to get OOB and ambulate  Also discussed dispo options of rehab and snf

## 2019-06-17 NOTE — DIABETES MGMT
Glycemic Control Plan of Care    T2DM with current A1c level of 7.1% (6/09/2019). See separate notes, 6/10/2019, for assessment of home diabetes management and education. Patient is s/p trach on 6/03/2019. She was able to communicate by writing. Home diabetes medication: Lantus insulin (vial) 90 units daily at bedtime. Current hospital diabetes medications: Lantus insulin 22 units and very resistant dose of correctional lispro insulin. POC BG range on 6/16/2019: 189-281 mg/dL. POC BG report on 6/17/2019 at time of review: 226, 195 mg/dL. Patient cont on TPN, dextrose 150 g/day and regular insulin increased from 10 to 12 units for the next bag. Noted patient was made NPO since 6/15/2019. Recommendation(s):  1.) Cont on current SC insulin orders/dose: basal lantus, very resistant dose of correctional lispro insulin and regular insulin in TPN. Assessment:  Patient is 63year old with past medical history including type 2 diabetes mellitus with neuropathy, sleep apnea, s/p aortic valve replacement, morbid obesity, hypothyroidism, hypertension, chronic CHF and s/p thyroidectomy - was admitted on 6/03/2019 with report of stridor. Noted:  Vocal cord dysfunction. Status post tracheostomy on 6/03/2019. Hypothyroidism. Recent thyroidectomy on 4/11/2019. Morbid obesity. T2DM with current A1c of 8.4% (4/04/2019). Most recent blood glucose values:    Results for Eliida Mar (MRN [id number]) as of 6/17/2019 14:09   Ref. Range 6/16/2019 05:38 6/16/2019 11:18 6/16/2019 16:32 6/16/2019 18:45 6/16/2019 20:54   GLUCOSE,FAST - POC Latest Ref Range: 70 - 110 mg/dL 211 (H) 281 (H) 189 (H) 216 (H) 191 (H)     Results for Elidia Mar (MRN [id number]) as of 6/17/2019 14:09   Ref.  Range 6/17/2019 05:31 6/17/2019 11:45   GLUCOSE,FAST - POC Latest Ref Range: 70 - 110 mg/dL 226 (H) 195 (H)     Current A1C: 7.1% (6/09/2019) which is equivalent to estimated average blood glucose of 153 mg/dL during the past 2-3 months. Current hospital diabetes medications:  Basal lantus insulin 22 units daily. Correctional lispro insulin ACHS. Very resistant dose. Current bag of TPN with 10 units regular insulin    Total daily dose insulin requirement previous day: 6/16/2019:  Lantus: 22 units  Lispro: 21 units  Regular insulin in TPN. Home diabetes medications: Obtained from patient on 6/06/2019:  Lantus insulin (trach, patient unable to speak but nodded head when I asked if she's on vial insulin). She nodded again when I asked if she's on 90 units of lantus daily at bedtime and she has meter monitoring her blood sugar. Diet: NPO. Goals:  Blood glucose will be within target range of  mg/dL by 6/20/2019.     Education:    _X__  Refer to Diabetes Education Record: 6/10/2019  ___  Education not indicated at this time    Lacie Claudio RN  Pager: [phone]

## 2019-06-17 NOTE — PROGRESS NOTES
Problem: Voice Impaired (Adult)  Goal: *Acute Goals and Plan of Care (Insert Text)  Description  Pt will:  1. Produce voice to finger occlusion in 3/5 trials. 2. Tolerate PMV placement for 10 minute increments without compromise to vitals. 3. Utilize proper breath support/techniques for increasing tolerance of PMV. 4. Phonate 10 word utterances with adequate breath support for adequate intensity of speech/increased intelligibilty with PMV. Outcome: Progressing Towards Goal    SPEECH LANGUAGE PATHOLOGY PASSY GEORGE VALVE EVALUATION/TREATMENT    Patient: Margaret Cuevas (89 y.o. female)  Date: 6/17/2019  Diagnosis: Stridor [R06.1]  Stridor [R06.1] <principal problem not specified>  Procedure(s) (LRB):  TRACHEOSTOMY (N/A) 14 Days Post-Op  Precautions: Aspiration, Fall(trach collar)  PLOF: Normal voice prior to trach placement     ASSESSMENT:  PMV evaluation completed with pt seated upright in chair. Pt with Portex #9 trach in place, cuff deflated upon arrival.  Pt reporting anxiety prior to trials, tolerating finger occlusion by SLP with increased anxiety. PMV donned with pt demonstrating increased work of breathing and anxiety. Pt able to produce prolonged vowel with hoarse/breathy vocal quality. Overall, tolerating ~30 second placement x2 prior to requesting removal by SLP. Doffed PMV with pt continuing to report anxiety. Recommend continue trials ONLY WITH SLP. D/w RN. TREATMENT:  Education completed with pt and family members in room regarding PMV and POC, relaxation techniques, and precautions regarding placement and recommendation to continue trials only with SLP. Pt able to verbalize understanding. Patient will benefit from skilled intervention to address the above impairments. Patient's rehabilitation potential is considered to be Good  Factors which may influence rehabilitation potential include:   ? None noted  ? Mental ability/status  ?               Medical condition  ? Home/family situation and support systems  ? Safety awareness  ? Pain tolerance/management  ? Other:      PLAN:  Recommendations and Planned Interventions:  As above   Frequency/Duration: Patient will be followed by speech-language pathology 1-2 times per day/3-7 days per week to address goals. Discharge Recommendations: To Be Determined     SUBJECTIVE:   Patient stated I'm nervous. OBJECTIVE:    Speak Valve:   Type of Assessment   PMV   Tracheostomy Data/Eval   Trach Size/Status: Cuffed, cuff deflated(#9)   Date of Placement: 06/03/19   Additional Trach Info: Primary   Passy-[place] Placement: SLP   On Ventilator: No   Mental Status   Neurologic State: Alert   Orientation Level: Oriented X4   Cognition: Appropriate decision making, Follows commands   Perception: Appears intact   Perseveration: No perseveration noted   Safety/Judgement: Awareness of environment, Fall prevention   Evidence of Comprehension   Meaningful Eye Movement/Gaze: Yes   Response to Basic Yes/No Questions (%): 100 %   Response to Complex Yes/No Questions (%) : 100 %   One-Step Basic Commands (%): 100 %   Two-Step Basic Commands (%): 100 %   Three-Step Basic Commands (%): 100 %   Cough : Present   Finger Occlusion   Application: SLP   Tolerance: Increased back pressure   Vocal Quality: Breathy, Hoarse   Vocal Intensity: Too soft   PMV Trial   Application: SLP   Tolerance: Increased anxiety, Increased work of breathing   Vocal Quality: Breathy, Hoarse, Low volume   Vocal Intensity: Too soft   Duration (minutes): (30 seconds)   Oxygen Therapy   O2 Sat (%): 97 %   Pulse via Oximetry: 68 beats per minute   O2 Device: Tracheal collar   O2 Flow Rate (L/min): 9 l/min   Airway Suction   Suction: (patient refused suction)   Sputum Amount: Small   Sputum Color/Odor: White   Sputum Consistency:  Thin   Suction Device: Karly(Refused Inline)   Suction Catheter Size: 14 Fr   Treatment Diagnosis   Treatment Diagnosis: Aphonia     PAIN:  Pt reports 0/10 pain prior to evaluation. Pt reports 0/10 pain following evaluation. After evaluation/treatment:   ?              Patient left in no apparent distress sitting up in chair  ? Patient left in no apparent distress in bed  ? Call bell left within reach  ? Nursing notified  ? Caregiver present  ? Bed alarm activated      COMMUNICATION/EDUCATION:   ? Patient/family have participated as able in goal setting and plan of care. ? Patient/family agree to work toward stated goals and plan of care. ?  Patient understands intent and goals of therapy, but is neutral about his/her participation. ?   Patient is unable to participate in goal setting and plan of care; education ongoing with interdisciplinary staff    Thank you for this referral,   TONY DoS., [id number] [facility]  Speech-Language Pathologist

## 2019-06-17 NOTE — PROGRESS NOTES
[facility]  [facility]    Patient: Kavita Pérez Age: 63 y.o. : 1960 MR#: [id number] SSN: [id number]  Date/Time: 2019    Subjective:     Patient sitting in chair in NAD, c/o vague ruq discomfort. brother and nephew at bedside. Assessment/Plan:     -DM2, uncontrolled with hyperglycemia  - s/p Tracheostomy for vocal cord paralysis  - HTN  - Dysphagia  - Morbid Obesity   - Hypothyroidism     PLAN    NPO as per speech  TPN as per surgeon, trach care  Lantus, Insulin in TPN  Check RUQ US  Monitor BP  IV synthyroid  D/w patient    Case discussed with:  [x]Patient  []Family  [x]Nursing  []Case Management  DVT Prophylaxis:  [x]Lovenox  []Hep SQ  []SCDs  []Coumadin   []On Heparin gtt    Objective:   VS:   Visit Vitals  /69 (BP 1 Location: Left arm, BP Patient Position: At rest)   Pulse 63   Temp 98.9 °F (37.2 °C)   Resp 20   Ht 5' 7\" (1.702 m)   Wt 120.7 kg (266 lb 1.6 oz)   SpO2 93%   Breastfeeding?  No   BMI 41.68 kg/m²      Tmax/24hrs: Temp (24hrs), Av.5 °F (36.9 °C), Min:97.8 °F (36.6 °C), Max:98.9 °F (37.2 °C)    Input/Output:     Intake/Output Summary (Last 24 hours) at 2019 1717  Last data filed at 2019 1235  Gross per 24 hour   Intake 1136.66 ml   Output 2475 ml   Net -1338.34 ml       General:  Awake, alert  Cardiovascular:  S1S2+, RRR  Pulmonary:  CTA b/l  GI:  Soft, BS+, Obese, Mild tenderness to palpation in RUQ  Extremities:  Trace  + trach edema      Labs:    Recent Results (from the past 24 hour(s))   GLUCOSE, POC    Collection Time: 19  6:45 PM   Result Value Ref Range    Glucose (POC) 216 (H) 70 - 110 mg/dL   GLUCOSE, POC    Collection Time: 19  8:54 PM   Result Value Ref Range    Glucose (POC) 191 (H) 70 - 110 mg/dL   CBC WITH AUTOMATED DIFF    Collection Time: 19  5:00 AM   Result Value Ref Range    WBC 5.8 4.6 - 13.2 K/uL    RBC 3.95 (L) 4.20 - 5.30 M/uL    HGB 11.9 (L) 12.0 - 16.0 g/dL    HCT 35.9 35.0 - 45.0 %    MCV 90.9 74.0 - 97.0 FL    MCH 30.1 24.0 - 34.0 PG    MCHC 33.1 31.0 - 37.0 g/dL    RDW 15.6 (H) 11.6 - 14.5 %    PLATELET 535 849 - 351 K/uL    MPV 8.7 (L) 9.2 - 11.8 FL    NEUTROPHILS 61 40 - 73 %    LYMPHOCYTES 30 21 - 52 %    MONOCYTES 7 3 - 10 %    EOSINOPHILS 2 0 - 5 %    BASOPHILS 0 0 - 2 %    ABS. NEUTROPHILS 3.6 1.8 - 8.0 K/UL    ABS. LYMPHOCYTES 1.8 0.9 - 3.6 K/UL    ABS. MONOCYTES 0.4 0.05 - 1.2 K/UL    ABS. EOSINOPHILS 0.1 0.0 - 0.4 K/UL    ABS.  BASOPHILS 0.0 0.0 - 0.1 K/UL    DF AUTOMATED     METABOLIC PANEL, BASIC    Collection Time: 06/17/19  5:00 AM   Result Value Ref Range    Sodium 137 136 - 145 mmol/L    Potassium 4.4 3.5 - 5.5 mmol/L    Chloride 104 100 - 108 mmol/L    CO2 29 21 - 32 mmol/L    Anion gap 4 3.0 - 18 mmol/L    Glucose 252 (H) 74 - 99 mg/dL    BUN 10 7.0 - 18 MG/DL    Creatinine 0.59 (L) 0.6 - 1.3 MG/DL    BUN/Creatinine ratio 17 12 - 20      GFR est AA >60 >60 ml/min/1.73m2    GFR est non-AA >60 >60 ml/min/1.73m2    Calcium 7.2 (L) 8.5 - 10.1 MG/DL   MAGNESIUM    Collection Time: 06/17/19  5:00 AM   Result Value Ref Range    Magnesium 2.2 1.6 - 2.6 mg/dL   PHOSPHORUS    Collection Time: 06/17/19  5:00 AM   Result Value Ref Range    Phosphorus 3.0 2.5 - 4.9 MG/DL   GLUCOSE, POC    Collection Time: 06/17/19  5:31 AM   Result Value Ref Range    Glucose (POC) 226 (H) 70 - 110 mg/dL   GLUCOSE, POC    Collection Time: 06/17/19 11:45 AM   Result Value Ref Range    Glucose (POC) 195 (H) 70 - 110 mg/dL   GLUCOSE, POC    Collection Time: 06/17/19  4:16 PM   Result Value Ref Range    Glucose (POC) 210 (H) 70 - 110 mg/dL     Additional Data Reviewed:      Signed By: Jud Gosselin, MD     June 17, 2019

## 2019-06-17 NOTE — PROGRESS NOTES
Problem: Falls - Risk of  Goal: *Absence of Falls  Description  Document Dorothyolfestus Constable Fall Risk and appropriate interventions in the flowsheet. Outcome: Progressing Towards Goal     Problem: Patient Education: Go to Patient Education Activity  Goal: Patient/Family Education  Outcome: Progressing Towards Goal     Problem: Pressure Injury - Risk of  Goal: *Prevention of pressure injury  Description  Document Omer Scale and appropriate interventions in the flowsheet. Outcome: Progressing Towards Goal     Problem: Patient Education: Go to Patient Education Activity  Goal: Patient/Family Education  Outcome: Progressing Towards Goal     Problem: Nutrition Deficit  Goal: *Optimize nutritional status  Outcome: Progressing Towards Goal     Problem: Patient Education: Go to Patient Education Activity  Goal: Patient/Family Education  Outcome: Progressing Towards Goal     Problem: Patient Education: Go to Patient Education Activity  Goal: Patient/Family Education  Outcome: Progressing Towards Goal     Problem: Patient Education: Go to Patient Education Activity  Goal: Patient/Family Education  Outcome: Progressing Towards Goal     Problem: Diabetes Self-Management  Goal: *Disease process and treatment process  Description  Define diabetes and identify own type of diabetes; list 3 options for treating diabetes. Outcome: Progressing Towards Goal  Goal: *Incorporating nutritional management into lifestyle  Description  Describe effect of type, amount and timing of food on blood glucose; list 3 methods for planning meals. Outcome: Progressing Towards Goal  Goal: *Incorporating physical activity into lifestyle  Description  State effect of exercise on blood glucose levels. Outcome: Progressing Towards Goal  Goal: *Developing strategies to promote health/change behavior  Description  Define the ABC's of diabetes; identify appropriate screenings, schedule and personal plan for screenings.   Outcome: Progressing Towards Goal  Goal: *Using medications safely  Description  State effect of diabetes medications on diabetes; name diabetes medication taking, action and side effects. Outcome: Progressing Towards Goal  Goal: *Monitoring blood glucose, interpreting and using results  Description  Identify recommended blood glucose targets  and personal targets. Outcome: Progressing Towards Goal  Goal: *Prevention, detection, treatment of acute complications  Description  List symptoms of hyper- and hypoglycemia; describe how to treat low blood sugar and actions for lowering  high blood glucose level. Outcome: Progressing Towards Goal  Goal: *Prevention, detection and treatment of chronic complications  Description  Define the natural course of diabetes and describe the relationship of blood glucose levels to long term complications of diabetes.   Outcome: Progressing Towards Goal  Goal: *Developing strategies to address psychosocial issues  Description  Describe feelings about living with diabetes; identify support needed and support network  Outcome: Progressing Towards Goal  Goal: *Insulin pump training  Outcome: Progressing Towards Goal  Goal: *Sick day guidelines  Outcome: Progressing Towards Goal  Goal: *Patient Specific Goal (EDIT GOAL, INSERT TEXT)  Outcome: Progressing Towards Goal     Problem: Patient Education: Go to Patient Education Activity  Goal: Patient/Family Education  Outcome: Progressing Towards Goal     Problem: Diabetes Maintenance:Admission  Goal: *Blood glucose 80 to 180 mg/dl  Outcome: Progressing Towards Goal     Problem: Hypertension  Goal: *Blood pressure within specified parameters  Outcome: Progressing Towards Goal  Goal: *Fluid volume balance  Outcome: Progressing Towards Goal  Goal: *Labs within defined limits  Outcome: Progressing Towards Goal     Problem: Patient Education: Go to Patient Education Activity  Goal: Patient/Family Education  Outcome: Progressing Towards Goal     Problem: Pain  Goal: *Control of Pain  Outcome: Progressing Towards Goal  Goal: *PALLIATIVE CARE:  Alleviation of Pain  Outcome: Progressing Towards Goal     Problem: Patient Education: Go to Patient Education Activity  Goal: Patient/Family Education  Outcome: Progressing Towards Goal     Problem: Infection - Risk of, Surgical Site Infection  Goal: *Absence of surgical site infection signs and symptoms  Outcome: Progressing Towards Goal

## 2019-06-17 NOTE — PROGRESS NOTES
Awake and quiet, alert to surroundings.  Pain level decreased but remains above 5, No s/s of acute distress,  Suctioned x 1 for white secretions, continue to monitor,   Patient Vitals for the past 8 hrs:   Temp Pulse Resp BP SpO2   06/17/19 0554 -- -- -- -- 95 %   06/17/19 0535 97.9 °F (36.6 °C) 60 20 131/72 97 %   06/17/19 0130 98.8 °F (37.1 °C) 71 17 143/80 99 %   06/17/19 0032 -- -- -- -- 96 %

## 2019-06-17 NOTE — ROUTINE PROCESS
Bedside and Verbal shift change report given to Young Obrien RN (oncoming nurse) by Andrew Ge RN (offgoing nurse). Report included the following information SBAR, Kardex, Intake/Output and MAR.

## 2019-06-18 ENCOUNTER — HOSPITAL ENCOUNTER (INPATIENT)
Dept: ULTRASOUND IMAGING | Age: 59
Discharge: HOME | DRG: 098 | End: 2019-06-18
Attending: EMERGENCY MEDICINE
Payer: MEDICAID

## 2019-06-18 LAB
ANION GAP SERPL CALC-SCNC: 6 MMOL/L (ref 3–18)
BASOPHILS # BLD: 0 K/UL (ref 0–0.1)
BASOPHILS NFR BLD: 0 % (ref 0–2)
BUN SERPL-MCNC: 14 MG/DL (ref 7–18)
BUN/CREAT SERPL: 21 (ref 12–20)
CALCIUM SERPL-MCNC: 8.3 MG/DL (ref 8.5–10.1)
CHLORIDE SERPL-SCNC: 105 MMOL/L (ref 100–108)
CO2 SERPL-SCNC: 29 MMOL/L (ref 21–32)
CREAT SERPL-MCNC: 0.68 MG/DL (ref 0.6–1.3)
CRP SERPL-MCNC: 4.8 MG/DL (ref 0–0.3)
DIFFERENTIAL METHOD BLD: ABNORMAL
EOSINOPHIL # BLD: 0.1 K/UL (ref 0–0.4)
EOSINOPHIL NFR BLD: 2 % (ref 0–5)
ERYTHROCYTE [DISTWIDTH] IN BLOOD BY AUTOMATED COUNT: 16.1 % (ref 11.6–14.5)
GLUCOSE BLD STRIP.AUTO-MCNC: 183 MG/DL (ref 70–110)
GLUCOSE BLD STRIP.AUTO-MCNC: 189 MG/DL (ref 70–110)
GLUCOSE BLD STRIP.AUTO-MCNC: 194 MG/DL (ref 70–110)
GLUCOSE SERPL-MCNC: 185 MG/DL (ref 74–99)
HCT VFR BLD AUTO: 35 % (ref 35–45)
HGB BLD-MCNC: 10.8 G/DL (ref 12–16)
LYMPHOCYTES # BLD: 1.7 K/UL (ref 0.9–3.6)
LYMPHOCYTES NFR BLD: 33 % (ref 21–52)
MAGNESIUM SERPL-MCNC: 2.2 MG/DL (ref 1.6–2.6)
MCH RBC QN AUTO: 29.5 PG (ref 24–34)
MCHC RBC AUTO-ENTMCNC: 30.9 G/DL (ref 31–37)
MCV RBC AUTO: 95.6 FL (ref 74–97)
MONOCYTES # BLD: 0.4 K/UL (ref 0.05–1.2)
MONOCYTES NFR BLD: 7 % (ref 3–10)
NEUTS SEG # BLD: 2.9 K/UL (ref 1.8–8)
NEUTS SEG NFR BLD: 58 % (ref 40–73)
PHOSPHATE SERPL-MCNC: 3.4 MG/DL (ref 2.5–4.9)
PLATELET # BLD AUTO: 215 K/UL (ref 135–420)
PMV BLD AUTO: 8.9 FL (ref 9.2–11.8)
POTASSIUM SERPL-SCNC: 3.7 MMOL/L (ref 3.5–5.5)
RBC # BLD AUTO: 3.66 M/UL (ref 4.2–5.3)
SODIUM SERPL-SCNC: 140 MMOL/L (ref 136–145)
TRIGL SERPL-MCNC: 298 MG/DL (ref ?–150)
WBC # BLD AUTO: 5 K/UL (ref 4.6–13.2)

## 2019-06-18 PROCEDURE — 74011636637 HC RX REV CODE- 636/637

## 2019-06-18 PROCEDURE — 92507 TX SP LANG VOICE COMM INDIV: CPT

## 2019-06-18 PROCEDURE — 77030011256 HC DRSG MEPILEX <16IN NO BORD MOLN -A

## 2019-06-18 PROCEDURE — 65270000029 HC RM PRIVATE

## 2019-06-18 PROCEDURE — 74011250636 HC RX REV CODE- 250/636

## 2019-06-18 PROCEDURE — 76705 ECHO EXAM OF ABDOMEN: CPT

## 2019-06-18 PROCEDURE — 85025 COMPLETE CBC W/AUTO DIFF WBC: CPT

## 2019-06-18 PROCEDURE — 74011000250 HC RX REV CODE- 250: Performed by: NURSE PRACTITIONER

## 2019-06-18 PROCEDURE — 92526 ORAL FUNCTION THERAPY: CPT

## 2019-06-18 PROCEDURE — 74011250636 HC RX REV CODE- 250/636: Performed by: HOSPITALIST

## 2019-06-18 PROCEDURE — 74011000250 HC RX REV CODE- 250

## 2019-06-18 PROCEDURE — 83735 ASSAY OF MAGNESIUM: CPT

## 2019-06-18 PROCEDURE — 82962 GLUCOSE BLOOD TEST: CPT

## 2019-06-18 PROCEDURE — 80048 BASIC METABOLIC PNL TOTAL CA: CPT

## 2019-06-18 PROCEDURE — 84478 ASSAY OF TRIGLYCERIDES: CPT

## 2019-06-18 PROCEDURE — 86140 C-REACTIVE PROTEIN: CPT

## 2019-06-18 PROCEDURE — 74011000258 HC RX REV CODE- 258

## 2019-06-18 PROCEDURE — 74011000250 HC RX REV CODE- 250: Performed by: HOSPITALIST

## 2019-06-18 PROCEDURE — 84100 ASSAY OF PHOSPHORUS: CPT

## 2019-06-18 PROCEDURE — 97535 SELF CARE MNGMENT TRAINING: CPT

## 2019-06-18 PROCEDURE — 97530 THERAPEUTIC ACTIVITIES: CPT

## 2019-06-18 PROCEDURE — 97110 THERAPEUTIC EXERCISES: CPT

## 2019-06-18 PROCEDURE — 74011636637 HC RX REV CODE- 636/637: Performed by: HOSPITALIST

## 2019-06-18 RX ORDER — LORAZEPAM 2 MG/ML
1 INJECTION INTRAMUSCULAR
Status: DISCONTINUED | OUTPATIENT
Start: 2019-06-18 | End: 2019-06-24

## 2019-06-18 RX ADMIN — INSULIN GLARGINE 22 UNITS: 100 INJECTION, SOLUTION SUBCUTANEOUS at 10:49

## 2019-06-18 RX ADMIN — FAMOTIDINE 20 MG: 10 INJECTION INTRAVENOUS at 21:43

## 2019-06-18 RX ADMIN — INSULIN LISPRO 3 UNITS: 100 INJECTION, SOLUTION INTRAVENOUS; SUBCUTANEOUS at 05:43

## 2019-06-18 RX ADMIN — LEVOTHYROXINE SODIUM 75 MCG: 100 INJECTION, POWDER, LYOPHILIZED, FOR SOLUTION INTRAVENOUS at 11:01

## 2019-06-18 RX ADMIN — FAMOTIDINE 20 MG: 10 INJECTION INTRAVENOUS at 10:49

## 2019-06-18 RX ADMIN — CHLORHEXIDINE GLUCONATE 0.12% ORAL RINSE 10 ML: 1.2 LIQUID ORAL at 10:49

## 2019-06-18 RX ADMIN — LORAZEPAM 1 MG: 2 INJECTION INTRAMUSCULAR at 06:10

## 2019-06-18 RX ADMIN — INSULIN LISPRO 3 UNITS: 100 INJECTION, SOLUTION INTRAVENOUS; SUBCUTANEOUS at 18:55

## 2019-06-18 RX ADMIN — ENOXAPARIN SODIUM 40 MG: 40 INJECTION SUBCUTANEOUS at 18:45

## 2019-06-18 RX ADMIN — HYDROMORPHONE HYDROCHLORIDE 1 MG: 2 INJECTION INTRAMUSCULAR; INTRAVENOUS; SUBCUTANEOUS at 03:00

## 2019-06-18 RX ADMIN — POTASSIUM CHLORIDE: 2 INJECTION, SOLUTION, CONCENTRATE INTRAVENOUS at 21:48

## 2019-06-18 RX ADMIN — HYDROMORPHONE HYDROCHLORIDE 1 MG: 2 INJECTION INTRAMUSCULAR; INTRAVENOUS; SUBCUTANEOUS at 18:52

## 2019-06-18 RX ADMIN — HYDROMORPHONE HYDROCHLORIDE 0.5 MG: 2 INJECTION INTRAMUSCULAR; INTRAVENOUS; SUBCUTANEOUS at 11:01

## 2019-06-18 RX ADMIN — INSULIN LISPRO 3 UNITS: 100 INJECTION, SOLUTION INTRAVENOUS; SUBCUTANEOUS at 13:48

## 2019-06-18 NOTE — PROGRESS NOTES
Problem: Self Care Deficits Care Plan (Adult)  Goal: *Acute Goals and Plan of Care (Insert Text)  Description  Occupational Therapy Goals  Initiated 6/5/2019, re-evaluated on 6/14/19 within 7 day(s). Continue all previously set goals    1. Patient will perform lower body dressing with supervision/set-up. 2.  Patient will perform functional task in standing for 3 minutes with supervision for balance. 3.  Patient will perform toilet transfers with supervision/set-up. 4.  Patient will participate in upper extremity therapeutic exercise/activities with supervision/set-up for 8 minutes to increase strength/endurance for ADLs. Prior Level of Function: Pt was modified independent with basic self care tasks and used two canes for functional mobility PTA. Outcome: Progressing Towards Goal   OCCUPATIONAL THERAPY TREATMENT    Patient: Michi Yeung (86 y.o. female)  Date: 6/18/2019  Diagnosis: Stridor [R06.1]  Stridor [R06.1] <principal problem not specified>  Procedure(s) (LRB):  TRACHEOSTOMY (N/A) 15 Days Post-Op  Precautions: Fall(trach collar)    Chart, occupational therapy assessment, plan of care, and goals were reviewed. ASSESSMENT:  Pt reports abdominal discomfort this am (unchanged from Saturday), and stating she doesn't feel she can get up right now. Pt is educated on the importance of EOB/OOB activities to increase strength and improve overall activity tolerance for carryover w/ADLs, pt demos understanding, however, seems to find mult reasons for not getting OOB for ADLs. Pt was able to perform LB dressing in bed w/SBA and crossed legs method. Issued Red Thera-band to the pt and pt was able to complete BUE TherEx (see below) with significant amount of RBs. Pt is fixated on eating, SLP notified. Progression toward goals:  ?          Improving appropriately and progressing toward goals  ? Improving slowly and progressing toward goals  ?           Not making progress toward goals and plan of care will be adjusted     PLAN:  Patient continues to benefit from skilled intervention to address the above impairments. Continue treatment per established plan of care. Discharge Recommendations:  Home Health w/Supervision  Further Equipment Recommendations for Discharge:  bedside commode and shower chair     SUBJECTIVE:   Patient mouthed \"when can I eat food? OBJECTIVE DATA SUMMARY:   Cognitive/Behavioral Status:  Neurologic State: Alert  Orientation Level: Oriented X4  Cognition: Follows commands  Safety/Judgement: Awareness of environment, Fall prevention    Functional Mobility and Transfers for ADLs:   Bed Mobility:   Scooting: Supervision   ADL Intervention:   Lower Body Dressing Assistance  Socks: Stand-by assistance(supine)  Leg Crossed Method Used: Yes  Position Performed: Long sitting on bed    UE Therapeutic Exercises:   Red Thera-band BUE TherEx shd horiz abduction, flexion, 2 sets of 10 with increased RBs    Pain:  Pain level pre-treatment: discomfort in abdomen  Pain level post-treatment: discomfort in abdomen      Activity Tolerance:    Fair-  Please refer to the flowsheet for vital signs taken during this treatment. After treatment:   ?  Patient left in no apparent distress sitting up in chair  ? Patient left in no apparent distress in bed  ? Call bell left within reach  ? Nursing notified  ? Caregiver present  ? Bed alarm activated    COMMUNICATION/EDUCATION:   ? Role of Occupational Therapy in the acute care setting  ? Home safety education was provided and the patient/caregiver indicated understanding. ? Patient/family have participated as able in working towards goals and plan of care. ? Patient/family agree to work toward stated goals and plan of care. ? Patient understands intent and goals of therapy, but is neutral about his/her participation. ? Patient is unable to participate in goal setting and plan of care.       Thank you for this referral.  Leona Mejia, PARRISH/L  Time Calculation: 25 mins

## 2019-06-18 NOTE — PROGRESS NOTES
Problem: Dysphagia (Adult)  Goal: *Acute Goals and Plan of Care (Insert Text)  Description  Patient will:  1. Tolerate PO trials with 0 s/s overt distress in 4/5 trials  2. Utilize compensatory swallow strategies/maneuvers (decrease bite/sip, size/rate, alt. liq/sol) with min cues in 4/5 trials  3. Perform oral-motor/laryngeal exercises to increase oropharyngeal swallow function with min cues  4. Complete an objective swallow study (i.e., MBSS) to assess swallow integrity, r/o aspiration, and determine of safest LRD, min A as indicated/ordered by MD     Recommend:   NPO with alternative nutrition/hydration source  Strict aspiration precautions (HOB >30 degrees at all times, Oral care TID)   Outcome: Not Progressing Towards Goal    SPEECH LANGUAGE PATHOLOGY DYSPHAGIA TREATMENT    Patient: Janes Lane (87 y.o. female)  Date: 6/18/2019  Diagnosis: Stridor [R06.1]  Stridor [R06.1] <principal problem not specified>  Procedure(s) (LRB):  TRACHEOSTOMY (N/A) 15 Days Post-Op  Precautions: Aspiration, Fall(trach collar)  PLOF: Regular diet with thin liquids     ASSESSMENT:  Pt seen for follow up dysphagia treatment with trach in place, cuff deflated. Pt requiring mod encouragement for po trials. Pt demo immediate weak cough with 1/2 tsp presentation of honey thick liquid. Pt with increased anxiety following trials and reporting \"it's burning\", declining further consistencies. Recommend continue NPO with alternative nutrition/hydration source. Results/recommendations discussed with pt who was able to verbalize understanding. D/w RN. Will follow for further dysphagia management as indicated. Progression toward goals:           Not making progress toward goals and plan of care will be adjusted     PLAN:  Recommendations and Planned Interventions:  Patient continues to benefit from skilled intervention to address the above impairments. Continue treatment per established plan of care.   Discharge Recommendations: To Be Determined     SUBJECTIVE:   Patient stated It's burning. OBJECTIVE:   Cognitive and Communication Status:  Neurologic State: Alert  Orientation Level: Oriented X4  Cognition: Follows commands  Perception: Appears intact  Perseveration: No perseveration noted  Safety/Judgement: Awareness of environment, Fall prevention  Dysphagia Treatment:  P.O.  Trials:   Vocal quality prior to P.O.: Breathy, Hoarse, Low volume   Consistency Presented:  Honey thick liquid   How Presented: Self-fed/presented, Spoon   How much:  1/2 tsp    Bolus Acceptance: No impairment   Bolus Formation/Control: Impaired   Type of Impairment: Delayed   Propulsion: Delayed (# of seconds)   Oral Residue: None   Initiation of Swallow: Delayed (# of seconds)   Laryngeal Elevation: Decreased, Weak   Aspiration Signs/Symptoms: Weak cough, Delayed cough/throat clear, Change vocal quality, Reported burning in chest    Pharyngeal Phase Characteristics: Feeling of discomfort, Poor endurance, Altered vocal quality   Effective Modifications: None     PAIN:  Start of Tx: 0  End of Tx: 0     After treatment:              Patient left in no apparent distress in bed             Call bell left within reach             Nursing notified    COMMUNICATION/EDUCATION:    Aspiration precautions; swallow safety; compensatory techniques              Patient/family able to participate in training and education     Keyur Cordova M.S., [id number] [facility]  Speech-Language Pathologist

## 2019-06-18 NOTE — PROGRESS NOTES
[facility]ist [facility]  Progress Note    Patient: Lillian Canal Age: 63 y.o. : 1960 MR#: [id number] SSN: [id number]  Date/Time: 2019    Subjective:     Patient sitting in chair in NAD    Assessment/Plan:     -DM2, uncontrolled with hyperglycemia  - s/p Tracheostomy for vocal cord paralysis  - HTN  - Dysphagia  - Morbid Obesity   - Hypothyroidism     PLAN    NPO as per speech & she is aspirating per speech note   TPN as per surgeon, trach care - Plan for repeat swallow eval in AM & if fails then PEG   Lantus, Insulin in TPN  Check RUQ US done - report pending   Monitor BP  IV synthyroid  D/w patient    Anticipate discharge to SNF/ Rehab when stable , will follow     Case discussed with:  [x]Patient  []Family  [x]Nursing  []Case Management  DVT Prophylaxis:  [x]Lovenox  []Hep SQ  []SCDs  []Coumadin   []On Heparin gtt    Objective:   VS:   Visit Vitals  /76 (BP 1 Location: Left arm, BP Patient Position: At rest)   Pulse 84   Temp 99.4 °F (37.4 °C)   Resp 16   Ht 5' 7\" (1.702 m)   Wt 121.1 kg (267 lb)   SpO2 98%   Breastfeeding?  No   BMI 41.82 kg/m²      Tmax/24hrs: Temp (24hrs), Av.7 °F (37.1 °C), Min:98.3 °F (36.8 °C), Max:99.4 °F (37.4 °C)    Input/Output:     Intake/Output Summary (Last 24 hours) at 2019 1858  Last data filed at 2019 1222  Gross per 24 hour   Intake 1098.33 ml   Output 1500 ml   Net -401.67 ml       General:  Awake, alert  Cardiovascular:  S1S2+, RRR  Pulmonary:  CTA b/l  GI:  Soft, BS+, Obese, Mild tenderness to palpation in RUQ  Extremities:  Trace  + trach edema      Labs:    Recent Results (from the past 24 hour(s))   GLUCOSE, POC    Collection Time: 19 11:05 PM   Result Value Ref Range    Glucose (POC) 143 (H) 70 - 110 mg/dL   CBC WITH AUTOMATED DIFF    Collection Time: 19  4:40 AM   Result Value Ref Range    WBC 5.0 4.6 - 13.2 K/uL    RBC 3.66 (L) 4.20 - 5.30 M/uL    HGB 10.8 (L) 12.0 - 16.0 g/dL    HCT 35.0 35.0 - 45.0 %    MCV 95.6 74.0 - 97.0 FL    MCH 29.5 24.0 - 34.0 PG    MCHC 30.9 (L) 31.0 - 37.0 g/dL    RDW 16.1 (H) 11.6 - 14.5 %    PLATELET 659 421 - 915 K/uL    MPV 8.9 (L) 9.2 - 11.8 FL    NEUTROPHILS 58 40 - 73 %    LYMPHOCYTES 33 21 - 52 %    MONOCYTES 7 3 - 10 %    EOSINOPHILS 2 0 - 5 %    BASOPHILS 0 0 - 2 %    ABS. NEUTROPHILS 2.9 1.8 - 8.0 K/UL    ABS. LYMPHOCYTES 1.7 0.9 - 3.6 K/UL    ABS. MONOCYTES 0.4 0.05 - 1.2 K/UL    ABS. EOSINOPHILS 0.1 0.0 - 0.4 K/UL    ABS.  BASOPHILS 0.0 0.0 - 0.1 K/UL    DF AUTOMATED     GLUCOSE, POC    Collection Time: 06/18/19  5:37 AM   Result Value Ref Range    Glucose (POC) 194 (H) 70 - 020 mg/dL   METABOLIC PANEL, BASIC    Collection Time: 06/18/19  6:30 AM   Result Value Ref Range    Sodium 140 136 - 145 mmol/L    Potassium 3.7 3.5 - 5.5 mmol/L    Chloride 105 100 - 108 mmol/L    CO2 29 21 - 32 mmol/L    Anion gap 6 3.0 - 18 mmol/L    Glucose 185 (H) 74 - 99 mg/dL    BUN 14 7.0 - 18 MG/DL    Creatinine 0.68 0.6 - 1.3 MG/DL    BUN/Creatinine ratio 21 (H) 12 - 20      GFR est AA >60 >60 ml/min/1.73m2    GFR est non-AA >60 >60 ml/min/1.73m2    Calcium 8.3 (L) 8.5 - 10.1 MG/DL   C REACTIVE PROTEIN, QT    Collection Time: 06/18/19  6:30 AM   Result Value Ref Range    C-Reactive protein 4.8 (H) 0 - 0.3 mg/dL   MAGNESIUM    Collection Time: 06/18/19  6:30 AM   Result Value Ref Range    Magnesium 2.2 1.6 - 2.6 mg/dL   PHOSPHORUS    Collection Time: 06/18/19  6:30 AM   Result Value Ref Range    Phosphorus 3.4 2.5 - 4.9 MG/DL   TRIGLYCERIDE    Collection Time: 06/18/19  6:30 AM   Result Value Ref Range    Triglyceride 298 (H) <150 MG/DL   GLUCOSE, POC    Collection Time: 06/18/19 12:11 PM   Result Value Ref Range    Glucose (POC) 189 (H) 70 - 110 mg/dL   GLUCOSE, POC    Collection Time: 06/18/19  6:55 PM   Result Value Ref Range    Glucose (POC) 183 (H) 70 - 110 mg/dL     Additional Data Reviewed:      Signed By: Lashanda Wick MD     June 18, 2019

## 2019-06-18 NOTE — PROGRESS NOTES
Surgery    Has made every effort to eat today with coughing and chocking  AF VSS  She is up in chair and working hard to make this work  Will swallow again tommorow and if failure will peg on Thus  Trach ok   Labs reviewed  Poor swallow, trach ok,  PEG Thur if needs it

## 2019-06-18 NOTE — PROGRESS NOTES
Problem: Mobility Impaired (Adult and Pediatric)  Goal: *Acute Goals and Plan of Care (Insert Text)  Description  Physical Therapy Goals  Initiated 6/5/2019 and to be accomplished within 7 day(s)  1. Patient will move from supine to sit and sit to supine , scoot up and down and roll side to side in bed with modified independence. 2.  Patient will transfer from bed to chair and chair to bed with modified independence using the least restrictive device. 3.  Patient will perform sit to stand with modified independence. 4.  Patient will ambulate with modified independence for >100 feet with the least restrictive device. 5.  Patient will ascend/descend 3 stairs with 1-2 handrail(s) with supervision/set-up. Prior Level of Function: Independent with mobility including gait using 2 canes. Pt lives with her brother and son in a single story home with 3 steps to enter B HRA. Outcome: Progressing Towards Goal   PHYSICAL THERAPY TREATMENT    Patient: Stephan Gordon (82 y.o. female)  Date: 6/18/2019  Diagnosis: Stridor [R06.1]  Stridor [R06.1] <principal problem not specified>  Procedure(s) (LRB):  TRACHEOSTOMY (N/A) 15 Days Post-Op  Precautions: Fall(trach collar)  PLOF: see above    ASSESSMENT:  Pt continues to demonstrate increased independence with transfers and ambulation. Pt declined ambulating in the hallway due to just having IV pain medication and afraid it would cause her to fall. Pt did perform supine LE exercises and seated LE exercises. Pt was mod I with supine to sit transfer and CGA for sit to stand. Pt used the bedside commode with SBA and then ambulated 20 feet with RW and SBA to the recliner. Pt was left sitting in recliner with needs in reach and nursing notified. Progression toward goals:   ?      Improving appropriately and progressing toward goals  ? Improving slowly and progressing toward goals  ?       Not making progress toward goals and plan of care will be adjusted PLAN:  Patient continues to benefit from skilled intervention to address the above impairments. Continue treatment per established plan of care. Discharge Recommendations:  Inpatient Rehab versus Home health with supervision  Further Equipment Recommendations for Discharge:  N/A     SUBJECTIVE:   Patient wrote \"I'm afraid to fall because of the IV pain medicine. ?    OBJECTIVE DATA SUMMARY:   Critical Behavior:  Neurologic State: Alert  Orientation Level: Oriented X4  Cognition: Follows commands  Safety/Judgement: Awareness of environment, Fall prevention  Functional Mobility Training:  Bed Mobility:  Supine to Sit: Modified independent  Scooting: Supervision      Transfers:  Sit to Stand: Contact guard assistance  Stand to Sit: Contact guard assistance  Bed to Chair: Contact guard assistance        Balance:  Sitting: Intact  Standing: With support  Standing - Static: Good  Standing - Dynamic : Fair   Ambulation/Gait Training:  Distance (ft): 20 Feet (ft)  Assistive Device: Walker, rolling  Ambulation - Level of Assistance: Stand-by assistance  Gait Abnormalities: Decreased step clearance  Base of Support: Widened  Therapeutic Exercises:         EXERCISE   Sets   Reps   Active Active Assist   Passive Self ROM   Comments   Ankle Pumps 1 20  ? ? ? ?    Quad Sets/Glut Sets    ? ? ? ? Hold for 5 secs   Hamstring Sets   ? ? ? ? Short Arc Quads   ? ? ? ? Heel Slides 1 10 ? ? ? ? Straight Leg Raises   ? ? ? ? Hip abd/Add in supine 1 10 ? ? ? ? Long Arc Quads 1 10 ? ? ? ? Seated Marching 1 10 ? ? ? ? Standing Marching   ? ? ? ?       ? ? ? ? Pain:  Pain level pre-treatment:0 /10  Pain level post-treatment: 0/10   Pain Intervention(s): N/A    Activity Tolerance:   Fair-  Please refer to the flowsheet for vital signs taken during this treatment. After treatment:   ? Patient left in no apparent distress sitting up in chair  ? Patient left in no apparent distress in bed  ?  Call bell left within reach  ? Nursing notified  ? Caregiver present  ? Bed alarm activated  ? SCDs applied      COMMUNICATION/EDUCATION:   ?         Role of Physical Therapy in the acute care setting. ?         Fall prevention education was provided and the patient/caregiver indicated understanding. ? Patient/family have participated as able in working toward goals and plan of care. ?         Patient/family agree to work toward stated goals and plan of care. ?         Patient understands intent and goals of therapy, but is neutral about his/her participation. ? Patient is unable to participate in stated goals/plan of care: ongoing with therapy staff.   ?         Other: importance of increasing time out of bed and activity throughout the day        Angel Andujar, PT   Time Calculation: 30 mins

## 2019-06-18 NOTE — DIABETES MGMT
Glycemic Control Plan of Care    T2DM with current A1c level of 7.1% (6/09/2019). See separate notes, 6/10/2019, for assessment of home diabetes management and education. Patient is s/p trach on 6/03/2019. She was able to communicate by writing. Home diabetes medication: Lantus insulin (vial) 90 units daily at bedtime. Current hospital diabetes medications: Lantus insulin 22 units and very resistant dose of correctional lispro insulin. POC BG range on 6/17/2019: 143-226 mg/dL. POC BG report on 6/18/2019 at time of review: 194 mg/dL. Noted changed from NPO to regular diet today by Dr. Edu Montes. Patient cont on TPN with regular insulin. RD will evaluate regular insulin dose in TPN. Recommendation(s):  1.) Cont on current SC insulin orders/dose: basal lantus, very resistant dose of correctional lispro insulin and regular insulin in TPN. Assessment:  Patient is 63year old with past medical history including type 2 diabetes mellitus with neuropathy, sleep apnea, s/p aortic valve replacement, morbid obesity, hypothyroidism, hypertension, chronic CHF and s/p thyroidectomy - was admitted on 6/03/2019 with report of stridor. Noted:  Vocal cord dysfunction. Status post tracheostomy on 6/03/2019. Hypothyroidism. Recent thyroidectomy on 4/11/2019. Morbid obesity. T2DM with current A1c of 8.4% (4/04/2019). Most recent blood glucose values:    Results for Renny Schultz (MRN [id number]) as of 6/18/2019 11:02   Ref. Range 6/17/2019 05:31 6/17/2019 11:45 6/17/2019 16:16 6/17/2019 23:05   GLUCOSE,FAST - POC Latest Ref Range: 70 - 110 mg/dL 226 (H) 195 (H) 210 (H) 143 (H)     Results for Renny Schultz (MRN [id number]) as of 6/18/2019 11:02   Ref. Range 6/18/2019 05:37   GLUCOSE,FAST - POC Latest Ref Range: 70 - 110 mg/dL 194 (H)     Current A1C: 7.1% (6/09/2019) which is equivalent to estimated average blood glucose of 153 mg/dL during the past 2-3 months.     Current hospital diabetes medications:  Basal lantus insulin 22 units daily. Correctional lispro insulin ACHS. Very resistant dose. Current bag of TPN with 12 units regular insulin    Total daily dose insulin requirement previous day: 6/17/2019:  Lantus: 22 units  Lispro: 15 units  Regular insulin in TPN. Home diabetes medications: Obtained from patient on 6/06/2019:  Lantus insulin (trach, patient unable to speak but nodded head when I asked if she's on vial insulin). She nodded again when I asked if she's on 90 units of lantus daily at bedtime and she has meter monitoring her blood sugar. Diet: Regular. Goals:  Blood glucose will be within target range of  mg/dL by 6/21/2019.     Education:    _X__  Refer to Diabetes Education Record: 6/10/2019  ___  Education not indicated at this time    Magaly Hernandez RN  Pager: [phone]

## 2019-06-18 NOTE — PROGRESS NOTES
NUTRITION CONSULT    Nutrition Consult: Management of Tube Feeding, TPN: RD to Manage     RECOMMENDATIONS / PLAN:     - Plan to provide parenteral nutrition support, monitor labs and adjust electrolytes daily. - Consider long term feeding tube placement to provide enteral nutrition until pt able to tolerate po and adequate intake of oral diet. - Continue RD inpatient monitoring and evaluation. Please Note:   Parenteral nutrition support to be provided using custom product, allowing for modification of electrolyte and macronutrient content day to day. Lipids included in PN on MWF. Macronutrient Goal (to meet 100% of estimated nutrition needs): 129 gm amino acids, 310 gm dextrose, 250 mL lipids (1784 kcal weekly average).       PARENTERAL NUTRITION ORDER:     Electrolyte Adjustments: K decreased    Day 5 PN to provide: 140 mEq Na, 78 mEq K, 15 mEq Ca, 16 mEq Mg, 39 mmol Phos, 1026 kcal, 129 gm amino acids, 150 gm dextrose, 10 mL MVI, 2.5 mL trace elements, 12 units regular insulin    PN Rate: 100 mL/hr   Glucose Infusion Rate: 0.86 mg/kg/min    Osmolarity: 1058 mOsm   Parenteral Nutrition Access Device: right basilic double lumen PICC (placed 6/13/19)  Indication for PN: per MD request, diet intolerance 2/2 dysphagia s/p tracheostomy   Refeeding Risk:      []  Yes  [x] No     NUTRITION DIAGNOSIS & INTERVENTIONS:     [x] Parenteral nutrition: continue, modify contents  [x] Meals/snacks: modified composition, diet per MD    [x] Collaboration and referral of nutrition care: MD to review PN order and note daily; will not call to verify content and changes, per MD request. Insulin discussed with Dr Brennen Smith- will keep same insulin dose in next bag per MD. Discussed recommendation to place feeding tube in anticipation for discharge as pt NPO per SLP recommendations and unable to receive TPN at rehab facility after discharge with Dr Silvia Castrejon- no plan for PEG placement at this time per MD and oral diet started today by MD. Nutrition Diagnosis: Swallowing difficulty related to dysphagia s/p tracheostomy as evidenced by pt NPO after SLP evaluation/MBS. ASSESSMENT:     6/18: Discussed disposition with - likely to discharge to ARU which will not accept if pt remains on TPN- TPN likely to stop in the next several days per MD and diet restarted by Surgery; SLP recommended NPO after swallow evaluation this morning, working with pt on PMV placement. 6/17: BG remains elevated and pt NPO.   6/16: BG levels trending down, but remain high. Made NPO yesterday per MD  6/15: Tolerating PN. Did not eat breakfast or lunch today because afraid to eat. Also c/o gassiness. BG levels remain elevated  6/14: Tolerating PN. Did not eat breakfast, but consumed most of lunch, which consisted of cream soup, mashed potatoes and pureed peas. Pt requesting to thicken liquids herself, despite order for thin liquids. BG levels elevated. 6/13: PICC placed to initiate TPN per MD request.   6/11: Pt with variable meal intake. Reports some difficulty tolerating certain foods, but ate 50% of breakfast this AM.  Encourage pt to ask for soft foods as desired. Diet changed to diabetic, BG in better control. 6/10: Was tolerating tube feeds until pt pulled NGT 6/8, receiving IVF since then. Diet started today by MD.  Pt reports being hungry. Pt with intolerance to artificial sweeteners, monitor BG levels and assess need for diabetic diet. 6/7: Formula changed to Jevity 1.5 prior to initiation 2/2 concern for sucrose sensitivity. Will continue current product due to questionable sensitivity of ingredients in Glucerna tube feeding. Tolerating rate at goal.  Elevated BG levels, but expected 2/2 formula needing to be given, discussed with MD and glycemin control, will attempt to manage BG with medication due to formula limitations. IVF discontinued. 6/6: Failed swallow evaluation/MBS; repeat MBS ordered for today.   C/o gas pains, but is passing flatus, and +BM. Some abdominal distension noted. Asking for water. DHT placed by IR, no tube feeding ordered at this time. 6/5: Admitted with stridor and vocal cord dysfunction s/p tracheostomy placement on 6/3/19, tolerating trach collar and able to eat if passes swallow evaluation per MD- may deflate cuff for meals but must re-inflate cuff when pt is not eating per Surgery. Pt refused NGT placement.     Average po intake adequate to meet patients estimated nutritional needs:   [] Yes     [x] No   [] Unable to determine at this time    PN infusion adequate to meet patients estimated nutritional needs:   [] Yes     [x] No   [] Unable to determine at this time    Diet: TPN ADULT - CENTRAL  DIET REGULAR  TPN ADULT - CENTRAL      Food Allergies: sweeteners, sucrose  Current Appetite:   [] Good     [] Fair     [] Poor     [x] Other: NPO  Appetite/meal intake prior to admission:   [x] Good     [] Fair     [] Poor     [] Other:  Feeding Limitations:  [x] Swallowing difficulty: SLP following    [] Chewing difficulty    [x] Other: hx of dysphagia s/p thyroidectomy PTA and trach placement 6/3/19  Current Meal Intake:   Patient Vitals for the past 100 hrs:   % Diet Eaten   06/18/19 1222 100 %   06/17/19 1856 0 %   06/17/19 1235 0 %   06/15/19 1158 0 %   06/15/19 0829 0 %       BM: 6/13  Skin Integrity: surgical incision to neck  Edema:  [] No     [x] Yes   Pertinent Medications: Reviewed: zofran, phenergan, pepcid, SSI q 6 hours very resistant scale, 22 units lantus      Labs: BMP, MG, Phos ordered daily; CMP, Triglyceride, Prealbumin ordered initially and weekly  Recent Labs     06/18/19  0630 06/17/19  0500 06/16/19  0505    137 139   K 3.7 4.4 3.4*    104 103   CO2 29 29 29   * 252* 242*   BUN 14 10 13   CREA 0.68 0.59* 0.65   CA 8.3* 7.2* 8.2*   MG 2.2 2.2 2.2   PHOS 3.4 3.0 2.8   Prealbumin: 12.9 mg/dL (6/14/19)  Triglyceride: 298 mg/dL (6/18/19), 316 mg/dL (6/13/19), 511 mg/dL (4/16/19)  Recent Labs     06/18/19  0440 06/17/19  0500 06/16/19  0505   WBC 5.0 5.8 7.5   HGB 10.8* 11.9* 12.6   HCT 35.0 35.9 37.8    215 231         Intake/Output Summary (Last 24 hours) at 6/18/2019 1236  Last data filed at 6/18/2019 1222  Gross per 24 hour   Intake 1098.33 ml   Output 1500 ml   Net -401.67 ml       Anthropometrics:   Ht Readings from Last 1 Encounters:   06/03/19 5' 7\" (1.702 m)     Last 3 Recorded Weights in this Encounter    06/12/19 1740 06/16/19 0040 06/18/19 0543   Weight: 121.1 kg (267 lb) 120.7 kg (266 lb 1.6 oz) 121.1 kg (267 lb)     Body mass index is 41.82 kg/m². Obese, Class III    Weight History: patient denies change in weight PTA, stable     Weight Metrics 6/18/2019 6/3/2019 5/23/2019 5/22/2019 5/16/2019 4/26/2019 4/16/2019   Weight 267 lb - 260 lb 6.4 oz - 258 lb 256 lb 260 lb 9.6 oz   BMI - 41.82 kg/m2 - 40.78 kg/m2 40.41 kg/m2 40.1 kg/m2 -          Admitting Diagnosis: Stridor [R06.1]  Stridor [R06.1]  Pertinent PMHx: T2DM with diabetic neuropathy, HTN, dyslipidemia, CHF, hypothyroidism s/p thyroidectomy 4/4/19    Education Needs:        [x] None identified  [] Identified - Not appropriate at this time  []  Identified and addressed - refer to education log  Learning Limitations:   [] None identified  [x] Identified: nonverbal. Communicates via pen/paper    Cultural, Nondenominational & ethnic food preferences:  [x] None identified    [] Identified and addressed     ESTIMATED NUTRITION NEEDS:     Calories: 6299-7626 kcal (MSJx1-1.3) based on   [x] Actual  kg     [] IBW:   Protein:  gm (0.8-1.2 gm/kg) based on   [x] Actual BW      [] IBW:   Fluid: 1 mL/kcal     MONITORING & EVALUATION:     Nutrition Goals:   1. Nutritional needs will be met through adequate oral intake or nutrition support within the next 5 days.   Outcome:  [] Met/Ongoing    [x] Progressing towards goal    [] Not progressing towards goal    [] New/Initial goal      Monitoring:  [x] Parenteral nutrition intake and administration  [x] Biochemical data, medical tests, and procedures   [x] Fluid intake   [x] Nutrition-focused physical findings   [x] Treatment/therapy   [] Food and beverage intake   [x] Diet order      [] Weight        Previous Recommendations (for follow-up assessments only):     [x]   Implemented       []   Not Implemented (RD to address)      [] No Longer Appropriate     [] No Recommendation Made        Discharge Planning: Nutrition recommendations pending patient's ability to tolerate po intake/enteral nutrition.    [x]  Participated in care planning, discharge planning, & interdisciplinary rounds as appropriate      Carol Ann Ramirez, [address]   Pager: [phone]

## 2019-06-18 NOTE — PROGRESS NOTES
Problem: Voice Impaired (Adult)  Goal: *Acute Goals and Plan of Care (Insert Text)  Description  Pt will:  1. Produce voice to finger occlusion in 3/5 trials. 2. Tolerate PMV placement for 10 minute increments without compromise to vitals. 3. Utilize proper breath support/techniques for increasing tolerance of PMV. 4. Phonate 10 word utterances with adequate breath support for adequate intensity of speech/increased intelligibilty with PMV. Outcome: Progressing Towards Goal     SPEECH LANGUAGE PATHOLOGY   SPEECH-LANGUAGE TREATMENT    Patient: Bebo Ramires (61 y.o. female)  Date: 6/18/2019  Diagnosis: Stridor [R06.1]  Stridor [R06.1] <principal problem not specified>  Procedure(s) (LRB):  TRACHEOSTOMY (N/A) 15 Days Post-Op  Precautions: Aspiration   PLOF: Normal voice prior to trach placement    ASSESSMENT:  Pt seen for follow up speech tx to address aphonia following trach placement. Portex #9 trach in place with cuff deflated. Pt highly anxious regarding placement, educated extensively regarding airwflow change with PMV placement resulting in ability to produce voice. Pt provided written handout for personal review. Pt agreeable to x1 attempt at placement, tolerating for ~2 minutes. Pt with hoarse/breathy vocal quality. Able to count to 10, produce vowel prolongation for ~10 seconds given mod cues for increased breath support. Pt requesting PMV be removed, doffed by SLP. Recommend continue trials with SLP only. Results/recommendations discussed with pt who was able to verbalize understanding. D/w RN. Progression toward goals:  ?       Improving appropriately and progressing toward goals  ? Improving slowly and progressing toward goals  ? Not making progress toward goals and plan of care will be adjusted     PLAN:  Patient continues to benefit from skilled intervention to address the above impairments. Continue treatment per established plan of care.   Discharge Recommendations: To Be Determined     SUBJECTIVE:   Patient stated ? Has that ever gotten stuck on anyone??    OBJECTIVE:   Mental Status:  Neurologic State: Alert  Orientation Level: Oriented X4  Cognition: Follows commands  Perception: Appears intact  Perseveration: No perseveration noted  Safety/Judgement: Awareness of environment, Fall prevention  Treatment & Interventions:  See above  Response & Tolerance to Activities:  Fair     PAIN:  Start of Tx: 0  End of Tx: 0     After treatment:   ?       Patient left in no apparent distress sitting up in chair  ? Patient left in no apparent distress in bed  ? Call bell left within reach  ? Nursing notified  ? Caregiver present  ? Bed alarm activated      COMMUNICATION/EDUCATION:   ? Compensatory speech/language/comprehension techniques  ? Patient/family able to participate in training and education   ?   Patient unable to participate in education; education ongoing with staff    Leann Lipscomb M.S., [address] [facility]  Speech-Language Pathologist

## 2019-06-19 ENCOUNTER — APPOINTMENT (OUTPATIENT)
Dept: GENERAL RADIOLOGY | Age: 59
DRG: 098 | End: 2019-06-19
Payer: MEDICAID

## 2019-06-19 ENCOUNTER — ANESTHESIA EVENT (OUTPATIENT)
Dept: SURGERY | Age: 59
DRG: 098 | End: 2019-06-19
Payer: MEDICAID

## 2019-06-19 LAB
ANION GAP SERPL CALC-SCNC: 4 MMOL/L (ref 3–18)
BASOPHILS # BLD: 0 K/UL (ref 0–0.1)
BASOPHILS NFR BLD: 1 % (ref 0–2)
BUN SERPL-MCNC: 15 MG/DL (ref 7–18)
BUN/CREAT SERPL: 25 (ref 12–20)
CALCIUM SERPL-MCNC: 8.2 MG/DL (ref 8.5–10.1)
CHLORIDE SERPL-SCNC: 106 MMOL/L (ref 100–108)
CO2 SERPL-SCNC: 29 MMOL/L (ref 21–32)
CREAT SERPL-MCNC: 0.6 MG/DL (ref 0.6–1.3)
DIFFERENTIAL METHOD BLD: ABNORMAL
EOSINOPHIL # BLD: 0.1 K/UL (ref 0–0.4)
EOSINOPHIL NFR BLD: 2 % (ref 0–5)
ERYTHROCYTE [DISTWIDTH] IN BLOOD BY AUTOMATED COUNT: 16.1 % (ref 11.6–14.5)
GLUCOSE BLD STRIP.AUTO-MCNC: 172 MG/DL (ref 70–110)
GLUCOSE BLD STRIP.AUTO-MCNC: 191 MG/DL (ref 70–110)
GLUCOSE BLD STRIP.AUTO-MCNC: 195 MG/DL (ref 70–110)
GLUCOSE BLD STRIP.AUTO-MCNC: 198 MG/DL (ref 70–110)
GLUCOSE BLD STRIP.AUTO-MCNC: 200 MG/DL (ref 70–110)
GLUCOSE SERPL-MCNC: 164 MG/DL (ref 74–99)
HCT VFR BLD AUTO: 39 % (ref 35–45)
HGB BLD-MCNC: 12.3 G/DL (ref 12–16)
LYMPHOCYTES # BLD: 2.1 K/UL (ref 0.9–3.6)
LYMPHOCYTES NFR BLD: 38 % (ref 21–52)
MAGNESIUM SERPL-MCNC: 2.2 MG/DL (ref 1.6–2.6)
MCH RBC QN AUTO: 29.1 PG (ref 24–34)
MCHC RBC AUTO-ENTMCNC: 31.5 G/DL (ref 31–37)
MCV RBC AUTO: 92.2 FL (ref 74–97)
MONOCYTES # BLD: 0.4 K/UL (ref 0.05–1.2)
MONOCYTES NFR BLD: 8 % (ref 3–10)
NEUTS SEG # BLD: 2.9 K/UL (ref 1.8–8)
NEUTS SEG NFR BLD: 51 % (ref 40–73)
PHOSPHATE SERPL-MCNC: 3.2 MG/DL (ref 2.5–4.9)
PLATELET # BLD AUTO: 237 K/UL (ref 135–420)
PMV BLD AUTO: 8.9 FL (ref 9.2–11.8)
POTASSIUM SERPL-SCNC: 3.6 MMOL/L (ref 3.5–5.5)
RBC # BLD AUTO: 4.23 M/UL (ref 4.2–5.3)
SODIUM SERPL-SCNC: 139 MMOL/L (ref 136–145)
WBC # BLD AUTO: 5.5 K/UL (ref 4.6–13.2)

## 2019-06-19 PROCEDURE — 97530 THERAPEUTIC ACTIVITIES: CPT

## 2019-06-19 PROCEDURE — 74011000250 HC RX REV CODE- 250: Performed by: HOSPITALIST

## 2019-06-19 PROCEDURE — 92526 ORAL FUNCTION THERAPY: CPT

## 2019-06-19 PROCEDURE — 84100 ASSAY OF PHOSPHORUS: CPT

## 2019-06-19 PROCEDURE — 97110 THERAPEUTIC EXERCISES: CPT

## 2019-06-19 PROCEDURE — 94640 AIRWAY INHALATION TREATMENT: CPT

## 2019-06-19 PROCEDURE — 82962 GLUCOSE BLOOD TEST: CPT

## 2019-06-19 PROCEDURE — 65270000029 HC RM PRIVATE

## 2019-06-19 PROCEDURE — 92611 MOTION FLUOROSCOPY/SWALLOW: CPT

## 2019-06-19 PROCEDURE — 74230 X-RAY XM SWLNG FUNCJ C+: CPT

## 2019-06-19 PROCEDURE — 80048 BASIC METABOLIC PNL TOTAL CA: CPT

## 2019-06-19 PROCEDURE — 83735 ASSAY OF MAGNESIUM: CPT

## 2019-06-19 PROCEDURE — 74011250636 HC RX REV CODE- 250/636

## 2019-06-19 PROCEDURE — 74011000258 HC RX REV CODE- 258

## 2019-06-19 PROCEDURE — 85025 COMPLETE CBC W/AUTO DIFF WBC: CPT

## 2019-06-19 PROCEDURE — 74011250636 HC RX REV CODE- 250/636: Performed by: HOSPITALIST

## 2019-06-19 PROCEDURE — 74011636637 HC RX REV CODE- 636/637: Performed by: HOSPITALIST

## 2019-06-19 PROCEDURE — 74011000250 HC RX REV CODE- 250: Performed by: NURSE PRACTITIONER

## 2019-06-19 PROCEDURE — 74011000250 HC RX REV CODE- 250

## 2019-06-19 PROCEDURE — 74011636637 HC RX REV CODE- 636/637

## 2019-06-19 PROCEDURE — 74011000255 HC RX REV CODE- 255

## 2019-06-19 PROCEDURE — 97535 SELF CARE MNGMENT TRAINING: CPT

## 2019-06-19 PROCEDURE — 74011000250 HC RX REV CODE- 250: Performed by: INTERNAL MEDICINE

## 2019-06-19 PROCEDURE — 94761 N-INVAS EAR/PLS OXIMETRY MLT: CPT

## 2019-06-19 PROCEDURE — 77010033678 HC OXYGEN DAILY

## 2019-06-19 RX ORDER — SODIUM CHLORIDE 0.9 % (FLUSH) 0.9 %
5-40 SYRINGE (ML) INJECTION EVERY 8 HOURS
Status: DISCONTINUED | OUTPATIENT
Start: 2019-06-19 | End: 2019-06-25 | Stop reason: HOSPADM

## 2019-06-19 RX ORDER — LIDOCAINE HYDROCHLORIDE 10 MG/ML
0.1 INJECTION, SOLUTION EPIDURAL; INFILTRATION; INTRACAUDAL; PERINEURAL AS NEEDED
Status: DISCONTINUED | OUTPATIENT
Start: 2019-06-19 | End: 2019-06-25 | Stop reason: HOSPADM

## 2019-06-19 RX ORDER — SODIUM CHLORIDE, SODIUM LACTATE, POTASSIUM CHLORIDE, CALCIUM CHLORIDE 600; 310; 30; 20 MG/100ML; MG/100ML; MG/100ML; MG/100ML
75 INJECTION, SOLUTION INTRAVENOUS CONTINUOUS
Status: DISPENSED | OUTPATIENT
Start: 2019-06-19 | End: 2019-06-20

## 2019-06-19 RX ORDER — INSULIN LISPRO 100 [IU]/ML
INJECTION, SOLUTION INTRAVENOUS; SUBCUTANEOUS ONCE
Status: ACTIVE | OUTPATIENT
Start: 2019-06-19 | End: 2019-06-20

## 2019-06-19 RX ORDER — SODIUM CHLORIDE 0.9 % (FLUSH) 0.9 %
5-40 SYRINGE (ML) INJECTION AS NEEDED
Status: DISCONTINUED | OUTPATIENT
Start: 2019-06-19 | End: 2019-06-25 | Stop reason: HOSPADM

## 2019-06-19 RX ADMIN — CHLORHEXIDINE GLUCONATE 0.12% ORAL RINSE 10 ML: 1.2 LIQUID ORAL at 08:28

## 2019-06-19 RX ADMIN — LEVOTHYROXINE SODIUM 75 MCG: 100 INJECTION, POWDER, LYOPHILIZED, FOR SOLUTION INTRAVENOUS at 08:28

## 2019-06-19 RX ADMIN — INSULIN LISPRO 3 UNITS: 100 INJECTION, SOLUTION INTRAVENOUS; SUBCUTANEOUS at 00:40

## 2019-06-19 RX ADMIN — INSULIN LISPRO 6 UNITS: 100 INJECTION, SOLUTION INTRAVENOUS; SUBCUTANEOUS at 23:50

## 2019-06-19 RX ADMIN — POTASSIUM CHLORIDE: 2 INJECTION, SOLUTION, CONCENTRATE INTRAVENOUS at 19:55

## 2019-06-19 RX ADMIN — BARIUM SULFATE 15 ML: 400 SUSPENSION ORAL at 11:17

## 2019-06-19 RX ADMIN — ENOXAPARIN SODIUM 40 MG: 40 INJECTION SUBCUTANEOUS at 17:10

## 2019-06-19 RX ADMIN — FAMOTIDINE 20 MG: 10 INJECTION INTRAVENOUS at 22:33

## 2019-06-19 RX ADMIN — HYDROMORPHONE HYDROCHLORIDE 1 MG: 2 INJECTION INTRAMUSCULAR; INTRAVENOUS; SUBCUTANEOUS at 16:03

## 2019-06-19 RX ADMIN — HYDROMORPHONE HYDROCHLORIDE 1 MG: 2 INJECTION INTRAMUSCULAR; INTRAVENOUS; SUBCUTANEOUS at 08:28

## 2019-06-19 RX ADMIN — INSULIN LISPRO 3 UNITS: 100 INJECTION, SOLUTION INTRAVENOUS; SUBCUTANEOUS at 11:36

## 2019-06-19 RX ADMIN — BARIUM SULFATE 45 ML: 400 SUSPENSION ORAL at 11:00

## 2019-06-19 RX ADMIN — BARIUM SULFATE 30 ML: 400 PASTE ORAL at 11:17

## 2019-06-19 RX ADMIN — Medication 10 ML: at 22:39

## 2019-06-19 RX ADMIN — HYDROMORPHONE HYDROCHLORIDE 1 MG: 2 INJECTION INTRAMUSCULAR; INTRAVENOUS; SUBCUTANEOUS at 00:22

## 2019-06-19 RX ADMIN — INSULIN GLARGINE 22 UNITS: 100 INJECTION, SOLUTION SUBCUTANEOUS at 08:27

## 2019-06-19 RX ADMIN — INSULIN LISPRO 3 UNITS: 100 INJECTION, SOLUTION INTRAVENOUS; SUBCUTANEOUS at 17:10

## 2019-06-19 RX ADMIN — HYDROMORPHONE HYDROCHLORIDE 1 MG: 2 INJECTION INTRAMUSCULAR; INTRAVENOUS; SUBCUTANEOUS at 22:33

## 2019-06-19 RX ADMIN — FAMOTIDINE 20 MG: 10 INJECTION INTRAVENOUS at 08:28

## 2019-06-19 RX ADMIN — ALBUTEROL SULFATE 2.5 MG: 2.5 SOLUTION RESPIRATORY (INHALATION) at 12:36

## 2019-06-19 RX ADMIN — LORAZEPAM 1 MG: 2 INJECTION INTRAMUSCULAR at 12:05

## 2019-06-19 RX ADMIN — INSULIN LISPRO 3 UNITS: 100 INJECTION, SOLUTION INTRAVENOUS; SUBCUTANEOUS at 06:22

## 2019-06-19 RX ADMIN — METOPROLOL TARTRATE 5 MG: 5 INJECTION INTRAVENOUS at 00:22

## 2019-06-19 NOTE — PROGRESS NOTES
Problem: Mobility Impaired (Adult and Pediatric)  Goal: *Acute Goals and Plan of Care (Insert Text)  Description  Physical Therapy Goals  Initiated 6/5/2019 and to be accomplished within 7 day(s)  1. Patient will move from supine to sit and sit to supine , scoot up and down and roll side to side in bed with modified independence. 2.  Patient will transfer from bed to chair and chair to bed with modified independence using the least restrictive device. 3.  Patient will perform sit to stand with modified independence. 4.  Patient will ambulate with modified independence for >100 feet with the least restrictive device. 5.  Patient will ascend/descend 3 stairs with 1-2 handrail(s) with supervision/set-up. Prior Level of Function: PHYSICAL THERAPY TREATMENT    Patient: Carlita Rueda (20 y.o. female)  Date: 6/19/2019  Diagnosis: Stridor [R06.1]  Stridor [R06.1] <principal problem not specified>  Procedure(s) (LRB):  TRACHEOSTOMY (N/A) 16 Days Post-Op  Precautions: Fall(trach collar); communicates in writing. Notebook at bedside  PLOF: Independent with mobility including gait using 2 canes. Pt lives with her brother and son in a single story home with 3 steps to enter B HRA. ASSESSMENT:  Pt presents in bed. Agreeing to PT. Req significant time for set up respiratory, nsg needs, ie., administer Ativan to aide in mgt of anxiety, obtain vitals machine, etc. and once up pt request toileting. Pt. Takes extra time for all mobility. Transferring to [facility] with supervision. Ambulating on the unit with two person A for mgt portable 02 at 6L, and IV; initially with her two canes for 100 ft then pushing her son in wheelchair for 100 ft before returning to room. Some SOB post activity. 02 sats maintaining at 100% throughout activity, HR 78. Respiratory present end of session as well as son and brother.     Progression toward goals:   ?      Improving appropriately and progressing toward goals       PLAN:  Patient continues to benefit from skilled intervention to address the above impairments. Continue treatment per established plan of care. Discharge Recommendations:  Inpatient Rehab and To Be Determined  Further Equipment Recommendations for Discharge:  N/A     SUBJECTIVE:   Patient stated ? .?    OBJECTIVE DATA SUMMARY:   Critical Behavior:  Neurologic State: Alert  Orientation Level: Oriented X4  Cognition: Appropriate decision making  Safety/Judgement: Awareness of environment, Fall prevention  Functional Mobility Training:  Bed Mobility:  Rolling: Modified independent  Supine to Sit: Modified independent;Bed Modified; Other (comment)(raised hob and using rail)  Transfers:  Sit to Stand: Supervision  Stand to Sit: Supervision  Stand Pivot Transfers: Supervision     Bed to Chair: Contact guard assistance  Balance:  Standing: With support  Standing - Static: Good  Standing - Dynamic : Good   Ambulation/Gait Training:  Distance (ft): 200 Feet (ft)  Assistive Device: Other (comment)  Gait Abnormalities: Altered arm swing;Decreased step clearance    Base of Support: Center of gravity altered; Widened  Speed/Yolis: Fluctuations  Pain:  Pain level pre-treatment: 0/10  Pain level post-treatment: 0/10   Activity Tolerance:   poor  Please refer to the flowsheet for vital signs taken during this treatment. After treatment:   ? Patient left in no apparent distress sitting up in chair  ? Call bell left within reach  ? Nursing notified  ? Brother & son, RT  present      COMMUNICATION/EDUCATION:   ?         Other:  deep breathing to assist with decreasing anxiety.  HYACINTH Vicente PTA   Time Calculation: 54 mins  (billed TA x 2)     Outcome: Progressing Towards Goal
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
## 2019-06-19 NOTE — PROGRESS NOTES
Discharge planning    [place] [facility] [place] Possible [place] placement. ARU following for placement.     JACK Sorto, RN  Pager # [phone]  Care Manager

## 2019-06-19 NOTE — PROGRESS NOTES
conducted an initial consultation and Spiritual Assessment for Consuelo Link, who is a 63 y.o.,female. Patients Primary Language is: [place]. According to the patients EMR Adventist Affiliation is: Pinky Gamino. The reason the Patient came to the hospital is:   Patient Active Problem List    Diagnosis Date Noted    Fever 06/08/2019    Type 2 diabetes mellitus with hyperglycemia ([address] 06/08/2019    Stridor 05/22/2019    S/P thyroidectomy 04/04/2019    Type 2 diabetes mellitus with diabetic neuropathy ([address] 01/16/2019    Chronic diastolic congestive heart failure ([address] 08/27/2018    Mood disorder ([address] 08/24/2018    SOB (shortness of breath) 05/17/2017    Type 2 diabetes mellitus without complication ([address] 63/79/9827    Hypothyroidism due to acquired atrophy of thyroid 05/17/2017    Essential hypertension 05/17/2017    Dyslipidemia 05/17/2017    Venous stasis dermatitis of both lower extremities 03/01/2017    Lumbar facet arthropathy 01/26/2017    Lumbar degenerative disc disease 01/26/2017    Left shoulder tendinitis 12/07/2016    Spondylosis of lumbar region without myelopathy or radiculopathy 12/07/2016    Chronic pain of both shoulders 12/07/2016    Chronic pain of both knees 12/07/2016    Chronic pain syndrome 12/07/2016    Encounter for long-term (current) use of medications 12/07/2016    Chronic midline low back pain 12/07/2016    Chronic left shoulder pain 11/11/2016    Morbid obesity ([address] 11/11/2016    Type II diabetes mellitus, uncontrolled ([address] 11/06/2015    Sleep apnea 11/06/2015    H/O aortic valve replacement 11/06/2015    History of bicuspid aortic valve 11/06/2015    Chronic back pain 11/06/2015        The  provided the following Interventions:  Initiated a relationship of care and support. Explored issues of mariely, belief, spirituality and Catholic/ritual needs while hospitalized. Listened empathically.   Provided information about Spiritual Care Services. Offered assurance of continued prayers on patient's behalf. The following outcomes where achieved:  Patient shared limited information about both their medical narrative and spiritual journey/beliefs in a whispered voice.  confirmed Patient's Taoism Affiliation. Patient processed feeling about current hospitalization by writing on a note pad. Patient expressed gratitude for 's visit. Assessment:  Patient does not have any Christianity/cultural needs that will affect patients preferences in health care. There are no spiritual or Christianity issues which require intervention at this time. Plan:  Chaplains will continue to follow and will provide pastoral care on an as needed/requested basis.  recommends bedside caregivers page  on duty if patient shows signs of acute spiritual or emotional distress.     [address]  [phone])

## 2019-06-19 NOTE — PROGRESS NOTES
Problem: Falls - Risk of  Goal: *Absence of Falls  Description  Document Teresa Berg Fall Risk and appropriate interventions in the flowsheet. Outcome: Progressing Towards Goal     Problem: Patient Education: Go to Patient Education Activity  Goal: Patient/Family Education  Outcome: Progressing Towards Goal     Problem: Pressure Injury - Risk of  Goal: *Prevention of pressure injury  Description  Document Omer Scale and appropriate interventions in the flowsheet. Outcome: Progressing Towards Goal     Problem: Patient Education: Go to Patient Education Activity  Goal: Patient/Family Education  Outcome: Progressing Towards Goal     Problem: Nutrition Deficit  Goal: *Optimize nutritional status  Outcome: Progressing Towards Goal     Problem: Patient Education: Go to Patient Education Activity  Goal: Patient/Family Education  Outcome: Progressing Towards Goal     Problem: Patient Education: Go to Patient Education Activity  Goal: Patient/Family Education  Outcome: Progressing Towards Goal     Problem: Patient Education: Go to Patient Education Activity  Goal: Patient/Family Education  Outcome: Progressing Towards Goal     Problem: Diabetes Self-Management  Goal: *Disease process and treatment process  Description  Define diabetes and identify own type of diabetes; list 3 options for treating diabetes. Outcome: Progressing Towards Goal  Goal: *Incorporating nutritional management into lifestyle  Description  Describe effect of type, amount and timing of food on blood glucose; list 3 methods for planning meals. Outcome: Progressing Towards Goal  Goal: *Incorporating physical activity into lifestyle  Description  State effect of exercise on blood glucose levels. Outcome: Progressing Towards Goal  Goal: *Developing strategies to promote health/change behavior  Description  Define the ABC's of diabetes; identify appropriate screenings, schedule and personal plan for screenings.   Outcome: Progressing Towards Goal  Goal: *Using medications safely  Description  State effect of diabetes medications on diabetes; name diabetes medication taking, action and side effects. Outcome: Progressing Towards Goal  Goal: *Monitoring blood glucose, interpreting and using results  Description  Identify recommended blood glucose targets  and personal targets. Outcome: Progressing Towards Goal  Goal: *Prevention, detection, treatment of acute complications  Description  List symptoms of hyper- and hypoglycemia; describe how to treat low blood sugar and actions for lowering  high blood glucose level. Outcome: Progressing Towards Goal  Goal: *Prevention, detection and treatment of chronic complications  Description  Define the natural course of diabetes and describe the relationship of blood glucose levels to long term complications of diabetes.   Outcome: Progressing Towards Goal  Goal: *Developing strategies to address psychosocial issues  Description  Describe feelings about living with diabetes; identify support needed and support network  Outcome: Progressing Towards Goal  Goal: *Insulin pump training  Outcome: Progressing Towards Goal  Goal: *Sick day guidelines  Outcome: Progressing Towards Goal  Goal: *Patient Specific Goal (EDIT GOAL, INSERT TEXT)  Outcome: Progressing Towards Goal     Problem: Patient Education: Go to Patient Education Activity  Goal: Patient/Family Education  Outcome: Progressing Towards Goal     Problem: Diabetes Maintenance:Admission  Goal: *Blood glucose 80 to 180 mg/dl  Outcome: Progressing Towards Goal     Problem: Hypertension  Goal: *Blood pressure within specified parameters  Outcome: Progressing Towards Goal  Goal: *Fluid volume balance  Outcome: Progressing Towards Goal  Goal: *Labs within defined limits  Outcome: Progressing Towards Goal     Problem: Patient Education: Go to Patient Education Activity  Goal: Patient/Family Education  Outcome: Progressing Towards Goal     Problem: Pain  Goal: *Control of Pain  Outcome: Progressing Towards Goal  Goal: *PALLIATIVE CARE:  Alleviation of Pain  Outcome: Progressing Towards Goal     Problem: Patient Education: Go to Patient Education Activity  Goal: Patient/Family Education  Outcome: Progressing Towards Goal     Problem: Infection - Risk of, Surgical Site Infection  Goal: *Absence of surgical site infection signs and symptoms  Outcome: Progressing Towards Goal     Problem: Patient Education: Go to Patient Education Activity  Goal: Patient/Family Education  Outcome: Progressing Towards Goal

## 2019-06-19 NOTE — DIABETES MGMT
Glycemic Control Plan of Care    T2DM with current A1c level of 7.1% (6/09/2019). See separate notes, 6/10/2019, for assessment of home diabetes management and education. Patient is s/p trach on 6/03/2019. She was able to communicate by writing. Home diabetes medication: Lantus insulin (vial) 90 units daily at bedtime. Current hospital diabetes medications: Lantus insulin 22 units and very resistant dose of correctional lispro insulin. POC BG range on 6/18/2019: 172-194 mg/dL. POC BG report on 6/19/2019 at time of review: 191, 198 mg/dL. Noted seen by SLP on 6/18/2019 and  changed back to NPO. Plan for PEG tube feeding placement. Patient cont on TPN with 12 units of regular insulin. Recommendation(s):  1.) Cont on current SC insulin orders/dose: basal lantus, very resistant dose of correctional lispro insulin and regular insulin in TPN. Assessment:  Patient is 63year old with past medical history including type 2 diabetes mellitus with neuropathy, sleep apnea, s/p aortic valve replacement, morbid obesity, hypothyroidism, hypertension, chronic CHF and s/p thyroidectomy - was admitted on 6/03/2019 with report of stridor. Noted:  Vocal cord dysfunction. Status post tracheostomy on 6/03/2019. Hypothyroidism. Recent thyroidectomy on 4/11/2019. Morbid obesity. T2DM with current A1c of 8.4% (4/04/2019). Most recent blood glucose values:    Results for Anam Ching (MRN [id number]) as of 6/19/2019 14:19   Ref. Range 6/18/2019 05:37 6/18/2019 12:11 6/18/2019 18:55   GLUCOSE,FAST - POC Latest Ref Range: 70 - 110 mg/dL 194 (H) 189 (H) 183 (H)     Results for Anam Ching (MRN [id number]) as of 6/19/2019 14:19   Ref. Range 6/19/2019 00:35 6/19/2019 05:17 6/19/2019 11:27   GLUCOSE,FAST - POC Latest Ref Range: 70 - 110 mg/dL 172 (H) 191 (H) 198 (H)     Current A1C: 7.1% (6/09/2019) which is equivalent to estimated average blood glucose of 153 mg/dL during the past 2-3 months.     Current hospital diabetes medications:  Basal lantus insulin 22 units daily. Correctional lispro insulin ACHS. Very resistant dose. Current bag of TPN with 12 units regular insulin    Total daily dose insulin requirement previous day: 6/18/2019:  Lantus: 22 units  Lispro: 9 units  12 units Regular insulin in TPN bag. Home diabetes medications: Obtained from patient on 6/06/2019:  Lantus insulin (trach, patient unable to speak but nodded head when I asked if she's on vial insulin). She nodded again when I asked if she's on 90 units of lantus daily at bedtime and she has meter monitoring her blood sugar. Diet: NPO    Goals:  Blood glucose will be within target range of  mg/dL by 6/22/2019.     Education:    _X__  Refer to Diabetes Education Record: 6/10/2019  ___  Education not indicated at this time    Janna Gore RN  Pager: [phone]

## 2019-06-19 NOTE — ROUTINE PROCESS
Bedside shift change report given to Adria Amor (oncoming nurse) by [facility] (offgoing nurse). Report included the following information SBAR, Kardex and MAR. Additional 8.0 inner cannula at bedside, as well as 8.0 and 9.0 trach sets. Adria Amor states will get 9.0 inner cannula from sterile processing.

## 2019-06-19 NOTE — PROGRESS NOTES
Problem: Self Care Deficits Care Plan (Adult)  Goal: *Acute Goals and Plan of Care (Insert Text)  Description  Occupational Therapy Goals  Initiated 6/5/2019, re-evaluated on 6/14/19 within 7 day(s). Continue all previously set goals    1. Patient will perform lower body dressing with supervision/set-up. 2.  Patient will perform functional task in standing for 3 minutes with supervision for balance. 3.  Patient will perform toilet transfers with supervision/set-up. 4.  Patient will participate in upper extremity therapeutic exercise/activities with supervision/set-up for 8 minutes to increase strength/endurance for ADLs. Prior Level of Function: Pt was modified independent with basic self care tasks and used two canes for functional mobility PTA. Outcome: Progressing Towards Goal   OCCUPATIONAL THERAPY TREATMENT    Patient: Hellen Kyle (29 y.o. female)  Date: 6/19/2019  Diagnosis: Stridor [R06.1]  Stridor [R06.1] <principal problem not specified>  Procedure(s) (LRB):  TRACHEOSTOMY (N/A) 16 Days Post-Op  Precautions: Fall(trach collar)    Chart, occupational therapy assessment, plan of care, and goals were reviewed. ASSESSMENT:  Pt sitting up in chair with wet cough/increased secretions from trach upon approach for tx. Introduction of therapist and plan of tx provided, pt agreeable. RN in room to provide deep suction without success and pt successful with encouragement to cough up secretions; extra time to manage. Pt required additional encouragement/redirection throughout session to complete x2 reps of therex (see below). She attempts to write thoughts on notebook, however this became an increased distraction during session. Fair activity tolerance noted. Pt did note she feels something \"stuck\" in the back of her throat; education on MBS and potential for bolus from various substances as possible sources. RN notified at end of session.  All needs in reach and pt made comfortable at end of session. Progression toward goals:  ?          Improving appropriately and progressing toward goals  ? Improving slowly and progressing toward goals  ? Not making progress toward goals and plan of care will be adjusted     PLAN:  Patient continues to benefit from skilled intervention to address the above impairments. Continue treatment per established plan of care. Discharge Recommendations:  Home Health with 24/7 Assistance vs Inpatient Rehab pending pt tolerance/participation in therapy  Further Equipment Recommendations for Discharge:  bedside commode     SUBJECTIVE:   Patient mouthed, \"I'm tired. ?    OBJECTIVE DATA SUMMARY:   Cognitive/Behavioral Status:  Neurologic State: Alert  Orientation Level: Oriented X4  Cognition: Follows commands  Safety/Judgement: Awareness of environment, Fall prevention    Pt required extra time to write thoughts on notebook paper to express to therapist; some non-relevant to session (I.e. \"does it feel like we are at the movies with all the noise\"). Pt appeared easily distracted and seemingly anxious by her coughing, noises in room, feeling of something \"stuck\" in her throat, and phone calls. Required frequent redirection and encouragement. Functional Mobility and Transfers for ADLs:   Bed Mobility:  Rolling: Modified independent  Supine to Sit: Modified independent;Bed Modified; Other (comment)(raised hob and using rail)      Transfers:  Sit to Stand: Supervision  Stand to Sit: Supervision  Stand Pivot Transfers: Supervision  Bed to Chair: Contact guard assistance    Balance:  Standing: With support  Standing - Static: Good  Standing - Dynamic : Good    ADL Intervention:  Grooming  Grooming Assistance: Modified independent  Washing Face: Modified independent  Washing Hands: Modified independent(sani-wipe )    Lower Body Dressing Assistance  Socks: Stand-by assistance    UE Therapeutic Exercises:   Using red theraband seated in chair, pt completed 10x2 reps each: elbow flexion/extension (to right/left sides), tricep extensions with shoulder flexion, shoulder abduction/adduction with scapula retraction/protraction. Pt required increased encouragement to complete second set, attempting to tell therapist she already completed each x3 reps. Pain:  Pt denied pain this session     Activity Tolerance:    Fair; min rest breaks required     Please refer to the flowsheet for vital signs taken during this treatment. After treatment:   ?  Patient left in no apparent distress sitting up in chair  ? Patient left in no apparent distress in bed  ? Call bell left within reach  ? Nursing notified  ? Caregiver present  ? Bed alarm activated    COMMUNICATION/EDUCATION:   ? Role of Occupational Therapy in the acute care setting  ? Home safety education was provided and the patient/caregiver indicated understanding. ? Patient/family have participated as able in working towards goals and plan of care. ? Patient/family agree to work toward stated goals and plan of care. ? Patient understands intent and goals of therapy, but is neutral about his/her participation. ? Patient is unable to participate in goal setting and plan of care.       Thank you for this referral.  VANDANA Harris   Time Calculation: 38 mins

## 2019-06-19 NOTE — PROGRESS NOTES
SPEECH LANGUAGE PATHOLOGY DYSPHAGIA TREATMENT    Patient: Yandy Cates (48 y.o. female)  Date: 6/19/2019  Diagnosis: Stridor [R06.1]  Stridor [R06.1] <principal problem not specified>  Procedure(s) (LRB):  TRACHEOSTOMY (N/A) 16 Days Post-Op  Precautions: Aspiration, Fall(trach collar)  PLOF: Regular diet with thin liquids     ASSESSMENT:  Pt seen for follow up dysphagia tx with trach in place, cuff deflated. Pt reporting via pen/paper \"I couldn't eat my dinner last night\" after being started on diet by MD.  Pt demo immediate cough on x1 ice chip presentation. Per surgeon, plan for repeat MBS later this date with plan for PEG placement if no improvement. Results/recommendations discussed with pt RN. Will continue to follow for further dysphagia management as indicated. Progression toward goals:  ?         Improving appropriately and progressing toward goals  ? Improving slowly and progressing toward goals  ? Not making progress toward goals and plan of care will be adjusted     PLAN:  Recommendations and Planned Interventions:  Repeat MBS   Patient continues to benefit from skilled intervention to address the above impairments. Continue treatment per established plan of care. Discharge Recommendations: To Be Determined     SUBJECTIVE:   Patient stated ? Why am I feeling like this??    OBJECTIVE:   Cognitive and Communication Status:  Neurologic State: Alert  Orientation Level: Oriented X4  Cognition: Follows commands  Perception: Appears intact  Perseveration: No perseveration noted  Safety/Judgement: Awareness of environment, Fall prevention  Dysphagia Treatment:  Oral Assessment:  Within functional limits   P.O.  Trials:   Vocal quality prior to P.O.: Breathy, Hoarse, Low volume   Consistency Presented:  Ice chips    How Presented: Self-fed/presented, Spoon   Bolus Acceptance: No impairment   Bolus Formation/Control: Impaired   Type of Impairment: Delayed   Propulsion: Delayed (# of seconds)   Oral Residue: None   Initiation of Swallow: Delayed (# of seconds)   Laryngeal Elevation: Decreased, Weak   Aspiration Signs/Symptoms: Weak cough, Delayed cough/throat clear, Change vocal quality   Pharyngeal Phase Characteristics: Feeling of discomfort, Poor endurance, Altered vocal quality   Effective Modifications: None    PAIN:  No pain reported prior to or following tx     After treatment:   ?              Patient left in no apparent distress sitting up in chair  ? Patient left in no apparent distress in bed  ? Call bell left within reach  ? Nursing notified  ? Family present  ? Caregiver present  ? Bed alarm activated      COMMUNICATION/EDUCATION:   ? Aspiration precautions; swallow safety; compensatory techniques  ? Patient/family able to participate in training and education   ? Patient unable to participate in training and education, education ongoing with staff   ?  Patient understands goals and intent of therapy; neutral about participation     TONY VasquezS., [id number] [facility]  Speech-Language Pathologist

## 2019-06-19 NOTE — PROGRESS NOTES
Occupational Therapy Note    Patient: Michi Yeung (70 y.o. female)  Date: 6/19/2019  Diagnosis: Stridor [R06.1]  Stridor [R06.1] <principal problem not specified>  Procedure(s) (LRB):  TRACHEOSTOMY (N/A) 16 Days Post-Op  Precautions: Fall(trach collar)  Chart, occupational therapy assessment, plan of care, and goals were reviewed. Occupational Therapy treatment attempted. Patient is unable to participate due to:  []  Nausea/vomiting  []  Eating  []  Pain  []  Pt lethargic  []  Off Unit  [x] Other:  Pt is refusing therapy session this am, writing on the paper that she is too nervous about her swallowing test later today, requesting to rest at this time  Will f/u later as schedule allows. Thank you.     SHIVANI Babin/CAROLEE

## 2019-06-19 NOTE — PROGRESS NOTES
[facility] REFERRAL CONTACT NOTE  [id number] [place] [address] for Physical Rehabilitation    RE: Iván Bond     Thank you for the opportunity to review this patient's case for admission to [id number] [place] [address] for Physical Rehabilitation. Based on our pre-admission screening:     [x ] Our Team/Medical Director is following this case. Comments:TPN as per surgeon, trach care - Plan for repeat swallow eval today & if fails then plan is for  PEG placement. Will continue to follow medical plans and PT/OT/ST progress and advice following review with the team     Again, Thank you for this referral. Should you have any questions please do not hesitate to call. Sincerely,  Florencio Lerner. Sadi Almanzar, [address]  Sadi Almanzar, RN  Admissions [facility] for [facility]  [phone]

## 2019-06-19 NOTE — ROUTINE PROCESS
Bedside shift change report given to MARKOS Mistry/MARKOS Modi (oncoming nurse) by Kim Jay RN (offgoing nurse). Report included the following information SBAR, Procedure Summary, Intake/Output, MAR and Recent Results.

## 2019-06-19 NOTE — PROGRESS NOTES
NUTRITION CONSULT    Nutrition Consult: Management of Tube Feeding, TPN: RD to Manage     RECOMMENDATIONS / PLAN:     - Plan to provide parenteral nutrition support, monitor labs and adjust electrolytes daily.  - Once PEG placed and able to use, recommend start tube feeding of Jevity 1.5 at 10 mL/hr and advance as tolerated by 10 mL q 4 hours to goal rate of 50 mL/hr with 100 mL q 4 hour water flushes and add Prosource BID. Recommend discontinuing TPN once pt tolerating advancement of tube feeding.   - Continue RD inpatient monitoring and evaluation. Please Note:   Parenteral nutrition support to be provided using custom product, allowing for modification of electrolyte and macronutrient content day to day. Lipids included in PN on MWF. Macronutrient Goal (to meet 100% of estimated nutrition needs): 129 gm amino acids, 310 gm dextrose, 250 mL lipids (1784 kcal weekly average). PARENTERAL NUTRITION ORDER:     Electrolyte Adjustments: K increased    Day 7 PN to provide: 140 mEq Na, 92 mEq K, 15 mEq Ca, 16 mEq Mg, 30 mmol Phos, 1226 kcal, 129 gm amino acids, 150 gm dextrose, 100 mL lipids, 10 mL MVI, 2.5 mL trace elements, 12 units regular insulin    PN Rate: 100 mL/hr   Glucose Infusion Rate: 0.86 mg/kg/min    Osmolarity: 1075 mOsm   Parenteral Nutrition Access Device: right basilic double lumen PICC (placed 6/13/19)  Indication for PN: per MD request, diet intolerance 2/2 dysphagia s/p tracheostomy      NUTRITION DIAGNOSIS & INTERVENTIONS:     [x] Parenteral nutrition: continue, modify contents  [x] Enteral nutrition: recommendations above   [x] Collaboration and referral of nutrition care: MD to review PN order and note daily; will not call to verify content and changes, per MD request. Insulin in TPN discussed with Dr Toño Mcdonough. Nutrition Diagnosis: Swallowing difficulty related to dysphagia s/p tracheostomy as evidenced by pt NPO after SLP evaluation/MBS.     ASSESSMENT:     6/19: NPO s/p repeat MBS this morning and GI consulted for PEG placement, tentatively planned for tomorrow. Discharge planning underway with plan for transfer to ARU.   6/18: Discussed disposition with - likely to discharge to ARU which will not accept if pt remains on TPN- TPN likely to stop in the next several days per MD and diet restarted by Surgery; SLP recommended NPO after swallow evaluation this morning, working with pt on PMV placement. 6/17: BG remains elevated and pt NPO.   6/16: BG levels trending down, but remain high. Made NPO yesterday per MD  6/15: Tolerating PN. Did not eat breakfast or lunch today because afraid to eat. Also c/o gassiness. BG levels remain elevated  6/14: Tolerating PN. Did not eat breakfast, but consumed most of lunch, which consisted of cream soup, mashed potatoes and pureed peas. Pt requesting to thicken liquids herself, despite order for thin liquids. BG levels elevated. 6/13: PICC placed to initiate TPN per MD request.   6/11: Pt with variable meal intake. Reports some difficulty tolerating certain foods, but ate 50% of breakfast this AM.  Encourage pt to ask for soft foods as desired. Diet changed to diabetic, BG in better control. 6/10: Was tolerating tube feeds until pt pulled NGT 6/8, receiving IVF since then. Diet started today by MD.  Pt reports being hungry. Pt with intolerance to artificial sweeteners, monitor BG levels and assess need for diabetic diet. 6/7: Formula changed to Jevity 1.5 prior to initiation 2/2 concern for sucrose sensitivity. Will continue current product due to questionable sensitivity of ingredients in Glucerna tube feeding. Tolerating rate at goal.  Elevated BG levels, but expected 2/2 formula needing to be given, discussed with MD and glycemin control, will attempt to manage BG with medication due to formula limitations. IVF discontinued. 6/6: Failed swallow evaluation/MBS; repeat MBS ordered for today. C/o gas pains, but is passing flatus, and +BM. Some abdominal distension noted. Asking for water. DHT placed by IR, no tube feeding ordered at this time. 6/5: Admitted with stridor and vocal cord dysfunction s/p tracheostomy placement on 6/3/19, tolerating trach collar and able to eat if passes swallow evaluation per MD- may deflate cuff for meals but must re-inflate cuff when pt is not eating per Surgery. Pt refused NGT placement.     Average po intake adequate to meet patients estimated nutritional needs:   [] Yes     [x] No   [] Unable to determine at this time    PN infusion adequate to meet patients estimated nutritional needs:   [] Yes     [x] No   [] Unable to determine at this time    Diet: TPN ADULT - CENTRAL  DIET NPO  TPN ADULT - CENTRAL      Food Allergies: sweeteners, sucrose  Current Appetite:   [] Good     [] Fair     [] Poor     [x] Other: NPO  Appetite/meal intake prior to admission:   [x] Good     [] Fair     [] Poor     [] Other:  Feeding Limitations:  [x] Swallowing difficulty: SLP following    [] Chewing difficulty    [x] Other: hx of dysphagia s/p thyroidectomy PTA and trach placement 6/3/19  Current Meal Intake:   Patient Vitals for the past 100 hrs:   % Diet Eaten   06/18/19 1222 100 %   06/17/19 1856 0 %   06/17/19 1235 0 %   06/15/19 1158 0 %   06/15/19 0829 0 %       BM: 6/13  Skin Integrity: surgical incision to neck  Edema:  [x] No     [] Yes   Pertinent Medications: Reviewed: zofran, phenergan, pepcid, SSI q 6 hours very resistant scale, 22 units lantus      Labs: BMP, MG, Phos ordered daily; CMP, Triglyceride, Prealbumin ordered initially and weekly  Recent Labs     06/19/19  0540 06/18/19  0630 06/17/19  0500    140 137   K 3.6 3.7 4.4    105 104   CO2 29 29 29   * 185* 252*   BUN 15 14 10   CREA 0.60 0.68 0.59*   CA 8.2* 8.3* 7.2*   MG 2.2 2.2 2.2   PHOS 3.2 3.4 3.0   Prealbumin: 12.9 mg/dL (6/14/19)  Triglyceride: 298 mg/dL (6/18/19), 316 mg/dL (6/13/19), 511 mg/dL (4/16/19)  C reactive protein: 4.8 MG/Dl (6/18/19)  Recent Labs     06/19/19  0540 06/18/19  0440 06/17/19  0500   WBC 5.5 5.0 5.8   HGB 12.3 10.8* 11.9*   HCT 39.0 35.0 35.9    215 215         Intake/Output Summary (Last 24 hours) at 6/19/2019 1213  Last data filed at 6/19/2019 0823  Gross per 24 hour   Intake 600 ml   Output 1760 ml   Net -1160 ml       Anthropometrics:   Ht Readings from Last 1 Encounters:   06/03/19 5' 7\" (1.702 m)     Last 3 Recorded Weights in this Encounter    06/12/19 1740 06/16/19 0040 06/18/19 0543   Weight: 121.1 kg (267 lb) 120.7 kg (266 lb 1.6 oz) 121.1 kg (267 lb)     Body mass index is 41.82 kg/m². Obese, Class III    Weight History: patient denies change in weight PTA, stable     Weight Metrics 6/18/2019 6/3/2019 5/23/2019 5/22/2019 5/16/2019 4/26/2019 4/16/2019   Weight 267 lb - 260 lb 6.4 oz - 258 lb 256 lb 260 lb 9.6 oz   BMI - 41.82 kg/m2 - 40.78 kg/m2 40.41 kg/m2 40.1 kg/m2 -          Admitting Diagnosis: Stridor [R06.1]  Stridor [R06.1]  Pertinent PMHx: T2DM with diabetic neuropathy, HTN, dyslipidemia, CHF, hypothyroidism s/p thyroidectomy 4/4/19    Education Needs:        [x] None identified  [] Identified - Not appropriate at this time  []  Identified and addressed - refer to education log  Learning Limitations:   [] None identified  [x] Identified: nonverbal. Communicates via pen/paper    Cultural, Buddhist & ethnic food preferences:  [x] None identified    [] Identified and addressed     ESTIMATED NUTRITION NEEDS:     Calories: 9949-1986 kcal (MSJx1-1.3) based on   [x] Actual  kg     [] IBW:   Protein:  gm (0.8-1.2 gm/kg) based on   [x] Actual BW      [] IBW:   Fluid: 1 mL/kcal     MONITORING & EVALUATION:     Nutrition Goals:   1. Nutritional needs will be met through adequate oral intake or nutrition support within the next 5 days.   Outcome:  [] Met/Ongoing    [x] Progressing towards goal    [] Not progressing towards goal    [] New/Initial goal      Monitoring:  [x] Parenteral nutrition intake and administration  [x] Biochemical data, medical tests, and procedures   [x] Fluid intake   [x] Nutrition-focused physical findings   [x] Treatment/therapy   [] Food and beverage intake   [x] Diet order      [] Weight        Previous Recommendations (for follow-up assessments only):     [x]   Implemented       []   Not Implemented (RD to address)      [] No Longer Appropriate     [] No Recommendation Made        Discharge Planning: Nutrition recommendations pending patient's ability to tolerate po intake/enteral nutrition.    [x]  Participated in care planning, discharge planning, & interdisciplinary rounds as appropriate      Kenny England, [address] [place] [address]   Pager: [phone]

## 2019-06-20 ENCOUNTER — ANESTHESIA (OUTPATIENT)
Dept: SURGERY | Age: 59
DRG: 098 | End: 2019-06-20
Payer: MEDICAID

## 2019-06-20 LAB
ANION GAP SERPL CALC-SCNC: 7 MMOL/L (ref 3–18)
BASOPHILS # BLD: 0 K/UL (ref 0–0.1)
BASOPHILS NFR BLD: 0 % (ref 0–2)
BUN SERPL-MCNC: 16 MG/DL (ref 7–18)
BUN/CREAT SERPL: 27 (ref 12–20)
CALCIUM SERPL-MCNC: 8.4 MG/DL (ref 8.5–10.1)
CHLORIDE SERPL-SCNC: 105 MMOL/L (ref 100–108)
CO2 SERPL-SCNC: 27 MMOL/L (ref 21–32)
CREAT SERPL-MCNC: 0.59 MG/DL (ref 0.6–1.3)
DIFFERENTIAL METHOD BLD: ABNORMAL
EOSINOPHIL # BLD: 0.2 K/UL (ref 0–0.4)
EOSINOPHIL NFR BLD: 3 % (ref 0–5)
ERYTHROCYTE [DISTWIDTH] IN BLOOD BY AUTOMATED COUNT: 16 % (ref 11.6–14.5)
GLUCOSE BLD STRIP.AUTO-MCNC: 187 MG/DL (ref 70–110)
GLUCOSE BLD STRIP.AUTO-MCNC: 190 MG/DL (ref 70–110)
GLUCOSE BLD STRIP.AUTO-MCNC: 208 MG/DL (ref 70–110)
GLUCOSE BLD STRIP.AUTO-MCNC: 209 MG/DL (ref 70–110)
GLUCOSE SERPL-MCNC: 178 MG/DL (ref 74–99)
HCT VFR BLD AUTO: 36.7 % (ref 35–45)
HGB BLD-MCNC: 12.1 G/DL (ref 12–16)
LYMPHOCYTES # BLD: 1.9 K/UL (ref 0.9–3.6)
LYMPHOCYTES NFR BLD: 24 % (ref 21–52)
MAGNESIUM SERPL-MCNC: 2.1 MG/DL (ref 1.6–2.6)
MCH RBC QN AUTO: 30.3 PG (ref 24–34)
MCHC RBC AUTO-ENTMCNC: 33 G/DL (ref 31–37)
MCV RBC AUTO: 91.8 FL (ref 74–97)
MONOCYTES # BLD: 0.5 K/UL (ref 0.05–1.2)
MONOCYTES NFR BLD: 7 % (ref 3–10)
NEUTS SEG # BLD: 5.3 K/UL (ref 1.8–8)
NEUTS SEG NFR BLD: 66 % (ref 40–73)
PHOSPHATE SERPL-MCNC: 3.2 MG/DL (ref 2.5–4.9)
PLATELET # BLD AUTO: 249 K/UL (ref 135–420)
PMV BLD AUTO: 9.1 FL (ref 9.2–11.8)
POTASSIUM SERPL-SCNC: 3.6 MMOL/L (ref 3.5–5.5)
RBC # BLD AUTO: 4 M/UL (ref 4.2–5.3)
SODIUM SERPL-SCNC: 139 MMOL/L (ref 136–145)
WBC # BLD AUTO: 8 K/UL (ref 4.6–13.2)

## 2019-06-20 PROCEDURE — 77030013079 HC BLNKT BAIR HGGR 3M -A: Performed by: ANESTHESIOLOGY

## 2019-06-20 PROCEDURE — 74011250636 HC RX REV CODE- 250/636: Performed by: NURSE PRACTITIONER

## 2019-06-20 PROCEDURE — 65270000029 HC RM PRIVATE

## 2019-06-20 PROCEDURE — 74011000250 HC RX REV CODE- 250: Performed by: HOSPITALIST

## 2019-06-20 PROCEDURE — 77030020782 HC GWN BAIR PAWS FLX 3M -B

## 2019-06-20 PROCEDURE — 82962 GLUCOSE BLOOD TEST: CPT

## 2019-06-20 PROCEDURE — 74011250636 HC RX REV CODE- 250/636

## 2019-06-20 PROCEDURE — 74011000258 HC RX REV CODE- 258

## 2019-06-20 PROCEDURE — 77030005122 HC CATH GASTMY PEG BSC -B

## 2019-06-20 PROCEDURE — 76010000149 HC OR TIME 1 TO 1.5 HR

## 2019-06-20 PROCEDURE — 76060000033 HC ANESTHESIA 1 TO 1.5 HR

## 2019-06-20 PROCEDURE — 77030032490 HC SLV COMPR SCD KNE COVD -B

## 2019-06-20 PROCEDURE — 74011000250 HC RX REV CODE- 250: Performed by: NURSE PRACTITIONER

## 2019-06-20 PROCEDURE — 83735 ASSAY OF MAGNESIUM: CPT

## 2019-06-20 PROCEDURE — 74011250636 HC RX REV CODE- 250/636: Performed by: HOSPITALIST

## 2019-06-20 PROCEDURE — 77010033678 HC OXYGEN DAILY

## 2019-06-20 PROCEDURE — 76210000006 HC OR PH I REC 0.5 TO 1 HR

## 2019-06-20 PROCEDURE — 92526 ORAL FUNCTION THERAPY: CPT

## 2019-06-20 PROCEDURE — 84100 ASSAY OF PHOSPHORUS: CPT

## 2019-06-20 PROCEDURE — 0DH63UZ INSERTION OF FEEDING DEVICE INTO STOMACH, PERCUTANEOUS APPROACH: ICD-10-PCS

## 2019-06-20 PROCEDURE — 74011000250 HC RX REV CODE- 250

## 2019-06-20 PROCEDURE — 74011636637 HC RX REV CODE- 636/637

## 2019-06-20 PROCEDURE — 74011636637 HC RX REV CODE- 636/637: Performed by: HOSPITALIST

## 2019-06-20 PROCEDURE — 85025 COMPLETE CBC W/AUTO DIFF WBC: CPT

## 2019-06-20 PROCEDURE — 80048 BASIC METABOLIC PNL TOTAL CA: CPT

## 2019-06-20 PROCEDURE — 77030020262 HC SOL INJ SOD CL 0.9% 100ML

## 2019-06-20 PROCEDURE — 3E0G76Z INTRODUCTION OF NUTRITIONAL SUBSTANCE INTO UPPER GI, VIA NATURAL OR ARTIFICIAL OPENING: ICD-10-PCS

## 2019-06-20 RX ORDER — PROPOFOL 10 MG/ML
INJECTION, EMULSION INTRAVENOUS AS NEEDED
Status: DISCONTINUED | OUTPATIENT
Start: 2019-06-20 | End: 2019-06-20 | Stop reason: HOSPADM

## 2019-06-20 RX ORDER — CEFAZOLIN SODIUM 2 G/50ML
2 SOLUTION INTRAVENOUS ONCE
Status: COMPLETED | OUTPATIENT
Start: 2019-06-20 | End: 2019-06-20

## 2019-06-20 RX ORDER — SODIUM CHLORIDE, SODIUM LACTATE, POTASSIUM CHLORIDE, CALCIUM CHLORIDE 600; 310; 30; 20 MG/100ML; MG/100ML; MG/100ML; MG/100ML
75 INJECTION, SOLUTION INTRAVENOUS CONTINUOUS
Status: DISCONTINUED | OUTPATIENT
Start: 2019-06-20 | End: 2019-06-20 | Stop reason: HOSPADM

## 2019-06-20 RX ORDER — SODIUM CHLORIDE 0.9 % (FLUSH) 0.9 %
5-40 SYRINGE (ML) INJECTION AS NEEDED
Status: DISCONTINUED | OUTPATIENT
Start: 2019-06-20 | End: 2019-06-20 | Stop reason: HOSPADM

## 2019-06-20 RX ORDER — ONDANSETRON 2 MG/ML
4 INJECTION INTRAMUSCULAR; INTRAVENOUS ONCE
Status: DISCONTINUED | OUTPATIENT
Start: 2019-06-20 | End: 2019-06-20 | Stop reason: HOSPADM

## 2019-06-20 RX ORDER — FENTANYL CITRATE 50 UG/ML
INJECTION, SOLUTION INTRAMUSCULAR; INTRAVENOUS AS NEEDED
Status: DISCONTINUED | OUTPATIENT
Start: 2019-06-20 | End: 2019-06-20 | Stop reason: HOSPADM

## 2019-06-20 RX ORDER — SODIUM CHLORIDE, SODIUM LACTATE, POTASSIUM CHLORIDE, CALCIUM CHLORIDE 600; 310; 30; 20 MG/100ML; MG/100ML; MG/100ML; MG/100ML
INJECTION, SOLUTION INTRAVENOUS
Status: DISCONTINUED | OUTPATIENT
Start: 2019-06-20 | End: 2019-06-20 | Stop reason: HOSPADM

## 2019-06-20 RX ORDER — MIDAZOLAM HYDROCHLORIDE 1 MG/ML
INJECTION, SOLUTION INTRAMUSCULAR; INTRAVENOUS AS NEEDED
Status: DISCONTINUED | OUTPATIENT
Start: 2019-06-20 | End: 2019-06-20 | Stop reason: HOSPADM

## 2019-06-20 RX ORDER — INSULIN LISPRO 100 [IU]/ML
INJECTION, SOLUTION INTRAVENOUS; SUBCUTANEOUS ONCE
Status: DISCONTINUED | OUTPATIENT
Start: 2019-06-20 | End: 2019-06-20 | Stop reason: HOSPADM

## 2019-06-20 RX ORDER — SODIUM CHLORIDE 0.9 % (FLUSH) 0.9 %
5-40 SYRINGE (ML) INJECTION EVERY 8 HOURS
Status: DISCONTINUED | OUTPATIENT
Start: 2019-06-20 | End: 2019-06-20 | Stop reason: HOSPADM

## 2019-06-20 RX ORDER — EPHEDRINE SULFATE/0.9% NACL/PF 50 MG/5 ML
SYRINGE (ML) INTRAVENOUS AS NEEDED
Status: DISCONTINUED | OUTPATIENT
Start: 2019-06-20 | End: 2019-06-20 | Stop reason: HOSPADM

## 2019-06-20 RX ORDER — FENTANYL CITRATE 50 UG/ML
25 INJECTION, SOLUTION INTRAMUSCULAR; INTRAVENOUS
Status: DISCONTINUED | OUTPATIENT
Start: 2019-06-20 | End: 2019-06-20 | Stop reason: HOSPADM

## 2019-06-20 RX ORDER — ONDANSETRON 2 MG/ML
INJECTION INTRAMUSCULAR; INTRAVENOUS AS NEEDED
Status: DISCONTINUED | OUTPATIENT
Start: 2019-06-20 | End: 2019-06-20 | Stop reason: HOSPADM

## 2019-06-20 RX ADMIN — PROPOFOL 100 MG: 10 INJECTION, EMULSION INTRAVENOUS at 14:42

## 2019-06-20 RX ADMIN — Medication 5 MG: at 14:57

## 2019-06-20 RX ADMIN — SODIUM CHLORIDE, SODIUM LACTATE, POTASSIUM CHLORIDE, CALCIUM CHLORIDE: 600; 310; 30; 20 INJECTION, SOLUTION INTRAVENOUS at 14:21

## 2019-06-20 RX ADMIN — INSULIN GLARGINE 22 UNITS: 100 INJECTION, SOLUTION SUBCUTANEOUS at 08:37

## 2019-06-20 RX ADMIN — Medication 10 ML: at 05:56

## 2019-06-20 RX ADMIN — ENOXAPARIN SODIUM 40 MG: 40 INJECTION SUBCUTANEOUS at 17:01

## 2019-06-20 RX ADMIN — INSULIN LISPRO 3 UNITS: 100 INJECTION, SOLUTION INTRAVENOUS; SUBCUTANEOUS at 05:53

## 2019-06-20 RX ADMIN — ONDANSETRON 4 MG: 2 INJECTION INTRAMUSCULAR; INTRAVENOUS at 16:43

## 2019-06-20 RX ADMIN — HYDROMORPHONE HYDROCHLORIDE 1 MG: 2 INJECTION INTRAMUSCULAR; INTRAVENOUS; SUBCUTANEOUS at 04:04

## 2019-06-20 RX ADMIN — FAMOTIDINE 20 MG: 10 INJECTION INTRAVENOUS at 22:26

## 2019-06-20 RX ADMIN — ONDANSETRON 4 MG: 2 INJECTION INTRAMUSCULAR; INTRAVENOUS at 11:13

## 2019-06-20 RX ADMIN — FAMOTIDINE 20 MG: 10 INJECTION INTRAVENOUS at 08:37

## 2019-06-20 RX ADMIN — PROMETHAZINE HYDROCHLORIDE 12.5 MG: 25 INJECTION, SOLUTION INTRAMUSCULAR; INTRAVENOUS at 16:47

## 2019-06-20 RX ADMIN — LEVOTHYROXINE SODIUM 75 MCG: 100 INJECTION, POWDER, LYOPHILIZED, FOR SOLUTION INTRAVENOUS at 08:37

## 2019-06-20 RX ADMIN — FENTANYL CITRATE 50 MCG: 50 INJECTION, SOLUTION INTRAMUSCULAR; INTRAVENOUS at 14:42

## 2019-06-20 RX ADMIN — FENTANYL CITRATE 50 MCG: 50 INJECTION, SOLUTION INTRAMUSCULAR; INTRAVENOUS at 14:50

## 2019-06-20 RX ADMIN — CEFAZOLIN 2 G: 10 INJECTION, POWDER, FOR SOLUTION INTRAVENOUS at 14:38

## 2019-06-20 RX ADMIN — PROPOFOL 50 MG: 10 INJECTION, EMULSION INTRAVENOUS at 14:51

## 2019-06-20 RX ADMIN — Medication 10 ML: at 22:27

## 2019-06-20 RX ADMIN — HYDROMORPHONE HYDROCHLORIDE 1 MG: 2 INJECTION INTRAMUSCULAR; INTRAVENOUS; SUBCUTANEOUS at 23:07

## 2019-06-20 RX ADMIN — INSULIN LISPRO 3 UNITS: 100 INJECTION, SOLUTION INTRAVENOUS; SUBCUTANEOUS at 18:40

## 2019-06-20 RX ADMIN — INSULIN LISPRO 6 UNITS: 100 INJECTION, SOLUTION INTRAVENOUS; SUBCUTANEOUS at 13:47

## 2019-06-20 RX ADMIN — LORAZEPAM 1 MG: 2 INJECTION INTRAMUSCULAR at 09:05

## 2019-06-20 RX ADMIN — HYDROMORPHONE HYDROCHLORIDE 1 MG: 2 INJECTION INTRAMUSCULAR; INTRAVENOUS; SUBCUTANEOUS at 18:41

## 2019-06-20 RX ADMIN — SODIUM CHLORIDE, SODIUM LACTATE, POTASSIUM CHLORIDE, AND CALCIUM CHLORIDE 75 ML/HR: 600; 310; 30; 20 INJECTION, SOLUTION INTRAVENOUS at 16:38

## 2019-06-20 RX ADMIN — Medication 10 ML: at 16:37

## 2019-06-20 RX ADMIN — ONDANSETRON 4 MG: 2 INJECTION INTRAMUSCULAR; INTRAVENOUS at 14:32

## 2019-06-20 RX ADMIN — MIDAZOLAM HYDROCHLORIDE 2 MG: 1 INJECTION, SOLUTION INTRAMUSCULAR; INTRAVENOUS at 14:19

## 2019-06-20 NOTE — DIABETES MGMT
Glycemic Control Plan of Care    T2DM with current A1c level of 7.1% (6/09/2019). See separate notes, 6/10/2019, for assessment of home diabetes management and education. Patient is s/p trach on 6/03/2019. She was able to communicate by writing. Home diabetes medication: Lantus insulin (vial) 90 units daily at bedtime. Current hospital diabetes medications: Lantus insulin 22 units and very resistant dose of correctional lispro insulin. Patient cont on TPN with 12 units of regular insulin in current bag. POC BG range on 6/19/2019: 172-200 mg/dL. POC BG report on 6/20/2019 at time of review: 190 mg/dL. Seen by SLP today. Plan for PEG tube feeding placement later this afternoon. Recommendation(s):  1.) Cont on current SC insulin orders/dose: basal lantus, very resistant dose of correctional lispro insulin and regular insulin in TPN. Assessment:  Patient is 63year old with past medical history including type 2 diabetes mellitus with neuropathy, sleep apnea, s/p aortic valve replacement, morbid obesity, hypothyroidism, hypertension, chronic CHF and s/p thyroidectomy - was admitted on 6/03/2019 with report of stridor. Noted:  Vocal cord dysfunction. Status post tracheostomy on 6/03/2019. Hypothyroidism. Recent thyroidectomy on 4/11/2019. Morbid obesity. T2DM with current A1c of 8.4% (4/04/2019). Most recent blood glucose values:    Results for Ritu Carvalho (MRN [id number]) as of 6/20/2019 10:19   Ref. Range 6/19/2019 00:35 6/19/2019 05:17 6/19/2019 11:27 6/19/2019 16:36 6/19/2019 23:45   GLUCOSE,FAST - POC Latest Ref Range: 70 - 110 mg/dL 172 (H) 191 (H) 198 (H) 195 (H) 200 (H)     Results for Ritu Carvalho (MRN [id number]) as of 6/20/2019 10:19   Ref.  Range 6/20/2019 05:52 6/20/2019 08:20   GLUCOSE,FAST - POC Latest Ref Range: 70 - 110 mg/dL 190 (H) 208 (H)     Current A1C: 7.1% (6/09/2019) which is equivalent to estimated average blood glucose of 153 mg/dL during the past 2-3 months. Current hospital diabetes medications:  Basal lantus insulin 22 units daily. Correctional lispro insulin ACHS. Very resistant dose. Current bag of TPN with 12 units regular insulin    Total daily dose insulin requirement previous day: 6/19/2019:  Lantus: 22 units  Lispro: 18 units  12 units Regular insulin in TPN bag. Home diabetes medications: Obtained from patient on 6/06/2019:  Lantus insulin (trach, patient unable to speak but nodded head when I asked if she's on vial insulin). She nodded again when I asked if she's on 90 units of lantus daily at bedtime and she has meter monitoring her blood sugar. Diet: NPO    Goals:  Blood glucose will be within target range of  mg/dL by 6/23/2019.     Education:    _X__  Refer to Diabetes Education Record: 6/10/2019  ___  Education not indicated at this time    Gurdeep Berg RN  Pager: [phone]

## 2019-06-20 NOTE — PROGRESS NOTES
Problem: Dysphagia (Adult)  Goal: *Acute Goals and Plan of Care (Insert Text)  Description  Patient will:  1. Tolerate PO trials with 0 s/s overt distress in 4/5 trials  2. Utilize compensatory swallow strategies/maneuvers (decrease bite/sip, size/rate, alt. liq/sol) with min cues in 4/5 trials  3. Perform oral-motor/laryngeal exercises to increase oropharyngeal swallow function with min cues  4. Complete an objective swallow study (i.e., MBSS) to assess swallow integrity, r/o aspiration, and determine of safest LRD, min A as indicated/ordered by MD     Recommend:   NPO; consider long term alternative means of nutrition (PEG)  Strict aspiration precautions (HOB >30 degrees at all times, Oral care TID)      Outcome: Progressing Towards Goal    SPEECH LANGUAGE PATHOLOGY DYSPHAGIA TREATMENT    Patient: Kaelyn Callejas (84 y.o. female)  Date: 6/20/2019  Diagnosis: Stridor [R06.1]  Stridor [R06.1] <principal problem not specified>  Procedure(s) (LRB):  TRACHEOSTOMY (N/A) 17 Days Post-Op  Precautions: aspiration Fall(trach collar)  PLOF: As per H&P      ASSESSMENT:  Pt was seen at bedside for follow up dysphagia management. Reviewed results of MBS including: \"mild oral and mod-severe pharyngeal dysphagia c/b laryngeal penetration after the swallow across all consistencies presented (nectar thick liquid, honey thick liquid, pudding and regular solids). Pt demo delayed a-p transfer, decreased base of tongue strength with premature spillage, and decreased laryngeal strength/closure/sensation. Mild vallecular residuals were eventually penetrated related to weakness and overall poor endurance with weak/wet cough and facial redness post penetration events. Compensatory strategies were ineffective at improving airway protection across multiple trials. Pt at high risk of aspiration with oral intake. \" Further reviewed previous MBS results, risk of aspiration with PO, role of SLP, and PEG vs TPN feeds.  Pt refused use of PMV this AM as she is nervous for PEG placement this PM. Reviewed importance of participation with therapy. Pt is planned for PEG this PM. Will continue to follow. Progression toward goals:  ?         Improving appropriately and progressing toward goals  ? Improving slowly and progressing toward goals  ? Not making progress toward goals and plan of care will be adjusted     PLAN:  Recommendations and Planned Interventions: See above  Patient continues to benefit from skilled intervention to address the above impairments. Continue treatment per established plan of care. Discharge Recommendations:  Darrius Godinez and To Be Determined     SUBJECTIVE:   Patient stated ? Am I going to die? ?.    OBJECTIVE:   Cognitive and Communication Status:  Neurologic State: Alert  Orientation Level: Oriented X4  Cognition: Follows commands, Recognition of people/places  Perception: Appears intact  Perseveration: No perseveration noted  Safety/Judgement: Awareness of environment, Fall prevention    Dysphagia Treatment: see above    PAIN:  Start of Tx: 0  End of Tx: 0     After treatment:   ?              Patient left in no apparent distress sitting up in chair  ? Patient left in no apparent distress in bed  ? Call bell left within reach  ? Nursing notified  ? Family present  ? Caregiver present  ? Bed alarm activated      COMMUNICATION/EDUCATION:   ? Aspiration precautions; swallow safety; compensatory techniques  ?         Patient/family able to participate in training and education     Thank you for this referral.    Javier Diaz M.S. CCC-SLP/L  Speech-Language Pathologist

## 2019-06-20 NOTE — ROUTINE PROCESS
TRANSFER - IN REPORT:    Verbal report received from Friday jazmyn RN(name) on Onetha Session  being received from "[employer] for routine post - op      Report consisted of patients Situation, Background, Assessment and   Recommendations(SBAR). Information from the following report(s) SBAR, OR Summary and MAR was reviewed with the receiving nurse. Opportunity for questions and clarification was provided. Assessment completed upon patients arrival to unit and care assumed.

## 2019-06-20 NOTE — ROUTINE PROCESS
Bedside and Verbal shift change report given to Haylie Colunga RN (oncoming nurse) by Olive Camilo RN (offgoing nurse). Report included the following information SBAR, Kardex and MAR.

## 2019-06-20 NOTE — ANESTHESIA POSTPROCEDURE EVALUATION
Procedure(s):  PERCUTANEOUS ENDOSCOPIC GASTROSTOMY TUBE INSERTION. general    Anesthesia Post Evaluation      Multimodal analgesia: multimodal analgesia used between 6 hours prior to anesthesia start to PACU discharge  Patient location during evaluation: bedside  Patient participation: complete - patient participated  Level of consciousness: awake  Pain score: 0  Pain management: adequate  Airway patency: patent (Tracheostomy)  Anesthetic complications: no  Cardiovascular status: stable  Respiratory status: acceptable  Hydration status: acceptable  Post anesthesia nausea and vomiting:  controlled      Vitals Value Taken Time   /72 6/20/2019  4:15 PM   Temp 37 °C (98.6 °F) 6/20/2019  3:30 PM   Pulse 32 6/20/2019  4:19 PM   Resp 18 6/20/2019  4:19 PM   SpO2 96 % 6/20/2019  4:19 PM   Vitals shown include unvalidated device data.

## 2019-06-20 NOTE — OP NOTES
[address] [place] [address]   OPERATIVE REPORT    Name:  James Winkler  MR#:   [id number]  :  1960  ACCOUNT #:  [de-identified]  DATE OF SERVICE:  2019    PREOPERATIVE DIAGNOSIS:  Vocal cord paralysis status post tracheostomy. POSTOPERATIVE DIAGNOSIS:  Vocal cord paralysis status post tracheostomy. PROCEDURE PERFORMED:  Percutaneous endoscopic gastrostomy. SURGEON:  David Plunkett MD    ASSISTANT:  .    ANESTHESIA:  General.    COMPLICATIONS:  None. SPECIMENS REMOVED:  None. IMPLANTS:  Ponsky pull-through PEG tube. ESTIMATED BLOOD LOSS:  Minimal.    FINDINGS:  The patient is a 55-year-old morbidly obese woman with status post thyroidectomy complicated by vocal cord paralysis and stridor. She ultimately underwent a tracheostomy and has failed multiple feeding swallows. She is here for PEG tube placement. PROCEDURE:  After appropriate antibiotics and sequential compression stockings, the patient was taken to the operating room and placed in a supine position on the OR table. After adequate general anesthesia, a Bite Block was placed and her abdomen was prepped and draped to [facility] standard. A standard gastroscope was advanced down her oropharynx and into her esophagus. Esophageal mucosa appeared normal and there were no hernias. It was advanced down through the gastroesophageal junction, which appeared normal.  The gastric mucosa also appeared normal and there were no mucosal lesions. The scope was advanced into her pylorus and duodenum. There were no ulcerations or mucosal lesions seen there. The duodenum was aspirated and then the scope was removed into the stomach. Digital pressure on her abdomen revealed an area for the PEG and a small incision with an 11-blade was placed there and a needle placed through the abdominal wall after of the stomach was inflated with air.   Using a snare and a blue wire, blue wire was placed through the needle and a snare was used to grasp the wire and pulled it retrograde out of the mouth. A Ponsky pull-through PEG tube was attached to the blue wire and was pulled back through pulling the PEG into good position. The PEG was fixed in a standard fashion and the gastroscope was re-advanced into the stomach. The PEG appeared in good position without bleeding. The stomach was aspirated of air and the gastroscope removed. She tolerated this well.       Jacque Gil MD    [id number]  D:  06/20/2019 16:21  T:  06/20/2019 16:31  JOB #:  [id number]

## 2019-06-20 NOTE — ANESTHESIA PREPROCEDURE EVALUATION
Relevant Problems   No relevant active problems       Anesthetic History   No history of anesthetic complications            Review of Systems / Medical History  Patient summary reviewed and pertinent labs reviewed    Pulmonary        Sleep apnea    Asthma : well controlled    Comments: Tracheostomy  (Due to vocal cord paralysis) in place. Oxygen   via Trach collar.      Neuro/Psych         Psychiatric history (Depression)     Cardiovascular    Hypertension: well controlled              Exercise tolerance: <4 METS     GI/Hepatic/Renal               Comments: Dysphagia Endo/Other    Diabetes: well controlled, type 2  Hypothyroidism  Morbid obesity and arthritis     Other Findings              Physical Exam    Airway  Mallampati: III  TM Distance: 4 - 6 cm  Neck ROM: normal range of motion     Tracheostomy present   Cardiovascular  Regular rate and rhythm,  S1 and S2 normal,  no murmur, click, rub, or gallop             Dental  No notable dental hx       Pulmonary  Breath sounds clear to auscultation               Abdominal  GI exam deferred       Other Findings            Anesthetic Plan    ASA: 3            Induction: Intravenous  Anesthetic plan and risks discussed with: Patient

## 2019-06-20 NOTE — PROGRESS NOTES
Refusing PT this AM secondary PEG placement PM. Will cont to follow. PM: Pt now off floor for PEG placement.

## 2019-06-20 NOTE — BRIEF OP NOTE
BRIEF OPERATIVE NOTE    Date of Procedure: 6/20/2019   Preoperative Diagnosis: R13.11 DYSPHAGIA/ORAL PHASE  Postoperative Diagnosis: R13.11 DYSPHAGIA/ORAL PHASE    Procedure(s):  PERCUTANEOUS ENDOSCOPIC GASTROSTOMY TUBE INSERTION  Surgeon(s) and Role:     Hermila Harris MD - Primary         Surgical Assistant:     Surgical Staff:  Dodie Rout: Juan Leslie  Scrub Tech-1: Estrellita Bones  Surg Asst-1: [facility]  Event Time In Time Out   Incision Start 06/20/2019 1445    Incision Close 06/20/2019 1504      Anesthesia: General   Estimated Blood Loss: min  Specimens: * No specimens in log *   Findings: normal Esophagus, stomach and duodenum   Complications: none  Implants: * No implants in log *

## 2019-06-20 NOTE — ROUTINE PROCESS
Bedside and Verbal shift change report given to Ana María Pichardo RN (oncoming nurse) by Milind Camilo RN (offgoing nurse). Report included the following information SBAR, Kardex and MAR.

## 2019-06-20 NOTE — PROGRESS NOTES
NUTRITION CONSULT    Nutrition Consult: Management of Tube Feeding, TPN: RD to Manage     RECOMMENDATIONS / PLAN:     - Discontinue TPN once current bag ends at 8 pm tonight.  - Once able to use PEG per MD, start tube feeding of Jevity 1.5 at 10 mL/hr and advance as tolerated by 10 mL q 4 hours to goal rate of 50 mL/hr with 100 mL q 4 hour water flushes and add Prosource BID.   - Continue RD inpatient monitoring and evaluation. Goal Regimen: Jevity 1.5 at 50 mL/hr + 100 mL q 4 hour water flushes + Prosource BID to provide: 1920 kcal, 107 gm protein, 60 gm fat, 259 gm CHO, 26 gm fiber, 912 mL free water, 1512 mL total water, 100% RDIs     NUTRITION DIAGNOSIS & INTERVENTIONS:     [x] Parenteral nutrition: discontinue  [x] Enteral nutrition: initiate once able to use PEG  [x] Collaboration and referral of nutrition care: discussed plan to discontinue TPN and start tube feeding once PEG placed with Dr Cornelius Gee    Nutrition Diagnosis: Swallowing difficulty related to dysphagia s/p tracheostomy as evidenced by pt NPO after SLP evaluation/MBS. ASSESSMENT:     6/20: S/p PEG placement. Will stop TPN and start tube feeds today. Disposition- plan for discharge to SNF versus rehab facility. 6/19: NPO s/p repeat MBS this morning and GI consulted for PEG placement, tentatively planned for tomorrow. Discharge planning underway with plan for transfer to ARU.   6/18: Discussed disposition with - likely to discharge to ARU which will not accept if pt remains on TPN- TPN likely to stop in the next several days per MD and diet restarted by Surgery; SLP recommended NPO after swallow evaluation this morning, working with pt on PMV placement. 6/17: BG remains elevated and pt NPO.   6/16: BG levels trending down, but remain high. Made NPO yesterday per MD  6/15: Tolerating PN. Did not eat breakfast or lunch today because afraid to eat. Also c/o gassiness. BG levels remain elevated  6/14: Tolerating PN.  Did not eat breakfast, but consumed most of lunch, which consisted of cream soup, mashed potatoes and pureed peas. Pt requesting to thicken liquids herself, despite order for thin liquids. BG levels elevated. 6/13: PICC placed to initiate TPN per MD request.   6/11: Pt with variable meal intake. Reports some difficulty tolerating certain foods, but ate 50% of breakfast this AM.  Encourage pt to ask for soft foods as desired. Diet changed to diabetic, BG in better control. 6/10: Was tolerating tube feeds until pt pulled NGT 6/8, receiving IVF since then. Diet started today by MD.  Pt reports being hungry. Pt with intolerance to artificial sweeteners, monitor BG levels and assess need for diabetic diet. 6/7: Formula changed to Jevity 1.5 prior to initiation 2/2 concern for sucrose sensitivity. Will continue current product due to questionable sensitivity of ingredients in Glucerna tube feeding. Tolerating rate at goal.  Elevated BG levels, but expected 2/2 formula needing to be given, discussed with MD and glycemin control, will attempt to manage BG with medication due to formula limitations. IVF discontinued. 6/6: Failed swallow evaluation/MBS; repeat MBS ordered for today. C/o gas pains, but is passing flatus, and +BM. Some abdominal distension noted. Asking for water. DHT placed by IR, no tube feeding ordered at this time. 6/5: Admitted with stridor and vocal cord dysfunction s/p tracheostomy placement on 6/3/19, tolerating trach collar and able to eat if passes swallow evaluation per MD- may deflate cuff for meals but must re-inflate cuff when pt is not eating per Surgery. Pt refused NGT placement.     Average po intake adequate to meet patients estimated nutritional needs:   [] Yes     [x] No   [] Unable to determine at this time    PN infusion adequate to meet patients estimated nutritional needs:   [] Yes     [x] No   [] Unable to determine at this time    EN infusion adequate to meet patients estimated nutritional needs:   [] Yes     [x] No   [] Unable to determine at this time    Diet: TPN ADULT - CENTRAL  DIET NPO      Food Allergies: sweeteners, sucrose  Current Appetite:   [] Good     [] Fair     [] Poor     [x] Other: NPO  Appetite/meal intake prior to admission:   [x] Good     [] Fair     [] Poor     [] Other:  Feeding Limitations:  [x] Swallowing difficulty: SLP following    [] Chewing difficulty    [x] Other: hx of dysphagia s/p thyroidectomy PTA and trach placement 6/3/19  Current Meal Intake:   Patient Vitals for the past 100 hrs:   % Diet Eaten   06/20/19 0821 0 %   06/19/19 0926 0 %   06/18/19 1222 100 %   06/17/19 1856 0 %   06/17/19 1235 0 %       BM: 6/13  Skin Integrity: surgical incision to neck  Edema:  [x] No     [] Yes   Pertinent Medications: Reviewed: zofran, phenergan, pepcid, SSI q 6 hours very resistant scale, 22 units lantus      Labs:   Recent Labs     06/20/19  0558 06/19/19  0540 06/18/19  0630    139 140   K 3.6 3.6 3.7    106 105   CO2 27 29 29   * 164* 185*   BUN 16 15 14   CREA 0.59* 0.60 0.68   CA 8.4* 8.2* 8.3*   MG 2.1 2.2 2.2   PHOS 3.2 3.2 3.4   Prealbumin: 12.9 mg/dL (6/14/19)  Triglyceride: 298 mg/dL (6/18/19), 316 mg/dL (6/13/19), 511 mg/dL (4/16/19)  C reactive protein: 4.8 mg/dL (6/18/19)  Recent Labs     06/20/19  0558 06/19/19  0540 06/18/19  0440   WBC 8.0 5.5 5.0   HGB 12.1 12.3 10.8*   HCT 36.7 39.0 35.0    237 215         Intake/Output Summary (Last 24 hours) at 6/20/2019 1556  Last data filed at 6/20/2019 1528  Gross per 24 hour   Intake 19530 ml   Output 1550 ml   Net 63147 ml       Anthropometrics:   Ht Readings from Last 1 Encounters:   06/03/19 5' 7\" (1.702 m)     Last 3 Recorded Weights in this Encounter    06/16/19 0040 06/18/19 0543 06/19/19 1926   Weight: 120.7 kg (266 lb 1.6 oz) 121.1 kg (267 lb) 118.8 kg (261 lb 12.8 oz)     Body mass index is 41 kg/m².    Obese, Class III    Weight History: patient denies change in weight PTA, stable     Weight Metrics 6/19/2019 6/3/2019 5/23/2019 5/22/2019 5/16/2019 4/26/2019 4/16/2019   Weight 261 lb 12.8 oz - 260 lb 6.4 oz - 258 lb 256 lb 260 lb 9.6 oz   BMI - 41 kg/m2 - 40.78 kg/m2 40.41 kg/m2 40.1 kg/m2 -          Admitting Diagnosis: Stridor [R06.1]  Stridor [R06.1]  Pertinent PMHx: T2DM with diabetic neuropathy, HTN, dyslipidemia, CHF, hypothyroidism s/p thyroidectomy 4/4/19    Education Needs:        [x] None identified  [] Identified - Not appropriate at this time  []  Identified and addressed - refer to education log  Learning Limitations:   [] None identified  [x] Identified: nonverbal. Communicates via pen/paper    Cultural, Taoist & ethnic food preferences:  [x] None identified    [] Identified and addressed     ESTIMATED NUTRITION NEEDS:     Calories: 5834-2156 kcal (MSJx1-1.3) based on   [x] Actual  kg     [] IBW:   Protein:  gm (0.8-1.2 gm/kg) based on   [x] Actual BW      [] IBW:   Fluid: 1 mL/kcal     MONITORING & EVALUATION:     Nutrition Goals:   1. Nutritional needs will be met through adequate oral intake or nutrition support within the next 5 days.   Outcome:  [] Met/Ongoing    [x] Progressing towards goal    [] Not progressing towards goal    [] New/Initial goal      Monitoring:    [x] Fluid intake   [x] Nutrition-focused physical findings   [x] Treatment/therapy   [] Food and beverage intake   [x] Diet order      [] Weight    [x] EN infusion    Previous Recommendations (for follow-up assessments only):     [x]   Implemented       []   Not Implemented (RD to address)      [] No Longer Appropriate     [] No Recommendation Made        Discharge Planning: goal enteral nutrition regimen via PEG    [x]  Participated in care planning, discharge planning, & interdisciplinary rounds as appropriate      Genaro Burdick, [address], [address] [place] [address]   Pager: [phone]

## 2019-06-20 NOTE — PROGRESS NOTES
[facility] Hospitalist Group  Progress Note    Patient: Toy Osler Age: 63 y.o. : 1960 MR#: [id number] SSN: [id number]  Date/Time: 2019    Subjective:     Patient sitting in chair in NAD - plan for PEG tube placement today per Gen surg     Assessment/Plan:     -DM2, uncontrolled with hyperglycemia  - s/p Tracheostomy for vocal cord paralysis  - HTN  - Dysphagia  - Morbid Obesity   - Hypothyroidism     PLAN  Per surg - PEG tube placement today   NPO as per speech & she is aspirating per speech note   TPN as per surgeon, trach care - Failed repeat swallow eval -- PEG tube today - can D/c TPN when PEG placed , will defer to surg   Lantus, Insulin in TPN   RUQ US done - report - Hepatic steatosis & No biliary Abnormality   Monitor BP  IV synthyroid  D/w patient    Anticipate discharge to SNF/ Rehab when stable , will follow     Case discussed with:  [x]Patient  []Family  [x]Nursing  []Case Management  DVT Prophylaxis:  [x]Lovenox  []Hep SQ  []SCDs  []Coumadin   []On Heparin gtt    Objective:   VS:   Visit Vitals  /73   Pulse 66   Temp 98.5 °F (36.9 °C)   Resp 12   Ht 5' 7\" (1.702 m)   Wt 118.8 kg (261 lb 12.8 oz)   SpO2 97%   Breastfeeding?  No   BMI 41.00 kg/m²      Tmax/24hrs: Temp (24hrs), Av.4 °F (36.9 °C), Min:97.1 °F (36.2 °C), Max:99 °F (37.2 °C)    Input/Output:     Intake/Output Summary (Last 24 hours) at 2019 1325  Last data filed at 2019 5608  Gross per 24 hour   Intake 17424 ml   Output 1550 ml   Net 83437 ml       General:  Awake, alert  Cardiovascular:  S1S2+, RRR  Pulmonary:  CTA b/l  GI:  Soft, BS+, Obese, Mild tenderness to palpation in RUQ  Extremities:  Trace  + trach edema      Labs:    Recent Results (from the past 24 hour(s))   GLUCOSE, POC    Collection Time: 19  4:36 PM   Result Value Ref Range    Glucose (POC) 195 (H) 70 - 110 mg/dL   GLUCOSE, POC    Collection Time: 19 11:45 PM   Result Value Ref Range    Glucose (POC) 200 (H) 70 - 110 mg/dL   GLUCOSE, POC    Collection Time: 06/20/19  5:52 AM   Result Value Ref Range    Glucose (POC) 190 (H) 70 - 110 mg/dL   CBC WITH AUTOMATED DIFF    Collection Time: 06/20/19  5:58 AM   Result Value Ref Range    WBC 8.0 4.6 - 13.2 K/uL    RBC 4.00 (L) 4.20 - 5.30 M/uL    HGB 12.1 12.0 - 16.0 g/dL    HCT 36.7 35.0 - 45.0 %    MCV 91.8 74.0 - 97.0 FL    MCH 30.3 24.0 - 34.0 PG    MCHC 33.0 31.0 - 37.0 g/dL    RDW 16.0 (H) 11.6 - 14.5 %    PLATELET 402 922 - 234 K/uL    MPV 9.1 (L) 9.2 - 11.8 FL    NEUTROPHILS 66 40 - 73 %    LYMPHOCYTES 24 21 - 52 %    MONOCYTES 7 3 - 10 %    EOSINOPHILS 3 0 - 5 %    BASOPHILS 0 0 - 2 %    ABS. NEUTROPHILS 5.3 1.8 - 8.0 K/UL    ABS. LYMPHOCYTES 1.9 0.9 - 3.6 K/UL    ABS. MONOCYTES 0.5 0.05 - 1.2 K/UL    ABS. EOSINOPHILS 0.2 0.0 - 0.4 K/UL    ABS.  BASOPHILS 0.0 0.0 - 0.1 K/UL    DF AUTOMATED     METABOLIC PANEL, BASIC    Collection Time: 06/20/19  5:58 AM   Result Value Ref Range    Sodium 139 136 - 145 mmol/L    Potassium 3.6 3.5 - 5.5 mmol/L    Chloride 105 100 - 108 mmol/L    CO2 27 21 - 32 mmol/L    Anion gap 7 3.0 - 18 mmol/L    Glucose 178 (H) 74 - 99 mg/dL    BUN 16 7.0 - 18 MG/DL    Creatinine 0.59 (L) 0.6 - 1.3 MG/DL    BUN/Creatinine ratio 27 (H) 12 - 20      GFR est AA >60 >60 ml/min/1.73m2    GFR est non-AA >60 >60 ml/min/1.73m2    Calcium 8.4 (L) 8.5 - 10.1 MG/DL   MAGNESIUM    Collection Time: 06/20/19  5:58 AM   Result Value Ref Range    Magnesium 2.1 1.6 - 2.6 mg/dL   PHOSPHORUS    Collection Time: 06/20/19  5:58 AM   Result Value Ref Range    Phosphorus 3.2 2.5 - 4.9 MG/DL   GLUCOSE, POC    Collection Time: 06/20/19  8:20 AM   Result Value Ref Range    Glucose (POC) 208 (H) 70 - 110 mg/dL     Additional Data Reviewed:      Signed By: Katalina Landry MD     June 20, 2019

## 2019-06-20 NOTE — ROUTINE PROCESS
TRANSFER - OUT REPORT:    Verbal report given to Geoff Plunkett RN on [address]  being transferred to room 504 for routine progression of care       Report consisted of patients Situation, Background, Assessment and   Recommendations(SBAR). Information from the following report(s) SBAR, OR Summary and MAR was reviewed with the receiving nurse. Lines:   PICC Double Lumen 15/07/78 Right;Basilic (Active)   Central Line Being Utilized Yes 6/20/2019  9:00 AM   Criteria for Appropriate Use Total parenteral nutrition 6/20/2019  9:00 AM   Site Assessment Clean, dry, & intact 6/20/2019  9:00 AM   Phlebitis Assessment 0 6/20/2019  9:00 AM   Infiltration Assessment 0 6/20/2019  9:00 AM   Date of Last Dressing Change 06/18/19 6/19/2019 10:49 AM   Dressing Status Clean, dry, & intact 6/19/2019 10:49 AM   Action Taken Open ports on tubing capped 6/19/2019  4:44 AM   Dressing Type Transparent;Tape;Disk with Chlorhexadine gluconate (CHG) 6/20/2019  9:00 AM   Hub Color/Line Status Purple;Capped;Flushed 6/20/2019  9:00 AM   Positive Blood Return (Site #1) Yes 6/20/2019  9:00 AM   Hub Color/Line Status Red; Infusing;Flushed 6/20/2019  9:00 AM   Positive Blood Return (Site #2) Yes 6/20/2019  9:00 AM   Alcohol Cap Used Yes 6/20/2019  9:00 AM        Opportunity for questions and clarification was provided.       Patient transported with:   O2 @ 8 liters  Tech

## 2019-06-20 NOTE — PROGRESS NOTES
[facility] Hospitalist Group  Progress Note    Patient: Consuelo Link Age: 63 y.o. : 1960 MR#: [id number] SSN: [id number]  Date/Time: 2019    Subjective:     Patient sitting in chair in NAD    Assessment/Plan:     -DM2, uncontrolled with hyperglycemia  - s/p Tracheostomy for vocal cord paralysis  - HTN  - Dysphagia  - Morbid Obesity   - Hypothyroidism     PLAN  Per surg - PEG tube placement in AM   NPO as per speech & she is aspirating per speech note   TPN as per surgeon, trach care - Failed repeat swallow eval -- PEG tube in AM   Lantus, Insulin in TPN  Check RUQ US done - report - Hepatic steatosis & No biliary Abnormality   Monitor BP  IV synthyroid  D/w patient    Anticipate discharge to SNF/ Rehab when stable , will follow     Case discussed with:  [x]Patient  []Family  [x]Nursing  []Case Management  DVT Prophylaxis:  [x]Lovenox  []Hep SQ  []SCDs  []Coumadin   []On Heparin gtt    Objective:   VS:   Visit Vitals  /75 (BP 1 Location: Left arm, BP Patient Position: At rest)   Pulse 68   Temp 99 °F (37.2 °C)   Resp 17   Ht 5' 7\" (1.702 m)   Wt 118.8 kg (261 lb 12.8 oz)   SpO2 100%   Breastfeeding?  No   BMI 41.00 kg/m²      Tmax/24hrs: Temp (24hrs), Av.4 °F (36.9 °C), Min:97.3 °F (36.3 °C), Max:99 °F (37.2 °C)    Input/Output:     Intake/Output Summary (Last 24 hours) at 2019  Last data filed at 2019  Gross per 24 hour   Intake 360 ml   Output 2300 ml   Net -1940 ml       General:  Awake, alert  Cardiovascular:  S1S2+, RRR  Pulmonary:  CTA b/l  GI:  Soft, BS+, Obese, Mild tenderness to palpation in RUQ  Extremities:  Trace  + trach edema      Labs:    Recent Results (from the past 24 hour(s))   GLUCOSE, POC    Collection Time: 19 12:35 AM   Result Value Ref Range    Glucose (POC) 172 (H) 70 - 110 mg/dL   GLUCOSE, POC    Collection Time: 19  5:17 AM   Result Value Ref Range    Glucose (POC) 191 (H) 70 - 110 mg/dL   CBC WITH AUTOMATED DIFF    Collection Time: 06/19/19  5:40 AM   Result Value Ref Range    WBC 5.5 4.6 - 13.2 K/uL    RBC 4.23 4.20 - 5.30 M/uL    HGB 12.3 12.0 - 16.0 g/dL    HCT 39.0 35.0 - 45.0 %    MCV 92.2 74.0 - 97.0 FL    MCH 29.1 24.0 - 34.0 PG    MCHC 31.5 31.0 - 37.0 g/dL    RDW 16.1 (H) 11.6 - 14.5 %    PLATELET 201 060 - 553 K/uL    MPV 8.9 (L) 9.2 - 11.8 FL    NEUTROPHILS 51 40 - 73 %    LYMPHOCYTES 38 21 - 52 %    MONOCYTES 8 3 - 10 %    EOSINOPHILS 2 0 - 5 %    BASOPHILS 1 0 - 2 %    ABS. NEUTROPHILS 2.9 1.8 - 8.0 K/UL    ABS. LYMPHOCYTES 2.1 0.9 - 3.6 K/UL    ABS. MONOCYTES 0.4 0.05 - 1.2 K/UL    ABS. EOSINOPHILS 0.1 0.0 - 0.4 K/UL    ABS.  BASOPHILS 0.0 0.0 - 0.1 K/UL    DF AUTOMATED     METABOLIC PANEL, BASIC    Collection Time: 06/19/19  5:40 AM   Result Value Ref Range    Sodium 139 136 - 145 mmol/L    Potassium 3.6 3.5 - 5.5 mmol/L    Chloride 106 100 - 108 mmol/L    CO2 29 21 - 32 mmol/L    Anion gap 4 3.0 - 18 mmol/L    Glucose 164 (H) 74 - 99 mg/dL    BUN 15 7.0 - 18 MG/DL    Creatinine 0.60 0.6 - 1.3 MG/DL    BUN/Creatinine ratio 25 (H) 12 - 20      GFR est AA >60 >60 ml/min/1.73m2    GFR est non-AA >60 >60 ml/min/1.73m2    Calcium 8.2 (L) 8.5 - 10.1 MG/DL   MAGNESIUM    Collection Time: 06/19/19  5:40 AM   Result Value Ref Range    Magnesium 2.2 1.6 - 2.6 mg/dL   PHOSPHORUS    Collection Time: 06/19/19  5:40 AM   Result Value Ref Range    Phosphorus 3.2 2.5 - 4.9 MG/DL   GLUCOSE, POC    Collection Time: 06/19/19 11:27 AM   Result Value Ref Range    Glucose (POC) 198 (H) 70 - 110 mg/dL   GLUCOSE, POC    Collection Time: 06/19/19  4:36 PM   Result Value Ref Range    Glucose (POC) 195 (H) 70 - 110 mg/dL     Additional Data Reviewed:      Signed By: Stormy Hammond MD     June 19, 2019

## 2019-06-20 NOTE — INTERVAL H&P NOTE
H&P Update:  Robert Lazar was seen and examined. History and physical has been reviewed. The patient has been examined.  There have been no significant clinical changes since the completion of the originally dated History and Physical.

## 2019-06-20 NOTE — PROGRESS NOTES
Patient had an uneventful night with NAD noted or voiced. Continues to have moderate amount of tan/yellow thick secretions. Remained continent of bowel and bladder. Double lumen PICC right upper arm, ports patent and flushes well with good blood return. Labs obtained by nurse this am. Patient for PEG tube placement today.  TPN infusing at 100 cc/hr in purple line, red line capped

## 2019-06-20 NOTE — ROUTINE PROCESS
Dr. Graham Arm ordered continuous tube feeding. Missed call with the Dietician. Notified Gladys Vallejo Arm. New orders to start patient on Jevity 1.5 at 10 cc.hr and advance patient to 20 cc/hr if patient tolerate feeding. Tube feeding will be flushed every 4 hours with 150 cc/ hr.    Seen note from dietician. Will follow dietician orders for prosource protein.

## 2019-06-21 LAB
ANION GAP SERPL CALC-SCNC: 5 MMOL/L (ref 3–18)
BASOPHILS # BLD: 0 K/UL (ref 0–0.06)
BASOPHILS NFR BLD: 0 % (ref 0–3)
BUN SERPL-MCNC: 15 MG/DL (ref 7–18)
BUN/CREAT SERPL: 19 (ref 12–20)
CALCIUM SERPL-MCNC: 8.5 MG/DL (ref 8.5–10.1)
CHLORIDE SERPL-SCNC: 107 MMOL/L (ref 100–108)
CO2 SERPL-SCNC: 26 MMOL/L (ref 21–32)
CREAT SERPL-MCNC: 0.79 MG/DL (ref 0.6–1.3)
DIFFERENTIAL METHOD BLD: ABNORMAL
EOSINOPHIL # BLD: 0.4 K/UL (ref 0–0.4)
EOSINOPHIL NFR BLD: 4 % (ref 0–5)
ERYTHROCYTE [DISTWIDTH] IN BLOOD BY AUTOMATED COUNT: 16.3 % (ref 11.6–14.5)
GLUCOSE BLD STRIP.AUTO-MCNC: 171 MG/DL (ref 70–110)
GLUCOSE BLD STRIP.AUTO-MCNC: 186 MG/DL (ref 70–110)
GLUCOSE BLD STRIP.AUTO-MCNC: 194 MG/DL (ref 70–110)
GLUCOSE BLD STRIP.AUTO-MCNC: 229 MG/DL (ref 70–110)
GLUCOSE BLD STRIP.AUTO-MCNC: 246 MG/DL (ref 70–110)
GLUCOSE SERPL-MCNC: 170 MG/DL (ref 74–99)
HCT VFR BLD AUTO: 40.9 % (ref 35–45)
HGB BLD-MCNC: 13.2 G/DL (ref 12–16)
LYMPHOCYTES # BLD: 3.2 K/UL (ref 0.8–3.5)
LYMPHOCYTES NFR BLD: 34 % (ref 20–51)
MAGNESIUM SERPL-MCNC: 2.2 MG/DL (ref 1.6–2.6)
MCH RBC QN AUTO: 29.8 PG (ref 24–34)
MCHC RBC AUTO-ENTMCNC: 32.3 G/DL (ref 31–37)
MCV RBC AUTO: 92.3 FL (ref 74–97)
MONOCYTES # BLD: 0.5 K/UL (ref 0–1)
MONOCYTES NFR BLD: 5 % (ref 2–9)
NEUTS SEG # BLD: 5.3 K/UL (ref 1.8–8)
NEUTS SEG NFR BLD: 57 % (ref 42–75)
PHOSPHATE SERPL-MCNC: 4.1 MG/DL (ref 2.5–4.9)
PLATELET # BLD AUTO: 232 K/UL (ref 135–420)
PLATELET COMMENTS,PCOM: ABNORMAL
PMV BLD AUTO: 9.8 FL (ref 9.2–11.8)
POTASSIUM SERPL-SCNC: 3.7 MMOL/L (ref 3.5–5.5)
RBC # BLD AUTO: 4.43 M/UL (ref 4.2–5.3)
RBC MORPH BLD: ABNORMAL
SODIUM SERPL-SCNC: 138 MMOL/L (ref 136–145)
WBC # BLD AUTO: 9.4 K/UL (ref 4.6–13.2)

## 2019-06-21 PROCEDURE — 77030009834 HC MSK O2 TRACH VYRM -A

## 2019-06-21 PROCEDURE — 83735 ASSAY OF MAGNESIUM: CPT

## 2019-06-21 PROCEDURE — 74011250636 HC RX REV CODE- 250/636

## 2019-06-21 PROCEDURE — 74011636637 HC RX REV CODE- 636/637: Performed by: HOSPITALIST

## 2019-06-21 PROCEDURE — 97535 SELF CARE MNGMENT TRAINING: CPT

## 2019-06-21 PROCEDURE — 80048 BASIC METABOLIC PNL TOTAL CA: CPT

## 2019-06-21 PROCEDURE — 77030018836 HC SOL IRR NACL ICUM -A

## 2019-06-21 PROCEDURE — 74011000250 HC RX REV CODE- 250

## 2019-06-21 PROCEDURE — 82962 GLUCOSE BLOOD TEST: CPT

## 2019-06-21 PROCEDURE — 65270000029 HC RM PRIVATE

## 2019-06-21 PROCEDURE — 94761 N-INVAS EAR/PLS OXIMETRY MLT: CPT

## 2019-06-21 PROCEDURE — 97164 PT RE-EVAL EST PLAN CARE: CPT

## 2019-06-21 PROCEDURE — 77010033678 HC OXYGEN DAILY

## 2019-06-21 PROCEDURE — 84100 ASSAY OF PHOSPHORUS: CPT

## 2019-06-21 PROCEDURE — 74011636637 HC RX REV CODE- 636/637

## 2019-06-21 PROCEDURE — 97530 THERAPEUTIC ACTIVITIES: CPT

## 2019-06-21 PROCEDURE — 85025 COMPLETE CBC W/AUTO DIFF WBC: CPT

## 2019-06-21 PROCEDURE — 36415 COLL VENOUS BLD VENIPUNCTURE: CPT

## 2019-06-21 PROCEDURE — 92526 ORAL FUNCTION THERAPY: CPT

## 2019-06-21 PROCEDURE — 97168 OT RE-EVAL EST PLAN CARE: CPT

## 2019-06-21 PROCEDURE — 97116 GAIT TRAINING THERAPY: CPT

## 2019-06-21 RX ORDER — LEVOTHYROXINE SODIUM 100 UG/1
100 TABLET ORAL
Status: DISCONTINUED | OUTPATIENT
Start: 2019-06-22 | End: 2019-06-24

## 2019-06-21 RX ORDER — MAG HYDROX/ALUMINUM HYD/SIMETH 200-200-20
30 SUSPENSION, ORAL (FINAL DOSE FORM) ORAL DAILY
Status: DISCONTINUED | OUTPATIENT
Start: 2019-06-22 | End: 2019-06-23

## 2019-06-21 RX ORDER — INSULIN GLARGINE 100 [IU]/ML
4 INJECTION, SOLUTION SUBCUTANEOUS ONCE
Status: COMPLETED | OUTPATIENT
Start: 2019-06-21 | End: 2019-06-21

## 2019-06-21 RX ORDER — MAG HYDROX/ALUMINUM HYD/SIMETH 200-200-20
30 SUSPENSION, ORAL (FINAL DOSE FORM) ORAL
Status: DISCONTINUED | OUTPATIENT
Start: 2019-06-21 | End: 2019-06-21

## 2019-06-21 RX ORDER — METOPROLOL SUCCINATE 50 MG/1
50 TABLET, EXTENDED RELEASE ORAL DAILY
Status: DISCONTINUED | OUTPATIENT
Start: 2019-06-22 | End: 2019-06-23

## 2019-06-21 RX ORDER — INSULIN GLARGINE 100 [IU]/ML
26 INJECTION, SOLUTION SUBCUTANEOUS DAILY
Status: DISCONTINUED | OUTPATIENT
Start: 2019-06-22 | End: 2019-06-22

## 2019-06-21 RX ADMIN — Medication 10 ML: at 07:42

## 2019-06-21 RX ADMIN — Medication 10 ML: at 04:31

## 2019-06-21 RX ADMIN — HYDROMORPHONE HYDROCHLORIDE 1 MG: 2 INJECTION INTRAMUSCULAR; INTRAVENOUS; SUBCUTANEOUS at 08:43

## 2019-06-21 RX ADMIN — Medication 10 ML: at 22:14

## 2019-06-21 RX ADMIN — LORAZEPAM 1 MG: 2 INJECTION INTRAMUSCULAR at 11:59

## 2019-06-21 RX ADMIN — INSULIN LISPRO 3 UNITS: 100 INJECTION, SOLUTION INTRAVENOUS; SUBCUTANEOUS at 00:11

## 2019-06-21 RX ADMIN — INSULIN GLARGINE 22 UNITS: 100 INJECTION, SOLUTION SUBCUTANEOUS at 08:43

## 2019-06-21 RX ADMIN — HYDROMORPHONE HYDROCHLORIDE 1 MG: 2 INJECTION INTRAMUSCULAR; INTRAVENOUS; SUBCUTANEOUS at 04:30

## 2019-06-21 RX ADMIN — INSULIN LISPRO 6 UNITS: 100 INJECTION, SOLUTION INTRAVENOUS; SUBCUTANEOUS at 23:38

## 2019-06-21 RX ADMIN — FAMOTIDINE 20 MG: 10 INJECTION INTRAVENOUS at 08:43

## 2019-06-21 RX ADMIN — LORAZEPAM 1 MG: 2 INJECTION INTRAMUSCULAR at 22:13

## 2019-06-21 RX ADMIN — INSULIN LISPRO 3 UNITS: 100 INJECTION, SOLUTION INTRAVENOUS; SUBCUTANEOUS at 12:01

## 2019-06-21 RX ADMIN — INSULIN GLARGINE 4 UNITS: 100 INJECTION, SOLUTION SUBCUTANEOUS at 11:59

## 2019-06-21 RX ADMIN — HYDROMORPHONE HYDROCHLORIDE 1 MG: 2 INJECTION INTRAMUSCULAR; INTRAVENOUS; SUBCUTANEOUS at 15:46

## 2019-06-21 RX ADMIN — INSULIN LISPRO 6 UNITS: 100 INJECTION, SOLUTION INTRAVENOUS; SUBCUTANEOUS at 06:39

## 2019-06-21 RX ADMIN — INSULIN LISPRO 3 UNITS: 100 INJECTION, SOLUTION INTRAVENOUS; SUBCUTANEOUS at 18:00

## 2019-06-21 RX ADMIN — HYDROMORPHONE HYDROCHLORIDE 1 MG: 2 INJECTION INTRAMUSCULAR; INTRAVENOUS; SUBCUTANEOUS at 23:39

## 2019-06-21 RX ADMIN — ENOXAPARIN SODIUM 40 MG: 40 INJECTION SUBCUTANEOUS at 18:00

## 2019-06-21 RX ADMIN — LEVOTHYROXINE SODIUM 75 MCG: 100 INJECTION, POWDER, LYOPHILIZED, FOR SOLUTION INTRAVENOUS at 08:43

## 2019-06-21 RX ADMIN — HYDROMORPHONE HYDROCHLORIDE 1 MG: 2 INJECTION INTRAMUSCULAR; INTRAVENOUS; SUBCUTANEOUS at 19:28

## 2019-06-21 RX ADMIN — ONDANSETRON 4 MG: 2 INJECTION INTRAMUSCULAR; INTRAVENOUS at 18:10

## 2019-06-21 NOTE — PROGRESS NOTES
Problem: Dysphagia (Adult)  Goal: *Acute Goals and Plan of Care (Insert Text)  Description  Patient will:  1. Tolerate PO trials with 0 s/s overt distress in 4/5 trials  2. Utilize compensatory swallow strategies/maneuvers (decrease bite/sip, size/rate, alt. liq/sol) with min cues in 4/5 trials  3. Perform oral-motor/laryngeal exercises to increase oropharyngeal swallow function with min cues  4. Complete an objective swallow study (i.e., MBSS) to assess swallow integrity, r/o aspiration, and determine of safest LRD, min A as indicated/ordered by MD     Recommend:   NPO with PEG  Strict aspiration precautions (HOB >30 degrees at all times, Oral care TID)       Outcome: Progressing Towards Goal    SPEECH LANGUAGE PATHOLOGY DYSPHAGIA TREATMENT    Patient: Victoria Dutton (88 y.o. female)  Date: 6/21/2019  Diagnosis: Stridor [R06.1]  Stridor [R06.1] <principal problem not specified>  Procedure(s) (LRB):  PERCUTANEOUS ENDOSCOPIC GASTROSTOMY TUBE INSERTION (N/A) 1 Day Post-Op  Precautions:  Fall(trach collar)  PLOF: As per H&P      ASSESSMENT:  Pt was seen at bedside for follow up dysphagia management. Pt s/p PEG placement yesterday. Per d/w MD: hold off on PMV treatments at this time. Cuff deflated upon SLP arrival with min-mod cream secretions from trach. Pt observed with immediate throat clear/cough, increase in RR, and c/o discomfort s/p 2/2 ice chip trials. Hard swallows ineffective at improving dysphagia. Continue to rec NPO with PEG, aspiration precautions, and oral care TID. ST will continue to follow. She will benefit from SLP services upon DC from this facility. Progression toward goals:  ?         Improving appropriately and progressing toward goals  ? Improving slowly and progressing toward goals  ?          Not making progress toward goals and plan of care will be adjusted     PLAN:  Recommendations and Planned Interventions: See above  Patient continues to benefit from skilled intervention to address the above impairments. Continue treatment per established plan of care. Discharge Recommendations: To Be Determined     SUBJECTIVE:   Patient stated ? I'm sorry? .    OBJECTIVE:   Cognitive and Communication Status:  Neurologic State: Alert  Orientation Level: Oriented X4  Cognition: Appropriate decision making, Appropriate for age attention/concentration, Appropriate safety awareness  Perception: Appears intact  Perseveration: No perseveration noted  Safety/Judgement: Fall prevention, Awareness of environment  Dysphagia Treatment:    P.O. Trials:       Vocal quality prior to P.O.: aphonic (trach)   Consistency Presented: ice chips   How Presented: Self-fed/presented, Spoon   How Much: (x2 tsp)   Bolus Acceptance: No impairment   Bolus Formation/Control: Impaired   Type of Impairment: Delayed   Propulsion: Delayed (# of seconds)   Oral Residue: None   Initiation of Swallow: Delayed (# of seconds)   Laryngeal Elevation: Decreased, Weak   Aspiration Signs/Symptoms: Weak cough, Delayed cough/throat clear, Change vocal quality   Pharyngeal Phase Characteristics: Feeling of discomfort, Poor endurance, Altered vocal quality   Effective Modifications: None      PAIN:  Start of Tx: 0  End of Tx: 0     After treatment:   ?              Patient left in no apparent distress sitting up in chair  ? Patient left in no apparent distress in bed  ? Call bell left within reach  ? Nursing notified  ? Family present  ? Caregiver present  ? Bed alarm activated      COMMUNICATION/EDUCATION:   ? Aspiration precautions; swallow safety; compensatory techniques  ?         Patient/family able to participate in training and education     Thank you for this referral.    Vasyl Lopez M.S. CCC-SLP/L  Speech-Language Pathologist

## 2019-06-21 NOTE — ROUTINE PROCESS
Notified dietician about patient concern about the prosource. Dietician will come  Educate patient on the prosource.

## 2019-06-21 NOTE — PROGRESS NOTES
Patient has tolerated tube feeding at 20 ml/hr since midnight. [place] continues to have copious amount of yellow-tan purulent secretions with foul smell. Double lumen PICC right upper arm flushes well with good blood return. Had an uneventful night with NAD noted or reported.

## 2019-06-21 NOTE — PROGRESS NOTES
[facility] Hospitalist Group  Progress Note    Patient: Cynthia Santiago Age: 63 y.o. : 1960 MR#: [id number] [id number]: [id number]  Date/Time: 2019    Subjective:     Patient sitting in chair in NAD - PEG tube placed yesterday     Assessment/Plan:     -DM2, uncontrolled with hyperglycemia  - s/p Tracheostomy for vocal cord paralysis  - HTN  - Dysphagia  - Morbid Obesity   - Hypothyroidism     PLAN  PEG tube placed , tolerating TF   NPO as per speech & she is aspirating per speech note   TPN as per surgeon, trach care - Failed repeat swallow eval -- PEG tube today - can D/c TPN when  Lantus, Insulin in TPN   Monitor BP  Synthroid PO   D/w patient    Anticipate discharge to SNF/ Rehab today     Case discussed with:  [x]Patient  []Family  [x]Nursing  []Case Management  DVT Prophylaxis:  [x]Lovenox  []Hep SQ  []SCDs  []Coumadin   []On Heparin gtt    Objective:   VS:   Visit Vitals  /74 (BP 1 Location: Left arm, BP Patient Position: At rest)   Pulse 77   Temp 98.7 °F (37.1 °C)   Resp 16   Ht 5' 7\" (1.702 m)   Wt 122.8 kg (270 lb 12.8 oz)   SpO2 93%   Breastfeeding?  No   BMI 42.41 kg/m²      Tmax/24hrs: Temp (24hrs), Av.5 °F (36.9 °C), Min:98 °F (36.7 °C), Max:98.7 °F (37.1 °C)    Input/Output:     Intake/Output Summary (Last 24 hours) at 2019 1428  Last data filed at 2019 0843  Gross per 24 hour   Intake 200 ml   Output 1450 ml   Net -1250 ml       General:  Awake, alert  Cardiovascular:  S1S2+, RRR  Pulmonary:  CTA b/l  GI:  Soft, BS+, Obese, Mild tenderness to palpation in RUQ  Extremities:  Trace  + trach edema      Labs:    Recent Results (from the past 24 hour(s))   GLUCOSE, POC    Collection Time: 19  6:30 PM   Result Value Ref Range    Glucose (POC) 187 (H) 70 - 110 mg/dL   GLUCOSE, POC    Collection Time: 19 12:09 AM   Result Value Ref Range    Glucose (POC) 171 (H) 70 - 110 mg/dL   CBC WITH AUTOMATED DIFF    Collection Time: 19  3:49 AM   Result Value Ref Range    WBC 9.4 4.6 - 13.2 K/uL    RBC 4.43 4.20 - 5.30 M/uL    HGB 13.2 12.0 - 16.0 g/dL    HCT 40.9 35.0 - 45.0 %    MCV 92.3 74.0 - 97.0 FL    MCH 29.8 24.0 - 34.0 PG    MCHC 32.3 31.0 - 37.0 g/dL    RDW 16.3 (H) 11.6 - 14.5 %    PLATELET 223 481 - 990 K/uL    MPV 9.8 9.2 - 11.8 FL    NEUTROPHILS 57 42 - 75 %    LYMPHOCYTES 34 20 - 51 %    MONOCYTES 5 2 - 9 %    EOSINOPHILS 4 0 - 5 %    BASOPHILS 0 0 - 3 %    ABS. NEUTROPHILS 5.3 1.8 - 8.0 K/UL    ABS. LYMPHOCYTES 3.2 0.8 - 3.5 K/UL    ABS. MONOCYTES 0.5 0 - 1.0 K/UL    ABS. EOSINOPHILS 0.4 0.0 - 0.4 K/UL    ABS.  BASOPHILS 0.0 0.0 - 0.06 K/UL    DF MANUAL      PLATELET COMMENTS ADEQUATE PLATELETS      RBC COMMENTS NORMOCYTIC, NORMOCHROMIC     METABOLIC PANEL, BASIC    Collection Time: 06/21/19  3:49 AM   Result Value Ref Range    Sodium 138 136 - 145 mmol/L    Potassium 3.7 3.5 - 5.5 mmol/L    Chloride 107 100 - 108 mmol/L    CO2 26 21 - 32 mmol/L    Anion gap 5 3.0 - 18 mmol/L    Glucose 170 (H) 74 - 99 mg/dL    BUN 15 7.0 - 18 MG/DL    Creatinine 0.79 0.6 - 1.3 MG/DL    BUN/Creatinine ratio 19 12 - 20      GFR est AA >60 >60 ml/min/1.73m2    GFR est non-AA >60 >60 ml/min/1.73m2    Calcium 8.5 8.5 - 10.1 MG/DL   MAGNESIUM    Collection Time: 06/21/19  3:49 AM   Result Value Ref Range    Magnesium 2.2 1.6 - 2.6 mg/dL   PHOSPHORUS    Collection Time: 06/21/19  3:49 AM   Result Value Ref Range    Phosphorus 4.1 2.5 - 4.9 MG/DL   GLUCOSE, POC    Collection Time: 06/21/19  6:35 AM   Result Value Ref Range    Glucose (POC) 229 (H) 70 - 110 mg/dL   GLUCOSE, POC    Collection Time: 06/21/19 11:00 AM   Result Value Ref Range    Glucose (POC) 186 (H) 70 - 110 mg/dL     Additional Data Reviewed:      Signed By: Candy Darden MD     June 21, 2019

## 2019-06-21 NOTE — DIABETES MGMT
Glycemic Control Plan of Care    T2DM with current A1c level of 7.1% (6/09/2019). See separate notes, 6/10/2019, for assessment of home diabetes management and education. Patient is s/p trach on 6/03/2019. She was able to communicate by writing. Home diabetes medication: Lantus insulin (vial) 90 units daily at bedtime. POC BG range on 6/20/2019: 187-209 mg/dL. POC BG report on 6/21/2019 at time of review: 171, 229 mg/dL. Patient is currently on basal lantus insulin 22 units daily and very resistant dose of correctional lispro insulin every 6 hours. TPN with regular insulin already discontinued. Seen patient this morning. She is s/p PEG tube feeding on 6/20/2019, Jevity 1.5 currently at 20 ml/hr with goal of 40 ml/hr. Recommendation(s):  1.) Consider increasing basal lantus insulin from 22 to 26 units daily starting today. Called Dr. Betzy Monterroso and obtained order. Noted patient already received 22 units of lantus this morning. Entered a one time additional dose of 4 units for today. Assessment:  Patient is 63year old with past medical history including type 2 diabetes mellitus with neuropathy, sleep apnea, s/p aortic valve replacement, morbid obesity, hypothyroidism, hypertension, chronic CHF and s/p thyroidectomy - was admitted on 6/03/2019 with report of stridor. Noted:  Vocal cord dysfunction. Status post tracheostomy on 6/03/2019. Hypothyroidism. Recent thyroidectomy on 4/11/2019. Morbid obesity. T2DM with current A1c of 8.4% (4/04/2019). Most recent blood glucose values:    Results for Carol Gagnon (MRN [id number]) as of 6/21/2019 09:46   Ref. Range 6/20/2019 05:52 6/20/2019 08:20 6/20/2019 13:37 6/20/2019 18:30   GLUCOSE,FAST - POC Latest Ref Range: 70 - 110 mg/dL 190 (H) 208 (H) 209 (H) 187 (H)     Results for Carol Gagnon (MRN [id number]) as of 6/21/2019 09:46   Ref.  Range 6/21/2019 00:09 6/21/2019 06:35   GLUCOSE,FAST - POC Latest Ref Range: 70 - 110 mg/dL 171 (H) 229 (H) Current A1C: 7.1% (6/09/2019) which is equivalent to estimated average blood glucose of 153 mg/dL during the past 2-3 months. Current hospital diabetes medications:  Basal lantus insulin 26 units daily starting 6/21/2019. Correctional lispro insulin every 6 hours. Very resistant dose. Total daily dose insulin requirement previous day: 6/20/2019:  Lantus: 22 units  Lispro: 12 units  TPN bag had 12 units of regular insulin. Home diabetes medications: Obtained from patient on 6/06/2019:  Lantus insulin (trach, patient unable to speak but nodded head when I asked if she's on vial insulin). She nodded again when I asked if she's on 90 units of lantus daily at bedtime and she has meter monitoring her blood sugar. Diet: NPO. Jevity 1.5 currently at 20 ml/hr adv to goal of 40 ml/hr. Goals:  Blood glucose will be within target range of  mg/dL by 6/24/2019.     Education:    _X__  Refer to Diabetes Education Record: 6/10/2019  ___  Education not indicated at this time    Elena Lopez RN  Pager: [phone]

## 2019-06-21 NOTE — PROGRESS NOTES
Discharge planning    Verlyn Mail in ARU will start  Insurance authorization.     JACK Lai, RN  Pager # [phone]  Care Manager
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
## 2019-06-21 NOTE — H&P
[address] [place] [address]   HISTORY AND PHYSICAL    Name:  Familia Gabriel  MR#:   [id number]  :  1960  ACCOUNT #:  [de-identified]  ADMIT DATE:  2019    CHIEF COMPLAINT:  Shortness of breath and stridor. HISTORY OF PRESENT ILLNESS:  The patient is a 52-year-old woman, morbidly obese lady, who is about a month and half status post bilateral thyroidectomy for what turns out to be Hashimoto's thyroiditis and benign calcified bilateral nodules. She returns with increasing stridor and difficulty breathing. She has been stridorous in the past and was controlled on it and did okay. She could talk and eat, and do home things; however, today is certainly different and she is back in the emergency room with short of breath and stridors. PAST MEDICAL HISTORY:  Significant for arthritis, asthma, low back pain, depression, diabetes, morbid obesity, partial hearing loss, panic attacks, headaches, sleep apnea, vertigo, and her thyroid disease. PAST SURGICAL HISTORY:  She has had open heart surgery in the distant past in  with valve surgery and an aortic repair. She has had a hysterectomy and myomectomy and some orthopedic procedures in the past.    ALLERGIES:  SHE IS ALLERGIC TO FOOD SWEETENERS, METFORMIN, MORPHINE, SINGULAIR, AND SUCROSE; ALL WITH ALLERGIC TYPE REACTIONS. CURRENT MEDICATIONS:  She takes Neurontin 400 t.i.d., Lantus U-100 90 units subcutaneous at bedtime, vitamin D2. She takes Synthroid 25 mcg daily. She takes Antivert 12.5 tablet three times a day, Toprol 50 mg once daily, Cymbalta one 60 mg capsule daily, Symbicort two puffs p.r.n., omega-3 vitamins, ProAir, Mysoline 50 mg tablet t.i.d., Lipitor 80 mg daily, Lasix 40 mg p.o. daily, Jardiance 25 mg one tab daily, Proventil p.r.n., aspirin. SOCIAL HISTORY:  She is , lives at home with children. She is a nonsmoker, not current drinker. FAMILY HISTORY:  Positive for diabetes, hypertension, cancer, and stroke.     REVIEW OF SYSTEMS:  Positive for HEENT problems with stridor. Positive for respiratory problems with shortness of breath. Negative for cardiac problems. Negative of abdominal or GI problems. Negative for  problems. Negative for dermatologic problems. Negative for neurologic problems. Negative for psychiatric problems. PHYSICAL EXAMINATION:  VITAL SIGNS:  Stable. Her saturation is 100% on nonrebreather. HEENT:  Normocephalic, atraumatic. Her pupils are equal.  Sclera is anicteric. Nose and throat are clear. CHEST:  Clear bilaterally. HEART:  Has regular rate and rhythm. ABDOMEN:  Obese, soft, and nontender. EXTREMITIES:  Warm and dry. NEUROLOGICAL:  She is intact. LABORATORY DATA:  Pending. IMPRESSION:  She is stridorous from poor cord function after a total thyroidectomy. PLAN:  Plan is to go upstairs emergently for a tracheostomy.       Pam Ogden MD      [id number]  D:  06/20/2019 15:33  T:  06/20/2019 17:37  JOB #:  [id number]

## 2019-06-21 NOTE — ROUTINE PROCESS
Bedside and Verbal shift change report given to Kelly Joy RN (oncoming nurse) by Juan F Chaidez RN (offgoing nurse). Report included the following information SBAR, Kardex and MAR.

## 2019-06-21 NOTE — PROGRESS NOTES
Surgery  Postoperative day 1 status post PEG placement  In reasonable spirits this morning.   PEG functional at 20 cc an hour of Jevity  Mild nausea and pain  Ween 02  Looking towards disposition to rehab for strengthening and conditioning prior to home

## 2019-06-21 NOTE — ADVANCED PRACTICE NURSE
Bedside and Verbal shift change report given to Mandeep Aguirre RN (oncoming nurse) by Ike Belcher RN (offgoing nurse). Report included the following information SBAR, Kardex and MAR.
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
## 2019-06-21 NOTE — PROGRESS NOTES
Nutrition:    Pt refusing Prosource. Discussed increasing tube feeding to meet protein needs, pt in agreement, but complains that the tube feeding is giving her heartburn. She is currently at 40 mL/hr. She has an order for pepcid, but states it isn't working, informed Dr. Eliazar Ahuja. Recommend changing tube feeding goal to 60 mL/hr and increasing from current rate by 10 mL q 4 hours as tolerated. Will provide 2160 kcal, 92 gm protein, 72 gm fat, 311 gm CHO, 32 gm fiber, 1094 mL free water, 1694 mL total water, 100% RDIs    Will continue to follow.      Sara Hauser RD

## 2019-06-21 NOTE — PROGRESS NOTES
ARU/IPR REFERRAL CONTACT NOTE  [id number] [place] [address] for Physical Rehabilitation    RE: Chris Ayoub    Referral received to review this patient's case for admission to [id number] [place] [address] for Physical Rehabilitation. Current status reviewed with team and patient meets criteria for admission to [facility] for Physical Rehabilitation pending authorization from [employer] . Case has been opened  and is pending review. Writer will notify  of status/determination once obtained. Thank you for this referral.  Should you have any questions please do not hesitate to call. Sincerely,  Michaela Amos. MARKOS Mehta.  [address] [place] [address] Physical Rehabilitation  [phone]

## 2019-06-21 NOTE — ROUTINE PROCESS
Patient was having heart burn and stated that the current medicine was not working. Dietician notified Dr. Harpreet Sepulveda. Riblet notified me to discontinue the protonix and start patient on the mylanta 30 mg oral to be given through the Peg tube.

## 2019-06-21 NOTE — PROGRESS NOTES
NUTRITION CONSULT    Nutrition Consult: Management of Tube Feeding, TPN: RD to Manage     RECOMMENDATIONS / PLAN:     - Advance tube feeds of Jevity 1.5 as tolerated by 10 mL q 4 hours to goal rate of 50 mL/hr with 100 mL q 4 hour water flushes and add Prosource BID.   - Continue RD inpatient monitoring and evaluation. Goal Regimen: Jevity 1.5 at 50 mL/hr + 100 mL q 4 hour water flushes + Prosource BID to provide: 1920 kcal, 107 gm protein, 60 gm fat, 259 gm CHO, 26 gm fiber, 912 mL free water, 1512 mL total water, 100% RDIs     NUTRITION DIAGNOSIS & INTERVENTIONS:     [x] Enteral nutrition: continue, advance as tolerated  [x] Collaboration and referral of nutrition care: discussed plan to tube feeding with nurse and diabetes management. Nutrition Diagnosis: Swallowing difficulty related to dysphagia s/p tracheostomy as evidenced by pt NPO after SLP evaluation/MBS. ASSESSMENT:     6/21: Tolerating TF, but c/o originally with advancement to 30 mL/hr. Pt with concerns about whether Jevity contains sucrose, discussed with pt that it does not, pt receptive. BG levels elevated, lantus increased. 6/20: S/p PEG placement. Will stop TPN and start tube feeds today. Disposition- plan for discharge to SNF versus rehab facility. 6/19: NPO s/p repeat MBS this morning and GI consulted for PEG placement, tentatively planned for tomorrow. Discharge planning underway with plan for transfer to ARU.   6/18: Discussed disposition with - likely to discharge to ARU which will not accept if pt remains on TPN- TPN likely to stop in the next several days per MD and diet restarted by Surgery; SLP recommended NPO after swallow evaluation this morning, working with pt on PMV placement. 6/17: BG remains elevated and pt NPO.   6/16: BG levels trending down, but remain high. Made NPO yesterday per MD  6/15: Tolerating PN. Did not eat breakfast or lunch today because afraid to eat. Also c/o gassiness.  BG levels remain elevated  6/14: Tolerating PN. Did not eat breakfast, but consumed most of lunch, which consisted of cream soup, mashed potatoes and pureed peas. Pt requesting to thicken liquids herself, despite order for thin liquids. BG levels elevated. 6/13: PICC placed to initiate TPN per MD request.   6/11: Pt with variable meal intake. Reports some difficulty tolerating certain foods, but ate 50% of breakfast this AM.  Encourage pt to ask for soft foods as desired. Diet changed to diabetic, BG in better control. 6/10: Was tolerating tube feeds until pt pulled NGT 6/8, receiving IVF since then. Diet started today by MD.  Pt reports being hungry. Pt with intolerance to artificial sweeteners, monitor BG levels and assess need for diabetic diet. 6/7: Formula changed to Jevity 1.5 prior to initiation 2/2 concern for sucrose sensitivity. Will continue current product due to questionable sensitivity of ingredients in Glucerna tube feeding. Tolerating rate at goal.  Elevated BG levels, but expected 2/2 formula needing to be given, discussed with MD and glycemin control, will attempt to manage BG with medication due to formula limitations. IVF discontinued. 6/6: Failed swallow evaluation/MBS; repeat MBS ordered for today. C/o gas pains, but is passing flatus, and +BM. Some abdominal distension noted. Asking for water. DHT placed by IR, no tube feeding ordered at this time. 6/5: Admitted with stridor and vocal cord dysfunction s/p tracheostomy placement on 6/3/19, tolerating trach collar and able to eat if passes swallow evaluation per MD- may deflate cuff for meals but must re-inflate cuff when pt is not eating per Surgery. Pt refused NGT placement.     EN infusion adequate to meet patients estimated nutritional needs:   [] Yes     [x] No   [] Unable to determine at this time    Tube Feeding: Jevity 1.5 at 30 mL/hr  Water Flushes: 100 mL q 4 hours  Residuals: none documented  Diet: DIET NPO  DIET TUBE FEEDING  DIET NUTRITIONAL SUPPLEMENTS Breakfast, Lunch; PROSOURCE      Food Allergies: sweeteners, sucrose  Current Appetite:   [] Good     [] Fair     [] Poor     [x] Other: NPO  Appetite/meal intake prior to admission:   [x] Good     [] Fair     [] Poor     [] Other:  Feeding Limitations:  [x] Swallowing difficulty: SLP following    [] Chewing difficulty    [x] Other: hx of dysphagia s/p thyroidectomy PTA and trach placement 6/3/19  Current Meal Intake: Patient Vitals for the past 100 hrs:   % Diet Eaten   06/21/19 0843 0 %   06/20/19 1801 0 %   06/20/19 0821 0 %   06/19/19 0926 0 %   06/18/19 1222 100 %   06/17/19 1856 0 %   06/17/19 1235 0 %       BM: 6/13  Skin Integrity: surgical incision to neck  Edema:  [x] No     [] Yes   Pertinent Medications: Reviewed: zofran, phenergan, pepcid, SSI q 6 hours very resistant scale, 26 units lantus      Labs:   Recent Labs     06/21/19  0349 06/20/19  0558 06/19/19  0540    139 139   K 3.7 3.6 3.6    105 106   CO2 26 27 29   * 178* 164*   BUN 15 16 15   CREA 0.79 0.59* 0.60   CA 8.5 8.4* 8.2*   MG 2.2 2.1 2.2   PHOS 4.1 3.2 3.2   Prealbumin: 12.9 mg/dL (6/14/19)  Triglyceride: 298 mg/dL (6/18/19), 316 mg/dL (6/13/19), 511 mg/dL (4/16/19)  C reactive protein: 4.8 mg/dL (6/18/19)  Recent Labs     06/21/19  0349 06/20/19  0558 06/19/19  0540   WBC 9.4 8.0 5.5   HGB 13.2 12.1 12.3   HCT 40.9 36.7 39.0    249 237         Intake/Output Summary (Last 24 hours) at 6/21/2019 1058  Last data filed at 6/21/2019 0843  Gross per 24 hour   Intake 200 ml   Output 1450 ml   Net -1250 ml       Anthropometrics:   Ht Readings from Last 1 Encounters:   06/03/19 5' 7\" (1.702 m)     Last 3 Recorded Weights in this Encounter    06/18/19 0543 06/19/19 1926 06/20/19 2030   Weight: 121.1 kg (267 lb) 118.8 kg (261 lb 12.8 oz) 122.8 kg (270 lb 12.8 oz)     Body mass index is 42.41 kg/m².    Obese, Class III    Weight History: patient denies change in weight PTA, stable     Weight Metrics 6/20/2019 6/3/2019 5/23/2019 5/22/2019 5/16/2019 4/26/2019 4/16/2019   Weight 270 lb 12.8 oz - 260 lb 6.4 oz - 258 lb 256 lb 260 lb 9.6 oz   BMI - 42.41 kg/m2 - 40.78 kg/m2 40.41 kg/m2 40.1 kg/m2 -          Admitting Diagnosis: Stridor [R06.1]  Stridor [R06.1]  Pertinent PMHx: T2DM with diabetic neuropathy, HTN, dyslipidemia, CHF, hypothyroidism s/p thyroidectomy 4/4/19    Education Needs:        [x] None identified  [] Identified - Not appropriate at this time  []  Identified and addressed - refer to education log  Learning Limitations:   [] None identified  [x] Identified: nonverbal. Communicates via pen/paper    Cultural, Judaism & ethnic food preferences:  [x] None identified    [] Identified and addressed     ESTIMATED NUTRITION NEEDS:     Calories: 4209-9729 kcal (MSJx1-1.3) based on   [x] Actual  kg     [] IBW:   Protein:  gm (0.8-1.2 gm/kg) based on   [x] Actual BW      [] IBW:   Fluid: 1 mL/kcal     MONITORING & EVALUATION:     Nutrition Goals:   1. Nutritional needs will be met through adequate oral intake or nutrition support within the next 5 days.   Outcome:  [] Met/Ongoing    [x] Progressing towards goal    [] Not progressing towards goal    [] New/Initial goal      Monitoring:    [x] Fluid intake   [] Nutrition-focused physical findings   [x] Treatment/therapy   [] Food and beverage intake   [x] Diet order      [] Weight    [x] EN infusion    Previous Recommendations (for follow-up assessments only):     [x]   Implemented       []   Not Implemented (RD to address)      [] No Longer Appropriate     [] No Recommendation Made        Discharge Planning: goal enteral nutrition regimen via PEG    [x]  Participated in care planning, discharge planning, & interdisciplinary rounds as appropriate      Ady Giles RD, [address]   Pager: [phone]

## 2019-06-22 ENCOUNTER — APPOINTMENT (OUTPATIENT)
Dept: GENERAL RADIOLOGY | Age: 59
DRG: 098 | End: 2019-06-22
Attending: HOSPITALIST
Payer: MEDICAID

## 2019-06-22 LAB
ANION GAP SERPL CALC-SCNC: 5 MMOL/L (ref 3–18)
BASOPHILS # BLD: 0 K/UL (ref 0–0.1)
BASOPHILS NFR BLD: 0 % (ref 0–2)
BUN SERPL-MCNC: 13 MG/DL (ref 7–18)
BUN/CREAT SERPL: 16 (ref 12–20)
CALCIUM SERPL-MCNC: 8.6 MG/DL (ref 8.5–10.1)
CHLORIDE SERPL-SCNC: 102 MMOL/L (ref 100–108)
CO2 SERPL-SCNC: 30 MMOL/L (ref 21–32)
CREAT SERPL-MCNC: 0.81 MG/DL (ref 0.6–1.3)
DIFFERENTIAL METHOD BLD: ABNORMAL
EOSINOPHIL # BLD: 0.2 K/UL (ref 0–0.4)
EOSINOPHIL NFR BLD: 2 % (ref 0–5)
ERYTHROCYTE [DISTWIDTH] IN BLOOD BY AUTOMATED COUNT: 16.4 % (ref 11.6–14.5)
GLUCOSE BLD STRIP.AUTO-MCNC: 239 MG/DL (ref 70–110)
GLUCOSE BLD STRIP.AUTO-MCNC: 260 MG/DL (ref 70–110)
GLUCOSE BLD STRIP.AUTO-MCNC: 270 MG/DL (ref 70–110)
GLUCOSE SERPL-MCNC: 300 MG/DL (ref 74–99)
HCT VFR BLD AUTO: 38.4 % (ref 35–45)
HGB BLD-MCNC: 12.6 G/DL (ref 12–16)
LYMPHOCYTES # BLD: 2 K/UL (ref 0.9–3.6)
LYMPHOCYTES NFR BLD: 22 % (ref 21–52)
MAGNESIUM SERPL-MCNC: 2.2 MG/DL (ref 1.6–2.6)
MCH RBC QN AUTO: 30.1 PG (ref 24–34)
MCHC RBC AUTO-ENTMCNC: 32.8 G/DL (ref 31–37)
MCV RBC AUTO: 91.9 FL (ref 74–97)
MONOCYTES # BLD: 0.7 K/UL (ref 0.05–1.2)
MONOCYTES NFR BLD: 8 % (ref 3–10)
NEUTS SEG # BLD: 6.2 K/UL (ref 1.8–8)
NEUTS SEG NFR BLD: 68 % (ref 40–73)
PHOSPHATE SERPL-MCNC: 3.7 MG/DL (ref 2.5–4.9)
PLATELET # BLD AUTO: 224 K/UL (ref 135–420)
PMV BLD AUTO: 9 FL (ref 9.2–11.8)
POTASSIUM SERPL-SCNC: 3.9 MMOL/L (ref 3.5–5.5)
RBC # BLD AUTO: 4.18 M/UL (ref 4.2–5.3)
SODIUM SERPL-SCNC: 137 MMOL/L (ref 136–145)
WBC # BLD AUTO: 9.1 K/UL (ref 4.6–13.2)

## 2019-06-22 PROCEDURE — 82962 GLUCOSE BLOOD TEST: CPT

## 2019-06-22 PROCEDURE — 84100 ASSAY OF PHOSPHORUS: CPT

## 2019-06-22 PROCEDURE — 80048 BASIC METABOLIC PNL TOTAL CA: CPT

## 2019-06-22 PROCEDURE — 74011250636 HC RX REV CODE- 250/636

## 2019-06-22 PROCEDURE — 83735 ASSAY OF MAGNESIUM: CPT

## 2019-06-22 PROCEDURE — 85025 COMPLETE CBC W/AUTO DIFF WBC: CPT

## 2019-06-22 PROCEDURE — 74011250637 HC RX REV CODE- 250/637: Performed by: HOSPITALIST

## 2019-06-22 PROCEDURE — 94761 N-INVAS EAR/PLS OXIMETRY MLT: CPT

## 2019-06-22 PROCEDURE — 71046 X-RAY EXAM CHEST 2 VIEWS: CPT

## 2019-06-22 PROCEDURE — 74011636637 HC RX REV CODE- 636/637: Performed by: HOSPITALIST

## 2019-06-22 PROCEDURE — 74011250637 HC RX REV CODE- 250/637

## 2019-06-22 PROCEDURE — 65270000029 HC RM PRIVATE

## 2019-06-22 PROCEDURE — 77010033678 HC OXYGEN DAILY

## 2019-06-22 PROCEDURE — 74011636637 HC RX REV CODE- 636/637

## 2019-06-22 RX ORDER — INSULIN GLARGINE 100 [IU]/ML
30 INJECTION, SOLUTION SUBCUTANEOUS DAILY
Status: DISCONTINUED | OUTPATIENT
Start: 2019-06-23 | End: 2019-06-25 | Stop reason: HOSPADM

## 2019-06-22 RX ADMIN — LEVOTHYROXINE SODIUM 100 MCG: 100 TABLET ORAL at 06:08

## 2019-06-22 RX ADMIN — ENOXAPARIN SODIUM 40 MG: 40 INJECTION SUBCUTANEOUS at 18:32

## 2019-06-22 RX ADMIN — Medication 10 ML: at 22:00

## 2019-06-22 RX ADMIN — INSULIN LISPRO 9 UNITS: 100 INJECTION, SOLUTION INTRAVENOUS; SUBCUTANEOUS at 06:08

## 2019-06-22 RX ADMIN — INSULIN GLARGINE 26 UNITS: 100 INJECTION, SOLUTION SUBCUTANEOUS at 09:41

## 2019-06-22 RX ADMIN — HYDROMORPHONE HYDROCHLORIDE 1 MG: 2 INJECTION INTRAMUSCULAR; INTRAVENOUS; SUBCUTANEOUS at 15:35

## 2019-06-22 RX ADMIN — ALUMINUM HYDROXIDE, MAGNESIUM HYDROXIDE, AND SIMETHICONE 30 ML: 200; 200; 20 SUSPENSION ORAL at 09:42

## 2019-06-22 RX ADMIN — HYDROMORPHONE HYDROCHLORIDE 1 MG: 2 INJECTION INTRAMUSCULAR; INTRAVENOUS; SUBCUTANEOUS at 09:42

## 2019-06-22 RX ADMIN — Medication 20 ML: at 14:00

## 2019-06-22 RX ADMIN — HYDROMORPHONE HYDROCHLORIDE 1 MG: 2 INJECTION INTRAMUSCULAR; INTRAVENOUS; SUBCUTANEOUS at 21:27

## 2019-06-22 RX ADMIN — INSULIN LISPRO 6 UNITS: 100 INJECTION, SOLUTION INTRAVENOUS; SUBCUTANEOUS at 18:37

## 2019-06-22 RX ADMIN — Medication 10 ML: at 03:52

## 2019-06-22 RX ADMIN — LORAZEPAM 1 MG: 2 INJECTION INTRAMUSCULAR at 18:45

## 2019-06-22 RX ADMIN — Medication 10 ML: at 21:28

## 2019-06-22 RX ADMIN — INSULIN LISPRO 9 UNITS: 100 INJECTION, SOLUTION INTRAVENOUS; SUBCUTANEOUS at 12:35

## 2019-06-22 RX ADMIN — HYDROMORPHONE HYDROCHLORIDE 1 MG: 2 INJECTION INTRAMUSCULAR; INTRAVENOUS; SUBCUTANEOUS at 03:52

## 2019-06-22 NOTE — PROGRESS NOTES
Bedside and Verbal shift change report given to Tip Bonner RN (oncoming nurse) by Camelia Baez RN (offgoing nurse). Report included the following information SBAR and Kardex.

## 2019-06-22 NOTE — ROUTINE PROCESS
1915 Bedside and Verbal shift change  Received from Juan Smith RN (outgoing nurse), to IFTIKHAR Vera (oncoming)  Pt. Is AOX 4. PICC Sl, Pt. denies  pain at this time. Report included the following information SBAR, Kardex, Procedure Summary, Intake/Output, MAR, Recent Lab Results. Will resume care and monitor Pt. Condition. Pt. Educated on call bell when in need of help and assistance. Pt. verbalized understanding. Pt. Head to toe Assessment Done and documented. 2015  Pt made no complaints. OOB to [facility], pt. Given bath, sherri care. Changed gown. 2130  Suction trach, copious thick secretion noted. Given pain medication per mar. 2230  Pt. Resting in bed, denies pain. 2345 Pt. Eyes closed, easily awaken. 0145  Pt. Made no complaints. 0220  Pt. Given pain meds, neck and abd.    0320  Pt. Resting in bed, eyes closed, not in distress. 0500  Pt. Made no complaints. 6191  Pt. Given pain meds, change gown and resting comfortably in bed. Verbal and bedside Shift changed report given to Wayne Luis RN (oncoming RN) on Pt. Condition. Report consisted of patients Situation, History, Activities, intake/output,  Background, Assessment and Recommendations(SBAR). Information from the following report(s) Kardex, order Summary, Lab results and MAR was reviewed with the receiving nurse. Opportunity for questions and clarification was provided.

## 2019-06-22 NOTE — PROGRESS NOTES
[facility] Hospitalist Group  Progress Note    Patient: Jeramie Brunson Age: 63 y.o. : 1960 MR#: [id number] SSN: [id number]  Date/Time: 2019    Subjective:     Patient sitting in chair in NAD - is having increased secretions from Trach & coughing a lot      Assessment/Plan:     -DM2, uncontrolled with hyperglycemia  - s/p Tracheostomy for vocal cord paralysis  - HTN  - Dysphagia  - Morbid Obesity   - Hypothyroidism     PLAN  PEG tube placed , tolerating TF - per surg decrease TF rate since she had vomiting x2 yesterday   NPO as per speech & she is aspirating   Increased coughing with thick yellowish secretions from Trach - CXR negative for infiltrate , sputum Cx sent , will follow   Lantus, Insulin in TPN   Monitor BP  Synthroid PO   D/w patient    Anticipate discharge to SNF/ Rehab post insurance Auth     Case discussed with:  [x]Patient  []Family  [x]Nursing  []Case Management  DVT Prophylaxis:  [x]Lovenox  []Hep SQ  []SCDs  []Coumadin   []On Heparin gtt    Objective:   VS:   Visit Vitals  /74 (BP 1 Location: Left arm, BP Patient Position: At rest)   Pulse 77   Temp 99.4 °F (37.4 °C)   Resp 19   Ht 5' 7\" (1.702 m)   Wt 122.2 kg (269 lb 8 oz)   SpO2 98%   Breastfeeding?  No   BMI 42.21 kg/m²      Tmax/24hrs: Temp (24hrs), Av.3 °F (37.4 °C), Min:98.4 °F (36.9 °C), Max:100.3 °F (37.9 °C)    Input/Output:     Intake/Output Summary (Last 24 hours) at 2019 1829  Last data filed at 2019 1556  Gross per 24 hour   Intake 550 ml   Output 1200 ml   Net -650 ml       General:  Awake, alert  Cardiovascular:  S1S2+, RRR  Pulmonary:  CTA b/l  GI:  Soft, BS+, Obese, Mild tenderness to palpation in RUQ  Extremities:  Trace  + trach edema      Labs:    Recent Results (from the past 24 hour(s))   GLUCOSE, POC    Collection Time: 19 11:35 PM   Result Value Ref Range    Glucose (POC) 246 (H) 70 - 110 mg/dL   GLUCOSE, POC    Collection Time: 19  5:30 AM   Result Value Ref Range    Glucose (POC) 270 (H) 70 - 110 mg/dL   CBC WITH AUTOMATED DIFF    Collection Time: 06/22/19  6:10 AM   Result Value Ref Range    WBC 9.1 4.6 - 13.2 K/uL    RBC 4.18 (L) 4.20 - 5.30 M/uL    HGB 12.6 12.0 - 16.0 g/dL    HCT 38.4 35.0 - 45.0 %    MCV 91.9 74.0 - 97.0 FL    MCH 30.1 24.0 - 34.0 PG    MCHC 32.8 31.0 - 37.0 g/dL    RDW 16.4 (H) 11.6 - 14.5 %    PLATELET 201 685 - 541 K/uL    MPV 9.0 (L) 9.2 - 11.8 FL    NEUTROPHILS 68 40 - 73 %    LYMPHOCYTES 22 21 - 52 %    MONOCYTES 8 3 - 10 %    EOSINOPHILS 2 0 - 5 %    BASOPHILS 0 0 - 2 %    ABS. NEUTROPHILS 6.2 1.8 - 8.0 K/UL    ABS. LYMPHOCYTES 2.0 0.9 - 3.6 K/UL    ABS. MONOCYTES 0.7 0.05 - 1.2 K/UL    ABS. EOSINOPHILS 0.2 0.0 - 0.4 K/UL    ABS.  BASOPHILS 0.0 0.0 - 0.1 K/UL    DF AUTOMATED     METABOLIC PANEL, BASIC    Collection Time: 06/22/19  6:10 AM   Result Value Ref Range    Sodium 137 136 - 145 mmol/L    Potassium 3.9 3.5 - 5.5 mmol/L    Chloride 102 100 - 108 mmol/L    CO2 30 21 - 32 mmol/L    Anion gap 5 3.0 - 18 mmol/L    Glucose 300 (H) 74 - 99 mg/dL    BUN 13 7.0 - 18 MG/DL    Creatinine 0.81 0.6 - 1.3 MG/DL    BUN/Creatinine ratio 16 12 - 20      GFR est AA >60 >60 ml/min/1.73m2    GFR est non-AA >60 >60 ml/min/1.73m2    Calcium 8.6 8.5 - 10.1 MG/DL   MAGNESIUM    Collection Time: 06/22/19  6:10 AM   Result Value Ref Range    Magnesium 2.2 1.6 - 2.6 mg/dL   PHOSPHORUS    Collection Time: 06/22/19  6:10 AM   Result Value Ref Range    Phosphorus 3.7 2.5 - 4.9 MG/DL   GLUCOSE, POC    Collection Time: 06/22/19 11:06 AM   Result Value Ref Range    Glucose (POC) 260 (H) 70 - 110 mg/dL     Additional Data Reviewed:      Signed By: Joey Flor MD     June 22, 2019

## 2019-06-22 NOTE — PROGRESS NOTES
Problem: Falls - Risk of  Goal: *Absence of Falls  Description  Document Obey Brower Fall Risk and appropriate interventions in the flowsheet. Outcome: Progressing Towards Goal     Problem: Patient Education: Go to Patient Education Activity  Goal: Patient/Family Education  Outcome: Progressing Towards Goal     Problem: Pressure Injury - Risk of  Goal: *Prevention of pressure injury  Description  Document Omer Scale and appropriate interventions in the flowsheet. Outcome: Progressing Towards Goal     Problem: Patient Education: Go to Patient Education Activity  Goal: Patient/Family Education  Outcome: Progressing Towards Goal     Problem: Nutrition Deficit  Goal: *Optimize nutritional status  Outcome: Progressing Towards Goal     Problem: Patient Education: Go to Patient Education Activity  Goal: Patient/Family Education  Outcome: Progressing Towards Goal     Problem: Patient Education: Go to Patient Education Activity  Goal: Patient/Family Education  Outcome: Progressing Towards Goal     Problem: Patient Education: Go to Patient Education Activity  Goal: Patient/Family Education  Outcome: Progressing Towards Goal     Problem: Diabetes Self-Management  Goal: *Disease process and treatment process  Description  Define diabetes and identify own type of diabetes; list 3 options for treating diabetes. Outcome: Progressing Towards Goal  Goal: *Incorporating nutritional management into lifestyle  Description  Describe effect of type, amount and timing of food on blood glucose; list 3 methods for planning meals. Outcome: Progressing Towards Goal  Goal: *Incorporating physical activity into lifestyle  Description  State effect of exercise on blood glucose levels. Outcome: Progressing Towards Goal  Goal: *Developing strategies to promote health/change behavior  Description  Define the ABC's of diabetes; identify appropriate screenings, schedule and personal plan for screenings.   Outcome: Progressing Towards Goal  Goal: *Using medications safely  Description  State effect of diabetes medications on diabetes; name diabetes medication taking, action and side effects. Outcome: Progressing Towards Goal  Goal: *Monitoring blood glucose, interpreting and using results  Description  Identify recommended blood glucose targets  and personal targets. Outcome: Progressing Towards Goal  Goal: *Prevention, detection, treatment of acute complications  Description  List symptoms of hyper- and hypoglycemia; describe how to treat low blood sugar and actions for lowering  high blood glucose level. Outcome: Progressing Towards Goal  Goal: *Prevention, detection and treatment of chronic complications  Description  Define the natural course of diabetes and describe the relationship of blood glucose levels to long term complications of diabetes.   Outcome: Progressing Towards Goal  Goal: *Developing strategies to address psychosocial issues  Description  Describe feelings about living with diabetes; identify support needed and support network  Outcome: Progressing Towards Goal  Goal: *Insulin pump training  Outcome: Progressing Towards Goal  Goal: *Sick day guidelines  Outcome: Progressing Towards Goal  Goal: *Patient Specific Goal (EDIT GOAL, INSERT TEXT)  Outcome: Progressing Towards Goal     Problem: Patient Education: Go to Patient Education Activity  Goal: Patient/Family Education  Outcome: Progressing Towards Goal     Problem: Diabetes Maintenance:Admission  Goal: *Blood glucose 80 to 180 mg/dl  Outcome: Progressing Towards Goal     Problem: Hypertension  Goal: *Blood pressure within specified parameters  Outcome: Progressing Towards Goal  Goal: *Fluid volume balance  Outcome: Progressing Towards Goal  Goal: *Labs within defined limits  Outcome: Progressing Towards Goal     Problem: Patient Education: Go to Patient Education Activity  Goal: Patient/Family Education  Outcome: Progressing Towards Goal     Problem: Pain  Goal: *Control of Pain  Outcome: Progressing Towards Goal  Goal: *PALLIATIVE CARE:  Alleviation of Pain  Outcome: Progressing Towards Goal     Problem: Patient Education: Go to Patient Education Activity  Goal: Patient/Family Education  Outcome: Progressing Towards Goal     Problem: Infection - Risk of, Surgical Site Infection  Goal: *Absence of surgical site infection signs and symptoms  Outcome: Progressing Towards Goal     Problem: Patient Education: Go to Patient Education Activity  Goal: Patient/Family Education  Outcome: Progressing Towards Goal

## 2019-06-22 NOTE — PROGRESS NOTES
[facility]  [address] [place] [id number] [phone]  Colon and Rectal Surgery Progress Note      Patient: Cristhian De Leon MRN: [id number]  SSN: [id number]    YOB: 1960  Age: 63 y.o. Sex: female      Admit Date: 6/3/2019    LOS: 19 days     Subjective:     Vomiting yesterday x 2. Objective:     Vitals:    06/22/19 0532 06/22/19 0556 06/22/19 0733 06/22/19 0806   BP: 114/67      Pulse: 76      Resp: 19      Temp: 98.4 °F (36.9 °C)      SpO2: 96% 95% 97%    Weight:    122.2 kg (269 lb 8 oz)   Height:            Intake and Output:  Current Shift: No intake/output data recorded.   Last three shifts: 06/20 1901 - 06/22 0700  In: 1450   Out: 1450 [Urine:1450]    Physical Exam:     abd distended, NT    Lab/Data Review:    BMP:   Lab Results   Component Value Date/Time     06/22/2019 06:10 AM    K 3.9 06/22/2019 06:10 AM     06/22/2019 06:10 AM    CO2 30 06/22/2019 06:10 AM    AGAP 5 06/22/2019 06:10 AM     (H) 06/22/2019 06:10 AM    BUN 13 06/22/2019 06:10 AM    CREA 0.81 06/22/2019 06:10 AM    GFRAA >60 06/22/2019 06:10 AM    GFRNA >60 06/22/2019 06:10 AM      CBC:   Lab Results   Component Value Date/Time    WBC 9.1 06/22/2019 06:10 AM    HGB 12.6 06/22/2019 06:10 AM    HCT 38.4 06/22/2019 06:10 AM     06/22/2019 06:10 AM         Assessment:     S/p PEG    Plan:     Decrease TF rate, re-advance tomorrow if tolerates  Increase free water flush    Signed By: Jevon Gregg MD        June 22, 2019

## 2019-06-22 NOTE — PROGRESS NOTES
NUTRITION CONSULT    Nutrition Consult: Management of Tube Feeding, TPN: RD to Manage (discontinued)     RECOMMENDATIONS / PLAN:     - If pt tolerates tube feeding at 30 mL/hr without emesis, recommend advancing  tube feeds of Jevity 1.5 as tolerated by 10 mL q 4 hours to goal rate of 60 mL/hr with 150 mL q 4 hour water flushes.   - Discontinue daily Mg and Phos now that PN is discontinued. - Continue RD inpatient monitoring and evaluation. Goal Regimen: Jevity 1.5 at 60 mL/hr + 150 mL q 4 hour water flushes to provide: 2160 kcal, 92 gm protein, 72 gm fat, 311 gm CHO, 32 gm fiber, 1094 mL free water, 1994 mL total water, 100% RDIs     NUTRITION DIAGNOSIS & INTERVENTIONS:     [x] Enteral nutrition: continue, advance as tolerated pending emesis  [x] Collaboration and referral of nutrition care: discussed tube feeding with surgery and nurse    Nutrition Diagnosis: Swallowing difficulty related to dysphagia s/p tracheostomy as evidenced by pt NPO after SLP evaluation/MBS. ASSESSMENT:     6/22: Abdomen distended, +flatus and small BM. Was receiving TF at goal, decreased to 30 mL/hr by surgery 2/2 distension and pt report of emesis; plan for advancement tomorrow if no emesis. No known emesis observed by RD or documented by nurse yesterday. Pepcid changed to Mylanta. Water flushes increased. BG levels elevated, by TF now decreased, will monitor. 6/21: Tolerating TF, but c/o originally with advancement to 30 mL/hr. Pt with concerns about whether Jevity contains sucrose, discussed with pt that it does not, pt receptive. BG levels elevated, lantus increased. Addendum : Pt refusing Prosource. Discussed increasing tube feeding, pt in agreement, complains that the tube feeding is giving her heartburn. Currently at 40 mL/hr. Has order for pepcid, states it isn't working, informed Dr. Harpreet Sepulveda. 6/20: S/p PEG placement. Will stop TPN and start tube feeds today.  Disposition- plan for discharge to SNF versus rehab facility. 6/19: NPO s/p repeat MBS this morning and GI consulted for PEG placement, tentatively planned for tomorrow. Discharge planning underway with plan for transfer to ARU.   6/18: Discussed disposition with - likely to discharge to ARU which will not accept if pt remains on TPN- TPN likely to stop in the next several days per MD and diet restarted by Surgery; SLP recommended NPO after swallow evaluation this morning, working with pt on PMV placement. 6/17: BG remains elevated and pt NPO.   6/16: BG levels trending down, but remain high. Made NPO yesterday per MD  6/15: Tolerating PN. Did not eat breakfast or lunch today because afraid to eat. Also c/o gassiness. BG levels remain elevated  6/14: Tolerating PN. Did not eat breakfast, but consumed most of lunch, which consisted of cream soup, mashed potatoes and pureed peas. Pt requesting to thicken liquids herself, despite order for thin liquids. BG levels elevated. 6/13: PICC placed to initiate TPN per MD request.   6/11: Pt with variable meal intake. Reports some difficulty tolerating certain foods, but ate 50% of breakfast this AM.  Encourage pt to ask for soft foods as desired. Diet changed to diabetic, BG in better control. 6/10: Was tolerating tube feeds until pt pulled NGT 6/8, receiving IVF since then. Diet started today by MD.  Pt reports being hungry. Pt with intolerance to artificial sweeteners, monitor BG levels and assess need for diabetic diet. 6/7: Formula changed to Jevity 1.5 prior to initiation 2/2 concern for sucrose sensitivity. Will continue current product due to questionable sensitivity of ingredients in Glucerna tube feeding. Tolerating rate at goal.  Elevated BG levels, but expected 2/2 formula needing to be given, discussed with MD and glycemin control, will attempt to manage BG with medication due to formula limitations. IVF discontinued. 6/6: Failed swallow evaluation/MBS; repeat MBS ordered for today.   C/o gas pains, but is passing flatus, and +BM. Some abdominal distension noted. Asking for water. DHT placed by IR, no tube feeding ordered at this time. 6/5: Admitted with stridor and vocal cord dysfunction s/p tracheostomy placement on 6/3/19, tolerating trach collar and able to eat if passes swallow evaluation per MD- may deflate cuff for meals but must re-inflate cuff when pt is not eating per Surgery. Pt refused NGT placement.     EN infusion adequate to meet patients estimated nutritional needs:   [] Yes     [x] No   [] Unable to determine at this time    Tube Feeding: Jevity 1.5 at 60 mL/hr  Water Flushes: 100 mL q 4 hours  Residuals: 0 mL  Diet: DIET NPO  DIET TUBE FEEDING      Food Allergies: sweeteners, sucrose  Current Appetite:   [] Good     [] Fair     [] Poor     [x] Other: NPO  Appetite/meal intake prior to admission:   [x] Good     [] Fair     [] Poor     [] Other:  Feeding Limitations:  [x] Swallowing difficulty: SLP following    [] Chewing difficulty    [x] Other: hx of dysphagia s/p thyroidectomy PTA and trach placement 6/3/19  Current Meal Intake:   Patient Vitals for the past 100 hrs:   % Diet Eaten   06/22/19 0916 0 %   06/21/19 1159 0 %   06/21/19 0843 0 %   06/20/19 1801 0 %   06/20/19 0821 0 %   06/19/19 0926 0 %   06/18/19 1222 100 %       BM: 6/22, small, watery  Skin Integrity: surgical incision to neck  Edema:  [x] No     [] Yes   Pertinent Medications: Reviewed: zofran, phenergan, SSI q 6 hours very resistant scale, 26 units lantus, mylanta      Labs:   Recent Labs     06/22/19  0610 06/21/19  0349 06/20/19  0558    138 139   K 3.9 3.7 3.6    107 105   CO2 30 26 27   * 170* 178*   BUN 13 15 16   CREA 0.81 0.79 0.59*   CA 8.6 8.5 8.4*   MG 2.2 2.2 2.1   PHOS 3.7 4.1 3.2   Prealbumin: 12.9 mg/dL (6/14/19)  Triglyceride: 298 mg/dL (6/18/19), 316 mg/dL (6/13/19), 511 mg/dL (4/16/19)  C reactive protein: 4.8 mg/dL (6/18/19)  Recent Labs     06/22/19  0610 06/21/19  8146 06/20/19  0558   WBC 9.1 9.4 8.0   HGB 12.6 13.2 12.1   HCT 38.4 40.9 36.7    232 249         Intake/Output Summary (Last 24 hours) at 6/22/2019 1153  Last data filed at 6/22/2019 0959  Gross per 24 hour   Intake 1500 ml   Output 1200 ml   Net 300 ml       Anthropometrics:   Ht Readings from Last 1 Encounters:   06/03/19 5' 7\" (1.702 m)     Last 3 Recorded Weights in this Encounter    06/19/19 1926 06/20/19 2030 06/22/19 0806   Weight: 118.8 kg (261 lb 12.8 oz) 122.8 kg (270 lb 12.8 oz) 122.2 kg (269 lb 8 oz)     Body mass index is 42.21 kg/m². Obese, Class III    Weight History: patient denies change in weight PTA, stable     Weight Metrics 6/22/2019 6/3/2019 5/23/2019 5/22/2019 5/16/2019 4/26/2019 4/16/2019   Weight 269 lb 8 oz - 260 lb 6.4 oz - 258 lb 256 lb 260 lb 9.6 oz   BMI - 42.21 kg/m2 - 40.78 kg/m2 40.41 kg/m2 40.1 kg/m2 -          Admitting Diagnosis: Stridor [R06.1]  Stridor [R06.1]  Pertinent PMHx: T2DM with diabetic neuropathy, HTN, dyslipidemia, CHF, hypothyroidism s/p thyroidectomy 4/4/19    Education Needs:        [x] None identified  [] Identified - Not appropriate at this time  []  Identified and addressed - refer to education log  Learning Limitations:   [] None identified  [x] Identified: nonverbal. Communicates via pen/paper    Cultural, Restorationism & ethnic food preferences:  [x] None identified    [] Identified and addressed     ESTIMATED NUTRITION NEEDS:     Calories: 3772-4544 kcal (MSJx1-1.3) based on   [x] Actual  kg     [] IBW:   Protein:  gm (0.8-1.2 gm/kg) based on   [x] Actual BW      [] IBW:   Fluid: 1 mL/kcal     MONITORING & EVALUATION:     Nutrition Goals:   1. Nutritional needs will be met through adequate oral intake or nutrition support within the next 5 days.   Outcome:  [] Met/Ongoing    [x] Progressing towards goal    [] Not progressing towards goal    [] New/Initial goal      Monitoring:    [x] Fluid intake   [] Nutrition-focused physical findings   [x] Treatment/therapy   [] Food and beverage intake   [x] Diet order      [] Weight    [x] EN infusion    Previous Recommendations (for follow-up assessments only):     [x]   Implemented       []   Not Implemented (RD to address)      [] No Longer Appropriate     [] No Recommendation Made        Discharge Planning: goal enteral nutrition regimen via PEG    [x]  Participated in care planning, discharge planning, & interdisciplinary rounds as appropriate      Kvng Rogers RD, [address]   Pager: [phone]

## 2019-06-22 NOTE — ROUTINE PROCESS
1920 Bedside and Verbal shift change  Received from Bernabe Baer, RN (outgoing nurse), to L. Nadeen Schlatter (oncoming)  Pt. Is AOX 4. IV Sl, Pt. in  pain at this time. Report included the following information SBAR, Kardex, Procedure Summary, Intake/Output, MAR, Recent Lab Results. Will resume care and monitor Pt. Condition. Pt. Educated on call bell when in need of help and assistance. Pt. Nods for understanding understanding. 1928  Pain meds given per AM RN. 2010 Pt. Head to toe Assessment Done and documented. 2020  Suction prn    2210  Pt. Showing anxiety at this time, given ativan per MAR>    2245  OOB to BSC, devorah hazy uring. 2330  Pt. Given dilaudid per [facility] for pain management.'    0030  Pt. Resting with eyes closed, easily awaken. 0230  Suction trach, thick copious secretion. 0300  Pt. Not in distress. 0400  Pt. Given pain meds . 0530  Pt. Resting comfortably in bed, no sign of distress.

## 2019-06-23 LAB
ANION GAP SERPL CALC-SCNC: 4 MMOL/L (ref 3–18)
BASOPHILS # BLD: 0 K/UL (ref 0–0.1)
BASOPHILS NFR BLD: 0 % (ref 0–2)
BUN SERPL-MCNC: 11 MG/DL (ref 7–18)
BUN/CREAT SERPL: 16 (ref 12–20)
CALCIUM SERPL-MCNC: 8.7 MG/DL (ref 8.5–10.1)
CHLORIDE SERPL-SCNC: 102 MMOL/L (ref 100–108)
CO2 SERPL-SCNC: 32 MMOL/L (ref 21–32)
CREAT SERPL-MCNC: 0.67 MG/DL (ref 0.6–1.3)
DIFFERENTIAL METHOD BLD: ABNORMAL
EOSINOPHIL # BLD: 0.2 K/UL (ref 0–0.4)
EOSINOPHIL NFR BLD: 3 % (ref 0–5)
ERYTHROCYTE [DISTWIDTH] IN BLOOD BY AUTOMATED COUNT: 16 % (ref 11.6–14.5)
GLUCOSE BLD STRIP.AUTO-MCNC: 150 MG/DL (ref 70–110)
GLUCOSE BLD STRIP.AUTO-MCNC: 159 MG/DL (ref 70–110)
GLUCOSE BLD STRIP.AUTO-MCNC: 174 MG/DL (ref 70–110)
GLUCOSE BLD STRIP.AUTO-MCNC: 190 MG/DL (ref 70–110)
GLUCOSE BLD STRIP.AUTO-MCNC: 235 MG/DL (ref 70–110)
GLUCOSE SERPL-MCNC: 193 MG/DL (ref 74–99)
HCT VFR BLD AUTO: 36.3 % (ref 35–45)
HGB BLD-MCNC: 11.9 G/DL (ref 12–16)
LYMPHOCYTES # BLD: 2.4 K/UL (ref 0.9–3.6)
LYMPHOCYTES NFR BLD: 35 % (ref 21–52)
MCH RBC QN AUTO: 30 PG (ref 24–34)
MCHC RBC AUTO-ENTMCNC: 32.8 G/DL (ref 31–37)
MCV RBC AUTO: 91.4 FL (ref 74–97)
MONOCYTES # BLD: 0.5 K/UL (ref 0.05–1.2)
MONOCYTES NFR BLD: 8 % (ref 3–10)
NEUTS SEG # BLD: 3.8 K/UL (ref 1.8–8)
NEUTS SEG NFR BLD: 54 % (ref 40–73)
PLATELET # BLD AUTO: 217 K/UL (ref 135–420)
PMV BLD AUTO: 9.2 FL (ref 9.2–11.8)
POTASSIUM SERPL-SCNC: 3.4 MMOL/L (ref 3.5–5.5)
RBC # BLD AUTO: 3.97 M/UL (ref 4.2–5.3)
SODIUM SERPL-SCNC: 138 MMOL/L (ref 136–145)
WBC # BLD AUTO: 6.9 K/UL (ref 4.6–13.2)

## 2019-06-23 PROCEDURE — 87070 CULTURE OTHR SPECIMN AEROBIC: CPT

## 2019-06-23 PROCEDURE — 65270000029 HC RM PRIVATE

## 2019-06-23 PROCEDURE — 74011250637 HC RX REV CODE- 250/637: Performed by: HOSPITALIST

## 2019-06-23 PROCEDURE — 82962 GLUCOSE BLOOD TEST: CPT

## 2019-06-23 PROCEDURE — 74011250637 HC RX REV CODE- 250/637: Performed by: COLON & RECTAL SURGERY

## 2019-06-23 PROCEDURE — 74011636637 HC RX REV CODE- 636/637

## 2019-06-23 PROCEDURE — 74011250636 HC RX REV CODE- 250/636: Performed by: COLON & RECTAL SURGERY

## 2019-06-23 PROCEDURE — 74011000250 HC RX REV CODE- 250: Performed by: COLON & RECTAL SURGERY

## 2019-06-23 PROCEDURE — 74011250637 HC RX REV CODE- 250/637

## 2019-06-23 PROCEDURE — 74011250636 HC RX REV CODE- 250/636

## 2019-06-23 PROCEDURE — 77010033678 HC OXYGEN DAILY

## 2019-06-23 PROCEDURE — 85025 COMPLETE CBC W/AUTO DIFF WBC: CPT

## 2019-06-23 PROCEDURE — 74011636637 HC RX REV CODE- 636/637: Performed by: HOSPITALIST

## 2019-06-23 PROCEDURE — 80048 BASIC METABOLIC PNL TOTAL CA: CPT

## 2019-06-23 RX ORDER — METOPROLOL TARTRATE 5 MG/5ML
2.5 INJECTION INTRAVENOUS EVERY 6 HOURS
Status: DISCONTINUED | OUTPATIENT
Start: 2019-06-23 | End: 2019-06-23

## 2019-06-23 RX ORDER — METOPROLOL TARTRATE 25 MG/1
25 TABLET, FILM COATED ORAL 2 TIMES DAILY
Status: DISCONTINUED | OUTPATIENT
Start: 2019-06-23 | End: 2019-06-25 | Stop reason: HOSPADM

## 2019-06-23 RX ORDER — POTASSIUM CHLORIDE AND SODIUM CHLORIDE 450; 150 MG/100ML; MG/100ML
INJECTION, SOLUTION INTRAVENOUS CONTINUOUS
Status: DISCONTINUED | OUTPATIENT
Start: 2019-06-23 | End: 2019-06-25 | Stop reason: HOSPADM

## 2019-06-23 RX ORDER — METOPROLOL TARTRATE 25 MG/1
25 TABLET, FILM COATED ORAL 2 TIMES DAILY
Status: DISCONTINUED | OUTPATIENT
Start: 2019-06-23 | End: 2019-06-23

## 2019-06-23 RX ADMIN — ONDANSETRON 4 MG: 2 INJECTION INTRAMUSCULAR; INTRAVENOUS at 16:14

## 2019-06-23 RX ADMIN — INSULIN LISPRO 6 UNITS: 100 INJECTION, SOLUTION INTRAVENOUS; SUBCUTANEOUS at 00:06

## 2019-06-23 RX ADMIN — LEVOTHYROXINE SODIUM 100 MCG: 100 TABLET ORAL at 06:03

## 2019-06-23 RX ADMIN — SODIUM CHLORIDE AND POTASSIUM CHLORIDE: 4.5; 1.49 INJECTION, SOLUTION INTRAVENOUS at 12:10

## 2019-06-23 RX ADMIN — INSULIN GLARGINE 30 UNITS: 100 INJECTION, SOLUTION SUBCUTANEOUS at 08:41

## 2019-06-23 RX ADMIN — HYDROMORPHONE HYDROCHLORIDE 1 MG: 2 INJECTION INTRAMUSCULAR; INTRAVENOUS; SUBCUTANEOUS at 10:31

## 2019-06-23 RX ADMIN — FAMOTIDINE 20 MG: 10 INJECTION INTRAVENOUS at 10:27

## 2019-06-23 RX ADMIN — METOPROLOL TARTRATE 25 MG: 25 TABLET ORAL at 18:07

## 2019-06-23 RX ADMIN — INSULIN LISPRO 2 UNITS: 100 INJECTION, SOLUTION INTRAVENOUS; SUBCUTANEOUS at 12:10

## 2019-06-23 RX ADMIN — INSULIN LISPRO 3 UNITS: 100 INJECTION, SOLUTION INTRAVENOUS; SUBCUTANEOUS at 06:01

## 2019-06-23 RX ADMIN — ALUMINUM HYDROXIDE, MAGNESIUM HYDROXIDE, AND SIMETHICONE 30 ML: 200; 200; 20 SUSPENSION ORAL at 08:41

## 2019-06-23 RX ADMIN — LORAZEPAM 1 MG: 2 INJECTION INTRAMUSCULAR at 20:14

## 2019-06-23 RX ADMIN — HYDROMORPHONE HYDROCHLORIDE 1 MG: 2 INJECTION INTRAMUSCULAR; INTRAVENOUS; SUBCUTANEOUS at 06:03

## 2019-06-23 RX ADMIN — ENOXAPARIN SODIUM 40 MG: 40 INJECTION SUBCUTANEOUS at 18:06

## 2019-06-23 RX ADMIN — Medication 10 ML: at 02:19

## 2019-06-23 RX ADMIN — HYDROMORPHONE HYDROCHLORIDE 1 MG: 2 INJECTION INTRAMUSCULAR; INTRAVENOUS; SUBCUTANEOUS at 02:17

## 2019-06-23 RX ADMIN — HYDROMORPHONE HYDROCHLORIDE 1 MG: 2 INJECTION INTRAMUSCULAR; INTRAVENOUS; SUBCUTANEOUS at 21:48

## 2019-06-23 RX ADMIN — Medication 10 ML: at 06:03

## 2019-06-23 RX ADMIN — HYDROMORPHONE HYDROCHLORIDE 1 MG: 2 INJECTION INTRAMUSCULAR; INTRAVENOUS; SUBCUTANEOUS at 16:14

## 2019-06-23 RX ADMIN — FAMOTIDINE 20 MG: 10 INJECTION INTRAVENOUS at 20:17

## 2019-06-23 RX ADMIN — Medication 10 ML: at 16:24

## 2019-06-23 RX ADMIN — SODIUM CHLORIDE AND POTASSIUM CHLORIDE: 4.5; 1.49 INJECTION, SOLUTION INTRAVENOUS at 22:00

## 2019-06-23 NOTE — PROGRESS NOTES
[facility]  [address] [place] [id number]  [phone]  Colon and Rectal Surgery Progress Note      Patient: Hina Langston MRN: [id number]  SSN: [id number]    YOB: 1960  Age: 63 y.o. Sex: female      Admit Date: 6/3/2019    LOS: 20 days     Subjective:     Vomiting. Objective:     Vitals:    06/22/19 2125 06/22/19 2208 06/23/19 0557 06/23/19 0700   BP: 107/67  109/63    Pulse: 77  65    Resp: 18  19 18   Temp: 99.3 °F (37.4 °C)  98.3 °F (36.8 °C)    SpO2: 97% 96% 95%    Weight:       Height:            Intake and Output:  Current Shift: No intake/output data recorded.   Last three shifts: 06/21 1901 - 06/23 0700  In: 550   Out: 1400 [Urine:1400]    Physical Exam:     abd distended, NT    Lab/Data Review:    BMP:   Lab Results   Component Value Date/Time     06/23/2019 06:07 AM    K 3.4 (L) 06/23/2019 06:07 AM     06/23/2019 06:07 AM    CO2 32 06/23/2019 06:07 AM    AGAP 4 06/23/2019 06:07 AM     (H) 06/23/2019 06:07 AM    BUN 11 06/23/2019 06:07 AM    CREA 0.67 06/23/2019 06:07 AM    GFRAA >60 06/23/2019 06:07 AM    GFRNA >60 06/23/2019 06:07 AM      CBC:   Lab Results   Component Value Date/Time    WBC 6.9 06/23/2019 06:07 AM    HGB 11.9 (L) 06/23/2019 06:07 AM    HCT 36.3 06/23/2019 06:07 AM     06/23/2019 06:07 AM         Assessment:     S/p PEG    Plan:     Hold TF  Start IVF  May need PICC/TPN next week if continued ileus    Signed By: Angela Covington MD        June 23, 2019

## 2019-06-23 NOTE — ROUTINE PROCESS
Bedside and Verbal shift change report given to Robbin Louie RN (oncoming nurse) by Vilma Castaneda RN (offgoing nurse). Report included the following information SBAR, Kardex and MAR.

## 2019-06-23 NOTE — ROUTINE PROCESS
Patient had Metprolol 50 mg extended release. Not able to swallow because patient failed the swallowing test. Can not crush medicine. Notified Dr. Kristie Goyal. New orders to d/c metoprolol extended release and give patient metoprolol 2.5 mg IV every 6 hours.

## 2019-06-23 NOTE — PROGRESS NOTES
[facility] Hospitalist Group  Progress Note    Patient: Hina Langston Age: 63 y.o. : 1960 MR#: [id number] SSN: [id number]  Date/Time: 2019    Subjective:     Patient sitting in chair in NAD - is having increased secretions from Trach & coughing a lot      Assessment/Plan:     -DM2, uncontrolled with hyperglycemia  - s/p Tracheostomy for vocal cord paralysis  - HTN  - Dysphagia  - Morbid Obesity   - Hypothyroidism     PLAN  PEG tube placed - per surg now developing ileus - TF stopped - Nurse mentions she is passing gas & had a BM , will get KUB   NPO as per speech & she is aspirating   Increased coughing with thick yellowish secretions from Trach - CXR negative for infiltrate , sputum Cx sent , will follow   Lantus, Insulin in TPN   Monitor BP  Synthroid PO   D/w patient    Anticipate discharge to SNF/ Rehab post insurance Auth     Case discussed with:  [x]Patient  []Family  [x]Nursing  []Case Management  DVT Prophylaxis:  [x]Lovenox  []Hep SQ  []SCDs  []Coumadin   []On Heparin gtt    Objective:   VS:   Visit Vitals  /71 (BP 1 Location: Left arm, BP Patient Position: At rest)   Pulse 67   Temp 98.9 °F (37.2 °C)   Resp 19   Ht 5' 7\" (1.702 m)   Wt 122.2 kg (269 lb 8 oz)   SpO2 98%   Breastfeeding?  No   BMI 42.21 kg/m²      Tmax/24hrs: Temp (24hrs), Av °F (37.2 °C), Min:98.3 °F (36.8 °C), Max:99.4 °F (37.4 °C)    Input/Output:     Intake/Output Summary (Last 24 hours) at 2019 1430  Last data filed at 2019 0900  Gross per 24 hour   Intake 280 ml   Output 200 ml   Net 80 ml       General:  Awake, alert  Cardiovascular:  S1S2+, RRR  Pulmonary:  CTA b/l  GI:  Soft, BS+, Obese, Mild tenderness to palpation in RUQ  Extremities:  Trace  + trach edema      Labs:    Recent Results (from the past 24 hour(s))   GLUCOSE, POC    Collection Time: 19  6:30 PM   Result Value Ref Range    Glucose (POC) 239 (H) 70 - 110 mg/dL   GLUCOSE, POC    Collection Time: 19 12:04 AM   Result Value Ref Range    Glucose (POC) 235 (H) 70 - 110 mg/dL   GLUCOSE, POC    Collection Time: 06/23/19  5:59 AM   Result Value Ref Range    Glucose (POC) 174 (H) 70 - 110 mg/dL   CBC WITH AUTOMATED DIFF    Collection Time: 06/23/19  6:07 AM   Result Value Ref Range    WBC 6.9 4.6 - 13.2 K/uL    RBC 3.97 (L) 4.20 - 5.30 M/uL    HGB 11.9 (L) 12.0 - 16.0 g/dL    HCT 36.3 35.0 - 45.0 %    MCV 91.4 74.0 - 97.0 FL    MCH 30.0 24.0 - 34.0 PG    MCHC 32.8 31.0 - 37.0 g/dL    RDW 16.0 (H) 11.6 - 14.5 %    PLATELET 230 947 - 932 K/uL    MPV 9.2 9.2 - 11.8 FL    NEUTROPHILS 54 40 - 73 %    LYMPHOCYTES 35 21 - 52 %    MONOCYTES 8 3 - 10 %    EOSINOPHILS 3 0 - 5 %    BASOPHILS 0 0 - 2 %    ABS. NEUTROPHILS 3.8 1.8 - 8.0 K/UL    ABS. LYMPHOCYTES 2.4 0.9 - 3.6 K/UL    ABS. MONOCYTES 0.5 0.05 - 1.2 K/UL    ABS. EOSINOPHILS 0.2 0.0 - 0.4 K/UL    ABS.  BASOPHILS 0.0 0.0 - 0.1 K/UL    DF AUTOMATED     METABOLIC PANEL, BASIC    Collection Time: 06/23/19  6:07 AM   Result Value Ref Range    Sodium 138 136 - 145 mmol/L    Potassium 3.4 (L) 3.5 - 5.5 mmol/L    Chloride 102 100 - 108 mmol/L    CO2 32 21 - 32 mmol/L    Anion gap 4 3.0 - 18 mmol/L    Glucose 193 (H) 74 - 99 mg/dL    BUN 11 7.0 - 18 MG/DL    Creatinine 0.67 0.6 - 1.3 MG/DL    BUN/Creatinine ratio 16 12 - 20      GFR est AA >60 >60 ml/min/1.73m2    GFR est non-AA >60 >60 ml/min/1.73m2    Calcium 8.7 8.5 - 10.1 MG/DL   GLUCOSE, POC    Collection Time: 06/23/19 10:50 AM   Result Value Ref Range    Glucose (POC) 190 (H) 70 - 110 mg/dL     Additional Data Reviewed:      Signed By: Lashanda Wick MD     June 23, 2019

## 2019-06-23 NOTE — ROUTINE PROCESS
Bedside and Verbal shift change report given to Anabela Ji RN (oncoming nurse) by Marcel Sheets RN (offgoing nurse). Report included the following information SBAR, Kardex and MAR.

## 2019-06-23 NOTE — ROUTINE PROCESS
Patient tube feeding was d/c. Wanted to know if Dr. Andres Richardson wanted to continue the water flushes. Notified Dr. Andres Richardson. New orders to flush the tube feeding every 8 hours with 15 mL of water .

## 2019-06-24 ENCOUNTER — APPOINTMENT (OUTPATIENT)
Dept: GENERAL RADIOLOGY | Age: 59
DRG: 098 | End: 2019-06-24
Attending: HOSPITALIST
Payer: MEDICAID

## 2019-06-24 LAB
GLUCOSE BLD STRIP.AUTO-MCNC: 109 MG/DL (ref 70–110)
GLUCOSE BLD STRIP.AUTO-MCNC: 118 MG/DL (ref 70–110)
GLUCOSE BLD STRIP.AUTO-MCNC: 133 MG/DL (ref 70–110)
GLUCOSE BLD STRIP.AUTO-MCNC: 156 MG/DL (ref 70–110)

## 2019-06-24 PROCEDURE — 74011250637 HC RX REV CODE- 250/637: Performed by: HOSPITALIST

## 2019-06-24 PROCEDURE — 74011250636 HC RX REV CODE- 250/636: Performed by: COLON & RECTAL SURGERY

## 2019-06-24 PROCEDURE — 74011636637 HC RX REV CODE- 636/637: Performed by: HOSPITALIST

## 2019-06-24 PROCEDURE — 77010033678 HC OXYGEN DAILY

## 2019-06-24 PROCEDURE — 82962 GLUCOSE BLOOD TEST: CPT

## 2019-06-24 PROCEDURE — 77030037875 HC DRSG MEPILEX <16IN BORD MOLN -A

## 2019-06-24 PROCEDURE — 65270000029 HC RM PRIVATE

## 2019-06-24 PROCEDURE — 74011000250 HC RX REV CODE- 250: Performed by: COLON & RECTAL SURGERY

## 2019-06-24 PROCEDURE — 74011250637 HC RX REV CODE- 250/637: Performed by: COLON & RECTAL SURGERY

## 2019-06-24 PROCEDURE — 97535 SELF CARE MNGMENT TRAINING: CPT

## 2019-06-24 PROCEDURE — 74022 RADEX COMPL AQT ABD SERIES: CPT

## 2019-06-24 PROCEDURE — 94760 N-INVAS EAR/PLS OXIMETRY 1: CPT

## 2019-06-24 PROCEDURE — 74011636637 HC RX REV CODE- 636/637

## 2019-06-24 PROCEDURE — 92526 ORAL FUNCTION THERAPY: CPT

## 2019-06-24 PROCEDURE — 74011250636 HC RX REV CODE- 250/636

## 2019-06-24 RX ORDER — LEVOTHYROXINE SODIUM 75 UG/1
150 TABLET ORAL
Status: DISCONTINUED | OUTPATIENT
Start: 2019-06-25 | End: 2019-06-25 | Stop reason: HOSPADM

## 2019-06-24 RX ORDER — LORAZEPAM 1 MG/1
1 TABLET ORAL
Status: DISCONTINUED | OUTPATIENT
Start: 2019-06-24 | End: 2019-06-25 | Stop reason: HOSPADM

## 2019-06-24 RX ORDER — METOCLOPRAMIDE HYDROCHLORIDE 5 MG/ML
5 INJECTION INTRAMUSCULAR; INTRAVENOUS EVERY 6 HOURS
Status: DISCONTINUED | OUTPATIENT
Start: 2019-06-24 | End: 2019-06-25 | Stop reason: HOSPADM

## 2019-06-24 RX ORDER — OXYCODONE AND ACETAMINOPHEN 5; 325 MG/1; MG/1
1 TABLET ORAL
Status: DISCONTINUED | OUTPATIENT
Start: 2019-06-24 | End: 2019-06-25 | Stop reason: HOSPADM

## 2019-06-24 RX ADMIN — ONDANSETRON 4 MG: 2 INJECTION INTRAMUSCULAR; INTRAVENOUS at 00:19

## 2019-06-24 RX ADMIN — FAMOTIDINE 20 MG: 10 INJECTION INTRAVENOUS at 20:27

## 2019-06-24 RX ADMIN — Medication 20 ML: at 14:00

## 2019-06-24 RX ADMIN — METOPROLOL TARTRATE 25 MG: 25 TABLET ORAL at 18:47

## 2019-06-24 RX ADMIN — FAMOTIDINE 20 MG: 10 INJECTION INTRAVENOUS at 08:52

## 2019-06-24 RX ADMIN — ONDANSETRON 4 MG: 2 INJECTION INTRAMUSCULAR; INTRAVENOUS at 05:33

## 2019-06-24 RX ADMIN — LORAZEPAM 1 MG: 1 TABLET ORAL at 22:41

## 2019-06-24 RX ADMIN — INSULIN GLARGINE 30 UNITS: 100 INJECTION, SOLUTION SUBCUTANEOUS at 08:52

## 2019-06-24 RX ADMIN — LEVOTHYROXINE SODIUM 100 MCG: 100 TABLET ORAL at 05:23

## 2019-06-24 RX ADMIN — METOCLOPRAMIDE 5 MG: 5 INJECTION, SOLUTION INTRAMUSCULAR; INTRAVENOUS at 18:31

## 2019-06-24 RX ADMIN — LORAZEPAM 1 MG: 1 TABLET ORAL at 16:17

## 2019-06-24 RX ADMIN — Medication 10 ML: at 22:41

## 2019-06-24 RX ADMIN — INSULIN LISPRO 3 UNITS: 100 INJECTION, SOLUTION INTRAVENOUS; SUBCUTANEOUS at 06:44

## 2019-06-24 RX ADMIN — HYDROMORPHONE HYDROCHLORIDE 1 MG: 2 INJECTION INTRAMUSCULAR; INTRAVENOUS; SUBCUTANEOUS at 02:36

## 2019-06-24 RX ADMIN — OXYCODONE HYDROCHLORIDE AND ACETAMINOPHEN 1 TABLET: 5; 325 TABLET ORAL at 16:33

## 2019-06-24 RX ADMIN — ENOXAPARIN SODIUM 40 MG: 40 INJECTION SUBCUTANEOUS at 18:32

## 2019-06-24 RX ADMIN — METOPROLOL TARTRATE 25 MG: 25 TABLET ORAL at 08:53

## 2019-06-24 RX ADMIN — SODIUM CHLORIDE AND POTASSIUM CHLORIDE: 4.5; 1.49 INJECTION, SOLUTION INTRAVENOUS at 08:52

## 2019-06-24 RX ADMIN — HYDROMORPHONE HYDROCHLORIDE 1 MG: 2 INJECTION INTRAMUSCULAR; INTRAVENOUS; SUBCUTANEOUS at 06:37

## 2019-06-24 NOTE — PROGRESS NOTES
PT orders received, chart reviewed. Pt unable to participate with PT due to:  []  Nausea/vomiting  []  Eating  []  Pain  []  Pt lethargic  []  Off Unit  [x]  Pt refused. Pt reports her neck and throat are painful. She states she has no energy and feels nauseous. Educated pt on importance of working with therapy but pt continued to decline PT  at this time and requests for tomorrow. Pt is currently up in chair. Educated to continue mobility with nursing as well.     []  Other   Will f/u later as patient's schedule allows.  Thank you for this referral.  Brittany Morse, PT, DPT

## 2019-06-24 NOTE — ROUTINE PROCESS
2200 Patient in bed AAOX4. Tracheostomy in  Place, no respiratory distress noted. Coughing thick yellow secretions ,suctioned  X1. Patient requesting pain med gave dilaudid 1mg iv.    0010 Patient resting quietly no respiratory distress noted. 0400 Tracheostomy care done, dressing changed.

## 2019-06-24 NOTE — DIABETES MGMT
Glycemic Control Plan of Care    T2DM with current A1c level of 7.1% (6/09/2019). See separate notes, 6/10/2019, for assessment of home diabetes management and education. Patient is s/p trach on 6/03/2019. She was able to communicate by writing. Home diabetes medication: Lantus insulin (vial) 90 units daily at bedtime. POC BG range on 6/23/2019: 150-235 mg/dL. Increased lantus insulin dose from 26 to 30 units daily. Cont on very resistant dose of correctional lispro insulin. POC BG report on 6/24/2019 at time of review: 156, 133 mg/dL. Patient received 30 units of lantus insulin and received correctional insulin this AM for BG of 156 mg/dL. Noted tube feeding via PEG was stopped on 6/23/2019 because patient's abdomen was distended. Feeding ordered resume starting today, 6/24/2019, Jevity 1.5. Discussed with MARKOS Dejesus, she will start start patient's tube feeding as ordered. Noted plan transfer to acute rehab unit. Recommendation(s):  1.) Cont inpatient glycemic monitoring and intervention. Assessment:  Patient is 63year old with past medical history including type 2 diabetes mellitus with neuropathy, sleep apnea, s/p aortic valve replacement, morbid obesity, hypothyroidism, hypertension, chronic CHF and s/p thyroidectomy - was admitted on 6/03/2019 with report of stridor. Noted:  Vocal cord dysfunction. Status post tracheostomy on 6/03/2019. Hypothyroidism. Recent thyroidectomy on 4/11/2019. Morbid obesity. T2DM with current A1c of 8.4% (4/04/2019). Most recent blood glucose values:    Results for Anam Ching (MRN [id number]) as of 6/24/2019 13:28   Ref. Range 6/23/2019 00:04 6/23/2019 05:59 6/23/2019 10:50 6/23/2019 18:03 6/23/2019 21:34   GLUCOSE,FAST - POC Latest Ref Range: 70 - 110 mg/dL 235 (H) 174 (H) 190 (H) 159 (H) 150 (H)     Results for Anam Ching (MRN [id number]) as of 6/24/2019 13:28   Ref.  Range 6/24/2019 05:44 6/24/2019 11:27   GLUCOSE,FAST - POC Latest Ref Range: 70 - 110 mg/dL 156 (H) 133 (H)     Current A1C: 7.1% (6/09/2019) which is equivalent to estimated average blood glucose of 153 mg/dL during the past 2-3 months. Current hospital diabetes medications:  Basal lantus insulin 30 units daily. Correctional lispro insulin every 6 hours. Very resistant dose. Total daily dose insulin requirement previous day: 6/23/2019:  Lantus: 30 units  Lispro: 11 units  TDD insulin: 41 units    Home diabetes medications: Obtained from patient on 6/06/2019:  Lantus insulin (trach, patient unable to speak but nodded head when I asked if she's on vial insulin). She nodded again when I asked if she's on 90 units of lantus daily at bedtime and she has meter monitoring her blood sugar. Diet: Jevity 1.5 tube feeding via PEG to be started. Goals:  Blood glucose will be within target range of  mg/dL by 6/27/2019.     Education:    _X__  Refer to Diabetes Education Record: 6/10/2019  ___  Education not indicated at this time    Evan Fletcher RN  Pager: [phone]

## 2019-06-24 NOTE — PROGRESS NOTES
Bedside and Verbal shift change report given to Michel Stephenson RN (oncoming nurse) by Keith Wallis RN (offgoing nurse). Report included the following information SBAR and Kardex.

## 2019-06-24 NOTE — PROGRESS NOTES
[facility] Hospitalist Group  Progress Note    Patient: Jeramie Brunson Age: 63 y.o. : 1960 MR#: [id number] SSN: [id number]  Date/Time: 2019    Subjective:     Patient sitting in chair in NAD - is having increased secretions from Trach & coughing a lot      Assessment/Plan:     -DM2, uncontrolled with hyperglycemia  - s/p Tracheostomy for vocal cord paralysis  - HTN  - Dysphagia  - Morbid Obesity   - Hypothyroidism     PLAN  S/p PEG tube - TF resumed , per nursing no residuals noted - doubt if she really had an ileus , stopped narcotics , encouraged to move   NPO as per speech & she is aspirating   Increased coughing with thick yellowish secretions from Trach - CXR negative for infiltrate , sputum Cx sent , will follow   Lantus, Insulin in TPN - BS monitored   Monitor BP  Synthroid PO   D/w patient    Anticipate discharge to SNF/ Rehab in Am , if no new events overnight     Case discussed with:  [x]Patient  []Family  [x]Nursing  []Case Management  DVT Prophylaxis:  [x]Lovenox  []Hep SQ  []SCDs  []Coumadin   []On Heparin gtt    Objective:   VS:   Visit Vitals  /55 (BP 1 Location: Right arm, BP Patient Position: At rest)   Pulse 77   Temp 99.7 °F (37.6 °C)   Resp 18   Ht 5' 7\" (1.702 m)   Wt 122.2 kg (269 lb 8 oz)   SpO2 98%   Breastfeeding?  No   BMI 42.21 kg/m²      Tmax/24hrs: Temp (24hrs), Av.8 °F (37.1 °C), Min:98.4 °F (36.9 °C), Max:99.7 °F (37.6 °C)    Input/Output:     Intake/Output Summary (Last 24 hours) at 2019 1529  Last data filed at 2019 4611  Gross per 24 hour   Intake 2236.67 ml   Output 650 ml   Net 1586.67 ml       General:  Awake, alert  Cardiovascular:  S1S2+, RRR  Pulmonary:  CTA b/l  GI:  Soft, BS+, Obese, Mild tenderness to palpation in RUQ  Extremities:  Trace  + trach edema      Labs:    Recent Results (from the past 24 hour(s))   GLUCOSE, POC    Collection Time: 19  6:03 PM   Result Value Ref Range    Glucose (POC) 159 (H) 70 - 110 mg/dL   GLUCOSE, POC    Collection Time: 06/23/19  9:34 PM   Result Value Ref Range    Glucose (POC) 150 (H) 70 - 110 mg/dL   GLUCOSE, POC    Collection Time: 06/24/19  5:44 AM   Result Value Ref Range    Glucose (POC) 156 (H) 70 - 110 mg/dL   GLUCOSE, POC    Collection Time: 06/24/19 11:27 AM   Result Value Ref Range    Glucose (POC) 133 (H) 70 - 110 mg/dL     Additional Data Reviewed:      Signed By: Talha iKrby MD     June 24, 2019

## 2019-06-24 NOTE — PROGRESS NOTES
Surgery  Nausea and mild abdominal distention over the weekend despite flatus and stools  Afebrile stable vital signs  PEG tube in good position and functional feeding stopped over the weekend but will resume. No real change in abdominal exam  Encouraged out of bed. We will minimize narcotics, increased Synthroid to 150 mcg daily  Rehab in the next few days we will do her some good.   Continue tube feeds at highest rate possible

## 2019-06-24 NOTE — PROGRESS NOTES
ARU/IPR REFERRAL CONTACT NOTE  [id number] [place] [address] for Physical Rehabilitation    Clarissa Howell   Thank you for the opportunity to review this patient's case for admission to [address] [place] [address] Physical Rehabilitation. Based on our pre-admission screening:     [x ] Our Team/Medical Director is following this case. Comments: Received authorization from [facility] for admission to [facility]. Patient will need to be off all IV pain medication with pain controlled with pills via the peg tube and for the patient to be medically stable. Will continue to follow      Again, Thank you for this referral. Should you have any questions please do not hesitate to call. Sincerely,  Ayo Aaron. Rebeca Mosher, [address]   Rebeca Mosher, RN  Admissions [facility] for Physical Rehabilitation  [phone]

## 2019-06-24 NOTE — PROGRESS NOTES
Problem: Dysphagia (Adult)  Goal: *Acute Goals and Plan of Care (Insert Text)  Description  Patient will:  1. Tolerate PO trials with 0 s/s overt distress in 4/5 trials  2. Utilize compensatory swallow strategies/maneuvers (decrease bite/sip, size/rate, alt. liq/sol) with min cues in 4/5 trials  3. Perform oral-motor/laryngeal exercises to increase oropharyngeal swallow function with min cues  4. Complete an objective swallow study (i.e., MBSS) to assess swallow integrity, r/o aspiration, and determine of safest LRD, min A as indicated/ordered by MD     Recommend:   NPO with PEG  Strict aspiration precautions (HOB >30 degrees at all times, Oral care TID)     Outcome: Progressing Towards Goal    SPEECH LANGUAGE PATHOLOGY DYSPHAGIA TREATMENT    Patient: Michi Yeung (75 y.o. female)  Date: 6/24/2019  Diagnosis: Stridor [R06.1]  Stridor [R06.1] <principal problem not specified>  Procedure(s) (LRB):  PERCUTANEOUS ENDOSCOPIC GASTROSTOMY TUBE INSERTION (N/A) 4 Days Post-Op  Precautions: Aspiration, Fall(trach collar)  PLOF:Regular diet with thin liquids     ASSESSMENT:  Pt seen for follow up dysphagia tx with dtr and brother present at bedside. Pt alert, nauseated but attempting participation in therapy this am.  Pt requesting ice chips, demo delayed wet/weak cough on x1 ice chip presentation. Attempted pharyngeal exercise program; however, pt requesting hold due to nausea. Extensive training and education completed with pt and family regarding MBS results, aspiration risk, diet recs, oral care and positioning. Pt and family able to verbalize understanding. Will continue to follow for further dysphagia management as indicated. Progression toward goals:  ?         Improving appropriately and progressing toward goals  ? Improving slowly and progressing toward goals  ?          Not making progress toward goals and plan of care will be adjusted     PLAN:  Recommendations and Planned Interventions:  Patient continues to benefit from skilled intervention to address the above impairments. Continue treatment per established plan of care. Discharge Recommendations:  Skilled Nursing Facility     SUBJECTIVE:   Patient wrote \"I don't feel good? .    OBJECTIVE:   Cognitive and Communication Status:  Neurologic State: Alert  Orientation Level: Oriented X4  Cognition: Appropriate decision making  Perception: Appears intact  Perseveration: No perseveration noted  Safety/Judgement: Fall prevention, Awareness of environment  Dysphagia Treatment:  P.O. Trials:   Vocal quality prior to P.O.: Aphonia related to trach placement    Consistency Presented:  Ice chip   How Presented: Self-fed/presented, Spoon   How much:  x1 ice chip   Bolus Acceptance: No impairment   Bolus Formation/Control: Impaired   Type of Impairment: Delayed   Propulsion: Delayed (# of seconds)   Oral Residue: None   Initiation of Swallow: Delayed (# of seconds)   Laryngeal Elevation: Decreased, Weak   Aspiration Signs/Symptoms: Weak cough, Delayed cough/throat clear, Change vocal quality   Pharyngeal Phase Characteristics: Feeling of discomfort, Poor endurance, Altered vocal quality   Effective Modifications: None     PAIN:  No pain reported prior to or following tx     After treatment:   ?              Patient left in no apparent distress sitting up in chair  ? Patient left in no apparent distress in bed  ? Call bell left within reach  ? Nursing notified  ? Family present  ? Caregiver present  ? Bed alarm activated      COMMUNICATION/EDUCATION:   ? Aspiration precautions; swallow safety; compensatory techniques  ? Patient/family able to participate in training and education   ? Patient unable to participate in training and education, education ongoing with staff   ?  Patient understands goals and intent of therapy; neutral about participation     Orin Grubbs M.S., [id number] [facility]  Speech-Language Pathologist

## 2019-06-24 NOTE — PROGRESS NOTES
Discharge planning      Reviewed chart. Met with pt and daughter concerning discharge plan. Explained that ARU received insurance authorization. Julien Howard has discussed with MD needs for transition. Plan is to admit to ARU on Tuesday.     JACK Morales, RN  Pager # [phone]  Care Manager

## 2019-06-24 NOTE — PROGRESS NOTES
Problem: Self Care Deficits Care Plan (Adult)  Goal: *Acute Goals and Plan of Care (Insert Text)  Description  Occupational Therapy Goals  Initiated 6/5/2019, re-evaluated on 6/14/19 and 6/21/2019 within 7 day(s). Continue all previously set goals    1. Patient will perform lower body dressing with supervision/set-up. 2.  Patient will perform functional task in standing for 3 minutes with supervision for balance. 3.  Patient will perform toilet transfers with supervision/set-up. 4.  Patient will participate in upper extremity therapeutic exercise/activities with supervision/set-up for 8 minutes to increase strength/endurance for ADLs. Prior Level of Function: Pt was modified independent with basic self care tasks and used two canes for functional mobility PTA. Outcome: Progressing Towards Goal   OCCUPATIONAL THERAPY TREATMENT    Patient: Victoria Dutton (33 y.o. female)  Date: 6/24/2019  Diagnosis: Stridor [R06.1]  Stridor [R06.1] <principal problem not specified>  Procedure(s) (LRB):  PERCUTANEOUS ENDOSCOPIC GASTROSTOMY TUBE INSERTION (N/A) 4 Days Post-Op  Precautions: Fall(trach collar)  PLOF: Independent    Chart, occupational therapy assessment, plan of care, and goals were reviewed. ASSESSMENT:  Pt OOB seated in chair upon entry. Pt requires max encouragement for minimal participation. Hospital gown saturated w/secretions from trach. Pt requires assist w/UB dressing tasks 2/2 O2 tubing mgt. Pt educated on importance participation to maximize functional gains w/ADLs. Progression toward goals:  ?          Improving appropriately and progressing toward goals  ? Improving slowly and progressing toward goals  ? Not making progress toward goals and plan of care will be adjusted     PLAN:  Patient continues to benefit from skilled intervention to address the above impairments. Continue treatment per established plan of care.   Discharge Recommendations:  Inpatient Rehab  Further Equipment Recommendations for Discharge:  shower chair and rolling walker     SUBJECTIVE:   Patient stated ? I was just getting ready to take a nap. ?    OBJECTIVE DATA SUMMARY:   Cognitive/Behavioral Status:  Neurologic State: Alert  Orientation Level: Oriented X4  Cognition: Follows commands  Safety/Judgement: Fall prevention, Awareness of environment    Functional Mobility and Transfers for ADLs:  Balance:  Sitting: Intact    ADL Intervention:  Upper Body [address]: Stand-by assistance    Pain:  Pain level pre-treatment: 0/10   Pain level post-treatment: 0/10  Pt reports throat hurting, although pain not rated. Activity Tolerance:    Poor    Please refer to the flowsheet for vital signs taken during this treatment. After treatment:   ?  Patient left in no apparent distress sitting up in chair  ? Patient left in no apparent distress in bed  ? Call bell left within reach  ? Nursing notified  ? Caregiver present  ? Bed alarm activated    COMMUNICATION/EDUCATION:   ? Role of Occupational Therapy in the acute care setting  ? Home safety education was provided and the patient/caregiver indicated understanding. ? Patient/family have participated as able in working towards goals and plan of care. ? Patient/family agree to work toward stated goals and plan of care. ? Patient understands intent and goals of therapy, but is neutral about his/her participation. ? Patient is unable to participate in goal setting and plan of care.       Thank you for this referral.  SHIVANI Soler  Time Calculation: 8 mins

## 2019-06-24 NOTE — PROGRESS NOTES
NUTRITION CONSULT    Nutrition Consult: Management of Tube Feeding, TPN: RD to Manage (discontinued)     RECOMMENDATIONS / PLAN:     - Resume tube feeding of Jevity 1.5 at 20 mL/hr and water flushes of 75 mL q 6 hours. If pt tolerates, recommend advancing by 10 mL q 8 hours towards goal.   - Continue IVF until tube feeding can be advanced to goal.   - Continue RD inpatient monitoring and evaluation. Goal Regimen: Jevity 1.5 at 60 mL/hr + 150 mL q 4 hour water flushes to provide: 2160 kcal, 92 gm protein, 72 gm fat, 311 gm CHO, 32 gm fiber, 1094 mL free water, 1994 mL total water, 100% RDIs     NUTRITION DIAGNOSIS & INTERVENTIONS:     [x] Enteral nutrition: resume, advance if tolerated  [x] IV Fluids: continue until tube feeding can be advanced  [x] Collaboration and referral of nutrition care: discussed tube feeding with surgery, nurse and hospitalist    Nutrition Diagnosis: Swallowing difficulty related to dysphagia s/p tracheostomy as evidenced by pt NPO after SLP evaluation/MBS. ASSESSMENT:     6/24: TF held yesterday 2/2 concern for ileus. Pt haivng +BMs and flatus, surgery/hospitalist do not suspect ileus, okay to resume TF at low rate. Lantus increased, BG decreased, but still elevated, however pt was not receiving TF.   6/22: Abdomen distended, +flatus and small BM. Was receiving TF at goal, decreased to 30 mL/hr by surgery 2/2 distension and pt report of emesis; plan for advancement tomorrow if no emesis. No known emesis observed by RD or documented by nurse yesterday. Pepcid changed to Mylanta. Water flushes increased. BG levels elevated, by TF now decreased, will monitor. 6/21: Tolerating TF, but c/o originally with advancement to 30 mL/hr. Pt with concerns about whether Jevity contains sucrose, discussed with pt that it does not, pt receptive. BG levels elevated, lantus increased. Addendum : Pt refusing Prosource.  Discussed increasing tube feeding, pt in agreement, complains that the tube feeding is giving her heartburn. Currently at 40 mL/hr. Has order for pepcid, states it isn't working, informed Dr. Demi Santamaria. 6/20: S/p PEG placement. Will stop TPN and start tube feeds today. Disposition- plan for discharge to SNF versus rehab facility. 6/19: NPO s/p repeat MBS this morning and GI consulted for PEG placement, tentatively planned for tomorrow. Discharge planning underway with plan for transfer to ARU.   6/18: Discussed disposition with - likely to discharge to ARU which will not accept if pt remains on TPN- TPN likely to stop in the next several days per MD and diet restarted by Surgery; SLP recommended NPO after swallow evaluation this morning, working with pt on PMV placement. 6/17: BG remains elevated and pt NPO.   6/16: BG levels trending down, but remain high. Made NPO yesterday per MD  6/15: Tolerating PN. Did not eat breakfast or lunch today because afraid to eat. Also c/o gassiness. BG levels remain elevated  6/14: Tolerating PN. Did not eat breakfast, but consumed most of lunch, which consisted of cream soup, mashed potatoes and pureed peas. Pt requesting to thicken liquids herself, despite order for thin liquids. BG levels elevated. 6/13: PICC placed to initiate TPN per MD request.   6/11: Pt with variable meal intake. Reports some difficulty tolerating certain foods, but ate 50% of breakfast this AM.  Encourage pt to ask for soft foods as desired. Diet changed to diabetic, BG in better control. 6/10: Was tolerating tube feeds until pt pulled NGT 6/8, receiving IVF since then. Diet started today by MD.  Pt reports being hungry. Pt with intolerance to artificial sweeteners, monitor BG levels and assess need for diabetic diet. 6/7: Formula changed to Jevity 1.5 prior to initiation 2/2 concern for sucrose sensitivity. Will continue current product due to questionable sensitivity of ingredients in Glucerna tube feeding.   Tolerating rate at goal.  Elevated BG levels, but expected 2/2 formula needing to be given, discussed with MD and glycemin control, will attempt to manage BG with medication due to formula limitations. IVF discontinued. 6/6: Failed swallow evaluation/MBS; repeat MBS ordered for today. C/o gas pains, but is passing flatus, and +BM. Some abdominal distension noted. Asking for water. DHT placed by IR, no tube feeding ordered at this time. 6/5: Admitted with stridor and vocal cord dysfunction s/p tracheostomy placement on 6/3/19, tolerating trach collar and able to eat if passes swallow evaluation per MD- may deflate cuff for meals but must re-inflate cuff when pt is not eating per Surgery. Pt refused NGT placement.     EN infusion adequate to meet patients estimated nutritional needs:   [] Yes     [x] No   [] Unable to determine at this time    Tube Feeding: Jevity 1.5 at 30 mL/hr (stopped)  Water Flushes: 15 mL q 8 hours  Residuals: 0 mL  Diet: DIET NPO      Food Allergies: sweeteners, sucrose  Current Appetite:   [] Good     [] Fair     [] Poor     [x] Other: NPO  Appetite/meal intake prior to admission:   [x] Good     [] Fair     [] Poor     [] Other:  Feeding Limitations:  [x] Swallowing difficulty: SLP following    [] Chewing difficulty    [x] Other: hx of dysphagia s/p thyroidectomy PTA and trach placement 6/3/19  Current Meal Intake: Patient Vitals for the past 100 hrs:   % Diet Eaten   06/24/19 0847 0 %   06/23/19 1734 0 %   06/23/19 1452 0 %   06/23/19 0827 0 %   06/22/19 1841 0 %   06/22/19 1319 0 %   06/22/19 0916 0 %   06/21/19 1159 0 %   06/21/19 0843 0 %   06/20/19 1801 0 %   06/20/19 0821 0 %       BM: 6/23  Skin Integrity: surgical incision to neck  Edema:  [x] No     [] Yes   Pertinent Medications: Reviewed: zofran, phenergan, SSI q 6 hours very resistant scale, 30 units lantus, ativan, pepcid     Labs:   Recent Labs     06/23/19  0607 06/22/19  0610    137   K 3.4* 3.9    102   CO2 32 30   * 300*   BUN 11 13   CREA 0.67 0. 81   CA 8.7 8.6   MG  --  2.2   PHOS  --  3.7   Prealbumin: 12.9 mg/dL (6/14/19)  Triglyceride: 298 mg/dL (6/18/19), 316 mg/dL (6/13/19), 511 mg/dL (4/16/19)  C reactive protein: 4.8 mg/dL (6/18/19)  Recent Labs     06/23/19  0607 06/22/19  0610   WBC 6.9 9.1   HGB 11.9* 12.6   HCT 36.3 38.4    224         Intake/Output Summary (Last 24 hours) at 6/24/2019 1122  Last data filed at 6/24/2019 0852  Gross per 24 hour   Intake 2236.67 ml   Output 1150 ml   Net 1086.67 ml       Anthropometrics:   Ht Readings from Last 1 Encounters:   06/03/19 5' 7\" (1.702 m)     Last 3 Recorded Weights in this Encounter    06/19/19 1926 06/20/19 2030 06/22/19 0806   Weight: 118.8 kg (261 lb 12.8 oz) 122.8 kg (270 lb 12.8 oz) 122.2 kg (269 lb 8 oz)     Body mass index is 42.21 kg/m². Obese, Class III    Weight History: patient denies change in weight PTA, stable     Weight Metrics 6/22/2019 6/3/2019 5/23/2019 5/22/2019 5/16/2019 4/26/2019 4/16/2019   Weight 269 lb 8 oz - 260 lb 6.4 oz - 258 lb 256 lb 260 lb 9.6 oz   BMI - 42.21 kg/m2 - 40.78 kg/m2 40.41 kg/m2 40.1 kg/m2 -          Admitting Diagnosis: Stridor [R06.1]  Stridor [R06.1]  Pertinent PMHx: T2DM with diabetic neuropathy, HTN, dyslipidemia, CHF, hypothyroidism s/p thyroidectomy 4/4/19    Education Needs:        [x] None identified  [] Identified - Not appropriate at this time  []  Identified and addressed - refer to education log  Learning Limitations:   [] None identified  [x] Identified: nonverbal. Communicates via pen/paper    Cultural, Buddhism & ethnic food preferences:  [x] None identified    [] Identified and addressed     ESTIMATED NUTRITION NEEDS:     Calories: 5099-1019 kcal (MSJx1-1.3) based on   [x] Actual  kg     [] IBW:   Protein:  gm (0.8-1.2 gm/kg) based on   [x] Actual BW      [] IBW:   Fluid: 1 mL/kcal     MONITORING & EVALUATION:     Nutrition Goals:   1.  Nutritional needs will be met through adequate oral intake or nutrition support within the next 5 days.   Outcome:  [] Met/Ongoing    [x] Progressing towards goal    [] Not progressing towards goal    [] New/Initial goal      Monitoring:    [x] Fluid intake   [] Nutrition-focused physical findings   [x] Treatment/therapy   [] Food and beverage intake   [x] Diet order      [] Weight    [x] EN infusion    Previous Recommendations (for follow-up assessments only):     [x]   Implemented       [x]   Not Implemented (RD to address)      [] No Longer Appropriate     [] No Recommendation Made        Discharge Planning: goal enteral nutrition regimen via PEG    [x]  Participated in care planning, discharge planning, & interdisciplinary rounds as appropriate      Gita Hayes, [address]   Pager: [phone]

## 2019-06-25 ENCOUNTER — HOSPITAL ENCOUNTER (INPATIENT)
Age: 59
LOS: 17 days | Discharge: HOME HEALTH | DRG: 254 | End: 2019-07-12
Attending: EMERGENCY MEDICINE | Admitting: EMERGENCY MEDICINE
Payer: MEDICAID

## 2019-06-25 VITALS
BODY MASS INDEX: 42.3 KG/M2 | DIASTOLIC BLOOD PRESSURE: 67 MMHG | OXYGEN SATURATION: 99 % | TEMPERATURE: 98.1 F | WEIGHT: 269.5 LBS | HEIGHT: 67 IN | HEART RATE: 63 BPM | RESPIRATION RATE: 18 BRPM | SYSTOLIC BLOOD PRESSURE: 112 MMHG

## 2019-06-25 DIAGNOSIS — R09.02 HYPOXEMIA REQUIRING SUPPLEMENTAL OXYGEN: ICD-10-CM

## 2019-06-25 DIAGNOSIS — M47.816 LUMBAR FACET ARTHROPATHY: ICD-10-CM

## 2019-06-25 DIAGNOSIS — Z79.4 TYPE 2 DIABETES MELLITUS WITH DIABETIC NEUROPATHY, WITH LONG-TERM CURRENT USE OF INSULIN (HCC): Chronic | ICD-10-CM

## 2019-06-25 DIAGNOSIS — M54.5 CHRONIC MIDLINE LOW BACK PAIN, WITH SCIATICA PRESENCE UNSPECIFIED: ICD-10-CM

## 2019-06-25 DIAGNOSIS — F32.A DEPRESSION, UNSPECIFIED DEPRESSION TYPE: Chronic | ICD-10-CM

## 2019-06-25 DIAGNOSIS — G89.4 CHRONIC PAIN SYNDROME: ICD-10-CM

## 2019-06-25 DIAGNOSIS — J38.02 BILATERAL VOCAL CORD PARALYSIS: Primary | ICD-10-CM

## 2019-06-25 DIAGNOSIS — G47.00 INSOMNIA, UNSPECIFIED TYPE: Chronic | ICD-10-CM

## 2019-06-25 DIAGNOSIS — J45.40 MODERATE PERSISTENT ASTHMA, UNSPECIFIED WHETHER COMPLICATED: Chronic | ICD-10-CM

## 2019-06-25 DIAGNOSIS — F41.0 PANIC ATTACKS: ICD-10-CM

## 2019-06-25 DIAGNOSIS — I11.9 HYPERTENSIVE HEART DISEASE WITHOUT HEART FAILURE: Chronic | ICD-10-CM

## 2019-06-25 DIAGNOSIS — Z99.81 HYPOXEMIA REQUIRING SUPPLEMENTAL OXYGEN: ICD-10-CM

## 2019-06-25 DIAGNOSIS — Z93.1 STATUS POST INSERTION OF PERCUTANEOUS ENDOSCOPIC GASTROSTOMY (PEG) TUBE (HCC): ICD-10-CM

## 2019-06-25 DIAGNOSIS — E11.40 TYPE 2 DIABETES MELLITUS WITH DIABETIC NEUROPATHY, WITH LONG-TERM CURRENT USE OF INSULIN (HCC): Chronic | ICD-10-CM

## 2019-06-25 DIAGNOSIS — E03.9 ACQUIRED HYPOTHYROIDISM: Chronic | ICD-10-CM

## 2019-06-25 DIAGNOSIS — Z78.9 IMPAIRED MOBILITY AND ADLS: ICD-10-CM

## 2019-06-25 DIAGNOSIS — G89.29 CHRONIC PAIN OF BOTH SHOULDERS: ICD-10-CM

## 2019-06-25 DIAGNOSIS — M25.562 CHRONIC PAIN OF BOTH KNEES: ICD-10-CM

## 2019-06-25 DIAGNOSIS — M25.512 CHRONIC PAIN OF BOTH SHOULDERS: ICD-10-CM

## 2019-06-25 DIAGNOSIS — Z74.09 IMPAIRED MOBILITY AND ADLS: ICD-10-CM

## 2019-06-25 DIAGNOSIS — F41.9 ANXIETY: ICD-10-CM

## 2019-06-25 DIAGNOSIS — M25.511 CHRONIC PAIN OF BOTH SHOULDERS: ICD-10-CM

## 2019-06-25 DIAGNOSIS — Z98.890 HISTORY OF TRACHEOSTOMY: ICD-10-CM

## 2019-06-25 DIAGNOSIS — M25.561 CHRONIC PAIN OF BOTH KNEES: ICD-10-CM

## 2019-06-25 DIAGNOSIS — R13.10 DYSPHAGIA, UNSPECIFIED TYPE: ICD-10-CM

## 2019-06-25 DIAGNOSIS — J38.3 VOCAL CORD DYSFUNCTION: ICD-10-CM

## 2019-06-25 DIAGNOSIS — M51.36 LUMBAR DEGENERATIVE DISC DISEASE: ICD-10-CM

## 2019-06-25 DIAGNOSIS — M47.816 SPONDYLOSIS OF LUMBAR REGION WITHOUT MYELOPATHY OR RADICULOPATHY: ICD-10-CM

## 2019-06-25 DIAGNOSIS — G47.33 OBSTRUCTIVE SLEEP APNEA: Chronic | ICD-10-CM

## 2019-06-25 DIAGNOSIS — J96.11 CHRONIC RESPIRATORY FAILURE WITH HYPOXIA (HCC): ICD-10-CM

## 2019-06-25 DIAGNOSIS — G89.29 CHRONIC MIDLINE LOW BACK PAIN, WITH SCIATICA PRESENCE UNSPECIFIED: ICD-10-CM

## 2019-06-25 DIAGNOSIS — E89.0 HISTORY OF THYROIDECTOMY: ICD-10-CM

## 2019-06-25 DIAGNOSIS — G89.29 CHRONIC PAIN OF BOTH KNEES: ICD-10-CM

## 2019-06-25 PROBLEM — J96.10 CHRONIC RESPIRATORY FAILURE (HCC): Status: ACTIVE | Noted: 2019-06-25

## 2019-06-25 LAB
ANION GAP SERPL CALC-SCNC: ABNORMAL MMOL/L (ref 3–18)
BACTERIA SPEC CULT: NORMAL
BASOPHILS # BLD: 0.1 K/UL (ref 0–0.1)
BASOPHILS NFR BLD: 1 % (ref 0–2)
BUN SERPL-MCNC: 6 MG/DL (ref 7–18)
BUN/CREAT SERPL: 9 (ref 12–20)
CALCIUM SERPL-MCNC: 8 MG/DL (ref 8.5–10.1)
CHLORIDE SERPL-SCNC: 107 MMOL/L (ref 100–108)
CO2 SERPL-SCNC: 32 MMOL/L (ref 21–32)
CREAT SERPL-MCNC: 0.67 MG/DL (ref 0.6–1.3)
DIFFERENTIAL METHOD BLD: ABNORMAL
EOSINOPHIL # BLD: 0.2 K/UL (ref 0–0.4)
EOSINOPHIL NFR BLD: 3 % (ref 0–5)
ERYTHROCYTE [DISTWIDTH] IN BLOOD BY AUTOMATED COUNT: 16 % (ref 11.6–14.5)
GLUCOSE BLD STRIP.AUTO-MCNC: 143 MG/DL (ref 70–110)
GLUCOSE BLD STRIP.AUTO-MCNC: 152 MG/DL (ref 70–110)
GLUCOSE BLD STRIP.AUTO-MCNC: 171 MG/DL (ref 70–110)
GLUCOSE SERPL-MCNC: 131 MG/DL (ref 74–99)
GRAM STN SPEC: NORMAL
HCT VFR BLD AUTO: 36.6 % (ref 35–45)
HGB BLD-MCNC: 11.7 G/DL (ref 12–16)
LYMPHOCYTES # BLD: 1.9 K/UL (ref 0.9–3.6)
LYMPHOCYTES NFR BLD: 29 % (ref 21–52)
MCH RBC QN AUTO: 29.5 PG (ref 24–34)
MCHC RBC AUTO-ENTMCNC: 32 G/DL (ref 31–37)
MCV RBC AUTO: 92.4 FL (ref 74–97)
MONOCYTES # BLD: 0.5 K/UL (ref 0.05–1.2)
MONOCYTES NFR BLD: 7 % (ref 3–10)
NEUTS SEG # BLD: 3.8 K/UL (ref 1.8–8)
NEUTS SEG NFR BLD: 60 % (ref 40–73)
PLATELET # BLD AUTO: 208 K/UL (ref 135–420)
PMV BLD AUTO: 9.3 FL (ref 9.2–11.8)
POTASSIUM SERPL-SCNC: 3.7 MMOL/L (ref 3.5–5.5)
RBC # BLD AUTO: 3.96 M/UL (ref 4.2–5.3)
SERVICE CMNT-IMP: NORMAL
SODIUM SERPL-SCNC: 138 MMOL/L (ref 136–145)
WBC # BLD AUTO: 6.3 K/UL (ref 4.6–13.2)

## 2019-06-25 PROCEDURE — 74011000250 HC RX REV CODE- 250: Performed by: COLON & RECTAL SURGERY

## 2019-06-25 PROCEDURE — 82962 GLUCOSE BLOOD TEST: CPT

## 2019-06-25 PROCEDURE — 74011250637 HC RX REV CODE- 250/637: Performed by: HOSPITALIST

## 2019-06-25 PROCEDURE — 74011250636 HC RX REV CODE- 250/636

## 2019-06-25 PROCEDURE — 74011250636 HC RX REV CODE- 250/636: Performed by: EMERGENCY MEDICINE

## 2019-06-25 PROCEDURE — 85025 COMPLETE CBC W/AUTO DIFF WBC: CPT

## 2019-06-25 PROCEDURE — 92526 ORAL FUNCTION THERAPY: CPT

## 2019-06-25 PROCEDURE — 74011636637 HC RX REV CODE- 636/637: Performed by: HOSPITALIST

## 2019-06-25 PROCEDURE — 74011250637 HC RX REV CODE- 250/637

## 2019-06-25 PROCEDURE — 36592 COLLECT BLOOD FROM PICC: CPT

## 2019-06-25 PROCEDURE — 74011636637 HC RX REV CODE- 636/637

## 2019-06-25 PROCEDURE — 77030009834 HC MSK O2 TRACH VYRM -A

## 2019-06-25 PROCEDURE — 74011250637 HC RX REV CODE- 250/637: Performed by: EMERGENCY MEDICINE

## 2019-06-25 PROCEDURE — 77030018846 HC SOL IRR STRL H20 ICUM -A

## 2019-06-25 PROCEDURE — 77030037875 HC DRSG MEPILEX <16IN BORD MOLN -A

## 2019-06-25 PROCEDURE — 74011250636 HC RX REV CODE- 250/636: Performed by: COLON & RECTAL SURGERY

## 2019-06-25 PROCEDURE — 80048 BASIC METABOLIC PNL TOTAL CA: CPT

## 2019-06-25 PROCEDURE — 74011636637 HC RX REV CODE- 636/637: Performed by: EMERGENCY MEDICINE

## 2019-06-25 PROCEDURE — 65310000000 HC RM PRIVATE REHAB

## 2019-06-25 RX ORDER — POTASSIUM CHLORIDE AND SODIUM CHLORIDE 450; 150 MG/100ML; MG/100ML
INJECTION, SOLUTION INTRAVENOUS CONTINUOUS
Status: DISCONTINUED | OUTPATIENT
Start: 2019-06-25 | End: 2019-07-07

## 2019-06-25 RX ORDER — DOCUSATE SODIUM 100 MG/1
100 CAPSULE, LIQUID FILLED ORAL 2 TIMES DAILY
Status: DISCONTINUED | OUTPATIENT
Start: 2019-06-25 | End: 2019-07-05

## 2019-06-25 RX ORDER — METOCLOPRAMIDE 5 MG/1
5 TABLET ORAL 3 TIMES DAILY
Status: DISCONTINUED | OUTPATIENT
Start: 2019-06-25 | End: 2019-07-08

## 2019-06-25 RX ORDER — FACIAL-BODY WIPES
10 EACH TOPICAL
Status: DISCONTINUED | OUTPATIENT
Start: 2019-06-25 | End: 2019-07-11

## 2019-06-25 RX ORDER — OXYCODONE AND ACETAMINOPHEN 5; 325 MG/1; MG/1
1 TABLET ORAL
Status: DISCONTINUED | OUTPATIENT
Start: 2019-06-25 | End: 2019-07-13 | Stop reason: HOSPADM

## 2019-06-25 RX ORDER — INSULIN LISPRO 100 [IU]/ML
INJECTION, SOLUTION INTRAVENOUS; SUBCUTANEOUS EVERY 6 HOURS
Status: DISCONTINUED | OUTPATIENT
Start: 2019-06-25 | End: 2019-07-08

## 2019-06-25 RX ORDER — LEVOTHYROXINE SODIUM 75 UG/1
150 TABLET ORAL
Status: DISCONTINUED | OUTPATIENT
Start: 2019-06-26 | End: 2019-06-26

## 2019-06-25 RX ORDER — MAGNESIUM SULFATE 100 %
4 CRYSTALS MISCELLANEOUS AS NEEDED
Status: DISCONTINUED | OUTPATIENT
Start: 2019-06-25 | End: 2019-07-13 | Stop reason: HOSPADM

## 2019-06-25 RX ORDER — ENOXAPARIN SODIUM 100 MG/ML
40 INJECTION SUBCUTANEOUS EVERY 24 HOURS
Status: DISCONTINUED | OUTPATIENT
Start: 2019-06-25 | End: 2019-07-05

## 2019-06-25 RX ORDER — METOPROLOL TARTRATE 25 MG/1
25 TABLET, FILM COATED ORAL 2 TIMES DAILY
Status: DISCONTINUED | OUTPATIENT
Start: 2019-06-25 | End: 2019-07-05

## 2019-06-25 RX ORDER — IPRATROPIUM BROMIDE AND ALBUTEROL SULFATE 2.5; .5 MG/3ML; MG/3ML
3 SOLUTION RESPIRATORY (INHALATION)
Status: DISCONTINUED | OUTPATIENT
Start: 2019-06-25 | End: 2019-07-13 | Stop reason: HOSPADM

## 2019-06-25 RX ORDER — INSULIN GLARGINE 100 [IU]/ML
30 INJECTION, SOLUTION SUBCUTANEOUS DAILY
Status: DISCONTINUED | OUTPATIENT
Start: 2019-06-26 | End: 2019-07-03

## 2019-06-25 RX ORDER — FAMOTIDINE 20 MG/1
20 TABLET, FILM COATED ORAL 2 TIMES DAILY
Status: DISCONTINUED | OUTPATIENT
Start: 2019-06-25 | End: 2019-06-27

## 2019-06-25 RX ORDER — LORAZEPAM 0.5 MG/1
1 TABLET ORAL
Status: DISCONTINUED | OUTPATIENT
Start: 2019-06-25 | End: 2019-07-10

## 2019-06-25 RX ORDER — DEXTROSE 50 % IN WATER (D50W) INTRAVENOUS SYRINGE
25-50 AS NEEDED
Status: DISCONTINUED | OUTPATIENT
Start: 2019-06-25 | End: 2019-06-25 | Stop reason: SDUPTHER

## 2019-06-25 RX ORDER — DEXTROSE MONOHYDRATE 100 MG/ML
125-250 INJECTION, SOLUTION INTRAVENOUS AS NEEDED
Status: DISCONTINUED | OUTPATIENT
Start: 2019-06-25 | End: 2019-07-13 | Stop reason: HOSPADM

## 2019-06-25 RX ADMIN — LORAZEPAM 1 MG: 0.5 TABLET ORAL at 23:17

## 2019-06-25 RX ADMIN — OXYCODONE HYDROCHLORIDE AND ACETAMINOPHEN 1 TABLET: 5; 325 TABLET ORAL at 19:05

## 2019-06-25 RX ADMIN — METOPROLOL TARTRATE 25 MG: 25 TABLET ORAL at 18:58

## 2019-06-25 RX ADMIN — DOCUSATE SODIUM 100 MG: 100 CAPSULE, LIQUID FILLED ORAL at 18:57

## 2019-06-25 RX ADMIN — SODIUM CHLORIDE AND POTASSIUM CHLORIDE: 4.5; 1.49 INJECTION, SOLUTION INTRAVENOUS at 22:30

## 2019-06-25 RX ADMIN — LEVOTHYROXINE SODIUM 150 MCG: 75 TABLET ORAL at 05:22

## 2019-06-25 RX ADMIN — INSULIN GLARGINE 30 UNITS: 100 INJECTION, SOLUTION SUBCUTANEOUS at 09:00

## 2019-06-25 RX ADMIN — INSULIN LISPRO 2 UNITS: 100 INJECTION, SOLUTION INTRAVENOUS; SUBCUTANEOUS at 18:57

## 2019-06-25 RX ADMIN — METOCLOPRAMIDE 5 MG: 5 INJECTION, SOLUTION INTRAMUSCULAR; INTRAVENOUS at 05:22

## 2019-06-25 RX ADMIN — Medication 10 ML: at 05:27

## 2019-06-25 RX ADMIN — METOCLOPRAMIDE 5 MG: 5 INJECTION, SOLUTION INTRAMUSCULAR; INTRAVENOUS at 00:24

## 2019-06-25 RX ADMIN — INSULIN LISPRO 3 UNITS: 100 INJECTION, SOLUTION INTRAVENOUS; SUBCUTANEOUS at 14:12

## 2019-06-25 RX ADMIN — FAMOTIDINE 20 MG: 10 INJECTION INTRAVENOUS at 09:44

## 2019-06-25 RX ADMIN — METOCLOPRAMIDE HYDROCHLORIDE 5 MG: 5 TABLET ORAL at 23:17

## 2019-06-25 RX ADMIN — FAMOTIDINE 20 MG: 20 TABLET, FILM COATED ORAL at 18:58

## 2019-06-25 RX ADMIN — ENOXAPARIN SODIUM 40 MG: 40 INJECTION SUBCUTANEOUS at 18:57

## 2019-06-25 RX ADMIN — SODIUM CHLORIDE AND POTASSIUM CHLORIDE: 4.5; 1.49 INJECTION, SOLUTION INTRAVENOUS at 02:21

## 2019-06-25 RX ADMIN — METOCLOPRAMIDE 5 MG: 5 INJECTION, SOLUTION INTRAMUSCULAR; INTRAVENOUS at 12:06

## 2019-06-25 RX ADMIN — METOPROLOL TARTRATE 25 MG: 25 TABLET ORAL at 09:44

## 2019-06-25 NOTE — DIABETES MGMT
Glycemic Control Plan of Care    T2DM with current A1c level of 7.1% (6/09/2019). See separate notes, 6/10/2019, for assessment of home diabetes management and education. Patient is s/p trach on 6/03/2019. She was able to communicate by writing. Home diabetes medication: Lantus insulin (vial) 90 units daily at bedtime. POC BG range on 6/24/2019: 109-156 mg/dL. Resumed tube feeding via PEG. POC BG report on 6/25/2019 at time of review: 147 mg/dL. Noted plan transfer to rehab facility this afternoon. Recommendation(s):  1.) Cont inpatient glycemic monitoring and intervention. Assessment:  Patient is 63year old with past medical history including type 2 diabetes mellitus with neuropathy, sleep apnea, s/p aortic valve replacement, morbid obesity, hypothyroidism, hypertension, chronic CHF and s/p thyroidectomy - was admitted on 6/03/2019 with report of stridor. Noted:  Vocal cord dysfunction. Status post tracheostomy on 6/03/2019. Hypothyroidism. Recent thyroidectomy on 4/11/2019. Morbid obesity. T2DM with current A1c of 8.4% (4/04/2019). Most recent blood glucose values:    Results for Shilpa Flood (MRN [id number]) as of 6/25/2019 12:39   Ref. Range 6/24/2019 05:44 6/24/2019 11:27 6/24/2019 18:34 6/24/2019 23:41   GLUCOSE,FAST - POC Latest Ref Range: 70 - 110 mg/dL 156 (H) 133 (H) 109 118 (H)     Results for Shilpa Flood (MRN [id number]) as of 6/25/2019 12:39   Ref. Range 6/25/2019 05:43   GLUCOSE,FAST - POC Latest Ref Range: 70 - 110 mg/dL 143 (H)     Current A1C: 7.1% (6/09/2019) which is equivalent to estimated average blood glucose of 153 mg/dL during the past 2-3 months. Current hospital diabetes medications:  Basal lantus insulin 30 units daily. Correctional lispro insulin every 6 hours. Very resistant dose.     Total daily dose insulin requirement previous day: 6/24/2019:  Lantus: 30 units  Lispro: 3 units  TDD insulin: 33 units    Home diabetes medications: Obtained from patient on 6/06/2019:  Lantus insulin (trach, patient unable to speak but nodded head when I asked if she's on vial insulin). She nodded again when I asked if she's on 90 units of lantus daily at bedtime and she has meter monitoring her blood sugar. Diet: Jevity 1.5 tube feeding via PEG at 30 ml/hr    Goals:  Blood glucose will be within target range of  mg/dL by 6/28/2019.     Education:    _X__  Refer to Diabetes Education Record: 6/10/2019  ___  Education not indicated at this time    Joshua Holbrook RN  Pager: [phone]

## 2019-06-25 NOTE — ROUTINE PROCESS
PICC line removed using sterile technique. 43 cm retrieved. Patient tolerated procedure well. No signs or symptoms of distress.

## 2019-06-25 NOTE — ROUTINE PROCESS
I have reviewed discharge instructions with the patient. The patient verbalized understanding.     Discharge medications reviewed with patient and appropriate educational materials and side effects teaching were provided.     Armband was removed and shredded. Pt vitals was stable and pt is cleared for discharge.

## 2019-06-25 NOTE — PROGRESS NOTES
Shift Summary Note:  Assumed care of patient in bed awake & quiet, No change in status. Trach care, PICC line dressing change & labs drawn. Patient requested ativan x1 for \" relaxation\". Uneventful night No s/s of acute distress or discomfort. Tolerating peg tube feeds.   Patient Vitals for the past 12 hrs:   Temp Pulse Resp BP SpO2   06/25/19 0541 98.7 °F (37.1 °C) 68 20 122/66 100 %   06/25/19 0037 -- -- -- -- 97 %   06/24/19 2147 98.2 °F (36.8 °C) 68 19 139/78 100 %

## 2019-06-25 NOTE — DISCHARGE SUMMARY
[address] [place] [address]    Name:  Mireille Cantu  MR#:   [id number]  :  1960  ACCOUNT #:  [de-identified]  ADMIT DATE:  2019  DISCHARGE DATE:  2019      DISCHARGE DIAGNOSES:  Endoscopic gastrostomy. HISTORY OF PRESENT ILLNESS:  The patient is a 60-year-old woman who, about 3 months ago, underwent a total thyroidectomy for bilateral calcified nodules. Her pathology ended up benign, however she ended up with bilateral vocal cord paralysis. For about 2 months, she did okay, was occasionally short of breath, however on the  presented to the emergency room with stridor and shortness of breath which was much more severe. She was taken urgently to the operating room where she had a tracheostomy placed. Postoperatively, she did okay, although did not immediately bounce back with activity. She failed several swallowing evaluations. She pulled out a K-0 feeding tube and failed some eating trials with coughing. She never became desaturated, but was quite anxious which she has carried throughout much of her life. This anxiety has led her to not participate in physical therapy as good as possible over a long period of time. Last week percutaneous endoscopic gastrostomy was placed due to her not eating very well and she has tolerated that to some extent, although has complained of nausea and  heartburn. She has been on TPN during this hospitalization, however that was weaned off with expected discharge to rehab facilities. She has suffered from depression during this hospitalization as well. PAST MEDICAL HISTORY:  Significant for morbid obesity, arthritis, asthma, depression, diabetes, panic attacks, headaches, sleep apnea and her Hashimoto's thyroiditis.     PAST SURGICAL HISTORY:  Open heart surgery in  with valvular repair of the aortic valve, She has had a hysterectomy and myomectomy and some orthopedic procedures in the past. She has had a hysterectomy and myomectomy and some orthopedic procedures in the past.    MEDICATIONS: Lovenox 40 mg subcu q.24, Pepcid 20 mg IV b.i.d., Lantus 30 units subcutaneous daily, Humalog subcutaneous q.6 hours, Synthroid 150 mcg per G-tube daily, Reglan 5 mg IV q.6 hours, Lopressor 25 mg b.i.d. through the PEG tube, She has IV half normal saline with 20 mEq of potassium at 125 an hour that has been on and off. Her PRN orders include Tylenol, Proventil q.4 p.r.n., hydralazine 10 mg IV q.6 p.r.n., Lopressor 5 mg q.6 p.r.n., Zofran 4 mg q.6 p.r.n., Percocet 5/325 mg q.6 hours p.r.n., Phenergan 12.5 mg p.r.n. She is currently on low level tube feeds. She is able to get out of bed, walk to the rest room, sit in a chair. She is seen by the hospitalist service for hypertension and diabetes control. She was being seen by PT and OT and by speech therapy. PLAN:  For her would be discharge to rehab for strengthening and conditioning prior to discharge home. Continue tube feeds and continue her tracheostomy, the original tube, at least for now given the difficulty of placing it and in fact that valve would probably not work in her due to her cord dysfunction.       Brittney Dwyer MD      [id number]/  D:  06/25/2019 10:53  T:  06/25/2019 13:56  JOB #:  [id number]

## 2019-06-25 NOTE — PROGRESS NOTES
Problem: Dysphagia (Adult)  Goal: *Acute Goals and Plan of Care (Insert Text)  Description  Patient will:  1. Tolerate PO trials with 0 s/s overt distress in 4/5 trials  2. Utilize compensatory swallow strategies/maneuvers (decrease bite/sip, size/rate, alt. liq/sol) with min cues in 4/5 trials  3. Perform oral-motor/laryngeal exercises to increase oropharyngeal swallow function with min cues  4. Complete an objective swallow study (i.e., MBSS) to assess swallow integrity, r/o aspiration, and determine of safest LRD, min A as indicated/ordered by MD     Recommend:   NPO with PEG  Strict aspiration precautions (HOB >30 degrees at all times, Oral care TID)     Outcome: Progressing Towards Goal    SPEECH LANGUAGE PATHOLOGY DYSPHAGIA TREATMENT    Patient: Kavita Pérez (46 y.o. female)  Date: 6/25/2019  Diagnosis: Stridor [R06.1]  Stridor [R06.1] <principal problem not specified>  Procedure(s) (LRB):  PERCUTANEOUS ENDOSCOPIC GASTROSTOMY TUBE INSERTION (N/A) 5 Days Post-Op  Precautions: aspiration Fall(trach collar)  PLOF: As per H&P      ASSESSMENT:  Pt was seen at bedside for follow up dysphagia management. Pt completed effortful swallow and dexter exercises 5 rep x 1 with mod-max cues. She refused further exercises stating, \"I don't feel good. \" Extensive training and education completed with pt and family regarding MBS results, aspiration risk, diet recs, oral care and positioning. Attempted finger occlusion trials; however, pt refused stating, \"It makes me scared. \" Continue to rec NPO with PEG feeds, aspiration precautions, and oral care TID. ST will continue to follow. Progression toward goals:  ?         Improving appropriately and progressing toward goals  ? Improving slowly and progressing toward goals  ?          Not making progress toward goals and plan of care will be adjusted     PLAN:  Recommendations and Planned Interventions: See above  Patient continues to benefit from skilled intervention to address the above impairments. Continue treatment per established plan of care. Discharge Recommendations:  Inpatient Rehab, [place] and To Be Determined     SUBJECTIVE:   Patient stated ? Don't leave me. I'm scared? .    OBJECTIVE:   Cognitive and Communication Status:  Neurologic State: Alert  Orientation Level: Oriented X4, Other (Comment)  Cognition: Follows commands  Perception: Appears intact  Perseveration: No perseveration noted  Safety/Judgement: Fall prevention, Awareness of environment  Dysphagia Treatment:     Exercises:  Laryngeal Exercises:  Effortful Swallow: Yes  Sets : 1  Reps : 5  Malissa: Yes  Sets : 1  Reps : 5    PAIN:  Start of Tx: 0  End of Tx: 0     After treatment:   ?              Patient left in no apparent distress sitting up in chair  ? Patient left in no apparent distress in bed  ? Call bell left within reach  ? Nursing notified  ? Family present  ? Caregiver present  ? Bed alarm activated    COMMUNICATION/EDUCATION:   ? Aspiration precautions; swallow safety; compensatory techniques  ?         Patient/family able to participate in training and education     Thank you for this referral.    Vasyl Lopez M.S. CCC-SLP/L  Speech-Language Pathologist

## 2019-06-25 NOTE — ROUTINE PROCESS
Bedside and Verbal shift change report given by Marjorie Macario RN (offgoing nurse) to Mandeep Aguirre RN (oncoming nurse). Report included the following information SBAR, Kardex and MAR.

## 2019-06-25 NOTE — PROGRESS NOTES
Discharge planning    Discharge order noted for today. Patient has been accepted to ARU facility. Confirmed with Vivianne Duverney that bed is available today. Met with patient and agreeable to the transition plan today. [employer] authorization has been obtained. ** Transport to facility has been arranged through with staff. Patient's discharge summary has been forwarded to skilled nursing facility via [facility]. . Bedside RN, Susanne Tay, has been updated to the transition plan. Discharge information has been updated on the AVS.  Please call report to [phone], room 176.    NORMA.  JACK Schaffer, RN  Pager # [phone]  Care Manager

## 2019-06-25 NOTE — PROGRESS NOTES
[facility] Hospitalist Group  Progress Note    Patient: Jose Al Age: 63 y.o. : 1960 MR#: [id number] SSN: [id number]  Date/Time: 2019    Subjective:     Patient sitting in chair in NAD , tolerating TF     Assessment/Plan:     -DM2, uncontrolled with hyperglycemia  - s/p Tracheostomy for vocal cord paralysis  - HTN  - Dysphagia  - Morbid Obesity   - Hypothyroidism     PLAN  S/p PEG tube - TF resumed yesterday & tolerating it well, encouraged to move   NPO as per speech & she is aspirating   Increased coughing with thick yellowish secretions from Trach - CXR negative for infiltrate , sputum Cx sent , will follow   Lantus, Insulin in TPN - BS monitored   Monitor BP  Synthroid PO   D/w patient    Anticipate discharge to SNF/ Rehab today if ok with Gen surg     Case discussed with:  [x]Patient  []Family  [x]Nursing  []Case Management  DVT Prophylaxis:  [x]Lovenox  []Hep SQ  []SCDs  []Coumadin   []On Heparin gtt    Objective:   VS:   Visit Vitals  /67 (BP 1 Location: Left arm, BP Patient Position: At rest)   Pulse 63   Temp 98.1 °F (36.7 °C)   Resp 18   Ht 5' 7\" (1.702 m)   Wt 122.2 kg (269 lb 8 oz)   SpO2 99%   Breastfeeding?  No   BMI 42.21 kg/m²      Tmax/24hrs: Temp (24hrs), Av.8 °F (37.1 °C), Min:98.1 °F (36.7 °C), Max:99.7 °F (37.6 °C)    Input/Output:     Intake/Output Summary (Last 24 hours) at 2019 1103  Last data filed at 2019 0930  Gross per 24 hour   Intake 425 ml   Output 1400 ml   Net -975 ml       General:  Awake, alert  Cardiovascular:  S1S2+, RRR  Pulmonary:  CTA b/l  GI:  Soft, BS+, Obese, Mild tenderness to palpation in RUQ  Extremities:  Trace  + trach edema      Labs:    Recent Results (from the past 24 hour(s))   GLUCOSE, POC    Collection Time: 19 11:27 AM   Result Value Ref Range    Glucose (POC) 133 (H) 70 - 110 mg/dL   GLUCOSE, POC    Collection Time: 19  6:34 PM   Result Value Ref Range    Glucose (POC) 109 70 - 110 mg/dL GLUCOSE, POC    Collection Time: 06/24/19 11:41 PM   Result Value Ref Range    Glucose (POC) 118 (H) 70 - 110 mg/dL   CBC WITH AUTOMATED DIFF    Collection Time: 06/25/19  4:30 AM   Result Value Ref Range    WBC 6.3 4.6 - 13.2 K/uL    RBC 3.96 (L) 4.20 - 5.30 M/uL    HGB 11.7 (L) 12.0 - 16.0 g/dL    HCT 36.6 35.0 - 45.0 %    MCV 92.4 74.0 - 97.0 FL    MCH 29.5 24.0 - 34.0 PG    MCHC 32.0 31.0 - 37.0 g/dL    RDW 16.0 (H) 11.6 - 14.5 %    PLATELET 890 035 - 449 K/uL    MPV 9.3 9.2 - 11.8 FL    NEUTROPHILS 60 40 - 73 %    LYMPHOCYTES 29 21 - 52 %    MONOCYTES 7 3 - 10 %    EOSINOPHILS 3 0 - 5 %    BASOPHILS 1 0 - 2 %    ABS. NEUTROPHILS 3.8 1.8 - 8.0 K/UL    ABS. LYMPHOCYTES 1.9 0.9 - 3.6 K/UL    ABS. MONOCYTES 0.5 0.05 - 1.2 K/UL    ABS. EOSINOPHILS 0.2 0.0 - 0.4 K/UL    ABS.  BASOPHILS 0.1 0.0 - 0.1 K/UL    DF AUTOMATED     METABOLIC PANEL, BASIC    Collection Time: 06/25/19  4:30 AM   Result Value Ref Range    Sodium 138 136 - 145 mmol/L    Potassium 3.7 3.5 - 5.5 mmol/L    Chloride 107 100 - 108 mmol/L    CO2 32 21 - 32 mmol/L    Anion gap NEG 1 3.0 - 18 mmol/L    Glucose 131 (H) 74 - 99 mg/dL    BUN 6 (L) 7.0 - 18 MG/DL    Creatinine 0.67 0.6 - 1.3 MG/DL    BUN/Creatinine ratio 9 (L) 12 - 20      GFR est AA >60 >60 ml/min/1.73m2    GFR est non-AA >60 >60 ml/min/1.73m2    Calcium 8.0 (L) 8.5 - 10.1 MG/DL   GLUCOSE, POC    Collection Time: 06/25/19  5:43 AM   Result Value Ref Range    Glucose (POC) 143 (H) 70 - 110 mg/dL     Additional Data Reviewed:      Signed By: Li Floyd MD     June 25, 2019

## 2019-06-26 LAB
ANION GAP SERPL CALC-SCNC: 5 MMOL/L (ref 3–18)
BASOPHILS # BLD: 0.1 K/UL (ref 0–0.1)
BASOPHILS NFR BLD: 1 % (ref 0–2)
BUN SERPL-MCNC: 9 MG/DL (ref 7–18)
BUN/CREAT SERPL: 11 (ref 12–20)
CALCIUM SERPL-MCNC: 8.3 MG/DL (ref 8.5–10.1)
CHLORIDE SERPL-SCNC: 105 MMOL/L (ref 100–108)
CO2 SERPL-SCNC: 31 MMOL/L (ref 21–32)
CREAT SERPL-MCNC: 0.82 MG/DL (ref 0.6–1.3)
DIFFERENTIAL METHOD BLD: ABNORMAL
EOSINOPHIL # BLD: 0.3 K/UL (ref 0–0.4)
EOSINOPHIL NFR BLD: 5 % (ref 0–5)
ERYTHROCYTE [DISTWIDTH] IN BLOOD BY AUTOMATED COUNT: 16.2 % (ref 11.6–14.5)
GLUCOSE BLD STRIP.AUTO-MCNC: 183 MG/DL (ref 70–110)
GLUCOSE BLD STRIP.AUTO-MCNC: 190 MG/DL (ref 70–110)
GLUCOSE BLD STRIP.AUTO-MCNC: 192 MG/DL (ref 70–110)
GLUCOSE BLD STRIP.AUTO-MCNC: 198 MG/DL (ref 70–110)
GLUCOSE BLD STRIP.AUTO-MCNC: 235 MG/DL (ref 70–110)
GLUCOSE SERPL-MCNC: 184 MG/DL (ref 74–99)
HCT VFR BLD AUTO: 36.4 % (ref 35–45)
HGB BLD-MCNC: 11.5 G/DL (ref 12–16)
LYMPHOCYTES # BLD: 2.3 K/UL (ref 0.9–3.6)
LYMPHOCYTES NFR BLD: 41 % (ref 21–52)
MAGNESIUM SERPL-MCNC: 2.3 MG/DL (ref 1.6–2.6)
MCH RBC QN AUTO: 29.3 PG (ref 24–34)
MCHC RBC AUTO-ENTMCNC: 31.6 G/DL (ref 31–37)
MCV RBC AUTO: 92.9 FL (ref 74–97)
MONOCYTES # BLD: 0.4 K/UL (ref 0.05–1.2)
MONOCYTES NFR BLD: 8 % (ref 3–10)
NEUTS SEG # BLD: 2.7 K/UL (ref 1.8–8)
NEUTS SEG NFR BLD: 45 % (ref 40–73)
PLATELET # BLD AUTO: 208 K/UL (ref 135–420)
PMV BLD AUTO: 9.2 FL (ref 9.2–11.8)
POTASSIUM SERPL-SCNC: 3.7 MMOL/L (ref 3.5–5.5)
RBC # BLD AUTO: 3.92 M/UL (ref 4.2–5.3)
SODIUM SERPL-SCNC: 141 MMOL/L (ref 136–145)
TSH SERPL DL<=0.05 MIU/L-ACNC: 54.2 UIU/ML (ref 0.36–3.74)
WBC # BLD AUTO: 5.7 K/UL (ref 4.6–13.2)

## 2019-06-26 PROCEDURE — 96105 ASSESSMENT OF APHASIA: CPT

## 2019-06-26 PROCEDURE — 77010033678 HC OXYGEN DAILY

## 2019-06-26 PROCEDURE — 74011636637 HC RX REV CODE- 636/637: Performed by: EMERGENCY MEDICINE

## 2019-06-26 PROCEDURE — 84443 ASSAY THYROID STIM HORMONE: CPT

## 2019-06-26 PROCEDURE — 74011250637 HC RX REV CODE- 250/637: Performed by: EMERGENCY MEDICINE

## 2019-06-26 PROCEDURE — 74011000250 HC RX REV CODE- 250: Performed by: EMERGENCY MEDICINE

## 2019-06-26 PROCEDURE — 97166 OT EVAL MOD COMPLEX 45 MIN: CPT

## 2019-06-26 PROCEDURE — 82962 GLUCOSE BLOOD TEST: CPT

## 2019-06-26 PROCEDURE — 92522 EVALUATE SPEECH PRODUCTION: CPT

## 2019-06-26 PROCEDURE — 80048 BASIC METABOLIC PNL TOTAL CA: CPT

## 2019-06-26 PROCEDURE — 96125 COGNITIVE TEST BY HC PRO: CPT

## 2019-06-26 PROCEDURE — 65310000000 HC RM PRIVATE REHAB

## 2019-06-26 PROCEDURE — 97163 PT EVAL HIGH COMPLEX 45 MIN: CPT

## 2019-06-26 PROCEDURE — 74011250636 HC RX REV CODE- 250/636: Performed by: EMERGENCY MEDICINE

## 2019-06-26 PROCEDURE — 97530 THERAPEUTIC ACTIVITIES: CPT

## 2019-06-26 PROCEDURE — 85025 COMPLETE CBC W/AUTO DIFF WBC: CPT

## 2019-06-26 PROCEDURE — 83735 ASSAY OF MAGNESIUM: CPT

## 2019-06-26 PROCEDURE — 97535 SELF CARE MNGMENT TRAINING: CPT

## 2019-06-26 PROCEDURE — 36415 COLL VENOUS BLD VENIPUNCTURE: CPT

## 2019-06-26 RX ADMIN — ENOXAPARIN SODIUM 40 MG: 40 INJECTION SUBCUTANEOUS at 18:25

## 2019-06-26 RX ADMIN — FAMOTIDINE 20 MG: 20 TABLET, FILM COATED ORAL at 10:48

## 2019-06-26 RX ADMIN — OXYCODONE HYDROCHLORIDE AND ACETAMINOPHEN 1 TABLET: 5; 325 TABLET ORAL at 01:08

## 2019-06-26 RX ADMIN — INSULIN GLARGINE 30 UNITS: 100 INJECTION, SOLUTION SUBCUTANEOUS at 09:16

## 2019-06-26 RX ADMIN — INSULIN LISPRO 2 UNITS: 100 INJECTION, SOLUTION INTRAVENOUS; SUBCUTANEOUS at 18:25

## 2019-06-26 RX ADMIN — METOCLOPRAMIDE HYDROCHLORIDE 5 MG: 5 TABLET ORAL at 14:42

## 2019-06-26 RX ADMIN — DOCUSATE SODIUM 100 MG: 100 CAPSULE, LIQUID FILLED ORAL at 18:25

## 2019-06-26 RX ADMIN — METOPROLOL TARTRATE 25 MG: 25 TABLET ORAL at 09:17

## 2019-06-26 RX ADMIN — INSULIN LISPRO 2 UNITS: 100 INJECTION, SOLUTION INTRAVENOUS; SUBCUTANEOUS at 05:41

## 2019-06-26 RX ADMIN — IPRATROPIUM BROMIDE AND ALBUTEROL SULFATE 3 ML: .5; 3 SOLUTION RESPIRATORY (INHALATION) at 01:08

## 2019-06-26 RX ADMIN — SODIUM CHLORIDE AND POTASSIUM CHLORIDE: 4.5; 1.49 INJECTION, SOLUTION INTRAVENOUS at 14:30

## 2019-06-26 RX ADMIN — LEVOTHYROXINE SODIUM 150 MCG: 75 TABLET ORAL at 05:45

## 2019-06-26 RX ADMIN — DOCUSATE SODIUM 100 MG: 100 CAPSULE, LIQUID FILLED ORAL at 09:17

## 2019-06-26 RX ADMIN — OXYCODONE HYDROCHLORIDE AND ACETAMINOPHEN 1 TABLET: 5; 325 TABLET ORAL at 09:34

## 2019-06-26 RX ADMIN — METOCLOPRAMIDE HYDROCHLORIDE 5 MG: 5 TABLET ORAL at 21:20

## 2019-06-26 RX ADMIN — METOPROLOL TARTRATE 25 MG: 25 TABLET ORAL at 18:25

## 2019-06-26 RX ADMIN — METOCLOPRAMIDE HYDROCHLORIDE 5 MG: 5 TABLET ORAL at 09:17

## 2019-06-26 RX ADMIN — INSULIN LISPRO 2 UNITS: 100 INJECTION, SOLUTION INTRAVENOUS; SUBCUTANEOUS at 00:45

## 2019-06-26 RX ADMIN — INSULIN LISPRO 4 UNITS: 100 INJECTION, SOLUTION INTRAVENOUS; SUBCUTANEOUS at 12:13

## 2019-06-26 RX ADMIN — FAMOTIDINE 20 MG: 20 TABLET, FILM COATED ORAL at 18:25

## 2019-06-26 RX ADMIN — OXYCODONE HYDROCHLORIDE AND ACETAMINOPHEN 1 TABLET: 5; 325 TABLET ORAL at 21:20

## 2019-06-26 NOTE — PROGRESS NOTES
Bedside and Verbal shift change report given to Shakir Mallory RN (oncoming nurse) by Chris Kathleen RN (offgoing nurse). Report included the following information SBAR, Kardex, MAR and Recent Results.     SITUATION:    Code Status: Full Code   Reason for Admission: Chronic respiratory failure ([facility] [address] [J96.10]    [facility] day: 1   Problem List:       Hospital Problems  Date Reviewed: 5/16/2019          Codes Class Noted POA    Chronic respiratory failure [facility]) ICD-10-CM: J96.10  ICD-9-CM: 518.83  6/25/2019 Unknown              BACKGROUND:    Past Medical History:   Past Medical History:   Diagnosis Date    Arthritis     Lower back     Asthma     Chronic back pain     Lower back pain    Depression     Diabetes ([facility] [facility]     Diabetes mellitus ([facility] [address]     Hearing loss     Heart failure (HCC)     chronic diastolic heart failure    Left ear hearing loss     pt states nerve damage--- 25% hearing loss, described as \"muffled\"    Memory difficulty     Panic attacks     Ringing of ears     Severe headache     Sleep apnea     SOB (shortness of breath) on exertion     w and w/out exertion    Stomach pain     Thyroid disease     Vertigo          Patient taking anticoagulants yes     ASSESSMENT:    Changes in Assessment Throughout Shift: none     Patient has Central Line: no Reasons if yes:    Patient has Barker Cath: no Reasons if yes:       Last Vitals:     Vitals:    06/26/19 0745 06/26/19 0913 06/26/19 1430 06/26/19 1544   BP: 119/65 131/76  117/68   Pulse: 80   72   Resp: 19   18   Temp: 97.4 °F (36.3 °C)   99.5 °F (37.5 °C)   SpO2: 96% 98%  97%   Weight:   122.2 kg (269 lb 8 oz)         IV and DRAINS (will only show if present)   Peripheral IV 06/25/19 Anterior;Proximal;Right Antecubital-Site Assessment: Clean, dry, & intact  PEG/Gastrostomy Tube 06/20/19-Site Assessment: Clean, dry, & intact  [REMOVED] Airway - Tracheostomy Tube 06/03/19 Portex-Site Assessment: Clean, dry, & intact     WOUND (if present)   Wound Type:         Dressing present Dressing Present : No   Wound Concerns/Notes:  none     PAIN    Pain Assessment    Pain Intensity 1: 0 (06/26/19 1607)    Pain Location 1: Abdomen    Pain Intervention(s) 1: Medication (see MAR)    Patient Stated Pain Goal: 0  o Interventions for Pain:  Pain medication    o Intervention effective: yes  o Time of last intervention: see MAR   o Reassessment Completed: yes      Last 3 Weights:  Last 3 Recorded Weights in this Encounter    06/26/19 1430   Weight: 122.2 kg (269 lb 8 oz)     Weight change:      INTAKE/OUPUT    Current Shift: No intake/output data recorded. Last three shifts: 06/25 0701 - 06/26 1900  In: 880   Out: -      LAB RESULTS     Recent Labs     06/26/19  0610 06/25/19  0430   WBC 5.7 6.3   HGB 11.5* 11.7*   HCT 36.4 36.6    208        Recent Labs     06/26/19  0610 06/25/19  0430    138   K 3.7 3.7   * 131*   BUN 9 6*   CREA 0.82 0.67   CA 8.3* 8.0*   MG 2.3  --        RECOMMENDATIONS AND DISCHARGE PLANNING     1. Pending tests/procedures/ Plan of Care or Other Needs: labs  2.      3. Discharge plan for patient and Needs/Barriers: TBD    4. Estimated Discharge Date: TBD Posted on Whiteboard in Patients Room: no      4. The patient's care plan was reviewed with the oncoming nurse. \"HEALS\" SAFETY CHECK      Fall Risk    Total Score: 3    Safety Measures: Safety Measures: Bed/Chair-Wheels locked    A safety check occurred in the patient's room between off going nurse and oncoming nurse listed above.     The safety check included the below items  Area Items   H  High Alert Medications - Verify all high alert medication drips (heparin, PCA, etc.)   E  Equipment - Suction is set up for ALL patients (with yanker)  - Red plugs utilized for all equipment (IV pumps, etc.)  - WOWs wiped down at end of shift.  - Room stocked with oxygen, suction, and other unit-specific supplies   A  Alarms - Bed alarm is set for fall risk patients  - Ensure chair alarm is in place and activated if patient is up in a chair   L  Lines - Check IV for any infiltration  - Barker bag is empty if patient has a Barker   - Tubing and IV bags are labeled   S  Safety   - Room is clean, patient is clean, and equipment is clean. - Hallways are clear from equipment besides carts. - Fall bracelet on for fall risk patients  - Ensure room is clear and free of clutter  - Suction is set up for ALL patients (with yanker)  - Hallways are clear from equipment besides carts.    - Isolation precautions followed, supplies available outside room, sign posted     Corrine Washburn RN

## 2019-06-26 NOTE — PROGRESS NOTES
NUTRITION CONSULT    Nutrition Consult: Management of Tube Feeding     RECOMMENDATIONS / PLAN:     - Advance tube feeding of Jevity 1.5 by 10 mL q 8 hours to goal rate of 60 mL/hr with water flushes of 75 mL q 6 hours. - Add Prosource once daily  - Continue IVF until EN at goal.  - Continue RD inpatient monitoring and evaluation. Goal Regimen: Jevity 1.5 at 60 mL/hr + 150 mL q 4 hour water flushes to provide: 2160 kcal, 92 gm protein, 72 gm fat, 311 gm CHO, 32 gm fiber, 1094 mL free water, 1994 mL total water, 100% RDIs  Tube feeding + Prosource once daily to provide: 2220 kcal, 107 gm protein     NUTRITION DIAGNOSIS & INTERVENTIONS:     [x] Enteral nutrition: advance to goal  [x] IV Fluids: continue until tube feeding at goal  [x] Collaboration and referral of nutrition care:  Discussed pt and EN tolerance with RN     Nutrition Diagnosis: Swallowing difficulty related to dysphagia s/p tracheostomy as evidenced by pt NPO after SLP evaluation/MBS. ASSESSMENT:     Pt lethargic/asleep at time of visit. Per chart review, pt admitted to [facility] hospital with stridor and vocal cord dysfunction s/p tracheostomy placement on 6/3/19, tolerating trach collar; would have to deflate cuff for meals and re-inflate when not eating. Pt had failed MBS x 2 by 6/6/19. DHT was placed by IR. Pt tolerated feeds, receiving Jevity 1.5 due to allergy/intolerance to artificial sweeteners, then pulled out feeding tube. A diet was started by MD on 6/10. Pt had variable meal intake. PICC placed on 6/13 for TPN. Remained on po diet and TPN; made NPO on  6/15. Diet restarted on 6/18. SLP recommended NPO after repeat MBS on 6/19. S/p PEG placement on 6/20/19 and TPN was stopped after starting tube feeds. Pt had c/o abdominal distention and fullness, emesis. TF held on 6/23 due to concern for ileus; pt having +BMs and flatus, feeds resumed at low rate.  Pt tolerated trickle feeds PTA; noted to express desire for receiving cyclic or bolus tube feeding and wanting to eat orally. Currently tolerating tube feeding at 40 mL/hr per RN. Pt did c/o abdominal pain per RN. BG remain elevated; discussed reason for not changing to Glucerna 1.5 (diabetic formula). EN infusion adequate to meet patients estimated nutritional needs:   [] Yes     [x] No   [] Unable to determine at this time    Tube Feeding: Jevity 1.5 at 40 mL/hr  Water Flushes: 75 mL q 6 hours  Residuals: 0 mL    Diet: DIET NPO  DIET TUBE FEEDING      Food Allergies: sweeteners, sucrose  Current Appetite:   [] Good     [] Fair     [] Poor     [x] Other: NPO  Appetite/meal intake prior to admission:   [x] Good     [] Fair     [] Poor     [] Other:  Feeding Limitations:  [x] Swallowing difficulty: SLP following    [] Chewing difficulty    [x] Other: hx of dysphagia s/p thyroidectomy PTA and trach placement 6/3/19  Current Meal Intake:   No data found. BM: 6/25  Skin Integrity: surgical incision to neck  Edema:  [] No     [x] Yes   Pertinent Medications: Reviewed: 1/2 NS + 20 mEq/L KCL at 75 mL/hr (90 gm dextrose, 306 kcal, 36 mEq KCl per day), bowel regimen, pepcid, SSI, lantus, reglan     Labs:   Recent Labs     06/26/19  0610 06/25/19  0430    138   K 3.7 3.7    107   CO2 31 32   * 131*   BUN 9 6*   CREA 0.82 0.67   CA 8.3* 8.0*   MG 2.3  --    Prealbumin: 12.9 mg/dL (6/14/19)  Triglyceride: 298 mg/dL (6/18/19), 316 mg/dL (6/13/19), 511 mg/dL (4/16/19)  C reactive protein: 4.8 mg/dL (6/18/19)  Recent Labs     06/26/19  0610 06/25/19  0430   WBC 5.7 6.3   HGB 11.5* 11.7*   HCT 36.4 36.6    208         Intake/Output Summary (Last 24 hours) at 6/26/2019 1430  Last data filed at 6/26/2019 0200  Gross per 24 hour   Intake 880 ml   Output --   Net 880 ml       Anthropometrics:   Ht Readings from Last 1 Encounters:   06/03/19 5' 7\" (1.702 m)     Last 3 Recorded Weights in this Encounter    06/26/19 1430   Weight: 122.2 kg (269 lb 8 oz)     Body mass index is 42.21 kg/m². Obese, Class III    Weight History: patient denies change in weight PTA, stable     Weight Metrics 6/26/2019 6/25/2019 6/22/2019 6/3/2019 5/23/2019 5/22/2019 5/16/2019   Weight 269 lb 8 oz - 269 lb 8 oz - 260 lb 6.4 oz - 258 lb   BMI - 42.21 kg/m2 - 42.21 kg/m2 - 40.78 kg/m2 40.41 kg/m2          Admitting Diagnosis: Chronic respiratory failure (HCC) [J96.10]  Pertinent PMHx: T2DM with diabetic neuropathy, HTN, dyslipidemia, CHF, hypothyroidism s/p thyroidectomy 4/4/19    Education Needs:        [x] None identified  [] Identified - Not appropriate at this time  []  Identified and addressed - refer to education log  Learning Limitations:   [] None identified  [x] Identified: nonverbal. Communicates via pen/paper    Cultural, Restorationism & ethnic food preferences:  [x] None identified    [] Identified and addressed     ESTIMATED NUTRITION NEEDS:     Calories: 5870-5617 kcal (MSJx1-1.2) based on   [x] Actual  kg     [] IBW:   Protein:  gm (0.8-1.2 gm/kg) based on   [x] Actual BW      [] IBW:   Fluid: 1 mL/kcal     MONITORING & EVALUATION:     Nutrition Goals:   1. Nutritional needs will be met through adequate oral intake or nutrition support within the next 5 days.   Outcome:  [] Met/Ongoing    [] Progressing towards goal    [] Not progressing towards goal    [x] New/Initial goal      Monitoring:    [x] Fluid intake   [] Nutrition-focused physical findings   [x] Treatment/therapy   [] Food and beverage intake   [x] Diet order      [] Weight    [x] EN infusion    Previous Recommendations (for follow-up assessments only):     []   Implemented       []   Not Implemented (RD to address)      [] No Longer Appropriate     [] No Recommendation Made        Discharge Planning: goal enteral nutrition regimen via PEG   [x]  Participated in care planning, discharge planning, & interdisciplinary rounds as appropriate      Corinne Spindle, [address]  Pager: [phone]
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
## 2019-06-26 NOTE — ROUTINE PROCESS
1545 Pt. Arrived from [address] to room 176  Alert and oriented x 4 p.ox 96 %. with [place] 4 liter trach site Dr. Nando Tatum was notified and stated will have admission orders. 1800 4 eyes dual assessment completed with Lucas Cano LPN no pressure ulcers noted , only scratches to bilateral ext. Skin intact. Tube feeding Jevity 1.5 infusing @ 40cclhr tolerating well with 75cc H20 flush.

## 2019-06-26 NOTE — PROGRESS NOTES
conducted a Follow up consultation and Spiritual Assessment for Tayler Esquivel, who is a 63 y.o.,female. The  provided the following Interventions:  Continued the relationship of care and support. Listened empathically the patient whispered her feelings about her hospitalization. Offered prayer and assurance of continued prayer on patients behalf. The following outcomes were achieved:  Patient expressed gratitude for 's visit. Assessment:  There are no further spiritual or Bahai issues which require Spiritual Care Services interventions at this time. Plan:  Chaplains will continue to follow and will provide pastoral care on an as needed/requested basis.  recommends bedside caregivers page  on duty if patient shows signs of acute spiritual or emotional distress.        [address] [place] [address]   [phone]

## 2019-06-26 NOTE — PROGRESS NOTES
Bedside and Verbal shift change report given to Chris Kathleen RN (oncoming nurse) by Devora Donaldson RN (offgoing nurse). Report included the following information SBAR, Kardex, MAR and Recent Results.     SITUATION:    Code Status: Full Code   Reason for Admission: Chronic respiratory failure ([facility] [J96.10]    [facility] day: 1   Problem List:       Hospital Problems  Date Reviewed: 5/16/2019          Codes Class Noted POA    Chronic respiratory failure [facility]) ICD-10-CM: J96.10  ICD-9-CM: 518.83  6/25/2019 Unknown              BACKGROUND:    Past Medical History:   Past Medical History:   Diagnosis Date    Arthritis     Lower back     Asthma     Chronic back pain     Lower back pain    Depression     Diabetes ([facility]     Diabetes mellitus ([facility]     Hearing loss     Heart failure (HCC)     chronic diastolic heart failure    Left ear hearing loss     pt states nerve damage--- 25% hearing loss, described as \"muffled\"    Memory difficulty     Panic attacks     Ringing of ears     Severe headache     Sleep apnea     SOB (shortness of breath) on exertion     w and w/out exertion    Stomach pain     Thyroid disease     Vertigo          Patient taking anticoagulants yes     ASSESSMENT:    Changes in Assessment Throughout Shift: none     Patient has Central Line: no Reasons if yes:    Patient has Barker Cath: no Reasons if yes:       Last Vitals:     Vitals:    06/25/19 1605 06/25/19 2205   BP: 114/71 116/70   Pulse: 68 65   Resp: 17 18   Temp: 99.1 °F (37.3 °C) 100 °F (37.8 °C)   SpO2: 96% 97%        IV and DRAINS (will only show if present)   Peripheral IV 06/25/19 Anterior;Proximal;Right Antecubital-Site Assessment: Clean, dry, & intact  PEG/Gastrostomy Tube 06/20/19-Site Assessment: Clean, dry, & intact     WOUND (if present)   Wound Type:         Dressing present     Wound Concerns/Notes:  none     PAIN    Pain Assessment    Pain Intensity 1: 0 (06/26/19 0400)    Pain Location 1: Back, Neck    Pain Intervention(s) 1: Medication (see MAR)    Patient Stated Pain Goal: 0  o Interventions for Pain:  Pain medication    o Intervention effective: yes  o Time of last intervention: see MAR   o Reassessment Completed: yes      Last 3 Weights: There were no vitals filed for this visit. Weight change:      INTAKE/OUPUT    Current Shift: No intake/output data recorded. Last three shifts: 06/24 1901 - 06/26 0700  In: 880   Out: -      LAB RESULTS     Recent Labs     06/26/19  0610 06/25/19  0430   WBC 5.7 6.3   HGB 11.5* 11.7*   HCT 36.4 36.6    208        Recent Labs     06/26/19  0610 06/25/19  0430    138   K 3.7 3.7   * 131*   BUN 9 6*   CREA 0.82 0.67   CA 8.3* 8.0*   MG 2.3  --        RECOMMENDATIONS AND DISCHARGE PLANNING     1. Pending tests/procedures/ Plan of Care or Other Needs: labs  2.      3. Discharge plan for patient and Needs/Barriers: TBD    4. Estimated Discharge Date: TBD Posted on Whiteboard in Patients Room: no      4. The patient's care plan was reviewed with the oncoming nurse. \"HEALS\" SAFETY CHECK      Fall Risk    Total Score: 3    Safety Measures:      A safety check occurred in the patient's room between off going nurse and oncoming nurse listed above. The safety check included the below items  Area Items   H  High Alert Medications - Verify all high alert medication drips (heparin, PCA, etc.)   E  Equipment - Suction is set up for ALL patients (with yanker)  - Red plugs utilized for all equipment (IV pumps, etc.)  - WOWs wiped down at end of shift.  - Room stocked with oxygen, suction, and other unit-specific supplies   A  Alarms - Bed alarm is set for fall risk patients  - Ensure chair alarm is in place and activated if patient is up in a chair   L  Lines - Check IV for any infiltration  - Barker bag is empty if patient has a Barker   - Tubing and IV bags are labeled   S  Safety   - Room is clean, patient is clean, and equipment is clean.   - Hallways are clear from equipment besides carts. - Fall bracelet on for fall risk patients  - Ensure room is clear and free of clutter  - Suction is set up for ALL patients (with chaitanya)  - Hallways are clear from equipment besides carts.    - Isolation precautions followed, supplies available outside room, sign posted     Rogenia Curling, RN
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
## 2019-06-26 NOTE — PROGRESS NOTES
Problem: Self Care Deficits Care Plan (Adult)  Goal: *Therapy Goal (Edit Goal, Insert Text)  Description  Occupational Therapy Goals   Long Term Goals  Initiated 19 and to be accomplished within 2 week(s) 7/10/19  1. Pt will perform functional activity up to 15 minutes with no more than 2 rest breaks and 2 vcs to attend to task. .   2. Pt will perform grooming with S.  3. Pt will perform UB bathing with S .  4. Pt will perform LB bathing with S..  5. Pt will perform tub/shower transfer with S.   6. Pt will perform UB dressing with S.  7. Pt will perform LB dressing with S..  8. Pt will perform toileting task with I.  9. Pt will perform toilet transfer with S.      Short Term Goals   Initiated 19 and to be accomplished within 7 day(s) 7/3/19  1. Pt will perform functional task up to 10 minutes with no more than 3 verbal cues to attend to task and 3 rest breaks. 2. Pt will perform grooming with SBA. 3. Pt will perform UB bathing with S and no more than 3 vcs to  Initiate and complete task. 4. Pt will perform LB bathing with [place] and no more than 3 vcs to  complete task. 5. Pt will perform tub/shower transfer with [facility]. 6. Pt will perform UB dressing with [place] and no more than 3 vcs to  initiate and complete task. .  7. Pt will perform LB dressing with [place] and no more than 3 vcs to initiate and  complete task. 8. Pt will perform toileting task with S.  9. Pt will perform toilet transfer with S and no more than 3 vcs to initiate and complete task. .      Outcome: Progressing Towards Goal   OCCUPATIONAL THERAPY EVALUATION    Patient: Gisselle Khan 63 y.o.   Date: 2019  Primary Diagnosis: Chronic respiratory failure ([facility] [J96.10]    Patient identified with name and :Yes    Past Medical History:   Past Medical History:   Diagnosis Date    Arthritis     Lower back     Asthma     Chronic back pain     Lower back pain    Depression     Diabetes ([facility]     Diabetes mellitus ([facility]     Hearing loss     Heart failure (HCC)     chronic diastolic heart failure    Left ear hearing loss     pt states nerve damage--- 25% hearing loss, described as \"muffled\"    Memory difficulty     Panic attacks     Ringing of ears     Severe headache     Sleep apnea     SOB (shortness of breath) on exertion     w and w/out exertion    Stomach pain     Thyroid disease     Vertigo      Precautions: fall    Time In: 0930  Time Out[de-identified] 1030  Time In: 1330  Time Out[de-identified] 1400    Pain:  Pt reports 7/10 pain or discomfort prior to treatment at abdomen  Pt reports 7/10 pain or discomfort post treatment at abdomen Second Session: 4/10  at abdomen     SUBJECTIVE:   Patient nonverbal, uses hand gestures,  and writes on pad. OBJECTIVE DATA SUMMARY:   Pt seen in AM/PM for OT Evaluation. OT introduced and purpose of visit discussed. ? Right hand dominant   ? Left hand dominant    Therapy Diagnosis:   Difficulty with ADLs  ? Difficulty with functional transfers  ? Difficulty with ambulation  ? Difficulty with IADLs  ? Problem List:    Decreased strength B UE  ? Decreased strength trunk/core  ? Decreased AROM   ? Decreased endurance  ? Decreased balance sitting  ? Decreased balance standing  ? Pain   ? Decreased PROM  ? Functional Limitations:   Decreased independence with ADL  ? Decreased independence with functional transfers  ? Decreased independence with ambulation  ? Decreased independence with IADL  ?        Previous Functional Level:  Praful in ADLs/IADLs    Home Environment: Home Situation  Home Environment: Private residence  # Steps to Enter: 3  Rails to Enter: Yes  Hand Rails : Bilateral  Wheelchair Ramp: No  One/Two Story Residence: One story  Living Alone: No  Support Systems: Family member(s)  Patient Expects to be Discharged to[de-identified] Private residence  Current DME Used/Available at Home: Cane, straight  Tub or Shower Type: Tub/Shower combination    Barriers to Learning/Limitations: yes;  emotional, cognitive and sensory deficits-vision/hearing/speech  Compensate with: visual, verbal, tactile, kinesthetic cues/model    Outcome Measures:      MMT Initial Assessment   Right Upper Extremity  Left Upper Extremity    UE AROM WFL; MMT 4/5 grossly WFL; MMT 4/5   Shoulder flexion     Shoulder extension     Shoulder ABDuction     Shoulder ADDUction     Elbow Flexion     Elbow Extension     Wrist Extension/Flexion          0/5 No palpable muscle contraction  1/5 Palpable muscle contraction, no joint movement  2-/5 Less than full range of motion in gravity eliminated position  2/5 Able to complete full range of motion in gravity eliminated position  2+/5 Able to initiate movement against gravity  3-/5 More than half but not full range of motion against gravity  3/5 Able to complete full range of motion against gravity  3+/5 Completes full range of motion against gravity with minimal resistance  4-/5 Completes full range of motion against gravity with minimal resistance  4/5 Completes full range of motion against gravity with moderate resistance  5/5 Completes full range of motion against gravity with maximum resistance    Sensation: Intact  Coordination: Intact    FIM SCORES Initial Assessment   Bladder - level of assist     Bladder - accident frequency score     Bowel - level of assist     Bowel - accident frequency score     Please see [facility] Interdisciplinary Eval: Coordination/Balance Section for details regarding FIM score description.     COGNITION/PERCEPTION Initial Assessment   Reading Status Literate   Writing Status WFL   Arousal/Alertness Generalized responses   Orientation Level Oriented X4   Visual Fields     Praxis     Body Scheme       COMPREHENSION MODE Initial Assessment   Primary Mode of Comprehension Auditory   Hearing Aide None   Corrective Lenses Reading glasses   Score 4     EXPRESSION Initial Assessment   Primary Mode of Expression Sign language, Verbal, Other (comment)(writes on paper)   Score 4   Comments needing cues occasionally for writing down needs vs attempting to verbalize     SOCIAL INTERACTION/PRAGMATICS Initial Assessment   Score 4   Comments moderate encouragement to participate in therapy     PROBLEM SOLVING Initial Assessment   Score 4   Comments min cues for functional problem solving     MEMORY Initial Assessment   Score 4   Comments able to recall information appropriately as indicated on chart with occasional clarification needed     EATING Initial Assessment   Functional Level    Comments PEG Tube     GROOMING Initial Assessment   Functional Level 5    Oral Hygiene FIM:0   Tasks completed by patient     Comments increased time needed     BATHING Initial Assessment   Functional Level 3   Body parts patient bathed     Comments increased time needed     TUB/SHOWER TRANSFER INDEPENDENCE Initial Assessment   Score 5   Comments       UPPER BODY DRESSING/UNDRESSING Initial Assessment   Functional Level 3(increased time needed)   Items applied/Steps completed     Comments domned hopital gown with [employer]       LOWER BODY DRESSING/UNDRESSING Initial Assessment   Functional Level 3    Sock and/or Shoe Management FIM:5   Items applied/Steps completed     Comments increased time needed     TOILETING Initial Assessment   Functional Level 5   Comments increased time needed     TOILET TRANSFER INDEPENDENCE Initial Assessment   Transfer score 5   Comments to bed side commod     INSTRUMENTAL ADL Initial Assessment (PLOF)   Meal preparation     Homemaking     Medicine Management     Financial Management          ASSESSMENT :  Based on the objective data described below, the patient presents with Trach/O2 at 4 L/PEG tube with decreased cognition/attention, activity tolerance and BUE strength impacting ability to perform bed mobility, functional transfers, bathing/dressing, toileting, grooming, and IADLs in a timely manner.       Pt would benefit from skilled occupational therapy in order to improve independent functional mobility/ADLs,/IADLs within the home. Interventions may include range of motion (AROM, PROM B UE), motor function (B UE/ strengthening/coordination), activity tolerance (vitals, oxygen saturation levels), balance training, ADL/IADL training and functional transfer training. Please see IRC; Interdisciplinary Eval, Care Plan, and Patient Education for further information regarding occupational therapy examination and plan of care. PLAN :  Recommendations and Planned Interventions:  ?               Self Care Training                   ? Therapeutic Activities  ? Functional Mobility Training    ? Cognitive Retraining  ? Therapeutic Exercises            ? Endurance Activities  ? Balance Training                     ? Neuromuscular Re-Education  ? Visual/Perceptual Training      ? Home Safety Training  ? Patient Education                    ? Family Training/Education  ? Other (comment):    Frequency/Duration: Patient will be followed by occupational therapy 1-2 times per day/4-7 days per week to address goals. Discharge Recommendations: Home Health  Further Equipment Recommendations for Discharge: 3 in 1 bedside commode      Please refer to the flowsheet for vital signs taken during this treatment. After treatment:   ? Patient left in no apparent distress sitting up in chair  ? Patient left in no apparent distress in bed  ? Call bell left within reach  ? Nursing notified  ? Caregiver present  ? Bed alarm activated    COMMUNICATION/EDUCATION:   ? Home safety education was provided and the patient/caregiver indicated understanding. ? Patient/family have participated as able in goal setting and plan of care. ? Patient/family agree to work toward stated goals and plan of care.   ? Patient understands intent and goals of therapy, but is neutral about his/her participation. ? Patient is unable to participate in goal setting and plan of care. Order received from MD for occupational therapy services and chart reviewed. Pt to be seen 5 times per week for 3 hours of total therapy per day for 2 weeks.   Thank you for the referral.    Thank you for this referral.  Mary Luz, OT

## 2019-06-26 NOTE — INTERDISCIPLINARY ROUNDS
[address]  [address] [place] [address], [address]    INPATIENT REHABILITATION  PRE-TEAM CONFERENCE SUMMARY     Date of Conference: 6/27/19    Patient Information:        Name: Kvng Lomeli Age / Sex: 63 y.o. / female   CSN: [id number] MRN: [id number]   [facility] Date: 6/25/2019 Length of Stay: 1 days     Primary Rehabilitation Diagnosis: Impaired Mobility and ADLs secondary to <principal problem not specified>    - Vocal cord dysfunction  - has tracheostomy  - Dysphagia, has PEG  -Debility  Therapy:     FIM SCORES Initial Assessment Weekly Progress Assessment 6/26/2019   Eating Functional Level: 1  Comments: PEG Tube1  1   Swallowing     Grooming 5  5   Bathing 3  3      Upper Body Dressing Functional Level: 3(increased time needed)  Comments: domned hopital gown with Moda  3   Lower Body Dressing Functional Level: 3  Comments: increased time needed  4   Toileting Functional Level: 5  Comments: increased time needed  5   Bladder - level of assist       Bladder - accident frequency score       Bowel - level of assist       Bowel - accident frequency score       Toilet Transfer [place] Toilet Transfer Score: 5  Comments: to bed side commod  5   Tub/Shower Transfer [place] Tub or Shower Type: Tub/Shower combination  Tub/Shower Transfer Score: 5 Tub/Shower combination0      Comprehension Primary Mode of Comprehension: Auditory  Score: 4  Comments: understanding simple and multi-step directions with occasional repetition needed Auditory  5   Expression Primary Mode of Expression: Sign language, Verbal, Other (comment)(writes on paper)  Score: 4  Comments: needing cues occasionally for writing down needs vs attempting to verbalize Verbal, writing  5   Social Interaction Score: 4  Comments: moderate encouragement to participate in therapy 4   Problem Solving Score: 4  Comments: min cues for functional problem solving 4   Memory Score: 4  Comments: able to recall information appropriately as indicated on chart with occasional clarification needed 5     FIM SCORES Initial Assessment Weekly Progress Assessment 6/26/2019   Bed/Chair/Wheelchair Transfers Transfer Type: Other  Other: stand step transfer without AD as per PLOF  Transfer Assistance : 4 (Minimal assistance)  Sit to Stand Assistance: Minimal assistance  Car Transfers: Not tested  Car Type: N/A Transfer Type:  Other  Other: stand step transfer without AD as per PLOF  Transfer Assistance : 4 (Minimal assistance)  Sit to Stand Assistance: Minimal assistance  Car Transfers: Not tested  Car Type: N/A   Bed Mobility Rolling Right 4 (Minimal assistance)   Rolling Left 4 (Minimal assistance)   Supine to Sit 4 (Minimal assistance)(Head of bed elevated as per physician order)   Sit to Stand Minimal assistance   Sit to Supine 4 (Minimal assistance)    Rolling Right   4 (Minimal assistance)   Rolling Left   4 (Minimal assistance)   Supine to Sit   4 (Minimal assistance)(Head of bed elevated as per physician order)   Sit to Stand   Minimal assistance   Sit to Supine   4 (Minimal assistance)      Locomotion (W/C) Able to Propel (ft): (not challenged today)  Functional Level: (not challenged today; to be further assessed)  Curbs/Ramps Assist Required (FIM Score): 0 (Not tested)  Wheelchair Setup Assist Required : 0 (Not tested)  Wheelchair Management: (not challenged today; to be further assessed) Function (not challenged today; to be further assessed)  Setup Assistance  0 (Not tested)      Locomotion (W/C distance) (not challenged today) (not challenged today)   Locomotion (Walk) 4 (Minimal assistance) 4 (Minimal assistance)  (no AD as per PLOF for household gait)   Locomotion (Walk dist.) 8 Feet (ft) 8 Feet (ft)   Steps/Stairs Steps/Stairs Ambulated (#): (not able to assess today due to patient fatigue)  Level of Assist : 0 (Not tested)  Rail Use: (N/A)  not assessed         Nursing:     Neuro:   AAA&O x  4          Respiratory:   [] WNL [x] O2 LPM:   Other:  Peripheral Vascular:   [] TEDS present   [x] Edema present _1-2 Grade   Cardiac:   [] WNL   [x] Other   CHF  Genitourinary:   [x] continent   [] incontinent   [] mueller  Abdominal _6/17/19_ LBM  GI: _______ Diet ______ Liquids _Jevity 1.5 @ 40 ml/hr_ tube feeds  Musculoskeletal: ____ ROM Transfers W/C Assistive Device Used  _Min/CGA_ Level of Assistance  Skin Integumentary:   [] Intact   [x] Not Intact   _Infection/Skin/Falls/Aspiration_Preventative Measures  Details__New G-tube & trach @ risk for infection. No pressure areas at this time. Pain: [x] Controlled   [] Not Controlled   Pain Meds:   [] Scheduled   [x] PRN        Registered Dietitian / Nutrition:   No data found. Pt lethargic/asleep at time of visit. Per chart review, pt admitted to [facility] hospital with stridor and vocal cord dysfunction s/p tracheostomy placement on 6/3/19, tolerating trach collar; would have to deflate cuff for meals and re-inflate when not eating. Pt had failed MBS x 2 by 6/6/19. DHT was placed by IR. Pt tolerated feeds, receiving Jevity 1.5 due to allergy/intolerance to artificial sweeteners, then pulled out feeding tube. A diet was started by MD on 6/10. Pt had variable meal intake. PICC placed on 6/13 for TPN. Remained on po diet and TPN; made NPO on  6/15. Diet restarted on 6/18. SLP recommended NPO after repeat MBS on 6/19. S/p PEG placement on 6/20/19 and TPN was stopped after starting tube feeds. Pt had c/o abdominal distention and fullness, emesis. TF held on 6/23 due to concern for ileus; pt having +BMs and flatus, feeds resumed at low rate. Pt tolerated trickle feeds PTA; noted to express desire for receiving cyclic or bolus tube feeding and wanting to eat orally. Currently tolerating tube feeding at 40 mL/hr per RN. Pt did c/o abdominal pain per RN. BG remain elevated; discussed reason for not changing to Glucerna 1.5 (diabetic formula).       Tube Feeding: Jevity 1.5 at 40 mL/hr  Water Flushes: 75 mL q 6 hours  Residuals: 0 mL          Intake/Output Summary (Last 24 hours) at 6/26/2019 1555  Last data filed at 6/26/2019 0200  Gross per 24 hour   Intake 880 ml   Output --   Net 880 ml                                Last bowel movement: 6/25      Interdisciplinary Team Goals:     1. Discipline  Physical Therapy    Goal  Patient will be able to tolerate sitting up out of bed for at least 2 hours to improve activity tolerance to better participate in physical therapy including gait and standing tasks. Barrier  Decreased strength, endurance, anxious with mobility, shortness of breath with exertion    Intervention  Therapeutic activity, education regarding safe mobility techniques and importance of out of bed mobility     Goal written by:   Jos Roy, PT, DPT     2. Discipline  Occupational Therapy    Goal  Pt will perform toilet transfer for toileting needs with S .    Barrier  Cognition, Cognition,  Speech, BUE strength,     Intervention  Theract, Therex, Self Care Retraining, Transfer Training    Goal written by:  TEZ Luu/CAROLEE     3. Discipline  Speech Therapy    Goal  Patient will participate in pharyngeal strengthening exercises with min cues. Barrier  Tracheostomy, dysphagia, axniety    Intervention  Pharyngeal strengthening exercises, swallowing trials, PMV trials, education to iSnap anxiety     Goal written by:  Jeremias Tian, [facility]-SLP     4. Discipline  Nursing    Goal  Pt and family will perform 75% of g-tube feedings/care by next week    Barrier  Pt not tolerating bolus feedings yet    Intervention  Nursing instruction and hands on teaching with patient/family on g-tube feeding/care. Goal written by:  Ron Ayoub RN BSN     5. Discipline  Clinical Psychology    Goal      Barrier      Intervention      Goal written by:       6. Discipline  Nutrition / Dietetics    Goal  Nutritional needs will be met through adequate oral intake or nutrition support within the next 5 days.   Outcome: [] Met/Ongoing    [] Progressing towards goal    [] Not progressing towards goal    [x] New/Initial goal      Barrier  none known     Intervention  - Advance tube feeding of Jevity 1.5 by 10 mL q 8 hours to goal rate of 60 mL/hr with water flushes of 75 mL q 6 hours. - Add Prosource once daily  - Continue IVF until EN at goal.    Goal written by:  Calderon Gamino RD       Disposition / Discharge Planning: Follow-up services:  [x] Physical Therapy             [x] Occupational Therapy       [x] Speech Therapy           [] Skilled Nursing      [] Medical Social Worker   [] Aide        [] Outpatient     [x] Home Health   [] [address]   [] [facility]   DME recommendations:  3 in 1 commode   Estimated discharge date:  tbd   Discharge Location:  [] Home   [] [facility]   [] TB             [] Other:          Electronic Signatures:      Signature Date Signed   Physical Therapist    Spencer Willson, PT, DPT  6/26/2019   Occupational Therapist    Audie Gutierrez, MSOTR/L  6/26/19   Speech Therapist    Justin Chen, [address]  6/26/09   Recreational Therapist    Kristen Pearson, [address] 6/26/19   Nursing    Margo Chaidez, RN BSN 6/27/19   Dietitian    Carol Ann Garcia  6/26/19   Clinical Psychologist         Physician    Alexy Rodriguez 6/26/19       Erick De Leon, EDWIN  6/26/19         The above information has been reviewed with the patient in a language that they can understand. Opportunity for comments and questions has been provided and a signed attestation has been scanned into the \"media tab\" of the EMR.       Patient Signature: ______________________________________________________    Date Signed: __________________________________________________________

## 2019-06-27 LAB
GLUCOSE BLD STRIP.AUTO-MCNC: 182 MG/DL (ref 70–110)
GLUCOSE BLD STRIP.AUTO-MCNC: 207 MG/DL (ref 70–110)
GLUCOSE BLD STRIP.AUTO-MCNC: 212 MG/DL (ref 70–110)
GLUCOSE BLD STRIP.AUTO-MCNC: 94 MG/DL (ref 70–110)

## 2019-06-27 PROCEDURE — 97116 GAIT TRAINING THERAPY: CPT

## 2019-06-27 PROCEDURE — 82962 GLUCOSE BLOOD TEST: CPT

## 2019-06-27 PROCEDURE — 92526 ORAL FUNCTION THERAPY: CPT

## 2019-06-27 PROCEDURE — 74011636637 HC RX REV CODE- 636/637: Performed by: EMERGENCY MEDICINE

## 2019-06-27 PROCEDURE — 74011250637 HC RX REV CODE- 250/637: Performed by: EMERGENCY MEDICINE

## 2019-06-27 PROCEDURE — 92507 TX SP LANG VOICE COMM INDIV: CPT

## 2019-06-27 PROCEDURE — 97110 THERAPEUTIC EXERCISES: CPT

## 2019-06-27 PROCEDURE — 97535 SELF CARE MNGMENT TRAINING: CPT

## 2019-06-27 PROCEDURE — 97542 WHEELCHAIR MNGMENT TRAINING: CPT

## 2019-06-27 PROCEDURE — 97530 THERAPEUTIC ACTIVITIES: CPT

## 2019-06-27 PROCEDURE — 65310000000 HC RM PRIVATE REHAB

## 2019-06-27 PROCEDURE — 74011250637 HC RX REV CODE- 250/637

## 2019-06-27 PROCEDURE — 74011250636 HC RX REV CODE- 250/636: Performed by: EMERGENCY MEDICINE

## 2019-06-27 RX ORDER — ONDANSETRON 4 MG/1
4 TABLET, FILM COATED ORAL
Status: DISCONTINUED | OUTPATIENT
Start: 2019-06-27 | End: 2019-07-09

## 2019-06-27 RX ORDER — PANTOPRAZOLE SODIUM 40 MG/1
40 GRANULE, DELAYED RELEASE ORAL
Status: DISCONTINUED | OUTPATIENT
Start: 2019-06-27 | End: 2019-07-13 | Stop reason: HOSPADM

## 2019-06-27 RX ADMIN — OXYCODONE HYDROCHLORIDE AND ACETAMINOPHEN 1 TABLET: 5; 325 TABLET ORAL at 03:33

## 2019-06-27 RX ADMIN — DOCUSATE SODIUM 100 MG: 100 CAPSULE, LIQUID FILLED ORAL at 09:54

## 2019-06-27 RX ADMIN — INSULIN LISPRO 4 UNITS: 100 INJECTION, SOLUTION INTRAVENOUS; SUBCUTANEOUS at 00:20

## 2019-06-27 RX ADMIN — DOCUSATE SODIUM 100 MG: 100 CAPSULE, LIQUID FILLED ORAL at 18:02

## 2019-06-27 RX ADMIN — INSULIN LISPRO 4 UNITS: 100 INJECTION, SOLUTION INTRAVENOUS; SUBCUTANEOUS at 12:31

## 2019-06-27 RX ADMIN — LORAZEPAM 1 MG: 0.5 TABLET ORAL at 02:30

## 2019-06-27 RX ADMIN — METOCLOPRAMIDE HYDROCHLORIDE 5 MG: 5 TABLET ORAL at 08:05

## 2019-06-27 RX ADMIN — METOCLOPRAMIDE HYDROCHLORIDE 5 MG: 5 TABLET ORAL at 21:32

## 2019-06-27 RX ADMIN — INSULIN LISPRO 2 UNITS: 100 INJECTION, SOLUTION INTRAVENOUS; SUBCUTANEOUS at 17:59

## 2019-06-27 RX ADMIN — SODIUM CHLORIDE AND POTASSIUM CHLORIDE: 4.5; 1.49 INJECTION, SOLUTION INTRAVENOUS at 05:35

## 2019-06-27 RX ADMIN — PANTOPRAZOLE SODIUM 40 MG: 40 GRANULE, DELAYED RELEASE ORAL at 12:29

## 2019-06-27 RX ADMIN — OXYCODONE HYDROCHLORIDE AND ACETAMINOPHEN 1 TABLET: 5; 325 TABLET ORAL at 18:03

## 2019-06-27 RX ADMIN — LEVOTHYROXINE SODIUM 200 MCG: 75 TABLET ORAL at 05:51

## 2019-06-27 RX ADMIN — FAMOTIDINE 20 MG: 20 TABLET, FILM COATED ORAL at 09:54

## 2019-06-27 RX ADMIN — METOCLOPRAMIDE HYDROCHLORIDE 5 MG: 5 TABLET ORAL at 15:34

## 2019-06-27 RX ADMIN — INSULIN GLARGINE 30 UNITS: 100 INJECTION, SOLUTION SUBCUTANEOUS at 09:52

## 2019-06-27 RX ADMIN — ENOXAPARIN SODIUM 40 MG: 40 INJECTION SUBCUTANEOUS at 17:57

## 2019-06-27 RX ADMIN — METOPROLOL TARTRATE 25 MG: 25 TABLET ORAL at 09:54

## 2019-06-27 RX ADMIN — LORAZEPAM 1 MG: 0.5 TABLET ORAL at 21:48

## 2019-06-27 RX ADMIN — SODIUM CHLORIDE AND POTASSIUM CHLORIDE: 4.5; 1.49 INJECTION, SOLUTION INTRAVENOUS at 21:34

## 2019-06-27 NOTE — PROGRESS NOTES
NUTRITION CONSULT    Nutrition Consult: Management of Tube Feeding     RECOMMENDATIONS / PLAN:     - Modify tube feeding; decrease to goal rate of 55 mL/hr  - Advance tube feeding of Jevity 1.5 by 10 mL q 8 hours to goal rate of 55 mL/hr with water flushes of 75 mL q 6 hours. - Modify Prosource order to BID  - Continue IVF until EN at goal.  - Recommend considering trying a different antacid medication  - Continue RD inpatient monitoring and evaluation. Goal Regimen: Jevity 1.5 at 55 mL/hr + 75 mL q 6 hour water flushes to provide: 1980 kcal, 84 gm protein, 66 gm fat, 285 gm CHO, 29 gm fiber, 1003 mL free water, 1603 mL total water, 100% RDIs  Tube feeding + Prosource BID to provide: 2100 kcal, 114 gm protein     NUTRITION DIAGNOSIS & INTERVENTIONS:     [x] Enteral nutrition: modify, advance to goal  [x] IV Fluids: continue until tube feeding at goal  [x] Collaboration and referral of nutrition care:  Discussed pt and EN tolerance with RN     Nutrition Diagnosis: Swallowing difficulty related to dysphagia s/p tracheostomy as evidenced by pt NPO after SLP evaluation/MBS. ASSESSMENT:     6/27: Tube feeding remains at 40 mL/hr; pt c/o nausea. Having regular BM, no gastric residuals. Abdomen distended per chart documentation per night nurse. Discussed tube feeding regimen and Prosource supplement; pt agreeable with plan. Noted pt previously reported pepcid \"not working\" when in main hospital; pepcid currently ordered    6/26: Pt lethargic/asleep at time of visit. Per chart review, pt admitted to main hospital with stridor and vocal cord dysfunction s/p tracheostomy placement on 6/3/19, tolerating trach collar; would have to deflate cuff for meals and re-inflate when not eating. Pt had failed MBS x 2 by 6/6/19. DHT was placed by IR. Pt tolerated feeds, receiving Jevity 1.5 due to allergy/intolerance to artificial sweeteners, then pulled out feeding tube. A diet was started by MD on 6/10.  Pt had variable meal intake. PICC placed on 6/13 for TPN. Remained on po diet and TPN; made NPO on  6/15. Diet restarted on 6/18. SLP recommended NPO after repeat MBS on 6/19. S/p PEG placement on 6/20/19 and TPN was stopped after starting tube feeds. Pt had c/o abdominal distention and fullness, emesis. TF held on 6/23 due to concern for ileus; pt having +BMs and flatus, feeds resumed at low rate. Pt tolerated trickle feeds PTA; noted to express desire for receiving cyclic or bolus tube feeding and wanting to eat orally. Currently tolerating tube feeding at 40 mL/hr per RN. Pt did c/o abdominal pain per RN. BG remain elevated; discussed reason for not changing to Glucerna 1.5 (diabetic formula). EN infusion adequate to meet patients estimated nutritional needs:   [] Yes     [x] No   [] Unable to determine at this time    Tube Feeding: Jevity 1.5 at 40 mL/hr  Water Flushes: 75 mL q 6 hours  Residuals: 0 mL    Diet: DIET NPO  DIET TUBE FEEDING  DIET NUTRITIONAL SUPPLEMENTS Lunch; PROSOURCE      Food Allergies: sweeteners, sucrose  Current Appetite:   [] Good     [] Fair     [] Poor     [x] Other: NPO  Appetite/meal intake prior to admission:   [x] Good     [] Fair     [] Poor     [] Other:  Feeding Limitations:  [x] Swallowing difficulty: SLP following    [] Chewing difficulty    [x] Other: hx of dysphagia s/p thyroidectomy PTA and trach placement 6/3/19  Current Meal Intake:   No data found.     BM: 6/26  Skin Integrity: surgical incision to neck  Edema:  [] No     [x] Yes   Pertinent Medications: Reviewed: 1/2 NS + 20 mEq/L KCL at 75 mL/hr (36 mEq KCl per day), bowel regimen, pepcid, SSI, lantus, reglan     Labs:   Recent Labs     06/26/19  0610 06/25/19  0430    138   K 3.7 3.7    107   CO2 31 32   * 131*   BUN 9 6*   CREA 0.82 0.67   CA 8.3* 8.0*   MG 2.3  --    Prealbumin: 12.9 mg/dL (6/14/19)  Triglyceride: 298 mg/dL (6/18/19), 316 mg/dL (6/13/19), 511 mg/dL (4/16/19)  C reactive protein: 4.8 mg/dL (6/18/19)  Recent Labs     06/26/19  0610 06/25/19  0430   WBC 5.7 6.3   HGB 11.5* 11.7*   HCT 36.4 36.6    208         Intake/Output Summary (Last 24 hours) at 6/27/2019 0951  Last data filed at 6/27/2019 0341  Gross per 24 hour   Intake 1350 ml   Output --   Net 1350 ml       Anthropometrics:   Ht Readings from Last 1 Encounters:   06/03/19 5' 7\" (1.702 m)     Last 3 Recorded Weights in this Encounter    06/26/19 1430   Weight: 122.2 kg (269 lb 8 oz)     Body mass index is 42.21 kg/m². Obese, Class III    Weight History: patient denies change in weight PTA, stable     Weight Metrics 6/26/2019 6/25/2019 6/22/2019 6/3/2019 5/23/2019 5/22/2019 5/16/2019   Weight 269 lb 8 oz - 269 lb 8 oz - 260 lb 6.4 oz - 258 lb   BMI - 42.21 kg/m2 - 42.21 kg/m2 - 40.78 kg/m2 40.41 kg/m2          Admitting Diagnosis: Chronic respiratory failure (HCC) [J96.10]  Pertinent PMHx: T2DM with diabetic neuropathy, HTN, dyslipidemia, CHF, hypothyroidism s/p thyroidectomy 4/4/19    Education Needs:        [x] None identified  [] Identified - Not appropriate at this time  []  Identified and addressed - refer to education log  Learning Limitations:   [] None identified  [x] Identified: nonverbal. Communicates via pen/paper    Cultural, Mormonism & ethnic food preferences:  [x] None identified    [] Identified and addressed     ESTIMATED NUTRITION NEEDS:     Calories: 6005-7926 kcal (MSJx1-1.2) based on   [x] Actual  kg     [] IBW:   Protein:  gm (0.8-1.2 gm/kg) based on   [x] Actual BW      [] IBW:   Fluid: 1 mL/kcal     MONITORING & EVALUATION:     Nutrition Goals:   1. Nutritional needs will be met through adequate oral intake or nutrition support within the next 5 days.   Outcome:  [] Met/Ongoing    [x] Progressing towards goal    [] Not progressing towards goal    [] New/Initial goal      Monitoring:    [x] Fluid intake   [] Nutrition-focused physical findings   [x] Treatment/therapy   [] Food and beverage intake   [x] Diet order      [] Weight    [x] EN infusion    Previous Recommendations (for follow-up assessments only):     [x]   Implemented       []   Not Implemented (RD to address)      [] No Longer Appropriate     [] No Recommendation Made        Discharge Planning: goal enteral nutrition regimen via PEG   [x]  Participated in care planning, discharge planning, & interdisciplinary rounds as appropriate      Ladan Perales, [address] [place] [address]  Pager: [phone]

## 2019-06-27 NOTE — PROGRESS NOTES
[facility] Hospitalist Group  Progress Note    Patient: Tosha Davis Age: 63 y.o. : 1960 MR#: [id number] SSN: [id number]  Date/Time: 2019    Subjective:     Patient sitting in bed in NAD, awake    Assessment/Plan:     1. Impaired mobility and activities of daily living. 2.  Vocal cord dysfunction. 3.  The patient has a tracheostomy. 4.  Status post recent thyroidectomy. 5.  Dysphagia. The patient has a PEG tube. 6.  Type 2 diabetes. 7.  Hypertension. 8.  Obesity. 9.  Hypothyroidism. 10.  Debility. PLAN  Trach care, bronchial hygiene  D/w RN to try to wean Oxygen down, currently on 4 liters  Tube feeding, nutrition following  PT, OT, ST  D/w patient  D/w RN    Case discussed with:  []Patient  []Family  []Nursing  []Case Management  DVT Prophylaxis:  []Lovenox  []Hep SQ  []SCDs  []Coumadin   []On Heparin gtt    Objective:   VS:   Visit Vitals  /56 (BP 1 Location: Left arm, BP Patient Position: At rest)   Pulse 60   Temp 99.8 °F (37.7 °C)   Resp 18   Wt 122.2 kg (269 lb 8 oz)   SpO2 97%   Breastfeeding?  No   BMI 42.21 kg/m²      Tmax/24hrs: Temp (24hrs), Av.3 °F (37.4 °C), Min:98.2 °F (36.8 °C), Max:100 °F (37.8 °C)    Input/Output:     Intake/Output Summary (Last 24 hours) at 2019 1744  Last data filed at 2019 0341  Gross per 24 hour   Intake 1350 ml   Output --   Net 1350 ml       General:  Awake, alert  Cardiovascular:  S1S2+, RRR  Pulmonary:  Coarse bs b/l  GI:  Soft, BS+, NT, ND  Extremities:  trace edema  + trach, + peg    Labs:    Recent Results (from the past 24 hour(s))   GLUCOSE, POC    Collection Time: 19  5:48 PM   Result Value Ref Range    Glucose (POC) 198 (H) 70 - 110 mg/dL   GLUCOSE, POC    Collection Time: 19  8:18 PM   Result Value Ref Range    Glucose (POC) 192 (H) 70 - 110 mg/dL   GLUCOSE, POC    Collection Time: 19 12:11 AM   Result Value Ref Range    Glucose (POC) 207 (H) 70 - 110 mg/dL   GLUCOSE, POC Collection Time: 06/27/19  5:06 AM   Result Value Ref Range    Glucose (POC) 94 70 - 110 mg/dL   GLUCOSE, POC    Collection Time: 06/27/19 12:20 PM   Result Value Ref Range    Glucose (POC) 212 (H) 70 - 110 mg/dL   GLUCOSE, POC    Collection Time: 06/27/19  5:22 PM   Result Value Ref Range    Glucose (POC) 182 (H) 70 - 110 mg/dL     Additional Data Reviewed:      Signed By: Duane Stovall MD     June 27, 2019

## 2019-06-27 NOTE — PROGRESS NOTES
[x] Psychology  [] Social Work [] Recreational Therapy    INTERVENTION  UNITS/TIME OF SERVICE   Assessment June 27, 2019   Supportive Counseling    Orientation    Discharge Planning    Resource Linkage              Progress/Current Status    Patient seen for Psychological Evaluation as requested on admission to ARU by MD.  Patient found sitting upright in wheelchair in room with daughter sitting nearby. Patient is immediately distressed on my contact and becomes tearful, stating \"I want to go home. \"  Patient is obviously stressed by immediate medical circumstances. She apparently struggled with anxiety and episodes of panic prior to transfer to ARU which is now allowing for Rx of Ativan q 12 hours. Patient reported that she has not been sleeping well secondary to anxious thoughts and feelings and was not offered Ativan until 3 am.  In turn, RN was requested to determine whether Rx could be offered at time patient goes to sleep, rather than after hours of lying awake and not sleeping secondary to anxiety. Patient would also benefit from learning relaxation techniques for self calming. She requested whether medications could not be crushed and administered other than through tube feeding, but this was deferred to [facility] Tiffanie Vallejo. Patient will be monitored for emotional and/or behavioral difficulties while on ARU and encouraged to persevere.       Poly Andrea, THE [facility] 6/27/2019 11:39 AM

## 2019-06-27 NOTE — PROGRESS NOTES
Bedside and Verbal shift change report given to Sheila Osullivan RN (oncoming nurse) by Devora Donaldson RN (offgoing nurse). Report included the following information SBAR, Kardex, MAR and Recent Results.     SITUATION:    Code Status: Full Code   Reason for Admission: Chronic respiratory failure ([facility] [J96.10]    [facility] day: 2   Problem List:       Hospital Problems  Date Reviewed: 5/16/2019          Codes Class Noted POA    Chronic respiratory failure [facility]) ICD-10-CM: J96.10  ICD-9-CM: 518.83  6/25/2019 Unknown              BACKGROUND:    Past Medical History:   Past Medical History:   Diagnosis Date    Arthritis     Lower back     Asthma     Chronic back pain     Lower back pain    Depression     Diabetes ([facility]     Diabetes mellitus ([facility]     Hearing loss     Heart failure (HCC)     chronic diastolic heart failure    Left ear hearing loss     pt states nerve damage--- 25% hearing loss, described as \"muffled\"    Memory difficulty     Panic attacks     Ringing of ears     Severe headache     Sleep apnea     SOB (shortness of breath) on exertion     w and w/out exertion    Stomach pain     Thyroid disease     Vertigo          Patient taking anticoagulants yes     ASSESSMENT:    Changes in Assessment Throughout Shift: none     Patient has Central Line: no Reasons if yes:    Patient has Barker Cath: no Reasons if yes:       Last Vitals:     Vitals:    06/26/19 1430 06/26/19 1544 06/26/19 2143 06/26/19 2355   BP:  117/68 114/65    Pulse:  72 67 75   Resp:  18 18 16   Temp:  99.5 °F (37.5 °C) 100 °F (37.8 °C)    SpO2:  97% 95% 97%   Weight: 122.2 kg (269 lb 8 oz)           IV and DRAINS (will only show if present)   Peripheral IV 06/25/19 Anterior;Proximal;Right Antecubital-Site Assessment: Clean, dry, & intact  PEG/Gastrostomy Tube 06/20/19-Site Assessment: Clean, dry, & intact  [REMOVED] Airway - Tracheostomy Tube 06/03/19 Portex-Site Assessment: Clean, dry, & intact     WOUND (if present)   Wound Type:         Dressing present Dressing Present : No   Wound Concerns/Notes:  none     PAIN    Pain Assessment    Pain Intensity 1: 0 (06/27/19 0400)    Pain Location 1: Back, Neck    Pain Intervention(s) 1: Medication (see MAR)    Patient Stated Pain Goal: 0  o Interventions for Pain:  Pain medication    o Intervention effective: yes  o Time of last intervention: see MAR   o Reassessment Completed: yes      Last 3 Weights:  Last 3 Recorded Weights in this Encounter    06/26/19 1430   Weight: 122.2 kg (269 lb 8 oz)     Weight change:      INTAKE/OUPUT    Current Shift: 06/26 1901 - 06/27 0700  In: 1350   Out: -     Last three shifts: 06/25 0701 - 06/26 1900  In: 880   Out: -      LAB RESULTS     Recent Labs     06/26/19 0610 06/25/19  0430   WBC 5.7 6.3   HGB 11.5* 11.7*   HCT 36.4 36.6    208        Recent Labs     06/26/19  0610 06/25/19  0430    138   K 3.7 3.7   * 131*   BUN 9 6*   CREA 0.82 0.67   CA 8.3* 8.0*   MG 2.3  --        RECOMMENDATIONS AND DISCHARGE PLANNING     1. Pending tests/procedures/ Plan of Care or Other Needs: labs  2.      3. Discharge plan for patient and Needs/Barriers: TBD    4. Estimated Discharge Date: TBD Posted on Whiteboard in Patients Room: no      4. The patient's care plan was reviewed with the oncoming nurse. \"HEALS\" SAFETY CHECK      Fall Risk    Total Score: 3    Safety Measures: Safety Measures: Bed/Chair alarm on, Bed/Chair-Wheels locked, Bed in low position, Call light within reach    A safety check occurred in the patient's room between off going nurse and oncoming nurse listed above.     The safety check included the below items  Area Items   H  High Alert Medications - Verify all high alert medication drips (heparin, PCA, etc.)   E  Equipment - Suction is set up for ALL patients (with yanker)  - Red plugs utilized for all equipment (IV pumps, etc.)  - WOWs wiped down at end of shift.  - Room stocked with oxygen, suction, and other unit-specific supplies   A  Alarms - Bed alarm is set for fall risk patients  - Ensure chair alarm is in place and activated if patient is up in a chair   L  Lines - Check IV for any infiltration  - Barker bag is empty if patient has a Barkre   - Tubing and IV bags are labeled   S  Safety   - Room is clean, patient is clean, and equipment is clean. - Hallways are clear from equipment besides carts. - Fall bracelet on for fall risk patients  - Ensure room is clear and free of clutter  - Suction is set up for ALL patients (with yanker)  - Hallways are clear from equipment besides carts.    - Isolation precautions followed, supplies available outside room, sign posted     Obie Ceja RN

## 2019-06-27 NOTE — PROGRESS NOTES
Problem: Dysphagia (Adult)  Goal: *Speech Goal: (INSERT TEXT)  Description  Long term goals  Patient will:  1. Perform pharyngeal strengthening exercises with supervision. 2. Tolerate swallowing trials of varied consistenies without over s/s of aspiration. 3. Participate in repeat MBS prior to resuming PO for nutrition. 4. Tolerate deflation of tracheostomy cuff for speech production for 20 minutes. 5. Tolerate placement of a passu-[place] speaking valve for speech for 20 minute intervals. Short term goals (by 7/3/19)  Patient will:  1. Perform pharyngeal strengthening exercises with min-mod assist.  2. Tolerate swallowing trials of ice chops and small boluses puree  without over s/s of aspiration. 3. Participate in repeat MBS prior to resuming PO for nutrition. 4. Tolerate deflation of tracheostomy cuff for speech production for 20 minutes. 5. Tolerate placement of a passu-[place] speaking valve for speech for 1-2 minute intervals. Note:   SPEECH LANGUAGE PATHOLOGY TREATMENT    Patient: Karishma Rabago (19 y.o. female)  Date: 6/27/2019  Diagnosis: [facility] [J96.10] <principal problem not specified>       Time in: 1100 1500  Time Out:  3357 0800  SUBJECTIVE:   Patient stated ? He told me that if it happened again (severe dyspnea) to just go the the ER? .    OBJECTIVE:   Mental Status:  Mrs. Tayler Gamino was awake and alert, but continues to be anxious and overwhelmed about the tracheostomy tube. Treatment & Interventions: This morning the patient was seen with her daughter in the room. Most of the session was spent in education about the tracheostomy and the progression of care to achieve more normalized function (for both speech and PO intake). SLP discussed with them the anatomical placement of the tube (below the vocal folds), it's purpose for ease of respiration, and it's effects upon the pharyngeal swallow (in layman's terms).   SLP tried to provide encouragement and hope for the patient and her family. She also discussed the concept of the one-way, biased open [place] valve to allow speech production and normalize swallow. This afternoon was also spent in education for Mrs. John Schumacher. The SW reported that she had never been able to really see the tracheostomy tube since it was placed. The SLP showed her in front of the mirror the trach tube in situ and discussed the visible parts, the  balloon for cuff inflation, the inner and outer cannulas allowing for cleaning and changing to prevent buildup of secretions inside. Education was initiated about the care of the tube, including airway suctioning. Mrs. John Schumacher allowed the SLP to occlude the opening with finger occlusion to allow speech several times. It was noted that she had a significant amount of secretions. She verbalized that she has had uncomfortable experiences with nurses suctioning her causing pain. Motor Speech/Swallowing:   Pharyngeal strengthening exercises were presented and briefly reviewed. A printed copy was left a her bedside. Voice:  Breathy but audible. Speech readily intelligible. Response & Tolerance to Activities:  Mrs. John Schumacher was very anxious this morning, but visibly more comfortable with her situation this afternoon. Pain:  Pain Scale 1: Numeric (0 - 10)  No report of pain     After treatment:   ?       Patient left in no apparent distress sitting up in chair  ? Patient left in no apparent distress in bed  ? Call bell left within reach  ? Nursing notified  ? Caregiver present  ? Bed alarm activated    ASSESSMENT:   Progression toward goals:  ?       Improving appropriately and progressing toward goals  ? Improving slowly and progressing toward goals  ? Not making progress toward goals and plan of care will be adjusted    PLAN:   Patient continues to benefit from skilled intervention to address the above impairments.   Continue treatment per established plan of care.   Discharge Recommendations:  Home Health    Estimated LOS: Through 7/9/19    CHINMAY Keane  Time Calculation:  90 Minutes

## 2019-06-27 NOTE — PROGRESS NOTES
Bedside and Verbal shift change report given to Dana Dos Santos (oncoming nurse) by Pavan Kim RN (offgoing nurse). Report included the following information SBAR, Kardex, MAR and Recent Results.     SITUATION:    Code Status: Full Code   Reason for Admission: Chronic respiratory failure ([facility] [J96.10]    [facility] day: 2   Problem List:       Hospital Problems  Date Reviewed: 5/16/2019          Codes Class Noted POA    Chronic respiratory failure [facility]) ICD-10-CM: J96.10  ICD-9-CM: 518.83  6/25/2019 Unknown              BACKGROUND:    Past Medical History:   Past Medical History:   Diagnosis Date    Arthritis     Lower back     Asthma     Chronic back pain     Lower back pain    Depression     Diabetes ([facility]     Diabetes mellitus ([facility]     Hearing loss     Heart failure (HCC)     chronic diastolic heart failure    Left ear hearing loss     pt states nerve damage--- 25% hearing loss, described as \"muffled\"    Memory difficulty     Panic attacks     Ringing of ears     Severe headache     Sleep apnea     SOB (shortness of breath) on exertion     w and w/out exertion    Stomach pain     Thyroid disease     Vertigo          Patient taking anticoagulants yes     ASSESSMENT:    Changes in Assessment Throughout Shift: none     Patient has Central Line: no Reasons if yes:    Patient has Barker Cath: no Reasons if yes:       Last Vitals:     Vitals:    06/26/19 2143 06/26/19 2355 06/27/19 0757 06/27/19 1620   BP: 114/65  111/67 108/56   Pulse: 67 75 62 60   Resp: 18 16 18 18   Temp: 100 °F (37.8 °C)  98.2 °F (36.8 °C) 99.8 °F (37.7 °C)   SpO2: 95% 97% 99% 97%   Weight:            IV and DRAINS (will only show if present)   Peripheral IV 06/25/19 Anterior;Proximal;Right Antecubital-Site Assessment: Clean, dry, & intact  PEG/Gastrostomy Tube 06/20/19-Site Assessment: Clean, dry, & intact  [REMOVED] Airway - Tracheostomy Tube 06/03/19 Portex-Site Assessment: Clean, dry, & intact     WOUND (if present)   Wound Type:         Dressing present Dressing Present : No   Wound Concerns/Notes:  none     PAIN    Pain Assessment    Pain Intensity 1: 6 (06/27/19 1542)    Pain Location 1: Back, Neck    Pain Intervention(s) 1: Medication (see MAR)    Patient Stated Pain Goal: 0  o Interventions for Pain:  Pain medication    o Intervention effective: yes  o Time of last intervention: see MAR   o Reassessment Completed: yes      Last 3 Weights:  Last 3 Recorded Weights in this Encounter    06/26/19 1430   Weight: 122.2 kg (269 lb 8 oz)     Weight change:      INTAKE/OUPUT    Current Shift: No intake/output data recorded. Last three shifts: 06/26 0701 - 06/27 1900  In: 1350   Out: -      LAB RESULTS     Recent Labs     06/26/19  0610 06/25/19  0430   WBC 5.7 6.3   HGB 11.5* 11.7*   HCT 36.4 36.6    208        Recent Labs     06/26/19  0610 06/25/19  0430    138   K 3.7 3.7   * 131*   BUN 9 6*   CREA 0.82 0.67   CA 8.3* 8.0*   MG 2.3  --        RECOMMENDATIONS AND DISCHARGE PLANNING     1. Pending tests/procedures/ Plan of Care or Other Needs: labs  2.      3. Discharge plan for patient and Needs/Barriers: TBD    4. Estimated Discharge Date: TBD Posted on Whiteboard in Patients Room: no      4. The patient's care plan was reviewed with the oncoming nurse. \"HEALS\" SAFETY CHECK      Fall Risk    Total Score: 3    Safety Measures: Safety Measures: Bed/Chair alarm on, Bed/Chair-Wheels locked, Bed in low position, Call light within reach, Extra trach at bedside, Fall prevention (comment), Gripper socks, Side rails X 3    A safety check occurred in the patient's room between off going nurse and oncoming nurse listed above.     The safety check included the below items  Area Items   H  High Alert Medications - Verify all high alert medication drips (heparin, PCA, etc.)   E  Equipment - Suction is set up for ALL patients (with yanker)  - Red plugs utilized for all equipment (IV pumps, etc.)  - WOWs wiped down at end of shift.  - Room stocked with oxygen, suction, and other unit-specific supplies   A  Alarms - Bed alarm is set for fall risk patients  - Ensure chair alarm is in place and activated if patient is up in a chair   L  Lines - Check IV for any infiltration  - Barker bag is empty if patient has a Barker   - Tubing and IV bags are labeled   S  Safety   - Room is clean, patient is clean, and equipment is clean. - Hallways are clear from equipment besides carts. - Fall bracelet on for fall risk patients  - Ensure room is clear and free of clutter  - Suction is set up for ALL patients (with yanker)  - Hallways are clear from equipment besides carts.    - Isolation precautions followed, supplies available outside room, sign posted     Dawna Darling RN
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
## 2019-06-27 NOTE — PROGRESS NOTES
Pt is a 63year old female admitted to ARU for chronic respiratory failure. Pt is alert and oriented with her nurse in the room. Pt consented to conversation with nurse present. Pt confirms that she is best able to answer yes or no questions. Pt demonstrates ability to further communicate via gesturing, mouthing words/sentances and writing on her tablet of paper. Pt confirms that she lives in a 1 level home with 3 steps to enter and bilateral rails. Pt indicates that she has a tub shower. Pt confirms that she lives with her son, [place], and her brother, Matthew Denise. Pt indicates that she was able to self care prior to admission and mobilized with the use of 2 straight canes. Pt indicates that she has a shower chair. Pt confirms that she has used [facility] [employer] [facility] and denies history with home health or SNF. Pt indicates that she does not have a POA and confirms that her children - Sarita Pinto [phone] and Divya Edwards [phone]- are her [facility] contacts. Pt indicates that her son is best suited to assist her at dc noting that her daughter will be supportive but not as available. Pt wrote that her brother, Matthew Denise, is \"afraid of everything\" and she does not feel that he will be a primary caregiver. Pt confirms her insurance as [employer]. Sw reviewed insurance updates, dc planning, team conference and family education. Pt was able to express her anticipation for a dc to home after becoming tearful hearing the planned dc date of 7/9. Pt indicated that she is comfortable with the date but anxious to return home. Sw and nurse encouraged pt to share and concerns that she may have about returning home and to continue asking questions about her care and how to care for her new trach and PEG. Sw will follow.

## 2019-06-27 NOTE — PROGRESS NOTES
Problem: Mobility Impaired (Adult and Pediatric)  Goal: *Therapy Goal (Edit Goal, Insert Text)  Description  Physical Therapy Goals  Initiated 6/26/2019 and to be accomplished within 7 day(s) 7/3/2019:   1. Patient will move from supine to sit and sit to supine , scoot up and down and roll side to side in bed with supervision/set-up. 2.  Patient will transfer from bed to chair and chair to bed with supervision/set-up using the least restrictive device. 3.  Patient will perform sit to stand with supervision/set-up. 4.  Patient will ambulate with supervision/set-up for 50 feet with the least restrictive device. 5.  Patient will ascend/descend 3 stairs with 2 handrail(s) with minimal assistance/contact guard assist.    Physical Therapy Goals  Initiated 6/26/2019 and to be accomplished within 14 day(s) 7/10/2019:  1. Patient will move from supine to sit and sit to supine , scoot up and down and roll side to side in bed with modified independence. 2.  Patient will transfer from bed to chair and chair to bed with modified independence using the least restrictive device. 3.  Patient will perform sit to stand with modified independence. 4.  Patient will ambulate with modified independence for 75 feet with the least restrictive device. 5.  Patient will ascend/descend 3 stairs with 2 handrail(s) with supervision/set-up. Outcome: Progressing Towards Goal   PHYSICAL THERAPY TREATMENT    Patient: Pamella Cleveland (46 y.o. female)  Date: 6/27/2019  Diagnosis: Chronic respiratory failure (HCC) [J96.10] <principal problem not specified>  Precautions: Aspiration, Fall  Chart, physical therapy assessment, plan of care and goals were reviewed.   Time In: 0930  Time Out: 1030  Time In: 1405  Time Out: 1435  Patient Seen For: Gait training;Transfer training;Patient education  Pain:  Patient continues to report pain/bloating in stomach, resistant to wearing pants or anything around her stomach/abdomen that puts pressure on this area. Patient's treatment modified to accommodate her reports. Patient identified with name and : yes    SUBJECTIVE:     Patient agreeable to treatment once plan of treatment session explained and patient educated regarding benefits of participating in therapy. Patient's daughter came to visit during therapy session and patient noted to be pleased with her daughter coming to visit, though also becoming overwhelmed with emotion briefly. Patient agreeable to continuing therapy session with daughter present. OBJECTIVE DATA SUMMARY:   Objective:     BED/MAT MOBILITY Daily Assessment    Supine to Sit : 4 (Minimal assistance)(head of bed elevated as per physician order)    Using bed rail to assist with supine->sit. TRANSFERS Daily Assessment    Transfer Type: Other  Other: stand step transfer without AD  Transfer Assistance : 4 (Minimal assistance)  Sit to Stand Assistance: Contact guard assistance  Car Transfers: Not tested  Car Type: N/A    Patient needing only steadying support to perform task. Uses UE support to assist with coming into standing position and to assist with sitting back down. GAIT Daily Assessment    Amount of Assistance: 4 (Minimal assistance)  Distance (ft): 48 Feet (ft)  Assistive Device: Cane, straight(2 straight canes for gait)    Min assist for gait with use of two canes (patient's PLOF), performing only one bout. Fatigued at end of bout. SpO2 100% and HR 63 bpm at end of gait bout. At rest was 98-99% and HR 61 bpm.        STEPS or STAIRS Daily Assessment    Steps/Stairs Ambulated (#): 1  Level of Assist : 4 (Minimal assistance)  Rail Use: Both    Up/down 1 stair, needing to sidestep down stair with bilat UE support of railing. Cues for safety and steadying support provided.         BALANCE Daily Assessment    Sitting - Static: Good (unsupported)  Sitting - Dynamic: Good (unsupported)  Standing - Static: Fair  Standing - Dynamic : Impaired       WHEELCHAIR MOBILITY Daily Assessment    Able to Propel (ft): 40 feet  Functional Level: 1  Curbs/Ramps Assist Required (FIM Score): 0 (Not tested)  Wheelchair Setup Assist Required : 3 (Moderate assistance)  Wheelchair Management: Manages left brake;Manages right brake(cues and assist at times)    W/C mobility 40 ft needing min/mod A for steering, needing equipment management (O2 tank, IV pole with continuous tube feeding). THERAPEUTIC EXERCISES Daily Assessment    Extremity: Both  Exercise Type #1: Supine lower extremity strengthening(ankle DF/PF, quad sets)  Sets Performed: 1  Reps Performed: 10  Level of Assist: Stand-by assistance       ASSESSMENT:  Patient continues to fatigue quickly but better able to participate in OOB mobility and activity outside her room today. Recommend ongoing skilled PT to continue to improve strength, endurance, balance for ability to perform safer and more independent mobility. Progression toward goals:  ?      Improving appropriately and progressing toward goals  ? Improving slowly and progressing toward goals  ? Not making progress toward goals and plan of care will be adjusted     PLAN:  Patient continues to benefit from skilled intervention to address the above impairments. Continue treatment per established plan of care. Discharge Recommendations:  Home Health  Further Equipment Recommendations for Discharge: To be determined most appropriate AD upon discharge      Estimated LOS: 2 weeks    Activity Tolerance:   Fair/fair minus due to fatigue but SpO2 staying % during session while on 4 L/min O2 via trach collar. After treatment:   Patient left seated in wheelchair and call button within reach following first session, handed off to speech therapy following second session.        Mele Linares, PT  6/27/2019

## 2019-06-27 NOTE — H&P
[address] [place] [address]   HISTORY AND PHYSICAL    Name:  Myke Augustin  MR#:   [id number]  :  1960  ACCOUNT #:  [de-identified]  ADMIT DATE:  2019    POST ADMISSION PHYSICIAN EVALUATION    IMPAIRMENT GROUP CODE:  Debility. REHAB IMPAIRMENT CATEGORY:  Miscellaneous. DIAGNOSIS:  Paralysis of vocal cord and larynx, unspecified. HISTORY OF PRESENT ILLNESS:  This is a 76-year-old female, who recently was admitted to [facility] under the care of surgery, Dr. Afshan Hernandez. The patient had undergone a prior thyroidectomy and subsequently developed vocal cord dysfunction, so the patient had a tracheostomy done because of that during the recent admission to  [facility]. Subsequently, the patient was noted to have some dysphagia and now was on TPN for some time and subsequently had a PEG tube placed recently during the recent admission. PT/OT saw the patient and the patient was transferred to the [address] [place] [address]. When I saw the patient, the patient was sitting in bed, in no apparent distress. The patient has a tracheostomy in place. The patient denied any shortness of breath. The patient does complain of some intermittent abdominal discomfort for which she is on Palbace pain medications. The patient states that she did have a bowel movement earlier today and has also been passing gas. No history of any nausea. No history of any fever or chills. No history of any headaches. No history of any rash. No history of any leg swelling. PAST MEDICAL HISTORY:  Significant for type 2 diabetes, obstructive sleep apnea, obesity, hypertension, and status post recent thyroidectomy on 2019 by Dr. Afshan Hernandez, history of vocal cord dysfunction and status post tracheostomy on 2019 by Dr. Afshan Hernandez, dysphagia for which she has a PEG tube. PAST SURGICAL HISTORY:  The patient has a prior history of some heart surgery and has had a hysterectomy.   The patient recently had a thyroidectomy and a tracheostomy and a PEG tube. ALLERGIES:  METFORMIN, MORPHINE, SINGULAIR, AND SUCROSE. SOCIAL HISTORY:  The patient denies any tobacco use. The patient denies any alcohol use. FAMILY HISTORY:  Diabetes and hypertension run in the family. REVIEW OF SYSTEMS:  Apart from complaints mentioned above, a complete review of systems was done and is negative. PRIOR TO ADMISSION MEDICATIONS:  As per the discharge summary from [facility], medications included Lovenox 40 mg subcutaneous q.24 hours, Pepcid 20 twice daily, Lantus 30 units subcutaneous daily, Humalog insulin sliding scale, Synthroid 150 mcg via G-tube daily, Reglan 5 mg every 6 hours, Lopressor 25 mg twice daily, hydralazine as needed, Percocet 5/325 every 6 hours as needed, Zofran as needed, albuterol as needed. PHYSICAL EXAMINATION:  VITAL SIGNS:  Show temperature of 99.5, pulse rate of 72, blood pressure of 117/68, respiratory rate of 18, oxygen saturation of 97%. The patient has a trach collar in place. GENERAL:  This is a 56-year-old female, sitting in bed, in no apparent distress at this time. HEENT:  On examination of the head, normocephalic and atraumatic. Eyes:  Pupils are reactive to light. Ears, nose, throat:  No ear or nasal discharge is seen. Mucous membranes are moist.  NECK:  Supple. No JVD. No carotid bruits. The patient has a tracheostomy in place. LUNGS:  Coarse breath sounds are heard bilaterally. No rales, no rhonchi, no wheezing is heard. HEART:  S1 and S2 were heard. Regular rate and rhythm. ABDOMEN:  Soft. Bowel sounds are positive. Nontender. Some distention is noted. PEG tube is in place. EXTREMITIES:  Trace ankle edema is noted. Pulses are 1+ bilaterally equal.  NEUROLOGIC:  The patient is awake and alert, and follows commands. The patient is able to move all four extremities.     LABORATORY DATA:  Labs today show WBC count of 5.7, hemoglobin of 11.5, hematocrit 36.4, platelet count of 653. Sodium 141, potassium 3.7, chloride of 105, CO2 of 31, glucose of 184, BUN of 9, creatinine of 0.82, calcium 8.3, magnesium 2.3. FUNCTIONAL ASSESSMENT:  Prior level of function includes eating independent, grooming/hygiene independent, upper extremity dressing independent, lower extremity dressing independent, bladder management independent, bowel management independent, bed mobility independent, supine-to-sit independent, sit-to-stand independent, transfer modified independent, toilet transfer modified independent, ambulation is modified independent, expression independent, and memory independent. In the preadmission screening, it showed eating was not evaluated as the patient is n.p.o., grooming/hygiene independent, upper extremity dressing supervision, lower extremity dressing supervision, bladder management supervision, bowel management supervision, bed mobility minimum assist-to-contact guard assist, supine-to-sit is minimum assist-to-contact guard assist, sit-to-stand is minimum assist-to-contact guard assist, transfer is minimum assist-to-contact guard assist, toilet transfer is minimum assist-to-contact guard assist, ambulation is minimum assist-to-contact guard assist, expression is minimum assist-to-contact guard assist, memory is modified independent.     In the post-admission evaluation, it showed eating again was not evaluated, grooming/hygiene independent, upper extremity dressing supervision, lower extremity dressing supervision, bladder management is supervision, bowel management is supervision, bed mobility is minimum assist-to-contact guard assist, supine-to-sit is minimum assist-to-contact guard assist, sit-to-stand is minimum assist-to-contact guard assist, transfer is minimum assist-to-contact guard assist, toilet transfer is minimum assist-to-contact guard assist, ambulation is minimum assist-to-contact guard assist, expression is minimum assist-to-contact guard assist, memory is modified independent. ASSESSMENT:  1. Impaired mobility and activities of daily living. 2.  Vocal cord dysfunction. 3.  The patient has a tracheostomy. 4.  Status post recent thyroidectomy. 5.  Dysphagia. The patient has a PEG tube. 6.  Type 2 diabetes. 7.  Hypertension. 8.  Obesity. 9.  Hypothyroidism. 10.  Debility. REHABILITATION PLAN:  The patient is being admitted to a comprehensive acute inpatient rehabilitation program consisting of at least 180 minutes a day, 5 out of 7 days a week of combined PT, OT, and speech therapy. The patient's prognosis for significant practical improvement within a reasonable period of time appears to be good. Estimated length of stay is 10 days. The patient/family has a good understanding of our discharge process. The patient has potential to make improvement and is in need of at least two of the following multidisciplinary therapies including but not limited to PT, OT, speech therapy, nutritional services, wound care, prosthetics, and orthotics. The patient is expected to be able to return to home with home health therapy and family support depending on her progress. Given the patient's various comorbidities and risk for further medical complications, rehabilitation services could not be safely provided at a lower level of care such as skilled nursing facility. Physical therapy for therapeutic exercise, progressive mobility, gait training, transfer training, bed mobility training, patient and family education, and wheelchair mobility training. Physical therapy goals to address extremity function, range of motion, balance, safety awareness, independence in transfers, activity tolerance, independence in bed mobility, and independence in ambulation. Occupational therapy for self-care, home management, transfer training, therapeutic exercise, activity, wheelchair mobility training.   Occupational therapy goals to address extremity function, cognition, balance, activity tolerance, independence in functional transfers, range of motion, safety awareness, independence in ADLs, and independence in home management skills. Speech and language pathology for cognitive training, dysphagia therapy, speech and language communication therapy. Goals for speech therapy to address cognition, expression of language, comprehension, safety awareness, and safe swallow. Specialized 24-hour rehabilitation nursing care for bowel and bladder retraining, disease management, pain management, pressure ulcer prevention, and management per policy, education on pressure relief techniques, embolism prevention, nutrition management, hydration management, transfer training, and medication distribution. Nutrition and dietary services will be obtained for assessment of adequate calorie needs, hydration and calorie count as appropriate. Therapeutic recreation for leisure skills, rehab psychology for coping skills, social work services for patient and family counseling and safe discharge planning. Rehabilitation goal to improve functional and activities of daily living skills in order to return back to independent living with family support. MEDICAL PLAN:  We will continue with routine tracheostomy care. PEG tube feeding will be continued. The patient will be continued on Synthroid. Sugars will be monitored. The patient will be continued on Lantus and sliding scale insulin. Blood pressure will be monitored. PRECAUTION:  Safety/fall precautions, DVT precautions, and aspiration precautions. POTENTIAL BARRIERS TO DISCHARGE:  Risk for falls.     RELEVANT CHANGES SINCE PREADMISSION SCREENING:  I have compared the patient's medical and functional status at the time of preadmission screening and on this post-admission evaluation, the preadmission screening and the findings from therapy evaluations both support my post-admission physician evaluation, deeming this patient to be an appropriate candidate for the inpatient rehab facility. The patient requires multidisciplinary treatments, physician oversight, and intensive therapy not provided at a lower level of care. By signing this document, I acknowledge that I have personally performed a full physical examination on this patient within 24 hours of admission to the [address] [place] [address] and I have determined the patient to be able to tolerate the above course of treatment at an intensive level for a reasonable period of time. Thank you very much.       Ro Reddy MD      [id number]  D:  06/26/2019 19:19  T:  06/26/2019 21:53  JOB #:  [id number]

## 2019-06-27 NOTE — PROGRESS NOTES
Problem: Self Care Deficits Care Plan (Adult)  Goal: *Therapy Goal (Edit Goal, Insert Text)  Description  Occupational Therapy Goals   Long Term Goals  Initiated 19 and to be accomplished within 2 week(s) 7/10/19  1. Pt will perform functional activity up to 15 minutes with no more than 2 rest breaks and 2 vcs to attend to task. .   2. Pt will perform grooming with S.  3. Pt will perform UB bathing with S .  4. Pt will perform LB bathing with S..  5. Pt will perform tub/shower transfer with S.   6. Pt will perform UB dressing with S.  7. Pt will perform LB dressing with S..  8. Pt will perform toileting task with I.  9. Pt will perform toilet transfer with S.      Short Term Goals   Initiated 19 and to be accomplished within 7 day(s) 7/3/19  1. Pt will perform functional task up to 10 minutes with no more than 3 verbal cues to attend to task and 3 rest breaks. 2. Pt will perform grooming with SBA. 3. Pt will perform UB bathing with S and no more than 3 vcs to  Initiate and complete task. 4. Pt will perform LB bathing with [place] and no more than 3 vcs to  complete task. 5. Pt will perform tub/shower transfer with [facility]. 6. Pt will perform UB dressing with [place] and no more than 3 vcs to  initiate and complete task. .  7. Pt will perform LB dressing with [place]an and no more than 3 vcs to initiate and  complete task. 8. Pt will perform toileting task with S.  9. Pt will perform toilet transfer with S and no more than 3 vcs to initiate and complete task. [facility] OCCUPATIONAL THERAPY TREATMENT    Patient: Nessa Vega   63 y.o. Patient identified with name and : Yes    Date: 2019    First Tx Session  Time In: 0730  Time Out[de-identified] 0900    Diagnosis: Chronic respiratory failure (HCC) [J96.10]   Precautions: Aspiration, Fall  Chart, occupational therapy assessment, plan of care, and goals were reviewed. Pain:  Pt reports 4/10 pain or discomfort prior to treatment.     Pt reports 4/10 pain or discomfort post treatment. Intervention Provided: N/A      SUBJECTIVE:   Patient stated I am exhausted and I am ready to take at nap.     OBJECTIVE DATA SUMMARY:       GROOMING Daily Assessment    Grooming  Comments: refused to comb hair. ..its daughter's job     [address] Daily Assessment    Upper Body Bathing  Bathing Assistance, Upper: 5 (Supervision)  Position Performed: Seated edge of bed     LOWER BODY BATHING Daily Assessment    Lower Body Bathing  Bathing Assistance, Lower : 5 (Stand-by assistance)(increased time needed by pt.)  Position Performed: Seated edge of bed  Adaptive Equipment: (pt refused to put HOB down )  Comments: \"I am exhausted and I am ready to take a nap\"     TOILETING Daily Assessment    Toileting  Toileting Assistance (FIM Score): 5 (Supervision)(increase time used by pt)     UPPER BODY DRESSING Daily Assessment    Upper Body Dressing   Dressing Assistance : 5 (Supervision)(\"I am so out of breathe\")  Comments: increased time needed     LOWER BODY DRESSING Daily Assessment    Lower Body Dressing   Comments: refused to put on pants(PER 10/10 wake until 3am afraid 2/2 half naked. ..pt encornur)     MOBILITY/TRANSFERS Daily Assessment    Functional Transfers  Amount of Assistance Required: 5 (Supervision/setup)(side step to 3 in 1 commode)       ASSESSMENT:  Pt progressing slowing requiring increase time to complete self care tasks. Pt reports shortness of breathe/ fatigue and demonstrates increased distraction by environmental stimuli. Pt to continue with maximizing I in ADLs/IADLs. Progression toward goals:  ?          Improving appropriately and progressing toward goals  ? x         Improving slowly and progressing toward goals  ? Not making progress toward goals and plan of care will be adjusted     PLAN:  Patient continues to benefit from skilled intervention to address the above impairments. Continue treatment per established plan of care.   Discharge Recommendations:  Home Health  Further Equipment Recommendations for Discharge:  3 in1 bedside commode     Activity Tolerance:  Fair      Estimated LOS:TBD    Please refer to the flowsheet for vital signs taken during this treatment. After treatment:   ?x  Patient left in no apparent distress sitting up in chair   ? Patient left in no apparent distress in bed  ? Call bell left within reach  ? Nursing notified  ? Caregiver present  ? Bed alarm activated    COMMUNICATION/EDUCATION:   ? Home safety education was provided and the patient/caregiver indicated understanding. ?x Patient/family have participated as able in goal setting and plan of care. ? Patient/family agree to work toward stated goals and plan of care. ? Patient understands intent and goals of therapy, but is neutral about his/her participation. ? Patient is unable to participate in goal setting and plan of care.       Rupinder Erwin, OT   Outcome: Progressing Towards Goal

## 2019-06-28 LAB
GLUCOSE BLD STRIP.AUTO-MCNC: 109 MG/DL (ref 70–110)
GLUCOSE BLD STRIP.AUTO-MCNC: 167 MG/DL (ref 70–110)
GLUCOSE BLD STRIP.AUTO-MCNC: 179 MG/DL (ref 70–110)
GLUCOSE BLD STRIP.AUTO-MCNC: 204 MG/DL (ref 70–110)
GLUCOSE BLD STRIP.AUTO-MCNC: 213 MG/DL (ref 70–110)

## 2019-06-28 PROCEDURE — 82962 GLUCOSE BLOOD TEST: CPT

## 2019-06-28 PROCEDURE — 97116 GAIT TRAINING THERAPY: CPT

## 2019-06-28 PROCEDURE — 74011250637 HC RX REV CODE- 250/637

## 2019-06-28 PROCEDURE — 74011250636 HC RX REV CODE- 250/636: Performed by: EMERGENCY MEDICINE

## 2019-06-28 PROCEDURE — 97542 WHEELCHAIR MNGMENT TRAINING: CPT

## 2019-06-28 PROCEDURE — 97530 THERAPEUTIC ACTIVITIES: CPT

## 2019-06-28 PROCEDURE — 74011250637 HC RX REV CODE- 250/637: Performed by: EMERGENCY MEDICINE

## 2019-06-28 PROCEDURE — 97110 THERAPEUTIC EXERCISES: CPT

## 2019-06-28 PROCEDURE — 97535 SELF CARE MNGMENT TRAINING: CPT

## 2019-06-28 PROCEDURE — 92526 ORAL FUNCTION THERAPY: CPT

## 2019-06-28 PROCEDURE — 77010033678 HC OXYGEN DAILY: Performed by: EMERGENCY MEDICINE

## 2019-06-28 PROCEDURE — 92507 TX SP LANG VOICE COMM INDIV: CPT

## 2019-06-28 PROCEDURE — 65310000000 HC RM PRIVATE REHAB

## 2019-06-28 PROCEDURE — 74011636637 HC RX REV CODE- 636/637: Performed by: EMERGENCY MEDICINE

## 2019-06-28 RX ORDER — DEXTROMETHORPHAN/PSEUDOEPHED 2.5-7.5/.8
40 DROPS ORAL
Status: DISCONTINUED | OUTPATIENT
Start: 2019-06-28 | End: 2019-07-13 | Stop reason: HOSPADM

## 2019-06-28 RX ADMIN — INSULIN LISPRO 2 UNITS: 100 INJECTION, SOLUTION INTRAVENOUS; SUBCUTANEOUS at 02:10

## 2019-06-28 RX ADMIN — INSULIN LISPRO 4 UNITS: 100 INJECTION, SOLUTION INTRAVENOUS; SUBCUTANEOUS at 11:51

## 2019-06-28 RX ADMIN — METOPROLOL TARTRATE 25 MG: 25 TABLET ORAL at 09:22

## 2019-06-28 RX ADMIN — OXYCODONE HYDROCHLORIDE AND ACETAMINOPHEN 1 TABLET: 5; 325 TABLET ORAL at 09:21

## 2019-06-28 RX ADMIN — LEVOTHYROXINE SODIUM 200 MCG: 75 TABLET ORAL at 06:10

## 2019-06-28 RX ADMIN — INSULIN GLARGINE 30 UNITS: 100 INJECTION, SOLUTION SUBCUTANEOUS at 09:00

## 2019-06-28 RX ADMIN — LORAZEPAM 1 MG: 0.5 TABLET ORAL at 20:26

## 2019-06-28 RX ADMIN — DOCUSATE SODIUM 100 MG: 100 CAPSULE, LIQUID FILLED ORAL at 09:21

## 2019-06-28 RX ADMIN — ENOXAPARIN SODIUM 40 MG: 40 INJECTION SUBCUTANEOUS at 17:50

## 2019-06-28 RX ADMIN — INSULIN LISPRO 4 UNITS: 100 INJECTION, SOLUTION INTRAVENOUS; SUBCUTANEOUS at 07:00

## 2019-06-28 RX ADMIN — METOCLOPRAMIDE HYDROCHLORIDE 5 MG: 5 TABLET ORAL at 09:21

## 2019-06-28 RX ADMIN — SODIUM CHLORIDE AND POTASSIUM CHLORIDE: 4.5; 1.49 INJECTION, SOLUTION INTRAVENOUS at 11:58

## 2019-06-28 RX ADMIN — METOCLOPRAMIDE HYDROCHLORIDE 5 MG: 5 TABLET ORAL at 20:26

## 2019-06-28 RX ADMIN — PANTOPRAZOLE SODIUM 40 MG: 40 GRANULE, DELAYED RELEASE ORAL at 07:09

## 2019-06-28 RX ADMIN — METOCLOPRAMIDE HYDROCHLORIDE 5 MG: 5 TABLET ORAL at 14:14

## 2019-06-28 RX ADMIN — DOCUSATE SODIUM 100 MG: 100 CAPSULE, LIQUID FILLED ORAL at 17:57

## 2019-06-28 RX ADMIN — ONDANSETRON HYDROCHLORIDE 4 MG: 4 TABLET, FILM COATED ORAL at 09:20

## 2019-06-28 RX ADMIN — SIMETHICONE 40 MG: 20 SUSPENSION/ DROPS ORAL at 18:54

## 2019-06-28 RX ADMIN — INSULIN LISPRO 2 UNITS: 100 INJECTION, SOLUTION INTRAVENOUS; SUBCUTANEOUS at 17:52

## 2019-06-28 NOTE — PROGRESS NOTES
[facility] Hospitalist Group  Progress Note    Patient: Katherine Jha Age: 63 y.o. : 1960 MR#: [id number] SSN: [id number]  Date/Time: 2019    Subjective:     Patient sitting in a chair in NAD, awake, alert    Assessment/Plan:     1. Impaired mobility and activities of daily living. 2.  Vocal cord dysfunction. 3.  The patient has a tracheostomy. 4.  Status post recent thyroidectomy. 5.  Dysphagia. The patient has a PEG tube. 6.  Type 2 diabetes. 7.  Hypertension. 8.  Obesity. 9.  Hypothyroidism. 10.  Debility. PLAN  Bronchial hygiene, trach care  Wean O2  Tube feeding, nutrition following  PT, OT, ST  D/w patient  D/w RN    Case discussed with:  []Patient  []Family  []Nursing  []Case Management  DVT Prophylaxis:  []Lovenox  []Hep SQ  []SCDs  []Coumadin   []On Heparin gtt    Objective:   VS:   Visit Vitals  /72   Pulse 61   Temp 99 °F (37.2 °C)   Resp 18   Wt 122.2 kg (269 lb 8 oz)   SpO2 99%   Breastfeeding?  No   BMI 42.21 kg/m²      Tmax/24hrs: Temp (24hrs), Av.1 °F (37.3 °C), Min:98.6 °F (37 °C), Max:99.8 °F (37.7 °C)    Input/Output:     Intake/Output Summary (Last 24 hours) at 2019 1545  Last data filed at 2019 0610  Gross per 24 hour   Intake 310 ml   Output --   Net 310 ml       General:  Awake, alert  Cardiovascular:  S1S2+, RRR  Pulmonary:  Coarse bs b/l  GI:  Soft, BS+, NT, ND  Extremities:  No edema  + trach, + peg    Labs:    Recent Results (from the past 24 hour(s))   GLUCOSE, POC    Collection Time: 19  5:22 PM   Result Value Ref Range    Glucose (POC) 182 (H) 70 - 110 mg/dL   GLUCOSE, POC    Collection Time: 19  2:06 AM   Result Value Ref Range    Glucose (POC) 167 (H) 70 - 110 mg/dL   GLUCOSE, POC    Collection Time: 19  7:00 AM   Result Value Ref Range    Glucose (POC) 204 (H) 70 - 110 mg/dL   GLUCOSE, POC    Collection Time: 19 11:41 AM   Result Value Ref Range    Glucose (POC) 213 (H) 70 - 110 mg/dL Additional Data Reviewed:      Signed By: Muriel Jenkins MD     June 28, 2019

## 2019-06-28 NOTE — PROGRESS NOTES
Surgery  Sitting up in a chair dressed. Looks good. Complains of nausea.   Afebrile vital signs stable  No physical exam changes  Continue rehab as bridge to home

## 2019-06-28 NOTE — PROGRESS NOTES
NUTRITION CONSULT    Nutrition Consult: Management of Tube Feeding     RECOMMENDATIONS / PLAN:     - Continue tube feeding of Jevity 1.5, advancing as pt tolerates by 10 mL q 8 hours to goal rate of 55 mL/hr with water flushes of 75 mL q 6 hours and Prosource BID  - Continue IVF until EN at goal.  - Continue RD inpatient monitoring and evaluation. Goal Regimen: Jevity 1.5 at 55 mL/hr + 75 mL q 6 hour water flushes to provide: 1980 kcal, 84 gm protein, 66 gm fat, 285 gm CHO, 29 gm fiber, 1003 mL free water, 1603 mL total water, 100% RDIs  Tube feeding + Prosource BID to provide: 2100 kcal, 114 gm protein     NUTRITION DIAGNOSIS & INTERVENTIONS:     [x] Enteral nutrition: continue, advance towards goal  [x] IV Fluids: continue until tube feeding at goal    Nutrition Diagnosis: Swallowing difficulty related to dysphagia s/p tracheostomy as evidenced by pt NPO after SLP evaluation/MBS. ASSESSMENT:     6/28: Pt with therapy at time of visit. Tube feeding remains at 40 mL/hr. Was c/o nausea earlier today; RN provided zofran. Antacid changed to protonix    6/27: Tube feeding remains at 40 mL/hr; pt c/o nausea. Having regular BM, no gastric residuals. Abdomen distended per chart documentation per night nurse. Discussed tube feeding regimen and Prosource supplement; pt agreeable with plan. Noted pt previously reported pepcid \"not working\" when in [facility] hospital; pepcid currently ordered  6/26: Pt lethargic/asleep at time of visit. Per chart review, pt admitted to [facility] hospital with stridor and vocal cord dysfunction s/p tracheostomy placement on 6/3/19, tolerating trach collar; would have to deflate cuff for meals and re-inflate when not eating. Pt had failed MBS x 2 by 6/6/19. DHT was placed by IR. Pt tolerated feeds, receiving Jevity 1.5 due to allergy/intolerance to artificial sweeteners, then pulled out feeding tube. A diet was started by MD on 6/10. Pt had variable meal intake. PICC placed on 6/13 for TPN.  Remained on po diet and TPN; made NPO on  6/15. Diet restarted on 6/18. SLP recommended NPO after repeat MBS on 6/19. S/p PEG placement on 6/20/19 and TPN was stopped after starting tube feeds. Pt had c/o abdominal distention and fullness, emesis. TF held on 6/23 due to concern for ileus; pt having +BMs and flatus, feeds resumed at low rate. Pt tolerated trickle feeds PTA; noted to express desire for receiving cyclic or bolus tube feeding and wanting to eat orally. Currently tolerating tube feeding at 40 mL/hr per RN. Pt did c/o abdominal pain per RN. BG remain elevated; discussed reason for not changing to Glucerna 1.5 (diabetic formula). EN infusion adequate to meet patients estimated nutritional needs:   [] Yes     [x] No   [] Unable to determine at this time    Tube Feeding: Jevity 1.5 at 40 mL/hr  Water Flushes: 75 mL q 6 hours  Residuals: 0 mL    Diet: DIET NPO  DIET TUBE FEEDING  DIET NUTRITIONAL SUPPLEMENTS Lunch, Dinner; [employer]      Food Allergies: sweeteners, sucrose  Current Appetite:   [] Good     [] Fair     [] Poor     [x] Other: NPO  Appetite/meal intake prior to admission:   [x] Good     [] Fair     [] Poor     [] Other:  Feeding Limitations:  [x] Swallowing difficulty: SLP following    [] Chewing difficulty    [x] Other: hx of dysphagia s/p thyroidectomy PTA and trach placement 6/3/19  Current Meal Intake:   No data found.     BM: 6/27  Skin Integrity: surgical incision to neck  Edema:  [] No     [x] Yes   Pertinent Medications: Reviewed: 1/2 NS + 20 mEq/L KCL at 75 mL/hr (36 mEq KCl per day), bowel regimen, protonix, zofran, SSI, lantus, reglan     Labs:   Recent Labs     06/26/19  0610      K 3.7      CO2 31   *   BUN 9   CREA 0.82   CA 8.3*   MG 2.3   Prealbumin: 12.9 mg/dL (6/14/19)  Triglyceride: 298 mg/dL (6/18/19), 316 mg/dL (6/13/19), 511 mg/dL (4/16/19)  C reactive protein: 4.8 mg/dL (6/18/19)  Recent Labs     06/26/19  0610   WBC 5.7   HGB 11.5*   HCT 36.4    Intake/Output Summary (Last 24 hours) at 6/28/2019 1230  Last data filed at 6/28/2019 0610  Gross per 24 hour   Intake 310 ml   Output --   Net 310 ml       Anthropometrics:   Ht Readings from Last 1 Encounters:   06/03/19 5' 7\" (1.702 m)     Last 3 Recorded Weights in this Encounter    06/26/19 1430   Weight: 122.2 kg (269 lb 8 oz)     Body mass index is 42.21 kg/m². Obese, Class III    Weight History: patient denies change in weight PTA, stable     Weight Metrics 6/26/2019 6/25/2019 6/22/2019 6/3/2019 5/23/2019 5/22/2019 5/16/2019   Weight 269 lb 8 oz - 269 lb 8 oz - 260 lb 6.4 oz - 258 lb   BMI - 42.21 kg/m2 - 42.21 kg/m2 - 40.78 kg/m2 40.41 kg/m2          Admitting Diagnosis: Chronic respiratory failure (HCC) [J96.10]  Pertinent PMHx: T2DM with diabetic neuropathy, HTN, dyslipidemia, CHF, hypothyroidism s/p thyroidectomy 4/4/19    Education Needs:        [x] None identified  [] Identified - Not appropriate at this time  []  Identified and addressed - refer to education log  Learning Limitations:   [] None identified  [x] Identified: nonverbal. Communicates via pen/paper    Cultural, Presybeterian & ethnic food preferences:  [x] None identified    [] Identified and addressed     ESTIMATED NUTRITION NEEDS:     Calories: 2341-4280 kcal (MSJx1-1.2) based on   [x] Actual  kg     [] IBW:   Protein:  gm (0.8-1.2 gm/kg) based on   [x] Actual BW      [] IBW:   Fluid: 1 mL/kcal     MONITORING & EVALUATION:     Nutrition Goals:   1. Nutritional needs will be met through adequate oral intake or nutrition support within the next 5 days.   Outcome:  [] Met/Ongoing    [x] Progressing towards goal    [] Not progressing towards goal    [] New/Initial goal      Monitoring:    [x] Fluid intake   [] Nutrition-focused physical findings   [x] Treatment/therapy   [] Food and beverage intake   [x] Diet order      [] Weight    [x] EN infusion    Previous Recommendations (for follow-up assessments only):     [x] Implemented       []   Not Implemented (RD to address)      [] No Longer Appropriate     [] No Recommendation Made        Discharge Planning: goal enteral nutrition regimen via PEG   [x]  Participated in care planning, discharge planning, & interdisciplinary rounds as appropriate      Stan Santiago, [address] [place] [address]  Pager: [phone]

## 2019-06-28 NOTE — PROGRESS NOTES
Patient reports nausea. No emesis. Reports bloating and feeling gassy. Gave Zofran this a.m. With no reported relief in nausea. PT reports walking short distances in therapy to assist with intestinal motility.  Will request eval from Dr. Kaylee Henson

## 2019-06-28 NOTE — PROGRESS NOTES
Problem: Mobility Impaired (Adult and Pediatric)  Goal: *Therapy Goal (Edit Goal, Insert Text)  Description  Physical Therapy Goals  Initiated 2019 and to be accomplished within 7 day(s) 7/3/2019:   1. Patient will move from supine to sit and sit to supine , scoot up and down and roll side to side in bed with supervision/set-up. 2.  Patient will transfer from bed to chair and chair to bed with supervision/set-up using the least restrictive device. 3.  Patient will perform sit to stand with supervision/set-up. 4.  Patient will ambulate with supervision/set-up for 50 feet with the least restrictive device. 5.  Patient will ascend/descend 3 stairs with 2 handrail(s) with minimal assistance/contact guard assist.    Physical Therapy Goals  Initiated 2019 and to be accomplished within 14 day(s) 7/10/2019:  1. Patient will move from supine to sit and sit to supine , scoot up and down and roll side to side in bed with modified independence. 2.  Patient will transfer from bed to chair and chair to bed with modified independence using the least restrictive device. 3.  Patient will perform sit to stand with modified independence. 4.  Patient will ambulate with modified independence for 75 feet with the least restrictive device. 5.  Patient will ascend/descend 3 stairs with 2 handrail(s) with supervision/set-up. Outcome: Progressing Towards Goal   PHYSICAL THERAPY TREATMENT    Patient: Hali Gardiner (59 y.o. female)  Date: 2019  Diagnosis: Chronic respiratory failure (HCC) [J96.10]   Precautions: Aspiration, Fall  Chart, physical therapy assessment, plan of care and goals were reviewed. Time In: 0810  Time Out: 0906  Time In: 1330  Time Out: 1405  Patient Seen For: Gait training;Balance activities;Transfer training; Wheelchair mobility; Patient education  Pain:  Patient reporting 9/10 headache; nurse notified.      Patient identified with name and : yes    SUBJECTIVE:     Patient agreeable to treatment with minimal encouragement (due to headache). Cooperative with therapist. Poonam Dias to get dressed for coming outside of room. OBJECTIVE DATA SUMMARY:   Objective:     BED/MAT MOBILITY Daily Assessment    Supine to Sit : 5 (Stand-by assistance)       TRANSFERS Daily Assessment    Transfer Type: Other  Other: stand step transfer without AD  Transfer Assistance : 4 (Contact guard assistance)  Sit to Stand Assistance: Stand-by assistance  Car Transfers: Not tested  Car Type: N/A    CGA with transfers for safety without AD       GAIT Daily Assessment    Amount of Assistance: 4 (Minimal assistance)  Distance (ft): 85 Feet (ft)  Assistive Device: Cane, straight(2 straight canes as per PLOF)    Min A for gait with 2 canes required, observed to have increased drift to left side. In PM, performing gait for up to 85 ft using RW but needing min A and mod/max cues for proper negotiation of RW. Also trialed gait without AD for 20 ft and patient with more upright posture observed. BALANCE Daily Assessment    Sitting - Static: Good (unsupported)  Sitting - Dynamic: Good (unsupported)  Standing - Static: Fair  Standing - Dynamic : Impaired       WHEELCHAIR MOBILITY Daily Assessment    Able to Propel (ft): 80 feet  Functional Level: 2(min A)  Curbs/Ramps Assist Required (FIM Score): 0 (Not tested)  Wheelchair Setup Assist Required : 3 (Moderate assistance)  Wheelchair Management: Manages left brake;Manages right brake    Using bilat UE and LE to propel chair, needing assist for managing equipment. THERAPEUTIC EXERCISES Daily Assessment    Extremity: Both  Exercise Type #1: Supine lower extremity strengthening(ankle DF/PF; quad sets in preparation for mobility)  Sets Performed: 1  Reps Performed: 10  Level of Assist: Stand-by assistance    Needing reminders for continuing to perform exercise.         ASSESSMENT:  Patient would continue to  benefit from skilled therapy to improve strength, endurance, balance for safer ability to perform safe functional mobility. Patient needing more cues for safety with use of RW for longer distance gait, observed to have more forward flexed posture using RW compared to 2 canes or no AD. Will continue to reassess patient. Progression toward goals:  ?      Improving appropriately and progressing toward goals  ? Improving slowly and progressing toward goals  ? Not making progress toward goals and plan of care will be adjusted     PLAN:  Patient continues to benefit from skilled intervention to address the above impairments. Continue treatment per established plan of care. Discharge Recommendations:  Home Health  Further Equipment Recommendations for Discharge: To be determined if patient needing more equipment for discharge home. Estimated LOS: 2 weeks    Activity Tolerance:   Fair due to fatigue, pain. After treatment:   Patient left seated in recliner chair, call button within reach. Handed off to nurseStepan.        Arizona Mckenna, PT  6/28/2019

## 2019-06-28 NOTE — INTERDISCIPLINARY ROUNDS
[id number]  [address] [place] [address], [address]    INPATIENT REHABILITATION  TEAM CONFERENCE SUMMARY     Date of Conference: 6/27/19    Patient Information:        Name: Corrine Arguelles Age / Sex: 63 y.o. / female   CSN: [id number] MRN: [id number]   [facility] Date: 6/25/2019 Length of Stay: 3 days     Primary Rehabilitation Diagnosis: Impaired Mobility and ADLs secondary to <principal problem not specified>    - Vocal cord dysfunction  - has tracheostomy  - Dysphagia, has PEG  -Debility  Therapy:     FIM SCORES Initial Assessment Weekly Progress Assessment 6/28/2019   Eating Functional Level: 1  Comments: PEG Tube1  1   Swallowing     Grooming 5  5   Bathing 3  3      Upper Body Dressing Functional Level: 3(increased time needed)  Comments: domned hopital gown with Moda  3   Lower Body Dressing Functional Level: 3  Comments: increased time needed  4   Toileting Functional Level: 5  Comments: increased time needed  5   Bladder - level of assist 4     Bladder - accident frequency score   3   Bowel - level of assist   4   Bowel - accident frequency score 3     Toilet Transfer Brule Toilet Transfer Score: 5  Comments: to bed side commod  5   Tub/Shower Transfer Brule Tub or Shower Type: Tub/Shower combination  Tub/Shower Transfer Score: 5  0      Comprehension Primary Mode of Comprehension: Auditory  Score: 4  Comments: understanding simple and multi-step directions with occasional repetition needed Auditory  5   Expression Primary Mode of Expression: Sign language, Verbal, Other (comment)(writes on paper)  Score: 4  Comments: needing cues occasionally for writing down needs vs attempting to verbalize Verbal, writing  5   Social Interaction Score: 4  Comments: moderate encouragement to participate in therapy 4   Problem Solving Score: 4  Comments: min cues for functional problem solving 4   Memory Score: 4  Comments: able to recall information appropriately as indicated on chart with occasional clarification needed 5     FIM SCORES Initial Assessment Weekly Progress Assessment 6/28/2019   Bed/Chair/Wheelchair Transfers Transfer Type: Other  Other: stand step transfer without AD as per PLOF  Transfer Assistance : 4 (Minimal assistance)  Sit to Stand Assistance: Minimal assistance  Car Transfers: Not tested  Car Type: N/A Transfer Type:  Other  Other: stand step transfer without AD  Transfer Assistance : 4 (Contact guard assistance)  Sit to Stand Assistance: Stand-by assistance  Car Transfers: Not tested  Car Type: N/A   Bed Mobility Rolling Right 4 (Minimal assistance)   Rolling Left 4 (Minimal assistance)   Supine to Sit 4 (Minimal assistance)(Head of bed elevated as per physician order)   Sit to Stand Minimal assistance   Sit to Supine 4 (Minimal assistance)    Rolling Right       Rolling Left       Supine to Sit   5 (Stand-by assistance)   Sit to Stand   Stand-by assistance   Sit to Supine          Locomotion (W/C) Able to Propel (ft): (not challenged today)  Functional Level: (not challenged today; to be further assessed)  Curbs/Ramps Assist Required (FIM Score): 0 (Not tested)  Wheelchair Setup Assist Required : 0 (Not tested)  Wheelchair Management: (not challenged today; to be further assessed) Function 2(min A)  Setup Assistance  3 (Moderate assistance)      Locomotion (W/C distance) (not challenged today) 80 feet   Locomotion (Walk) 4 (Minimal assistance) 4 (Minimal assistance)  Gait belt;Walker, rolling(also 20 ft no AD CGA)   Locomotion (Walk dist.) 8 Feet (ft) 85 Feet (ft)   Steps/Stairs Steps/Stairs Ambulated (#): (not able to assess today due to patient fatigue)  Level of Assist : 0 (Not tested)  Rail Use: (N/A)  not assessed         Nursing:     Neuro:   AAA&O x  4          Respiratory:   [] WNL   [x] O2 LPM:   Other:  Peripheral Vascular:   [] TEDS present   [x] Edema present _1-2 Grade   Cardiac:   [] WNL   [x] Other   CHF  Genitourinary:   [x] continent   [] incontinent   [] mueller  Abdominal _6/17/19_ LBM  GI: _______ Diet ______ Liquids _Jevity 1.5 @ 40 ml/hr_ tube feeds  Musculoskeletal: ____ ROM Transfers W/C Assistive Device Used  _Min/CGA_ Level of Assistance  Skin Integumentary:   [] Intact   [x] Not Intact   _Infection/Skin/Falls/Aspiration_Preventative Measures  Details__New G-tube & trach @ risk for infection. No pressure areas at this time. Pain: [x] Controlled   [] Not Controlled   Pain Meds:   [] Scheduled   [x] PRN        Registered Dietitian / Nutrition:   No data found. Pt lethargic/asleep at time of visit. Per chart review, pt admitted to [facility] hospital with stridor and vocal cord dysfunction s/p tracheostomy placement on 6/3/19, tolerating trach collar; would have to deflate cuff for meals and re-inflate when not eating. Pt had failed MBS x 2 by 6/6/19. DHT was placed by IR. Pt tolerated feeds, receiving Jevity 1.5 due to allergy/intolerance to artificial sweeteners, then pulled out feeding tube. A diet was started by MD on 6/10. Pt had variable meal intake. PICC placed on 6/13 for TPN. Remained on po diet and TPN; made NPO on  6/15. Diet restarted on 6/18. SLP recommended NPO after repeat MBS on 6/19. S/p PEG placement on 6/20/19 and TPN was stopped after starting tube feeds. Pt had c/o abdominal distention and fullness, emesis. TF held on 6/23 due to concern for ileus; pt having +BMs and flatus, feeds resumed at low rate. Pt tolerated trickle feeds PTA; noted to express desire for receiving cyclic or bolus tube feeding and wanting to eat orally. Currently tolerating tube feeding at 40 mL/hr per RN. Pt did c/o abdominal pain per RN. BG remain elevated; discussed reason for not changing to Glucerna 1.5 (diabetic formula).       Tube Feeding: Jevity 1.5 at 40 mL/hr  Water Flushes: 75 mL q 6 hours  Residuals: 0 mL          Intake/Output Summary (Last 24 hours) at 6/28/2019 1541  Last data filed at 6/28/2019 0610  Gross per 24 hour   Intake 310 ml Output --   Net 310 ml                                Last bowel movement: 6/25      Interdisciplinary Team Goals:     1. Discipline  Physical Therapy    Goal  Patient will be able to tolerate sitting up out of bed for at least 2 hours to improve activity tolerance to better participate in physical therapy including gait and standing tasks. Barrier  Decreased strength, endurance, anxious with mobility, shortness of breath with exertion    Intervention  Therapeutic activity, education regarding safe mobility techniques and importance of out of bed mobility     Goal written by:   Ela Luna, PT, DPT     2. Discipline  Occupational Therapy    Goal  Pt will perform toilet transfer for toileting needs with S .    Barrier  Cognition, Cognition,  Speech, BUE strength,     Intervention  Theract, Therex, Self Care Retraining, Transfer Training    Goal written by:  Garrett Viveros, TEZ/L     3. Discipline  Speech Therapy    Goal  Patient will participate in pharyngeal strengthening exercises with min cues. Barrier  Tracheostomy, dysphagia, axniety    Intervention  Pharyngeal strengthening exercises, swallowing trials, PMV trials, education to [employer] anxiety     Goal written by:  Singh Gannon CCC-SLP     4. Discipline  Nursing    Goal  Pt and family will perform 75% of g-tube feedings/care by next week    Barrier  Pt not tolerating bolus feedings yet    Intervention  Nursing instruction and hands on teaching with patient/family on g-tube feeding/care. Goal written by:  Henry Levine RN BSN     5. Discipline  Clinical Psychology    Goal      Barrier      Intervention      Goal written by:       6. Discipline  Nutrition / Dietetics    Goal  Nutritional needs will be met through adequate oral intake or nutrition support within the next 5 days.   Outcome:  [] Met/Ongoing    [] Progressing towards goal    [] Not progressing towards goal    [x] New/Initial goal      Barrier  none known     Intervention  - Advance tube feeding of Jevity 1.5 by 10 mL q 8 hours to goal rate of 60 mL/hr with water flushes of 75 mL q 6 hours. - Add Prosource once daily  - Continue IVF until EN at goal.    Goal written by:  Isela Park RD       Disposition / Discharge Planning: Follow-up services:  [x] Physical Therapy             [x] Occupational Therapy       [x] Speech Therapy           [] Skilled Nursing      [] Medical Social Worker   [] Aide        [] Outpatient     [x] Home Health   [] [address]   [] [facility]   DME recommendations:  3 in 1 commode   Estimated discharge date:  tbd   Discharge Location:  [] Home   [] [facility]   [] TBD             [] Other:          Electronic Signatures:   Tem conference was attended by the following-       Physical Therapist    Minerva uHng, PT, DPT    Occupational Therapist    Niki Peterson, MSOTR/L    Speech Therapist    Jevon Salvador, [id number] [facility]    Recreational Therapist    Pam Morse, [address] BSN    Dietitian    Isela Park RD    Clinical Psychologist         Physician    [facility] Worker    Yahir Sales, MSW          The above information has been reviewed with the patient in a language that they can understand. Opportunity for comments and questions has been provided and a signed attestation has been scanned into the \"media tab\" of the EMR.       Patient Signature: ______________________________________________________    Date Signed: __________________________________________________________

## 2019-06-28 NOTE — PROGRESS NOTES
Bedside and Verbal shift change report given to [address] [place] [address] (oncoming nurse) by Radha Carrillo RN (offgoing nurse). Report included the following information SBAR, Kardex, MAR and Recent Results.     SITUATION:    Code Status: Full Code   Reason for Admission: Chronic respiratory failure ([facility] [address] [J96.10]    [facility] day: 3   Problem List:       Hospital Problems  Date Reviewed: 5/16/2019          Codes Class Noted POA    Chronic respiratory failure [facility]) ICD-10-CM: J96.10  ICD-9-CM: 518.83  6/25/2019 Unknown              BACKGROUND:    Past Medical History:   Past Medical History:   Diagnosis Date    Arthritis     Lower back     Asthma     Chronic back pain     Lower back pain    Depression     Diabetes ([facility] [address]     Diabetes mellitus ([facility] [address]     Hearing loss     Heart failure (HCC)     chronic diastolic heart failure    Left ear hearing loss     pt states nerve damage--- 25% hearing loss, described as \"muffled\"    Memory difficulty     Panic attacks     Ringing of ears     Severe headache     Sleep apnea     SOB (shortness of breath) on exertion     w and w/out exertion    Stomach pain     Thyroid disease     Vertigo          Patient taking anticoagulants yes     ASSESSMENT:    Changes in Assessment Throughout Shift: none     Patient has Central Line: no Reasons if yes:    Patient has Barker Cath: no Reasons if yes:       Last Vitals:     Vitals:    06/27/19 1620 06/27/19 2100 06/28/19 0750 06/28/19 1800   BP: 108/56 123/63 121/72 108/62   Pulse: 60 60 61 62   Resp: 18 18 18    Temp: 99.8 °F (37.7 °C) 98.6 °F (37 °C) 99 °F (37.2 °C)    SpO2: 97% 98% 99%    Weight:            IV and DRAINS (will only show if present)   Peripheral IV 06/25/19 Anterior;Proximal;Right Antecubital-Site Assessment: Clean, dry, & intact  PEG/Gastrostomy Tube 06/20/19-Site Assessment: Clean, dry, & intact  [REMOVED] Airway - Tracheostomy Tube 06/03/19 Portex-Site Assessment: Clean, dry, & intact     WOUND (if present)   Wound Type:         Dressing present Dressing Present : No   Wound Concerns/Notes:  none     PAIN    Pain Assessment    Pain Intensity 1: 9 (06/28/19 1600)    Pain Location 1: Head    Pain Intervention(s) 1: Medication (see MAR)    Patient Stated Pain Goal: 0  o Interventions for Pain:  Pain medication    o Intervention effective: yes  o Time of last intervention: see MAR   o Reassessment Completed: yes      Last 3 Weights:  Last 3 Recorded Weights in this Encounter    06/26/19 1430   Weight: 122.2 kg (269 lb 8 oz)     Weight change:      INTAKE/OUPUT    Current Shift: No intake/output data recorded. Last three shifts: 06/27 0701 - 06/28 1900  In: 310   Out: -      LAB RESULTS     Recent Labs     06/26/19  0610   WBC 5.7   HGB 11.5*   HCT 36.4           Recent Labs     06/26/19  0610      K 3.7   *   BUN 9   CREA 0.82   CA 8.3*   MG 2.3       RECOMMENDATIONS AND DISCHARGE PLANNING     1. Pending tests/procedures/ Plan of Care or Other Needs: labs  2.      3. Discharge plan for patient and Needs/Barriers: TBD    4. Estimated Discharge Date: TBD Posted on Whiteboard in Patients Room: no      4. The patient's care plan was reviewed with the oncoming nurse. \"HEALS\" SAFETY CHECK      Fall Risk    Total Score: 3    Safety Measures: Safety Measures: Bed/Chair alarm on, Bed/Chair-Wheels locked, Bed in low position, Call light within reach, Fall prevention (comment), Gripper socks, Side rails X 3    A safety check occurred in the patient's room between off going nurse and oncoming nurse listed above.     The safety check included the below items  Area Items   H  High Alert Medications - Verify all high alert medication drips (heparin, PCA, etc.)   E  Equipment - Suction is set up for ALL patients (with yanker)  - Red plugs utilized for all equipment (IV pumps, etc.)  - WOWs wiped down at end of shift.  - Room stocked with oxygen, suction, and other unit-specific supplies A  Alarms - Bed alarm is set for fall risk patients  - Ensure chair alarm is in place and activated if patient is up in a chair   L  Lines - Check IV for any infiltration  - Barker bag is empty if patient has a Barker   - Tubing and IV bags are labeled   S  Safety   - Room is clean, patient is clean, and equipment is clean. - Hallways are clear from equipment besides carts. - Fall bracelet on for fall risk patients  - Ensure room is clear and free of clutter  - Suction is set up for ALL patients (with yanker)  - Hallways are clear from equipment besides carts.    - Isolation precautions followed, supplies available outside room, sign posted     Fozia Tran RN

## 2019-06-28 NOTE — PROGRESS NOTES
Problem: Self Care Deficits Care Plan (Adult)  Goal: *Therapy Goal (Edit Goal, Insert Text)  Description  Occupational Therapy Goals   Long Term Goals  Initiated 19 and to be accomplished within 2 week(s) 7/10/19  1. Pt will perform functional activity up to 15 minutes with no more than 2 rest breaks and 2 vcs to attend to task. .   2. Pt will perform grooming with S.  3. Pt will perform UB bathing with S .  4. Pt will perform LB bathing with S..  5. Pt will perform tub/shower transfer with S.   6. Pt will perform UB dressing with S.  7. Pt will perform LB dressing with S..  8. Pt will perform toileting task with I.  9. Pt will perform toilet transfer with S.      Short Term Goals   Initiated 19 and to be accomplished within 7 day(s) 7/3/19  1. Pt will perform functional task up to 10 minutes with no more than 3 verbal cues to attend to task and 3 rest breaks. 2. Pt will perform grooming with SBA. 3. Pt will perform UB bathing with S and no more than 3 vcs to  Initiate and complete task. 4. Pt will perform LB bathing with Beltran and no more than 3 vcs to  complete task. 5. Pt will perform tub/shower transfer with [facility]. 6. Pt will perform UB dressing with Beltran and no more than 3 vcs to  initiate and complete task. .  7. Pt will perform LB dressing with Beltran and no more than 3 vcs to initiate and  complete task. 8. Pt will perform toileting task with S.  9. Pt will perform toilet transfer with S and no more than 3 vcs to initiate and complete task. .      Outcome: Progressing Towards Goal   OCCUPATIONAL THERAPY TREATMENT    Patient: Babatunde Jj   63 y.o. Patient identified with name and : yes    Date: 2019    First Tx Session  Time In: 11:00  Time Out[de-identified] 12:30      Diagnosis: Chronic respiratory failure ([facility] [address] [J96.10]   Precautions: Aspiration, Fall  Chart, occupational therapy assessment, plan of care, and goals were reviewed.      Pain:  Pt reports 9/10 pain or discomfort prior to treatment at head  Pt reports 9/10 pain or discomfort post treatment. Intervention Provided: Nursing made aware. SUBJECTIVE:   Pt received alert, dressed seated in recliner chair with Trach/O2 in place on 4L. Patient stated I am not trying to be difficult.   Pt giving reasons as to why she did not stand when asked. OBJECTIVE DATA SUMMARY:     THERAPEUTIC EXERCISE Daily Assessment    Pt propelled w/c from room to gym with SBA requiring asst to manage PEG TUBE/IV Meds. Resistive clip taskx18 with 2lb weights at wrist in seated position. Pt refused stand for task. Pt required rest breaks for task. Pt reported 7/10 PER. TOILETING Daily Assessment     Pt performed toileting task with S.  Pt able to wipe front and back properly. MOBILITY/TRANSFERS Daily Assessment     EOC <-->w/c with S. Toilet transfer with device requiring CGA. Note: Pt functional ambulated from bed to bathroom in room        ASSESSMENT:  Pt making slow progress. Pt continues to show decreased activity tolerance requiring rest breaks and increased time to complete. Note:pt resistant to standing to increase trunk/core strength. Pt to continue with OT and maximizing function in ADLs/IADLs. Progression toward goals:  ?x          Improving appropriately and progressing toward goals  ? Improving slowly and progressing toward goals  ? Not making progress toward goals and plan of care will be adjusted     PLAN:  Patient continues to benefit from skilled intervention to address the above impairments. Continue treatment per established plan of care. Discharge Recommendations:  Home Health  Further Equipment Recommendations for Discharge:  Shower transfer bench      Activity Tolerance:  Fair      Estimated LOS:TBD    Please refer to the flowsheet for vital signs taken during this treatment. After treatment:   ?x  Patient left in no apparent distress sitting up in chair   ?   Patient left in no apparent distress in bed  ? Call bell left within reach  ? Nursing notified  ? Caregiver present  ? Bed alarm activated    COMMUNICATION/EDUCATION:   ?Home safety education was provided and the patient/caregiver indicated understanding. ?x Patient/family have participated as able in goal setting and plan of care. ? Patient/family agree to work toward stated goals and plan of care. ? Patient understands intent and goals of therapy, but is neutral about his/her participation. ? Patient is unable to participate in goal setting and plan of care.       Eric Russ, OT

## 2019-06-28 NOTE — PROGRESS NOTES
Problem: Dysphagia (Adult)  Goal: *Speech Goal: (INSERT TEXT)  Description  Long term goals  Patient will:  1. Perform pharyngeal strengthening exercises with supervision. 2. Tolerate swallowing trials of varied consistenies without over s/s of aspiration. 3. Participate in repeat MBS prior to resuming PO for nutrition. 4. Tolerate deflation of tracheostomy cuff for speech production for 20 minutes. 5. Tolerate placement of a passu-Malinda speaking valve for speech for 20 minute intervals. Short term goals (by 7/3/19)  Patient will:  1. Perform pharyngeal strengthening exercises with min-mod assist.  2. Tolerate swallowing trials of ice chops and small boluses puree  without over s/s of aspiration. 3. Participate in repeat MBS prior to resuming PO for nutrition. 4. Tolerate deflation of tracheostomy cuff for speech production for 20 minutes. 5. Tolerate placement of a passu-Rosine speaking valve for speech for 1-2 minute intervals. Outcome: Progressing Towards Goal  Note:   SPEECH LANGUAGE PATHOLOGY TREATMENT    Patient: Alysha Michael (68 y.o. female)  Date: 6/28/2019  Diagnosis: Chronic respiratory failure ([facility] [address] [J96.10] <principal problem not specified>       Time in: 0930 1530  Time Out:  1010 1600  SUBJECTIVE:   Patient stated ? That was not as bad as I thought it would be? (placement of PMV). OBJECTIVE:   Mental Status:  Mrs. John Schumacher was relatively calm this morning. At bit more anxious this afternoon due to fatigue. Treatment & Interventions:   Mrs. John Schumacher was seen in her room while sitting in the recliner for both sessions. She was conversant and pleasant. Motor Speech: This morning the patient agreeable to SLP ensuring cuff deflation of her tracheostomy tube, light suctioning in and around the tube. Mrs. John Schumacher the allowed the SLP to perform finger occlusion of the trach tube to allow her to speak.   After brief re-education about the Passy-Rosine Valve (PMV), she was agreeable to SLP placing in on the trach hub. SLP explained that if she indicated any discomfort or distress, the valve would be removed immediately. On first placement, Mrs. Humberto Mcwilliams kept the valve on for 12 minutes. After a brief rest period, she allowed the valve to be placed again and wore it for an additional 15 minutes. In a session late in the afternoon, patient was again seen in the recliner. She was fatigued from today's therapy, but willing to try the PMV once again. This time she wore the valve for only 2-3 minutes before requesting that the SLP remove it. She then reported to the SLP that she felt very \"anxious\" this afternoon. Two more brief (2-3 minute) trials were done. SLP presented a very small bolus of ice chips for swallowing in the final wearing of the valve. Patient managed the material without overt s/s of aspiration (no change in voice quality, no cough). However, she again became anxious and requested that the valve be removed. SLP then showed her how to remove it herself. Mrs. Humberto Mcwilliams was surprised at how easily it came off. Voice:  Voice as hoarse and breathy when speaking around the tracheostomy tube. Response & Tolerance to Activities:  Mrs. Humberto Mcwilliams was much more cooperative with use of the PMV than anticipated in prior sessions. She was happy to be able to converse this morning. In the afternoon session, patient was much more \"anxious\" and hesitant to keep the valve on for more than 2-3 minutes. Pain:  Pain Scale 1: Numeric (0 - 10)     Pain Description 1: Aching  After treatment:   ?       Patient left in no apparent distress sitting up in chair  ? Patient left in no apparent distress in bed  ? Call bell left within reach  ? Nursing notified  ? Caregiver present  ? Bed alarm activated    ASSESSMENT:   Progression toward goals:  ?       Improving appropriately and progressing toward goals  ?        Improving slowly and progressing toward goals  ?       Not making progress toward goals and plan of care will be adjusted    PLAN:   Patient continues to benefit from skilled intervention to address the above impairments. Continue treatment per established plan of care.   Discharge Recommendations:  Home Health    Estimated LOS: Through 7/9/19    Vinny Morris, SLP  Time calculation:  70 Minutes

## 2019-06-28 NOTE — PROGRESS NOTES
Bedside and Verbal shift change report given to Lyssa Barry (oncoming nurse) by Tayler Tolbert RN (offgoing nurse). Report included the following information SBAR, Kardex, MAR and Recent Results.     SITUATION:    Code Status: Full Code   Reason for Admission: Chronic respiratory failure ([facility] [address] [J96.10]    [facility] day: 3   Problem List:       Hospital Problems  Date Reviewed: 5/16/2019          Codes Class Noted POA    Chronic respiratory failure [facility]) ICD-10-CM: J96.10  ICD-9-CM: 518.83  6/25/2019 Unknown              BACKGROUND:    Past Medical History:   Past Medical History:   Diagnosis Date    Arthritis     Lower back     Asthma     Chronic back pain     Lower back pain    Depression     Diabetes ([facility] [address]     Diabetes mellitus ([facility] [address]     Hearing loss     Heart failure (HCC)     chronic diastolic heart failure    Left ear hearing loss     pt states nerve damage--- 25% hearing loss, described as \"muffled\"    Memory difficulty     Panic attacks     Ringing of ears     Severe headache     Sleep apnea     SOB (shortness of breath) on exertion     w and w/out exertion    Stomach pain     Thyroid disease     Vertigo          Patient taking anticoagulants yes     ASSESSMENT:    Changes in Assessment Throughout Shift: none     Patient has Central Line: no Reasons if yes:    Patient has Barker Cath: no Reasons if yes:       Last Vitals:     Vitals:    06/26/19 2355 06/27/19 0757 06/27/19 1620 06/27/19 2100   BP:  111/67 108/56 123/63   Pulse: 75 62 60 60   Resp: 16 18 18 18   Temp:  98.2 °F (36.8 °C) 99.8 °F (37.7 °C) 98.6 °F (37 °C)   SpO2: 97% 99% 97% 98%   Weight:            IV and DRAINS (will only show if present)   Peripheral IV 06/25/19 Anterior;Proximal;Right Antecubital-Site Assessment: Clean, dry, & intact  PEG/Gastrostomy Tube 06/20/19-Site Assessment: Clean, dry, & intact  [REMOVED] Airway - Tracheostomy Tube 06/03/19 Portex-Site Assessment: Clean, dry, & intact     WOUND (if present)   Wound Type:         Dressing present Dressing Present : No   Wound Concerns/Notes:  none     PAIN    Pain Assessment    Pain Intensity 1: 0 (06/28/19 0000)    Pain Location 1: Back, Neck    Pain Intervention(s) 1: Medication (see MAR)    Patient Stated Pain Goal: 0  o Interventions for Pain:  Pain medication    o Intervention effective: yes  o Time of last intervention: see MAR   o Reassessment Completed: yes      Last 3 Weights:  Last 3 Recorded Weights in this Encounter    06/26/19 1430   Weight: 122.2 kg (269 lb 8 oz)     Weight change:      INTAKE/OUPUT    Current Shift: 06/27 1901 - 06/28 0700  In: 205   Out: -     Last three shifts: 06/26 0701 - 06/27 1900  In: 1350   Out: -      LAB RESULTS     Recent Labs     06/26/19  0610 06/25/19  0430   WBC 5.7 6.3   HGB 11.5* 11.7*   HCT 36.4 36.6    208        Recent Labs     06/26/19  0610 06/25/19  0430    138   K 3.7 3.7   * 131*   BUN 9 6*   CREA 0.82 0.67   CA 8.3* 8.0*   MG 2.3  --        RECOMMENDATIONS AND DISCHARGE PLANNING     1. Pending tests/procedures/ Plan of Care or Other Needs: labs  2.      3. Discharge plan for patient and Needs/Barriers: TBD    4. Estimated Discharge Date: TBD Posted on Whiteboard in Patients Room: no      4. The patient's care plan was reviewed with the oncoming nurse. \"HEALS\" SAFETY CHECK      Fall Risk    Total Score: 3    Safety Measures: Safety Measures: Bed/Chair-Wheels locked, Bed in low position, Call light within reach    A safety check occurred in the patient's room between off going nurse and oncoming nurse listed above.     The safety check included the below items  Area Items   H  High Alert Medications - Verify all high alert medication drips (heparin, PCA, etc.)   E  Equipment - Suction is set up for ALL patients (with yanker)  - Red plugs utilized for all equipment (IV pumps, etc.)  - WOWs wiped down at end of shift.  - Room stocked with oxygen, suction, and other unit-specific supplies   A  Alarms - Bed alarm is set for fall risk patients  - Ensure chair alarm is in place and activated if patient is up in a chair   L  Lines - Check IV for any infiltration  - Barker bag is empty if patient has a Barker   - Tubing and IV bags are labeled   S  Safety   - Room is clean, patient is clean, and equipment is clean. - Hallways are clear from equipment besides carts. - Fall bracelet on for fall risk patients  - Ensure room is clear and free of clutter  - Suction is set up for ALL patients (with yanker)  - Hallways are clear from equipment besides carts.    - Isolation precautions followed, supplies available outside room, sign posted     Marvin Ulloa RN

## 2019-06-29 LAB
ANION GAP SERPL CALC-SCNC: 5 MMOL/L (ref 3–18)
BUN SERPL-MCNC: 7 MG/DL (ref 7–18)
BUN/CREAT SERPL: 10 (ref 12–20)
CALCIUM SERPL-MCNC: 7.9 MG/DL (ref 8.5–10.1)
CHLORIDE SERPL-SCNC: 104 MMOL/L (ref 100–108)
CO2 SERPL-SCNC: 30 MMOL/L (ref 21–32)
CREAT SERPL-MCNC: 0.67 MG/DL (ref 0.6–1.3)
GLUCOSE BLD STRIP.AUTO-MCNC: 158 MG/DL (ref 70–110)
GLUCOSE BLD STRIP.AUTO-MCNC: 159 MG/DL (ref 70–110)
GLUCOSE BLD STRIP.AUTO-MCNC: 163 MG/DL (ref 70–110)
GLUCOSE BLD STRIP.AUTO-MCNC: 235 MG/DL (ref 70–110)
GLUCOSE SERPL-MCNC: 155 MG/DL (ref 74–99)
MAGNESIUM SERPL-MCNC: 2.3 MG/DL (ref 1.6–2.6)
POTASSIUM SERPL-SCNC: 4.1 MMOL/L (ref 3.5–5.5)
SODIUM SERPL-SCNC: 139 MMOL/L (ref 136–145)

## 2019-06-29 PROCEDURE — 97116 GAIT TRAINING THERAPY: CPT

## 2019-06-29 PROCEDURE — 97530 THERAPEUTIC ACTIVITIES: CPT

## 2019-06-29 PROCEDURE — 74011250637 HC RX REV CODE- 250/637

## 2019-06-29 PROCEDURE — 97110 THERAPEUTIC EXERCISES: CPT

## 2019-06-29 PROCEDURE — 74011636637 HC RX REV CODE- 636/637: Performed by: EMERGENCY MEDICINE

## 2019-06-29 PROCEDURE — 65310000000 HC RM PRIVATE REHAB

## 2019-06-29 PROCEDURE — 94760 N-INVAS EAR/PLS OXIMETRY 1: CPT

## 2019-06-29 PROCEDURE — 92507 TX SP LANG VOICE COMM INDIV: CPT

## 2019-06-29 PROCEDURE — 80048 BASIC METABOLIC PNL TOTAL CA: CPT

## 2019-06-29 PROCEDURE — 77010033678 HC OXYGEN DAILY

## 2019-06-29 PROCEDURE — 82962 GLUCOSE BLOOD TEST: CPT

## 2019-06-29 PROCEDURE — 74011250636 HC RX REV CODE- 250/636: Performed by: EMERGENCY MEDICINE

## 2019-06-29 PROCEDURE — 36415 COLL VENOUS BLD VENIPUNCTURE: CPT

## 2019-06-29 PROCEDURE — 83735 ASSAY OF MAGNESIUM: CPT

## 2019-06-29 PROCEDURE — 74011250637 HC RX REV CODE- 250/637: Performed by: EMERGENCY MEDICINE

## 2019-06-29 PROCEDURE — 92526 ORAL FUNCTION THERAPY: CPT

## 2019-06-29 RX ADMIN — LEVOTHYROXINE SODIUM 200 MCG: 75 TABLET ORAL at 06:31

## 2019-06-29 RX ADMIN — METOCLOPRAMIDE HYDROCHLORIDE 5 MG: 5 TABLET ORAL at 22:03

## 2019-06-29 RX ADMIN — METOCLOPRAMIDE HYDROCHLORIDE 5 MG: 5 TABLET ORAL at 10:20

## 2019-06-29 RX ADMIN — INSULIN GLARGINE 30 UNITS: 100 INJECTION, SOLUTION SUBCUTANEOUS at 09:00

## 2019-06-29 RX ADMIN — METOCLOPRAMIDE HYDROCHLORIDE 5 MG: 5 TABLET ORAL at 14:15

## 2019-06-29 RX ADMIN — ENOXAPARIN SODIUM 40 MG: 40 INJECTION SUBCUTANEOUS at 18:34

## 2019-06-29 RX ADMIN — OXYCODONE HYDROCHLORIDE AND ACETAMINOPHEN 1 TABLET: 5; 325 TABLET ORAL at 10:20

## 2019-06-29 RX ADMIN — LORAZEPAM 1 MG: 0.5 TABLET ORAL at 21:00

## 2019-06-29 RX ADMIN — OXYCODONE HYDROCHLORIDE AND ACETAMINOPHEN 1 TABLET: 5; 325 TABLET ORAL at 22:03

## 2019-06-29 RX ADMIN — INSULIN LISPRO 2 UNITS: 100 INJECTION, SOLUTION INTRAVENOUS; SUBCUTANEOUS at 18:00

## 2019-06-29 RX ADMIN — INSULIN LISPRO 2 UNITS: 100 INJECTION, SOLUTION INTRAVENOUS; SUBCUTANEOUS at 06:31

## 2019-06-29 RX ADMIN — INSULIN LISPRO 4 UNITS: 100 INJECTION, SOLUTION INTRAVENOUS; SUBCUTANEOUS at 12:34

## 2019-06-29 RX ADMIN — DOCUSATE SODIUM 100 MG: 100 CAPSULE, LIQUID FILLED ORAL at 18:33

## 2019-06-29 RX ADMIN — PANTOPRAZOLE SODIUM 40 MG: 40 GRANULE, DELAYED RELEASE ORAL at 06:31

## 2019-06-29 RX ADMIN — SODIUM CHLORIDE AND POTASSIUM CHLORIDE: 4.5; 1.49 INJECTION, SOLUTION INTRAVENOUS at 06:58

## 2019-06-29 RX ADMIN — DOCUSATE SODIUM 100 MG: 100 CAPSULE, LIQUID FILLED ORAL at 10:20

## 2019-06-29 RX ADMIN — ONDANSETRON HYDROCHLORIDE 4 MG: 4 TABLET, FILM COATED ORAL at 10:20

## 2019-06-29 NOTE — PROGRESS NOTES
Bedside and Verbal shift change report given to Kaylee Hollis RN (oncoming nurse) by Humza Butler RN (offgoing nurse). Report included the following information SBAR, Kardex, MAR and Recent Results.     SITUATION:    Code Status: Full Code   Reason for Admission: Chronic respiratory failure ([facility] [address] [J96.10]    [facility] day: 4   Problem List:       Hospital Problems  Date Reviewed: 5/16/2019          Codes Class Noted POA    Chronic respiratory failure [facility]) ICD-10-CM: J96.10  ICD-9-CM: 518.83  6/25/2019 Unknown              BACKGROUND:    Past Medical History:   Past Medical History:   Diagnosis Date    Arthritis     Lower back     Asthma     Chronic back pain     Lower back pain    Depression     Diabetes ([facility] [address]     Diabetes mellitus ([facility] [address]     Hearing loss     Heart failure (HCC)     chronic diastolic heart failure    Left ear hearing loss     pt states nerve damage--- 25% hearing loss, described as \"muffled\"    Memory difficulty     Panic attacks     Ringing of ears     Severe headache     Sleep apnea     SOB (shortness of breath) on exertion     w and w/out exertion    Stomach pain     Thyroid disease     Vertigo          Patient taking anticoagulants yes     ASSESSMENT:    Changes in Assessment Throughout Shift: none     Patient has Central Line: no Reasons if yes:    Patient has Barker Cath: no Reasons if yes:       Last Vitals:     Vitals:    06/28/19 1800 06/28/19 2025 06/29/19 0739 06/29/19 1600   BP: 108/62 121/74 103/61 113/64   Pulse: 62 62 62 64   Resp:  18 17 16   Temp:  98.3 °F (36.8 °C) 98 °F (36.7 °C) 100.3 °F (37.9 °C)   SpO2:  98% 96% 95%   Weight:            IV and DRAINS (will only show if present)   Peripheral IV 06/25/19 Anterior;Proximal;Right Antecubital-Site Assessment: Clean, dry, & intact  PEG/Gastrostomy Tube 06/20/19-Site Assessment: Clean, dry, & intact  [REMOVED] Airway - Tracheostomy Tube 06/03/19 Portex-Site Assessment: Clean, dry, & intact     WOUND (if present)   Wound Type:         Dressing present Dressing Present : No   Wound Concerns/Notes:  none     PAIN    Pain Assessment    Pain Intensity 1: 0 (06/29/19 1600)    Pain Location 1: Back, Abdomen    Pain Intervention(s) 1: Medication (see MAR)    Patient Stated Pain Goal: 0  o Interventions for Pain:  Pain medication    o Intervention effective: yes  o Time of last intervention: see MAR   o Reassessment Completed: yes      Last 3 Weights:  Last 3 Recorded Weights in this Encounter    06/26/19 1430   Weight: 122.2 kg (269 lb 8 oz)     Weight change:      INTAKE/OUPUT    Current Shift: 06/29 0701 - 06/29 1900  In: 690   Out: 400 [Urine:400]    Last three shifts: 06/27 1901 - 06/29 0700  In: 2689.2 [I.V.:584.2]  Out: -      LAB RESULTS     No results for input(s): WBC, HGB, HCT, PLT, HGBEXT, HCTEXT, PLTEXT, HGBEXT, HCTEXT, PLTEXT in the last 72 hours. Recent Labs     06/29/19  0608      K 4.1   *   BUN 7   CREA 0.67   CA 7.9*   MG 2.3       RECOMMENDATIONS AND DISCHARGE PLANNING     1. Pending tests/procedures/ Plan of Care or Other Needs: labs  2.      3. Discharge plan for patient and Needs/Barriers: TBD    4. Estimated Discharge Date: TBD Posted on Whiteboard in Patients Room: no      4. The patient's care plan was reviewed with the oncoming nurse. \"HEALS\" SAFETY CHECK      Fall Risk    Total Score: 3    Safety Measures: Safety Measures: Bed/Chair alarm on, Bed/Chair-Wheels locked, Bed in low position, Call light within reach, Fall prevention (comment)    A safety check occurred in the patient's room between off going nurse and oncoming nurse listed above.     The safety check included the below items  Area Items   H  High Alert Medications - Verify all high alert medication drips (heparin, PCA, etc.)   E  Equipment - Suction is set up for ALL patients (with yanker)  - Red plugs utilized for all equipment (IV pumps, etc.)  - WOWs wiped down at end of shift.  - Room stocked with oxygen, suction, and other unit-specific supplies   A  Alarms - Bed alarm is set for fall risk patients  - Ensure chair alarm is in place and activated if patient is up in a chair   L  Lines - Check IV for any infiltration  - Barker bag is empty if patient has a Barker   - Tubing and IV bags are labeled   S  Safety   - Room is clean, patient is clean, and equipment is clean. - Hallways are clear from equipment besides carts. - Fall bracelet on for fall risk patients  - Ensure room is clear and free of clutter  - Suction is set up for ALL patients (with basiliaker)  - Hallways are clear from equipment besides carts.    - Isolation precautions followed, supplies available outside room, sign posted     Sravan Shrestha, MARKOS

## 2019-06-29 NOTE — ROUTINE PROCESS
0800 Pt. Awake sitting up in bed no change in assessment no signs of distress pt. Reported to be feeling fine. 0930 Pt. Sitting up in chair tolerating TF Jevity 1.5 thru G tube . 1200 pt. With therapy. 1330 able to transfer by  Safia Gauthier. 1500 no change in assessment. 1800 Pt. Sitting up in chair tolerating Jevity 1.5 @ 40 cc/hr.

## 2019-06-29 NOTE — PROGRESS NOTES
Bedside and Verbal shift change report given to Jerry Gama (oncoming nurse) by Yoni Canchola (offgoing nurse). Report included the following information SBAR, Kardex, MAR and Recent Results.     SITUATION:    Code Status: Full Code   Reason for Admission: Chronic respiratory failure ([facility] [address] [J96.10]    [facility] day: 4   Problem List:       Hospital Problems  Date Reviewed: 5/16/2019          Codes Class Noted POA    Chronic respiratory failure [facility]) ICD-10-CM: J96.10  ICD-9-CM: 518.83  6/25/2019 Unknown              BACKGROUND:    Past Medical History:   Past Medical History:   Diagnosis Date    Arthritis     Lower back     Asthma     Chronic back pain     Lower back pain    Depression     Diabetes ([facility] [address]     Diabetes mellitus ([facility] [address]     Hearing loss     Heart failure (HCC)     chronic diastolic heart failure    Left ear hearing loss     pt states nerve damage--- 25% hearing loss, described as \"muffled\"    Memory difficulty     Panic attacks     Ringing of ears     Severe headache     Sleep apnea     SOB (shortness of breath) on exertion     w and w/out exertion    Stomach pain     Thyroid disease     Vertigo          Patient taking anticoagulants yes     ASSESSMENT:    Changes in Assessment Throughout Shift: none     Patient has Central Line: no Reasons if yes:    Patient has Barker Cath: no Reasons if yes:       Last Vitals:     Vitals:    06/27/19 2100 06/28/19 0750 06/28/19 1800 06/28/19 2025   BP: 123/63 121/72 108/62 121/74   Pulse: 60 61 62 62   Resp: 18 18  18   Temp: 98.6 °F (37 °C) 99 °F (37.2 °C)  98.3 °F (36.8 °C)   SpO2: 98% 99%  98%   Weight:            IV and DRAINS (will only show if present)   Peripheral IV 06/25/19 Anterior;Proximal;Right Antecubital-Site Assessment: Clean, dry, & intact  PEG/Gastrostomy Tube 06/20/19-Site Assessment: Clean, dry, & intact  [REMOVED] Airway - Tracheostomy Tube 06/03/19 Portex-Site Assessment: Clean, dry, & intact     WOUND (if present)   Wound Type:         Dressing present Dressing Present : No   Wound Concerns/Notes:  none     PAIN    Pain Assessment    Pain Intensity 1: 0 (06/29/19 0400)    Pain Location 1: Head    Pain Intervention(s) 1: Medication (see MAR)    Patient Stated Pain Goal: 0  o Interventions for Pain:  Pain medication    o Intervention effective: yes  o Time of last intervention: see MAR   o Reassessment Completed: yes      Last 3 Weights:  Last 3 Recorded Weights in this Encounter    06/26/19 1430   Weight: 122.2 kg (269 lb 8 oz)     Weight change:      INTAKE/OUPUT    Current Shift: 06/28 1901 - 06/29 0700  In: 685   Out: -     Last three shifts: 06/27 0701 - 06/28 1900  In: 1230   Out: -      LAB RESULTS     Recent Labs     06/26/19  0610   WBC 5.7   HGB 11.5*   HCT 36.4           Recent Labs     06/26/19  0610      K 3.7   *   BUN 9   CREA 0.82   CA 8.3*   MG 2.3       RECOMMENDATIONS AND DISCHARGE PLANNING     1. Pending tests/procedures/ Plan of Care or Other Needs: labs  2.      3. Discharge plan for patient and Needs/Barriers: TBD    4. Estimated Discharge Date: TBD Posted on Whiteboard in Patients Room: no      4. The patient's care plan was reviewed with the oncoming nurse. \"HEALS\" SAFETY CHECK      Fall Risk    Total Score: 3    Safety Measures: Safety Measures: Bed/Chair alarm on, Bed/Chair-Wheels locked, Bed in low position, Call light within reach, Fall prevention (comment)    A safety check occurred in the patient's room between off going nurse and oncoming nurse listed above.     The safety check included the below items  Area Items   H  High Alert Medications - Verify all high alert medication drips (heparin, PCA, etc.)   E  Equipment - Suction is set up for ALL patients (with yanker)  - Red plugs utilized for all equipment (IV pumps, etc.)  - WOWs wiped down at end of shift.  - Room stocked with oxygen, suction, and other unit-specific supplies   A  Alarms - Bed alarm is set for fall risk patients  - Ensure chair alarm is in place and activated if patient is up in a chair   L  Lines - Check IV for any infiltration  - Barker bag is empty if patient has a Barker   - Tubing and IV bags are labeled   S  Safety   - Room is clean, patient is clean, and equipment is clean. - Hallways are clear from equipment besides carts. - Fall bracelet on for fall risk patients  - Ensure room is clear and free of clutter  - Suction is set up for ALL patients (with chaitanya)  - Hallways are clear from equipment besides carts.    - Isolation precautions followed, supplies available outside room, sign posted     Dawna Nathan

## 2019-06-29 NOTE — PROGRESS NOTES
Problem: Dysphagia (Adult)  Goal: *Acute Goals and Plan of Care (Insert Text)  Description  Long term goals  Patient will:  1. Perform pharyngeal strengthening exercises with supervision. 2. Tolerate swallowing trials of varied consistenies without over s/s of aspiration. 3. Participate in repeat MBS prior to resuming PO for nutrition. 4. Tolerate deflation of tracheostomy cuff for speech production for 20 minutes. 5. Tolerate placement of a passu-[place] speaking valve for speech for 20 minute intervals. Short term goals (by 7/3/19)  Patient will:  1. Perform pharyngeal strengthening exercises with min-mod assist.  2. Tolerate swallowing trials of ice chops and small boluses puree  without over s/s of aspiration. 3. Participate in repeat MBS prior to resuming PO for nutrition. 4. Tolerate deflation of tracheostomy cuff for speech production for 20 minutes. 5. Tolerate placement of a passu[place] speaking valve for speech for 1-2 minute intervals. 6/29/2019 1252 by CHINMAY Sifuentes  Outcome: Progressing Towards Goal    SPEECH LANGUAGE PATHOLOGY - [address] [place] [address] TREATMENT    Patient: Pamella Cleveland (26 y.o. female)  Date: 6/29/2019  Diagnosis: Chronic respiratory failure (HCC) [J96.10] <principal problem not specified>       Time In: 1102   Time Out[de-identified]  2331    ST intervention attempted at 800 this AM, patient adamantly refused despite significant encouragement. SLP returned later this AM with pt's  at bedside. SPEECH TX:  Provided further education about the tracheostomy and the progression of care to achieve more normalized function (for both speech and PO intake) as well as use of [place] valve to allow speech production and normalize swallow. Patient able to self-occlude trach with gloved finger in 4/4 trials, producing breathy but audible 3-4 word sentences. SLP placed PMV and patient engaged in conversation with SLP with 0 s/sx of discomfort.  Pt tolerated for ~8 minutes before requesting removal.     DYSPHAGIA TX:  Reviewed NPO status, aspiration risk and MBSS results and recommendations. Patient stated \"I'm so hungry, I want a cheeseburger. \" Patient able to completed x10 reps each of effortful swallow and dexter maneuver. Patient encouraged to complete exercises throughout the day. Handout at bedside. Progression toward goals:  ?       Improving appropriately and progressing toward goals  ? Improving slowly and progressing toward goals  ? Not making progress toward goals and plan of care will be adjusted     PLAN:  Patient continues to benefit from skilled intervention to address the above impairments. Continue treatment per established plan of care. Discharge Recommendations:  Home Health     SUBJECTIVE:   Patient stated ? I'm so hungry? .    OBJECTIVE:   Mental Status:  Neurologic State: Alert  Orientation Level: Oriented X4  Cognition: Follows commands  Perception: Appears intact  Perseveration: No perseveration noted  Safety/Judgement: Fall prevention     Response & Tolerance to Activities:     Patient required frequent \"breathing breaks\" throughout PMV trials, somewhat anxious but willing to participate with encouragement. Pain:  Pre-Treatment:  9   Post-Treatment:  9  At PEG site, RN alerted by PT     After treatment:   ?       Patient assisted to gym for next session. ? Patient left in no apparent distress sitting up in chair. ? Patient left in no apparent distress in bed. ?       Call bell left within reach. ?       Nursing notified. ?       Caregiver present. ?       Bed alarm activated.     Estimated LOS: Through 7/9/19  Discharge recommendations: CHINMAY Magdaleno  Time Calculation: 31 mins

## 2019-06-29 NOTE — PROGRESS NOTES
Progress Note    Patient: Tosha Davis MRN: [id number]  CSN: [id number]    YOB: 1960  Age: 63 y.o. Sex: female    DOA: 6/25/2019 LOS:  LOS: 4 days                    Subjective:       IMPAIRMENT GROUP CODE:  Debility.     REHAB IMPAIRMENT CATEGORY:  Miscellaneous.     DIAGNOSIS:  Paralysis of vocal cord and larynx, unspecified. Review of systems  General: No fevers or chills. Cardiovascular: No chest pain or pressure. No palpitations. Pulmonary:pt has trach in place  Gastrointestinal: PEG tube couldn't tolerate 50 cc/h now at 40 cc/h  Genitourinary: No urinary or dysuria. Musculoskeletal: No joint or muscle pain, no back pain, no recent trauma. Neurologic: No headache, generalized weakness     Objective:     Physical Exam:  Visit Vitals  /61   Pulse 62   Temp 98 °F (36.7 °C)   Resp 17   Wt 122.2 kg (269 lb 8 oz)   SpO2 96%   Breastfeeding? No   BMI 42.21 kg/m²        General:         Alert,no acute distress    HEENT: NC, Atraumatic. PERRLA, anicteric sclerae. Lungs: Positive faint expiratory wheezing B/L  Heart:  Regular  rhythm,  No murmur, No Rubs, No Gallops  Abdomen: Soft, Non distended, Non tender.  PEG tube in place  Extremities: Trace lower limb edema  Psych:   Good insight. Not anxious or agitated. Neurologic:  CN 2-12 grossly intact, Alert and oriented X 3. Intake and Output:  Current Shift:  06/29 0701 - 06/29 1900  In: 230   Out: -   Last three shifts:  06/27 1901 - 06/29 0700  In: 2689.2 [I.V.:584.2]  Out: -     Labs: Results:       Chemistry Recent Labs     06/29/19  0608   *      K 4.1      CO2 30   BUN 7   CREA 0.67   CA 7.9*   AGAP 5   BUCR 10*      CBC w/Diff No results for input(s): WBC, RBC, HGB, HCT, PLT, GRANS, LYMPH, EOS, HGBEXT, HCTEXT, PLTEXT in the last 72 hours. Cardiac Enzymes No results for input(s): CPK, CKND1, SANDY in the last 72 hours.     No lab exists for component: CKRMB, TROIP   Coagulation No results for input(s): PTP, INR, APTT in the last 72 hours. No lab exists for component: INREXT    Lipid Panel Lab Results   Component Value Date/Time    Cholesterol, total 347 (H) 04/05/2019 03:46 AM    HDL Cholesterol 26 (L) 04/05/2019 03:46 AM    LDL, calculated  04/05/2019 03:46 AM     LDL AND VLDL CHOLESTEROL NOT CALCULATED WHEN TRIGLYCERIDES >400 MG/DL OR HDL CHOLESTEROL <20 MG/DL    VLDL, calculated Not calculated due to elevated triglyceride level 04/05/2019 03:46 AM    Triglyceride 298 (H) 06/18/2019 06:30 AM    CHOL/HDL Ratio 13.3 (H) 04/05/2019 03:46 AM      BNP No results for input(s): BNPP in the last 72 hours. Liver Enzymes No results for input(s): TP, ALB, TBIL, AP, SGOT, GPT in the last 72 hours.     No lab exists for component: DBIL   Thyroid Studies Lab Results   Component Value Date/Time    TSH 54.20 (H) 06/26/2019 06:10 AM          Procedures/imaging: see electronic medical records for all procedures/Xrays and details which were not copied into this note but were reviewed prior to creation of Plan    Medications:   Current Facility-Administered Medications   Medication Dose Route Frequency    simethicone (MYLICON) 00IC/4.1NK oral drops 40 mg  40 mg Per G Tube QID PRN    pantoprazole (PROTONIX) granules for oral suspension 40 mg  40 mg Per G Tube ACB    ondansetron hcl (ZOFRAN) tablet 4 mg  4 mg Per G Tube Q6H PRN    levothyroxine (SYNTHROID) tablet 200 mcg  200 mcg Per G Tube 6am    metoprolol tartrate (LOPRESSOR) tablet 25 mg  25 mg Per G Tube BID    enoxaparin (LOVENOX) injection 40 mg  40 mg SubCUTAneous Q24H    insulin glargine (LANTUS) injection 30 Units  30 Units SubCUTAneous DAILY    oxyCODONE-acetaminophen (PERCOCET) 5-325 mg per tablet 1 Tab  1 Tab Oral Q6H PRN    metoclopramide HCl (REGLAN) tablet 5 mg  5 mg Per G Tube TID    insulin lispro (HUMALOG) injection   SubCUTAneous Q6H    glucose chewable tablet 16 g  4 Tab Oral PRN    glucagon (GLUCAGEN) injection 1 mg  1 mg IntraMUSCular PRN    LORazepam (ATIVAN) tablet 1 mg  1 mg Oral Q12H PRN    docusate sodium (COLACE) capsule 100 mg  100 mg Per G Tube BID    bisacodyl (DULCOLAX) suppository 10 mg  10 mg Rectal Q48H PRN    albuterol-ipratropium (DUO-NEB) 2.5 MG-0.5 MG/3 ML  3 mL Nebulization Q4H PRN    dextrose 10% infusion 125-250 mL  125-250 mL IntraVENous PRN    0.45% sodium chloride with KCl 20 mEq/L infusion   IntraVENous CONTINUOUS       Assessment/Plan     Active Problems:    Chronic respiratory failure (HCC) (6/25/2019)    Vocal cord dysfunction. S/P  Tracheostomy/ Debility. Continue trach care    Status post recent thyroidectomy. Continue to monitor sx f/u tsha nd free t4   Dual nebulizer q 4h prn    Dysphagia.   The patient has a PEG tube  Nutritional consult   Aspiration precaution    Type 2 diabetes  Lantus 30 unites s q  Humalog sliding scale coverage     H/O Hypertension  continue to monitor Bp    Hypothyroidism  Synthroid 200 Mcg daily  F/u tsh and free t4     Debility  Pt and ot     discussed with pt and nursing staff    Masood Canales MD  6/29/2019 3:40 PM

## 2019-06-29 NOTE — PROGRESS NOTES
Problem: Mobility Impaired (Adult and Pediatric)  Goal: *Therapy Goal (Edit Goal, Insert Text)  Description  Physical Therapy Goals  Initiated 6/26/2019 and to be accomplished within 7 day(s) 7/3/2019:   1. Patient will move from supine to sit and sit to supine , scoot up and down and roll side to side in bed with supervision/set-up. 2.  Patient will transfer from bed to chair and chair to bed with supervision/set-up using the least restrictive device. 3.  Patient will perform sit to stand with supervision/set-up. 4.  Patient will ambulate with supervision/set-up for 50 feet with the least restrictive device. 5.  Patient will ascend/descend 3 stairs with 2 handrail(s) with minimal assistance/contact guard assist.    Physical Therapy Goals  Initiated 6/26/2019 and to be accomplished within 14 day(s) 7/10/2019:  1. Patient will move from supine to sit and sit to supine , scoot up and down and roll side to side in bed with modified independence. 2.  Patient will transfer from bed to chair and chair to bed with modified independence using the least restrictive device. 3.  Patient will perform sit to stand with modified independence. 4.  Patient will ambulate with modified independence for 75 feet with the least restrictive device. 5.  Patient will ascend/descend 3 stairs with 2 handrail(s) with supervision/set-up. Outcome: Progressing Towards Goal   PHYSICAL THERAPY TREATMENT    Patient: Edwin Allen (45 y.o. female)  Date: 6/29/2019  Diagnosis: Chronic respiratory failure (HCC) [J96.10] <principal problem not specified>  Precautions: Aspiration, Fall  Chart, physical therapy assessment, plan of care and goals were reviewed. Time In: 0900  Time Out: 1000  Patient Seen For: Gait training;Patient education; Therapeutic exercise;Transfer training  Pain:  Pt pain was reported as  8 pre-treatment in abdomen. Pt pain was reported as 9 post-treatment in abdomen.   Intervention: nursing notified    Patient identified with name and :yes    SUBJECTIVE:     \"I don't feel good, my stomach hurts. \"    OBJECTIVE DATA SUMMARY:   Objective:     GROSS ASSESSMENT Daily Assessment     Pt required increased rest breaks due to pain and fatigue. BED/MAT MOBILITY Daily Assessment            TRANSFERS Daily Assessment    Sit to Stand Assistance: Stand-by assistance  Car Transfers: Not tested       GAIT Daily Assessment    Amount of Assistance: 5 (Stand-by assistance)  Distance (ft): 100 Feet (ft)  Assistive Device: Walker, rollator  Pt declined ambulating with RW due to stating she couldn't fit in it so rollator was attempted. Pt was able to maneuver the rollator independently and only required verbal cues to lock and unlock the breaks. STEPS or STAIRS Daily Assessment     NT       BALANCE Daily Assessment    Sitting - Static: Good (unsupported)  Sitting - Dynamic: Good (unsupported)  Standing - Static: Fair       WHEELCHAIR MOBILITY Daily Assessment           THERAPEUTIC EXERCISES Daily Assessment    Extremity: Both  Exercise Type #1: Seated lower extremity strengthening  Sets Performed: 1  Reps Performed: 20  Level of Assist: Supervision    Sit to stand x5 with minimal UE support and SBA/CGA       ASSESSMENT:  Pt demonstrated increased ambulation distance and increased independence when using rollator for ambulation during treatment. Pt was limited by abdominal pain throughout treatment. Progression toward goals:  ?      Improving appropriately and progressing toward goals  ? Improving slowly and progressing toward goals  ? Not making progress toward goals and plan of care will be adjusted     PLAN:  Patient continues to benefit from skilled intervention to address the above impairments. Continue treatment per established plan of care.   Discharge Recommendations:  Home Health  Further Equipment Recommendations for Discharge:  TBD as patient progresses     Estimated LOS:2 weeks    Activity Tolerance:   Fair-  Please refer to the flowsheet for vital signs taken during this treatment.     After treatment:   Patient left sitting in recliner in room with needs in reach      Veronika, PT  6/29/2019

## 2019-06-29 NOTE — PROGRESS NOTES
Patient complaining of discomfort since the tube feeding rate increased earlier today. Requests that the rate be returned to 40 ml/hr. 0518:  Patient requests to hold TF for now as she still is having left sided abdominal pain. Bowel sounds active, no residual, and left sided abdominal tenderness. 6357:  Patient declining to have trach dressing changed at this time states \" they did it last night\". Also does not want to change clothes to get ready for the day at this time. 9929:  TF restarted at 40 ml/hr with 75 ml flush. Patient reports discomfort has decreased significantly.

## 2019-06-30 LAB
GLUCOSE BLD STRIP.AUTO-MCNC: 166 MG/DL (ref 70–110)
GLUCOSE BLD STRIP.AUTO-MCNC: 193 MG/DL (ref 70–110)
GLUCOSE BLD STRIP.AUTO-MCNC: 213 MG/DL (ref 70–110)

## 2019-06-30 PROCEDURE — 74011250636 HC RX REV CODE- 250/636: Performed by: EMERGENCY MEDICINE

## 2019-06-30 PROCEDURE — 65310000000 HC RM PRIVATE REHAB

## 2019-06-30 PROCEDURE — 74011250637 HC RX REV CODE- 250/637

## 2019-06-30 PROCEDURE — 74011250637 HC RX REV CODE- 250/637: Performed by: EMERGENCY MEDICINE

## 2019-06-30 PROCEDURE — 77010033678 HC OXYGEN DAILY

## 2019-06-30 PROCEDURE — 74011636637 HC RX REV CODE- 636/637: Performed by: EMERGENCY MEDICINE

## 2019-06-30 PROCEDURE — 82962 GLUCOSE BLOOD TEST: CPT

## 2019-06-30 RX ADMIN — PANTOPRAZOLE SODIUM 40 MG: 40 GRANULE, DELAYED RELEASE ORAL at 06:07

## 2019-06-30 RX ADMIN — DOCUSATE SODIUM 100 MG: 100 CAPSULE, LIQUID FILLED ORAL at 09:44

## 2019-06-30 RX ADMIN — OXYCODONE HYDROCHLORIDE AND ACETAMINOPHEN 1 TABLET: 5; 325 TABLET ORAL at 21:40

## 2019-06-30 RX ADMIN — METOCLOPRAMIDE HYDROCHLORIDE 5 MG: 5 TABLET ORAL at 09:44

## 2019-06-30 RX ADMIN — METOCLOPRAMIDE HYDROCHLORIDE 5 MG: 5 TABLET ORAL at 13:07

## 2019-06-30 RX ADMIN — INSULIN LISPRO 2 UNITS: 100 INJECTION, SOLUTION INTRAVENOUS; SUBCUTANEOUS at 06:37

## 2019-06-30 RX ADMIN — INSULIN GLARGINE 30 UNITS: 100 INJECTION, SOLUTION SUBCUTANEOUS at 09:00

## 2019-06-30 RX ADMIN — ENOXAPARIN SODIUM 40 MG: 40 INJECTION SUBCUTANEOUS at 18:38

## 2019-06-30 RX ADMIN — SODIUM CHLORIDE AND POTASSIUM CHLORIDE: 4.5; 1.49 INJECTION, SOLUTION INTRAVENOUS at 04:26

## 2019-06-30 RX ADMIN — SODIUM CHLORIDE AND POTASSIUM CHLORIDE: 4.5; 1.49 INJECTION, SOLUTION INTRAVENOUS at 21:40

## 2019-06-30 RX ADMIN — INSULIN LISPRO 4 UNITS: 100 INJECTION, SOLUTION INTRAVENOUS; SUBCUTANEOUS at 12:46

## 2019-06-30 RX ADMIN — LEVOTHYROXINE SODIUM 200 MCG: 75 TABLET ORAL at 06:06

## 2019-06-30 RX ADMIN — METOCLOPRAMIDE HYDROCHLORIDE 5 MG: 5 TABLET ORAL at 21:00

## 2019-06-30 RX ADMIN — LORAZEPAM 1 MG: 0.5 TABLET ORAL at 21:40

## 2019-06-30 RX ADMIN — METOPROLOL TARTRATE 25 MG: 25 TABLET ORAL at 09:44

## 2019-06-30 RX ADMIN — INSULIN LISPRO 2 UNITS: 100 INJECTION, SOLUTION INTRAVENOUS; SUBCUTANEOUS at 00:30

## 2019-06-30 RX ADMIN — DOCUSATE SODIUM 100 MG: 100 CAPSULE, LIQUID FILLED ORAL at 18:38

## 2019-06-30 RX ADMIN — OXYCODONE HYDROCHLORIDE AND ACETAMINOPHEN 1 TABLET: 5; 325 TABLET ORAL at 09:45

## 2019-06-30 RX ADMIN — INSULIN LISPRO 2 UNITS: 100 INJECTION, SOLUTION INTRAVENOUS; SUBCUTANEOUS at 18:37

## 2019-06-30 NOTE — ROUTINE PROCESS
0800 Pt. Awake sitting up in bed no change in assessment no signs of distress pt. Stated to be feeling fine. 0930 Pt. Sitting up in chair remained NPO tolerating TF jevity 1.5 @ 40cc/hr. 1200 no complaints. 1330 able to transfer from chair to bedside commode no difficulty. 1500 no change in assessment. 1800 Pt.  Sitting up in chair tolerated TF Jevity 1.5 @ 40 cc/hr no residual

## 2019-06-30 NOTE — PROGRESS NOTES
Bedside and Verbal shift change report given to Brian Avila RN (oncoming nurse) by Maribell Bravo RN (offgoing nurse). Report included the following information SBAR, Kardex, MAR and Recent Results.     SITUATION:    Code Status: Full Code   Reason for Admission: Chronic respiratory failure ([facility] [address] [J96.10]    [facility] day: 5   Problem List:       Hospital Problems  Date Reviewed: 5/16/2019          Codes Class Noted POA    Chronic respiratory failure [facility]) ICD-10-CM: J96.10  ICD-9-CM: 518.83  6/25/2019 Unknown              BACKGROUND:    Past Medical History:   Past Medical History:   Diagnosis Date    Arthritis     Lower back     Asthma     Chronic back pain     Lower back pain    Depression     Diabetes ([facility] [address]     Diabetes mellitus ([facility] [address]     Hearing loss     Heart failure (HCC)     chronic diastolic heart failure    Left ear hearing loss     pt states nerve damage--- 25% hearing loss, described as \"muffled\"    Memory difficulty     Panic attacks     Ringing of ears     Severe headache     Sleep apnea     SOB (shortness of breath) on exertion     w and w/out exertion    Stomach pain     Thyroid disease     Vertigo          Patient taking anticoagulants yes     ASSESSMENT:    Changes in Assessment Throughout Shift: none     Patient has Central Line: no Reasons if yes:    Patient has Barker Cath: no Reasons if yes:       Last Vitals:     Vitals:    06/30/19 0800 06/30/19 1037 06/30/19 1510 06/30/19 1600   BP: 126/77   104/57   Pulse: 78   69   Resp: 18   18   Temp: 99 °F (37.2 °C)   99 °F (37.2 °C)   SpO2:  94% 94% 98%   Weight:            IV and DRAINS (will only show if present)   Peripheral IV 06/30/19 Left Antecubital-Site Assessment: Clean, dry, & intact  [REMOVED] Peripheral IV 06/25/19 Anterior;Proximal;Right Antecubital-Site Assessment: Dry, Intact  PEG/Gastrostomy Tube 06/20/19-Site Assessment: Clean, dry, & intact  [REMOVED] Airway - Tracheostomy Tube 06/03/19 Portex-Site Assessment: Clean, dry, & intact     WOUND (if present)   Wound Type:         Dressing present Dressing Present : No   Wound Concerns/Notes:  none     PAIN    Pain Assessment    Pain Intensity 1: 0 (06/30/19 1600)    Pain Location 1: Back, Neck    Pain Intervention(s) 1: Medication (see MAR)    Patient Stated Pain Goal: 0  o Interventions for Pain:  Pain medication    o Intervention effective: yes  o Time of last intervention: see MAR   o Reassessment Completed: yes      Last 3 Weights:  Last 3 Recorded Weights in this Encounter    06/26/19 1430   Weight: 122.2 kg (269 lb 8 oz)     Weight change:      INTAKE/OUPUT    Current Shift: No intake/output data recorded. Last three shifts: 06/29 0701 - 06/30 1900  In: 3510   Out: 1000 [Urine:1000]     LAB RESULTS     No results for input(s): WBC, HGB, HCT, PLT, HGBEXT, HCTEXT, PLTEXT, HGBEXT, HCTEXT, PLTEXT in the last 72 hours. Recent Labs     06/29/19  0608      K 4.1   *   BUN 7   CREA 0.67   CA 7.9*   MG 2.3       RECOMMENDATIONS AND DISCHARGE PLANNING     1. Pending tests/procedures/ Plan of Care or Other Needs: labs  2.      3. Discharge plan for patient and Needs/Barriers: TBD    4. Estimated Discharge Date: TBD Posted on Whiteboard in Patients Room: no      4. The patient's care plan was reviewed with the oncoming nurse. \"HEALS\" SAFETY CHECK      Fall Risk    Total Score: 3    Safety Measures: Safety Measures: Bed/Chair alarm on, Bed/Chair-Wheels locked, Bed in low position, Caregiver at bedside, Call light within reach, Extra trach at bedside    A safety check occurred in the patient's room between off going nurse and oncoming nurse listed above.     The safety check included the below items  Area Items   H  High Alert Medications - Verify all high alert medication drips (heparin, PCA, etc.)   E  Equipment - Suction is set up for ALL patients (with yanker)  - Red plugs utilized for all equipment (IV pumps, etc.)  - WOWs wiped down at end of shift.  - Room stocked with oxygen, suction, and other unit-specific supplies   A  Alarms - Bed alarm is set for fall risk patients  - Ensure chair alarm is in place and activated if patient is up in a chair   L  Lines - Check IV for any infiltration  - Barker bag is empty if patient has a Barker   - Tubing and IV bags are labeled   S  Safety   - Room is clean, patient is clean, and equipment is clean. - Hallways are clear from equipment besides carts. - Fall bracelet on for fall risk patients  - Ensure room is clear and free of clutter  - Suction is set up for ALL patients (with yanker)  - Hallways are clear from equipment besides carts.    - Isolation precautions followed, supplies available outside room, sign posted     Michael Pretty RN

## 2019-06-30 NOTE — PROGRESS NOTES
Progress Note    Patient: Lali Faria MRN: [id number]  CSN: [id number]    YOB: 1960  Age: 63 y.o. Sex: female    DOA: 6/25/2019 LOS:  LOS: 5 days                    Subjective:       IMPAIRMENT GROUP CODE:  Debility.     REHAB IMPAIRMENT CATEGORY:  Miscellaneous.     DIAGNOSIS:  Paralysis of vocal cord and larynx, unspecified. Review of systems  General: No fevers or chills. Cardiovascular: No chest pain or pressure. No palpitations. Pulmonary:pt has trach in place  Gastrointestinal: PEG tube couldn't tolerate 50 cc/h still at 40 cc/h  Genitourinary: No urinary or dysuria. Musculoskeletal: No joint or muscle pain, no back pain, no recent trauma. Neurologic: No headache, generalized weakness     Objective:     Physical Exam:  Visit Vitals  /77 (BP 1 Location: Left arm, BP Patient Position: At rest)   Pulse 78   Temp 99 °F (37.2 °C)   Resp 18   Wt 122.2 kg (269 lb 8 oz)   SpO2 94%   Breastfeeding? No   BMI 42.21 kg/m²        General:         Alert,no acute distress    HEENT: NC, Atraumatic. PERRLA, anicteric sclerae. Lungs: No wheezing B/L  Heart:  Regular  rhythm,  No murmur, No Rubs, No Gallops  Abdomen: Soft, Non distended, Non tender.  PEG tube in place  Extremities: Trace lower limb edema  Psych:   . Not anxious or agitated. Neurologic:  CN 2-12 grossly intact,  Intake and Output:  Current Shift:  No intake/output data recorded. Last three shifts:  06/28 1901 - 06/30 0700  In: 3029.2 [I.V.:584.2]  Out: 400 [Urine:400]    Labs: Results:       Chemistry Recent Labs     06/29/19  0608   *      K 4.1      CO2 30   BUN 7   CREA 0.67   CA 7.9*   AGAP 5   BUCR 10*      CBC w/Diff No results for input(s): WBC, RBC, HGB, HCT, PLT, GRANS, LYMPH, EOS, HGBEXT, HCTEXT, PLTEXT, HGBEXT, HCTEXT, PLTEXT in the last 72 hours. Cardiac Enzymes No results for input(s): CPK, CKND1, SANDY in the last 72 hours.     No lab exists for component: CKRMB, TROIP   Coagulation No results for input(s): PTP, INR, APTT in the last 72 hours. No lab exists for component: INREXT, INREXT    Lipid Panel Lab Results   Component Value Date/Time    Cholesterol, total 347 (H) 04/05/2019 03:46 AM    HDL Cholesterol 26 (L) 04/05/2019 03:46 AM    LDL, calculated  04/05/2019 03:46 AM     LDL AND VLDL CHOLESTEROL NOT CALCULATED WHEN TRIGLYCERIDES >400 MG/DL OR HDL CHOLESTEROL <20 MG/DL    VLDL, calculated Not calculated due to elevated triglyceride level 04/05/2019 03:46 AM    Triglyceride 298 (H) 06/18/2019 06:30 AM    CHOL/HDL Ratio 13.3 (H) 04/05/2019 03:46 AM      BNP No results for input(s): BNPP in the last 72 hours. Liver Enzymes No results for input(s): TP, ALB, TBIL, AP, SGOT, GPT in the last 72 hours.     No lab exists for component: DBIL   Thyroid Studies Lab Results   Component Value Date/Time    TSH 54.20 (H) 06/26/2019 06:10 AM          Procedures/imaging: see electronic medical records for all procedures/Xrays and details which were not copied into this note but were reviewed prior to creation of Plan    Medications:   Current Facility-Administered Medications   Medication Dose Route Frequency    simethicone (MYLICON) 01FB/6.2ZZ oral drops 40 mg  40 mg Per G Tube QID PRN    pantoprazole (PROTONIX) granules for oral suspension 40 mg  40 mg Per G Tube ACB    ondansetron hcl (ZOFRAN) tablet 4 mg  4 mg Per G Tube Q6H PRN    levothyroxine (SYNTHROID) tablet 200 mcg  200 mcg Per G Tube 6am    metoprolol tartrate (LOPRESSOR) tablet 25 mg  25 mg Per G Tube BID    enoxaparin (LOVENOX) injection 40 mg  40 mg SubCUTAneous Q24H    insulin glargine (LANTUS) injection 30 Units  30 Units SubCUTAneous DAILY    oxyCODONE-acetaminophen (PERCOCET) 5-325 mg per tablet 1 Tab  1 Tab Oral Q6H PRN    metoclopramide HCl (REGLAN) tablet 5 mg  5 mg Per G Tube TID    insulin lispro (HUMALOG) injection   SubCUTAneous Q6H    glucose chewable tablet 16 g  4 Tab Oral PRN    glucagon (GLUCAGEN) injection 1 mg 1 mg IntraMUSCular PRN    LORazepam (ATIVAN) tablet 1 mg  1 mg Oral Q12H PRN    docusate sodium (COLACE) capsule 100 mg  100 mg Per G Tube BID    bisacodyl (DULCOLAX) suppository 10 mg  10 mg Rectal Q48H PRN    albuterol-ipratropium (DUO-NEB) 2.5 MG-0.5 MG/3 ML  3 mL Nebulization Q4H PRN    dextrose 10% infusion 125-250 mL  125-250 mL IntraVENous PRN    0.45% sodium chloride with KCl 20 mEq/L infusion   IntraVENous CONTINUOUS       Assessment/Plan     Active Problems:    Chronic respiratory failure (HCC) (6/25/2019)    Vocal cord dysfunction. S/P  Tracheostomy/ Debility. Continue trach care    Status post recent thyroidectomy. Continue to monitor sx f/u tsh and free t4   Dual nebulizer q 4h prn    Dysphagia.   The patient has a PEG tube   f/u Nutritional consult   Aspiration precaution    Type 2 diabetes  Lantus 30 unites s q  Humalog sliding scale coverage     H/O Hypertension  continue to monitor Bp    Hypothyroidism  Synthroid 200 Mcg daily  F/u tsh and free t4     Debility  Pt and ot         Ama Mcfarland MD  6/30/2019 2:15 PM

## 2019-06-30 NOTE — PROGRESS NOTES
Bedside and Verbal shift change report given to Georgette Fink (oncoming nurse) by Nitesh Haines RN (offgoing nurse). Report included the following information SBAR, Kardex, MAR and Recent Results.     SITUATION:    Code Status: Full Code   Reason for Admission: Chronic respiratory failure ([facility] [address] [J96.10]    [facility] day: 5   Problem List:       Hospital Problems  Date Reviewed: 5/16/2019          Codes Class Noted POA    Chronic respiratory failure [facility]) ICD-10-CM: J96.10  ICD-9-CM: 518.83  6/25/2019 Unknown              BACKGROUND:    Past Medical History:   Past Medical History:   Diagnosis Date    Arthritis     Lower back     Asthma     Chronic back pain     Lower back pain    Depression     Diabetes ([facility] [address]     Diabetes mellitus ([facility] [address]     Hearing loss     Heart failure (HCC)     chronic diastolic heart failure    Left ear hearing loss     pt states nerve damage--- 25% hearing loss, described as \"muffled\"    Memory difficulty     Panic attacks     Ringing of ears     Severe headache     Sleep apnea     SOB (shortness of breath) on exertion     w and w/out exertion    Stomach pain     Thyroid disease     Vertigo          Patient taking anticoagulants yes     ASSESSMENT:    Changes in Assessment Throughout Shift: none     Patient has Central Line: no Reasons if yes:    Patient has Barker Cath: no Reasons if yes:       Last Vitals:     Vitals:    06/29/19 0739 06/29/19 1600 06/29/19 2200 06/30/19 0018   BP: 103/61 113/64 121/76    Pulse: 62 64 66    Resp: 17 16 18    Temp: 98 °F (36.7 °C) 100.3 °F (37.9 °C) 100 °F (37.8 °C)    SpO2: 96% 95% 97% 98%   Weight:            IV and DRAINS (will only show if present)   Peripheral IV 06/25/19 Anterior;Proximal;Right Antecubital-Site Assessment: Dry, Intact  PEG/Gastrostomy Tube 06/20/19-Site Assessment: Clean, dry, & intact  [REMOVED] Airway - Tracheostomy Tube 06/03/19 Portex-Site Assessment: Clean, dry, & intact     WOUND (if present)   Wound Type: Dressing present Dressing Present : No   Wound Concerns/Notes:  none     PAIN    Pain Assessment    Pain Intensity 1: 0 (06/30/19 0400)    Pain Location 1: Generalized, Neck    Pain Intervention(s) 1: Medication (see MAR)    Patient Stated Pain Goal: 0  o Interventions for Pain:  Pain medication    o Intervention effective: yes  o Time of last intervention: see MAR   o Reassessment Completed: yes      Last 3 Weights:  Last 3 Recorded Weights in this Encounter    06/26/19 1430   Weight: 122.2 kg (269 lb 8 oz)     Weight change:      INTAKE/OUPUT    Current Shift: No intake/output data recorded. Last three shifts: 06/28 1901 - 06/30 0700  In: 3029.2 [I.V.:584.2]  Out: 400 [Urine:400]     LAB RESULTS     No results for input(s): WBC, HGB, HCT, PLT, HGBEXT, HCTEXT, PLTEXT, HGBEXT, HCTEXT, PLTEXT in the last 72 hours. Recent Labs     06/29/19  0608      K 4.1   *   BUN 7   CREA 0.67   CA 7.9*   MG 2.3       RECOMMENDATIONS AND DISCHARGE PLANNING     1. Pending tests/procedures/ Plan of Care or Other Needs: labs  2.      3. Discharge plan for patient and Needs/Barriers: TBD    4. Estimated Discharge Date: TBD Posted on Whiteboard in Patients Room: no      4. The patient's care plan was reviewed with the oncoming nurse. \"HEALS\" SAFETY CHECK      Fall Risk    Total Score: 3    Safety Measures: Safety Measures: Bed/Chair alarm on, Bed/Chair-Wheels locked, Bed in low position, Caregiver at bedside, Call light within reach, Extra trach at bedside    A safety check occurred in the patient's room between off going nurse and oncoming nurse listed above.     The safety check included the below items  Area Items   H  High Alert Medications - Verify all high alert medication drips (heparin, PCA, etc.)   E  Equipment - Suction is set up for ALL patients (with basiliaker)  - Red plugs utilized for all equipment (IV pumps, etc.)  - WOWs wiped down at end of shift.  - Room stocked with oxygen, suction, and other unit-specific supplies   A  Alarms - Bed alarm is set for fall risk patients  - Ensure chair alarm is in place and activated if patient is up in a chair   L  Lines - Check IV for any infiltration  - Barker bag is empty if patient has a Barker   - Tubing and IV bags are labeled   S  Safety   - Room is clean, patient is clean, and equipment is clean. - Hallways are clear from equipment besides carts. - Fall bracelet on for fall risk patients  - Ensure room is clear and free of clutter  - Suction is set up for ALL patients (with basiliaker)  - Hallways are clear from equipment besides carts.    - Isolation precautions followed, supplies available outside room, sign posted     Mikael Benson RN

## 2019-07-01 ENCOUNTER — APPOINTMENT (OUTPATIENT)
Dept: GENERAL RADIOLOGY | Age: 59
DRG: 254 | End: 2019-07-01
Attending: EMERGENCY MEDICINE
Payer: MEDICAID

## 2019-07-01 LAB
APPEARANCE UR: CLEAR
BILIRUB UR QL: NEGATIVE
COLOR UR: YELLOW
GLUCOSE BLD STRIP.AUTO-MCNC: 121 MG/DL (ref 70–110)
GLUCOSE BLD STRIP.AUTO-MCNC: 149 MG/DL (ref 70–110)
GLUCOSE BLD STRIP.AUTO-MCNC: 173 MG/DL (ref 70–110)
GLUCOSE BLD STRIP.AUTO-MCNC: 196 MG/DL (ref 70–110)
GLUCOSE BLD STRIP.AUTO-MCNC: 205 MG/DL (ref 70–110)
GLUCOSE UR STRIP.AUTO-MCNC: NEGATIVE MG/DL
HGB UR QL STRIP: NEGATIVE
KETONES UR QL STRIP.AUTO: NEGATIVE MG/DL
LEUKOCYTE ESTERASE UR QL STRIP.AUTO: NEGATIVE
NITRITE UR QL STRIP.AUTO: NEGATIVE
PH UR STRIP: 8.5 [PH] (ref 5–8)
PROT UR STRIP-MCNC: NEGATIVE MG/DL
SP GR UR REFRACTOMETRY: 1.01 (ref 1–1.03)
UROBILINOGEN UR QL STRIP.AUTO: 1 EU/DL (ref 0.2–1)

## 2019-07-01 PROCEDURE — 71045 X-RAY EXAM CHEST 1 VIEW: CPT

## 2019-07-01 PROCEDURE — 74011250636 HC RX REV CODE- 250/636: Performed by: EMERGENCY MEDICINE

## 2019-07-01 PROCEDURE — 65310000000 HC RM PRIVATE REHAB

## 2019-07-01 PROCEDURE — 92507 TX SP LANG VOICE COMM INDIV: CPT

## 2019-07-01 PROCEDURE — 97542 WHEELCHAIR MNGMENT TRAINING: CPT

## 2019-07-01 PROCEDURE — 97530 THERAPEUTIC ACTIVITIES: CPT

## 2019-07-01 PROCEDURE — 92526 ORAL FUNCTION THERAPY: CPT

## 2019-07-01 PROCEDURE — 87077 CULTURE AEROBIC IDENTIFY: CPT

## 2019-07-01 PROCEDURE — 97110 THERAPEUTIC EXERCISES: CPT

## 2019-07-01 PROCEDURE — 74011636637 HC RX REV CODE- 636/637: Performed by: EMERGENCY MEDICINE

## 2019-07-01 PROCEDURE — 74011250637 HC RX REV CODE- 250/637: Performed by: EMERGENCY MEDICINE

## 2019-07-01 PROCEDURE — 82962 GLUCOSE BLOOD TEST: CPT

## 2019-07-01 PROCEDURE — 81003 URINALYSIS AUTO W/O SCOPE: CPT

## 2019-07-01 PROCEDURE — 74011250637 HC RX REV CODE- 250/637

## 2019-07-01 PROCEDURE — 97535 SELF CARE MNGMENT TRAINING: CPT

## 2019-07-01 PROCEDURE — 87070 CULTURE OTHR SPECIMN AEROBIC: CPT

## 2019-07-01 PROCEDURE — 87086 URINE CULTURE/COLONY COUNT: CPT

## 2019-07-01 PROCEDURE — 87186 SC STD MICRODIL/AGAR DIL: CPT

## 2019-07-01 RX ORDER — LEVOFLOXACIN 750 MG/1
750 TABLET ORAL EVERY 24 HOURS
Status: COMPLETED | OUTPATIENT
Start: 2019-07-01 | End: 2019-07-07

## 2019-07-01 RX ADMIN — SIMETHICONE 40 MG: 20 SUSPENSION/ DROPS ORAL at 14:52

## 2019-07-01 RX ADMIN — ENOXAPARIN SODIUM 40 MG: 40 INJECTION SUBCUTANEOUS at 18:53

## 2019-07-01 RX ADMIN — LEVOFLOXACIN 750 MG: 750 TABLET, FILM COATED ORAL at 20:03

## 2019-07-01 RX ADMIN — METOPROLOL TARTRATE 25 MG: 25 TABLET ORAL at 09:13

## 2019-07-01 RX ADMIN — PANTOPRAZOLE SODIUM 40 MG: 40 GRANULE, DELAYED RELEASE ORAL at 06:05

## 2019-07-01 RX ADMIN — METOCLOPRAMIDE HYDROCHLORIDE 5 MG: 5 TABLET ORAL at 14:49

## 2019-07-01 RX ADMIN — INSULIN LISPRO 4 UNITS: 100 INJECTION, SOLUTION INTRAVENOUS; SUBCUTANEOUS at 13:42

## 2019-07-01 RX ADMIN — METOCLOPRAMIDE HYDROCHLORIDE 5 MG: 5 TABLET ORAL at 20:06

## 2019-07-01 RX ADMIN — METOCLOPRAMIDE HYDROCHLORIDE 5 MG: 5 TABLET ORAL at 09:13

## 2019-07-01 RX ADMIN — LORAZEPAM 1 MG: 0.5 TABLET ORAL at 21:18

## 2019-07-01 RX ADMIN — LEVOTHYROXINE SODIUM 200 MCG: 75 TABLET ORAL at 06:04

## 2019-07-01 RX ADMIN — INSULIN GLARGINE 30 UNITS: 100 INJECTION, SOLUTION SUBCUTANEOUS at 08:20

## 2019-07-01 RX ADMIN — INSULIN LISPRO 2 UNITS: 100 INJECTION, SOLUTION INTRAVENOUS; SUBCUTANEOUS at 06:05

## 2019-07-01 RX ADMIN — DOCUSATE SODIUM 100 MG: 100 CAPSULE, LIQUID FILLED ORAL at 09:13

## 2019-07-01 RX ADMIN — SIMETHICONE 40 MG: 20 SUSPENSION/ DROPS ORAL at 09:21

## 2019-07-01 RX ADMIN — METOPROLOL TARTRATE 25 MG: 25 TABLET ORAL at 18:55

## 2019-07-01 RX ADMIN — OXYCODONE HYDROCHLORIDE AND ACETAMINOPHEN 1 TABLET: 5; 325 TABLET ORAL at 09:20

## 2019-07-01 RX ADMIN — SODIUM CHLORIDE AND POTASSIUM CHLORIDE: 4.5; 1.49 INJECTION, SOLUTION INTRAVENOUS at 19:27

## 2019-07-01 NOTE — PROGRESS NOTES
Problem: Self Care Deficits Care Plan (Adult)  Goal: *Therapy Goal (Edit Goal, Insert Text)  Description  Occupational Therapy Goals   Long Term Goals  Initiated 19 and to be accomplished within 2 week(s) 7/10/19  1. Pt will perform functional activity up to 15 minutes with no more than 2 rest breaks and 2 vcs to attend to task. .   2. Pt will perform grooming with S.  3. Pt will perform UB bathing with S .  4. Pt will perform LB bathing with S..  5. Pt will perform tub/shower transfer with S.   6. Pt will perform UB dressing with S.  7. Pt will perform LB dressing with S..  8. Pt will perform toileting task with I.  9. Pt will perform toilet transfer with S.      Short Term Goals   Initiated 19 and to be accomplished within 7 day(s) 7/3/19  1. Pt will perform functional task up to 10 minutes with no more than 3 verbal cues to attend to task and 3 rest breaks. 2. Pt will perform grooming with SBA. 3. Pt will perform UB bathing with S and no more than 3 vcs to  Initiate and complete task. 4. Pt will perform LB bathing with [place] and no more than 3 vcs to  complete task. 5. Pt will perform tub/shower transfer with [facility]. 6. Pt will perform UB dressing with Beltran and no more than 3 vcs to  initiate and complete task. .  7. Pt will perform LB dressing with Beltran and no more than 3 vcs to initiate and  complete task. 8. Pt will perform toileting task with S.  9. Pt will perform toilet transfer with S and no more than 3 vcs to initiate and complete task. Adonis Angry OCCUPATIONAL THERAPY TREATMENT    Patient: Tru Lozano   63 y.o. Patient identified with name and : Yes    Date: 2019    First Tx Session  Time In: 1100  Time Out[de-identified] 8015    Diagnosis: Chronic respiratory failure ([address] [J96.10]   Precautions: Aspiration, Fall  Chart, occupational therapy assessment, plan of care, and goals were reviewed. Pain:  Pt reports 0/10  discomfort prior to treatment.     Pt reports discomfort post treatment at head.  Intervention Provided: Rest      SUBJECTIVE:   Patient received sitting in recliner with Trach/ O2 in place at 4L. Pt stated I am 63years old and I know how to wash myself    OBJECTIVE DATA SUMMARY:     THERAPEUTIC EXERCISE Daily Assessment    Chest press 3x15 with #2 Isaias Nickels. Side raises with 1b weight in hand 3x10.   2nd trial pt reported headache. Pt continued with rest.  Yellow Therabar (pronation/supination) 3x10. Pt required rest breaks to complete task. Note: family present during last part of sessoon. GROOMING Daily Assessment    Grooming  Grooming Assistance : 5 (Supervision)  Comments: pt brushed hair(\"That just wiped me out\")     UPPER BODY BATHING Daily Assessment    Upper Body Bathing  Bathing Assistance, Upper: 5 (Supervision)  Comments: set up with bath basin on table. LOWER BODY BATHING Daily Assessment    Lower Body Bathing  Bathing Assistance, Lower : 5 (Supervision)(performed on toilet)  Position Performed: Other (comment)     TOILETING Daily Assessment    Toileting  Toileting Assistance (FIM Score): 5 (Supervision)     UPPER BODY DRESSING Daily Assessment    Upper Body Dressing   Dressing Assistance : 5 (Supervision)     LOWER BODY DRESSING Daily Assessment    Lower Body Dressing   Dressing Assistance : 5 (Supervision)  Position Performed: Seated in chair(on toilet)  Comments: pt used wipes     MOBILITY/TRANSFERS Daily Assessment    Functional Transfers  Toilet Transfer : Stand pivot transfer without device  Amount of Assistance Required: 5 (Supervision/setup)       ASSESSMENT:  Pt making minimal progress with activity/standing  tolerance refusing to stand for tasks, sitting for LB bathing, and focusing on unrelated subjects requiring multiple cues of redirection task. Pt requires increased time to initiate and complete all tasks. Pt to continue with maximizing I in ADLs/IADLs.    Progression toward goals:  ?          Improving appropriately and progressing toward goals  ? Improving slowly and progressing toward goals  ? Not making progress toward goals and plan of care will be adjusted     PLAN:  Patient continues to benefit from skilled intervention to address the above impairments. Continue treatment per established plan of care. Discharge Recommendations:  Home Health  Further Equipment Recommendations for Discharge:  Bariatric 3 in 1 bedside commode, shower transfer bench     Activity Tolerance:  Poor      Estimated LOS:7/12/19    Please refer to the flowsheet for vital signs taken during this treatment. After treatment:   ?x  Patient left in no apparent distress sitting up in chair   ? Patient left in no apparent distress in bed  ? Call bell left within reach  ? Nursing notified  ? Caregiver present  ? Bed alarm activated    COMMUNICATION/EDUCATION:   ? Home safety education was provided and the patient/caregiver indicated understanding. ? Patient/family have participated as able in goal setting and plan of care. ? Patient/family agree to work toward stated goals and plan of care. ? Patient understands intent and goals of therapy, but is neutral about his/her participation. ? Patient is unable to participate in goal setting and plan of care.       Rex Feng OT   Outcome: Not Progressing Towards Goal

## 2019-07-01 NOTE — INTERDISCIPLINARY ROUNDS
[facility] PHYSICAL REHABILITATION  [address] [place] [address], [address]    INPATIENT REHABILITATION  PRE-TEAM CONFERENCE SUMMARY     Date of Conference: 7/2/2019    Patient Information:        Name: Corrine Arguelles Age / Sex: 63 y.o. / female   CSN: [id number] MRN: [id number]   [facility] Date: 6/25/2019 Length of Stay: 6 days     Primary Rehabilitation Diagnosis: Impaired Mobility and ADLs secondary to <principal problem not specified>   Vocal cord dysfunction  Has Tracheostomy  Has PEG      Therapy:     FIM SCORES Initial Assessment Weekly Progress Assessment 7/1/2019   Eating Functional Level: 1  Comments: PEG Tube  1   Swallowing     Grooming 5 Grooming Assistance : 5 (Supervision)  Comments: pt brushed hair(\"That just wiped me out\")5   Bathing 3 Bathing Assistance, Upper: 5 (Supervision)  Comments: set up with bath basin on table. 5 Pt hase used cloth and bath jo seated EOB for UB bathing but shows inconsistencies with LB bathing. Pt sometimes uses cloth and bath basin or cleaning wipes. Bathing Assistance, Lower : 5 (Supervision)(performed on toilet)  Position Performed:  Other (comment)   Upper Body Dressing Functional Level: 3(increased time needed)  Comments: domned [facility] gow with [address] : 5 ([address]   Lower Body Dressing Functional Level: 3  Comments: increased time needed Dressing Assistance : 5 (Supervision)  Position Performed: Seated in chair(on toilet)  Comments: pt used wipes5   Toileting Functional Level: 5  Comments: increased time needed Toileting Assistance (FIM Score): 5 (Supervision)5   Bladder - level of assist 4 6   Bladder - accident frequency score 7 3   Bowel - level of assist 6 6   Bowel - accident frequency score 3     Toilet Transfer [place] Toilet Transfer Score: 5  Comments: to bed side commod 5 (Supervision/setup)   Tub/Shower Transfer [place] Tub or Shower Type: Tub/Shower combination  Tub/Shower Transfer Score: 5  5 Comprehension Primary Mode of Comprehension: Auditory  Score: 4  Comments: understanding simple and multi-step directions with occasional repetition needed Auditory  6   Expression Primary Mode of Expression: Sign language, Verbal, Other (comment)(writes on paper)  Score: 4  Comments: needing cues occasionally for writing down needs vs attempting to verbalize Nonverbal - Gestures, writing  Verbal  6   Social Interaction Score: 4  Comments: moderate encouragement to participate in therapy 4   Problem Solving Score: 4  Comments: min cues for functional problem solving 4   Memory Score: 4  Comments: able to recall information appropriately as indicated on chart with occasional clarification needed 5     FIM SCORES Initial Assessment Weekly Progress Assessment 7/1/2019   Bed/Chair/Wheelchair Transfers Transfer Type: Other  Other: stand step transfer without AD as per PLOF  Transfer Assistance : 4 (Minimal assistance)  Sit to Stand Assistance: Minimal assistance  Car Transfers: Not tested  Car Type: N/A Transfer Type:  Other  Other: stand step transfer without AD  Transfer Assistance : 5 (Stand-by assistance)  Sit to Stand Assistance: Stand-by assistance   Bed Mobility Rolling Right 4 (Minimal assistance)   Rolling Left 4 (Minimal assistance)   Supine to Sit 4 (Minimal assistance)(Head of bed elevated as per physician order)   Sit to Stand Minimal assistance   Sit to Supine 4 (Minimal assistance)    Rolling Right    CG/SBA   Rolling Left    CG/SBA   Supine to Sit   5 (Stand-by assistance)(head of bed elevated (pt. does not tolerate lowered head))   Sit to Stand   Stand-by assistance   Sit to Supine    not recently assessed      Locomotion (W/C) Able to Propel (ft): (not challenged today)  Functional Level: (not challenged today; to be further assessed)  Curbs/Ramps Assist Required (FIM Score): 0 (Not tested)  Wheelchair Setup Assist Required : 0 (Not tested)  Wheelchair Management: (not challenged today; to be further assessed) Function 2  Setup Assistance  5 (Stand-by assistance)      Locomotion (W/C distance) (not challenged today) 85 feet(performing x 85 ft, then additional 60 ft)   Locomotion (Walk) 4 (Minimal assistance) 0 (Not tested)(During AM session unable to tolerate)      Locomotion (Walk dist.) 8 Feet (ft)  has been able to tolerate up to 100 ft with rollator but unable to tolerate today   Steps/Stairs Steps/Stairs Ambulated (#): (not able to assess today due to patient fatigue)  Level of Assist : 0 (Not tested)  Rail Use: (N/A)  Last week able to negotiate a step but not able to assess today. Nursing:     Neuro:   AAA&O x 4            Respiratory:   [] WNL   [x] O2 LPM: 4  Other: Trach  Peripheral Vascular:   [] TEDS present   [x] Edema present _2+_ Grade   Cardiac:   [x] WNL   [] Other  Genitourinary:   [x] continent   [] incontinent   [] mueller  Abdominal _7/1/19_ LBM  GI: __NPO_____ Diet ______ Liquids _Jevity 1.5 @ 40-50 ml/hr  tube feeds  Musculoskeletal: ____ ROM Transfers _W/C_ Assistive Device Used  _SBA/Min_ Level of Assistance  Skin Integumentary:   [] Intact   [x] Not Intact   _Fall/Infection/Aspiration__Preventative Measures  Details  At risk for infection due to trach and aspiration. Trach and g-tube are fairly new incisions (at risk for infection)  Pain: [] Controlled   [x] Not Controlled   Pain Meds:   [] Scheduled   [x] PRN        Registered Dietitian / Nutrition:     Pt with tube feeding of Jevity 1.5 at 50 mL/hr, but wants rate to be decreased. C/o gas/ bloating; constantly passing gas. Abdominal discomfort. RN asking about whether can try a different formula. Will have to review formulas due to patient with sucrose/ artificial sweetener allergy. IV fluids continue;  1/2NS with KCl at 20 mL/hr at 50 mL/hr.       Tube Feeding: Jevity 1.5 at 50 mL/hr  Water Flushes: 75 mL q 6 hours  Residuals: 0 mL        Supplements:          [x] Yes: Prosource BID   [] No      Amount of supplement consumed: Unable to assess at time of visit      Intake/Output Summary (Last 24 hours) at 7/1/2019 1507  Last data filed at 7/1/2019 0600  Gross per 24 hour   Intake 1820 ml   Output --   Net 1820 ml                                Last bowel movement: 7/1      Interdisciplinary Team Goals:     1. Discipline  Physical Therapy    Goal  Patient will be able to gait household distances (50-75 ft) with SBA using appropriate AD and ability to manage tubing appropriately with only incidental assist.     Barrier  Multiple lines/tubing (PEG, IV, trach/O2 tubing), decreased balance, decreased endurance, decreased strength    Intervention  Therapeutic activity, gait training, strengthening, balance training, endurance training, education regarding safe mobility techniques and management of tubing. Goal written by:   Domi Kelsey, PT, DPT     2. Discipline  Occupational Therapy    Goal  Pt will perform bathing and dressing task with no more than 2 verbal cues at S level    Barrier  Decreased activity tolerance, insight into current condition, UB/lB strength. Intervention  Therex, Theract, Self care Retraining, Transfer Training, and Education    Goal written by:  Newton Bullard, TEZ/L     3. Discipline  Speech Therapy    Goal  Patient will tolerate Passy-Malinda valve (PMV) placement for 20 minutes without s/s of distress, anxiety. Barrier  Airway compromise with tracheostomy, anxiety, frustration, dysphagia    Intervention  Placement of PMV, swallowing trials, patient/family education    Goal written by:  Julio Ortiz CCC-SLP     4. Discipline  Nursing    Goal  Patient participate in care of g-tube & feeding and trach care    Barrier  anxiety & frustration    Intervention  Daily instruction during care of g-tube care/feeding & trach care. Goal written by: Helena Ugalde RN BSN     5.   Discipline  Clinical Psychology    Goal  Minimize subjective anxiety and distress in recovery effort    Barrier  Situational stress and anxiety about respiratory recovery    Intervention  Support , behavioral redirection and relaxation cues    Goal written by:  Ad Elizalde, PhD     6. Discipline  Nutrition / Dietetics    Goal  Nutritional needs will be met through adequate oral intake or nutrition support within the next 5 days. Outcome:  [] Met/Ongoing    [x] Progressing towards goal    [] Not progressing towards goal    [] New/Initial goal      Barrier  abdominal discomfort/ gas/ bloating    Intervention  - Continue tube feeding of Jevity 1.5, advancing as pt tolerates by 10 mL q 8 hours to goal rate of 55 mL/hr with water flushes of 75 mL q 6 hours and Prosource BID  - Continue IVF until EN at goal.  - Plan to trial different formula pending on ingredients agreeable with patients listed allergies. - Continue reglan and protonix  - Continue RD inpatient monitoring and evaluation.     Goal Regimen: Jevity 1.5 at 55 mL/hr + 75 mL q 6 hour water flushes to provide: 1980 kcal, 84 gm protein, 66 gm fat, 285 gm CHO, 29 gm fiber, 1003 mL free water, 1603 mL total water, 100% RDIs  Tube feeding + Prosource BID to provide: 2100 kcal, 114 gm protein    Goal written by:  Hardik Pacheco RD       Disposition / Discharge Planning: Follow-up services:  [x] Physical Therapy             [x] Occupational Therapy       [x] Speech Therapy           [] Skilled Nursing      [] Medical Social Worker   [] Aide        [] Outpatient     [x] Home Health   [] [address]   [] [facility] recommendations:  Potentially wheelchair with foam cushion if patient unable to gait household distances consistently.     Estimated discharge date: 7/9/2019   Discharge Location:  [] Home   [] [facility]   [] D             [] Other:          Electronic Signatures:      Signature Date Signed   Physical Therapist    Karina Gonzalez, PT, DPT  7/1/19    Occupational Therapist    Lauren Sanchez, TEZ/L  7/1/19   Speech Therapist Roberta Tabares, CCC-SLP  7/1/79   Recreational Therapist    Karen Harris, CTRS 7/1/19   Nursing    Semaj Figueroa, RN BSN 7/1/19   Dietitian    Corinne Spindle, TOM  7/1/19   Clinical Psychologist    Jose Samson, PhD 7/1/19    Physician    Jacob Zelaya MD 7/1/19                  The above information has been reviewed with the patient in a language that they can understand. Opportunity for comments and questions has been provided and a signed attestation has been scanned into the \"media tab\" of the EMR.       Patient Signature: ______________________________________________________    Date Signed: __________________________________________________________

## 2019-07-01 NOTE — PROGRESS NOTES
Abdominal distention appears to be greater today than Friday when this nurse was last here. But shirts appear smaller today (same shirt she had on Friday)  Pt reports she feels like mylicon/simethicone helps gas. Reporting abdominal pain. Bowel sounds active and pt has explosive gas when she goes to restroom. Could it be the tube feeding formula? Having regular daily BMs.

## 2019-07-01 NOTE — PROGRESS NOTES
Bedside and Verbal shift change report given to Henry Levine RN (oncoming nurse) by Diana Mondragon RN (offgoing nurse). Report included the following information SBAR, Kardex, MAR and Recent Results.     SITUATION:    Code Status: Full Code   Reason for Admission: Chronic respiratory failure ([facility] [address] [J96.10]    [facility] day: 6   Problem List:       Hospital Problems  Date Reviewed: 5/16/2019          Codes Class Noted POA    Chronic respiratory failure [facility]) ICD-10-CM: J96.10  ICD-9-CM: 518.83  6/25/2019 Unknown              BACKGROUND:    Past Medical History:   Past Medical History:   Diagnosis Date    Arthritis     Lower back     Asthma     Chronic back pain     Lower back pain    Depression     Diabetes ([facility] [address]     Diabetes mellitus ([facility] [address]     Hearing loss     Heart failure (HCC)     chronic diastolic heart failure    Left ear hearing loss     pt states nerve damage--- 25% hearing loss, described as \"muffled\"    Memory difficulty     Panic attacks     Ringing of ears     Severe headache     Sleep apnea     SOB (shortness of breath) on exertion     w and w/out exertion    Stomach pain     Thyroid disease     Vertigo          Patient taking anticoagulants yes     ASSESSMENT:    Changes in Assessment Throughout Shift: none     Patient has Central Line: no Reasons if yes:    Patient has Barker Cath: no Reasons if yes:       Last Vitals:     Vitals:    06/30/19 1037 06/30/19 1510 06/30/19 1600 06/30/19 2155   BP:   104/57 112/71   Pulse:   69 71   Resp:   18 18   Temp:   99 °F (37.2 °C) 98 °F (36.7 °C)   SpO2: 94% 94% 98% 98%   Weight:            IV and DRAINS (will only show if present)   Peripheral IV 06/30/19 Left Antecubital-Site Assessment: Clean, dry, & intact  [REMOVED] Peripheral IV 06/25/19 Anterior;Proximal;Right Antecubital-Site Assessment: Dry, Intact  PEG/Gastrostomy Tube 06/20/19-Site Assessment: Clean, dry, & intact  [REMOVED] Airway - Tracheostomy Tube 06/03/19 Portex-Site Assessment: Clean, dry, & intact     WOUND (if present)   Wound Type:         Dressing present Dressing Present : No   Wound Concerns/Notes:  none     PAIN    Pain Assessment    Pain Intensity 1: 0 (07/01/19 0400)    Pain Location 1: Abdomen, Back    Pain Intervention(s) 1: Medication (see MAR)    Patient Stated Pain Goal: 0  o Interventions for Pain:  Pain medication    o Intervention effective: yes  o Time of last intervention: see MAR   o Reassessment Completed: yes      Last 3 Weights:  Last 3 Recorded Weights in this Encounter    06/26/19 1430   Weight: 122.2 kg (269 lb 8 oz)     Weight change:      INTAKE/OUPUT    Current Shift: 06/30 1901 - 07/01 0700  In: 220   Out: -     Last three shifts: 06/29 0701 - 06/30 1900  In: 3510   Out: 1000 [Urine:1000]     LAB RESULTS     No results for input(s): WBC, HGB, HCT, PLT, HGBEXT, HCTEXT, PLTEXT, HGBEXT, HCTEXT, PLTEXT in the last 72 hours. Recent Labs     06/29/19  0608      K 4.1   *   BUN 7   CREA 0.67   CA 7.9*   MG 2.3       RECOMMENDATIONS AND DISCHARGE PLANNING     1. Pending tests/procedures/ Plan of Care or Other Needs: labs  2.      3. Discharge plan for patient and Needs/Barriers: TBD    4. Estimated Discharge Date: TBD Posted on Whiteboard in Patients Room: no      4. The patient's care plan was reviewed with the oncoming nurse. \"HEALS\" SAFETY CHECK      Fall Risk    Total Score: 3    Safety Measures: Safety Measures: Bed/Chair alarm on, Bed/Chair-Wheels locked, Bed in low position, Caregiver at bedside, Call light within reach, Extra trach at bedside    A safety check occurred in the patient's room between off going nurse and oncoming nurse listed above.     The safety check included the below items  Area Items   H  High Alert Medications - Verify all high alert medication drips (heparin, PCA, etc.)   E  Equipment - Suction is set up for ALL patients (with yanker)  - Red plugs utilized for all equipment (IV pumps, etc.)  - WOWs wiped down at end of shift.  - Room stocked with oxygen, suction, and other unit-specific supplies   A  Alarms - Bed alarm is set for fall risk patients  - Ensure chair alarm is in place and activated if patient is up in a chair   L  Lines - Check IV for any infiltration  - Barker bag is empty if patient has a Barker   - Tubing and IV bags are labeled   S  Safety   - Room is clean, patient is clean, and equipment is clean. - Hallways are clear from equipment besides carts. - Fall bracelet on for fall risk patients  - Ensure room is clear and free of clutter  - Suction is set up for ALL patients (with yanker)  - Hallways are clear from equipment besides carts.    - Isolation precautions followed, supplies available outside room, sign posted     Reji Caro RN

## 2019-07-01 NOTE — PROGRESS NOTES
Problem: Dysphagia (Adult)  Goal: *Acute Goals and Plan of Care (Insert Text)  Description  Long term goals  Patient will:  1. Perform pharyngeal strengthening exercises with supervision. 2. Tolerate swallowing trials of varied consistenies without over s/s of aspiration. 3. Participate in repeat MBS prior to resuming PO for nutrition. 4. Tolerate deflation of tracheostomy cuff for speech production for 20 minutes. 5. Tolerate placement of a passu-Lykens speaking valve for speech for 20 minute intervals. Short term goals (by 7/3/19)  Patient will:  1. Perform pharyngeal strengthening exercises with min-mod assist.  2. Tolerate swallowing trials of ice chops and small boluses puree  without over s/s of aspiration. 3. Participate in repeat MBS prior to resuming PO for nutrition. 4. Tolerate deflation of tracheostomy cuff for speech production for 20 minutes. 5. Tolerate placement of a passu-Malinda speaking valve for speech for 1-2 minute intervals. Outcome: Progressing Towards Goal  Note:   SPEECH LANGUAGE PATHOLOGY TREATMENT    Patient: Hali Gardiner (9 y.o. female)  Date: 7/1/2019  Diagnosis: Chronic respiratory failure ([facility] [address] [J96.10] <principal problem not specified>       Time in: 0930 1400  Time Out:  [id number]   SUBJECTIVE:   Patient stated ? Show him (son) how it works? .    OBJECTIVE:   Mental Status:  Mrs. Ryan Chirinos was awake and alert, but anxious today in sessions. Treatment & Interventions:   Patient was seen for two speech therapy sessions, morning and afternoon. Both sessions were conducted in her room while she was seated in the recliner. Patient's family members were present this afternoon. The following treatment tasks were presented: Motor Speech:   Passy-Malinda Valve placement:  Patient wore the valve x 6 during sessions.        Placement times:  3/17 Minutes  Speech with PMV:   Hoarse and breathy but intelligible  Swallowing trials:   Patient willing to take several small boluses of ice chips. No overt s/s of aspiration; patient quite anxious    Neuro-Linguistics:   Orientation:    Independent  Recent memory:   Independent    Voice:  As above, soft but audible, breathy and hoarse. Response & Tolerance to Activities:  Mrs. Liliana Dodge was cooperative with the SLP in today's sessions. She was upset this morning after a visit from the surgeon, who did not instill optimism for speech and swallowing. He initially said that the PMV was not going to be possible. However, when the SLP reported progress with the valve, he agreed with it's use. Pain:  Pain Scale 1: Visual  Abdominal pain reported, not quantified. After treatment:   ?       Patient left in no apparent distress sitting up in chair  ? Patient left in no apparent distress in bed  ? Call bell left within reach  ? Nursing notified  ? Caregiver present  ? Bed alarm activated    ASSESSMENT:   Progression toward goals:  ?       Improving appropriately and progressing toward goals  ? Improving slowly and progressing toward goals  ? Not making progress toward goals and plan of care will be adjusted    PLAN:   Patient continues to benefit from skilled intervention to address the above impairments. Continue treatment per established plan of care.   Discharge Recommendations:  Home Health    Estimated LOS: Through 7/9/19    CHINMAY Keane  Time calculation:  75 Minutes

## 2019-07-01 NOTE — PROGRESS NOTES
Problem: Mobility Impaired (Adult and Pediatric)  Goal: *Therapy Goal (Edit Goal, Insert Text)  Description  Physical Therapy Goals  Initiated 6/26/2019 and to be accomplished within 7 day(s) 7/3/2019:   1. Patient will move from supine to sit and sit to supine , scoot up and down and roll side to side in bed with supervision/set-up. 2.  Patient will transfer from bed to chair and chair to bed with supervision/set-up using the least restrictive device. 3.  Patient will perform sit to stand with supervision/set-up. 4.  Patient will ambulate with supervision/set-up for 50 feet with the least restrictive device. 5.  Patient will ascend/descend 3 stairs with 2 handrail(s) with minimal assistance/contact guard assist.    Physical Therapy Goals  Initiated 6/26/2019 and to be accomplished within 14 day(s) 7/10/2019:  1. Patient will move from supine to sit and sit to supine , scoot up and down and roll side to side in bed with modified independence. 2.  Patient will transfer from bed to chair and chair to bed with modified independence using the least restrictive device. 3.  Patient will perform sit to stand with modified independence. 4.  Patient will ambulate with modified independence for 75 feet with the least restrictive device. 5.  Patient will ascend/descend 3 stairs with 2 handrail(s) with supervision/set-up. Outcome: Progressing Towards Goal   PHYSICAL THERAPY TREATMENT    Patient: Tayler Esquivel (7 y.o. female)  Date: 7/1/2019  Diagnosis: Chronic respiratory failure (HCC) [J96.10]   Precautions: Aspiration, Fall  Chart, physical therapy assessment, plan of care and goals were reviewed. Time In: 6579  Time Out: 0918  Time In: 1330  Time Out: 1405  Patient Seen For: Transfer training; Therapeutic exercise; Wheelchair mobility; Patient education  Pain:  Patient indicating abdominal pain, headache, nausea. Nurse notified regarding patient's pain reports.      Patient identified with name and : yes    SUBJECTIVE:     Patient indicating that she was willing to participate in therapy. Reporting during session feeling like she was getting a headache, and when asked if she has light sensitivity, patient indicated that she does. Therapist modified treatment space to area with lower lighting so that patient better able to tolerate session. In PM, patient again reporting increased abdominal pain affecting ability to tolerate standing and gait. OBJECTIVE DATA SUMMARY:   Objective:     GROSS ASSESSMENT Daily Assessment    AROM: Within functional limits  PROM: Within functional limits  Strength: Generally decreased, functional  Coordination: Within functional limits  Tone: Normal  Sensation: Intact       BED/MAT MOBILITY Daily Assessment    Supine to Sit : 5 (Stand-by assistance)(head of bed elevated as patient does not tolerate lowered head of bed. Order for maintaining 30 degrees elevation head of bed but patient does not tolerate head of bed lowering that far down). TRANSFERS Daily Assessment    Transfer Type: Other  Other: stand step transfer without AD  Transfer Assistance : 5 (Stand-by assistance)  Sit to Stand Assistance: Stand-by assistance    Patient needing assist with all lines management including I.V., PEG tube, oxygen tubing. Abdominal binder applied in PM (two abdominal binders attached together for appropriate width and support around patient so as to better tolerate transfers and standing, patient reporting marginal improvement. GAIT Daily Assessment    Amount of Assistance: 0 (Not tested)    During AM and PM sessions unable to tolerate due to abdominal pain report.           BALANCE Daily Assessment    Sitting - Static: Good (unsupported)  Sitting - Dynamic: Good (unsupported)  Standing - Static: Fair;Occasional;Good  Standing - Dynamic : Impaired       WHEELCHAIR MOBILITY Daily Assessment    Able to Propel (ft): 85 feet(performing x 85 ft, then additional 60 ft)  Functional Level: 2  Curbs/Ramps Assist Required (FIM Score): 0 (Not tested)  Wheelchair Setup Assist Required : 5 (Stand-by assistance)  Wheelchair Management: Manages left brake;Manages right brake    Fatiguing quickly with wheelchair mobility including propulsion with bilateral UE and LE . THERAPEUTIC EXERCISES Daily Assessment    Extremity: Both  Exercise Type #1: Seated lower extremity strengthening(LAQ, hip flex marches 2# cuff weights)  Sets Performed: 2  Reps Performed: 10(10-12 reps depending on fatigue)  Level of Assist: Supervision  Exercise Type #2: Seated lower extremity strengthening(LAQ, hip flex marches, ankle DF for HEP no weight)  Sets Performed: 2  Reps Performed: 15  Level of Assist: Supervision    Needing encouragement to continue participation, particularly during AM session. ASSESSMENT:  Patient would continue to benefit from skilled therapy to improve strength, endurance, and ability to perform household gait. Currently patient not yet consistent with ability to perform gait due to abdominal pain reports. Will continue to progress patient as appropriate. Recommending ongoing use of wheelchair for safety with functional mobility at this time. Progression toward goals:  ?      Improving appropriately and progressing toward goals  ? Improving slowly and progressing toward goals  ? Not making progress toward goals and plan of care will be adjusted     PLAN:  Patient continues to benefit from skilled intervention to address the above impairments. Continue treatment per established plan of care. Discharge Recommendations:  Home Health  Further Equipment Recommendations for Discharge:  wheelchair with foam cushion      Estimated LOS: 2 weeks    Activity Tolerance:   Fair to poor due to fatigue and abdominal pain. SpO2 96% on 3 L O2 via trach during session (Discussed with Dr. Himanshu Gonsalves and patient able to be weaned from oxygen as able to tolerate).      After treatment:   Patient left seated in recliner chair, call button within reach and handed off to nurse.        Lucas Baker, PT  7/1/2019

## 2019-07-01 NOTE — PROGRESS NOTES
[facility] Hospitalist Group  Progress Note    Patient: Georgie Hatch Age: 63 y.o. : 1960 MR#: [id number] SSN: [id number]  Date/Time: 2019    Subjective:     Patient sitting in a chair in NAD. On trach collar, currently on 4 liters O2    Assessment/Plan:     1. Impaired mobility and activities of daily living. 2.  Vocal cord dysfunction. 3.  The patient has a tracheostomy. 4.  Status post recent thyroidectomy. 5.  Dysphagia. The patient has a PEG tube. 6.  Type 2 diabetes. 7.  Hypertension. 8.  Obesity. 9.  Hypothyroidism. 10.  Debility. PLAN  Bronchial hygiene, trach care  Wean O2  Tube feeding, nutrition following  PT, OT, ST  D/w patient  D/w RN  Patient with Low grade fevers, greenish yellow tracheal secretions. Check Cx, CXR, UA. Will start empiric abx. Case discussed with:  []Patient  []Family  []Nursing  []Case Management  DVT Prophylaxis:  []Lovenox  []Hep SQ  []SCDs  []Coumadin   []On Heparin gtt    Objective:   VS:   Visit Vitals  /61 (BP 1 Location: Right arm, BP Patient Position: At rest;Sitting)   Pulse 66   Temp 99.9 °F (37.7 °C)   Resp 18   Wt 122.2 kg (269 lb 8 oz)   SpO2 99%   Breastfeeding?  No   BMI 42.21 kg/m²      Tmax/24hrs: Temp (24hrs), Av.9 °F (37.2 °C), Min:98 °F (36.7 °C), Max:99.9 °F (37.7 °C)    Input/Output:     Intake/Output Summary (Last 24 hours) at 2019 1742  Last data filed at 2019 1513  Gross per 24 hour   Intake 1581 ml   Output --   Net 1581 ml       General:  Awake, alert  Cardiovascular:  S1S2+, RRR  Pulmonary:  Coarse bs b/l  GI:  Soft, BS+, NT, ND  Extremities:  No edema  + trach, + peg    Labs:    Recent Results (from the past 24 hour(s))   GLUCOSE, POC    Collection Time: 19  6:08 PM   Result Value Ref Range    Glucose (POC) 166 (H) 70 - 110 mg/dL   GLUCOSE, POC    Collection Time: 19 12:03 AM   Result Value Ref Range    Glucose (POC) 149 (H) 70 - 110 mg/dL   GLUCOSE, POC    Collection Time: 07/01/19  5:59 AM   Result Value Ref Range    Glucose (POC) 173 (H) 70 - 110 mg/dL   GLUCOSE, POC    Collection Time: 07/01/19  1:17 PM   Result Value Ref Range    Glucose (POC) 205 (H) 70 - 110 mg/dL     Additional Data Reviewed:      Signed By: Connor Bright MD     July 1, 2019

## 2019-07-02 LAB
ANION GAP SERPL CALC-SCNC: 6 MMOL/L (ref 3–18)
BASOPHILS # BLD: 0 K/UL (ref 0–0.1)
BASOPHILS NFR BLD: 0 % (ref 0–2)
BUN SERPL-MCNC: 8 MG/DL (ref 7–18)
BUN/CREAT SERPL: 10 (ref 12–20)
CALCIUM SERPL-MCNC: 8.3 MG/DL (ref 8.5–10.1)
CHLORIDE SERPL-SCNC: 101 MMOL/L (ref 100–108)
CO2 SERPL-SCNC: 29 MMOL/L (ref 21–32)
CREAT SERPL-MCNC: 0.79 MG/DL (ref 0.6–1.3)
DIFFERENTIAL METHOD BLD: ABNORMAL
EOSINOPHIL # BLD: 0.2 K/UL (ref 0–0.4)
EOSINOPHIL NFR BLD: 3 % (ref 0–5)
ERYTHROCYTE [DISTWIDTH] IN BLOOD BY AUTOMATED COUNT: 16.1 % (ref 11.6–14.5)
GLUCOSE BLD STRIP.AUTO-MCNC: 174 MG/DL (ref 70–110)
GLUCOSE BLD STRIP.AUTO-MCNC: 179 MG/DL (ref 70–110)
GLUCOSE BLD STRIP.AUTO-MCNC: 200 MG/DL (ref 70–110)
GLUCOSE BLD STRIP.AUTO-MCNC: 208 MG/DL (ref 70–110)
GLUCOSE BLD STRIP.AUTO-MCNC: 245 MG/DL (ref 70–110)
GLUCOSE SERPL-MCNC: 203 MG/DL (ref 74–99)
HCT VFR BLD AUTO: 34.8 % (ref 35–45)
HGB BLD-MCNC: 11.1 G/DL (ref 12–16)
LYMPHOCYTES # BLD: 2.1 K/UL (ref 0.9–3.6)
LYMPHOCYTES NFR BLD: 30 % (ref 21–52)
MCH RBC QN AUTO: 29.5 PG (ref 24–34)
MCHC RBC AUTO-ENTMCNC: 31.9 G/DL (ref 31–37)
MCV RBC AUTO: 92.6 FL (ref 74–97)
MONOCYTES # BLD: 0.7 K/UL (ref 0.05–1.2)
MONOCYTES NFR BLD: 11 % (ref 3–10)
NEUTS SEG # BLD: 3.8 K/UL (ref 1.8–8)
NEUTS SEG NFR BLD: 56 % (ref 40–73)
PLATELET # BLD AUTO: 248 K/UL (ref 135–420)
PMV BLD AUTO: 9.3 FL (ref 9.2–11.8)
POTASSIUM SERPL-SCNC: 4.4 MMOL/L (ref 3.5–5.5)
RBC # BLD AUTO: 3.76 M/UL (ref 4.2–5.3)
SODIUM SERPL-SCNC: 136 MMOL/L (ref 136–145)
WBC # BLD AUTO: 6.8 K/UL (ref 4.6–13.2)

## 2019-07-02 PROCEDURE — 85025 COMPLETE CBC W/AUTO DIFF WBC: CPT

## 2019-07-02 PROCEDURE — 74011250636 HC RX REV CODE- 250/636: Performed by: EMERGENCY MEDICINE

## 2019-07-02 PROCEDURE — 97116 GAIT TRAINING THERAPY: CPT

## 2019-07-02 PROCEDURE — 36415 COLL VENOUS BLD VENIPUNCTURE: CPT

## 2019-07-02 PROCEDURE — 74011250637 HC RX REV CODE- 250/637: Performed by: EMERGENCY MEDICINE

## 2019-07-02 PROCEDURE — 65310000000 HC RM PRIVATE REHAB

## 2019-07-02 PROCEDURE — 97542 WHEELCHAIR MNGMENT TRAINING: CPT

## 2019-07-02 PROCEDURE — 80048 BASIC METABOLIC PNL TOTAL CA: CPT

## 2019-07-02 PROCEDURE — 74011250637 HC RX REV CODE- 250/637

## 2019-07-02 PROCEDURE — 82962 GLUCOSE BLOOD TEST: CPT

## 2019-07-02 PROCEDURE — 77010033678 HC OXYGEN DAILY: Performed by: EMERGENCY MEDICINE

## 2019-07-02 PROCEDURE — 97110 THERAPEUTIC EXERCISES: CPT

## 2019-07-02 PROCEDURE — 97530 THERAPEUTIC ACTIVITIES: CPT

## 2019-07-02 PROCEDURE — 74011636637 HC RX REV CODE- 636/637: Performed by: EMERGENCY MEDICINE

## 2019-07-02 PROCEDURE — 92507 TX SP LANG VOICE COMM INDIV: CPT

## 2019-07-02 RX ADMIN — PANTOPRAZOLE SODIUM 40 MG: 40 GRANULE, DELAYED RELEASE ORAL at 06:33

## 2019-07-02 RX ADMIN — METOCLOPRAMIDE HYDROCHLORIDE 5 MG: 5 TABLET ORAL at 09:30

## 2019-07-02 RX ADMIN — LEVOFLOXACIN 750 MG: 750 TABLET, FILM COATED ORAL at 19:29

## 2019-07-02 RX ADMIN — INSULIN LISPRO 2 UNITS: 100 INJECTION, SOLUTION INTRAVENOUS; SUBCUTANEOUS at 17:49

## 2019-07-02 RX ADMIN — METOCLOPRAMIDE HYDROCHLORIDE 5 MG: 5 TABLET ORAL at 21:22

## 2019-07-02 RX ADMIN — LORAZEPAM 1 MG: 0.5 TABLET ORAL at 21:22

## 2019-07-02 RX ADMIN — METOPROLOL TARTRATE 25 MG: 25 TABLET ORAL at 09:00

## 2019-07-02 RX ADMIN — INSULIN LISPRO 4 UNITS: 100 INJECTION, SOLUTION INTRAVENOUS; SUBCUTANEOUS at 06:21

## 2019-07-02 RX ADMIN — INSULIN LISPRO 2 UNITS: 100 INJECTION, SOLUTION INTRAVENOUS; SUBCUTANEOUS at 23:53

## 2019-07-02 RX ADMIN — LEVOTHYROXINE SODIUM 200 MCG: 75 TABLET ORAL at 05:52

## 2019-07-02 RX ADMIN — INSULIN GLARGINE 30 UNITS: 100 INJECTION, SOLUTION SUBCUTANEOUS at 09:27

## 2019-07-02 RX ADMIN — INSULIN LISPRO 4 UNITS: 100 INJECTION, SOLUTION INTRAVENOUS; SUBCUTANEOUS at 12:54

## 2019-07-02 RX ADMIN — INSULIN LISPRO 2 UNITS: 100 INJECTION, SOLUTION INTRAVENOUS; SUBCUTANEOUS at 00:02

## 2019-07-02 RX ADMIN — METOCLOPRAMIDE HYDROCHLORIDE 5 MG: 5 TABLET ORAL at 16:47

## 2019-07-02 RX ADMIN — SODIUM CHLORIDE AND POTASSIUM CHLORIDE: 4.5; 1.49 INJECTION, SOLUTION INTRAVENOUS at 17:58

## 2019-07-02 RX ADMIN — ENOXAPARIN SODIUM 40 MG: 40 INJECTION SUBCUTANEOUS at 17:48

## 2019-07-02 NOTE — PROGRESS NOTES
conducted a Follow up consultation and Spiritual Assessment for Nessa Vega, who is a 63 y.o.,female. The  provided the following Interventions:  Continued the relationship of care and support. Read empathically as the patient shared in writing her hopes and fears during the rehabilitation phase of her hospitalization. Offered prayer and assurance of continued prayer on patients behalf. The following outcomes were achieved:  Patient expressed gratitude for 's visit. Assessment:  There are no further spiritual or Mu-ism issues which require Spiritual Care Services interventions at this time. Plan:  Chaplains will continue to follow and will provide pastoral care on an as needed/requested basis.  recommends bedside caregivers page  on duty if patient shows signs of acute spiritual or emotional distress.        [address] [place] [address]   [phone]

## 2019-07-02 NOTE — PROGRESS NOTES
Bedside and Verbal shift change report given to Estephania Christian RN (oncoming nurse) by Pavan Kim RN (offgoing nurse). Report included the following information SBAR, Kardex, MAR and Recent Results.     SITUATION:    Code Status: Full Code   Reason for Admission: Chronic respiratory failure ([facility] [J96.10]    [facility] day: 6   Problem List:       Hospital Problems  Date Reviewed: 5/16/2019          Codes Class Noted POA    Chronic respiratory failure [facility]) ICD-10-CM: J96.10  ICD-9-CM: 518.83  6/25/2019 Unknown              BACKGROUND:    Past Medical History:   Past Medical History:   Diagnosis Date    Arthritis     Lower back     Asthma     Chronic back pain     Lower back pain    Depression     Diabetes ([facility]     Diabetes mellitus ([facility]     Hearing loss     Heart failure (HCC)     chronic diastolic heart failure    Left ear hearing loss     pt states nerve damage--- 25% hearing loss, described as \"muffled\"    Memory difficulty     Panic attacks     Ringing of ears     Severe headache     Sleep apnea     SOB (shortness of breath) on exertion     w and w/out exertion    Stomach pain     Thyroid disease     Vertigo          Patient taking anticoagulants yes     ASSESSMENT:    Changes in Assessment Throughout Shift: none     Patient has Central Line: no Reasons if yes:    Patient has Barker Cath: no Reasons if yes:       Last Vitals:     Vitals:    06/30/19 2155 07/01/19 0740 07/01/19 1629 07/01/19 1800   BP: 112/71 122/67 108/61 114/67   Pulse: 71 65 66 67   Resp: 18 16 18 18   Temp: 98 °F (36.7 °C) 98.9 °F (37.2 °C) 99.9 °F (37.7 °C) (!) 100.6 °F (38.1 °C)   SpO2: 98% 97% 99% 98%   Weight:            IV and DRAINS (will only show if present)   Peripheral IV 06/30/19 Left Antecubital-Site Assessment: Clean, dry, & intact  [REMOVED] Peripheral IV 06/25/19 Anterior;Proximal;Right Antecubital-Site Assessment: Dry, Intact  PEG/Gastrostomy Tube 06/20/19-Site Assessment: Clean, dry, & intact  [REMOVED] Airway - Tracheostomy Tube 06/03/19 Portex-Site Assessment: Clean, dry, & intact     WOUND (if present)   Wound Type:         Dressing present Dressing Present : No   Wound Concerns/Notes:  none     PAIN    Pain Assessment    Pain Intensity 1: 0 (07/01/19 1556)    Pain Location 1: Abdomen, Back    Pain Intervention(s) 1: Medication (see MAR)    Patient Stated Pain Goal: 0  o Interventions for Pain:  Pain medication    o Intervention effective: yes  o Time of last intervention: see MAR   o Reassessment Completed: yes      Last 3 Weights:  Last 3 Recorded Weights in this Encounter    06/26/19 1430   Weight: 122.2 kg (269 lb 8 oz)     Weight change:      INTAKE/OUPUT    Current Shift: No intake/output data recorded. Last three shifts: 06/30 0701 - 07/01 1900  In: 3521   Out: 600 [Urine:600]     LAB RESULTS     No results for input(s): WBC, HGB, HCT, PLT, HGBEXT, HCTEXT, PLTEXT, HGBEXT, HCTEXT, PLTEXT in the last 72 hours. Recent Labs     06/29/19  0608      K 4.1   *   BUN 7   CREA 0.67   CA 7.9*   MG 2.3       RECOMMENDATIONS AND DISCHARGE PLANNING     1. Pending tests/procedures/ Plan of Care or Other Needs: labs  2.      3. Discharge plan for patient and Needs/Barriers: TBD    4. Estimated Discharge Date: TBD Posted on Whiteboard in Patients Room: no      4. The patient's care plan was reviewed with the oncoming nurse. \"HEALS\" SAFETY CHECK      Fall Risk    Total Score: 3    Safety Measures: Safety Measures: Bed/Chair alarm on, Bed/Chair-Wheels locked, Bed in low position, Call light within reach, Fall prevention (comment), Gripper socks, Side rails X 3    A safety check occurred in the patient's room between off going nurse and oncoming nurse listed above.     The safety check included the below items  Area Items   H  High Alert Medications - Verify all high alert medication drips (heparin, PCA, etc.)   E  Equipment - Suction is set up for ALL patients (with chaitanya)  - Red plugs utilized for all equipment (IV pumps, etc.)  - WOWs wiped down at end of shift.  - Room stocked with oxygen, suction, and other unit-specific supplies   A  Alarms - Bed alarm is set for fall risk patients  - Ensure chair alarm is in place and activated if patient is up in a chair   L  Lines - Check IV for any infiltration  - Barker bag is empty if patient has a Barker   - Tubing and IV bags are labeled   S  Safety   - Room is clean, patient is clean, and equipment is clean. - Hallways are clear from equipment besides carts. - Fall bracelet on for fall risk patients  - Ensure room is clear and free of clutter  - Suction is set up for ALL patients (with chaitanya)  - Hallways are clear from equipment besides carts.    - Isolation precautions followed, supplies available outside room, sign posted     Artur Vuong RN

## 2019-07-02 NOTE — PROGRESS NOTES
Bedside and Verbal shift change report given to Sarajane Kussmaul (oncoming nurse) by Radha Carrillo RN (offgoing nurse). Report included the following information SBAR, Kardex, MAR and Recent Results.     SITUATION:    Code Status: Full Code   Reason for Admission: Chronic respiratory failure ([facility] [J96.10]    [facility] day: 7   Problem List:       Hospital Problems  Date Reviewed: 5/16/2019          Codes Class Noted POA    Chronic respiratory failure [facility]) ICD-10-CM: J96.10  ICD-9-CM: 518.83  6/25/2019 Unknown              BACKGROUND:    Past Medical History:   Past Medical History:   Diagnosis Date    Arthritis     Lower back     Asthma     Chronic back pain     Lower back pain    Depression     Diabetes ([facility]     Diabetes mellitus ([facility]     Hearing loss     Heart failure (HCC)     chronic diastolic heart failure    Left ear hearing loss     pt states nerve damage--- 25% hearing loss, described as \"muffled\"    Memory difficulty     Panic attacks     Ringing of ears     Severe headache     Sleep apnea     SOB (shortness of breath) on exertion     w and w/out exertion    Stomach pain     Thyroid disease     Vertigo          Patient taking anticoagulants yes     ASSESSMENT:    Changes in Assessment Throughout Shift: none     Patient has Central Line: no Reasons if yes:    Patient has Barker Cath: no Reasons if yes:       Last Vitals:     Vitals:    07/01/19 2200 07/02/19 0800 07/02/19 1155 07/02/19 1609   BP: 123/66 126/69 105/59 97/56   Pulse: 69 65 64 60   Resp: 18 19  18   Temp: 100.1 °F (37.8 °C) 98.8 °F (37.1 °C)  98.6 °F (37 °C)   SpO2: 99% 95% 98% 98%   Weight:            IV and DRAINS (will only show if present)   Peripheral IV 06/30/19 Left Antecubital-Site Assessment: Clean, dry, & intact  [REMOVED] Peripheral IV 06/25/19 Anterior;Proximal;Right Antecubital-Site Assessment: Dry, Intact  PEG/Gastrostomy Tube 06/20/19-Site Assessment: Clean, dry, & intact  [REMOVED] Airway - Tracheostomy Tube 06/03/19 Portex-Site Assessment: Clean, dry, & intact     WOUND (if present)   Wound Type:         Dressing present Dressing Present : No   Wound Concerns/Notes:  none     PAIN    Pain Assessment    Pain Intensity 1: 8 (07/02/19 1128)    Pain Location 1: Abdomen, Back    Pain Intervention(s) 1: Medication (see MAR)    Patient Stated Pain Goal: 0  o Interventions for Pain:  Pain medication    o Intervention effective: yes  o Time of last intervention: see MAR   o Reassessment Completed: yes      Last 3 Weights:  Last 3 Recorded Weights in this Encounter    06/26/19 1430   Weight: 122.2 kg (269 lb 8 oz)     Weight change:      INTAKE/OUPUT    Current Shift: No intake/output data recorded. Last three shifts: 07/01 0701 - 07/02 1900  In: 786   Out: -      LAB RESULTS     Recent Labs     07/02/19  0640   WBC 6.8   HGB 11.1*   HCT 34.8*           Recent Labs     07/02/19  0640      K 4.4   *   BUN 8   CREA 0.79   CA 8.3*       RECOMMENDATIONS AND DISCHARGE PLANNING     1. Pending tests/procedures/ Plan of Care or Other Needs: labs  2.      3. Discharge plan for patient and Needs/Barriers: TBD    4. Estimated Discharge Date: TBD Posted on Whiteboard in Patients Room: no      4. The patient's care plan was reviewed with the oncoming nurse. \"HEALS\" SAFETY CHECK      Fall Risk    Total Score: 3    Safety Measures: Safety Measures: Bed/Chair alarm on, Bed/Chair-Wheels locked, Bed in low position, Call light within reach, Extra trach at bedside, Fall prevention (comment), Gripper socks, Side rails X 3    A safety check occurred in the patient's room between off going nurse and oncoming nurse listed above.     The safety check included the below items  Area Items   H  High Alert Medications - Verify all high alert medication drips (heparin, PCA, etc.)   E  Equipment - Suction is set up for ALL patients (with chaitanya)  - Red plugs utilized for all equipment (IV pumps, etc.)  - WOWs wiped down at end of shift.  - Room stocked with oxygen, suction, and other unit-specific supplies   A  Alarms - Bed alarm is set for fall risk patients  - Ensure chair alarm is in place and activated if patient is up in a chair   L  Lines - Check IV for any infiltration  - Barker bag is empty if patient has a Barker   - Tubing and IV bags are labeled   S  Safety   - Room is clean, patient is clean, and equipment is clean. - Hallways are clear from equipment besides carts. - Fall bracelet on for fall risk patients  - Ensure room is clear and free of clutter  - Suction is set up for ALL patients (with yanker)  - Hallways are clear from equipment besides carts.    - Isolation precautions followed, supplies available outside room, sign posted     Bishop Janel RN

## 2019-07-02 NOTE — PROGRESS NOTES
Problem: Dysphagia (Adult)  Goal: *Acute Goals and Plan of Care (Insert Text)  Description  Long term goals  Patient will:  1. Perform pharyngeal strengthening exercises with supervision. 2. Tolerate swallowing trials of varied consistenies without over s/s of aspiration. 3. Participate in repeat MBS prior to resuming PO for nutrition. 4. Tolerate deflation of tracheostomy cuff for speech production for 20 minutes. 5. Tolerate placement of a passu-Malinda speaking valve for speech for 20 minute intervals. Short term goals (by 7/3/19)  Patient will:  1. Perform pharyngeal strengthening exercises with min-mod assist.  2. Tolerate swallowing trials of ice chops and small boluses puree  without over s/s of aspiration. 3. Participate in repeat MBS prior to resuming PO for nutrition. 4. Tolerate deflation of tracheostomy cuff for speech production for 20 minutes. 5. Tolerate placement of a passu-[place] speaking valve for speech for 1-2 minute intervals. Outcome: Progressing Towards Goal  Note:   SPEECH LANGUAGE PATHOLOGY TREATMENT    Patient: Yan Marshall (61 y.o. female)  Date: 7/2/2019  Diagnosis: [facility] [J96.10] <principal problem not specified>       Time in: 9521 [id number]  Time Out:  1389 [id number]  SUBJECTIVE:   Patient stated ? I didn't sleep last night?, and \"I don't want to be anywhere that I can't breathe\". OBJECTIVE:   Mental Status:  Mrs. Misael Lomeli was awake, alert and engaged in treatment sessions today. However, she was quite anxious having felt that she could not breathe well prior to the afternoon session. Treatment & Interventions:   Patient was seen in her room for two half hour speech therapy sessions. Again, time was spent in each session addressing her anxiety and questions about going home. Voice:  Mrs. Gar Speaks tracheostomy tube no longer has the cuff inflated.   She has no distress when seated with the SLP and has a strong cough, expelling secretions through the tracheostomy. After cough, there is no evidence of airway secretions with finger occulusion, voice quality and respirations are clear. Patient tolerated the [place]-Malinda Valve for 12, 15, and 17 minutes at a time today. Voice with the valve in place continues to be breathy hoarse. The valve is removed when she begins to feel anxious about it. She has no difficulty communicating verbally through lip-reading or writing without the speaking valve, or with speech when the valve is in place. Response & Tolerance to Activities:  Patient is very cooperative despite fearfulness of the the speaking valve. Pain:  Pain Scale 1: Numeric (0 - 10)  No report of pain     After treatment:   ?       Patient left in no apparent distress sitting up in chair  ? Patient left in no apparent distress in bed  ? Call bell left within reach  ? Nursing notified  ? Caregiver present  ? Bed alarm activated    ASSESSMENT:   Progression toward goals:  ?       Improving appropriately and progressing toward goals  ? Improving slowly and progressing toward goals  ? Not making progress toward goals and plan of care will be adjusted    PLAN:   Patient continues to benefit from skilled intervention to address the above impairments. Continue treatment per established plan of care.   Discharge Recommendations:  Home Health    Estimated LOS: Through 7/9/19    CHINMAY Soto  Time Calculation:  60 Minutes

## 2019-07-02 NOTE — PROGRESS NOTES
[facility] Hospitalist Group  Progress Note    Patient: Kvng Lomeli Age: 63 y.o. : 1960 MR#: [id number] SSN: [id number]  Date/Time: 2019    Subjective:     Patient sitting in a chair in NAD, awake, follows commands    Assessment/Plan:     1. Impaired mobility and activities of daily living. 2.  Vocal cord dysfunction. 3.  The patient has a tracheostomy. 4.  Status post recent thyroidectomy. 5.  Dysphagia. The patient has a PEG tube. 6.  Type 2 diabetes. 7.  Hypertension. 8.  Obesity. 9.  Hypothyroidism. 10.  Debility. 11- ? Bronchitis    PLAN  Bronchial hygiene, trach care  Wean O2  Tube feeding, nutrition following  PT, OT, ST  D/w patient  D/w RN  On empiric levaquin, encouraged IS, encouraged mobilization, follow sputum cx    Case discussed with:  [x]Patient  []Family  []Nursing  []Case Management  DVT Prophylaxis:  []Lovenox  []Hep SQ  []SCDs  []Coumadin   []On Heparin gtt    Objective:   VS:   Visit Vitals  BP 97/56   Pulse 60   Temp 98.6 °F (37 °C)   Resp 18   Wt 122.2 kg (269 lb 8 oz)   SpO2 98%   Breastfeeding?  No   BMI 42.21 kg/m²      Tmax/24hrs: Temp (24hrs), Av.5 °F (37.5 °C), Min:98.6 °F (37 °C), Max:100.6 °F (38.1 °C)    Input/Output:     Intake/Output Summary (Last 24 hours) at 2019 1642  Last data filed at 2019 0600  Gross per 24 hour   Intake 225 ml   Output --   Net 225 ml       General:  Awake, alert    + trach,    Labs:    Recent Results (from the past 24 hour(s))   GLUCOSE, POC    Collection Time: 19  5:57 PM   Result Value Ref Range    Glucose (POC) 121 (H) 70 - 110 mg/dL   CULTURE, URINE    Collection Time: 19  6:30 PM   Result Value Ref Range    Special Requests: NO SPECIAL REQUESTS      Culture result: NO GROWTH AFTER 15 HOURS     URINALYSIS W/ RFLX MICROSCOPIC    Collection Time: 19  6:30 PM   Result Value Ref Range    Color YELLOW      Appearance CLEAR      Specific gravity 1.010 1.005 - 1.030      pH (UA) 8.5 (H) 5.0 - 8.0      Protein NEGATIVE  NEG mg/dL    Glucose NEGATIVE  NEG mg/dL    Ketone NEGATIVE  NEG mg/dL    Bilirubin NEGATIVE  NEG      Blood NEGATIVE  NEG      Urobilinogen 1.0 0.2 - 1.0 EU/dL    Nitrites NEGATIVE  NEG      Leukocyte Esterase NEGATIVE  NEG     CULTURE, RESPIRATORY/SPUTUM/BRONCH W GRAM STAIN    Collection Time: 07/01/19  7:45 PM   Result Value Ref Range    Special Requests: NO SPECIAL REQUESTS      GRAM STAIN MODERATE WBC'S      GRAM STAIN MODERATE GRAM POSITIVE COCCI IN PAIRS      GRAM STAIN FEW GRAM POSITIVE COCCI IN CHAINS      GRAM STAIN MANY GRAM POSITIVE RODS      GRAM STAIN FEW GRAM NEGATIVE RODS      Culture result: FEW GRAM NEGATIVE RODS (A)      Culture result: FEW NORMAL RESPIRATORY SHANE     GLUCOSE, POC    Collection Time: 07/01/19 11:56 PM   Result Value Ref Range    Glucose (POC) 196 (H) 70 - 110 mg/dL   GLUCOSE, POC    Collection Time: 07/02/19  5:56 AM   Result Value Ref Range    Glucose (POC) 208 (H) 70 - 110 mg/dL   CBC WITH AUTOMATED DIFF    Collection Time: 07/02/19  6:40 AM   Result Value Ref Range    WBC 6.8 4.6 - 13.2 K/uL    RBC 3.76 (L) 4.20 - 5.30 M/uL    HGB 11.1 (L) 12.0 - 16.0 g/dL    HCT 34.8 (L) 35.0 - 45.0 %    MCV 92.6 74.0 - 97.0 FL    MCH 29.5 24.0 - 34.0 PG    MCHC 31.9 31.0 - 37.0 g/dL    RDW 16.1 (H) 11.6 - 14.5 %    PLATELET 546 189 - 923 K/uL    MPV 9.3 9.2 - 11.8 FL    NEUTROPHILS 56 40 - 73 %    LYMPHOCYTES 30 21 - 52 %    MONOCYTES 11 (H) 3 - 10 %    EOSINOPHILS 3 0 - 5 %    BASOPHILS 0 0 - 2 %    ABS. NEUTROPHILS 3.8 1.8 - 8.0 K/UL    ABS. LYMPHOCYTES 2.1 0.9 - 3.6 K/UL    ABS. MONOCYTES 0.7 0.05 - 1.2 K/UL    ABS. EOSINOPHILS 0.2 0.0 - 0.4 K/UL    ABS.  BASOPHILS 0.0 0.0 - 0.1 K/UL    DF AUTOMATED     METABOLIC PANEL, BASIC    Collection Time: 07/02/19  6:40 AM   Result Value Ref Range    Sodium 136 136 - 145 mmol/L    Potassium 4.4 3.5 - 5.5 mmol/L    Chloride 101 100 - 108 mmol/L    CO2 29 21 - 32 mmol/L    Anion gap 6 3.0 - 18 mmol/L    Glucose 203 (H) 74 - 99 mg/dL    BUN 8 7.0 - 18 MG/DL    Creatinine 0.79 0.6 - 1.3 MG/DL    BUN/Creatinine ratio 10 (L) 12 - 20      GFR est AA >60 >60 ml/min/1.73m2    GFR est non-AA >60 >60 ml/min/1.73m2    Calcium 8.3 (L) 8.5 - 10.1 MG/DL   GLUCOSE, POC    Collection Time: 07/02/19  7:59 AM   Result Value Ref Range    Glucose (POC) 200 (H) 70 - 110 mg/dL   GLUCOSE, POC    Collection Time: 07/02/19 12:53 PM   Result Value Ref Range    Glucose (POC) 245 (H) 70 - 110 mg/dL     Additional Data Reviewed:      Signed By: Tammie Mcdermott MD     July 2, 2019

## 2019-07-02 NOTE — PROGRESS NOTES
Problem: Mobility Impaired (Adult and Pediatric)  Goal: *Therapy Goal (Edit Goal, Insert Text)  Description  Physical Therapy Goals  Initiated 6/26/2019 and to be accomplished within 7 day(s) 7/3/2019:   1. Patient will move from supine to sit and sit to supine , scoot up and down and roll side to side in bed with supervision/set-up. 2.  Patient will transfer from bed to chair and chair to bed with supervision/set-up using the least restrictive device. 3.  Patient will perform sit to stand with supervision/set-up. 4.  Patient will ambulate with supervision/set-up for 50 feet with the least restrictive device. 5.  Patient will ascend/descend 3 stairs with 2 handrail(s) with minimal assistance/contact guard assist.    Physical Therapy Goals  Initiated 6/26/2019 and to be accomplished within 14 day(s) 7/10/2019:  1. Patient will move from supine to sit and sit to supine , scoot up and down and roll side to side in bed with modified independence. 2.  Patient will transfer from bed to chair and chair to bed with modified independence using the least restrictive device. 3.  Patient will perform sit to stand with modified independence. 4.  Patient will ambulate with modified independence for 75 feet with the least restrictive device. 5.  Patient will ascend/descend 3 stairs with 2 handrail(s) with supervision/set-up. 7/2/2019 1518 by Danielito Luciano PT  Outcome: Progressing Towards Goal   PHYSICAL THERAPY TREATMENT    Patient: Spencer Abdalla (89 y.o. female)  Date: 7/2/2019  Diagnosis: Chronic respiratory failure ([facility] [J96.10] <principal problem not specified>  Precautions: Aspiration, Fall  Chart, physical therapy assessment, plan of care and goals were reviewed. Time In: 1105  Time Out: 1200  Time In: 1400  Time Out: 1435  Patient Seen For: Transfer training;Patient education(Therapeutic activity); Gait training;  Wheelchair management    Pain:  Patient reporting ongoing abdominal pain, headache, and occasional back pain. Patient identified with name and : yes    SUBJECTIVE:     In AM patient agreeable to treatment. Patient attempted in PM at 1330, patient upset and reporting difficulty with breathing, nurse notified. Patient then seen for therapy at 1400 once patient better able to participate in therapy, but patient continued to be somewhat anxious and was not wanting to go down to gym for therapy. PM session focused on breathing and postural exercises with support of recreational therapist using modified Ari Chi exercises. OBJECTIVE DATA SUMMARY:   Objective:     TRANSFERS Daily Assessment    Transfer Type: Other  Other: stand step transfer without AD  Transfer Assistance : 5 (Stand-by assistance)  Sit to Stand Assistance: Stand-by assistance    Transfers SBA without device. GAIT Daily Assessment    Amount of Assistance: 4 (Contact guard assistance)  Distance (ft): 75 Feet (ft)(75 ft x 2 bouts with rollator, 20 ft without AD CGA)  Assistive Device: Walker, rollator    CGA for gait without AD within room, CG/SBA for gait with rollator for 75 ft bouts needing seated rest on rollator seat after each bout. During gait, needing assist for management of all tubing/lines for safety (IV , PEG, and O2 tubing). Reminded to take standing rests as needed. BALANCE Daily Assessment    Sitting - Static: Good (unsupported)  Sitting - Dynamic: Good (unsupported)  Standing - Static: Fair;Occasional;Good    Postural and breathing exercises for 20 minutes sitting edge of bed, cues for performing to tolerance and taking slower breaths as she was able to tolerate. Patient reporting feeling better able to breathe at end of session.         WHEELCHAIR MOBILITY Daily Assessment    Able to Propel (ft): 90 feet  Functional Level: 2  Curbs/Ramps Assist Required (FIM Score): 0 (Not tested)  Wheelchair Setup Assist Required : 5 (Stand-by assistance)  Wheelchair Management: Manages left brake;Manages right brake(needs cues)    W/C mobility for 90 ft bouts using bilat UE to propel chair before needing rest. Patient needing assist for negotiation of narrow turns/doorways. THERAPEUTIC EXERCISES Daily Assessment    Extremity: Both  Exercise Type #1: Seated lower extremity strengthening(ankle DF/PF, 2# cuff weights LAQ and hip flex marches)  Sets Performed: 1  Reps Performed: 10  Level of Assist: Stand-by assistance(encouragement for participation)         ASSESSMENT:  Patient continues to be limited with standing endurance, gait endurance. Will need to practice stairs at next session as well as bed mobility. Patient reporting that her daughter is looking into a recliner chair for her to sleep in upon return home since she has been needing to elevate head of bed nearly completely for ease of breathing. Patient continues to require significant assist with management of her multiple lines/tubing (IV , PEG , and oxygen tubing). Recommend that patient continue to emphasize standing balance, endurance, gait, endurance and strengthening to better tolerate and perform safe mobility. Patient able to tolerate session on 2 L O2 via trach tubing with SpO2 98% and above, nurse notified. Progression toward goals:  ?      Improving appropriately and progressing toward goals  ? Improving slowly and progressing toward goals  ? Not making progress toward goals and plan of care will be adjusted     PLAN:  Patient continues to benefit from skilled intervention to address the above impairments. Continue treatment per established plan of care. Discharge Recommendations:  Home Health  Further Equipment Recommendations for Discharge:  rollator and wheelchair 22 inch if patient unable to gait consistently household distances (limited at times due to abdominal pain, difficulty with breathing/shortness of breath). Estimated LOS: 2 weeks    Activity Tolerance:   Fair due to shortness of breath.   Please refer to the flowsheet for vital signs taken during this treatment. After treatment:   Patient left seated in wheelchair after first session with hand off to OT , left seated in recliner chair following second session, call button within reach.        Yesi Adler, PT  7/2/2019

## 2019-07-02 NOTE — PROGRESS NOTES
Bedside and Verbal shift change report given to Warner Ritter RN (oncoming nurse) by Riley Burdick RN (offgoing nurse). Report included the following information SBAR, Kardex, MAR and Recent Results.     SITUATION:    Code Status: Full Code   Reason for Admission: Chronic respiratory failure ([facility] [J96.10]    [facility] day: 7   Problem List:       Hospital Problems  Date Reviewed: 5/16/2019          Codes Class Noted POA    Chronic respiratory failure [facility]) ICD-10-CM: J96.10  ICD-9-CM: 518.83  6/25/2019 Unknown              BACKGROUND:    Past Medical History:   Past Medical History:   Diagnosis Date    Arthritis     Lower back     Asthma     Chronic back pain     Lower back pain    Depression     Diabetes ([facility]     Diabetes mellitus ([facility]     Hearing loss     Heart failure (HCC)     chronic diastolic heart failure    Left ear hearing loss     pt states nerve damage--- 25% hearing loss, described as \"muffled\"    Memory difficulty     Panic attacks     Ringing of ears     Severe headache     Sleep apnea     SOB (shortness of breath) on exertion     w and w/out exertion    Stomach pain     Thyroid disease     Vertigo          Patient taking anticoagulants yes     ASSESSMENT:    Changes in Assessment Throughout Shift: none     Patient has Central Line: no Reasons if yes:    Patient has Barker Cath: no Reasons if yes:       Last Vitals:     Vitals:    07/01/19 0740 07/01/19 1629 07/01/19 1800 07/01/19 2200   BP: 122/67 108/61 114/67 123/66   Pulse: 65 66 67 69   Resp: 16 18 18 18   Temp: 98.9 °F (37.2 °C) 99.9 °F (37.7 °C) (!) 100.6 °F (38.1 °C) 100.1 °F (37.8 °C)   SpO2: 97% 99% 98% 99%   Weight:            IV and DRAINS (will only show if present)   Peripheral IV 06/30/19 Left Antecubital-Site Assessment: Clean, dry, & intact  [REMOVED] Peripheral IV 06/25/19 Anterior;Proximal;Right Antecubital-Site Assessment: Dry, Intact  PEG/Gastrostomy Tube 06/20/19-Site Assessment: Clean, dry, & intact  [REMOVED] Airway - Tracheostomy Tube 06/03/19 Portex-Site Assessment: Clean, dry, & intact     WOUND (if present)   Wound Type:         Dressing present Dressing Present : No   Wound Concerns/Notes:  none     PAIN    Pain Assessment    Pain Intensity 1: 0 (07/02/19 0400)    Pain Location 1: Abdomen, Back    Pain Intervention(s) 1: Medication (see MAR)    Patient Stated Pain Goal: 0  o Interventions for Pain:  Pain medication    o Intervention effective: yes  o Time of last intervention: see MAR   o Reassessment Completed: yes      Last 3 Weights:  Last 3 Recorded Weights in this Encounter    06/26/19 1430   Weight: 122.2 kg (269 lb 8 oz)     Weight change:      INTAKE/OUPUT    Current Shift: 07/01 1901 - 07/02 0700  In: 95   Out: -     Last three shifts: 06/30 0701 - 07/01 1900  In: 3521   Out: 600 [Urine:600]     LAB RESULTS     No results for input(s): WBC, HGB, HCT, PLT, HGBEXT, HCTEXT, PLTEXT, HGBEXT, HCTEXT, PLTEXT in the last 72 hours. Recent Labs     06/29/19  0608      K 4.1   *   BUN 7   CREA 0.67   CA 7.9*   MG 2.3       RECOMMENDATIONS AND DISCHARGE PLANNING     1. Pending tests/procedures/ Plan of Care or Other Needs: labs  2.      3. Discharge plan for patient and Needs/Barriers: TBD    4. Estimated Discharge Date: TBD Posted on Whiteboard in Patients Room: no      4. The patient's care plan was reviewed with the oncoming nurse. \"HEALS\" SAFETY CHECK      Fall Risk    Total Score: 3    Safety Measures: Safety Measures: Bed/Chair-Wheels locked, Bed in low position, Call light within reach    A safety check occurred in the patient's room between off going nurse and oncoming nurse listed above.     The safety check included the below items  Area Items   H  High Alert Medications - Verify all high alert medication drips (heparin, PCA, etc.)   E  Equipment - Suction is set up for ALL patients (with yanker)  - Red plugs utilized for all equipment (IV pumps, etc.)  - WOWs wiped down at end of shift.  - Room stocked with oxygen, suction, and other unit-specific supplies   A  Alarms - Bed alarm is set for fall risk patients  - Ensure chair alarm is in place and activated if patient is up in a chair   L  Lines - Check IV for any infiltration  - Barker bag is empty if patient has a Barker   - Tubing and IV bags are labeled   S  Safety   - Room is clean, patient is clean, and equipment is clean. - Hallways are clear from equipment besides carts. - Fall bracelet on for fall risk patients  - Ensure room is clear and free of clutter  - Suction is set up for ALL patients (with yanker)  - Hallways are clear from equipment besides carts.    - Isolation precautions followed, supplies available outside room, sign posted     García Loyd RN

## 2019-07-02 NOTE — PROGRESS NOTES
NUTRITION CONSULT    Nutrition Consult: Management of Tube Feeding     RECOMMENDATIONS / PLAN:     - Modify tube feeding, increase goal rate to 60 mL/hr. Continue with Jevity 1.5, advancing as pt tolerates by 10 mL q 8 hours to goal rate of 65 mL/hr with water flushes of 75 mL q 6 hours   - Discontinue Prosource supplement  - Continue IVF until EN at goal.  - Continue RD inpatient monitoring and evaluation. Goal Regimen: Jevity 1.5 at 65 mL/hr + 75 mL q 6 hour water flushes to provide: 2340 kcal, 100 gm protein, 78 gm fat, 336 gm CHO, 32 gm fiber, 1186 mL free water, 1486 mL total water, 100% RDIs     NUTRITION DIAGNOSIS & INTERVENTIONS:     [x] Enteral nutrition: continue, advance towards goal  [x] IV Fluids: continue until tube feeding at goal  [x] Collaboration and referral of nutrition care: pt discussed with RN    Nutrition Diagnosis: Swallowing difficulty related to dysphagia s/p tracheostomy as evidenced by pt NPO after SLP evaluation/MBS. ASSESSMENT:     7/1: Pt at current goal rate of 55 mL/hr; still feels a little full with tube feeding. Discussed possible formula substitution option, TwoCal HN. TwoCal HN and Prosource ingredients reviewed with pt; she has also reviewed ingredients in Jevity 1.5. Upon reviewing ingredients, pt wants to continue with Jevity 1.5 formula at this time and understand can change to TwoCal HN if having difficulty not tolerating Jevity. Pt refusing Prosource supplement; stated will not tolerate. Per RN, pt has been refusing Prosource and has not been receiving the supplement. Pt c/o headache per RN report. Explained to pt, goal rate of tube feeding will have to be increased once Prosource discontinued; pt verbalized understanding    6/28: Pt with therapy at time of visit. Tube feeding remains at 40 mL/hr. Was c/o nausea earlier today; RN provided zofran. Antacid changed to protonix  6/27: Tube feeding remains at 40 mL/hr; pt c/o nausea.  Having regular BM, no gastric residuals. Abdomen distended per chart documentation per night nurse. Discussed tube feeding regimen and Prosource supplement; pt agreeable with plan. Noted pt previously reported pepcid \"not working\" when in main hospital; pepcid currently ordered    6/26: Pt lethargic/asleep at time of visit. Per chart review, pt admitted to [facility] hospital with stridor and vocal cord dysfunction s/p tracheostomy placement on 6/3/19, tolerating trach collar; would have to deflate cuff for meals and re-inflate when not eating. Pt had failed MBS x 2 by 6/6/19. DHT was placed by IR. Pt tolerated feeds, receiving Jevity 1.5 due to allergy/intolerance to artificial sweeteners, then pulled out feeding tube. A diet was started by MD on 6/10. Pt had variable meal intake. PICC placed on 6/13 for TPN. Remained on po diet and TPN; made NPO on  6/15. Diet restarted on 6/18. SLP recommended NPO after repeat MBS on 6/19. S/p PEG placement on 6/20/19 and TPN was stopped after starting tube feeds. Pt had c/o abdominal distention and fullness, emesis. TF held on 6/23 due to concern for ileus; pt having +BMs and flatus, feeds resumed at low rate. Pt tolerated trickle feeds PTA; noted to express desire for receiving cyclic or bolus tube feeding and wanting to eat orally. Currently tolerating tube feeding at 40 mL/hr per RN. Pt did c/o abdominal pain per RN. BG remain elevated; discussed reason for not changing to Glucerna 1.5 (diabetic formula).       EN infusion adequate to meet patients estimated nutritional needs:   [] Yes     [x] No   [] Unable to determine at this time    Tube Feeding: Jevity 1.5 at 55 mL/hr  Water Flushes: 75 mL q 6 hours  Residuals: 0 mL    Diet: DIET NPO  DIET TUBE FEEDING  DIET NUTRITIONAL SUPPLEMENTS Lunch, Dinner; [employer]      Food Allergies: sweeteners, sucrose  Current Appetite:   [] Good     [] Fair     [] Poor     [x] Other: NPO  Appetite/meal intake prior to admission:   [x] Good     [] Fair     [] Poor     [] Other:  Feeding Limitations:  [x] Swallowing difficulty: SLP following    [] Chewing difficulty    [x] Other: hx of dysphagia s/p thyroidectomy PTA and trach placement 6/3/19  Current Meal Intake:   Patient Vitals for the past 100 hrs:   % Diet Eaten   06/30/19 1200 0 %   06/30/19 0800 0 %   06/29/19 1600 0 %   06/29/19 1200 0 %       BM: 7/1  Skin Integrity: surgical incision to neck  Edema:  [] No     [x] Yes   Pertinent Medications: Reviewed: 1/2 NS + 20 mEq/L KCL at 75 mL/hr (36 mEq KCl per day), bowel regimen, protonix, zofran, SSI, lantus, reglan     Labs:   Recent Labs     07/02/19  0640      K 4.4      CO2 29   *   BUN 8   CREA 0.79   CA 8.3*   Prealbumin: 12.9 mg/dL (6/14/19)  Triglyceride: 298 mg/dL (6/18/19), 316 mg/dL (6/13/19), 511 mg/dL (4/16/19)  C reactive protein: 4.8 mg/dL (6/18/19)  Recent Labs     07/02/19  0640   WBC 6.8   HGB 11.1*   HCT 34.8*            Intake/Output Summary (Last 24 hours) at 7/2/2019 1224  Last data filed at 7/2/2019 0600  Gross per 24 hour   Intake 711 ml   Output --   Net 711 ml       Anthropometrics:   Ht Readings from Last 1 Encounters:   06/03/19 5' 7\" (1.702 m)     Last 3 Recorded Weights in this Encounter    06/26/19 1430   Weight: 122.2 kg (269 lb 8 oz)     Body mass index is 42.21 kg/m².    Obese, Class III    Weight History: patient denies change in weight PTA, stable     Weight Metrics 6/26/2019 6/25/2019 6/22/2019 6/3/2019 5/23/2019 5/22/2019 5/16/2019   Weight 269 lb 8 oz - 269 lb 8 oz - 260 lb 6.4 oz - 258 lb   BMI - 42.21 kg/m2 - 42.21 kg/m2 - 40.78 kg/m2 40.41 kg/m2          Admitting Diagnosis: Chronic respiratory failure (HCC) [J96.10]  Pertinent PMHx: T2DM with diabetic neuropathy, HTN, dyslipidemia, CHF, hypothyroidism s/p thyroidectomy 4/4/19    Education Needs:        [x] None identified  [] Identified - Not appropriate at this time  []  Identified and addressed - refer to education log  Learning Limitations:   [] None identified  [x] Identified: nonverbal. Communicates via pen/paper    Cultural, Caodaism & ethnic food preferences:  [x] None identified    [] Identified and addressed     ESTIMATED NUTRITION NEEDS:     Calories: 7420-8811 kcal (MSJx1-1.2) based on   [x] Actual  kg     [] IBW:   Protein:  gm (0.8-1.2 gm/kg) based on   [x] Actual BW      [] IBW:   Fluid: 1 mL/kcal     MONITORING & EVALUATION:     Nutrition Goals:   1. Nutritional needs will be met through adequate oral intake or nutrition support within the next 5 days.   Outcome:  [] Met/Ongoing    [x] Progressing towards goal    [] Not progressing towards goal    [] New/Initial goal      Monitoring:    [x] Fluid intake   [] Nutrition-focused physical findings   [x] Treatment/therapy   [] Food and beverage intake   [x] Diet order      [] Weight    [x] EN infusion    Previous Recommendations (for follow-up assessments only):     [x]   Implemented       []   Not Implemented (RD to address)      [] No Longer Appropriate     [] No Recommendation Made        Discharge Planning: goal enteral nutrition regimen via PEG   [x]  Participated in care planning, discharge planning, & interdisciplinary rounds as appropriate      Dima Daly, [address]  Pager: [phone]

## 2019-07-02 NOTE — PROGRESS NOTES
Problem: Self Care Deficits Care Plan (Adult)  Goal: *Therapy Goal (Edit Goal, Insert Text)  Description  Occupational Therapy Goals   Long Term Goals  Initiated 19 and to be accomplished within 2 week(s) 7/10/19  1. Pt will perform functional activity up to 15 minutes with no more than 2 rest breaks and 2 vcs to attend to task. .   2. Pt will perform grooming with S.  3. Pt will perform UB bathing with S .  4. Pt will perform LB bathing with S..  5. Pt will perform tub/shower transfer with S.   6. Pt will perform UB dressing with S.  7. Pt will perform LB dressing with S..  8. Pt will perform toileting task with I.  9. Pt will perform toilet transfer with S.      Short Term Goals   Initiated 19 and to be accomplished within 7 day(s) 7/3/19  1. Pt will perform functional task up to 10 minutes with no more than 3 verbal cues to attend to task and 3 rest breaks. 2. Pt will perform grooming with SBA. 3. Pt will perform UB bathing with S and no more than 3 vcs to  Initiate and complete task. 4. Pt will perform LB bathing with [place] and no more than 3 vcs to  complete task. 5. Pt will perform tub/shower transfer with [facility]. 6. Pt will perform UB dressing with [place] and no more than 3 vcs to  initiate and complete task. .  7. Pt will perform LB dressing with [place] and no more than 3 vcs to initiate and  complete task. 8. Pt will perform toileting task with S.  9. Pt will perform toilet transfer with S and no more than 3 vcs to initiate and complete task. .      Outcome: Progressing Towards Goal   OCCUPATIONAL THERAPY TREATMENT    Patient: Corrine Arguelles   63 y.o. Patient identified with name and : Yes    Date: 2019    First Tx Session  Time In: 0930  Time Out[de-identified] 9331    Second Tx Session  Time In: 1200  Time Out[de-identified] 8666    Diagnosis: Chronic respiratory failure ([facility] [address] [J96.10]   Precautions: Aspiration, Fall  Chart, occupational therapy assessment, plan of care, and goals were reviewed. Pain:  Pt reports 8/10 pain or discomfort prior to treatment. Pt reports 8/10 pain or discomfort post treatment. Pt refused pain medication. Pt education o  pain levels and increase blood pressure. Pt nodded head yes. Intervention Provided: N/A      SUBJECTIVE:   Patient stated I know what I need to do to feel better. Pt refused to stay in w/c to wait for PT session and proceeded to transfer from w/c to lounge chair unsafely. (Nursing present). OBJECTIVE DATA SUMMARY:     THERAPEUTIC ACTIVITY Daily Assessment    Pt asked to complete ARC task at 24 inches but pt reported \"I can't\" Bursitis. PROM performed to level of ARC. ARC then lowered to 20 inches and pt completed task with vcs for form and pace . Pt appeared to dismiss instructions and continued performing task her way. Pt educated importance of form/ UE strengthening to increase activity tolerance for  ADLs. Second Session:Pt completed 24 piece puzzle placed in incline position and in seated position with 2lb weights at wrist.  Pt refused to stand for task to increase activity tolerance. Pt took off 2lb weight on right arm reporting it hurts. OT then put lighter weight on right arm for pt to put puzzle back into box. Vitals taken in standing showing and  WNL. THERAPEUTIC EXERCISE Daily Assessment    Pt propelled wheel chair from room <-->gym requiring S and increased time. Second Session: Pr propelled w.c to room with S and vcs to slow down. Pt asked to increased   O2 from 2L to 4L. Vitals taken with O2 at 98% at 2L. Pt educated on the importance of weaning off of O2 per MD.   Pt became agitated and began propelling w/c faster. Pt asked to slow down to pace for energy conservation. Pt refused. MOBILITY/TRANSFERS Daily Assessment     First and Second Session :W/c to chair  transfer with S showing poor safety awareness.         ASSESSMENT:  Pt continues to make minimal progress with goals secondary to self limiting behaviors. Pt to continue with maximizing I in ADLs/IADLs. Progression toward goals:  ?          Improving appropriately and progressing toward goals  ? Improving slowly and progressing toward goals  ?x         Not making progress toward goals and plan of care will be adjusted     PLAN:  Patient continues to benefit from skilled intervention to address the above impairments. Continue treatment per established plan of care. Discharge Recommendations:  Home Health  Further Equipment Recommendations for Discharge:  Bariatric bedside commode, shower chair      Activity Tolerance:  Poor    Estimated LOS:7/91/9    Please refer to the flowsheet for vital signs taken during this treatment. After treatment:   ?x  Patient left in no apparent distress sitting up in chair   ? Patient left in no apparent distress in bed  ? Call bell left within reach  ? Nursing notified  ? Caregiver present  ? Bed alarm activated    COMMUNICATION/EDUCATION:   ? Home safety education was provided and the patient/caregiver indicated understanding. ? xPatient/family have participated as able in goal setting and plan of care. ? Patient/family agree to work toward stated goals and plan of care. ? Patient understands intent and goals of therapy, but is neutral about his/her participation. ? Patient is unable to participate in goal setting and plan of care.       Valerie Walker, OT

## 2019-07-02 NOTE — INTERDISCIPLINARY ROUNDS
[address]  [address] [place] [address], [address]    INPATIENT REHABILITATION  TEAM CONFERENCE SUMMARY     Date of Conference: 7/2/2019    Patient Information:        Name: Hali Gardiner Age / Sex: 63 y.o. / female   CSN: [id number] MRN: [id number]   [facility] Date: 6/25/2019 Length of Stay: 7 days     Primary Rehabilitation Diagnosis: Impaired Mobility and ADLs secondary to <principal problem not specified>   Vocal cord dysfunction  Has Tracheostomy  Has PEG      Therapy:     FIM SCORES Initial Assessment Weekly Progress Assessment 7/2/2019   Eating Functional Level: 1  Comments: PEG Tube  1   Swallowing     Grooming 5  5   Bathing 3  5 Pt hase used cloth and bath jo seated EOB for UB bathing but shows inconsistencies with LB bathing. Pt sometimes uses cloth and bath basin or cleaning wipes.        Upper Body Dressing Functional Level: 3(increased time needed)  Comments: domned hopital gown with Moda  5   Lower Body Dressing Functional Level: 3  Comments: increased time needed  5   Toileting Functional Level: 5  Comments: increased time needed  5   Bladder - level of assist 4 6   Bladder - accident frequency score 7 6   Bowel - level of assist 6 5   Bowel - accident frequency score 3     Toilet Transfer [place] Toilet Transfer Score: 5  Comments: to bed side commod     Tub/Shower Transfer [place] Tub or Shower Type: Tub/Shower combination  Tub/Shower Transfer Score: 5  5      Comprehension Primary Mode of Comprehension: Auditory  Score: 4  Comments: understanding simple and multi-step directions with occasional repetition needed Auditory  6   Expression Primary Mode of Expression: Sign language, Verbal, Other (comment)(writes on paper)  Score: 4  Comments: needing cues occasionally for writing down needs vs attempting to verbalize Nonverbal - Gestures, writing  Verbal  6   Social Interaction Score: 4  Comments: moderate encouragement to participate in therapy 5 Problem Solving Score: 4  Comments: min cues for functional problem solving 5   Memory Score: 4  Comments: able to recall information appropriately as indicated on chart with occasional clarification needed 5     FIM SCORES Initial Assessment Weekly Progress Assessment 7/2/2019   Bed/Chair/Wheelchair Transfers Transfer Type: Other  Other: stand step transfer without AD as per PLOF  Transfer Assistance : 4 (Minimal assistance)  Sit to Stand Assistance: Minimal assistance  Car Transfers: Not tested  Car Type: N/A Transfer Type:  Other  Other: stand step transfer without AD  Transfer Assistance : 5 (Stand-by assistance)  Sit to Stand Assistance: Stand-by assistance   Bed Mobility Rolling Right 4 (Minimal assistance)   Rolling Left 4 (Minimal assistance)   Supine to Sit 4 (Minimal assistance)(Head of bed elevated as per physician order)   Sit to Stand Minimal assistance   Sit to Supine 4 (Minimal assistance)    Rolling Right    CG/SBA   Rolling Left    CG/SBA   Supine to Sit       Sit to Stand   Stand-by assistance   Sit to Supine    not recently assessed      Locomotion (W/C) Able to Propel (ft): (not challenged today)  Functional Level: (not challenged today; to be further assessed)  Curbs/Ramps Assist Required (FIM Score): 0 (Not tested)  Wheelchair Setup Assist Required : 0 (Not tested)  Wheelchair Management: (not challenged today; to be further assessed) Function 2  Setup Assistance  5 (Stand-by assistance)      Locomotion (W/C distance) (not challenged today) 90 feet   Locomotion (Walk) 4 (Minimal assistance) 4 (Contact guard assistance)  Walker, rollator   Locomotion (Walk dist.) 8 Feet (ft) 75 Feet (ft)(75 ft x 2 bouts with rollator, 20 ft without AD CGA)has been able to tolerate up to 100 ft with rollator but unable to tolerate today   Steps/Stairs Steps/Stairs Ambulated (#): (not able to assess today due to patient fatigue)  Level of Assist : 0 (Not tested)  Rail Use: (N/A)  Last week able to negotiate a step but not able to assess today. Nursing:     Neuro:   AAA&O x 4            Respiratory:   [] WNL   [x] O2 LPM: 4  Other: Trach  Peripheral Vascular:   [] TEDS present   [x] Edema present _2+_ Grade   Cardiac:   [x] WNL   [] Other  Genitourinary:   [x] continent   [] incontinent   [] mueller  Abdominal _7/1/19_ LBM  GI: __NPO_____ Diet ______ Liquids _Jevity 1.5 @ 40-50 ml/hr  tube feeds  Musculoskeletal: ____ ROM Transfers _W/C_ Assistive Device Used  _SBA/Min_ Level of Assistance  Skin Integumentary:   [] Intact   [x] Not Intact   _Fall/Infection/Aspiration__Preventative Measures  Details  At risk for infection due to trach and aspiration. Trach and g-tube are fairly new incisions (at risk for infection)  Pain: [] Controlled   [x] Not Controlled   Pain Meds:   [] Scheduled   [x] PRN        Registered Dietitian / Nutrition:     Pt with tube feeding of Jevity 1.5 at 50 mL/hr, but wants rate to be decreased. C/o gas/ bloating; constantly passing gas. Abdominal discomfort. RN asking about whether can try a different formula. Will have to review formulas due to patient with sucrose/ artificial sweetener allergy. IV fluids continue;  1/2NS with KCl at 20 mL/hr at 50 mL/hr. Tube Feeding: Jevity 1.5 at 50 mL/hr  Water Flushes: 75 mL q 6 hours  Residuals: 0 mL        Supplements:          [x] Yes: Prosource BID   [] No      Amount of supplement consumed:  Unable to assess at time of visit      Intake/Output Summary (Last 24 hours) at 7/2/2019 1645  Last data filed at 7/2/2019 0600  Gross per 24 hour   Intake 225 ml   Output --   Net 225 ml                                Last bowel movement: 7/1      Interdisciplinary Team Goals:     1.  Discipline  Physical Therapy    Goal  Patient will be able to gait household distances (50-75 ft) with SBA using appropriate AD and ability to manage tubing appropriately with only incidental assist.     Barrier  Multiple lines/tubing (PEG, IV, trach/O2 tubing), decreased balance, decreased endurance, decreased strength    Intervention  Therapeutic activity, gait training, strengthening, balance training, endurance training, education regarding safe mobility techniques and management of tubing. Goal written by:   Rhona Garcia, PT, DPT     2. Discipline  Occupational Therapy    Goal  Pt will perform bathing and dressing task with no more than 2 verbal cues at S level    Barrier  Decreased activity tolerance, insight into current condition, UB/lB strength. Intervention  Therex, Theract, Self care Retraining, Transfer Training, and Education    Goal written by:  TEZ Grijalva/L     3. Discipline  Speech Therapy    Goal  Patient will tolerate Passy-Redfox valve (PMV) placement for 20 minutes without s/s of distress, anxiety. Barrier  Airway compromise with tracheostomy, anxiety, frustration, dysphagia    Intervention  Placement of PMV, swallowing trials, patient/family education    Goal written by:  Roberta Tabares, [facility]-SLP     4. Discipline  Nursing    Goal  Patient participate in care of g-tube & feeding and trach care    Barrier  anxiety & frustration    Intervention  Daily instruction during care of g-tube care/feeding & trach care. Goal written by: Semaj Figueroa RN BSN     5. Discipline  Clinical Psychology    Goal  Minimize subjective anxiety and distress in recovery effort    Barrier  Situational stress and anxiety about respiratory recovery    Intervention  Support , behavioral redirection and relaxation cues    Goal written by:  Jose Samson, PhD     6. Discipline  Nutrition / Dietetics    Goal  Nutritional needs will be met through adequate oral intake or nutrition support within the next 5 days.   Outcome:  [] Met/Ongoing    [x] Progressing towards goal    [] Not progressing towards goal    [] New/Initial goal      Barrier  abdominal discomfort/ gas/ bloating    Intervention  - Continue tube feeding of Jevity 1.5, advancing as pt tolerates by 10 mL q 8 hours to goal rate of 55 mL/hr with water flushes of 75 mL q 6 hours and Prosource BID  - Continue IVF until EN at goal.  - Plan to trial different formula pending on ingredients agreeable with patients listed allergies. - Continue reglan and protonix  - Continue RD inpatient monitoring and evaluation.     Goal Regimen: Jevity 1.5 at 55 mL/hr + 75 mL q 6 hour water flushes to provide: 1980 kcal, 84 gm protein, 66 gm fat, 285 gm CHO, 29 gm fiber, 1003 mL free water, 1603 mL total water, 100% RDIs  Tube feeding + Prosource BID to provide: 2100 kcal, 114 gm protein    Goal written by:  Anni Wolf RD       Disposition / Discharge Planning: Follow-up services:  [x] Physical Therapy             [x] Occupational Therapy       [x] Speech Therapy           [] Skilled Nursing      [] Medical Social Worker   [] Aide        [] Outpatient     [x] Home Health   [] [address]   [] [facility] recommendations:  Potentially wheelchair with foam cushion if patient unable to gait household distances consistently. Estimated discharge date: 7/9/2019   Discharge Location:  [] Home   [] [facility]   [] D             [] Other:          Electronic Signatures:   Team conference was attended by the following   Signature    Physical Therapist     PT, DPT    Occupational Therapist    Kelly Gomez, MSOTR/L     Speech Therapist   Arina Santiago, [address] [facility]     Recreational Therapist        Nursing        Dietitian    Anni Wolf RD     Clinical Psychologist       Physician    Tammie Mcdermott MD                   The above information has been reviewed with the patient in a language that they can understand. Opportunity for comments and questions has been provided and a signed attestation has been scanned into the \"media tab\" of the EMR.       Patient Signature: ______________________________________________________    Date Signed: __________________________________________________________

## 2019-07-03 LAB
BACTERIA SPEC CULT: ABNORMAL
BACTERIA SPEC CULT: ABNORMAL
BACTERIA SPEC CULT: NORMAL
GLUCOSE BLD STRIP.AUTO-MCNC: 174 MG/DL (ref 70–110)
GLUCOSE BLD STRIP.AUTO-MCNC: 202 MG/DL (ref 70–110)
GLUCOSE BLD STRIP.AUTO-MCNC: 241 MG/DL (ref 70–110)
GLUCOSE BLD STRIP.AUTO-MCNC: 309 MG/DL (ref 70–110)
GRAM STN SPEC: ABNORMAL
SERVICE CMNT-IMP: ABNORMAL
SERVICE CMNT-IMP: NORMAL

## 2019-07-03 PROCEDURE — 74011250637 HC RX REV CODE- 250/637: Performed by: EMERGENCY MEDICINE

## 2019-07-03 PROCEDURE — 92507 TX SP LANG VOICE COMM INDIV: CPT

## 2019-07-03 PROCEDURE — 97530 THERAPEUTIC ACTIVITIES: CPT

## 2019-07-03 PROCEDURE — 74011250636 HC RX REV CODE- 250/636: Performed by: EMERGENCY MEDICINE

## 2019-07-03 PROCEDURE — 74011636637 HC RX REV CODE- 636/637: Performed by: EMERGENCY MEDICINE

## 2019-07-03 PROCEDURE — 77010033678 HC OXYGEN DAILY: Performed by: EMERGENCY MEDICINE

## 2019-07-03 PROCEDURE — 82962 GLUCOSE BLOOD TEST: CPT

## 2019-07-03 PROCEDURE — 74011250637 HC RX REV CODE- 250/637

## 2019-07-03 PROCEDURE — 92526 ORAL FUNCTION THERAPY: CPT

## 2019-07-03 PROCEDURE — 97116 GAIT TRAINING THERAPY: CPT

## 2019-07-03 PROCEDURE — 65310000000 HC RM PRIVATE REHAB

## 2019-07-03 RX ORDER — INSULIN GLARGINE 100 [IU]/ML
32 INJECTION, SOLUTION SUBCUTANEOUS DAILY
Status: DISCONTINUED | OUTPATIENT
Start: 2019-07-04 | End: 2019-07-04

## 2019-07-03 RX ADMIN — DOCUSATE SODIUM 100 MG: 100 CAPSULE, LIQUID FILLED ORAL at 18:05

## 2019-07-03 RX ADMIN — INSULIN LISPRO 4 UNITS: 100 INJECTION, SOLUTION INTRAVENOUS; SUBCUTANEOUS at 06:14

## 2019-07-03 RX ADMIN — LORAZEPAM 1 MG: 0.5 TABLET ORAL at 20:39

## 2019-07-03 RX ADMIN — SODIUM CHLORIDE AND POTASSIUM CHLORIDE: 4.5; 1.49 INJECTION, SOLUTION INTRAVENOUS at 12:37

## 2019-07-03 RX ADMIN — LEVOTHYROXINE SODIUM 200 MCG: 75 TABLET ORAL at 06:16

## 2019-07-03 RX ADMIN — INSULIN LISPRO 8 UNITS: 100 INJECTION, SOLUTION INTRAVENOUS; SUBCUTANEOUS at 12:32

## 2019-07-03 RX ADMIN — METOCLOPRAMIDE HYDROCHLORIDE 5 MG: 5 TABLET ORAL at 14:02

## 2019-07-03 RX ADMIN — METOPROLOL TARTRATE 25 MG: 25 TABLET ORAL at 09:25

## 2019-07-03 RX ADMIN — PANTOPRAZOLE SODIUM 40 MG: 40 GRANULE, DELAYED RELEASE ORAL at 06:34

## 2019-07-03 RX ADMIN — INSULIN LISPRO 2 UNITS: 100 INJECTION, SOLUTION INTRAVENOUS; SUBCUTANEOUS at 18:03

## 2019-07-03 RX ADMIN — METOCLOPRAMIDE HYDROCHLORIDE 5 MG: 5 TABLET ORAL at 20:39

## 2019-07-03 RX ADMIN — ENOXAPARIN SODIUM 40 MG: 40 INJECTION SUBCUTANEOUS at 18:03

## 2019-07-03 RX ADMIN — DOCUSATE SODIUM 100 MG: 100 CAPSULE, LIQUID FILLED ORAL at 09:25

## 2019-07-03 RX ADMIN — METOCLOPRAMIDE HYDROCHLORIDE 5 MG: 5 TABLET ORAL at 09:25

## 2019-07-03 RX ADMIN — LEVOFLOXACIN 750 MG: 750 TABLET, FILM COATED ORAL at 18:04

## 2019-07-03 RX ADMIN — INSULIN GLARGINE 30 UNITS: 100 INJECTION, SOLUTION SUBCUTANEOUS at 09:24

## 2019-07-03 RX ADMIN — INSULIN LISPRO 4 UNITS: 100 INJECTION, SOLUTION INTRAVENOUS; SUBCUTANEOUS at 23:27

## 2019-07-03 NOTE — PROGRESS NOTES
NUTRITION CONSULT    Nutrition Consult: Management of Tube Feeding     RECOMMENDATIONS / PLAN:     - Continue feeds of Jevity 1.5, advancing as pt tolerates by 10 mL q 8 hours to goal rate of 65 mL/hr with water flushes of 75 mL q 6 hours. Will not transition to bolus feeds at this time per MD.  - Check patient's weight  - Continue IVF until EN at goal.  - Continue RD inpatient monitoring and evaluation. Goal Regimen: Jevity 1.5 at 65 mL/hr + 75 mL q 6 hour water flushes to provide: 2340 kcal, 100 gm protein, 78 gm fat, 336 gm CHO, 32 gm fiber, 1186 mL free water, 1486 mL total water, 100% RDIs     NUTRITION DIAGNOSIS & INTERVENTIONS:     [x] Enteral nutrition: continue, advance towards goal  [x] IV Fluids: continue until tube feeding at goal  [x] Collaboration and referral of nutrition care: Discussed pt and plan with Dr Cornell Rhoades; MD recommend to hold off on transitioning to bolus feeds at this time until pt reaching goal rate of continuous feeds and Dr Rox More return on 7/5 for official assessment regarding transition due to MD concern that pt is risk for aspiration. Pt and nutrition plan discussed with RN; discussed re-checking patient's weight    Nutrition Diagnosis: Swallowing difficulty related to dysphagia s/p tracheostomy as evidenced by pt NPO after SLP evaluation/MBS. ASSESSMENT:     7/3: Per staff report, pt willing to try transitioning to bolus tube feeding. Tolerating feeds at rate of 60 mL/hr. Noted plan for discharge to home on 7/12. Discussed bolus tube feeding and formula options (Jevity 1.5 versus TwoCal HN) with possible need for additional liquid protein supplement (one appropriate for her given allergies) if remain with Jevity 1.5. Pt agreeable to changing to bolus tube feeding and wants to try with TwoCal HN.  Explained to pt plan to hold off on transition at this time per MD; pt verbalized understanding    7/1: Pt at current goal rate of 55 mL/hr; still feels a little full with tube feeding. Discussed possible formula substitution option, TwoCal HN. TwoCal HN and Prosource ingredients reviewed with pt; she has also reviewed ingredients in Jevity 1.5. Upon reviewing ingredients, pt wants to continue with Jevity 1.5 formula at this time and understand can change to TwoCal HN if having difficulty not tolerating Jevity. Pt refusing Prosource supplement; stated will not tolerate. Per RN, pt has been refusing Prosource and has not been receiving the supplement. Pt c/o headache per RN report. Explained to pt, goal rate of tube feeding will have to be increased once Prosource discontinued; pt verbalized understanding  6/28: Pt with therapy at time of visit. Tube feeding remains at 40 mL/hr. Was c/o nausea earlier today; RN provided zofran. Antacid changed to protonix  6/27: Tube feeding remains at 40 mL/hr; pt c/o nausea. Having regular BM, no gastric residuals. Abdomen distended per chart documentation per night nurse. Discussed tube feeding regimen and Prosource supplement; pt agreeable with plan. Noted pt previously reported pepcid \"not working\" when in main hospital; pepcid currently ordered  6/26: Pt lethargic/asleep at time of visit. Per chart review, pt admitted to [facility] hospital with stridor and vocal cord dysfunction s/p tracheostomy placement on 6/3/19, tolerating trach collar; would have to deflate cuff for meals and re-inflate when not eating. Pt had failed MBS x 2 by 6/6/19. DHT was placed by IR. Pt tolerated feeds, receiving Jevity 1.5 due to allergy/intolerance to artificial sweeteners, then pulled out feeding tube. A diet was started by MD on 6/10. Pt had variable meal intake. PICC placed on 6/13 for TPN. Remained on po diet and TPN; made NPO on  6/15. Diet restarted on 6/18. SLP recommended NPO after repeat MBS on 6/19. S/p PEG placement on 6/20/19 and TPN was stopped after starting tube feeds. Pt had c/o abdominal distention and fullness, emesis.  TF held on 6/23 due to concern for ileus; pt having +BMs and flatus, feeds resumed at low rate. Pt tolerated trickle feeds PTA; noted to express desire for receiving cyclic or bolus tube feeding and wanting to eat orally. Currently tolerating tube feeding at 40 mL/hr per RN. Pt did c/o abdominal pain per RN. BG remain elevated; discussed reason for not changing to Glucerna 1.5 (diabetic formula).       EN infusion adequate to meet patients estimated nutritional needs:   [] Yes     [x] No   [] Unable to determine at this time    Tube Feeding: Jevity 1.5 at 60 mL/hr  Water Flushes: 75 mL q 6 hours  Residuals: 0 mL    Diet: DIET NPO  DIET TUBE FEEDING      Food Allergies: sweeteners, sucrose  Current Appetite:   [] Good     [] Fair     [] Poor     [x] Other: NPO  Appetite/meal intake prior to admission:   [x] Good     [] Fair     [] Poor     [] Other:  Feeding Limitations:  [x] Swallowing difficulty: SLP following    [] Chewing difficulty    [x] Other: hx of dysphagia s/p thyroidectomy PTA and trach placement 6/3/19  Current Meal Intake:   Patient Vitals for the past 100 hrs:   % Diet Eaten   06/30/19 1200 0 %   06/30/19 0800 0 %   06/29/19 1600 0 %   06/29/19 1200 0 %       BM: 7/3 - loose  Skin Integrity: surgical incision to neck  Edema:  [] No     [x] Yes   Pertinent Medications: Reviewed: 1/2 NS + 20 mEq/L KCL at 50 mL/hr (24 mEq KCl per day), bowel regimen, protonix, zofran, SSI, lantus, reglan     Labs:   Recent Labs     07/02/19  0640      K 4.4      CO2 29   *   BUN 8   CREA 0.79   CA 8.3*   Prealbumin: 12.9 mg/dL (6/14/19)  Triglyceride: 298 mg/dL (6/18/19), 316 mg/dL (6/13/19), 511 mg/dL (4/16/19)  C reactive protein: 4.8 mg/dL (6/18/19)  Recent Labs     07/02/19  0640   WBC 6.8   HGB 11.1*   HCT 34.8*            Intake/Output Summary (Last 24 hours) at 7/3/2019 1441  Last data filed at 7/3/2019 0600  Gross per 24 hour   Intake 260 ml   Output --   Net 260 ml       Anthropometrics:   Ht Readings from Last 1 Encounters:   06/03/19 5' 7\" (1.702 m)     Last 3 Recorded Weights in this Encounter    06/26/19 1430   Weight: 122.2 kg (269 lb 8 oz)     Body mass index is 42.21 kg/m². Obese, Class III    Weight History: patient denies change in weight PTA, stable     Weight Metrics 6/26/2019 6/25/2019 6/22/2019 6/3/2019 5/23/2019 5/22/2019 5/16/2019   Weight 269 lb 8 oz - 269 lb 8 oz - 260 lb 6.4 oz - 258 lb   BMI - 42.21 kg/m2 - 42.21 kg/m2 - 40.78 kg/m2 40.41 kg/m2          Admitting Diagnosis: Chronic respiratory failure (HCC) [J96.10]  Pertinent PMHx: T2DM with diabetic neuropathy, HTN, dyslipidemia, CHF, hypothyroidism s/p thyroidectomy 4/4/19    Education Needs:        [x] None identified  [] Identified - Not appropriate at this time  []  Identified and addressed - refer to education log  Learning Limitations:   [] None identified  [x] Identified: nonverbal. Communicates via pen/paper    Cultural, Lutheran & ethnic food preferences:  [x] None identified    [] Identified and addressed     ESTIMATED NUTRITION NEEDS:     Calories: 3418-1728 kcal (MSJx1-1.2) based on   [x] Actual  kg     [] IBW:   Protein:  gm (0.8-1.2 gm/kg) based on   [x] Actual BW      [] IBW:   Fluid: 1 mL/kcal     MONITORING & EVALUATION:     Nutrition Goals:   1. Nutritional needs will be met through adequate oral intake or nutrition support within the next 5 days.   Outcome:  [] Met/Ongoing    [x] Progressing towards goal    [] Not progressing towards goal    [] New/Initial goal      Monitoring:    [x] Fluid intake   [] Nutrition-focused physical findings   [x] Treatment/therapy   [] Food and beverage intake   [x] Diet order      [] Weight    [x] EN infusion    Previous Recommendations (for follow-up assessments only):     [x]   Implemented       []   Not Implemented (RD to address)      [] No Longer Appropriate     [] No Recommendation Made        Discharge Planning: goal enteral nutrition regimen via PEG   [x]  Participated in care planning, discharge planning, & interdisciplinary rounds as appropriate      Yakelin Petersen, [address] [place] [address]  Pager: [phone]

## 2019-07-03 NOTE — PROGRESS NOTES
Problem: Self Care Deficits Care Plan (Adult)  Goal: *Therapy Goal (Edit Goal, Insert Text)  Description  Occupational Therapy Goals   Long Term Goals  Initiated 19 and to be accomplished within 2 week(s) 7/10/19  1. Pt will perform functional activity up to 15 minutes with no more than 2 rest breaks and 2 vcs to attend to task. .   2. Pt will perform grooming with S.           3. Pt will perform UB bathing with S .       4. Pt will perform LB bathing with S.   5. Pt will perform tub/shower transfer with S.  6. Pt will perform UB dressing with S.    7. Pt will perform LB dressing with S.    8. Pt will perform toileting task with I.      9. Pt will perform toilet transfer with S.       Short Term Goals   Initiated 19 and to be accomplished within 7 day(s) 7/3/19  1. Pt will perform functional task up to 10 minutes with no more than 3 verbal cues to attend to task and 3 rest breaks. 2. Pt will perform grooming with SBA. .  7/3/19  3. Pt will perform UB bathing with S and no more than 3 vcs to  Initiate and complete task. 4. Pt will perform LB bathing with [place] and no more than 3 vcs to  complete task. 5. Pt will perform tub/shower transfer with [facility].  7/3/19-S  6. Pt will perform UB dressing with [place]an and no more than 3 vcs to  initiate and complete task. .  7. Pt will perform LB dressing with Bel[place]an and no more than 3 vcs to initiate and  complete task. 8. Pt will perform toileting task with S. 7/3/19  9. Pt will perform toilet transfer with S and no more than 3 vcs to initiate and complete task.  7/3/19     OT WEEKLY PROGRESS NOTE  Patient Name:Miguel Taylor      Time In: 1200  Time Out: 5381  Time In: 1330  Time Out: 0  Medical Diagnosis:  Chronic respiratory failure ([facility] [address] [J96.10] <principal problem not specified>     Pain at start of tx:8/10 pain or discomfort. Pain at stop of tx:8/10 pain or discomfort.     Patient identified with name and :Yes  Subjective: Pt received sitting in lounge chair with Trach/O2 at 2L with son and brother present. Objective: Pt seen in AM for Family Education.         Outcome Measures:      AROM: [facility]      COGNITION/PERCEPTION Initial Assessment Weekly Progress Assessment 7/3/2019   Premorbid Reading Status Literate  Literate   Premorbid Writing Status [facility]   Arousal/Alertness Generalized responses  Generalized responses   Orientation Level Oriented X4 Oriented X4   Visual Fields       Praxis       Body Scheme       COMPREHENSION MODE Initial Assessment Weekly Progress Assessment 7/3/2019   Primary Mode of Comprehension Auditory Auditory   Hearing Aide None None   Corrective Lenses Reading glasses     Score 4 6     EXPRESSION Initial Assessment Weekly Progress Assessment 7/3/2019   Primary Mode of Expression Sign language, Verbal, Other (comment)(writes on paper) Sign language;Verbal;Other (comment)(writes on paper)   Score 4 4   Comments needing cues occasionally for writing down needs vs attempting to verbalize needing cues occasionally for writing down needs vs attempting to verbalize     SOCIAL INTERACTION/ PRAGMATICS Initial Assessment Weekly Progress Assessment 7/3/2019   Score 4 4   Comments moderate encouragement to participate in therapy moderate encouragement to participate in therapy     PROBLEM SOLVING Initial Assessment Weekly Progress Assessment 7/3/2019   Score 4 4   Comments min cues for functional problem solving min cues for functional problem solving     MEMORY Initial Assessment Weekly Progress Assessment 7/3/2019   Score 4 4   Comments able to recall information appropriately as indicated on chart with occasional clarification needed able to recall information appropriately as indicated on chart with occasional clarification needed     EATING Initial Assessment Weekly Progress Assessment 7/3/2019   Functional Level 1     Comments PEG Tube       GROOMING Initial Assessment Weekly Progress Assessment 7/3/2019   Functional Level 5  5 (7/1/19 TP)   Tasks completed by patient       Comments increased time needed       BATHING Initial Assessment Weekly Progress Assessment 7/3/2019   Functional Level 3  5 seated on toilet   (7/1/19 TP)       Body parts patient bathed    chest, abdomen, arm, legs, thighs, legs   Comments increased time needed  increased time with max encouragement     TUB/SHOWER TRANSFER INDEPENDENCE Initial Assessment Weekly Progress Assessment 7/3/2019   Score 5  5   Comments         UPPER BODY DRESSING/UNDRESSING Initial Assessment Weekly Progress Assessment 7/3/2019   Functional Level 3(increased time needed)  5 (7/1/19 TP)   Items applied/Steps completed       Comments domned hopital gown with Moda       LOWER BODY DRESSING/UNDRESSING Initial Assessment Weekly Progress Assessment 7/3/2019   Functional Level 3  5 seated on toilet (7/1/19 TP)   Items applied/Steps completed    pants and underwear   Comments increased time needed  increased time needed     TOILETING Initial Assessment Weekly Progress Assessment 7/3/2019   Functional Level 5  5 (7/1/19)   Comments increased time needed  increased time needed     TOILET TRANSFER INDEPENDENCE Initial Assessment Weekly Progress Assessment 7/3/2019   Transfer score 5  5   Comments to bed side commod  to toilet (7/1/19)          Second session:THEREX- Pt propelled w/c from room <-->gym with [facility] [employer] [facility] to manage IV/O2. Pt required rest break x3 and shows noncompliance with following vcs for pace. Side pulls with 2 lb weight except at last rep with right UE where pt refused to used secondary to 7/10 pain in shoulder (deltoid muscle) where pt reports bursitis. Chest press 3x10 with 1lb weight  in left UE. Pt reports pain in right UE and unable to perform therex. Bicep curls and overhead raises with 1lb weight at BUE 3x10.  Pt able to raise left UE  1/2 ROM   Family Education- Pt/ son participated in functional transfers managing O2/IV with S showing  adequate assist.  Pt/ son educated on assistive equipment for bathing/dressing task. Pt/son verbalized understanding and given handout. HEP initiated with pt refusing to follow through with HEP as recommended. Handout left with pt. ASSESSMENT:  Pt shows minimal participation in therapy and progress towards goals. Pt has met 5/9 goals secondary to self limiting behaviors. Pt has reached max potential with Occupational Therapy at this time. However, management of O2/IV fluids in prep for discharge home is recommended to be addressed. Progression toward goals:  ?          Improving appropriately and progressing toward goals  ?x          Improving slowly and progressing toward goals  ?x          Not making progress toward goals and plan of care will be adjusted     PLAN:  Patient continues to benefit from skilled intervention to address the above impairments. Continue treatment per established plan of care. Discharge Recommendations:  Home Health  Further Equipment Recommendations for Discharge:  bedside commode, shower chair and N/A     Please refer to the flowsheet for vital signs taken during this treatment. After treatment:   ?x  Patient left in no apparent distress sitting up in chair  ? Patient left in no apparent distress in bed  ? Call bell left within reach  ? Nursing notified  ? Caregiver present  ? Bed alarm activated    COMMUNICATION/EDUCATION:   ? Home safety education was provided and the patient/caregiver indicated understanding. ?x Patient/family have participated as able in goal setting and plan of care. ? Patient/family agree to work toward stated goals and plan of care. ? Patient understands intent and goals of therapy, but is neutral about his/her participation. ? Patient is unable to participate in goal setting and plan of care. Plan of Care: Please see Care Plan for updated LTGs.    Family Trainin/3/19  Estimated LOS: 19    Nahomi Amaya OT  7/3/2019   7/3/2019 1302 by Janak Thakur OT  Outcome: Progressing Minimally Towards Goal

## 2019-07-03 NOTE — PROGRESS NOTES
Problem: Mobility Impaired (Adult and Pediatric)  Goal: *Therapy Goal (Edit Goal, Insert Text)  Description  Physical Therapy Goals  Initiated 6/26/2019, reassessed 7/3/19 and to be accomplished by d/c 7/9/2019:   1. Patient will move from supine to sit and sit to supine , scoot up and down and roll side to side in bed with supervision/set-up. (MET)     2. Patient will transfer from bed to chair and chair to bed with supervision/set-up using the least restrictive device. (MET)  3. Patient will perform sit to stand with supervision/set-up. (MET)  4. Patient will ambulate with supervision/set-up for 50 feet with the least restrictive device. (with CGA)  5. Patient will ascend/descend 3 stairs with 2 handrail(s) with minimal assistance/contact guard assist. (2 steps with 1HR side stepping)    Physical Therapy Goals  Initiated 6/26/2019 and to be accomplished within 14 day(s) 7/10/2019:  1. Patient will move from supine to sit and sit to supine , scoot up and down and roll side to side in bed with modified independence. 2.  Patient will transfer from bed to chair and chair to bed with modified independence using the least restrictive device. 3.  Patient will perform sit to stand with modified independence. 4.  Patient will ambulate with modified independence for 75 feet with the least restrictive device. 5.  Patient will ascend/descend 3 stairs with 2 handrail(s) with supervision/set-up. Outcome: Progressing Towards Goal   PHYSICAL THERAPY WEEKLY PROGRESS NOTE    Patient: Soo Alonzo (19 y.o. female)  Date: 7/3/2019  Diagnosis: Chronic respiratory failure ([facility] [address] [J96.10] <principal problem not specified>  Precautions: Aspiration, Fall  Chart, physical therapy assessment, plan of care and goals were reviewed. Pain:  Pt pain was reported as  no c/o pre-treatment. Pt pain was reported as  no c/o post-treatment.   Intervention:  NA    Time in:1032  Time out:1136    Pt seen for:txfr training, gait training, stair training, bed mobility    Patient identified with name and : yes     SUBJECTIVE:     Pt states \"I want to be able to talk and eat when I go home. \" Pt got emotional at start of session due to events that led to trach. OBJECTIVE DATA SUMMARY:   AROM: within functional limits    FIM SCORES Initial Assessment Weekly Progress Assessment 7/3/2019   Bed/Chair/Wheelchair Transfers 4 5   Wheelchair Mobility (not challenged today; to be further assessed) 2   Walking [place] 1 2   Steps/Stairs 1 1   Please see IRC Interdisciplinary Eval: Coordination/Balance Section for details regarding FIM score description. BED/CHAIR/WHEELCHAIR TRANSFERS Initial Assessment Weekly Progress Assessment 7/3/2019   Rolling Right 4 (Minimal assistance) 6 (Modified independent)   Rolling Left 4 (Minimal assistance) 6 (Modified independent)   Supine to Sit 4 (Minimal assistance)(Head of bed elevated as per physician order) 6 (Modified independent)   Sit to Stand Minimal assistance Supervision   Sit to Supine 4 (Minimal assistance) 6 (Modified independent)   Transfer Type Other Other   Comments stand step transfer without AD  Pt performed sit to stand with S and stand step txfr without AD with S/SBA.     Car Transfer Not tested Not tested   Car Type N/A NA     GROSS ASSESSMENT Weekly Progress Assessment 7/3/2019   AROM Within functional limits   Strength Generally decreased, functional   Coordination Within functional limits   Tone Normal   Sensation Intact   PROM Within functional limits     POSTURE Weekly Progress Assessment 7/3/2019   Posture (WDL) Exceptions to WDL   Posture Assessment Forward head;Rounded shoulders     WHEELCHAIR MOBILITY/MANAGEMENT Initial Assessment Weekly Progress Assessment 7/3/2019   Able to Propel (not challenged today)  90ft   Curbs/ramps assistance required 0 (Not tested)  NT   Wheelchair set up assistance required 0 (Not tested)  SBA   Wheelchair management (not challenged today; to be further assessed)  Manages B brakes     WALKING INDEPENDENCE Initial Assessment Weekly Progress Assessment 7/3/2019   Assistive device (no AD as per PLOF for household gait) Michelle Medico, rollator;Gait belt   Ambulation assistance - level surface  4(Laz)  4(CGA)   Distance 8 Feet (ft) 75 Feet (ft)   Comments 8 ft within room without device, steadying assist needed but needing seated rest after 8 ft Pt ambulated with rollator with slow pace and wide base, side to side sway. GAIT Weekly Progress Assessment 7/3/2019   Gait Description (WDL) Exceptions to WDL   Gait Abnormalities Decreased step clearance     STEPS/STAIRS Initial Assessment Weekly Progress Assessment 7/3/2019   Steps/Stairs ambulated (not able to assess today due to patient fatigue) 2(6\" steps)   Rail Use (N/A) Right (side stepping with B hands on right HR)   Comments unable to challenge today  Pt negotiated up and down side stepping with right HR ascending and left HR descending   Curbs/Ramps    NT       Family education for krzysztof has taken place today. ASSESSMENT:  Pt demo'd progress with bed mobility, stairs and not requiring physical assistance for txfrs. Pt's barrier to d/c home at this time is IV, continuous feeding tube and O2 lines requiring mod/maxA for management. Progression toward goals:  ?      Improving appropriately and progressing toward goals  ? Improving slowly and progressing toward goals  ? Not making progress toward goals and plan of care will be adjusted     PLAN:  Patient continues to benefit from skilled intervention to address the above impairments. Continue treatment per established plan of care.   Discharge Recommendations:  Home Health  Further Equipment Recommendations for Discharge:  wheelchair 18 inch(due to pt unable to tolerate mobility when breathing is labored and anxiety is elevated)     Estimated LOS: 7/9/19    Activity Tolerance:   fair  Please refer to the flowsheet for vital signs taken during this treatment. After treatment:   ? Patient left in no apparent distress on toilet, informed CNA and educated pt to use call bell. ? Patient left in no apparent distress sitting up in chair  ? Patient left in no apparent distress in w/c mobilizing under own power  ? Patient left in no apparent distress dining area  ? Patient left in no apparent distress mobilizing under own power  ? Call bell left within reach  ? Nursing notified  ? Caregiver present  ?  Bed alarm activated       Pinky Don, PTA  7/3/2019

## 2019-07-03 NOTE — PROGRESS NOTES
Problem: Dysphagia (Adult)  Goal: *Acute Goals and Plan of Care (Insert Text)  Description  Long term goals  Patient will:  1. Perform pharyngeal strengthening exercises with supervision. 2. Tolerate swallowing trials of varied consistenies without over s/s of aspiration. 3. Participate in repeat MBS prior to resuming PO for nutrition. 4. Tolerate deflation of tracheostomy cuff for speech production for 20 minutes. 5. Tolerate placement of a passu-Rainier speaking valve for speech for 20 minute intervals. Short term goals (by 7/3/19)  Patient will:  1. Perform pharyngeal strengthening exercises with min-mod assist.  2. Tolerate swallowing trials of ice chops and small boluses puree  without over s/s of aspiration. 3. Participate in repeat MBS prior to resuming PO for nutrition. 4. Tolerate deflation of tracheostomy cuff for speech production for 20 minutes. 5. Tolerate placement of a passu-Malinda speaking valve for speech for 1-2 minute intervals. Outcome: Progressing Towards Goal  Note:   SPEECH LANGUAGE PATHOLOGY TREATMENT    Patient: Georgie Hatch (89 y.o. female)  Date: 7/3/2019  Diagnosis: [facility] [J96.10] <principal problem not specified>       Time in: 1140 1435  Time Out:  1210 1520  SUBJECTIVE:   Patient stated ? My sons have an odd sense of humor? .    OBJECTIVE:   Mental Status:  Mrs. Ella Shelley was awake, alert and engaged in today's sessions. Treatment & Interventions:   Patient was seen for two speech therapy sessions, morning and afternoon. A part of this morning's session was devoted to family education about her Passy-Malinda Valve (PMV-precautions, donning/doffing). Patient's son and brother were present. The following treatment tasks were also presented:  Neuro-Linguistics:   Orientation:  Independent  Recent memory: Independent    Voice:  Placement of PMV: Patient did not require suctioning.      Tolerated for 20 minutes this am.     Patient wore the PMV for 30 minutes this afternoon without distress. Oxygen saturation 99% on 4 lpm per trach collar. Swallowing:   Trials honey thick: Patient tolerated 5 cc boluses of honey thick liquid without overt s/s of aspiration. No coughing of c/o shortness of breath. No evidence of material at trach site. Response & Tolerance to Activities:  Mrs. Humberto Mcwilliams is consistently cooperative with the SLP and is gaining confidence while the PMV is in place. Voice production quite breathy today. Pain:  Pain Scale 1: Numeric (0 - 10)  No report of pain     After treatment:   ?       Patient left in no apparent distress sitting up in chair  ? Patient left in no apparent distress in bed  ? Call bell left within reach  ? Nursing notified  ? Caregiver present  ? Bed alarm activated    ASSESSMENT:   Progression toward goals:  ?       Improving appropriately and progressing toward goals  ? Improving slowly and progressing toward goals  ? Not making progress toward goals and plan of care will be adjusted    PLAN:   Patient continues to benefit from skilled intervention to address the above impairments. Continue treatment per established plan of care.   Discharge Recommendations:  Home Health    Estimated LOS: Through 7/16/19    CHINMAY Barton  Time Calculation:  75 Minutes

## 2019-07-03 NOTE — PROGRESS NOTES
Bedside and Verbal shift change report given to Angel Finch (oncoming nurse) by Marla Morillo RN (offgoing nurse). Report included the following information SBAR, Kardex, MAR and Recent Results.     SITUATION:    Code Status: Full Code   Reason for Admission: Chronic respiratory failure ([facility] [address] [J96.10]    [facility] day: 8   Problem List:       Hospital Problems  Date Reviewed: 5/16/2019          Codes Class Noted POA    Chronic respiratory failure [facility]) ICD-10-CM: J96.10  ICD-9-CM: 518.83  6/25/2019 Unknown              BACKGROUND:    Past Medical History:   Past Medical History:   Diagnosis Date    Arthritis     Lower back     Asthma     Chronic back pain     Lower back pain    Depression     Diabetes ([facility] [address]     Diabetes mellitus ([facility] [address]     Hearing loss     Heart failure (HCC)     chronic diastolic heart failure    Left ear hearing loss     pt states nerve damage--- 25% hearing loss, described as \"muffled\"    Memory difficulty     Panic attacks     Ringing of ears     Severe headache     Sleep apnea     SOB (shortness of breath) on exertion     w and w/out exertion    Stomach pain     Thyroid disease     Vertigo          Patient taking anticoagulants yes     ASSESSMENT:    Changes in Assessment Throughout Shift: none     Patient has Central Line: no Reasons if yes:    Patient has Barker Cath: no Reasons if yes:       Last Vitals:     Vitals:    07/02/19 0800 07/02/19 1155 07/02/19 1609 07/02/19 2100   BP: 126/69 105/59 97/56 122/67   Pulse: 65 64 60 65   Resp: 19  18 18   Temp: 98.8 °F (37.1 °C)  98.6 °F (37 °C) 99 °F (37.2 °C)   SpO2: 95% 98% 98% 99%   Weight:            IV and DRAINS (will only show if present)   Peripheral IV 06/30/19 Left Antecubital-Site Assessment: Clean, dry, & intact  [REMOVED] Peripheral IV 06/25/19 Anterior;Proximal;Right Antecubital-Site Assessment: Dry, Intact  PEG/Gastrostomy Tube 06/20/19-Site Assessment: Clean, dry, & intact  [REMOVED] Airway - Tracheostomy Tube 06/03/19 Portex-Site Assessment: Clean, dry, & intact     WOUND (if present)   Wound Type:         Dressing present Dressing Present : No   Wound Concerns/Notes:  none     PAIN    Pain Assessment    Pain Intensity 1: 0 (07/03/19 0400)    Pain Location 1: Abdomen, Back    Pain Intervention(s) 1: Medication (see MAR)    Patient Stated Pain Goal: 0  o Interventions for Pain:  Pain medication    o Intervention effective: yes  o Time of last intervention: see MAR   o Reassessment Completed: yes      Last 3 Weights:  Last 3 Recorded Weights in this Encounter    06/26/19 1430   Weight: 122.2 kg (269 lb 8 oz)     Weight change:      INTAKE/OUPUT    Current Shift: 07/02 1901 - 07/03 0700  In: 130   Out: -     Last three shifts: 07/01 0701 - 07/02 1900  In: 786   Out: -      LAB RESULTS     Recent Labs     07/02/19  0640   WBC 6.8   HGB 11.1*   HCT 34.8*           Recent Labs     07/02/19  0640      K 4.4   *   BUN 8   CREA 0.79   CA 8.3*       RECOMMENDATIONS AND DISCHARGE PLANNING     1. Pending tests/procedures/ Plan of Care or Other Needs: labs  2.      3. Discharge plan for patient and Needs/Barriers: TBD    4. Estimated Discharge Date: TBD Posted on Whiteboard in Patients Room: no      4. The patient's care plan was reviewed with the oncoming nurse. \"HEALS\" SAFETY CHECK      Fall Risk    Total Score: 3    Safety Measures: Safety Measures: Bed/Chair-Wheels locked, Bed in low position, Call light within reach    A safety check occurred in the patient's room between off going nurse and oncoming nurse listed above.     The safety check included the below items  Area Items   H  High Alert Medications - Verify all high alert medication drips (heparin, PCA, etc.)   E  Equipment - Suction is set up for ALL patients (with yanker)  - Red plugs utilized for all equipment (IV pumps, etc.)  - WOWs wiped down at end of shift.  - Room stocked with oxygen, suction, and other unit-specific supplies   A  Alarms - Bed alarm is set for fall risk patients  - Ensure chair alarm is in place and activated if patient is up in a chair   L  Lines - Check IV for any infiltration  - Barker bag is empty if patient has a Barker   - Tubing and IV bags are labeled   S  Safety   - Room is clean, patient is clean, and equipment is clean. - Hallways are clear from equipment besides carts. - Fall bracelet on for fall risk patients  - Ensure room is clear and free of clutter  - Suction is set up for ALL patients (with yanker)  - Hallways are clear from equipment besides carts.    - Isolation precautions followed, supplies available outside room, sign posted     Marla Morillo RN

## 2019-07-04 LAB
GLUCOSE BLD STRIP.AUTO-MCNC: 162 MG/DL (ref 70–110)
GLUCOSE BLD STRIP.AUTO-MCNC: 221 MG/DL (ref 70–110)
GLUCOSE BLD STRIP.AUTO-MCNC: 224 MG/DL (ref 70–110)
GLUCOSE BLD STRIP.AUTO-MCNC: 278 MG/DL (ref 70–110)

## 2019-07-04 PROCEDURE — 97530 THERAPEUTIC ACTIVITIES: CPT

## 2019-07-04 PROCEDURE — 74011250637 HC RX REV CODE- 250/637

## 2019-07-04 PROCEDURE — 92526 ORAL FUNCTION THERAPY: CPT

## 2019-07-04 PROCEDURE — 92507 TX SP LANG VOICE COMM INDIV: CPT

## 2019-07-04 PROCEDURE — 74011250636 HC RX REV CODE- 250/636: Performed by: EMERGENCY MEDICINE

## 2019-07-04 PROCEDURE — 82962 GLUCOSE BLOOD TEST: CPT

## 2019-07-04 PROCEDURE — 97116 GAIT TRAINING THERAPY: CPT

## 2019-07-04 PROCEDURE — 74011636637 HC RX REV CODE- 636/637: Performed by: EMERGENCY MEDICINE

## 2019-07-04 PROCEDURE — 74011250637 HC RX REV CODE- 250/637: Performed by: EMERGENCY MEDICINE

## 2019-07-04 PROCEDURE — 97535 SELF CARE MNGMENT TRAINING: CPT

## 2019-07-04 PROCEDURE — 65310000000 HC RM PRIVATE REHAB

## 2019-07-04 RX ORDER — INSULIN GLARGINE 100 [IU]/ML
34 INJECTION, SOLUTION SUBCUTANEOUS DAILY
Status: DISCONTINUED | OUTPATIENT
Start: 2019-07-05 | End: 2019-07-05

## 2019-07-04 RX ADMIN — METOCLOPRAMIDE HYDROCHLORIDE 5 MG: 5 TABLET ORAL at 20:14

## 2019-07-04 RX ADMIN — INSULIN LISPRO 2 UNITS: 100 INJECTION, SOLUTION INTRAVENOUS; SUBCUTANEOUS at 23:55

## 2019-07-04 RX ADMIN — PANTOPRAZOLE SODIUM 40 MG: 40 GRANULE, DELAYED RELEASE ORAL at 06:35

## 2019-07-04 RX ADMIN — INSULIN LISPRO 4 UNITS: 100 INJECTION, SOLUTION INTRAVENOUS; SUBCUTANEOUS at 06:19

## 2019-07-04 RX ADMIN — SODIUM CHLORIDE AND POTASSIUM CHLORIDE: 4.5; 1.49 INJECTION, SOLUTION INTRAVENOUS at 12:06

## 2019-07-04 RX ADMIN — METOCLOPRAMIDE HYDROCHLORIDE 5 MG: 5 TABLET ORAL at 14:13

## 2019-07-04 RX ADMIN — METOPROLOL TARTRATE 25 MG: 25 TABLET ORAL at 09:09

## 2019-07-04 RX ADMIN — INSULIN GLARGINE 32 UNITS: 100 INJECTION, SOLUTION SUBCUTANEOUS at 09:06

## 2019-07-04 RX ADMIN — INSULIN LISPRO 4 UNITS: 100 INJECTION, SOLUTION INTRAVENOUS; SUBCUTANEOUS at 17:49

## 2019-07-04 RX ADMIN — LEVOTHYROXINE SODIUM 200 MCG: 75 TABLET ORAL at 06:17

## 2019-07-04 RX ADMIN — LORAZEPAM 1 MG: 0.5 TABLET ORAL at 19:40

## 2019-07-04 RX ADMIN — LEVOFLOXACIN 750 MG: 750 TABLET, FILM COATED ORAL at 18:10

## 2019-07-04 RX ADMIN — METOCLOPRAMIDE HYDROCHLORIDE 5 MG: 5 TABLET ORAL at 09:09

## 2019-07-04 RX ADMIN — ENOXAPARIN SODIUM 40 MG: 40 INJECTION SUBCUTANEOUS at 17:49

## 2019-07-04 RX ADMIN — OXYCODONE HYDROCHLORIDE AND ACETAMINOPHEN 1 TABLET: 5; 325 TABLET ORAL at 19:40

## 2019-07-04 RX ADMIN — INSULIN LISPRO 6 UNITS: 100 INJECTION, SOLUTION INTRAVENOUS; SUBCUTANEOUS at 12:01

## 2019-07-04 RX ADMIN — METOPROLOL TARTRATE 25 MG: 25 TABLET ORAL at 17:49

## 2019-07-04 NOTE — PROGRESS NOTES
Progress Note    Patient: Tayler Esquivel MRN: [id number]  CSN: [id number]    YOB: 1960  Age: 63 y.o. Sex: female    DOA: 6/25/2019 LOS:  LOS: 9 days                    Subjective:       IMPAIRMENT GROUP CODE:  Debility.     REHAB IMPAIRMENT CATEGORY:  Miscellaneous.     DIAGNOSIS:  Paralysis of vocal cord and larynx, unspecified. Review of systems  General: No fevers or chills. Pt had tracj increase drainage from trach started on levqquine until 7/8. Pt more active with pt and ot . She still on PEG tube feeding 60 cc/h   Cardiovascular: No chest pain or pressure. No palpitations. Pulmonary:pt has trach in place  Gastrointestinal: PEG tube up to 60 ml/h  Genitourinary: No urinary or dysuria. Musculoskeletal: No joint or muscle pain, no back pain, no recent trauma. Neurologic: No headache, generalized weakness     Objective:     Physical Exam:  Visit Vitals  /78 (BP 1 Location: Right arm, BP Patient Position: At rest)   Pulse 64   Temp 97.5 °F (36.4 °C)   Resp 18   Ht 5' 7\" (1.702 m)   Wt 103.9 kg (229 lb)   SpO2 98%   Breastfeeding? No   BMI 35.87 kg/m²        General:         Alert,no acute distress    HEENT: NC, Atraumatic. PERRLA, anicteric sclerae. Lungs: CTA,   Heart:  Regular  rhythm,  No murmur,   Abdomen: Soft, Non distended, Non tender.  PEG tube in place  Extremities: Trace lower limb edema  Psych:   . Not anxious or agitated. Neurologic:  CN 2-12 grossly intact,  Intake and Output:  Current Shift:  No intake/output data recorded.   Last three shifts:  07/02 1901 - 07/04 0700  In: 1969.2 [I.V.:579.2]  Out: -     Labs: Results:       Chemistry Recent Labs     07/02/19  0640   *      K 4.4      CO2 29   BUN 8   CREA 0.79   CA 8.3*   AGAP 6   BUCR 10*      CBC w/Diff Recent Labs     07/02/19  0640   WBC 6.8   RBC 3.76*   HGB 11.1*   HCT 34.8*      GRANS 56   LYMPH 30   EOS 3      Cardiac Enzymes No results for input(s): CPK, CKND1, SANDY in the last 72 hours. No lab exists for component: CKRMB, TROIP   Coagulation No results for input(s): PTP, INR, APTT in the last 72 hours. No lab exists for component: INREXT, INREXT    Lipid Panel Lab Results   Component Value Date/Time    Cholesterol, total 347 (H) 04/05/2019 03:46 AM    HDL Cholesterol 26 (L) 04/05/2019 03:46 AM    LDL, calculated  04/05/2019 03:46 AM     LDL AND VLDL CHOLESTEROL NOT CALCULATED WHEN TRIGLYCERIDES >400 MG/DL OR HDL CHOLESTEROL <20 MG/DL    VLDL, calculated Not calculated due to elevated triglyceride level 04/05/2019 03:46 AM    Triglyceride 298 (H) 06/18/2019 06:30 AM    CHOL/HDL Ratio 13.3 (H) 04/05/2019 03:46 AM      BNP No results for input(s): BNPP in the last 72 hours. Liver Enzymes No results for input(s): TP, ALB, TBIL, AP, SGOT, GPT in the last 72 hours.     No lab exists for component: DBIL   Thyroid Studies Lab Results   Component Value Date/Time    TSH 54.20 (H) 06/26/2019 06:10 AM          Procedures/imaging: see electronic medical records for all procedures/Xrays and details which were not copied into this note but were reviewed prior to creation of Plan    Medications:   Current Facility-Administered Medications   Medication Dose Route Frequency    insulin glargine (LANTUS) injection 32 Units  32 Units SubCUTAneous DAILY    levoFLOXacin (LEVAQUIN) tablet 750 mg  750 mg Per G Tube Q24H    simethicone (MYLICON) 22WD/0.6EG oral drops 40 mg  40 mg Per G Tube QID PRN    pantoprazole (PROTONIX) granules for oral suspension 40 mg  40 mg Per G Tube ACB    ondansetron hcl (ZOFRAN) tablet 4 mg  4 mg Per G Tube Q6H PRN    levothyroxine (SYNTHROID) tablet 200 mcg  200 mcg Per G Tube 6am    metoprolol tartrate (LOPRESSOR) tablet 25 mg  25 mg Per G Tube BID    enoxaparin (LOVENOX) injection 40 mg  40 mg SubCUTAneous Q24H    oxyCODONE-acetaminophen (PERCOCET) 5-325 mg per tablet 1 Tab  1 Tab Oral Q6H PRN    metoclopramide HCl (REGLAN) tablet 5 mg  5 mg Per G Tube TID    insulin lispro (HUMALOG) injection   SubCUTAneous Q6H    glucose chewable tablet 16 g  4 Tab Oral PRN    glucagon (GLUCAGEN) injection 1 mg  1 mg IntraMUSCular PRN    LORazepam (ATIVAN) tablet 1 mg  1 mg Oral Q12H PRN    docusate sodium (COLACE) capsule 100 mg  100 mg Per G Tube BID    bisacodyl (DULCOLAX) suppository 10 mg  10 mg Rectal Q48H PRN    albuterol-ipratropium (DUO-NEB) 2.5 MG-0.5 MG/3 ML  3 mL Nebulization Q4H PRN    dextrose 10% infusion 125-250 mL  125-250 mL IntraVENous PRN    0.45% sodium chloride with KCl 20 mEq/L infusion   IntraVENous CONTINUOUS       Assessment/Plan     Active Problems:    Chronic respiratory failure (HCC) (6/25/2019)    Vocal cord dysfunction. S/P  Tracheostomy/ Debility. Continue trach care . Pt can swallow sips of water and apple sauce when she used track valve  Might switch to bolus feeding     Status post recent thyroidectomy. Continue to monitor sx f/u tsh and free t4   Dual nebulizer q 4h prn    Dysphagia.   The patient has a PEG tube   f/u Nutritional consult   Aspiration precaution  Continue speech therapy    Type 2 diabetes  Lantus 32 unites s q  Glucose level running high in AM  Will increase Lantus to 34 u  Humalog sliding scale coverage     H/O Hypertension  continue to monitor Bp  Pt on IV fluid 1/2 ns low rate 500 cc/h    Hypothyroidism  Synthroid 200 Mcg daily  F/u tsh and free t4     Debility  Pt and ot     DVT and GI prophylaxis with lovenox and protonix  Discussed with pt PT and speech therapy      Mario Vaca MD  7/4/2019 11:50 AM

## 2019-07-04 NOTE — PROGRESS NOTES
Bedside and Verbal shift change report given to MARKOS Barbosa (oncoming nurse) by Cliff Wise (offgoing nurse). Report included the following information SBAR, Kardex, MAR and Recent Results. SITUATION:    Code Status: Full Code   Reason for Admission: Chronic respiratory failure ([facility] [address] [J96.10]    [facility] day: 5   Problem List:       Hospital Problems  Date Reviewed: 5/16/2019          Codes Class Noted POA    Chronic respiratory failure [facility]) ICD-10-CM: J96.10  ICD-9-CM: 518.83  6/25/2019 Unknown              BACKGROUND:    Past Medical History:   Past Medical History:   Diagnosis Date    Arthritis     Lower back     Asthma     Chronic back pain     Lower back pain    Depression     Diabetes ([facility] [address]     Diabetes mellitus ([facility] [address]     Hearing loss     Heart failure (HCC)     chronic diastolic heart failure    Left ear hearing loss     pt states nerve damage--- 25% hearing loss, described as \"muffled\"    Memory difficulty     Panic attacks     Ringing of ears     Severe headache     Sleep apnea     SOB (shortness of breath) on exertion     w and w/out exertion    Stomach pain     Thyroid disease     Vertigo          Patient taking anticoagulants yes     ASSESSMENT:    Changes in Assessment Throughout Shift: none     Patient has Central Line: no Reasons if yes:    Patient has Barker Cath: no Reasons if yes:       Last Vitals:     Vitals:    07/03/19 0734 07/03/19 1635 07/03/19 1852 07/03/19 2031   BP: 128/71 108/60  119/74   Pulse: 74 65  67   Resp: 18 18  20   Temp: 97 °F (36.1 °C) 98 °F (36.7 °C)  97.5 °F (36.4 °C)   SpO2: 97% 99%  99%   Weight:   103.9 kg (229 lb)    Height:   5' 7\" (1.702 m)         IV and DRAINS (will only show if present)   [REMOVED] Peripheral IV 06/30/19 Left Antecubital-Site Assessment: Clean, dry, & intact  Peripheral IV 07/03/19 Anterior; Left Forearm-Site Assessment: Clean, dry, & intact  [REMOVED] Peripheral IV 06/25/19 Anterior;Proximal;Right Antecubital-Site S/p LVAD this admission  Per HTS; avoid hypoglycemia     Assessment: Dry, Intact  PEG/Gastrostomy Tube 06/20/19-Site Assessment: Clean, dry, & intact  [REMOVED] Airway - Tracheostomy Tube 06/03/19 Portex-Site Assessment: Clean, dry, & intact     WOUND (if present)   Wound Type:         Dressing present Dressing Present : No   Wound Concerns/Notes:  none     PAIN    Pain Assessment    Pain Intensity 1: 0 (07/04/19 0358)    Pain Location 1: Abdomen, Back    Pain Intervention(s) 1: Medication (see MAR)    Patient Stated Pain Goal: 0  o Interventions for Pain:  Pain medication    o Intervention effective: yes  o Time of last intervention: see MAR   o Reassessment Completed: yes      Last 3 Weights:  Last 3 Recorded Weights in this Encounter    06/26/19 1430 07/03/19 1852   Weight: 122.2 kg (269 lb 8 oz) 103.9 kg (229 lb)     Weight change:      INTAKE/OUPUT    Current Shift: 07/03 1901 - 07/04 0700  In: 215   Out: -     Last three shifts: 07/02 0701 - 07/03 1900  In: 260   Out: -      LAB RESULTS     Recent Labs     07/02/19  0640   WBC 6.8   HGB 11.1*   HCT 34.8*           Recent Labs     07/02/19  0640      K 4.4   *   BUN 8   CREA 0.79   CA 8.3*       RECOMMENDATIONS AND DISCHARGE PLANNING     1. Pending tests/procedures/ Plan of Care or Other Needs: labs  2.      3. Discharge plan for patient and Needs/Barriers: TBD    4. Estimated Discharge Date: TBD Posted on Whiteboard in Patients Room: no      4. The patient's care plan was reviewed with the oncoming nurse. \"HEALS\" SAFETY CHECK      Fall Risk    Total Score: 3    Safety Measures: Safety Measures: Bed/Chair alarm on, Bed/Chair-Wheels locked, Bed in low position, Call light within reach, Fall prevention (comment), Extra trach at bedside, Gripper socks    A safety check occurred in the patient's room between off going nurse and oncoming nurse listed above.     The safety check included the below items  Area Items   H  High Alert Medications - Verify all high alert medication drips (heparin, PCA, etc.)   E  Equipment - Suction is set up for ALL patients (with chaitanya)  - Red plugs utilized for all equipment (IV pumps, etc.)  - WOWs wiped down at end of shift.  - Room stocked with oxygen, suction, and other unit-specific supplies   A  Alarms - Bed alarm is set for fall risk patients  - Ensure chair alarm is in place and activated if patient is up in a chair   L  Lines - Check IV for any infiltration  - Barker bag is empty if patient has a Barker   - Tubing and IV bags are labeled   S  Safety   - Room is clean, patient is clean, and equipment is clean. - Hallways are clear from equipment besides carts. - Fall bracelet on for fall risk patients  - Ensure room is clear and free of clutter  - Suction is set up for ALL patients (with chaitanya)  - Hallways are clear from equipment besides carts.    - Isolation precautions followed, supplies available outside room, sign posted     Tresa Polanco

## 2019-07-04 NOTE — PROGRESS NOTES
Problem: Self Care Deficits Care Plan (Adult)  Goal: *Therapy Goal (Edit Goal, Insert Text)  Description  Occupational Therapy Goals   Long Term Goals  Initiated 19 and to be accomplished within 2 week(s) 7/10/19  1. Pt will perform functional activity up to 15 minutes with no more than 2 rest breaks and 2 vcs to attend to task. .   2. Pt will perform grooming with S.           3. Pt will perform UB bathing with S .       4. Pt will perform LB bathing with S.   5. Pt will perform tub/shower transfer with S.  6. Pt will perform UB dressing with S.    7. Pt will perform LB dressing with S.    8. Pt will perform toileting task with I.      9. Pt will perform toilet transfer with S.       Short Term Goals   Initiated 19 and to be accomplished within 7 day(s) 7/3/19. Updated 7/3/19  1. Pt will perform functional task up to 10 minutes with no more than 3 verbal cues to attend to task and 3 rest breaks. 2. Pt will perform grooming with SBA. .  7/3/19  3. Pt will perform UB bathing with S and no more than 3 vcs to  Initiate and complete task. 4. Pt will perform LB bathing with Beltran and no more than 3 vcs to  complete task. 5. Pt will perform tub/shower transfer with [facility].  7/3/19-S  6. Pt will perform UB dressing with Beltran and no more than 3 vcs to  initiate and complete task. .  7. Pt will perform LB dressing with Beltran and no more than 3 vcs to initiate and  complete task. 8. Pt will perform toileting task with S. 7/3/19  9. Pt will perform toilet transfer with S and no more than 3 vcs to initiate and complete task.  7/3/19        Outcome: Progressing Towards Goal   OCCUPATIONAL THERAPY TREATMENT    Patient: Michelle Elzbieta   63 y.o.     Patient identified with name and : Yes    Date: 2019    First Tx Session  Time In: 0730  Time Out[de-identified] 0900    Diagnosis: Chronic respiratory failure ([address] [J96.10]   Precautions: Aspiration, Fall  Chart, occupational therapy assessment, plan of care, and goals were reviewed. Pain:  Pt reports 8/10 pain at head prior to treatment   Pt reports 8/10 pain at head post treatment. Intervention Provided: Pt refuses pain medication. SUBJECTIVE:   Patient is non verbal and uses hand gestures, writing pad, and mouths words. OBJECTIVE DATA SUMMARY:     FEEDING/EATING Daily Assessment    Feeding/Eating  Comments: NA PEG     GROOMING Daily Assessment    Grooming  Grooming Assistance : 5 (Supervision)  Comments: setup     UPPER BODY BATHING Daily Assessment    Upper Body Bathing  Bathing Assistance, Upper: 5 (Supervision)  Upper Body : Compensatory technique training  Position Performed: Seated edge of bed  Comments:  pt requires rest breaks due to 10/10 pain in back(Pt refuses pain meds)     LOWER BODY BATHING Daily Assessment    Lower Body Bathing  Bathing Assistance, Lower : 5 (Supervision)  Position Performed: Seated edge of bed  Comments: increased time needed     TOILETING Daily Assessment    Toileting  Toileting Assistance (FIM Score): 5 (Supervision)     UPPER BODY DRESSING Daily Assessment    Upper Body Dressing   Dressing Assistance : 5 (Supervision)  Comments: pt required rest breaks due to 10/10 pain back( pt refuses pain meds)     LOWER BODY DRESSING Daily Assessment    Lower Body Dressing   Dressing Assistance : 5 (Supervision)  Leg Crossed Method Used: No  Position Performed: Seated edge of bed  Comments: increased time needed     MOBILITY/TRANSFERS Daily Assessment    Functional Transfers  Toilet Transfer : Grab bars;Stand pivot transfer without device       ASSESSMENT:  Pt shows inconsistent performance of self care without verbal encouragement. Today's session, discussed pt using the shower for bathing needs but declined. However, pt agreeable to EOB bathing task without verbal cues of encouragement needed. Pt complete all self care EOB with S/setup taking required rest breaks as needed. Pt able to manage O2 and IV with Beltran EOB.  Pt educated on using AE for work simplification but refused. Pt to continue with maximizing I in ADLs with consistency using compensatory strategies in prep for discharge. Progression toward goals:  ?          Improving appropriately and progressing toward goals  ? x         Improving slowly and progressing toward goals  ? Not making progress toward goals and plan of care will be adjusted     PLAN:  Patient continues to benefit from skilled intervention to address the above impairments. Continue treatment per established plan of care. Discharge Recommendations:  Home Health  Further Equipment Recommendations for Discharge: Bariatric 3 in 1 bedside commode and Shower tub transfer bench     Activity Tolerance:  Fair      Estimated LOS:7/12/19    Please refer to the flowsheet for vital signs taken during this treatment. After treatment:   ?  Patient left in no apparent distress sitting up in chair   ? Patient left in no apparent distress in bed  ? Call bell left within reach  ? Nursing notified  ? Caregiver present  ? Bed alarm activated    COMMUNICATION/EDUCATION:   ? Home safety education was provided and the patient/caregiver indicated understanding. ? Patient/family have participated as able in goal setting and plan of care. ? Patient/family agree to work toward stated goals and plan of care. ? Patient understands intent and goals of therapy, but is neutral about his/her participation. ? Patient is unable to participate in goal setting and plan of care.       Ada Current, OT

## 2019-07-04 NOTE — PROGRESS NOTES
Bedside and Verbal shift change report given to Guillermo Alvarenga RN (oncoming nurse) by Brittany Bee RN (offgoing nurse). Report included the following information SBAR, Kardex, MAR and Recent Results. SITUATION:    Code Status: Full Code   Reason for Admission: Chronic respiratory failure ([facility] [address] [J96.10]    [facility] day: 5   Problem List:       Hospital Problems  Date Reviewed: 5/16/2019          Codes Class Noted POA    Chronic respiratory failure [facility]) ICD-10-CM: J96.10  ICD-9-CM: 518.83  6/25/2019 Unknown              BACKGROUND:    Past Medical History:   Past Medical History:   Diagnosis Date    Arthritis     Lower back     Asthma     Chronic back pain     Lower back pain    Depression     Diabetes ([facility] [address]     Diabetes mellitus ([facility] [address]     Hearing loss     Heart failure (HCC)     chronic diastolic heart failure    Left ear hearing loss     pt states nerve damage--- 25% hearing loss, described as \"muffled\"    Memory difficulty     Panic attacks     Ringing of ears     Severe headache     Sleep apnea     SOB (shortness of breath) on exertion     w and w/out exertion    Stomach pain     Thyroid disease     Vertigo          Patient taking anticoagulants yes     ASSESSMENT:    Changes in Assessment Throughout Shift: none     Patient has Central Line: no Reasons if yes:    Patient has Barker Cath: no Reasons if yes:       Last Vitals:     Vitals:    07/03/19 1852 07/03/19 2031 07/04/19 0724 07/04/19 1612   BP:  119/74 137/78 136/73   Pulse:  67 64 65   Resp:  20 18 18   Temp:  97.5 °F (36.4 °C) 97.5 °F (36.4 °C) 98.7 °F (37.1 °C)   SpO2:  99% 98% 98%   Weight: 103.9 kg (229 lb)      Height: 5' 7\" (1.702 m)           IV and DRAINS (will only show if present)   [REMOVED] Peripheral IV 06/30/19 Left Antecubital-Site Assessment: Clean, dry, & intact  Peripheral IV 07/03/19 Anterior; Left Forearm-Site Assessment: Clean, dry, & intact  [REMOVED] Peripheral IV 06/25/19 Anterior;Proximal;Right Antecubital-Site Assessment: Dry, Intact  PEG/Gastrostomy Tube 06/20/19-Site Assessment: Clean, dry, & intact  [REMOVED] Airway - Tracheostomy Tube 06/03/19 Portex-Site Assessment: Clean, dry, & intact     WOUND (if present)   Wound Type:         Dressing present Dressing Present : No   Wound Concerns/Notes:  none     PAIN    Pain Assessment    Pain Intensity 1: 9 (07/04/19 1505)    Pain Location 1: Abdomen, Back    Pain Intervention(s) 1: Medication (see MAR)    Patient Stated Pain Goal: 0  o Interventions for Pain:  Pain medication    o Intervention effective: yes  o Time of last intervention: see MAR   o Reassessment Completed: yes      Last 3 Weights:  Last 3 Recorded Weights in this Encounter    06/26/19 1430 07/03/19 1852   Weight: 122.2 kg (269 lb 8 oz) 103.9 kg (229 lb)     Weight change:      INTAKE/OUPUT    Current Shift: No intake/output data recorded. Last three shifts: 07/03 0701 - 07/04 1900  In: 2709.2 [I.V.:1579.2]  Out: -      LAB RESULTS     Recent Labs     07/02/19  0640   WBC 6.8   HGB 11.1*   HCT 34.8*           Recent Labs     07/02/19  0640      K 4.4   *   BUN 8   CREA 0.79   CA 8.3*       RECOMMENDATIONS AND DISCHARGE PLANNING     1. Pending tests/procedures/ Plan of Care or Other Needs: labs  2.      3. Discharge plan for patient and Needs/Barriers: TBD    4. Estimated Discharge Date: TBD Posted on Whiteboard in Patients Room: no      4. The patient's care plan was reviewed with the oncoming nurse. \"HEALS\" SAFETY CHECK      Fall Risk    Total Score: 3    Safety Measures: Safety Measures: Bed/Chair alarm on, Bed/Chair-Wheels locked, Bed in low position, Call light within reach, Extra trach at bedside, Fall prevention (comment), Gripper socks, Nurse at bedside, Side rails X 3    A safety check occurred in the patient's room between off going nurse and oncoming nurse listed above.     The safety check included the below items  Area Items   H  High Alert Medications - Verify all high alert medication drips (heparin, PCA, etc.)   E  Equipment - Suction is set up for ALL patients (with chaitanya)  - Red plugs utilized for all equipment (IV pumps, etc.)  - WOWs wiped down at end of shift.  - Room stocked with oxygen, suction, and other unit-specific supplies   A  Alarms - Bed alarm is set for fall risk patients  - Ensure chair alarm is in place and activated if patient is up in a chair   L  Lines - Check IV for any infiltration  - Barker bag is empty if patient has a Barker   - Tubing and IV bags are labeled   S  Safety   - Room is clean, patient is clean, and equipment is clean. - Hallways are clear from equipment besides carts. - Fall bracelet on for fall risk patients  - Ensure room is clear and free of clutter  - Suction is set up for ALL patients (with chaitanya)  - Hallways are clear from equipment besides carts.    - Isolation precautions followed, supplies available outside room, sign posted     Madyson De La Rosa RN

## 2019-07-04 NOTE — PROGRESS NOTES
Problem: Mobility Impaired (Adult and Pediatric)  Goal: *Therapy Goal (Edit Goal, Insert Text)  Description  Physical Therapy Goals  Initiated 6/26/2019, reassessed 7/3/19 and to be accomplished by d/c 7/9/2019:   1. Patient will move from supine to sit and sit to supine , scoot up and down and roll side to side in bed with supervision/set-up. (MET)     2. Patient will transfer from bed to chair and chair to bed with supervision/set-up using the least restrictive device. (MET)  3. Patient will perform sit to stand with supervision/set-up. (MET)  4. Patient will ambulate with supervision/set-up for 50 feet with the least restrictive device. (with CGA)  5. Patient will ascend/descend 3 stairs with 2 handrail(s) with minimal assistance/contact guard assist. (2 steps with 1HR side stepping)    Physical Therapy Goals  Initiated 6/26/2019 and to be accomplished within 14 day(s) 7/10/2019:  1. Patient will move from supine to sit and sit to supine , scoot up and down and roll side to side in bed with modified independence. 2.  Patient will transfer from bed to chair and chair to bed with modified independence using the least restrictive device. 3.  Patient will perform sit to stand with modified independence. 4.  Patient will ambulate with modified independence for 75 feet with the least restrictive device. 5.  Patient will ascend/descend 3 stairs with 2 handrail(s) with supervision/set-up. Outcome: Progressing Towards Goal   PHYSICAL THERAPY TREATMENT    Patient: Katherine Jha (89 y.o. female)  Date: 7/4/2019  Diagnosis: Chronic respiratory failure ([facility] [address] [J96.10] <principal problem not specified>  Precautions: Aspiration, Fall  Chart, physical therapy assessment, plan of care and goals were reviewed. Time In: 1030  Time Out: 1130  Patient Seen For: Wheelchair mobility;Transfer training;Gait training(stair training)  Pain:  Pt pain was reported as  9/10 pre-treatment.   Pt pain was reported as 9/10 post-treatment. Intervention: nurse asked if pt wanted pain meds and pt declined    Patient identified with name and : yes     SUBJECTIVE:     Pt reports back, right shoulder pain and headache, but did not allow pain to limit PT session. OBJECTIVE DATA SUMMARY:   Objective:     TRANSFERS Daily Assessment    Transfer Type: Other  Other: stand step txfr without AD  Transfer Assistance : 5 (Supervision/setup)  Sit to Stand Assistance: Supervision  Pt performed all sit to stands with S and stand step txfr recliner to w/c without AD and with S.   Pt stepping onto scale to weight herself with SBA. GAIT Daily Assessment    Amount of Assistance: 5 (Stand-by assistance)  Distance (ft): 80 Feet (ft)  Assistive Device: Walker, rollator  Pt ambulated 80ft with rollator and with SBA, therapist pushing IV pole and port o2. Pt ambulated 10ft to bathroom with no AD with SBA and therapist managing IV pole and O2. STEPS or STAIRS Daily Assessment    Steps/Stairs Ambulated (#): 2(6\" steps)  Level of Assist : 4 (Contact guard assistance)  Rail Use: Right (side stepping )  Pt negotiated up and down 2 6\" steps with right HR to ascend and left HR to descend, side stepping both directions. BALANCE Daily Assessment    Sitting - Static: Good (unsupported)  Sitting - Dynamic: Good (unsupported)  Standing - Static: Fair  Standing - Dynamic : Impaired       WHEELCHAIR MOBILITY Daily Assessment    Able to Propel (ft): 150 feet  Functional Level: 3  Curbs/Ramps Assist Required (FIM Score): 0 (Not tested)  Wheelchair Setup Assist Required : 3 (Moderate assistance)  Wheelchair Management: Manages left brake;Manages right brake  Pt propelled w/c from room toward gym x150ft, requiring 1 rest break, with therapist managing IV pole and O2 tank. ASSESSMENT:  Pt demo'd good tolerance today, never demonstrating shortness of breath, only increased respirations after each task, requiring sitting rest break to recover. Progression toward goals:  ?      Improving appropriately and progressing toward goals  ? Improving slowly and progressing toward goals  ? Not making progress toward goals and plan of care will be adjusted     PLAN:  Patient continues to benefit from skilled intervention to address the above impairments. Continue treatment per established plan of care. Discharge Recommendations:  Home Health  Further Equipment Recommendations for Discharge:  rollator(pt has at home)     Estimated LOS: 7/9/19    Activity Tolerance:   fair  Please refer to the flowsheet for vital signs taken during this treatment. After treatment:   Patient left sitting on toilet, educated to call for assistance to return to recliner.        Pinky Don, PTA  7/4/2019

## 2019-07-04 NOTE — PROGRESS NOTES
Problem: Dysphagia (Adult)  Goal: *Acute Goals and Plan of Care (Insert Text)  Description  Long term goals  Patient will:  1. Perform pharyngeal strengthening exercises with supervision. 2. Tolerate swallowing trials of varied consistenies without over s/s of aspiration. 3. Participate in repeat MBS prior to resuming PO for nutrition. 4. Tolerate deflation of tracheostomy cuff for speech production for 20 minutes. MET  5. Tolerate placement of a passu-Liebenthal speaking valve for speech for 20 minute intervals. MET- increased to 45 minutes. Short term goals (by 7/10/19)  Patient will:  1. Perform pharyngeal strengthening exercises with mincues. 2. Tolerate swallowing trials of ice chops and small boluses puree without overt s/s of aspiration. 3. Participate in repeat MBS prior to resuming PO for nutrition. 4. Tolerate deflation of tracheostomy cuff for speech production for 20 minutes. MET- Tracheostomy cuff consistently deflated. 5. Tolerate placement of a passu-Malinda speaking valve for speech for 30-45 minute intervals. Outcome: Progressing Towards Goal  Note:   SPEECH LANGUAGE PATHOLOGY TREATMENT    Patient: Tru Lozano (1 y.o. female)  Date: 7/4/2019  Diagnosis: Chronic respiratory failure ([facility] [J96.10] <principal problem not specified>       Time in: 0930 1330  Time Out:  1000 1415  SUBJECTIVE:   Patient stated ? I can't get to sleep at night. I can't get comfortable and stop thinking about things? .    OBJECTIVE:   Mental Status:  Mrs. Alexis Costello was awake and cooperative, but complaining of fatigue due to poor sleep. Treatment & Interventions:   Patient was seen for two speech therapy sessions today, morning and afternoon. The initial minutes of both sessions were devoted to listening to the patient's concerns and trying to alleviate some of her concerns, particularly those about returning home.       Mrs. Alexis Costello wore the Passy-Liebenthal Valve three times in these sessions, each time for 15-20 minutes without distress. Voicing varied with the PMV in place from very breathy and low volume to a louder more audible audible (yet still breathy voice). This morning, patient took several small boluses of applesauce with the SLP. She initially tolerated these quite well. However, after the 4th bolus, she demonstrated strong cough with the valve coming off with her cough. There was no evidence of the material presented in her sputum in or out of the tracheostomy after coughing. Today, Patient was able to communicate verbally with her nurse and express some concerns in a conversational format, which appeared to make her more comfortable. Voice:  Variable volume, breathy voice with PMV in place. Response & Tolerance to Activities: Though she continues to be quite anxious, Mrs. Misael Lomeli participates will with the SLP and is making progress with speech and swallowing trials. Pain:  Pain Scale 1: Numeric (0 - 10)  No report of pain     After treatment:   ?       Patient left in no apparent distress sitting up in chair  ? Patient left in no apparent distress in bed  ? Call bell left within reach  ? Nursing notified  ? Caregiver present  ? Bed alarm activated    ASSESSMENT:   Progression toward goals:  ?       Improving appropriately and progressing toward goals  ? Improving slowly and progressing toward goals  ? Not making progress toward goals and plan of care will be adjusted    PLAN:   Patient continues to benefit from skilled intervention to address the above impairments. Continue treatment per established plan of care.   Discharge Recommendations:  Home Health    Estimated LOS: Through 7/12/19    CHINMAY Sarabia  Time Calculation:  75 Minutes

## 2019-07-05 PROBLEM — I11.9 HYPERTENSIVE HEART DISEASE WITHOUT HEART FAILURE: Chronic | Status: ACTIVE | Noted: 2017-05-17

## 2019-07-05 PROBLEM — F32.A DEPRESSION: Chronic | Status: ACTIVE | Noted: 2018-08-24

## 2019-07-05 PROBLEM — Z93.1 STATUS POST INSERTION OF PERCUTANEOUS ENDOSCOPIC GASTROSTOMY (PEG) TUBE (HCC): Status: ACTIVE | Noted: 2019-06-20

## 2019-07-05 PROBLEM — Z78.9 IMPAIRED MOBILITY AND ADLS: Status: ACTIVE | Noted: 2019-06-25

## 2019-07-05 PROBLEM — F32.A DEPRESSION: Status: ACTIVE | Noted: 2018-08-24

## 2019-07-05 PROBLEM — J38.3 VOCAL CORD DYSFUNCTION: Status: ACTIVE | Noted: 2019-04-04

## 2019-07-05 PROBLEM — R13.10 DYSPHAGIA: Status: ACTIVE | Noted: 2019-06-25

## 2019-07-05 PROBLEM — Z98.890 HISTORY OF TRACHEOSTOMY: Status: ACTIVE | Noted: 2019-06-03

## 2019-07-05 PROBLEM — E03.9 ACQUIRED HYPOTHYROIDISM: Chronic | Status: ACTIVE | Noted: 2017-05-17

## 2019-07-05 PROBLEM — Z74.09 IMPAIRED MOBILITY AND ADLS: Status: ACTIVE | Noted: 2019-06-25

## 2019-07-05 PROBLEM — Z86.39 HISTORY OF VITAMIN D DEFICIENCY: Chronic | Status: ACTIVE | Noted: 2017-08-28

## 2019-07-05 LAB
GLUCOSE BLD STRIP.AUTO-MCNC: 197 MG/DL (ref 70–110)
GLUCOSE BLD STRIP.AUTO-MCNC: 269 MG/DL (ref 70–110)
GLUCOSE BLD STRIP.AUTO-MCNC: 281 MG/DL (ref 70–110)

## 2019-07-05 PROCEDURE — 97116 GAIT TRAINING THERAPY: CPT

## 2019-07-05 PROCEDURE — 74011250637 HC RX REV CODE- 250/637

## 2019-07-05 PROCEDURE — 74011250637 HC RX REV CODE- 250/637: Performed by: EMERGENCY MEDICINE

## 2019-07-05 PROCEDURE — 97530 THERAPEUTIC ACTIVITIES: CPT

## 2019-07-05 PROCEDURE — 92507 TX SP LANG VOICE COMM INDIV: CPT

## 2019-07-05 PROCEDURE — 97110 THERAPEUTIC EXERCISES: CPT

## 2019-07-05 PROCEDURE — 65310000000 HC RM PRIVATE REHAB

## 2019-07-05 PROCEDURE — 74011636637 HC RX REV CODE- 636/637: Performed by: FAMILY MEDICINE

## 2019-07-05 PROCEDURE — 74011250636 HC RX REV CODE- 250/636: Performed by: EMERGENCY MEDICINE

## 2019-07-05 PROCEDURE — 97542 WHEELCHAIR MNGMENT TRAINING: CPT

## 2019-07-05 PROCEDURE — 77030037875 HC DRSG MEPILEX <16IN BORD MOLN -A

## 2019-07-05 PROCEDURE — 74011636637 HC RX REV CODE- 636/637: Performed by: INTERNAL MEDICINE

## 2019-07-05 PROCEDURE — 74011636637 HC RX REV CODE- 636/637: Performed by: EMERGENCY MEDICINE

## 2019-07-05 PROCEDURE — 74011250636 HC RX REV CODE- 250/636: Performed by: INTERNAL MEDICINE

## 2019-07-05 PROCEDURE — 82962 GLUCOSE BLOOD TEST: CPT

## 2019-07-05 PROCEDURE — 97535 SELF CARE MNGMENT TRAINING: CPT

## 2019-07-05 PROCEDURE — 74011250637 HC RX REV CODE- 250/637: Performed by: INTERNAL MEDICINE

## 2019-07-05 PROCEDURE — 92526 ORAL FUNCTION THERAPY: CPT

## 2019-07-05 RX ORDER — TEMAZEPAM 7.5 MG/1
7.5 CAPSULE ORAL
Status: DISCONTINUED | OUTPATIENT
Start: 2019-07-05 | End: 2019-07-10

## 2019-07-05 RX ORDER — DOCUSATE SODIUM 100 MG/1
100 CAPSULE, LIQUID FILLED ORAL 2 TIMES DAILY
Status: DISCONTINUED | OUTPATIENT
Start: 2019-07-05 | End: 2019-07-08

## 2019-07-05 RX ORDER — ENOXAPARIN SODIUM 100 MG/ML
40 INJECTION SUBCUTANEOUS EVERY 24 HOURS
Status: DISCONTINUED | OUTPATIENT
Start: 2019-07-05 | End: 2019-07-13 | Stop reason: HOSPADM

## 2019-07-05 RX ORDER — TEMAZEPAM 7.5 MG/1
7.5 CAPSULE ORAL
Status: DISCONTINUED | OUTPATIENT
Start: 2019-07-05 | End: 2019-07-05

## 2019-07-05 RX ORDER — GUAIFENESIN 100 MG/5ML
200 SOLUTION ORAL EVERY 12 HOURS
Status: DISCONTINUED | OUTPATIENT
Start: 2019-07-05 | End: 2019-07-13 | Stop reason: HOSPADM

## 2019-07-05 RX ORDER — UREA 10 %
1 LOTION (ML) TOPICAL
Status: DISCONTINUED | OUTPATIENT
Start: 2019-07-05 | End: 2019-07-13 | Stop reason: HOSPADM

## 2019-07-05 RX ORDER — INSULIN GLARGINE 100 [IU]/ML
34 INJECTION, SOLUTION SUBCUTANEOUS
Status: DISCONTINUED | OUTPATIENT
Start: 2019-07-06 | End: 2019-07-11

## 2019-07-05 RX ORDER — METOPROLOL TARTRATE 25 MG/1
25 TABLET, FILM COATED ORAL EVERY 12 HOURS
Status: DISCONTINUED | OUTPATIENT
Start: 2019-07-05 | End: 2019-07-13 | Stop reason: HOSPADM

## 2019-07-05 RX ADMIN — SODIUM CHLORIDE AND POTASSIUM CHLORIDE: 4.5; 1.49 INJECTION, SOLUTION INTRAVENOUS at 08:57

## 2019-07-05 RX ADMIN — PANTOPRAZOLE SODIUM 40 MG: 40 GRANULE, DELAYED RELEASE ORAL at 06:32

## 2019-07-05 RX ADMIN — INSULIN GLARGINE 34 UNITS: 100 INJECTION, SOLUTION SUBCUTANEOUS at 08:57

## 2019-07-05 RX ADMIN — LEVOFLOXACIN 750 MG: 750 TABLET, FILM COATED ORAL at 18:13

## 2019-07-05 RX ADMIN — METOPROLOL TARTRATE 25 MG: 25 TABLET ORAL at 22:47

## 2019-07-05 RX ADMIN — METOCLOPRAMIDE HYDROCHLORIDE 5 MG: 5 TABLET ORAL at 13:09

## 2019-07-05 RX ADMIN — INSULIN LISPRO 6 UNITS: 100 INJECTION, SOLUTION INTRAVENOUS; SUBCUTANEOUS at 05:44

## 2019-07-05 RX ADMIN — DOCUSATE SODIUM 100 MG: 100 CAPSULE, LIQUID FILLED ORAL at 09:00

## 2019-07-05 RX ADMIN — METOCLOPRAMIDE HYDROCHLORIDE 5 MG: 5 TABLET ORAL at 09:00

## 2019-07-05 RX ADMIN — LORAZEPAM 1 MG: 0.5 TABLET ORAL at 22:40

## 2019-07-05 RX ADMIN — INSULIN LISPRO 4 UNITS: 100 INJECTION, SOLUTION INTRAVENOUS; SUBCUTANEOUS at 12:16

## 2019-07-05 RX ADMIN — LEVOTHYROXINE SODIUM 200 MCG: 75 TABLET ORAL at 05:41

## 2019-07-05 RX ADMIN — OXYCODONE HYDROCHLORIDE AND ACETAMINOPHEN 1 TABLET: 5; 325 TABLET ORAL at 21:40

## 2019-07-05 RX ADMIN — GUAIFENESIN 200 MG: 200 SOLUTION ORAL at 22:40

## 2019-07-05 RX ADMIN — TEMAZEPAM 7.5 MG: 7.5 CAPSULE ORAL at 21:00

## 2019-07-05 RX ADMIN — Medication 1 MG: at 21:00

## 2019-07-05 NOTE — PROGRESS NOTES
Problem: Self Care Deficits Care Plan (Adult)  Goal: *Therapy Goal (Edit Goal, Insert Text)  Description  Occupational Therapy Goals   Long Term Goals  Initiated 19 and to be accomplished within 2 week(s) 7/10/19  1. Pt will perform functional activity up to 15 minutes with no more than 2 rest breaks and 2 vcs to attend to task. .   2. Pt will perform grooming with S.           3. Pt will perform UB bathing with S .       4. Pt will perform LB bathing with S.   5. Pt will perform tub/shower transfer with S.  6. Pt will perform UB dressing with S.    7. Pt will perform LB dressing with S.    8. Pt will perform toileting task with I.      9. Pt will perform toilet transfer with S.       Short Term Goals   Initiated 19 and to be accomplished within 7 day(s) 7/3/19. Updated 7/3/19  1. Pt will perform functional task up to 10 minutes with no more than 3 verbal cues to attend to task and 3 rest breaks. 2. Pt will perform grooming with SBA. .  7/3/19  3. Pt will perform UB bathing with S and no more than 3 vcs to  Initiate and complete task. 4. Pt will perform LB bathing with [place] and no more than 3 vcs to  complete task. 5. Pt will perform tub/shower transfer with [facility].  7/3/19-S  6. Pt will perform UB dressing with [place] and no more than 3 vcs to  initiate and complete task. .  7. Pt will perform LB dressing with [place] and no more than 3 vcs to initiate and  complete task. 8. Pt will perform toileting task with S. 7/3/19  9. Pt will perform toilet transfer with S and no more than 3 vcs to initiate and complete task.  7/3/19     OCCUPATIONAL THERAPY TREATMENT    Patient: Ling Stephenson   63 y.o. Patient identified with name and :yes    Date: 2019    First Tx Session  Time In: 56  Time Out[de-identified] 1100    Diagnosis: Chronic respiratory failure ([facility] [address] [J96.10]   Precautions: Aspiration, Fall  Chart, occupational therapy assessment, plan of care, and goals were reviewed.      Pain:  Pt reports 8/10 pain or discomfort prior to treatment. Pt reports 8/10 pain or discomfort post treatment. Intervention Provided: N/A      SUBJECTIVE:   Patient stated \" I am not moving my arm\"    OBJECTIVE DATA SUMMARY:     THERAPEUTIC EXERCISE Daily Assessment    Pt propelled w/c from room to gym with Maxa to  mange IV/O2. Pt refused to perform any exercises at left arm distally and proximally due to reports of pain in deltoids/bursitis. Pt performed external/internal rotation and arm raises 3x10 at left UE with 2lb weight. Pt dismissed any education on importance of using right UE.       TOILETING Daily Assessment     Toileting with total asst to manage medical equipment. MOBILITY/TRANSFERS Daily Assessment     Chair<--> w/c transfer with SBA to manage medical equipment        ASSESSMENT:  Pt continues to make minimal progress towards goals. Pt requires S and increased time to complete most tasks and assist to manage O2/IV. Progression toward goals:  ?          Improving appropriately and progressing toward goals  ?x          Improving slowly and progressing toward goals  ? Not making progress toward goals and plan of care will be adjusted     PLAN:  Patient continues to benefit from skilled intervention to address the above impairments. Continue treatment per established plan of care. Discharge Recommendations:  Home Health  Further Equipment Recommendations for Discharge: 3 in 1 bedside commode,  shower transfer chair      Activity Tolerance:  Poor      Estimated LOS: 7/12/19    Please refer to the flowsheet for vital signs taken during this treatment. After treatment:   ? x Patient left in no apparent distress sitting up in chair   ? Patient left in no apparent distress in bed  ? Call bell left within reach  ? Nursing notified  ? Caregiver present  ? Bed alarm activated    COMMUNICATION/EDUCATION:   ? Home safety education was provided and the patient/caregiver indicated understanding.   ? xPatient/family have participated as able in goal setting and plan of care. ? Patient/family agree to work toward stated goals and plan of care. ? Patient understands intent and goals of therapy, but is neutral about his/her participation. ? Patient is unable to participate in goal setting and plan of care.       Yoseph Marte, OT    Outcome: Progressing Slowly Towards Goal

## 2019-07-05 NOTE — PROGRESS NOTES
Problem: Mobility Impaired (Adult and Pediatric)  Goal: *Therapy Goal (Edit Goal, Insert Text)  Description  Physical Therapy Goals: STG  Initiated 6/26/2019, reassessed 7/3/19 and to be accomplished 7/9/2019:   1. Patient will move from supine to sit and sit to supine , scoot up and down and roll side to side in bed with supervision/set-up. ( 7/3/2019 MET)     2. Patient will transfer from bed to chair and chair to bed with supervision/set-up using the least restrictive device. (7/3/2019 MET)  3. Patient will perform sit to stand with supervision/set-up. (7/3/2019 MET)  4. Patient will ambulate with supervision/set-up for 50 feet with the least restrictive device. 5.  Patient will ascend/descend 3 stairs with 2 handrail(s) with minimal assistance/contact guard assist.     Physical Therapy Goals: LTG  Initiated 6/26/2019 and to be accomplished within 14 day(s) 7/10/2019:  1. Patient will move from supine to sit and sit to supine , scoot up and down and roll side to side in bed with modified independence. 2.  Patient will transfer from bed to chair and chair to bed with modified independence using the least restrictive device. 3.  Patient will perform sit to stand with modified independence. 4.  Updated 7/5/2019: Patient will ambulate with modified independence for 150 feet with the least restrictive device. 5.  Patient will ascend/descend 3 stairs with 2 handrail(s) with supervision/set-up. Outcome: Progressing Towards Goal   PHYSICAL THERAPY TREATMENT    Patient: Alysha Michael (66 y.o. female)  Date: 7/5/2019  Diagnosis: Chronic respiratory failure ([facility]) [J96.10] Vocal cord dysfunction  Precautions: Aspiration, Fall  Chart, physical therapy assessment, plan of care and goals were reviewed. Time In: 0818  Time Out: 6125  Patient Seen For: Gait training;Patient education;Transfer training; Wheelchair mobility  Pain:  Patient indicating discomfort at abdomen, IV site on left forearm. Patient identified with name and : yes    SUBJECTIVE:     Patient needing encouragement to participate initially. Requiring extra time to perform all tasks. Reporting toward end of session feeling \"clogged\" so nurse notified. OBJECTIVE DATA SUMMARY:   Objective:       BED/MAT MOBILITY Daily Assessment    Supine to Sit : 6 (Modified independent)(elevated head of bed)    Head of bed elevated greater than required 30 degrees. Patient maintains this position at all times. TRANSFERS Daily Assessment    Transfer Type: Other  Other: stand step transfer without AD  Transfer Assistance : 5 (Supervision/setup)  Sit to Stand Assistance: Supervision    Supervision for transfers without AD . Needing significant assist for management of oxygen tubing, IV line and pole, PEG tubing. GAIT Daily Assessment    Amount of Assistance: 5 (Stand-by assistance)  Distance (ft): 170 Feet (ft)(170 ft, then additional 70 ft back into room)  Assistive Device: Walker, rollator(Therapist assisting with all lines/tubing)     Gait emphasis on taking standing and seated rests as needed. Patient on 2LPM O2 via trach, SpO2% was 98-99% throughout session including after gait bouts. Cued for appropriate transfers to/from rollator seat during seated rests in between walking bouts. In PM, negotiating 2 stairs using sidestepping method (right handrail) CG/SBA for steadying support/safety. PT managing all lines/tubing. Patient reporting after coming down stairs that she was having more difficulty with catching her breath and needing to sit down. SpO2% 99%. Patient observed to be quite anxious as she attempted coughing, noted to have clear sputum. Patient returned back to room and nurse notified regarding patient's reports.         BALANCE Daily Assessment    Sitting - Static: Good (unsupported)  Sitting - Dynamic: Good (unsupported)  Standing - Static: Fair  Standing - Dynamic : Impaired       WHEELCHAIR MOBILITY Daily Assessment Able to Propel (ft): 150 feet  Functional Level: 4  Curbs/Ramps Assist Required (FIM Score): 0 (Not tested)  Wheelchair Setup Assist Required : 4 (Minimal assistance)  Wheelchair Management: Manages left brake;Manages right brake       ASSESSMENT:  Patient continues to be quite anxious with any activity that results in increased shortness of breath. Recommend ongoing endurance training, strengthening with establishment of HEP. Patient not yet able to tolerate negotiation of 3 stairs (requirement for return back home). Patient recommended to have ongoing skilled PT to improve safety with gait pattern as well. Using rollator for ease of longer distance gait. Progression toward goals:  ?      Improving appropriately and progressing toward goals  ? Improving slowly and progressing toward goals  ? Not making progress toward goals and plan of care will be adjusted     PLAN:  Patient continues to benefit from skilled intervention to address the above impairments. Continue treatment per established plan of care. Discharge Recommendations:  Home Health  Further Equipment Recommendations for Discharge:  rollator and wheelchair 22 inch with cushion as patient with difficulty consistently participating in functional mobility due to breathing difficulty observed. Estimated LOS: 2-3 weeks    Activity Tolerance:   Fair to poor depending on breathing difficulty reported. SpO2% 97-99% throughout treatment. After treatment:   Patient left seated in recliner chair with family in room, call button within reach with hand off to nursing.        Yesi Adler, PT  7/5/2019

## 2019-07-05 NOTE — PROGRESS NOTES
[x] Psychology  [] Social Work [] Recreational Therapy    INTERVENTION  UNITS/TIME OF SERVICE   Assessment    Supportive Counseling July 3, 2019   Orientation    Discharge Planning    Resource Linkage              Progress/Current Status    Patient seen for individual support  on ARU on above referenced date. Patient found sitting upright in wheelchair in room and not in distress. She acknowledges that she is making progress in therapy program though describes continued problems for herself with anxiety interference. She especially fears \"not being able to breathe\" and her distress is complicated by feeling that she is sometimes \"pushed to perform, but they do not understand what it is like when I cannot catch my breath. \"  Recommendations for relaxation response were made and patient is actually observed to make good effort at deep breathing exercise (to the extent that she is able to inhale). In spite of her good efforts with breathing to relax, patient remains frustrated in feeling that her needs are not wholly taken into account. Patient was strongly encouraged to dialogue with therapists to the best of her ability and thereby give her input in session so that she she does not feel that she is being disregarded. She maintained calm and composure throughout this session and did not display any lability, as had been observed earlier, on admission.     Caren Lopez, THE [facility] 7/5/2019 10:32 AM

## 2019-07-05 NOTE — PROGRESS NOTES
Bolus feed given per PEG. Jevity 1.5 120cc given with 150cc H20 flush. 0 gastric residual noted. Patient tolerated well. Patient is having anxiety issues this evening. States she is not breathing well despite vital signs of 98.2-64-/70-99%. Patient does not appear in acute distress,  However is tearful. Attempted slow and easy breathing techniques and just sitting with patient and talking. Will notify MD.    Patient refused insulin 2 units and Lovenox. States she will take the Lovenox later. Also refused her tube feeding of 120cc. Patient is calmer now, still a little tearful. Will continue to monitor on next shift.

## 2019-07-05 NOTE — PROGRESS NOTES
[address]  [address] [place] [address] [address]     INPATIENT REHABILITATION  DAILY PROGRESS NOTE     Date: 7/5/2019    Name: Georgie Hatch Age / Sex: 63 y.o. / female   CSN: [id number] MRN: [id number]   Admit Date: 6/25/2019 Length of Stay: 10 days     Primary Rehab Diagnosis: Generalized Weakness with Impaired Mobility and ADLs secondary to Vocal cord dysfunction status post thyroidectomy (4/4/2019 - Dr. Josiane Ott) with residual dysphagia and dysarthria; S/P PEG tube insertion (6/20/2019) - Dr. Jsoiane Ott)       Subjective:     Patient seen and examined. Blood pressure controlled. Blood sugars fairly controllled. No fever over the past 24 hours. Patient's Complaint:   Has problem sleeping  Sensation of difficulty of breathing despite normal O2 saturation on pulse oximeter   Episodes of panic    Pain Control: no current joint or muscle symptoms, essentially pain-free      Objective:     Vital Signs:  Patient Vitals for the past 24 hrs:   BP Temp Pulse Resp SpO2   07/05/19 0900 122/71 -- -- -- --   07/05/19 0756 122/71 97 °F (36.1 °C) 67 18 97 %   07/04/19 2153 130/71 98 °F (36.7 °C) 62 18 98 %   07/04/19 1938 114/71 -- 63 -- 98 %   07/04/19 1612 136/73 98.7 °F (37.1 °C) 65 18 98 %        Physical Examination:  GENERAL SURVEY: Patient is awake, alert, oriented x 3, sitting comfortably on the chair, not in acute respiratory distress.   HEENT: pink palpebral conjunctivae, anicteric sclerae, no nasoaural discharge, moist oral mucosa  NECK: (+) tracheostomy in place, supple, no jugular venous distention, no palpable lymph nodes  CHEST/LUNGS: symmetrical chest expansion, good air entry, clear breath sounds  HEART: adynamic precordium, good S1 S2, no S3, regular rhythm, no murmurs  ABDOMEN: (+) PEG tube in place, obese, bowel sounds appreciated, soft, non-tender  EXTREMITIES: pink nailbeds, no edema, full and equal pulses, no calf tenderness   NEUROLOGICAL EXAM: The patient is awake, alert and oriented x 3, able to answer questions fairly appropriately, able to follow 1 and 2 step commands. Able to tell time from the wall clock. Cranial nerves II-XII are grossly intact except for dysphagia. No gross sensory deficit. Motor strength is 4+/5 on all 4 extremities. Current Medications:  Current Facility-Administered Medications   Medication Dose Route Frequency    insulin glargine (LANTUS) injection 34 Units  34 Units SubCUTAneous DAILY    levoFLOXacin (LEVAQUIN) tablet 750 mg  750 mg Per G Tube Q24H    simethicone (MYLICON) 78LX/8.8XL oral drops 40 mg  40 mg Per G Tube QID PRN    pantoprazole (PROTONIX) granules for oral suspension 40 mg  40 mg Per G Tube ACB    ondansetron hcl (ZOFRAN) tablet 4 mg  4 mg Per G Tube Q6H PRN    levothyroxine (SYNTHROID) tablet 200 mcg  200 mcg Per G Tube 6am    metoprolol tartrate (LOPRESSOR) tablet 25 mg  25 mg Per G Tube BID    enoxaparin (LOVENOX) injection 40 mg  40 mg SubCUTAneous Q24H    oxyCODONE-acetaminophen (PERCOCET) 5-325 mg per tablet 1 Tab  1 Tab Oral Q6H PRN    metoclopramide HCl (REGLAN) tablet 5 mg  5 mg Per G Tube TID    insulin lispro (HUMALOG) injection   SubCUTAneous Q6H    glucose chewable tablet 16 g  4 Tab Oral PRN    glucagon (GLUCAGEN) injection 1 mg  1 mg IntraMUSCular PRN    LORazepam (ATIVAN) tablet 1 mg  1 mg Oral Q12H PRN    docusate sodium (COLACE) capsule 100 mg  100 mg Per G Tube BID    bisacodyl (DULCOLAX) suppository 10 mg  10 mg Rectal Q48H PRN    albuterol-ipratropium (DUO-NEB) 2.5 MG-0.5 MG/3 ML  3 mL Nebulization Q4H PRN    dextrose 10% infusion 125-250 mL  125-250 mL IntraVENous PRN    0.45% sodium chloride with KCl 20 mEq/L infusion   IntraVENous CONTINUOUS       Allergies:   Allergies   Allergen Reactions    Other Food Other (comments)     Artificial sweeteners- causes headaches    Metformin Other (comments)     Increase pain in feet and swelling in feet  Increased hunger,tired and bilat foot pain    Morphine Other (comments)     headache    Singulair [Montelukast] Other (comments)     hallucinations    Sucrose Other (comments)     Pt. Is not allergic to sucrose. She develops headaches with artificial sweeteners. Lab/Data Review:  Recent Results (from the past 24 hour(s))   GLUCOSE, POC    Collection Time: 07/04/19 11:55 AM   Result Value Ref Range    Glucose (POC) 278 (H) 70 - 110 mg/dL   GLUCOSE, POC    Collection Time: 07/04/19  5:35 PM   Result Value Ref Range    Glucose (POC) 224 (H) 70 - 110 mg/dL   GLUCOSE, POC    Collection Time: 07/04/19 11:48 PM   Result Value Ref Range    Glucose (POC) 162 (H) 70 - 110 mg/dL   GLUCOSE, POC    Collection Time: 07/05/19  5:20 AM   Result Value Ref Range    Glucose (POC) 281 (H) 70 - 110 mg/dL       Assessment:     Primary Rehab Diagnosis  1. Generalized Weakness with Impaired Mobility and ADLs  2.  Vocal cord dysfunction status post thyroidectomy (4/4/2019 - Dr. Elizabeth Pinto) with residual dysphagia and dysarthria; S/P PEG tube insertion (6/20/2019) - Dr. Elizabeth Pinto)     Comorbidities   Obstructive sleep apnea G47.33    H/O aortic valve replacement Z95.2    History of bicuspid aortic valve Z87.74    Chronic back pain M54.9, G89.29    Chronic left shoulder pain M25.512, G89.29    Obesity, Class II, BMI 35-39.9 E66.9    Left shoulder tendinitis M75.82    Spondylosis of lumbar region without myelopathy or radiculopathy M47.816    Chronic pain of both shoulders M25.511, G89.29, M25.512    Chronic pain of both knees M25.561, M25.562, G89.29    Chronic pain syndrome G89.4    Encounter for long-term (current) use of medications Z79.899    Chronic midline low back pain M54.5, G89.29    Lumbar facet arthropathy M47.816    Lumbar degenerative disc disease M51.36    Venous stasis dermatitis of both lower extremities I87.2    Diabetic neuropathy associated with type 2 diabetes mellitus  E11.40    Acquired hypothyroidism E03.9  Hypertensive heart disease without heart failure I11.9    Dyslipidemia E78.5    Depression F32.9    Chronic diastolic congestive heart failure  I50.32    Type 2 diabetes mellitus with diabetic neuropathy, with long-term current use of insulin  E11.40, Z79.4    Stridor R06.1    Fever R50.9    Chronic respiratory failure J96.10    Vertigo R42    Panic attacks F41.0    Left ear hearing loss H91.92    History of vitamin D deficiency Z86.39        Plan:     1. Justification for continued stay: Good progression towards established rehabilitation goals. 2. Medical Issues being followed closely:    [x]  Fall and safety precautions     []  Wound Care     [x]  Bowel and Bladder Function     [x]  Fluid Electrolyte and Nutrition Balance     []  Pain Control      3. Issues that 24 hour rehabilitation nursing is following:    [x]  Fall and safety precautions     []  Wound Care     [x]  Bowel and Bladder Function     [x]  Fluid Electrolyte and Nutrition Balance     []  Pain Control      [x]  Assistance with and education on in-room safety with transfers to and from the bed, wheelchair, toilet and shower. 4. Acute rehabilitation plan of care:    [x]  Continue current care and rehab. [x]  Physical Therapy           [x]  Occupational Therapy           [x]  Speech Therapy     []  Hold Rehab until further notice     5. Medications:    [x]  MAR Reviewed     [x]  Continue Present Medications     6. DVT Prophylaxis:      [x]  Lovenox     []  Unfractionated Heparin     []  Coumadin     []  NOAC     []  DINORA Stockings     []  Sequential Compression Device     []  None     7.  Orders:   > Vocal cord dysfunction status post thyroidectomy (4/4/2019 - Dr. Vipin Ge) with residual dysphagia and dysarthria; S/P PEG tube insertion (6/20/2019) - Dr. Alexy Kilpatrick)    > Add Guaifenesin 200 mg via PEG tube q 12 hr   > Continue:    > Levofloxacin 750 mg via PEG tube q 24 hr x 7 days    > Metoclopramide 5 mg via PEG tube TID    > Pantoprazole 40 mg via PEG tube once daily    > Hypertensive heart disease without heart failure   > Continue Metoprolol tartrate 25 mg via PEG tube q 12 hr (9AM, 9PM)    > Hypothyroidism, S/P Thyroidectomy (4/4/2019 - Dr. Zelalem Ordonez)    > TSH (6/11/2019) = 42.50   > Upon admission to the ARU, patient was on Levothyroxine 200 mcg via PEG tube q AM   > TSH (6/26/2019) = 54.20   > Decrease Levothyroxine from 200 mcg to 175 mcg via PEG tube q AM    > Type 2 diabetes mellitus with diabetic neuropathy, with long-term current use of insulin   > Continue:    > Lantus 34 units SC q AM    > Humalog insulin sliding scale SC q 6 hr     > Anxiety/Panic attack   > Unable to start SSRI because of potential drug interaction with Metoclopramide   > Once Levofloxacin course is completed, plan to:    > Discontinue Metoclopramide 5 mg via PEG tube TID (re: drug interaction with SSRI's)    > Start trial of Erythromycin 100 mg via PEG tube TID as a prokinetic    > Start an SSRI    > Difficulty sleeping   > Start:    > Melatonin 1 mg via PEG tube q HS    > Temazepam 7.5 mg via PEG tube q HS    > Diet:   > Specifications: NPO   > PEG tube feed: Jevity 1.5 65 ml/hr via PEg tube continuous   > Water flush: 75 ml via PEG tube q 6 hr      8. Patient's progress in rehabilitation and medical issues discussed with the patient. All questions answered to the best of my ability. Care plan discussed with patient and nurse.       Signed:    Liv Nettles MD    July 5, 2019

## 2019-07-05 NOTE — PROGRESS NOTES
Problem: Dysphagia (Adult)  Goal: *Acute Goals and Plan of Care (Insert Text)  Description  Long term goals  Patient will:  1. Perform pharyngeal strengthening exercises with supervision. 2. Tolerate swallowing trials of varied consistenies without over s/s of aspiration. 3. Participate in repeat MBS prior to resuming PO for nutrition. 4. Tolerate deflation of tracheostomy cuff for speech production for 20 minutes. MET  5. Tolerate placement of a passu-[place] speaking valve for speech for 20 minute intervals. MET- increased to 45 minutes. Short term goals (by 7/10/19)  Patient will:  1. Perform pharyngeal strengthening exercises with mincues. 2. Tolerate swallowing trials of ice chops and small boluses puree without overt s/s of aspiration. 3. Participate in repeat MBS prior to resuming PO for nutrition. 4. Tolerate deflation of tracheostomy cuff for speech production for 20 minutes. MET- Tracheostomy cuff consistently deflated. 5. Tolerate placement of a passu-Malinda speaking valve for speech for 30-45 minute intervals. Note:   SPEECH LANGUAGE PATHOLOGY TREATMENT    Patient: Babatunde Jj (72 y.o. female)  Date: 7/5/2019  Diagnosis: Chronic respiratory failure (HCC) [J96.10] Vocal cord dysfunction       Time in: 1130 1500  Time Out:  [id number]  SUBJECTIVE:   Patient stated ? I feel like its harder to breathe today? .    OBJECTIVE:   Mental Status:  Mrs. Darlin Collado was more anxious today, in both the morning and afternoon sessions. Treatment & Interventions:   Patient  was seen for two speech therapy sessions today, morning and afternoon. In both sessions, she had many questions about breathing, talking and swallowing with the tracheostomy tube. Patient was concerned about upper airway swelling causing her to have more difficulty breathing. SLP assured her that the trach was created to bypass any swelling causing airway obstruction.   So long as the trach is not occluded (with secretions, finger or dressings/clothing, the airway is patent through the tracheostomy tube). Mrs. Irene Jarquin also appeared concerned about the change to bolus feedings this afternoon. SLP assured her that this was a positive step and will make her care at home much more manageable. This does not interfere with PO trials or a transition to more PO intake in the hospital or at home. SLP was present for the afternoon bolus feed, and Mrs. Irene Jarquin appeared to tolerate it well, stating only that she felt \"very full\" afterward. Passy-Malinda trials were successful for only brief intervals today (1-3 minutes). Mrs. Irene Jarquin reported more difficulty breathing with each trial.  One swallowing trial with honey thick liquid yielded no overt s/s of aspiration, but patient unable to tolerate valve long enough for further trials. Response & Tolerance to Activities:  Mrs. Irene Jarquin was quite anxious today, which was a barrier to greater succuss with speech and swallowing trials. She continues to communicate well through writing and verbally (for lip reading). Pain:  Pain Scale 1: Numeric (0 - 10)  No report of pain     After treatment:   ?       Patient left in no apparent distress sitting up in chair  ? Patient left in no apparent distress in bed  ? Call bell left within reach  ? Nursing notified  ? Caregiver present  ? Bed alarm activated    ASSESSMENT:   Progression toward goals:  ?       Improving appropriately and progressing toward goals  ? Improving slowly and progressing toward goals  ? Not making progress toward goals and plan of care will be adjusted    PLAN:   Patient continues to benefit from skilled intervention to address the above impairments. Continue treatment per established plan of care.   Discharge Recommendations:  Home Health    Estimated LOS: Through 7/12/19    CHINMAY Pena  Time calculation:  75 Minutes

## 2019-07-05 NOTE — PROGRESS NOTES
Bedside and Verbal shift change report given to Katharina Tadeo RN (oncoming nurse) by Ellie Forbes (offgoing nurse). Report included the following information SBAR, Kardex, MAR and Recent Results. SITUATION:    Code Status: Full Code   Reason for Admission: Chronic respiratory failure ([address] [J96.10]    [facility] day: 10   Problem List:       Hospital Problems  Date Reviewed: 5/16/2019          Codes Class Noted POA    Chronic respiratory failure [facility]) ICD-10-CM: J96.10  ICD-9-CM: 518.83  6/25/2019 Unknown              BACKGROUND:    Past Medical History:   Past Medical History:   Diagnosis Date    Arthritis     Lower back     Asthma     Chronic back pain     Lower back pain    Depression     Diabetes ([address]     Diabetes mellitus ([address]     Hearing loss     Heart failure (HCC)     chronic diastolic heart failure    Left ear hearing loss     pt states nerve damage--- 25% hearing loss, described as \"muffled\"    Memory difficulty     Panic attacks     Ringing of ears     Severe headache     Sleep apnea     SOB (shortness of breath) on exertion     w and w/out exertion    Stomach pain     Thyroid disease     Vertigo          Patient taking anticoagulants yes     ASSESSMENT:    Changes in Assessment Throughout Shift: none     Patient has Central Line: no Reasons if yes:    Patient has Barker Cath: no Reasons if yes:       Last Vitals:     Vitals:    07/04/19 0724 07/04/19 1612 07/04/19 1938 07/04/19 2153   BP: 137/78 136/73 114/71 130/71   Pulse: 64 65 63 62   Resp: 18 18  18   Temp: 97.5 °F (36.4 °C) 98.7 °F (37.1 °C)  98 °F (36.7 °C)   SpO2: 98% 98% 98% 98%   Weight:       Height:            IV and DRAINS (will only show if present)   [REMOVED] Peripheral IV 06/30/19 Left Antecubital-Site Assessment: Clean, dry, & intact  Peripheral IV 07/03/19 Anterior; Left Forearm-Site Assessment: Clean, dry, & intact  [REMOVED] Peripheral IV 06/25/19 Anterior;Proximal;Right Antecubital-Site Assessment: Dry, Intact  PEG/Gastrostomy Tube 06/20/19-Site Assessment: Clean, dry, & intact  [REMOVED] Airway - Tracheostomy Tube 06/03/19 Portex-Site Assessment: Clean, dry, & intact     WOUND (if present)   Wound Type:         Dressing present Dressing Present : No   Wound Concerns/Notes:  none     PAIN    Pain Assessment    Pain Intensity 1: 0 (07/05/19 0404)    Pain Location 1: Head    Pain Intervention(s) 1: Medication (see MAR)    Patient Stated Pain Goal: 0  o Interventions for Pain:  Pain medication    o Intervention effective: yes  o Time of last intervention: see MAR   o Reassessment Completed: yes      Last 3 Weights:  Last 3 Recorded Weights in this Encounter    06/26/19 1430 07/03/19 1852   Weight: 122.2 kg (269 lb 8 oz) 103.9 kg (229 lb)     Weight change:      INTAKE/OUPUT    Current Shift: 07/04 1901 - 07/05 0700  In: 800   Out: -     Last three shifts: 07/03 0701 - 07/04 1900  In: 2709.2 [I.V.:1579.2]  Out: -      LAB RESULTS     No results for input(s): WBC, HGB, HCT, PLT, HGBEXT, HCTEXT, PLTEXT, HGBEXT, HCTEXT, PLTEXT in the last 72 hours. No results for input(s): NA, K, GLU, BUN, CREA, CA, MG, INR in the last 72 hours. No lab exists for component: PT, PTT, INREXT, INREXT    RECOMMENDATIONS AND DISCHARGE PLANNING     1. Pending tests/procedures/ Plan of Care or Other Needs: labs  2.      3. Discharge plan for patient and Needs/Barriers: TBD    4. Estimated Discharge Date: TBD Posted on Whiteboard in Patients Room: no      4. The patient's care plan was reviewed with the oncoming nurse. \"HEALS\" SAFETY CHECK      Fall Risk    Total Score: 3    Safety Measures: Safety Measures: Bed/Chair alarm on, Bed/Chair-Wheels locked, Bed in low position, Call light within reach, Fall prevention (comment), Gripper socks, Side rails X 3    A safety check occurred in the patient's room between off going nurse and oncoming nurse listed above.     The safety check included the below items  Area Items   H  High Alert Medications - Verify all high alert medication drips (heparin, PCA, etc.)   E  Equipment - Suction is set up for ALL patients (with chaitanya)  - Red plugs utilized for all equipment (IV pumps, etc.)  - WOWs wiped down at end of shift.  - Room stocked with oxygen, suction, and other unit-specific supplies   A  Alarms - Bed alarm is set for fall risk patients  - Ensure chair alarm is in place and activated if patient is up in a chair   L  Lines - Check IV for any infiltration  - Barker bag is empty if patient has a Barker   - Tubing and IV bags are labeled   S  Safety   - Room is clean, patient is clean, and equipment is clean. - Hallways are clear from equipment besides carts. - Fall bracelet on for fall risk patients  - Ensure room is clear and free of clutter  - Suction is set up for ALL patients (with chaitanya)  - Hallways are clear from equipment besides carts.    - Isolation precautions followed, supplies available outside room, sign posted     Viktor Beam

## 2019-07-05 NOTE — PROGRESS NOTES
NUTRITION CONSULT    Nutrition Consult: Management of Tube Feeding     RECOMMENDATIONS / PLAN:     - Stop continuous tube feeds at 12pm. Start bolus feeds at 3pm.  Continue with Jevity 1.5, starting with 120 mL (1/2 carton) x 2 feedings, advancing as pt tolerates to 237 mL (1 carton) x 5 feeds and 120 mL (1/2 carton) x 1 feeding (total of 5.5 cartons daily) with water flushes of 150 mL after each bolus feed  - Suggested bolus feeding schedule as pt tolerates:  6am, 9am, 12pm, 3pm, 6pm, 9pm (the last feeding will only be 120 mL Jevity 1.5 formula)  - Continue IVF until bolus EN at goal.  - Continue RD inpatient monitoring and evaluation. Goal Regimen: bolus tube feeding of Jevity 1.5, 237 mL (1 carton) x 5 feeds and 120 mL (1/2 carton) x 1 feeding (total of 5.5 cartons daily) + water flushes of 150 mL after each bolus feed to provide: 1953 kcal, 83 gm protein, 65 gm fat, 281 gm CHO, 29 gm fiber, 990 mL free water, 1890 mL total water, 100% RDIs     NUTRITION DIAGNOSIS & INTERVENTIONS:     [x] Enteral nutrition: modify; change to bolus feeds  [x] IV Fluids: continue until tube feeding at goal (pt to be changed to bolus feeds, will continue this recommendation)  [x] Collaboration and referral of nutrition care: pt, medical status, nutrition status and recommendation for changing to bolus tube feeding discussed with Dr Beto Guerrero; MD agrees with changing feeds to bolus. Discussed plan with RN    Nutrition Diagnosis: Swallowing difficulty related to dysphagia s/p tracheostomy as evidenced by pt NPO after SLP evaluation/MBS. ASSESSMENT:     7/5: Pt tolerating tube feeding at goal rate. Remains agreeable to trying bolus tube feeds. Wt checked on 7/3    7/3: Per staff report, pt willing to try transitioning to bolus tube feeding. Tolerating feeds at rate of 60 mL/hr. Noted plan for discharge to home on 7/12.  Discussed bolus tube feeding and formula options (Jevity 1.5 versus TwoCal HN) with possible need for additional liquid protein supplement (one appropriate for her given allergies) if remain with Jevity 1.5. Pt agreeable to changing to bolus tube feeding and wants to try with TwoCal HN. Explained to pt plan to hold off on transition at this time per MD; pt verbalized understanding  7/1: Pt at current goal rate of 55 mL/hr; still feels a little full with tube feeding. Discussed possible formula substitution option, TwoCal HN. TwoCal HN and Prosource ingredients reviewed with pt; she has also reviewed ingredients in Jevity 1.5. Upon reviewing ingredients, pt wants to continue with Jevity 1.5 formula at this time and understand can change to TwoCal HN if having difficulty not tolerating Jevity. Pt refusing Prosource supplement; stated will not tolerate. Per RN, pt has been refusing Prosource and has not been receiving the supplement. Pt c/o headache per RN report. Explained to pt, goal rate of tube feeding will have to be increased once Prosource discontinued; pt verbalized understanding  6/28: Pt with therapy at time of visit. Tube feeding remains at 40 mL/hr. Was c/o nausea earlier today; RN provided zofran. Antacid changed to protonix  6/27: Tube feeding remains at 40 mL/hr; pt c/o nausea. Having regular BM, no gastric residuals. Abdomen distended per chart documentation per night nurse. Discussed tube feeding regimen and Prosource supplement; pt agreeable with plan. Noted pt previously reported pepcid \"not working\" when in main hospital; pepcid currently ordered  6/26: Pt lethargic/asleep at time of visit. Per chart review, pt admitted to main hospital with stridor and vocal cord dysfunction s/p tracheostomy placement on 6/3/19, tolerating trach collar; would have to deflate cuff for meals and re-inflate when not eating. Pt had failed MBS x 2 by 6/6/19. DHT was placed by IR. Pt tolerated feeds, receiving Jevity 1.5 due to allergy/intolerance to artificial sweeteners, then pulled out feeding tube.  A diet was started by MD on 6/10. Pt had variable meal intake. PICC placed on 6/13 for TPN. Remained on po diet and TPN; made NPO on  6/15. Diet restarted on 6/18. SLP recommended NPO after repeat MBS on 6/19. S/p PEG placement on 6/20/19 and TPN was stopped after starting tube feeds. Pt had c/o abdominal distention and fullness, emesis. TF held on 6/23 due to concern for ileus; pt having +BMs and flatus, feeds resumed at low rate. Pt tolerated trickle feeds PTA; noted to express desire for receiving cyclic or bolus tube feeding and wanting to eat orally. Currently tolerating tube feeding at 40 mL/hr per RN. Pt did c/o abdominal pain per RN. BG remain elevated; discussed reason for not changing to Glucerna 1.5 (diabetic formula). EN infusion adequate to meet patients estimated nutritional needs:   [x] Yes     [] No   [] Unable to determine at this time    Tube Feeding: Jevity 1.5 at 65 mL/hr  Water Flushes: 75 mL q 6 hours  Residuals: 0 mL    Diet: DIET NPO  DIET TUBE FEEDING      Food Allergies: sweeteners, sucrose  Current Appetite:   [] Good     [] Fair     [] Poor     [x] Other: NPO  Appetite/meal intake prior to admission:   [x] Good     [] Fair     [] Poor     [] Other:  Feeding Limitations:  [x] Swallowing difficulty: SLP following    [] Chewing difficulty    [x] Other: hx of dysphagia s/p thyroidectomy PTA and trach placement 6/3/19  Current Meal Intake:   No data found. BM: 7/5 - loose  Skin Integrity: surgical incision to neck  Edema:  [] No     [x] Yes   Pertinent Medications: Reviewed: 1/2 NS + 20 mEq/L KCL at 50 mL/hr (24 mEq KCl per day), bowel regimen, SSI, reglan, protonix, zofran prn. Future medication: colace, lantus     Labs:   No results for input(s): NA, K, CL, CO2, GLU, BUN, CREA, CA, MG, PHOS, ALB, TBIL, SGOT, ALT in the last 72 hours.     No lab exists for component: [id number]: 12.9 mg/dL (6/14/19)  Triglyceride: 298 mg/dL (6/18/19), 316 mg/dL (6/13/19), 511 mg/dL (4/16/19)  C reactive protein: 4.8 mg/dL (6/18/19)  No results for input(s): WBC, HGB, HCT, PLT, HGBEXT, HCTEXT, PLTEXT, HGBEXT, HCTEXT, PLTEXT in the last 72 hours. Intake/Output Summary (Last 24 hours) at 7/5/2019 1152  Last data filed at 7/5/2019 0402  Gross per 24 hour   Intake 1800 ml   Output --   Net 1800 ml       Anthropometrics:   Ht Readings from Last 1 Encounters:   07/03/19 5' 7\" (1.702 m)     Last 3 Recorded Weights in this Encounter    06/26/19 1430 07/03/19 1852   Weight: 122.2 kg (269 lb 8 oz) 103.9 kg (229 lb)     Body mass index is 35.87 kg/m². Obese, Class III    Weight History: patient denies change in weight PTA, stable     Weight Metrics 7/3/2019 6/25/2019 6/22/2019 6/3/2019 5/23/2019 5/22/2019 5/16/2019   Weight 229 lb - 269 lb 8 oz - 260 lb 6.4 oz - 258 lb   BMI - 35.87 kg/m2 - 42.21 kg/m2 - 40.78 kg/m2 40.41 kg/m2          Admitting Diagnosis: Chronic respiratory failure (HCC) [J96.10]  Pertinent PMHx: T2DM with diabetic neuropathy, HTN, dyslipidemia, CHF, hypothyroidism s/p thyroidectomy 4/4/19    Education Needs:        [x] None identified  [] Identified - Not appropriate at this time  []  Identified and addressed - refer to education log  Learning Limitations:   [] None identified  [x] Identified: nonverbal. Communicates via pen/paper    Cultural, Mandaeism & ethnic food preferences:  [x] None identified    [] Identified and addressed     ESTIMATED NUTRITION NEEDS:     Calories: 7064-7046 kcal (MSJx1-1.2) based on   [x] Actual  kg     [] IBW:   Protein:  gm (0.8-1.2 gm/kg) based on   [x] Actual BW      [] IBW:   Fluid: 1 mL/kcal     MONITORING & EVALUATION:     Nutrition Goals:   1. Nutritional needs will be met through adequate oral intake or nutrition support within the next 5 days.   Outcome:  [x] Met/Ongoing    [] Progressing towards goal    [] Not progressing towards goal    [] New/Initial goal      Monitoring:    [x] Fluid intake   [] Nutrition-focused physical findings   [x] Treatment/therapy   [] Food and beverage intake   [x] Diet order      [] Weight    [x] EN infusion    Previous Recommendations (for follow-up assessments only):     [x]   Implemented       []   Not Implemented (RD to address)      [] No Longer Appropriate     [] No Recommendation Made        Discharge Planning: goal enteral nutrition regimen via PEG   [x]  Participated in care planning, discharge planning, & interdisciplinary rounds as appropriate      Dayron Win, [address] [place] [address]  Pager: [phone]

## 2019-07-06 LAB
GLUCOSE BLD STRIP.AUTO-MCNC: 136 MG/DL (ref 70–110)
GLUCOSE BLD STRIP.AUTO-MCNC: 152 MG/DL (ref 70–110)
GLUCOSE BLD STRIP.AUTO-MCNC: 231 MG/DL (ref 70–110)
GLUCOSE BLD STRIP.AUTO-MCNC: 241 MG/DL (ref 70–110)

## 2019-07-06 PROCEDURE — 65310000000 HC RM PRIVATE REHAB

## 2019-07-06 PROCEDURE — 74011250636 HC RX REV CODE- 250/636: Performed by: INTERNAL MEDICINE

## 2019-07-06 PROCEDURE — 74011636637 HC RX REV CODE- 636/637: Performed by: INTERNAL MEDICINE

## 2019-07-06 PROCEDURE — 97530 THERAPEUTIC ACTIVITIES: CPT

## 2019-07-06 PROCEDURE — 92507 TX SP LANG VOICE COMM INDIV: CPT

## 2019-07-06 PROCEDURE — 74011250636 HC RX REV CODE- 250/636: Performed by: EMERGENCY MEDICINE

## 2019-07-06 PROCEDURE — 74011250637 HC RX REV CODE- 250/637: Performed by: INTERNAL MEDICINE

## 2019-07-06 PROCEDURE — 97110 THERAPEUTIC EXERCISES: CPT

## 2019-07-06 PROCEDURE — 92526 ORAL FUNCTION THERAPY: CPT

## 2019-07-06 PROCEDURE — 97116 GAIT TRAINING THERAPY: CPT

## 2019-07-06 PROCEDURE — 74011250637 HC RX REV CODE- 250/637: Performed by: EMERGENCY MEDICINE

## 2019-07-06 RX ADMIN — GUAIFENESIN 200 MG: 200 SOLUTION ORAL at 09:10

## 2019-07-06 RX ADMIN — GUAIFENESIN 200 MG: 200 SOLUTION ORAL at 21:18

## 2019-07-06 RX ADMIN — METOCLOPRAMIDE HYDROCHLORIDE 5 MG: 5 TABLET ORAL at 09:08

## 2019-07-06 RX ADMIN — SODIUM CHLORIDE AND POTASSIUM CHLORIDE: 4.5; 1.49 INJECTION, SOLUTION INTRAVENOUS at 05:45

## 2019-07-06 RX ADMIN — METOCLOPRAMIDE HYDROCHLORIDE 5 MG: 5 TABLET ORAL at 13:12

## 2019-07-06 RX ADMIN — INSULIN LISPRO 4 UNITS: 100 INJECTION, SOLUTION INTRAVENOUS; SUBCUTANEOUS at 12:39

## 2019-07-06 RX ADMIN — LEVOTHYROXINE SODIUM 175 MCG: 75 TABLET ORAL at 05:46

## 2019-07-06 RX ADMIN — METOCLOPRAMIDE HYDROCHLORIDE 5 MG: 5 TABLET ORAL at 21:19

## 2019-07-06 RX ADMIN — PANTOPRAZOLE SODIUM 40 MG: 40 GRANULE, DELAYED RELEASE ORAL at 05:50

## 2019-07-06 RX ADMIN — Medication 1 MG: at 21:19

## 2019-07-06 RX ADMIN — OXYCODONE HYDROCHLORIDE AND ACETAMINOPHEN 1 TABLET: 5; 325 TABLET ORAL at 12:24

## 2019-07-06 RX ADMIN — INSULIN LISPRO 4 UNITS: 100 INJECTION, SOLUTION INTRAVENOUS; SUBCUTANEOUS at 17:55

## 2019-07-06 RX ADMIN — LEVOFLOXACIN 750 MG: 750 TABLET, FILM COATED ORAL at 18:00

## 2019-07-06 RX ADMIN — TEMAZEPAM 7.5 MG: 7.5 CAPSULE ORAL at 21:19

## 2019-07-06 RX ADMIN — INSULIN LISPRO 2 UNITS: 100 INJECTION, SOLUTION INTRAVENOUS; SUBCUTANEOUS at 00:00

## 2019-07-06 RX ADMIN — INSULIN GLARGINE 34 UNITS: 100 INJECTION, SOLUTION SUBCUTANEOUS at 07:32

## 2019-07-06 RX ADMIN — ENOXAPARIN SODIUM 40 MG: 40 INJECTION SUBCUTANEOUS at 17:56

## 2019-07-06 NOTE — PROGRESS NOTES
Bedside and Verbal shift change report given to Matty Harris RN (oncoming nurse) by Dolores Gee RN (offgoing nurse). Report included the following information SBAR, Kardex, MAR and Recent Results.     SITUATION:    Code Status: Full Code   Reason for Admission: Chronic respiratory failure ([facility] [address] [J96.10]    [facility] day: 6   Problem List:       Hospital Problems  Date Reviewed: 5/16/2019          Codes Class Noted POA    Chronic respiratory failure ([facility] [address] ICD-10-CM: J96.10  ICD-9-CM: 518.83  6/25/2019 Yes        Impaired mobility and ADLs ICD-10-CM: Z74.09  ICD-9-CM: 799.89  6/25/2019 Yes        Dysphagia ICD-10-CM: R13.10  ICD-9-CM: 787.20  6/25/2019 Yes    Overview Signed 7/5/2019 10:50 AM by Pierre Lopez MD     S/P Thyroidectomy (4/4/2019 - Dr. Autumn Madera)             Status post insertion of percutaneous endoscopic gastrostomy (PEG) tube [facility]) ICD-10-CM: Z93.1  ICD-9-CM: V44.1  6/20/2019 Yes    Overview Addendum 7/5/2019 11:39 AM by Pierre Lopez MD     S/P PEG tube insertion (6/20/2019) - Dr. Autumn Madera)             History of tracheostomy ICD-10-CM: Z98.890  ICD-9-CM: V44.0  6/3/2019 Yes    Overview Signed 7/5/2019 10:44 AM by Pierre Lopez MD     S/P Tracheostomy (6/3/2019 - Dr. Autumn Madera)             History of thyroidectomy ICD-10-CM: Z05.802  ICD-9-CM: V15.29  4/4/2019 Yes    Overview Signed 7/5/2019 10:45 AM by Pierre Lopez MD     S/P Thyroidectomy (4/4/2019 - Dr. Autumn Madera)             * (Principal) Vocal cord dysfunction ICD-10-CM: J38.3  ICD-9-CM: 478.5  4/4/2019 Yes        Type 2 diabetes mellitus with diabetic neuropathy, with long-term current use of insulin (HCC) (Chronic) ICD-10-CM: E11.40, Z79.4  ICD-9-CM: 250.60, 357.2, V58.67  Unknown Yes        Acquired hypothyroidism (Chronic) ICD-10-CM: E03.9  ICD-9-CM: 244.9  5/17/2017 Yes        Hypertensive heart disease without heart failure (Chronic) ICD-10-CM: I11.9  ICD-9-CM: 402.90  5/17/2017 Yes BACKGROUND:    Past Medical History:   Past Medical History:   Diagnosis Date    Acquired hypothyroidism 5/17/2017    Chronic back pain     Lower back pain    Depression     Diabetic neuropathy associated with type 2 diabetes mellitus ([facility] [address]     Dysphagia 6/25/2019    S/P Thyroidectomy (4/4/2019 - Dr. Nieves Hopkins)    History of asthma     History of heart failure     chronic diastolic heart failure    History of vitamin D deficiency 8/28/2017    Hypertensive heart disease without heart failure 5/17/2017    Left ear hearing loss     pt states nerve damage--- 25% hearing loss, described as \"muffled\"    Memory difficulty     Obesity, Class II, BMI 35-39.9 11/11/2016    Obstructive sleep apnea 11/6/2015    Panic attacks     Ringing of ears     Type 2 diabetes mellitus with diabetic neuropathy, with long-term current use of insulin ([facility])     Vertigo     Vocal cord dysfunction 4/4/2019         Patient taking anticoagulants yes     ASSESSMENT:    Changes in Assessment Throughout Shift: none     Patient has Central Line: no Reasons if yes:    Patient has Barker Cath: no Reasons if yes:       Last Vitals:     Vitals:    07/05/19 0756 07/05/19 0900 07/05/19 1644 07/05/19 2200   BP: 122/71 122/71 116/70 143/88   Pulse: 67  64 64   Resp: 18  16 18   Temp: 97 °F (36.1 °C)  98.2 °F (36.8 °C) 97 °F (36.1 °C)   SpO2: 97%  99% 99%   Weight:       Height:            IV and DRAINS (will only show if present)   [REMOVED] Peripheral IV 06/30/19 Left Antecubital-Site Assessment: Clean, dry, & intact  Peripheral IV 07/03/19 Anterior; Left Forearm-Site Assessment: Clean, dry, & intact  [REMOVED] Peripheral IV 06/25/19 Anterior;Proximal;Right Antecubital-Site Assessment: Dry, Intact  PEG/Gastrostomy Tube 06/20/19-Site Assessment: Clean, dry, & intact  [REMOVED] Airway - Tracheostomy Tube 06/03/19 Portex-Site Assessment: Clean, dry, & intact     WOUND (if present)   Wound Type:         Dressing present Dressing Present : No   Wound Concerns/Notes:  none     PAIN    Pain Assessment    Pain Intensity 1: 0 (07/06/19 0742)    Pain Location 1: Abdomen, Neck    Pain Intervention(s) 1: Medication (see MAR)    Patient Stated Pain Goal: 0  o Interventions for Pain:  Pain medication    o Intervention effective: yes  o Time of last intervention: see MAR   o Reassessment Completed: yes      Last 3 Weights:  Last 3 Recorded Weights in this Encounter    06/26/19 1430 07/03/19 1852   Weight: 122.2 kg (269 lb 8 oz) 103.9 kg (229 lb)     Weight change:      INTAKE/OUPUT    Current Shift: No intake/output data recorded. Last three shifts: 07/04 1901 - 07/06 0700  In: 2405   Out: -      LAB RESULTS     No results for input(s): WBC, HGB, HCT, PLT, HGBEXT, HCTEXT, PLTEXT, HGBEXT, HCTEXT, PLTEXT in the last 72 hours. No results for input(s): NA, K, GLU, BUN, CREA, CA, MG, INR in the last 72 hours. No lab exists for component: PT, PTT, INREXT, INREXT    RECOMMENDATIONS AND DISCHARGE PLANNING     1. Pending tests/procedures/ Plan of Care or Other Needs: labs  2.      3. Discharge plan for patient and Needs/Barriers: TBD    4. Estimated Discharge Date: TBD Posted on Whiteboard in Patients Room: no      4. The patient's care plan was reviewed with the oncoming nurse. \"HEALS\" SAFETY CHECK      Fall Risk    Total Score: 3    Safety Measures: Safety Measures: Bed/Chair alarm on, Bed/Chair-Wheels locked, Bed in low position, Call light within reach    A safety check occurred in the patient's room between off going nurse and oncoming nurse listed above.     The safety check included the below items  Area Items   H  High Alert Medications - Verify all high alert medication drips (heparin, PCA, etc.)   E  Equipment - Suction is set up for ALL patients (with yanker)  - Red plugs utilized for all equipment (IV pumps, etc.)  - WOWs wiped down at end of shift.  - Room stocked with oxygen, suction, and other unit-specific supplies A  Alarms - Bed alarm is set for fall risk patients  - Ensure chair alarm is in place and activated if patient is up in a chair   L  Lines - Check IV for any infiltration  - Barker bag is empty if patient has a Barker   - Tubing and IV bags are labeled   S  Safety   - Room is clean, patient is clean, and equipment is clean. - Hallways are clear from equipment besides carts. - Fall bracelet on for fall risk patients  - Ensure room is clear and free of clutter  - Suction is set up for ALL patients (with yanker)  - Hallways are clear from equipment besides carts.    - Isolation precautions followed, supplies available outside room, sign posted     Gadiel Gonzalez RN

## 2019-07-06 NOTE — PROGRESS NOTES
Problem: Self Care Deficits Care Plan (Adult)  Goal: *Therapy Goal (Edit Goal, Insert Text)  Description  Occupational Therapy Goals   Long Term Goals  Initiated 19 and to be accomplished within 2 week(s) 7/10/19  1. Pt will perform functional activity up to 15 minutes with no more than 2 rest breaks and 2 vcs to attend to task. .   2. Pt will perform grooming with S.           3. Pt will perform UB bathing with S .       4. Pt will perform LB bathing with S.   5. Pt will perform tub/shower transfer with S.  6. Pt will perform UB dressing with S.    7. Pt will perform LB dressing with S.    8. Pt will perform toileting task with I.      9. Pt will perform toilet transfer with S.       Short Term Goals   Initiated 19 and to be accomplished within 7 day(s) 7/3/19. Updated 7/3/19  1. Pt will perform functional task up to 10 minutes with no more than 3 verbal cues to attend to task and 3 rest breaks. 2. Pt will perform grooming with SBA. .  7/3/19  3. Pt will perform UB bathing with S and no more than 3 vcs to  Initiate and complete task. 4. Pt will perform LB bathing with Beltran and no more than 3 vcs to  complete task. 5. Pt will perform tub/shower transfer with [facility].  7/3/19-S  6. Pt will perform UB dressing with Beltran and no more than 3 vcs to  initiate and complete task. .  7. Pt will perform LB dressing with Beltran and no more than 3 vcs to initiate and  complete task. 8. Pt will perform toileting task with S. 7/3/19  9. Pt will perform toilet transfer with S and no more than 3 vcs to initiate and complete task.  7/3/19    Problem: Patient Education: Go to Patient Education Activity  Goal: Patient/Family Education  Outcome: Progressing Towards Goal  OCCUPATIONAL THERAPY TREATMENT    Patient: Tru Lozano   63 y.o.     Patient identified with name and :yes    Date: 2019    First Tx Session  Time In: 56  Time Out[de-identified] 1200      Diagnosis: Chronic respiratory failure ([facility] [address] [J96.10] Precautions: Aspiration, Fall  Chart, occupational therapy assessment, plan of care, and goals were reviewed. Pain:  Pt reports 7/10 pain or discomfort prior to treatment. Pt reports 10/10 pain or discomfort post treatment. Intervention Provided: Notify nursing      SUBJECTIVE:   Patient stated my head is pounding, I will take the pain med's after treatment because it make me sleepy     OBJECTIVE DATA SUMMARY:   Pt seen in recliner with IV in left arm and trach with 3.5 liter of oxygen attach to wall. Pt set-up with portable O2 tank and transfer to gym for treatment. Pt O2 Stat check periodically during treatment session: sitting O2- 98 %, HR-64, standing O2 -98%, HR-79 and after standing O2-90%, HR-65. Treatment session focus on activity standing tolerance. THERAPEUTIC ACTIVITY Daily Assessment    Pt performed one handed chest press with left UE 2/2 injury right shoulder. Followed with wrist and hand strengthening with 6# flex bar 3x15 rep with rest between sets. THERAPEUTIC EXERCISE Daily Assessment    Pt stood at raised table with AD with left hand on table while performing chinese   pegboard. Pt stood for 2:19 minutes than 35 second finishing up activity. Than stood again for 3:25 minutes performing activity. ASSESSMENT: Pt fatigue easily req'd increase rest break. Pt demonstrate decrease activity standing tolerance with therapeutic activity, but is showing progress. Progression toward goals:  ?          Improving appropriately and progressing toward goals  ? Improving slowly and progressing toward goals  ? Not making progress toward goals and plan of care will be adjusted     PLAN:  Patient continues to benefit from skilled intervention to address the above impairments. Continue treatment per established plan of care.   Discharge Recommendations: Home Health  Further Equipment Recommendations for Discharge:  3:1 commode and shower bench     Activity Tolerance:  Fair      Estimated LOS:7/12    Please refer to the flow sheet for vital signs taken during this treatment. After treatment:   ?  Patient left in no apparent distress sitting up in chair   ? Patient left in no apparent distress in bed  ? Call bell left within reach  ? Nursing notified  ? Caregiver present  ? Bed alarm activated    COMMUNICATION/EDUCATION:   ? Home safety education was provided and the patient/caregiver indicated understanding. ? Patient/family have participated as able in goal setting and plan of care. ? Patient/family agree to work toward stated goals and plan of care. ? Patient understands intent and goals of therapy, but is neutral about his/her participation. ? Patient is unable to participate in goal setting and plan of care.       Jasbir ROSS  7/6/2019

## 2019-07-06 NOTE — PROGRESS NOTES
Progress Note    Patient: Declan Ledesma MRN: [id number]  CSN: [id number]    YOB: 1960  Age: 63 y.o. Sex: female    DOA: 6/25/2019 LOS:  LOS: 11 days                    Subjective:       IMPAIRMENT GROUP CODE:  Debility.     REHAB IMPAIRMENT CATEGORY:  Miscellaneous.     DIAGNOSIS:  Paralysis of vocal cord and larynx, unspecified. Review of systems  General: No fevers or chills. Cardiovascular: No chest pain or pressure. No palpitations. Pulmonary:pt has trach in place  Gastrointestinal: PEG tube  Bolus feeding now  Genitourinary: No urinary or dysuria. Musculoskeletal: No joint or muscle pain, no back pain, no recent trauma. Neurologic: No headache, generalized weakness     Objective:     Physical Exam:  Visit Vitals  /64   Pulse 61   Temp 97 °F (36.1 °C)   Resp 18   Ht 5' 7\" (1.702 m)   Wt 103.9 kg (229 lb)   SpO2 100%   Breastfeeding? No   BMI 35.87 kg/m²        General:         Alert,no acute distress    HEENT: NC, Atraumatic. PERRLA, anicteric sclerae. Lungs: CTA,   Heart:  Regular  rhythm,  No murmur,   Abdomen: Soft, Non distended, Non tender.  PEG tube in place  Extremities: Trace lower limb edema  Psych:   . anxious  no agitated. Neurologic:  CN 2-12 grossly intact,  Intake and Output:  Current Shift:  07/06 0701 - 07/06 1900  In: 5645   Out: -   Last three shifts:  07/04 1901 - 07/06 0700  In: 2405   Out: -     Labs: Results:       Chemistry No results for input(s): GLU, NA, K, CL, CO2, BUN, CREA, CA, AGAP, BUCR, TBIL, GPT, AP, TP, ALB, GLOB, AGRAT in the last 72 hours. CBC w/Diff No results for input(s): WBC, RBC, HGB, HCT, PLT, GRANS, LYMPH, EOS, HGBEXT, HCTEXT, PLTEXT, HGBEXT, HCTEXT, PLTEXT in the last 72 hours. Cardiac Enzymes No results for input(s): CPK, CKND1, SANDY in the last 72 hours. No lab exists for component: CKRMB, TROIP   Coagulation No results for input(s): PTP, INR, APTT in the last 72 hours.     No lab exists for component: INREXT, INREXT Lipid Panel Lab Results   Component Value Date/Time    Cholesterol, total 347 (H) 04/05/2019 03:46 AM    HDL Cholesterol 26 (L) 04/05/2019 03:46 AM    LDL, calculated  04/05/2019 03:46 AM     LDL AND VLDL CHOLESTEROL NOT CALCULATED WHEN TRIGLYCERIDES >400 MG/DL OR HDL CHOLESTEROL <20 MG/DL    VLDL, calculated Not calculated due to elevated triglyceride level 04/05/2019 03:46 AM    Triglyceride 298 (H) 06/18/2019 06:30 AM    CHOL/HDL Ratio 13.3 (H) 04/05/2019 03:46 AM      BNP No results for input(s): BNPP in the last 72 hours. Liver Enzymes No results for input(s): TP, ALB, TBIL, AP, SGOT, GPT in the last 72 hours.     No lab exists for component: DBIL   Thyroid Studies Lab Results   Component Value Date/Time    TSH 54.20 (H) 06/26/2019 06:10 AM          Procedures/imaging: see electronic medical records for all procedures/Xrays and details which were not copied into this note but were reviewed prior to creation of Plan    Medications:   Current Facility-Administered Medications   Medication Dose Route Frequency    docusate sodium (COLACE) capsule 100 mg  100 mg Per G Tube BID    enoxaparin (LOVENOX) injection 40 mg  40 mg SubCUTAneous Q24H    insulin glargine (LANTUS) injection 34 Units  34 Units SubCUTAneous ACB    levothyroxine (SYNTHROID) tablet 175 mcg  175 mcg Per G Tube 6am    metoprolol tartrate (LOPRESSOR) tablet 25 mg  25 mg Per G Tube Q12H    melatonin tablet 1 mg  1 mg Per G Tube QHS    guaiFENesin (ROBITUSSIN) 100 mg/5 mL oral liquid 200 mg  200 mg Per G Tube Q12H    temazepam (RESTORIL) capsule 7.5 mg  7.5 mg Per G Tube QHS    levoFLOXacin (LEVAQUIN) tablet 750 mg  750 mg Per G Tube Q24H    simethicone (MYLICON) 57DL/7.2HZ oral drops 40 mg  40 mg Per G Tube QID PRN    pantoprazole (PROTONIX) granules for oral suspension 40 mg  40 mg Per G Tube ACB    ondansetron hcl (ZOFRAN) tablet 4 mg  4 mg Per G Tube Q6H PRN    oxyCODONE-acetaminophen (PERCOCET) 5-325 mg per tablet 1 Tab  1 Tab Oral Q6H PRN    metoclopramide HCl (REGLAN) tablet 5 mg  5 mg Per G Tube TID    insulin lispro (HUMALOG) injection   SubCUTAneous Q6H    glucose chewable tablet 16 g  4 Tab Oral PRN    glucagon (GLUCAGEN) injection 1 mg  1 mg IntraMUSCular PRN    LORazepam (ATIVAN) tablet 1 mg  1 mg Oral Q12H PRN    bisacodyl (DULCOLAX) suppository 10 mg  10 mg Rectal Q48H PRN    albuterol-ipratropium (DUO-NEB) 2.5 MG-0.5 MG/3 ML  3 mL Nebulization Q4H PRN    dextrose 10% infusion 125-250 mL  125-250 mL IntraVENous PRN    0.45% sodium chloride with KCl 20 mEq/L infusion   IntraVENous CONTINUOUS       Assessment/Plan     Principal Problem:    Vocal cord dysfunction (4/4/2019)    Active Problems:    Acquired hypothyroidism (5/17/2017)      Hypertensive heart disease without heart failure (5/17/2017)      Type 2 diabetes mellitus with diabetic neuropathy, with long-term current use of insulin ([facility]) ()      History of thyroidectomy (4/4/2019)      Overview: S/P Thyroidectomy (4/4/2019 - Dr. Koki Hanna)      Chronic respiratory failure ([facility] [address] (6/25/2019)      History of tracheostomy (6/3/2019)      Overview: S/P Tracheostomy (6/3/2019 - Dr. Koki Hanna)      Impaired mobility and ADLs (6/25/2019)      Status post insertion of percutaneous endoscopic gastrostomy (PEG) tube ([facility] [address] (6/20/2019)      Overview: S/P PEG tube insertion (6/20/2019) - Dr. Koki Hanna)      Dysphagia (6/25/2019)      Overview: S/P Thyroidectomy (4/4/2019 - Dr. Koki Hanna)    Vocal cord dysfunction. S/P  Tracheostomy/ Debility. Continue trach care . Pt can swallow sips of water and apple sauce when she used track valve  PEG tube feeding was  switch to bolus feeding   Levaquin 750 mg daily for increase draiange per Trach sx improving      Status post recent thyroidectomy. Continue to monitor sx f/u tsh and free t4   Dual nebulizer q 4h prn    Dysphagia.   The patient has a PEG tube   f/u Nutritional consult   Aspiration precaution  Continue speech therapy  Pt on reglan 5 mg TID    Type 2 diabetes  Lantus 32 unites s q  Glucose level running high in AM  Will increase Lantus to 34 u  Humalog sliding scale coverage     H/O Hypertension  Lopressor 25 mg BID  continue to monitor Bp      Hypothyroidism  Synthroid 175 Mcg daily  F/u tsh and free t4     Debility  Pt and ot     Anxiety/Insomnia  Restoril 7.5 mg qhs  Might start SSRI after D/C Reglan and finish ABT      DVT and GI prophylaxis with lovenox and protonix  Discussed with pt       Virginia Farrell MD  7/6/2019 2:35 PM

## 2019-07-06 NOTE — PROGRESS NOTES
Problem: Mobility Impaired (Adult and Pediatric)  Goal: *Therapy Goal (Edit Goal, Insert Text)  Description  Physical Therapy Goals: STG  Initiated 6/26/2019, reassessed 7/3/19 and to be accomplished 7/9/2019:   1. Patient will move from supine to sit and sit to supine , scoot up and down and roll side to side in bed with supervision/set-up. ( 7/3/2019 MET)     2. Patient will transfer from bed to chair and chair to bed with supervision/set-up using the least restrictive device. (7/3/2019 MET)  3. Patient will perform sit to stand with supervision/set-up. (7/3/2019 MET)  4. Patient will ambulate with supervision/set-up for 50 feet with the least restrictive device. 5.  Patient will ascend/descend 3 stairs with 2 handrail(s) with minimal assistance/contact guard assist.     Physical Therapy Goals: LTG  Initiated 6/26/2019 and to be accomplished within 14 day(s) 7/10/2019:  1. Patient will move from supine to sit and sit to supine , scoot up and down and roll side to side in bed with modified independence. 2.  Patient will transfer from bed to chair and chair to bed with modified independence using the least restrictive device. 3.  Patient will perform sit to stand with modified independence. 4.  Updated 7/5/2019: Patient will ambulate with modified independence for 150 feet with the least restrictive device. 5.  Patient will ascend/descend 3 stairs with 2 handrail(s) with supervision/set-up. Outcome: Progressing Towards Goal   PHYSICAL THERAPY TREATMENT    Patient: Edwin Allen (35 y.o. female)  Date: 7/6/2019  Diagnosis: [facility] [J96.10] Vocal cord dysfunction  Precautions: Aspiration, Fall  Chart, physical therapy assessment, plan of care and goals were reviewed. Time In: 0820  Time Out: 5061  Time In: 0945  Time Out: 1000  Patient Seen For: Gait training;Patient education;Transfer training; Therapeutic exercise.   Pain:  Patient not indicating significant pain this morning but did report feeling rather full after bolus feed. Patient identified with name and : yes    SUBJECTIVE:     Patient reporting, \"I had a bad day yesterday. \" Patient reporting feeling a little better today. OBJECTIVE DATA SUMMARY:   Objective:     Continues to tolerate 2 LPM via trach with SpO2% 98-99%, HR 62-63 bpm during session (patient had been cleared to have supplemental O2 reduced as per therapist discussion with Dr. Nini Bravo). Per nurse, to be put back to 4 LPM O2 at end of session to follow physician order on chart. TRANSFERS Daily Assessment    Transfer Assistance : 5 (Supervision/setup)  Sit to Stand Assistance: Supervision       GAIT Daily Assessment    Amount of Assistance: 5 (Stand-by assistance)  Distance (ft): 75 Feet (ft)(75 ft bouts x 2 using rollator)  Assistive Device: Walker, rollator(Therapist assisting with all lines/tubing)    Patient needing one standing rest during each gait bout. Continues to fatigue quickly, have slow pace with gait but no significant loss of balance observed. Using rollator seat appropriately for taking seated rest, appropriately locking/unlocking brakes. BALANCE Daily Assessment    Sitting - Static: Good (unsupported)  Sitting - Dynamic: Good (unsupported)  Standing - Static: Fair;Occasional;Good  Standing - Dynamic : Impaired       THERAPEUTIC EXERCISES Daily Assessment    Extremity: Both  Exercise Type #1: Seated lower extremity strengthening(Hip flex, LAQ 2# cuff weights;hip abd green Tband)  Sets Performed: 3  Reps Performed: 10(15 reps for hip flex marches and LAQ)  Level of Assist: Stand-by assistance    Seated LE exercises emphasis on improving LE strength and endurance for better ability to perform transfers and gait. Patient needing cues for proper technique at times.         ASSESSMENT:  Patient would continue to benefit from skilled PT to improve ability to perform safer gait pattern, improve functional endurance, standing balance for better ability to participate in standing and gait tasks. Progression toward goals:  ?      Improving appropriately and progressing toward goals  ? Improving slowly and progressing toward goals  ? Not making progress toward goals and plan of care will be adjusted     PLAN:  Patient continues to benefit from skilled intervention to address the above impairments. Continue treatment per established plan of care. Discharge Recommendations:  Home Health  Further Equipment Recommendations for Discharge:  rollator; potentially wheelchair 22\" due to inconsistent ability to tolerate gait/standing tasks with increased difficulty breathing reported. Estimated LOS: 2-3 weeks    Activity Tolerance:   Fair due to fatigue. SpO2 98-99% as indicated above during treatment. After treatment:   Patient left seated in recliner chair, telephone and call button within reach.        Katlin Alvarado, PT  7/6/2019

## 2019-07-06 NOTE — PROGRESS NOTES
Problem: Dysphagia (Adult)  Goal: *Acute Goals and Plan of Care (Insert Text)  Description  Long term goals  Patient will:  1. Perform pharyngeal strengthening exercises with supervision. 2. Tolerate swallowing trials of varied consistenies without over s/s of aspiration. 3. Participate in repeat MBS prior to resuming PO for nutrition. 4. Tolerate deflation of tracheostomy cuff for speech production for 20 minutes. MET  5. Tolerate placement of a passu-Bucyrus speaking valve for speech for 20 minute intervals. MET- increased to 45 minutes. Short term goals (by 7/10/19)  Patient will:  1. Perform pharyngeal strengthening exercises with mincues. 2. Tolerate swallowing trials of ice chops and small boluses puree without overt s/s of aspiration. 3. Participate in repeat MBS prior to resuming PO for nutrition. 4. Tolerate deflation of tracheostomy cuff for speech production for 20 minutes. MET- Tracheostomy cuff consistently deflated. 5. Tolerate placement of a passu-Malinda speaking valve for speech for 30-45 minute intervals. Outcome: Progressing Towards Goal    SPEECH LANGUAGE PATHOLOGY   SPEECH & DYSPHAGIA TREATMENT    Patient: Yaquelin Godwin (4 y.o. female)  Date: 7/6/2019  Diagnosis: Chronic respiratory failure ([facility] [address] [J96.10] Vocal cord dysfunction       Time In: 1000  Time Out:  1030    ASSESSMENT:  Speech tx:  Patient continues to express concern about upper airway swelling causing her to have more difficulty breathing, reports increased anxiety with placement of PMV. Provided further education about the tracheostomy and the progression of care to achieve more normalized function (for both speech and PO intake) as well as use of Passy-Bucyrus valve to allow speech production and normalize swallow. SLP placed PMV and patient engaged in conversation with SLP with 0 s/sx of discomfort. Initially patient only able to tolerate valve placement for ~60 seconds.  After a rest break, pt tolerated valve placement for ~5 more minutes before requesting removal.     Dysphagia tx:   Patient completed x8 reps each of dexter maneuver and effortful swallow with modA. Pt accepted self-fed ice chip trials with 0 overt s/sx of aspiration across all 6 trials. Further discussed aspiration precautions and importance of compensatory swallow techniques to decrease aspiration risks when attempting PO trials; verbalized comprehension. Progression toward goals:  ?       Improving appropriately and progressing toward goals  ? Improving slowly and progressing toward goals  ? Not making progress toward goals and plan of care will be adjusted     PLAN:  Patient continues to benefit from skilled intervention to address the above impairments. Continue treatment per established plan of care. Discharge Recommendations:  Home Health     SUBJECTIVE:   Patient stated ? I could use it before but now I can't? .    OBJECTIVE:   Mental Status:  Neurologic State: Alert  Orientation Level: Oriented X4  Cognition: Follows commands  Perception: Appears intact  Perseveration: No perseveration noted  Safety/Judgement: Fall prevention    Treatment & Interventions:    Language Comprehension and Expression:     Verbal Expression  Primary Mode of Expression: Verbal, writing    Dysphagia Treatment:  Oral Assessment:  Oral Assessment  Labial: No impairment  Dentition: Natural  Oral Hygiene: WFL  Lingual: No impairment  Velum: No impairment  Mandible: No impairment    Response & Tolerance to Activities:  Patient was highly anxious today and reports she does not feel she is nearing readiness to return home     Pain:  Pre-Treatment:    0  Post-Treatment:  0    After treatment:   ?       Patient assisted to gym for next session. ? Patient left in no apparent distress sitting up in chair. ? Patient left in no apparent distress in bed. ?       Call bell left within reach. ?       Nursing notified. ?       Caregiver present.   ?       Bed alarm activated.     Estimated LOS: Through 7/12/19  Discharge recommendations: [facility]  Time Calculation: 30 mins

## 2019-07-06 NOTE — PROGRESS NOTES
Bedside and Verbal shift change report given to Ani Dockery (oncoming nurse) by Medhat Sherwood (offgoing nurse). Report included the following information SBAR, Kardex, MAR and Recent Results.     SITUATION:    Code Status: Full Code   Reason for Admission: Chronic respiratory failure ([facility] [J96.10]    [facility]   Problem List:       Hospital Problems  Date Reviewed: 5/16/2019          Codes Class Noted POA    Chronic respiratory failure ([facility] ICD-10-CM: J96.10  ICD-9-CM: 518.83  6/25/2019 Yes        Impaired mobility and ADLs ICD-10-CM: Z74.09  ICD-9-CM: 799.89  6/25/2019 Yes        Dysphagia ICD-10-CM: R13.10  ICD-9-CM: 787.20  6/25/2019 Yes    Overview Signed 7/5/2019 10:50 AM by Patti Gutierrez MD     S/P Thyroidectomy (4/4/2019 - Dr. Geoff Gorman)             Status post insertion of percutaneous endoscopic gastrostomy (PEG) tube [facility]) ICD-10-CM: Z93.1  ICD-9-CM: V44.1  6/20/2019 Yes    Overview Addendum 7/5/2019 11:39 AM by Patti Gutierrez MD     S/P PEG tube insertion (6/20/2019) - Dr. Geoff Gorman)             History of tracheostomy ICD-10-CM: Z98.890  ICD-9-CM: V44.0  6/3/2019 Yes    Overview Signed 7/5/2019 10:44 AM by Patti Gutierrez MD     S/P Tracheostomy (6/3/2019 - Dr. Geoff Gorman)             History of thyroidectomy ICD-10-CM: S69.591  ICD-9-CM: V15.29  4/4/2019 Yes    Overview Signed 7/5/2019 10:45 AM by Patti Gutierrez MD     S/P Thyroidectomy (4/4/2019 - Dr. Geoff Gorman)             * (Principal) Vocal cord dysfunction ICD-10-CM: J38.3  ICD-9-CM: 478.5  4/4/2019 Yes        Type 2 diabetes mellitus with diabetic neuropathy, with long-term current use of insulin (HCC) (Chronic) ICD-10-CM: E11.40, Z79.4  ICD-9-CM: 250.60, 357.2, V58.67  Unknown Yes        Acquired hypothyroidism (Chronic) ICD-10-CM: E03.9  ICD-9-CM: 244.9  5/17/2017 Yes        Hypertensive heart disease without heart failure (Chronic) ICD-10-CM: I11.9  ICD-9-CM: 402.90  5/17/2017 Yes BACKGROUND:    Past Medical History:   Past Medical History:   Diagnosis Date    Acquired hypothyroidism 5/17/2017    Chronic back pain     Lower back pain    Depression     Diabetic neuropathy associated with type 2 diabetes mellitus ([facility]     Dysphagia 6/25/2019    S/P Thyroidectomy (4/4/2019 - Dr. Geoff Gorman)    History of asthma     History of heart failure     chronic diastolic heart failure    History of vitamin D deficiency 8/28/2017    Hypertensive heart disease without heart failure 5/17/2017    Left ear hearing loss     pt states nerve damage--- 25% hearing loss, described as \"muffled\"    Memory difficulty     Obesity, Class II, BMI 35-39.9 11/11/2016    Obstructive sleep apnea 11/6/2015    Panic attacks     Ringing of ears     Type 2 diabetes mellitus with diabetic neuropathy, with long-term current use of insulin ([facility])     Vertigo     Vocal cord dysfunction 4/4/2019         Patient taking anticoagulants yes     ASSESSMENT:    Changes in Assessment Throughout Shift: none     Patient has Central Line: no Reasons if yes:    Patient has Barker Cath: no Reasons if yes:       Last Vitals:     Vitals:    07/05/19 2200 07/06/19 0808 07/06/19 0909 07/06/19 1600   BP: 143/88 138/79 121/64 114/62   Pulse: 64 (!) 59 61 60   Resp: 18 18  20   Temp: 97 °F (36.1 °C) 97 °F (36.1 °C)  97.8 °F (36.6 °C)   SpO2: 99% 100%  100%   Weight:       Height:            IV and DRAINS (will only show if present)   [REMOVED] Peripheral IV 06/30/19 Left Antecubital-Site Assessment: Clean, dry, & intact  Peripheral IV 07/03/19 Anterior; Left Forearm-Site Assessment: Clean, dry, & intact  [REMOVED] Peripheral IV 06/25/19 Anterior;Proximal;Right Antecubital-Site Assessment: Dry, Intact  PEG/Gastrostomy Tube 06/20/19-Site Assessment: Clean, dry, & intact  [REMOVED] Airway - Tracheostomy Tube 06/03/19 Portex-Site Assessment: Clean, dry, & intact     WOUND (if present)   Wound Type:         Dressing present Dressing Present : No   Wound Concerns/Notes:  none     PAIN    Pain Assessment    Pain Intensity 1: 0 (07/06/19 1316)    Pain Location 1: Head    Pain Intervention(s) 1: Medication (see MAR)    Patient Stated Pain Goal: 0  o Interventions for Pain:  Pain medication    o Intervention effective: yes  o Time of last intervention: see MAR   o Reassessment Completed: yes      Last 3 Weights:  Last 3 Recorded Weights in this Encounter    06/26/19 1430 07/03/19 1852   Weight: 122.2 kg (269 lb 8 oz) 103.9 kg (229 lb)     Weight change:      INTAKE/OUPUT    Current Shift: No intake/output data recorded. Last three shifts: 07/05 0701 - 07/06 1900  In: 3753   Out: -      LAB RESULTS     No results for input(s): WBC, HGB, HCT, PLT, HGBEXT, HCTEXT, PLTEXT, HGBEXT, HCTEXT, PLTEXT in the last 72 hours. No results for input(s): NA, K, GLU, BUN, CREA, CA, MG, INR in the last 72 hours. No lab exists for component: PT, PTT, INREXT, INREXT    RECOMMENDATIONS AND DISCHARGE PLANNING     1. Pending tests/procedures/ Plan of Care or Other Needs: labs  2.      3. Discharge plan for patient and Needs/Barriers: TBD    4. Estimated Discharge Date: TBD Posted on Whiteboard in Patients Room: no      4. The patient's care plan was reviewed with the oncoming nurse. \"HEALS\" SAFETY CHECK      Fall Risk    Total Score: 3    Safety Measures: Safety Measures: Bed/Chair alarm on, Bed/Chair-Wheels locked, Bed in low position, Call light within reach, Extra trach at bedside, Fall prevention (comment), Gripper socks    A safety check occurred in the patient's room between off going nurse and oncoming nurse listed above.     The safety check included the below items  Area Items   H  High Alert Medications - Verify all high alert medication drips (heparin, PCA, etc.)   E  Equipment - Suction is set up for ALL patients (with yanker)  - Red plugs utilized for all equipment (IV pumps, etc.)  - WOWs wiped down at end of shift.  - Room stocked with oxygen, suction, and other unit-specific supplies   A  Alarms - Bed alarm is set for fall risk patients  - Ensure chair alarm is in place and activated if patient is up in a chair   L  Lines - Check IV for any infiltration  - Barker bag is empty if patient has a Barker   - Tubing and IV bags are labeled   S  Safety   - Room is clean, patient is clean, and equipment is clean. - Hallways are clear from equipment besides carts. - Fall bracelet on for fall risk patients  - Ensure room is clear and free of clutter  - Suction is set up for ALL patients (with basiliaker)  - Hallways are clear from equipment besides carts.    - Isolation precautions followed, supplies available outside room, sign posted     Kita Laurent

## 2019-07-07 LAB
GLUCOSE BLD STRIP.AUTO-MCNC: 120 MG/DL (ref 70–110)
GLUCOSE BLD STRIP.AUTO-MCNC: 154 MG/DL (ref 70–110)
GLUCOSE BLD STRIP.AUTO-MCNC: 161 MG/DL (ref 70–110)
GLUCOSE BLD STRIP.AUTO-MCNC: 184 MG/DL (ref 70–110)

## 2019-07-07 PROCEDURE — 74011250637 HC RX REV CODE- 250/637: Performed by: INTERNAL MEDICINE

## 2019-07-07 PROCEDURE — 82962 GLUCOSE BLOOD TEST: CPT

## 2019-07-07 PROCEDURE — 65310000000 HC RM PRIVATE REHAB

## 2019-07-07 PROCEDURE — 77010033678 HC OXYGEN DAILY

## 2019-07-07 PROCEDURE — 74011250637 HC RX REV CODE- 250/637: Performed by: EMERGENCY MEDICINE

## 2019-07-07 PROCEDURE — 74011250636 HC RX REV CODE- 250/636: Performed by: EMERGENCY MEDICINE

## 2019-07-07 PROCEDURE — 74011636637 HC RX REV CODE- 636/637: Performed by: INTERNAL MEDICINE

## 2019-07-07 PROCEDURE — 74011250636 HC RX REV CODE- 250/636: Performed by: INTERNAL MEDICINE

## 2019-07-07 RX ADMIN — GUAIFENESIN 200 MG: 200 SOLUTION ORAL at 21:30

## 2019-07-07 RX ADMIN — LEVOFLOXACIN 750 MG: 750 TABLET, FILM COATED ORAL at 18:06

## 2019-07-07 RX ADMIN — OXYCODONE HYDROCHLORIDE AND ACETAMINOPHEN 1 TABLET: 5; 325 TABLET ORAL at 14:04

## 2019-07-07 RX ADMIN — TEMAZEPAM 7.5 MG: 7.5 CAPSULE ORAL at 21:30

## 2019-07-07 RX ADMIN — INSULIN LISPRO 2 UNITS: 100 INJECTION, SOLUTION INTRAVENOUS; SUBCUTANEOUS at 00:08

## 2019-07-07 RX ADMIN — SODIUM CHLORIDE AND POTASSIUM CHLORIDE: 4.5; 1.49 INJECTION, SOLUTION INTRAVENOUS at 00:58

## 2019-07-07 RX ADMIN — PANTOPRAZOLE SODIUM 40 MG: 40 GRANULE, DELAYED RELEASE ORAL at 06:41

## 2019-07-07 RX ADMIN — INSULIN LISPRO 2 UNITS: 100 INJECTION, SOLUTION INTRAVENOUS; SUBCUTANEOUS at 06:42

## 2019-07-07 RX ADMIN — METOCLOPRAMIDE HYDROCHLORIDE 5 MG: 5 TABLET ORAL at 21:30

## 2019-07-07 RX ADMIN — INSULIN GLARGINE 34 UNITS: 100 INJECTION, SOLUTION SUBCUTANEOUS at 07:53

## 2019-07-07 RX ADMIN — METOPROLOL TARTRATE 25 MG: 25 TABLET ORAL at 08:48

## 2019-07-07 RX ADMIN — METOPROLOL TARTRATE 25 MG: 25 TABLET ORAL at 21:50

## 2019-07-07 RX ADMIN — METOCLOPRAMIDE HYDROCHLORIDE 5 MG: 5 TABLET ORAL at 08:48

## 2019-07-07 RX ADMIN — LEVOTHYROXINE SODIUM 175 MCG: 75 TABLET ORAL at 06:06

## 2019-07-07 RX ADMIN — Medication 1 MG: at 21:30

## 2019-07-07 RX ADMIN — METOCLOPRAMIDE HYDROCHLORIDE 5 MG: 5 TABLET ORAL at 14:03

## 2019-07-07 RX ADMIN — OXYCODONE HYDROCHLORIDE AND ACETAMINOPHEN 1 TABLET: 5; 325 TABLET ORAL at 08:48

## 2019-07-07 RX ADMIN — ENOXAPARIN SODIUM 40 MG: 40 INJECTION SUBCUTANEOUS at 18:06

## 2019-07-07 RX ADMIN — INSULIN LISPRO 2 UNITS: 100 INJECTION, SOLUTION INTRAVENOUS; SUBCUTANEOUS at 11:57

## 2019-07-07 RX ADMIN — GUAIFENESIN 200 MG: 200 SOLUTION ORAL at 08:53

## 2019-07-07 NOTE — PROGRESS NOTES
0940:  Patient refused morning feed and flush due to feeling full from previous feed. Says she feels like she is receiving feeds to often. MD made aware. 1209: Water flush and tube feed given.

## 2019-07-07 NOTE — PROGRESS NOTES
NUTRITION CONSULT    Nutrition Consult: Management of Tube Feeding, Other (re-evaluate tube feeding)    RECOMMENDATIONS / PLAN:     - Modify bolus feeding regimen to promote tolerance and alleviate pt c/o fullness. Provide 330 mL bolus tube feed of Jevity 1.5 every 4 hours for total of 4 bolus feeds per day and flush with 200 mL water after each bolus. Bolus to be administered using pump at 165 mL/hr x 2 hours. Maintain aspiration precautions and HOB elevation. Suggested bolus schedule: 6:00, 10:00, 14:00 and 18:00.  - Consider changing Reglan administration route to IV. Continue bowel regimen and zofran as needed. - Continue RD inpatient monitoring and evaluation. Goal Regimen: 330 mL bolus of Jevity 1.5 x 4 daily + 200 mL water flush after each bolus to provide: 1953 kcal, 83 gm protein, 65 gm fat, 281 gm CHO, 29 gm fiber, 990 mL free water, 1790 mL total water, 100% RDIs     NUTRITION DIAGNOSIS & INTERVENTIONS:     [x] Enteral nutrition: modify regimen to promote tolerance     Nutrition Diagnosis: Inadequate oral intake related to dysphagia s/p tracheostomy as evidenced by pt NPO per SLP s/p MBS and PEG placement. ASSESSMENT:     7/7: Pt c/o fullness with current tube feeding reigmen- will modify bolus feeds to promote tolerance. Receiving Reglan enterally, Zofran and simethicone ordered prn and have not been given. Colace held, BM yesterday and abdomen distended. 7/5: Pt tolerating tube feeding at goal rate. Remains agreeable to trying bolus tube feeds. Wt checked on 7/3  7/3: Per staff report, pt willing to try transitioning to bolus tube feeding. Tolerating feeds at rate of 60 mL/hr. Noted plan for discharge to home on 7/12. Discussed bolus tube feeding and formula options (Jevity 1.5 versus TwoCal HN) with possible need for additional liquid protein supplement (one appropriate for her given allergies) if remain with Jevity 1.5.  Pt agreeable to changing to bolus tube feeding and wants to try with TwoCal HN. Explained to pt plan to hold off on transition at this time per MD; pt verbalized understanding  7/1: Pt at current goal rate of 55 mL/hr; still feels a little full with tube feeding. Discussed possible formula substitution option, TwoCal HN. TwoCal HN and Prosource ingredients reviewed with pt; she has also reviewed ingredients in Jevity 1.5. Upon reviewing ingredients, pt wants to continue with Jevity 1.5 formula at this time and understand can change to TwoCal HN if having difficulty not tolerating Jevity. Pt refusing Prosource supplement; stated will not tolerate. Per RN, pt has been refusing Prosource and has not been receiving the supplement. Pt c/o headache per RN report. Explained to pt, goal rate of tube feeding will have to be increased once Prosource discontinued; pt verbalized understanding  6/28: Pt with therapy at time of visit. Tube feeding remains at 40 mL/hr. Was c/o nausea earlier today; RN provided zofran. Antacid changed to protonix  6/27: Tube feeding remains at 40 mL/hr; pt c/o nausea. Having regular BM, no gastric residuals. Abdomen distended per chart documentation per night nurse. Discussed tube feeding regimen and Prosource supplement; pt agreeable with plan. Noted pt previously reported pepcid \"not working\" when in [facility] hospital; pepcid currently ordered  6/26: Pt lethargic/asleep at time of visit. Per chart review, pt admitted to [facility] hospital with stridor and vocal cord dysfunction s/p tracheostomy placement on 6/3/19, tolerating trach collar; would have to deflate cuff for meals and re-inflate when not eating. Pt had failed MBS x 2 by 6/6/19. DHT was placed by IR. Pt tolerated feeds, receiving Jevity 1.5 due to allergy/intolerance to artificial sweeteners, then pulled out feeding tube. A diet was started by MD on 6/10. Pt had variable meal intake. PICC placed on 6/13 for TPN. Remained on po diet and TPN; made NPO on  6/15. Diet restarted on 6/18.  SLP recommended NPO after repeat MBS on 6/19. S/p PEG placement on 6/20/19 and TPN was stopped after starting tube feeds. Pt had c/o abdominal distention and fullness, emesis. TF held on 6/23 due to concern for ileus; pt having +BMs and flatus, feeds resumed at low rate. Pt tolerated trickle feeds PTA; noted to express desire for receiving cyclic or bolus tube feeding and wanting to eat orally. Currently tolerating tube feeding at 40 mL/hr per RN. Pt did c/o abdominal pain per RN. BG remain elevated; discussed reason for not changing to Glucerna 1.5 (diabetic formula). EN infusion adequate to meet patients estimated nutritional needs:   [x] Yes     [] No   [] Unable to determine at this time    Tube Feeding: Jevity 1.5, 1 container 5.5 x daily via PEG  Water Flushes: 150 mL after each bolus   Residuals: 0 mL    Diet: DIET NPO  DIET TUBE FEEDING 7/5/19: By goal rate, pt to receive bolus feeds of Jevity 1.5 with 237 mL (1 carton) x 5 feeds and 120 mL (1/2 carton) x 1 feed (total of 5.5 cartons daily) with water flushes of 150 mL after each bolus feed      Food Allergies: sweeteners, sucrose  Appetite/meal intake prior to admission:   [x] Good     [] Fair     [] Poor     [] Other:  Feeding Limitations:  [x] Swallowing difficulty: SLP following    [] Chewing difficulty    [x] Other: hx of dysphagia s/p thyroidectomy PTA and trach placement 6/3/19  Current Meal Intake:   No data found. BM: 7/6 (abdomen distended)   Skin Integrity: surgical incision to neck; trach and PEG sites   Edema:  [] No     [x] Yes   Pertinent Medications: Reviewed: dulcolax prn, colace (held), lantus, SSI, Reglan 5 mg TID via G tube, zofran, protonix, simethicone prn     Labs:   No results for input(s): NA, K, CL, CO2, GLU, BUN, CREA, CA, MG, PHOS, ALB, TBIL, SGOT, ALT in the last 72 hours.     No lab exists for component: A1C   Prealbumin: 12.9 mg/dL (6/14/19)  Triglyceride: 298 mg/dL (6/18/19), 316 mg/dL (6/13/19), 511 mg/dL (4/16/19)  C reactive protein: 4.8 mg/dL (6/18/19)  No results for input(s): WBC, HGB, HCT, PLT, HGBEXT, HCTEXT, PLTEXT, HGBEXT, HCTEXT, PLTEXT in the last 72 hours. Intake/Output Summary (Last 24 hours) at 7/7/2019 1207  Last data filed at 7/7/2019 0600  Gross per 24 hour   Intake 2398 ml   Output --   Net 2398 ml       Anthropometrics:   Ht Readings from Last 1 Encounters:   07/03/19 5' 7\" (1.702 m)     Last 3 Recorded Weights in this Encounter    06/26/19 1430 07/03/19 1852   Weight: 122.2 kg (269 lb 8 oz) 103.9 kg (229 lb)     Body mass index is 35.87 kg/m². Obese, Class II    Weight History: patient denies change in weight PTA, stable     Weight Metrics 7/3/2019 6/25/2019 6/22/2019 6/3/2019 5/23/2019 5/22/2019 5/16/2019   Weight 229 lb - 269 lb 8 oz - 260 lb 6.4 oz - 258 lb   BMI - 35.87 kg/m2 - 42.21 kg/m2 - 40.78 kg/m2 40.41 kg/m2          Admitting Diagnosis: Chronic respiratory failure (HCC) [J96.10]  Pertinent PMHx: T2DM with diabetic neuropathy, HTN, dyslipidemia, CHF, hypothyroidism s/p thyroidectomy 4/4/19    Education Needs:        [x] None identified  [] Identified - Not appropriate at this time  []  Identified and addressed - refer to education log  Learning Limitations:   [] None identified  [x] Identified: nonverbal, communicates via pen/paper    Cultural, Quaker & ethnic food preferences:  [x] None identified    [] Identified and addressed     ESTIMATED NUTRITION NEEDS:     Calories: 5149-8507 kcal (MSJx1-1.2) based on   [x] Actual  kg     [] IBW:   Protein:  gm (0.8-1.2 gm/kg) based on   [x] Actual BW      [] IBW:   Fluid: 1 mL/kcal     MONITORING & EVALUATION:     Nutrition Goals: goals modified   1. Patient will tolerate enteral nutrition formula and regimen without difficulty within the next 7 days. Outcome:  [] Met/Ongoing    [] Progressing towards goal    [] Not progressing towards goal    [x] New/Initial goal   2.  Patient will meet their estimated nutritional needs through adequate enteral nutrition within the next 7 days.  Outcome:  [] Met/Ongoing    [] Progressing towards goal    [] Not progressing towards goal    [x] New/Initial goal      Monitoring:  [x] Nutrition-focused physical findings   [x] Treatment/therapy   [] Food and beverage intake   [] Diet order      [] Weight    [x] EN infusion    Previous Recommendations (for follow-up assessments only):     [x]   Implemented       []   Not Implemented (RD to address)      [] No Longer Appropriate     [] No Recommendation Made        Discharge Planning: goal enteral nutrition regimen via PEG   [x]  Participated in care planning, discharge planning, & interdisciplinary rounds as appropriate      Eugene Price, [address]   Pager: [phone]

## 2019-07-07 NOTE — PROGRESS NOTES
Bedside and Verbal shift change report given to Stefany Tejada RN (oncoming nurse) by Enrique Ko (offgoing nurse). Report included the following information SBAR, Kardex, MAR and Recent Results.     SITUATION:    Code Status: Full Code   Reason for Admission: Chronic respiratory failure ([facility] [address] [J96.10]    [facility] day: 15   Problem List:       Hospital Problems  Date Reviewed: 5/16/2019          Codes Class Noted POA    Chronic respiratory failure ([facility] [address] ICD-10-CM: J96.10  ICD-9-CM: 518.83  6/25/2019 Yes        Impaired mobility and ADLs ICD-10-CM: Z74.09  ICD-9-CM: 799.89  6/25/2019 Yes        Dysphagia ICD-10-CM: R13.10  ICD-9-CM: 787.20  6/25/2019 Yes    Overview Signed 7/5/2019 10:50 AM by Radha Orantes MD     S/P Thyroidectomy (4/4/2019 - Dr. Claudetta Jules)             Status post insertion of percutaneous endoscopic gastrostomy (PEG) tube [facility]) ICD-10-CM: Z93.1  ICD-9-CM: V44.1  6/20/2019 Yes    Overview Addendum 7/5/2019 11:39 AM by Radha Orantes MD     S/P PEG tube insertion (6/20/2019) - Dr. Claudetta Jules)             History of tracheostomy ICD-10-CM: Z98.890  ICD-9-CM: V44.0  6/3/2019 Yes    Overview Signed 7/5/2019 10:44 AM by Radha Orantes MD     S/P Tracheostomy (6/3/2019 - Dr. Claudetta Jules)             History of thyroidectomy ICD-10-CM: J01.984  ICD-9-CM: V15.29  4/4/2019 Yes    Overview Signed 7/5/2019 10:45 AM by Radha Orantes MD     S/P Thyroidectomy (4/4/2019 - Dr. Claudetta Jules)             * (Principal) Vocal cord dysfunction ICD-10-CM: J38.3  ICD-9-CM: 478.5  4/4/2019 Yes        Type 2 diabetes mellitus with diabetic neuropathy, with long-term current use of insulin (HCC) (Chronic) ICD-10-CM: E11.40, Z79.4  ICD-9-CM: 250.60, 357.2, V58.67  Unknown Yes        Acquired hypothyroidism (Chronic) ICD-10-CM: E03.9  ICD-9-CM: 244.9  5/17/2017 Yes        Hypertensive heart disease without heart failure (Chronic) ICD-10-CM: I11.9  ICD-9-CM: 402.90  5/17/2017 Yes BACKGROUND:    Past Medical History:   Past Medical History:   Diagnosis Date    Acquired hypothyroidism 5/17/2017    Chronic back pain     Lower back pain    Depression     Diabetic neuropathy associated with type 2 diabetes mellitus ([facility] [address]     Dysphagia 6/25/2019    S/P Thyroidectomy (4/4/2019 - Dr. Je Marrero)    History of asthma     History of heart failure     chronic diastolic heart failure    History of vitamin D deficiency 8/28/2017    Hypertensive heart disease without heart failure 5/17/2017    Left ear hearing loss     pt states nerve damage--- 25% hearing loss, described as \"muffled\"    Memory difficulty     Obesity, Class II, BMI 35-39.9 11/11/2016    Obstructive sleep apnea 11/6/2015    Panic attacks     Ringing of ears     Type 2 diabetes mellitus with diabetic neuropathy, with long-term current use of insulin ([facility])     Vertigo     Vocal cord dysfunction 4/4/2019         Patient taking anticoagulants yes     ASSESSMENT:    Changes in Assessment Throughout Shift: none     Patient has Central Line: no Reasons if yes:    Patient has Barker Cath: no Reasons if yes:       Last Vitals:     Vitals:    07/06/19 1600 07/06/19 2119 07/07/19 0752 07/07/19 1612   BP: 114/62 117/66 137/75 146/79   Pulse: 60 66 65 61   Resp: 20 18 16 18   Temp: 97.8 °F (36.6 °C) 99.7 °F (37.6 °C) 98.1 °F (36.7 °C) 98 °F (36.7 °C)   SpO2: 100% 98% 98% 99%   Weight:       Height:            IV and DRAINS (will only show if present)   [REMOVED] Peripheral IV 06/30/19 Left Antecubital-Site Assessment: Clean, dry, & intact  Peripheral IV 07/03/19 Anterior; Left Forearm-Site Assessment: Clean, dry, & intact  [REMOVED] Peripheral IV 06/25/19 Anterior;Proximal;Right Antecubital-Site Assessment: Dry, Intact  PEG/Gastrostomy Tube 06/20/19-Site Assessment: Clean, dry, & intact  [REMOVED] Airway - Tracheostomy Tube 06/03/19 Portex-Site Assessment: Clean, dry, & intact     WOUND (if present)   Wound Type: Dressing present Dressing Present : No   Wound Concerns/Notes:  none     PAIN    Pain Assessment    Pain Intensity 1: 0 (07/07/19 1457)    Pain Location 1: Head    Pain Intervention(s) 1: Medication (see MAR)    Patient Stated Pain Goal: 0  o Interventions for Pain:  Pain medication    o Intervention effective: yes  o Time of last intervention: see MAR   o Reassessment Completed: yes      Last 3 Weights:  Last 3 Recorded Weights in this Encounter    06/26/19 1430 07/03/19 1852   Weight: 122.2 kg (269 lb 8 oz) 103.9 kg (229 lb)     Weight change:      INTAKE/OUPUT    Current Shift: No intake/output data recorded. Last three shifts: 07/06 0701 - 07/07 1900  In: 3472   Out: -      LAB RESULTS     No results for input(s): WBC, HGB, HCT, PLT, HGBEXT, HCTEXT, PLTEXT, HGBEXT, HCTEXT, PLTEXT in the last 72 hours. No results for input(s): NA, K, GLU, BUN, CREA, CA, MG, INR in the last 72 hours. No lab exists for component: PT, PTT, INREXT, INREXT    RECOMMENDATIONS AND DISCHARGE PLANNING     1. Pending tests/procedures/ Plan of Care or Other Needs: labs  2.      3. Discharge plan for patient and Needs/Barriers: TBD    4. Estimated Discharge Date: TBD Posted on Whiteboard in Patients Room: no      4. The patient's care plan was reviewed with the oncoming nurse. \"HEALS\" SAFETY CHECK      Fall Risk    Total Score: 3    Safety Measures: Safety Measures: Bed/Chair alarm on, Bed/Chair-Wheels locked, Bed in low position, Call light within reach, Gripper socks, Fall prevention (comment)    A safety check occurred in the patient's room between off going nurse and oncoming nurse listed above.     The safety check included the below items  Area Items   H  High Alert Medications - Verify all high alert medication drips (heparin, PCA, etc.)   E  Equipment - Suction is set up for ALL patients (with yanker)  - Red plugs utilized for all equipment (IV pumps, etc.)  - WOWs wiped down at end of shift.  - Room stocked with oxygen, suction, and other unit-specific supplies   A  Alarms - Bed alarm is set for fall risk patients  - Ensure chair alarm is in place and activated if patient is up in a chair   L  Lines - Check IV for any infiltration  - Barker bag is empty if patient has a Barker   - Tubing and IV bags are labeled   S  Safety   - Room is clean, patient is clean, and equipment is clean. - Hallways are clear from equipment besides carts. - Fall bracelet on for fall risk patients  - Ensure room is clear and free of clutter  - Suction is set up for ALL patients (with basiliaker)  - Hallways are clear from equipment besides carts.    - Isolation precautions followed, supplies available outside room, sign posted     Bobbi Whitaker

## 2019-07-07 NOTE — PROGRESS NOTES
Progress Note    Patient: Nessa Vega MRN: [id number]  CSN: [id number]    YOB: 1960  Age: 63 y.o. Sex: female    DOA: 6/25/2019 LOS:  LOS: 12 days                    Subjective:       IMPAIRMENT GROUP CODE:  Debility.     REHAB IMPAIRMENT CATEGORY:  Miscellaneous.     DIAGNOSIS:  Paralysis of vocal cord and larynx, unspecified. Review of systems  General: No fevers or chills. Cardiovascular: No chest pain or pressure. No palpitations. Pulmonary:pt has trach in place  Gastrointestinal: PEG tube  Bolus feeding now pt feeling full when she get can every 3 h  Genitourinary: No urinary or dysuria. Musculoskeletal: No joint or muscle pain, no back pain, no recent trauma. Neurologic: No headache, generalized weakness     Objective:     Physical Exam:  Visit Vitals  /75   Pulse 65   Temp 98.1 °F (36.7 °C)   Resp 16   Ht 5' 7\" (1.702 m)   Wt 103.9 kg (229 lb)   SpO2 98%   Breastfeeding? No   BMI 35.87 kg/m²        General:         Alert,no acute distress    HEENT: NC, Atraumatic. PERRLA, anicteric sclerae. Lungs: CTA  Heart:  Regular  rhythm,  No murmur,   Abdomen: Soft, Non distended, Non tender.  PEG tube in place  Extremities: Trace lower limb edema  Psych:    Anxious  no agitated. Neurologic:  CN 2-12 grossly intact,  Intake and Output:  Current Shift:  No intake/output data recorded. Last three shifts:  07/05 1901 - 07/07 0700  In: 4015   Out: -     Labs: Results:       Chemistry No results for input(s): GLU, NA, K, CL, CO2, BUN, CREA, CA, AGAP, BUCR, TBIL, GPT, AP, TP, ALB, GLOB, AGRAT in the last 72 hours. CBC w/Diff No results for input(s): WBC, RBC, HGB, HCT, PLT, GRANS, LYMPH, EOS, HGBEXT, HCTEXT, PLTEXT, HGBEXT, HCTEXT, PLTEXT in the last 72 hours. Cardiac Enzymes No results for input(s): CPK, CKND1, SANDY in the last 72 hours. No lab exists for component: CKRMB, TROIP   Coagulation No results for input(s): PTP, INR, APTT in the last 72 hours.     No lab exists for component: INREXT, INREXT    Lipid Panel Lab Results   Component Value Date/Time    Cholesterol, total 347 (H) 04/05/2019 03:46 AM    HDL Cholesterol 26 (L) 04/05/2019 03:46 AM    LDL, calculated  04/05/2019 03:46 AM     LDL AND VLDL CHOLESTEROL NOT CALCULATED WHEN TRIGLYCERIDES >400 MG/DL OR HDL CHOLESTEROL <20 MG/DL    VLDL, calculated Not calculated due to elevated triglyceride level 04/05/2019 03:46 AM    Triglyceride 298 (H) 06/18/2019 06:30 AM    CHOL/HDL Ratio 13.3 (H) 04/05/2019 03:46 AM      BNP No results for input(s): BNPP in the last 72 hours. Liver Enzymes No results for input(s): TP, ALB, TBIL, AP, SGOT, GPT in the last 72 hours.     No lab exists for component: DBIL   Thyroid Studies Lab Results   Component Value Date/Time    TSH 54.20 (H) 06/26/2019 06:10 AM          Procedures/imaging: see electronic medical records for all procedures/Xrays and details which were not copied into this note but were reviewed prior to creation of Plan    Medications:   Current Facility-Administered Medications   Medication Dose Route Frequency    docusate sodium (COLACE) capsule 100 mg  100 mg Per G Tube BID    enoxaparin (LOVENOX) injection 40 mg  40 mg SubCUTAneous Q24H    insulin glargine (LANTUS) injection 34 Units  34 Units SubCUTAneous ACB    levothyroxine (SYNTHROID) tablet 175 mcg  175 mcg Per G Tube 6am    metoprolol tartrate (LOPRESSOR) tablet 25 mg  25 mg Per G Tube Q12H    melatonin tablet 1 mg  1 mg Per G Tube QHS    guaiFENesin (ROBITUSSIN) 100 mg/5 mL oral liquid 200 mg  200 mg Per G Tube Q12H    temazepam (RESTORIL) capsule 7.5 mg  7.5 mg Per G Tube QHS    levoFLOXacin (LEVAQUIN) tablet 750 mg  750 mg Per G Tube Q24H    simethicone (MYLICON) 24LV/3.8FZ oral drops 40 mg  40 mg Per G Tube QID PRN    pantoprazole (PROTONIX) granules for oral suspension 40 mg  40 mg Per G Tube ACB    ondansetron hcl (ZOFRAN) tablet 4 mg  4 mg Per G Tube Q6H PRN    oxyCODONE-acetaminophen (PERCOCET) 5-325 mg per tablet 1 Tab  1 Tab Oral Q6H PRN    metoclopramide HCl (REGLAN) tablet 5 mg  5 mg Per G Tube TID    insulin lispro (HUMALOG) injection   SubCUTAneous Q6H    glucose chewable tablet 16 g  4 Tab Oral PRN    glucagon (GLUCAGEN) injection 1 mg  1 mg IntraMUSCular PRN    LORazepam (ATIVAN) tablet 1 mg  1 mg Oral Q12H PRN    bisacodyl (DULCOLAX) suppository 10 mg  10 mg Rectal Q48H PRN    albuterol-ipratropium (DUO-NEB) 2.5 MG-0.5 MG/3 ML  3 mL Nebulization Q4H PRN    dextrose 10% infusion 125-250 mL  125-250 mL IntraVENous PRN    0.45% sodium chloride with KCl 20 mEq/L infusion   IntraVENous CONTINUOUS       Assessment/Plan     Principal Problem:    Vocal cord dysfunction (4/4/2019)    Active Problems:    Acquired hypothyroidism (5/17/2017)      Hypertensive heart disease without heart failure (5/17/2017)      Type 2 diabetes mellitus with diabetic neuropathy, with long-term current use of insulin ([facility]) ()      History of thyroidectomy (4/4/2019)      Overview: S/P Thyroidectomy (4/4/2019 - Dr. Nigel Hooker)      Chronic respiratory failure ([facility] [address] (6/25/2019)      History of tracheostomy (6/3/2019)      Overview: S/P Tracheostomy (6/3/2019 - Dr. Nigel Hooker)      Impaired mobility and ADLs (6/25/2019)      Status post insertion of percutaneous endoscopic gastrostomy (PEG) tube ([facility] [address] (6/20/2019)      Overview: S/P PEG tube insertion (6/20/2019) - Dr. Nigel Hooker)      Dysphagia (6/25/2019)      Overview: S/P Thyroidectomy (4/4/2019 - Dr. Nigel Hooker)    Vocal cord dysfunction. S/P  Tracheostomy/ Debility. Continue trach care . Pt can swallow sips of water and apple sauce when she used track valve  PEG tube feeding was  switch to bolus feeding   Levaquin 750 mg daily for increase draiange per Trach sx improving      Status post recent thyroidectomy. Continue to monitor sx f/u tsh and free t4   Dual nebulizer q 4h prn    Dysphagia.   The patient has a PEG tube   f/u Nutritional consult   Aspiration precaution  Continue speech therapy  Pt on reglan 5 mg TID   Pt c/o feeling full after bolus feeding will get nutritional consult to reevaluate   D/C IV fluid   And recheck lab cbc, cmp, mag phosphorus level    Type 2 diabetes  Lantus 32 unites s q  Glucose level running high in AM  Will increase Lantus to 34 u  Humalog sliding scale coverage     H/O Hypertension  Lopressor 25 mg BID  continue to monitor Bp      Hypothyroidism  Synthroid 175 Mcg daily  F/u tsh and free t4     Debility  Pt and ot     Anxiety/Insomnia  Restoril 7.5 mg qhs  Might start SSRI after D/C Reglan and finish ABT      DVT and GI prophylaxis with lovenox and protonix  Discussed with pt and nursing staff      Frnak Winter MD  7/7/2019 10:45 AM

## 2019-07-07 NOTE — PROGRESS NOTES
Bedside and Verbal shift change report given to Braden Lr (oncoming nurse) by Marry Short RN (offgoing nurse). Report included the following information SBAR, Kardex, MAR and Recent Results.     SITUATION:    Code Status: Full Code   Reason for Admission: Chronic respiratory failure ([address] [J96.10]    [facility] day: 15   Problem List:       Hospital Problems  Date Reviewed: 5/16/2019          Codes Class Noted POA    Chronic respiratory failure ([address] ICD-10-CM: J96.10  ICD-9-CM: 518.83  6/25/2019 Yes        Impaired mobility and ADLs ICD-10-CM: Z74.09  ICD-9-CM: 799.89  6/25/2019 Yes        Dysphagia ICD-10-CM: R13.10  ICD-9-CM: 787.20  6/25/2019 Yes    Overview Signed 7/5/2019 10:50 AM by Erickson Hamm MD     S/P Thyroidectomy (4/4/2019 - Dr. Zelalem Ordonez)             Status post insertion of percutaneous endoscopic gastrostomy (PEG) tube [facility]) ICD-10-CM: Z93.1  ICD-9-CM: V44.1  6/20/2019 Yes    Overview Addendum 7/5/2019 11:39 AM by Erickson Hamm MD     S/P PEG tube insertion (6/20/2019) - Dr. Zelalem Ordonez)             History of tracheostomy ICD-10-CM: Z98.890  ICD-9-CM: V44.0  6/3/2019 Yes    Overview Signed 7/5/2019 10:44 AM by Erickson Hamm MD     S/P Tracheostomy (6/3/2019 - Dr. Zelalem Ordonez)             History of thyroidectomy ICD-10-CM: U94.892  ICD-9-CM: V15.29  4/4/2019 Yes    Overview Signed 7/5/2019 10:45 AM by Erickson Hamm MD     S/P Thyroidectomy (4/4/2019 - Dr. Zelalem Ordonez)             * (Principal) Vocal cord dysfunction ICD-10-CM: J38.3  ICD-9-CM: 478.5  4/4/2019 Yes        Type 2 diabetes mellitus with diabetic neuropathy, with long-term current use of insulin (HCC) (Chronic) ICD-10-CM: E11.40, Z79.4  ICD-9-CM: 250.60, 357.2, V58.67  Unknown Yes        Acquired hypothyroidism (Chronic) ICD-10-CM: E03.9  ICD-9-CM: 244.9  5/17/2017 Yes        Hypertensive heart disease without heart failure (Chronic) ICD-10-CM: I11.9  ICD-9-CM: 402.90  5/17/2017 Yes BACKGROUND:    Past Medical History:   Past Medical History:   Diagnosis Date    Acquired hypothyroidism 5/17/2017    Chronic back pain     Lower back pain    Depression     Diabetic neuropathy associated with type 2 diabetes mellitus ([facility]     Dysphagia 6/25/2019    S/P Thyroidectomy (4/4/2019 - Dr. Ernestine Morrell)    History of asthma     History of heart failure     chronic diastolic heart failure    History of vitamin D deficiency 8/28/2017    Hypertensive heart disease without heart failure 5/17/2017    Left ear hearing loss     pt states nerve damage--- 25% hearing loss, described as \"muffled\"    Memory difficulty     Obesity, Class II, BMI 35-39.9 11/11/2016    Obstructive sleep apnea 11/6/2015    Panic attacks     Ringing of ears     Type 2 diabetes mellitus with diabetic neuropathy, with long-term current use of insulin ([facility])     Vertigo     Vocal cord dysfunction 4/4/2019         Patient taking anticoagulants yes     ASSESSMENT:    Changes in Assessment Throughout Shift: none     Patient has Central Line: no Reasons if yes:    Patient has Barker Cath: no Reasons if yes:       Last Vitals:     Vitals:    07/06/19 0808 07/06/19 0909 07/06/19 1600 07/06/19 2119   BP: 138/79 121/64 114/62 117/66   Pulse: (!) 59 61 60 66   Resp: 18  20 18   Temp: 97 °F (36.1 °C)  97.8 °F (36.6 °C) 99.7 °F (37.6 °C)   SpO2: 100%  100% 98%   Weight:       Height:            IV and DRAINS (will only show if present)   [REMOVED] Peripheral IV 06/30/19 Left Antecubital-Site Assessment: Clean, dry, & intact  Peripheral IV 07/03/19 Anterior; Left Forearm-Site Assessment: Clean, dry, & intact  [REMOVED] Peripheral IV 06/25/19 Anterior;Proximal;Right Antecubital-Site Assessment: Dry, Intact  PEG/Gastrostomy Tube 06/20/19-Site Assessment: Clean, dry, & intact  [REMOVED] Airway - Tracheostomy Tube 06/03/19 Portex-Site Assessment: Clean, dry, & intact     WOUND (if present)   Wound Type:         Dressing present Dressing Present : No   Wound Concerns/Notes:  none     PAIN    Pain Assessment    Pain Intensity 1: 0 (07/07/19 0000)    Pain Location 1: Head    Pain Intervention(s) 1: Medication (see MAR)    Patient Stated Pain Goal: 0  o Interventions for Pain:  Pain medication    o Intervention effective: yes  o Time of last intervention: see MAR   o Reassessment Completed: yes      Last 3 Weights:  Last 3 Recorded Weights in this Encounter    06/26/19 1430 07/03/19 1852   Weight: 122.2 kg (269 lb 8 oz) 103.9 kg (229 lb)     Weight change:      INTAKE/OUPUT    Current Shift: 07/06 1901 - 07/07 0700  In: 350   Out: -     Last three shifts: 07/05 0701 - 07/06 1900  In: 3753   Out: -      LAB RESULTS     No results for input(s): WBC, HGB, HCT, PLT, HGBEXT, HCTEXT, PLTEXT, HGBEXT, HCTEXT, PLTEXT in the last 72 hours. No results for input(s): NA, K, GLU, BUN, CREA, CA, MG, INR in the last 72 hours. No lab exists for component: PT, PTT, INREXT, INREXT    RECOMMENDATIONS AND DISCHARGE PLANNING     1. Pending tests/procedures/ Plan of Care or Other Needs: labs  2.      3. Discharge plan for patient and Needs/Barriers: TBD    4. Estimated Discharge Date: TBD Posted on Whiteboard in Patients Room: no      4. The patient's care plan was reviewed with the oncoming nurse. \"HEALS\" SAFETY CHECK      Fall Risk    Total Score: 3    Safety Measures: Safety Measures: Bed/Chair-Wheels locked, Bed in low position, Call light within reach    A safety check occurred in the patient's room between off going nurse and oncoming nurse listed above.     The safety check included the below items  Area Items   H  High Alert Medications - Verify all high alert medication drips (heparin, PCA, etc.)   E  Equipment - Suction is set up for ALL patients (with yanker)  - Red plugs utilized for all equipment (IV pumps, etc.)  - WOWs wiped down at end of shift.  - Room stocked with oxygen, suction, and other unit-specific supplies   A  Alarms - Bed alarm is set for fall risk patients  - Ensure chair alarm is in place and activated if patient is up in a chair   L  Lines - Check IV for any infiltration  - Barker bag is empty if patient has a Barker   - Tubing and IV bags are labeled   S  Safety   - Room is clean, patient is clean, and equipment is clean. - Hallways are clear from equipment besides carts. - Fall bracelet on for fall risk patients  - Ensure room is clear and free of clutter  - Suction is set up for ALL patients (with yanker)  - Hallways are clear from equipment besides carts.    - Isolation precautions followed, supplies available outside room, sign posted     Meghann Quiñonez, MARKOS

## 2019-07-08 PROBLEM — Z99.81 HYPOXEMIA REQUIRING SUPPLEMENTAL OXYGEN: Status: ACTIVE | Noted: 2019-06-25

## 2019-07-08 PROBLEM — R09.02 HYPOXEMIA REQUIRING SUPPLEMENTAL OXYGEN: Status: ACTIVE | Noted: 2019-06-25

## 2019-07-08 LAB
ALBUMIN SERPL-MCNC: 3.2 G/DL (ref 3.4–5)
ALBUMIN/GLOB SERPL: 0.8 {RATIO} (ref 0.8–1.7)
ALP SERPL-CCNC: 80 U/L (ref 45–117)
ALT SERPL-CCNC: 21 U/L (ref 13–56)
ANION GAP SERPL CALC-SCNC: 7 MMOL/L (ref 3–18)
AST SERPL-CCNC: 17 U/L (ref 15–37)
BASOPHILS # BLD: 0 K/UL (ref 0–0.1)
BASOPHILS NFR BLD: 1 % (ref 0–2)
BILIRUB SERPL-MCNC: 0.6 MG/DL (ref 0.2–1)
BUN SERPL-MCNC: 11 MG/DL (ref 7–18)
BUN/CREAT SERPL: 14 (ref 12–20)
CALCIUM SERPL-MCNC: 8.7 MG/DL (ref 8.5–10.1)
CHLORIDE SERPL-SCNC: 102 MMOL/L (ref 100–108)
CO2 SERPL-SCNC: 28 MMOL/L (ref 21–32)
CREAT SERPL-MCNC: 0.81 MG/DL (ref 0.6–1.3)
DIFFERENTIAL METHOD BLD: ABNORMAL
EOSINOPHIL # BLD: 0.2 K/UL (ref 0–0.4)
EOSINOPHIL NFR BLD: 3 % (ref 0–5)
ERYTHROCYTE [DISTWIDTH] IN BLOOD BY AUTOMATED COUNT: 15.9 % (ref 11.6–14.5)
GLOBULIN SER CALC-MCNC: 4.2 G/DL (ref 2–4)
GLUCOSE BLD STRIP.AUTO-MCNC: 141 MG/DL (ref 70–110)
GLUCOSE BLD STRIP.AUTO-MCNC: 141 MG/DL (ref 70–110)
GLUCOSE BLD STRIP.AUTO-MCNC: 171 MG/DL (ref 70–110)
GLUCOSE BLD STRIP.AUTO-MCNC: 239 MG/DL (ref 70–110)
GLUCOSE BLD STRIP.AUTO-MCNC: 291 MG/DL (ref 70–110)
GLUCOSE SERPL-MCNC: 160 MG/DL (ref 74–99)
HCT VFR BLD AUTO: 36.7 % (ref 35–45)
HGB BLD-MCNC: 11.7 G/DL (ref 12–16)
LYMPHOCYTES # BLD: 2 K/UL (ref 0.9–3.6)
LYMPHOCYTES NFR BLD: 32 % (ref 21–52)
MAGNESIUM SERPL-MCNC: 2.3 MG/DL (ref 1.6–2.6)
MCH RBC QN AUTO: 29 PG (ref 24–34)
MCHC RBC AUTO-ENTMCNC: 31.9 G/DL (ref 31–37)
MCV RBC AUTO: 90.8 FL (ref 74–97)
MONOCYTES # BLD: 0.5 K/UL (ref 0.05–1.2)
MONOCYTES NFR BLD: 8 % (ref 3–10)
NEUTS SEG # BLD: 3.5 K/UL (ref 1.8–8)
NEUTS SEG NFR BLD: 56 % (ref 40–73)
PHOSPHATE SERPL-MCNC: 4.6 MG/DL (ref 2.5–4.9)
PLATELET # BLD AUTO: 268 K/UL (ref 135–420)
PMV BLD AUTO: 8.9 FL (ref 9.2–11.8)
POTASSIUM SERPL-SCNC: 3.9 MMOL/L (ref 3.5–5.5)
PROT SERPL-MCNC: 7.4 G/DL (ref 6.4–8.2)
RBC # BLD AUTO: 4.04 M/UL (ref 4.2–5.3)
SODIUM SERPL-SCNC: 137 MMOL/L (ref 136–145)
WBC # BLD AUTO: 6.3 K/UL (ref 4.6–13.2)

## 2019-07-08 PROCEDURE — 65310000000 HC RM PRIVATE REHAB

## 2019-07-08 PROCEDURE — 97530 THERAPEUTIC ACTIVITIES: CPT

## 2019-07-08 PROCEDURE — 77030037875 HC DRSG MEPILEX <16IN BORD MOLN -A

## 2019-07-08 PROCEDURE — 74011250637 HC RX REV CODE- 250/637: Performed by: EMERGENCY MEDICINE

## 2019-07-08 PROCEDURE — 97110 THERAPEUTIC EXERCISES: CPT

## 2019-07-08 PROCEDURE — 80053 COMPREHEN METABOLIC PANEL: CPT

## 2019-07-08 PROCEDURE — 74011250636 HC RX REV CODE- 250/636: Performed by: INTERNAL MEDICINE

## 2019-07-08 PROCEDURE — 85025 COMPLETE CBC W/AUTO DIFF WBC: CPT

## 2019-07-08 PROCEDURE — 82962 GLUCOSE BLOOD TEST: CPT

## 2019-07-08 PROCEDURE — 74011636637 HC RX REV CODE- 636/637: Performed by: INTERNAL MEDICINE

## 2019-07-08 PROCEDURE — 84100 ASSAY OF PHOSPHORUS: CPT

## 2019-07-08 PROCEDURE — 83735 ASSAY OF MAGNESIUM: CPT

## 2019-07-08 PROCEDURE — 97116 GAIT TRAINING THERAPY: CPT

## 2019-07-08 PROCEDURE — 97542 WHEELCHAIR MNGMENT TRAINING: CPT

## 2019-07-08 PROCEDURE — 36415 COLL VENOUS BLD VENIPUNCTURE: CPT

## 2019-07-08 PROCEDURE — 92507 TX SP LANG VOICE COMM INDIV: CPT

## 2019-07-08 PROCEDURE — 92526 ORAL FUNCTION THERAPY: CPT

## 2019-07-08 PROCEDURE — 74011250637 HC RX REV CODE- 250/637: Performed by: INTERNAL MEDICINE

## 2019-07-08 PROCEDURE — 97535 SELF CARE MNGMENT TRAINING: CPT

## 2019-07-08 RX ORDER — ERYTHROMYCIN ETHYLSUCCINATE 200 MG/5ML
80 SUSPENSION ORAL
Status: DISCONTINUED | OUTPATIENT
Start: 2019-07-08 | End: 2019-07-09

## 2019-07-08 RX ORDER — INSULIN LISPRO 100 [IU]/ML
INJECTION, SOLUTION INTRAVENOUS; SUBCUTANEOUS
Status: DISCONTINUED | OUTPATIENT
Start: 2019-07-08 | End: 2019-07-09

## 2019-07-08 RX ORDER — PAROXETINE 10 MG/5ML
20 SUSPENSION ORAL
Status: DISCONTINUED | OUTPATIENT
Start: 2019-07-09 | End: 2019-07-13 | Stop reason: HOSPADM

## 2019-07-08 RX ADMIN — ERYTHROMYCIN ETHYLSUCCINATE 80 MG: 200 GRANULE, FOR SUSPENSION ORAL at 15:41

## 2019-07-08 RX ADMIN — METOPROLOL TARTRATE 25 MG: 25 TABLET ORAL at 21:54

## 2019-07-08 RX ADMIN — LEVOTHYROXINE SODIUM 175 MCG: 75 TABLET ORAL at 06:12

## 2019-07-08 RX ADMIN — Medication 1 MG: at 21:54

## 2019-07-08 RX ADMIN — TEMAZEPAM 7.5 MG: 7.5 CAPSULE ORAL at 21:54

## 2019-07-08 RX ADMIN — ONDANSETRON HYDROCHLORIDE 4 MG: 4 TABLET, FILM COATED ORAL at 10:15

## 2019-07-08 RX ADMIN — METOCLOPRAMIDE HYDROCHLORIDE 5 MG: 5 TABLET ORAL at 09:21

## 2019-07-08 RX ADMIN — ERYTHROMYCIN ETHYLSUCCINATE 80 MG: 200 GRANULE, FOR SUSPENSION ORAL at 17:49

## 2019-07-08 RX ADMIN — PANTOPRAZOLE SODIUM 40 MG: 40 GRANULE, DELAYED RELEASE ORAL at 06:48

## 2019-07-08 RX ADMIN — INSULIN GLARGINE 34 UNITS: 100 INJECTION, SOLUTION SUBCUTANEOUS at 06:59

## 2019-07-08 RX ADMIN — METOPROLOL TARTRATE 25 MG: 25 TABLET ORAL at 09:21

## 2019-07-08 RX ADMIN — GUAIFENESIN 200 MG: 200 SOLUTION ORAL at 21:54

## 2019-07-08 RX ADMIN — GUAIFENESIN 200 MG: 200 SOLUTION ORAL at 09:22

## 2019-07-08 RX ADMIN — INSULIN LISPRO 6 UNITS: 100 INJECTION, SOLUTION INTRAVENOUS; SUBCUTANEOUS at 12:10

## 2019-07-08 RX ADMIN — OXYCODONE HYDROCHLORIDE AND ACETAMINOPHEN 1 TABLET: 5; 325 TABLET ORAL at 17:47

## 2019-07-08 RX ADMIN — ENOXAPARIN SODIUM 40 MG: 40 INJECTION SUBCUTANEOUS at 17:39

## 2019-07-08 RX ADMIN — LORAZEPAM 1 MG: 0.5 TABLET ORAL at 23:00

## 2019-07-08 RX ADMIN — INSULIN LISPRO 4 UNITS: 100 INJECTION, SOLUTION INTRAVENOUS; SUBCUTANEOUS at 17:41

## 2019-07-08 NOTE — PROGRESS NOTES
Problem: Dysphagia (Adult)  Goal: *Acute Goals and Plan of Care (Insert Text)  Description  Long term goals  Patient will:  1. Perform pharyngeal strengthening exercises with supervision. 2. Tolerate swallowing trials of varied consistenies without over s/s of aspiration. 3. Participate in repeat MBS prior to resuming PO for nutrition. 4. Tolerate deflation of tracheostomy cuff for speech production for 20 minutes. MET  5. Tolerate placement of a [place] speaking valve for speech for 20 minute intervals. MET- increased to 45 minutes. Short term goals (by 7/10/19)  Patient will:  1. Perform pharyngeal strengthening exercises with mincues. 2. Tolerate swallowing trials of ice chops and small boluses puree without overt s/s of aspiration. 3. Participate in repeat MBS prior to resuming PO for nutrition. 4. Tolerate deflation of tracheostomy cuff for speech production for 20 minutes. MET- Tracheostomy cuff consistently deflated. 5. Tolerate placement of a passu[place] speaking valve for speech for 30-45 minute intervals. Outcome: Progressing Towards Goal  Note:   SPEECH LANGUAGE PATHOLOGY TREATMENT    Patient: Kelly Cantu (89 y.o. female)  Date: 7/8/2019  Diagnosis: Chronic respiratory failure (HCC) [J96.10] Vocal cord dysfunction       Time in: 1130 1400  Time Out:  [id number]  SUBJECTIVE:   Patient stated ? I know he won't answer my questions? .    OBJECTIVE:   Mental Status:  Mrs. Christa Pinto was awake, alert and anxious in today's sessions. Treatment & Interventions:   Patient was seen for two speech therapy sessions, morning and afternoon. The following treatment tasks were presented:  Neuro-Linguistics:   Orientation:  Independent  Recent memory: Independent  Voice:  Mrs. Christa Pinto participate in trials with the [place] Valve (PMV) in both sessions today. Initially this morning, she wore the valve for only two minutes.   After time to rest, she then had the valve in place for approximately 20 minutes. Speech production was breathy with minimal voicing this morning. However, this afternoon, patient tolerated the valve for 22 minutes, prior to requesting its removal.  Voicing was soft, but present this afternoon. Patient voicing her concerns about returning home on Friday. SLP also provided 3-4 boluses of ice chips this afternoon when valve worn for 22 minutes. She performed effortful swallow, repeat swallow to clear without overt s/s of aspiration. No cough, no change in voice quality. Patient's nurse in to perform education about the tracheostomy and changing the inner cannula; SLP remained to assist.  Patient very apprehensive and expressed anxiety over caring for the trach herself at home. A sample tracheostomy similar to her own was used to teach the parts of the trach tube, and demonstrated removal and replacement of the inner cannula. Patient did not perform this herself on the sample tube. Education will continue tomorrow. Response & Tolerance to Activities:  Mrs. Elliott Cid was cooperative with tasks presented by the SLP. She was very anxious at the thought of caring for the tracheostomy tube once she is discharged to home. Pain:  Pain Scale 1: Numeric (0 - 10)  No report of pain to the SLP     After treatment:   ?       Patient left in no apparent distress sitting up in chair  ? Patient left in no apparent distress in bed  ? Call bell left within reach  ? Nursing notified  ? Caregiver present  ? Bed alarm activated    ASSESSMENT:   Progression toward goals:  ?       Improving appropriately and progressing toward goals  ? Improving slowly and progressing toward goals  ? Not making progress toward goals and plan of care will be adjusted    PLAN:   Patient continues to benefit from skilled intervention to address the above impairments. Continue treatment per established plan of care.   Discharge Recommendations:  Home Health    Estimated LOS: Through 7/12/19    Maryse Marks, SLP  Time calculation:  76 Minutes

## 2019-07-08 NOTE — PROGRESS NOTES
Zofran given for nausea. Addendum:  Pt denies any relief of nausea from Zofran. Will refer to Dr. Linda Ji to address further.

## 2019-07-08 NOTE — INTERDISCIPLINARY ROUNDS
[facility] PHYSICAL REHABILITATION  [address] [place] [address], [facility], [address]    INPATIENT REHABILITATION  PRE-TEAM CONFERENCE SUMMARY     Date of Conference: 7/9/2019    Patient Information:        Name: Lena Peña Age / Sex: 63 y.o. / female   CSN: [id number] MRN: [id number]   [address] [place] [address] Date: 6/25/2019 Length of Stay: 13 days     Primary Rehab Diagnosis  1. Generalized Weakness with Impaired Mobility and ADLs  2.  Vocal cord dysfunction status post thyroidectomy (4/4/2019 - Dr. Abby Spann) with residual dysphagia and dysarthria; S/P PEG tube insertion (6/20/2019) - Dr. Abby Spann)     Comorbidities   Obstructive sleep apnea G47.33    H/O aortic valve replacement Z95.2    History of bicuspid aortic valve Z87.74    Chronic back pain M54.9, G89.29    Chronic left shoulder pain M25.512, G89.29    Obesity, Class II, BMI 35-39.9 E66.9    Left shoulder tendinitis M75.82    Spondylosis of lumbar region without myelopathy or radiculopathy M47.816    Chronic pain of both shoulders M25.511, G89.29, M25.512    Chronic pain of both knees M25.561, M25.562, G89.29    Chronic pain syndrome G89.4    Encounter for long-term (current) use of medications Z79.899    Chronic midline low back pain M54.5, G89.29    Lumbar facet arthropathy M47.816    Lumbar degenerative disc disease M51.36    Venous stasis dermatitis of both lower extremities I87.2    Diabetic neuropathy associated with type 2 diabetes mellitus  E11.40    Acquired hypothyroidism E03.9    Hypertensive heart disease without heart failure I11.9    Dyslipidemia E78.5    Depression F32.9    Chronic diastolic congestive heart failure  I50.32    Type 2 diabetes mellitus with diabetic neuropathy, with long-term current use of insulin  E11.40, Z79.4    Stridor R06.1    Fever R50.9    Chronic respiratory failure J96.10    Vertigo R42    Panic attacks F41.0    Left ear hearing loss H91.92    History of vitamin D deficiency Z86.39 Therapy:     FIM SCORES Initial Assessment Weekly Progress Assessment 7/8/2019   Eating Functional Level: 1  Comments: PEG Tube  5- Supervision with tub feeding    Swallowing     Grooming 5  6- mod independent   Bathing 3  UB/LB 5- Supervision      Upper Body Dressing Functional Level: 3(increased time needed)  Comments: domned hopital gown with Moda  5 -supevision   Lower Body Dressing Functional Level: 3  Comments: increased time needed 5- supervision   Toileting Functional Level: 5  Comments: increased time needed 5- supervision   Bladder - level of assist 4 6   Bladder - accident frequency score 7 6   Bowel - level of assist 6 6   Bowel - accident frequency score 3     Toilet Transfer [place] Toilet Transfer Score: 5  Comments: to bed side commod     Tub/Shower Transfer [place] Tub or Shower Type: Tub/Shower combination  Tub/Shower Transfer Score: 5        Comprehension Primary Mode of Comprehension: Auditory  Score: 4  Comments: understanding simple and multi-step directions with occasional repetition needed Auditory  6   Expression Primary Mode of Expression: Sign language, Verbal, Other (comment)(writes on paper)  Score: 4  Comments: needing cues occasionally for writing down needs vs attempting to verbalize Nonverbal - Written;Nonverbal - Pointing;Nonverbal - Gestures  6   Social Interaction Score: 4  Comments: moderate encouragement to participate in therapy 5   Problem Solving Score: 4  Comments: min cues for functional problem solving 6   Memory Score: 4  Comments: able to recall information appropriately as indicated on chart with occasional clarification needed 6     FIM SCORES Initial Assessment Weekly Progress Assessment 7/8/2019   Bed/Chair/Wheelchair Transfers Transfer Type:  Other  Other: stand step transfer without AD as per PLOF  Transfer Assistance : 4 (Minimal assistance)  Sit to Stand Assistance: Minimal assistance  Car Transfers: Not tested  Car Type: N/A Transfer Type: Other  Other: stand step transfer without AD  Transfer Assistance : 5 (Supervision/setup)  Sit to Stand Assistance: Supervision   Bed Mobility Rolling Right 4 (Minimal assistance)   Rolling Left 4 (Minimal assistance)   Supine to Sit 4 (Minimal assistance)(Head of bed elevated as per physician order)   Sit to Stand Minimal assistance   Sit to Supine 4 (Minimal assistance)    Rolling Right    Mod I in hospital bed   Rolling Left    Mod I in hospital bed   Supine to Sit   6 (Modified independent)(elevated head of bed)   Sit to Stand   Supervision   Sit to Supine    Mod I using railing and hospital bed      Locomotion (W/C) Able to Propel (ft): (not challenged today)  Functional Level: (not challenged today; to be further assessed)  Curbs/Ramps Assist Required (FIM Score): 0 (Not tested)  Wheelchair Setup Assist Required : 0 (Not tested)  Wheelchair Management: (not challenged today; to be further assessed) Function 2  Setup Assistance         Locomotion (W/C distance) (not challenged today) 70 feet(70-75 ft bouts before fatiguing)   Locomotion (Walk) 4 (Minimal assistance) 5 (Stand-by assistance)  Walker, rollator(therapist assisting with oxygen tubing)   Locomotion (Walk dist.) 8 Feet (ft) 75 Feet (ft)(performing second bout 70 ft before needing rest)   Steps/Stairs Steps/Stairs Ambulated (#): (not able to assess today due to patient fatigue)  Level of Assist : 0 (Not tested)  Rail Use: (N/A)  up/down 4 stairs with right handrail using sidestepping technique CG/SBA level         Nursing:     Neuro:   AAA&O x   4         Respiratory:   [] WNL   [x] O2 LPM: 2L  Other: Trach  Peripheral Vascular:   [] TEDS present   [x] Edema present __1+__ Grade   Cardiac:   [x] WNL   [] Other  Genitourinary:   [x] continent   [] incontinent   [] mueller  Abdominal _7/8/19_ LBM  GI: _NPO_ Diet ______ Liquids Jevity 1.5_ bolus type feedings  tube feeds  Musculoskeletal: ____ ROM Transfers _none Assistive Device Used  _SBA  Level of Assistance  Skin Integumentary:   [] Intact   [x] Not Intact   Infection/Aspiration/Fall/Skin_Preventative Measures  Details_at risk for aspiration pneumonia/pneumonia due to trach/g-tube. Pain: [] Controlled   [x] Not Controlled   Pain Meds:   [] Scheduled   [x] PRN        Registered Dietitian / Nutrition:   No data found. Pt anxious today. Was not tolerating multiple bolus tube feeding schedule. Noted schedule and amount provided at a given time modified on 7/7. Receiving bolus feeds. Pt c/o nausea per staff report; provided zofran. . Noted Reglan medication discontinued per MD; changed to erythromycin for promotion of increased tube feeding tolerance. Tube feeding regimen:     Bolus feeding regimen to promote tolerance and alleviate pt c/o fullness. Provide 330 mL bolus tube feed of Jevity 1.5 every 4 hours for total of 4 bolus feeds per day and flush with 200 mL water after each bolus. Bolus to be administered using pump at 165 mL/hr x 2 hours. Maintain aspiration precautions and HOB elevation. Suggested bolus schedule: 6:00, 10:00, 14:00 and 18:00. Supplements:          [] Yes   [x] No      Amount of supplement consumed: not applicable      Intake/Output Summary (Last 24 hours) at 7/8/2019 1150  Last data filed at 7/8/2019 0600  Gross per 24 hour   Intake 1197 ml   Output --   Net 1197 ml                                Last bowel movement: 7/6      Interdisciplinary Team Goals:     1. Discipline  Physical Therapy    Goal  Patient will be able to negotiate at least 3 steps using right handrail and assist with negotiation of tubing/lines as appropriate at supervision level for safe discharge home with support of family. Barrier  Decreased endurance, decreased strength, impaired balance, multiple lines/tubing currently (oxygen, PEG tube)    Intervention  Gait training, stair training, education and reinforcement of safe mobility techniques, strengthening and endurance training as appropriate. Goal written by:   Jason Castro, PT, DPT     2. Discipline  Occupational Therapy    Goal Pt to be supervision with changing tracheostomy tubs inner cannula     Barrier  Anxirty to performs tasks. Intervention  Education     Goal written by:  SHIVANI Guajardo/L     3. Discipline  Speech Therapy    Goal  Patient will tolerate use of the [place] Valve for 25-30 minutes without distress. Barrier  Tracheostomy due to airway compromise, fear, anxiety    Intervention  education about tracheostomy and trach tube, palcement of PMV, swallowing trials with PMV in place    Goal written by:  Janee Singh [facility]-SLP     4. Discipline  Nursing    Goal Patient/family will be able to perform trach care by discharge    Barrier  Patient anxiety    Intervention  Hands on hands instruction by nursing, OT, speech regarding trach care ongoing    Goal written by:  Allison Brizuela, RN BSN     5. Discipline  Clinical Psychology    Goal  Minimize subjective anxiety and distress    Barrier  Situational stress and anxiety episodes with activity    Intervention  Cues for relaxation    Goal written by:  Kin Jones, PhD     6. Discipline  Nutrition / Dietetics    Goal  1. Patient will tolerate enteral nutrition formula and regimen without difficulty within the next 7 days. Outcome:  [x] Met/Ongoing    [] Progressing towards goal    [] Not progressing towards goal    [] New/Initial goal   2. Patient will meet their estimated nutritional needs through adequate enteral nutrition within the next 7 days. Outcome:  [x] Met/Ongoing    [] Progressing towards goal    [] Not progressing towards goal    [] New/Initial goal     Barrier  c/o fullness, nausea    Intervention  continue bolus tube feeding schedule/ regimen. Continue bowel regimen, zofran, and erythromycin    Goal written by:  Greg Dennis RD       Disposition / Discharge Planning:      Follow-up services:  [x] Physical Therapy             [x] Occupational Therapy       [x] Speech Therapy           [x] Skilled Nursing      [] Medical Social Worker   [] Aide        [] Outpatient     [x] Home Health   [] [address]   [] [facility]   DME recommendations: Hospital bed, w/c, w/c cushion, rollator : 3:1 commode and tub transfer bench   Estimated discharge date:  7/12/2019   Discharge Location:  [x] Home   [] [facility]   [] TBD             [] Other:          Electronic Signatures:      Signature Date Signed   Physical Therapist    Terrence Villeda, PT, DPT  7/8/2019   Occupational Therapist    Whitney Palacios, [facility] [address], MSOTR/L  7/8/2019   Speech Therapist   Wright Kayser, [address] [facility] 7/8/19   Recreational Therapist    Viviana Carey, CTRS 7/8/19   Nursing    Chauncy Sandifer, RN, BSN 7/8/19   Dietitian    Albin Gutierrez RD  7/8/19   Clinical Psychologist    Ashok Shelley, PhD 7/8/19    Physician    Chantal Lu MD   7/8/2019                   The above information has been reviewed with the patient in a language that they can understand. Opportunity for comments and questions has been provided and a signed attestation has been scanned into the \"media tab\" of the EMR.       Patient Signature: ______________________________________________________    Date Signed: __________________________________________________________

## 2019-07-08 NOTE — PROGRESS NOTES
Problem: Self Care Deficits Care Plan (Adult)  Goal: *Therapy Goal (Edit Goal, Insert Text)  Description  Occupational Therapy Goals   Long Term Goals  Initiated 19 and to be accomplished within 2 week(s) 7/10/19  1. Pt will perform functional activity up to 15 minutes with no more than 2 rest breaks and 2 vcs to attend to task. .   2. Pt will perform grooming with S.           3. Pt will perform UB bathing with S .       4. Pt will perform LB bathing with S.   5. Pt will perform tub/shower transfer with S.  6. Pt will perform UB dressing with S.    7. Pt will perform LB dressing with S.    8. Pt will perform toileting task with I.      9. Pt will perform toilet transfer with S.       Short Term Goals   Initiated 19 and to be accomplished within 7 day(s) 7/3/19. Updated 7/3/19  1. Pt will perform functional task up to 10 minutes with no more than 3 verbal cues to attend to task and 3 rest breaks. 2. Pt will perform grooming with SBA. .  7/3/19  3. Pt will perform UB bathing with S and no more than 3 vcs to  Initiate and complete task. 4. Pt will perform LB bathing with [place] and no more than 3 vcs to  complete task. 5. Pt will perform tub/shower transfer with [facility].  7/3/19-S  6. Pt will perform UB dressing with [place] and no more than 3 vcs to  initiate and complete task. .  7. Pt will perform LB dressing with [place] and no more than 3 vcs to initiate and  complete task. 8. Pt will perform toileting task with S. 7/3/19  9. Pt will perform toilet transfer with S and no more than 3 vcs to initiate and complete task.  7/3/19        Outcome: Progressing Towards Goal  Goal: Interventions  Outcome: Progressing Towards Goal     Problem: Patient Education: Go to Patient Education Activity  Goal: Patient/Family Education  Outcome: Progressing Towards Goal  OCCUPATIONAL THERAPY TREATMENT    Patient: Romana Finely   63 y.o.     Patient identified with name and : Yes    Date: 2019    First Tx Session  Time In: 930  Time Out[de-identified] 1100      Diagnosis: Chronic respiratory failure (HCC) [J96.10]   Precautions: Aspiration, Fall  Chart, occupational therapy assessment, plan of care, and goals were reviewed. Pain:  Pt reports 6/10 pain or discomfort prior to treatment. Pt reports 6/10 pain or discomfort post treatment. Intervention Provided: None      SUBJECTIVE:   Patient stated her back hurt with standing. C/o dizziness and headache so- so.    OBJECTIVE DATA SUMMARY:   Pt treatment session focus on education for tracheostomy tube inner cannula. Pt seen in recliner performing her tub feeding with supervision from nursing while on 3L of O2. Nursing and OT began collaborating with education on tracheostomy tube inner cannula, on how to replace it. Pt acknowledge she was scared and asked if we had a dummy to practice on, respiratory therapy and nursing educator was contacted, waiting on a respond. Pt performed sponge bath prior to OT treatment session although schedule for shower therefore, re-scheduled shower for Thursday. Pt later began to cry states  'I don't want this' pointing to her trac. Will con't with education to  follow up with demonstration to maximize independency with tracheostomy. Pt asked to increase her O2 level 2/2 feeling dry and unable to breath than added 'it's not the oxygen it's the air it make me feel like I can breath better'     THERAPEUTIC ACTIVITY Daily Assessment    Focus on activity standing tolerance. Pt performs static standing for ~ 1:25 minutes x2, supervision without AD. THERAPEUTIC EXERCISE Daily Assessment    Pt c/o pain (6/10) in right shoulder with  PROM 2/2 old injured. LOWER BODY DRESSING Daily Assessment    Lower Body Dressing   Dressing Assistance : 5 (Supervision)  Position Performed: Seated in chair  Comments: Pt turn sideways/catty corner in recliner bring LE's onto chair reaching feet easily for doffing/ donning sock. MOBILITY/TRANSFERS Daily Assessment     Pt sit to stand from recliner without AD with SBA for static standing. ASSESSMENT: Pt is supervision with self care and tub feeding form wheel chair level. Pt is not comfortable with addressing trach education for independency with Inner cannula. Pt will benefit from con't education. Progression toward goals:  ?          Improving appropriately and progressing toward goals  ? Improving slowly and progressing toward goals  ? Not making progress toward goals and plan of care will be adjusted     PLAN:  Patient continues to benefit from skilled intervention to address the above impairments. Continue treatment per established plan of care. Discharge Recommendations:  Home Health   Further Equipment Recommendations for Discharge: 3:1 commode and tub shower bench. Activity Tolerance:  Fair     Estimated LOS:7/12    Please refer to the flow sheet for vital signs taken during this treatment. After treatment:   ?  Patient left in no apparent distress sitting up in chair   ? Patient left in no apparent distress in bed  ? Call bell left within reach  ? Nursing notified  ? Caregiver present  ? Bed alarm activated    COMMUNICATION/EDUCATION:   ? Home safety education was provided and the patient/caregiver indicated understanding. ? Patient/family have participated as able in goal setting and plan of care. ? Patient/family agree to work toward stated goals and plan of care. ? Patient understands intent and goals of therapy, but is neutral about his/her participation. ? Patient is unable to participate in goal setting and plan of care.       Su ROSS  7/8/2019

## 2019-07-08 NOTE — PROGRESS NOTES
conducted a Follow up consultation and Spiritual Assessment for Alysha Michael, who is a 63 y.o.,female. The  provided the following Interventions:  Patient was sitting in the recliner next to her bed at the time of visit. Patient's  was also in the room. Continued the relationship of care and support. Patient shared her feelings through writing on a pad. She shared that her thyroid surgery brought about some damage to the nerves that affected her voice. She had some discomfort from time to time but pain is controlled. Offered prayer and assurance of continued prayer on patient's behalf. Chart reviewed. The following outcomes were achieved:  Patient expressed gratitude for 's visit. Assessment:  Patient denies any emotional concerns or spiritual needs at this time. There are no further spiritual or [employer] issues which require Spiritual Care Services interventions at this time. Plan:  Chaplains will continue to follow and will provide pastoral care on an as needed/requested basis.  recommends bedside caregivers page  on duty if patient shows signs of acute spiritual or emotional distress.        [address] [facility]   [phone]

## 2019-07-08 NOTE — PROGRESS NOTES
Problem: Mobility Impaired (Adult and Pediatric)  Goal: *Therapy Goal (Edit Goal, Insert Text)  Description  Physical Therapy Goals: STG  Initiated 6/26/2019, reassessed 7/3/19 and to be accomplished 7/10/2019:   1. Patient will move from supine to sit and sit to supine , scoot up and down and roll side to side in bed with supervision/set-up. ( 7/3/2019 MET)     2. Patient will transfer from bed to chair and chair to bed with supervision/set-up using the least restrictive device. (7/3/2019 MET)  3. Patient will perform sit to stand with supervision/set-up. (7/3/2019 MET)  4. Patient will ambulate with supervision/set-up for 50 feet with the least restrictive device. 5.  Patient will ascend/descend 3 stairs with 2 handrail(s) with minimal assistance/contact guard assist.     Physical Therapy Goals: LTG  Initiated 6/26/2019 and to be accomplished within 21 day(s) 7/17/2019:  1. Patient will move from supine to sit and sit to supine , scoot up and down and roll side to side in bed with modified independence. 2.  Patient will transfer from bed to chair and chair to bed with modified independence using the least restrictive device. 3.  Patient will perform sit to stand with modified independence. 4.  Updated 7/5/2019: Patient will ambulate with modified independence for 150 feet with the least restrictive device. 5.  Patient will ascend/descend 3 stairs with 2 handrail(s) with supervision/set-up. Outcome: Progressing Towards Goal   PHYSICAL THERAPY TREATMENT    Patient: Vonda De La Cruz (5 y.o. female)  Date: 7/8/2019  Diagnosis: Chronic respiratory failure (HCC) [J96.10] Vocal cord dysfunction  Precautions: Aspiration, Fall  Chart, physical therapy assessment, plan of care and goals were reviewed. Time In: 0805  Time Out: 0910  Time In: 1100  Time Out: 1130  Patient Seen For: Transfer training; Therapeutic exercise;Patient education;Gait training  Pain:  Patient continues to report headache, but when nurse offered medication for pain, patient declined. Patient identified with name and : yes    SUBJECTIVE:     Patient agreeable to treatment but needing some encouragement as she reported feeling dizzy initially. Patient reporting feeling somewhat nauseous during second session but willing to work with PT for 30 minute treatment session. OBJECTIVE DATA SUMMARY:   Objective:     BED/MAT MOBILITY Daily Assessment    Supine to Sit : 6 (Modified independent)(elevated head of bed)       TRANSFERS Daily Assessment    Transfer Type: Other  Other: stand step transfer without AD  Transfer Assistance : 5 (Supervision/setup)  Sit to Stand Assistance: Supervision       GAIT Daily Assessment    Amount of Assistance: 5 (Stand-by assistance)  Distance (ft): 75 Feet (ft)(performing second bout 70 ft before needing rest)  Assistive Device: Walker, rollator(therapist assisting with oxygen tubing)    Patient able to maintain SpO2% 97-98% with heart rate 67-72 bpm during and following gait bouts. STEPS or STAIRS Daily Assessment    Steps/Stairs Ambulated (#): 4  Level of Assist : 4 (Contact guard assistance)(CG/SBA using sidestepping technique up/down stairs)  Rail Use: Right (sidestepping up/down stairs)       BALANCE Daily Assessment    Sitting - Static: Good (unsupported)  Sitting - Dynamic: Good (unsupported)  Standing - Static: Fair;Occasional;Good  Standing - Dynamic : Impaired       WHEELCHAIR MOBILITY Daily Assessment    Able to Propel (ft): 70 feet(70-75 ft bouts before fatiguing)  Functional Level: 2     Patient fatiguing quickly today with wheelchair mobility when using bilat UE; cued for increased use of bilat LE to improve functional endurance with task.         THERAPEUTIC EXERCISES Daily Assessment    Extremity: Both  Exercise Type #1: Seated lower extremity strengthening(2# cuff weights LAQ, hip flex marches)  Sets Performed: 3  Reps Performed: 15  Level of Assist: Stand-by assistance    Review of exercises performed for patient's home exercise program and written handout provided. ASSESSMENT:  Patient continues to be anxious whenever exhibiting increased shortness of breath with activity, needing encouragement and education regarding her SpO2% to indicate peripheral blood oxygen levels. Patient continues to be limited in her endurance and tolerance for standing and gait tasks, participating this morning in gait but limited in ability to participate consistently (only able to perform 2 bouts). During second session, patient able to negotiate 4 stairs (previously only able to negotiate 2 stairs before needing rest). SpO2% remaining 97-98% with activity and following activity. Patient recommended to have ongoing skilled PT to improve ability to perform standing tasks including gait, improving standing balance as well as endurance for reduced falls risk. Patient continues to be reliant on elevated head of bed for bed mobility, using bed rails as well, appropriate for hospital bed upon discharge. Progression toward goals:  ?      Improving appropriately and progressing toward goals  ? Improving slowly and progressing toward goals  ? Not making progress toward goals and plan of care will be adjusted     PLAN:  Patient continues to benefit from skilled intervention to address the above impairments. Continue treatment per established plan of care. Discharge Recommendations:  Home Health  Further Equipment Recommendations for Discharge:  rollator and wheelchair 22 inch with foam cushion, hospital bed     Estimated LOS: 2-3 weeks    Activity Tolerance:   Fair to poor depending on fatigue. Patient requiring fan to blow on her during treatment as patient reports that she feels like she can better breathe. After treatment:   Patient left seated in recliner chair, call button within reach and handed off to nurse after first session, handed off to speech therapist following second session. Radha Niño, PT  7/8/2019

## 2019-07-08 NOTE — PROGRESS NOTES
[address]  [address] [place] [address], [address]     INPATIENT REHABILITATION  DAILY PROGRESS NOTE     Date: 7/8/2019    Name: Tru Lozano Age / Sex: 63 y.o. / female   CSN: [id number] MRN: [id number]   Admit Date: 6/25/2019 Length of Stay: 13 days     Primary Rehab Diagnosis: Generalized Weakness with Impaired Mobility and ADLs secondary to Vocal cord dysfunction status post thyroidectomy (4/4/2019 - Dr. Elizabeth Pinto) with residual dysphagia and dysarthria; S/P PEG tube insertion (6/20/2019) - Dr. Elizabeth Pinto)       Subjective:     Patient seen and examined. Blood pressure controlled. Blood sugars fairly controllled. No fever over the past 24 hours. Patient's Complaint:   Sensation of difficulty of breathing despite normal O2 saturation on pulse oximeter   Episodes of panic    Pain Control: no current joint or muscle symptoms, essentially pain-free      Objective:     Vital Signs:  Patient Vitals for the past 24 hrs:   BP Temp Pulse Resp SpO2   07/08/19 0823 125/70 -- 65 -- 99 %   07/08/19 0745 118/67 98.2 °F (36.8 °C) 68 18 98 %   07/07/19 2150 126/69 98.5 °F (36.9 °C) 67 18 100 %   07/07/19 1956 -- -- -- -- 98 %   07/07/19 1612 146/79 98 °F (36.7 °C) 61 18 99 %        Physical Examination:  GENERAL SURVEY: Patient is awake, alert, oriented x 3, sitting comfortably on the chair, not in acute respiratory distress.   HEENT: pink palpebral conjunctivae, anicteric sclerae, no nasoaural discharge, moist oral mucosa  NECK: (+) tracheostomy in place, supple, no jugular venous distention, no palpable lymph nodes  CHEST/LUNGS: symmetrical chest expansion, good air entry, clear breath sounds  HEART: adynamic precordium, good S1 S2, no S3, regular rhythm, no murmurs  ABDOMEN: (+) PEG tube in place, obese, bowel sounds appreciated, soft, non-tender  EXTREMITIES: pink nailbeds, no edema, full and equal pulses, no calf tenderness   NEUROLOGICAL EXAM: The patient is awake, alert and oriented x 3, able to answer questions fairly appropriately, able to follow 1 and 2 step commands. Able to tell time from the wall clock. Cranial nerves II-XII are grossly intact except for dysphagia. No gross sensory deficit. Motor strength is 4+/5 on all 4 extremities. Current Medications:  Current Facility-Administered Medications   Medication Dose Route Frequency    docusate sodium (COLACE) capsule 100 mg  100 mg Per G Tube BID    enoxaparin (LOVENOX) injection 40 mg  40 mg SubCUTAneous Q24H    insulin glargine (LANTUS) injection 34 Units  34 Units SubCUTAneous ACB    levothyroxine (SYNTHROID) tablet 175 mcg  175 mcg Per G Tube 6am    metoprolol tartrate (LOPRESSOR) tablet 25 mg  25 mg Per G Tube Q12H    melatonin tablet 1 mg  1 mg Per G Tube QHS    guaiFENesin (ROBITUSSIN) 100 mg/5 mL oral liquid 200 mg  200 mg Per G Tube Q12H    temazepam (RESTORIL) capsule 7.5 mg  7.5 mg Per G Tube QHS    simethicone (MYLICON) 00BE/8.0SR oral drops 40 mg  40 mg Per G Tube QID PRN    pantoprazole (PROTONIX) granules for oral suspension 40 mg  40 mg Per G Tube ACB    ondansetron hcl (ZOFRAN) tablet 4 mg  4 mg Per G Tube Q6H PRN    oxyCODONE-acetaminophen (PERCOCET) 5-325 mg per tablet 1 Tab  1 Tab Oral Q6H PRN    metoclopramide HCl (REGLAN) tablet 5 mg  5 mg Per G Tube TID    insulin lispro (HUMALOG) injection   SubCUTAneous Q6H    glucose chewable tablet 16 g  4 Tab Oral PRN    glucagon (GLUCAGEN) injection 1 mg  1 mg IntraMUSCular PRN    LORazepam (ATIVAN) tablet 1 mg  1 mg Oral Q12H PRN    bisacodyl (DULCOLAX) suppository 10 mg  10 mg Rectal Q48H PRN    albuterol-ipratropium (DUO-NEB) 2.5 MG-0.5 MG/3 ML  3 mL Nebulization Q4H PRN    dextrose 10% infusion 125-250 mL  125-250 mL IntraVENous PRN       Allergies:   Allergies   Allergen Reactions    Other Food Other (comments)     Artificial sweeteners- causes headaches    Metformin Other (comments)     Increase pain in feet and swelling in feet  Increased hunger,tired and bilat foot pain    Morphine Other (comments)     headache    Singulair [Montelukast] Other (comments)     hallucinations    Sucrose Other (comments)     Pt. Is not allergic to sucrose. She develops headaches with artificial sweeteners. Lab/Data Review:  Recent Results (from the past 24 hour(s))   GLUCOSE, POC    Collection Time: 07/07/19  5:30 PM   Result Value Ref Range    Glucose (POC) 120 (H) 70 - 110 mg/dL   GLUCOSE, POC    Collection Time: 07/08/19 12:20 AM   Result Value Ref Range    Glucose (POC) 141 (H) 70 - 110 mg/dL   GLUCOSE, POC    Collection Time: 07/08/19  5:54 AM   Result Value Ref Range    Glucose (POC) 141 (H) 70 - 110 mg/dL   CBC WITH AUTOMATED DIFF    Collection Time: 07/08/19  6:20 AM   Result Value Ref Range    WBC 6.3 4.6 - 13.2 K/uL    RBC 4.04 (L) 4.20 - 5.30 M/uL    HGB 11.7 (L) 12.0 - 16.0 g/dL    HCT 36.7 35.0 - 45.0 %    MCV 90.8 74.0 - 97.0 FL    MCH 29.0 24.0 - 34.0 PG    MCHC 31.9 31.0 - 37.0 g/dL    RDW 15.9 (H) 11.6 - 14.5 %    PLATELET 929 198 - 221 K/uL    MPV 8.9 (L) 9.2 - 11.8 FL    NEUTROPHILS 56 40 - 73 %    LYMPHOCYTES 32 21 - 52 %    MONOCYTES 8 3 - 10 %    EOSINOPHILS 3 0 - 5 %    BASOPHILS 1 0 - 2 %    ABS. NEUTROPHILS 3.5 1.8 - 8.0 K/UL    ABS. LYMPHOCYTES 2.0 0.9 - 3.6 K/UL    ABS. MONOCYTES 0.5 0.05 - 1.2 K/UL    ABS. EOSINOPHILS 0.2 0.0 - 0.4 K/UL    ABS.  BASOPHILS 0.0 0.0 - 0.1 K/UL    DF AUTOMATED     METABOLIC PANEL, COMPREHENSIVE    Collection Time: 07/08/19  6:20 AM   Result Value Ref Range    Sodium 137 136 - 145 mmol/L    Potassium 3.9 3.5 - 5.5 mmol/L    Chloride 102 100 - 108 mmol/L    CO2 28 21 - 32 mmol/L    Anion gap 7 3.0 - 18 mmol/L    Glucose 160 (H) 74 - 99 mg/dL    BUN 11 7.0 - 18 MG/DL    Creatinine 0.81 0.6 - 1.3 MG/DL    BUN/Creatinine ratio 14 12 - 20      GFR est AA >60 >60 ml/min/1.73m2    GFR est non-AA >60 >60 ml/min/1.73m2    Calcium 8.7 8.5 - 10.1 MG/DL    Bilirubin, total 0.6 0.2 - 1.0 MG/DL    ALT (SGPT) 21 13 - 56 U/L    AST (SGOT) 17 15 - 37 U/L    Alk. phosphatase 80 45 - 117 U/L    Protein, total 7.4 6.4 - 8.2 g/dL    Albumin 3.2 (L) 3.4 - 5.0 g/dL    Globulin 4.2 (H) 2.0 - 4.0 g/dL    A-G Ratio 0.8 0.8 - 1.7     MAGNESIUM    Collection Time: 07/08/19  6:20 AM   Result Value Ref Range    Magnesium 2.3 1.6 - 2.6 mg/dL   PHOSPHORUS    Collection Time: 07/08/19  6:20 AM   Result Value Ref Range    Phosphorus 4.6 2.5 - 4.9 MG/DL   GLUCOSE, POC    Collection Time: 07/08/19 12:01 PM   Result Value Ref Range    Glucose (POC) 291 (H) 70 - 110 mg/dL       Assessment:     Primary Rehab Diagnosis  1. Generalized Weakness with Impaired Mobility and ADLs  2.  Vocal cord dysfunction status post thyroidectomy (4/4/2019 - Dr. Koki Hanna) with residual dysphagia and dysarthria; S/P PEG tube insertion (6/20/2019) - Dr. Koki Hanna)     Comorbidities   Obstructive sleep apnea G47.33    H/O aortic valve replacement Z95.2    History of bicuspid aortic valve Z87.74    Chronic back pain M54.9, G89.29    Chronic left shoulder pain M25.512, G89.29    Obesity, Class II, BMI 35-39.9 E66.9    Left shoulder tendinitis M75.82    Spondylosis of lumbar region without myelopathy or radiculopathy M47.816    Chronic pain of both shoulders M25.511, G89.29, M25.512    Chronic pain of both knees M25.561, M25.562, G89.29    Chronic pain syndrome G89.4    Encounter for long-term (current) use of medications Z79.899    Chronic midline low back pain M54.5, G89.29    Lumbar facet arthropathy M47.816    Lumbar degenerative disc disease M51.36    Venous stasis dermatitis of both lower extremities I87.2    Diabetic neuropathy associated with type 2 diabetes mellitus  E11.40    Acquired hypothyroidism E03.9    Hypertensive heart disease without heart failure I11.9    Dyslipidemia E78.5    Depression F32.9    Chronic diastolic congestive heart failure  I50.32    Type 2 diabetes mellitus with diabetic neuropathy, with long-term current use of insulin  E11.40, Z79.4    Stridor R06.1    Fever R50.9    Chronic respiratory failure J96.10    Hypoxemia requiring supplemental oxygen R09.02, Z99.81    Vertigo R42    Panic attacks F41.0    Left ear hearing loss H91.92    History of vitamin D deficiency Z86.39        Plan:     1. Justification for continued stay: Good progression towards established rehabilitation goals. 2. Medical Issues being followed closely:    [x]  Fall and safety precautions     []  Wound Care     [x]  Bowel and Bladder Function     [x]  Fluid Electrolyte and Nutrition Balance     []  Pain Control      3. Issues that 24 hour rehabilitation nursing is following:    [x]  Fall and safety precautions     []  Wound Care     [x]  Bowel and Bladder Function     [x]  Fluid Electrolyte and Nutrition Balance     []  Pain Control      [x]  Assistance with and education on in-room safety with transfers to and from the bed, wheelchair, toilet and shower. 4. Acute rehabilitation plan of care:    [x]  Continue current care and rehab. [x]  Physical Therapy           [x]  Occupational Therapy           [x]  Speech Therapy     []  Hold Rehab until further notice     5. Medications:    [x]  MAR Reviewed     [x]  Continue Present Medications     6. DVT Prophylaxis:      [x]  Lovenox     []  Unfractionated Heparin     []  Coumadin     []  NOAC     []  DINORA Stockings     []  Sequential Compression Device     []  None     7.  Orders:   > Vocal cord dysfunction status post thyroidectomy (4/4/2019 - Dr. Aung Ray) with residual dysphagia and dysarthria; S/P PEG tube insertion (6/20/2019) - Dr. Aung Ray)    > On 7/5/2019, added Guaifenesin 200 mg via PEG tube q 12 hr   > On 7/7/2019, patient had completed the recommended treatment course of Levofloxacin    > Discontinue Metoclopramide 5 mg via PEG tube TID (re: drug interaction with SSRI's)   > Start trial of Erythromycin 80 mg via PEG tube QID as a prokinetic   > Continue:    > Pantoprazole 40 mg via PEG tube once daily    > Guaifenesin 200 mg via PEG tube q 12 hr    > Chronic respiratory failure with hypoxia; Hypoxemia requiring supplementary oxygen   > Decrease O2 inhalation from 4 LPM to 2 LPM via trach collar continuous     > Hypertensive heart disease without heart failure   > Continue Metoprolol tartrate 25 mg via PEG tube q 12 hr (9AM, 9PM)    > Hypothyroidism, S/P Thyroidectomy (4/4/2019 - Dr. Roby Donovan)    > TSH (6/11/2019) = 42.50   > Upon admission to the ARU, patient was on Levothyroxine 200 mcg via PEG tube q AM   > TSH (6/26/2019) = 54.20   > On 7/6/2019, decreased Levothyroxine from 200 mcg to 175 mcg via PEG tube q AM   > Continue Levothyroxine 175 mcg via PEG tube q AM    > Type 2 diabetes mellitus with diabetic neuropathy, with long-term current use of insulin   > Continue:    > Lantus 34 units SC q AM    > Humalog insulin sliding scale SC q 6 hr     > Anxiety/Panic attack   > Unable to start SSRI because of potential drug interaction with Metoclopramide   > On 7/7/2019, patient had completed the recommended treatment course of Levofloxacin    > Discontinue Metoclopramide 5 mg via PEG tube TID (re: drug interaction with SSRI's)   > Start trial of Erythromycin 80 mg via PEG tube QID as a prokinetic   > Start Paroxetine 80 mg via PEG tube 4 times daily    > Difficulty sleeping   > On 7/5/2019, started:    > Melatonin 1 mg via PEG tube q HS    > Temazepam 7.5 mg via PEG tube q HS   > Continue:    > Melatonin 1 mg via PEG tube q HS    > Temazepam 7.5 mg via PEG tube q HS    > Diet:   > On 7/5/2019, continuous PEG feeding was transitioned to bolus PEG feeding   > Specifications: NPO except tube feeding   > PEG tube feed: Jevity 1.5, 330 ml via PEG tube 4 times daily (6AM, 10AM, 2PM, 6PM)   > Water flush: 200 ml via PEG tube  4 times daily after each bolus feed      8.  Patient's progress in rehabilitation and medical issues discussed with the patient. All questions answered to the best of my ability. Care plan discussed with patient and nurse.       Signed:    Nito Piper MD    July 8, 2019

## 2019-07-08 NOTE — PROGRESS NOTES
Bedside and Verbal shift change report given to Hugo Wing, RN (oncoming nurse) by Zulema Prieto, MARKOS (offgoing nurse). Report included the following information SBAR, Kardex, MAR and Recent Results.     SITUATION:    Code Status: Full Code   Reason for Admission: Chronic respiratory failure ([facility] [address] [J96.10]    [facility] day: 15   Problem List:       Hospital Problems  Date Reviewed: 5/16/2019          Codes Class Noted POA    Chronic respiratory failure ([facility] [address] ICD-10-CM: J96.10  ICD-9-CM: 518.83  6/25/2019 Yes        Impaired mobility and ADLs ICD-10-CM: Z74.09  ICD-9-CM: 799.89  6/25/2019 Yes        Dysphagia ICD-10-CM: R13.10  ICD-9-CM: 787.20  6/25/2019 Yes    Overview Signed 7/5/2019 10:50 AM by Amandeep Baer MD     S/P Thyroidectomy (4/4/2019 - Dr. Jacqueline Mccauley)             Status post insertion of percutaneous endoscopic gastrostomy (PEG) tube [facility]) ICD-10-CM: Z93.1  ICD-9-CM: V44.1  6/20/2019 Yes    Overview Addendum 7/5/2019 11:39 AM by Amandeep Baer MD     S/P PEG tube insertion (6/20/2019) - Dr. Jacqueline Mccauley)             History of tracheostomy ICD-10-CM: Z98.890  ICD-9-CM: V44.0  6/3/2019 Yes    Overview Signed 7/5/2019 10:44 AM by Amandeep Baer MD     S/P Tracheostomy (6/3/2019 - Dr. Jacqueline Mccauley)             History of thyroidectomy ICD-10-CM: K35.933  ICD-9-CM: V15.29  4/4/2019 Yes    Overview Signed 7/5/2019 10:45 AM by Amandeep Baer MD     S/P Thyroidectomy (4/4/2019 - Dr. Jacqueline Mccauley)             * (Principal) Vocal cord dysfunction ICD-10-CM: J38.3  ICD-9-CM: 478.5  4/4/2019 Yes        Type 2 diabetes mellitus with diabetic neuropathy, with long-term current use of insulin (HCC) (Chronic) ICD-10-CM: E11.40, Z79.4  ICD-9-CM: 250.60, 357.2, V58.67  Unknown Yes        Acquired hypothyroidism (Chronic) ICD-10-CM: E03.9  ICD-9-CM: 244.9  5/17/2017 Yes        Hypertensive heart disease without heart failure (Chronic) ICD-10-CM: I11.9  ICD-9-CM: 402.90  5/17/2017 Yes BACKGROUND:    Past Medical History:   Past Medical History:   Diagnosis Date    Acquired hypothyroidism 5/17/2017    Chronic back pain     Lower back pain    Depression     Diabetic neuropathy associated with type 2 diabetes mellitus ([facility] [address]     Dysphagia 6/25/2019    S/P Thyroidectomy (4/4/2019 - Dr. Barrett Martinez)    History of asthma     History of heart failure     chronic diastolic heart failure    History of vitamin D deficiency 8/28/2017    Hypertensive heart disease without heart failure 5/17/2017    Left ear hearing loss     pt states nerve damage--- 25% hearing loss, described as \"muffled\"    Memory difficulty     Obesity, Class II, BMI 35-39.9 11/11/2016    Obstructive sleep apnea 11/6/2015    Panic attacks     Ringing of ears     Type 2 diabetes mellitus with diabetic neuropathy, with long-term current use of insulin ([facility])     Vertigo     Vocal cord dysfunction 4/4/2019         Patient taking anticoagulants yes     ASSESSMENT:    Changes in Assessment Throughout Shift: none     Patient has Central Line: no Reasons if yes:    Patient has Barker Cath: no Reasons if yes:       Last Vitals:     Vitals:    07/07/19 0752 07/07/19 1612 07/07/19 1956 07/07/19 2150   BP: 137/75 146/79  126/69   Pulse: 65 61  67   Resp: 16 18  18   Temp: 98.1 °F (36.7 °C) 98 °F (36.7 °C)  98.5 °F (36.9 °C)   SpO2: 98% 99% 98% 100%   Weight:       Height:            IV and DRAINS (will only show if present)   [REMOVED] Peripheral IV 06/30/19 Left Antecubital-Site Assessment: Clean, dry, & intact  Peripheral IV 07/03/19 Anterior; Left Forearm-Site Assessment: Clean, dry, & intact  [REMOVED] Peripheral IV 06/25/19 Anterior;Proximal;Right Antecubital-Site Assessment: Dry, Intact  PEG/Gastrostomy Tube 06/20/19-Site Assessment: Clean, dry, & intact  [REMOVED] Airway - Tracheostomy Tube 06/03/19 Portex-Site Assessment: Clean, dry, & intact     WOUND (if present)   Wound Type:         Dressing present Dressing Present : No   Wound Concerns/Notes:  none     PAIN    Pain Assessment    Pain Intensity 1: 0 (07/08/19 0000)    Pain Location 1: Head    Pain Intervention(s) 1: Medication (see MAR)    Patient Stated Pain Goal: 0  o Interventions for Pain:  Pain medication    o Intervention effective: yes  o Time of last intervention: see MAR   o Reassessment Completed: yes      Last 3 Weights:  Last 3 Recorded Weights in this Encounter    06/26/19 1430 07/03/19 1852   Weight: 122.2 kg (269 lb 8 oz) 103.9 kg (229 lb)     Weight change:      INTAKE/OUPUT    Current Shift: 07/07 1901 - 07/08 0700  In: 530   Out: -     Last three shifts: 07/06 0701 - 07/07 1900  In: 3472   Out: -      LAB RESULTS     No results for input(s): WBC, HGB, HCT, PLT, HGBEXT, HCTEXT, PLTEXT, HGBEXT, HCTEXT, PLTEXT in the last 72 hours. No results for input(s): NA, K, GLU, BUN, CREA, CA, MG, INR in the last 72 hours. No lab exists for component: PT, PTT, INREXT, INREXT    RECOMMENDATIONS AND DISCHARGE PLANNING     1. Pending tests/procedures/ Plan of Care or Other Needs: labs  2.      3. Discharge plan for patient and Needs/Barriers: TBD    4. Estimated Discharge Date: TBD Posted on Whiteboard in Patients Room: no      4. The patient's care plan was reviewed with the oncoming nurse. \"HEALS\" SAFETY CHECK      Fall Risk    Total Score: 3    Safety Measures: Safety Measures: Call light within reach, Bed in low position, Fall prevention (comment)    A safety check occurred in the patient's room between off going nurse and oncoming nurse listed above.     The safety check included the below items  Area Items   H  High Alert Medications - Verify all high alert medication drips (heparin, PCA, etc.)   E  Equipment - Suction is set up for ALL patients (with yanker)  - Red plugs utilized for all equipment (IV pumps, etc.)  - WOWs wiped down at end of shift.  - Room stocked with oxygen, suction, and other unit-specific supplies   A  Alarms - Bed alarm is set for fall risk patients  - Ensure chair alarm is in place and activated if patient is up in a chair   L  Lines - Check IV for any infiltration  - Barker bag is empty if patient has a Barker   - Tubing and IV bags are labeled   S  Safety   - Room is clean, patient is clean, and equipment is clean. - Hallways are clear from equipment besides carts. - Fall bracelet on for fall risk patients  - Ensure room is clear and free of clutter  - Suction is set up for ALL patients (with yanker)  - Hallways are clear from equipment besides carts.    - Isolation precautions followed, supplies available outside room, sign posted     Kimberli Granger RN

## 2019-07-09 LAB
GLUCOSE BLD STRIP.AUTO-MCNC: 128 MG/DL (ref 70–110)
GLUCOSE BLD STRIP.AUTO-MCNC: 144 MG/DL (ref 70–110)
GLUCOSE BLD STRIP.AUTO-MCNC: 173 MG/DL (ref 70–110)
GLUCOSE BLD STRIP.AUTO-MCNC: 243 MG/DL (ref 70–110)

## 2019-07-09 PROCEDURE — 97116 GAIT TRAINING THERAPY: CPT

## 2019-07-09 PROCEDURE — 74011250637 HC RX REV CODE- 250/637: Performed by: INTERNAL MEDICINE

## 2019-07-09 PROCEDURE — 74011250636 HC RX REV CODE- 250/636: Performed by: INTERNAL MEDICINE

## 2019-07-09 PROCEDURE — 82962 GLUCOSE BLOOD TEST: CPT

## 2019-07-09 PROCEDURE — 65310000000 HC RM PRIVATE REHAB

## 2019-07-09 PROCEDURE — 97535 SELF CARE MNGMENT TRAINING: CPT

## 2019-07-09 PROCEDURE — 97542 WHEELCHAIR MNGMENT TRAINING: CPT

## 2019-07-09 PROCEDURE — 97530 THERAPEUTIC ACTIVITIES: CPT

## 2019-07-09 PROCEDURE — 92507 TX SP LANG VOICE COMM INDIV: CPT

## 2019-07-09 PROCEDURE — 74011250637 HC RX REV CODE- 250/637: Performed by: EMERGENCY MEDICINE

## 2019-07-09 PROCEDURE — 77010033678 HC OXYGEN DAILY

## 2019-07-09 PROCEDURE — 74011636637 HC RX REV CODE- 636/637: Performed by: INTERNAL MEDICINE

## 2019-07-09 RX ORDER — ONDANSETRON 4 MG/1
4 TABLET, FILM COATED ORAL
Status: DISCONTINUED | OUTPATIENT
Start: 2019-07-09 | End: 2019-07-13 | Stop reason: HOSPADM

## 2019-07-09 RX ORDER — ERYTHROMYCIN ETHYLSUCCINATE 200 MG/5ML
80 SUSPENSION ORAL
Status: DISCONTINUED | OUTPATIENT
Start: 2019-07-09 | End: 2019-07-13 | Stop reason: HOSPADM

## 2019-07-09 RX ORDER — INSULIN LISPRO 100 [IU]/ML
INJECTION, SOLUTION INTRAVENOUS; SUBCUTANEOUS
Status: DISCONTINUED | OUTPATIENT
Start: 2019-07-09 | End: 2019-07-11

## 2019-07-09 RX ADMIN — GUAIFENESIN 200 MG: 200 SOLUTION ORAL at 08:28

## 2019-07-09 RX ADMIN — LEVOTHYROXINE SODIUM 175 MCG: 75 TABLET ORAL at 07:00

## 2019-07-09 RX ADMIN — ONDANSETRON HYDROCHLORIDE 4 MG: 4 TABLET, FILM COATED ORAL at 14:33

## 2019-07-09 RX ADMIN — GUAIFENESIN 200 MG: 200 SOLUTION ORAL at 20:59

## 2019-07-09 RX ADMIN — ERYTHROMYCIN ETHYLSUCCINATE 80 MG: 200 GRANULE, FOR SUSPENSION ORAL at 07:01

## 2019-07-09 RX ADMIN — LORAZEPAM 1 MG: 0.5 TABLET ORAL at 20:59

## 2019-07-09 RX ADMIN — INSULIN GLARGINE 34 UNITS: 100 INJECTION, SOLUTION SUBCUTANEOUS at 08:22

## 2019-07-09 RX ADMIN — OXYCODONE HYDROCHLORIDE AND ACETAMINOPHEN 1 TABLET: 5; 325 TABLET ORAL at 11:49

## 2019-07-09 RX ADMIN — TEMAZEPAM 7.5 MG: 7.5 CAPSULE ORAL at 20:59

## 2019-07-09 RX ADMIN — Medication 1 MG: at 20:59

## 2019-07-09 RX ADMIN — ERYTHROMYCIN ETHYLSUCCINATE 80 MG: 200 GRANULE, FOR SUSPENSION ORAL at 14:30

## 2019-07-09 RX ADMIN — METOPROLOL TARTRATE 25 MG: 25 TABLET ORAL at 20:59

## 2019-07-09 RX ADMIN — ERYTHROMYCIN ETHYLSUCCINATE 80 MG: 200 GRANULE, FOR SUSPENSION ORAL at 17:36

## 2019-07-09 RX ADMIN — PAROXETINE HYDROCHLORIDE 20 MG: 10 SUSPENSION ORAL at 08:28

## 2019-07-09 RX ADMIN — ERYTHROMYCIN ETHYLSUCCINATE 80 MG: 200 GRANULE, FOR SUSPENSION ORAL at 10:48

## 2019-07-09 RX ADMIN — PANTOPRAZOLE SODIUM 40 MG: 40 GRANULE, DELAYED RELEASE ORAL at 07:00

## 2019-07-09 RX ADMIN — METOPROLOL TARTRATE 25 MG: 25 TABLET ORAL at 08:28

## 2019-07-09 RX ADMIN — ONDANSETRON HYDROCHLORIDE 4 MG: 4 TABLET, FILM COATED ORAL at 11:09

## 2019-07-09 RX ADMIN — ENOXAPARIN SODIUM 40 MG: 40 INJECTION SUBCUTANEOUS at 17:37

## 2019-07-09 RX ADMIN — INSULIN LISPRO 4 UNITS: 100 INJECTION, SOLUTION INTRAVENOUS; SUBCUTANEOUS at 13:10

## 2019-07-09 NOTE — INTERDISCIPLINARY ROUNDS
Sw unable to electronically sign the Team Conference Sheet but has documented and signed physical copy that will be presented to the patient and scanned into the record.

## 2019-07-09 NOTE — PROGRESS NOTES
Problem: Mobility Impaired (Adult and Pediatric)  Goal: *Therapy Goal (Edit Goal, Insert Text)  Description  Physical Therapy Goals: STG  Initiated 6/26/2019, reassessed 7/3/19 and to be accomplished 7/10/2019:   1. Patient will move from supine to sit and sit to supine , scoot up and down and roll side to side in bed with supervision/set-up. ( 7/3/2019 MET)     2. Patient will transfer from bed to chair and chair to bed with supervision/set-up using the least restrictive device. (7/3/2019 MET)  3. Patient will perform sit to stand with supervision/set-up. (7/3/2019 MET)  4. Patient will ambulate with supervision/set-up for 50 feet with the least restrictive device. 5.  Patient will ascend/descend 3 stairs with 2 handrail(s) with minimal assistance/contact guard assist.     Physical Therapy Goals: LTG  Initiated 6/26/2019 and to be accomplished within 21 day(s) 7/17/2019:  1. Patient will move from supine to sit and sit to supine , scoot up and down and roll side to side in bed with modified independence. 2.  Patient will transfer from bed to chair and chair to bed with modified independence using the least restrictive device. 3.  Patient will perform sit to stand with modified independence. 4.  Updated 7/5/2019: Patient will ambulate with modified independence for 150 feet with the least restrictive device. 5.  Patient will ascend/descend 3 stairs with 2 handrail(s) with supervision/set-up. Outcome: Progressing Towards Goal   PHYSICAL THERAPY TREATMENT    Patient: Gisselle Khan (67 y.o. female)  Date: 7/9/2019  Diagnosis: Chronic respiratory failure (HCC) [J96.10] Vocal cord dysfunction  Precautions: Aspiration, Fall  Chart, physical therapy assessment, plan of care and goals were reviewed. Time In: 0815  Time Out: 0910  Time In: 1340  Time out: 1430    Patient Seen For: Gait training;Patient education;Transfer training; Wheelchair mobility  Pain:  No pain indicated during treatment. Patient identified with name and : yes    SUBJECTIVE:     Patient reporting (writing on notepad) during session, \"I need air flow, if I don't feel air flow, I panic. \" Patient needing education regarding use of oxygen for maintaining appropriate blood oxygen levels; discussed with patient that physician asking for monitoring of peripheral blood oxygen levels when at 2 LPM O2 and when on room air. Patient indicating not willing to attempt activity without O2 today. OBJECTIVE DATA SUMMARY:   Objective:   Rest at 2 LPM O2: SpO2 98% HR 65 bpm  During activity with 2 LPM O2: 98% HR 74 bpm  After gait with 2 LPM O2: 98% HR 74 bpm    Room air at rest for 3 minutes: SpO2 98% HR 66 bpm  *Patient observed to be anxious and requesting trach collar and O2 to be increased to 2 LPM once again. *  TRANSFERS Daily Assessment    Transfer Assistance : 5 (Supervision/setup)  Sit to Stand Assistance: Supervision       GAIT Daily Assessment    Amount of Assistance: 5 (Stand-by assistance)  Distance (ft): 85 Feet (ft)  Assistive Device: Walker, rollator(therapist assisting with oxygen management)    Performing multiple gait bouts today; patient maintained on 2 LPM O2 with PT providing oxygen tubing management. Needing seated rests after 80 ft bouts. Performing x 4 bouts today. BALANCE Daily Assessment    Sitting - Static: Good (unsupported)  Sitting - Dynamic: Good (unsupported)  Standing - Static: Good  Standing - Dynamic : Impaired       WHEELCHAIR MOBILITY Daily Assessment    Able to Propel (ft): 80 feet(SBA, using bilat feet and UE to propel)  Functional Level: 2  Curbs/Ramps Assist Required (FIM Score): 0 (Not tested)  Wheelchair Setup Assist Required : 5 (Stand-by assistance)  Wheelchair Management: Manages left brake;Manages right brake    Needing rest breaks after 80 ft bouts.           ASSESSMENT:  Patient continues to require frequent rests during standing and gait tasks, requiring several minute rest breaks following each gait bout. Performing 2 bouts in AM and 2 bouts in PM .   Progression toward goals:  ?      Improving appropriately and progressing toward goals  ? Improving slowly and progressing toward goals  ? Not making progress toward goals and plan of care will be adjusted     PLAN:  Patient continues to benefit from skilled intervention to address the above impairments. Continue treatment per established plan of care. Discharge Recommendations:  Home Health  Further Equipment Recommendations for Discharge:  rollator     Estimated LOS: 3 weeks     Activity Tolerance:   Fair due to fatigue, patient observed to be anxious during session. Rest at 2 LPM O2: SpO2 98% HR 65 bpm  During activity with 2 LPM O2: 98% HR 74 bpm  After gait with 2 LPM O2: 98% HR 74 bpm    Room air at rest for 3 minutes: SpO2 98% HR 66 bpm  *Patient observed to be anxious and requesting trach collar and O2 to be increased to 2 LPM once again. *    After treatment:   Patient left seated in recliner chair, call button within reach.        Sukhi Duff, PT  7/9/2019

## 2019-07-09 NOTE — PROGRESS NOTES
Problem: Dysphagia (Adult)  Goal: *Acute Goals and Plan of Care (Insert Text)  Description  Long term goals  Patient will:  1. Perform pharyngeal strengthening exercises with supervision. 2. Tolerate swallowing trials of varied consistenies without over s/s of aspiration. 3. Participate in repeat MBS prior to resuming PO for nutrition. 4. Tolerate deflation of tracheostomy cuff for speech production for 20 minutes. MET  5. Tolerate placement of a passu-Clinton speaking valve for speech for 20 minute intervals. MET- increased to 45 minutes. Short term goals (by 7/10/19)  Patient will:  1. Perform pharyngeal strengthening exercises with mincues. 2. Tolerate swallowing trials of ice chops and small boluses puree without overt s/s of aspiration. 3. Participate in repeat MBS prior to resuming PO for nutrition. 4. Tolerate deflation of tracheostomy cuff for speech production for 20 minutes. MET- Tracheostomy cuff consistently deflated. 5. Tolerate placement of a passu-Malinda speaking valve for speech for 30-45 minute intervals. Outcome: Progressing Towards Goal  Note:   SPEECH LANGUAGE PATHOLOGY TREATMENT    Patient: Reuben Farah (37 y.o. female)  Date: 7/9/2019  Diagnosis: Chronic respiratory failure (HCC) [J96.10] Vocal cord dysfunction       Time in: 0930 1100  Time Out:  1000 1115  SUBJECTIVE:   Patient stated ? I'm still afraid of going home? .    OBJECTIVE:   Mental Status:  Mrs. Guevara Robles appeared more stressed this morning. The OT had scheduled co-treatment for instruction in how to change the inner cannula of her tracheostomy tube. Patient was initially reluctant to participate. Treatment & Interventions:   Co-treating with the OT, training re: how to change the inner cannula of her tracheostomy tube was the focus of the earlier morning session. SLP placed her Passy-Malinda valve, which was tolerated for approximately 20 minutes, so that she could better interact during initial stages of training. The process was explained then demonstrated using an mannequin with a trach. Then Mrs. Gerald Armstrong practiced changing the inner cannula several times with the mannequin. Following this external training, a mirror was provided so that she herself and she was guided through performing the change on herself. Facial expression indicated discomfort with this, but Mrs. Gerald Armstrong had no difficulty changing the tube. She then agreed with the SLP that there was no pain with the procedure, only a feeling of pressure and the inner cannula was removed and then replaced. SLP returned for a second session later in the morning. Patient had just been given her antibiotic and reported significant nausea and headache. Her nurse then administered and anti-emetic. Patient declined completing the session due to feeling ill. She did ask about her fear of weaning her oxygen and that she did not want to go home without it. SLP assured her that she would have home oxygen available post discharge. Response & Tolerance to Activities:  Mrs. Gerald Armstrong was uncomfortable, but cooperative this morning during training for changing the inner cannula of her tracheostomy. Pain:  Pain Scale 1: Numeric (0 - 10)  Pain Orientation 1: Medial  Pain Description 1: Aching  After treatment:   ?       Patient left in no apparent distress sitting up in chair  ? Patient left in no apparent distress in bed  ? Call bell left within reach  ? Nursing notified  ? Caregiver present  ? Bed alarm activated    ASSESSMENT:   Progression toward goals:  ?       Improving appropriately and progressing toward goals  ? Improving slowly and progressing toward goals  ? Not making progress toward goals and plan of care will be adjusted    PLAN:   Patient continues to benefit from skilled intervention to address the above impairments. Continue treatment per established plan of care.   Discharge Recommendations:  Home Health    Estimated LOS: Through 7/12/19    Faiza Sorto SLP  Time calculation:  39 Minutes

## 2019-07-09 NOTE — INTERDISCIPLINARY ROUNDS
Tracheostomy supplies needed 9mm inner cannulas Extra tracheostomy compliant with 9mm cannula Split gauzes 9x4 meplix dressing 
qtips or cotton tipped applicators Bottles of normal saline 4x4 gauze squares 2 inch paper tape Trach care kit Suctioning catheters Suction machine 
abdiel Suction catheter Suction tubing G-tube supplies Pump Bag that goes with pump Irrigation kits Specimen cups

## 2019-07-09 NOTE — PROGRESS NOTES
Problem: Patient Education: Go to Patient Education Activity  Goal: Patient/Family Education  7/9/2019 1621 by Ikre Jack  Outcome: Progressing Towards Goal  7/9/2019 1621 by Iker Jack  Outcome: Progressing Towards Goal     Problem: Self Care Deficits Care Plan (Adult)  Goal: *Therapy Goal (Edit Goal, Insert Text)  Description  Occupational Therapy Goals   Long Term Goals  Initiated 6/26/19 and to be accomplished within 2 week(s) 7/10/19  1. Pt will perform functional activity up to 15 minutes with no more than 2 rest breaks and 2 vcs to attend to task. .   2. Pt will perform grooming with S.           3. Pt will perform UB bathing with S .       4. Pt will perform LB bathing with S.   5. Pt will perform tub/shower transfer with S.  6. Pt will perform UB dressing with S.    7. Pt will perform LB dressing with S.    8. Pt will perform toileting task with I.      9. Pt will perform toilet transfer with S.       Short Term Goals   Initiated 6/26/19 and to be accomplished within 7 day(s) 7/3/19. Updated 7/3/19  1. Pt will perform functional task up to 10 minutes with no more than 3 verbal cues to attend to task and 3 rest breaks. 2. Pt will perform grooming with SBA. .  7/3/19  3. Pt will perform UB bathing with S and no more than 3 vcs to  Initiate and complete task. 4. Pt will perform LB bathing with [place] and no more than 3 vcs to  complete task. 5. Pt will perform tub/shower transfer with [facility].  7/3/19-S  6. Pt will perform UB dressing with Beltran and no more than 3 vcs to  initiate and complete task. .  7. Pt will perform LB dressing with Beltran and no more than 3 vcs to initiate and  complete task. 8. Pt will perform toileting task with S. 7/3/19  9. Pt will perform toilet transfer with S and no more than 3 vcs to initiate and complete task.   7/3/19        7/9/2019 1621 by Iker Jack  Outcome: Progressing Towards Goal  OCCUPATIONAL THERAPY TREATMENT    Patient: Fern Landin   [address]    Patient identified with name and : yes    Date: 2019    First Tx Session  Time In: 80  Time Out[de-identified] 1100      Diagnosis: Chronic respiratory failure ([address] [J96.10]   Precautions: Aspiration, Fall  Chart, occupational therapy assessment, plan of care, and goals were reviewed. Pain:  Pt reports 4/10 pain or discomfort prior to treatment. Pt reports 7/10 pain or discomfort post treatment. Intervention Provided: Notify nursing      SUBJECTIVE:   Patient stated  I am feeling nauseated and my headache increase. Everyday I have a headache    OBJECTIVE DATA SUMMARY:   Pt educated from [address] [place] [address] on removing/ replacing Inner cannula tube for trach. Pt practice several time on [employer] the manikin  on how to changed a tracheostomy tube inner cannula. Pt than took a deep breath and gentle removed her inner cannula and replacing it. Than states 'it wasn't too bad I felt a pressure but it wasn't pain full'. Pt concern about supplies going home her on Friday regarding her trach, pt was relieved when SW informed her, her insurance will provide all her supplies needed. Pt than states 'I am feeling much better'. Pt propelled from her room to the gym stopping once 2/2 fatigue, while resting pt's received med's than con't to the gym. Approxcimently 5 minutes later upon arriving in the gym pt began to complain of nausea and headache. Pt was return to her room to suction trach if necessary, notify nursing of pt situation. THERAPEUTIC ACTIVITY Daily Assessment    Pt performed the exchanging of her inner cannula with visual from a mirror with   supervision. GROOMING  Daily Assessment     Pt washed hands after toileting task in standing.          TOILETING Daily Assessment    Toileting  Toileting Assistance (FIM Score): 6 (Modified independent)  Adaptive Equipment: (Pt req'd assist with O2 tank<> bathroom)       MOBILITY/TRANSFERS Daily Assessment    Functional Transfers  Toilet Transfer : Pt ambulated <> bathroom without device, assisted with O2 tank   Amount of Assistance Required: 5 (Supervision/setup)       ASSESSMENT:Pt demonstrating confident/ independency with giving herself med's through her tub feeding, and managing her inner cannula tube with staff supervision. Progression toward goals:  ?          Improving appropriately and progressing toward goals  ? Improving slowly and progressing toward goals  ? Not making progress toward goals and plan of care will be adjusted     PLAN:  Patient continues to benefit from skilled intervention to address the above impairments. Continue treatment per established plan of care. Discharge Recommendations: Home Health  Further Equipment Recommendations for Discharge:  3:1 commode and tub transfer bench     Activity Tolerance:  Fair    Estimated LOS:7/12/2019    Please refer to the flow sheet for vital signs taken during this treatment. After treatment:   ?  Patient left in no apparent distress sitting up in chair   ? Patient left in no apparent distress in bed  ? Call bell left within reach  ? Nursing notified  ? Caregiver present  ? Pt hand off to ST for treatment. COMMUNICATION/EDUCATION:   ? Home safety education was provided and the patient/caregiver indicated understanding. ? Patient/family have participated as able in goal setting and plan of care. ? Patient/family agree to work toward stated goals and plan of care. ? Patient understands intent and goals of therapy, but is neutral about his/her participation. ? Patient is unable to participate in goal setting and plan of care.       Su ROSS  7/9/2019  7

## 2019-07-09 NOTE — PROGRESS NOTES
Bedside and Verbal shift change report given to Guevara Newsome,  RN (oncoming nurse) by Shebea Das, MARKOS (offgoing nurse). Report included the following information SBAR, Kardex, MAR and Recent Results.     SITUATION:    Code Status: Full Code   Reason for Admission: Chronic respiratory failure ([facility] [J96.10]    [facility] day: 15   Problem List:       Hospital Problems  Date Reviewed: 7/8/2019          Codes Class Noted POA    Chronic respiratory failure ([facility] ICD-10-CM: J96.10  ICD-9-CM: 518.83  6/25/2019 Yes        Impaired mobility and ADLs ICD-10-CM: Z74.09  ICD-9-CM: 799.89  6/25/2019 Yes        Dysphagia ICD-10-CM: R13.10  ICD-9-CM: 787.20  6/25/2019 Yes    Overview Signed 7/5/2019 10:50 AM by Nick Colindres MD     S/P Thyroidectomy (4/4/2019 - Dr. Aung Ray)             Hypoxemia requiring supplemental oxygen ICD-10-CM: R09.02, Z99.81  ICD-9-CM: 799.02  6/25/2019 Yes        Status post insertion of percutaneous endoscopic gastrostomy (PEG) tube ([facility] ICD-10-CM: Z93.1  ICD-9-CM: V44.1  6/20/2019 Yes    Overview Addendum 7/5/2019 11:39 AM by Nick Colindres MD     S/P PEG tube insertion (6/20/2019) - Dr. Aung Ray)             History of tracheostomy ICD-10-CM: Z98.890  ICD-9-CM: V44.0  6/3/2019 Yes    Overview Signed 7/5/2019 10:44 AM by Nick Colindres MD     S/P Tracheostomy (6/3/2019 - Dr. Aung Ray)             History of thyroidectomy ICD-10-CM: R84.306  ICD-9-CM: V15.29  4/4/2019 Yes    Overview Signed 7/5/2019 10:45 AM by Nick Colindres MD     S/P Thyroidectomy (4/4/2019 - Dr. Aung Ray)             * (Principal) Vocal cord dysfunction ICD-10-CM: J38.3  ICD-9-CM: 478.5  4/4/2019 Yes        Type 2 diabetes mellitus with diabetic neuropathy, with long-term current use of insulin (HCC) (Chronic) ICD-10-CM: E11.40, Z79.4  ICD-9-CM: 250.60, 357.2, V58.67  Unknown Yes        Acquired hypothyroidism (Chronic) ICD-10-CM: E03.9  ICD-9-CM: 244.9  5/17/2017 Yes        Hypertensive heart disease without heart failure (Chronic) ICD-10-CM: I11.9  ICD-9-CM: 402.90  5/17/2017 Yes              BACKGROUND:    Past Medical History:   Past Medical History:   Diagnosis Date    Acquired hypothyroidism 5/17/2017    Chronic back pain     Lower back pain    Depression     Diabetic neuropathy associated with type 2 diabetes mellitus ([facility]     Dysphagia 6/25/2019    S/P Thyroidectomy (4/4/2019 - Dr. Vipin Ge)    History of asthma     History of heart failure     chronic diastolic heart failure    History of vitamin D deficiency 8/28/2017    Hypertensive heart disease without heart failure 5/17/2017    Hypoxemia requiring supplemental oxygen 6/25/2019    Left ear hearing loss     pt states nerve damage--- 25% hearing loss, described as \"muffled\"    Memory difficulty     Obesity, Class II, BMI 35-39.9 11/11/2016    Obstructive sleep apnea 11/6/2015    Panic attacks     Ringing of ears     Type 2 diabetes mellitus with diabetic neuropathy, with long-term current use of insulin ([facility])     Vertigo     Vocal cord dysfunction 4/4/2019         Patient taking anticoagulants yes     ASSESSMENT:    Changes in Assessment Throughout Shift: none     Patient has Central Line: no Reasons if yes:    Patient has Barker Cath: no Reasons if yes:       Last Vitals:     Vitals:    07/08/19 1558 07/08/19 2100 07/09/19 0758 07/09/19 1544   BP: 119/72 129/64 116/64 128/65   Pulse: 63 66 62 63   Resp: 18 18 18 18   Temp: 98.6 °F (37 °C) 99.1 °F (37.3 °C) 97.8 °F (36.6 °C) 98.6 °F (37 °C)   SpO2: 99% 97% 99% 95%   Weight:       Height:            IV and DRAINS (will only show if present)   [REMOVED] Peripheral IV 06/30/19 Left Antecubital-Site Assessment: Clean, dry, & intact  Peripheral IV 07/03/19 Anterior; Left Forearm-Site Assessment: Clean, dry, & intact  [REMOVED] Peripheral IV 06/25/19 Anterior;Proximal;Right Antecubital-Site Assessment: Dry, Intact  PEG/Gastrostomy Tube 06/20/19-Site Assessment: Clean, dry, & intact  [REMOVED] Airway - Tracheostomy Tube 06/03/19 Portex-Site Assessment: Clean, dry, & intact     WOUND (if present)   Wound Type:         Dressing present Dressing Present : No   Wound Concerns/Notes:  none     PAIN    Pain Assessment    Pain Intensity 1: 5 (07/09/19 1903)    Pain Location 1: Head    Pain Intervention(s) 1: Medication (see MAR)    Patient Stated Pain Goal: 0  o Interventions for Pain:  Pain medication    o Intervention effective: yes  o Time of last intervention: see MAR   o Reassessment Completed: yes      Last 3 Weights:  Last 3 Recorded Weights in this Encounter    06/26/19 1430 07/03/19 1852   Weight: 122.2 kg (269 lb 8 oz) 103.9 kg (229 lb)     Weight change:      INTAKE/OUPUT    Current Shift: No intake/output data recorded. Last three shifts: 07/08 0701 - 07/09 1900  In: 1010   Out: -      LAB RESULTS     Recent Labs     07/08/19 0620   WBC 6.3   HGB 11.7*   HCT 36.7           Recent Labs     07/08/19  0620      K 3.9   *   BUN 11   CREA 0.81   CA 8.7   MG 2.3       RECOMMENDATIONS AND DISCHARGE PLANNING     1. Pending tests/procedures/ Plan of Care or Other Needs: labs  2.      3. Discharge plan for patient and Needs/Barriers: TBD    4. Estimated Discharge Date: TBD Posted on Whiteboard in Patients Room: no      4. The patient's care plan was reviewed with the oncoming nurse. \"HEALS\" SAFETY CHECK      Fall Risk    Total Score: 3    Safety Measures: Safety Measures: Bed/Chair alarm on, Bed/Chair-Wheels locked, Bed in low position, Call light within reach, Extra trach at bedside, Fall prevention (comment), Gripper socks, Side rails X 3    A safety check occurred in the patient's room between off going nurse and oncoming nurse listed above.     The safety check included the below items  Area Items   H  High Alert Medications - Verify all high alert medication drips (heparin, PCA, etc.)   E  Equipment - Suction is set up for ALL patients (with chaitanya)  - Red plugs utilized for all equipment (IV pumps, etc.)  - WOWs wiped down at end of shift.  - Room stocked with oxygen, suction, and other unit-specific supplies   A  Alarms - Bed alarm is set for fall risk patients  - Ensure chair alarm is in place and activated if patient is up in a chair   L  Lines - Check IV for any infiltration  - Barker bag is empty if patient has a Barker   - Tubing and IV bags are labeled   S  Safety   - Room is clean, patient is clean, and equipment is clean. - Hallways are clear from equipment besides carts. - Fall bracelet on for fall risk patients  - Ensure room is clear and free of clutter  - Suction is set up for ALL patients (with chaitanya)  - Hallways are clear from equipment besides carts.    - Isolation precautions followed, supplies available outside room, sign posted     Jessica López RN

## 2019-07-09 NOTE — PROGRESS NOTES
Bedside and Verbal shift change report given to Ron Ayoub RN (oncoming nurse) by Dolores Gee RN (offgoing nurse). Report included the following information SBAR, Kardex, MAR and Recent Results.     SITUATION:    Code Status: Full Code   Reason for Admission: Chronic respiratory failure ([facility] [J96.10]    [facility] day: 15   Problem List:       Hospital Problems  Date Reviewed: 7/8/2019          Codes Class Noted POA    Chronic respiratory failure ([facility] ICD-10-CM: J96.10  ICD-9-CM: 518.83  6/25/2019 Yes        Impaired mobility and ADLs ICD-10-CM: Z74.09  ICD-9-CM: 799.89  6/25/2019 Yes        Dysphagia ICD-10-CM: R13.10  ICD-9-CM: 787.20  6/25/2019 Yes    Overview Signed 7/5/2019 10:50 AM by Pierre Lopez MD     S/P Thyroidectomy (4/4/2019 - Dr. Autumn Madera)             Hypoxemia requiring supplemental oxygen ICD-10-CM: R09.02, Z99.81  ICD-9-CM: 799.02  6/25/2019 Yes        Status post insertion of percutaneous endoscopic gastrostomy (PEG) tube ([facility] ICD-10-CM: Z93.1  ICD-9-CM: V44.1  6/20/2019 Yes    Overview Addendum 7/5/2019 11:39 AM by Pierre Lopez MD     S/P PEG tube insertion (6/20/2019) - Dr. Autumn Madera)             History of tracheostomy ICD-10-CM: Z98.890  ICD-9-CM: V44.0  6/3/2019 Yes    Overview Signed 7/5/2019 10:44 AM by Pierre Lopez MD     S/P Tracheostomy (6/3/2019 - Dr. Autumn Madera)             History of thyroidectomy ICD-10-CM: T63.355  ICD-9-CM: V15.29  4/4/2019 Yes    Overview Signed 7/5/2019 10:45 AM by Pierre Lopez MD     S/P Thyroidectomy (4/4/2019 - Dr. Autumn Madera)             * (Principal) Vocal cord dysfunction ICD-10-CM: J38.3  ICD-9-CM: 478.5  4/4/2019 Yes        Type 2 diabetes mellitus with diabetic neuropathy, with long-term current use of insulin (HCC) (Chronic) ICD-10-CM: E11.40, Z79.4  ICD-9-CM: 250.60, 357.2, V58.67  Unknown Yes        Acquired hypothyroidism (Chronic) ICD-10-CM: E03.9  ICD-9-CM: 244.9  5/17/2017 Yes        Hypertensive heart disease without heart failure (Chronic) ICD-10-CM: I11.9  ICD-9-CM: 402.90  5/17/2017 Yes              BACKGROUND:    Past Medical History:   Past Medical History:   Diagnosis Date    Acquired hypothyroidism 5/17/2017    Chronic back pain     Lower back pain    Depression     Diabetic neuropathy associated with type 2 diabetes mellitus ([facility]     Dysphagia 6/25/2019    S/P Thyroidectomy (4/4/2019 - Dr. Geoff Gorman)    History of asthma     History of heart failure     chronic diastolic heart failure    History of vitamin D deficiency 8/28/2017    Hypertensive heart disease without heart failure 5/17/2017    Hypoxemia requiring supplemental oxygen 6/25/2019    Left ear hearing loss     pt states nerve damage--- 25% hearing loss, described as \"muffled\"    Memory difficulty     Obesity, Class II, BMI 35-39.9 11/11/2016    Obstructive sleep apnea 11/6/2015    Panic attacks     Ringing of ears     Type 2 diabetes mellitus with diabetic neuropathy, with long-term current use of insulin ([facility])     Vertigo     Vocal cord dysfunction 4/4/2019         Patient taking anticoagulants yes     ASSESSMENT:    Changes in Assessment Throughout Shift: none     Patient has Central Line: no Reasons if yes:    Patient has Barker Cath: no Reasons if yes:       Last Vitals:     Vitals:    07/08/19 0823 07/08/19 1558 07/08/19 2100 07/09/19 0758   BP: 125/70 119/72 129/64 116/64   Pulse: 65 63 66 62   Resp:  18 18 18   Temp:  98.6 °F (37 °C) 99.1 °F (37.3 °C) 97.8 °F (36.6 °C)   SpO2: 99% 99% 97% 99%   Weight:       Height:            IV and DRAINS (will only show if present)   [REMOVED] Peripheral IV 06/30/19 Left Antecubital-Site Assessment: Clean, dry, & intact  Peripheral IV 07/03/19 Anterior; Left Forearm-Site Assessment: Clean, dry, & intact  [REMOVED] Peripheral IV 06/25/19 Anterior;Proximal;Right Antecubital-Site Assessment: Dry, Intact  PEG/Gastrostomy Tube 06/20/19-Site Assessment: Clean, dry, & intact  [REMOVED] Airway - Tracheostomy Tube 06/03/19 Portex-Site Assessment: Clean, dry, & intact     WOUND (if present)   Wound Type:         Dressing present Dressing Present : No   Wound Concerns/Notes:  none     PAIN    Pain Assessment    Pain Intensity 1: 0 (07/09/19 0736)    Pain Location 1: Head    Pain Intervention(s) 1: Medication (see MAR)    Patient Stated Pain Goal: 0  o Interventions for Pain:  Pain medication    o Intervention effective: yes  o Time of last intervention: see MAR   o Reassessment Completed: yes      Last 3 Weights:  Last 3 Recorded Weights in this Encounter    06/26/19 1430 07/03/19 1852   Weight: 122.2 kg (269 lb 8 oz) 103.9 kg (229 lb)     Weight change:      INTAKE/OUPUT    Current Shift: No intake/output data recorded. Last three shifts: 07/07 1901 - 07/09 0700  In: 910   Out: -      LAB RESULTS     Recent Labs     07/08/19  0620   WBC 6.3   HGB 11.7*   HCT 36.7           Recent Labs     07/08/19  0620      K 3.9   *   BUN 11   CREA 0.81   CA 8.7   MG 2.3       RECOMMENDATIONS AND DISCHARGE PLANNING     1. Pending tests/procedures/ Plan of Care or Other Needs: labs  2.      3. Discharge plan for patient and Needs/Barriers: TBD    4. Estimated Discharge Date: TBD Posted on Whiteboard in Patients Room: no      4. The patient's care plan was reviewed with the oncoming nurse. \"HEALS\" SAFETY CHECK      Fall Risk    Total Score: 3    Safety Measures: Safety Measures: Bed/Chair alarm on, Bed/Chair-Wheels locked, Bed in low position, Call light within reach, Fall prevention (comment), Gripper socks, Side rails X 3    A safety check occurred in the patient's room between off going nurse and oncoming nurse listed above.     The safety check included the below items  Area Items   H  High Alert Medications - Verify all high alert medication drips (heparin, PCA, etc.)   E  Equipment - Suction is set up for ALL patients (with yanker)  - Red plugs utilized for all equipment (IV pumps, etc.)  - WOWs wiped down at end of shift.  - Room stocked with oxygen, suction, and other unit-specific supplies   A  Alarms - Bed alarm is set for fall risk patients  - Ensure chair alarm is in place and activated if patient is up in a chair   L  Lines - Check IV for any infiltration  - Barker bag is empty if patient has a Barker   - Tubing and IV bags are labeled   S  Safety   - Room is clean, patient is clean, and equipment is clean. - Hallways are clear from equipment besides carts. - Fall bracelet on for fall risk patients  - Ensure room is clear and free of clutter  - Suction is set up for ALL patients (with yanker)  - Hallways are clear from equipment besides carts.    - Isolation precautions followed, supplies available outside room, sign posted     Britney Lewis RN

## 2019-07-09 NOTE — PROGRESS NOTES
[address]  [address] [place] [address], [address]     INPATIENT REHABILITATION  DAILY PROGRESS NOTE     Date: 7/9/2019    Name: Gisselle Khan Age / Sex: 63 y.o. / female   CSN: [id number] MRN: [id number]   Admit Date: 6/25/2019 Length of Stay: 14 days     Primary Rehab Diagnosis: Generalized Weakness with Impaired Mobility and ADLs secondary to Vocal cord dysfunction status post thyroidectomy (4/4/2019 - Dr. Vipin Ge) with residual dysphagia and dysarthria; S/P PEG tube insertion (6/20/2019) - Dr. Vipin Ge)       Subjective:     Patient seen and examined. Blood pressure controlled. Blood sugars fairly controllled. No fever over the past 24 hours. Team conference was held at bedside this PM.     Patient's Complaint:   No significant medical complaints     Pain Control: no current joint or muscle symptoms, essentially pain-free      Objective:     Vital Signs:  Patient Vitals for the past 24 hrs:   BP Temp Pulse Resp SpO2   07/09/19 0758 116/64 97.8 °F (36.6 °C) 62 18 99 %   07/08/19 2100 129/64 99.1 °F (37.3 °C) 66 18 97 %   07/08/19 1558 119/72 98.6 °F (37 °C) 63 18 99 %        Physical Examination:  GENERAL SURVEY: Patient is awake, alert, oriented x 3, sitting comfortably on the chair, not in acute respiratory distress.   HEENT: pink palpebral conjunctivae, anicteric sclerae, no nasoaural discharge, moist oral mucosa  NECK: (+) tracheostomy in place, supple, no jugular venous distention, no palpable lymph nodes  CHEST/LUNGS: symmetrical chest expansion, good air entry, clear breath sounds  HEART: adynamic precordium, good S1 S2, no S3, regular rhythm, no murmurs  ABDOMEN: (+) PEG tube in place, obese, bowel sounds appreciated, soft, non-tender  EXTREMITIES: pink nailbeds, no edema, full and equal pulses, no calf tenderness   NEUROLOGICAL EXAM: The patient is awake, alert and oriented x 3, able to answer questions fairly appropriately, able to follow 1 and 2 step commands. Able to tell time from the wall clock. Cranial nerves II-XII are grossly intact except for dysphagia. No gross sensory deficit. Motor strength is 4+/5 on all 4 extremities. Current Medications:  Current Facility-Administered Medications   Medication Dose Route Frequency    erythromycin (E.E.S.) 200 mg/5 mL oral suspension 80 mg  80 mg Per G Tube AC&HS    ondansetron hcl (ZOFRAN) tablet 4 mg  4 mg Per G Tube TID PRN    insulin lispro (HUMALOG) injection   SubCUTAneous AC&HS    PARoxetine hcl (PAXIL) 10 mg/5 mL oral suspension 20 mg  20 mg Per G Tube 7am    enoxaparin (LOVENOX) injection 40 mg  40 mg SubCUTAneous Q24H    insulin glargine (LANTUS) injection 34 Units  34 Units SubCUTAneous ACB    levothyroxine (SYNTHROID) tablet 175 mcg  175 mcg Per G Tube 6am    metoprolol tartrate (LOPRESSOR) tablet 25 mg  25 mg Per G Tube Q12H    melatonin tablet 1 mg  1 mg Per G Tube QHS    guaiFENesin (ROBITUSSIN) 100 mg/5 mL oral liquid 200 mg  200 mg Per G Tube Q12H    temazepam (RESTORIL) capsule 7.5 mg  7.5 mg Per G Tube QHS    simethicone (MYLICON) 42NY/0.4SY oral drops 40 mg  40 mg Per G Tube QID PRN    pantoprazole (PROTONIX) granules for oral suspension 40 mg  40 mg Per G Tube ACB    oxyCODONE-acetaminophen (PERCOCET) 5-325 mg per tablet 1 Tab  1 Tab Oral Q6H PRN    glucose chewable tablet 16 g  4 Tab Oral PRN    glucagon (GLUCAGEN) injection 1 mg  1 mg IntraMUSCular PRN    LORazepam (ATIVAN) tablet 1 mg  1 mg Oral Q12H PRN    bisacodyl (DULCOLAX) suppository 10 mg  10 mg Rectal Q48H PRN    albuterol-ipratropium (DUO-NEB) 2.5 MG-0.5 MG/3 ML  3 mL Nebulization Q4H PRN    dextrose 10% infusion 125-250 mL  125-250 mL IntraVENous PRN       Allergies:   Allergies   Allergen Reactions    Other Food Other (comments)     Artificial sweeteners- causes headaches    Metformin Other (comments)     Increase pain in feet and swelling in feet  Increased hunger,tired and bilat foot pain    Morphine Other (comments)     headache    Singulair [Montelukast] Other (comments)     hallucinations    Sucrose Other (comments)     Pt. Is not allergic to sucrose. She develops headaches with artificial sweeteners. Functional Progress:    OCCUPATIONAL THERAPY    ON ADMISSION MOST RECENT   Eating  Functional Level: 1   Eating  Functional Level: 3     Grooming  Functional Level: 5   Grooming  Functional Level: 5     Bathing  Functional Level: 3   Bathing  Functional Level: 3     Upper Body Dressing  Functional Level: 3(increased time needed)   Upper Body Dressing  Functional Level: 3     Lower Body Dressing  Functional Level: 3   Lower Body Dressing  Functional Level: 3     Toileting  Functional Level: 5   Toileting  Functional Level: 5     Toilet Transfers  Toilet Transfer Score: 5   Toilet Transfers  Toilet Transfer Score: 6     Tub /Shower Transfers  Tub/Shower Transfer Score: 5   Tub/Shower Transfers  Tub/Shower Transfer Score: 5       Legend:   7 - Independent   6 - Modified Independent   5 - Standby Assistance / Supervision / Set-up   4 - Minimum Assistance / Contact Guard Assistance   3 - Moderate Assistance   2 - Maximum Assistance   1 - Total Assistance / Dependent       Lab/Data Review:  Recent Results (from the past 24 hour(s))   GLUCOSE, POC    Collection Time: 07/08/19  5:17 PM   Result Value Ref Range    Glucose (POC) 239 (H) 70 - 110 mg/dL   GLUCOSE, POC    Collection Time: 07/08/19 11:32 PM   Result Value Ref Range    Glucose (POC) 171 (H) 70 - 110 mg/dL   GLUCOSE, POC    Collection Time: 07/09/19  5:35 AM   Result Value Ref Range    Glucose (POC) 144 (H) 70 - 110 mg/dL   GLUCOSE, POC    Collection Time: 07/09/19 10:58 AM   Result Value Ref Range    Glucose (POC) 173 (H) 70 - 110 mg/dL       Assessment:     Primary Rehab Diagnosis  1. Generalized Weakness with Impaired Mobility and ADLs  2.  Vocal cord dysfunction status post thyroidectomy (4/4/2019 - Dr. Matilde Prasad) with residual dysphagia and dysarthria; S/P PEG tube insertion (6/20/2019) - Dr. Matilde Prasad)     Comorbidities   Obstructive sleep apnea G47.33    H/O aortic valve replacement Z95.2    History of bicuspid aortic valve Z87.74    Chronic back pain M54.9, G89.29    Chronic left shoulder pain M25.512, G89.29    Obesity, Class II, BMI 35-39.9 E66.9    Left shoulder tendinitis M75.82    Spondylosis of lumbar region without myelopathy or radiculopathy M47.816    Chronic pain of both shoulders M25.511, G89.29, M25.512    Chronic pain of both knees M25.561, M25.562, G89.29    Chronic pain syndrome G89.4    Encounter for long-term (current) use of medications Z79.899    Chronic midline low back pain M54.5, G89.29    Lumbar facet arthropathy M47.816    Lumbar degenerative disc disease M51.36    Venous stasis dermatitis of both lower extremities I87.2    Diabetic neuropathy associated with type 2 diabetes mellitus  E11.40    Acquired hypothyroidism E03.9    Hypertensive heart disease without heart failure I11.9    Dyslipidemia E78.5    Depression F32.9    Chronic diastolic congestive heart failure  I50.32    Type 2 diabetes mellitus with diabetic neuropathy, with long-term current use of insulin  E11.40, Z79.4    Stridor R06.1    Fever R50.9    Chronic respiratory failure J96.10    Hypoxemia requiring supplemental oxygen R09.02, Z99.81    Vertigo R42    Panic attacks F41.0    Left ear hearing loss H91.92    History of vitamin D deficiency Z86.39        Plan:     1. Justification for continued stay: Good progression towards established rehabilitation goals. 2. Medical Issues being followed closely:    [x]  Fall and safety precautions     []  Wound Care     [x]  Bowel and Bladder Function     [x]  Fluid Electrolyte and Nutrition Balance     []  Pain Control      3.  Issues that 24 hour rehabilitation nursing is following:    [x]  Fall and safety precautions     []  Wound Care     [x]  Bowel and Bladder Function     [x] Fluid Electrolyte and Nutrition Balance     []  Pain Control      [x]  Assistance with and education on in-room safety with transfers to and from the bed, wheelchair, toilet and shower. 4. Acute rehabilitation plan of care:    [x]  Continue current care and rehab. [x]  Physical Therapy           [x]  Occupational Therapy           [x]  Speech Therapy     []  Hold Rehab until further notice     5. Medications:    [x]  MAR Reviewed     [x]  Continue Present Medications     6. DVT Prophylaxis:      [x]  Lovenox     []  Unfractionated Heparin     []  Coumadin     []  NOAC     []  DINORA Stockings     []  Sequential Compression Device     []  None     7. Orders:   > Vocal cord dysfunction status post thyroidectomy (4/4/2019 - Dr. Katalina De Leon) with residual dysphagia and dysarthria; S/P PEG tube insertion (6/20/2019) - Dr. Katalina De Leon)    > On 7/5/2019, added Guaifenesin 200 mg via PEG tube q 12 hr   > On 7/7/2019, patient had completed the recommended treatment course of Levofloxacin    > On 7/8/2019:    > Discontinued Metoclopramide 5 mg via PEG tube TID (re: drug interaction with SSRI's)    > Started trial of Erythromycin 80 mg via PEG tube QID as a prokinetic   > Continue:    > Pantoprazole 40 mg via PEG tube once daily    > Guaifenesin 200 mg via PEG tube q 12 hr    > Erythromycin 80 mg via PEG tube QID before tube feeding    > Chronic respiratory failure with hypoxia;  Hypoxemia requiring supplementary oxygen   > On 7/8/2019, decreased O2 inhalation from 4 LPM to 2 LPM via trach collar continuous    > Continue O2 inhalation 2 LPM via trach collar continuous     > Hypertensive heart disease without heart failure   > Continue Metoprolol tartrate 25 mg via PEG tube q 12 hr (9AM, 9PM)    > Hypothyroidism, S/P Thyroidectomy (4/4/2019 - Dr. Katalina De Leon)    > TSH (6/11/2019) = 42.50   > Upon admission to the ARU, patient was on Levothyroxine 200 mcg via PEG tube q AM   > TSH (6/26/2019) = 54.20   > On 7/6/2019, decreased Levothyroxine from 200 mcg to 175 mcg via PEG tube q AM   > Continue Levothyroxine 175 mcg via PEG tube q AM    > Type 2 diabetes mellitus with diabetic neuropathy, with long-term current use of insulin   > Continue:    > Lantus 34 units SC q AM    > Humalog insulin sliding scale SC 4 times daily before tube feeding    > Anxiety/Panic attack   > Unable to start SSRI because of potential drug interaction with Metoclopramide   > On 7/7/2019, patient had completed the recommended treatment course of Levofloxacin    > On 7/8/2019:    > Discontinued Metoclopramide 5 mg via PEG tube TID (re: drug interaction with SSRI's)    > Started trial of Erythromycin 80 mg via PEG tube QID before tube feeding as a prokinetic   > Start Paroxetine 20 mg via PEG tube once daily    > Difficulty sleeping   > On 7/5/2019, started:    > Melatonin 1 mg via PEG tube q HS    > Temazepam 7.5 mg via PEG tube q HS   > Continue:    > Melatonin 1 mg via PEG tube q HS    > Temazepam 7.5 mg via PEG tube q HS    > Diet:   > On 7/5/2019, continuous PEG feeding was transitioned to bolus PEG feeding   > Specifications: NPO except tube feeding   > PEG tube feed: Jevity 1.5, 330 ml via PEG tube 4 times daily (6AM, 10AM, 2PM, 6PM)   > Water flush: 200 ml via PEG tube  4 times daily after each bolus feed      8. Patient's progress in rehabilitation and medical issues discussed with the patient. All questions answered to the best of my ability. Care plan discussed with patient and nurse.       Signed:    Gaby Morrissey MD    July 9, 2019

## 2019-07-10 PROBLEM — F41.9 ANXIETY: Status: ACTIVE | Noted: 2019-06-25

## 2019-07-10 PROBLEM — J38.02 BILATERAL VOCAL CORD PARALYSIS: Status: ACTIVE | Noted: 2019-04-04

## 2019-07-10 LAB
GLUCOSE BLD STRIP.AUTO-MCNC: 167 MG/DL (ref 70–110)
GLUCOSE BLD STRIP.AUTO-MCNC: 175 MG/DL (ref 70–110)
GLUCOSE BLD STRIP.AUTO-MCNC: 182 MG/DL (ref 70–110)
GLUCOSE BLD STRIP.AUTO-MCNC: 239 MG/DL (ref 70–110)
TSH SERPL DL<=0.05 MIU/L-ACNC: 39.2 UIU/ML (ref 0.36–3.74)

## 2019-07-10 PROCEDURE — 97530 THERAPEUTIC ACTIVITIES: CPT

## 2019-07-10 PROCEDURE — 82962 GLUCOSE BLOOD TEST: CPT

## 2019-07-10 PROCEDURE — 92526 ORAL FUNCTION THERAPY: CPT

## 2019-07-10 PROCEDURE — 92507 TX SP LANG VOICE COMM INDIV: CPT

## 2019-07-10 PROCEDURE — 97535 SELF CARE MNGMENT TRAINING: CPT

## 2019-07-10 PROCEDURE — 97542 WHEELCHAIR MNGMENT TRAINING: CPT

## 2019-07-10 PROCEDURE — 74011250637 HC RX REV CODE- 250/637: Performed by: INTERNAL MEDICINE

## 2019-07-10 PROCEDURE — 97110 THERAPEUTIC EXERCISES: CPT

## 2019-07-10 PROCEDURE — 74011250637 HC RX REV CODE- 250/637: Performed by: EMERGENCY MEDICINE

## 2019-07-10 PROCEDURE — 36415 COLL VENOUS BLD VENIPUNCTURE: CPT

## 2019-07-10 PROCEDURE — 74011636637 HC RX REV CODE- 636/637: Performed by: INTERNAL MEDICINE

## 2019-07-10 PROCEDURE — 65310000000 HC RM PRIVATE REHAB

## 2019-07-10 PROCEDURE — 84443 ASSAY THYROID STIM HORMONE: CPT

## 2019-07-10 PROCEDURE — 74011250636 HC RX REV CODE- 250/636: Performed by: INTERNAL MEDICINE

## 2019-07-10 PROCEDURE — 97116 GAIT TRAINING THERAPY: CPT

## 2019-07-10 RX ORDER — TEMAZEPAM 15 MG/1
15 CAPSULE ORAL
Status: DISCONTINUED | OUTPATIENT
Start: 2019-07-10 | End: 2019-07-11

## 2019-07-10 RX ADMIN — INSULIN GLARGINE 34 UNITS: 100 INJECTION, SOLUTION SUBCUTANEOUS at 06:00

## 2019-07-10 RX ADMIN — TEMAZEPAM 15 MG: 15 CAPSULE ORAL at 20:59

## 2019-07-10 RX ADMIN — Medication 1 MG: at 20:59

## 2019-07-10 RX ADMIN — PANTOPRAZOLE SODIUM 40 MG: 40 GRANULE, DELAYED RELEASE ORAL at 05:45

## 2019-07-10 RX ADMIN — PAROXETINE HYDROCHLORIDE 20 MG: 10 SUSPENSION ORAL at 05:44

## 2019-07-10 RX ADMIN — INSULIN LISPRO 2 UNITS: 100 INJECTION, SOLUTION INTRAVENOUS; SUBCUTANEOUS at 05:46

## 2019-07-10 RX ADMIN — INSULIN LISPRO 2 UNITS: 100 INJECTION, SOLUTION INTRAVENOUS; SUBCUTANEOUS at 18:15

## 2019-07-10 RX ADMIN — INSULIN LISPRO 2 UNITS: 100 INJECTION, SOLUTION INTRAVENOUS; SUBCUTANEOUS at 14:00

## 2019-07-10 RX ADMIN — METOPROLOL TARTRATE 25 MG: 25 TABLET ORAL at 08:59

## 2019-07-10 RX ADMIN — ERYTHROMYCIN ETHYLSUCCINATE 80 MG: 200 GRANULE, FOR SUSPENSION ORAL at 18:15

## 2019-07-10 RX ADMIN — ERYTHROMYCIN ETHYLSUCCINATE 80 MG: 200 GRANULE, FOR SUSPENSION ORAL at 08:57

## 2019-07-10 RX ADMIN — GUAIFENESIN 200 MG: 200 SOLUTION ORAL at 20:59

## 2019-07-10 RX ADMIN — GUAIFENESIN 200 MG: 200 SOLUTION ORAL at 08:57

## 2019-07-10 RX ADMIN — ENOXAPARIN SODIUM 40 MG: 40 INJECTION SUBCUTANEOUS at 18:15

## 2019-07-10 RX ADMIN — LEVOTHYROXINE SODIUM 175 MCG: 75 TABLET ORAL at 05:45

## 2019-07-10 RX ADMIN — ERYTHROMYCIN ETHYLSUCCINATE 80 MG: 200 GRANULE, FOR SUSPENSION ORAL at 14:00

## 2019-07-10 RX ADMIN — ERYTHROMYCIN ETHYLSUCCINATE 80 MG: 200 GRANULE, FOR SUSPENSION ORAL at 05:43

## 2019-07-10 RX ADMIN — METOPROLOL TARTRATE 25 MG: 25 TABLET ORAL at 21:00

## 2019-07-10 RX ADMIN — OXYCODONE HYDROCHLORIDE AND ACETAMINOPHEN 1 TABLET: 5; 325 TABLET ORAL at 02:11

## 2019-07-10 RX ADMIN — INSULIN LISPRO 4 UNITS: 100 INJECTION, SOLUTION INTRAVENOUS; SUBCUTANEOUS at 10:31

## 2019-07-10 NOTE — PROGRESS NOTES
Bedside and Verbal shift change report given to aDvid Ayers  RN (oncoming nurse) by Yaima Castillo RN (offgoing nurse). Report included the following information SBAR, Kardex, MAR and Recent Results.     SITUATION:    Code Status: Full Code   [facility] Admission: [facility] ([facility] [J96.10]    [facility] day: 15   Problem List:       Hospital Problems  Date Reviewed: 7/10/2019          Codes Class Noted POA    Panic attacks ICD-10-CM: F41.0  ICD-9-CM: 300.01  Unknown Yes        Chronic respiratory failure ([facility] ICD-10-CM: J96.10  ICD-9-CM: 518.83  6/25/2019 Yes        Impaired mobility and ADLs ICD-10-CM: Z74.09  ICD-9-CM: 799.89  6/25/2019 Yes        Dysphagia ICD-10-CM: R13.10  ICD-9-CM: 787.20  6/25/2019 Yes    Overview Signed 7/5/2019 10:50 AM by Makenzie Sanchez MD     S/P Thyroidectomy (4/4/2019 - Dr. Josiane Ott)             Hypoxemia requiring supplemental oxygen ICD-10-CM: R09.02, Z99.81  ICD-9-CM: 799.02  6/25/2019 Yes        Anxiety ICD-10-CM: F41.9  ICD-9-CM: 300.00  6/25/2019 Yes        Status post insertion of percutaneous endoscopic gastrostomy (PEG) tube ([facility] ICD-10-CM: Z93.1  ICD-9-CM: V44.1  6/20/2019 Yes    Overview Addendum 7/5/2019 11:39 AM by Makenzie Sanchez MD     S/P PEG tube insertion (6/20/2019) - Dr. Josiane Ott)             History of tracheostomy ICD-10-CM: Z98.890  ICD-9-CM: V44.0  6/3/2019 Yes    Overview Signed 7/5/2019 10:44 AM by Makenzie Sanchez MD     S/P Tracheostomy (6/3/2019 - Dr. Josiane Ott)             History of thyroidectomy ICD-10-CM: X31.513  ICD-9-CM: V15.29  4/4/2019 Yes    Overview Signed 7/5/2019 10:45 AM by Makenzie Sanchez MD     S/P Thyroidectomy (4/4/2019 - Dr. Josiane Ott)             * (Principal) Bilateral vocal cord paralysis ICD-10-CM: J38.02  ICD-9-CM: 478.33  4/4/2019 Yes        Type 2 diabetes mellitus with diabetic neuropathy, with long-term current use of insulin (HCC) (Chronic) ICD-10-CM: E11.40, Z79.4  ICD-9-CM: 250.60, 357.2, V58.67  Unknown Yes        Depression (Chronic) ICD-10-CM: F32.9  ICD-9-CM: 033  8/24/2018 Yes        Acquired hypothyroidism (Chronic) ICD-10-CM: E03.9  ICD-9-CM: 244.9  5/17/2017 Yes        Hypertensive heart disease without heart failure (Chronic) ICD-10-CM: I11.9  ICD-9-CM: 402.90  5/17/2017 Yes              BACKGROUND:    Past Medical History:   Past Medical History:   Diagnosis Date    Acquired hypothyroidism 5/17/2017    Anxiety 6/25/2019    Chronic back pain     Lower back pain    Depression     Diabetic neuropathy associated with type 2 diabetes mellitus ([facility]     Dysphagia 6/25/2019    S/P Thyroidectomy (4/4/2019 - Dr. Yadira White)    History of asthma     History of heart failure     chronic diastolic heart failure    History of vitamin D deficiency 8/28/2017    Hypertensive heart disease without heart failure 5/17/2017    Hypoxemia requiring supplemental oxygen 6/25/2019    Left ear hearing loss     pt states nerve damage--- 25% hearing loss, described as \"muffled\"    Memory difficulty     Obesity, Class II, BMI 35-39.9 11/11/2016    Obstructive sleep apnea 11/6/2015    Panic attacks     Ringing of ears     Type 2 diabetes mellitus with diabetic neuropathy, with long-term current use of insulin ([facility])     Vertigo     Vocal cord dysfunction 4/4/2019         Patient taking anticoagulants yes     ASSESSMENT:    Changes in Assessment Throughout Shift: none     Patient has Central Line: no Reasons if yes:    Patient has Barker Cath: no Reasons if yes:       Last Vitals:     Vitals:    07/10/19 1422 07/10/19 1425 07/10/19 1428 07/10/19 1600   BP:    121/65   Pulse: 63 81 72 67   Resp:    18   Temp:    97.8 °F (36.6 °C)   SpO2: 99% 99% 98% 98%   Weight:       Height:            IV and DRAINS (will only show if present)   [REMOVED] Peripheral IV 06/30/19 Left Antecubital-Site Assessment: Clean, dry, & intact  Peripheral IV 07/03/19 Anterior; Left Forearm-Site Assessment: Clean, dry, & intact  [REMOVED] Peripheral IV 06/25/19 Anterior;Proximal;Right Antecubital-Site Assessment: Dry, Intact  PEG/Gastrostomy Tube 06/20/19-Site Assessment: Clean, dry, & intact  [REMOVED] Airway - Tracheostomy Tube 06/03/19 Portex-Site Assessment: Clean, dry, & intact     WOUND (if present)   Wound Type:         Dressing present Dressing Present : Yes   Wound Concerns/Notes:  none     PAIN    Pain Assessment    Pain Intensity 1: 0 (07/10/19 1552)    Pain Location 1: Abdomen, Neck    Pain Intervention(s) 1: Medication (see MAR)    Patient Stated Pain Goal: 0  o Interventions for Pain:  Pain medication    o Intervention effective: yes  o Time of last intervention: see MAR   o Reassessment Completed: yes      Last 3 Weights:  Last 3 Recorded Weights in this Encounter    06/26/19 1430 07/03/19 1852   Weight: 122.2 kg (269 lb 8 oz) 103.9 kg (229 lb)     Weight change:      INTAKE/OUPUT    Current Shift: No intake/output data recorded. Last three shifts: 07/09 0701 - 07/10 1900  In: 2220   Out: -      LAB RESULTS     Recent Labs     07/08/19  0620   WBC 6.3   HGB 11.7*   HCT 36.7           Recent Labs     07/08/19  0620      K 3.9   *   BUN 11   CREA 0.81   CA 8.7   MG 2.3       RECOMMENDATIONS AND DISCHARGE PLANNING     1. Pending tests/procedures/ Plan of Care or Other Needs: labs  2.      3. Discharge plan for patient and Needs/Barriers: TBD    4. Estimated Discharge Date: TBD Posted on Whiteboard in Patients Room: no      4. The patient's care plan was reviewed with the oncoming nurse. \"HEALS\" SAFETY CHECK      Fall Risk    Total Score: 3    Safety Measures: Safety Measures: Bed/Chair alarm on, Bed/Chair-Wheels locked, Bed in low position, Call light within reach    A safety check occurred in the patient's room between off going nurse and oncoming nurse listed above.     The safety check included the below items  Area Items   H  High Alert Medications - Verify all high alert medication drips (heparin, PCA, etc.)   E  Equipment - Suction is set up for ALL patients (with chaitanya)  - Red plugs utilized for all equipment (IV pumps, etc.)  - WOWs wiped down at end of shift.  - Room stocked with oxygen, suction, and other unit-specific supplies   A  Alarms - Bed alarm is set for fall risk patients  - Ensure chair alarm is in place and activated if patient is up in a chair   L  Lines - Check IV for any infiltration  - Barker bag is empty if patient has a Barker   - Tubing and IV bags are labeled   S  Safety   - Room is clean, patient is clean, and equipment is clean. - Hallways are clear from equipment besides carts. - Fall bracelet on for fall risk patients  - Ensure room is clear and free of clutter  - Suction is set up for ALL patients (with chaitanya)  - Hallways are clear from equipment besides carts.    - Isolation precautions followed, supplies available outside room, sign posted     Collette Jerry, RN

## 2019-07-10 NOTE — PROGRESS NOTES
Family teaching with patient's brother and son. Reviewed care of PEG site, how to administer meds through PEG and diabetic education including drawing up insulin and administering. Also reviewed care of trach and how to change inner cannula. Offered to instruct in suctioning, but patient declined. Patient's son able to return demonstration in feeding, meds (including insulin), trach care and replacing the inner cannula.

## 2019-07-10 NOTE — PROGRESS NOTES
Surgery  Ms. Riley Bhardwaj looks better than I have ever seen her in the since her hospital stay. She seems to be happy and is able to talk through her trach. Is afebrile with stable vital signs  Her G-tube feedings are going reasonably well and she is excited about possibly going home towards the end of the week. Is not short of breath. Have answered the same questions that she is had for a while and that is that her trach will stay in for a fairly long time and that she really cannot eat by mouth at present.

## 2019-07-10 NOTE — PROGRESS NOTES
Problem: Mobility Impaired (Adult and Pediatric)  Goal: *Therapy Goal (Edit Goal, Insert Text)  Description  Physical Therapy Goals: STG  Initiated 6/26/2019, reassessed 7/3/19 and to be accomplished 7/10/2019:   1. Patient will move from supine to sit and sit to supine , scoot up and down and roll side to side in bed with supervision/set-up. ( 7/3/2019 MET)     2. Patient will transfer from bed to chair and chair to bed with supervision/set-up using the least restrictive device. (7/3/2019 MET)  3. Patient will perform sit to stand with supervision/set-up. (7/3/2019 MET)  4. Patient will ambulate with supervision/set-up for 50 feet with the least restrictive device. (MET for SBA level using rollator)  5. Patient will ascend/descend 3 stairs with 2 handrail(s) with minimal assistance/contact guard assist. (MET for SBA)     Physical Therapy Goals: LTG  Initiated 6/26/2019 and to be accomplished within 21 day(s) 7/17/2019:  1. Patient will move from supine to sit and sit to supine , scoot up and down and roll side to side in bed with modified independence. 2.  Patient will transfer from bed to chair and chair to bed with modified independence using the least restrictive device. 3.  Patient will perform sit to stand with modified independence. 4.  Updated 7/5/2019: Patient will ambulate with modified independence for 150 feet with the least restrictive device. 5.  Patient will ascend/descend 3 stairs with 2 handrail(s) with supervision/set-up. Outcome: Progressing Towards Goal   PHYSICAL THERAPY WEEKLY PROGRESS NOTE    Patient: Yan Marshall (72 y.o. female)  Date: 7/10/2019  Diagnosis: Chronic respiratory failure (HCC) [J96.10] Bilateral vocal cord paralysis  Precautions: Aspiration, Fall  Chart, physical therapy assessment, plan of care and goals were reviewed.    *PT received verbal order from physician regarding assessment of oxygen saturation when at rest and during activity (ambulation) when on room air and when on 2 LPM supplemental oxygen    Pain:  Patient not indicating increased pain during treatment. Time in: 1032  Time out: 1100    Pt seen for: therapeutic activity, gait training, therapeutic exercise, wheelchair management, patient education    Patient identified with name and : yes    SUBJECTIVE:     Patient agreeable to treatment. Observed to get anxious with testing oxygen saturation levels when on room air (trach tubing attached to oxygen tank without flow during testing in event that patient started having oxygen desaturation), asking therapist at end of gait bout without additional oxygen to return oxygen flow back up to 2 LPM. Patient told therapist in afternoon not to decrease oxygen again during session. Nurse,  notified. OBJECTIVE DATA SUMMARY:   Vitals assessment:   Patient Vitals during therapy session   O2 flow Pulse SpO2   07/10/19 1428  2 LPM 72 98 %   07/10/19 1425  2 LPM 81 99 %   07/10/19 1422  2 LPM 63 99 %   07/10/19 1046  2 LPM at rest 63 98 %   07/10/19 1043 Room air following gait bout (after 96 ft of gait) 68 97 %   07/10/19 1040 Room air during gait bout (after ~ 40-50 ft) 63 95 %   07/10/19 1032 Room air, at rest 63 97 %         AROM: [facility]    FIM SCORES Initial Assessment Weekly Progress Assessment 7/10/2019   Bed/Chair/Wheelchair Transfers 4 5   Wheelchair Mobility (not challenged today; to be further assessed) 2 ( ft supervision)   Walking [place] 1 2 (85-96 ft using rollator SBA)   Steps/Stairs 1 2   Please see [facility] Interdisciplinary Eval: Coordination/Balance Section for details regarding FIM score description.     BED/CHAIR/WHEELCHAIR TRANSFERS Initial Assessment Weekly Progress Assessment 7/10/2019   Rolling Right 4 (Minimal assistance)  Mod I at last assessment   Rolling Left 4 (Minimal assistance)  Mod I at last assessment   Supine to Sit 4 (Minimal assistance)(Head of bed elevated as per physician order)  Mod I at last assessment   Sit to Stand Minimal assistance Supervision(cued for checking locked brakes on recliner)   Sit to Supine 4 (Minimal assistance)  Mod I at last assessment   Transfer Type Other Other   Comments stand step transfer without AD needing occasional cue for safety with transfers, assist with management of lines/tubing   Car Transfer Not tested  Not tested   Car Type N/A  N/A     GROSS ASSESSMENT Weekly Progress Assessment 7/10/2019   AROM Within functional limits   Strength Generally decreased, functional   Coordination Within functional limits   Tone Normal   Sensation Intact   PROM Within functional limits     POSTURE Weekly Progress Assessment 7/10/2019   Posture (WDL) Exceptions to WDL   Posture Assessment Forward head;Rounded shoulders; Increased;Lordosis, cervical     WHEELCHAIR MOBILITY/MANAGEMENT Initial Assessment Weekly Progress Assessment 7/10/2019   Able to Propel (not challenged today) 90 feet( ft bouts)   Curbs/ramps assistance required 0 (Not tested) 0 (Not tested)   Wheelchair set up assistance required 0 (Not tested)  Not tested   Wheelchair management (not challenged today; to be further assessed) Manages right brake;Manages left brake     WALKING INDEPENDENCE Initial Assessment Weekly Progress Assessment 7/10/2019   Assistive device (no AD as per PLOF for household gait) jf Balderasator   Ambulation assistance - level surface    SBA   Distance 8 Feet (ft) 96 Feet (ft)   Comments 8 ft within room without device, steadying assist needed but needing seated rest after 8 ft Assist for tubing management only     GAIT Weekly Progress Assessment 7/10/2019   Gait Description (WDL) Exceptions to WDL   Gait Abnormalities Decreased step clearance     STEPS/STAIRS Initial Assessment Weekly Progress Assessment 7/10/2019   Steps/Stairs ambulated (not able to assess today due to patient fatigue) 4   Rail Use (N/A) Both(two handrails to ascend, right handrail descending)   Comments unable to challenge today up/down 4 stairs with bilat handrails going up steps, then sidestepping down stairs. Cues/SBA for safety   Curbs/Ramps    Not assessed       Therapeutic exercise:  Heel raises in standing x 8 reps, hip flex marches x 8 reps before needing seated rest.       ASSESSMENT:  Patient continues to insist on 2 LPM O2 during session despite education regarding her pulse oximetry readings including oxygen saturation. Nurse notified. Patient continues to demonstrate increased anxiousness with increased shortness of breath with increased effort of task (longer gait bouts, wheelchair mobility, standing exercises). Continues to require assist with tubing management (oxygen tubing and PEG tubing) during mobility tasks. Progression toward goals:  ?      Improving appropriately and progressing toward goals  ? Improving slowly and progressing toward goals  ? Not making progress toward goals and plan of care will be adjusted     PLAN:  Patient continues to benefit from skilled intervention to address the above impairments. Continue treatment per established plan of care. Discharge Recommendations:  Home Health  Further Equipment Recommendations for Discharge:  hospital bed and rollator     Estimated LOS: 2-3 weeks    Activity Tolerance:   Fair due to increased fatigue, increased anxiousness observed. Modified Megan RPE (0-10 scale) was 5/10 for negotiation of stairs and gait to/from chair for additional 50 ft of gait. Please refer to the flowsheet for vital signs taken during this treatment. After treatment:   ? Patient left in no apparent distress in bed  ? Patient left in no apparent distress sitting up in recliner chair  ? Patient left in no apparent distress in w/c mobilizing under own power  ? Patient left in no apparent distress dining area  ? Patient left in no apparent distress mobilizing under own power  ? Call bell left within reach  ? Nursing notified  ? Caregiver present  ?  Bed alarm activated Lazarus Grimes, PT  7/10/2019

## 2019-07-10 NOTE — PROGRESS NOTES
Bedside and Verbal shift change report given to Sanjeev Clifford  RN (oncoming nurse) by Andrez Townsend RN (offgoing nurse). Report included the following information SBAR, Kardex, MAR and Recent Results.     SITUATION:    Code Status: Full Code   Reason for Admission: Chronic respiratory failure ([facility] [J96.10]    [facility] day: 15   Problem List:       Hospital Problems  Date Reviewed: 7/8/2019          Codes Class Noted POA    Chronic respiratory failure ([facility] ICD-10-CM: J96.10  ICD-9-CM: 518.83  6/25/2019 Yes        Impaired mobility and ADLs ICD-10-CM: Z74.09  ICD-9-CM: 799.89  6/25/2019 Yes        Dysphagia ICD-10-CM: R13.10  ICD-9-CM: 787.20  6/25/2019 Yes    Overview Signed 7/5/2019 10:50 AM by Cristal Peck MD     S/P Thyroidectomy (4/4/2019 - Dr. Je Marrero)             Hypoxemia requiring supplemental oxygen ICD-10-CM: R09.02, Z99.81  ICD-9-CM: 799.02  6/25/2019 Yes        Status post insertion of percutaneous endoscopic gastrostomy (PEG) tube ([facility] ICD-10-CM: Z93.1  ICD-9-CM: V44.1  6/20/2019 Yes    Overview Addendum 7/5/2019 11:39 AM by Cristal Peck MD     S/P PEG tube insertion (6/20/2019) - Dr. Je Marrero)             History of tracheostomy ICD-10-CM: Z98.890  ICD-9-CM: V44.0  6/3/2019 Yes    Overview Signed 7/5/2019 10:44 AM by Cristal Peck MD     S/P Tracheostomy (6/3/2019 - Dr. Je Marrero)             History of thyroidectomy ICD-10-CM: O91.603  ICD-9-CM: V15.29  4/4/2019 Yes    Overview Signed 7/5/2019 10:45 AM by Cristal Peck MD     S/P Thyroidectomy (4/4/2019 - Dr. Je Marrero)             * (Principal) Vocal cord dysfunction ICD-10-CM: J38.3  ICD-9-CM: 478.5  4/4/2019 Yes        Type 2 diabetes mellitus with diabetic neuropathy, with long-term current use of insulin (HCC) (Chronic) ICD-10-CM: E11.40, Z79.4  ICD-9-CM: 250.60, 357.2, V58.67  Unknown Yes        Acquired hypothyroidism (Chronic) ICD-10-CM: E03.9  ICD-9-CM: 244.9  5/17/2017 Yes        Hypertensive heart disease without heart failure (Chronic) ICD-10-CM: I11.9  ICD-9-CM: 402.90  5/17/2017 Yes              BACKGROUND:    Past Medical History:   Past Medical History:   Diagnosis Date    Acquired hypothyroidism 5/17/2017    Chronic back pain     Lower back pain    Depression     Diabetic neuropathy associated with type 2 diabetes mellitus ([facility]     Dysphagia 6/25/2019    S/P Thyroidectomy (4/4/2019 - Dr. Vipin Ge)    History of asthma     History of heart failure     chronic diastolic heart failure    History of vitamin D deficiency 8/28/2017    Hypertensive heart disease without heart failure 5/17/2017    Hypoxemia requiring supplemental oxygen 6/25/2019    Left ear hearing loss     pt states nerve damage--- 25% hearing loss, described as \"muffled\"    Memory difficulty     Obesity, Class II, BMI 35-39.9 11/11/2016    Obstructive sleep apnea 11/6/2015    Panic attacks     Ringing of ears     Type 2 diabetes mellitus with diabetic neuropathy, with long-term current use of insulin ([facility])     Vertigo     Vocal cord dysfunction 4/4/2019         Patient taking anticoagulants yes     ASSESSMENT:    Changes in Assessment Throughout Shift: none     Patient has Central Line: no Reasons if yes:    Patient has Barker Cath: no Reasons if yes:       Last Vitals:     Vitals:    07/09/19 0758 07/09/19 1544 07/09/19 2059 07/09/19 2100   BP: 116/64 128/65 127/72 119/65   Pulse: 62 63 70 64   Resp: 18 18  18   Temp: 97.8 °F (36.6 °C) 98.6 °F (37 °C)  97.9 °F (36.6 °C)   SpO2: 99% 95%  100%   Weight:       Height:            IV and DRAINS (will only show if present)   [REMOVED] Peripheral IV 06/30/19 Left Antecubital-Site Assessment: Clean, dry, & intact  Peripheral IV 07/03/19 Anterior; Left Forearm-Site Assessment: Clean, dry, & intact  [REMOVED] Peripheral IV 06/25/19 Anterior;Proximal;Right Antecubital-Site Assessment: Dry, Intact  PEG/Gastrostomy Tube 06/20/19-Site Assessment: Clean, dry, & intact  [REMOVED] Airway - Tracheostomy Tube 06/03/19 Portex-Site Assessment: Clean, dry, & intact     WOUND (if present)   Wound Type:         Dressing present Dressing Present : Yes   Wound Concerns/Notes:  none     PAIN    Pain Assessment    Pain Intensity 1: 0 (07/10/19 0400)    Pain Location 1: Abdomen, Neck    Pain Intervention(s) 1: Medication (see MAR)    Patient Stated Pain Goal: 0  o Interventions for Pain:  Pain medication    o Intervention effective: yes  o Time of last intervention: see MAR   o Reassessment Completed: yes      Last 3 Weights:  Last 3 Recorded Weights in this Encounter    06/26/19 1430 07/03/19 1852   Weight: 122.2 kg (269 lb 8 oz) 103.9 kg (229 lb)     Weight change:      INTAKE/OUPUT    Current Shift: No intake/output data recorded. Last three shifts: 07/08 1901 - 07/10 0700  In: 2220   Out: -      LAB RESULTS     Recent Labs     07/08/19  0620   WBC 6.3   HGB 11.7*   HCT 36.7           Recent Labs     07/08/19  0620      K 3.9   *   BUN 11   CREA 0.81   CA 8.7   MG 2.3       RECOMMENDATIONS AND DISCHARGE PLANNING     1. Pending tests/procedures/ Plan of Care or Other Needs: labs  2.      3. Discharge plan for patient and Needs/Barriers: TBD    4. Estimated Discharge Date: TBD Posted on Whiteboard in Patients Room: no      4. The patient's care plan was reviewed with the oncoming nurse. \"HEALS\" SAFETY CHECK      Fall Risk    Total Score: 3    Safety Measures: Safety Measures: Bed/Chair alarm on, Bed/Chair-Wheels locked, Bed in low position, Call light within reach, Extra trach at bedside, Fall prevention (comment), Gripper socks, Side rails X 3    A safety check occurred in the patient's room between off going nurse and oncoming nurse listed above.     The safety check included the below items  Area Items   H  High Alert Medications - Verify all high alert medication drips (heparin, PCA, etc.)   E  Equipment - Suction is set up for ALL patients (with chaitanya)  - Red plugs utilized for all equipment (IV pumps, etc.)  - WOWs wiped down at end of shift.  - Room stocked with oxygen, suction, and other unit-specific supplies   A  Alarms - Bed alarm is set for fall risk patients  - Ensure chair alarm is in place and activated if patient is up in a chair   L  Lines - Check IV for any infiltration  - Barker bag is empty if patient has a Barker   - Tubing and IV bags are labeled   S  Safety   - Room is clean, patient is clean, and equipment is clean. - Hallways are clear from equipment besides carts. - Fall bracelet on for fall risk patients  - Ensure room is clear and free of clutter  - Suction is set up for ALL patients (with chaitanya)  - Hallways are clear from equipment besides carts.    - Isolation precautions followed, supplies available outside room, sign posted     Roula Hastings RN

## 2019-07-10 NOTE — PROGRESS NOTES
Description  Occupational Therapy Goals   Long Term Goals  Initiated 6/26/19 and to be accomplished within 2 week(s) 7/10/19  1. Pt will perform functional activity up to 15 minutes with no more than 2 rest breaks and 2 vcs to attend to task. .   2. Pt will perform grooming with S.           3. Pt will perform UB bathing with S .       4. Pt will perform LB bathing with S.   5. Pt will perform tub/shower transfer with S.  6. Pt will perform UB dressing with S.    7. Pt will perform LB dressing with S.    8. Pt will perform toileting task with I.      9. Pt will perform toilet transfer with S.       Short Term Goals   Initiated 6/26/19 and to be accomplished within 7 day(s) 7/3/19. Updated 7/3/19  1. Pt will perform functional task up to 10 minutes with no more than 3 verbal cues to attend to task and 3 rest breaks. 2. Pt will perform grooming with SBA. .  7/3/19  3. Pt will perform UB bathing with S and no more than 3 vcs to  Initiate and complete task. 4. Pt will perform LB bathing with [place]tran and no more than 3 vcs to  complete task. 5. Pt will perform tub/shower transfer with [facility].  7/3/19-S  6. Pt will perform UB dressing with Beltran and no more than 3 vcs to  initiate and complete task. .  7. Pt will perform LB dressing with Beltran and no more than 3 vcs to initiate and  complete task. 8. Pt will perform toileting task with S. 7/3/19  9. Pt will perform toilet transfer with S and no more than 3 vcs to initiate and complete task.   7/3/19        Outcome: Progressing Towards Goal  Goal: Interventions  Outcome: Progressing Towards Goal     Problem: Patient Education: Go to Patient Education Activity  Goal: Patient/Family Education  Outcome: Progressing Towards Goal  OT WEEKLY PROGRESS NOTE  Patient [address]   Time Spent With Patient  Time In: 0805  Time Out: 6372  Medical Diagnosis:  Chronic respiratory failure ([facility] [J96.10] Vocal cord dysfunction     Pain at start of tx: 0/10 pain or discomfort. Pain at stop of tx: 0/10 pain or discomfort. Patient identified with name and : yes  Subjective: Will you change it for me ( asking her daughter to change her inner cannula tube)         Objective: Pt seen for AM shower, but prior to shower pt demonstrates to her daughter and brother (who came in early for educational training ) how to change the inner cannula tube ( with staff supervision). Pt's surgeons than came in and talk with pt's family members and concern pt had regarding her trach,how long she will keep the trach in. (conference lasted ~ 20 minutes)  Pt than ambulated into bathroom without AD on 3L O2. See below for ADL's functional levels. Pt's daughter turn her head when pt's trach been suction. Asked daughter how often she will visit her mom to assist she was unable to say. pt's brother was disengage on his cell phone, reply 'I am trying to fine a car for you ( niece) with a good price'.      Outcome Measures: Pt to d/c home with home Health      AROM: B UE intact    COGNITION/PERCEPTION Initial Assessment Weekly Progress Assessment 7/10/2019   Premorbid Reading Status Literate   Literate   Premorbid Writing Status [facility]   Arousal/Alertness Generalized responses   Generalized responses   Orientation Level Oriented X4 Oriented X4   Visual Fields       Praxis       Body Scheme       COMPREHENSION MODE Initial Assessment Weekly Progress Assessment 7/10/2019   Primary Mode of Comprehension Auditory Auditory   Hearing Aide None None   Corrective Lenses Reading glasses   Reading glasses   Score 4 6     EXPRESSION Initial Assessment Weekly Progress Assessment 7/10/2019   Primary Mode of Expression Sign language, Verbal, Other (comment)(writes on paper) Sign language;Verbal;Other (comment)(writes on paper)   Score 4 6   Comments needing cues occasionally for writing down needs vs attempting to verbalize needing cues occasionally for writing down needs vs attempting to verbalize SOCIAL INTERACTION/ PRAGMATICS Initial Assessment Weekly Progress Assessment 7/10/2019   Score 4 4   Comments moderate encouragement to participate in therapy moderate encouragement to participate in therapy     PROBLEM SOLVING Initial Assessment Weekly Progress Assessment 7/10/2019   Score 4 5   Comments min cues for functional problem solving min cues for functional problem solving     MEMORY Initial Assessment Weekly Progress Assessment 7/10/2019   Score 4 5   Comments able to recall information appropriately as indicated on chart with occasional clarification needed able to recall information appropriately as indicated on chart with occasional clarification needed     EATING Initial Assessment Weekly Progress Assessment 7/10/2019   Functional Level 1 1   Comments PEG Tube   Feeding/Eating  Feeding/Eating Assistance: NA (not applicable)  Comments: Pt tube feeding     GROOMING Initial Assessment Weekly Progress Assessment 7/10/2019   Functional Level 5 6   Tasks completed by patient      Comments increased time needed Mod independent at sink     BATHING Initial Assessment Weekly Progress Assessment 7/10/2019   Functional Level 3  5     Body parts patient bathed       Comments increased time needed   Upper Body Bathing  Bathing Assistance, Upper: 6 (Modified independent)  Position Performed: Seated in chair  Adaptive Equipment: Tub bench  Comments: Pt sequence bathing tasks without cuing. Lower Body Bathing  Bathing Assistance, Lower : 5 (Stand-by assistance)  Position Performed: Standing  Adaptive Equipment: Grab bar;Tub bench  Comments: Pt stood with, and used grab bars for support while washing buttocks and sherri area. Pt than sat and rest for a few minutes 2/2 fatigue than con't.      TUB/SHOWER TRANSFER INDEPENDENCE Initial Assessment Weekly Progress Assessment 7/10/2019   Score 5 5   Comments      Tub or Shower Type: Shower  Amount of Assistance Required: 5 (Stand-by assistance)  Adaptive Equipment: Shower chair with back; Other (comment)(Ambulated without AD into shower , from shower to room in wheel chair 2/2 fatigue.)     UPPER BODY DRESSING/UNDRESSING Initial Assessment Weekly Progress Assessment 7/10/2019   Functional Level 3(increased time needed) 4   Items applied/Steps completed       Comments domned hopital gown with Moda   Upper Body Dressing   Dressing Assistance : 4 (Minimal assistance)  Comments: Min assist pulling shirt down at the sides. LOWER BODY DRESSING/UNDRESSING Initial Assessment Weekly Progress Assessment 7/10/2019   Functional Level 3 4   Items applied/Steps completed       Comments increased time needed   Lower Body Dressing   Dressing Assistance : 4 (Minimal assistance)  Leg Crossed Method Used: No  Position Performed: Seated edge of bed;Seated in chair;Standing  Comments: Pt bending forward donning panties and pants to thigh than stood managing clothing to waist independently, SBA. Pt than sat EOB with LE catty corner on bed donning socks but req'd min assist donning shoe. TOILETING Initial Assessment Weekly Progress Assessment 7/10/2019   Functional Level 5 5   Comments increased time needed    Functional Transfers  Toilet Transfer : Stand pivot transfer without device  Amount of Assistance Required: 5 (stand-by assistance)     TOILET TRANSFER INDEPENDENCE Initial Assessment Weekly Progress Assessment 7/10/2019   Transfer score 5 5   Comments to bed side commod    Functional Transfers  Toilet Transfer : Ambulated <> transfer without device  Amount of Assistance Required: 5 (stand-by assistance)              ASSESSMENT: Pt making progress with self care including changing the inner cannula for her trach. Pt participated well with ADL's without demonstrating anxiety in shower. Progression toward goals: Meeting the remainder STG's   ? Improving appropriately and progressing toward goals  ? Improving slowly and progressing toward goals  ?           Not making progress toward goals and plan of care will be adjusted     PLAN:  Patient continues to benefit from skilled intervention to address the above impairments. Continue treatment per established plan of care. Discharge Recommendations:  Home Health  Further Equipment Recommendations for Discharge: 3:1 commode and tub transfer bench     Please refer to the flow sheet for vital signs taken during this treatment. After treatment:   ?  Patient left in no apparent distress sitting up in chair  ? Patient left in no apparent distress in bed  ? Call bell left within reach  ? Nursing notified  ? Caregiver present  ? Bed alarm activated    COMMUNICATION/EDUCATION:   ? Home safety education was provided and the patient/caregiver indicated understanding. ? Patient/family have participated as able in goal setting and plan of care. ? Patient/family agree to work toward stated goals and plan of care. ? Patient understands intent and goals of therapy, but is neutral about his/her participation. ? Patient is unable to participate in goal setting and plan of care. Plan of Care: Please see Care Plan for updated LTGs.    Family Training: Done this morning   Estimated LOS: 7/12/2019    Carmelita ROSS  7/10/2019

## 2019-07-10 NOTE — PROGRESS NOTES
Milly spoke with pt and her brother and son at the bedside after nursing education. Sw reviewed dc plans and answered questions. Sw met with pt and Dr. Lily Davis to discuss results of o2 testing and o2 criteria for home use. Pt does not currently meet the criteria for home o2. Pt expressed frustration and anxiety about returning home without supplemental o2. Dr. Lily Davis explained processes of testing and ordering as well as our body's ability to obtain and utilize o2. Milly advised pt of the option to privately pay for home o2 and pt states that she will let sw know her decision in the morning. Sw offered to schedule another family education session with pt's family, explaining benefits to additional education in physical and emotional needs. Pt declines. Milly advised pt that Mamta from [employer] would be by to meet with her tomorrow morning at 10 to further discuss home health and personal care. Pt states understanding and agreement. Pt denies further need at end of conversation and asks that her door be closed. Sw asked that pt's nurse round on the pt frequently this evening for additional support. Nurse states understanding and agreement. Sw will follow.

## 2019-07-10 NOTE — PROGRESS NOTES
[id number]  [address] [place] [address], [id number]     INPATIENT REHABILITATION  DAILY PROGRESS NOTE     Date: 7/10/2019    Name: Corrine Arguelles Age / Sex: 63 y.o. / female   CSN: [id number] MRN: [id number]   Admit Date: 6/25/2019 Length of Stay: 15 days     Primary Rehab Diagnosis: Generalized Weakness with Impaired Mobility and ADLs secondary to Vocal cord dysfunction status post thyroidectomy (4/4/2019 - Dr. Scott Ferrer) with residual dysphagia and dysarthria; S/P PEG tube insertion (6/20/2019) - Dr. Scott Ferrer)       Subjective:     Patient seen and examined. Blood pressure controlled. Blood sugars better. No fever over the past 24 hours. Patient's Complaint:   No significant medical complaints     Pain Control: no current joint or muscle symptoms, essentially pain-free      Objective:     Vital Signs:  Patient Vitals for the past 24 hrs:   BP Temp Pulse Resp SpO2   07/10/19 0749 -- 97.7 °F (36.5 °C) 64 19 99 %   07/09/19 2100 119/65 97.9 °F (36.6 °C) 64 18 100 %   07/09/19 2059 127/72 -- 70 -- --   07/09/19 1544 128/65 98.6 °F (37 °C) 63 18 95 %        Physical Examination:  GENERAL SURVEY: Patient is awake, alert, oriented x 3, sitting comfortably on the chair, not in acute respiratory distress. HEENT: pink palpebral conjunctivae, anicteric sclerae, no nasoaural discharge, moist oral mucosa  NECK: (+) tracheostomy in place, supple, no jugular venous distention, no palpable lymph nodes  CHEST/LUNGS: symmetrical chest expansion, good air entry, clear breath sounds  HEART: adynamic precordium, good S1 S2, no S3, regular rhythm, no murmurs  ABDOMEN: (+) PEG tube in place, obese, bowel sounds appreciated, soft, non-tender  EXTREMITIES: pink nailbeds, no edema, full and equal pulses, no calf tenderness   NEUROLOGICAL EXAM: The patient is awake, alert and oriented x 3, able to answer questions fairly appropriately, able to follow 1 and 2 step commands.   Able to tell time from the wall clock. Cranial nerves II-XII are grossly intact except for dysphagia. No gross sensory deficit. Motor strength is 4+/5 on all 4 extremities. Current Medications:  Current Facility-Administered Medications   Medication Dose Route Frequency    erythromycin (E.E.S.) 200 mg/5 mL oral suspension 80 mg  80 mg Per G Tube AC&HS    ondansetron hcl (ZOFRAN) tablet 4 mg  4 mg Per G Tube TID PRN    insulin lispro (HUMALOG) injection   SubCUTAneous AC&HS    PARoxetine hcl (PAXIL) 10 mg/5 mL oral suspension 20 mg  20 mg Per G Tube 7am    enoxaparin (LOVENOX) injection 40 mg  40 mg SubCUTAneous Q24H    insulin glargine (LANTUS) injection 34 Units  34 Units SubCUTAneous ACB    levothyroxine (SYNTHROID) tablet 175 mcg  175 mcg Per G Tube 6am    metoprolol tartrate (LOPRESSOR) tablet 25 mg  25 mg Per G Tube Q12H    melatonin tablet 1 mg  1 mg Per G Tube QHS    guaiFENesin (ROBITUSSIN) 100 mg/5 mL oral liquid 200 mg  200 mg Per G Tube Q12H    temazepam (RESTORIL) capsule 7.5 mg  7.5 mg Per G Tube QHS    simethicone (MYLICON) 75CT/4.3VD oral drops 40 mg  40 mg Per G Tube QID PRN    pantoprazole (PROTONIX) granules for oral suspension 40 mg  40 mg Per G Tube ACB    oxyCODONE-acetaminophen (PERCOCET) 5-325 mg per tablet 1 Tab  1 Tab Oral Q6H PRN    glucose chewable tablet 16 g  4 Tab Oral PRN    glucagon (GLUCAGEN) injection 1 mg  1 mg IntraMUSCular PRN    LORazepam (ATIVAN) tablet 1 mg  1 mg Oral Q12H PRN    bisacodyl (DULCOLAX) suppository 10 mg  10 mg Rectal Q48H PRN    albuterol-ipratropium (DUO-NEB) 2.5 MG-0.5 MG/3 ML  3 mL Nebulization Q4H PRN    dextrose 10% infusion 125-250 mL  125-250 mL IntraVENous PRN       Allergies:   Allergies   Allergen Reactions    Other Food Other (comments)     Artificial sweeteners- causes headaches    Metformin Other (comments)     Increase pain in feet and swelling in feet  Increased hunger,tired and bilat foot pain    Morphine Other (comments) headache    Singulair [Montelukast] Other (comments)     hallucinations    Sucrose Other (comments)     Pt. Is not allergic to sucrose. She develops headaches with artificial sweeteners.        Functional Progress:    PHYSICAL THERAPY    ON ADMISSION MOST RECENT   Wheelchair Mobility/Management  Able to Propel (ft): (not challenged today)  Functional Level: (not challenged today; to be further assessed)  Curbs/Ramps Assist Required (FIM Score): 0 (Not tested)  Wheelchair Setup Assist Required : 0 (Not tested)  Wheelchair Management: (not challenged today; to be further assessed) Wheelchair Mobility/Management  Able to Propel (ft): 80 feet(SBA, using bilat feet and UE to propel)  Functional Level: 2  Curbs/Ramps Assist Required (FIM Score): 0 (Not tested)  Wheelchair Setup Assist Required : 5 (Stand-by assistance)  Wheelchair Management: Manages left brake, Manages right brake     Gait  Amount of Assistance: 4 (Minimal assistance)  Distance (ft): 8 Feet (ft)  Assistive Device: (no AD as per PLOF for household gait) Gait  Amount of Assistance: 5 (Stand-by assistance)  Distance (ft): 85 Feet (ft)  Assistive Device: Walker, rollator(therapist assisting with oxygen management)     Balance-Sitting/Standing  Sitting - Static: Good (unsupported)  Sitting - Dynamic: Fair (occasional)  Standing - Static: Fair  Standing - Dynamic : Impaired Balance-Sitting/Standing  Sitting - Static: Good (unsupported)  Sitting - Dynamic: Good (unsupported)  Standing - Static: Good  Standing - Dynamic : Impaired     Bed/Mat Mobility  Rolling Right : 4 (Minimal assistance)  Rolling Left : 4 (Minimal assistance)  Supine to Sit : 4 (Minimal assistance)(Head of bed elevated as per physician order)  Sit to Supine : 4 (Minimal assistance) Bed/Mat Mobility  Rolling Right : 6 (Modified independent)  Rolling Left : 6 (Modified independent)  Supine to Sit : 6 (Modified independent)(elevated head of bed)  Sit to Supine : 6 (Modified independent) Transfers  Transfer Type: Other  Other: stand step transfer without AD as per PLOF  Transfer Assistance : 4 (Minimal assistance)  Sit to Stand Assistance: Minimal assistance  Car Transfers: Not tested  Car Type: N/A Transfers  Transfer Type: Other  Other: stand step transfer without AD  Transfer Assistance : 5 (Supervision/setup)  Sit to Stand Assistance: Supervision  Car Transfers: Not tested  Car Type: NA     Steps or Stairs  Steps/Stairs Ambulated (#): (not able to assess today due to patient fatigue)  Level of Assist : 0 (Not tested)  Rail Use: (N/A) Steps or Stairs  Steps/Stairs Ambulated (#): 4  Level of Assist : 4 (Contact guard assistance)(CG/SBA using sidestepping technique up/down stairs)  Rail Use: Right (sidestepping up/down stairs)         Lab/Data Review:  Recent Results (from the past 24 hour(s))   GLUCOSE, POC    Collection Time: 07/09/19  1:09 PM   Result Value Ref Range    Glucose (POC) 243 (H) 70 - 110 mg/dL   GLUCOSE, POC    Collection Time: 07/09/19  5:33 PM   Result Value Ref Range    Glucose (POC) 128 (H) 70 - 110 mg/dL   GLUCOSE, POC    Collection Time: 07/10/19  4:48 AM   Result Value Ref Range    Glucose (POC) 182 (H) 70 - 110 mg/dL   GLUCOSE, POC    Collection Time: 07/10/19  9:43 AM   Result Value Ref Range    Glucose (POC) 239 (H) 70 - 110 mg/dL   TSH 3RD GENERATION    Collection Time: 07/10/19  9:58 AM   Result Value Ref Range    TSH 39.20 (H) 0.36 - 3.74 uIU/mL       Assessment:     Primary Rehab Diagnosis  1. Generalized Weakness with Impaired Mobility and ADLs  2.  Vocal cord dysfunction status post thyroidectomy (4/4/2019 - Dr. Norah Armando) with residual dysphagia and dysarthria; S/P PEG tube insertion (6/20/2019) - Dr. Norah Armando)     Comorbidities   Obstructive sleep apnea G47.33    H/O aortic valve replacement Z95.2    History of bicuspid aortic valve Z87.74    Chronic back pain M54.9, G89.29    Chronic left shoulder pain M25.512, G89.29    Obesity, Class II, BMI 35-39.9 E66.9    Left shoulder tendinitis M75.82    Spondylosis of lumbar region without myelopathy or radiculopathy M47.816    Chronic pain of both shoulders M25.511, G89.29, M25.512    Chronic pain of both knees M25.561, M25.562, G89.29    Chronic pain syndrome G89.4    Encounter for long-term (current) use of medications Z79.899    Chronic midline low back pain M54.5, G89.29    Lumbar facet arthropathy M47.816    Lumbar degenerative disc disease M51.36    Venous stasis dermatitis of both lower extremities I87.2    Diabetic neuropathy associated with type 2 diabetes mellitus  E11.40    Acquired hypothyroidism E03.9    Hypertensive heart disease without heart failure I11.9    Dyslipidemia E78.5    Depression F32.9    Chronic diastolic congestive heart failure  I50.32    Type 2 diabetes mellitus with diabetic neuropathy, with long-term current use of insulin  E11.40, Z79.4    Stridor R06.1    Fever R50.9    Chronic respiratory failure J96.10    Hypoxemia requiring supplemental oxygen R09.02, Z99.81    Vertigo R42    Panic attacks F41.0    Left ear hearing loss H91.92    History of vitamin D deficiency Z86.39    Anxiety F41.9        Plan:     1. Justification for continued stay: Good progression towards established rehabilitation goals. 2. Medical Issues being followed closely:    [x]  Fall and safety precautions     []  Wound Care     [x]  Bowel and Bladder Function     [x]  Fluid Electrolyte and Nutrition Balance     []  Pain Control      3. Issues that 24 hour rehabilitation nursing is following:    [x]  Fall and safety precautions     []  Wound Care     [x]  Bowel and Bladder Function     [x]  Fluid Electrolyte and Nutrition Balance     []  Pain Control      [x]  Assistance with and education on in-room safety with transfers to and from the bed, wheelchair, toilet and shower. 4. Acute rehabilitation plan of care:    [x]  Continue current care and rehab.            [x]  Physical Therapy           [x]  Occupational Therapy           [x]  Speech Therapy     []  Hold Rehab until further notice     5. Medications:    [x]  MAR Reviewed     [x]  Continue Present Medications     6. DVT Prophylaxis:      [x]  Lovenox     []  Unfractionated Heparin     []  Coumadin     []  NOAC     []  DINORA Stockings     []  Sequential Compression Device     []  None     7. Orders:   > Vocal cord dysfunction status post thyroidectomy (4/4/2019 - Dr. Stefanie Stephenson) with residual dysphagia and dysarthria; S/P PEG tube insertion (6/20/2019) - Dr. Stefanie Stephenson)    > On 7/5/2019, added Guaifenesin 200 mg via PEG tube q 12 hr   > On 7/7/2019, patient had completed the recommended treatment course of Levofloxacin    > On 7/8/2019:    > Discontinued Metoclopramide 5 mg via PEG tube TID (re: drug interaction with SSRI's)    > Started trial of Erythromycin 80 mg via PEG tube QID as a prokinetic   > Continue:    > Pantoprazole 40 mg via PEG tube once daily    > Guaifenesin 200 mg via PEG tube q 12 hr    > Erythromycin 80 mg via PEG tube QID before tube feeding    > Chronic respiratory failure with hypoxia;  Hypoxemia requiring supplementary oxygen   > On 7/8/2019, decreased O2 inhalation from 4 LPM to 2 LPM via trach collar continuous    > O2 testing for possible home O2 inhalation    > SpO2 97% at room air (at rest)    > SpO2 95% at room air (while ambulating)    > SpO2 97% at room air (after ambulating 96 ft)   > Patient does not meet criteria for home oxygen inhalation   > Explained to patient that she does not meet the criteria for home oxygen inhalation and she said she is considering paying out of pocket for the oxygen inhalation at home; she will inform us of her decision tomorrow   > Change O2 inhalation from 2 LPM via trach collar continuous to 2 LPM via trach collar PRN for SpO2 lesser than 90%    > Hypertensive heart disease without heart failure   > Continue Metoprolol tartrate 25 mg via PEG tube q 12 hr (9AM, 9PM)    > Hypothyroidism, S/P Thyroidectomy (4/4/2019 - Dr. Roma Vega)    > TSH (6/11/2019) = 42.50   > Upon admission to the ARU, patient was on Levothyroxine 200 mcg via PEG tube q AM   > TSH (6/26/2019) = 54.20   > On 7/6/2019, decreased Levothyroxine from 200 mcg to 175 mcg via PEG tube q AM   > TSH (7/10/2019) = 39.20   > Dr. Bladimir Funes increased Levothyroxine from 175 mcg to 200 mcg via PEG tube q AM    > Type 2 diabetes mellitus with diabetic neuropathy, with long-term current use of insulin   > Continue:    > Lantus 34 units SC q AM    > Humalog insulin sliding scale SC 4 times daily before tube feeding    > Anxiety/Panic attack   > Unable to start SSRI because of potential drug interaction with Metoclopramide   > On 7/7/2019, patient had completed the recommended treatment course of Levofloxacin    > On 7/8/2019:    > Discontinued Metoclopramide 5 mg via PEG tube TID (re: drug interaction with SSRI's)    > Started trial of Erythromycin 80 mg via PEG tube QID before tube feeding as a prokinetic   > On 7/9/2019, started Paroxetine 20 mg via PEG tube once daily   > Discontinue Lorazepam 1 mg PO q 12 hr PRN   > Continue Paroxetine 20 mg via PEG tube once daily    > Difficulty sleeping   > On 7/5/2019, started:    > Melatonin 1 mg via PEG tube q HS    > Temazepam 7.5 mg via PEG tube q HS   > Continue:    > Melatonin 1 mg via PEG tube q HS    > Increase Temazepam from 7.5 mg to 15 mg via PEG tube q HS    > Diet:   > On 7/5/2019, continuous PEG feeding was transitioned to bolus PEG feeding   > Specifications: NPO except tube feeding   > PEG tube feed: Jevity 1.5, 330 ml via PEG tube 4 times daily (6AM, 10AM, 2PM, 6PM)   > Water flush: 200 ml via PEG tube  4 times daily after each bolus feed      8. Patient's progress in rehabilitation and medical issues discussed with the patient. All questions answered to the best of my ability. Care plan discussed with patient and nurse.       Signed:    Sondra Smyth MD    July 10, 2019

## 2019-07-10 NOTE — PROGRESS NOTES
Problem: Dysphagia (Adult)  Goal: *Acute Goals and Plan of Care (Insert Text)  Description  Long term goals  Patient will:  1. Perform pharyngeal strengthening exercises with supervision. 2. Tolerate swallowing trials of varied consistenies without over s/s of aspiration. 3. Participate in repeat MBS prior to resuming PO for nutrition. 4. Tolerate deflation of tracheostomy cuff for speech production for 20 minutes. MET  5. Tolerate placement of a passu-Cecil speaking valve for speech for 20 minute intervals. MET- increased to 45 minutes. Short term goals (by 7/10/19)  Patient will:  1. Perform pharyngeal strengthening exercises with mincues. 2. Tolerate swallowing trials of ice chops and small boluses puree without overt s/s of aspiration. 3. Participate in repeat MBS prior to resuming PO for nutrition. 4. Tolerate deflation of tracheostomy cuff for speech production for 20 minutes. MET- Tracheostomy cuff consistently deflated. 5. Tolerate placement of a passu-Malinda speaking valve for speech for 30-45 minute intervals. Outcome: Progressing Towards Goal  Note:   SPEECH LANGUAGE PATHOLOGY TREATMENT    Patient: Sheba Peters (2 y.o. female)  Date: 7/10/2019  Diagnosis: Chronic respiratory failure (HCC) [J96.10] Bilateral vocal cord paralysis       Time in: 1100 1330   Time Out:  1030 1400   SUBJECTIVE:   Patient stated ? He didn't answer any of my questions? .    OBJECTIVE:   Mental Status:  Mrs. Joselin Sanders was alert and cooperative in today's treatment sessions. Treatment & Interventions:   Patient was seen for two half hour speech therapy sessions in her room today, morning and afternoon. The following treatment tasks were presented: Motor Speech:   Posterior tongue ex: Supervision  Passy-Malinda Valve: Worn for 20 then 25 minutes without sequelae     Very good voicing in afternoon session. Swallowing trials: Patient ate 30 cc applesauce in this morning's session.      No overt s/s of aspiration. Response & Tolerance to Activities:  Mrs. Ella Shelley is very cooperative with the SLP. She is able to wear the PMV for 20 minutes and more. She is not comfortable wearing the valve if no one is in the room with her. She is surpassing initial goals for speech therapy. Pain:  Pain Scale 1: Numeric (0 - 10)  No report of pain     After treatment:   ?       Patient left in no apparent distress sitting up in chair  ? Patient left in no apparent distress in bed  ? Call bell left within reach  ? Nursing notified  ? Caregiver present  ? Bed alarm activated    ASSESSMENT:   Progression toward goals:  ?       Improving appropriately and progressing toward goals  ? Improving slowly and progressing toward goals  ? Not making progress toward goals and plan of care will be adjusted    PLAN:   Patient continues to benefit from skilled intervention to address the above impairments. Continue treatment per established plan of care.   Discharge Recommendations:  Home Health    Estimated LOS: Through 7/12/19    [facility]  Time Calculation:  60 Minutes

## 2019-07-11 LAB
GLUCOSE BLD STRIP.AUTO-MCNC: 183 MG/DL (ref 70–110)
GLUCOSE BLD STRIP.AUTO-MCNC: 204 MG/DL (ref 70–110)
GLUCOSE BLD STRIP.AUTO-MCNC: 231 MG/DL (ref 70–110)
GLUCOSE BLD STRIP.AUTO-MCNC: 236 MG/DL (ref 70–110)
HCT VFR BLD AUTO: 36 % (ref 35–45)
HGB BLD-MCNC: 11.7 G/DL (ref 12–16)

## 2019-07-11 PROCEDURE — 74011250636 HC RX REV CODE- 250/636: Performed by: INTERNAL MEDICINE

## 2019-07-11 PROCEDURE — 85018 HEMOGLOBIN: CPT

## 2019-07-11 PROCEDURE — 36415 COLL VENOUS BLD VENIPUNCTURE: CPT

## 2019-07-11 PROCEDURE — 65310000000 HC RM PRIVATE REHAB

## 2019-07-11 PROCEDURE — 74011250637 HC RX REV CODE- 250/637: Performed by: INTERNAL MEDICINE

## 2019-07-11 PROCEDURE — 74011250637 HC RX REV CODE- 250/637: Performed by: EMERGENCY MEDICINE

## 2019-07-11 PROCEDURE — 77010033678 HC OXYGEN DAILY

## 2019-07-11 PROCEDURE — 92507 TX SP LANG VOICE COMM INDIV: CPT

## 2019-07-11 PROCEDURE — 82962 GLUCOSE BLOOD TEST: CPT

## 2019-07-11 PROCEDURE — 74011636637 HC RX REV CODE- 636/637: Performed by: INTERNAL MEDICINE

## 2019-07-11 PROCEDURE — 92526 ORAL FUNCTION THERAPY: CPT

## 2019-07-11 PROCEDURE — 74011250637 HC RX REV CODE- 250/637

## 2019-07-11 RX ORDER — INSULIN GLARGINE 100 [IU]/ML
38 INJECTION, SOLUTION SUBCUTANEOUS
Status: DISCONTINUED | OUTPATIENT
Start: 2019-07-12 | End: 2019-07-11

## 2019-07-11 RX ORDER — INSULIN GLARGINE 100 [IU]/ML
40 INJECTION, SOLUTION SUBCUTANEOUS
Qty: 30 ML | Refills: 0 | Status: SHIPPED | OUTPATIENT
Start: 2019-07-11 | End: 2019-09-17 | Stop reason: SDUPTHER

## 2019-07-11 RX ORDER — LORAZEPAM 1 MG/1
1 TABLET ORAL
Qty: 7 TAB | Refills: 0 | Status: SHIPPED | OUTPATIENT
Start: 2019-07-12 | End: 2019-07-18 | Stop reason: DRUGHIGH

## 2019-07-11 RX ORDER — LEVOTHYROXINE SODIUM 200 UG/1
100 TABLET ORAL
Qty: 30 TAB | Refills: 0 | Status: SHIPPED | OUTPATIENT
Start: 2019-07-11 | End: 2019-08-12 | Stop reason: DRUGHIGH

## 2019-07-11 RX ORDER — PAROXETINE 10 MG/5ML
20 SUSPENSION ORAL
Qty: 1 BOTTLE | Refills: 0 | Status: SHIPPED | OUTPATIENT
Start: 2019-07-12 | End: 2019-07-12

## 2019-07-11 RX ORDER — OXYCODONE AND ACETAMINOPHEN 5; 325 MG/1; MG/1
1 TABLET ORAL
Qty: 21 TAB | Refills: 0 | Status: SHIPPED | OUTPATIENT
Start: 2019-07-11 | End: 2019-07-14

## 2019-07-11 RX ORDER — METOPROLOL TARTRATE 25 MG/1
25 TABLET, FILM COATED ORAL EVERY 12 HOURS
Qty: 60 TAB | Refills: 0 | Status: SHIPPED | OUTPATIENT
Start: 2019-07-12 | End: 2019-08-13 | Stop reason: SDUPTHER

## 2019-07-11 RX ORDER — INSULIN LISPRO 100 [IU]/ML
2 INJECTION, SOLUTION INTRAVENOUS; SUBCUTANEOUS 4 TIMES DAILY
Status: DISCONTINUED | OUTPATIENT
Start: 2019-07-11 | End: 2019-07-13 | Stop reason: HOSPADM

## 2019-07-11 RX ORDER — ONDANSETRON 4 MG/1
4 TABLET, FILM COATED ORAL
Qty: 21 TAB | Refills: 0 | Status: SHIPPED | OUTPATIENT
Start: 2019-07-11 | End: 2019-07-26 | Stop reason: SDUPTHER

## 2019-07-11 RX ORDER — UREA 10 %
1 LOTION (ML) TOPICAL
Qty: 30 TAB | Refills: 0 | Status: SHIPPED | OUTPATIENT
Start: 2019-07-12 | End: 2019-08-13 | Stop reason: SDUPTHER

## 2019-07-11 RX ORDER — INSULIN GLARGINE 100 [IU]/ML
40 INJECTION, SOLUTION SUBCUTANEOUS
Status: DISCONTINUED | OUTPATIENT
Start: 2019-07-12 | End: 2019-07-13 | Stop reason: HOSPADM

## 2019-07-11 RX ORDER — LORAZEPAM 0.5 MG/1
1 TABLET ORAL
Status: DISCONTINUED | OUTPATIENT
Start: 2019-07-11 | End: 2019-07-13 | Stop reason: HOSPADM

## 2019-07-11 RX ORDER — INSULIN LISPRO 100 [IU]/ML
2 INJECTION, SOLUTION INTRAVENOUS; SUBCUTANEOUS
Qty: 1 VIAL | Refills: 0 | Status: SHIPPED | OUTPATIENT
Start: 2019-07-11 | End: 2021-06-17

## 2019-07-11 RX ADMIN — Medication 1 MG: at 20:53

## 2019-07-11 RX ADMIN — GUAIFENESIN 200 MG: 200 SOLUTION ORAL at 09:18

## 2019-07-11 RX ADMIN — LEVOTHYROXINE SODIUM 200 MCG: 75 TABLET ORAL at 05:33

## 2019-07-11 RX ADMIN — GUAIFENESIN 200 MG: 200 SOLUTION ORAL at 20:53

## 2019-07-11 RX ADMIN — OXYCODONE HYDROCHLORIDE AND ACETAMINOPHEN 1 TABLET: 5; 325 TABLET ORAL at 09:19

## 2019-07-11 RX ADMIN — INSULIN LISPRO 2 UNITS: 100 INJECTION, SOLUTION INTRAVENOUS; SUBCUTANEOUS at 13:39

## 2019-07-11 RX ADMIN — ERYTHROMYCIN ETHYLSUCCINATE 80 MG: 200 GRANULE, FOR SUSPENSION ORAL at 09:18

## 2019-07-11 RX ADMIN — INSULIN LISPRO 4 UNITS: 100 INJECTION, SOLUTION INTRAVENOUS; SUBCUTANEOUS at 09:26

## 2019-07-11 RX ADMIN — ERYTHROMYCIN ETHYLSUCCINATE 80 MG: 200 GRANULE, FOR SUSPENSION ORAL at 17:46

## 2019-07-11 RX ADMIN — INSULIN LISPRO 2 UNITS: 100 INJECTION, SOLUTION INTRAVENOUS; SUBCUTANEOUS at 17:45

## 2019-07-11 RX ADMIN — INSULIN GLARGINE 34 UNITS: 100 INJECTION, SOLUTION SUBCUTANEOUS at 07:09

## 2019-07-11 RX ADMIN — ERYTHROMYCIN ETHYLSUCCINATE 80 MG: 200 GRANULE, FOR SUSPENSION ORAL at 05:36

## 2019-07-11 RX ADMIN — ERYTHROMYCIN ETHYLSUCCINATE 80 MG: 200 GRANULE, FOR SUSPENSION ORAL at 13:31

## 2019-07-11 RX ADMIN — METOPROLOL TARTRATE 25 MG: 25 TABLET ORAL at 09:18

## 2019-07-11 RX ADMIN — PANTOPRAZOLE SODIUM 40 MG: 40 GRANULE, DELAYED RELEASE ORAL at 06:55

## 2019-07-11 RX ADMIN — INSULIN LISPRO 2 UNITS: 100 INJECTION, SOLUTION INTRAVENOUS; SUBCUTANEOUS at 05:35

## 2019-07-11 RX ADMIN — METOPROLOL TARTRATE 25 MG: 25 TABLET ORAL at 20:54

## 2019-07-11 RX ADMIN — PAROXETINE HYDROCHLORIDE 20 MG: 10 SUSPENSION ORAL at 06:54

## 2019-07-11 RX ADMIN — ENOXAPARIN SODIUM 40 MG: 40 INJECTION SUBCUTANEOUS at 17:46

## 2019-07-11 RX ADMIN — OXYCODONE HYDROCHLORIDE AND ACETAMINOPHEN 1 TABLET: 5; 325 TABLET ORAL at 03:23

## 2019-07-11 RX ADMIN — LORAZEPAM 1 MG: 0.5 TABLET ORAL at 20:53
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
## 2019-07-11 NOTE — PROGRESS NOTES
[x] Psychology  [] Social Work [] Recreational Therapy    INTERVENTION  UNITS/TIME OF SERVICE   Assessment    Supportive Counseling July 11, 2019   Orientation    Discharge Planning    Resource Linkage              Progress/Current Status    Individual support  with patient this date on ARU; she was found sitting quietly in wheelchair in room with continuous O2 in place. Patient confirmed that she is scheduled tomorrow for discharge to home. On the one hand, she is pleased to be leaving the hospital but then reported that she is very frustrated by decision to curtail oxygen availability on discharge. Yesterday, although she apparently, successfully ambulated on unit, she reportedly became very upset when she found out that she had done so without O2 provided, in spite of the fact that she was still wearing cannula but had not been informed of any decision to ambulate without the O2. She acknowledged that she may have succeeded with gait but that breathing was difficult afterwards, which she attributed to the lack of supply during exertion. She apparently has made a decision to self-pay for O2 in residence in order to feel reassured that she has access; and, in spite of working on relaxation techniques with some success, she fully admits that anxiety continues, is often situational, and she fears for herself to be without access out of hospital.      Patient further reported that she did not want to participate in PT this morning on account of yesterday's situation. In turn, she was strongly encouraged to explain herself to PT, in order to maintain a dialogue, and so that PT might also offer continued impressions to the patient. She was reluctant to agree but was nonetheless encouraged.       Caridad Way, THE [facility] 7/11/2019 2:13 PM

## 2019-07-11 NOTE — PROGRESS NOTES
Problem: Dysphagia (Adult)  Goal: *Acute Goals and Plan of Care (Insert Text)  Description  Long term goals  Patient will:  1. Perform pharyngeal strengthening exercises with supervision. 2. Tolerate swallowing trials of varied consistenies without over s/s of aspiration. 3. Participate in repeat MBS prior to resuming PO for nutrition. 4. Tolerate deflation of tracheostomy cuff for speech production for 20 minutes. MET  5. Tolerate placement of a passu-Rochester speaking valve for speech for 20 minute intervals. MET- increased to 45 minutes. Short term goals (by 7/13/19)  Patient will:  1. Perform pharyngeal strengthening exercises with min cues-supervision. 2. Tolerate swallowing trials of ice chips and small boluses puree (up to 4 oz at a time) without overt s/s of aspiration. 3. Participate in repeat MBS prior to resuming PO for nutrition. 4. Tolerate deflation of tracheostomy cuff for speech production for 20 minutes. MET- Tracheostomy cuff consistently deflated. 5. Tolerate placement of a passu-Malinda speaking valve for speech for 40-60 minute intervals. Outcome: Progressing Towards Goal  Note:   SPEECH LANGUAGE PATHOLOGY TREATMENT    Patient: Hali Gardiner (89 y.o. female)  Date: 7/11/2019  Diagnosis: Chronic respiratory failure (HCC) [J96.10] Bilateral vocal cord paralysis       Time in: 0930  Time Out:  1030  SUBJECTIVE:   Patient stated ? I don't have any choice. I can't go home without oxygen. My oxygen sats were 95% when I came to the hospital this time and I ended up with a trach? .    OBJECTIVE:   Mental Status:  Mrs. Ryan Chirinos was initially drowsy, stating that she slept very little last night due to anxiety about going home. She awakened quickly and participated well throughout the session. Treatment & Interventions:   Patient was seen for an hour long session this morning. She was quite verbal prior to and after placement of the [place] Valve (PMV).   She continues to be quite upset about the lack of recommendation that she be provided home oxygen post discharge and was very vocal about this and her fears (as above). Motor Speech/Dysphagia:   Patient ate 2 oz applesauce in small (<5cc) boluses without overt s/s of aspiration. Neuro-Linguistics:   Orientation:     Independent  Recent memory: Independent    Voice:  PMV placed and patient wore in for 47 minutes. She was able to interact with the Home Health liaison and be understood without having to write to clarify herself. Patient on oxygen at the time (2 lpm) and had no complaints about breathing difficulty. Voice was audible, but breathy hoarse throughout the time that she was wearing the PMV. Patient could have tolerated the valve for a longer interval, but was not comfortable leaving it on when no one was in the room with her. Response & Tolerance to Activities:  Mrs. Elliott Cid responds well in speech therapy sessions. She demonstrates no distress wearing the PMV for > 45 minutes. She has also tolerated modest amounts of pureed consistency PO intale while wearing the valve without overt s/s of difficulty or later evidence of the material at her tracheostomy site. SLP recommends that she be allowed small amounts of pureed consistency foods for pleasure post discharge. Pain:  Pain Scale 1: Numeric (0 - 10)  Pain Orientation 1: Anterior  Pain Description 1: Aching  After treatment:   ?       Patient left in no apparent distress sitting up in chair  ? Patient left in no apparent distress in bed  ? Call bell left within reach  ? Nursing notified  ? Caregiver present  ? Bed alarm activated    ASSESSMENT:   Progression toward goals:  ?       Improving appropriately and progressing toward goals  ? Improving slowly and progressing toward goals  ?        Not making progress toward goals and plan of care will be adjusted    PLAN:   Patient continues to benefit from skilled intervention to address the above impairments. Continue treatment per established plan of care.   Discharge Recommendations:  Home Health    Estimated LOS: Through 7/12/19    Chiquita Marley, SLP  Time Calculation: 60 mins

## 2019-07-11 NOTE — PROGRESS NOTES
NUTRITION CONSULT    Nutrition Consult: Management of Tube Feeding, Other (re-evaluate tube feeding)    RECOMMENDATIONS / PLAN:     - Continue to provide 330 mL bolus tube feed of Jevity 1.5 every 4 hours for total of 4 bolus feeds per day and flush with 200 mL water after each bolus. Bolus to be administered using pump at 165 mL/hr x 2 hours. Maintain aspiration precautions and HOB elevation. Suggested bolus schedule: 6:00, 10:00, 14:00 and 18:00.  - Continue bowel regimen and zofran as needed. - Continue RD inpatient monitoring and evaluation. Goal Regimen: 330 mL bolus of Jevity 1.5 x 4 daily + 200 mL water flush after each bolus to provide: 1953 kcal, 83 gm protein, 65 gm fat, 281 gm CHO, 29 gm fiber, 990 mL free water, 1790 mL total water, 100% RDIs     NUTRITION DIAGNOSIS & INTERVENTIONS:     [x] Enteral nutrition: continue     Nutrition Diagnosis: Inadequate oral intake related to dysphagia s/p tracheostomy as evidenced by pt NPO per SLP s/p MBS and PEG placement. ASSESSMENT:     7/11: Tolerating new EN regimen, providing 330 mL bolus feeding over 2 hours at rate of 165 mL/hr. Provided every 4 hours for total of 4 feedings. Tolerating well and receiving erythromycin. 7/7: Pt c/o fullness with current tube feeding reigmen- will modify bolus feeds to promote tolerance. Receiving Reglan enterally, Zofran and simethicone ordered prn and have not been given. Colace held, BM yesterday and abdomen distended. 7/5: Pt tolerating tube feeding at goal rate. Remains agreeable to trying bolus tube feeds. Wt checked on 7/3  7/3: Per staff report, pt willing to try transitioning to bolus tube feeding. Tolerating feeds at rate of 60 mL/hr. Noted plan for discharge to home on 7/12. Discussed bolus tube feeding and formula options (Jevity 1.5 versus TwoCal HN) with possible need for additional liquid protein supplement (one appropriate for her given allergies) if remain with Jevity 1.5.  Pt agreeable to changing to bolus tube feeding and wants to try with TwoCal HN. Explained to pt plan to hold off on transition at this time per MD; pt verbalized understanding  7/1: Pt at current goal rate of 55 mL/hr; still feels a little full with tube feeding. Discussed possible formula substitution option, TwoCal HN. TwoCal HN and Prosource ingredients reviewed with pt; she has also reviewed ingredients in Jevity 1.5. Upon reviewing ingredients, pt wants to continue with Jevity 1.5 formula at this time and understand can change to TwoCal HN if having difficulty not tolerating Jevity. Pt refusing Prosource supplement; stated will not tolerate. Per RN, pt has been refusing Prosource and has not been receiving the supplement. Pt c/o headache per RN report. Explained to pt, goal rate of tube feeding will have to be increased once Prosource discontinued; pt verbalized understanding  6/28: Pt with therapy at time of visit. Tube feeding remains at 40 mL/hr. Was c/o nausea earlier today; RN provided zofran. Antacid changed to protonix  6/27: Tube feeding remains at 40 mL/hr; pt c/o nausea. Having regular BM, no gastric residuals. Abdomen distended per chart documentation per night nurse. Discussed tube feeding regimen and Prosource supplement; pt agreeable with plan. Noted pt previously reported pepcid \"not working\" when in [facility] hospital; pepcid currently ordered  6/26: Pt lethargic/asleep at time of visit. Per chart review, pt admitted to [facility] hospital with stridor and vocal cord dysfunction s/p tracheostomy placement on 6/3/19, tolerating trach collar; would have to deflate cuff for meals and re-inflate when not eating. Pt had failed MBS x 2 by 6/6/19. DHT was placed by IR. Pt tolerated feeds, receiving Jevity 1.5 due to allergy/intolerance to artificial sweeteners, then pulled out feeding tube. A diet was started by MD on 6/10. Pt had variable meal intake. PICC placed on 6/13 for TPN. Remained on po diet and TPN; made NPO on  6/15.  Diet restarted on 6/18. SLP recommended NPO after repeat MBS on 6/19. S/p PEG placement on 6/20/19 and TPN was stopped after starting tube feeds. Pt had c/o abdominal distention and fullness, emesis. TF held on 6/23 due to concern for ileus; pt having +BMs and flatus, feeds resumed at low rate. Pt tolerated trickle feeds PTA; noted to express desire for receiving cyclic or bolus tube feeding and wanting to eat orally. Currently tolerating tube feeding at 40 mL/hr per RN. Pt did c/o abdominal pain per RN. BG remain elevated; discussed reason for not changing to Glucerna 1.5 (diabetic formula). EN infusion adequate to meet patients estimated nutritional needs:   [x] Yes     [] No   [] Unable to determine at this time    Tube Feeding: Jevity 1.5, 330 mL q 4 hours via PEG (rate of 165 mL/hr bolus feeding over 2 hours via pump)  Water Flushes: 200 mL after each bolus   Residuals: 0 mL    Diet: DIET TUBE FEEDING Please administer each bolus feed using pump at 165 mL/hr x 2 hours. Provide 330 mL bolus every 4 hours for total of 4 bolus feeds per day. Suggested schedule: 06:00, 10:00, 14:00, 18:00. Aspiration precautions and maintain HOB e... DIET NPO With Tube Feedings      Food Allergies: sweeteners, sucrose  Appetite/meal intake prior to admission:   [x] Good     [] Fair     [] Poor     [] Other:  Feeding Limitations:  [x] Swallowing difficulty: SLP following    [] Chewing difficulty    [x] Other: hx of dysphagia s/p thyroidectomy PTA and trach placement 6/3/19  Current Meal Intake:   No data found. BM: 7/9  Skin Integrity: surgical incision to neck; trach and PEG sites   Edema:  [] No     [x] Yes   Pertinent Medications: Reviewed: erythromycin, zofran, protonix, simethicone    Labs:   No results for input(s): NA, K, CL, CO2, GLU, BUN, CREA, CA, MG, PHOS, ALB, TBIL, SGOT, ALT in the last 72 hours.     No lab exists for component: A1C   Prealbumin: 12.9 mg/dL (6/14/19)  Triglyceride: 298 mg/dL (6/18/19), 316 mg/dL (6/13/19), 511 mg/dL (4/16/19)  C reactive protein: 4.8 mg/dL (6/18/19)  Recent Labs     07/11/19  0613   HGB 11.7*   HCT 36.0         Intake/Output Summary (Last 24 hours) at 7/11/2019 1125  Last data filed at 7/11/2019 0810  Gross per 24 hour   Intake 1232 ml   Output --   Net 1232 ml       Anthropometrics:   Ht Readings from Last 1 Encounters:   07/03/19 5' 7\" (1.702 m)     Last 3 Recorded Weights in this Encounter    06/26/19 1430 07/03/19 1852   Weight: 122.2 kg (269 lb 8 oz) 103.9 kg (229 lb)     Body mass index is 35.87 kg/m². Obese, Class II    Weight History: patient denies change in weight PTA, stable     Weight Metrics 7/3/2019 6/25/2019 6/22/2019 6/3/2019 5/23/2019 5/22/2019 5/16/2019   Weight 229 lb - 269 lb 8 oz - 260 lb 6.4 oz - 258 lb   BMI - 35.87 kg/m2 - 42.21 kg/m2 - 40.78 kg/m2 40.41 kg/m2          Admitting Diagnosis: Chronic respiratory failure (HCC) [J96.10]  Pertinent PMHx: T2DM with diabetic neuropathy, HTN, dyslipidemia, CHF, hypothyroidism s/p thyroidectomy 4/4/19    Education Needs:        [x] None identified  [] Identified - Not appropriate at this time  []  Identified and addressed - refer to education log  Learning Limitations:   [x] None identified  [] Identified    Cultural, Adventist & ethnic food preferences:  [x] None identified    [] Identified and addressed     ESTIMATED NUTRITION NEEDS:     Calories: 0118-4014 kcal (MSJx1-1.2) based on   [x] Actual  kg     [] IBW:   Protein:  gm (0.8-1.2 gm/kg) based on   [x] Actual BW      [] IBW:   Fluid: 1 mL/kcal     MONITORING & EVALUATION:     Nutrition Goals: goals modified   1. Patient will tolerate enteral nutrition formula and regimen without difficulty within the next 7 days. Outcome:  [x] Met/Ongoing    [] Progressing towards goal    [] Not progressing towards goal    [] New/Initial goal   2. Patient will meet their estimated nutritional needs through adequate enteral nutrition within the next 7 days.  Outcome:  [x] Met/Ongoing    [] Progressing towards goal    [] Not progressing towards goal    [] New/Initial goal      Monitoring:  [x] Nutrition-focused physical findings   [x] Treatment/therapy   [] Food and beverage intake   [] Diet order      [] Weight    [x] EN infusion    Previous Recommendations (for follow-up assessments only):     [x]   Implemented       []   Not Implemented (RD to address)      [] No Longer Appropriate     [] No Recommendation Made        Discharge Planning: goal enteral nutrition regimen via PEG   [x]  Participated in care planning, discharge planning, & interdisciplinary rounds as appropriate      Nguyen Ch RD  Pager: [phone]

## 2019-07-11 NOTE — PROGRESS NOTES
Bedside and Verbal shift change report given to David Honeycutt LPN (oncoming nurse) by Noa Block RN (offgoing nurse). Report included the following information SBAR, Kardex, MAR and Recent Results.     SITUATION:    Code Status: Full Code   Reason for Admission: Chronic respiratory failure ([facility] [address] [J96.10]    [facility] day: 12   Problem List:       Hospital Problems  Date Reviewed: 7/10/2019          Codes Class Noted POA    Panic attacks ICD-10-CM: F41.0  ICD-9-CM: 300.01  Unknown Yes        Chronic respiratory failure ([facility] [address] ICD-10-CM: J96.10  ICD-9-CM: 518.83  6/25/2019 Yes        Impaired mobility and ADLs ICD-10-CM: Z74.09  ICD-9-CM: 799.89  6/25/2019 Yes        Dysphagia ICD-10-CM: R13.10  ICD-9-CM: 787.20  6/25/2019 Yes    Overview Signed 7/5/2019 10:50 AM by Aliya Castano MD     S/P Thyroidectomy (4/4/2019 - Dr. Reyna Grey)             Hypoxemia requiring supplemental oxygen ICD-10-CM: R09.02, Z99.81  ICD-9-CM: 799.02  6/25/2019 Yes        Anxiety ICD-10-CM: F41.9  ICD-9-CM: 300.00  6/25/2019 Yes        Status post insertion of percutaneous endoscopic gastrostomy (PEG) tube ([facility] ICD-10-CM: Z93.1  ICD-9-CM: V44.1  6/20/2019 Yes    Overview Addendum 7/5/2019 11:39 AM by Aliya Castano MD     S/P PEG tube insertion (6/20/2019) - Dr. Reyna rGey)             History of tracheostomy ICD-10-CM: Z98.890  ICD-9-CM: V44.0  6/3/2019 Yes    Overview Signed 7/5/2019 10:44 AM by Aliya Castano MD     S/P Tracheostomy (6/3/2019 - Dr. Reyna Grey)             History of thyroidectomy ICD-10-CM: S93.669  ICD-9-CM: V15.29  4/4/2019 Yes    Overview Signed 7/5/2019 10:45 AM by Aliya Castano MD     S/P Thyroidectomy (4/4/2019 - Dr. Reyna Grey)             * (Principal) Bilateral vocal cord paralysis ICD-10-CM: J38.02  ICD-9-CM: 478.33  4/4/2019 Yes        Type 2 diabetes mellitus with diabetic neuropathy, with long-term current use of insulin (HCC) (Chronic) ICD-10-CM: E11.40, Z79.4  ICD-9-CM: 250.60, 357.2, V58.67  Unknown Yes        Depression (Chronic) ICD-10-CM: F32.9  ICD-9-CM: 001  8/24/2018 Yes        Acquired hypothyroidism (Chronic) ICD-10-CM: E03.9  ICD-9-CM: 244.9  5/17/2017 Yes        Hypertensive heart disease without heart failure (Chronic) ICD-10-CM: I11.9  ICD-9-CM: 402.90  5/17/2017 Yes              BACKGROUND:    Past Medical History:   Past Medical History:   Diagnosis Date    Acquired hypothyroidism 5/17/2017    Anxiety 6/25/2019    Chronic back pain     Lower back pain    Depression     Diabetic neuropathy associated with type 2 diabetes mellitus ([facility] [address]     Dysphagia 6/25/2019    S/P Thyroidectomy (4/4/2019 - Dr. Roma Vega)    History of asthma     History of heart failure     chronic diastolic heart failure    History of vitamin D deficiency 8/28/2017    Hypertensive heart disease without heart failure 5/17/2017    Hypoxemia requiring supplemental oxygen 6/25/2019    Left ear hearing loss     pt states nerve damage--- 25% hearing loss, described as \"muffled\"    Memory difficulty     Obesity, Class II, BMI 35-39.9 11/11/2016    Obstructive sleep apnea 11/6/2015    Panic attacks     Ringing of ears     Type 2 diabetes mellitus with diabetic neuropathy, with long-term current use of insulin ([facility])     Vertigo     Vocal cord dysfunction 4/4/2019         Patient taking anticoagulants yes     ASSESSMENT:    Changes in Assessment Throughout Shift: none     Patient has Central Line: no Reasons if yes:    Patient has Barker Cath: no Reasons if yes:       Last Vitals:     Vitals:    07/10/19 1600 07/10/19 2100 07/10/19 2155 07/11/19 0055   BP: 121/65 113/71 113/71    Pulse: 67 69 69    Resp: 18  18    Temp: 97.8 °F (36.6 °C)  99 °F (37.2 °C)    SpO2: 98%  98% 98%   Weight:       Height:            IV and DRAINS (will only show if present)   [REMOVED] Peripheral IV 06/30/19 Left Antecubital-Site Assessment: Clean, dry, & intact  Peripheral IV 07/03/19 Anterior; Left Forearm-Site Assessment: Clean, dry, & intact  [REMOVED] Peripheral IV 06/25/19 Anterior;Proximal;Right Antecubital-Site Assessment: Dry, Intact  PEG/Gastrostomy Tube 06/20/19-Site Assessment: Clean, dry, & intact  [REMOVED] Airway - Tracheostomy Tube 06/03/19 Portex-Site Assessment: Clean, dry, & intact     WOUND (if present)   Wound Type:         Dressing present Dressing Present : No   Wound Concerns/Notes:  none     PAIN    Pain Assessment    Pain Intensity 1: 8 (07/11/19 0323)    Pain Location 1: Head    Pain Intervention(s) 1: Medication (see MAR)    Patient Stated Pain Goal: 0  o Interventions for Pain:  Pain medication    o Intervention effective: yes  o Time of last intervention: see MAR   o Reassessment Completed: yes      Last 3 Weights:  Last 3 Recorded Weights in this Encounter    06/26/19 1430 07/03/19 1852   Weight: 122.2 kg (269 lb 8 oz) 103.9 kg (229 lb)     Weight change:      INTAKE/OUPUT    Current Shift: 07/10 1901 - 07/11 0700  In: 620   Out: -     Last three shifts: 07/09 0701 - 07/10 1900  In: 2220   Out: -      LAB RESULTS     Recent Labs     07/08/19  0620   WBC 6.3   HGB 11.7*   HCT 36.7           Recent Labs     07/08/19  0620      K 3.9   *   BUN 11   CREA 0.81   CA 8.7   MG 2.3       RECOMMENDATIONS AND DISCHARGE PLANNING     1. Pending tests/procedures/ Plan of Care or Other Needs: labs  2.      3. Discharge plan for patient and Needs/Barriers: TBD    4. Estimated Discharge Date: TBD Posted on Whiteboard in Patients Room: no      4. The patient's care plan was reviewed with the oncoming nurse. \"HEALS\" SAFETY CHECK      Fall Risk    Total Score: 3    Safety Measures: Safety Measures: Bed/Chair-Wheels locked, Bed in low position, Call light within reach    A safety check occurred in the patient's room between off going nurse and oncoming nurse listed above.     The safety check included the below items  Area Items   H  High Alert Medications - Verify all high alert medication drips (heparin, PCA, etc.)   E  Equipment - Suction is set up for ALL patients (with chaitanya)  - Red plugs utilized for all equipment (IV pumps, etc.)  - WOWs wiped down at end of shift.  - Room stocked with oxygen, suction, and other unit-specific supplies   A  Alarms - Bed alarm is set for fall risk patients  - Ensure chair alarm is in place and activated if patient is up in a chair   L  Lines - Check IV for any infiltration  - Barker bag is empty if patient has a Barker   - Tubing and IV bags are labeled   S  Safety   - Room is clean, patient is clean, and equipment is clean. - Hallways are clear from equipment besides carts. - Fall bracelet on for fall risk patients  - Ensure room is clear and free of clutter  - Suction is set up for ALL patients (with chaitanya)  - Hallways are clear from equipment besides carts.    - Isolation precautions followed, supplies available outside room, sign posted     Edmond Coley RN

## 2019-07-11 NOTE — PROGRESS NOTES
[address]  [address] [place] [address], [address]     INPATIENT REHABILITATION  DAILY PROGRESS NOTE     Date: 7/11/2019    Name: Nieves Magallanes Age / Sex: 63 y.o. / female   CSN: [id number] MRN: [id number]   Admit Date: 6/25/2019 Length of Stay: 16 days     Primary Rehab Diagnosis: Generalized Weakness with Impaired Mobility and ADLs secondary to Vocal cord dysfunction status post thyroidectomy (4/4/2019 - Dr. Nieves Hopkins) with residual dysphagia and dysarthria; S/P PEG tube insertion (6/20/2019) - Dr. Nieves Hopkins)       Subjective:     Patient seen and examined. Blood pressure controlled. Blood sugars fairly controlled. No fever over the past 24 hours. Patient's Complaint:   Has problem sleeping  Patient stated Temazepam doesn't work as good as Lorazepam     Pain Control: no current joint or muscle symptoms, essentially pain-free      Objective:     Vital Signs:  Patient Vitals for the past 24 hrs:   BP Temp Pulse Resp SpO2   07/11/19 0736 130/67 97.1 °F (36.2 °C) 63 18 98 %   07/11/19 0055 -- -- -- -- 98 %   07/10/19 2155 113/71 99 °F (37.2 °C) 69 18 98 %   07/10/19 2100 113/71 -- 69 -- --   07/10/19 1600 121/65 97.8 °F (36.6 °C) 67 18 98 %   07/10/19 1428 -- -- 72 -- 98 %   07/10/19 1425 -- -- 81 -- 99 %   07/10/19 1422 -- -- 63 -- 99 %   07/10/19 1046 -- -- 63 -- 98 %   07/10/19 1043 -- -- 68 -- 97 %   07/10/19 1040 -- -- 63 -- 95 %   07/10/19 1032 -- -- 63 -- 97 %        Physical Examination:  GENERAL SURVEY: Patient is awake, alert, oriented x 3, sitting comfortably on the chair, not in acute respiratory distress.   HEENT: pink palpebral conjunctivae, anicteric sclerae, no nasoaural discharge, moist oral mucosa  NECK: (+) tracheostomy in place, supple, no jugular venous distention, no palpable lymph nodes  CHEST/LUNGS: symmetrical chest expansion, good air entry, clear breath sounds  HEART: adynamic precordium, good S1 S2, no S3, regular rhythm, no murmurs  ABDOMEN: (+) PEG tube in place, obese, bowel sounds appreciated, soft, non-tender  EXTREMITIES: pink nailbeds, no edema, full and equal pulses, no calf tenderness   NEUROLOGICAL EXAM: The patient is awake, alert and oriented x 3, able to answer questions fairly appropriately, able to follow 1 and 2 step commands. Able to tell time from the wall clock. Cranial nerves II-XII are grossly intact except for dysphagia. No gross sensory deficit. Motor strength is 4+/5 on all 4 extremities.        Current Medications:  Current Facility-Administered Medications   Medication Dose Route Frequency    temazepam (RESTORIL) capsule 15 mg  15 mg Per G Tube QHS    levothyroxine (SYNTHROID) tablet 200 mcg  200 mcg Per G Tube 6am    erythromycin (E.E.S.) 200 mg/5 mL oral suspension 80 mg  80 mg Per G Tube AC&HS    ondansetron hcl (ZOFRAN) tablet 4 mg  4 mg Per G Tube TID PRN    insulin lispro (HUMALOG) injection   SubCUTAneous AC&HS    PARoxetine hcl (PAXIL) 10 mg/5 mL oral suspension 20 mg  20 mg Per G Tube 7am    enoxaparin (LOVENOX) injection 40 mg  40 mg SubCUTAneous Q24H    insulin glargine (LANTUS) injection 34 Units  34 Units SubCUTAneous ACB    metoprolol tartrate (LOPRESSOR) tablet 25 mg  25 mg Per G Tube Q12H    melatonin tablet 1 mg  1 mg Per G Tube QHS    guaiFENesin (ROBITUSSIN) 100 mg/5 mL oral liquid 200 mg  200 mg Per G Tube Q12H    simethicone (MYLICON) 55YB/7.3VZ oral drops 40 mg  40 mg Per G Tube QID PRN    pantoprazole (PROTONIX) granules for oral suspension 40 mg  40 mg Per G Tube ACB    oxyCODONE-acetaminophen (PERCOCET) 5-325 mg per tablet 1 Tab  1 Tab Oral Q6H PRN    glucose chewable tablet 16 g  4 Tab Oral PRN    glucagon (GLUCAGEN) injection 1 mg  1 mg IntraMUSCular PRN    bisacodyl (DULCOLAX) suppository 10 mg  10 mg Rectal Q48H PRN    albuterol-ipratropium (DUO-NEB) 2.5 MG-0.5 MG/3 ML  3 mL Nebulization Q4H PRN    dextrose 10% infusion 125-250 mL  125-250 mL IntraVENous PRN Allergies: Allergies   Allergen Reactions    Other Food Other (comments)     Artificial sweeteners- causes headaches    Metformin Other (comments)     Increase pain in feet and swelling in feet  Increased hunger,tired and bilat foot pain    Morphine Other (comments)     headache    Singulair [Montelukast] Other (comments)     hallucinations    Sucrose Other (comments)     Pt. Is not allergic to sucrose. She develops headaches with artificial sweeteners.        Functional Progress:    SPEECH AND LANGUAGE PATHOLOGY    ON ADMISSION MOST RECENT   Comprehension (Native Language)  Primary Mode of Comprehension: Auditory  Score: 4  Comments: understanding simple and multi-step directions with occasional repetition needed Comprehension (Native Language)  Primary Mode of Comprehension: Auditory  Score: 6  Comments: understanding simple and multi-step directions with occasional repetition needed     Expression (Native Language)  Primary Mode of Expression: Sign language, Verbal, Other (comment)(writes on paper)  Score: 4  Comments: needing cues occasionally for writing down needs vs attempting to verbalize   Expression (Native Language)  Primary Mode of Expression: Verbal  Score: 6  Comments: writing down information      Social Interaction/Pragmatics  Score: 4  Comments: moderate encouragement to participate in therapy Social Interaction/Pragmatics  Score: 5  Comments: needing min encouragement due to increased anxiety at times during treatment sessions     Problem Solving  Score: 4  Comments: min cues for functional problem solving   Problem Solving  Score: 6  Comments: needing occasional cues for safe functional mobility techniques     Memory  Score: 4  Comments: able to recall information appropriately as indicated on chart with occasional clarification needed Memory  Score: 6  Comments: extra time       Legend:   7 - Independent   6 - Modified Independent   5 - Standby Assistance / Supervision / Set-up   4 - Minimum Assistance / Contact Guard Assistance   3 - Moderate Assistance   2 - Maximum Assistance   1 - Total Assistance / Dependent       Lab/Data Review:  Recent Results (from the past 24 hour(s))   GLUCOSE, POC    Collection Time: 07/10/19  9:43 AM   Result Value Ref Range    Glucose (POC) 239 (H) 70 - 110 mg/dL   TSH 3RD GENERATION    Collection Time: 07/10/19  9:58 AM   Result Value Ref Range    TSH 39.20 (H) 0.36 - 3.74 uIU/mL   GLUCOSE, POC    Collection Time: 07/10/19  1:40 PM   Result Value Ref Range    Glucose (POC) 175 (H) 70 - 110 mg/dL   GLUCOSE, POC    Collection Time: 07/10/19  5:59 PM   Result Value Ref Range    Glucose (POC) 167 (H) 70 - 110 mg/dL   GLUCOSE, POC    Collection Time: 07/11/19  5:33 AM   Result Value Ref Range    Glucose (POC) 183 (H) 70 - 110 mg/dL   HGB & HCT    Collection Time: 07/11/19  6:13 AM   Result Value Ref Range    HGB 11.7 (L) 12.0 - 16.0 g/dL    HCT 36.0 35.0 - 45.0 %       Assessment:     Primary Rehab Diagnosis  1. Generalized Weakness with Impaired Mobility and ADLs  2.  Vocal cord dysfunction status post thyroidectomy (4/4/2019 - Dr. Mathew Goldman) with residual dysphagia and dysarthria; S/P PEG tube insertion (6/20/2019) - Dr. Mathew Goldman)     Comorbidities   Obstructive sleep apnea G47.33    Moderate persistent asthma J45.40    H/O aortic valve replacement Z95.2    History of bicuspid aortic valve Z87.74    Chronic back pain M54.9, G89.29    Chronic left shoulder pain M25.512, G89.29    Obesity, Class II, BMI 35-39.9 E66.9    Left shoulder tendinitis M75.82    Spondylosis of lumbar region without myelopathy or radiculopathy M47.816    Chronic pain of both shoulders M25.511, G89.29, M25.512    Chronic pain of both knees M25.561, M25.562, G89.29    Chronic pain syndrome G89.4    Encounter for long-term (current) use of medications Z79.899    Chronic midline low back pain M54.5, G89.29    Lumbar facet arthropathy M47.816    Lumbar degenerative disc disease M51.36    Venous stasis dermatitis of both lower extremities I87.2    Diabetic neuropathy associated with type 2 diabetes mellitus  E11.40    Acquired hypothyroidism E03.9    Hypertensive heart disease without heart failure I11.9    Dyslipidemia E78.5    Depression F32.9    Chronic diastolic congestive heart failure  I50.32    Type 2 diabetes mellitus with diabetic neuropathy, with long-term current use of insulin  E11.40, Z79.4    Stridor R06.1    Fever R50.9    Chronic respiratory failure J96.10    Hypoxemia requiring supplemental oxygen R09.02, Z99.81    Vertigo R42    Panic attacks F41.0    Left ear hearing loss H91.92    History of vitamin D deficiency Z86.39    Anxiety F41.9        Plan:     1. Justification for continued stay: Good progression towards established rehabilitation goals. 2. Medical Issues being followed closely:    [x]  Fall and safety precautions     []  Wound Care     [x]  Bowel and Bladder Function     [x]  Fluid Electrolyte and Nutrition Balance     []  Pain Control      3. Issues that 24 hour rehabilitation nursing is following:    [x]  Fall and safety precautions     []  Wound Care     [x]  Bowel and Bladder Function     [x]  Fluid Electrolyte and Nutrition Balance     []  Pain Control      [x]  Assistance with and education on in-room safety with transfers to and from the bed, wheelchair, toilet and shower. 4. Acute rehabilitation plan of care:    [x]  Continue current care and rehab. [x]  Physical Therapy           [x]  Occupational Therapy           [x]  Speech Therapy     []  Hold Rehab until further notice     5. Medications:    [x]  MAR Reviewed     [x]  Continue Present Medications     6. DVT Prophylaxis:      [x]  Lovenox     []  Unfractionated Heparin     []  Coumadin     []  NOAC     []  DINORA Stockings     []  Sequential Compression Device     []  None     7.  Orders:   > Bilateral vocal cord paralysis status post thyroidectomy (4/4/2019 - Dr. Claudetta Jules) with residual dysphagia and dysarthria; S/P Tracheostomy (6/3/2019 - Dr. Claudetta Jules); S/P PEG tube insertion (6/20/2019) - Dr. Claudetta Jules)    > On 7/5/2019, added Guaifenesin 200 mg via PEG tube q 12 hr   > On 7/7/2019, patient had completed the recommended treatment course of Levofloxacin    > On 7/8/2019:    > Discontinued Metoclopramide 5 mg via PEG tube TID (re: drug interaction with SSRI's)    > Started trial of Erythromycin 80 mg via PEG tube QID as a prokinetic   > Continue:    > Pantoprazole 40 mg via PEG tube once daily    > Guaifenesin 200 mg via PEG tube q 12 hr    > Erythromycin 80 mg via PEG tube QID before tube feeding    > Chronic respiratory failure with hypoxia;  Hypoxemia requiring supplementary oxygen   > On 7/8/2019, decreased O2 inhalation from 4 LPM to 2 LPM via trach collar continuous    > On 7/10/2019:    > O2 testing for possible home O2 inhalation     > SpO2 97% at room air (at rest)     > SpO2 95% at room air (while ambulating)     > SpO2 97% at room air (after ambulating 96 ft)    > Patient does NOT meet criteria for home oxygen inhalation    > Explained to patient that she does not meet the criteria for home oxygen inhalation and she said she is considering paying out of pocket for the oxygen inhalation at home; she will inform us of her decision tomorrow    > Changed O2 inhalation from 2 LPM via trach collar continuous to 2 LPM via trach collar PRN for SpO2 lesser than 90%   > Patient stated that she is willing to pay for oxygen inhalation out of pocket if insurance would not cover the cost    > Continue O2 inhalation 2 LPM via trach collar PRN for SpO2 lesser than 90%    > Hypertensive heart disease without heart failure   > Continue Metoprolol tartrate 25 mg via PEG tube q 12 hr (9AM, 9PM)    > Hypothyroidism, S/P Thyroidectomy (4/4/2019 - Dr. Claudetta Jules)    > TSH (6/11/2019) = 42.50   > Upon admission to the ARU, patient was on Levothyroxine 200 mcg via PEG tube q AM   > TSH (6/26/2019) = 54.20   > On 7/6/2019, decreased Levothyroxine from 200 mcg to 175 mcg via PEG tube q AM   > TSH (7/10/2019) = 39.20   > On 7/10/2019, Dr. W[place] increased Levothyroxine from 175 mcg to 200 mcg via PEG tube q AM   > Continue Levothyroxine 200 mcg via PEG tube q AM    > Type 2 diabetes mellitus with diabetic neuropathy, with long-term current use of insulin   > Discontinue Humalog insulin sliding scale SC 4 times daily before tube feeding   > Start Humalog insulin 2 units SC 4 times daily before tube feeding   > Increase Lantus from 34 units to 40 units SC q AM    > Anxiety/Panic attack   > Unable to start SSRI because of potential drug interaction with Metoclopramide   > On 7/7/2019, patient had completed the recommended treatment course of Levofloxacin    > On 7/8/2019:    > Discontinued Metoclopramide 5 mg via PEG tube TID (re: drug interaction with SSRI's)    > Started trial of Erythromycin 80 mg via PEG tube QID before tube feeding as a prokinetic   > On 7/9/2019, started Paroxetine 20 mg via PEG tube once daily   > On 7/10/2019, discontinued Lorazepam 1 mg PO q 12 hr PRN   > Continue Paroxetine 20 mg via PEG tube once daily    > Difficulty sleeping   > On 7/5/2019, started:    > Melatonin 1 mg via PEG tube q HS    > Temazepam 7.5 mg via PEG tube q HS   > On 7/10/2019, increased Temazepam from 7.5 mg to 15 mg via PEG tube q HS   > Continue:    > Melatonin 1 mg via PEG tube q HS    > Change Temazepam 15 mg to Lorazepam 1 mg via PEG tube q HS    > Diet:   > On 7/5/2019, continuous PEG feeding was transitioned to bolus PEG feeding   > Specifications: NPO except tube feeding   > PEG tube feed: Jevity 1.5, 330 ml via PEG tube 4 times daily (6AM, 10AM, 2PM, 6PM)   > Water flush: 200 ml via PEG tube  4 times daily after each bolus feed      8. Patient's progress in rehabilitation and medical issues discussed with the patient. All questions answered to the best of my ability.  Care plan discussed with patient and nurse. 9. Discharge Planning:  Discharge date  Friday (7/12/2019)   Discharge location  [x] Home     [] [address]    [] Other:    Follow-up services  [] Outpatient      [x] Home Health       [x] Physical Therapy              [x] Occupational Therapy       [x] Speech Therapy                [] Medical Social Worker    [] Aide   [x] Skilled Nursing           [x] Medication reconciliation        [x] Disease education        [] PT/INR monitoring        [] Post-CABG care/monitoring        [] Colostomy/Ileostomy care        [] Routine PICC line care        [] IV antibiotic administration            Antibiotic:             Stop date:        [x] Tube feeding:              > PEG tube feed: Jevity 1.5, 330 ml (rate: 165 ml/hr to run over 2 hours) via PEG tube 4 times daily (6AM, 10AM, 2PM, 6PM)              > Water flush: 200 ml via PEG tube  4 times daily after each bolus feed         [] Indwelling mueller catheter care        [x] Tracheostomy care   Follow-up appointments  1. PCP (Dr. Carter Keys)   2. General Surgery (Dr. Zelalem Ordonez)   3. Otorhinolaryngology (Dr. Blas Goetz)   4.  Pulmonary Medicine (Dr. Bruce Man)       Signed:    Liv Nettles MD    July 11, 2019

## 2019-07-11 NOTE — PROGRESS NOTES
Milly spoke with pt this morning who states that she is prepared to self pay for home o2 if she is not able to qualify for insurance coverage. Sw notified [employer] liaison who will submit to insurance to attempt billing and has a credit card on file in case of denial.     Pt to receive a hospital bed, extra wide bedside commode, wheelchair and cushion, tub transfer bench, o2, suction and trach supplies from [facility]. Pt to receive home health pt, ot, st and skilled nursing and personal care with [facility]. Pt to receive PEG nutrition and supplies with [employer], as coordinated by [facility]. Sw will follow.

## 2019-07-11 NOTE — PROGRESS NOTES
Bedside and Verbal shift change report given to Mirna Vega RN (oncoming nurse) by Pretty Costello LPN (offgoing nurse). Report included the following information SBAR, Kardex, MAR and Recent Results.     SITUATION:    Code Status: Full Code   Reason for Admission: [facility] ([facility] [J96.10]    [facility] day: 12   Problem List:       Hospital Problems  Date Reviewed: 7/10/2019          Codes Class Noted POA    Panic attacks ICD-10-CM: F41.0  ICD-9-CM: 300.01  Unknown Yes        Chronic respiratory failure ([facility] ICD-10-CM: J96.10  ICD-9-CM: 518.83  6/25/2019 Yes        Impaired mobility and ADLs ICD-10-CM: Z74.09  ICD-9-CM: 799.89  6/25/2019 Yes        Dysphagia ICD-10-CM: R13.10  ICD-9-CM: 787.20  6/25/2019 Yes    Overview Signed 7/5/2019 10:50 AM by Makenzie Sanchez MD     S/P Thyroidectomy (4/4/2019 - Dr. Josiane Ott)             Hypoxemia requiring supplemental oxygen ICD-10-CM: R09.02, Z99.81  ICD-9-CM: 799.02  6/25/2019 Yes        Anxiety ICD-10-CM: F41.9  ICD-9-CM: 300.00  6/25/2019 Yes        Status post insertion of percutaneous endoscopic gastrostomy (PEG) tube ([facility] ICD-10-CM: Z93.1  ICD-9-CM: V44.1  6/20/2019 Yes    Overview Addendum 7/5/2019 11:39 AM by Makenzie Sanchez MD     S/P PEG tube insertion (6/20/2019) - Dr. Josiane Ott)             History of tracheostomy ICD-10-CM: Z98.890  ICD-9-CM: V44.0  6/3/2019 Yes    Overview Signed 7/5/2019 10:44 AM by Makenzie Sanchez MD     S/P Tracheostomy (6/3/2019 - Dr. Josiane Ott)             History of thyroidectomy ICD-10-CM: L77.463  ICD-9-CM: V15.29  4/4/2019 Yes    Overview Signed 7/5/2019 10:45 AM by Makenzie Sanchez MD     S/P Thyroidectomy (4/4/2019 - Dr. Josiane Ott)             * (Principal) Bilateral vocal cord paralysis ICD-10-CM: J38.02  ICD-9-CM: 478.33  4/4/2019 Yes    Overview Signed 7/11/2019  9:28 AM by Makenzie Sanchez MD     Bilateral vocal cord paralysis status post thyroidectomy (4/4/2019 - Dr. Josiane Ott) with residual dysphagia and dysarthria; S/P Tracheostomy (6/3/2019 - Dr. Scott Ferrer); S/P PEG tube insertion (6/20/2019) - Dr. Scott Ferrer)             Type 2 diabetes mellitus with diabetic neuropathy, with long-term current use of insulin (HCC) (Chronic) ICD-10-CM: E11.40, Z79.4  ICD-9-CM: 250.60, 357.2, V58.67  Unknown Yes        Depression (Chronic) ICD-10-CM: F32.9  ICD-9-CM: 291  8/24/2018 Yes        Acquired hypothyroidism (Chronic) ICD-10-CM: E03.9  ICD-9-CM: 244.9  5/17/2017 Yes        Hypertensive heart disease without heart failure (Chronic) ICD-10-CM: I11.9  ICD-9-CM: 402.90  5/17/2017 Yes              BACKGROUND:    Past Medical History:   Past Medical History:   Diagnosis Date    Acquired hypothyroidism 5/17/2017    Anxiety 6/25/2019    Bilateral vocal cord paralysis 4/4/2019    Bilateral vocal cord paralysis status post thyroidectomy (4/4/2019 - Dr. Scott Ferrer) with residual dysphagia and dysarthria; S/P Tracheostomy (6/3/2019 - Dr. Scott Ferrer); S/P PEG tube insertion (6/20/2019) - Dr. Scott Ferrer)    Chronic back pain     Lower back pain    Depression     Diabetic neuropathy associated with type 2 diabetes mellitus ([facility]     Dysphagia 6/25/2019    S/P Thyroidectomy (4/4/2019 - Dr. Scott Ferrer)    History of asthma     History of heart failure     chronic diastolic heart failure    History of vitamin D deficiency 8/28/2017    Hypertensive heart disease without heart failure 5/17/2017    Hypoxemia requiring supplemental oxygen 6/25/2019    Left ear hearing loss     pt states nerve damage--- 25% hearing loss, described as \"muffled\"    Memory difficulty     Moderate persistent asthma     Obesity, Class II, BMI 35-39.9 11/11/2016    Obstructive sleep apnea 11/6/2015    Panic attacks     Ringing of ears     Type 2 diabetes mellitus with diabetic neuropathy, with long-term current use of insulin (HCC)     Vertigo          Patient taking anticoagulants yes ASSESSMENT:    Changes in Assessment Throughout Shift: none     Patient has Central Line: no Reasons if yes:    Patient has Barker Cath: no Reasons if yes:       Last Vitals:     Vitals:    07/10/19 2155 07/11/19 0055 07/11/19 0736 07/11/19 1603   BP: 113/71  130/67 137/77   Pulse: 69  63 64   Resp: 18 18 19   Temp: 99 °F (37.2 °C)  97.1 °F (36.2 °C) 97.1 °F (36.2 °C)   SpO2: 98% 98% 98% 98%   Weight:       Height:            IV and DRAINS (will only show if present)   [REMOVED] Peripheral IV 06/30/19 Left Antecubital-Site Assessment: Clean, dry, & intact  Peripheral IV 07/03/19 Anterior; Left Forearm-Site Assessment: Clean, dry, & intact  [REMOVED] Peripheral IV 06/25/19 Anterior;Proximal;Right Antecubital-Site Assessment: Dry, Intact  PEG/Gastrostomy Tube 06/20/19-Site Assessment: Clean, dry, & intact  [REMOVED] Airway - Tracheostomy Tube 06/03/19 Portex-Site Assessment: Clean, dry, & intact     WOUND (if present)   Wound Type:         Dressing present Dressing Present : No   Wound Concerns/Notes:  none     PAIN    Pain Assessment    Pain Intensity 1: 0 (07/11/19 1600)    Pain Location 1: Head, Back    Pain Intervention(s) 1: Medication (see MAR)    Patient Stated Pain Goal: 0  o Interventions for Pain:  Pain medication    o Intervention effective: yes  o Time of last intervention: see MAR   o Reassessment Completed: yes      Last 3 Weights:  Last 3 Recorded Weights in this Encounter    06/26/19 1430 07/03/19 1852   Weight: 122.2 kg (269 lb 8 oz) 103.9 kg (229 lb)     Weight change:      INTAKE/OUPUT    Current Shift: 07/11 0701 - 07/11 1900  In: 1361   Out: -     Last three shifts: 07/09 1901 - 07/11 0700  In: 2162   Out: -      LAB RESULTS     Recent Labs     07/11/19 0613   HGB 11.7*   HCT 36.0        No results for input(s): NA, K, GLU, BUN, CREA, CA, MG, INR in the last 72 hours. No lab exists for component: PT, PTT, INREXT, INREXT    RECOMMENDATIONS AND DISCHARGE PLANNING     1.  Pending tests/procedures/ Plan of Care or Other Needs: labs  2.      3. Discharge plan for patient and Needs/Barriers: TBD    4. Estimated Discharge Date: TBD Posted on Whiteboard in Patients Room: no      4. The patient's care plan was reviewed with the oncoming nurse. \"HEALS\" SAFETY CHECK      Fall Risk    Total Score: 3    Safety Measures: Safety Measures: Bed/Chair alarm on, Bed/Chair-Wheels locked, Bed in low position, Caregiver at bedside, Call light within reach, Fall prevention (comment), Gripper socks    A safety check occurred in the patient's room between off going nurse and oncoming nurse listed above. The safety check included the below items  Area Items   H  High Alert Medications - Verify all high alert medication drips (heparin, PCA, etc.)   E  Equipment - Suction is set up for ALL patients (with chaitanya)  - Red plugs utilized for all equipment (IV pumps, etc.)  - WOWs wiped down at end of shift.  - Room stocked with oxygen, suction, and other unit-specific supplies   A  Alarms - Bed alarm is set for fall risk patients  - Ensure chair alarm is in place and activated if patient is up in a chair   L  Lines - Check IV for any infiltration  - Barker bag is empty if patient has a Barker   - Tubing and IV bags are labeled   S  Safety   - Room is clean, patient is clean, and equipment is clean. - Hallways are clear from equipment besides carts. - Fall bracelet on for fall risk patients  - Ensure room is clear and free of clutter  - Suction is set up for ALL patients (with chaitanya)  - Hallways are clear from equipment besides carts.    - Isolation precautions followed, supplies available outside room, sign posted     Brian Gill LPN

## 2019-07-11 NOTE — PROGRESS NOTES
Surgery  Doing well   AF VSS  roger TF  D/c planning in works  Needs home O2 for exertion  F/u in office 2 weeks

## 2019-07-11 NOTE — PROGRESS NOTES
Problem: Mobility Impaired (Adult and Pediatric)  Goal: *Therapy Goal (Edit Goal, Insert Text)  Description  Physical Therapy Goals: STG  Initiated 2019, reassessed 7/3/19 and to be accomplished 7/10/2019:   1. Patient will move from supine to sit and sit to supine , scoot up and down and roll side to side in bed with supervision/set-up. ( 7/3/2019 MET)     2. Patient will transfer from bed to chair and chair to bed with supervision/set-up using the least restrictive device. (7/3/2019 MET)  3. Patient will perform sit to stand with supervision/set-up. (7/3/2019 MET)  4. Patient will ambulate with supervision/set-up for 50 feet with the least restrictive device. (MET for SBA level using rollator)  5. Patient will ascend/descend 3 stairs with 2 handrail(s) with minimal assistance/contact guard assist. (MET for SBA)     Physical Therapy Goals: LTG  Initiated 2019 and to be accomplished within 21 day(s) 2019:  1. Patient will move from supine to sit and sit to supine , scoot up and down and roll side to side in bed with modified independence. 2.  Patient will transfer from bed to chair and chair to bed with modified independence using the least restrictive device. 3.  Patient will perform sit to stand with modified independence. 4.  Updated 2019: Patient will ambulate with modified independence for 150 feet with the least restrictive device. 5.  Patient will ascend/descend 3 stairs with 2 handrail(s) with supervision/set-up. Outcome: Progressing Towards Goal   PHYSICAL THERAPY TREATMENT    Patient: Nieves Magallanes (14 y.o. female)  Date: 2019  Diagnosis: Chronic respiratory failure (HCC) [J96.10] Bilateral vocal cord paralysis  Precautions: Aspiration, Fall  Chart, physical therapy assessment, plan of care and goals were reviewed. *Patient missed 60 minutes of scheduled therapy time*    Pain:  Patient did not complain of pain.      Patient identified with name and : yes    Patient attempted for physical therapy treatment at 1100; however, patient declined physical therapy today reporting that she was too tired and fatigued after not sleeping last night. Patient also reporting feeling upset about potentially not having home oxygen covered by insurance; discussed patient's concerns extensively with  and also spoke with Dr. Lakisha Xie regarding her reports of not feeling like she is able to breathe when not having oxygen flow. Patient educated regarding the oxygen testing performed during therapy session yesterday. Patient's brother present as well, and asked \"what if she feels like she cannot breathe at home, what do we do? \" and PT explained that at any point in time should patient feel medically compromised or in medical crisis, 9-1-1 and emergency services should be called.          Lazaro Dunbar, PT  7/11/2019

## 2019-07-12 VITALS
SYSTOLIC BLOOD PRESSURE: 130 MMHG | BODY MASS INDEX: 35.94 KG/M2 | OXYGEN SATURATION: 99 % | HEART RATE: 64 BPM | TEMPERATURE: 99.6 F | WEIGHT: 229 LBS | DIASTOLIC BLOOD PRESSURE: 71 MMHG | HEIGHT: 67 IN | RESPIRATION RATE: 19 BRPM

## 2019-07-12 LAB
GLUCOSE BLD STRIP.AUTO-MCNC: 174 MG/DL (ref 70–110)
GLUCOSE BLD STRIP.AUTO-MCNC: 202 MG/DL (ref 70–110)
GLUCOSE BLD STRIP.AUTO-MCNC: 257 MG/DL (ref 70–110)

## 2019-07-12 PROCEDURE — 82962 GLUCOSE BLOOD TEST: CPT

## 2019-07-12 PROCEDURE — 97542 WHEELCHAIR MNGMENT TRAINING: CPT

## 2019-07-12 PROCEDURE — 92507 TX SP LANG VOICE COMM INDIV: CPT

## 2019-07-12 PROCEDURE — 74011636637 HC RX REV CODE- 636/637: Performed by: INTERNAL MEDICINE

## 2019-07-12 PROCEDURE — 97530 THERAPEUTIC ACTIVITIES: CPT

## 2019-07-12 PROCEDURE — 97116 GAIT TRAINING THERAPY: CPT

## 2019-07-12 PROCEDURE — 77030018836 HC SOL IRR NACL ICUM -A

## 2019-07-12 PROCEDURE — 74011250637 HC RX REV CODE- 250/637

## 2019-07-12 PROCEDURE — 74011250637 HC RX REV CODE- 250/637: Performed by: EMERGENCY MEDICINE

## 2019-07-12 PROCEDURE — 74011250637 HC RX REV CODE- 250/637: Performed by: INTERNAL MEDICINE

## 2019-07-12 RX ORDER — GUAIFENESIN 100 MG/5ML
200 SOLUTION ORAL EVERY 12 HOURS
Qty: 1 BOTTLE | Refills: 0 | Status: SHIPPED | OUTPATIENT
Start: 2019-07-12 | End: 2019-11-12

## 2019-07-12 RX ORDER — PAROXETINE HYDROCHLORIDE 20 MG/1
20 TABLET, FILM COATED ORAL DAILY
Qty: 30 TAB | Refills: 0 | Status: SHIPPED | OUTPATIENT
Start: 2019-07-12 | End: 2019-08-13 | Stop reason: SDUPTHER

## 2019-07-12 RX ADMIN — LEVOTHYROXINE SODIUM 200 MCG: 75 TABLET ORAL at 05:35

## 2019-07-12 RX ADMIN — GUAIFENESIN 200 MG: 200 SOLUTION ORAL at 10:02

## 2019-07-12 RX ADMIN — INSULIN GLARGINE 40 UNITS: 100 INJECTION, SOLUTION SUBCUTANEOUS at 07:00

## 2019-07-12 RX ADMIN — INSULIN LISPRO 2 UNITS: 100 INJECTION, SOLUTION INTRAVENOUS; SUBCUTANEOUS at 05:39

## 2019-07-12 RX ADMIN — INSULIN LISPRO 2 UNITS: 100 INJECTION, SOLUTION INTRAVENOUS; SUBCUTANEOUS at 10:01

## 2019-07-12 RX ADMIN — ERYTHROMYCIN ETHYLSUCCINATE 80 MG: 200 GRANULE, FOR SUSPENSION ORAL at 13:30

## 2019-07-12 RX ADMIN — PAROXETINE HYDROCHLORIDE 20 MG: 10 SUSPENSION ORAL at 06:01

## 2019-07-12 RX ADMIN — LORAZEPAM 1 MG: 0.5 TABLET ORAL at 18:32

## 2019-07-12 RX ADMIN — INSULIN LISPRO 2 UNITS: 100 INJECTION, SOLUTION INTRAVENOUS; SUBCUTANEOUS at 14:08

## 2019-07-12 RX ADMIN — ERYTHROMYCIN ETHYLSUCCINATE 80 MG: 200 GRANULE, FOR SUSPENSION ORAL at 10:02

## 2019-07-12 RX ADMIN — ERYTHROMYCIN ETHYLSUCCINATE 80 MG: 200 GRANULE, FOR SUSPENSION ORAL at 05:34

## 2019-07-12 RX ADMIN — PANTOPRAZOLE SODIUM 40 MG: 40 GRANULE, DELAYED RELEASE ORAL at 06:30

## 2019-07-12 RX ADMIN — OXYCODONE HYDROCHLORIDE AND ACETAMINOPHEN 1 TABLET: 5; 325 TABLET ORAL at 03:00

## 2019-07-12 RX ADMIN — METOPROLOL TARTRATE 25 MG: 25 TABLET ORAL at 10:02

## 2019-07-12 NOTE — PROGRESS NOTES
Problem: Dysphagia (Adult)  Goal: *Acute Goals and Plan of Care (Insert Text)  Description  Long term goals  Patient will:  1. Perform pharyngeal strengthening exercises with supervision. 2. Tolerate swallowing trials of varied consistenies without over s/s of aspiration. 3. Participate in repeat MBS prior to resuming PO for nutrition. 4. Tolerate deflation of tracheostomy cuff for speech production for 20 minutes. MET  5. Tolerate placement of a passu-Malinda speaking valve for speech for 20 minute intervals. MET- increased to 45 minutes. Short term goals (by 7/13/19)  Patient will:  1. Perform pharyngeal strengthening exercises with min cues-supervision. 2. Tolerate swallowing trials of ice chips and small boluses puree (up to 4 oz at a time) without overt s/s of aspiration. 3. Participate in repeat MBS prior to resuming PO for nutrition. 4. Tolerate deflation of tracheostomy cuff for speech production for 20 minutes. MET- Tracheostomy cuff consistently deflated. 5. Tolerate placement of a passu-[place] speaking valve for speech for 40-60 minute intervals. Outcome: Resolved/Met  Note:   SPEECH LANGUAGE PATHOLOGY TREATMENT    Patient: Katherine Jha (24 y.o. female)  Date: 7/12/2019  Diagnosis: Chronic respiratory failure (HCC) [J96.10] Bilateral vocal cord paralysis       Time in: 0930  Time Out:  0945  SUBJECTIVE:   Patient stated ? They haven't delivered anything to my house yet? .    OBJECTIVE:   Mental Status:  Mrs. Humberto Mcwilliams was awake and alert this morning. Treatment & Interventions:   Patient was seen in her room this morning. SLP reinforced training in donning/doffing the [place]-Mal[place] Valve (PMV). Patient placed it successfully without feedback of the LP. She was also able to remove the PMV without difficulty. SLP also reviewed with Mrs. Humberto Mcwilliams techniques for inner suctioning through the tracheostomy tube should she have thick secretions which she was not able to clear with a cough.   SLP discussed settings on suctioning machine, sterile procedure of filling the water reservoir, donning gloves, attaching the suction catheter, and suctioning within the trach with valving by the thumb on the catheter. Patient was instructed to suction the length of the tracheostomy tube. She was able to verbalize the above and demonstrate suctioning technique. SLP then had patient place the PMV and talk about her pending discharge. At this point the [address] [place] [address] [employer] Pettit representative arrived. SLP left the patient to talk with the representative with instructions to remove the valve after conversation complete. Response & Tolerance to Activities:  Mrs. Matri Simmons was very receptive to the suctioning training. She is able to manage the PMV for speech. Pain:  Pain Scale 1: Numeric (0 - 10)  Pain Orientation 1: Anterior  Pain Description 1: Aching  After treatment:   ?       Patient left in no apparent distress sitting up in chair  ? Patient left in no apparent distress in bed  ? Call bell left within reach  ? Nursing notified  ? Caregiver present  ? Bed alarm activated    ASSESSMENT:   Progression toward goals:  ?       Improving appropriately and progressing toward goals  ? Improving slowly and progressing toward goals  ? Not making progress toward goals and plan of care will be adjusted    PLAN:   Patient continues to benefit from skilled intervention to address the above impairments. Continue treatment per established plan of care.   Discharge Recommendations:  Home Health    Estimated LOS: Patient to be discharged home this afternoon    CHINMAY Mooney  Time Calculation: 15 mins

## 2019-07-12 NOTE — PROGRESS NOTES
Thank you for your referral for a wheelchair with accessories, commode, tub transfer bench, oxygen, and trach/suction. All equipment will be delivered to the home.     Roberto Can Liaison  [facility]

## 2019-07-12 NOTE — PROGRESS NOTES
Sw has been in communication with Josse Gibbs at [facility] regarding nutrition, Ruchi Pascal and Tisha at [facility]  for DME and trach/o2 needs and Amos Mercado at [facility] for home health and personal care. All providers are prepared for pt's dc to home today. Pt states that her family is at home waiting on the DME to be delivered. Pt states that after the DME is in the home, the family will come to pick her up. Pt's dc meds have been sent to [facility] per her request.     No further needs are noted at this time. Sw will remain available to assist if needed.

## 2019-07-12 NOTE — DISCHARGE SUMMARY
[address]  [address] [place] [address] [address]     INPATIENT REHABILITATION  DISCHARGE SUMMARY    Name: Cristian Gomez MRN: [id number]   Age / Sex: 63 y.o. / female CSN: [id number]   YOB: 1960 Length of Stay: 17 days   Admit Date: 6/25/2019 Discharge Date: 7/12/2019        PRIMARY CARE PHYSICIAN: Harini Salvador MD      DISCHARGE DIAGNOSES:    Primary Rehab Diagnosis  1.  Generalized Weakness with Impaired Mobility and ADLs  2. Bilateral vocal cord paralysis status post thyroidectomy (4/4/2019 - Dr. Nigel Hooker) with residual dysphagia and dysarthria; S/P Tracheostomy (6/3/2019 - Dr. Esther Kilpatrick); S/P PEG tube insertion (6/20/2019) - Dr. Nigel Hooker)      Comorbidities   Obstructive sleep apnea G47.33    Moderate persistent asthma J45.40    H/O aortic valve replacement Z95.2    History of bicuspid aortic valve Z87.74    Chronic back pain M54.9, G89.29    Chronic left shoulder pain M25.512, G89.29    Obesity, Class II, BMI 35-39.9 E66.9    Left shoulder tendinitis M75.82    Spondylosis of lumbar region without myelopathy or radiculopathy M47.816    Chronic pain of both shoulders M25.511, G89.29, M25.512    Chronic pain of both knees M25.561, M25.562, G89.29    Chronic pain syndrome G89.4    Encounter for long-term (current) use of medications Z79.899    Chronic midline low back pain M54.5, G89.29    Lumbar facet arthropathy M47.816    Lumbar degenerative disc disease M51.36    Venous stasis dermatitis of both lower extremities I87.2    Diabetic neuropathy associated with type 2 diabetes mellitus  E11.40    Acquired hypothyroidism E03.9    Hypertensive heart disease without heart failure I11.9    Dyslipidemia E78.5    Depression F32.9    Chronic diastolic congestive heart failure  I50.32    Type 2 diabetes mellitus with diabetic neuropathy, with long-term current use of insulin  E11.40, Z79.4    Stridor R06.1    Fever R50.9    Chronic respiratory failure J96.10    Hypoxemia requiring supplemental oxygen R09.02, Z99.81    Vertigo R42    Panic attacks F41.0    Left ear hearing loss H91.92    History of vitamin D deficiency Z86.39    Anxiety F41. 9        CONSULTS CALLED: General Surgery (Dr. Annalee Heimlich)      PROCEDURES DONE: Portable chest x-ray (7/1/2019) showed a probable small left pleural effusion and left lower lobe atelectasis; significant interval decrease in the amount of free intraperitoneal air since the prior exam.      BRIEF HISTORY (from the history and physical dictated by Dr. Carin Hernandez): This is a 61-year-old female, who recently was admitted to  [facility] under the care of surgery, Dr. Jha Lung patient had undergone a prior thyroidectomy and subsequently developed vocal cord dysfunction, so the patient had a tracheostomy done because of that during the recent admission to [facility].  Subsequently, the patient was noted to have some dysphagia and now was on TPN for some time and subsequently had a PEG tube placed recently during the recent admission.  PT/OT saw the patient and the patient was transferred to the [address] [place] [address] I saw the patient, the patient was sitting in bed, in no apparent distress.  The patient has a tracheostomy in place.  The patient denied any shortness of breath.  The patient does complain of some intermittent abdominal discomfort for which she is on [place] pain medications.  The patient states that she did have a bowel movement earlier today and has also been passing gas.  No history of any nausea.  No history of any fever or chills.  No history of any headaches.  No history of any rash.  No history of any leg swelling. COURSE IN THE HOSPITAL: Upon admission to the [facility] for Physical Rehabilitation, the patient underwent physical therapy, occupational therapy and speech therapy.  The patient was able to actively participate in the rehabilitation activities and progressed well. On discharge, the patient was able to perform the following activities:    1. Occupational Therapy    ON ADMISSION ON DISCHARGE   Eating  Functional Level: 1   Eating  Functional Level: 6     Grooming  Functional Level: 5   Grooming  Functional Level: 5     Bathing  Functional Level: 3   Bathing  Functional Level: 5     Upper Body Dressing  Functional Level: 3(increased time needed)   Upper Body Dressing  Functional Level: 4     Lower Body Dressing  Functional Level: 3   Lower Body Dressing  Functional Level: 5     Toileting  Functional Level: 5   Toileting  Functional Level: 5     Toilet Transfers  Toilet Transfer Score: 5   Toilet Transfers  Toilet Transfer Score: 5     Tub /Shower Transfers  Tub/Shower Transfer Score: 5   Tub/Shower Transfers  Tub/Shower Transfer Score: 5       2.  Physical Therapy    ON ADMISSION ON DISCHARGE   Wheelchair Mobility/Management  Able to Propel (ft): (not challenged today)  Functional Level: (not challenged today; to be further assessed)  Curbs/Ramps Assist Required (FIM Score): 0 (Not tested)  Wheelchair Setup Assist Required : 0 (Not tested)  Wheelchair Management: (not challenged today; to be further assessed) Wheelchair Mobility/Management  Able to Propel (ft): 90 feet( ft bouts)  Functional Level: 2  Curbs/Ramps Assist Required (FIM Score): 0 (Not tested)  Wheelchair Setup Assist Required : 5 (Stand-by assistance)  Wheelchair Management: Manages right brake, Manages left brake     Gait  Amount of Assistance: 4 (Minimal assistance)  Distance (ft): 8 Feet (ft)  Assistive Device: (no AD as per PLOF for household gait) Gait  Amount of Assistance: 5 (Stand-by assistance)  Distance (ft): 96 Feet (ft)  Assistive Device: Walker, rollator     Balance-Sitting/Standing  Sitting - Static: Good (unsupported)  Sitting - Dynamic: Fair (occasional)  Standing - Static: Fair  Standing - Dynamic : Impaired Balance-Sitting/Standing  Sitting - Static: Good (unsupported)  Sitting - Dynamic: Good (unsupported)  Standing - Static: Good  Standing - Dynamic : Impaired     Bed/Mat Mobility  Rolling Right : 4 (Minimal assistance)  Rolling Left : 4 (Minimal assistance)  Supine to Sit : 4 (Minimal assistance)(Head of bed elevated as per physician order)  Sit to Supine : 4 (Minimal assistance) Bed/Mat Mobility  Rolling Right : 6 (Modified independent)  Rolling Left : 6 (Modified independent)  Supine to Sit : 6 (Modified independent)(elevated head of bed)  Sit to Supine : 6 (Modified independent)     Transfers  Transfer Type: Other  Other: stand step transfer without AD as per PLOF  Transfer Assistance : 4 (Minimal assistance)  Sit to Stand Assistance: Minimal assistance  Car Transfers: Not tested  Car Type: N/A Transfers  Transfer Type: Other  Other: stand step transfer without AD  Transfer Assistance : 5 (Supervision/setup)  Sit to Stand Assistance: Supervision(cued for checking locked brakes on recliner)  Car Transfers: Not tested  Car Type: NA     Steps or Stairs  Steps/Stairs Ambulated (#): (not able to assess today due to patient fatigue)  Level of Assist : 0 (Not tested)  Rail Use: (N/A) Steps or Stairs  Steps/Stairs Ambulated (#): 4  Level of Assist : 5 (Stand-by assistance)  Rail Use: Both(two handrails to ascend, right handrail descending)       3.  Speech and Language Pathology    ON ADMISSION ON DISCHARGE   Comprehension (Native Language)  Primary Mode of Comprehension: Auditory  Score: 4  Comments: understanding simple and multi-step directions with occasional repetition needed Comprehension (Native Language)  Primary Mode of Comprehension: Auditory  Score: 6  Comments: understanding simple and multi-step directions with occasional repetition needed     Expression (Native Language)  Primary Mode of Expression: Sign language, Verbal, Other (comment)(writes on paper)  Score: 4  Comments: needing cues occasionally for writing down needs vs attempting to verbalize   Expression (Native Language)  Primary Mode of Expression: Verbal  Score: 6  Comments: writing down information      Social Interaction/Pragmatics  Score: 4  Comments: moderate encouragement to participate in therapy Social Interaction/Pragmatics  Score: 6  Comments: needing min encouragement due to increased anxiety at times during treatment sessions     Problem Solving  Score: 4  Comments: min cues for functional problem solving   Problem Solving  Score: 6  Comments: needing occasional cues for safe functional mobility techniques     Memory  Score: 4  Comments: able to recall information appropriately as indicated on chart with occasional clarification needed Memory  Score: 6  Comments: extra time       Legend:   7 - Independent   6 - Modified Independent   5 - Standby Assistance / Supervision / Set-up   4 - Minimum Assistance / Contact Guard Assistance   3 - Moderate Assistance   2 - Maximum Assistance   1 - Total Assistance / Dependent       ACUTE MEDICAL ISSUES ADDRESSED IN INPATIENT REHABILITATION FACILITY:     > Bilateral vocal cord paralysis status post thyroidectomy (4/4/2019 - Dr. Anh Gauthier) with residual dysphagia and dysarthria; S/P Tracheostomy (6/3/2019 - Dr. Anh Gauthier); S/P PEG tube insertion (6/20/2019) - Dr. Anh Gauthier)               > On 7/5/2019, added Guaifenesin 200 mg via PEG tube q 12 hr              > On 7/7/2019, patient had completed the recommended treatment course of Levofloxacin               > On 7/8/2019:                          > Discontinued Metoclopramide 5 mg via PEG tube TID (re: drug interaction with SSRI's)                          > Started trial of Erythromycin 80 mg via PEG tube QID as a prokinetic              > During the patient's stay at the ARU, the patient was given:                           > Pantoprazole 40 mg via PEG tube once daily                          > Guaifenesin 200 mg via PEG tube q 12 hr                          > Erythromycin 80 mg via PEG tube QID before tube feeding     > Chronic respiratory failure with hypoxia;  Hypoxemia requiring supplementary oxygen              > On 7/8/2019, decreased O2 inhalation from 4 LPM to 2 LPM via trach collar continuous               > On 7/10/2019:                          > O2 testing for possible home O2 inhalation                                      > SpO2 97% at room air (at rest)                                      > SpO2 95% at room air (while ambulating)                                      > SpO2 97% at room air (after ambulating 96 ft)                          > Patient does NOT meet criteria for home oxygen inhalation                          > Explained to patient that she does not meet the criteria for home oxygen inhalation and she said she is considering paying out of pocket for the oxygen inhalation at home; she will inform us of her decision tomorrow                          > Changed O2 inhalation from 2 LPM via trach collar continuous to 2 LPM via trach collar PRN for SpO2 lesser than 90%              > Patient stated that she is willing to pay for oxygen inhalation out of pocket if insurance would not cover the cost               > During the patient's stay at the ARU, the patient was given O2 inhalation 2 LPM via trach collar PRN for SpO2 lesser than 90%     > Hypertensive heart disease without heart failure              > Continue Metoprolol tartrate 25 mg via PEG tube q 12 hr (9AM, 9PM)     > Hypothyroidism, S/P Thyroidectomy (4/4/2019 - Dr. Aung Ray)               > TSH (6/11/2019) = 42.50              > Upon admission to the ARU, patient was on Levothyroxine 200 mcg via PEG tube q AM              > TSH (6/26/2019) = 54.20              > On 7/6/2019, decreased Levothyroxine from 200 mcg to 175 mcg via PEG tube q AM              > TSH (7/10/2019) = 39.20              > On 7/10/2019, Dr. Brandt Ritchie increased Levothyroxine from 175 mcg to 200 mcg via PEG tube q AM              > Continue Levothyroxine 200 mcg via PEG tube q AM     > Type 2 diabetes mellitus with diabetic neuropathy, with long-term current use of insulin   > On 7/11/2019:               > Discontinued Humalog insulin sliding scale SC 4 times daily before tube feeding               > Started Humalog insulin 2 units SC 4 times daily before tube feeding               > Increased Lantus from 34 units to 40 units SC q AM   > Continue:    > Humalog insulin 2 units SC 4 times daily before tube feeding               > Lantus 40 units SC q AM     > Anxiety/Panic attack              > Unable to start SSRI because of potential drug interaction with Metoclopramide              > On 7/7/2019, patient had completed the recommended treatment course of Levofloxacin               > On 7/8/2019:                          > Discontinued Metoclopramide 5 mg via PEG tube TID (re: drug interaction with SSRI's)                          > Started trial of Erythromycin 80 mg via PEG tube QID before tube feeding as a prokinetic              > On 7/9/2019, started Paroxetine 20 mg via PEG tube once daily              > On 7/10/2019, discontinued Lorazepam 1 mg PO q 12 hr PRN              > Continue Paroxetine 20 mg via PEG tube once daily     > Difficulty sleeping              > On 7/5/2019, started:                          > Melatonin 1 mg via PEG tube q HS                          > Temazepam 7.5 mg via PEG tube q HS              > On 7/10/2019, increased Temazepam from 7.5 mg to 15 mg via PEG tube q HS   > On 7/11/2019, changed Temazepam 15 mg to Lorazepam 1 mg via PEG tube q HS              > Continue:                          > Melatonin 1 mg via PEG tube q HS                          > Lorazepam 1 mg via PEG tube q HS     > Diet:              > On 7/5/2019, continuous PEG feeding was transitioned to bolus PEG feeding              > Specifications: NPO except tube feeding              > PEG tube feed: Jevity 1.5, 330 ml via PEG tube 4 times daily (6AM, 10AM, 2PM, 6PM)              > Water flush: 200 ml via PEG tube  4 times daily after each bolus feed       MEDICATIONS ON DISCHARGE:    Current Discharge Medication List      START taking these medications    Details   PARoxetine (PAXIL) 20 mg tablet 1 Tab by Per G Tube route daily. Indications: Anxiousness associated with Depression  Qty: 30 Tab, Refills: 0    Associated Diagnoses: Depression, unspecified depression type; Panic attacks; Anxiety      guaiFENesin (ROBITUSSIN) 100 mg/5 mL liquid 10 mL by Per G Tube route every twelve (12) hours. Qty: 1 Bottle, Refills: 0      oxyCODONE-acetaminophen (PERCOCET) 5-325 mg per tablet 1 Tab by Per G Tube route every six (6) hours as needed (for pain level greater than 4/10) for up to 3 days. Max Daily Amount: 4 Tabs. Indications: Pain  Qty: 21 Tab, Refills: 0    Associated Diagnoses: Spondylosis of lumbar region without myelopathy or radiculopathy; Chronic pain of both shoulders; Chronic pain of both knees; Chronic pain syndrome; Chronic midline low back pain, with sciatica presence unspecified; Lumbar facet arthropathy; Lumbar degenerative disc disease      ondansetron hcl (ZOFRAN) 4 mg tablet 1 Tab by Per G Tube route three (3) times daily as needed for Nausea. Indications: Nausea / Vomiting  Qty: 21 Tab, Refills: 0    Associated Diagnoses: Dysphagia, unspecified type      metoprolol tartrate (LOPRESSOR) 25 mg tablet 1 Tab by Per G Tube route every twelve (12) hours. Indications: high blood pressure  Qty: 60 Tab, Refills: 0    Associated Diagnoses: Hypertensive heart disease without heart failure      melatonin 1 mg tablet 1 Tab by Per G Tube route nightly. Indications: Insomnia  Qty: 30 Tab, Refills: 0    Associated Diagnoses: Insomnia, unspecified type      LORazepam (ATIVAN) 1 mg tablet 1 Tab by Per G Tube route nightly. Max Daily Amount: 1 mg. Indications: anxious, chronic trouble sleeping  Qty: 7 Tab, Refills: 0    Associated Diagnoses: Anxiety;  Insomnia, unspecified type      insulin lispro (HUMALOG) 100 unit/mL injection 2 Units by SubCUTAneous route Before breakfast, lunch, dinner and at bedtime. To be given 30 minutes before starting each tube feeding  Qty: 1 Vial, Refills: 0    Associated Diagnoses: Type 2 diabetes mellitus with diabetic neuropathy, with long-term current use of insulin (formerly [facility])         CONTINUE these medications which have CHANGED    Details   insulin glargine (LANTUS U-100 INSULIN) 100 unit/mL injection 40 Units by SubCUTAneous route Daily (before breakfast). Qty: 30 mL, Refills: 0    Associated Diagnoses: Type 2 diabetes mellitus with diabetic neuropathy, with long-term current use of insulin (formerly [facility])      levothyroxine (SYNTHROID) 200 mcg tablet Take 0.5 Tabs by mouth Daily (before breakfast). Indications: hypothyroidism  Qty: 30 Tab, Refills: 0    Associated Diagnoses: Acquired hypothyroidism         CONTINUE these medications which have NOT CHANGED    Details   FREESTYLE LITE STRIPS strip TEST twice a day as directed  Qty: 100 Strip, Refills: 11    Associated Diagnoses: Type II diabetes mellitus, uncontrolled (formerly [facility])      omega-3 acid ethyl esters (LOVAZA) 1 gram capsule take 1 capsule by mouth twice a day  Qty: 60 Cap, Refills: 0      atorvastatin (LIPITOR) 80 mg tablet take 1 tablet by mouth once daily  Qty: 30 Tab, Refills: 3    Associated Diagnoses: Dyslipidemia      BD INSULIN SYRINGE ULTRA-FINE 1 mL 31 gauge x 5/16 syrg use as directed twice a day  Qty: 200 Syringe, Refills: 3      empagliflozin (JARDIANCE) 25 mg tablet Take 25 mg by mouth daily. Indications: type 2 diabetes mellitus    Associated Diagnoses: Uncontrolled type 2 diabetes mellitus with hyperglycemia, with long-term current use of insulin (formerly [facility])      albuterol (PROVENTIL VENTOLIN) 2.5 mg /3 mL (0.083 %) nebulizer solution 3 mL by Nebulization route every four (4) hours as needed for Wheezing.   Qty: 24 Each, Refills: 5    Associated Diagnoses: Asthmatic bronchitis, mild intermittent, with acute exacerbation      Aspirin, Buffered 81 mg tab Take 81 mg by mouth daily. STOP taking these medications       metoprolol succinate (TOPROL-XL) 50 mg XL tablet Comments:   Reason for Stopping:         VITAMIN D2 50,000 unit capsule Comments:   Reason for Stopping:         gabapentin (NEURONTIN) 400 mg capsule Comments:   Reason for Stopping:         calcium carbonate (TUMS) 200 mg calcium (500 mg) chew Comments:   Reason for Stopping:         meclizine (ANTIVERT) 12.5 mg tablet Comments:   Reason for Stopping:         DULoxetine (CYMBALTA) 60 mg capsule Comments:   Reason for Stopping:         SYMBICORT 160-4.5 mcg/actuation HFAA Comments:   Reason for Stopping:         PROAIR HFA 90 mcg/actuation inhaler Comments:   Reason for Stopping:         furosemide (LASIX) 40 mg tablet Comments:   Reason for Stopping:         potassium chloride (KLOR-CON) 10 mEq tablet Comments:   Reason for Stopping:         cyanocobalamin (VITAMIN B-12) 1,000 mcg sublingual tablet Comments:   Reason for Stopping:               DISCHARGE VITAL SIGNS:  Visit Vitals  /69 (BP 1 Location: Right arm, BP Patient Position: At rest)   Pulse 62   Temp 97.6 °F (36.4 °C)   Resp 19   Ht 5' 7\" (1.702 m)   Wt 103.9 kg (229 lb)   SpO2 98%   Breastfeeding? No   BMI 35.87 kg/m²       DISCHARGE PHYSICAL EXAMINATION:  GENERAL SURVEY: Patient is awake, alert, oriented x 3, sitting comfortably on the chair, not in acute respiratory distress.   HEENT: pink palpebral conjunctivae, anicteric sclerae, no nasoaural discharge, moist oral mucosa  NECK: (+) tracheostomy in place, supple, no jugular venous distention, no palpable lymph nodes  CHEST/LUNGS: symmetrical chest expansion, good air entry, clear breath sounds  HEART: adynamic precordium, good S1 S2, no S3, regular rhythm, no murmurs  ABDOMEN: (+) PEG tube in place, obese, bowel sounds appreciated, soft, non-tender  EXTREMITIES: pink nailbeds, no edema, full and equal pulses, no calf tenderness   NEUROLOGICAL EXAM: The patient is awake, alert and oriented x 3, able to answer questions fairly appropriately, able to follow 1 and 2 step commands. Able to tell time from the wall clock. Cranial nerves II-XII are grossly intact except for dysphagia. No gross sensory deficit. Motor strength is 4+/5 on all 4 extremities. CONDITION ON DISCHARGE: Stable. DISPOSITION: Patient clinically improved and was discharged to home with home health physical therapy, occupational therapy, speech therapy and skilled nursing. The patient is temporarily homebound secondary to functional deficits due to prolonged hospital stay due to Bilateral vocal cord paralysis status post thyroidectomy (4/4/2019 - Dr. Elizabeth Pinto) with residual dysphagia and dysarthria; S/P Tracheostomy (6/3/2019 - Dr. Juan Ramon Kilpatrick); S/P PEG tube insertion (6/20/2019) - Dr. Elizabeth Pinto). She can ambulate using a rollator (see above). The patient would benefit from continued skilled physical therapy in order to improve independent functional mobility within the home with use of least restrictive device. The patient would also benefit from continued skilled occupational therapy in order to improve self care and functional mobility within the home with use of least restrictive device. The patient would also benefit from continued skilled speech therapy in order to work on cognition, swallowing and speech restoration. Short-term skilled nursing is needed for medication reconciliation, disease education, PEG tube feeding, tracheostomy care, home oxygen.       FOLLOW-UP RECOMMENDATIONS:   Follow-up Information     Follow up With Specialties Details Why [address] [place] [address] [address]    [facility]   Will provide O2 and supplies for your trach, hospital bed, bedside commode, tub transfer bench and wheelchair [phone]    Kosta Lara MD Family Practice On 7/18/2019 Patient has an appointment scheduled with PCP Dr. Deepthi Greene on July 18, 2019 @ 9:30am. [facility] [address]      Lizzie Heath MD Surgery On 7/22/2019 Patient has an appointment scheduled with Surgeon Dr. Sussy Pratt on July 22, 2019 @ 10:00am. [address] [address] So. Nona Hernandez MD Otolaryngology, Surgery On 7/19/2019 Patient has an appointment scheduled with Otorhinolarngology Dr. Laurie Ag on July 19, 2019 @ 315pm.  Patient may have to pay $100.00 dollars up front fee due to Insurance. Mohit Villeda MD Pulmonary Disease, Urgent Care, Internal Medicine On 8/8/2019 Patient has an appointment with Pulmonary Medicine Dr. Sigrid Sousa on August 8, 2019 @10:15am [place] [id number] [facility]  [address] [place] [address] [id number] [phone]            OTHER INSTRUCTIONS:  1. Diet.               > Specifications: NPO except tube feeding              > PEG tube feed: Jevity 1.5, 330 ml (rate: 165 ml/hr over 2 hours) via PEG tube 4 times daily (6AM, 10AM, 2PM, 6PM)              > Water flush: 200 ml via PEG tube  4 times daily after each bolus feed  2. Activity. As tolerated. 3. Safety / fall precautions. 4. Aspiration precautions. 5. Routine tracheostomy site care. 6. Routine PEG tube site care. TIME SPENT ON DISCHARGE ACTIVITIES: More than 30 minutes.       Signed:  Alysha Mercer MD    7/12/2019

## 2019-07-12 NOTE — PROGRESS NOTES
conducted a Follow up consultation and Spiritual Assessment for Romana Finely, who is a 63 y.o.,female. The  provided the following Interventions:  Continued the relationship of care and support with this very pleasant 63year old female patient. Listened empathically as the patient shared in a whispered voice and wrote about her progress since her hospitalization and the anticipation of her discharge from the hospital .  Offered prayer and assurance of continued prayer on patients behalf. The following outcomes were achieved:  Patient expressed gratitude for 's visit. Assessment:  There are no further spiritual or Hinduism issues which require Spiritual Care Services interventions at this time. Plan:  Chaplains will continue to follow and will provide pastoral care on an as needed/requested basis.  recommends bedside caregivers page  on duty if patient shows signs of acute spiritual or emotional distress.        [address] [place] [address]   [phone]

## 2019-07-12 NOTE — PROGRESS NOTES
Surgery  Looks good, up in chair  Afebrile vital signs stable  Trach and PEG in good position  Encouragement given, ready for discharge.   Can follow-up in 3 weeks

## 2019-07-12 NOTE — DISCHARGE INSTRUCTIONS
DISCHARGE SUMMARY from Nurse        What to do at Home:  Recommended activity: Activity as tolerated, per therapy instructions        *  Please give a list of your current medications to your Primary Care Provider. *  Please update this list whenever your medications are discontinued, doses are      changed, or new medications (including over-the-counter products) are added. Tube feeding Jevity 1.5 - 330cc (1 1/2 container) every 4 hrs. @ approximately 8am, 12pm, 4pm and 8pm. Check blood sugar before each feeding and administer 2 units of Humalog insulin for blood sugar above 90. Hold insulin for blood sugar 90 or below. Patient armband removed and shredded    *  Please carry medication information at all times in case of emergency situations. These are general instructions for a healthy lifestyle:    No smoking/ No tobacco products/ Avoid exposure to second hand smoke  Surgeon General's Warning:  Quitting smoking now greatly reduces serious risk to your health. Obesity, smoking, and sedentary lifestyle greatly increases your risk for illness    A healthy diet, regular physical exercise & weight monitoring are important for maintaining a healthy lifestyle    You may be retaining fluid if you have a history of heart failure or if you experience any of the following symptoms:  Weight gain of 3 pounds or more overnight or 5 pounds in a week, increased swelling in our hands or feet or shortness of breath while lying flat in bed. Please call your doctor as soon as you notice any of these symptoms; do not wait until your next office visit. The discharge information has been reviewed with the patient and caregiver. The patient and caregiver verbalized understanding.   Discharge medications reviewed with the patient and caregiver and appropriate educational materials and side effects teaching were provided. ___________________________________________________________________________________________________________________________________    ------------------------------------------------------------------------------------------------------------    DISCHARGE INSTRUCTIONS    1. Make sure that when you request refills at the pharmacy that the refill requests are sent to your PCP (NOT to the prescriber at the [facility]) to avoid any delays in getting your medication refills. The physician at the [facility] will not able to order medications or refills after discharge -- Please understand that though we would like to help, it is simply not safe for our physician to order you a medication that we cannot monitor. 2. If any of the prescribed medications require a PRIOR AUTHORIZATION, contact your Primary Care Physician or specialist to EITHER complete prior authorizations and paperwork on your behalf OR prescribe an alternative medication. -- Please understand that though we would like to help, it is simply not safe for our physician to order you a medication that we cannot monitor. 3. If any of your prescriptions medications PR[facility] TO [facility] needs a refill, kindly contact your Primary Care Physician for refills.     -------------------------------------------------------------------------------------------------------------------
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
## 2019-07-13 NOTE — ROUTINE PROCESS
Patient informed RN that all supplies needed for home had been delivered and she was ready to go. Discharge information gone over and given to patient. Educated that night time dose of Ativan had already been taken and not to take this evening. Patient verbalized understanding. Patient and all belongings wheeled to entrance to family transportation home.

## 2019-07-15 NOTE — PROGRESS NOTES
7/12/19    Milly has been in communication with pt, family, bioscripts and [employer] [facility]. All DME has been delivered by 830pm per family and [facility]. 7/15/19    Milly notified pt's  of pt's dc to home on 7/12/19 with home health from [facility]. No further needs are noted at this time.

## 2019-07-16 ENCOUNTER — TELEPHONE (OUTPATIENT)
Dept: FAMILY MEDICINE CLINIC | Age: 59
End: 2019-07-16

## 2019-07-16 NOTE — TELEPHONE ENCOUNTER
Doyle Dawson, Speech Pathologist with [address] called to relay a message to Dr. Chanel Dye. Patient was recently in the hospital for issues with vocal cords and saw Dr. Kristofer Agarwal with ENT during her stay. Initial order was placed at hospital for one week to see the speech pathologist. Patient does not want to continue her care with Dr. Dilma Garcia and Ms. Ronnie England discussed with patient about seeing ENT at [facility] for follow up care. Ms. Ronnie England is requesting a new referral for patient to see ENT at [facility] as well as a new referral to continue care with speech pathologist twice per week for 4 weeks. Patient has appt on 7/18/19 and I advised Ms. Ronnie England this would need to be discussed at her appointment before he can place these referrals. Advised I would put message in to make Dr. Chanel Dye and his nurse aware that this needs to be addressed at her appt.        Doyle Dawson [facility] DIVISION #: [phone]

## 2019-07-17 RX ORDER — INSULIN PUMP SYRINGE, 3 ML
EACH MISCELLANEOUS
Qty: 1 KIT | Refills: 0 | Status: SHIPPED | OUTPATIENT
Start: 2019-07-17 | End: 2019-11-12

## 2019-07-17 RX ORDER — LANCETS
EACH MISCELLANEOUS
Qty: 300 EACH | Refills: 3 | Status: SHIPPED | OUTPATIENT
Start: 2019-07-17

## 2019-07-18 ENCOUNTER — OFFICE VISIT (OUTPATIENT)
Dept: FAMILY MEDICINE CLINIC | Age: 59
End: 2019-07-18

## 2019-07-18 VITALS
DIASTOLIC BLOOD PRESSURE: 69 MMHG | TEMPERATURE: 98.8 F | SYSTOLIC BLOOD PRESSURE: 130 MMHG | HEART RATE: 67 BPM | BODY MASS INDEX: 37.67 KG/M2 | HEIGHT: 67 IN | RESPIRATION RATE: 16 BRPM | WEIGHT: 240 LBS | OXYGEN SATURATION: 100 %

## 2019-07-18 DIAGNOSIS — J38.02 BILATERAL VOCAL CORD PARALYSIS: Primary | ICD-10-CM

## 2019-07-18 DIAGNOSIS — F39 MOOD DISORDER (HCC): ICD-10-CM

## 2019-07-18 DIAGNOSIS — G47.9 SLEEP DISORDER: ICD-10-CM

## 2019-07-18 DIAGNOSIS — Z93.0 TRACHEOSTOMY IN PLACE (HCC): ICD-10-CM

## 2019-07-18 DIAGNOSIS — E66.01 SEVERE OBESITY (HCC): ICD-10-CM

## 2019-07-18 DIAGNOSIS — R49.0 DYSPHONIA: ICD-10-CM

## 2019-07-18 DIAGNOSIS — J45.31 ASTHMATIC BRONCHITIS, MILD PERSISTENT, WITH ACUTE EXACERBATION: ICD-10-CM

## 2019-07-18 DIAGNOSIS — I10 ESSENTIAL HYPERTENSION: ICD-10-CM

## 2019-07-18 DIAGNOSIS — F41.9 ANXIETY: ICD-10-CM

## 2019-07-18 DIAGNOSIS — E11.9 TYPE 2 DIABETES MELLITUS WITHOUT COMPLICATION, UNSPECIFIED WHETHER LONG TERM INSULIN USE (HCC): ICD-10-CM

## 2019-07-18 RX ORDER — NALOXONE HYDROCHLORIDE 4 MG/.1ML
SPRAY NASAL
Qty: 2 EACH | Refills: 0 | Status: SHIPPED | OUTPATIENT
Start: 2019-07-18 | End: 2021-12-15

## 2019-07-18 RX ORDER — LORAZEPAM 2 MG/1
2 TABLET ORAL
Qty: 30 TAB | Refills: 1 | Status: SHIPPED | OUTPATIENT
Start: 2019-07-18 | End: 2019-08-13 | Stop reason: SDUPTHER

## 2019-07-18 RX ORDER — GUAIFENESIN 100 MG/5ML
200 SOLUTION ORAL
Qty: 473 ML | Refills: 1 | Status: SHIPPED | OUTPATIENT
Start: 2019-07-18 | End: 2019-08-26 | Stop reason: SDUPTHER

## 2019-07-18 RX ORDER — OXYCODONE AND ACETAMINOPHEN 5; 325 MG/1; MG/1
TABLET ORAL
COMMUNITY
End: 2019-11-12

## 2019-07-18 NOTE — PROGRESS NOTES
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 exacerbation J45.31 493.92      lab results and schedule of future lab studies reviewed with patient  reviewed diet, exercise and weight control  reviewed medications and side effects in detail  specific diabetic recommendations: low cholesterol diet, weight control and daily exercise discussed, home glucose monitoring emphasized, all medications, side effects and compliance discussed carefully, glycohemoglobin and other lab monitoring discussed and long term diabetic complications discussed      I have discussed the diagnosis with the patient and the intended plan of care as seen in the above orders. The patient has received an after-visit summary and questions were answered concerning future plans. I have discussed medication, side effects, and warnings with the patient in detail. The patient verbalized understanding and is in agreement with the plan of care. The patient will contact the office with any additional concerns. Neal Howell MD    PLEASE NOTE:   This document has been produced using voice recognition software.  Unrecognized errors in transcription may be present

## 2019-07-22 ENCOUNTER — OFFICE VISIT (OUTPATIENT)
Dept: SURGERY | Age: 59
End: 2019-07-22

## 2019-07-22 VITALS
BODY MASS INDEX: 37.51 KG/M2 | OXYGEN SATURATION: 99 % | DIASTOLIC BLOOD PRESSURE: 66 MMHG | HEART RATE: 62 BPM | WEIGHT: 239 LBS | RESPIRATION RATE: 20 BRPM | TEMPERATURE: 98.7 F | HEIGHT: 67 IN | SYSTOLIC BLOOD PRESSURE: 116 MMHG

## 2019-07-22 DIAGNOSIS — E89.0 S/P TOTAL THYROIDECTOMY: ICD-10-CM

## 2019-07-22 DIAGNOSIS — E04.9 GOITER: Primary | ICD-10-CM

## 2019-07-22 NOTE — PROGRESS NOTES
Progress Note    Patient: Ferdinand Ramon  MRN: [id number]  SSN: [id number]   YOB: 1960  Age: 63 y.o. Sex: female     Chief Complaint   Patient presents with    Post OP Follow Up     6/20/2019 tracheostomy in place. HPI    Ms Eulalio Harper is s/p Total Thyroidectomy complicated by bilateral cord dysfunction. She is s/p cherise and Peg and a long rehab stay. She is back today and looks great. Her anxiety level is way down and she is roger tube feeds well. Her trach and peg look good. She is down to 2 l of O2.     Past Medical History:   Diagnosis Date    Acquired hypothyroidism 5/17/2017    Anxiety 6/25/2019    Bilateral vocal cord paralysis 4/4/2019    Bilateral vocal cord paralysis status post thyroidectomy (4/4/2019 - Dr. Jefe Rodriguez) with residual dysphagia and dysarthria; S/P Tracheostomy (6/3/2019 - Dr. Jefe Rodriguez); S/P PEG tube insertion (6/20/2019) - Dr. Jefe Rodriguez)    Chronic back pain     Lower back pain    Depression     Diabetic neuropathy associated with type 2 diabetes mellitus ([address]     Dysphagia 6/25/2019    S/P Thyroidectomy (4/4/2019 - Dr. Jefe Rodriguez)    History of asthma     History of heart failure     chronic diastolic heart failure    History of vitamin D deficiency 8/28/2017    Hypertensive heart disease without heart failure 5/17/2017    Hypoxemia requiring supplemental oxygen 6/25/2019    Insomnia     Left ear hearing loss     pt states nerve damage--- 25% hearing loss, described as \"muffled\"    Memory difficulty     Moderate persistent asthma     Obesity, Class II, BMI 35-39.9 11/11/2016    Obstructive sleep apnea 11/6/2015    Panic attacks     Ringing of ears     Type 2 diabetes mellitus with diabetic neuropathy, with long-term current use of insulin ([address]     Vertigo      Past Surgical History:   Procedure Laterality Date    CARDIAC SURG PROCEDURE UNLIST  10/31/2013    open heart    HX HEART VALVE SURGERY  2013    aortic valve repair    HX HYSTERECTOMY      Partial Hysterectomy - removed ovary    HX MYOMECTOMY      HX ORTHOPAEDIC  02/2018    had nerves burned on her right side of back    HX TRACHEOSTOMY  06/03/2019    S/P Tracheostomy (6/3/2019 - Dr. Jan Posadas)    [place] EGD PERCUTANEOUS PLACEMENT GASTROSTOMY TUBE N/A 06/20/2019    Dr. Phylicia Ceja Bilateral 04/04/2019    Dr. Kayla Bnaegas Other (comments)     Artificial sweeteners- causes headaches    Metformin Other (comments)     Increase pain in feet and swelling in feet  Increased hunger,tired and bilat foot pain    Morphine Other (comments)     headache    Singulair [Montelukast] Other (comments)     hallucinations    Sucrose Other (comments)     Pt. Is not allergic to sucrose. She develops headaches with artificial sweeteners. Current Outpatient Medications   Medication Sig Dispense Refill    LORazepam (ATIVAN) 2 mg tablet Take 1 Tab by mouth nightly. Max Daily Amount: 2 mg. 30 Tab 1    Blood-Glucose Meter monitoring kit Check blood glucose 3 times daily. 1 Kit 0    glucose blood VI test strips (ASCENSIA AUTODISC VI, ONE TOUCH ULTRA TEST VI) strip Check blood glucose 3 times daily 300 Strip 3    lancets misc Check blood glucose 3 times daily 300 Each 3    PARoxetine (PAXIL) 20 mg tablet 1 Tab by Per G Tube route daily. Indications: Anxiousness associated with Depression 30 Tab 0    guaiFENesin (ROBITUSSIN) 100 mg/5 mL liquid 10 mL by Per G Tube route every twelve (12) hours. 1 Bottle 0    insulin glargine (LANTUS U-100 INSULIN) 100 unit/mL injection 40 Units by SubCUTAneous route Daily (before breakfast). 30 mL 0    levothyroxine (SYNTHROID) 200 mcg tablet Take 0.5 Tabs by mouth Daily (before breakfast). Indications: hypothyroidism 30 Tab 0    ondansetron hcl (ZOFRAN) 4 mg tablet 1 Tab by Per G Tube route three (3) times daily as needed for Nausea.  Indications: Nausea / Vomiting 21 Tab 0    metoprolol tartrate (LOPRESSOR) 25 mg tablet 1 Tab by Per G Tube route every twelve (12) hours. Indications: high blood pressure 60 Tab 0    melatonin 1 mg tablet 1 Tab by Per G Tube route nightly. Indications: Insomnia 30 Tab 0    omega-3 acid ethyl esters (LOVAZA) 1 gram capsule take 1 capsule by mouth twice a day 60 Cap 0    BD INSULIN SYRINGE ULTRA-FINE 1 mL 31 gauge x 5/16 syrg use as directed twice a day 200 Syringe 3    empagliflozin (JARDIANCE) 25 mg tablet Take 25 mg by mouth daily. Indications: type 2 diabetes mellitus      FREESTYLE LITE STRIPS strip TEST twice a day as directed 100 Strip 11    Aspirin, Buffered 81 mg tab Take 81 mg by mouth daily.  oxyCODONE-acetaminophen (PERCOCET) 5-325 mg per tablet Take  by mouth every four (4) hours as needed for Pain.  naloxone (NARCAN) 4 mg/actuation nasal spray Use 1 spray intranasally, then discard. Repeat with new spray every 2 min as needed for opioid overdose symptoms, alternating nostrils. 2 Each 0    guaiFENesin (TUSSIN) 100 mg/5 mL liquid Take 10 mL by mouth three (3) times daily as needed for Cough. 473 mL 1    insulin lispro (HUMALOG) 100 unit/mL injection 2 Units by SubCUTAneous route Before breakfast, lunch, dinner and at bedtime. To be given 30 minutes before starting each tube feeding 1 Vial 0    atorvastatin (LIPITOR) 80 mg tablet take 1 tablet by mouth once daily 30 Tab 3    albuterol (PROVENTIL VENTOLIN) 2.5 mg /3 mL (0.083 %) nebulizer solution 3 mL by Nebulization route every four (4) hours as needed for Wheezing.  24 Each 5     Social History     Socioeconomic History    Marital status:      Spouse name: Not on file    Number of children: Not on file    Years of education: Not on file    Highest education level: Not on file   Occupational History    Not on file   Social Needs    Financial resource strain: Not on file    Food insecurity:     Worry: Not on file     Inability: Not on file    Transportation needs:     Medical: Not on file     Non-medical: Not on file   Tobacco Use    Smoking status: Never Smoker    Smokeless tobacco: Never Used   Substance and Sexual Activity    Alcohol use: No    Drug use: No    Sexual activity: Not Currently   Lifestyle    Physical activity:     Days per week: Not on file     Minutes per session: Not on file    Stress: Not on file   Relationships    Social connections:     Talks on phone: Not on file     Gets together: Not on file     Attends Hinduism service: Not on file     Active member of club or organization: Not on file     Attends meetings of clubs or organizations: Not on file     Relationship status: Not on file    Intimate partner violence:     Fear of current or ex partner: Not on file     Emotionally abused: Not on file     Physically abused: Not on file     Forced sexual activity: Not on file   Other Topics Concern     Service No    Blood Transfusions No    Caffeine Concern No    Occupational Exposure No    Hobby Hazards No    Sleep Concern Yes     Comment: Sleep apnea     Stress Concern Yes     Comment: Due to family medical issues.      Weight Concern No    Special Diet No    Back Care Yes     Comment: Patient tries to do back care with streches     Exercise No    Bike Helmet Yes   [address] [facility] Yes    Self-Exams Yes   Social History Narrative    Not on file     Family History   Problem Relation Age of Onset    Heart Disease Mother     Diabetes Mother     Stroke Mother     Hypertension Mother     Diabetes Father     Heart Disease Father     Hypertension Father     Stroke Father     Diabetes Brother     Cancer Brother     Hypertension Brother     Stroke Brother     Heart Disease Brother     Diabetes Brother     Hypertension Brother     Stroke Brother     Heart Disease Brother     Diabetes Brother     Hypertension Brother     Hypertension Brother          Review of systems:  Patient denies any reflux, emesis, abdominal pain, change in bowel habits, hematochezia, melena, fever, weight loss, fatigue chills, dermatitis, abnormal moles, change in vision, vertigo, epistaxis, dysphagia, hoarseness, chest pain, palpitations, hypertension, edema, cough, shortness of breath, wheezing, hemoptysis, snoring, hematuria, diabetes, thyroid disease, anemia, bruising, history of blood transfusion, dizziness, headache, or fainting.     Physical Examination    Well developed well nourished female in no apparent distress  Visit Vitals  /66   Pulse 62   Temp 98.7 °F (37.1 °C) (Oral)   Resp 20   Ht 5' 7\" (1.702 m)   Wt 108.4 kg (239 lb)   SpO2 99%   BMI 37.43 kg/m²      Head: normocephalic, atraumatic  Mouth: Clear, no overt lesions, oral mucosa pink and moist  Neck: supple, no masses, no adenopathy or carotid bruits, trachea midline  Resp: clear to auscultation bilaterally, no wheeze, rhonchi or rales, excursions normal and symmetrical  Cardio: Regular rate and rhythm, no murmurs, clicks, gallops or rubs, no edema or varicosities  Abdomen: soft, nontender, nondistended, normoactive bowel sounds, no hernias, no hepatosplenomegaly,   Back: na  Extremeties: warm, well-perfused, no tenderness or swelling, normal gait/station  Neuro: sensation and strength grossly intact and symmetrical  Psych: alert and oriented to person, place and time  Breast exam na    IMPRESSION  Slowly improving course     PLAN  Orders Placed This Encounter    TSH 3RD GENERATION     Standing Status:   Future     Standing Expiration Date:   1/22/2020     Check TSH  Jan Posadas MD

## 2019-07-22 NOTE — PROGRESS NOTES
Chief Complaint   Patient presents with    Post OP Follow Up     6/20/2019 tracheostomy in place. Pt states needs more inner canulas. Using last one now.

## 2019-07-23 ENCOUNTER — PATIENT MESSAGE (OUTPATIENT)
Dept: FAMILY MEDICINE CLINIC | Age: 59
End: 2019-07-23

## 2019-07-23 DIAGNOSIS — R13.10 DYSPHAGIA, UNSPECIFIED TYPE: ICD-10-CM

## 2019-07-26 RX ORDER — ONDANSETRON 4 MG/1
4 TABLET, FILM COATED ORAL
Qty: 30 TAB | Refills: 0 | Status: SHIPPED | OUTPATIENT
Start: 2019-07-26 | End: 2019-08-12 | Stop reason: SDUPTHER

## 2019-07-26 NOTE — TELEPHONE ENCOUNTER
From: Tabitha Rico  To: Preethi Chau MD  Sent: 7/23/2019 10:31 AM EDT  Subject: Prescription Question    Need a refill on ondansetron hcl 4 mg tablets. Three times a day for nausea as needed.  Per g tube

## 2019-07-30 ENCOUNTER — TELEPHONE (OUTPATIENT)
Dept: FAMILY MEDICINE CLINIC | Age: 59
End: 2019-07-30

## 2019-07-30 NOTE — TELEPHONE ENCOUNTER
Called [facility] with referral info. Referral was faxed to Dr. Roger Camilo at [facility] on 7/19/19 and confirmation received. Phone number to ENT was provided in the message.

## 2019-07-30 NOTE — TELEPHONE ENCOUNTER
Amirah Speech Therapist from [facility] called to follow up on ENT referral to [facility]    [phone]

## 2019-08-10 ENCOUNTER — HOSPITAL ENCOUNTER (OUTPATIENT)
Dept: LAB | Age: 59
Discharge: HOME | End: 2019-08-10

## 2019-08-10 LAB — XX-LABCORP SPECIMEN COL,LCBCF: NORMAL

## 2019-08-10 PROCEDURE — 99001 SPECIMEN HANDLING PT-LAB: CPT

## 2019-08-11 LAB — TSH SERPL DL<=0.005 MIU/L-ACNC: 22.9 UIU/ML (ref 0.45–4.5)

## 2019-08-12 ENCOUNTER — OFFICE VISIT (OUTPATIENT)
Dept: SURGERY | Age: 59
End: 2019-08-12

## 2019-08-12 VITALS
DIASTOLIC BLOOD PRESSURE: 69 MMHG | HEIGHT: 67 IN | WEIGHT: 239 LBS | HEART RATE: 62 BPM | SYSTOLIC BLOOD PRESSURE: 133 MMHG | BODY MASS INDEX: 37.51 KG/M2 | OXYGEN SATURATION: 98 % | RESPIRATION RATE: 16 BRPM

## 2019-08-12 DIAGNOSIS — E89.0 S/P TOTAL THYROIDECTOMY: Primary | ICD-10-CM

## 2019-08-12 DIAGNOSIS — R13.10 DYSPHAGIA, UNSPECIFIED TYPE: ICD-10-CM

## 2019-08-12 RX ORDER — ONDANSETRON 4 MG/1
4 TABLET, FILM COATED ORAL
Qty: 30 TAB | Refills: 0 | Status: SHIPPED | OUTPATIENT
Start: 2019-08-12 | End: 2019-08-26 | Stop reason: SDUPTHER

## 2019-08-12 RX ORDER — LEVOTHYROXINE SODIUM 100 UG/1
150 TABLET ORAL
Qty: 30 TAB | Refills: 0 | Status: SHIPPED | OUTPATIENT
Start: 2019-08-12 | End: 2019-08-12 | Stop reason: CLARIF

## 2019-08-12 RX ORDER — LEVOTHYROXINE SODIUM 150 UG/1
150 TABLET ORAL
Qty: 30 TAB | Refills: 1 | Status: SHIPPED | OUTPATIENT
Start: 2019-08-12 | End: 2019-10-07 | Stop reason: SDUPTHER

## 2019-08-12 NOTE — PROGRESS NOTES
Progress Note    Patient: Jeramie Brunson  MRN: [id number]  SSN: [id number]   YOB: 1960  Age: 63 y.o. Sex: female     Chief Complaint   Patient presents with    Follow-up     total thyroidectomy       HPI    This Jadine Loan returns today just for a wound and trach check. She is status post a total thyroidectomy requiring postoperative trach and PEG. She is in good shape today and her issues mostly revolve around her insurance. Her trach looks good as does her PEG. She does admit to eating some mashed potatoes which she ate without difficulty or choking.     Past Medical History:   Diagnosis Date    Acquired hypothyroidism 5/17/2017    Anxiety 6/25/2019    Bilateral vocal cord paralysis 4/4/2019    Bilateral vocal cord paralysis status post thyroidectomy (4/4/2019 - Dr. Ana Paula Dukes) with residual dysphagia and dysarthria; S/P Tracheostomy (6/3/2019 - Dr. Ana Paula Dukes); S/P PEG tube insertion (6/20/2019) - Dr. Ana Paula Dukes)    Chronic back pain     Lower back pain    Depression     Diabetic neuropathy associated with type 2 diabetes mellitus ([facility] [address] [facility]     Dysphagia 6/25/2019    S/P Thyroidectomy (4/4/2019 - Dr. Ana Paula Dukes)    History of asthma     History of heart failure     chronic diastolic heart failure    History of vitamin D deficiency 8/28/2017    Hypertensive heart disease without heart failure 5/17/2017    Hypoxemia requiring supplemental oxygen 6/25/2019    Insomnia     Left ear hearing loss     pt states nerve damage--- 25% hearing loss, described as \"muffled\"    Memory difficulty     Moderate persistent asthma     Obesity, Class II, BMI 35-39.9 11/11/2016    Obstructive sleep apnea 11/6/2015    Panic attacks     Ringing of ears     Type 2 diabetes mellitus with diabetic neuropathy, with long-term current use of insulin ([facility])     Vertigo      Past Surgical History:   Procedure Laterality Date    CARDIAC SURG PROCEDURE UNLIST  10/31/2013    open heart    HX HEART VALVE SURGERY  2013    aortic valve repair    HX HYSTERECTOMY      Partial Hysterectomy - removed ovary    HX MYOMECTOMY      HX ORTHOPAEDIC  02/2018    had nerves burned on her right side of back    HX TRACHEOSTOMY  06/03/2019    S/P Tracheostomy (6/3/2019 - Dr. Jefe Rodriguez)    [place] EGD PERCUTANEOUS PLACEMENT GASTROSTOMY TUBE N/A 06/20/2019    Dr. Obinna Arthur Bilateral 04/04/2019    Dr. Peace Peers Other (comments)     Artificial sweeteners- causes headaches    Metformin Other (comments)     Increase pain in feet and swelling in feet  Increased hunger,tired and bilat foot pain    Morphine Other (comments)     headache    Singulair [Montelukast] Other (comments)     hallucinations    Sucrose Other (comments)     Pt. Is not allergic to sucrose. She develops headaches with artificial sweeteners. Current Outpatient Medications   Medication Sig Dispense Refill    ondansetron hcl (ZOFRAN) 4 mg tablet 1 Tab by Per G Tube route every eight (8) hours as needed for Nausea. IF NAUSEA PERSISTING, RECOMMEND TO DISCUSS WITH GI SPECIALIST OR PCP 30 Tab 0    oxyCODONE-acetaminophen (PERCOCET) 5-325 mg per tablet Take  by mouth every four (4) hours as needed for Pain.  LORazepam (ATIVAN) 2 mg tablet Take 1 Tab by mouth nightly. Max Daily Amount: 2 mg. 30 Tab 1    guaiFENesin (TUSSIN) 100 mg/5 mL liquid Take 10 mL by mouth three (3) times daily as needed for Cough. 473 mL 1    Blood-Glucose Meter monitoring kit Check blood glucose 3 times daily. 1 Kit 0    glucose blood VI test strips (ASCENSIA AUTODISC VI, ONE TOUCH ULTRA TEST VI) strip Check blood glucose 3 times daily 300 Strip 3    lancets misc Check blood glucose 3 times daily 300 Each 3    PARoxetine (PAXIL) 20 mg tablet 1 Tab by Per G Tube route daily.  Indications: Anxiousness associated with Depression 30 Tab 0    guaiFENesin (ROBITUSSIN) 100 mg/5 mL liquid 10 mL by Per G Tube route every twelve (12) hours. 1 Bottle 0    insulin glargine (LANTUS U-100 INSULIN) 100 unit/mL injection 40 Units by SubCUTAneous route Daily (before breakfast). 30 mL 0    metoprolol tartrate (LOPRESSOR) 25 mg tablet 1 Tab by Per G Tube route every twelve (12) hours. Indications: high blood pressure 60 Tab 0    melatonin 1 mg tablet 1 Tab by Per G Tube route nightly. Indications: Insomnia 30 Tab 0    insulin lispro (HUMALOG) 100 unit/mL injection 2 Units by SubCUTAneous route Before breakfast, lunch, dinner and at bedtime. To be given 30 minutes before starting each tube feeding 1 Vial 0    omega-3 acid ethyl esters (LOVAZA) 1 gram capsule take 1 capsule by mouth twice a day 60 Cap 0    atorvastatin (LIPITOR) 80 mg tablet take 1 tablet by mouth once daily 30 Tab 3    BD INSULIN SYRINGE ULTRA-FINE 1 mL 31 gauge x 5/16 syrg use as directed twice a day 200 Syringe 3    empagliflozin (JARDIANCE) 25 mg tablet Take 25 mg by mouth daily. Indications: type 2 diabetes mellitus      FREESTYLE LITE STRIPS strip TEST twice a day as directed 100 Strip 11    albuterol (PROVENTIL VENTOLIN) 2.5 mg /3 mL (0.083 %) nebulizer solution 3 mL by Nebulization route every four (4) hours as needed for Wheezing. 24 Each 5    Aspirin, Buffered 81 mg tab Take 81 mg by mouth daily.  naloxone (NARCAN) 4 mg/actuation nasal spray Use 1 spray intranasally, then discard. Repeat with new spray every 2 min as needed for opioid overdose symptoms, alternating nostrils.  2 Each 0     Social History     Socioeconomic History    Marital status:      Spouse name: Not on file    Number of children: Not on file    Years of education: Not on file    Highest education level: Not on file   Occupational History    Not on file   Social Needs    Financial resource strain: Not on file    Food insecurity:     Worry: Not on file     Inability: Not on file    Transportation needs:     Medical: Not on file     Non-medical: Not on file   Tobacco Use  Smoking status: Never Smoker    Smokeless tobacco: Never Used   Substance and Sexual Activity    Alcohol use: No    Drug use: No    Sexual activity: Not Currently   Lifestyle    Physical activity:     Days per week: Not on file     Minutes per session: Not on file    Stress: Not on file   Relationships    Social connections:     Talks on phone: Not on file     Gets together: Not on file     Attends Catholic service: Not on file     Active member of club or organization: Not on file     Attends meetings of clubs or organizations: Not on file     Relationship status: Not on file    Intimate partner violence:     Fear of current or ex partner: Not on file     Emotionally abused: Not on file     Physically abused: Not on file     Forced sexual activity: Not on file   Other Topics Concern     Service No    Blood Transfusions No    Caffeine Concern No    Occupational Exposure No    Hobby Hazards No    Sleep Concern Yes     Comment: Sleep apnea     Stress Concern Yes     Comment: Due to family medical issues.      Weight Concern No    Special Diet No    Back Care Yes     Comment: Patient tries to do back care with streches     Exercise No    Bike Helmet Yes   [address] [facility] Yes    Self-Exams Yes   Social History Narrative    Not on file     Family History   Problem Relation Age of Onset    Heart Disease Mother     Diabetes Mother     Stroke Mother     Hypertension Mother     Diabetes Father     Heart Disease Father     Hypertension Father     Stroke Father     Diabetes Brother     Cancer Brother     Hypertension Brother     Stroke Brother     Heart Disease Brother     Diabetes Brother     Hypertension Brother     Stroke Brother     Heart Disease Brother     Diabetes Brother     Hypertension Brother     Hypertension Brother          Review of systems:  Patient denies any reflux, emesis, abdominal pain, change in bowel habits, hematochezia, melena, fever, weight loss, fatigue chills, dermatitis, abnormal moles, change in vision, vertigo, epistaxis, dysphagia, hoarseness, chest pain, palpitations, hypertension, edema, cough, shortness of breath, wheezing, hemoptysis, snoring, hematuria, diabetes, thyroid disease, anemia, bruising, history of blood transfusion, dizziness, headache, or fainting. Physical Examination    Well developed well nourished female in no apparent distress  Visit Vitals  /69   Pulse 62   Resp 16   Ht 5' 7\" (1.702 m)   Wt 108.4 kg (239 lb)   SpO2 98%   BMI 37.43 kg/m²      Head: normocephalic, atraumatic  Mouth: Clear, no overt lesions, oral mucosa pink and moist  Neck: supple, no masses, no adenopathy or carotid bruits, trachea midline trach in good position  Resp: clear to auscultation bilaterally, no wheeze, rhonchi or rales, excursions normal and symmetrical  Cardio: Regular rate and rhythm, no murmurs, clicks, gallops or rubs, no edema or varicosities  Abdomen: soft, nontender, nondistended, normoactive bowel sounds, no hernias, no hepatosplenomegaly, PEG in good position  Back: Deferred  Extremeties: warm, well-perfused, no tenderness or swelling, normal gait/station  Neuro: sensation and strength grossly intact and symmetrical  Psych: alert and oriented to person, place and time  Breast exam deferred    IMPRESSION  Status post trach and PEG after total thyroidectomy doing well    PLAN  No orders of the defined types were placed in this encounter.     Order a swallowing eval, Increase Synthroid to 150 mcg daily

## 2019-08-13 ENCOUNTER — PATIENT MESSAGE (OUTPATIENT)
Dept: FAMILY MEDICINE CLINIC | Age: 59
End: 2019-08-13

## 2019-08-13 DIAGNOSIS — F41.0 PANIC ATTACKS: ICD-10-CM

## 2019-08-13 DIAGNOSIS — F32.A DEPRESSION, UNSPECIFIED DEPRESSION TYPE: Chronic | ICD-10-CM

## 2019-08-13 DIAGNOSIS — G47.00 INSOMNIA, UNSPECIFIED TYPE: Chronic | ICD-10-CM

## 2019-08-13 DIAGNOSIS — F41.9 ANXIETY: ICD-10-CM

## 2019-08-13 DIAGNOSIS — Z79.4 UNCONTROLLED TYPE 2 DIABETES MELLITUS WITH HYPERGLYCEMIA, WITH LONG-TERM CURRENT USE OF INSULIN (HCC): ICD-10-CM

## 2019-08-13 DIAGNOSIS — E11.65 UNCONTROLLED TYPE 2 DIABETES MELLITUS WITH HYPERGLYCEMIA, WITH LONG-TERM CURRENT USE OF INSULIN (HCC): ICD-10-CM

## 2019-08-13 DIAGNOSIS — I11.9 HYPERTENSIVE HEART DISEASE WITHOUT HEART FAILURE: Chronic | ICD-10-CM

## 2019-08-13 RX ORDER — PAROXETINE HYDROCHLORIDE 20 MG/1
20 TABLET, FILM COATED ORAL DAILY
Qty: 30 TAB | Refills: 0 | Status: SHIPPED | OUTPATIENT
Start: 2019-08-13 | End: 2019-09-11 | Stop reason: SDUPTHER

## 2019-08-13 RX ORDER — LORAZEPAM 2 MG/1
TABLET ORAL
Qty: 30 TAB | Refills: 0 | Status: SHIPPED | OUTPATIENT
Start: 2019-08-13 | End: 2019-09-11 | Stop reason: SDUPTHER

## 2019-08-13 RX ORDER — METOPROLOL TARTRATE 25 MG/1
25 TABLET, FILM COATED ORAL EVERY 12 HOURS
Qty: 60 TAB | Refills: 0 | Status: SHIPPED | OUTPATIENT
Start: 2019-08-13 | End: 2019-09-11 | Stop reason: SDUPTHER

## 2019-08-13 RX ORDER — UREA 10 %
1 LOTION (ML) TOPICAL
Qty: 30 TAB | Refills: 0 | Status: SHIPPED | OUTPATIENT
Start: 2019-08-13 | End: 2019-09-11 | Stop reason: SDUPTHER

## 2019-08-26 ENCOUNTER — OFFICE VISIT (OUTPATIENT)
Dept: FAMILY MEDICINE CLINIC | Age: 59
End: 2019-08-26

## 2019-08-26 VITALS
WEIGHT: 256 LBS | RESPIRATION RATE: 18 BRPM | HEIGHT: 67 IN | OXYGEN SATURATION: 100 % | TEMPERATURE: 98.6 F | SYSTOLIC BLOOD PRESSURE: 122 MMHG | BODY MASS INDEX: 40.18 KG/M2 | HEART RATE: 54 BPM | DIASTOLIC BLOOD PRESSURE: 58 MMHG

## 2019-08-26 DIAGNOSIS — F39 MOOD DISORDER (HCC): ICD-10-CM

## 2019-08-26 DIAGNOSIS — I10 ESSENTIAL HYPERTENSION: ICD-10-CM

## 2019-08-26 DIAGNOSIS — E11.21 TYPE 2 DIABETES WITH NEPHROPATHY (HCC): Primary | ICD-10-CM

## 2019-08-26 DIAGNOSIS — F41.9 ANXIETY: ICD-10-CM

## 2019-08-26 DIAGNOSIS — Z93.0 TRACHEOSTOMY IN PLACE (HCC): ICD-10-CM

## 2019-08-26 DIAGNOSIS — R13.10 DYSPHAGIA, UNSPECIFIED TYPE: ICD-10-CM

## 2019-08-26 DIAGNOSIS — Z79.4 UNCONTROLLED TYPE 2 DIABETES MELLITUS WITH HYPERGLYCEMIA, WITH LONG-TERM CURRENT USE OF INSULIN (HCC): ICD-10-CM

## 2019-08-26 DIAGNOSIS — E03.4 HYPOTHYROIDISM DUE TO ACQUIRED ATROPHY OF THYROID: ICD-10-CM

## 2019-08-26 DIAGNOSIS — E11.65 UNCONTROLLED TYPE 2 DIABETES MELLITUS WITH HYPERGLYCEMIA, WITH LONG-TERM CURRENT USE OF INSULIN (HCC): ICD-10-CM

## 2019-08-26 RX ORDER — ONDANSETRON 4 MG/1
4 TABLET, FILM COATED ORAL
Qty: 30 TAB | Refills: 0 | Status: SHIPPED | OUTPATIENT
Start: 2019-08-26 | End: 2019-09-11 | Stop reason: SDUPTHER

## 2019-08-26 RX ORDER — GUAIFENESIN 100 MG/5ML
200 SOLUTION ORAL
Qty: 473 ML | Refills: 1 | Status: SHIPPED | OUTPATIENT
Start: 2019-08-26 | End: 2019-10-10 | Stop reason: SDUPTHER

## 2019-08-26 NOTE — PATIENT INSTRUCTIONS
Body Mass Index: Care Instructions  Your Care Instructions    Body mass index (BMI) can help you see if your weight is raising your risk for health problems. It uses a formula to compare how much you weigh with how tall you are. · A BMI lower than 18.5 is considered underweight. · A BMI between 18.5 and 24.9 is considered healthy. · A BMI between 25 and 29.9 is considered overweight. A BMI of 30 or higher is considered obese. If your BMI is in the normal range, it means that you have a lower risk for weight-related health problems. If your BMI is in the overweight or obese range, you may be at increased risk for weight-related health problems, such as high blood pressure, heart disease, stroke, arthritis or joint pain, and diabetes. If your BMI is in the underweight range, you may be at increased risk for health problems such as fatigue, lower protection (immunity) against illness, muscle loss, bone loss, hair loss, and hormone problems. BMI is just one measure of your risk for weight-related health problems. You may be at higher risk for health problems if you are not active, you eat an unhealthy diet, or you drink too much alcohol or use tobacco products. Follow-up care is a key part of your treatment and safety. Be sure to make and go to all appointments, and call your doctor if you are having problems. It's also a good idea to know your test results and keep a list of the medicines you take. How can you care for yourself at home? · Practice healthy eating habits. This includes eating plenty of fruits, vegetables, whole grains, lean protein, and low-fat dairy. · If your doctor recommends it, get more exercise. Walking is a good choice. Bit by bit, increase the amount you walk every day. Try for at least 30 minutes on most days of the week. · Do not smoke. Smoking can increase your risk for health problems. If you need help quitting, talk to your doctor about stop-smoking programs and medicines. These can increase your chances of quitting for good. · Limit alcohol to 2 drinks a day for men and 1 drink a day for women. Too much alcohol can cause health problems. If you have a BMI higher than 25  · Your doctor may do other tests to check your risk for weight-related health problems. This may include measuring the distance around your waist. A waist measurement of more than 40 inches in men or 35 inches in women can increase the risk of weight-related health problems. · Talk with your doctor about steps you can take to stay healthy or improve your health. You may need to make lifestyle changes to lose weight and stay healthy, such as changing your diet and getting regular exercise. If you have a BMI lower than 18.5  · Your doctor may do other tests to check your risk for health problems. · Talk with your doctor about steps you can take to stay healthy or improve your health. You may need to make lifestyle changes to gain or maintain weight and stay healthy, such as getting more healthy foods in your diet and doing exercises to build muscle. Where can you learn more? Go to http://nadya-irasema.info/. Enter S176 in the search box to learn more about \"Body Mass Index: Care Instructions. \"  Current as of: October 13, 2016  Content Version: 11.4  © 6949-0640 [employer]. Care instructions adapted under license by [employer] (which disclaims liability or warranty for this information). If you have questions about a medical condition or this instruction, always ask your healthcare professional. [address] any warranty or liability for your use of this information.

## 2019-08-26 NOTE — PROGRESS NOTES
Chief Complaint   Patient presents with    Diabetes    Hypertension    Abdominal Pain     pain at g-tube site      1. Have you been to the ER, urgent care clinic since your last visit? Hospitalized since your last visit? No    2. Have you seen or consulted any other health care providers outside of the [address] [place] [address] since your last visit? Include any pap smears or colon screening. No     HPI  Tameraelenlarissa Bauerpe comes in for follow-up care. 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 She is on oxygen. She is trach dependent. She does need to have suctioning machine and equipment to help with the secretions. She does need to toxin which also helps dry up some of the secretions. She is due to follow-up with the ENT physician. Vocal cord paralysis: Patient noted to have vocal cord paralysis. This was after the thyroidectomy. She did have some hoarseness of voice. Currently she is trach dependent. She is scheduled to follow-up with the ENT doctor. She is due to have a speech and swallow evaluation tomorrow. She has a PEG tube through which she gets her nutrition. Her weight is stable. GERD: Patient gets gastroesophageal reflux symptoms with nausea. She takes Zofran and this helps with the symptoms. She would like a refill of medication. Prescription is sent in. Anxiety and mood disorder: Patient is on Paxil and lorazepam.  We will continue with these medications. The lorazepam does help her relax and sleep. Thyroid disorder: Patient is on levothyroxine. She is status post thyroidectomy. This was due to multinodular thyroid. Diabetes mellitus type 2: Patient has type 2 diabetes mellitus. She is on insulin and Jardiance. Her blood glucose numbers are stable. Plan to recheck her HbA1c at next visit. Previous level was 7.1. I will do a foot exam at next visit. Hypertension: Patient's blood pressure is stable. She is on Lopressor.   We will continue with the current treatment plan.     Past Medical History  Past Medical History:   Diagnosis Date    Acquired hypothyroidism 5/17/2017    Anxiety 6/25/2019    Bilateral vocal cord paralysis 4/4/2019    Bilateral vocal cord paralysis status post thyroidectomy (4/4/2019 - Dr. Zaida Worley) with residual dysphagia and dysarthria; S/P Tracheostomy (6/3/2019 - Dr. Zaida Worley); S/P PEG tube insertion (6/20/2019) - Dr. Zaida Worley)    Chronic back pain     Lower back pain    Depression     Diabetic neuropathy associated with type 2 diabetes mellitus ([address]     Dysphagia 6/25/2019    S/P Thyroidectomy (4/4/2019 - Dr. Zaida Worley)    History of asthma     History of heart failure     chronic diastolic heart failure    History of vitamin D deficiency 8/28/2017    Hypertensive heart disease without heart failure 5/17/2017    Hypoxemia requiring supplemental oxygen 6/25/2019    Insomnia     Left ear hearing loss     pt states nerve damage--- 25% hearing loss, described as \"muffled\"    Memory difficulty     Moderate persistent asthma     Obesity, Class II, BMI 35-39.9 11/11/2016    Obstructive sleep apnea 11/6/2015    Panic attacks     Ringing of ears     Type 2 diabetes mellitus with diabetic neuropathy, with long-term current use of insulin ([address]     Vertigo        Surgical History  Past Surgical History:   Procedure Laterality Date    CARDIAC SURG PROCEDURE UNLIST  10/31/2013    open heart    HX HEART VALVE SURGERY  2013    aortic valve repair    HX HYSTERECTOMY      Partial Hysterectomy - removed ovary    HX MYOMECTOMY      HX ORTHOPAEDIC  02/2018    had nerves burned on her right side of back    HX TRACHEOSTOMY  06/03/2019    S/P Tracheostomy (6/3/2019 - Dr. Tonya Kilpatrick)    AR EGD PERCUTANEOUS PLACEMENT GASTROSTOMY TUBE N/A 06/20/2019    Dr. Laury Martinez Bilateral 04/04/2019    Dr. Herbert Roman        Medications  Current Outpatient Medications   Medication Sig Dispense Refill    guaiFENesin (TUSSIN) 100 mg/5 mL liquid Take 10 mL by mouth three (3) times daily as needed for Cough. 473 mL 1    ondansetron hcl (ZOFRAN) 4 mg tablet 1 Tab by Per G Tube route every eight (8) hours as needed for Nausea. IF NAUSEA PERSISTING, RECOMMEND TO DISCUSS WITH GI SPECIALIST OR PCP 30 Tab 0    PARoxetine (PAXIL) 20 mg tablet 1 Tab by Per G Tube route daily. Indications: Anxiousness associated with Depression 30 Tab 0    metoprolol tartrate (LOPRESSOR) 25 mg tablet 1 Tab by Per G Tube route every twelve (12) hours. Indications: high blood pressure 60 Tab 0    melatonin 1 mg tablet 1 Tab by Per G Tube route nightly. Indications: Insomnia 30 Tab 0    empagliflozin (JARDIANCE) 25 mg tablet Take 25 mg daily via G-tube route. Indications: type 2 diabetes mellitus 30 Tab 0    LORazepam (ATIVAN) 2 mg tablet Take 2 mg via G-tube route bedtime as needed for anxiety. 30 Tab 0    levothyroxine (SYNTHROID) 150 mcg tablet Take 1 Tab by mouth Daily (before breakfast). 30 Tab 1    Blood-Glucose Meter monitoring kit Check blood glucose 3 times daily. 1 Kit 0    glucose blood VI test strips (ASCENSIA AUTODISC VI, ONE TOUCH ULTRA TEST VI) strip Check blood glucose 3 times daily 300 Strip 3    lancets misc Check blood glucose 3 times daily 300 Each 3    guaiFENesin (ROBITUSSIN) 100 mg/5 mL liquid 10 mL by Per G Tube route every twelve (12) hours. 1 Bottle 0    insulin glargine (LANTUS U-100 INSULIN) 100 unit/mL injection 40 Units by SubCUTAneous route Daily (before breakfast). 30 mL 0    BD INSULIN SYRINGE ULTRA-FINE 1 mL 31 gauge x 5/16 syrg use as directed twice a day 200 Syringe 3    FREESTYLE LITE STRIPS strip TEST twice a day as directed 100 Strip 11    Aspirin, Buffered 81 mg tab Take 81 mg by mouth daily.  oxyCODONE-acetaminophen (PERCOCET) 5-325 mg per tablet Take  by mouth every four (4) hours as needed for Pain.  naloxone (NARCAN) 4 mg/actuation nasal spray Use 1 spray intranasally, then discard.  Repeat with new spray every 2 min as needed for opioid overdose symptoms, alternating nostrils. 2 Each 0    insulin lispro (HUMALOG) 100 unit/mL injection 2 Units by SubCUTAneous route Before breakfast, lunch, dinner and at bedtime. To be given 30 minutes before starting each tube feeding 1 Vial 0    omega-3 acid ethyl esters (LOVAZA) 1 gram capsule take 1 capsule by mouth twice a day 60 Cap 0    atorvastatin (LIPITOR) 80 mg tablet take 1 tablet by mouth once daily 30 Tab 3    albuterol (PROVENTIL VENTOLIN) 2.5 mg /3 mL (0.083 %) nebulizer solution 3 mL by Nebulization route every four (4) hours as needed for Wheezing. 24 Each 5       Allergies  Allergies   Allergen Reactions    Other Food Other (comments)     Artificial sweeteners- causes headaches    Metformin Other (comments)     Increase pain in feet and swelling in feet  Increased hunger,tired and bilat foot pain    Morphine Other (comments)     headache    Singulair [Montelukast] Other (comments)     hallucinations    Sucrose Other (comments)     Pt. Is not allergic to sucrose. She develops headaches with artificial sweeteners.        Family History  Family History   Problem Relation Age of Onset    Heart Disease Mother     Diabetes Mother     Stroke Mother     Hypertension Mother     Diabetes Father     Heart Disease Father     Hypertension Father     Stroke Father     Diabetes Brother     Cancer Brother     Hypertension Brother     Stroke Brother     Heart Disease Brother     Diabetes Brother     Hypertension Brother     Stroke Brother     Heart Disease Brother     Diabetes Brother     Hypertension Brother     Hypertension Brother        Social History  Social History     Socioeconomic History    Marital status:      Spouse name: Not on file    Number of children: Not on file    Years of education: Not on file    Highest education level: Not on file   Occupational History    Not on file   Social Needs    Financial resource strain: Not on file    Food insecurity:     Worry: Not on file     Inability: Not on file    Transportation needs:     Medical: Not on file     Non-medical: Not on file   Tobacco Use    Smoking status: Never Smoker    Smokeless tobacco: Never Used   Substance and Sexual Activity    Alcohol use: No    Drug use: No    Sexual activity: Not Currently   Lifestyle    Physical activity:     Days per week: Not on file     Minutes per session: Not on file    Stress: Not on file   Relationships    Social connections:     Talks on phone: Not on file     Gets together: Not on file     Attends Gnosticist service: Not on file     Active member of club or organization: Not on file     Attends meetings of clubs or organizations: Not on file     Relationship status: Not on file    Intimate partner violence:     Fear of current or ex partner: Not on file     Emotionally abused: Not on file     Physically abused: Not on file     Forced sexual activity: Not on file   Other Topics Concern     Service No    Blood Transfusions No    Caffeine Concern No    Occupational Exposure No    Hobby Hazards No    Sleep Concern Yes     Comment: Sleep apnea     Stress Concern Yes     Comment: Due to family medical issues.  Weight Concern No    Special Diet No    Back Care Yes     Comment: Patient tries to do back care with streches     Exercise No    Bike Helmet Yes    Seat Belt Yes    Self-Exams Yes   Social History Narrative    Not on file       Review of Systems  Review of Systems - Review of all systems is negative except as noted above in the HPI.     Vital Signs  Visit Vitals  /58 (BP 1 Location: Left arm, BP Patient Position: Sitting)   Pulse (!) 54   Temp 98.6 °F (37 °C) (Oral)   Resp 18   Ht 5' 7\" (1.702 m)   Wt 256 lb (116.1 kg)   SpO2 100%   BMI 40.10 kg/m²         Physical Exam  Physical Examination: General appearance - oriented to person, place, and time, overweight, acyanotic, in no respiratory distress and chronically ill appearing  Mental status - alert, oriented to person, place, and time, affect appropriate to mood  Nose - normal and patent, no erythema, discharge or polyps  Mouth - mucous membranes moist, pharynx normal without lesions  Neck - supple, tracheostomy in place  Lymphatics - no palpable lymphadenopathy  Chest - no tachypnea, retractions or cyanosis, decreased air entry bilateral lung bases  Heart - S1 and S2 normal, systolic murmur 2/6 at lower left sternal border  Abdomen - no rebound tenderness noted  bowel sounds normal  Has PEG tube in place. Back exam - limited range of motion  Neurological - abnormal neurological exam unchanged from prior examinations  Musculoskeletal - osteoarthritic changes noted in both hands  Extremities - intact peripheral pulses    Results  Results for orders placed or performed during the hospital encounter of 08/10/19   [facility] SPECIMEN COL   Result Value Ref Range    [facility]. Specimens collected/sent to [facility]. Please direct inquiries to ([phone]). ASSESSMENT and PLAN    ICD-10-CM ICD-9-CM    1. Dysphagia, unspecified type R13.10 787.20 ondansetron hcl (ZOFRAN) 4 mg tablet   Discussed the patient's BMI with her. The BMI follow up plan is as follows:     dietary management education, guidance, and counseling  encourage exercise  monitor weight  lab results and schedule of future lab studies reviewed with patient  reviewed diet, exercise and weight control  cardiovascular risk and specific lipid/LDL goals reviewed  reviewed medications and side effects in detail        I have discussed the diagnosis with the patient and the intended plan of care as seen in the above orders. The patient has received an after-visit summary and questions were answered concerning future plans. I have discussed medication, side effects, and warnings with the patient in detail.  The patient verbalized understanding and is in agreement with the plan of care. The patient will contact the office with any additional concerns. Julián Jeffers MD    PLEASE NOTE:   This document has been produced using voice recognition software.  Unrecognized errors in transcription may be present

## 2019-08-27 ENCOUNTER — HOSPITAL ENCOUNTER (OUTPATIENT)
Dept: GENERAL RADIOLOGY | Age: 59
Discharge: HOME | End: 2019-08-27
Payer: MEDICAID

## 2019-08-27 DIAGNOSIS — Z93.0 TRACHEOSTOMY STATUS (HCC): ICD-10-CM

## 2019-08-27 PROCEDURE — 74230 X-RAY XM SWLNG FUNCJ C+: CPT

## 2019-08-27 PROCEDURE — 74011000255 HC RX REV CODE- 255

## 2019-08-27 PROCEDURE — 92611 MOTION FLUOROSCOPY/SWALLOW: CPT

## 2019-08-27 RX ADMIN — BARIUM SULFATE 700 MG: 700 TABLET ORAL at 13:15

## 2019-08-27 RX ADMIN — BARIUM SULFATE 30 G: 960 POWDER, FOR SUSPENSION ORAL at 13:15

## 2019-08-27 RX ADMIN — BARIUM SULFATE 30 ML: 400 PASTE ORAL at 13:15

## 2019-08-27 RX ADMIN — BARIUM SULFATE 15 ML: 400 SUSPENSION ORAL at 13:16

## 2019-08-27 RX ADMIN — BARIUM SULFATE 15 ML: 400 SUSPENSION ORAL at 13:18

## 2019-08-27 RX ADMIN — BARIUM SULFATE 15 ML: 400 SUSPENSION ORAL at 13:21

## 2019-08-27 NOTE — PROGRESS NOTES
Outpatient Modified Barium Swallow Evaluation    Patient: Ladonna Salguero (72 y.o. female)  Date: 8/27/2019  Primary Diagnosis: Tracheostomy status ([facility] [address] [Z93.0]  Precautions: Aspiration       Recommend  Regular diet with thin liquids  Meds as tolerated  General safe swallow precautions     Videofluoroscopy Results  MBS completed with pt seated upright in flouro chair. Pt demo min oropharyngeal dysphagia. Pt tolerating all consistencies presented (thin +/- straw, pudding, regular solids and 13 mm Ba pill with thin liquid wash) with positive airway protection noted across multiple trials. Deficits include mildly decreased base of tongue strength that resulted in premature spillage and mild vallecular residue post swallow. Pt demo adequate oral prep phase, adequate laryngeal elevation/adduction and positive epiglottic tilt. Pharyngeal residue cleared with independent dry swallow. Pt safe for regular solid diet with thin liquids with thin liquids with meds as tolerated. Results/recommendations discussed with pt with video feedback with pt able to verbalize understanding. Video Flouroscopic Procedures  [x] Lateral View   [] A-P View [] Scanned to level of Sternum    [x] Seated at 90 deg.   [] Other:    Presentation:    [x] Spoon   [x] Cup   [x] Straw   [] Syringe   [x] Consecutive Swallows  [] Other:     Consistencies:   [x] Ba+ liquid    [] Ba+ liquid (nectar)    [] Ba+ liquid (honey)    [x] Ba+ pudding  [x] Ba+ cookie  [x] 13 mm Ba pill with thin Ba wash    Treatment Techniques Attempted:   [] Head Turn: [] Right [] Left     [] Head Tilt: [] Right [] Left     [] Chin Down:  [] Small Sips/bites:  [] Effortful swallow:  [x] Double swallow:  [] Other:    Results  Dysphagia Present:     [x] Yes  [] No    Ratings of Dysphagia:     Min    Stages of Breakdown:   Oropharyngeal     Aspiration:   [] Yes    [x] No  [] At Risk     Cough: [] Yes      [] No     Penetration:   [] Yes    [x] No     Cough: [] Yes      [] No   [] Flash/trace   [] Mod   [] To Chords          Motility Problems with:  [] Lip Closure:    [] Mastication:   [] Bolus Formation/control:   [] A-P Transport:  [x] Tongue Base Retraction:  [] Swallow Response (delayed):  [] Velopharyngeal Closure:  [] Pharyngeal Aspirations:  [] Laryngeal Elevation/adduction:  [] Epiglottic Inversion:  [] Pharyngeal motility/sensation:  [] Cricopharyngeal Relaxation:  [] Esophageal Peristalsis:  [] Other:    Timing of Aspiration/Penetration: n/a   [] Before Swallow:  [] During Swallow:  [] After Swallow:    Transit Time Delay: n/a  [] >1 Second  Oral  [] >1 Second Pharyngeal  [] >20 Second Esophageal     Residuals:  [x] Vallecula:  Min, cleared independently     Thank you for this referral,   Consuelo Barrett M.S., [id number] [facility]  Speech-Language Pathologist

## 2019-09-09 ENCOUNTER — OFFICE VISIT (OUTPATIENT)
Dept: SURGERY | Age: 59
End: 2019-09-09

## 2019-09-09 DIAGNOSIS — E89.0 S/P TOTAL THYROIDECTOMY: Primary | ICD-10-CM

## 2019-09-11 DIAGNOSIS — E11.65 UNCONTROLLED TYPE 2 DIABETES MELLITUS WITH HYPERGLYCEMIA, WITH LONG-TERM CURRENT USE OF INSULIN (HCC): ICD-10-CM

## 2019-09-11 DIAGNOSIS — F32.A DEPRESSION, UNSPECIFIED DEPRESSION TYPE: Chronic | ICD-10-CM

## 2019-09-11 DIAGNOSIS — G47.00 INSOMNIA, UNSPECIFIED TYPE: Chronic | ICD-10-CM

## 2019-09-11 DIAGNOSIS — Z79.4 UNCONTROLLED TYPE 2 DIABETES MELLITUS WITH HYPERGLYCEMIA, WITH LONG-TERM CURRENT USE OF INSULIN (HCC): ICD-10-CM

## 2019-09-11 DIAGNOSIS — F41.9 ANXIETY: ICD-10-CM

## 2019-09-11 DIAGNOSIS — R13.10 DYSPHAGIA, UNSPECIFIED TYPE: ICD-10-CM

## 2019-09-11 DIAGNOSIS — F41.0 PANIC ATTACKS: ICD-10-CM

## 2019-09-11 DIAGNOSIS — I11.9 HYPERTENSIVE HEART DISEASE WITHOUT HEART FAILURE: Chronic | ICD-10-CM

## 2019-09-12 RX ORDER — PAROXETINE HYDROCHLORIDE 20 MG/1
20 TABLET, FILM COATED ORAL DAILY
Qty: 30 TAB | Refills: 0 | Status: SHIPPED | OUTPATIENT
Start: 2019-09-12 | End: 2019-11-12 | Stop reason: SDUPTHER

## 2019-09-12 RX ORDER — UREA 10 %
1 LOTION (ML) TOPICAL
Qty: 30 TAB | Refills: 0 | Status: SHIPPED | OUTPATIENT
Start: 2019-09-12 | End: 2019-11-12 | Stop reason: SDUPTHER

## 2019-09-12 RX ORDER — ONDANSETRON 4 MG/1
4 TABLET, FILM COATED ORAL
Qty: 30 TAB | Refills: 0 | Status: SHIPPED | OUTPATIENT
Start: 2019-09-12 | End: 2019-10-31 | Stop reason: SDUPTHER

## 2019-09-12 RX ORDER — METOPROLOL TARTRATE 25 MG/1
25 TABLET, FILM COATED ORAL EVERY 12 HOURS
Qty: 60 TAB | Refills: 0 | Status: SHIPPED | OUTPATIENT
Start: 2019-09-12 | End: 2019-11-12 | Stop reason: SDUPTHER

## 2019-09-12 RX ORDER — LORAZEPAM 2 MG/1
TABLET ORAL
Qty: 30 TAB | Refills: 0 | Status: SHIPPED | OUTPATIENT
Start: 2019-09-12 | End: 2019-10-14 | Stop reason: SDUPTHER

## 2019-09-17 DIAGNOSIS — E11.40 TYPE 2 DIABETES MELLITUS WITH DIABETIC NEUROPATHY, WITH LONG-TERM CURRENT USE OF INSULIN (HCC): Chronic | ICD-10-CM

## 2019-09-17 DIAGNOSIS — Z79.4 TYPE 2 DIABETES MELLITUS WITH DIABETIC NEUROPATHY, WITH LONG-TERM CURRENT USE OF INSULIN (HCC): Chronic | ICD-10-CM

## 2019-09-17 RX ORDER — INSULIN GLARGINE 100 [IU]/ML
INJECTION, SOLUTION SUBCUTANEOUS
Qty: 30 ML | Refills: 0 | Status: SHIPPED | OUTPATIENT
Start: 2019-09-17 | End: 2019-11-16 | Stop reason: SDUPTHER

## 2019-10-10 RX ORDER — GUAIFENESIN 100 MG/5ML
SOLUTION ORAL
Qty: 473 ML | Refills: 1 | Status: SHIPPED | OUTPATIENT
Start: 2019-10-10 | End: 2019-11-12

## 2019-10-14 DIAGNOSIS — F41.9 ANXIETY: ICD-10-CM

## 2019-10-14 RX ORDER — LORAZEPAM 2 MG/1
TABLET ORAL
Qty: 30 TAB | Refills: 0 | Status: SHIPPED | OUTPATIENT
Start: 2019-10-14 | End: 2019-11-16 | Stop reason: SDUPTHER

## 2019-10-15 ENCOUNTER — DOCUMENTATION ONLY (OUTPATIENT)
Dept: FAMILY MEDICINE CLINIC | Age: 59
End: 2019-10-15

## 2019-10-15 RX ORDER — LEVOTHYROXINE SODIUM 150 UG/1
TABLET ORAL
Qty: 30 TAB | Refills: 1 | Status: SHIPPED | OUTPATIENT
Start: 2019-10-15 | End: 2019-11-29 | Stop reason: SDUPTHER

## 2019-10-18 ENCOUNTER — TELEPHONE (OUTPATIENT)
Dept: FAMILY MEDICINE CLINIC | Age: 59
End: 2019-10-18

## 2019-10-18 NOTE — TELEPHONE ENCOUNTER
Elie Canchola, from [facility] in home care, called to check on oxygen. Her discharge papers state to follow up with PCP regarding her portable and concentrated oxygen.     [phone]

## 2019-10-21 NOTE — TELEPHONE ENCOUNTER
Called [facility] to get more information at this time. Not aware of order at this time. LVM informing [facility] to return phone call

## 2019-10-31 DIAGNOSIS — R13.10 DYSPHAGIA, UNSPECIFIED TYPE: ICD-10-CM

## 2019-10-31 RX ORDER — ONDANSETRON 4 MG/1
TABLET, FILM COATED ORAL
Qty: 30 TAB | Refills: 0 | Status: SHIPPED | OUTPATIENT
Start: 2019-10-31 | End: 2019-11-16 | Stop reason: SDUPTHER

## 2019-11-12 ENCOUNTER — OFFICE VISIT (OUTPATIENT)
Dept: FAMILY MEDICINE CLINIC | Age: 59
End: 2019-11-12

## 2019-11-12 VITALS
HEIGHT: 67 IN | WEIGHT: 262 LBS | DIASTOLIC BLOOD PRESSURE: 67 MMHG | SYSTOLIC BLOOD PRESSURE: 137 MMHG | TEMPERATURE: 97.5 F | BODY MASS INDEX: 41.12 KG/M2 | OXYGEN SATURATION: 99 % | HEART RATE: 58 BPM | RESPIRATION RATE: 16 BRPM

## 2019-11-12 DIAGNOSIS — Z79.4 TYPE 2 DIABETES MELLITUS WITH DIABETIC NEUROPATHY, WITH LONG-TERM CURRENT USE OF INSULIN (HCC): ICD-10-CM

## 2019-11-12 DIAGNOSIS — I10 ESSENTIAL HYPERTENSION: ICD-10-CM

## 2019-11-12 DIAGNOSIS — E03.4 HYPOTHYROIDISM DUE TO ACQUIRED ATROPHY OF THYROID: ICD-10-CM

## 2019-11-12 DIAGNOSIS — Z93.0 TRACHEOSTOMY IN PLACE (HCC): ICD-10-CM

## 2019-11-12 DIAGNOSIS — G47.00 INSOMNIA, UNSPECIFIED TYPE: ICD-10-CM

## 2019-11-12 DIAGNOSIS — F39 MOOD DISORDER (HCC): ICD-10-CM

## 2019-11-12 DIAGNOSIS — E11.40 TYPE 2 DIABETES MELLITUS WITH DIABETIC NEUROPATHY, WITH LONG-TERM CURRENT USE OF INSULIN (HCC): ICD-10-CM

## 2019-11-12 DIAGNOSIS — J45.21 ASTHMATIC BRONCHITIS, MILD INTERMITTENT, WITH ACUTE EXACERBATION: Primary | ICD-10-CM

## 2019-11-12 DIAGNOSIS — R21 RASH: ICD-10-CM

## 2019-11-12 RX ORDER — ALBUTEROL SULFATE 0.83 MG/ML
2.5 SOLUTION RESPIRATORY (INHALATION)
Qty: 24 EACH | Refills: 5 | Status: SHIPPED | OUTPATIENT
Start: 2019-11-12 | End: 2021-11-17 | Stop reason: SDUPTHER

## 2019-11-12 RX ORDER — PROMETHAZINE HYDROCHLORIDE AND DEXTROMETHORPHAN HYDROBROMIDE 6.25; 15 MG/5ML; MG/5ML
2.5 SYRUP ORAL
Qty: 240 ML | Refills: 1 | Status: SHIPPED | OUTPATIENT
Start: 2019-11-12 | End: 2019-11-19

## 2019-11-12 RX ORDER — MOXIFLOXACIN HYDROCHLORIDE 400 MG/1
400 TABLET ORAL DAILY
Qty: 10 TAB | Refills: 0 | Status: SHIPPED | OUTPATIENT
Start: 2019-11-12 | End: 2019-12-11

## 2019-11-12 NOTE — PROGRESS NOTES
Chief Complaint   Patient presents with    Cold Symptoms     1. Have you been to the ER, urgent care clinic since your last visit? Hospitalized since your last visit? No    2. Have you seen or consulted any other health care providers outside of the [address] [place] [address] since your last visit? Include any pap smears or colon screening. No     HPI  Sheba Peters comes in for follow-up care. Patient has nasal congestion, cough, wheeze and shortness of breath. This has been ongoing over the past week. Has malaise and fatigue. Cough is productive of mucopurulent phlegm. She is trach dependent and does have a lot of secretions coming out of her trach that her mucopurulent. She does have fever and chills. She also has odynophagia and a sore throat. She has tried taking cough medication and over-the-counter medication without much relief. She has a nebulizer at home. Has not been using the nebulizer solution. States that it is difficult for her to use the nebulizer. We discussed how she could use her nebulizer through the tracheostomy or also through her mouth. She has purulent bronchitis. I will send in some more albuterol nebulizer, Avelox and a cough medication. I will follow-up in case of no improvement worsening symptoms. Patient is able to take orally at the moment. She has a history of vocal cord paralysis following thyroidectomy. Currently taking orally and denies aspiration or regurgitation. She does have a PEG tube in place. Previously was using this for tube feeds but currently she is only flushing it with water. Has not been using it for feeds. She has diabetes mellitus type 2. She is on insulin. States that her blood glucose numbers have been stable. Her last HbA1c was 7.1. She does need to come in for foot exam and recheck of labs. Will come in a month for these. Patient has a rash around the neck area where the tracheostomy tube is.   This is due to having a lot of secretions. There is also due to friction and contact with the mask. She does use oxygen. I will give some bacitracin to apply in that area of the open wound. Once this heals up that she could use some triamcinolone or hydrocortisone to prevent the irritation. There is no discharge in the area. Patient has anxiety and insomnia. She has been using melatonin and lorazepam.  States that if she does not take lorazepam she is unable to sleep. She is also taking Paxil. She is seen by behavioral health specialist.  She will continue with the current treatment plan. She has hypothyroidism and is on thyroid replacement therapy. Plan to recheck labs at next visit. Patient has hypertension. Blood pressure is stable. She is on Lopressor. We will continue with the current treatment plan.     Past Medical History  Past Medical History:   Diagnosis Date    Acquired hypothyroidism 5/17/2017    Anxiety 6/25/2019    Bilateral vocal cord paralysis 4/4/2019    Bilateral vocal cord paralysis status post thyroidectomy (4/4/2019 - Dr. Newton Mckeon) with residual dysphagia and dysarthria; S/P Tracheostomy (6/3/2019 - Dr. Newton Mckeon); S/P PEG tube insertion (6/20/2019) - Dr. Newton Mckeon)    Chronic back pain     Lower back pain    Depression     Diabetic neuropathy associated with type 2 diabetes mellitus ([facility] [address]     Dysphagia 6/25/2019    S/P Thyroidectomy (4/4/2019 - Dr. Newton Mckeon)    History of asthma     History of heart failure     chronic diastolic heart failure    History of vitamin D deficiency 8/28/2017    Hypertensive heart disease without heart failure 5/17/2017    Hypoxemia requiring supplemental oxygen 6/25/2019    Insomnia     Left ear hearing loss     pt states nerve damage--- 25% hearing loss, described as \"muffled\"    Memory difficulty     Moderate persistent asthma     Obesity, Class II, BMI 35-39.9 11/11/2016    Obstructive sleep apnea 11/6/2015    Panic attacks     Ringing of ears     Type 2 diabetes mellitus with diabetic neuropathy, with long-term current use of insulin ([address]     Vertigo        Surgical History  Past Surgical History:   Procedure Laterality Date    CARDIAC SURG PROCEDURE UNLIST  10/31/2013    open heart    HX HEART VALVE SURGERY  2013    aortic valve repair    HX HYSTERECTOMY      Partial Hysterectomy - removed ovary    HX MYOMECTOMY      HX ORTHOPAEDIC  02/2018    had nerves burned on her right side of back    HX TRACHEOSTOMY  06/03/2019    S/P Tracheostomy (6/3/2019 - Dr. Wendy Kilpatrick)    AL EGD PERCUTANEOUS PLACEMENT GASTROSTOMY TUBE N/A 06/20/2019    Dr. Benson Acron Bilateral 04/04/2019    Dr. Klever Isaacs        Medications  Current Outpatient Medications   Medication Sig Dispense Refill    moxifloxacin (AVELOX) 400 mg tablet Take 1 Tab by mouth daily. 10 Tab 0    promethazine-dextromethorphan (PROMETHAZINE-DM) 6.25-15 mg/5 mL syrup Take 2.5 mL by mouth every four (4) hours as needed for Cough for up to 7 days. 240 mL 1    albuterol (PROVENTIL VENTOLIN) 2.5 mg /3 mL (0.083 %) nebu 3 mL by Nebulization route every four (4) hours as needed (wheeze). Indications: Asthma Attack 24 Each 5    bacitracin-neomycin-polymyxin b-hydrocortisone (CORTISPORIN) 1 % ointment Apply  to affected area two (2) times a day. 15 g 0    ondansetron hcl (ZOFRAN) 4 mg tablet take 1 tablet PER G TUBE EVERY 8 HOURS AS NEEDED FOR NAUSEA.  IF NAUSEA PERSISTING, RECOMMEND TO DISCUSS WITH GI SPECIALIST OR PCP 30 Tab 0    levothyroxine (SYNTHROID) 150 mcg tablet take 1 tablet by mouth daily before breakfast 30 Tab 1    LORazepam (ATIVAN) 2 mg tablet take 1 tablet by mouth nightly 30 Tab 0    LANTUS U-100 INSULIN 100 unit/mL injection INJECT 40 UNITES SUBCUTANEOUSLY ONCE A DAY BEFORE BREAKFAST 30 mL 0    glucose blood VI test strips (ASCENSIA AUTODISC VI, ONE TOUCH ULTRA TEST VI) strip Check blood glucose 3 times daily 300 Strip 3    empagliflozin (JARDIANCE) 25 mg tablet Take 25 mg daily via G-tube route. Indications: type 2 diabetes mellitus 30 Tab 0    PARoxetine (PAXIL) 20 mg tablet give 1 tablet PER G TUBE DAILY 30 Tab 3    melatonin 1 mg tablet give 1 tablet  PER G TUBE NIGHTLY 30 Tab 3    metoprolol tartrate (LOPRESSOR) 25 mg tablet give 1 tablet PER G TUBE EVERY 12 HOURS 60 Tab 3    LORazepam (ATIVAN) 2 mg tablet take 1 tablet by mouth nightly 30 Tab 0    naloxone (NARCAN) 4 mg/actuation nasal spray Use 1 spray intranasally, then discard. Repeat with new spray every 2 min as needed for opioid overdose symptoms, alternating nostrils. 2 Each 0    lancets misc Check blood glucose 3 times daily 300 Each 3    insulin lispro (HUMALOG) 100 unit/mL injection 2 Units by SubCUTAneous route Before breakfast, lunch, dinner and at bedtime. To be given 30 minutes before starting each tube feeding 1 Vial 0    omega-3 acid ethyl esters (LOVAZA) 1 gram capsule take 1 capsule by mouth twice a day 60 Cap 0    atorvastatin (LIPITOR) 80 mg tablet take 1 tablet by mouth once daily 30 Tab 3    BD INSULIN SYRINGE ULTRA-FINE 1 mL 31 gauge x 5/16 syrg use as directed twice a day 200 Syringe 3    FREESTYLE LITE STRIPS strip TEST twice a day as directed 100 Strip 11    Aspirin, Buffered 81 mg tab Take 81 mg by mouth daily. Allergies  Allergies   Allergen Reactions    Other Food Other (comments)     Artificial sweeteners- causes headaches    Metformin Other (comments)     Increase pain in feet and swelling in feet  Increased hunger,tired and bilat foot pain    Morphine Other (comments)     headache    Singulair [Montelukast] Other (comments)     hallucinations    Sucrose Other (comments)     Pt. Is not allergic to sucrose. She develops headaches with artificial sweeteners.        Family History  Family History   Problem Relation Age of Onset    Heart Disease Mother     Diabetes Mother    Seema [facility]one Stroke Mother     Hypertension Mother     Diabetes Father     Heart Disease Father     Hypertension Father     Stroke Father     Diabetes Brother     Cancer Brother     Hypertension Brother     Stroke Brother     Heart Disease Brother     Diabetes Brother     Hypertension Brother     Stroke Brother     Heart Disease Brother     Diabetes Brother     Hypertension Brother     Hypertension Brother        Social History  Social History     Socioeconomic History    Marital status:      Spouse name: Not on file    Number of children: Not on file    Years of education: Not on file    Highest education level: Not on file   Occupational History    Not on file   Social Needs    Financial resource strain: Not on file    Food insecurity:     Worry: Not on file     Inability: Not on file    Transportation needs:     Medical: Not on file     Non-medical: Not on file   Tobacco Use    Smoking status: Never Smoker    Smokeless tobacco: Never Used   Substance and Sexual Activity    Alcohol use: No    Drug use: No    Sexual activity: Not Currently   Lifestyle    Physical activity:     Days per week: Not on file     Minutes per session: Not on file    Stress: Not on file   Relationships    Social connections:     Talks on phone: Not on file     Gets together: Not on file     Attends Worship service: Not on file     Active member of club or organization: Not on file     Attends meetings of clubs or organizations: Not on file     Relationship status: Not on file    Intimate partner violence:     Fear of current or ex partner: Not on file     Emotionally abused: Not on file     Physically abused: Not on file     Forced sexual activity: Not on file   Other Topics Concern     Service No    Blood Transfusions No    Caffeine Concern No    Occupational Exposure No    Hobby Hazards No    Sleep Concern Yes     Comment: Sleep apnea     Stress Concern Yes     Comment: Due to family medical issues.      Weight Concern No    Special Diet No    Back Care Yes Comment: Patient tries to do back care with streches     Exercise No    Bike Helmet Yes    Seat Belt Yes    Self-Exams Yes   Social History Narrative    Not on file       Review of Systems  Review of Systems - Review of all systems is negative except as noted above in the HPI. Vital Signs  Visit Vitals  /67 (BP 1 Location: Left arm, BP Patient Position: Sitting)   Pulse (!) 58   Temp 97.5 °F (36.4 °C) (Oral)   Resp 16   Ht 5' 7\" (1.702 m)   Wt 262 lb (118.8 kg)   SpO2 99%   BMI 41.04 kg/m²         Physical Exam  Physical Examination: General appearance - oriented to person, place, and time, overweight, acyanotic, in no respiratory distress and chronically ill appearing  Mental status - affect appropriate to mood  Eyes - sclera anicteric  Nose - normal and patent, no erythema, discharge or polyps  Mouth -dry oral cavity  Neck -tracheostomy in place, it has some secretions  Lymphatics - no palpable lymphadenopathy  Chest - wheezing noted bilateral lung zones  Heart - S1 and S2 normal  Abdomen - no rebound tenderness noted  Neurological - abnormal neurological exam unchanged from prior examinations  Musculoskeletal - osteoarthritic changes noted in both hands  Extremities - intact peripheral pulses  Skin -patient has erythematous patches around her neck where there is contact with oxygen mask    Results  Results for orders placed or performed during the hospital encounter of 08/10/19   [facility] SPECIMEN COL   Result Value Ref Range    [facility] SPECIMEN [facility]. Specimens collected/sent to [facility]. Please direct inquiries to ([phone]). ASSESSMENT and PLAN    ICD-10-CM ICD-9-CM    1. Asthmatic bronchitis, mild intermittent, with acute exacerbation J45.21 493.92 moxifloxacin (AVELOX) 400 mg tablet      promethazine-dextromethorphan (PROMETHAZINE-DM) 6.25-15 mg/5 mL syrup      albuterol (PROVENTIL VENTOLIN) 2.5 mg /3 mL (0.083 %) nebu   2.  Rash R21 782.1 bacitracin-neomycin-polymyxin b-hydrocortisone (CORTISPORIN) 1 % ointment   3. Type 2 diabetes mellitus with diabetic neuropathy, with long-term current use of insulin (HCC) E11.40 250.60     Z79.4 357.2      V58.67    4. Trache[facility] in place ([facility] Z93.0 V44.0    5. Mood disorder ([facility]) F39 296.90    6. Hypothyroidism due to acquired atrophy of thyroid E03.4 244.8      246.8    7. Essential hypertension I10 401.9    8. Insomnia, unspecified type G47.00 780.52      reviewed diet, exercise and weight control  cardiovascular risk and specific lipid/LDL goals reviewed  reviewed medications and side effects in detail  specific diabetic recommendations: low cholesterol diet, weight control and daily exercise discussed, home glucose monitoring emphasized, all medications, side effects and compliance discussed carefully, glycohemoglobin and other lab monitoring discussed and long term diabetic complications discussed    I have discussed the diagnosis with the patient and the intended plan of care as seen in the above orders. The patient has received an after-visit summary and questions were answered concerning future plans. I have discussed medication, side effects, and warnings with the patient in detail. The patient verbalized understanding and is in agreement with the plan of care. The patient will contact the office with any additional concerns. More than 50% of 40 minute visit spent counseling and coordinating care with patient face to face on asthma control, bronchitis, tracheostomy care and dependency, diabetes mellitus and lifestyle and dietary modifications. Discussed need for compliance with treatment regimen, follow-up, and taking responsibility for her disease process. Madyson Gonzalez MD    PLEASE NOTE:   This document has been produced using voice recognition software.  Unrecognized errors in transcription may be present

## 2019-11-12 NOTE — LETTER
11/12/2019 6:00 PM 
 
Ms. Aurelia Yang [address] [id number] [facility] [address] To Whom It May Concern: 
 
Aurelia Yang is currently under the care of [facility]. She has multiple chronic medical illnesses. She is on oxygen. She does have hypoxic respiratory failure. She does need to move around with an oxygen tank. She also has impaired mobility. As a result she is not able to go up and down stairs. She would benefit from getting an apartment on the ground floor as this helped in her mobility needs. If there are questions or concerns please have the patient contact our office. Sincerely, Dorian Ceballos MD

## 2019-12-02 RX ORDER — LEVOTHYROXINE SODIUM 150 UG/1
TABLET ORAL
Qty: 30 TAB | Refills: 1 | Status: SHIPPED | OUTPATIENT
Start: 2019-12-02 | End: 2019-12-17 | Stop reason: DRUGHIGH

## 2019-12-10 DIAGNOSIS — G47.00 INSOMNIA, UNSPECIFIED TYPE: Chronic | ICD-10-CM

## 2019-12-10 DIAGNOSIS — R13.10 DYSPHAGIA, UNSPECIFIED TYPE: ICD-10-CM

## 2019-12-11 ENCOUNTER — OFFICE VISIT (OUTPATIENT)
Dept: FAMILY MEDICINE CLINIC | Age: 59
End: 2019-12-11

## 2019-12-11 VITALS
BODY MASS INDEX: 41.12 KG/M2 | TEMPERATURE: 97.2 F | HEART RATE: 80 BPM | WEIGHT: 262 LBS | DIASTOLIC BLOOD PRESSURE: 72 MMHG | SYSTOLIC BLOOD PRESSURE: 139 MMHG | HEIGHT: 67 IN | OXYGEN SATURATION: 99 % | RESPIRATION RATE: 16 BRPM

## 2019-12-11 DIAGNOSIS — E11.40 TYPE 2 DIABETES MELLITUS WITH DIABETIC NEUROPATHY, WITH LONG-TERM CURRENT USE OF INSULIN (HCC): Primary | ICD-10-CM

## 2019-12-11 DIAGNOSIS — F41.9 ANXIETY: ICD-10-CM

## 2019-12-11 DIAGNOSIS — E56.9 HYPOVITAMINOSIS: ICD-10-CM

## 2019-12-11 DIAGNOSIS — E03.4 HYPOTHYROIDISM DUE TO ACQUIRED ATROPHY OF THYROID: ICD-10-CM

## 2019-12-11 DIAGNOSIS — Z79.4 TYPE 2 DIABETES MELLITUS WITH DIABETIC NEUROPATHY, WITH LONG-TERM CURRENT USE OF INSULIN (HCC): Primary | ICD-10-CM

## 2019-12-11 DIAGNOSIS — I10 ESSENTIAL HYPERTENSION: ICD-10-CM

## 2019-12-11 DIAGNOSIS — F39 MOOD DISORDER (HCC): ICD-10-CM

## 2019-12-11 DIAGNOSIS — G47.00 INSOMNIA, UNSPECIFIED TYPE: Chronic | ICD-10-CM

## 2019-12-11 LAB — HBA1C MFR BLD HPLC: 8 %

## 2019-12-11 RX ORDER — DULOXETIN HYDROCHLORIDE 60 MG/1
60 CAPSULE, DELAYED RELEASE ORAL DAILY
Qty: 30 CAP | Refills: 3 | Status: SHIPPED | OUTPATIENT
Start: 2019-12-11 | End: 2021-02-17

## 2019-12-11 RX ORDER — LORAZEPAM 2 MG/1
TABLET ORAL
Qty: 30 TAB | Refills: 0 | Status: SHIPPED | OUTPATIENT
Start: 2019-12-11 | End: 2020-01-20

## 2019-12-11 RX ORDER — UREA 10 %
LOTION (ML) TOPICAL
Qty: 30 TAB | Refills: 3 | Status: SHIPPED | OUTPATIENT
Start: 2019-12-11 | End: 2020-01-22 | Stop reason: SDUPTHER

## 2019-12-11 RX ORDER — METOPROLOL SUCCINATE 50 MG/1
50 TABLET, EXTENDED RELEASE ORAL DAILY
Qty: 30 TAB | Refills: 3 | Status: SHIPPED | OUTPATIENT
Start: 2019-12-11 | End: 2020-06-18

## 2019-12-11 NOTE — PROGRESS NOTES
Chief Complaint   Patient presents with    Diabetes    Anxiety     1. Have you been to the ER, urgent care clinic since your last visit? Hospitalized since your last visit? No    2. Have you seen or consulted any other health care providers outside of the [address] [place] [address] since your last visit? Include any pap smears or colon screening. No     HPI  Pamella Cleveland comes in for follow-up care. Patient has a history of thyroid nodules and is status post thyroidectomy which resulted in bilateral vocal cord paralysis. She has tracheostomy in place that was placed by Dr. greene also has a PEG tube in place. She is a patient who has diabetes mellitus type 2. Today comes in for follow-up care. Diabetes mellitus type 2: Patient is on insulin and Jardiance. Has not been checking her blood glucose numbers regularly. We will check labs today. Will do an HbA1c. At times her blood glucose numbers have been fluctuating and lately these have been up. Hypertension: Patient is on Toprol-XL. Her blood pressure is stable. We will continue with the current treatment plan. She denies headache, changes in vision or focal weakness. Anxiety: Patient has anxiety and feels depressed. She has been on Paxil but this has not helped much. Previously she was on Cymbalta. This was for neuropathy and back pain. I discussed the role of Cymbalta in mood disorder. She would like to try this medication. I will send in a prescription of Cymbalta 60 mg daily. I will follow-up at next visit. At times she is tearful. All this is due to her current health status. She feels that things have not improved. States that she is not in a position she expected to be at few months ago. Hypothyroidism. Patient is on thyroid replacement therapy. We will check her TSH. She has malaise and fatigue. She feels she has no energy. She has no motivation to do anything. She has also gained weight. Currently takes 150 mcg daily. This has been followed up by her specialist.  Insomnia: Patient takes melatonin. I will send in a refill of the medication. Tracheostomy: Patient has a tracheostomy tube in place. She does have episodes of coughing and occasional problems with expectoration due to secretions. She has a mucolytic prescribed. She should use this. Also they should continue with strict hygiene. Hypovitaminosis D: Patient has vitamin D deficiency. Will send in vitamin D replacement. Will recheck labs.     Past Medical History  Past Medical History:   Diagnosis Date    Acquired hypothyroidism 5/17/2017    Anxiety 6/25/2019    Bilateral vocal cord paralysis 4/4/2019    Bilateral vocal cord paralysis status post thyroidectomy (4/4/2019 - Dr. Shoshana Dawson) with residual dysphagia and dysarthria; S/P Tracheostomy (6/3/2019 - Dr. Shoshana Dawson); S/P PEG tube insertion (6/20/2019) - Dr. Shoshana Dawson)    Chronic back pain     Lower back pain    Depression     Diabetic neuropathy associated with type 2 diabetes mellitus ([address]     Dysphagia 6/25/2019    S/P Thyroidectomy (4/4/2019 - Dr. Shoshana Dawson)    History of asthma     History of heart failure     chronic diastolic heart failure    History of vitamin D deficiency 8/28/2017    Hypertensive heart disease without heart failure 5/17/2017    Hypoxemia requiring supplemental oxygen 6/25/2019    Insomnia     Left ear hearing loss     pt states nerve damage--- 25% hearing loss, described as \"muffled\"    Memory difficulty     Moderate persistent asthma     Obesity, Class II, BMI 35-39.9 11/11/2016    Obstructive sleep apnea 11/6/2015    Panic attacks     Ringing of ears     Type 2 diabetes mellitus with diabetic neuropathy, with long-term current use of insulin ([address]     Vertigo        Surgical History  Past Surgical History:   Procedure Laterality Date    CARDIAC SURG PROCEDURE UNLIST  10/31/2013    open heart    HX HEART VALVE SURGERY  2013    aortic valve repair    HX HYSTERECTOMY      Partial Hysterectomy - removed ovary    HX MYOMECTOMY      HX ORTHOPAEDIC  02/2018    had nerves burned on her right side of back    HX TRACHEOSTOMY  06/03/2019    S/P Tracheostomy (6/3/2019 - Dr. Je Marrero)    [place] EGD PERCUTANEOUS PLACEMENT GASTROSTOMY TUBE N/A 06/20/2019    Dr. Zev Zheng Bilateral 04/04/2019    Dr. Suki Corona        Medications  Current Outpatient Medications   Medication Sig Dispense Refill    LORazepam (ATIVAN) 2 mg tablet take 1 tablet by mouth nightly 30 Tab 0    melatonin 1 mg tablet give 1 tablet  PER G TUBE NIGHTLY 30 Tab 3    metoprolol succinate (TOPROL-XL) 50 mg XL tablet Take 1 Tab by mouth daily. 30 Tab 3    DULoxetine (CYMBALTA) 60 mg capsule Take 1 Cap by mouth daily. 30 Cap 3    levothyroxine (SYNTHROID) 150 mcg tablet take 1 tablet by mouth once daily before breakfast 30 Tab 1    EXPECTORANT COUGH SYRUP 100 mg/5 mL liquid take 10 milliliters by mouth three times a day if needed for cough 473 mL 1    ondansetron hcl (ZOFRAN) 4 mg tablet take 1 tablet PER G TUBE every 8 hours if needed for nausea . IF NAUSEA PERSISTS, RECOMMEND DISCUSSING WITH GI SPECIALIST OR PCP 30 Tab 0    LANTUS U-100 INSULIN 100 unit/mL injection INJECT 40 UNITS SUBCUTANEOUSLY ONCE A DAY BEFORE BREAKFAST 30 mL 0    albuterol (PROVENTIL VENTOLIN) 2.5 mg /3 mL (0.083 %) nebu 3 mL by Nebulization route every four (4) hours as needed (wheeze). Indications: Asthma Attack 24 Each 5    bacitracin-neomycin-polymyxin b-hydrocortisone (CORTISPORIN) 1 % ointment Apply  to affected area two (2) times a day. 15 g 0    glucose blood VI test strips (ASCENSIA AUTODISC VI, ONE TOUCH ULTRA TEST VI) strip Check blood glucose 3 times daily 300 Strip 3    empagliflozin (JARDIANCE) 25 mg tablet Take 25 mg daily via G-tube route. Indications: type 2 diabetes mellitus 30 Tab 0    naloxone (NARCAN) 4 mg/actuation nasal spray Use 1 spray intranasally, then discard.  Repeat with new spray every 2 min as needed for opioid overdose symptoms, alternating nostrils. 2 Each 0    lancets misc Check blood glucose 3 times daily 300 Each 3    insulin lispro (HUMALOG) 100 unit/mL injection 2 Units by SubCUTAneous route Before breakfast, lunch, dinner and at bedtime. To be given 30 minutes before starting each tube feeding 1 Vial 0    omega-3 acid ethyl esters (LOVAZA) 1 gram capsule take 1 capsule by mouth twice a day 60 Cap 0    atorvastatin (LIPITOR) 80 mg tablet take 1 tablet by mouth once daily 30 Tab 3    BD INSULIN SYRINGE ULTRA-FINE 1 mL 31 gauge x 5/16 syrg use as directed twice a day 200 Syringe 3    FREESTYLE LITE STRIPS strip TEST twice a day as directed 100 Strip 11    Aspirin, Buffered 81 mg tab Take 81 mg by mouth daily. Allergies  Allergies   Allergen Reactions    Other Food Other (comments)     Artificial sweeteners- causes headaches    Metformin Other (comments)     Increase pain in feet and swelling in feet  Increased hunger,tired and bilat foot pain    Morphine Other (comments)     headache    Singulair [Montelukast] Other (comments)     hallucinations    Sucrose Other (comments)     Pt. Is not allergic to sucrose. She develops headaches with artificial sweeteners.        Family History  Family History   Problem Relation Age of Onset    Heart Disease Mother     Diabetes Mother     Stroke Mother     Hypertension Mother     Diabetes Father     Heart Disease Father     Hypertension Father     Stroke Father     Diabetes Brother     Cancer Brother     Hypertension Brother     Stroke Brother     Heart Disease Brother     Diabetes Brother     Hypertension Brother     Stroke Brother     Heart Disease Brother     Diabetes Brother     Hypertension Brother     Hypertension Brother        Social History  Social History     Socioeconomic History    Marital status:      Spouse name: Not on file    Number of children: Not on file    Years of education: Not on file    Highest education level: Not on file   Occupational History    Not on file   Social Needs    Financial resource strain: Not on file    Food insecurity:     Worry: Not on file     Inability: Not on file    Transportation needs:     Medical: Not on file     Non-medical: Not on file   Tobacco Use    Smoking status: Never Smoker    Smokeless tobacco: Never Used   Substance and Sexual Activity    Alcohol use: No    Drug use: No    Sexual activity: Not Currently   Lifestyle    Physical activity:     Days per week: Not on file     Minutes per session: Not on file    Stress: Not on file   Relationships    Social connections:     Talks on phone: Not on file     Gets together: Not on file     Attends Sikh service: Not on file     Active member of club or organization: Not on file     Attends meetings of clubs or organizations: Not on file     Relationship status: Not on file    Intimate partner violence:     Fear of current or ex partner: Not on file     Emotionally abused: Not on file     Physically abused: Not on file     Forced sexual activity: Not on file   Other Topics Concern     Service No    Blood Transfusions No    Caffeine Concern No    Occupational Exposure No    Hobby Hazards No    Sleep Concern Yes     Comment: Sleep apnea     Stress Concern Yes     Comment: Due to family medical issues.  Weight Concern No    Special Diet No    Back Care Yes     Comment: Patient tries to do back care with streches     Exercise No    Bike Helmet Yes    Seat Belt Yes    Self-Exams Yes   Social History Narrative    Not on file       Review of Systems  Review of Systems - Review of all systems is negative except as noted above in the HPI.     Vital Signs  Visit Vitals  /72 (BP 1 Location: Left arm, BP Patient Position: Sitting)   Pulse 80   Temp 97.2 °F (36.2 °C) (Oral)   Resp 16   Ht 5' 7\" (1.702 m)   Wt 262 lb (118.8 kg)   SpO2 99%   BMI 41.04 kg/m² Physical Exam  Physical Examination: General appearance - overweight and chronically ill appearing  Mental status - depressed mood  Mouth - mucous membranes moist, pharynx normal without lesions  Neck -tracheostomy tube in place  Lymphatics - no palpable lymphadenopathy  Chest - wheezing noted midlung zones, decreased air entry noted bilateral lung bases  Heart - S1 and S2 normal  Abdomen - no rebound tenderness noted  She has a PEG tube in place  Back exam - limited range of motion  Neurological - abnormal neurological exam unchanged from prior examinations  Musculoskeletal - osteoarthritic changes noted in both hands  Extremities - intact peripheral pulses    Results  Results for orders placed or performed during the hospital encounter of 08/10/19   [facility] SPECIMEN COL   Result Value Ref Range    [facility] SPEC[facility]. Specimens collected/sent to [facility]. Please direct inquiries to ([phone]). ASSESSMENT and PLAN    ICD-10-CM ICD-9-CM    1. Type 2 diabetes mellitus with diabetic neuropathy, with long-term current use of insulin ([facility]) E11.40 250.60 AMB POC HEMOGLOBIN A1C    Z79.4 357.2 HEMOGLOBIN A1C WITH EAG     V58.67 COLLECTION VENOUS BLOOD,VENIPUNCTURE   2. Anxiety F41.9 300.00 LORazepam (ATIVAN) 2 mg tablet   3. Insomnia, unspecified type G47.00 780.52 melatonin 1 mg tablet   4. Mood disorder ([facility]) F39 296.90 DULoxetine (CYMBALTA) 60 mg capsule   5. Hypothyroidism due to acquired atrophy of thyroid E03.4 244.8 TSH 3RD GENERATION     246.8 COLLECTION VENOUS BLOOD,VENIPUNCTURE   6. Essential hypertension I10 401.9 metoprolol succinate (TOPROL-XL) 50 mg XL tablet      CBC WITH AUTOMATED DIFF      METABOLIC PANEL, COMPREHENSIVE      LIPID PANEL      COLLECTION VENOUS BLOOD,VENIPUNCTURE   7.  Hypovitaminosis E56.9 269.2 VITAMIN B12      VITAMIN D, 25 HYDROXY      COLLECTION VENOUS BLOOD,VENIPUNCTURE     lab results and schedule of future lab studies reviewed with patient  reviewed diet, exercise and weight control  cardiovascular risk and specific lipid/LDL goals reviewed  reviewed medications and side effects in detail  specific diabetic recommendations: low cholesterol diet, weight control and daily exercise discussed, home glucose monitoring emphasized, all medications, side effects and compliance discussed carefully, glycohemoglobin and other lab monitoring discussed and long term diabetic complications discussed    I have discussed the diagnosis with the patient and the intended plan of care as seen in the above orders. The patient has received an after-visit summary and questions were answered concerning future plans. I have discussed medication, side effects, and warnings with the patient in detail. The patient verbalized understanding and is in agreement with the plan of care. The patient will contact the office with any additional concerns. More than 50% of 50-minute visit spent counseling and coordinating care with patient face to face on depression, anxiety and management options. Also discussed diabetes mellitus and the ways to control the same including dietary modification. Discussed need for compliance with treatment regimen, follow-up, and taking responsibility for her disease process. Korina Diane MD    PLEASE NOTE:   This document has been produced using voice recognition software.  Unrecognized errors in transcription may be present

## 2019-12-12 RX ORDER — UREA 10 %
1 LOTION (ML) TOPICAL
Qty: 90 TAB | Refills: 1 | Status: SHIPPED | OUTPATIENT
Start: 2019-12-12 | End: 2020-11-30

## 2019-12-12 RX ORDER — ONDANSETRON 4 MG/1
TABLET, FILM COATED ORAL
Qty: 30 TAB | Refills: 0 | Status: SHIPPED | OUTPATIENT
Start: 2019-12-12 | End: 2020-01-08

## 2019-12-12 NOTE — PROGRESS NOTES
Please let patient know her HbA1c is 8.0. This is an improvement from previous. Continue current management plan.   Negrita Huitron MD

## 2019-12-12 NOTE — PROGRESS NOTES
Called patient at this time. No answer noted at this time. LVM with results. Patient was informed of A1C in the office by me

## 2019-12-13 LAB
25(OH)D3+25(OH)D2 SERPL-MCNC: NORMAL NG/ML
ALBUMIN SERPL-MCNC: 4.1 G/DL (ref 3.5–5.5)
ALBUMIN/GLOB SERPL: 1.3 {RATIO} (ref 1.2–2.2)
ALP SERPL-CCNC: 121 IU/L (ref 39–117)
ALT SERPL-CCNC: 13 IU/L (ref 0–32)
AST SERPL-CCNC: 20 IU/L (ref 0–40)
BASOPHILS # BLD AUTO: 0.1 X10E3/UL (ref 0–0.2)
BASOPHILS NFR BLD AUTO: 1 %
BILIRUB SERPL-MCNC: 0.3 MG/DL (ref 0–1.2)
BUN SERPL-MCNC: 17 MG/DL (ref 6–24)
BUN/CREAT SERPL: 21 (ref 9–23)
CALCIUM SERPL-MCNC: 9.1 MG/DL (ref 8.7–10.2)
CHLORIDE SERPL-SCNC: 99 MMOL/L (ref 96–106)
CHOLEST SERPL-MCNC: 353 MG/DL (ref 100–199)
CO2 SERPL-SCNC: 18 MMOL/L (ref 20–29)
CREAT SERPL-MCNC: 0.81 MG/DL (ref 0.57–1)
EOSINOPHIL # BLD AUTO: 0.1 X10E3/UL (ref 0–0.4)
EOSINOPHIL NFR BLD AUTO: 1 %
ERYTHROCYTE [DISTWIDTH] IN BLOOD BY AUTOMATED COUNT: 15.4 % (ref 12.3–15.4)
EST. AVERAGE GLUCOSE BLD GHB EST-MCNC: 174 MG/DL
GLOBULIN SER CALC-MCNC: 3.1 G/DL (ref 1.5–4.5)
GLUCOSE SERPL-MCNC: ABNORMAL MG/DL
HBA1C MFR BLD: 7.7 % (ref 4.8–5.6)
HCT VFR BLD AUTO: 45 % (ref 34–46.6)
HDLC SERPL-MCNC: 21 MG/DL
HGB BLD-MCNC: 15.6 G/DL (ref 11.1–15.9)
IMM GRANULOCYTES # BLD AUTO: 0.1 X10E3/UL (ref 0–0.1)
IMM GRANULOCYTES NFR BLD AUTO: 1 %
INTERPRETATION, 910389: NORMAL
LDLC SERPL CALC-MCNC: ABNORMAL MG/DL (ref 0–99)
LYMPHOCYTES # BLD AUTO: 2.3 X10E3/UL (ref 0.7–3.1)
LYMPHOCYTES NFR BLD AUTO: 31 %
Lab: NORMAL
MCH RBC QN AUTO: 31 PG (ref 26.6–33)
MCHC RBC AUTO-ENTMCNC: 34.7 G/DL (ref 31.5–35.7)
MCV RBC AUTO: 89 FL (ref 79–97)
MONOCYTES # BLD AUTO: 0.4 X10E3/UL (ref 0.1–0.9)
MONOCYTES NFR BLD AUTO: 6 %
NEUTROPHILS # BLD AUTO: 4.4 X10E3/UL (ref 1.4–7)
NEUTROPHILS NFR BLD AUTO: 60 %
PLATELET # BLD AUTO: 226 X10E3/UL (ref 150–450)
POTASSIUM SERPL-SCNC: ABNORMAL MMOL/L
PROT SERPL-MCNC: 7.2 G/DL (ref 6–8.5)
RBC # BLD AUTO: 5.04 X10E6/UL (ref 3.77–5.28)
SODIUM SERPL-SCNC: 138 MMOL/L (ref 134–144)
TRIGL SERPL-MCNC: 2017 MG/DL (ref 0–149)
TSH SERPL DL<=0.005 MIU/L-ACNC: 31.45 UIU/ML (ref 0.45–4.5)
VIT B12 SERPL-MCNC: 303 PG/ML (ref 232–1245)
VLDLC SERPL CALC-MCNC: ABNORMAL MG/DL (ref 5–40)
WBC # BLD AUTO: 7.4 X10E3/UL (ref 3.4–10.8)

## 2019-12-17 RX ORDER — LEVOTHYROXINE SODIUM 200 UG/1
200 TABLET ORAL
Qty: 30 TAB | Refills: 2 | Status: SHIPPED | OUTPATIENT
Start: 2019-12-17 | End: 2020-03-09

## 2019-12-17 NOTE — PROGRESS NOTES
Patient is already taking Lovaza which is the fish oil. She is on Lipitor 80 mg daily. I will have her continue with these medications. We do need to check a fasting lipid panel. Will hold off on sending the Crestor for now until we do the fasting lipid panel. She should however in the meantime intensify lifestyle and dietary modification. She should take a diet low in polysaturated fats.   Comfort Singh MD

## 2019-12-17 NOTE — PROGRESS NOTES
Please let patient know her TSH level is elevated. This means her thyroid level is low. I would recommend going up to 200 mcg of the levothyroxine. I will send in the increased dose of the levothyroxine. She should follow-up with her specialist.  Her triglyceride level is elevated. I will send in Crestor to take daily. We will recheck labs in 3 months. She should take a diet low in polysaturated fats. She would also benefit from taking a fish oil thousand milligrams omega-3 daily. I will send this into the pharmacy. Her hemoglobin HbA1c is 7.7. She should intensify lifestyle and dietary modification. She should continue to take diabetes medication.   German Mayer MD

## 2019-12-18 NOTE — PROGRESS NOTES
Spoke with patient at this time. Informed patient of results at this time. Patient verbalized understanding at this time

## 2019-12-30 ENCOUNTER — OFFICE VISIT (OUTPATIENT)
Dept: SURGERY | Age: 59
End: 2019-12-30

## 2019-12-30 DIAGNOSIS — E89.0 S/P TOTAL THYROIDECTOMY: Primary | ICD-10-CM

## 2019-12-30 NOTE — PROGRESS NOTES
Progress Note    Patient: Gisselle Khan  MRN: [id number]  SSN: [id number]   YOB: 1960  Age: 63 y.o. Sex: female     No chief complaint on file. HPI    Ms. Ana Crisostomo is a 59-year-old woman well-known to me with bilateral vocal cord paralysis after a total thyroidectomy 6 months ago. This required a trach and PEG and a long hospitalization in follow-up. Today she is back for ongoing follow-up and is significantly improved. Her voice when using her finger over her tracheostomy is essentially normal.  Apparently she is seen an ENT in [place] who is done a laryngoscopy and tells her that her left cord is working reasonably and her right cord is still paralyzed. She looks good is much more nutritionally sound today and is tolerating p.o. and not using her PEG tube. She may end up getting a wedge for her right cord so I will leave her PEG tube in for now. Overall I am very pleased with her progress over the last 3 months and have given her encouragement regarding this.     Past Medical History:   Diagnosis Date    Acquired hypothyroidism 5/17/2017    Anxiety 6/25/2019    Bilateral vocal cord paralysis 4/4/2019    Bilateral vocal cord paralysis status post thyroidectomy (4/4/2019 - Dr. Vipin Ge) with residual dysphagia and dysarthria; S/P Tracheostomy (6/3/2019 - Dr. Vipin Ge); S/P PEG tube insertion (6/20/2019) - Dr. Vipin Ge)    Chronic back pain     Lower back pain    Depression     Diabetic neuropathy associated with type 2 diabetes mellitus ([address]     Dysphagia 6/25/2019    S/P Thyroidectomy (4/4/2019 - Dr. Vipin Ge)    History of asthma     History of heart failure     chronic diastolic heart failure    History of vitamin D deficiency 8/28/2017    Hypertensive heart disease without heart failure 5/17/2017    Hypoxemia requiring supplemental oxygen 6/25/2019    Insomnia     Left ear hearing loss     pt states nerve damage--- 25% hearing loss, described as \"muffled\"    Memory difficulty     Moderate persistent asthma     Obesity, Class II, BMI 35-39.9 11/11/2016    Obstructive sleep apnea 11/6/2015    Panic attacks     Ringing of ears     Type 2 diabetes mellitus with diabetic neuropathy, with long-term current use of insulin (HCC)     Vertigo      Past Surgical History:   Procedure Laterality Date    CARDIAC SURG PROCEDURE UNLIST  10/31/2013    open heart    HX HEART VALVE SURGERY  2013    aortic valve repair    HX HYSTERECTOMY      Partial Hysterectomy - removed ovary    HX MYOMECTOMY      HX ORTHOPAEDIC  02/2018    had nerves burned on her right side of back    HX TRACHEOSTOMY  06/03/2019    S/P Tracheostomy (6/3/2019 - Dr. Stefanie Stephenson)    NC EGD PERCUTANEOUS PLACEMENT GASTROSTOMY TUBE N/A 06/20/2019    Dr. Brody Dickson Bilateral 04/04/2019    Dr. Citlali Hinkle Other (comments)     Artificial sweeteners- causes headaches    Metformin Other (comments)     Increase pain in feet and swelling in feet  Increased hunger,tired and bilat foot pain    Morphine Other (comments)     headache    Singulair [Montelukast] Other (comments)     hallucinations    Sucrose Other (comments)     Pt. Is not allergic to sucrose. She develops headaches with artificial sweeteners. Current Outpatient Medications   Medication Sig Dispense Refill    levothyroxine (SYNTHROID) 200 mcg tablet Take 1 Tab by mouth Daily (before breakfast). 30 Tab 2    ondansetron hcl (ZOFRAN) 4 mg tablet GIVE 1 TABLET VIA G TUBE EVERY 8 HOURS IF NEEDED FOR NAUSEA. IF NAUSEA PERSISTS, RECOMMEND DISCUSSING WITH GI SPECIALIST OR PCP 30 Tab 0    melatonin 1 mg tablet Take 1 Tab by mouth nightly. 90 Tab 1    LORazepam (ATIVAN) 2 mg tablet take 1 tablet by mouth nightly 30 Tab 0    melatonin 1 mg tablet give 1 tablet  PER G TUBE NIGHTLY 30 Tab 3    metoprolol succinate (TOPROL-XL) 50 mg XL tablet Take 1 Tab by mouth daily.  30 Tab 3  DULoxetine (CYMBALTA) 60 mg capsule Take 1 Cap by mouth daily. 30 Cap 3    EXPECTORANT COUGH SYRUP 100 mg/5 mL liquid take 10 milliliters by mouth three times a day if needed for cough 473 mL 1    LANTUS U-100 INSULIN 100 unit/mL injection INJECT 40 UNITS SUBCUTANEOUSLY ONCE A DAY BEFORE BREAKFAST 30 mL 0    albuterol (PROVENTIL VENTOLIN) 2.5 mg /3 mL (0.083 %) nebu 3 mL by Nebulization route every four (4) hours as needed (wheeze). Indications: Asthma Attack 24 Each 5    bacitracin-neomycin-polymyxin b-hydrocortisone (CORTISPORIN) 1 % ointment Apply  to affected area two (2) times a day. 15 g 0    glucose blood VI test strips (ASCENSIA AUTODISC VI, ONE TOUCH ULTRA TEST VI) strip Check blood glucose 3 times daily 300 Strip 3    empagliflozin (JARDIANCE) 25 mg tablet Take 25 mg daily via G-tube route. Indications: type 2 diabetes mellitus 30 Tab 0    naloxone (NARCAN) 4 mg/actuation nasal spray Use 1 spray intranasally, then discard. Repeat with new spray every 2 min as needed for opioid overdose symptoms, alternating nostrils. 2 Each 0    lancets misc Check blood glucose 3 times daily 300 Each 3    insulin lispro (HUMALOG) 100 unit/mL injection 2 Units by SubCUTAneous route Before breakfast, lunch, dinner and at bedtime. To be given 30 minutes before starting each tube feeding 1 Vial 0    omega-3 acid ethyl esters (LOVAZA) 1 gram capsule take 1 capsule by mouth twice a day 60 Cap 0    atorvastatin (LIPITOR) 80 mg tablet take 1 tablet by mouth once daily 30 Tab 3    BD INSULIN SYRINGE ULTRA-FINE 1 mL 31 gauge x 5/16 syrg use as directed twice a day 200 Syringe 3    FREESTYLE LITE STRIPS strip TEST twice a day as directed 100 Strip 11    Aspirin, Buffered 81 mg tab Take 81 mg by mouth daily.        Social History     Socioeconomic History    Marital status:      Spouse name: Not on file    Number of children: Not on file    Years of education: Not on file    Highest education level: Not on file   Occupational History    Not on file   Social Needs    Financial resource strain: Not on file    Food insecurity:     Worry: Not on file     Inability: Not on file    Transportation needs:     Medical: Not on file     Non-medical: Not on file   Tobacco Use    Smoking status: Never Smoker    Smokeless tobacco: Never Used   Substance and Sexual Activity    Alcohol use: No    Drug use: No    Sexual activity: Not Currently   Lifestyle    Physical activity:     Days per week: Not on file     Minutes per session: Not on file    Stress: Not on file   Relationships    Social connections:     Talks on phone: Not on file     Gets together: Not on file     Attends Yarsani service: Not on file     Active member of club or organization: Not on file     Attends meetings of clubs or organizations: Not on file     Relationship status: Not on file    Intimate partner violence:     Fear of current or ex partner: Not on file     Emotionally abused: Not on file     Physically abused: Not on file     Forced sexual activity: Not on file   Other Topics Concern     Service No    Blood Transfusions No    Caffeine Concern No    Occupational Exposure No    Hobby Hazards No    Sleep Concern Yes     Comment: Sleep apnea     Stress Concern Yes     Comment: Due to family medical issues.      Weight Concern No    Special Diet No    Back Care Yes     Comment: Patient tries to do back care with streches     Exercise No    Bike Helmet Yes   [address] [facility] Yes    Self-Exams Yes   Social History Narrative    Not on file     Family History   Problem Relation Age of Onset    Heart Disease Mother     Diabetes Mother     Stroke Mother     Hypertension Mother     Diabetes Father     Heart Disease Father     Hypertension Father     Stroke Father     Diabetes Brother     Cancer Brother     Hypertension Brother     Stroke Brother     Heart Disease Brother     Diabetes Brother     Hypertension Brother  Stroke Brother     Heart Disease Brother     Diabetes Brother     Hypertension Brother     Hypertension Brother          Review of systems:  Patient denies any reflux, emesis, abdominal pain, change in bowel habits, hematochezia, melena, fever, weight loss, fatigue chills, dermatitis, abnormal moles, change in vision, vertigo, epistaxis, dysphagia, hoarseness, chest pain, palpitations, hypertension, edema, cough, shortness of breath, wheezing, hemoptysis, snoring, hematuria, diabetes, thyroid disease, anemia, bruising, history of blood transfusion, dizziness, headache, or fainting. Physical Examination    Well developed well nourished female in no apparent distress  There were no vitals taken for this visit. Head: normocephalic, atraumatic  Mouth: Clear, no overt lesions, oral mucosa pink and moist  Neck: supple, no masses, no adenopathy or carotid bruits, trachea midline tracheostomy tube in place resp: clear to auscultation bilaterally, no wheeze, rhonchi or rales, excursions normal and symmetrical  Cardio: Regular rate and rhythm, no murmurs, clicks, gallops or rubs, no edema or varicosities  Abdomen: soft, nontender, nondistended, normoactive bowel sounds, no hernias, no hepatosplenomegaly, PEG in place  Back: Deferred  Extremeties: warm, well-perfused, no tenderness or swelling, normal gait/station  Neuro: sensation and strength grossly intact and symmetrical  Psych: alert and oriented to person, place and time  Breast exam deferred    IMPRESSION  Significant improvement in laryngeal function over the last 3 months. PLAN  No orders of the defined types were placed in this encounter.     Aloe up with ENT in [place] in my absence  Jacqueline Mccauley MD

## 2020-01-06 DIAGNOSIS — E56.9 HYPOVITAMINOSIS: ICD-10-CM

## 2020-01-06 DIAGNOSIS — E11.40 TYPE 2 DIABETES MELLITUS WITH DIABETIC NEUROPATHY, WITH LONG-TERM CURRENT USE OF INSULIN (HCC): ICD-10-CM

## 2020-01-06 DIAGNOSIS — E03.4 HYPOTHYROIDISM DUE TO ACQUIRED ATROPHY OF THYROID: ICD-10-CM

## 2020-01-06 DIAGNOSIS — I10 ESSENTIAL HYPERTENSION: ICD-10-CM

## 2020-01-06 DIAGNOSIS — Z79.4 TYPE 2 DIABETES MELLITUS WITH DIABETIC NEUROPATHY, WITH LONG-TERM CURRENT USE OF INSULIN (HCC): ICD-10-CM

## 2020-01-22 ENCOUNTER — OFFICE VISIT (OUTPATIENT)
Dept: FAMILY MEDICINE CLINIC | Age: 60
End: 2020-01-22

## 2020-01-22 VITALS
SYSTOLIC BLOOD PRESSURE: 132 MMHG | RESPIRATION RATE: 18 BRPM | HEART RATE: 83 BPM | OXYGEN SATURATION: 96 % | DIASTOLIC BLOOD PRESSURE: 67 MMHG | TEMPERATURE: 98.1 F | WEIGHT: 265 LBS | HEIGHT: 67 IN | BODY MASS INDEX: 41.59 KG/M2

## 2020-01-22 DIAGNOSIS — Z93.0 TRACHEOSTOMY IN PLACE (HCC): ICD-10-CM

## 2020-01-22 DIAGNOSIS — E03.4 HYPOTHYROIDISM DUE TO ACQUIRED ATROPHY OF THYROID: ICD-10-CM

## 2020-01-22 DIAGNOSIS — F39 MOOD DISORDER (HCC): ICD-10-CM

## 2020-01-22 DIAGNOSIS — R53.81 PHYSICAL DEBILITY: ICD-10-CM

## 2020-01-22 DIAGNOSIS — E11.65 UNCONTROLLED TYPE 2 DIABETES MELLITUS WITH HYPERGLYCEMIA, WITH LONG-TERM CURRENT USE OF INSULIN (HCC): Primary | ICD-10-CM

## 2020-01-22 DIAGNOSIS — E56.9 HYPOVITAMINOSIS: ICD-10-CM

## 2020-01-22 DIAGNOSIS — R49.0 DYSPHONIA: ICD-10-CM

## 2020-01-22 DIAGNOSIS — Z79.4 UNCONTROLLED TYPE 2 DIABETES MELLITUS WITH HYPERGLYCEMIA, WITH LONG-TERM CURRENT USE OF INSULIN (HCC): Primary | ICD-10-CM

## 2020-01-22 NOTE — PROGRESS NOTES
Venipuncture to patient Left deltoid at this time. Patient tolerated well at this time. Labs ordered by PCP at this time. Labs will be sent to [facility] at this time.

## 2020-01-22 NOTE — PROGRESS NOTES
Chief Complaint   Patient presents with    Diabetes     1. Have you been to the ER, urgent care clinic since your last visit? Hospitalized since your last visit? No    2. Have you seen or consulted any other health care providers outside of the [address] [place] [address] since your last visit? Include any pap smears or colon screening. No     HPI  Daniel Dominguez comes in for follow-up care. Hypothyroidism: Patient has hypothyroidism. We increased her levothyroxine to 200 mcg daily. She still has malaise and fatigue. She is status post thyroidectomy. We will recheck her TSH and FT4 levels. Diabetes mellitus type 2: Patient's HbA1c was 7.7. She is on Jardiance and insulin. Encouraged her to do lifestyle and dietary modification. I will follow-up at next visit. Vocal cord paralysis: Patient had thyroidectomy and injury to her nerves that resulted in vocal cord paralysis. She has a tracheostomy in place. It has been a challenge especially when she lays down in at night. She does get coughing episodes. She does need to elevate the head of the bed to 30 degrees. She has a hospital bed. She will need to keep this in order to maintain the elevation of her head. [facility] called her wanting to come and reprocess the bed but we will let them know that she does need the bed. Patient also needs sidebars for her toilet and bathroom to stop her from falling. An order was placed for this. Patient is currently being seen by an ENT specialist.  Gastrointestinal: Patient has a PEG tube in place. She has not been using it and is able to eat orally. She would like to have the PEG tube removed. She will have a swallow evaluation prior to this as recommended by her specialist.  Anxiety/mood disorder: Patient has anxiety and depression especially after she had a tracheostomy. She is tearful at times during our conversation. She states that she never wanted to be in this position.   She is on lorazepam.  I will give her a refill of the medication. She is also on duloxetine. She will continue with this medication. Hypertension: Patient has a history of hypertension. Blood pressure is stable. She takes metoprolol daily. We will continue with this medication. Pulmonary: Patient has a history of atelectasis. She has seen the pulmonologist in the past.  She is being scheduled to see a different pulmonologist for follow-up. She is using a trach. Denies wheeze, hemoptysis or chest pain. She does continue to have coughing spells on and off. She takes guaifenesin for this. No fever or chills.       Past Medical History  Past Medical History:   Diagnosis Date    Acquired hypothyroidism 5/17/2017    Anxiety 6/25/2019    Bilateral vocal cord paralysis 4/4/2019    Bilateral vocal cord paralysis status post thyroidectomy (4/4/2019 - Dr. Katia Dhillon) with residual dysphagia and dysarthria; S/P Tracheostomy (6/3/2019 - Dr. Katia Dhillon); S/P PEG tube insertion (6/20/2019) - Dr. Katia Dhillon)    Chronic back pain     Lower back pain    Depression     Diabetic neuropathy associated with type 2 diabetes mellitus ([facility] [address]     Dysphagia 6/25/2019    S/P Thyroidectomy (4/4/2019 - Dr. Katia Dhillon)    History of asthma     History of heart failure     chronic diastolic heart failure    History of vitamin D deficiency 8/28/2017    Hypertensive heart disease without heart failure 5/17/2017    Hypoxemia requiring supplemental oxygen 6/25/2019    Insomnia     Left ear hearing loss     pt states nerve damage--- 25% hearing loss, described as \"muffled\"    Memory difficulty     Moderate persistent asthma     Obesity, Class II, BMI 35-39.9 11/11/2016    Obstructive sleep apnea 11/6/2015    Panic attacks     Ringing of ears     Type 2 diabetes mellitus with diabetic neuropathy, with long-term current use of insulin (HCC)     Vertigo        Surgical History  Past Surgical History:   Procedure Laterality Date  CARDIAC SURG PROCEDURE UNLIST  10/31/2013    open heart    HX HEART VALVE SURGERY  2013    aortic valve repair    HX HYSTERECTOMY      Partial Hysterectomy - removed ovary    HX MYOMECTOMY      HX ORTHOPAEDIC  02/2018    had nerves burned on her right side of back    HX TRACHEOSTOMY  06/03/2019    S/P Tracheostomy (6/3/2019 - Dr. Anais Scott)    SD EGD PERCUTANEOUS PLACEMENT GASTROSTOMY TUBE N/A 06/20/2019    Dr. Chu Hill Bilateral 04/04/2019    Dr. Guillermo Montez        Medications  Current Outpatient Medications   Medication Sig Dispense Refill    LORazepam (ATIVAN) 2 mg tablet take 1 tablet by mouth nightly 30 Tab 0    ondansetron hcl (ZOFRAN) 4 mg tablet Take 1 Tab by mouth every eight (8) hours as needed for Nausea or Vomiting. 30 Tab 1    empagliflozin (JARDIANCE) 25 mg tablet Take 1 Tab by mouth daily. 30 Tab 1    guaiFENesin (EXPECTORANT COUGH SYRUP) 100 mg/5 mL liquid Take 5 mL by mouth four (4) times daily as needed for Cough. 474 mL 0    levothyroxine (SYNTHROID) 200 mcg tablet Take 1 Tab by mouth Daily (before breakfast). 30 Tab 2    melatonin 1 mg tablet Take 1 Tab by mouth nightly. 90 Tab 1    metoprolol succinate (TOPROL-XL) 50 mg XL tablet Take 1 Tab by mouth daily. 30 Tab 3    DULoxetine (CYMBALTA) 60 mg capsule Take 1 Cap by mouth daily. 30 Cap 3    LANTUS U-100 INSULIN 100 unit/mL injection INJECT 40 UNITS SUBCUTANEOUSLY ONCE A DAY BEFORE BREAKFAST (Patient taking differently: 65 Units daily.) 30 mL 0    albuterol (PROVENTIL VENTOLIN) 2.5 mg /3 mL (0.083 %) nebu 3 mL by Nebulization route every four (4) hours as needed (wheeze). Indications: Asthma Attack 24 Each 5    bacitracin-neomycin-polymyxin b-hydrocortisone (CORTISPORIN) 1 % ointment Apply  to affected area two (2) times a day.  15 g 0    glucose blood VI test strips (ASCENSIA AUTODISC VI, ONE TOUCH ULTRA TEST VI) strip Check blood glucose 3 times daily 300 Strip 3    lancets misc Check blood glucose 3 times daily 300 Each 3    insulin lispro (HUMALOG) 100 unit/mL injection 2 Units by SubCUTAneous route Before breakfast, lunch, dinner and at bedtime. To be given 30 minutes before starting each tube feeding 1 Vial 0    omega-3 acid ethyl esters (LOVAZA) 1 gram capsule take 1 capsule by mouth twice a day 60 Cap 0    atorvastatin (LIPITOR) 80 mg tablet take 1 tablet by mouth once daily 30 Tab 3    BD INSULIN SYRINGE ULTRA-FINE 1 mL 31 gauge x 5/16 syrg use as directed twice a day 200 Syringe 3    FREESTYLE LITE STRIPS strip TEST twice a day as directed 100 Strip 11    Aspirin, Buffered 81 mg tab Take 81 mg by mouth daily.  naloxone (NARCAN) 4 mg/actuation nasal spray Use 1 spray intranasally, then discard. Repeat with new spray every 2 min as needed for opioid overdose symptoms, alternating nostrils. 2 Each 0       Allergies  Allergies   Allergen Reactions    Other Food Other (comments)     Artificial sweeteners- causes headaches    Metformin Other (comments)     Increase pain in feet and swelling in feet  Increased hunger,tired and bilat foot pain    Morphine Other (comments)     headache    Singulair [Montelukast] Other (comments)     hallucinations    Sucrose Other (comments)     Pt. Is not allergic to sucrose. She develops headaches with artificial sweeteners.        Family History  Family History   Problem Relation Age of Onset    Heart Disease Mother     Diabetes Mother     Stroke Mother     Hypertension Mother     Diabetes Father     Heart Disease Father     Hypertension Father     Stroke Father     Diabetes Brother     Cancer Brother     Hypertension Brother     Stroke Brother     Heart Disease Brother     Diabetes Brother     Hypertension Brother     Stroke Brother     Heart Disease Brother     Diabetes Brother     Hypertension Brother     Hypertension Brother        Social History  Social History     Socioeconomic History    Marital status:      Spouse name: Not on file    Number of children: Not on file    Years of education: Not on file    Highest education level: Not on file   Occupational History    Not on file   Social Needs    Financial resource strain: Not on file    Food insecurity:     Worry: Not on file     Inability: Not on file    Transportation needs:     Medical: Not on file     Non-medical: Not on file   Tobacco Use    Smoking status: Never Smoker    Smokeless tobacco: Never Used   Substance and Sexual Activity    Alcohol use: No    Drug use: No    Sexual activity: Not Currently   Lifestyle    Physical activity:     Days per week: Not on file     Minutes per session: Not on file    Stress: Not on file   Relationships    Social connections:     Talks on phone: Not on file     Gets together: Not on file     Attends [employer] service: Not on file     Active member of club or organization: Not on file     Attends meetings of clubs or organizations: Not on file     Relationship status: Not on file    Intimate partner violence:     Fear of current or ex partner: Not on file     Emotionally abused: Not on file     Physically abused: Not on file     Forced sexual activity: Not on file   Other Topics Concern     Service No    Blood Transfusions No    Caffeine Concern No    Occupational Exposure No    Hobby Hazards No    Sleep Concern Yes     Comment: Sleep apnea     Stress Concern Yes     Comment: Due to family medical issues.  Weight Concern No    Special Diet No    Back Care Yes     Comment: Patient tries to do back care with streches     Exercise No    Bike Helmet Yes    Seat Belt Yes    Self-Exams Yes   Social History Narrative    Not on file       Review of Systems  Review of Systems - Review of all systems is negative except as noted above in the HPI.     Vital Signs  Visit Vitals  /67 (BP 1 Location: Left arm, BP Patient Position: Sitting)   Pulse 83   Temp 98.1 °F (36.7 °C) (Oral)   Resp 18   Ht 5' 7\" (1.702 m)   Wt 265 lb (120.2 kg)   SpO2 96%   BMI 41.50 kg/m²         Physical Exam  Physical Examination: General appearance - overweight and chronically ill appearing  Mental status - alert, oriented to person, place, and time, depressed mood  Neck - trach in place  Lymphatics - no palpable lymphadenopathy  Chest - decreased air entry noted bilateral lungs with bilateral wheeze  Heart - S1 and S2 normal  Back exam - limited range of motion  Neurological - abnormal neurological exam unchanged from prior examinations  Musculoskeletal - osteoarthritic changes noted in both hands  Extremities - intact peripheral pulses    Results  Results for orders placed or performed in visit on 12/11/19   CBC WITH AUTOMATED DIFF   Result Value Ref Range    WBC 7.4 3.4 - 10.8 x10E3/uL    RBC 5.04 3.77 - 5.28 x10E6/uL    HGB 15.6 11.1 - 15.9 g/dL    HCT 45.0 34.0 - 46.6 %    MCV 89 79 - 97 fL    MCH 31.0 26.6 - 33.0 pg    MCHC 34.7 31.5 - 35.7 g/dL    RDW 15.4 12.3 - 15.4 %    PLATELET 157 946 - 550 x10E3/uL    NEUTROPHILS 60 Not Estab. %    Lymphocytes 31 Not Estab. %    MONOCYTES 6 Not Estab. %    EOSINOPHILS 1 Not Estab. %    BASOPHILS 1 Not Estab. %    ABS. NEUTROPHILS 4.4 1.4 - 7.0 x10E3/uL    Abs Lymphocytes 2.3 0.7 - 3.1 x10E3/uL    ABS. MONOCYTES 0.4 0.1 - 0.9 x10E3/uL    ABS. EOSINOPHILS 0.1 0.0 - 0.4 x10E3/uL    ABS. BASOPHILS 0.1 0.0 - 0.2 x10E3/uL    IMMATURE GRANULOCYTES 1 Not Estab. %    ABS. IMM.  GRANS. 0.1 0.0 - 0.1 [id number]   METABOLIC PANEL, COMPREHENSIVE   Result Value Ref Range    Glucose CANCELED mg/dL    BUN 17 6 - 24 mg/dL    Creatinine 0.81 0.57 - 1.00 mg/dL    GFR est non-AA 80 >59 mL/min/1.73    GFR est AA 93 >59 mL/min/1.73    BUN/Creatinine ratio 21 9 - 23    Sodium 138 134 - 144 mmol/L    Potassium CANCELED mmol/L    Chloride 99 96 - 106 mmol/L    CO2 18 (L) 20 - 29 mmol/L    Calcium 9.1 8.7 - 10.2 mg/dL    Protein, total 7.2 6.0 - 8.5 g/dL    Albumin 4.1 3.5 - 5.5 g/dL    GLOBULIN, TOTAL 3.1 1.5 - 4.5 g/dL    A-G Ratio 1. 3 1.2 - 2.2    Bilirubin, total 0.3 0.0 - 1.2 mg/dL    Alk. phosphatase 121 (H) 39 - 117 IU/L    AST (SGOT) 20 0 - 40 IU/L    ALT (SGPT) 13 0 - 32 IU/L   LIPID PANEL   Result Value Ref Range    Cholesterol, total 353 (H) 100 - 199 mg/dL    Triglyceride 2,017 (HH) 0 - 149 mg/dL    HDL Cholesterol 21 (L) >39 mg/dL    VLDL, calculated Comment 5 - 40 mg/dL    LDL, calculated Comment 0 - 99 mg/dL   CVD REPORT   Result Value Ref Range    INTERPRETATION Note    HEMOGLOBIN A1C WITH EAG   Result Value Ref Range    Hemoglobin A1c 7.7 (H) 4.8 - 5.6 %    Estimated average glucose 174 mg/dL   DIABETES PATIENT EDUCATION   Result Value Ref Range    PDF Image Not applicable    TSH 3RD GENERATION   Result Value Ref Range    TSH 31.450 (H) 0.450 - 4.500 uIU/mL   VITAMIN D, 25 HYDROXY   Result Value Ref Range    VITAMIN D, 25-HYDROXY CANCELED ng/mL   VITAMIN B12   Result Value Ref Range    Vitamin B12 303 232 - 1,245 pg/mL   AMB POC HEMOGLOBIN A1C   Result Value Ref Range    Hemoglobin A1c (POC) 8.0 %       ASSESSMENT and PLAN    ICD-10-CM ICD-9-CM    1. Uncontrolled type 2 diabetes mellitus with hyperglycemia, with long-term current use of insulin ([facility]) E11.65 250.02     Z79.4 V58.67    2. Hypothyroidism due to acquired atrophy of thyroid E03.4 244.8 TSH 3RD GENERATION     246.8 T4, FREE      COLLECTION VENOUS BLOOD,VENIPUNCTURE   3. Mood disorder ([facility]) F39 296.90    4. [facility] Z93.0 V44.0    5. Hypovitaminosis E56.9 269.2 VITAMIN D, 25 HYDROXY   6. Dysphonia R49.0 784.42    7.  Physical debility R53.81 799.3      lab results and schedule of future lab studies reviewed with patient  reviewed diet, exercise and weight control  cardiovascular risk and specific lipid/LDL goals reviewed  reviewed medications and side effects in detail  specific diabetic recommendations: low cholesterol diet, weight control and daily exercise discussed, home glucose monitoring emphasized, all medications, side effects and compliance discussed carefully, glycohemoglobin and other lab monitoring discussed and long term diabetic complications discussed      I have discussed the diagnosis with the patient and the intended plan of care as seen in the above orders. The patient has received an after-visit summary and questions were answered concerning future plans. I have discussed medication, side effects, and warnings with the patient in detail. The patient verbalized understanding and is in agreement with the plan of care. The patient will contact the office with any additional concerns. Chuy Floyd MD    PLEASE NOTE:   This document has been produced using voice recognition software.  Unrecognized errors in transcription may be present

## 2020-01-23 LAB
25(OH)D3+25(OH)D2 SERPL-MCNC: 7.9 NG/ML (ref 30–100)
T4 FREE SERPL-MCNC: 1.41 NG/DL (ref 0.82–1.77)
TSH SERPL DL<=0.005 MIU/L-ACNC: 5.74 UIU/ML (ref 0.45–4.5)

## 2020-01-25 DIAGNOSIS — F41.9 ANXIETY: ICD-10-CM

## 2020-01-27 RX ORDER — LORAZEPAM 2 MG/1
TABLET ORAL
Qty: 30 TAB | Refills: 1 | Status: SHIPPED | OUTPATIENT
Start: 2020-01-27 | End: 2020-02-26 | Stop reason: SDUPTHER

## 2020-01-29 ENCOUNTER — TELEPHONE (OUTPATIENT)
Dept: FAMILY MEDICINE CLINIC | Age: 60
End: 2020-01-29

## 2020-01-29 NOTE — TELEPHONE ENCOUNTER
Jarret Frey, from [facility] in [address], called regarding orders they received.  They can provide hospital bed for patient, but unable to provide sidebars for toilet/tub.    [phone]

## 2020-01-30 RX ORDER — ERGOCALCIFEROL 1.25 MG/1
50000 CAPSULE ORAL
Qty: 13 CAP | Refills: 3 | Status: SHIPPED | OUTPATIENT
Start: 2020-01-30 | End: 2021-05-12 | Stop reason: SDUPTHER

## 2020-01-30 RX ORDER — LEVOTHYROXINE SODIUM 25 UG/1
25 TABLET ORAL
Qty: 30 TAB | Refills: 3 | Status: SHIPPED | OUTPATIENT
Start: 2020-01-30 | End: 2020-05-18

## 2020-01-31 NOTE — PROGRESS NOTES
Please let patient know her TSH remains elevated. Patient is however improved from the previous level. This is due to the increase in her dosage of levothyroxine. I will increase the levothyroxine to 250 micrograms daily. I will send in a prescription at the new dose. Her vitamin D level is also low. I will send in vitamin D replacement therapy to 50,000 units weekly. Recheck TSH in 8 weeks.   Juancho Brody MD

## 2020-01-31 NOTE — PROGRESS NOTES
I will increase the levothyroxine to 225 mcg daily. She will continue with the 200 mcg and I will add a 25 mcg tablets to take in addition to the 200 mcg to make 225 mcg daily.   Tessy Metz MD

## 2020-02-03 ENCOUNTER — TELEPHONE (OUTPATIENT)
Dept: FAMILY MEDICINE CLINIC | Age: 60
End: 2020-02-03

## 2020-02-03 NOTE — TELEPHONE ENCOUNTER
Spoke with the [address] [place] [address] in [place] to have Vitamin D and Synthroid prescriptions transferred from the [facility] on [facility]. They will message patient when ready for .

## 2020-02-03 NOTE — PROGRESS NOTES
Patient was advised of her lab results and prescriptions were called in to [facility] on [address] [facility]. requested that the prescriptions be sent to the [facility] at [facility]

## 2020-02-06 DIAGNOSIS — E56.9 HYPOVITAMINOSIS: ICD-10-CM

## 2020-02-06 DIAGNOSIS — E03.4 HYPOTHYROIDISM DUE TO ACQUIRED ATROPHY OF THYROID: ICD-10-CM

## 2020-02-07 DIAGNOSIS — R13.10 DYSPHAGIA, UNSPECIFIED TYPE: ICD-10-CM

## 2020-02-09 RX ORDER — ONDANSETRON 4 MG/1
TABLET, FILM COATED ORAL
Qty: 30 TAB | Refills: 1 | Status: SHIPPED | OUTPATIENT
Start: 2020-02-09 | End: 2020-03-12

## 2020-02-10 DIAGNOSIS — Z79.4 TYPE 2 DIABETES MELLITUS WITH DIABETIC NEUROPATHY, WITH LONG-TERM CURRENT USE OF INSULIN (HCC): Chronic | ICD-10-CM

## 2020-02-10 DIAGNOSIS — E11.40 TYPE 2 DIABETES MELLITUS WITH DIABETIC NEUROPATHY, WITH LONG-TERM CURRENT USE OF INSULIN (HCC): Chronic | ICD-10-CM

## 2020-02-11 DIAGNOSIS — E11.65 UNCONTROLLED TYPE 2 DIABETES MELLITUS WITH HYPERGLYCEMIA, WITH LONG-TERM CURRENT USE OF INSULIN (HCC): ICD-10-CM

## 2020-02-11 DIAGNOSIS — Z79.4 UNCONTROLLED TYPE 2 DIABETES MELLITUS WITH HYPERGLYCEMIA, WITH LONG-TERM CURRENT USE OF INSULIN (HCC): ICD-10-CM

## 2020-02-12 RX ORDER — EMPAGLIFLOZIN 25 MG/1
TABLET, FILM COATED ORAL
Qty: 30 TAB | Refills: 1 | Status: SHIPPED | OUTPATIENT
Start: 2020-02-12 | End: 2020-04-06

## 2020-02-12 RX ORDER — INSULIN GLARGINE 100 [IU]/ML
INJECTION, SOLUTION SUBCUTANEOUS
Qty: 10 ML | Refills: 1 | Status: SHIPPED | OUTPATIENT
Start: 2020-02-12 | End: 2020-02-26 | Stop reason: SDUPTHER

## 2020-02-26 ENCOUNTER — OFFICE VISIT (OUTPATIENT)
Dept: FAMILY MEDICINE CLINIC | Age: 60
End: 2020-02-26

## 2020-02-26 VITALS
HEART RATE: 69 BPM | DIASTOLIC BLOOD PRESSURE: 64 MMHG | HEIGHT: 67 IN | BODY MASS INDEX: 42.22 KG/M2 | OXYGEN SATURATION: 100 % | WEIGHT: 269 LBS | RESPIRATION RATE: 18 BRPM | TEMPERATURE: 98.8 F | SYSTOLIC BLOOD PRESSURE: 135 MMHG

## 2020-02-26 DIAGNOSIS — R53.81 PHYSICAL DEBILITY: ICD-10-CM

## 2020-02-26 DIAGNOSIS — E56.9 HYPOVITAMINOSIS: ICD-10-CM

## 2020-02-26 DIAGNOSIS — F41.9 ANXIETY: ICD-10-CM

## 2020-02-26 DIAGNOSIS — I10 ESSENTIAL HYPERTENSION: ICD-10-CM

## 2020-02-26 DIAGNOSIS — Z12.31 ENCOUNTER FOR SCREENING MAMMOGRAM FOR BREAST CANCER: ICD-10-CM

## 2020-02-26 DIAGNOSIS — F39 MOOD DISORDER (HCC): ICD-10-CM

## 2020-02-26 DIAGNOSIS — Z79.4 TYPE 2 DIABETES MELLITUS WITH DIABETIC NEUROPATHY, WITH LONG-TERM CURRENT USE OF INSULIN (HCC): Primary | Chronic | ICD-10-CM

## 2020-02-26 DIAGNOSIS — G47.9 SLEEP DISORDER: ICD-10-CM

## 2020-02-26 DIAGNOSIS — H91.90 HEARING LOSS, UNSPECIFIED HEARING LOSS TYPE, UNSPECIFIED LATERALITY: ICD-10-CM

## 2020-02-26 DIAGNOSIS — E11.40 TYPE 2 DIABETES MELLITUS WITH DIABETIC NEUROPATHY, WITH LONG-TERM CURRENT USE OF INSULIN (HCC): Primary | Chronic | ICD-10-CM

## 2020-02-26 DIAGNOSIS — J38.02 BILATERAL VOCAL CORD PARALYSIS: ICD-10-CM

## 2020-02-26 DIAGNOSIS — G47.00 INSOMNIA, UNSPECIFIED TYPE: ICD-10-CM

## 2020-02-26 RX ORDER — LORAZEPAM 2 MG/1
TABLET ORAL
Qty: 31 TAB | Refills: 0 | Status: SHIPPED | OUTPATIENT
Start: 2020-04-24 | End: 2020-02-26 | Stop reason: SDUPTHER

## 2020-02-26 RX ORDER — INSULIN GLARGINE 100 [IU]/ML
INJECTION, SOLUTION SUBCUTANEOUS
Qty: 10 ML | Refills: 1 | Status: SHIPPED | OUTPATIENT
Start: 2020-02-26 | End: 2020-03-26

## 2020-02-26 RX ORDER — LORAZEPAM 2 MG/1
TABLET ORAL
Qty: 31 TAB | Refills: 0 | Status: SHIPPED | OUTPATIENT
Start: 2020-05-24 | End: 2020-07-02 | Stop reason: SDUPTHER

## 2020-02-26 RX ORDER — LORAZEPAM 2 MG/1
TABLET ORAL
Qty: 31 TAB | Refills: 0 | Status: SHIPPED | OUTPATIENT
Start: 2020-03-24 | End: 2020-02-26 | Stop reason: SDUPTHER

## 2020-02-26 NOTE — PROGRESS NOTES
HPI  Mary Lou Cheema comes in for f/u care. Memory loss: she has memory loss and difficulty with remembering names. She would like to be evaluated for this. I will refer to neuropsychologist to evaluate for memory loss. Hypothyroidism: Patient is on thyroid replacement therapy. I did explain to her that I would want her to take 225 mg of the levothyroxine. We will recheck labs at the next visit. Mood disorder: on cymbalta. She does have episodes of anger, anxiety and depression. We will continue with the Cymbalta. Anxiety: Patient takes lorazepam for anxiety. This only helps transiently. She will continue with the medication. Hypovitaminosis D: On replacement therapy. Plan to recheck labs. He should continue with the vitamin D replacement. Insomnia: she is on melatonin and lorazepam.  States that he still continues to have disturbed sleep. She does need to have sleep study done to evaluate for sleep apnea. At the moment she does have a tracheostomy tube in place and this causes irritation at night and she states that this has worsened her sleep disorder. DM2: She has not been checking her blood glucose numbers. Patient has been referred in the past to see the endocrinologist.  She does need to have a microalbumin checked. She states that she will do this at next visit. She is on insulin and Jardiance. Patient also needs to get a foot exam and eye exam done. The past she has declined to have a foot exam done. We will try and do this at the next visit. Hypertension: Patient is on metoprolol XL. Blood pressure is stable. We will continue with the current treatment plan. PEG tube: Patient had PEG tube pulled out by the ENT. Area is healing well. She does have irritation around the PEG tube site but no erythema or drainage. She was reassured on this. Bilateral vocal cord paralysis: Patient has bilateral vocal cord paralysis following thyroid surgery.   She is currently being followed by the ENT specialist.  She has a tracheostomy tube in place. At times she does cough and with expectorants does notice some pieces of tissue in the expectorant. This is likely old tissue around the tracheostomy site. There is no bleeding, fever or chills. We will continue to monitor this. Hearing loss: Patient states that she has some hearing loss especially in her left ear. She will consult her ENT specialist whom she is seeing at the moment for a hearing evaluation. We will follow-up with those results. Physical debility: Patient has physical debility. She is unable to get in and out of her bath without using hand/sidebars. She would like a prescription for this sent into her DME provider. Prescription is placed. Health maintenance: Patient does need breast cancer screening. Request for mammogram is placed.     Past Medical History  Past Medical History:   Diagnosis Date    Acquired hypothyroidism 5/17/2017    Anxiety 6/25/2019    Bilateral vocal cord paralysis 4/4/2019    Bilateral vocal cord paralysis status post thyroidectomy (4/4/2019 - Dr. Yumiko Gallagher) with residual dysphagia and dysarthria; S/P Tracheostomy (6/3/2019 - Dr. Yumiko Gallagher); S/P PEG tube insertion (6/20/2019) - Dr. Yumiko Gallagher)    Chronic back pain     Lower back pain    Depression     Diabetic neuropathy associated with type 2 diabetes mellitus ([facility] [address]     Dysphagia 6/25/2019    S/P Thyroidectomy (4/4/2019 - Dr. Yumiko Gallagher)    History of asthma     History of heart failure     chronic diastolic heart failure    History of vitamin D deficiency 8/28/2017    Hypertensive heart disease without heart failure 5/17/2017    Hypoxemia requiring supplemental oxygen 6/25/2019    Insomnia     Left ear hearing loss     pt states nerve damage--- 25% hearing loss, described as \"muffled\"    Memory difficulty     Moderate persistent asthma     Obesity, Class II, BMI 35-39.9 11/11/2016    Obstructive sleep apnea 11/6/2015    Panic attacks     Ringing of ears     Type 2 diabetes mellitus with diabetic neuropathy, with long-term current use of insulin (HCC)     Vertigo        Surgical History  Past Surgical History:   Procedure Laterality Date    CARDIAC SURG PROCEDURE UNLIST  10/31/2013    open heart    HX HEART VALVE SURGERY  2013    aortic valve repair    HX HYSTERECTOMY      Partial Hysterectomy - removed ovary    HX MYOMECTOMY      HX ORTHOPAEDIC  02/2018    had nerves burned on her right side of back    HX TRACHEOSTOMY  06/03/2019    S/P Tracheostomy (6/3/2019 - Dr. Katia Dhillon)    IA EGD PERCUTANEOUS PLACEMENT GASTROSTOMY TUBE N/A 06/20/2019    Dr. Katharine Cantu Bilateral 04/04/2019    Dr. Gila Melara        Medications  Current Outpatient Medications   Medication Sig Dispense Refill    LANTUS U-100 INSULIN 100 unit/mL injection inject 40 units subcutaneously once daily before breakfast 10 mL 1    JARDIANCE 25 mg tablet take 1 tablet by mouth once daily 30 Tab 1    ondansetron hcl (ZOFRAN) 4 mg tablet take 1 tablet by mouth every 8 hours if needed for nausea or vomiting 30 Tab 1    levothyroxine (SYNTHROID) 25 mcg tablet Take 1 Tab by mouth Daily (before breakfast). Take in addition to the 200 mcg tablets to make 225 mcg. 30 Tab 3    ergocalciferol (ERGOCALCIFEROL) 1,250 mcg (50,000 unit) capsule Take 1 Cap by mouth every seven (7) days. 13 Cap 3    LORazepam (ATIVAN) 2 mg tablet take 1 tablet by mouth nightly 30 Tab 1    guaiFENesin (EXPECTORANT COUGH SYRUP) 100 mg/5 mL liquid Take 5 mL by mouth four (4) times daily as needed for Cough. 474 mL 0    levothyroxine (SYNTHROID) 200 mcg tablet Take 1 Tab by mouth Daily (before breakfast). 30 Tab 2    melatonin 1 mg tablet Take 1 Tab by mouth nightly. 90 Tab 1    metoprolol succinate (TOPROL-XL) 50 mg XL tablet Take 1 Tab by mouth daily. 30 Tab 3    DULoxetine (CYMBALTA) 60 mg capsule Take 1 Cap by mouth daily.  30 Cap 3    albuterol (PROVENTIL VENTOLIN) 2.5 mg /3 mL (0.083 %) nebu 3 mL by Nebulization route every four (4) hours as needed (wheeze). Indications: Asthma Attack 24 Each 5    bacitracin-neomycin-polymyxin b-hydrocortisone (CORTISPORIN) 1 % ointment Apply  to affected area two (2) times a day. 15 g 0    glucose blood VI test strips (ASCENSIA AUTODISC VI, ONE TOUCH ULTRA TEST VI) strip Check blood glucose 3 times daily 300 Strip 3    naloxone (NARCAN) 4 mg/actuation nasal spray Use 1 spray intranasally, then discard. Repeat with new spray every 2 min as needed for opioid overdose symptoms, alternating nostrils. 2 Each 0    lancets misc Check blood glucose 3 times daily 300 Each 3    insulin lispro (HUMALOG) 100 unit/mL injection 2 Units by SubCUTAneous route Before breakfast, lunch, dinner and at bedtime. To be given 30 minutes before starting each tube feeding 1 Vial 0    omega-3 acid ethyl esters (LOVAZA) 1 gram capsule take 1 capsule by mouth twice a day 60 Cap 0    atorvastatin (LIPITOR) 80 mg tablet take 1 tablet by mouth once daily 30 Tab 3    BD INSULIN SYRINGE ULTRA-FINE 1 mL 31 gauge x 5/16 syrg use as directed twice a day 200 Syringe 3    FREESTYLE LITE STRIPS strip TEST twice a day as directed 100 Strip 11    Aspirin, Buffered 81 mg tab Take 81 mg by mouth daily. Allergies  Allergies   Allergen Reactions    Other Food Other (comments)     Artificial sweeteners- causes headaches    Metformin Other (comments)     Increase pain in feet and swelling in feet  Increased hunger,tired and bilat foot pain    Morphine Other (comments)     headache    Singulair [Montelukast] Other (comments)     hallucinations    Sucrose Other (comments)     Pt. Is not allergic to sucrose. She develops headaches with artificial sweeteners.        Family History  Family History   Problem Relation Age of Onset    Heart Disease Mother     Diabetes Mother    [facility] Stroke Mother     Hypertension Mother     Diabetes Father     Heart Disease Father    [facility] Hypertension Father     Stroke Father     Diabetes Brother     Cancer Brother     Hypertension Brother     Stroke Brother     Heart Disease Brother     Diabetes Brother     Hypertension Brother     Stroke Brother     Heart Disease Brother     Diabetes Brother     Hypertension Brother     Hypertension Brother        Social History  Social History     Socioeconomic History    Marital status:      Spouse name: Not on file    Number of children: Not on file    Years of education: Not on file    Highest education level: Not on file   Occupational History    Not on file   Social Needs    Financial resource strain: Not on file    Food insecurity:     Worry: Not on file     Inability: Not on file    Transportation needs:     Medical: Not on file     Non-medical: Not on file   Tobacco Use    Smoking status: Never Smoker    Smokeless tobacco: Never Used   Substance and Sexual Activity    Alcohol use: No    Drug use: No    Sexual activity: Not Currently   Lifestyle    Physical activity:     Days per week: Not on file     Minutes per session: Not on file    Stress: Not on file   Relationships    Social connections:     Talks on phone: Not on file     Gets together: Not on file     Attends Sabianism service: Not on file     Active member of club or organization: Not on file     Attends meetings of clubs or organizations: Not on file     Relationship status: Not on file    Intimate partner violence:     Fear of current or ex partner: Not on file     Emotionally abused: Not on file     Physically abused: Not on file     Forced sexual activity: Not on file   Other Topics Concern     Service No    Blood Transfusions No    Caffeine Concern No    Occupational Exposure No    Hobby Hazards No    Sleep Concern Yes     Comment: Sleep apnea     Stress Concern Yes     Comment: Due to family medical issues.      Weight Concern No    Special Diet No    Back Care Yes     Comment: Patient tries to do back care with streches     Exercise No    Bike Helmet Yes    Seat Belt Yes    Self-Exams Yes   Social History Narrative    Not on file     Review of Systems  Review of Systems - Review of all systems is negative except as noted above in the HPI. Vital Signs  Visit Vitals  /64 (BP 1 Location: Left arm, BP Patient Position: Sitting)   Pulse 69   Temp 98.8 °F (37.1 °C) (Oral)   Resp 18   Ht 5' 7\" (1.702 m)   Wt 269 lb (122 kg)   SpO2 100%   BMI 42.13 kg/m²         Physical Exam  Physical Examination: General appearance - oriented to person, place, and time, overweight and well hydrated  Mental status - alert, oriented to person, place, and time, depressed mood  Neck -trach in place  Lymphatics - no palpable lymphadenopathy  Chest -multiple transmitted sounds bilateral lung zones  Heart - S1 and S2 normal  Abdomen - no rebound tenderness noted  Back exam - limited range of motion  Neurological - abnormal neurological exam unchanged from prior examinations  Musculoskeletal - osteoarthritic changes noted in both hands  Extremities - intact peripheral pulses    Results  Results for orders placed or performed in visit on 01/22/20   TSH 3RD GENERATION   Result Value Ref Range    TSH 5.740 (H) 0.450 - 4.500 uIU/mL   VITAMIN D, 25 HYDROXY   Result Value Ref Range    VITAMIN D, 25-HYDROXY 7.9 (L) 30.0 - 100.0 ng/mL   T4, FREE   Result Value Ref Range    T4, Free 1.41 0.82 - 1.77 ng/dL       ASSESSMENT and PLAN    ICD-10-CM ICD-9-CM    1. Type 2 diabetes mellitus with diabetic neuropathy, with long-term current use of insulin ([facility]) E11.40 250.60 insulin glargine (LANTUS U-100 INSULIN) 100 unit/mL injection    Z79.4 357.2 REFERRAL TO OPHTHALMOLOGY     V58.67 MICROALBUMIN, UR, RAND W/ MICROALB/CREAT RATIO   2. Anxiety F41.9 300.00 LORazepam (ATIVAN) 2 mg tablet      DISCONTINUED: LORazepam (ATIVAN) 2 mg tablet      DISCONTINUED: LORazepam (ATIVAN) 2 mg tablet   3.  Hearing loss, unspecified hearing loss type, unspecified laterality H91.90 389.9 REFERRAL TO ENT-OTOLARYNGOLOGY   4. Physical debility R53.81 799.3    5. Bilateral vocal cord paralysis J38.02 478.33    6. Mood disorder (HCC) F39 296.90    7. Hypovitaminosis E56.9 269.2    8. Insomnia, unspecified type G47.00 780.52    9. Essential hypertension I10 401.9    10. Sleep disorder G47.9 780.50    11. Encounter for screening mammogram for breast cancer Z12.31 V76.12 CORNELIO MAMMO BI SCREENING INCL CAD     reviewed diet, exercise and weight control  cardiovascular risk and specific lipid/LDL goals reviewed  reviewed medications and side effects in detail  specific diabetic recommendations: low cholesterol diet, weight control and daily exercise discussed, home glucose monitoring emphasized, all medications, side effects and compliance discussed carefully, glycohemoglobin and other lab monitoring discussed and long term diabetic complications discussed      I have discussed the diagnosis with the patient and the intended plan of care as seen in the above orders. The patient has received an after-visit summary and questions were answered concerning future plans. I have discussed medication, side effects, and warnings with the patient in detail. The patient verbalized understanding and is in agreement with the plan of care. The patient will contact the office with any additional concerns. Jose Nelson MD    PLEASE NOTE:   This document has been produced using voice recognition software.  Unrecognized errors in transcription may be present

## 2020-03-06 ENCOUNTER — HOSPITAL ENCOUNTER (OUTPATIENT)
Dept: MAMMOGRAPHY | Age: 60
Discharge: HOME | End: 2020-03-06
Attending: FAMILY MEDICINE
Payer: MEDICAID

## 2020-03-06 DIAGNOSIS — Z12.31 ENCOUNTER FOR SCREENING MAMMOGRAM FOR BREAST CANCER: ICD-10-CM

## 2020-03-06 PROCEDURE — 77067 SCR MAMMO BI INCL CAD: CPT

## 2020-03-09 RX ORDER — LEVOTHYROXINE SODIUM 200 UG/1
TABLET ORAL
Qty: 30 TAB | Refills: 2 | Status: SHIPPED | OUTPATIENT
Start: 2020-03-09 | End: 2020-05-04

## 2020-03-10 DIAGNOSIS — R13.10 DYSPHAGIA, UNSPECIFIED TYPE: ICD-10-CM

## 2020-03-12 RX ORDER — ONDANSETRON 4 MG/1
TABLET, FILM COATED ORAL
Qty: 30 TAB | Refills: 1 | Status: SHIPPED | OUTPATIENT
Start: 2020-03-12 | End: 2020-04-09

## 2020-04-05 DIAGNOSIS — E11.40 TYPE 2 DIABETES MELLITUS WITH DIABETIC NEUROPATHY, WITH LONG-TERM CURRENT USE OF INSULIN (HCC): Chronic | ICD-10-CM

## 2020-04-05 DIAGNOSIS — Z79.4 TYPE 2 DIABETES MELLITUS WITH DIABETIC NEUROPATHY, WITH LONG-TERM CURRENT USE OF INSULIN (HCC): Chronic | ICD-10-CM

## 2020-04-05 RX ORDER — INSULIN GLARGINE 100 [IU]/ML
INJECTION, SOLUTION SUBCUTANEOUS
Qty: 30 ML | Refills: 2 | Status: SHIPPED | OUTPATIENT
Start: 2020-04-05 | End: 2020-09-17 | Stop reason: SDUPTHER

## 2020-04-06 DIAGNOSIS — Z79.4 UNCONTROLLED TYPE 2 DIABETES MELLITUS WITH HYPERGLYCEMIA, WITH LONG-TERM CURRENT USE OF INSULIN (HCC): ICD-10-CM

## 2020-04-06 DIAGNOSIS — E11.65 UNCONTROLLED TYPE 2 DIABETES MELLITUS WITH HYPERGLYCEMIA, WITH LONG-TERM CURRENT USE OF INSULIN (HCC): ICD-10-CM

## 2020-04-06 RX ORDER — EMPAGLIFLOZIN 25 MG/1
TABLET, FILM COATED ORAL
Qty: 30 TAB | Refills: 1 | Status: SHIPPED | OUTPATIENT
Start: 2020-04-06 | End: 2020-04-27

## 2020-04-09 DIAGNOSIS — R13.10 DYSPHAGIA, UNSPECIFIED TYPE: ICD-10-CM

## 2020-04-09 RX ORDER — ONDANSETRON 4 MG/1
TABLET, FILM COATED ORAL
Qty: 30 TAB | Refills: 1 | Status: SHIPPED | OUTPATIENT
Start: 2020-04-09 | End: 2020-06-10

## 2020-04-13 RX ORDER — GUAIFENESIN 100 MG/5ML
LIQUID (ML) ORAL
Qty: 474 ML | Refills: 0 | Status: SHIPPED | OUTPATIENT
Start: 2020-04-13 | End: 2020-04-15 | Stop reason: SDUPTHER

## 2020-04-16 RX ORDER — OMEGA-3-ACID ETHYL ESTERS 1 G/1
CAPSULE, LIQUID FILLED ORAL
Qty: 60 CAP | Refills: 1 | Status: SHIPPED | OUTPATIENT
Start: 2020-04-16 | End: 2020-05-18

## 2020-04-16 RX ORDER — GUAIFENESIN 100 MG/5ML
SOLUTION ORAL
Qty: 474 ML | Refills: 0 | Status: SHIPPED | OUTPATIENT
Start: 2020-04-16 | End: 2020-05-26

## 2020-04-27 DIAGNOSIS — Z79.4 UNCONTROLLED TYPE 2 DIABETES MELLITUS WITH HYPERGLYCEMIA, WITH LONG-TERM CURRENT USE OF INSULIN (HCC): ICD-10-CM

## 2020-04-27 DIAGNOSIS — E11.65 UNCONTROLLED TYPE 2 DIABETES MELLITUS WITH HYPERGLYCEMIA, WITH LONG-TERM CURRENT USE OF INSULIN (HCC): ICD-10-CM

## 2020-04-27 RX ORDER — EMPAGLIFLOZIN 25 MG/1
TABLET, FILM COATED ORAL
Qty: 30 TAB | Refills: 1 | Status: SHIPPED | OUTPATIENT
Start: 2020-04-27 | End: 2020-09-22

## 2020-04-28 ENCOUNTER — VIRTUAL VISIT (OUTPATIENT)
Dept: FAMILY MEDICINE CLINIC | Age: 60
End: 2020-04-28

## 2020-04-28 DIAGNOSIS — I10 ESSENTIAL HYPERTENSION: ICD-10-CM

## 2020-04-28 DIAGNOSIS — E11.65 UNCONTROLLED TYPE 2 DIABETES MELLITUS WITH HYPERGLYCEMIA, WITH LONG-TERM CURRENT USE OF INSULIN (HCC): Primary | ICD-10-CM

## 2020-04-28 DIAGNOSIS — F39 MOOD DISORDER (HCC): ICD-10-CM

## 2020-04-28 DIAGNOSIS — Z79.4 UNCONTROLLED TYPE 2 DIABETES MELLITUS WITH HYPERGLYCEMIA, WITH LONG-TERM CURRENT USE OF INSULIN (HCC): Primary | ICD-10-CM

## 2020-04-28 DIAGNOSIS — R60.0 PEDAL EDEMA: ICD-10-CM

## 2020-04-28 DIAGNOSIS — B37.31 YEAST VAGINITIS: ICD-10-CM

## 2020-04-28 DIAGNOSIS — Z98.890 HISTORY OF TRACHEOSTOMY: ICD-10-CM

## 2020-04-28 DIAGNOSIS — E03.4 HYPOTHYROIDISM DUE TO ACQUIRED ATROPHY OF THYROID: ICD-10-CM

## 2020-04-28 RX ORDER — POTASSIUM CHLORIDE 750 MG/1
10 TABLET, EXTENDED RELEASE ORAL DAILY
Qty: 30 TAB | Refills: 2 | Status: SHIPPED | OUTPATIENT
Start: 2020-04-28 | End: 2020-07-22

## 2020-04-28 RX ORDER — FLUCONAZOLE 150 MG/1
150 TABLET ORAL DAILY
Qty: 1 TAB | Refills: 2 | Status: SHIPPED | OUTPATIENT
Start: 2020-04-28 | End: 2020-04-28 | Stop reason: DRUGHIGH

## 2020-04-28 RX ORDER — FUROSEMIDE 20 MG/1
20 TABLET ORAL DAILY
Qty: 30 TAB | Refills: 2 | Status: SHIPPED | OUTPATIENT
Start: 2020-04-28 | End: 2020-04-30 | Stop reason: DRUGHIGH

## 2020-04-28 RX ORDER — FLUCONAZOLE 200 MG/1
200 TABLET ORAL DAILY
Qty: 3 TAB | Refills: 0 | Status: SHIPPED | OUTPATIENT
Start: 2020-04-28 | End: 2020-05-26

## 2020-04-28 NOTE — PROGRESS NOTES
Camryn Brooke is a 62 y.o. female evaluated via telephone on 4/28/2020. Consent:  She and/or health care decision maker is aware that she may receive a bill for this telephone service, depending on her insurance coverage, and has provided verbal consent to proceed: Yes    Documentation:  I communicated with the patient and/or health care decision maker about yeast vaginitis, diabetes mellitus type 2..   Details of this discussion including any medical advice provided:   I had a phone visit with patient. Patient has diabetes mellitus type 2. She is on insulin and Jardiance. States that her blood glucose numbers have been stable lately. She does get her numbers around 160. She is not doing much lifestyle and dietary modification. I did emphasize the need to intensify lifestyle and dietary modification. We would like to get the numbers better controlled. I will check a HbA1c at next visit. I have sent in a refill of the Jardiance and I will also send in a refill of the Lantus. She is on 65 units daily. Pedal edema: Patient has noticed bilateral pedal edema. In the past she has been on Lasix and potassium supplementation. She is out of the medication. She would like a refill of the same. I will send in Lasix 20 mg daily with potassium supplementation. I will recheck labs at next visit. Yeast vaginitis: Patient has noticed yeast vaginitis. This is worse since she started using the Jardiance. Has taken Diflucan with good result. Would like a refill of the same. Prescription will be sent in. Mood disorder: Patient has anxiety on and off. She is on lorazepam and also takes Cymbalta. I will have her continue with these medications. Status post trach: Patient has a tracheostomy in place. She does get secretions on and off and has to cough this out. She is on seen chest congestion to help with mucolysis. She will continue with this medication.   Hypothyroidism: Patient has hypothyroidism and is on levothyroxine replacement therapy. She will continue with the current treatment plan. I will set up an appointment for her to follow-up with me in the clinic in the next 2 months. Patient has hypertension. She is on medication. She takes Toprol-XL. Her blood pressure today 124/68. She will continue with the current treatment plan. I affirm this is a Patient Initiated Episode with an Established Patient who has not had a related appointment within my department in the past 7 days or scheduled within the next 24 hours.     Total Time: minutes: 21-30 minutes      Angelito Javier MD

## 2020-04-30 RX ORDER — FUROSEMIDE 40 MG/1
40 TABLET ORAL DAILY
Qty: 30 TAB | Refills: 2 | Status: SHIPPED | OUTPATIENT
Start: 2020-04-30 | End: 2020-07-22

## 2020-05-04 RX ORDER — LEVOTHYROXINE SODIUM 200 UG/1
TABLET ORAL
Qty: 30 TAB | Refills: 2 | Status: SHIPPED | OUTPATIENT
Start: 2020-05-04 | End: 2020-05-26

## 2020-05-12 RX ORDER — IBUPROFEN 600 MG/1
600 TABLET ORAL
Qty: 90 TAB | Refills: 0 | Status: SHIPPED | OUTPATIENT
Start: 2020-05-12 | End: 2020-06-29

## 2020-05-18 RX ORDER — LEVOTHYROXINE SODIUM 25 UG/1
TABLET ORAL
Qty: 30 TAB | Refills: 3 | Status: SHIPPED | OUTPATIENT
Start: 2020-05-18 | End: 2021-02-01

## 2020-05-18 RX ORDER — OMEGA-3-ACID ETHYL ESTERS 1 G/1
CAPSULE, LIQUID FILLED ORAL
Qty: 60 CAP | Refills: 1 | Status: SHIPPED | OUTPATIENT
Start: 2020-05-18 | End: 2020-06-04

## 2020-05-26 RX ORDER — FLUCONAZOLE 200 MG/1
TABLET ORAL
Qty: 3 TAB | Refills: 0 | Status: SHIPPED | OUTPATIENT
Start: 2020-05-26 | End: 2020-07-21

## 2020-05-26 RX ORDER — CALCIUM CARBONATE 200(500)MG
TABLET,CHEWABLE ORAL
Qty: 472 ML | Refills: 0 | Status: SHIPPED | OUTPATIENT
Start: 2020-05-26 | End: 2020-06-24 | Stop reason: SDUPTHER

## 2020-06-04 RX ORDER — OMEGA-3-ACID ETHYL ESTERS 1 G/1
CAPSULE, LIQUID FILLED ORAL
Qty: 60 CAP | Refills: 1 | Status: SHIPPED | OUTPATIENT
Start: 2020-06-04 | End: 2020-09-22

## 2020-06-09 DIAGNOSIS — R13.10 DYSPHAGIA, UNSPECIFIED TYPE: ICD-10-CM

## 2020-06-10 RX ORDER — ONDANSETRON 4 MG/1
TABLET, FILM COATED ORAL
Qty: 30 TAB | Refills: 1 | Status: SHIPPED | OUTPATIENT
Start: 2020-06-10 | End: 2020-08-03

## 2020-06-17 DIAGNOSIS — I10 ESSENTIAL HYPERTENSION: ICD-10-CM

## 2020-06-18 RX ORDER — METOPROLOL SUCCINATE 50 MG/1
TABLET, EXTENDED RELEASE ORAL
Qty: 30 TAB | Refills: 3 | Status: SHIPPED | OUTPATIENT
Start: 2020-06-18 | End: 2020-10-20

## 2020-06-24 RX ORDER — GUAIFENESIN 100 MG/5ML
SOLUTION ORAL
Qty: 472 ML | Refills: 0 | Status: SHIPPED | OUTPATIENT
Start: 2020-06-24 | End: 2020-07-21

## 2020-06-25 ENCOUNTER — TELEPHONE (OUTPATIENT)
Dept: FAMILY MEDICINE CLINIC | Age: 60
End: 2020-06-25

## 2020-06-25 NOTE — TELEPHONE ENCOUNTER
[facility] is calling to inform Dr Nay Elizabeth they faxed over a document for this patient's medication for metoprolol, none of the boxes were check on the document. The pharmacy will call back tomorrow to see if they can speak with the NP to get a verbal over the phone.

## 2020-06-26 NOTE — TELEPHONE ENCOUNTER
Spoke with pharmacist Hardik Robles- she is not sure what the problem was but states no problem any more. They can see refill from Dr. German Huynh on 6/18/2020 with 3 refills. No need for verbal order at this time.

## 2020-06-29 RX ORDER — IBUPROFEN 600 MG/1
TABLET ORAL
Qty: 90 TAB | Refills: 0 | Status: SHIPPED | OUTPATIENT
Start: 2020-06-29 | End: 2022-07-05 | Stop reason: SDUPTHER

## 2020-07-20 ENCOUNTER — TELEPHONE (OUTPATIENT)
Dept: FAMILY MEDICINE CLINIC | Age: 60
End: 2020-07-20

## 2020-07-20 NOTE — TELEPHONE ENCOUNTER
Patient's called requesting order for hospital mattress. It has 3 tears in it.  Order needs to be sent to:    [facility]   [phone]

## 2020-07-21 RX ORDER — GUAIFENESIN 100 MG/5ML
SOLUTION ORAL
Qty: 474 ML | Refills: 1 | Status: SHIPPED | OUTPATIENT
Start: 2020-07-21 | End: 2020-07-29 | Stop reason: ALTCHOICE

## 2020-07-21 RX ORDER — FLUCONAZOLE 200 MG/1
TABLET ORAL
Qty: 3 TAB | Refills: 0 | Status: SHIPPED | OUTPATIENT
Start: 2020-07-21 | End: 2020-08-27

## 2020-07-28 ENCOUNTER — TELEPHONE (OUTPATIENT)
Dept: FAMILY MEDICINE CLINIC | Age: 60
End: 2020-07-28

## 2020-07-28 NOTE — TELEPHONE ENCOUNTER
Marco A, from [facility] in [address], called regarding order for hospital mattress. She needs this to say repair or replace.        [phone]

## 2020-07-29 ENCOUNTER — OFFICE VISIT (OUTPATIENT)
Dept: FAMILY MEDICINE CLINIC | Age: 60
End: 2020-07-29

## 2020-07-29 VITALS
BODY MASS INDEX: 42.69 KG/M2 | OXYGEN SATURATION: 99 % | SYSTOLIC BLOOD PRESSURE: 135 MMHG | HEIGHT: 67 IN | DIASTOLIC BLOOD PRESSURE: 72 MMHG | WEIGHT: 272 LBS | HEART RATE: 68 BPM | TEMPERATURE: 98.6 F | RESPIRATION RATE: 18 BRPM

## 2020-07-29 DIAGNOSIS — Z98.890 HISTORY OF TRACHEOSTOMY: ICD-10-CM

## 2020-07-29 DIAGNOSIS — I10 ESSENTIAL HYPERTENSION: ICD-10-CM

## 2020-07-29 DIAGNOSIS — G89.29 CHRONIC RIGHT SHOULDER PAIN: ICD-10-CM

## 2020-07-29 DIAGNOSIS — L98.9 SKIN LESION: ICD-10-CM

## 2020-07-29 DIAGNOSIS — M25.511 CHRONIC RIGHT SHOULDER PAIN: ICD-10-CM

## 2020-07-29 DIAGNOSIS — R60.0 PEDAL EDEMA: ICD-10-CM

## 2020-07-29 DIAGNOSIS — I87.2 VENOUS STASIS DERMATITIS OF BOTH LOWER EXTREMITIES: Primary | ICD-10-CM

## 2020-07-29 DIAGNOSIS — F41.9 ANXIETY: ICD-10-CM

## 2020-07-29 DIAGNOSIS — J38.00 VOCAL CORD PARALYSIS: ICD-10-CM

## 2020-07-29 DIAGNOSIS — E11.65 UNCONTROLLED TYPE 2 DIABETES MELLITUS WITH HYPERGLYCEMIA, WITH LONG-TERM CURRENT USE OF INSULIN (HCC): ICD-10-CM

## 2020-07-29 DIAGNOSIS — E03.4 HYPOTHYROIDISM DUE TO ACQUIRED ATROPHY OF THYROID: ICD-10-CM

## 2020-07-29 DIAGNOSIS — Z79.4 UNCONTROLLED TYPE 2 DIABETES MELLITUS WITH HYPERGLYCEMIA, WITH LONG-TERM CURRENT USE OF INSULIN (HCC): ICD-10-CM

## 2020-07-29 RX ORDER — MUPIROCIN 20 MG/G
OINTMENT TOPICAL DAILY
Qty: 30 G | Refills: 0 | Status: SHIPPED | OUTPATIENT
Start: 2020-07-29 | End: 2021-06-17 | Stop reason: SDUPTHER

## 2020-07-29 RX ORDER — GUAIFENESIN 600 MG/1
600 TABLET, EXTENDED RELEASE ORAL 2 TIMES DAILY
Qty: 180 TAB | Refills: 1 | Status: SHIPPED | OUTPATIENT
Start: 2020-07-29 | End: 2021-05-06

## 2020-07-29 RX ORDER — LORAZEPAM 2 MG/1
TABLET ORAL
Qty: 31 TAB | Refills: 2 | Status: SHIPPED | OUTPATIENT
Start: 2020-07-29 | End: 2020-11-11

## 2020-07-29 NOTE — PROGRESS NOTES
[facility]  Tayler Esquivel comes in for follow-up care. Pedal edema: Patient has bilateral pedal edema with venous stasis dermatitis. She has been on furosemide but has not been taking this medication consistently. Now has a skin lesion right lower extremity where the blisters have opened up. Area is losing some serosanguineous fluid. She has been applying triple antibiotic and she states that it is better. There is some slight erythema but no tenderness or warmth. Patient states that it is getting better. I discussed antibiotic use. She would prefer topical medication. I will send in a prescription for mupirocin. I discussed wound care and the Unna boot dressing  to help with the stasis dermatitis. Patient would prefer to hold off on this as she feels it is getting better. She will however have to take her Lasix daily with potassium supplementation. She will monitor her weight which has gone up recently. She will let us know if her weight goes up or does not go down. She will keep the area on the right leg dry. Her left leg also has some stasis dermatitis and it is edematous. Diabetes mellitus type 2: Patient has type 2 diabetes mellitus. States that her blood glucose numbers have been stable. Her last HbA1c was 7.7. She has been followed up by the endocrinologist.  She is on insulin, Jardiance. States that her blood glucose numbers are around 140s. I did a foot exam.  She has reduced sensation both feet. She does need to see podiatry to help with foot care. States that she has home health aide who comes in and helps with the foot care. Hypertension: Patient has hypertension. Blood pressure today is stable. She denies headache, changes in vision or focal weakness. She is on metoprolol XL. We will continue with the current treatment plan. Plan to recheck labs at next visit. Anxiety: Patient has anxiety and mood disorder. She is unable to sleep at night due to anxiety and takes Ativan.   She would like a refill of this medication. She is also on duloxetine for mood disorder. This helps calm her down. At the moment she is worried because her son will need to get on to peritoneal dialysis. In the past he did get an infection with this. I talked with the patient and try to reassure her. She will continue with the current medication. Shoulder pain: Patient has right shoulder pain with discomfort in overhead motions the shoulder. Suspect rotator cuff tendinopathy. She could also have degenerative disease in the shoulder. We will request an x-ray of the shoulder. Patient states that she will set up an appointment to come in for the x-ray. Bilateral vocal cord paralysis: Patient has a history of bilateral vocal cord paralysis. She currently has a trach in place. She does get a cough on and off and has used cough medication for the same. This is due to secretions that are thick. I will give a prescription for Mucinex to use as needed. She states that she needs to see an ENT physician and she is due for this. She does need to have her trach looked at and possibly changed. Referral will be placed. When she coughs out a lot of secretions come out that are at times blood-tinged. Hypothyroidism: Patient has hypothyroidism. She has had thyroidectomy. She is on Synthroid. We will continue with the current treatment plan. Plan to check her TSH at next visit.       Past Medical History  Past Medical History:   Diagnosis Date    Acquired hypothyroidism 5/17/2017    Anxiety 6/25/2019    Bilateral vocal cord paralysis 4/4/2019    Bilateral vocal cord paralysis status post thyroidectomy (4/4/2019 - Dr. Roby Donovan) with residual dysphagia and dysarthria; S/P Tracheostomy (6/3/2019 - Dr. Roby Donovan); S/P PEG tube insertion (6/20/2019) - Dr. Roby Donovan)    Chronic back pain     Lower back pain    Depression     Diabetic neuropathy associated with type 2 diabetes mellitus ([facility] [address]     Dysphagia 6/25/2019    S/P Thyroidectomy (4/4/2019 - Dr. Fredy Garrett)    History of asthma     History of heart failure     chronic diastolic heart failure    History of vitamin D deficiency 8/28/2017    Hypertensive heart disease without heart failure 5/17/2017    Hypoxemia requiring supplemental oxygen 6/25/2019    Insomnia     Left ear hearing loss     pt states nerve damage--- 25% hearing loss, described as \"muffled\"    Memory difficulty     Moderate persistent asthma     Obesity, Class II, BMI 35-39.9 11/11/2016    Obstructive sleep apnea 11/6/2015    Panic attacks     Ringing of ears     Type 2 diabetes mellitus with diabetic neuropathy, with long-term current use of insulin ([address]     Vertigo        Surgical History  Past Surgical History:   Procedure Laterality Date    CARDIAC SURG PROCEDURE UNLIST  10/31/2013    open heart    HX HEART VALVE SURGERY  2013    aortic valve repair    HX HYSTERECTOMY      Partial Hysterectomy - removed ovary    HX MYOMECTOMY      HX ORTHOPAEDIC  02/2018    had nerves burned on her right side of back    HX TRACHEOSTOMY  06/03/2019    S/P Tracheostomy (6/3/2019 - Dr. Narcisa Kilpatrick)    CT EGD PERCUTANEOUS PLACEMENT GASTROSTOMY TUBE N/A 06/20/2019    Dr. Ana Rosa Deleon Bilateral 04/04/2019    Dr. James Singh        Medications  Current Outpatient Medications   Medication Sig Dispense Refill    LORazepam (ATIVAN) 2 mg tablet take 1 tablet by mouth nightly 31 Tab 2    mupirocin (BACTROBAN) 2 % ointment Apply  to affected area daily. 30 g 0    guaiFENesin ER (MUCINEX) 600 mg ER tablet Take 1 Tab by mouth two (2) times a day.  180 Tab 1    potassium chloride (KLOR-CON) 10 mEq tablet take 1 tablet by mouth once daily 90 Tab 1    furosemide (LASIX) 40 mg tablet take 1 tablet by mouth once daily 90 Tab 1    fluconazole (DIFLUCAN) 200 mg tablet take 1 tablet by mouth once daily for 3 days FDA ADVISES CAUTIOUS PRESCRIBING OF FLUCONAZOLE IN PREGNANCY 3 Tab 0  ibuprofen (MOTRIN) 600 mg tablet take 1 tablet by mouth every 8 hours if needed for pain 90 Tab 0    metoprolol succinate (TOPROL-XL) 50 mg XL tablet take 1 tablet by mouth once daily 30 Tab 3    ondansetron hcl (ZOFRAN) 4 mg tablet take 1 tablet by mouth every 8 hours if needed for nausea and vomiting 30 Tab 1    omega-3 acid ethyl esters (LOVAZA) 1 gram capsule take 1 capsule by mouth twice a day 60 Cap 1    levothyroxine (SYNTHROID) 200 mcg tablet take 1 tablet by mouth once daily BEFORE BREAKFAST 90 Tab 2    levothyroxine (SYNTHROID) 25 mcg tablet take 1 tablet by mouth daily BEFORE BREAKFAST; TAKE IN ADDITION TO  MCG TABLETS TO MAKE 225 MCG 30 Tab 3    Jardiance 25 mg tablet take 1 tablet by mouth once daily 30 Tab 1    glucose blood VI test strips (ASCENSIA AUTODISC VI, ONE TOUCH ULTRA TEST VI) strip Check blood glucose 3 times daily 300 Strip 3    insulin glargine (Lantus U-100 Insulin) 100 unit/mL injection inject 65 units subcutaneously once daily before breakfast 30 mL 2    ergocalciferol (ERGOCALCIFEROL) 1,250 mcg (50,000 unit) capsule Take 1 Cap by mouth every seven (7) days. 13 Cap 3    melatonin 1 mg tablet Take 1 Tab by mouth nightly. 90 Tab 1    DULoxetine (CYMBALTA) 60 mg capsule Take 1 Cap by mouth daily. 30 Cap 3    albuterol (PROVENTIL VENTOLIN) 2.5 mg /3 mL (0.083 %) nebu 3 mL by Nebulization route every four (4) hours as needed (wheeze). Indications: Asthma Attack 24 Each 5    naloxone (NARCAN) 4 mg/actuation nasal spray Use 1 spray intranasally, then discard. Repeat with new spray every 2 min as needed for opioid overdose symptoms, alternating nostrils. 2 Each 0    lancets misc Check blood glucose 3 times daily 300 Each 3    insulin lispro (HUMALOG) 100 unit/mL injection 2 Units by SubCUTAneous route Before breakfast, lunch, dinner and at bedtime.  To be given 30 minutes before starting each tube feeding 1 Vial 0    atorvastatin (LIPITOR) 80 mg tablet take 1 tablet by mouth once daily 30 Tab 3    BD INSULIN SYRINGE ULTRA-FINE 1 mL 31 gauge x 5/16 syrg use as directed twice a day 200 Syringe 3    FREESTYLE LITE STRIPS strip TEST twice a day as directed 100 Strip 11    Aspirin, Buffered 81 mg tab Take 81 mg by mouth daily. Allergies  Allergies   Allergen Reactions    Other Food Other (comments)     Artificial sweeteners- causes headaches    Metformin Other (comments)     Increase pain in feet and swelling in feet  Increased hunger,tired and bilat foot pain    Morphine Other (comments)     headache    Singulair [Montelukast] Other (comments)     hallucinations    Sucrose Other (comments)     Pt. Is not allergic to sucrose. She develops headaches with artificial sweeteners.        Family History  Family History   Problem Relation Age of Onset    Heart Disease Mother     Diabetes Mother     Stroke Mother     Hypertension Mother     Diabetes Father     Heart Disease Father     Hypertension Father     Stroke Father     Diabetes Brother     Cancer Brother     Hypertension Brother     Stroke Brother     Heart Disease Brother     Diabetes Brother     Hypertension Brother     Stroke Brother     Heart Disease Brother     Diabetes Brother     Hypertension Brother     Hypertension Brother        Social History  Social History     Socioeconomic History    Marital status:      Spouse name: Not on file    Number of children: Not on file    Years of education: Not on file    Highest education level: Not on file   Occupational History    Not on file   Social Needs    Financial resource strain: Not on file    Food insecurity     Worry: Not on file     Inability: Not on file   [employer] needs     Medical: Not on file     Non-medical: Not on file   Tobacco Use    Smoking status: Never Smoker    Smokeless tobacco: Never Used   Substance and Sexual Activity    Alcohol use: No    Drug use: No    Sexual activity: Not Currently   Lifestyle    Physical activity     Days per week: Not on file     Minutes per session: Not on file    Stress: Not on file   Relationships    Social connections     Talks on phone: Not on file     Gets together: Not on file     Attends Rastafari service: Not on file     Active member of club or organization: Not on file     Attends meetings of clubs or organizations: Not on file     Relationship status: Not on file    Intimate partner violence     Fear of current or ex partner: Not on file     Emotionally abused: Not on file     Physically abused: Not on file     Forced sexual activity: Not on file   Other Topics Concern     Service No    Blood Transfusions No    Caffeine Concern No    Occupational Exposure No    Hobby Hazards No    Sleep Concern Yes     Comment: Sleep apnea     Stress Concern Yes     Comment: Due to family medical issues.  Weight Concern No    Special Diet No    Back Care Yes     Comment: Patient tries to do back care with streches     Exercise No    Bike Helmet Yes    Seat Belt Yes    Self-Exams Yes   Social History Narrative    Not on file       Review of Systems  Review of Systems - Review of all systems is negative except as noted above in the HPI.     Vital Signs  Visit Vitals  /72 (BP 1 Location: Left arm, BP Patient Position: Sitting)   Pulse 68   Temp 98.6 °F (37 °C) (Oral)   Resp 18   Ht 5' 7\" (1.702 m)   Wt 272 lb (123.4 kg)   SpO2 99%   BMI 42.60 kg/m²         Physical Exam  Physical Examination: General appearance - oriented to person, place, and time, overweight and acyanotic, in no respiratory distress  Mental status - affect appropriate to mood  Mouth - mucous membranes moist, pharynx normal without lesions  Neck - supple, no significant adenopathy, tracheostomy tube in place, patient on oxygen  Lymphatics - no palpable lymphadenopathy  Chest - decreased air entry noted bilateral lung bases  Heart - S1 and S2 normal  Abdomen - no rebound tenderness noted  Back exam - limited range of motion  Neurological - abnormal neurological exam unchanged from prior examinations  Musculoskeletal - osteoarthritic changes noted in both hands, right shoulder with discomfort to palpation along the margins. Limitation in overhead motions right shoulder  Extremities - pedal edema 2 +, intact peripheral pulses  Skin -venous stasis dermatitis noted bilateral lower extremities with areas of excoriation right shin that are healing. Slight serosanguineous drainage. Diabetic foot exam:     Left Foot:   Visual Exam: Flaky dermatitis dorsum of the foot   Pulse DP: 2+ (normal)   Filament test: reduced sensation        Right Foot:   Visual Exam: Flaky dermatitis dorsum of the foot   Pulse DP: 2+ (normal)   Filament test: reduced sensation      Results  Results for orders placed or performed in visit on 01/22/20   TSH 3RD GENERATION   Result Value Ref Range    TSH 5.740 (H) 0.450 - 4.500 uIU/mL   VITAMIN D, 25 HYDROXY   Result Value Ref Range    VITAMIN D, 25-HYDROXY 7.9 (L) 30.0 - 100.0 ng/mL   T4, FREE   Result Value Ref Range    T4, Free 1.41 0.82 - 1.77 ng/dL       ASSESSMENT and PLAN    ICD-10-CM ICD-9-CM    1. Venous stasis dermatitis of both lower extremities  I87.2 454.1 mupirocin (BACTROBAN) 2 % ointment   2. Anxiety  F41.9 300.00 LORazepam (ATIVAN) 2 mg tablet   3. Uncontrolled type 2 diabetes mellitus with hyperglycemia, with long-term current use of insulin (HCC)  E11.65 250.02     Z79.4 V58.67    4. Pedal edema  R60.0 782.3 mupirocin (BACTROBAN) 2 % ointment   5. Essential hypertension  I10 401.9    6. History of tracheostomy  Z98.890 V44.0 REFERRAL TO ENT-OTOLARYNGOLOGY   7. Hypothyroidism due to acquired atrophy of thyroid  E03.4 244.8      246.8    8. Skin lesion  L98.9 709.9 mupirocin (BACTROBAN) 2 % ointment   9. Chronic right shoulder pain  M25.511 719.41 XR SHOULDER RT AP/LAT MIN 2 V    G89.29 338.29    10.  Vocal cord paralysis  J38.00 478.30 REFERRAL TO ENT-OTOLARYNGOLOGY lab results and schedule of future lab studies reviewed with patient  reviewed diet, exercise and weight control  cardiovascular risk and specific lipid/LDL goals reviewed  reviewed medications and side effects in detail  specific diabetic recommendations: low cholesterol diet, weight control and daily exercise discussed, home glucose monitoring emphasized, all medications, side effects and compliance discussed carefully, foot care discussed and Podiatry visits discussed, annual eye examinations at Ophthalmology discussed, glycohemoglobin and other lab monitoring discussed and long term diabetic complications discussed      I have discussed the diagnosis with the patient and the intended plan of care as seen in the above orders. The patient has received an after-visit summary and questions were answered concerning future plans. I have discussed medication, side effects, and warnings with the patient in detail. The patient verbalized understanding and is in agreement with the plan of care. The patient will contact the office with any additional concerns. I spent at least 53 minutes on this visit with this established patient. Dotty Azul MD    PLEASE NOTE:   This document has been produced using voice recognition software.  Unrecognized errors in transcription may be present

## 2020-08-01 DIAGNOSIS — R13.10 DYSPHAGIA, UNSPECIFIED TYPE: ICD-10-CM

## 2020-08-03 RX ORDER — ONDANSETRON 4 MG/1
TABLET, FILM COATED ORAL
Qty: 30 TAB | Refills: 1 | Status: SHIPPED | OUTPATIENT
Start: 2020-08-03 | End: 2020-10-28

## 2020-08-27 RX ORDER — FLUCONAZOLE 200 MG/1
TABLET ORAL
Qty: 3 TAB | Refills: 0 | Status: SHIPPED | OUTPATIENT
Start: 2020-08-27 | End: 2020-10-06

## 2020-09-13 DIAGNOSIS — E11.65 UNCONTROLLED TYPE 2 DIABETES MELLITUS WITH HYPERGLYCEMIA, WITH LONG-TERM CURRENT USE OF INSULIN (HCC): ICD-10-CM

## 2020-09-13 DIAGNOSIS — Z79.4 UNCONTROLLED TYPE 2 DIABETES MELLITUS WITH HYPERGLYCEMIA, WITH LONG-TERM CURRENT USE OF INSULIN (HCC): ICD-10-CM

## 2020-09-16 ENCOUNTER — TELEPHONE (OUTPATIENT)
Dept: FAMILY MEDICINE CLINIC | Age: 60
End: 2020-09-16

## 2020-09-16 DIAGNOSIS — E11.40 TYPE 2 DIABETES MELLITUS WITH DIABETIC NEUROPATHY, WITH LONG-TERM CURRENT USE OF INSULIN (HCC): Chronic | ICD-10-CM

## 2020-09-16 DIAGNOSIS — Z79.4 TYPE 2 DIABETES MELLITUS WITH DIABETIC NEUROPATHY, WITH LONG-TERM CURRENT USE OF INSULIN (HCC): Chronic | ICD-10-CM

## 2020-09-16 NOTE — TELEPHONE ENCOUNTER
Spoke with pharmacy at this time. Informed pharmacy I do not see anywhere in the chart stating that provider increase the dose to 75 units only see where he states patient has been taking 65 units. Informed pharmacy we will have to wait until provider return for clarification on units or patient can contact her endorinologist for the refill

## 2020-09-16 NOTE — TELEPHONE ENCOUNTER
Patient has not taking her insulin in two days, stated the pharmacy advised her to call the office because they can't get it refill until Sept 29th. Ms Marti Simmons stated the units she was taking was more than originally instructed to take but she is completely out. Ms Marti Simmons wanted to know if she could send in a emergency prescription for her Lantus Insulin.  Please contact pt to inform if Herbert Alejandra can do this when available

## 2020-09-16 NOTE — ROUTINE PROCESS
Bedside and Verbal shift change report given to [place] (oncoming nurse) by Durga Dan RN   (offgoing nurse). Report given with SBAR, Kardex, Intake/Output, MAR, Accordion and Recent Results. Statement Selected

## 2020-09-16 NOTE — TELEPHONE ENCOUNTER
Patient is complete out of insulin medication is too soon too be filled with the 65 unit rx. Advised patient to seek urgent care or ER. Patient has not seen the endocrinologist over 1 year at this time. Unable to get rx from the endocrinologist

## 2020-09-16 NOTE — TELEPHONE ENCOUNTER
Requested Prescriptions     Pending Prescriptions Disp Refills    omega-3 acid ethyl esters (LOVAZA) 1 gram capsule [Pharmacy Med Name: OMEGA-3 ETHYL ESTERS 1 GM CAP] 60 Cap 1     Sig: take 1 capsule by mouth twice a day    Jardiance 25 mg tablet [Pharmacy Med Name: Cassie Adrien 25 MG TABLET] 30 Tab 1     Sig: take 1 tablet by mouth once daily

## 2020-09-17 RX ORDER — INSULIN GLARGINE 100 [IU]/ML
65 INJECTION, SOLUTION SUBCUTANEOUS DAILY
Qty: 19.5 ML | Refills: 0 | Status: SHIPPED | OUTPATIENT
Start: 2020-09-17 | End: 2020-09-22 | Stop reason: SDUPTHER

## 2020-09-22 DIAGNOSIS — E11.40 TYPE 2 DIABETES MELLITUS WITH DIABETIC NEUROPATHY, WITH LONG-TERM CURRENT USE OF INSULIN (HCC): Chronic | ICD-10-CM

## 2020-09-22 DIAGNOSIS — Z79.4 TYPE 2 DIABETES MELLITUS WITH DIABETIC NEUROPATHY, WITH LONG-TERM CURRENT USE OF INSULIN (HCC): Chronic | ICD-10-CM

## 2020-09-22 RX ORDER — INSULIN GLARGINE 100 [IU]/ML
75 INJECTION, SOLUTION SUBCUTANEOUS DAILY
Qty: 30 ML | Refills: 2 | Status: SHIPPED | OUTPATIENT
Start: 2020-09-22 | End: 2020-09-23 | Stop reason: SDUPTHER

## 2020-09-22 RX ORDER — EMPAGLIFLOZIN 25 MG/1
TABLET, FILM COATED ORAL
Qty: 30 TAB | Refills: 1 | Status: SHIPPED | OUTPATIENT
Start: 2020-09-22 | End: 2020-11-17

## 2020-09-22 RX ORDER — OMEGA-3-ACID ETHYL ESTERS 1 G/1
CAPSULE, LIQUID FILLED ORAL
Qty: 60 CAP | Refills: 1 | Status: SHIPPED | OUTPATIENT
Start: 2020-09-22 | End: 2020-11-17

## 2020-09-23 ENCOUNTER — TELEPHONE (OUTPATIENT)
Dept: FAMILY MEDICINE CLINIC | Age: 60
End: 2020-09-23

## 2020-09-23 DIAGNOSIS — Z79.4 TYPE 2 DIABETES MELLITUS WITH DIABETIC NEUROPATHY, WITH LONG-TERM CURRENT USE OF INSULIN (HCC): Chronic | ICD-10-CM

## 2020-09-23 DIAGNOSIS — E11.40 TYPE 2 DIABETES MELLITUS WITH DIABETIC NEUROPATHY, WITH LONG-TERM CURRENT USE OF INSULIN (HCC): Chronic | ICD-10-CM

## 2020-09-23 RX ORDER — INSULIN GLARGINE 100 [IU]/ML
75 INJECTION, SOLUTION SUBCUTANEOUS DAILY
Qty: 30 ML | Refills: 2 | Status: SHIPPED | OUTPATIENT
Start: 2020-09-23 | End: 2021-02-15

## 2020-09-23 NOTE — TELEPHONE ENCOUNTER
[facility] pharmacy is requesting Dr Gaurav Franco to send in clear instructions for the Lantus prescription. Stated the pt inform that she has increased her dosage on this medication.  Please be advised and contact when available

## 2020-10-06 RX ORDER — FLUCONAZOLE 200 MG/1
TABLET ORAL
Qty: 3 TAB | Refills: 0 | Status: SHIPPED | OUTPATIENT
Start: 2020-10-06 | End: 2020-10-29

## 2020-10-24 DIAGNOSIS — R13.10 DYSPHAGIA, UNSPECIFIED TYPE: ICD-10-CM

## 2020-10-28 RX ORDER — ONDANSETRON 4 MG/1
TABLET, FILM COATED ORAL
Qty: 30 TAB | Refills: 1 | Status: SHIPPED | OUTPATIENT
Start: 2020-10-28 | End: 2021-01-08

## 2020-10-29 ENCOUNTER — VIRTUAL VISIT (OUTPATIENT)
Dept: FAMILY MEDICINE CLINIC | Age: 60
End: 2020-10-29
Payer: MEDICAID

## 2020-10-29 DIAGNOSIS — F41.9 ANXIETY: ICD-10-CM

## 2020-10-29 DIAGNOSIS — L29.9 ITCHING: ICD-10-CM

## 2020-10-29 DIAGNOSIS — I10 ESSENTIAL HYPERTENSION: ICD-10-CM

## 2020-10-29 DIAGNOSIS — I87.2 VENOUS STASIS DERMATITIS OF BOTH LOWER EXTREMITIES: ICD-10-CM

## 2020-10-29 DIAGNOSIS — I89.0 LYMPHEDEMA: Primary | ICD-10-CM

## 2020-10-29 DIAGNOSIS — Z98.890 HISTORY OF TRACHEOSTOMY: ICD-10-CM

## 2020-10-29 DIAGNOSIS — Z79.4 TYPE 2 DIABETES MELLITUS WITH DIABETIC NEUROPATHY, WITH LONG-TERM CURRENT USE OF INSULIN (HCC): ICD-10-CM

## 2020-10-29 DIAGNOSIS — E11.40 TYPE 2 DIABETES MELLITUS WITH DIABETIC NEUROPATHY, WITH LONG-TERM CURRENT USE OF INSULIN (HCC): ICD-10-CM

## 2020-10-29 DIAGNOSIS — R06.02 SOBOE (SHORTNESS OF BREATH ON EXERTION): ICD-10-CM

## 2020-10-29 DIAGNOSIS — E03.4 HYPOTHYROIDISM DUE TO ACQUIRED ATROPHY OF THYROID: ICD-10-CM

## 2020-10-29 DIAGNOSIS — Z74.09 MOBILITY IMPAIRED: ICD-10-CM

## 2020-10-29 PROCEDURE — 99215 OFFICE O/P EST HI 40 MIN: CPT | Performed by: FAMILY MEDICINE

## 2020-10-29 RX ORDER — HYDROXYZINE 25 MG/1
25 TABLET, FILM COATED ORAL
Qty: 60 TAB | Refills: 1 | Status: SHIPPED | OUTPATIENT
Start: 2020-10-29 | End: 2021-02-17

## 2020-10-29 NOTE — PROGRESS NOTES
Óscar Lopez is a 62 y.o. female who was seen by synchronous (real-time) audio-video technology on 10/29/2020 for Leg Swelling; Diabetes; and Depression        Assessment & Plan:       ICD-10-CM ICD-9-CM    1. Lymphedema  I89.0 457.1 REFERRAL TO VASCULAR SURGERY   2. Essential hypertension  I10 401.9    3. Type 2 diabetes mellitus with diabetic neuropathy, with long-term current use of insulin (HCC)  E11.40 250.60 REFERRAL TO PODIATRY    Z79.4 357.2      V58.67    4. Venous stasis dermatitis of both lower extremities  I87.2 454.1    5. History of tracheostomy  Z98.890 V44.0    6. Itching  L29.9 698.9 hydrOXYzine HCL (ATARAX) 25 mg tablet   7. Mobility impaired  Z74.09 799.89    8. SOBOE (shortness of breath on exertion)  R06.02 786.05    9. Anxiety  F41.9 300.00    10. Hypothyroidism due to acquired atrophy of thyroid  E03.4 244.8      246.8          I spent at least 45 minutes on this visit with this established patient. Subjective:   Óscar Lopez is seen for follow-up care. Leg swelling: Patient has swelling bilateral lower extremities. This has been chronic for her. She does have stasis dermatitis. She states that the swelling has gotten worse and she wonders about her circulation. She is concerned about lymphedema. She has tried to elevate the lower extremities as much as she can. She requests referral to vascular specialist Dr. Fina Madrigal at [facility]. She denies redness or erythema of the lower extremities. At times they weep. I did let her know that this is due to the stasis dermatitis. She does have Lasix to take. Referral to the vascular specialist will be made. Impaired mobility: Patient has impaired mobility. She is unable to walk short distances due to shortness of breath. She has a trach in place. She has morbid obesity. She is unable to move from her bedroom to the kitchen or even to the bathroom for activities of daily living, grooming and for nutritional needs.   She does need a wheelchair to enable her move within her house. I will write a prescription for this. Status post tracheostomy: Patient has chronic trach in place. She is followed up by the ENT specialist.  She is seen by Dr. Savita Browning. At times she does get a lot of secretion and bleeding from the trach tube. She has had the tube changed recently. She will continue with follow-up with the ENT specialist.  Diabetes mellitus type 2: Patient has diabetes mellitus type 2. She is on insulin and Jardiance. Her last HbA1c was 8.0. She does need to get this rechecked. She will come in at next visit to get this checked. She should intensify lifestyle and dietary modification. Her blood glucose numbers have been fluctuating. Shortness of breath with exertion: Patient has shortness of breath with exertion. States that her oxygen levels drop when she is exerting herself. She does need to be on home oxygen and she has an evaluation for this scheduled. She denies chest pain, palpitations or diaphoresis. If symptoms worsen she will need to go to the emergency room for evaluation. She denies fever or chills. She does have an occasional cough that is productive of clear phlegm. She takes a mucolytic to help break down secretions through her trach. Hypertension: Patient has hypertension. Her blood pressure is stable. She denies headache, changes in vision or focal weakness. She will continue with the current treatment plan. She is on metoprolol XL. Mood disorder: Patient has anxiety and depression. She is on lorazepam for anxiety as needed. She takes Cymbalta daily. Mood is stable. We will continue with the current treatment plan. Itching: Patient has been having episodes of itching that come on and off. She denies any skin rash. In the past she has used hydroxyzine with good results. She would like a prescription for this sent to the pharmacy. Refill is given. Hypothyroidism: Patient has hypothyroidism.   She is on thyroid replacement therapy. She will come in for recheck of her TSH. Dyslipidemia: Patient has dyslipidemia. She takes Lipitor 80 mg daily. We will recheck lipid panel at next visit. Continue current treatment plan. Morbid obesity: Patient has morbid obesity. We discussed lifestyle and dietary modification. Foot care: Patient has overgrown nails and has been having trouble trimming these. She is diabetic and at times when she trims her nails there is bleeding. I will place a referral for her to see the podiatrist for diabetic foot care. Prior to Admission medications    Medication Sig Start Date End Date Taking? Authorizing Provider   ondansetron hcl (ZOFRAN) 4 mg tablet take 1 tablet by mouth every 8 hours if needed for nausea and vomiting 10/28/20   Angelito Perkins MD   metoprolol succinate (TOPROL-XL) 50 mg XL tablet take 1 tablet by mouth once daily 10/20/20   Angelito Perkins MD   fluconazole (DIFLUCAN) 200 mg tablet take 1 tablet by mouth once daily for 3 days FDA ADVISE CAUTIOUS PRESCRIBING OF FLUCONAZOLE IN PREGNANCY 10/6/20   Angelito Perkins MD   insulin glargine (Lantus U-100 Insulin) 100 unit/mL injection 75 Units by SubCUTAneous route daily. 9/23/20   Angelito Perkins MD   omega-3 acid ethyl esters (LOVAZA) 1 gram capsule take 1 capsule by mouth twice a day 9/22/20   Angelito Perkins MD   Jardiance 25 mg tablet take 1 tablet by mouth once daily 9/22/20   Leanne Stoddard MD   LORazepam (ATIVAN) 2 mg tablet take 1 tablet by mouth nightly 7/29/20   Angelito Perkins MD   mupirocin (BACTROBAN) 2 % ointment Apply  to affected area daily. 7/29/20   Angelito Perkins MD   guaiFENesin ER (MUCINEX) 600 mg ER tablet Take 1 Tab by mouth two (2) times a day.  7/29/20   Angelito Perkins MD   potassium chloride (KLOR-CON) 10 mEq tablet take 1 tablet by mouth once daily 7/22/20   Angelito Perkins MD   furosemide (LASIX) 40 mg tablet take 1 tablet by mouth once daily 7/22/20   Gaynell Cooks, Hesed MD LISY   ibuprofen (MOTRIN) 600 mg tablet take 1 tablet by mouth every 8 hours if needed for pain 6/29/20   Angelito Perkins MD   levothyroxine (SYNTHROID) 200 mcg tablet take 1 tablet by mouth once daily BEFORE BREAKFAST 5/26/20   [place] Canavan, Hesed N, MD   levothyroxine (SYNTHROID) 25 mcg tablet take 1 tablet by mouth daily BEFORE BREAKFAST; TAKE IN ADDITION TO  MCG TABLETS TO MAKE 225 MCG 5/18/20   [place] Canavan, Hesed N, MD   glucose blood VI test strips (ASCENSIA AUTODISC VI, ONE TOUCH ULTRA TEST VI) strip Check blood glucose 3 times daily 4/16/20   Steve Decker MD   ergocalciferol (ERGOCALCIFEROL) 1,250 mcg (50,000 unit) capsule Take 1 Cap by mouth every seven (7) days. 1/30/20   Angelito Perkins MD   melatonin 1 mg tablet Take 1 Tab by mouth nightly. 12/12/19   Angelito Perkins MD   DULoxetine (CYMBALTA) 60 mg capsule Take 1 Cap by mouth daily. 12/11/19   Angelito Perkins MD   albuterol (PROVENTIL VENTOLIN) 2.5 mg /3 mL (0.083 %) nebu 3 mL by Nebulization route every four (4) hours as needed (wheeze). Indications: Asthma Attack 11/12/19   Angelito Perkins MD   naloxone [facility]) 4 mg/actuation nasal spray Use 1 spray intranasally, then discard. Repeat with new spray every 2 min as needed for opioid overdose symptoms, alternating nostrils. 7/18/19   Angelito Perkins MD   lancets misc Check blood glucose 3 times daily 7/17/19   Angelito Perkins MD   insulin lispro (HUMALOG) 100 unit/mL injection 2 Units by SubCUTAneous route Before breakfast, lunch, dinner and at bedtime.  To be given 30 minutes before starting each tube feeding 7/11/19   Lin Hicks MD   atorvastatin (LIPITOR) 80 mg tablet take 1 tablet by mouth once daily 11/29/18   Angelito Perkins MD   BD INSULIN SYRINGE ULTRA-FINE 1 mL 31 gauge x 5/16 syrg use as directed twice a day 10/18/18   Angelito Perkins MD   FREESTYLE LITE STRIPS strip TEST twice a day as directed 11/20/17   Angelito Perkins MD   Aspirin, Buffered 81 mg tab Take 81 mg by mouth daily. Provider, Historical     ROS Review of all systems is negative except as noted above in the HPI. Objective:   No flowsheet data found. Constitutional: [x]  No apparent distress      Mental status: [x] Alert and awake  [x] Oriented to person/place/time    Eyes:   EOM    [x]  Normal       Neck: [x] Tracheostomy tube in place    Pulmonary/Chest: [x] Respiratory effort normal      Neurological:        [x] No Facial Asymmetry (Cranial nerve 7 motor function) (limited exam due to video visit)                    Psychiatric:       [x] Normal Affect    We discussed the expected course, resolution and complications of the diagnosis(es) in detail. Medication risks, benefits, costs, interactions, and alternatives were discussed as indicated. I advised her to contact the office if her condition worsens, changes or fails to improve as anticipated. She expressed understanding with the diagnosis(es) and plan. Stew Jimenez, who was evaluated through a patient-initiated, synchronous (real-time) audio-video encounter, and/or her healthcare decision maker, is aware that it is a billable service, with coverage as determined by her insurance carrier. She provided verbal consent to proceed: Yes, and patient identification was verified. It was conducted pursuant to the emergency declaration under the [address] [place] [address] authority and the [employer] General Act. A caregiver was present when appropriate. Ability to conduct physical exam was limited. I was in the office. The patient was at home.       Donna Huff MD

## 2020-11-06 ENCOUNTER — TELEPHONE (OUTPATIENT)
Dept: FAMILY MEDICINE CLINIC | Age: 60
End: 2020-11-06

## 2020-11-06 NOTE — TELEPHONE ENCOUNTER
Nicole Peña from [employer] [facility] is requesting this patient's height and weight for an order that was received from Dr Prince Show  Please contact Nicole Peña when available

## 2020-11-11 DIAGNOSIS — F41.9 ANXIETY: ICD-10-CM

## 2020-11-11 RX ORDER — LORAZEPAM 2 MG/1
TABLET ORAL
Qty: 31 TAB | Refills: 2 | Status: SHIPPED | OUTPATIENT
Start: 2020-11-11 | End: 2021-02-21

## 2020-11-30 DIAGNOSIS — G47.00 INSOMNIA, UNSPECIFIED TYPE: Chronic | ICD-10-CM

## 2020-11-30 RX ORDER — UREA 10 %
LOTION (ML) TOPICAL
Qty: 90 TAB | Refills: 1 | Status: SHIPPED | OUTPATIENT
Start: 2020-11-30 | End: 2021-04-27 | Stop reason: ALTCHOICE

## 2021-01-07 DIAGNOSIS — R60.0 PEDAL EDEMA: ICD-10-CM

## 2021-01-08 DIAGNOSIS — R13.10 DYSPHAGIA, UNSPECIFIED TYPE: ICD-10-CM

## 2021-01-08 RX ORDER — POTASSIUM CHLORIDE 750 MG/1
TABLET, EXTENDED RELEASE ORAL
Qty: 90 TAB | Refills: 1 | Status: SHIPPED | OUTPATIENT
Start: 2021-01-08 | End: 2021-07-08

## 2021-01-08 RX ORDER — ONDANSETRON 4 MG/1
TABLET, FILM COATED ORAL
Qty: 30 TAB | Refills: 1 | Status: SHIPPED | OUTPATIENT
Start: 2021-01-08 | End: 2021-04-25

## 2021-01-08 RX ORDER — FUROSEMIDE 40 MG/1
TABLET ORAL
Qty: 90 TAB | Refills: 1 | Status: SHIPPED | OUTPATIENT
Start: 2021-01-08 | End: 2021-06-17

## 2021-02-01 RX ORDER — LEVOTHYROXINE SODIUM 25 UG/1
TABLET ORAL
Qty: 30 TAB | Refills: 3 | Status: SHIPPED | OUTPATIENT
Start: 2021-02-01 | End: 2021-02-21 | Stop reason: DRUGHIGH

## 2021-02-04 ENCOUNTER — VIRTUAL VISIT (OUTPATIENT)
Dept: FAMILY MEDICINE CLINIC | Age: 61
End: 2021-02-04
Payer: MEDICAID

## 2021-02-04 DIAGNOSIS — Z98.890 HISTORY OF TRACHEOSTOMY: ICD-10-CM

## 2021-02-04 DIAGNOSIS — I10 ESSENTIAL HYPERTENSION: ICD-10-CM

## 2021-02-04 DIAGNOSIS — R60.0 PEDAL EDEMA: ICD-10-CM

## 2021-02-04 DIAGNOSIS — F39 MOOD DISORDER (HCC): ICD-10-CM

## 2021-02-04 DIAGNOSIS — G47.00 INSOMNIA, UNSPECIFIED TYPE: ICD-10-CM

## 2021-02-04 DIAGNOSIS — J38.00 VOCAL CORD PARALYSIS: ICD-10-CM

## 2021-02-04 DIAGNOSIS — Z79.4 UNCONTROLLED TYPE 2 DIABETES MELLITUS WITH HYPERGLYCEMIA, WITH LONG-TERM CURRENT USE OF INSULIN (HCC): Primary | ICD-10-CM

## 2021-02-04 DIAGNOSIS — E11.65 UNCONTROLLED TYPE 2 DIABETES MELLITUS WITH HYPERGLYCEMIA, WITH LONG-TERM CURRENT USE OF INSULIN (HCC): Primary | ICD-10-CM

## 2021-02-04 DIAGNOSIS — I89.0 LYMPHEDEMA: ICD-10-CM

## 2021-02-04 DIAGNOSIS — F41.9 ANXIETY: ICD-10-CM

## 2021-02-04 DIAGNOSIS — E03.4 HYPOTHYROIDISM DUE TO ACQUIRED ATROPHY OF THYROID: ICD-10-CM

## 2021-02-04 DIAGNOSIS — R53.81 PHYSICAL DEBILITY: ICD-10-CM

## 2021-02-04 PROCEDURE — 99214 OFFICE O/P EST MOD 30 MIN: CPT | Performed by: FAMILY MEDICINE

## 2021-02-04 NOTE — PROGRESS NOTES
Rogerio Street is a 62 y.o. female who was seen by synchronous (real-time) audio-video technology on 2/4/2021 for Anxiety, Depression, Hypertension, and Diabetes        Assessment & Plan:       ICD-10-CM ICD-9-CM    1. Uncontrolled type 2 diabetes mellitus with hyperglycemia, with long-term current use of insulin (HCC)  E11.65 250.02 CBC WITH AUTOMATED DIFF    Z79.4 V58.67 LIPID PANEL      METABOLIC PANEL, COMPREHENSIVE      HEMOGLOBIN A1C WITH EAG   2. Hypothyroidism due to acquired atrophy of thyroid  E03.4 244.8 TSH 3RD GENERATION     246.8 T4, FREE   3. Insomnia, unspecified type  G47.00 780.52    4. Pedal edema  R60.0 782.3    5. Essential hypertension  I10 401.9    6. Lymphedema  I89.0 457.1    7. Anxiety  F41.9 300.00    8. History of tracheostomy  Z98.890 V44.0    9. Mood disorder ([facility] [address]  F39 296.90    10. Vocal cord paralysis  J38.00 478.30    11. Physical debility  R53.81 799.3      Subjective:   Rogerio Street is seen for follow-up care. Hypothyroidism: Patient has hypothyroidism. She is on thyroid replacement therapy. She takes 225 mcg daily. Has not had labs checked in almost a year. States that the she has noted changes in her fingernails. She has generalized malaise and no energy. We will check labs. Physical debility: Patient has generalized weakness. She has malaise and fatigue. She uses oxygen chronically and is trach dependent. She has various home health aides who come in to help with activities of daily living. She is frustrated as they are not doing what they are supposed to do. We discussed this at length. She will talk to her home health office to see if something can be done. She has had different home health aides assigned to her at different times. Hypovitaminosis D: Patient has a history of hypovitaminosis D. She is on vitamin D replacement therapy. We will recheck labs. Diabetes mellitus type 2: Patient has type 2 diabetes mellitus. She is on insulin and the Jardiance. Her last HbA1c was 8.0. Her blood glucose numbers have been fluctuating. She needs to come in and get her HbA1c rechecked and also to have a foot exam.  She should intensify lifestyle and dietary modification. Hypertension: Patient has hypertension. She denies headache, changes in vision or focal weakness. She is on Toprol-XL. Has been stable on this medication. Plan to recheck labs. Trach dependent: Patient had thyroidectomy with resultant vocal cord paralysis. She now is trach dependent. She does do trach care by herself. She is on mucolytic's. Takes Mucinex as needed. Occasionally has wheezing. She has inhaler medications that she uses. She has been followed up by the ENT specialist.  We will continue with the current management plan. Morbid obesity: Patient has a BMI of 42.60. She will intensify lifestyle and dietary modification. Mood disorder: Patient has mood disorder. She has anxiety especially with a trach being placed. She takes lorazepam for this. She is also on Cymbalta. Dyslipidemia: Patient has dyslipidemia. She takes Lipitor 80 mg daily. I will recheck lipid panel at next visit. Lymphedema: Patient has bilateral lower extremity swelling. She is doing elevation of the lower extremities. She has been followed up by the vascular specialist.  Kathrin Magda to also consider getting compression devices. She is on Lasix 40 mg daily. Insomnia: Patient has history of insomnia. She is on melatonin. Continue current treatment plan. Prior to Admission medications    Medication Sig Start Date End Date Taking?  Authorizing Provider   levothyroxine (SYNTHROID) 25 mcg tablet take 1 tablet by mouth daily before breakfast take IN ADDITION TO  MCG TABLETS TO MAKE 225 MCG 2/1/21   Angelito Prekins MD   furosemide (LASIX) 40 mg tablet take 1 tablet by mouth once daily 1/8/21   Angelito Perkins MD   potassium chloride (KLOR-CON) 10 mEq tablet take 1 tablet by mouth once daily 1/8/21 Henrietta Harvey MD   ondansetron hcl (ZOFRAN) 4 mg tablet take 1 tablet by mouth every 8 hours if needed for nausea and vomiting 1/8/21   Angelito Perkins MD   melatonin 1 mg tablet take 1 tablet by mouth every evening 11/30/20   Angelito Perkins MD   omega-3 acid ethyl esters (LOVAZA) 1 gram capsule take 1 capsule by mouth twice a day 11/17/20   Angelito Perkins MD   Jardiance 25 mg tablet take 1 tablet by mouth once daily 11/17/20   Henrietta Harvey MD   LORazepam (ATIVAN) 2 mg tablet take 1 tablet by mouth nightly 11/11/20   Angelito Perkins MD   hydrOXYzine HCL (ATARAX) 25 mg tablet Take 1 Tab by mouth every eight (8) hours as needed for Itching. 10/29/20   Angelito Perkins MD   metoprolol succinate (TOPROL-XL) 50 mg XL tablet take 1 tablet by mouth once daily 10/20/20   Angelito Perkins MD   insulin glargine (Lantus U-100 Insulin) 100 unit/mL injection 75 Units by SubCUTAneous route daily. 9/23/20   Angelito Perkins MD   mupirocin (BACTROBAN) 2 % ointment Apply  to affected area daily. 7/29/20   Angelito Perkins MD   guaiFENesin ER (MUCINEX) 600 mg ER tablet Take 1 Tab by mouth two (2) times a day. 7/29/20   Angelito Perkins MD   ibuprofen (MOTRIN) 600 mg tablet take 1 tablet by mouth every 8 hours if needed for pain 6/29/20   Angelito Perkins MD   levothyroxine (SYNTHROID) 200 mcg tablet take 1 tablet by mouth once daily BEFORE BREAKFAST 5/26/20   Henrietta Harvey MD   glucose blood VI test strips (ASCENSIA AUTODISC VI, ONE TOUCH ULTRA TEST VI) strip Check blood glucose 3 times daily 4/16/20   Henrietta Harvey MD   ergocalciferol (ERGOCALCIFEROL) 1,250 mcg (50,000 unit) capsule Take 1 Cap by mouth every seven (7) days. 1/30/20   Angelito Perkins MD   DULoxetine (CYMBALTA) 60 mg capsule Take 1 Cap by mouth daily. 12/11/19   Angelito Perkins MD   albuterol (PROVENTIL VENTOLIN) 2.5 mg /3 mL (0.083 %) nebu 3 mL by Nebulization route every four (4) hours as needed (wheeze).  Indications: Asthma Attack 11/12/19   Angelito Perkins MD   naloxone (NARCAN) 4 mg/actuation nasal spray Use 1 spray intranasally, then discard. Repeat with new spray every 2 min as needed for opioid overdose symptoms, alternating nostrils. 7/18/19   Angelito Perkins MD   lancets misc Check blood glucose 3 times daily 7/17/19   Angelito Perkins MD   insulin lispro (HUMALOG) 100 unit/mL injection 2 Units by SubCUTAneous route Before breakfast, lunch, dinner and at bedtime. To be given 30 minutes before starting each tube feeding 7/11/19   Zhanna Martinez MD   atorvastatin (LIPITOR) 80 mg tablet take 1 tablet by mouth once daily 11/29/18   Angelito Perkins MD   BD INSULIN SYRINGE ULTRA-FINE 1 mL 31 gauge x 5/16 syrg use as directed twice a day 10/18/18   Angelito Perkins MD   FREESTYLE LITE STRIPS strip TEST twice a day as directed 11/20/17   Angelito Perkins MD   Aspirin, Buffered 81 mg tab Take 81 mg by mouth daily. Provider, Historical     ROS Review of all systems is negative except as noted above in the HPI. Objective:   No flowsheet data found. Constitutional: [x] No apparent distress      Mental status: [x] Alert and awake  [x] Oriented to person/place/time [x] Able to follow commands     Neck: [x]  Abnormal -tracheostomy tube in place    Pulmonary/Chest: [x] Respiratory effort normal   [x] No visualized signs of difficulty breathing or respiratory distress            Neurological:        [x] No Facial Asymmetry (Cranial nerve 7 motor function) (limited exam due to video visit)                     Psychiatric:       [x] Normal Affect    We discussed the expected course, resolution and complications of the diagnosis(es) in detail. Medication risks, benefits, costs, interactions, and alternatives were discussed as indicated. I advised her to contact the office if her condition worsens, changes or fails to improve as anticipated. She expressed understanding with the diagnosis(es) and plan.        Sheyla Reyes, who was evaluated through a patient-initiated, synchronous (real-time) audio-video encounter, and/or her healthcare decision maker, is aware that it is a billable service, with coverage as determined by her insurance carrier. She provided verbal consent to proceed: Yes, and patient identification was verified. It was conducted pursuant to the emergency declaration under the [address] [place] [address] and the [employer] Act. A caregiver was present when appropriate. Ability to conduct physical exam was limited. I was in the office. The patient was at home.       Carol Andrade MD

## 2021-02-14 DIAGNOSIS — Z79.4 TYPE 2 DIABETES MELLITUS WITH DIABETIC NEUROPATHY, WITH LONG-TERM CURRENT USE OF INSULIN (HCC): Chronic | ICD-10-CM

## 2021-02-14 DIAGNOSIS — E11.40 TYPE 2 DIABETES MELLITUS WITH DIABETIC NEUROPATHY, WITH LONG-TERM CURRENT USE OF INSULIN (HCC): Chronic | ICD-10-CM

## 2021-02-15 RX ORDER — INSULIN GLARGINE 100 [IU]/ML
INJECTION, SOLUTION SUBCUTANEOUS
Qty: 30 ML | Refills: 2 | Status: SHIPPED | OUTPATIENT
Start: 2021-02-15 | End: 2021-02-21 | Stop reason: SDUPTHER

## 2021-02-17 ENCOUNTER — OFFICE VISIT (OUTPATIENT)
Dept: FAMILY MEDICINE CLINIC | Age: 61
End: 2021-02-17
Payer: MEDICAID

## 2021-02-17 DIAGNOSIS — E11.40 TYPE 2 DIABETES MELLITUS WITH DIABETIC NEUROPATHY, WITH LONG-TERM CURRENT USE OF INSULIN (HCC): Primary | ICD-10-CM

## 2021-02-17 DIAGNOSIS — E03.4 HYPOTHYROIDISM DUE TO ACQUIRED ATROPHY OF THYROID: ICD-10-CM

## 2021-02-17 DIAGNOSIS — B37.31 YEAST VAGINITIS: ICD-10-CM

## 2021-02-17 DIAGNOSIS — J96.11 CHRONIC RESPIRATORY FAILURE WITH HYPOXIA (HCC): ICD-10-CM

## 2021-02-17 DIAGNOSIS — Z79.4 TYPE 2 DIABETES MELLITUS WITH DIABETIC NEUROPATHY, WITH LONG-TERM CURRENT USE OF INSULIN (HCC): Primary | ICD-10-CM

## 2021-02-17 DIAGNOSIS — F39 MOOD DISORDER (HCC): ICD-10-CM

## 2021-02-17 DIAGNOSIS — I10 ESSENTIAL HYPERTENSION: ICD-10-CM

## 2021-02-17 DIAGNOSIS — R60.0 PEDAL EDEMA: ICD-10-CM

## 2021-02-17 DIAGNOSIS — I87.2 VENOUS STASIS DERMATITIS OF BOTH LOWER EXTREMITIES: ICD-10-CM

## 2021-02-17 DIAGNOSIS — R06.02 SOBOE (SHORTNESS OF BREATH ON EXERTION): ICD-10-CM

## 2021-02-17 DIAGNOSIS — J38.00 VOCAL CORD PARALYSIS: ICD-10-CM

## 2021-02-17 DIAGNOSIS — I89.0 LYMPHEDEMA: ICD-10-CM

## 2021-02-17 DIAGNOSIS — Z98.890 HISTORY OF TRACHEOSTOMY: ICD-10-CM

## 2021-02-17 PROCEDURE — 36415 COLL VENOUS BLD VENIPUNCTURE: CPT | Performed by: FAMILY MEDICINE

## 2021-02-17 PROCEDURE — 99215 OFFICE O/P EST HI 40 MIN: CPT | Performed by: FAMILY MEDICINE

## 2021-02-17 PROCEDURE — 3052F HG A1C>EQUAL 8.0%<EQUAL 9.0%: CPT | Performed by: FAMILY MEDICINE

## 2021-02-17 RX ORDER — FLUCONAZOLE 200 MG/1
200 TABLET ORAL DAILY
Qty: 3 TAB | Refills: 5 | Status: SHIPPED | OUTPATIENT
Start: 2021-02-17 | End: 2021-05-23

## 2021-02-17 NOTE — PROGRESS NOTES
Chief Complaint   Patient presents with    Follow Up Chronic Condition    Medication Refill     diflucan      1. Have you been to the ER, urgent care clinic since your last visit? Hospitalized since your last visit? No    2. Have you seen or consulted any other health care providers outside of the [address] [place] [address] since your last visit? Include any pap smears or colon screening. No     HPI  Paola Thompson is seen for follow-up care. Hypertension: Patient has hypertension. She denies headache, changes in vision or focal weakness. She is on metoprolol. We will continue with the current treatment plan. Dyslipidemia: Patient has dyslipidemia. She takes atorvastatin daily. She will take a diet low in polysaturated fats. Diabetes mellitus type 2: Patient has type 2 diabetes mellitus. Blood glucose numbers have been fluctuating. These are around 200 in the morning. She is on Lantus and Jardiance. She has been followed up by the endocrinologist.  We will recheck labs today. Discussed lifestyle and dietary modification. Hypothyroidism: Patient has hypothyroidism. We will recheck her TSH levels. She is on levothyroxine. Respiratory: Patient has chronic respiratory failure and has been followed up by the pulmonologist.  She has wheeze and shortness of breath. She has COPD. She is on chronic oxygen. She has occasional cough that comes on and off. She has inhaler medications. Recently seen by the pulmonologist who has placed orders for device that can help her with nebulization. Trach dependent: Patient has chronic tracheostomy. She is trach dependent. She is also on chronic oxygen. She does do regular and has home health nurse to help her. Patient had vocal cord paralysis following thyroidectomy. Mood disorder: Patient has mood disorder with anxiety and depression. She has been more anxious lately. She has been at home due to the COVID-19 pandemic. She does not have much to do.   States that she only takes, watches TV and sleeps. She does not have many people that she can relate to. Her daughter is moving to [facility]. Given her health condition it is even more depressing for her. She is on lorazepam for acute anxiety. In the past patient has been on Cymbalta for acute anxiety. This did not help much. Declines to take any other medication chronically for mood disorder. To take lorazepam as needed. Morbid obesity: Patient has morbid obesity with a BMI of 44.95. She will intensify lifestyle and dietary modification. Lymphedema: Patient has bilateral lower extremity swelling. She has lymphedema. She is being followed up by the vascular specialist.  Has bilateral lower extremity ordered but she is yet to get these. I encouraged her to do elevation and also continue with diuretic medication. Venous stasis dermatitis: Patient has venous stasis dermatitis bilateral lower remedies. She also has pedal edema. We will have her take furosemide with potassium supplementation. She has been followed up by the vascular specialist for lymphedema. Yeast vaginitis: Patient gets yeast vaginitis when she uses the [place]. She has used Diflucan for this with good result. She would like a refill of medication. Prescription is sent in.     Past Medical History  Past Medical History:   Diagnosis Date    Acquired hypothyroidism 5/17/2017    Anxiety 6/25/2019    Bilateral vocal cord paralysis 4/4/2019    Bilateral vocal cord paralysis status post thyroidectomy (4/4/2019 - Dr. Encarnacion Records) with residual dysphagia and dysarthria; S/P Tracheostomy (6/3/2019 - Dr. Encarnacion [facility]); S/P PEG tube insertion (6/20/2019) - Dr. Encarnacion [facility])    Chronic back pain     Lower back pain    Depression     Diabetic neuropathy associated with type 2 diabetes mellitus ([facility] [facility]     Dysphagia 6/25/2019    S/P Thyroidectomy (4/4/2019 - Dr. Encarnacion [facility])    History of asthma     History of heart failure chronic diastolic heart failure    History of vitamin D deficiency 8/28/2017    Hypertensive heart disease without heart failure 5/17/2017    Hypoxemia requiring supplemental oxygen 6/25/2019    Insomnia     Left ear hearing loss     pt states nerve damage--- 25% hearing loss, described as \"muffled\"    Memory difficulty     Moderate persistent asthma     Obesity, Class II, BMI 35-39.9 11/11/2016    Obstructive sleep apnea 11/6/2015    Panic attacks     Ringing of ears     Type 2 diabetes mellitus with diabetic neuropathy, with long-term current use of insulin ([address]     Vertigo        Surgical History  Past Surgical History:   Procedure Laterality Date    CARDIAC SURG PROCEDURE UNLIST  10/31/2013    open heart    HX HEART VALVE SURGERY  2013    aortic valve repair    HX HYSTERECTOMY      Partial Hysterectomy - removed ovary    HX MYOMECTOMY      HX ORTHOPAEDIC  02/2018    had nerves burned on her right side of back    HX TRACHEOSTOMY  06/03/2019    S/P Tracheostomy (6/3/2019 - Dr. Ashley Kilpatrick)    [place] EGD PERCUTANEOUS PLACEMENT GASTROSTOMY TUBE N/A 06/20/2019    Dr. Choi Orn Bilateral 04/04/2019    Dr. Kvng Gomez        Medications  Current Outpatient Medications   Medication Sig Dispense Refill    fluconazole (DIFLUCAN) 200 mg tablet Take 1 Tab by mouth daily for 3 days. FDA advises cautious prescribing of oral fluconazole in pregnancy.  3 Tab 5    Lantus U-100 Insulin 100 unit/mL injection inject 75 units subcutaneously daily 30 mL 2    levothyroxine (SYNTHROID) 25 mcg tablet take 1 tablet by mouth daily before breakfast take IN ADDITION TO  MCG TABLETS TO MAKE 225 MCG 30 Tab 3    furosemide (LASIX) 40 mg tablet take 1 tablet by mouth once daily 90 Tab 1    potassium chloride (KLOR-CON) 10 mEq tablet take 1 tablet by mouth once daily 90 Tab 1    ondansetron hcl (ZOFRAN) 4 mg tablet take 1 tablet by mouth every 8 hours if needed for nausea and vomiting 30 Tab 1    melatonin 1 mg tablet take 1 tablet by mouth every evening 90 Tab 1    omega-3 acid ethyl esters (LOVAZA) 1 gram capsule take 1 capsule by mouth twice a day 60 Cap 2    Jardiance 25 mg tablet take 1 tablet by mouth once daily 30 Tab 2    LORazepam (ATIVAN) 2 mg tablet take 1 tablet by mouth nightly 31 Tab 2    metoprolol succinate (TOPROL-XL) 50 mg XL tablet take 1 tablet by mouth once daily 90 Tab 1    mupirocin (BACTROBAN) 2 % ointment Apply  to affected area daily. 30 g 0    ibuprofen (MOTRIN) 600 mg tablet take 1 tablet by mouth every 8 hours if needed for pain 90 Tab 0    levothyroxine (SYNTHROID) 200 mcg tablet take 1 tablet by mouth once daily BEFORE BREAKFAST 90 Tab 2    glucose blood VI test strips (ASCENSIA AUTODISC VI, ONE TOUCH ULTRA TEST VI) strip Check blood glucose 3 times daily 300 Strip 3    ergocalciferol (ERGOCALCIFEROL) 1,250 mcg (50,000 unit) capsule Take 1 Cap by mouth every seven (7) days. 13 Cap 3    albuterol (PROVENTIL VENTOLIN) 2.5 mg /3 mL (0.083 %) nebu 3 mL by Nebulization route every four (4) hours as needed (wheeze). Indications: Asthma Attack 24 Each 5    naloxone (NARCAN) 4 mg/actuation nasal spray Use 1 spray intranasally, then discard. Repeat with new spray every 2 min as needed for opioid overdose symptoms, alternating nostrils. 2 Each 0    lancets misc Check blood glucose 3 times daily 300 Each 3    insulin lispro (HUMALOG) 100 unit/mL injection 2 Units by SubCUTAneous route Before breakfast, lunch, dinner and at bedtime. To be given 30 minutes before starting each tube feeding 1 Vial 0    atorvastatin (LIPITOR) 80 mg tablet take 1 tablet by mouth once daily 30 Tab 3    BD INSULIN SYRINGE ULTRA-FINE 1 mL 31 gauge x 5/16 syrg use as directed twice a day 200 Syringe 3    FREESTYLE LITE STRIPS strip TEST twice a day as directed 100 Strip 11    Aspirin, Buffered 81 mg tab Take 81 mg by mouth daily.       guaiFENesin ER (MUCINEX) 600 mg ER tablet Take 1 Tab by mouth two (2) times a day. 180 Tab 1       Allergies  Allergies   Allergen Reactions    Other Food Other (comments)     Artificial sweeteners- causes headaches    Metformin Other (comments)     Increase pain in feet and swelling in feet  Increased hunger,tired and bilat foot pain    Morphine Other (comments)     headache    Singulair [Montelukast] Other (comments)     hallucinations    Sucrose Other (comments)     Pt. Is not allergic to sucrose. She develops headaches with artificial sweeteners.        Family History  Family History   Problem Relation Age of Onset    Heart Disease Mother     Diabetes Mother     Stroke Mother     Hypertension Mother     Diabetes Father     Heart Disease Father     Hypertension Father     Stroke Father     Diabetes Brother     Cancer Brother     Hypertension Brother     Stroke Brother     Heart Disease Brother     Diabetes Brother     Hypertension Brother     Stroke Brother     Heart Disease Brother     Diabetes Brother     Hypertension Brother     Hypertension Brother        Social History  Social History     Socioeconomic History    Marital status:      Spouse name: Not on file    Number of children: Not on file    Years of education: Not on file    Highest education level: Not on file   Occupational History    Not on file   Social Needs    Financial resource strain: Not on file    Food insecurity     Worry: Not on file     Inability: Not on file   [employer] needs     Medical: Not on file     Non-medical: Not on file   Tobacco Use    Smoking status: Never Smoker    Smokeless tobacco: Never Used   Substance and Sexual Activity    Alcohol use: No    Drug use: No    Sexual activity: Not Currently   Lifestyle    Physical activity     Days per week: Not on file     Minutes per session: Not on file    Stress: Not on file   Relationships    Social connections     Talks on phone: Not on file     Gets together: Not on file     Attends Ten[employer] service: Not on file     Active member of club or organization: Not on file     Attends meetings of clubs or organizations: Not on file     Relationship status: Not on file    Intimate partner violence     Fear of current or ex partner: Not on file     Emotionally abused: Not on file     Physically abused: Not on file     Forced sexual activity: Not on file   Other Topics Concern     Service No    Blood Transfusions No    Caffeine Concern No    Occupational Exposure No    Hobby Hazards No    Sleep Concern Yes     Comment: Sleep apnea     Stress Concern Yes     Comment: Due to family medical issues.  Weight Concern No    Special Diet No    Back Care Yes     Comment: Patient tries to do back care with streches     Exercise No    Bike Helmet Yes    Seat Belt Yes    Self-Exams Yes   Social History Narrative    Not on file       Review of Systems  Review of Systems - Review of all systems is negative except as noted above in the HPI.     Vital Signs  Visit Vitals  /76 (BP 1 Location: Left upper arm, BP Patient Position: Sitting, BP Cuff Size: Adult long)   Pulse 77   Temp 97.5 °F (36.4 °C) (Oral)   Resp 20   Ht 5' 7\" (1.702 m)   Wt 287 lb (130.2 kg)   SpO2 99%   BMI 44.95 kg/m²         Physical Exam  Physical Examination: General appearance - oriented to person, place, and time and acyanotic, in no respiratory distress  Mental status - affect appropriate to mood  Nose - normal nontender sinuses  Mouth - mucous membranes moist, pharynx normal without lesions  Neck -patient has tracheostomy in place  Lymphatics - no palpable lymphadenopathy  Chest - decreased air entry noted bilateral lung bases with transmitted sounds noted  Heart - S1 and S2 normal  Abdomen - no rebound tenderness noted  Neurological - abnormal neurological exam unchanged from prior examinations  Musculoskeletal - osteoarthritic changes noted in both hands  Extremities - pedal edema 2 +, intact peripheral pulses, venous stasis dermatitis noted      Results  Results for orders placed or performed in visit on 02/04/21   CBC WITH AUTOMATED DIFF   Result Value Ref Range    WBC 8.2 3.4 - 10.8 x10E3/uL    RBC 5.25 3.77 - 5.28 x10E6/uL    HGB 16.3 (H) 11.1 - 15.9 g/dL    HCT 49.2 (H) 34.0 - 46.6 %    MCV 94 79 - 97 fL    MCH 31.0 26.6 - 33.0 pg    MCHC 33.1 31.5 - 35.7 g/dL    RDW 14.4 11.7 - 15.4 %    PLATELET 766 824 - 666 x10E3/uL    NEUTROPHILS 63 Not Estab. %    Lymphocytes 29 Not Estab. %    MONOCYTES 5 Not Estab. %    EOSINOPHILS 1 Not Estab. %    BASOPHILS 1 Not Estab. %    ABS. NEUTROPHILS 5.2 1.4 - 7.0 x10E3/uL    Abs Lymphocytes 2.4 0.7 - 3.1 x10E3/uL    ABS. MONOCYTES 0.4 0.1 - 0.9 x10E3/uL    ABS. EOSINOPHILS 0.1 0.0 - 0.4 x10E3/uL    ABS. BASOPHILS 0.1 0.0 - 0.2 x10E3/uL    IMMATURE GRANULOCYTES 1 Not Estab. %    ABS. IMM. GRANS. 0.1 0.0 - 0.1 Z44C4/HK   METABOLIC PANEL, COMPREHENSIVE   Result Value Ref Range    Glucose 349 (H) 65 - 99 mg/dL    BUN 25 (H) 6 - 24 mg/dL    Creatinine 0.88 0.57 - 1.00 mg/dL    GFR est non-AA 72 >59 mL/min/1.73    GFR est AA 83 >59 mL/min/1.73    BUN/Creatinine ratio 28 (H) 9 - 23    Sodium 138 134 - 144 mmol/L    Potassium 4.5 3.5 - 5.2 mmol/L    Chloride 98 96 - 106 mmol/L    CO2 22 20 - 29 mmol/L    Calcium 9.0 8.7 - 10.2 mg/dL    Protein, total 7.2 6.0 - 8.5 g/dL    Albumin 3.8 3.8 - 4.9 g/dL    GLOBULIN, TOTAL 3.4 1.5 - 4.5 g/dL    A-G Ratio 1.1 (L) 1.2 - 2.2    Bilirubin, total 0.5 0.0 - 1.2 mg/dL    Alk.  phosphatase 166 (H) 39 - 117 IU/L    AST (SGOT) 16 0 - 40 IU/L    ALT (SGPT) 15 0 - 32 IU/L   LIPID PANEL   Result Value Ref Range    Cholesterol, total 268 (H) 100 - 199 mg/dL    Triglyceride 1,254 (HH) 0 - 149 mg/dL    HDL Cholesterol 28 (L) >39 mg/dL    VLDL, calculated Comment (A) 5 - 40 mg/dL    LDL, calculated Comment (A) 0 - 99 mg/dL   MICROALBUMIN, UR, RAND W/ MICROALB/CREAT RATIO   Result Value Ref Range    Creatinine, urine 47.3 Not Estab. mg/dL    Microalbumin, urine 11.9 Not Estab. ug/mL    Microalb/Creat ratio (ug/mg creat.) 25 0 - 29 mg/g creat   CVD REPORT   Result Value Ref Range    INTERPRETATION Note    HEMOGLOBIN A1C WITH EAG   Result Value Ref Range    Hemoglobin A1c 9.0 (H) 4.8 - 5.6 %    Estimated average glucose 212 mg/dL   DIABETES PATIENT EDUCATION   Result Value Ref Range    PDF Image Not applicable    TSH 3RD GENERATION   Result Value Ref Range    TSH 6.690 (H) 0.450 - 4.500 uIU/mL   T4, FREE   Result Value Ref Range    T4, Free 1.09 0.82 - 1.77 ng/dL   SPECIMEN STATUS REPORT   Result Value Ref Range    SPECIMEN STATUS REPORT COMMENT        ASSESSMENT and PLAN    ICD-10-CM ICD-9-CM    1. Type 2 diabetes mellitus with diabetic neuropathy, with long-term current use of insulin ([facility])  E11.40 250.60 COLLECTION VENOUS BLOOD,VENIPUNCTURE    Z79.4 357.2      V58.67    2. Yeast vaginitis  B37.3 112.1 fluconazole (DIFLUCAN) 200 mg tablet   3. Hypothyroidism due to acquired atrophy of thyroid  E03.4 244.8 COLLECTION VENOUS BLOOD,VENIPUNCTURE     246.8    4. Pedal edema  R60.0 782.3    5. Essential hypertension  I10 401.9    6. Lymphedema  I89.0 457.1    7. History of tracheostomy  Z98.890 V44.0    8. Mood disorder ([facility])  F39 296.90    9. Vocal cord paralysis  J38.00 478.30    10. Venous stasis dermatitis of both lower extremities  I87.2 454.1    11. Chronic respiratory failure with hypoxia ([facility])  J96.11 518.83      799.02    12.  SOBOE (shortness of breath on exertion)  R06.02 786.05      lab results and schedule of future lab studies reviewed with patient  reviewed diet, exercise and weight control  cardiovascular risk and specific lipid/LDL goals reviewed  reviewed medications and side effects in detail  specific diabetic recommendations: low cholesterol diet, weight control and daily exercise discussed, home glucose monitoring emphasized, all medications, side effects and compliance discussed carefully, foot care discussed and Podiatry visits discussed, annual eye examinations at Ophthalmology discussed, glycohemoglobin and other lab monitoring discussed and long term diabetic complications discussed  use of aspirin to prevent MI and TIA's discussed      I have discussed the diagnosis with the patient and the intended plan of care as seen in the above orders. The patient has received an after-visit summary and questions were answered concerning future plans. I have discussed medication, side effects, and warnings with the patient in detail. The patient verbalized understanding and is in agreement with the plan of care. The patient will contact the office with any additional concerns. I spent at least 47 minutes on this visit with this established patient. Haroon Ladd MD    PLEASE NOTE:   This document has been produced using voice recognition software. Unrecognized errors in transcription may be present    Addendum: 03/11/2021. Donnita Castleman is a patient who is trach dependent and on chronic oxygen. She has morbid obesity. She has bilateral shoulder pain and DJD. For the results of this she has weakness in her upper body. She also has osteoarthritis bilateral knees. She is unable to walk due to this and unable to use a cane or a walker given the weakness of the extremities and shoulders. Strength of the upper extremities is 4 out of 5 with limited range of motion. As result she is recommended to get a wheelchair instead of walker or cane as she is unable to use things given the above-stated limitations.     Haroon Ladd MD

## 2021-02-18 DIAGNOSIS — F41.9 ANXIETY: ICD-10-CM

## 2021-02-19 ENCOUNTER — TELEPHONE (OUTPATIENT)
Dept: FAMILY MEDICINE CLINIC | Age: 61
End: 2021-02-19

## 2021-02-19 LAB
ALBUMIN SERPL-MCNC: 3.8 G/DL (ref 3.8–4.9)
ALBUMIN/CREAT UR: 25 MG/G CREAT (ref 0–29)
ALBUMIN/GLOB SERPL: 1.1 {RATIO} (ref 1.2–2.2)
ALP SERPL-CCNC: 166 IU/L (ref 39–117)
ALT SERPL-CCNC: 15 IU/L (ref 0–32)
AST SERPL-CCNC: 16 IU/L (ref 0–40)
BASOPHILS # BLD AUTO: 0.1 X10E3/UL (ref 0–0.2)
BASOPHILS NFR BLD AUTO: 1 %
BILIRUB SERPL-MCNC: 0.5 MG/DL (ref 0–1.2)
BUN SERPL-MCNC: 25 MG/DL (ref 6–24)
BUN/CREAT SERPL: 28 (ref 9–23)
CALCIUM SERPL-MCNC: 9 MG/DL (ref 8.7–10.2)
CHLORIDE SERPL-SCNC: 98 MMOL/L (ref 96–106)
CHOLEST SERPL-MCNC: 268 MG/DL (ref 100–199)
CO2 SERPL-SCNC: 22 MMOL/L (ref 20–29)
CREAT SERPL-MCNC: 0.88 MG/DL (ref 0.57–1)
CREAT UR-MCNC: 47.3 MG/DL
CREATININE, EXTERNAL: 0.88
EOSINOPHIL # BLD AUTO: 0.1 X10E3/UL (ref 0–0.4)
EOSINOPHIL NFR BLD AUTO: 1 %
ERYTHROCYTE [DISTWIDTH] IN BLOOD BY AUTOMATED COUNT: 14.4 % (ref 11.7–15.4)
EST. AVERAGE GLUCOSE BLD GHB EST-MCNC: 212 MG/DL
GLOBULIN SER CALC-MCNC: 3.4 G/DL (ref 1.5–4.5)
GLUCOSE SERPL-MCNC: 349 MG/DL (ref 65–99)
HBA1C MFR BLD HPLC: 9 %
HBA1C MFR BLD: 9 % (ref 4.8–5.6)
HCT VFR BLD AUTO: 49.2 % (ref 34–46.6)
HDLC SERPL-MCNC: 28 MG/DL
HGB BLD-MCNC: 16.3 G/DL (ref 11.1–15.9)
IMM GRANULOCYTES # BLD AUTO: 0.1 X10E3/UL (ref 0–0.1)
IMM GRANULOCYTES NFR BLD AUTO: 1 %
INTERPRETATION, 910389: NORMAL
LDLC SERPL CALC-MCNC: ABNORMAL MG/DL (ref 0–99)
LYMPHOCYTES # BLD AUTO: 2.4 X10E3/UL (ref 0.7–3.1)
LYMPHOCYTES NFR BLD AUTO: 29 %
Lab: NORMAL
MCH RBC QN AUTO: 31 PG (ref 26.6–33)
MCHC RBC AUTO-ENTMCNC: 33.1 G/DL (ref 31.5–35.7)
MCV RBC AUTO: 94 FL (ref 79–97)
MICROALBUMIN UR TEST STR-MCNC: 11.9 MG/DL
MICROALBUMIN UR-MCNC: 11.9 UG/ML
MONOCYTES # BLD AUTO: 0.4 X10E3/UL (ref 0.1–0.9)
MONOCYTES NFR BLD AUTO: 5 %
NEUTROPHILS # BLD AUTO: 5.2 X10E3/UL (ref 1.4–7)
NEUTROPHILS NFR BLD AUTO: 63 %
PLATELET # BLD AUTO: 202 X10E3/UL (ref 150–450)
POTASSIUM SERPL-SCNC: 4.5 MMOL/L (ref 3.5–5.2)
PROT SERPL-MCNC: 7.2 G/DL (ref 6–8.5)
RBC # BLD AUTO: 5.25 X10E6/UL (ref 3.77–5.28)
SODIUM SERPL-SCNC: 138 MMOL/L (ref 134–144)
SPECIMEN STATUS REPORT, ROLRST: NORMAL
T4 FREE SERPL-MCNC: 1.09 NG/DL (ref 0.82–1.77)
TRIGL SERPL-MCNC: 1254 MG/DL (ref 0–149)
TSH SERPL DL<=0.005 MIU/L-ACNC: 6.69 UIU/ML (ref 0.45–4.5)
VLDLC SERPL CALC-MCNC: ABNORMAL MG/DL (ref 5–40)
WBC # BLD AUTO: 8.2 X10E3/UL (ref 3.4–10.8)

## 2021-02-19 NOTE — TELEPHONE ENCOUNTER
This patient is requesting an order for a wheelchair from [address] [place] [address] and asked them to reach out to her PCP for it. Dario Bravo from [address] [place] [address] called to inform.  Please follow up when available

## 2021-02-21 VITALS
DIASTOLIC BLOOD PRESSURE: 76 MMHG | RESPIRATION RATE: 20 BRPM | SYSTOLIC BLOOD PRESSURE: 134 MMHG | OXYGEN SATURATION: 99 % | HEIGHT: 67 IN | HEART RATE: 77 BPM | TEMPERATURE: 97.5 F | BODY MASS INDEX: 45.04 KG/M2 | WEIGHT: 287 LBS

## 2021-02-21 DIAGNOSIS — E11.40 TYPE 2 DIABETES MELLITUS WITH DIABETIC NEUROPATHY, WITH LONG-TERM CURRENT USE OF INSULIN (HCC): Primary | ICD-10-CM

## 2021-02-21 DIAGNOSIS — Z79.4 TYPE 2 DIABETES MELLITUS WITH DIABETIC NEUROPATHY, WITH LONG-TERM CURRENT USE OF INSULIN (HCC): Primary | ICD-10-CM

## 2021-02-21 DIAGNOSIS — E78.5 DYSLIPIDEMIA: ICD-10-CM

## 2021-02-21 DIAGNOSIS — E03.4 HYPOTHYROIDISM DUE TO ACQUIRED ATROPHY OF THYROID: ICD-10-CM

## 2021-02-21 RX ORDER — LEVOTHYROXINE SODIUM 50 UG/1
50 TABLET ORAL
Qty: 90 TAB | Refills: 1 | Status: SHIPPED | OUTPATIENT
Start: 2021-02-21 | End: 2021-06-29 | Stop reason: SDUPTHER

## 2021-02-21 RX ORDER — INSULIN GLARGINE 100 [IU]/ML
INJECTION, SOLUTION SUBCUTANEOUS
Qty: 30 ML | Refills: 2 | Status: SHIPPED | OUTPATIENT
Start: 2021-02-21 | End: 2021-10-17

## 2021-02-21 RX ORDER — LEVOTHYROXINE SODIUM 200 UG/1
TABLET ORAL
Qty: 90 TAB | Refills: 2 | Status: SHIPPED | OUTPATIENT
Start: 2021-02-21 | End: 2021-11-10

## 2021-02-21 RX ORDER — LORAZEPAM 2 MG/1
TABLET ORAL
Qty: 31 TAB | Refills: 2 | Status: SHIPPED | OUTPATIENT
Start: 2021-02-21 | End: 2021-02-23

## 2021-02-21 RX ORDER — ROSUVASTATIN CALCIUM 40 MG/1
40 TABLET, COATED ORAL
Qty: 30 TAB | Refills: 3 | Status: SHIPPED | OUTPATIENT
Start: 2021-02-21 | End: 2021-06-21

## 2021-02-22 DIAGNOSIS — F41.9 ANXIETY: ICD-10-CM

## 2021-02-22 NOTE — PROGRESS NOTES
Please let patient know her TSH is elevated. I will increase her levothyroxine to 250 mcg daily. I will place a referral for to be seen by the endocrinologist.  Her blood glucose is elevated and high HbA1c is at 9.0. She should intensify lifestyle and dietary modification. I will also have her follow-up with the endocrinologist on this. She should decrease her Lantus to 80 units daily. Her cholesterol numbers are elevated especially the triglycerides. I would recommend changing from Lipitor to Crestor 40 mg daily. I will send in a prescription for this. Recheck fasting lipid panel in 3 months.   Ganesh Wagner MD

## 2021-02-23 RX ORDER — LORAZEPAM 2 MG/1
TABLET ORAL
Qty: 31 TAB | Refills: 2 | Status: SHIPPED | OUTPATIENT
Start: 2021-02-23 | End: 2021-09-07

## 2021-03-02 ENCOUNTER — TELEPHONE (OUTPATIENT)
Dept: FAMILY MEDICINE CLINIC | Age: 61
End: 2021-03-02

## 2021-03-02 NOTE — TELEPHONE ENCOUNTER
Bobbi Mata from [address] stated they received the notes and order for a wheelchair for this patient, they need the notes to specifically say that this pt is not able to use a cane or walker.  Please follow up on this matter when available

## 2021-03-11 NOTE — TELEPHONE ENCOUNTER
Addendum to my note is made stating that she is not able to use walker or cane. Please send this in.   Ezequiel Watkins MD

## 2021-03-29 DIAGNOSIS — I10 ESSENTIAL HYPERTENSION: ICD-10-CM

## 2021-03-29 RX ORDER — METOPROLOL SUCCINATE 50 MG/1
TABLET, EXTENDED RELEASE ORAL
Qty: 90 TAB | Refills: 1 | Status: SHIPPED | OUTPATIENT
Start: 2021-03-29 | End: 2021-10-22

## 2021-04-17 DIAGNOSIS — Z79.4 UNCONTROLLED TYPE 2 DIABETES MELLITUS WITH HYPERGLYCEMIA, WITH LONG-TERM CURRENT USE OF INSULIN (HCC): ICD-10-CM

## 2021-04-17 DIAGNOSIS — E03.4 HYPOTHYROIDISM DUE TO ACQUIRED ATROPHY OF THYROID: ICD-10-CM

## 2021-04-17 DIAGNOSIS — E11.65 UNCONTROLLED TYPE 2 DIABETES MELLITUS WITH HYPERGLYCEMIA, WITH LONG-TERM CURRENT USE OF INSULIN (HCC): ICD-10-CM

## 2021-04-22 DIAGNOSIS — R13.10 DYSPHAGIA, UNSPECIFIED TYPE: ICD-10-CM

## 2021-04-25 RX ORDER — ONDANSETRON 4 MG/1
TABLET, FILM COATED ORAL
Qty: 30 TAB | Refills: 1 | Status: SHIPPED | OUTPATIENT
Start: 2021-04-25 | End: 2021-05-23

## 2021-04-26 ENCOUNTER — TELEPHONE (OUTPATIENT)
Dept: FAMILY MEDICINE CLINIC | Age: 61
End: 2021-04-26

## 2021-04-26 NOTE — TELEPHONE ENCOUNTER
Pt is requesting sleeping medication to help her sleep, Ms Ana Shannon has  An upcoming nadia for 05/06.  Please follow up with this pt when available to clarify

## 2021-04-27 RX ORDER — RAMELTEON 8 MG/1
8 TABLET ORAL
Qty: 30 TAB | Refills: 2 | Status: SHIPPED | OUTPATIENT
Start: 2021-04-27 | End: 2021-06-17

## 2021-05-06 ENCOUNTER — VIRTUAL VISIT (OUTPATIENT)
Dept: FAMILY MEDICINE CLINIC | Age: 61
End: 2021-05-06
Payer: MEDICAID

## 2021-05-06 DIAGNOSIS — G47.33 OBSTRUCTIVE SLEEP APNEA: Chronic | ICD-10-CM

## 2021-05-06 DIAGNOSIS — J96.11 CHRONIC RESPIRATORY FAILURE WITH HYPOXIA (HCC): ICD-10-CM

## 2021-05-06 DIAGNOSIS — R30.0 DYSURIA: ICD-10-CM

## 2021-05-06 DIAGNOSIS — E03.9 ACQUIRED HYPOTHYROIDISM: Chronic | ICD-10-CM

## 2021-05-06 DIAGNOSIS — I50.32 CHRONIC DIASTOLIC CONGESTIVE HEART FAILURE (HCC): ICD-10-CM

## 2021-05-06 DIAGNOSIS — R60.0 PEDAL EDEMA: ICD-10-CM

## 2021-05-06 DIAGNOSIS — E11.40 TYPE 2 DIABETES MELLITUS WITH DIABETIC NEUROPATHY, WITH LONG-TERM CURRENT USE OF INSULIN (HCC): Primary | Chronic | ICD-10-CM

## 2021-05-06 DIAGNOSIS — E11.42 DIABETIC POLYNEUROPATHY ASSOCIATED WITH TYPE 2 DIABETES MELLITUS (HCC): Chronic | ICD-10-CM

## 2021-05-06 DIAGNOSIS — D51.0 PERNICIOUS ANEMIA: ICD-10-CM

## 2021-05-06 DIAGNOSIS — Z79.4 TYPE 2 DIABETES MELLITUS WITH DIABETIC NEUROPATHY, WITH LONG-TERM CURRENT USE OF INSULIN (HCC): Primary | Chronic | ICD-10-CM

## 2021-05-06 DIAGNOSIS — F41.9 ANXIETY: ICD-10-CM

## 2021-05-06 DIAGNOSIS — Z86.39 HISTORY OF VITAMIN D DEFICIENCY: Chronic | ICD-10-CM

## 2021-05-06 PROCEDURE — 99214 OFFICE O/P EST MOD 30 MIN: CPT | Performed by: FAMILY MEDICINE

## 2021-05-06 PROCEDURE — 3052F HG A1C>EQUAL 8.0%<EQUAL 9.0%: CPT | Performed by: FAMILY MEDICINE

## 2021-05-06 RX ORDER — BUMETANIDE 1 MG/1
1 TABLET ORAL 2 TIMES DAILY
Qty: 60 TAB | Refills: 0 | Status: SHIPPED | OUTPATIENT
Start: 2021-05-06 | End: 2021-06-06

## 2021-05-06 NOTE — PROGRESS NOTES
Chief Complaint   Patient presents with    Sleep Problem     1. Have you been to the ER, urgent care clinic since your last visit? Hospitalized since your last visit? No    2. Have you seen or consulted any other health care providers outside of the [address] [place] [address] since your last visit? Include any pap smears or colon screening. No Raymundo Sandifer is a 62 y.o. female who was seen by synchronous (real-time) audio-video technology on 5/6/2021 for Sleep Problem        Assessment & Plan:       ICD-10-CM ICD-9-CM    1. Type 2 diabetes mellitus with diabetic neuropathy, with long-term current use of insulin ([facility])  E11.40 250.60     Z79.4 357.2      V58.67    2. Diabetic polyneuropathy associated with type 2 diabetes mellitus ([facility])  E11.42 250.60 LIPID PANEL     631.5 METABOLIC PANEL, COMPREHENSIVE      CBC WITH AUTOMATED DIFF   3. Chronic diastolic congestive heart failure ([facility])  I50.32 428.32      428.0    4. History of vitamin D deficiency  Z86.39 V12.1 VITAMIN D, 25 HYDROXY   5. Obstructive sleep apnea  G47.33 327.23    6. Acquired hypothyroidism  E03.9 244.9 TSH 3RD GENERATION      T4, FREE      REFERRAL TO ENDOCRINOLOGY   7. Pedal edema  R60.0 782.3 bumetanide (BUMEX) 1 mg tablet   8. Chronic respiratory failure with hypoxia ([facility])  J96.11 518.83      799.02    9. Anxiety  F41.9 300.00      Subjective:   Raymundo Sandifer is seen for follow-up care. Diabetes mellitus type 2: Patient has type 2 diabetes mellitus. She is followed up by the endocrinologist.  She is on insulin. Blood glucose numbers have been fluctuating. We will recheck labs at next visit. She should intensify lifestyle and dietary modification. Hypothyroidism: Patient has hypothyroidism. This has been difficult to manage. Currently on 250 mcg of levothyroxine daily. I will refer her to endocrinology for advice on management of the hypothyroidism. We will recheck labs. Patient has generalized malaise and fatigue.   I suspect this is related to hypothyroidism. Hypovitaminosis D: Patient has hypovitaminosis D. We will recheck vitamin D levels. She has been on vitamin D replacement therapy. Urinary incontinence: Patient has urinary incontinence. This comes when she coughs. This is stress incontinence. She would like to have bed pads to use on her bed or recliner. Prescription will be written. Lymphedema/pedal edema: Patient has been on Lasix for lymphedema. Lower extremities continue to swell. Her weight continues to go up. She would like to try different diuretic. I will send in Bumex to take twice a day. We will follow-up at next visit. Morbid obesity: Patient has morbid obesity. Her weight has gone up to 291.8 kg. BMI is over 40. She should intensify lifestyle and dietary modification. Physical debility: Patient has morbid obesity. She has lymphedema bilateral lower extremities. She gets around using a wheelchair. She is incontinent of urine. She has generalized malaise and weakness given hypothyroidism. 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 As a result she has trouble getting up to go to the bathroom at night or getting out of bed. She needs bed pads to apply on her bed at night due to the incontinence. She also does need to have a hospital bed for convenience of getting out to the bathroom without falling and also being able to raise the head of bed given her chronic respiratory failure. Prescription for hospital bed will be given. Chronic respiratory failure: Patient has chronic respiratory failure. She is trach dependent and is on chronic oxygen. 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 Insomnia: Patient has insomnia. She has Rozerem prescribed. She should continue with this medication. Anxiety: Patient has a history of anxiety. She is on lorazepam.  We will continue with this medication. Dyslipidemia: Patient is on Crestor for dyslipidemia.   I will check lipid panel at next visit. Prior to Admission medications    Medication Sig Start Date End Date Taking? Authorizing Provider   ondansetron hcl (ZOFRAN) 4 mg tablet take 1 tablet by mouth every 8 hours if needed for nausea and vomiting 4/25/21  Yes Sana Luciano MD   metoprolol succinate (TOPROL-XL) 50 mg XL tablet take 1 tablet by mouth once daily 3/29/21  Yes Sana Luciano MD   LORazepam (ATIVAN) 2 mg tablet take 1 tablet by mouth nightly 2/23/21  Yes Sana Luciano MD   levothyroxine (SYNTHROID) 200 mcg tablet take 1 tablet by mouth once daily BEFORE BREAKFAST 2/21/21  Yes Angelito Mata MD   levothyroxine (SYNTHROID) 50 mcg tablet Take 1 Tab by mouth Daily (before breakfast). Taking the 200 mcg medication 50 mcg. 2/21/21  Yes Sana Luciano MD   insulin glargine (Lantus U-100 Insulin) 100 unit/mL injection inject 80 units subcutaneously daily 2/21/21  Yes Angelito Perkins MD   rosuvastatin (CRESTOR) 40 mg tablet Take 1 Tab by mouth nightly. 2/21/21  Yes Sana Luciano MD   furosemide (LASIX) 40 mg tablet take 1 tablet by mouth once daily 1/8/21  Yes Sana Luciano MD   potassium chloride (KLOR-CON) 10 mEq tablet take 1 tablet by mouth once daily 1/8/21  Yes Sana Luciano MD   omega-3 acid ethyl esters (LOVAZA) 1 gram capsule take 1 capsule by mouth twice a day 11/17/20  Yes Sana Luciano MD   mupirocin (BACTROBAN) 2 % ointment Apply  to affected area daily. 7/29/20  Yes Sana Luciano MD   ibuprofen (MOTRIN) 600 mg tablet take 1 tablet by mouth every 8 hours if needed for pain 6/29/20  Yes Sana Luciano MD   glucose blood VI test strips (ASCENSIA AUTODISC VI, ONE TOUCH ULTRA TEST VI) strip Check blood glucose 3 times daily 4/16/20  Yes Sana Luciaon MD   ergocalciferol (ERGOCALCIFEROL) 1,250 mcg (50,000 unit) capsule Take 1 Cap by mouth every seven (7) days.  1/30/20  Yes Angelito Perkins MD   albuterol (PROVENTIL VENTOLIN) 2.5 mg /3 mL (0.083 %) nebu 3 mL by Nebulization route every four (4) hours as needed (wheeze). Indications: Asthma Attack 11/12/19  Yes Harinder Kent MD   naloxone (NARCAN) 4 mg/actuation nasal spray Use 1 spray intranasally, then discard. Repeat with new spray every 2 min as needed for opioid overdose symptoms, alternating nostrils. 7/18/19  Yes Harinder Kent MD   lancets misc Check blood glucose 3 times daily 7/17/19  Yes Harinder Kent MD   insulin lispro (HUMALOG) 100 unit/mL injection 2 Units by SubCUTAneous route Before breakfast, lunch, dinner and at bedtime. To be given 30 minutes before starting each tube feeding 7/11/19  Yes Brandy Brooks MD   BD INSULIN SYRINGE ULTRA-FINE 1 mL 31 gauge x 5/16 syrg use as directed twice a day 10/18/18  Yes Angelito Lyons MD   FREESTYLE LITE STRIPS strip TEST twice a day as directed 11/20/17  Yes Angelito Perkins MD   Aspirin, Buffered 81 mg tab Take 81 mg by mouth daily. Yes Provider, Historical   ramelteon (ROZEREM) 8 mg tablet Take 1 Tab by mouth nightly. Patient taking differently: Take 8 mg by mouth nightly. Not taking 4/27/21   Federico Perkins MD   Jardiance 25 mg tablet take 1 tablet by mouth once daily 11/17/20   Angelito Perkins MD   guaiFENesin ER (MUCINEX) 600 mg ER tablet Take 1 Tab by mouth two (2) times a day. 7/29/20 5/6/21  Angelito Perkins MD     ROS Review of all systems is negative except as noted above in the HPI.     Objective:     Patient-Reported Vitals 5/6/2021   Patient-Reported Weight 291   Patient-Reported Pulse 65   Patient-Reported Temperature 97.3   Patient-Reported Systolic  931   Patient-Reported Diastolic 65     Constitutional: [x]No apparent distress     Mental status: [x] Alert and awake  [x] Oriented to person/place/time [x] Able to follow commands     HENT: [x] Normocephalic, atraumatic     Pulmonary/Chest: [x] Respiratory effort normal   [x] No visualized signs of difficulty breathing or respiratory distress           Neurological:        [x] No Facial Asymmetry (Cranial nerve 7 motor function) (limited exam due to video visit)                   Psychiatric:       [x] Normal Affect    We discussed the expected course, resolution and complications of the diagnosis(es) in detail. Medication risks, benefits, costs, interactions, and alternatives were discussed as indicated. I advised her to contact the office if her condition worsens, changes or fails to improve as anticipated. She expressed understanding with the diagnosis(es) and plan. Tiffany Montez, was evaluated through a synchronous (real-time) audio-video encounter. The patient (or guardian if applicable) is aware that this is a billable service. Verbal consent to proceed has been obtained within the past 12 months. The visit was conducted pursuant to the emergency declaration under the [address] [place] [address] authority and the [employer] Act. Patient identification was verified, and a caregiver was present when appropriate. The patient was located in a state where the provider was credentialed to provide care.       Lemar Eisenmenger, MD

## 2021-05-11 ENCOUNTER — TELEPHONE (OUTPATIENT)
Dept: FAMILY MEDICINE CLINIC | Age: 61
End: 2021-05-11

## 2021-05-11 NOTE — TELEPHONE ENCOUNTER
Pt is requesting to be contacted by Dr Conner Horner when available. Asked Ms Elisabeth Hodges the nature of her call and she responded to just tell him to call me.  Please be advised and contact when available

## 2021-05-12 DIAGNOSIS — R30.0 DYSURIA: ICD-10-CM

## 2021-05-12 DIAGNOSIS — D51.0 PERNICIOUS ANEMIA: Primary | ICD-10-CM

## 2021-05-12 RX ORDER — ERGOCALCIFEROL 1.25 MG/1
50000 CAPSULE ORAL
Qty: 13 CAP | Refills: 3 | Status: SHIPPED | OUTPATIENT
Start: 2021-05-12 | End: 2022-08-01 | Stop reason: SDUPTHER

## 2021-05-12 NOTE — TELEPHONE ENCOUNTER
Spoke with patient at this time. Patient want to know if B12 and urinalysis can be added to her labwork orders. Also she states her vitamin d was never sent to the pharmacy. Informed patient provider may be waiting on vitamin d level before sending in medication. Informed patient I will confirm that for her

## 2021-05-13 ENCOUNTER — LAB ONLY (OUTPATIENT)
Dept: FAMILY MEDICINE CLINIC | Age: 61
End: 2021-05-13
Payer: MEDICAID

## 2021-05-13 DIAGNOSIS — Z01.89 ENCOUNTER FOR LABORATORY TEST: Primary | ICD-10-CM

## 2021-05-13 PROCEDURE — 36415 COLL VENOUS BLD VENIPUNCTURE: CPT | Performed by: FAMILY MEDICINE

## 2021-05-14 LAB
25(OH)D3+25(OH)D2 SERPL-MCNC: 20.4 NG/ML (ref 30–100)
ALBUMIN SERPL-MCNC: 4.1 G/DL (ref 3.8–4.9)
ALBUMIN/GLOB SERPL: 1.2 {RATIO} (ref 1.2–2.2)
ALP SERPL-CCNC: 94 IU/L (ref 39–117)
ALT SERPL-CCNC: 11 IU/L (ref 0–32)
APPEARANCE UR: CLEAR
AST SERPL-CCNC: 13 IU/L (ref 0–40)
BACTERIA #/AREA URNS HPF: ABNORMAL /[HPF]
BASOPHILS # BLD AUTO: 0.1 X10E3/UL (ref 0–0.2)
BASOPHILS NFR BLD AUTO: 1 %
BILIRUB SERPL-MCNC: 0.5 MG/DL (ref 0–1.2)
BILIRUB UR QL STRIP: NEGATIVE
BUN SERPL-MCNC: 15 MG/DL (ref 8–27)
BUN/CREAT SERPL: 22 (ref 12–28)
CALCIUM SERPL-MCNC: 8.9 MG/DL (ref 8.7–10.3)
CASTS URNS QL MICRO: ABNORMAL /LPF
CHLORIDE SERPL-SCNC: 101 MMOL/L (ref 96–106)
CHOLEST SERPL-MCNC: 213 MG/DL (ref 100–199)
CO2 SERPL-SCNC: 25 MMOL/L (ref 20–29)
COLOR UR: ABNORMAL
CREAT SERPL-MCNC: 0.68 MG/DL (ref 0.57–1)
CRYSTALS URNS MICRO: ABNORMAL
EOSINOPHIL # BLD AUTO: 0.1 X10E3/UL (ref 0–0.4)
EOSINOPHIL NFR BLD AUTO: 2 %
EPI CELLS #/AREA URNS HPF: ABNORMAL /HPF (ref 0–10)
ERYTHROCYTE [DISTWIDTH] IN BLOOD BY AUTOMATED COUNT: 14.3 % (ref 11.7–15.4)
GLOBULIN SER CALC-MCNC: 3.4 G/DL (ref 1.5–4.5)
GLUCOSE SERPL-MCNC: 118 MG/DL (ref 65–99)
GLUCOSE UR QL: ABNORMAL
HCT VFR BLD AUTO: 46.4 % (ref 34–46.6)
HDLC SERPL-MCNC: 35 MG/DL
HGB BLD-MCNC: 15.1 G/DL (ref 11.1–15.9)
HGB UR QL STRIP: NEGATIVE
IMM GRANULOCYTES # BLD AUTO: 0.1 X10E3/UL (ref 0–0.1)
IMM GRANULOCYTES NFR BLD AUTO: 1 %
IMP & REVIEW OF LAB RESULTS: NORMAL
KETONES UR QL STRIP: NEGATIVE
LDLC SERPL CALC-MCNC: 113 MG/DL (ref 0–99)
LEUKOCYTE ESTERASE UR QL STRIP: NEGATIVE
LYMPHOCYTES # BLD AUTO: 2 X10E3/UL (ref 0.7–3.1)
LYMPHOCYTES NFR BLD AUTO: 27 %
MCH RBC QN AUTO: 29.3 PG (ref 26.6–33)
MCHC RBC AUTO-ENTMCNC: 32.5 G/DL (ref 31.5–35.7)
MCV RBC AUTO: 90 FL (ref 79–97)
MICRO URNS: ABNORMAL
MICRO URNS: ABNORMAL
MONOCYTES # BLD AUTO: 0.6 X10E3/UL (ref 0.1–0.9)
MONOCYTES NFR BLD AUTO: 8 %
NEUTROPHILS # BLD AUTO: 4.7 X10E3/UL (ref 1.4–7)
NEUTROPHILS NFR BLD AUTO: 61 %
NITRITE UR QL STRIP: NEGATIVE
PH UR STRIP: 5 [PH] (ref 5–7.5)
PLATELET # BLD AUTO: 235 X10E3/UL (ref 150–450)
POTASSIUM SERPL-SCNC: 4.3 MMOL/L (ref 3.5–5.2)
PROT SERPL-MCNC: 7.5 G/DL (ref 6–8.5)
PROT UR QL STRIP: ABNORMAL
RBC # BLD AUTO: 5.15 X10E6/UL (ref 3.77–5.28)
RBC #/AREA URNS HPF: ABNORMAL /HPF (ref 0–2)
SODIUM SERPL-SCNC: 142 MMOL/L (ref 134–144)
SP GR UR: 1.02 (ref 1–1.03)
T4 FREE SERPL-MCNC: 1.72 NG/DL (ref 0.82–1.77)
TRIGL SERPL-MCNC: 375 MG/DL (ref 0–149)
TSH SERPL DL<=0.005 MIU/L-ACNC: 3.35 UIU/ML (ref 0.45–4.5)
UNIDENT CRYS URNS QL MICRO: PRESENT
UROBILINOGEN UR STRIP-MCNC: 0.2 MG/DL (ref 0.2–1)
VIT B12 SERPL-MCNC: 1849 PG/ML (ref 232–1245)
VLDLC SERPL CALC-MCNC: 65 MG/DL (ref 5–40)
WBC # BLD AUTO: 7.6 X10E3/UL (ref 3.4–10.8)
WBC #/AREA URNS HPF: ABNORMAL /HPF (ref 0–5)

## 2021-05-24 ENCOUNTER — TELEPHONE (OUTPATIENT)
Dept: FAMILY MEDICINE CLINIC | Age: 61
End: 2021-05-24

## 2021-05-24 NOTE — TELEPHONE ENCOUNTER
Patient called stating she called [facility] to follow up on the hospital bed. She says she was told it was denied because the diagnosis was CHF. She states is supposed to be because she has a trach and she can't lay flat. She has been sleeping in a recliner for months. Please advise.

## 2021-05-25 NOTE — PROGRESS NOTES
Please let patient know urinalysis was negative for infection. She however has some glucose in the urine indicating her blood glucose levels have been elevated. Her thyroid level is stable. We will continue the current treatment plan. Triglyceride level remains elevated though has come down significantly. It is now 375. LDL cholesterol is 113. We would want this to go down to below 100. She should continue to take the Crestor daily and we will recheck labs in 3 to 6 months. She should take a diet low in polysaturated fats. Her vitamin D level is still low though has gone up from the previous 7.9 to 20.4. She should continue with vitamin D replacement therapy. Her vitamin B12 level is elevated. She should discontinue any vitamin B12 replacement therapy.   Lemar Eisenmenger, MD

## 2021-05-26 ENCOUNTER — TELEPHONE (OUTPATIENT)
Dept: FAMILY MEDICINE CLINIC | Age: 61
End: 2021-05-26

## 2021-05-26 NOTE — TELEPHONE ENCOUNTER
[employer] will not cover this pt to get a bed because she's had a bed for only 2 years, it has to be 5yrs. Patient stated she needs to be in a bed that is not flat because of the trach. Ms Christa Pinto wanted to speak with a clinical staff regarding this issue. Stated that someone advised the [employer] company that she needed it for congested heart failure when in fact she needs it because she can't lay flat.  Please follow up when available

## 2021-05-27 NOTE — PROGRESS NOTES
Spoke with patient. Patient was given lab results. Patient stated understanding. Patient also stated that she wanted a prescription wrote to repair her bed not replace. Patient stated she has been sleeping on a recliner for the last two months.

## 2021-06-01 NOTE — TELEPHONE ENCOUNTER
Received letter order was denied due to patient receiving bed in 07/2019 and a mattress in 2020 and insurance will only allow 1 every 60 months for bed and mattress. Patient is not eligible

## 2021-06-03 DIAGNOSIS — R60.0 PEDAL EDEMA: ICD-10-CM

## 2021-06-06 RX ORDER — BUMETANIDE 1 MG/1
TABLET ORAL
Qty: 60 TABLET | Refills: 0 | Status: SHIPPED | OUTPATIENT
Start: 2021-06-06 | End: 2021-07-12

## 2021-06-07 ENCOUNTER — TELEPHONE (OUTPATIENT)
Dept: FAMILY MEDICINE CLINIC | Age: 61
End: 2021-06-07

## 2021-06-08 NOTE — TELEPHONE ENCOUNTER
Spoke with [facility] at this time. Hospital bed is not covered at this time for a new bed,Order need to be written for repair.  Order for absorbent pads will be sent to home care delivered

## 2021-06-09 ENCOUNTER — TELEPHONE (OUTPATIENT)
Dept: FAMILY MEDICINE CLINIC | Age: 61
End: 2021-06-09

## 2021-06-09 NOTE — TELEPHONE ENCOUNTER
Spoke with Nelida Jasso at this time. Repair of hospital bed is not covered patient will have to pay out of pocket 75 dollars for repair. Patient made aware at this time.

## 2021-06-09 NOTE — TELEPHONE ENCOUNTER
Cr Hercules from home care delivery called to inform they received the pads for this pt and will be reaching out to her to let her know. Please be advised.  If you have any questions please contact Cr Hercules at [phone] ext [phone] when available

## 2021-06-09 NOTE — TELEPHONE ENCOUNTER
[facility] from [facility] called to speak with Christy Allen regarding this pt.  Please follow up when available at [phone]

## 2021-06-16 ENCOUNTER — TELEPHONE (OUTPATIENT)
Dept: FAMILY MEDICINE CLINIC | Age: 61
End: 2021-06-16

## 2021-06-16 NOTE — TELEPHONE ENCOUNTER
Pt wanted to inform Dr Sofy Short that her son who is also a pt that Chloé Ramos is currently admitted in the hospital for a kidney transplant.  Please be advised

## 2021-06-17 ENCOUNTER — OFFICE VISIT (OUTPATIENT)
Dept: FAMILY MEDICINE CLINIC | Age: 61
End: 2021-06-17
Payer: MEDICAID

## 2021-06-17 VITALS
OXYGEN SATURATION: 99 % | TEMPERATURE: 97.7 F | BODY MASS INDEX: 45.67 KG/M2 | RESPIRATION RATE: 18 BRPM | SYSTOLIC BLOOD PRESSURE: 137 MMHG | WEIGHT: 291 LBS | HEIGHT: 67 IN | DIASTOLIC BLOOD PRESSURE: 65 MMHG | HEART RATE: 81 BPM

## 2021-06-17 DIAGNOSIS — E11.40 TYPE 2 DIABETES MELLITUS WITH DIABETIC NEUROPATHY, WITH LONG-TERM CURRENT USE OF INSULIN (HCC): ICD-10-CM

## 2021-06-17 DIAGNOSIS — R60.0 PEDAL EDEMA: ICD-10-CM

## 2021-06-17 DIAGNOSIS — J38.00 VOCAL CORD PARALYSIS: ICD-10-CM

## 2021-06-17 DIAGNOSIS — L98.9 SKIN LESION: ICD-10-CM

## 2021-06-17 DIAGNOSIS — I87.2 VENOUS STASIS DERMATITIS OF BOTH LOWER EXTREMITIES: Primary | ICD-10-CM

## 2021-06-17 DIAGNOSIS — I10 ESSENTIAL HYPERTENSION: ICD-10-CM

## 2021-06-17 DIAGNOSIS — Z79.4 TYPE 2 DIABETES MELLITUS WITH DIABETIC NEUROPATHY, WITH LONG-TERM CURRENT USE OF INSULIN (HCC): ICD-10-CM

## 2021-06-17 DIAGNOSIS — E03.9 ACQUIRED HYPOTHYROIDISM: ICD-10-CM

## 2021-06-17 DIAGNOSIS — Z93.0 TRACHEOSTOMY DEPENDENCE (HCC): ICD-10-CM

## 2021-06-17 LAB — HBA1C MFR BLD HPLC: 10.2 %

## 2021-06-17 PROCEDURE — 83036 HEMOGLOBIN GLYCOSYLATED A1C: CPT | Performed by: FAMILY MEDICINE

## 2021-06-17 PROCEDURE — 99215 OFFICE O/P EST HI 40 MIN: CPT | Performed by: FAMILY MEDICINE

## 2021-06-17 RX ORDER — MUPIROCIN 20 MG/G
OINTMENT TOPICAL DAILY
Qty: 30 G | Refills: 0 | Status: SHIPPED | OUTPATIENT
Start: 2021-06-17 | End: 2022-06-14

## 2021-06-17 NOTE — PROGRESS NOTES
Chief Complaint   Patient presents with    Diabetes     update med list remove non taking medications     Hypertension     1. Have you been to the ER, urgent care clinic since your last visit? Hospitalized since your last visit? No    2. Have you seen or consulted any other health care providers outside of the [address] [place] [address] since your last visit? Include any pap smears or colon screening. No     HPI  Ama Correia comes in for follow-up care. Lymphedema: Patient has lymphedema bilateral lower extremities. Has been seen by the vascular specialist.  Was given SCDs but redness has increased with the stools both legs and they are weeping. I will refer her to the wound clinic given the open wound on her left leg. It is just the epidermis that has been excoriated due to blister formation. She may need to have an Unna boot dressing placed. Patient is on Bumex for the pedal edema. Leg wound: Patient has open wound left leg. No signs of infection. She is draining clear fluid. This was due to opening up of a blister. We will give mupirocin to apply. Vocal cord paralysis: Patient has a history of vocal cord paralysis following thyroidectomy. She has hoarseness of voice. She has a permanent trach in place. Mood disorder: Patient has mood disorder. She has anxiety. She takes lorazepam.  We will continue with the current treatment plan. Morbid obesity: Patient is morbidly obese. Weight has gone up. BMI 45.58. She will intensify lifestyle and dietary modification. Diabetes mellitus type 2: Patient has type 2 diabetes mellitus. Blood glucose numbers have been fluctuating. She is on insulin. She has been referred to the endocrinologist in the past.  She would like to see a different endocrinologist.  Her HbA1c is elevated at 10.4. She needs to intensify lifestyle and dietary modification. Currently she is taking 80 units daily of glargine. She should increase this to 85 units daily.   She should also follow-up with endocrinologist.  Hypertension: Patient has hypertension. Blood pressure is stable. She denies headache, changes in vision or focal weakness. She is on metoprolol. We will continue with the current treatment plan. Hypothyroidism: Patient has hypothyroidism. She is on levothyroxine. We will recheck TSH levels at next visit. Dyslipidemia: Patient has dyslipidemia. She is on Lovaza and the Crestor. We will continue with this medication.   We will recheck lipid panel      Past Medical History  Past Medical History:   Diagnosis Date    Acquired hypothyroidism 5/17/2017    Anxiety 6/25/2019    Bilateral vocal cord paralysis 4/4/2019    Bilateral vocal cord paralysis status post thyroidectomy (4/4/2019 - Dr. Lauren Schultz) with residual dysphagia and dysarthria; S/P Tracheostomy (6/3/2019 - Dr. Lauren Schultz); S/P PEG tube insertion (6/20/2019) - Dr. Lauren Schultz)    Chronic back pain     Lower back pain    Depression     Diabetic neuropathy associated with type 2 diabetes mellitus ([facility] [address]     Dysphagia 6/25/2019    S/P Thyroidectomy (4/4/2019 - Dr. Lauren Schultz)    History of asthma     History of heart failure     chronic diastolic heart failure    History of vitamin D deficiency 8/28/2017    Hypertensive heart disease without heart failure 5/17/2017    Hypoxemia requiring supplemental oxygen 6/25/2019    Insomnia     Left ear hearing loss     pt states nerve damage--- 25% hearing loss, described as \"muffled\"    Memory difficulty     Moderate persistent asthma     Obesity, Class II, BMI 35-39.9 11/11/2016    Obstructive sleep apnea 11/6/2015    Panic attacks     Ringing of ears     Type 2 diabetes mellitus with diabetic neuropathy, with long-term current use of insulin (HCC)     Vertigo        Surgical History  Past Surgical History:   Procedure Laterality Date    HX HEART VALVE SURGERY  2013    aortic valve repair    HX HYSTERECTOMY      Partial Hysterectomy - removed ovary    HX MYOMECTOMY      HX ORTHOPAEDIC  02/2018    had nerves burned on her right side of back    HX TRACHEOSTOMY  06/03/2019    S/P Tracheostomy (6/3/2019 - Dr. Susan Stewart)   [facility] UNLIST  10/31/2013    open heart    NM EGD PERCUTANEOUS PLACEMENT GASTROSTOMY TUBE N/A 06/20/2019    Dr. Crispin Tai Bilateral 04/04/2019    Dr. Kim Shen        Medications  Current Outpatient Medications   Medication Sig Dispense Refill    mupirocin (BACTROBAN) 2 % ointment Apply  to affected area daily. 30 g 0    bumetanide (BUMEX) 1 mg tablet take 1 tablet by mouth twice a day 60 Tablet 0    ondansetron hcl (ZOFRAN) 4 mg tablet take 1 tablet by mouth every 8 hours if needed for nausea and vomiting 30 Tablet 1    ergocalciferol (ERGOCALCIFEROL) 1,250 mcg (50,000 unit) capsule Take 1 Cap by mouth every seven (7) days. 13 Cap 3    metoprolol succinate (TOPROL-XL) 50 mg XL tablet take 1 tablet by mouth once daily 90 Tab 1    LORazepam (ATIVAN) 2 mg tablet take 1 tablet by mouth nightly 31 Tab 2    levothyroxine (SYNTHROID) 200 mcg tablet take 1 tablet by mouth once daily BEFORE BREAKFAST 90 Tab 2    levothyroxine (SYNTHROID) 50 mcg tablet Take 1 Tab by mouth Daily (before breakfast). Taking the 200 mcg medication 50 mcg. 90 Tab 1    insulin glargine (Lantus U-100 Insulin) 100 unit/mL injection inject 80 units subcutaneously daily 30 mL 2    rosuvastatin (CRESTOR) 40 mg tablet Take 1 Tab by mouth nightly.  30 Tab 3    potassium chloride (KLOR-CON) 10 mEq tablet take 1 tablet by mouth once daily 90 Tab 1    omega-3 acid ethyl esters (LOVAZA) 1 gram capsule take 1 capsule by mouth twice a day 60 Cap 2    ibuprofen (MOTRIN) 600 mg tablet take 1 tablet by mouth every 8 hours if needed for pain 90 Tab 0    glucose blood VI test strips (ASCENSIA AUTODISC VI, ONE TOUCH ULTRA TEST VI) strip Check blood glucose 3 times daily 300 Strip 3    albuterol (PROVENTIL VENTOLIN) 2.5 mg /3 mL (0.083 %) nebu 3 mL by Nebulization route every four (4) hours as needed (wheeze). Indications: Asthma Attack 24 Each 5    naloxone (NARCAN) 4 mg/actuation nasal spray Use 1 spray intranasally, then discard. Repeat with new spray every 2 min as needed for opioid overdose symptoms, alternating nostrils. 2 Each 0    lancets misc Check blood glucose 3 times daily 300 Each 3    BD INSULIN SYRINGE ULTRA-FINE 1 mL 31 gauge x 5/16 syrg use as directed twice a day 200 Syringe 3    FREESTYLE LITE STRIPS strip TEST twice a day as directed 100 Strip 11    Aspirin, Buffered 81 mg tab Take 81 mg by mouth daily. Allergies  Allergies   Allergen Reactions    Other Food Other (comments)     Artificial sweeteners- causes headaches    Metformin Other (comments)     Increase pain in feet and swelling in feet  Increased hunger,tired and bilat foot pain    Morphine Other (comments)     headache    Singulair [Montelukast] Other (comments)     hallucinations    Sucrose Other (comments)     Pt. Is not allergic to sucrose. She develops headaches with artificial sweeteners.        Family History  Family History   Problem Relation Age of Onset    Heart Disease Mother     Diabetes Mother     Stroke Mother     Hypertension Mother     Diabetes Father     Heart Disease Father     Hypertension Father     Stroke Father     Diabetes Brother     Cancer Brother     Hypertension Brother     Stroke Brother     Heart Disease Brother     Diabetes Brother     Hypertension Brother     Stroke Brother     Heart Disease Brother     Diabetes Brother     Hypertension Brother     Hypertension Brother        Social History  Social History     Socioeconomic History    Marital status:      Spouse name: Not on file    Number of children: Not on file    Years of education: Not on file    Highest education level: Not on file   Occupational History    Not on file   Tobacco Use    Smoking status: Never Smoker    Smokeless tobacco: Never Used   Substance and Sexual Activity    Alcohol use: No    Drug use: No    Sexual activity: Not Currently   Other Topics Concern     Service No    Blood Transfusions No    Caffeine Concern No    Occupational Exposure No    Hobby Hazards No    Sleep Concern Yes     Comment: Sleep apnea     Stress Concern Yes     Comment: Due to family medical issues.  Weight Concern No    Special Diet No    Back Care Yes     Comment: Patient tries to do back care with streches     Exercise No    Bike Helmet Yes    Seat Belt Yes    Self-Exams Yes   Social History Narrative    Not on file     Social Determinants of Health     Financial Resource Strain:     Difficulty of Paying Living Expenses:    Food Insecurity:     Worried About Running Out of Food in the Last Year:     [address] in the Last Year:    Transportation Needs:     Lack of Transportation (Medical):  Lack of Transportation (Non-Medical):    Physical Activity:     Days of Exercise per Week:     Minutes of Exercise per Session:    Stress:     Feeling of Stress :    Social Connections:     Frequency of Communication with Friends and Family:     Frequency of Social Gatherings with Friends and Family:     Attends Rastafari Services:     Active Member of Clubs or Organizations:     Attends Club or Organization Meetings:     Marital Status:    Intimate Partner Violence:     Fear of Current or Ex-Partner:     Emotionally Abused:     Physically Abused:     Sexually Abused:        Review of Systems  Review of Systems - Review of all systems is negative except as noted above in the HPI.     Vital Signs  Visit Vitals  /65 (BP 1 Location: Left upper arm, BP Patient Position: Sitting, BP Cuff Size: Adult)   Pulse 81   Temp 97.7 °F (36.5 °C) (Oral)   Resp 18   Ht 5' 7\" (1.702 m)   Wt 291 lb (132 kg)   SpO2 99%   BMI 45.58 kg/m²       Physical Exam  Physical Examination: General appearance - oriented to person, place, and time and overweight  Mental status - affect appropriate to mood  Neck -tracheostomy in place  Lymphatics - no palpable lymphadenopathy  Chest - decreased air entry noted bilateral lung bases  Heart - S1 and S2 normal  Abdomen - no rebound tenderness noted  Back exam - limited range of motion  Neurological - abnormal neurological exam unchanged from prior examinations  Musculoskeletal - osteoarthritic changes noted in both hands      Results  Results for orders placed or performed in visit on 06/17/21   AMB POC HEMOGLOBIN A1C   Result Value Ref Range    Hemoglobin A1c (POC) 10.2 %       ASSESSMENT and PLAN    ICD-10-CM ICD-9-CM    1. Venous stasis dermatitis of both lower extremities  I87.2 454.1 mupirocin (BACTROBAN) 2 % ointment      REFERRAL TO WOUND CARE   2. Pedal edema  R60.0 782.3 mupirocin (BACTROBAN) 2 % ointment      REFERRAL TO WOUND CARE   3. Skin lesion  L98.9 709.9 mupirocin (BACTROBAN) 2 % ointment   4. Vocal cord paralysis  J38.00 478.30    5. Tracheostomy dependence ([facility] [address]  Z93.0 V44.0    6. Type 2 diabetes mellitus with diabetic neuropathy, with long-term current use of insulin (HCC)  E11.40 250.60 AMB POC HEMOGLOBIN A1C    Z79.4 357.2      V58.67    7. Acquired hypothyroidism  E03.9 244.9    8.  Essential hypertension  I10 401.9      lab results and schedule of future lab studies reviewed with patient  reviewed diet, exercise and weight control  cardiovascular risk and specific lipid/LDL goals reviewed  reviewed medications and side effects in detail  specific diabetic recommendations: low cholesterol diet, weight control and daily exercise discussed, home glucose monitoring emphasized, all medications, side effects and compliance discussed carefully, annual eye examinations at Ophthalmology discussed, glycohemoglobin and other lab monitoring discussed and long term diabetic complications discussed      I have discussed the diagnosis with the patient and the intended plan of care as seen in the above orders. The patient has received an after-visit summary and questions were answered concerning future plans. I have discussed medication, side effects, and warnings with the patient in detail. The patient verbalized understanding and is in agreement with the plan of care. The patient will contact the office with any additional concerns. I spent at least 44 minutes on this visit with this established patient. Paola De La Vega MD    PLEASE NOTE:   This document has been produced using voice recognition software.  Unrecognized errors in transcription may be present

## 2021-06-23 ENCOUNTER — TELEPHONE (OUTPATIENT)
Dept: FAMILY MEDICINE CLINIC | Age: 61
End: 2021-06-23

## 2021-06-27 DIAGNOSIS — B37.31 YEAST VAGINITIS: ICD-10-CM

## 2021-06-28 RX ORDER — FLUCONAZOLE 200 MG/1
TABLET ORAL
Qty: 3 TABLET | Refills: 1 | Status: SHIPPED | OUTPATIENT
Start: 2021-06-28 | End: 2021-09-27

## 2021-06-29 DIAGNOSIS — E03.4 HYPOTHYROIDISM DUE TO ACQUIRED ATROPHY OF THYROID: ICD-10-CM

## 2021-06-29 RX ORDER — OMEGA-3-ACID ETHYL ESTERS 1 G/1
CAPSULE, LIQUID FILLED ORAL
Qty: 60 CAPSULE | Refills: 2 | Status: SHIPPED | OUTPATIENT
Start: 2021-06-29 | End: 2021-12-13

## 2021-06-29 RX ORDER — LEVOTHYROXINE SODIUM 50 UG/1
50 TABLET ORAL
Qty: 90 TABLET | Refills: 1 | Status: SHIPPED | OUTPATIENT
Start: 2021-06-29 | End: 2021-12-20

## 2021-06-29 NOTE — TELEPHONE ENCOUNTER
Glynn Montez Requested Prescriptions     Pending Prescriptions Disp Refills    omega-3 acid ethyl esters (LOVAZA) 1 gram capsule 60 Capsule 2    levothyroxine (SYNTHROID) 50 mcg tablet 90 Tablet 1     Sig: Take 1 Tablet by mouth Daily (before breakfast). Taking the 200 mcg medication 50 mcg.      Patient requesting the following medication refill       Date last prescribed: 02/21/2021    Date patient last seen: 06/17/2021    Follow up appointment scheduled: 09/23/2021    Please Advise

## 2021-07-01 RX ORDER — UREA 10 %
1 LOTION (ML) TOPICAL
Qty: 90 TABLET | Refills: 1 | Status: SHIPPED | OUTPATIENT
Start: 2021-07-01 | End: 2021-12-29

## 2021-07-03 DIAGNOSIS — R60.0 PEDAL EDEMA: ICD-10-CM

## 2021-07-08 RX ORDER — FUROSEMIDE 40 MG/1
TABLET ORAL
Qty: 90 TABLET | Refills: 1 | Status: SHIPPED | OUTPATIENT
Start: 2021-07-08 | End: 2021-07-08 | Stop reason: ALTCHOICE

## 2021-07-08 RX ORDER — POTASSIUM CHLORIDE 750 MG/1
TABLET, EXTENDED RELEASE ORAL
Qty: 90 TABLET | Refills: 1 | Status: SHIPPED | OUTPATIENT
Start: 2021-07-08 | End: 2022-01-10

## 2021-07-08 NOTE — TELEPHONE ENCOUNTER
Attempted to contact patient. Patient didn't answer at this time. Patient was left a VM to call the office back concerning medications.

## 2021-07-15 ENCOUNTER — TELEPHONE (OUTPATIENT)
Dept: FAMILY MEDICINE CLINIC | Age: 61
End: 2021-07-15

## 2021-07-15 NOTE — TELEPHONE ENCOUNTER
Pt is requesting to be contacted when available. Regarding her nadia for the wound care. Stated no one has contacted her yet for it.  Please be advised

## 2021-07-19 NOTE — TELEPHONE ENCOUNTER
Spoke with [facility] they never received fax. Spoke with referral coordinator she will refax information

## 2021-08-03 PROBLEM — M47.816 LUMBAR FACET ARTHROPATHY: Status: RESOLVED | Noted: 2017-01-26 | Resolved: 2021-08-03

## 2021-08-04 DIAGNOSIS — R13.10 DYSPHAGIA, UNSPECIFIED TYPE: ICD-10-CM

## 2021-08-05 RX ORDER — ONDANSETRON 4 MG/1
TABLET, FILM COATED ORAL
Qty: 30 TABLET | Refills: 1 | Status: SHIPPED | OUTPATIENT
Start: 2021-08-05 | End: 2021-08-30

## 2021-08-28 DIAGNOSIS — R13.10 DYSPHAGIA, UNSPECIFIED TYPE: ICD-10-CM

## 2021-08-30 RX ORDER — ONDANSETRON 4 MG/1
TABLET, FILM COATED ORAL
Qty: 30 TABLET | Refills: 1 | Status: SHIPPED | OUTPATIENT
Start: 2021-08-30 | End: 2021-10-20

## 2021-09-05 DIAGNOSIS — F41.9 ANXIETY: ICD-10-CM

## 2021-09-07 RX ORDER — LORAZEPAM 2 MG/1
TABLET ORAL
Qty: 31 TABLET | Refills: 2 | Status: SHIPPED | OUTPATIENT
Start: 2021-09-07 | End: 2021-12-29

## 2021-09-23 ENCOUNTER — VIRTUAL VISIT (OUTPATIENT)
Dept: FAMILY MEDICINE CLINIC | Age: 61
End: 2021-09-23
Payer: MEDICAID

## 2021-09-23 DIAGNOSIS — J96.11 CHRONIC RESPIRATORY FAILURE WITH HYPOXIA (HCC): ICD-10-CM

## 2021-09-23 DIAGNOSIS — Z93.0 TRACHEOSTOMY DEPENDENCE (HCC): ICD-10-CM

## 2021-09-23 DIAGNOSIS — J38.00 VOCAL CORD PARALYSIS: ICD-10-CM

## 2021-09-23 DIAGNOSIS — I89.0 LYMPHEDEMA: ICD-10-CM

## 2021-09-23 DIAGNOSIS — E11.40 TYPE 2 DIABETES MELLITUS WITH DIABETIC NEUROPATHY, WITH LONG-TERM CURRENT USE OF INSULIN (HCC): ICD-10-CM

## 2021-09-23 DIAGNOSIS — R13.10 DYSPHAGIA, UNSPECIFIED TYPE: ICD-10-CM

## 2021-09-23 DIAGNOSIS — I10 ESSENTIAL HYPERTENSION: ICD-10-CM

## 2021-09-23 DIAGNOSIS — Z79.4 TYPE 2 DIABETES MELLITUS WITH DIABETIC NEUROPATHY, WITH LONG-TERM CURRENT USE OF INSULIN (HCC): ICD-10-CM

## 2021-09-23 DIAGNOSIS — R53.81 PHYSICAL DEBILITY: ICD-10-CM

## 2021-09-23 DIAGNOSIS — E03.4 HYPOTHYROIDISM DUE TO ACQUIRED ATROPHY OF THYROID: ICD-10-CM

## 2021-09-23 DIAGNOSIS — I87.2 VENOUS STASIS DERMATITIS OF BOTH LOWER EXTREMITIES: ICD-10-CM

## 2021-09-23 DIAGNOSIS — E66.01 MORBID OBESITY (HCC): Primary | ICD-10-CM

## 2021-09-23 PROCEDURE — 99215 OFFICE O/P EST HI 40 MIN: CPT | Performed by: FAMILY MEDICINE

## 2021-09-23 RX ORDER — PHENTERMINE HYDROCHLORIDE 37.5 MG/1
37.5 TABLET ORAL
Qty: 30 TABLET | Refills: 0 | Status: SHIPPED | OUTPATIENT
Start: 2021-09-23 | End: 2021-12-15

## 2021-09-23 NOTE — PROGRESS NOTES
Chief Complaint   Patient presents with    Follow Up Chronic Condition     1. Have you been to the ER, urgent care clinic since your last visit? Hospitalized since your last visit? No    2. Have you seen or consulted any other health care providers outside of the [address] [place] [address] since your last visit? Include any pap smears or colon screening.  No

## 2021-09-23 NOTE — PROGRESS NOTES
Jairo Pool is a 62 y.o. female who was seen by synchronous (real-time) audio-video technology on 9/23/2021 for Follow Up Chronic Condition        Assessment & Plan:       ICD-10-CM ICD-9-CM    1. Morbid obesity ([facility])  E66.01 278.01 phentermine (ADIPEX-P) 37.5 mg tablet   2. Hypothyroidism due to acquired atrophy of thyroid  E03.4 244.8      246.8    3. Dysphagia, unspecified type  R13.10 787.20    4. Venous stasis dermatitis of both lower extremities  I87.2 454.1    5. Tracheostomy dependence ([facility] [address]  Z93.0 V44.0    6. Vocal cord paralysis  J38.00 478.30    7. Type 2 diabetes mellitus with diabetic neuropathy, with long-term current use of insulin ([facility])  E11.40 250.60     Z79.4 357.2      V58.67    8. Essential hypertension  I10 401.9    9. Chronic respiratory failure with hypoxia ([facility])  J96.11 518.83      799.02    10. Physical debility  R53.81 799.3    11. Lymphedema  I89.0 457.1          I spent at least 40 minutes on this visit with this established patient. Subjective:   Jairo Pool is seen for follow-up care. Morbid obesity: Patient has morbid obesity with a BMI of 45.58. States that her weight keeps going up. She would like medication to help with weight loss. We discussed lifestyle and dietary modification. I will send in phentermine to take. I will follow-up in a month. Lymphedema: Patient has lymphedema bilateral lower extremities. This is getting worse. Occasionally has weeping from the lower extremities. Has been seen in the wound clinic. May benefit from being seen in the lymphedema clinic. She is on Bumex and will continue with the medication. Discussed elevation of the extremities. She is unable to sleep on her hospital bed as it is broken. She needs to get a new hospital bed to the right she can sleep and elevate her lower extremities. She is trach dependent and as there is difficulty in laying in a normal bed with elevating lower extremities.   She does need to have a hospital bed for this. Prescription for hospital bed has been written in the past.  We will rewrite this again and send it in. Anxiety: Patient has anxiety. She is on lorazepam.  We will continue with the medication. Trach dependent: Patient had thyroidectomy and this resulted in vocal cord paralysis. She has since been trach dependent. She is followed up by the ENT specialist.  She is on chronic oxygen. Dysphagia: Patient has oropharyngeal dysphagia. She has been followed up by the speech and swallow team.  Recently had fluoroscopy done. We will request the records. Asthma: Patient has a history of asthma. Has been followed up with the pulmonologist.  She is on albuterol nebulizer. We will continue with this medication. Oral pharyngeal candidiasis: Patient gets oropharyngeal candidiasis and has used Diflucan for this. Prescription will be sent into the pharmacy. Diabetes mellitus type 2: Patient has type 2 diabetes mellitus. She is on insulin. She will come in for an HbA1c test.  States that her blood glucose numbers have been fluctuating. She has not seen the endocrinologist lately. Encouraged her to do lifestyle and dietary modification. She will keep a blood glucose log and I will follow-up at next visit. Hypothyroidism: Patient has hypothyroidism. She had thyroidectomy. She is on thyroid replacement therapy. She has been seen by the endocrinologist in the past.  Dyslipidemia: Patient has dyslipidemia. She takes Crestor. Tolerating medication. We will continue current treatment plan. Prior to Admission medications    Medication Sig Start Date End Date Taking? Authorizing Provider   phentermine (ADIPEX-P) 37.5 mg tablet Take 1 Tablet by mouth every morning.  Max Daily Amount: 37.5 mg. 9/23/21  Yes Debo Louis MD   LORazepam (ATIVAN) 2 mg tablet take 1 tablet by mouth nightly 9/7/21  Yes Angelito Perkins MD   ondansetron hcl (ZOFRAN) 4 mg tablet take 1 tablet by mouth every 8 hours if needed for nausea and vomiting 8/30/21  Yes Cassie Powers MD   bumetanide (BUMEX) 1 mg tablet take 1 tablet by mouth twice a day 7/12/21  Yes Cassie Powers MD   potassium chloride (KLOR-CON) 10 mEq tablet take 1 tablet by mouth once daily 7/8/21  Yes Cassie Powers MD   melatonin 1 mg tablet Take 1 Tablet by mouth nightly. 7/1/21  Yes Cassie Powers MD   omega-3 acid ethyl esters (LOVAZA) 1 gram capsule take 1 capsule by mouth twice a day 6/29/21  Yes Cassie Powers MD   fluconazole (DIFLUCAN) 200 mg tablet take 1 tablet by mouth once daily for 3 days 6/28/21  Yes Cassie Powers MD   rosuvastatin (CRESTOR) 40 mg tablet take 1 tablet by mouth nightly 6/21/21  Yes Cassie Powers MD   mupirocin (BACTROBAN) 2 % ointment Apply  to affected area daily. 6/17/21  Yes Cassie Powers MD   ergocalciferol (ERGOCALCIFEROL) 1,250 mcg (50,000 unit) capsule Take 1 Cap by mouth every seven (7) days. 5/12/21  Yes Cassie Powers MD   metoprolol succinate (TOPROL-XL) 50 mg XL tablet take 1 tablet by mouth once daily 3/29/21  Yes Cassie Powers MD   insulin glargine (Lantus U-100 Insulin) 100 unit/mL injection inject 80 units subcutaneously daily 2/21/21  Yes Cassie Powers MD   ibuprofen (MOTRIN) 600 mg tablet take 1 tablet by mouth every 8 hours if needed for pain 6/29/20  Yes Cassie Powers MD   glucose blood VI test strips (ASCENSIA AUTODISC VI, ONE TOUCH ULTRA TEST VI) strip Check blood glucose 3 times daily 4/16/20  Yes Angelito Dalton MD   albuterol (PROVENTIL VENTOLIN) 2.5 mg /3 mL (0.083 %) nebu 3 mL by Nebulization route every four (4) hours as needed (wheeze). Indications: Asthma Attack 11/12/19  Yes Cassie Powers MD   naloxone (NARCAN) 4 mg/actuation nasal spray Use 1 spray intranasally, then discard. Repeat with new spray every 2 min as needed for opioid overdose symptoms, alternating nostrils.  7/18/19  Yes Cassie Powers MD   lancets misc Check blood glucose 3 times daily 7/17/19  Yes Angelito Perkins MD   BD INSULIN SYRINGE ULTRA-FINE 1 mL 31 gauge x 5/16 syrg use as directed twice a day 10/18/18  Yes Angelito Saldaña MD   FREESTYLE LITE STRIPS strip TEST twice a day as directed 11/20/17  Yes Angelito Perkins MD   Aspirin, Buffered 81 mg tab Take 81 mg by mouth daily. Yes Provider, Historical   levothyroxine (SYNTHROID) 50 mcg tablet Take 1 Tablet by mouth Daily (before breakfast). Taking the 200 mcg medication 50 mcg. 6/29/21   Angelito Perkins MD   levothyroxine (SYNTHROID) 200 mcg tablet take 1 tablet by mouth once daily BEFORE BREAKFAST 2/21/21   Angelito Perkins MD     ROS Review of all systems is negative except as noted above in the HPI. Objective:     Patient-Reported Vitals 9/23/2021   Patient-Reported Weight 288 lbs   Patient-Reported Pulse -   Patient-Reported Temperature -   Patient-Reported Systolic  -   Patient-Reported Diastolic -   Constitutional: [x] Appears well-developed and well-nourished [x] No apparent distress      Mental status: [x] Alert and awake  [x] Oriented to person/place/time [x] Able to follow commands     HENT: [x] Normocephalic, atraumatic     Neck: [x]  Abnormal -trach in place    Pulmonary/Chest: [x] Abnormal -occasional cough and trouble breathing. Neurological:        [x] No Facial Asymmetry (Cranial nerve 7 motor function) (limited exam due to video visit)                  Skin:        [x]Lymphedema both lower extremities with stasis dermatitis. Psychiatric:       [x]  No Hallucinations        We discussed the expected course, resolution and complications of the diagnosis(es) in detail. Medication risks, benefits, costs, interactions, and alternatives were discussed as indicated. I advised her to contact the office if her condition worsens, changes or fails to improve as anticipated. She expressed understanding with the diagnosis(es) and plan.        Kayce Pepe, was evaluated through a synchronous (real-time) audio-video encounter. The patient (or guardian if applicable) is aware that this is a billable service. Verbal consent to proceed has been obtained within the past 12 months. The visit was conducted pursuant to the emergency declaration under the [address] [place] [address], [address] [place] [address] authority and the [employer]. Patient identification was verified, and a caregiver was present when appropriate. The patient was located in a state where the provider was credentialed to provide care.       Drake Lares MD

## 2021-09-25 DIAGNOSIS — B37.31 YEAST VAGINITIS: ICD-10-CM

## 2021-09-27 RX ORDER — FLUCONAZOLE 200 MG/1
TABLET ORAL
Qty: 3 TABLET | Refills: 1 | Status: SHIPPED | OUTPATIENT
Start: 2021-09-27 | End: 2021-10-26

## 2021-10-12 ENCOUNTER — TELEPHONE (OUTPATIENT)
Dept: FAMILY MEDICINE CLINIC | Age: 61
End: 2021-10-12

## 2021-10-16 DIAGNOSIS — R60.0 PEDAL EDEMA: ICD-10-CM

## 2021-10-17 DIAGNOSIS — Z79.4 TYPE 2 DIABETES MELLITUS WITH DIABETIC NEUROPATHY, WITH LONG-TERM CURRENT USE OF INSULIN (HCC): ICD-10-CM

## 2021-10-17 DIAGNOSIS — E11.40 TYPE 2 DIABETES MELLITUS WITH DIABETIC NEUROPATHY, WITH LONG-TERM CURRENT USE OF INSULIN (HCC): ICD-10-CM

## 2021-10-17 RX ORDER — BUMETANIDE 1 MG/1
TABLET ORAL
Qty: 60 TABLET | Refills: 2 | Status: SHIPPED | OUTPATIENT
Start: 2021-10-17 | End: 2022-01-10

## 2021-10-17 RX ORDER — INSULIN GLARGINE 100 [IU]/ML
INJECTION, SOLUTION SUBCUTANEOUS
Qty: 30 ML | Refills: 2 | Status: SHIPPED | OUTPATIENT
Start: 2021-10-17 | End: 2022-02-23

## 2021-10-18 DIAGNOSIS — R13.10 DYSPHAGIA, UNSPECIFIED TYPE: ICD-10-CM

## 2021-10-20 DIAGNOSIS — I89.0 LYMPHEDEMA: Primary | ICD-10-CM

## 2021-10-20 RX ORDER — ONDANSETRON 4 MG/1
TABLET, FILM COATED ORAL
Qty: 30 TABLET | Refills: 1 | Status: SHIPPED | OUTPATIENT
Start: 2021-10-20 | End: 2021-12-29

## 2021-10-21 DIAGNOSIS — I10 ESSENTIAL HYPERTENSION: ICD-10-CM

## 2021-10-22 DIAGNOSIS — I11.9 HYPERTENSIVE HEART DISEASE WITHOUT HEART FAILURE: Primary | ICD-10-CM

## 2021-10-22 RX ORDER — METOPROLOL SUCCINATE 50 MG/1
50 TABLET, EXTENDED RELEASE ORAL DAILY
Qty: 90 TABLET | Refills: 0 | Status: SHIPPED | OUTPATIENT
Start: 2021-10-22 | End: 2021-10-22

## 2021-10-22 RX ORDER — METOPROLOL SUCCINATE 50 MG/1
50 TABLET, EXTENDED RELEASE ORAL DAILY
Qty: 90 TABLET | Refills: 1 | Status: SHIPPED | OUTPATIENT
Start: 2021-10-22 | End: 2022-07-22 | Stop reason: SDUPTHER

## 2021-10-22 NOTE — TELEPHONE ENCOUNTER
Please see patient refill request    Patient was last seen on 9-    Last prescribed on 3-  #90 X 1    Thank you

## 2021-10-22 NOTE — TELEPHONE ENCOUNTER
Refilled metoprolol XL 50 mg once a day x 90 days to [facility] pharmacy on file.

## 2021-10-25 ENCOUNTER — VIRTUAL VISIT (OUTPATIENT)
Dept: FAMILY MEDICINE CLINIC | Age: 61
End: 2021-10-25
Payer: MEDICAID

## 2021-10-25 DIAGNOSIS — J01.01 ACUTE RECURRENT MAXILLARY SINUSITIS: ICD-10-CM

## 2021-10-25 DIAGNOSIS — B37.31 YEAST VAGINITIS: ICD-10-CM

## 2021-10-25 PROCEDURE — 99213 OFFICE O/P EST LOW 20 MIN: CPT | Performed by: FAMILY MEDICINE

## 2021-10-25 RX ORDER — SODIUM CHLORIDE 0.65 %
1 AEROSOL, SPRAY (ML) NASAL AS NEEDED
Qty: 104 ML | Refills: 1 | Status: SHIPPED | OUTPATIENT
Start: 2021-10-25 | End: 2021-12-07

## 2021-10-25 RX ORDER — LORATADINE 10 MG/1
10 TABLET ORAL
Qty: 30 TABLET | Refills: 1 | Status: SHIPPED | OUTPATIENT
Start: 2021-10-25 | End: 2021-11-24

## 2021-10-25 NOTE — PROGRESS NOTES
1. Have you been to the ER, urgent care clinic since your last visit? Hospitalized since your last visit? No    2. Have you seen or consulted any other health care providers outside of the [address] [place] [address] since your last visit? Include any pap smears or colon screening.  No

## 2021-10-25 NOTE — PROGRESS NOTES
Breonna Esquivel (: 1960) is a 62 y.o. female, established patient, here for evaluation of the following chief complaint(s):   Sinus Pain (x 1-2 days)       ASSESSMENT/PLAN:  Below is the assessment and plan developed based on review of pertinent history, labs, studies, and medications. 1. Acute recurrent maxillary sinusitis  -     loratadine (Claritin) 10 mg tablet; Take 1 Tablet by mouth daily as needed for Allergies or Rhinitis for up to 30 days. , Normal, Disp-30 Tablet, R-1  -     sodium chloride (Ayr Saline) 0.65 % nasal squeeze bottle; 0.05 mL by Both Nostrils route as needed for Congestion for up to 43 days. , Normal, Disp-104 mL, R-1        -Advised patient to call back should she have worsening of symptoms or no improvement in symptoms in the next 7-10 days. At that time we discussed possibility of bacterial sinus infection and requiring antibiotics. -Advised patient to use above medication and to beware of too much dryness that could lead to mucous plugs. Patient was offered flonase however she states that she is unable to sniff and does not thing the flonase will help her. Patient also was opposed to nasal saline rinses and was instead offered nasal saline spray to help with congestion as well.          -patient agreed to the plan and understands risks and benefits of current plan. -Follow up in 3 months with Dr. Nay Elizabeth for DM management. Return in about 3 months (around 2022), or if symptoms worsen or fail to improve, for Diabetes follow up with Dr. Nay Elizabeth .     SUBJECTIVE/OBJECTIVE:  HPI this is a 4year-old  female with past medical history significant for diabetes with neuropathy, hypertension, hypertensive heart disease, congestive heart failure, moderate persistent asthma, chronic respiratory failure status post vocal cord paralysis with trach, morbid obesity, history of recurrent sinusitis status post sinus procedure who is being seen today for sinus pain x2 days    Sinusitis  Patient states that Saturday night she had pain in the right sinus and felt a little feverish. She measured her temperature to be 100 °F.  Currently does not have a fever, chills. Also complains of increased mucus production that is clear in color. She has more congestion, runny nose. Denies any headache, change in vision, sore throat, worsening cough, worsening shortness of breath, chest pain, abdominal pain, nausea, vomiting, diarrhea, rash, sick contacts, exposure to Covid. Of note patient has a history of recurrent sinusitis. She states that she has needed a sinus procedure in order to clear out all her sinuses. She does not currently use any allergy medications. She states that her CPAP and oxygen compressor helped her last night. Of note patient has a trach. Review of Systems   Constitutional: Negative for activity change, appetite change and fever. HENT: Positive for congestion, rhinorrhea and sinus pain. Negative for ear pain, facial swelling and sore throat. Eyes: Negative for pain and visual disturbance. Respiratory: Negative for cough, chest tightness and shortness of breath. Cardiovascular: Negative for chest pain. Gastrointestinal: Negative for abdominal pain, diarrhea and nausea. Skin: Negative for rash. Allergic/Immunologic: Positive for environmental allergies. Neurological: Negative for dizziness and headaches. Psychiatric/Behavioral: Negative for agitation and confusion.         Patient-Reported Weight: 291       Physical Exam    [INSTRUCTIONS:  \"[x]\" Indicates a positive item  \"[]\" Indicates a negative item  -- DELETE ALL ITEMS NOT EXAMINED]    Constitutional: [x] Appears well-developed and well-nourished [x] No apparent distress      [] Abnormal -     Mental status: [x] Alert and awake  [x] Oriented to person/place/time [x] Able to follow commands    [] Abnormal -     Eyes:   EOM    [x]  Normal    [] Abnormal -   Sclera  [x]  Normal    [] Abnormal -          Discharge [x]  None visible   [] Abnormal -     HENT: [x] Normocephalic, atraumatic  [x] Abnormal - mild sinus tenderness in the maxillary sinus right >left    [x] Mouth/Throat: Mucous membranes are moist    External Ears [x] Normal  [] Abnormal -    Neck: [x] No visualized mass [] Abnormal -     Pulmonary/Chest: [x] Respiratory effort normal   [x] No visualized signs of difficulty breathing or respiratory distress        [] Abnormal -      Musculoskeletal:   [] Normal gait with no signs of ataxia         [x] Normal range of motion of neck        [] Abnormal -     Neurological:        [x] No Facial Asymmetry (Cranial nerve 7 motor function) (limited exam due to video visit)          [x] No gaze palsy        [] Abnormal -          Skin:        [x] No significant exanthematous lesions or discoloration noted on facial skin         [] Abnormal -            Psychiatric:       [x] Normal Affect [] Abnormal -        [x] No Hallucinations    Other pertinent observable physical exam findings:-        On this date 10/25/2021 I have spent 20 minutes reviewing previous notes, test results and face to face (virtual) with the patient discussing the diagnosis and importance of compliance with the treatment plan as well as documenting on the day of the visit. Jennifer Amaro, was evaluated through a synchronous (real-time) audio-video encounter. The patient (or guardian if applicable) is aware that this is a billable service. Verbal consent to proceed has been obtained within the past 12 months. The visit was conducted pursuant to the emergency declaration under the [address] [place] [address], [address] [place] [address] authority and the [employer] and [employer] General Act. Patient identification was verified, and a caregiver was present when appropriate. The patient was located in a state where the provider was credentialed to provide care.        An electronic signature was used to authenticate this note.   -- Carlyle Bernard MD

## 2021-10-26 RX ORDER — FLUCONAZOLE 200 MG/1
TABLET ORAL
Qty: 3 TABLET | Refills: 1 | Status: SHIPPED | OUTPATIENT
Start: 2021-10-26 | End: 2021-12-20

## 2021-11-03 ENCOUNTER — VIRTUAL VISIT (OUTPATIENT)
Dept: FAMILY MEDICINE CLINIC | Age: 61
End: 2021-11-03
Payer: MEDICAID

## 2021-11-03 DIAGNOSIS — J32.4 PANSINUSITIS, UNSPECIFIED CHRONICITY: Primary | ICD-10-CM

## 2021-11-03 DIAGNOSIS — E11.40 TYPE 2 DIABETES MELLITUS WITH DIABETIC NEUROPATHY, WITH LONG-TERM CURRENT USE OF INSULIN (HCC): ICD-10-CM

## 2021-11-03 DIAGNOSIS — Z79.4 TYPE 2 DIABETES MELLITUS WITH DIABETIC NEUROPATHY, WITH LONG-TERM CURRENT USE OF INSULIN (HCC): ICD-10-CM

## 2021-11-03 PROCEDURE — 99213 OFFICE O/P EST LOW 20 MIN: CPT | Performed by: FAMILY MEDICINE

## 2021-11-03 RX ORDER — AMOXICILLIN AND CLAVULANATE POTASSIUM 875; 125 MG/1; MG/1
1 TABLET, FILM COATED ORAL EVERY 12 HOURS
Qty: 20 TABLET | Refills: 0 | Status: SHIPPED | OUTPATIENT
Start: 2021-11-03 | End: 2021-11-13

## 2021-11-03 RX ORDER — MINERAL OIL
180 ENEMA (ML) RECTAL DAILY
Qty: 30 TABLET | Refills: 1 | Status: SHIPPED | OUTPATIENT
Start: 2021-11-03 | End: 2022-01-10

## 2021-11-03 NOTE — PROGRESS NOTES
Milana Kelly is a 62 y.o. female who was seen by synchronous (real-time) audio-video technology on 11/3/2021 for Allergies        Assessment & Plan:       ICD-10-CM ICD-9-CM    1. Pansinusitis, unspecified chronicity  J32.4 473.8 amoxicillin-clavulanate (AUGMENTIN) 875-125 mg per tablet      fexofenadine (ALLEGRA) 180 mg tablet   2. Type 2 diabetes mellitus with diabetic neuropathy, with long-term current use of insulin ([facility])  E11.40 250.60     Z79.4 357.2      V58.67      Subjective:   Milana Kelly is seen for follow-up care. Sinusitis: Patient has sinus congestion and pressure. Was seen previously due to allergies. She was started on Claritin. Has used this. Would like to try different medication. Continues to have frontal maxillary sinus pressure with postnasal drainage. Has low-grade fever. She would like to take an antibiotic. I will send in Augmentin. She also has sore throat. Encourage supportive care measures including adequate fluid intake. I will send in Allegra to take instead of the Claritin. Diabetes mellitus type 2: Patient has type 2 diabetes mellitus. She is on insulin. Has been off insulin for some days. Encourage her to take medication as prescribed. She will intensify lifestyle and dietary modification. Prior to Admission medications    Medication Sig Start Date End Date Taking? Authorizing Provider   amoxicillin-clavulanate (AUGMENTIN) 875-125 mg per tablet Take 1 Tablet by mouth every twelve (12) hours for 10 days. 11/3/21 11/13/21 Yes Jene Cooks, MD   fexofenadine (ALLEGRA) 180 mg tablet Take 1 Tablet by mouth daily. 11/3/21  Yes Jene Cooks, MD   fluconazole (DIFLUCAN) 200 mg tablet take 1 tablet by mouth once daily for 3 days 10/26/21  Yes Jene Cooks, MD   loratadine (Claritin) 10 mg tablet Take 1 Tablet by mouth daily as needed for Allergies or Rhinitis for up to 30 days.  10/25/21 11/24/21 Yes Jiles Sacks, MD   sodium chloride (Ayr Saline) 0.65 % nasal squeeze bottle 0.05 mL by Both Nostrils route as needed for Congestion for up to 43 days. 10/25/21 12/7/21 Yes Shahzad Rosario MD   metoprolol succinate (TOPROL-XL) 50 mg XL tablet Take 1 Tablet by mouth daily. 10/22/21  Yes Shahzad Rosario MD   ondansetron hcl (ZOFRAN) 4 mg tablet TAKE 1 TABLET BY MOUTH EVERY 8 HOURS AS NEEDED FOR NAUSEA AND VOMITTING 10/20/21  Yes Angelito Engle MD   bumetanide (BUMEX) 1 mg tablet take 1 tablet by mouth twice a day 10/17/21  Yes Henok Kumar MD   insulin glargine (Lantus U-100 Insulin) 100 unit/mL injection inject 80 units subcutaneously once daily 10/17/21  Yes Henok Kumar MD   LORazepam (ATIVAN) 2 mg tablet take 1 tablet by mouth nightly 9/7/21  Yes Henok Kumar MD   potassium chloride (KLOR-CON) 10 mEq tablet take 1 tablet by mouth once daily 7/8/21  Yes Henok Kumar MD   melatonin 1 mg tablet Take 1 Tablet by mouth nightly. 7/1/21  Yes Henok Kumar MD   omega-3 acid ethyl esters (LOVAZA) 1 gram capsule take 1 capsule by mouth twice a day 6/29/21  Yes Henok Kumar MD   levothyroxine (SYNTHROID) 50 mcg tablet Take 1 Tablet by mouth Daily (before breakfast). Taking the 200 mcg medication 50 mcg. 6/29/21  Yes Angelito Perkins MD   mupirocin (BACTROBAN) 2 % ointment Apply  to affected area daily. 6/17/21  Yes Henok Kumar MD   ergocalciferol (ERGOCALCIFEROL) 1,250 mcg (50,000 unit) capsule Take 1 Cap by mouth every seven (7) days.  5/12/21  Yes Henok Kumar MD   levothyroxine (SYNTHROID) 200 mcg tablet take 1 tablet by mouth once daily BEFORE BREAKFAST 2/21/21  Yes Angelito Engle MD   ibuprofen (MOTRIN) 600 mg tablet take 1 tablet by mouth every 8 hours if needed for pain 6/29/20  Yes Henok Kumar MD   glucose blood VI test strips (ASCENSIA AUTODISC VI, ONE TOUCH ULTRA TEST VI) strip Check blood glucose 3 times daily 4/16/20  Yes Henok Kumar MD   lancets misc Check blood glucose 3 times daily 7/17/19  Yes Veronica Bhakta MD [facility]   BD INSULIN SYRINGE ULTRA-FINE 1 mL 31 gauge x 5/16 syrg use as directed twice a day 10/18/18  Yes Angelito Ballesteros MD   FREESTYLE LITE STRIPS strip TEST twice a day as directed 11/20/17  Yes Angelito Perkins MD   Aspirin, Buffered 81 mg tab Take 81 mg by mouth daily. Yes Provider, Historical   phentermine (ADIPEX-P) 37.5 mg tablet Take 1 Tablet by mouth every morning. Max Daily Amount: 37.5 mg. Patient not taking: Reported on 10/25/2021 9/23/21   Angelito Perkins MD   rosuvastatin (CRESTOR) 40 mg tablet take 1 tablet by mouth nightly  Patient not taking: Reported on 10/25/2021 6/21/21   Angelito Perkins MD   albuterol (PROVENTIL VENTOLIN) 2.5 mg /3 mL (0.083 %) nebu 3 mL by Nebulization route every four (4) hours as needed (wheeze). Indications: Asthma Attack  Patient not taking: Reported on 10/25/2021 11/12/19   Angelito Perkins MD   naloxone [facility]) 4 mg/actuation nasal spray Use 1 spray intranasally, then discard. Repeat with new spray every 2 min as needed for opioid overdose symptoms, alternating nostrils. Patient not taking: Reported on 10/25/2021 7/18/19   Angelito Perkins MD     ROS Review of all systems is negative except as noted above in the HPI.     Objective:     Patient-Reported Vitals 11/3/2021   Patient-Reported Weight 291 lbs   Patient-Reported Pulse -   Patient-Reported Temperature 98.1   Patient-Reported Systolic  -   Patient-Reported Diastolic -   Constitutional: [x] No apparent distress     Mental status: [x] Alert and awake  [x] Oriented to person/place/time [x] Able to follow commands     HENT: [x] Normocephalic, atraumatic    Pulmonary/Chest: [x] Respiratory effort normal   [x] No visualized signs of difficulty breathing or respiratory distress           Neurological:        [x] No Facial Asymmetry (Cranial nerve 7 motor function) (limited exam due to video visit)                     Psychiatric:       [x] Normal Affect    We discussed the expected course, resolution and complications of the diagnosis(es) in detail. Medication risks, benefits, costs, interactions, and alternatives were discussed as indicated. I advised her to contact the office if her condition worsens, changes or fails to improve as anticipated. She expressed understanding with the diagnosis(es) and plan. Breonna Esquivel, was evaluated through a synchronous (real-time) audio-video encounter. The patient (or guardian if applicable) is aware that this is a billable service. Verbal consent to proceed has been obtained within the past 12 months. The visit was conducted pursuant to the emergency declaration under the [address] [place] [address], [address] [place] [address] authority and the [employer] General Act. Patient identification was verified, and a caregiver was present when appropriate. The patient was located in a state where the provider was credentialed to provide care.       Justine Pantoja MD

## 2021-11-03 NOTE — PROGRESS NOTES
Chief Complaint   Patient presents with    Allergies     1. \"Have you been to the ER, urgent care clinic since your last visit? Hospitalized since your last visit? \" No    2. \"Have you seen or consulted any other health care providers outside of the [address] [place] [address] since your last visit? \" No     3. For patients aged 39-70: Has the patient had a colonoscopy? Yes, HM satisfied with blue hyperlink     If the patient is female:    4. For patients aged 41-77: Has the patient had a mammogram within the past 2 years? Yes, HM satisfied with blue hyperlink    5. For patients aged 21-65: Has the patient had a pap smear?  Yes, HM satisfied with blue hyperlink

## 2021-11-09 ENCOUNTER — TELEPHONE (OUTPATIENT)
Dept: FAMILY MEDICINE CLINIC | Age: 61
End: 2021-11-09

## 2021-11-09 NOTE — TELEPHONE ENCOUNTER
[facility] Alphonse [facility] is calling in regards to this pt,concerning medical supplies needed.  Please follow up at [phone]

## 2021-11-10 RX ORDER — LEVOTHYROXINE SODIUM 200 UG/1
TABLET ORAL
Qty: 90 TABLET | Refills: 2 | Status: SHIPPED | OUTPATIENT
Start: 2021-11-10 | End: 2022-08-01 | Stop reason: DRUGHIGH

## 2021-11-11 LAB — HBA1C MFR BLD HPLC: 9.9 %

## 2021-11-16 ENCOUNTER — TELEPHONE (OUTPATIENT)
Dept: FAMILY MEDICINE CLINIC | Age: 61
End: 2021-11-16

## 2021-11-16 DIAGNOSIS — J45.21 ASTHMATIC BRONCHITIS, MILD INTERMITTENT, WITH ACUTE EXACERBATION: ICD-10-CM

## 2021-11-16 RX ORDER — ALBUTEROL SULFATE 0.83 MG/ML
2.5 SOLUTION RESPIRATORY (INHALATION)
Qty: 24 EACH | Refills: 5 | Status: CANCELLED | OUTPATIENT
Start: 2021-11-16

## 2021-11-16 NOTE — TELEPHONE ENCOUNTER
Peter from [address] [place] [address] called regarding the pts wound care and medical supplies. She can be reached at [phone]. Please advise.  Thank you!!!

## 2021-11-17 DIAGNOSIS — J45.21 ASTHMATIC BRONCHITIS, MILD INTERMITTENT, WITH ACUTE EXACERBATION: ICD-10-CM

## 2021-11-17 RX ORDER — ALBUTEROL SULFATE 0.83 MG/ML
2.5 SOLUTION RESPIRATORY (INHALATION)
Qty: 24 EACH | Refills: 5 | Status: SHIPPED | OUTPATIENT
Start: 2021-11-17

## 2021-11-17 RX ORDER — NEBULIZER AND COMPRESSOR
EACH MISCELLANEOUS
Qty: 1 EACH | Refills: 0 | Status: SHIPPED | OUTPATIENT
Start: 2021-11-17

## 2021-11-17 RX ORDER — ALBUTEROL SULFATE 90 UG/1
2 AEROSOL, METERED RESPIRATORY (INHALATION)
Qty: 18 G | Refills: 2 | Status: SHIPPED | OUTPATIENT
Start: 2021-11-17 | End: 2022-01-18

## 2021-11-18 ENCOUNTER — TELEPHONE (OUTPATIENT)
Dept: FAMILY MEDICINE CLINIC | Age: 61
End: 2021-11-18

## 2021-11-18 NOTE — TELEPHONE ENCOUNTER
Pt stated she was told by the pharmacy they don't carry the nebulizer machine, Ms Dayanara Wells called around to [facility] and spoke with Lizbeth Ge, who instructed her to contact her PCP and have him to fax an order for the nebulizer to [phone] . Please contact this pt for clarification.  Ms Dayanara Wells stated she need this machine today

## 2021-11-19 NOTE — TELEPHONE ENCOUNTER
Pt called back and stated she really needs this nebulizer because she is not able to breathe well. Ms Olga Murphy has a trach and it is difficult without the nebulizer machine.  Please follow up when available to clarify her medical needs

## 2021-11-22 NOTE — TELEPHONE ENCOUNTER
Spoke with ana paula and she wanted to know was it ok to use the three layer compression instead of four layer because insurance wont cover it. Or she can use coban and curlex.  Please be advised

## 2021-11-29 NOTE — TELEPHONE ENCOUNTER
Spoke with ana paula and she was informed. Lali Elam stated she didn't have any other questions or concerns.

## 2021-11-30 ENCOUNTER — TELEPHONE (OUTPATIENT)
Dept: FAMILY MEDICINE CLINIC | Age: 61
End: 2021-11-30

## 2021-11-30 NOTE — TELEPHONE ENCOUNTER
Spoke with patient. Patient was informed that paperwork is in the process of being filled out and faxed back.

## 2021-11-30 NOTE — TELEPHONE ENCOUNTER
Pt is requesting to speak with the nurse concerning paper work.  Please contact when available to discuss

## 2021-12-02 ENCOUNTER — TELEPHONE (OUTPATIENT)
Dept: FAMILY MEDICINE CLINIC | Age: 61
End: 2021-12-02

## 2021-12-02 NOTE — TELEPHONE ENCOUNTER
Chelsey from [facility] is calling to check on the status for the new order for wound care. Please advise.  Thank you!!!

## 2021-12-03 NOTE — TELEPHONE ENCOUNTER
Chelsey from [facility] is asking if the NP can ok the supplies that the patient needs for [facility], Ms Johanna Watkins stated they have been requesting this for over a month now and she has to go to the patient's home without these items and the patient is going to be upset. Ms Johanna Watkins is asking to be contacted by a nurse today that can give her some info and this matter.  Please contact at [phone] when available

## 2021-12-06 NOTE — TELEPHONE ENCOUNTER
Please call and find out what orders these are that they need to be okayed. If need be let them fax the forms and I will sign them.     Aaron Pemberton MD

## 2021-12-07 ENCOUNTER — TELEPHONE (OUTPATIENT)
Dept: FAMILY MEDICINE CLINIC | Age: 61
End: 2021-12-07

## 2021-12-07 NOTE — TELEPHONE ENCOUNTER
Ms. Amie Linda from [facility] called and stated Ms. Jaye Swift has not received her  supplies. Ms. Amie Linda can be reached at [phone]. Please advise.  Thank you!!!

## 2021-12-08 ENCOUNTER — TELEPHONE (OUTPATIENT)
Dept: FAMILY MEDICINE CLINIC | Age: 61
End: 2021-12-08

## 2021-12-08 NOTE — TELEPHONE ENCOUNTER
Pt is requesting to be contacted by Dr Kay Erazo, Ms Alejandro Garcia declined to speak with the nurse, stated she wanted to speak only to Dr Kay Erazo and that it was very important.  Please follow up when available

## 2021-12-08 NOTE — TELEPHONE ENCOUNTER
Spoke with Mrs Oliveira Jm. Mrs Oliveira Jm was informed that the paperwork will be filled out and faxed back.

## 2021-12-10 ENCOUNTER — TELEPHONE (OUTPATIENT)
Dept: FAMILY MEDICINE CLINIC | Age: 61
End: 2021-12-10

## 2021-12-13 ENCOUNTER — TELEPHONE (OUTPATIENT)
Dept: FAMILY MEDICINE CLINIC | Age: 61
End: 2021-12-13

## 2021-12-13 RX ORDER — OMEGA-3-ACID ETHYL ESTERS 1 G/1
CAPSULE, LIQUID FILLED ORAL
Qty: 60 CAPSULE | Refills: 2 | Status: SHIPPED | OUTPATIENT
Start: 2021-12-13 | End: 2022-07-05 | Stop reason: SDUPTHER

## 2021-12-13 NOTE — TELEPHONE ENCOUNTER
I called and spoke to patient and discussed her concerns. Paperwork has been signed for wound care.   Savannah Au MD

## 2021-12-13 NOTE — TELEPHONE ENCOUNTER
Sweta from [address] [place] [address] called to check the status of documents sent on behalf of this pt. Sweta stated a document was faxed back but missing a date and a question that was not answered.  Please be advised

## 2021-12-15 ENCOUNTER — VIRTUAL VISIT (OUTPATIENT)
Dept: FAMILY MEDICINE CLINIC | Age: 61
End: 2021-12-15

## 2021-12-15 DIAGNOSIS — E11.40 TYPE 2 DIABETES MELLITUS WITH DIABETIC NEUROPATHY, WITH LONG-TERM CURRENT USE OF INSULIN (HCC): ICD-10-CM

## 2021-12-15 DIAGNOSIS — E78.5 DYSLIPIDEMIA: ICD-10-CM

## 2021-12-15 DIAGNOSIS — E03.4 HYPOTHYROIDISM DUE TO ACQUIRED ATROPHY OF THYROID: ICD-10-CM

## 2021-12-15 DIAGNOSIS — I89.0 LYMPHEDEMA: ICD-10-CM

## 2021-12-15 DIAGNOSIS — I87.2 VENOUS STASIS DERMATITIS OF BOTH LOWER EXTREMITIES: Primary | ICD-10-CM

## 2021-12-15 DIAGNOSIS — R60.0 PEDAL EDEMA: ICD-10-CM

## 2021-12-15 DIAGNOSIS — Z79.4 TYPE 2 DIABETES MELLITUS WITH DIABETIC NEUROPATHY, WITH LONG-TERM CURRENT USE OF INSULIN (HCC): ICD-10-CM

## 2021-12-15 DIAGNOSIS — I10 ESSENTIAL HYPERTENSION: ICD-10-CM

## 2021-12-15 DIAGNOSIS — J45.21 ASTHMATIC BRONCHITIS, MILD INTERMITTENT, WITH ACUTE EXACERBATION: ICD-10-CM

## 2021-12-15 DIAGNOSIS — Z93.0 TRACHEOSTOMY DEPENDENCE (HCC): ICD-10-CM

## 2021-12-15 PROBLEM — J38.00 PARALYZED VOCAL CORDS: Status: ACTIVE | Noted: 2020-05-12

## 2021-12-15 PROBLEM — J45.20 MILD INTERMITTENT ASTHMA WITHOUT COMPLICATION: Status: ACTIVE | Noted: 2020-05-12

## 2021-12-15 PROCEDURE — 99214 OFFICE O/P EST MOD 30 MIN: CPT | Performed by: FAMILY MEDICINE

## 2021-12-15 RX ORDER — ROSUVASTATIN CALCIUM 40 MG/1
TABLET, COATED ORAL
Qty: 90 TABLET | Refills: 1 | Status: SHIPPED | OUTPATIENT
Start: 2021-12-15 | End: 2022-06-14

## 2021-12-15 NOTE — PROGRESS NOTES
Trung De La Torre is a 62 y.o. female who was seen by synchronous (real-time) audio-video technology on 12/15/2021 for Follow Up Chronic Condition        Assessment & Plan:       ICD-10-CM ICD-9-CM    1. Venous stasis dermatitis of both lower extremities  I87.2 454.1    2. Asthmatic bronchitis, mild intermittent, with acute exacerbation  J45.21 493.92    3. Dyslipidemia  E78.5 272.4    4. Type 2 diabetes mellitus with diabetic neuropathy, with long-term current use of insulin (HCC)  E11.40 250.60     Z79.4 357.2      V58.67    5. Essential hypertension  I10 401.9    6. Pedal edema  R60.0 782.3    7. Hypothyroidism due to acquired atrophy of thyroid  E03.4 244.8      246.8    8. Tracheostomy dependence ([facility]  Z93.0 V44.0    9. Lymphedema  I89.0 457.1      Subjective:   Trung De La Torre is seen for follow-up care. Lymphedema/stasis dermatitis: Patient has lymphedema and venous stasis dermatitis with open wounds both lower extremities. She is being followed up in the wound care team.  I recently signed on paperwork from the wound care team for follow-up management. States that she still has oozing/weeping from the leg wounds. She should continue with care by the specialist and follow-up with their recommendations. She is on Bumex for the edema. Pulmonary: Patient has asthmatic bronchitis. She is on inhaler medication. Has been having trouble with using a nebulizer given she is trach dependent. We have sent in prescription for the nebulizer to [facility] with the required devices to help achieve nebulization. She does get occasional wheeze. Denies chest pain or shortness of breath. Diabetes mellitus type 2: Patient has type 2 diabetes mellitus. Her last HbA1c was elevated at 10.1. We need to recheck this. She will come in for recheck labs. She has not seen her endocrinologist.  Currently she is on insulin. She does not have a blood glucose numbers at the moment.   We discussed lifestyle and dietary modification. She would like to be seen by different endocrinologist.  Hypothyroidism: Patient has hypothyroidism. She is on thyroid replacement therapy. We will place a referral to endocrinology for follow-up care. Mood disorder: Patient has anxiety. She is on lorazepam.  Has been stable on the medication. Continue current treatment plan. Hypertension: Patient has hypertension. She is on metoprolol. Denies headache, changes in vision or focal weakness. Continue current treatment plan. Dyslipidemia: Patient has dyslipidemia. She is on Crestor and Lovaza. Continue current treatment plan. We will recheck lipid panel at next visit. Prior to Admission medications    Medication Sig Start Date End Date Taking? Authorizing Provider   omega-3 acid ethyl esters (LOVAZA) 1 gram capsule take 1 capsule by mouth twice a day 12/13/21  Yes Angelito Ibarra MD   albuterol (PROVENTIL VENTOLIN) 2.5 mg /3 mL (0.083 %) nebu 3 mL by Nebulization route every four (4) hours as needed (wheeze). Indications: asthma attack 11/17/21  Yes Red Maher MD   albuterol (PROVENTIL HFA, VENTOLIN HFA, PROAIR HFA) 90 mcg/actuation inhaler Take 2 Puffs by inhalation every four (4) hours as needed for Wheezing. 11/17/21  Yes Red Maher MD   Nebulizer & Compressor machine Use for nebulization as needed. 11/17/21  Yes Red Maher MD   Nebulizer Accessories kit Use for nebulization as needed. 11/17/21  Yes Red Maher MD   levothyroxine (SYNTHROID) 200 mcg tablet take 1 tablet by mouth once daily before breakfast 11/10/21  Yes Red Maher MD   fexofenadine (ALLEGRA) 180 mg tablet Take 1 Tablet by mouth daily. 11/3/21  Yes Red Maher MD   fluconazole (DIFLUCAN) 200 mg tablet take 1 tablet by mouth once daily for 3 days 10/26/21  Yes Angelito Perkins MD   metoprolol succinate (TOPROL-XL) 50 mg XL tablet Take 1 Tablet by mouth daily.  10/22/21  Yes Jayshree Walls MD   ondansetron hcl (ZOFRAN) 4 mg tablet TAKE 1 TABLET BY MOUTH EVERY 8 HOURS AS NEEDED FOR NAUSEA AND VOMITTING 10/20/21  Yes Angelito Moncada MD   bumetanide (BUMEX) 1 mg tablet take 1 tablet by mouth twice a day 10/17/21  Yes Avila Johnson MD   insulin glargine (Lantus U-100 Insulin) 100 unit/mL injection inject 80 units subcutaneously once daily 10/17/21  Yes Avila Johnson MD   LORazepam (ATIVAN) 2 mg tablet take 1 tablet by mouth nightly 9/7/21  Yes Avila Johnson MD   potassium chloride (KLOR-CON) 10 mEq tablet take 1 tablet by mouth once daily 7/8/21  Yes Avila Johnson MD   melatonin 1 mg tablet Take 1 Tablet by mouth nightly. 7/1/21  Yes Angelito Perkins MD   levothyroxine (SYNTHROID) 50 mcg tablet Take 1 Tablet by mouth Daily (before breakfast). Taking the 200 mcg medication 50 mcg. 6/29/21  Yes Angelito Perkins MD   mupirocin (BACTROBAN) 2 % ointment Apply  to affected area daily. 6/17/21  Yes Avila Johnson MD   ergocalciferol (ERGOCALCIFEROL) 1,250 mcg (50,000 unit) capsule Take 1 Cap by mouth every seven (7) days. 5/12/21  Yes Avila Johnson MD   ibuprofen (MOTRIN) 600 mg tablet take 1 tablet by mouth every 8 hours if needed for pain 6/29/20  Yes Avila Johnson MD   glucose blood VI test strips (ASCENSIA AUTODISC VI, ONE TOUCH ULTRA TEST VI) strip Check blood glucose 3 times daily 4/16/20  Yes Avila Johnson MD   lancets misc Check blood glucose 3 times daily 7/17/19  Yes Angelito Moncada MD   BD INSULIN SYRINGE ULTRA-FINE 1 mL 31 gauge x 5/16 syrg use as directed twice a day 10/18/18  Yes Angelito Moncada MD   FREESTYLE LITE STRIPS strip TEST twice a day as directed 11/20/17  Yes Angelito Perkins MD   Aspirin, Buffered 81 mg tab Take 81 mg by mouth daily. Yes Provider, Historical   phentermine (ADIPEX-P) 37.5 mg tablet Take 1 Tablet by mouth every morning. Max Daily Amount: 37.5 mg.   Patient not taking: Reported on 10/25/2021 9/23/21   Angelito Perkins MD   rosuvastatin (CRESTOR) 40 mg tablet take 1 tablet by mouth nightly  Patient not taking: Reported on 10/25/2021 6/21/21   Angelito Perkins MD   naloxone [facility]) 4 mg/actuation nasal spray Use 1 spray intranasally, then discard. Repeat with new spray every 2 min as needed for opioid overdose symptoms, alternating nostrils. Patient not taking: Reported on 10/25/2021 7/18/19   Angelito Perkins MD   ROS Review of all systems is negative except as noted above in the HPI. Objective:     Patient-Reported Vitals 12/15/2021   Patient-Reported Weight 291 lbs   Patient-Reported Pulse -   Patient-Reported Temperature -   Patient-Reported Systolic  -   Patient-Reported Diastolic -   Constitutional: [x]  No apparent distress     Mental status: [x] Alert and awake  [x] Oriented to person/place/time     HENT: [x] Patient has tracheostomy in place. Neck: [x]  Abnormal -tracheostomy tube in place. Pulmonary/Chest: [x] Respiratory effort normal     Neurological:        [x] No Facial Asymmetry (Cranial nerve 7 motor function) (limited exam due to video visit)                    Psychiatric:       [x] Normal Affect    We discussed the expected course, resolution and complications of the diagnosis(es) in detail. Medication risks, benefits, costs, interactions, and alternatives were discussed as indicated. I advised her to contact the office if her condition worsens, changes or fails to improve as anticipated. She expressed understanding with the diagnosis(es) and plan. Cristal Minemilan, was evaluated through a synchronous (real-time) audio-video encounter. The patient (or guardian if applicable) is aware that this is a billable service. Verbal consent to proceed has been obtained within the past 12 months. The visit was conducted pursuant to the emergency declaration under the [facility], [address] [place] [address] authority and the [employer] Act.   Patient identification was verified, and a caregiver was present when appropriate. The patient was located in a state where the provider was credentialed to provide care.       Breonna Hamm MD

## 2021-12-15 NOTE — PROGRESS NOTES
Chief Complaint   Patient presents with    Follow Up Chronic Condition     1. \"Have you been to the ER, urgent care clinic since your last visit? Hospitalized since your last visit? \" No    2. \"Have you seen or consulted any other health care providers outside of the [address] [place] [address] since your last visit? \" No     3. For patients aged 39-70: Has the patient had a colonoscopy / FIT/ Cologuard? Yes, HM satisfied with blue hyperlink     If the patient is female:    4. For patients aged 41-77: Has the patient had a mammogram within the past 2 years? Yes, HM satisfied with blue hyperlink    5. For patients aged 21-65: Has the patient had a pap smear?  Yes, HM satisfied with blue hyperlink

## 2021-12-18 DIAGNOSIS — B37.31 YEAST VAGINITIS: ICD-10-CM

## 2021-12-20 DIAGNOSIS — E03.4 HYPOTHYROIDISM DUE TO ACQUIRED ATROPHY OF THYROID: ICD-10-CM

## 2021-12-20 RX ORDER — FLUCONAZOLE 200 MG/1
TABLET ORAL
Qty: 3 TABLET | Refills: 1 | Status: SHIPPED | OUTPATIENT
Start: 2021-12-20 | End: 2022-01-18

## 2021-12-20 RX ORDER — LEVOTHYROXINE SODIUM 50 UG/1
TABLET ORAL
Qty: 90 TABLET | Refills: 1 | Status: SHIPPED | OUTPATIENT
Start: 2021-12-20 | End: 2022-06-14

## 2021-12-25 DIAGNOSIS — R13.10 DYSPHAGIA, UNSPECIFIED TYPE: ICD-10-CM

## 2021-12-25 DIAGNOSIS — F41.9 ANXIETY: ICD-10-CM

## 2021-12-29 RX ORDER — LORAZEPAM 2 MG/1
TABLET ORAL
Qty: 31 TABLET | Refills: 2 | Status: SHIPPED | OUTPATIENT
Start: 2021-12-29 | End: 2022-04-11

## 2021-12-29 RX ORDER — ONDANSETRON 4 MG/1
TABLET, FILM COATED ORAL
Qty: 30 TABLET | Refills: 1 | Status: SHIPPED | OUTPATIENT
Start: 2021-12-29 | End: 2022-02-02

## 2021-12-29 RX ORDER — UREA 10 %
1 LOTION (ML) TOPICAL
Qty: 90 TABLET | Refills: 1 | Status: SHIPPED | OUTPATIENT
Start: 2021-12-29 | End: 2022-08-07

## 2022-01-09 DIAGNOSIS — R60.0 PEDAL EDEMA: ICD-10-CM

## 2022-01-09 DIAGNOSIS — J32.4 PANSINUSITIS, UNSPECIFIED CHRONICITY: ICD-10-CM

## 2022-01-10 RX ORDER — BUMETANIDE 1 MG/1
TABLET ORAL
Qty: 60 TABLET | Refills: 2 | Status: SHIPPED | OUTPATIENT
Start: 2022-01-10 | End: 2022-08-01 | Stop reason: DRUGHIGH

## 2022-01-10 RX ORDER — POTASSIUM CHLORIDE 750 MG/1
TABLET, EXTENDED RELEASE ORAL
Qty: 90 TABLET | Refills: 1 | Status: SHIPPED | OUTPATIENT
Start: 2022-01-10

## 2022-01-10 RX ORDER — FEXOFENADINE HYDROCHLORIDE 180 MG/1
TABLET, FILM COATED ORAL
Qty: 30 TABLET | Refills: 1 | Status: SHIPPED | OUTPATIENT
Start: 2022-01-10 | End: 2022-03-08 | Stop reason: SDUPTHER

## 2022-01-18 DIAGNOSIS — B37.31 YEAST VAGINITIS: ICD-10-CM

## 2022-01-18 RX ORDER — FLUCONAZOLE 200 MG/1
TABLET ORAL
Qty: 3 TABLET | Refills: 1 | Status: SHIPPED | OUTPATIENT
Start: 2022-01-18 | End: 2022-06-14

## 2022-01-18 RX ORDER — ALBUTEROL SULFATE 90 UG/1
AEROSOL, METERED RESPIRATORY (INHALATION)
Qty: 18 G | Refills: 2 | Status: SHIPPED | OUTPATIENT
Start: 2022-01-18

## 2022-01-25 ENCOUNTER — OFFICE VISIT (OUTPATIENT)
Dept: FAMILY MEDICINE CLINIC | Age: 62
End: 2022-01-25
Payer: MEDICAID

## 2022-01-25 VITALS
BODY MASS INDEX: 45.99 KG/M2 | DIASTOLIC BLOOD PRESSURE: 76 MMHG | HEART RATE: 65 BPM | TEMPERATURE: 97.9 F | WEIGHT: 293 LBS | OXYGEN SATURATION: 98 % | HEIGHT: 67 IN | RESPIRATION RATE: 18 BRPM | SYSTOLIC BLOOD PRESSURE: 156 MMHG

## 2022-01-25 DIAGNOSIS — I10 ESSENTIAL HYPERTENSION: ICD-10-CM

## 2022-01-25 DIAGNOSIS — R60.0 PEDAL EDEMA: ICD-10-CM

## 2022-01-25 DIAGNOSIS — J38.00 VOCAL CORD PARALYSIS: ICD-10-CM

## 2022-01-25 DIAGNOSIS — Z79.4 TYPE 2 DIABETES MELLITUS WITH DIABETIC NEUROPATHY, WITH LONG-TERM CURRENT USE OF INSULIN (HCC): Primary | ICD-10-CM

## 2022-01-25 DIAGNOSIS — E11.40 TYPE 2 DIABETES MELLITUS WITH DIABETIC NEUROPATHY, WITH LONG-TERM CURRENT USE OF INSULIN (HCC): Primary | ICD-10-CM

## 2022-01-25 DIAGNOSIS — E03.4 HYPOTHYROIDISM DUE TO ACQUIRED ATROPHY OF THYROID: ICD-10-CM

## 2022-01-25 DIAGNOSIS — I89.0 LYMPHEDEMA: ICD-10-CM

## 2022-01-25 DIAGNOSIS — Z93.0 TRACHEOSTOMY DEPENDENCE (HCC): ICD-10-CM

## 2022-01-25 DIAGNOSIS — E66.01 SEVERE OBESITY (HCC): ICD-10-CM

## 2022-01-25 DIAGNOSIS — F39 MOOD DISORDER (HCC): ICD-10-CM

## 2022-01-25 DIAGNOSIS — E78.5 DYSLIPIDEMIA: ICD-10-CM

## 2022-01-25 DIAGNOSIS — E66.01 MORBID OBESITY (HCC): ICD-10-CM

## 2022-01-25 DIAGNOSIS — I87.2 VENOUS STASIS DERMATITIS OF BOTH LOWER EXTREMITIES: ICD-10-CM

## 2022-01-25 LAB — HBA1C MFR BLD HPLC: 10.1 %

## 2022-01-25 PROCEDURE — 83036 HEMOGLOBIN GLYCOSYLATED A1C: CPT | Performed by: FAMILY MEDICINE

## 2022-01-25 PROCEDURE — 99215 OFFICE O/P EST HI 40 MIN: CPT | Performed by: FAMILY MEDICINE

## 2022-01-25 NOTE — PROGRESS NOTES
BETHEL  Ren Ralph comes in for f/u care. Stasis dermatitis: Patient has stasis dermatitis bilateral lower extremities. She is followed up in wound care clinic. Currently no open wounds but does have occasional weeping. Feet have been addressed in the past but today they are not addressed. She was due to get wound care today but this has been postponed to the next day. I will also place a referral for to be seen in the lymphedema clinic given the strength of both lower extremities. Patient is a 22 states that she does not have an open wound and thus she may have to do the dressings herself. We will get an opinion from the lymphedema clinic as to whether anything else could be done. She is on diuretic medicine. She takes Bumex. She should take this as prescribed. DM2: Patient has type 2 diabetes mellitus. She is followed up with endocrinologist.  Her last HbA1c was 10.1. We did a recheck in the same point. She needs to discuss with endocrinologist on adjustment of her medications. This number needs to get down to around 7. She is not doing much of lifestyle or dietary modification. I did emphasize this. HTN: Patient has hypertension blood pressure is stable. She is on metoprolol. Denies headache, changes in vision or focal weakness. GERD: Patient has gastroesophageal reflux disease. She gets heartburn on and off. She is on Protonix. We will continue with the current treatment plan. Vocal cord paralysis: Patient has history of vocal cord paralysis following thyroid surgery. She is followed up by the ENT specialist.  Nathaneil Spotted dependence: Patient has a trach in place. She has a plan for trach but she is still waiting from the DME office. A lot of this is prescribed by her ENT specialist.  Hypothyroidism: Patient has hypothyroidism. She is on thyroid replacement therapy. We will recheck labs. Morbid obesity: Patient is morbidly obese with a BMI of 46.05.   She will intensify lifestyle and dietary modification. Anxiety: Patient has anxiety. She is on lorazepam.  This has helped with symptoms of anxiety. We will continue with the current treatment plan. Dyslipidemia: Patient has dyslipidemia. She is on Crestor. She will continue to take a diet low in polysaturated fats. We will recheck lipid panel at next visit. Insomnia: Patient has insomnia. She is on lorazepam and the melatonin. She will continue with these medications.       Past Medical History  Past Medical History:   Diagnosis Date    Acquired hypothyroidism 5/17/2017    Anxiety 6/25/2019    Bilateral vocal cord paralysis 4/4/2019    Bilateral vocal cord paralysis status post thyroidectomy (4/4/2019 - Dr. Yandy Peralta) with residual dysphagia and dysarthria; S/P Tracheostomy (6/3/2019 - Dr. Yanyd Peralta); S/P PEG tube insertion (6/20/2019) - Dr. Yandy Peralta)    Chronic back pain     Lower back pain    Depression     Diabetic neuropathy associated with type 2 diabetes mellitus ([facility]     Dysphagia 6/25/2019    S/P Thyroidectomy (4/4/2019 - Dr. Yandy Peralta)    History of asthma     History of heart failure     chronic diastolic heart failure    History of vitamin D deficiency 8/28/2017    Hypertensive heart disease without heart failure 5/17/2017    Hypoxemia requiring supplemental oxygen 6/25/2019    Insomnia     Left ear hearing loss     pt states nerve damage--- 25% hearing loss, described as \"muffled\"    Memory difficulty     Moderate persistent asthma     Obesity, Class II, BMI 35-39.9 11/11/2016    Obstructive sleep apnea 11/6/2015    Panic attacks     Ringing of ears     Type 2 diabetes mellitus with diabetic neuropathy, with long-term current use of insulin ([facility])     Vertigo        Surgical History  Past Surgical History:   Procedure Laterality Date    HX HEART VALVE SURGERY  2013    aortic valve repair    HX HYSTERECTOMY      Partial Hysterectomy - removed ovary    HX MYOMECTOMY      HX ORTHOPAEDIC  02/2018    had nerves burned on her right side of back    HX TRACHEOSTOMY  06/03/2019    S/P Tracheostomy (6/3/2019 - Dr. Alexander Leonard)   [address] MYOS UNLIST  10/31/2013    open heart    HI EGD PERCUTANEOUS PLACEMENT GASTROSTOMY TUBE N/A 06/20/2019    Dr. Tran Breezewood Bilateral 04/04/2019    Dr. Cecil Spring        Medications  Current Outpatient Medications   Medication Sig Dispense Refill    fluconazole (DIFLUCAN) 200 mg tablet take 1 tablet by mouth once daily for 3 days 3 Tablet 1    albuterol (PROVENTIL HFA, VENTOLIN HFA, PROAIR HFA) 90 mcg/actuation inhaler inhale 2 puffs by mouth and INTO THE LUNGS every 4 hours if needed for wheezing 18 g 2    Allergy Relief, fexofenadine, 180 mg tablet take 1 tablet by mouth once daily 30 Tablet 1    potassium chloride (KLOR-CON M10) 10 mEq tablet take 1 tablet by mouth once daily 90 Tablet 1    bumetanide (BUMEX) 1 mg tablet take 1 tablet by mouth twice a day 60 Tablet 2    ondansetron hcl (ZOFRAN) 4 mg tablet TAKE 1 TABLET BY MOUTH EVERY 8 HOURS AS NEEDED FOR NAUSEA AND VOMITTING 30 Tablet 1    LORazepam (ATIVAN) 2 mg tablet take 1 tablet by mouth nightly 31 Tablet 2    melatonin 1 mg tablet take 1 tablet by mouth nightly 90 Tablet 1    levothyroxine (SYNTHROID) 50 mcg tablet take 1 tablet by mouth once daily before breakfast take with 200 MCG TABLET 90 Tablet 1    rosuvastatin (CRESTOR) 40 mg tablet take 1 tablet by mouth nightly 90 Tablet 1    omega-3 acid ethyl esters (LOVAZA) 1 gram capsule take 1 capsule by mouth twice a day 60 Capsule 2    albuterol (PROVENTIL VENTOLIN) 2.5 mg /3 mL (0.083 %) nebu 3 mL by Nebulization route every four (4) hours as needed (wheeze). Indications: asthma attack 24 Each 5    Nebulizer & Compressor machine Use for nebulization as needed. 1 Each 0    Nebulizer Accessories kit Use for nebulization as needed.  1 Kit 2    levothyroxine (SYNTHROID) 200 mcg tablet take 1 tablet by mouth once daily before breakfast 90 Tablet 2    metoprolol succinate (TOPROL-XL) 50 mg XL tablet Take 1 Tablet by mouth daily. 90 Tablet 1    insulin glargine (Lantus U-100 Insulin) 100 unit/mL injection inject 80 units subcutaneously once daily 30 mL 2    mupirocin (BACTROBAN) 2 % ointment Apply  to affected area daily. 30 g 0    ergocalciferol (ERGOCALCIFEROL) 1,250 mcg (50,000 unit) capsule Take 1 Cap by mouth every seven (7) days. 13 Cap 3    ibuprofen (MOTRIN) 600 mg tablet take 1 tablet by mouth every 8 hours if needed for pain 90 Tab 0    glucose blood VI test strips (ASCENSIA AUTODISC VI, ONE TOUCH ULTRA TEST VI) strip Check blood glucose 3 times daily 300 Strip 3    lancets misc Check blood glucose 3 times daily 300 Each 3    BD INSULIN SYRINGE ULTRA-FINE 1 mL 31 gauge x 5/16 syrg use as directed twice a day 200 Syringe 3    FREESTYLE LITE STRIPS strip TEST twice a day as directed 100 Strip 11    Aspirin, Buffered 81 mg tab Take 81 mg by mouth daily. Allergies  Allergies   Allergen Reactions    Other Food Other (comments)     Artificial sweeteners- causes headaches    Metformin Other (comments)     Increase pain in feet and swelling in feet  Increased hunger,tired and bilat foot pain    Morphine Other (comments)     headache    Singulair [Montelukast] Other (comments)     hallucinations    Sucrose Other (comments)     Pt. Is not allergic to sucrose. She develops headaches with artificial sweeteners.     Trulicity [Dulaglutide] Other (comments)       Family History  Family History   Problem Relation Age of Onset    Heart Disease Mother     Diabetes Mother     Stroke Mother     Hypertension Mother     Diabetes Father     Heart Disease Father     Hypertension Father     Stroke Father     Diabetes Brother     Cancer Brother     Hypertension Brother     Stroke Brother     Heart Disease Brother     Diabetes Brother     Hypertension Brother     Stroke Brother     Heart Disease Brother     Diabetes Brother     Hypertension Brother     Hypertension Brother        Social History  Social History     Socioeconomic History    Marital status:      Spouse name: Not on file    Number of children: Not on file    Years of education: Not on file    Highest education level: Not on file   Occupational History    Not on file   Tobacco Use    Smoking status: Never Smoker    Smokeless tobacco: Never Used   Vaping Use    Vaping Use: Never used   Substance and Sexual Activity    Alcohol use: No    Drug use: No    Sexual activity: Not Currently   Other Topics Concern     Service No    Blood Transfusions No    Caffeine Concern No    Occupational Exposure No    Hobby Hazards No    Sleep Concern Yes     Comment: Sleep apnea     Stress Concern Yes     Comment: Due to family medical issues.  Weight Concern No    Special Diet No    Back Care Yes     Comment: Patient tries to do back care with streches     Exercise No    Bike Helmet Yes    Seat Belt Yes    Self-Exams Yes   Social History Narrative    Not on file     Social Determinants of Health     Financial Resource Strain:     Difficulty of Paying Living Expenses: Not on file   Food Insecurity:     Worried About Running Out of Food in the Last Year: Not on file    [employer] of Food in the Last Year: Not on file   Transportation Needs:     Lack of Transportation (Medical): Not on file    Lack of Transportation (Non-Medical):  Not on file   Physical Activity:     Days of Exercise per Week: Not on file    Minutes of Exercise per Session: Not on file   Stress:     Feeling of Stress : Not on file   Social Connections:     Frequency of Communication with Friends and Family: Not on file    Frequency of Social Gatherings with Friends and Family: Not on file    Attends Confucianist Services: Not on file    Active Member of Clubs or Organizations: Not on file    Attends Club or Organization Meetings: Not on file   Junior Marital Status: Not on file   Intimate Partner Violence:     Fear of Current or Ex-Partner: Not on file    Emotionally Abused: Not on file    Physically Abused: Not on file    Sexually Abused: Not on file   Housing Stability:     Unable to Pay for Housing in the Last Year: Not on file    Number of [place] in the Last Year: Not on file    Unstable Housing in the Last Year: Not on file       Review of Systems  Review of Systems - Review of all systems is negative except as noted above in the HPI. Vital Signs  Visit Vitals  BP (!) 156/76 (BP 1 Location: Left upper arm, BP Patient Position: Sitting, BP Cuff Size: Adult)   Pulse 65   Temp 97.9 °F (36.6 °C) (Oral)   Resp 18   Ht 5' 7\" (1.702 m)   Wt 294 lb (133.4 kg)   SpO2 98%   BMI 46.05 kg/m²         Physical Exam  Physical Examination: General appearance - oriented to person, place, and time, overweight and acyanotic, in no respiratory distress  Mental status - alert, oriented to person, place, and time, affect appropriate to mood  Mouth - mucous membranes moist, pharynx normal without lesions  Neck - tracheostomy in place. Lymphatics - no palpable lymphadenopathy, no hepatosplenomegaly  Chest - decreased air entry noted bilateral lung bases  Heart - systolic murmur 2/6 at lower left sternal border  Abdomen - no rebound tenderness noted  Back exam - limited range of motion  Neurological - abnormal neurological exam unchanged from prior examinations  Musculoskeletal - osteoarthritic changes noted in both hands  Extremities - pedal edema 2 +, venous stasis dermatitis noted        Results  Results for orders placed or performed in visit on 06/17/21   AMB POC HEMOGLOBIN A1C   Result Value Ref Range    Hemoglobin A1c (POC) 10.2 %       ASSESSMENT and PLAN    ICD-10-CM ICD-9-CM    1.  Type 2 diabetes mellitus with diabetic neuropathy, with long-term current use of insulin (HCC)  E11.40 250.60 AMB POC HEMOGLOBIN A1C    Z79.4 357.2 AMB POC URINE, MICROALBUMIN, SEMIQUANTITATIVE     V58.67    2. Hypothyroidism due to acquired atrophy of thyroid  E03.4 244.8      246.8    3. Tracheostomy dependence ([facility]  Z93.0 V44.0    4. Severe obesity ([facility]  E66.01 278.01    5. Pedal edema  R60.0 782.3 REFERRAL TO LYMPHEDEMA CLINIC   6. Venous stasis dermatitis of both lower extremities  I87.2 454.1    7. Dyslipidemia  E78.5 272.4    8. Essential hypertension  I10 401.9    9. Lymphedema  I89.0 457.1 REFERRAL TO LYMPHEDEMA CLINIC   10. Morbid obesity ([facility]  E66.01 278.01    11. Vocal cord paralysis  J38.00 478.30    12. Mood disorder ([facility]  F39 296.90      lab results and schedule of future lab studies reviewed with patient  reviewed diet, exercise and weight control  cardiovascular risk and specific lipid/LDL goals reviewed  reviewed medications and side effects in detail  specific diabetic recommendations: low cholesterol diet, weight control and daily exercise discussed, home glucose monitoring emphasized, all medications, side effects and compliance discussed carefully, annual eye examinations at Ophthalmology discussed, glycohemoglobin and other lab monitoring discussed and long term diabetic complications discussed  radiology results and schedule of future radiology studies reviewed with patient      I have discussed the diagnosis with the patient and the intended plan of care as seen in the above orders. The patient has received an after-visit summary and questions were answered concerning future plans. I have discussed medication, side effects, and warnings with the patient in detail. The patient verbalized understanding and is in agreement with the plan of care. The patient will contact the office with any additional concerns. I spent at least 45 minutes on this visit with this established patient. Cornell Erwin MD    PLEASE NOTE:   This document has been produced using voice recognition software.  Unrecognized errors in transcription may be present

## 2022-01-25 NOTE — PROGRESS NOTES
Chief Complaint   Patient presents with    Follow Up Chronic Condition     1. \"Have you been to the ER, urgent care clinic since your last visit? Hospitalized since your last visit? \" No    2. \"Have you seen or consulted any other health care providers outside of the [address] [place] [address] since your last visit? \" No     3. For patients aged 39-70: Has the patient had a colonoscopy / FIT/ Cologuard? Yes - no Care Gap present      If the patient is female:    4. For patients aged 41-77: Has the patient had a mammogram within the past 2 years? Yes - no Care Gap present  See top three    5. For patients aged 21-65: Has the patient had a pap smear?  Yes - no Care Gap present

## 2022-02-01 DIAGNOSIS — R13.10 DYSPHAGIA, UNSPECIFIED TYPE: ICD-10-CM

## 2022-02-02 RX ORDER — ONDANSETRON 4 MG/1
TABLET, FILM COATED ORAL
Qty: 30 TABLET | Refills: 1 | Status: SHIPPED | OUTPATIENT
Start: 2022-02-02 | End: 2022-08-15

## 2022-02-22 DIAGNOSIS — Z79.4 TYPE 2 DIABETES MELLITUS WITH DIABETIC NEUROPATHY, WITH LONG-TERM CURRENT USE OF INSULIN (HCC): ICD-10-CM

## 2022-02-22 DIAGNOSIS — E11.40 TYPE 2 DIABETES MELLITUS WITH DIABETIC NEUROPATHY, WITH LONG-TERM CURRENT USE OF INSULIN (HCC): ICD-10-CM

## 2022-02-23 RX ORDER — INSULIN GLARGINE 100 [IU]/ML
INJECTION, SOLUTION SUBCUTANEOUS
Qty: 30 ML | Refills: 2 | Status: SHIPPED | OUTPATIENT
Start: 2022-02-23 | End: 2022-06-26

## 2022-03-08 ENCOUNTER — VIRTUAL VISIT (OUTPATIENT)
Dept: FAMILY MEDICINE CLINIC | Age: 62
End: 2022-03-08

## 2022-03-08 DIAGNOSIS — E78.5 DYSLIPIDEMIA: ICD-10-CM

## 2022-03-08 DIAGNOSIS — Z93.0 TRACHEOSTOMY DEPENDENCE (HCC): ICD-10-CM

## 2022-03-08 DIAGNOSIS — R09.81 SINUS CONGESTION: ICD-10-CM

## 2022-03-08 DIAGNOSIS — E11.40 TYPE 2 DIABETES MELLITUS WITH DIABETIC NEUROPATHY, WITH LONG-TERM CURRENT USE OF INSULIN (HCC): ICD-10-CM

## 2022-03-08 DIAGNOSIS — Z79.4 TYPE 2 DIABETES MELLITUS WITH DIABETIC NEUROPATHY, WITH LONG-TERM CURRENT USE OF INSULIN (HCC): ICD-10-CM

## 2022-03-08 DIAGNOSIS — I89.0 LYMPHEDEMA: ICD-10-CM

## 2022-03-08 DIAGNOSIS — E03.4 HYPOTHYROIDISM DUE TO ACQUIRED ATROPHY OF THYROID: ICD-10-CM

## 2022-03-08 DIAGNOSIS — J32.4 PANSINUSITIS, UNSPECIFIED CHRONICITY: Primary | ICD-10-CM

## 2022-03-08 DIAGNOSIS — I87.2 VENOUS STASIS DERMATITIS OF BOTH LOWER EXTREMITIES: ICD-10-CM

## 2022-03-08 PROCEDURE — 99214 OFFICE O/P EST MOD 30 MIN: CPT | Performed by: FAMILY MEDICINE

## 2022-03-08 RX ORDER — AMOXICILLIN AND CLAVULANATE POTASSIUM 875; 125 MG/1; MG/1
1 TABLET, FILM COATED ORAL EVERY 12 HOURS
Qty: 20 TABLET | Refills: 0 | Status: SHIPPED | OUTPATIENT
Start: 2022-03-08 | End: 2022-03-18

## 2022-03-08 RX ORDER — MINERAL OIL
180 ENEMA (ML) RECTAL DAILY
Qty: 90 TABLET | Refills: 1 | Status: SHIPPED | OUTPATIENT
Start: 2022-03-08

## 2022-03-08 NOTE — PROGRESS NOTES
Chief Complaint   Patient presents with    Follow Up Chronic Condition    Sinus Pain     1. \"Have you been to the ER, urgent care clinic since your last visit? Hospitalized since your last visit? \" No    2. \"Have you seen or consulted any other health care providers outside of the [address] [place] [address] since your last visit? \" No     3. For patients aged 39-70: Has the patient had a colonoscopy / FIT/ Cologuard? Yes - no Care Gap present      If the patient is female:    4. For patients aged 41-77: Has the patient had a mammogram within the past 2 years? No      5. For patients aged 21-65: Has the patient had a pap smear?  Yes - no Care Gap present

## 2022-03-08 NOTE — PROGRESS NOTES
Inna Garcia is a 62 y.o. female who was seen by synchronous (real-time) audio-video technology on 3/8/2022 for Follow Up Chronic Condition and Sinus Pain        Assessment & Plan:       ICD-10-CM ICD-9-CM    1. Pansinusitis, unspecified chronicity  J32.4 473.8 amoxicillin-clavulanate (AUGMENTIN) 875-125 mg per tablet      fexofenadine (Allergy Relief, fexofenadine,) 180 mg tablet   2. Sinus congestion  R09.81 478.19 amoxicillin-clavulanate (AUGMENTIN) 875-125 mg per tablet   3. Type 2 diabetes mellitus with diabetic neuropathy, with long-term current use of insulin ([facility])  E11.40 250.60 REFERRAL TO ENDOCRINOLOGY    Z79.4 357.2      V58.67    4. Hypothyroidism due to acquired atrophy of thyroid  E03.4 244.8      246.8    5. Tracheostomy dependence ([facility] [address]  Z93.0 V44.0    6. Venous stasis dermatitis of both lower extremities  I87.2 454.1    7. Dyslipidemia  E78.5 272.4    8. Lymphedema  I89.0 457.1      Subjective:   Inna Garcia is seen for f/u care. Sinus congestion: Patient has sinus congestion with pressure frontal and maxillary sinuses and postnasal drainage that is mucopurulent in nature. She has malaise and fatigue. This is a sinus infection. In the past he has taken antibiotics with good result. I will send in Augmentin. I will follow-up in case of no improvement or worsening symptoms. DM2: Patient has type 2 diabetes mellitus. Blood glucose numbers have been fluctuating. She is on insulin. Her last HbA1c was 10.1. This is uncontrolled. She has been referred to see the endocrinologist.  She has seen them in the past and they have been adjusting her insulin. She has not seen them in a while. Mood disorder: Patient has mood disorder with anxiety. She takes lorazepam for this. I will send in a refill of medication. Hypothyroidism: Patient has history of hypothyroidism and is on thyroid replacement therapy. She has been followed up with endocrinologist.  She has had thyroidectomy.   Stephanie Maxwell dependent: Patient has chronic trach and is on oxygen. She should follow-up with the ENT specialist.  Dyslipidemia: Patient has dyslipidemia. She takes Crestor. She should take a diet low in polysaturated fats. She is also on Lovaza. Lymphedema: Patient has lymphedema bilateral lower extremities. She has been followed up in the wound clinic. We have placed a referral for to be seen in the lymphedema clinic. She gets dressings done of the lower extremities due to the weeping that occurs. Prior to Admission medications    Medication Sig Start Date End Date Taking? Authorizing Provider   amoxicillin-clavulanate (AUGMENTIN) 875-125 mg per tablet Take 1 Tablet by mouth every twelve (12) hours for 10 days. 3/8/22 3/18/22 Yes Angelito Perkins MD   fexofenadine (Allergy Relief, fexofenadine,) 180 mg tablet Take 1 Tablet by mouth daily.  3/8/22  Yes Sulma Quintero MD   insulin glargine (Lantus U-100 Insulin) 100 unit/mL injection inject 80 units subcutaneously once daily 2/23/22  Yes Angelito Perkins MD   ondansetron hcl (ZOFRAN) 4 mg tablet TAKE 1 TABLET BY MOUTH EVERY 8 HOURS AS NEEDED FOR NAUSEA AND VOMITTING 2/2/22  Yes Angelito Rowland MD   fluconazole (DIFLUCAN) 200 mg tablet take 1 tablet by mouth once daily for 3 days 1/18/22  Yes Sulma Quintero MD   albuterol (PROVENTIL HFA, VENTOLIN HFA, PROAIR HFA) 90 mcg/actuation inhaler inhale 2 puffs by mouth and INTO THE LUNGS every 4 hours if needed for wheezing 1/18/22  Yes Sulma Quintero MD   potassium chloride (KLOR-CON M10) 10 mEq tablet take 1 tablet by mouth once daily 1/10/22  Yes Sulma Quintero MD   bumetanide (BUMEX) 1 mg tablet take 1 tablet by mouth twice a day 1/10/22  Yes Sulma Quintero MD   LORazepam (ATIVAN) 2 mg tablet take 1 tablet by mouth nightly 12/29/21  Yes Sulma Quintero MD   melatonin 1 mg tablet take 1 tablet by mouth nightly 12/29/21  Yes Sulma Quintero MD   levothyroxine (SYNTHROID) 50 mcg tablet take 1 tablet by mouth once daily before breakfast take with 200 MCG TABLET 12/20/21  Yes Dodie Gibbs MD   rosuvastatin (CRESTOR) 40 mg tablet take 1 tablet by mouth nightly 12/15/21  Yes Dodie Gibbs MD   omega-3 acid ethyl esters (LOVAZA) 1 gram capsule take 1 capsule by mouth twice a day 12/13/21  Yes Angelito Reynolds MD   albuterol (PROVENTIL VENTOLIN) 2.5 mg /3 mL (0.083 %) nebu 3 mL by Nebulization route every four (4) hours as needed (wheeze). Indications: asthma attack 11/17/21  Yes Dodie Gibbs MD   Nebulizer & Compressor machine Use for nebulization as needed. 11/17/21  Yes Dodie Gibbs MD   Nebulizer Accessories kit Use for nebulization as needed. 11/17/21  Yes Dodie Gibbs MD   levothyroxine (SYNTHROID) 200 mcg tablet take 1 tablet by mouth once daily before breakfast 11/10/21  Yes Angelito Perkins MD   metoprolol succinate (TOPROL-XL) 50 mg XL tablet Take 1 Tablet by mouth daily. 10/22/21  Yes Chet Bourne MD   pirocin [facility]) 2 % ointment Apply  to affected area daily. 6/17/21  Yes Dodie Gibbs MD   ergocalciferol (ERGOCALCIFEROL) 1,250 mcg (50,000 unit) capsule Take 1 Cap by mouth every seven (7) days. 5/12/21  Yes Dodie Gibbs MD   ibuprofen (MOTRIN) 600 mg tablet take 1 tablet by mouth every 8 hours if needed for pain 6/29/20  Yes Dodie Gibbs MD   glucose blood VI test strips (ASCENSIA AUTODISC VI, ONE TOUCH ULTRA TEST VI) strip Check blood glucose 3 times daily 4/16/20  Yes Dodie Gibbs MD   lancets misc Check blood glucose 3 times daily 7/17/19  Yes Angelito Reynolds MD   BD INSULIN SYRINGE ULTRA-FINE 1 mL 31 gauge x 5/16 syrg use as directed twice a day 10/18/18  Yes Angelito Reynolds MD   FREESTYLE LITE STRIPS strip TEST twice a day as directed 11/20/17  Yes Angelito Perkins MD   Aspirin, Buffered 81 mg tab Take 81 mg by mouth daily.    Yes Provider, Historical   Allergy Relief, fexofenadine, 180 mg tablet take 1 tablet by mouth once daily 1/10/22 3/8/22 MD LUIS M Lindquist Review of all systems is negative except as noted above in the HPI. Objective:     Patient-Reported Vitals 3/8/2022   Patient-Reported Weight 294   Patient-Reported Pulse 72   Patient-Reported Temperature 97.3   Patient-Reported SpO2 93   Patient-Reported Systolic  435   Patient-Reported Diastolic 72   Constitutional: [x]  No apparent distress     Mental status: [x] Alert and awake  [x] Oriented to person/place/time     HENT: [x]  Abnormal -tracheostomy in place. Neck: [x]  Abnormal -tracheostomy in place    Pulmonary/Chest: [x]  No visualized signs of difficulty breathing or respiratory distress           Neurological:        [x] No Facial Asymmetry (Cranial nerve 7 motor function) (limited exam due to video visit)                    Psychiatric:       [x] Normal Affect    We discussed the expected course, resolution and complications of the diagnosis(es) in detail. Medication risks, benefits, costs, interactions, and alternatives were discussed as indicated. I advised her to contact the office if her condition worsens, changes or fails to improve as anticipated. She expressed understanding with the diagnosis(es) and plan. Julián Butt, was evaluated through a synchronous (real-time) audio-video encounter. The patient (or guardian if applicable) is aware that this is a billable service, which includes applicable co-pays. Verbal consent to proceed has been obtained. The visit was conducted pursuant to the emergency declaration under the [facility], [address] [place] [address] [facility] authority and the [place] [employer] General Act. Patient identification was verified, and a caregiver was present when appropriate. The patient was located at home in a state where the provider was licensed to provide care.       Jordon Giraldo MD

## 2022-03-18 PROBLEM — J38.00 PARALYZED VOCAL CORDS: Status: ACTIVE | Noted: 2020-05-12

## 2022-03-18 PROBLEM — J96.10 CHRONIC RESPIRATORY FAILURE (HCC): Status: ACTIVE | Noted: 2019-06-25

## 2022-03-18 PROBLEM — R06.1 STRIDOR: Status: ACTIVE | Noted: 2019-05-22

## 2022-03-18 PROBLEM — E03.9 ACQUIRED HYPOTHYROIDISM: Status: ACTIVE | Noted: 2017-05-17

## 2022-03-18 PROBLEM — F41.9 ANXIETY: Status: ACTIVE | Noted: 2019-06-25

## 2022-03-18 PROBLEM — I89.0 LYMPHEDEMA OF BOTH LOWER EXTREMITIES: Status: ACTIVE | Noted: 2020-12-04

## 2022-03-18 PROBLEM — F32.A DEPRESSION: Status: ACTIVE | Noted: 2018-08-24

## 2022-03-18 PROBLEM — I87.2 VENOUS STASIS DERMATITIS OF BOTH LOWER EXTREMITIES: Status: ACTIVE | Noted: 2017-03-01

## 2022-03-19 PROBLEM — Z93.0 TRACHEOSTOMY DEPENDENCE (HCC): Status: ACTIVE | Noted: 2020-05-12

## 2022-03-19 PROBLEM — I10 ESSENTIAL HYPERTENSION: Status: ACTIVE | Noted: 2019-08-26

## 2022-03-19 PROBLEM — E78.5 DYSLIPIDEMIA: Status: ACTIVE | Noted: 2017-05-17

## 2022-03-19 PROBLEM — R09.02 HYPOXEMIA REQUIRING SUPPLEMENTAL OXYGEN: Status: ACTIVE | Noted: 2019-06-25

## 2022-03-19 PROBLEM — E11.21 TYPE 2 DIABETES WITH NEPHROPATHY (HCC): Status: ACTIVE | Noted: 2019-08-26

## 2022-03-19 PROBLEM — M51.369 LUMBAR DEGENERATIVE DISC DISEASE: Status: ACTIVE | Noted: 2017-01-26

## 2022-03-19 PROBLEM — Z93.1 STATUS POST INSERTION OF PERCUTANEOUS ENDOSCOPIC GASTROSTOMY (PEG) TUBE (HCC): Status: ACTIVE | Noted: 2019-06-20

## 2022-03-19 PROBLEM — Z99.81 HYPOXEMIA REQUIRING SUPPLEMENTAL OXYGEN: Status: ACTIVE | Noted: 2019-06-25

## 2022-03-19 PROBLEM — R13.10 DYSPHAGIA: Status: ACTIVE | Noted: 2019-06-25

## 2022-03-19 PROBLEM — Z86.39 HISTORY OF VITAMIN D DEFICIENCY: Status: ACTIVE | Noted: 2017-08-28

## 2022-03-19 PROBLEM — Z98.890 HISTORY OF TRACHEOSTOMY: Status: ACTIVE | Noted: 2019-06-03

## 2022-03-19 PROBLEM — I50.32 CHRONIC DIASTOLIC CONGESTIVE HEART FAILURE (HCC): Status: ACTIVE | Noted: 2018-08-27

## 2022-03-19 PROBLEM — M51.36 LUMBAR DEGENERATIVE DISC DISEASE: Status: ACTIVE | Noted: 2017-01-26

## 2022-03-19 PROBLEM — J38.02 BILATERAL VOCAL CORD PARALYSIS: Status: ACTIVE | Noted: 2019-04-04

## 2022-03-19 PROBLEM — I11.9 HYPERTENSIVE HEART DISEASE WITHOUT HEART FAILURE: Status: ACTIVE | Noted: 2017-05-17

## 2022-03-20 PROBLEM — R50.9 FEVER: Status: ACTIVE | Noted: 2019-06-08

## 2022-03-20 PROBLEM — Z78.9 IMPAIRED MOBILITY AND ADLS: Status: ACTIVE | Noted: 2019-06-25

## 2022-03-20 PROBLEM — J45.20 MILD INTERMITTENT ASTHMA WITHOUT COMPLICATION: Status: ACTIVE | Noted: 2020-05-12

## 2022-03-20 PROBLEM — E66.01 SEVERE OBESITY (HCC): Status: ACTIVE | Noted: 2019-07-18

## 2022-03-20 PROBLEM — Z98.890 HISTORY OF THYROIDECTOMY: Status: ACTIVE | Noted: 2019-04-04

## 2022-03-20 PROBLEM — Z90.89 HISTORY OF THYROIDECTOMY: Status: ACTIVE | Noted: 2019-04-04

## 2022-03-20 PROBLEM — Z74.09 IMPAIRED MOBILITY AND ADLS: Status: ACTIVE | Noted: 2019-06-25

## 2022-03-20 PROBLEM — E89.0 HISTORY OF THYROIDECTOMY: Status: ACTIVE | Noted: 2019-04-04

## 2022-04-04 ENCOUNTER — TELEPHONE (OUTPATIENT)
Dept: FAMILY MEDICINE CLINIC | Age: 62
End: 2022-04-04

## 2022-04-04 NOTE — TELEPHONE ENCOUNTER
Luana called from [employer] to check to see if its ok if they decrease the frequency to once a week. Balwinder Bradley can be reached at [phone]. Please advise.  Thank you!!!

## 2022-04-05 ENCOUNTER — OFFICE VISIT (OUTPATIENT)
Dept: FAMILY MEDICINE CLINIC | Age: 62
End: 2022-04-05
Payer: MEDICAID

## 2022-04-05 VITALS
HEIGHT: 67 IN | HEART RATE: 67 BPM | WEIGHT: 292 LBS | SYSTOLIC BLOOD PRESSURE: 115 MMHG | RESPIRATION RATE: 18 BRPM | BODY MASS INDEX: 45.83 KG/M2 | TEMPERATURE: 97.8 F | OXYGEN SATURATION: 97 % | DIASTOLIC BLOOD PRESSURE: 59 MMHG

## 2022-04-05 DIAGNOSIS — Z79.4 TYPE 2 DIABETES MELLITUS WITH DIABETIC NEUROPATHY, WITH LONG-TERM CURRENT USE OF INSULIN (HCC): ICD-10-CM

## 2022-04-05 DIAGNOSIS — R35.0 URINARY FREQUENCY: ICD-10-CM

## 2022-04-05 DIAGNOSIS — E66.01 MORBID OBESITY (HCC): ICD-10-CM

## 2022-04-05 DIAGNOSIS — I89.0 LYMPHEDEMA: ICD-10-CM

## 2022-04-05 DIAGNOSIS — K59.00 CONSTIPATION, UNSPECIFIED CONSTIPATION TYPE: Primary | ICD-10-CM

## 2022-04-05 DIAGNOSIS — F39 MOOD DISORDER (HCC): ICD-10-CM

## 2022-04-05 DIAGNOSIS — Z93.0 TRACHEOSTOMY DEPENDENCE (HCC): ICD-10-CM

## 2022-04-05 DIAGNOSIS — I87.2 VENOUS STASIS DERMATITIS OF BOTH LOWER EXTREMITIES: ICD-10-CM

## 2022-04-05 DIAGNOSIS — E03.9 ACQUIRED HYPOTHYROIDISM: ICD-10-CM

## 2022-04-05 DIAGNOSIS — E11.40 TYPE 2 DIABETES MELLITUS WITH DIABETIC NEUROPATHY, WITH LONG-TERM CURRENT USE OF INSULIN (HCC): ICD-10-CM

## 2022-04-05 LAB
BILIRUB UR QL STRIP: NEGATIVE
CREAT SERPL-MCNC: 300 MG/DL
GLUCOSE UR-MCNC: NORMAL MG/DL
KETONES P FAST UR STRIP-MCNC: NEGATIVE MG/DL
MICROALBUMIN UR TEST STR-MCNC: 80 MG/L
MICROALBUMIN/CREAT RATIO POC: NORMAL MG/G
PH UR STRIP: 6.5 [PH] (ref 4.6–8)
PROT UR QL STRIP: NORMAL
SP GR UR STRIP: 1.03 (ref 1–1.03)
UA UROBILINOGEN AMB POC: NORMAL (ref 0.2–1)
URINALYSIS CLARITY POC: CLEAR
URINALYSIS COLOR POC: YELLOW
URINE BLOOD POC: NEGATIVE
URINE LEUKOCYTES POC: NEGATIVE
URINE NITRITES POC: NEGATIVE

## 2022-04-05 PROCEDURE — 82044 UR ALBUMIN SEMIQUANTITATIVE: CPT | Performed by: FAMILY MEDICINE

## 2022-04-05 PROCEDURE — 3046F HEMOGLOBIN A1C LEVEL >9.0%: CPT | Performed by: FAMILY MEDICINE

## 2022-04-05 PROCEDURE — 99214 OFFICE O/P EST MOD 30 MIN: CPT | Performed by: FAMILY MEDICINE

## 2022-04-05 PROCEDURE — 81003 URINALYSIS AUTO W/O SCOPE: CPT | Performed by: FAMILY MEDICINE

## 2022-04-05 RX ORDER — AMOXICILLIN 250 MG
1 CAPSULE ORAL
Qty: 90 TABLET | Refills: 1 | Status: SHIPPED | OUTPATIENT
Start: 2022-04-05 | End: 2022-09-07

## 2022-04-05 NOTE — PROGRESS NOTES
Chief Complaint   Patient presents with    Follow Up Chronic Condition     1. \"Have you been to the ER, urgent care clinic since your last visit? Hospitalized since your last visit? \" No    2. \"Have you seen or consulted any other health care providers outside of the [address] [place] [id number] since your last visit? \" No     3. For patients aged 39-70: Has the patient had a colonoscopy / FIT/ Cologuard? Yes - no Care Gap present      If the patient is female:    4. For patients aged 41-77: Has the patient had a mammogram within the past 2 years? No      5. For patients aged 21-65: Has the patient had a pap smear?  NA - based on age or sex

## 2022-04-05 NOTE — PROGRESS NOTES
BETHEL  Tiffanie Blount comes in [facility]. DM2: Patient has diabetes mellitus type 2. She is on insulin. She has been referred to see the endocrinologist but she is yet to do so. She was seen in the past.  Her HbA1c is 10.1. She should intensify lifestyle dietary modification. She did not come in with her blood glucose log. Lymphedema: Patient has lymphedema with bilateral lower extremity swelling. She is getting dressings done. Ashok Pierce would like to cut back on the dressings to once a week. She will continue getting another dressing done by her home health aide. She still has weeping from the bilateral lower extremities but no open wounds. Constipation: Patient has constipation. She takes stool softeners daily. She would like medication sent into the pharmacy. I will send Lucy-Colace to take daily. Mood disorder: Patient has anxiety and depression. She is on lorazepam for anxiety as needed. We will continue with the current treatment plan. Hypothyroidism: Patient has hypothyroidism. She is on levothyroxine. Takes 250 mcg daily. She is followed up by the endocrinologist.  We will continue with the current treatment plan. I will recheck labs at next visit. Dyslipidemia:  HTN: Patient has hypertension. She is on metoprolol. She denies headache, changes in vision or focal weakness. We will continue with current treatment plan. Urinary frequency: Patient has urinary frequency. Denies dysuria or hematuria. We will do a urinalysis.       Past Medical History  Past Medical History:   Diagnosis Date    Acquired hypothyroidism 5/17/2017    Anxiety 6/25/2019    Bilateral vocal cord paralysis 4/4/2019    Bilateral vocal cord paralysis status post thyroidectomy (4/4/2019 - Dr. Louis Chino) with residual dysphagia and dysarthria; S/P Tracheostomy (6/3/2019 - Dr. Louis Chino); S/P PEG tube insertion (6/20/2019) - Dr. Louis Chino)    Chronic back pain     Lower back pain    Depression     Diabetic neuropathy associated with type 2 diabetes mellitus ([facility] [address]     Dysphagia 6/25/2019    S/P Thyroidectomy (4/4/2019 - Dr. Hilda Hudson)    History of asthma     History of heart failure     chronic diastolic heart failure    History of vitamin D deficiency 8/28/2017    Hypertensive heart disease without heart failure 5/17/2017    Hypoxemia requiring supplemental oxygen 6/25/2019    Insomnia     Left ear hearing loss     pt states nerve damage--- 25% hearing loss, described as \"muffled\"    Memory difficulty     Moderate persistent asthma     Obesity, Class II, BMI 35-39.9 11/11/2016    Obstructive sleep apnea 11/6/2015    Panic attacks     Ringing of ears     Type 2 diabetes mellitus with diabetic neuropathy, with long-term current use of insulin ([facility] [address]     Vertigo        Surgical History  Past Surgical History:   Procedure Laterality Date    HX HEART VALVE SURGERY  2013    aortic valve repair    HX HYSTERECTOMY      Partial Hysterectomy - removed ovary    HX MYOMECTOMY      HX ORTHOPAEDIC  02/2018    had nerves burned on her right side of back    HX TRACHEOSTOMY  06/03/2019    S/P Tracheostomy (6/3/2019 - Dr. Hilda Hudson)   [address] [facility] [employer] UNLIST  10/31/2013    open heart    [place] EGD PERCUTANEOUS PLACEMENT GASTROSTOMY TUBE N/A 06/20/2019    Dr. Efrain Cesar Bilateral 04/04/2019    Dr. Basia Taylor        Medications  Current Outpatient Medications   Medication Sig Dispense Refill    fexofenadine (Allergy Relief, fexofenadine,) 180 mg tablet Take 1 Tablet by mouth daily.  90 Tablet 1    insulin glargine (Lantus U-100 Insulin) 100 unit/mL injection inject 80 units subcutaneously once daily 30 mL 2    ondansetron hcl (ZOFRAN) 4 mg tablet TAKE 1 TABLET BY MOUTH EVERY 8 HOURS AS NEEDED FOR NAUSEA AND VOMITTING 30 Tablet 1    fluconazole (DIFLUCAN) 200 mg tablet take 1 tablet by mouth once daily for 3 days 3 Tablet 1    albuterol (PROVENTIL HFA, VENTOLIN HFA, PROAIR HFA) 90 mcg/actuation inhaler inhale 2 puffs by mouth and INTO THE LUNGS every 4 hours if needed for wheezing 18 g 2    potassium chloride (KLOR-CON M10) 10 mEq tablet take 1 tablet by mouth once daily 90 Tablet 1    bumetanide (BUMEX) 1 mg tablet take 1 tablet by mouth twice a day 60 Tablet 2    LORazepam (ATIVAN) 2 mg tablet take 1 tablet by mouth nightly 31 Tablet 2    melatonin 1 mg tablet take 1 tablet by mouth nightly 90 Tablet 1    levothyroxine (SYNTHROID) 50 mcg tablet take 1 tablet by mouth once daily before breakfast take with 200 MCG TABLET 90 Tablet 1    rosuvastatin (CRESTOR) 40 mg tablet take 1 tablet by mouth nightly 90 Tablet 1    omega-3 acid ethyl esters (LOVAZA) 1 gram capsule take 1 capsule by mouth twice a day 60 Capsule 2    albuterol (PROVENTIL VENTOLIN) 2.5 mg /3 mL (0.083 %) nebu 3 mL by Nebulization route every four (4) hours as needed (wheeze). Indications: asthma attack 24 Each 5    Nebulizer & Compressor machine Use for nebulization as needed. 1 Each 0    Nebulizer Accessories kit Use for nebulization as needed. 1 Kit 2    levothyroxine (SYNTHROID) 200 mcg tablet take 1 tablet by mouth once daily before breakfast 90 Tablet 2    metoprolol succinate (TOPROL-XL) 50 mg XL tablet Take 1 Tablet by mouth daily. 90 Tablet 1    mupirocin (BACTROBAN) 2 % ointment Apply  to affected area daily. 30 g 0    ergocalciferol (ERGOCALCIFEROL) 1,250 mcg (50,000 unit) capsule Take 1 Cap by mouth every seven (7) days.  13 Cap 3    ibuprofen (MOTRIN) 600 mg tablet take 1 tablet by mouth every 8 hours if needed for pain 90 Tab 0    glucose blood VI test strips (ASCENSIA AUTODISC VI, ONE TOUCH ULTRA TEST VI) strip Check blood glucose 3 times daily 300 Strip 3    lancets misc Check blood glucose 3 times daily 300 Each 3    BD INSULIN SYRINGE ULTRA-FINE 1 mL 31 gauge x 5/16 syrg use as directed twice a day 200 Syringe 3    FREESTYLE LITE STRIPS strip TEST twice a day as directed 100 Strip 11    Aspirin, Buffered 81 mg tab Take 81 mg by mouth daily. Allergies  Allergies   Allergen Reactions    Other Food Other (comments)     Artificial sweeteners- causes headaches    Metformin Other (comments)     Increase pain in feet and swelling in feet  Increased hunger,tired and bilat foot pain    Morphine Other (comments)     headache    Singulair [Montelukast] Other (comments)     hallucinations    Sucrose Other (comments)     Pt. Is not allergic to sucrose. She develops headaches with artificial sweeteners.  Trulicity [Dulaglutide] Other (comments)       Family History  Family History   Problem Relation Age of Onset    Heart Disease Mother     Diabetes Mother     Stroke Mother     Hypertension Mother     Diabetes Father     Heart Disease Father     Hypertension Father     Stroke Father     Diabetes Brother     Cancer Brother     Hypertension Brother     Stroke Brother     Heart Disease Brother     Diabetes Brother     Hypertension Brother     Stroke Brother     Heart Disease Brother     Diabetes Brother     Hypertension Brother     Hypertension Brother        Social History  Social History     Socioeconomic History    Marital status:      Spouse name: Not on file    Number of children: Not on file    Years of education: Not on file    Highest education level: Not on file   Occupational History    Not on file   Tobacco Use    Smoking status: Never Smoker    Smokeless tobacco: Never Used   Vaping Use    Vaping Use: Never used   Substance and Sexual Activity    Alcohol use: No    Drug use: No    Sexual activity: Not Currently   Other Topics Concern     Service No    Blood Transfusions No    Caffeine Concern No    Occupational Exposure No    Hobby Hazards No    Sleep Concern Yes     Comment: Sleep apnea     Stress Concern Yes     Comment: Due to family medical issues.      Weight Concern No    Special Diet No    Back Care Yes     Comment: Patient tries to do back care with streches     Exercise No    Bike Helmet Yes    Seat Belt Yes    Self-Exams Yes   Social History Narrative    Not on file     Social Determinants of Health     Financial Resource Strain:     Difficulty of Paying Living Expenses: Not on file   Food Insecurity:     Worried About Running Out of Food in the Last Year: Not on file    [employer] of Food in the Last Year: Not on file   Transportation Needs:     Lack of Transportation (Medical): Not on file    Lack of Transportation (Non-Medical): Not on file   Physical Activity:     Days of Exercise per Week: Not on file    Minutes of Exercise per Session: Not on file   Stress:     Feeling of Stress : Not on file   Social Connections:     Frequency of Communication with Friends and Family: Not on file    Frequency of Social Gatherings with Friends and Family: Not on file    Attends Zoroastrianism Services: Not on file    Active Member of [employer] Group or Organizations: Not on file    Attends Club or Organization Meetings: Not on file    Marital Status: Not on file   Intimate Partner Violence:     Fear of Current or Ex-Partner: Not on file    Emotionally Abused: Not on file    Physically Abused: Not on file    Sexually Abused: Not on file   Housing Stability:     Unable to Pay for Housing in the Last Year: Not on file    Number of [place] in the Last Year: Not on file    Unstable Housing in the Last Year: Not on file       Review of Systems  Review of Systems - Review of all systems is negative except as noted above in the HPI.     Vital Signs  Visit Vitals  BP (!) 115/59 (BP 1 Location: Left lower arm, BP Patient Position: Sitting, BP Cuff Size: Large adult)   Pulse 67   Temp 97.8 °F (36.6 °C) (Temporal)   Resp 18   Ht 5' 7\" (1.702 m)   Wt 292 lb (132.5 kg)   LMP  (LMP Unknown)   SpO2 97%   BMI 45.73 kg/m²         Physical Exam  Physical Examination: General appearance - oriented to person, place, and time  Mental status - affect appropriate to mood  Neck -tracheostomy in place  Lymphatics - no palpable lymphadenopathy  Chest - decreased air entry noted bilateral lung bases  Heart - systolic murmur 2/6 at lower left sternal border  Abdomen - no rebound tenderness noted  Back exam - limited range of motion  Neurological - abnormal neurological exam unchanged from prior examinations  Musculoskeletal - osteoarthritic changes noted in both hands, lymphedema bilateral extremities  Extremities - pedal edema 2 +, intact peripheral pulses      Results  Results for orders placed or performed in visit on 01/25/22   AMB POC HEMOGLOBIN A1C   Result Value Ref Range    Hemoglobin A1c (POC) 10.1 %       ASSESSMENT and PLAN    ICD-10-CM ICD-9-CM    1. Constipation, unspecified constipation type  K59.00 564.00 senna-docusate (PERICOLACE) 8.6-50 mg per tablet   2. Type 2 diabetes mellitus with diabetic neuropathy, with long-term current use of insulin ([facility])  E11.40 250.60 AMB POC URINE, MICROALBUMIN, SEMIQUANTITATIVE    Z79.4 357.2      V58.67    3. Morbid obesity ([facility] [address]  E66.01 278.01 REFERRAL TO BARIATRIC SURGERY   4. Urinary frequency  R35.0 788.41 AMB POC URINALYSIS DIP STICK AUTO W/O MICRO   5. Tracheostomy dependence ([facility] [address]  Z93.0 V44.0    6. Venous stasis dermatitis of both lower extremities  I87.2 454.1    7. Lymphedema  I89.0 457.1    8. Mood disorder ([facility])  F39 296.90    9.  Acquired hypothyroidism  E03.9 244.9      lab results and schedule of future lab studies reviewed with patient  reviewed diet, exercise and weight control  cardiovascular risk and specific lipid/LDL goals reviewed  reviewed medications and side effects in detail  specific diabetic recommendations: low cholesterol diet, weight control and daily exercise discussed, home glucose monitoring emphasized, all medications, side effects and compliance discussed carefully, annual eye examinations at Ophthalmology discussed, glycohemoglobin and other lab monitoring discussed and long term diabetic complications discussed      I have discussed the diagnosis with the patient and the intended plan of care as seen in the above orders. The patient has received an after-visit summary and questions were answered concerning future plans. I have discussed medication, side effects, and warnings with the patient in detail. The patient verbalized understanding and is in agreement with the plan of care. The patient will contact the office with any additional concerns. Kathe Marcelo MD    PLEASE NOTE:   This document has been produced using voice recognition software.  Unrecognized errors in transcription may be present

## 2022-04-06 NOTE — TELEPHONE ENCOUNTER
Spoke with Amirah and she stated that patient have no open wounds and her home healthcare nurse was train to wrap her leg. Its only needed once a week for nurse to come out and assist her. Amirah was given the ok per Dr Kristina Ibarra. Mrs Brandy Mcallister will be informed.

## 2022-04-06 NOTE — TELEPHONE ENCOUNTER
Lino Montoya is waiting to hear back for a response on a verbal order, Ms Lino Montoya stated If you contact and she is not available to please leave a message on her voicemail.  Follow up when available

## 2022-04-08 DIAGNOSIS — F41.9 ANXIETY: ICD-10-CM

## 2022-04-11 RX ORDER — LORAZEPAM 2 MG/1
TABLET ORAL
Qty: 31 TABLET | Refills: 2 | Status: SHIPPED | OUTPATIENT
Start: 2022-04-11 | End: 2022-08-11

## 2022-04-22 ENCOUNTER — TELEPHONE (OUTPATIENT)
Dept: FAMILY MEDICINE CLINIC | Age: 62
End: 2022-04-22

## 2022-04-22 NOTE — TELEPHONE ENCOUNTER
Yuliya Bahena stated the pts wounds are completely healed, and they wrapped her leg because of the edema. Pt was discharged and she is not happy about it. For more information Yuliya Bahena from [employer] [phone]. Please advise.  Thank you!!!

## 2022-05-05 NOTE — PATIENT INSTRUCTIONS
Benign Essential Tremor: Care Instructions  Your Care Instructions    Benign essential tremor is a medical term for shaking that you can't control. Your hand or fingers may shake when you lift a cup or point at something. Or your voice may shake when you speak. This type of tremor is not harmful. It is not caused by a stroke or Parkinson's disease. Some things can affect how much you shake. For example, drinking or eating something with caffeine may make tremors worse for a while. Some medicines also can increase tremors. These include antidepressants and too much thyroid replacement. Talk to your doctor if you think one of your medicines makes your tremors worse. If you are self-conscious about your tremors, there are some things you can do to reduce them or make them less noticeable. This includes taking medicine. Follow-up care is a key part of your treatment and safety. Be sure to make and go to all appointments, and call your doctor if you are having problems. It's also a good idea to know your test results and keep a list of the medicines you take. How can you care for yourself at home? · Take your medicines exactly as prescribed. Call your doctor if you think you are having a problem with your medicine. Some medicines that help control tremors have to be taken every day, even if you are not having tremors. You will get more details on the specific medicines your doctor prescribes. · Get plenty of rest.  · Eat a balanced, healthy diet. · Try to reduce stress. Regular exercise and massages may help. · Limit alcohol. Heavy drinking can make your tremors worse. · Avoid drinks or foods with caffeine if they make your tremors worse. These include tea, cola, coffee, and chocolate. · Wear a heavy bracelet or watch. This adds a little weight to your hand. The extra weight may reduce tremors. · Drink from cups or glasses that are only half full. You may also want to try drinking with a straw.   When should you call for help? Watch closely for changes in your health, and be sure to contact your doctor if:  ? · You notice your tremors are getting worse. ? · You can't do your everyday activities because of your tremors. ? · You are sad and embarrassed about your shaking. Where can you learn more? Go to http://nadya-irasema.info/. Enter B746 in the search box to learn more about \"Benign Essential Tremor: Care Instructions. \"  Current as of: October 14, 2016  Content Version: 11.4  © 3136-3178 [employer]. Care instructions adapted under license by [employer] (which disclaims liability or warranty for this information). If you have questions about a medical condition or this instruction, always ask your healthcare professional. [address] any warranty or liability for your use of this information. Discharged

## 2022-06-07 ENCOUNTER — HOSPITAL ENCOUNTER (OUTPATIENT)
Dept: PHYSICAL THERAPY | Age: 62
Discharge: HOME | End: 2022-06-07
Payer: MEDICAID

## 2022-06-07 PROCEDURE — 97163 PT EVAL HIGH COMPLEX 45 MIN: CPT

## 2022-06-07 NOTE — PROGRESS NOTES
Lymphedema EVAL/DAILY NOTE    Patient Name: Cornelius Louis  Date:2022  : 1960  [x]  Patient  Verified  Payor: Leslie Quintana Pamella / Plan: Thornell Lesches / Product Type: Managed Care Medicaid /    In time: 15:55   Out time: 16.41   Total Treatment Time (min): 44  Total Timed Codes (min): 25  1:1 Treatment Time (MC/BCBS only): 40   Visit #: 1 of 8    Referring Provider: Марина King MD  Next Appointment: TBD  Treatment Area: Lymphedema, not elsewhere classified [I89.0]    Pain Level (0-10 scale): 0    Personal Goal:  \"To get my legs down\"    History of Present Illness: Was being seen by home health nursing due to wounds. Discharged ~1.5 months ago. Swelling occurring below and at knee. Social History (DME-pump, family support): Work situation:   []Currently working    []Plan to return to work   [x]Disabled   []Retired    Lives with:   []Alone   []Spouse/significant other   []Family/relative   [x]Other     Are you physically able to complete lymphedema home program? []Yes  [x]No  If no, who will assist patient? Home health nurse    Current or Previous Compression Garment(s):   [x]Stocking []Sleeve  [] Pantyhose                          [] Glove/gauntlet/toe                           []Brand:                          []mmHg:   [] Size:     Compression Pump:  []Yes  [x]No      Has your activity changed since diagnosis? Yes    Limitations/Prior level of functioning: Decreased ambulatory tolerance, difficulty with functional mobility requiring LE elevation     Subjective functional status:    Lymphedema extremity:    []Left arm    []Right Arm    [x]Left Leg    [x]Right Leg      Present Symptoms/History:    History of infections? No   History of leaking fluid? No   Currently on antibiotics? []Yes [x]No      Do you take diuretics for lymphedema? []Yes [x]No      Do you take any other drugs for lymphedema?  No   Previous lymphedema treatment: No     Oncology history, if applicable: Subjective     History provided by:  Patient   used: No    Pain   Location:  Generalized  Severity:  Moderate  Onset quality:  Gradual  Timing:  Constant  Progression:  Unchanged  Chronicity:  Chronic  Context:  Pt with PMH significant for multiple myeloma. Is in a pain clinic due to his chronic pain.   Associated symptoms: myalgias    Associated symptoms: no chest pain, no congestion, no cough, no diarrhea, no fever, no headaches, no nausea, no rash, no rhinorrhea, no shortness of breath, no sore throat, no vomiting and no wheezing        Review of Systems   Constitutional: Negative for activity change and fever.   HENT: Negative for congestion, rhinorrhea and sore throat.    Eyes: Negative for pain.   Respiratory: Negative for cough, shortness of breath and wheezing.    Cardiovascular: Negative for chest pain.   Gastrointestinal: Negative for abdominal distention, diarrhea, nausea and vomiting.   Genitourinary: Negative for difficulty urinating and dysuria.   Musculoskeletal: Positive for myalgias. Negative for arthralgias.   Skin: Negative for rash and wound.   Neurological: Negative for dizziness and headaches.   Psychiatric/Behavioral: Negative for agitation.   All other systems reviewed and are negative.      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 No pertinent family history.    Social History     Social History   • Marital status:      Social History Main Topics   • Smoking status: Current Every Day Smoker     Packs/day: 0.50     Types: Cigarettes   • Smokeless tobacco: Never Used   • Alcohol use No   • Drug use: No      Comment: oxycodone 20 mg tabs QID and valium 10 mg q 12 hours    • Sexual activity: Defer     Other Topics Concern   • Not on file           Objective   Physical Exam   Constitutional: He is oriented to person, place, and time. He appears well-developed and well-nourished.   HENT:   Head: Normocephalic and atraumatic.   Eyes: EOM are normal. Pupils are equal, round, and reactive to light.   Neck: Normal range of motion. Neck supple.   Cardiovascular: Normal rate, regular rhythm and normal heart sounds.    Pulmonary/Chest: Effort normal and breath sounds normal.   Abdominal: Soft. Bowel sounds are normal.   Musculoskeletal: Normal range of motion.   Neurological: He is alert and oriented to person, place, and time.   Skin: Skin is warm and dry.   Psychiatric: He has a normal mood and affect. His behavior is normal. Judgment and thought content normal.   Nursing note and vitals reviewed.      Procedures         ED Course  ED Course   Comment By Time   Pt requesting a \"20\" because that's what the pain clinic gives him. Will give pt one dose of percocet 5. He has an appt with pain clinic on 4/2.  JARETH Herrera 03/25 1442                  MDM  Number of Diagnoses or Management Options     Amount and/or Complexity of Data Reviewed  Clinical lab tests: ordered and reviewed  Tests in the radiology section of CPT®: ordered and reviewed  Tests in the medicine section of CPT®: reviewed and ordered    Patient Progress  Patient progress: stable      Final diagnoses:   Chronic pain disorder            JARETH Herrera  03/25/18 1446    " N/A    Precautions/Indications: Tracheostomy with supplemental O2, OA, asthma, DM II, BMI >30, depression, back pain    Diagnostic tests (imaging/bone scan/labs): N/A       OBJECTIVE:   Edema:  []min [x]moderate  [] severe [] Pitting    Circumferences:                                             Girth (cm):  Right LE  6/7/2022 Left LE     MTP: 24.0 22.0   Midfoot/navicular: 25.2 23.6   Ankle: (7 cm from floor) 26.8 27.5   Calf     (15 cm from floor) 36.8 35.7   Calf:      (30 cm from floor) 50.3 51.2   Knee:    (patella) 46.7 45.7   Thigh      55.5 55.0   TOTAL 265.3 260.7       Skin Integrity      Sensation:  []normal   [] sensitive    [x] decreased  Light touch/Sharp/Dull                Color:  []normal []red  [x] Pink    Texture:  []Soft []Brawny [x]Fibrotic [x]Boggy    Skin integrity:  [] Hyperpigmentation [x]Hyperkeratosis [x]Papillomas  [x]Lymphorrhea                           Scars/Burns/incisions:  Various scratches covering irck LE                  Wounds: location: various throughout rick LE                        Posture:  seated in WC    ROM:     Strength: 3/5 global    19 min [x]Eval                  []Re-Eval       25 min Self Care/Home Management:  Education regarding A&P of lymphatic system and associated pathology, regarding role of Complete Decongestive Therapy (CDT) in managing condition, phases of CDT including visual references/videos of compression bandaging, education on various garments, explanation/demonstration of  Manual Lymph Drainage (MLD), education on time and financial commitment of lymphedema management   Rationale: To promote patient's knowledge of condition and understanding of lymphedema treatment protocol to maximize compliance with therapy and promote independence with home management.            With   [] TE   [] TA   [] neuro   [] other: Patient Education: [] Review HEP    [] Progressed/Changed HEP based on:   [] positioning   [] body mechanics   [] transfers   [] heat/ice application    [x] other: see Self Care     Other Objective/Functional Measures: N/A     Recommended bandaging:  rick knee high, potential for thigh high  Planned bandages to order: 8-cm x 4, 10-cm x 8,  mollelast wraps, cellona 10-cm x 4    Recommend compression level: []20-30 mmHg  [x]30-40 mmHg []40-50 mmHg []50-60 mmHg    Pain Level (0-10 scale) post treatment: 0    ASSESSMENT/Changes in Function: Patient is 65year old female with c/o bilateral lower extremity swelling which began about 7 years ago but has worsened . She is limited in activity and ADL tolerance due to discomfort in bilateral LE and diffuse pain with edema. Patient skin non- intact with hyperpigmentation, severe papillomas, hyperkeratosis, and lymphorrhea. Sensation diminished. . Edema  non-pitting. Patient with signs and symptoms consistent with bilateral lower extremity lymphedema (Stage 3). She would benefit from skilled physical therapy for complete decongestive therapy in order to reduce limb swelling, mitigate progression, promote functional mobility and ADLs, and procure necessary compression devices (bandages and garments). She will be instructed in self management of condition including self manual lymph drainage, self bandaging, donning/doffing compression garments, skin care, and remedial exercise. Patient received an initial evaluation today followed by education as to diagnosis, precautions and treatment plan. Patient was provided with a basic information on lymphedema, complete decongestive therapies, compression and financial obligations. Short Term Goals: To be accomplished in 2 weeks:  1. Patient to tolerate bilateral LE compression bandaging to ensure management of symptoms for progress towards ambulatory tolerance. Evaluation: to be initiated    2. Patient to be independent in decongestive HEP to maximize therapeutic benefit of muscle pump with compression bandages donned.   Evaluation: to be initiated     Long Term Goals: To be accomplished in 6 weeks:  1. Patient to be independent in self manual lymph drainage (MLD) and/or diaphragmatic breathing for bilateral lower extremities to reroute lymphatic fluid for reduction in symptoms. Evaluation: to be initiated    2. Patient to be fitted for necessary compression garments to ensure independent management of condition following discharge  Evaluation: to be fitted upon plateau of limb decongestion    3. Patient to independently don/doff compression garments for maintenance of symptoms and increase ADL tolerance. Evaluation: to be evaluated upon procurement of gaments    4. Patient to demonstrate an overall 8% reduction in rick LE for improved standing and walking ability.   Evaluation: baseline measurements gathered; right .3, left .7    Justification for Eval Code Complexity:   Patient History (low 0, mod 1-2, high 3-4): high   Examination (low 1-2, mod 3+, high 4+): high  Clinical Presentation (low: stable/uncomplicated; mod: evolving; high: unstable/unpredictable): high  Clinical Decision Making (low , mod 26-74, high 1-25): high           PLAN  [x]  Upgrade activities as tolerated     []  Continue plan of care  []  Update interventions per flow sheet       []  Discharge due to:_  [x]  Other:_ initiate POC      Char Duke PT 6/7/2022  3:51 PM

## 2022-06-07 NOTE — PROGRESS NOTES
[facility]  [address] [facility] [address]  [place], [address]  [phone] fax    Plan of Care/ Statement of Necessity for Physical Therapy Services    Patient name: Estrellita Lacey Start of Care: 2022   Referral source: Jose Carlos Mendez MD : 1960    Medical Diagnosis: Lymphedema, not elsewhere classified [I89.0]  Payor: [address] [place] [address] / Plan: [facility] / Product Type: Managed Care Medicaid /  Onset Date:2022    Treatment Diagnosis: Lymphedema, bilateral lower extremity   Prior Hospitalization: see medical history Provider#: [id number]   Medications: Verified on Patient summary List    Comorbidities: Tracheostomy with supplemental O2, OA, asthma, DM II, BMI >30, depression, back pain   Prior Level of Function: Edema managed, greater ease with Le elevation. Wheelchair for community integration, ambulating short household distances. Requires supplemental O2 via tracheostomy. The Plan of Care and following information is based on the information from the initial evaluation. Assessment/ key information: Patient is 65year old female with c/o bilateral lower extremity swelling which began about 7 years ago but has worsened . She is limited in activity and ADL tolerance due to discomfort in bilateral LE and diffuse pain with edema. Patient skin non- intact with hyperpigmentation, severe papillomas, hyperkeratosis, and lymphorrhea. Sensation diminished. . Edema  non-pitting. Patient with signs and symptoms consistent with bilateral lower extremity lymphedema (Stage 3). She would benefit from skilled physical therapy for complete decongestive therapy in order to reduce limb swelling, mitigate progression, promote functional mobility and ADLs, and procure necessary compression devices (bandages and garments).  She will be instructed in self management of condition including self manual lymph drainage, self bandaging, donning/doffing compression garments, skin care, and remedial exercise.      Patient received an initial evaluation today followed by education as to diagnosis, precautions and treatment plan. Patient was provided with a basic information on lymphedema, complete decongestive therapies, compression and financial obligations. Evaluation Complexity History HIGH Complexity :3+ comorbidities / personal factors will impact the outcome/ POC ; Examination HIGH Complexity : 4+ Standardized tests and measures addressing body structure, function, activity limitation and / or participation in recreation  ;Presentation HIGH Complexity : Unstable and unpredictable characteristics  ; Clinical Decision Making HIGH Complexity : FOTO score of 1- 25   Overall Complexity Rating: HIGH   Problem List: edema affecting function, decrease ADL/ functional abilitiies, decrease activity tolerance and decrease transfer abilities   Treatment Plan may include any combination of the following: Therapeutic exercise, Therapeutic activities, Manual therapy, Patient education, Self Care training and Home safety training  Patient / Family readiness to learn indicated by: asking questions and interest  Persons(s) to be included in education: patient (P)  Barriers to Learning/Limitations: None  Patient Goal (s): to get my legs down  Patient Self Reported Health Status: Not reported  Rehabilitation Potential: good       Short Term Goals: To be accomplished in 2 weeks:  1. Patient to tolerate bilateral LE compression bandaging to ensure management of symptoms for progress towards ambulatory tolerance. Evaluation: to be initiated     2. Patient to be independent in decongestive HEP to maximize therapeutic benefit of muscle pump with compression bandages donned. Evaluation: to be initiated     Long Term Goals: To be accomplished in 6 weeks:  1.  Patient to be independent in self manual lymph drainage (MLD) and/or diaphragmatic breathing for bilateral lower extremities to reroute lymphatic fluid for reduction in symptoms. Evaluation: to be initiated     2. Patient to be fitted for necessary compression garments to ensure independent management of condition following discharge  Evaluation: to be fitted upon plateau of limb decongestion     3. Patient to independently don/doff compression garments for maintenance of symptoms and increase ADL tolerance. Evaluation: to be evaluated upon procurement of gaments     4. Patient to demonstrate an overall 8% reduction in rick LE for improved standing and walking ability. Evaluation: baseline measurements gathered; right .3, left .7    Frequency / Duration: Patient to be seen 2 times per week for 4 weeks. Patient/ Caregiver education and instruction: Diagnosis, prognosis, self care, activity modification, brace/ splint application and exercises   [x]  Plan of care has been reviewed with EFRAIN Espana, PT 6/7/2022 3:52 PM    ________________________________________________________________________    I certify that the above Therapy Services are being furnished while the patient is under my care. I agree with the treatment plan and certify that this therapy is necessary.     [de-identified] Signature:____________Date:_________TIME:________     Ana Nugent MD  ** Signature, Date and Time must be completed for valid certification **    Please sign and return to [facility] [address] [place] [address] & [facility]  [address]  [place], [place] [address]  [phone] fax

## 2022-06-14 ENCOUNTER — OFFICE VISIT (OUTPATIENT)
Dept: SURGERY | Age: 62
End: 2022-06-14
Payer: MEDICAID

## 2022-06-14 VITALS
HEART RATE: 76 BPM | WEIGHT: 288 LBS | TEMPERATURE: 97.4 F | HEIGHT: 67 IN | BODY MASS INDEX: 45.2 KG/M2 | OXYGEN SATURATION: 98 % | RESPIRATION RATE: 20 BRPM | SYSTOLIC BLOOD PRESSURE: 136 MMHG | DIASTOLIC BLOOD PRESSURE: 72 MMHG

## 2022-06-14 DIAGNOSIS — R09.02 HYPOXEMIA REQUIRING SUPPLEMENTAL OXYGEN: ICD-10-CM

## 2022-06-14 DIAGNOSIS — E11.21 TYPE 2 DIABETES WITH NEPHROPATHY (HCC): ICD-10-CM

## 2022-06-14 DIAGNOSIS — E66.01 SEVERE OBESITY (HCC): Primary | ICD-10-CM

## 2022-06-14 DIAGNOSIS — Z98.890 HISTORY OF TRACHEOSTOMY: ICD-10-CM

## 2022-06-14 DIAGNOSIS — I10 ESSENTIAL HYPERTENSION: ICD-10-CM

## 2022-06-14 DIAGNOSIS — Z01.818 PREOP EXAMINATION: ICD-10-CM

## 2022-06-14 DIAGNOSIS — E66.01 SEVERE OBESITY (HCC): ICD-10-CM

## 2022-06-14 DIAGNOSIS — J45.40 MODERATE PERSISTENT ASTHMA, UNSPECIFIED WHETHER COMPLICATED: ICD-10-CM

## 2022-06-14 DIAGNOSIS — Z86.79 HISTORY OF HEART FAILURE: ICD-10-CM

## 2022-06-14 DIAGNOSIS — J96.11 CHRONIC RESPIRATORY FAILURE WITH HYPOXIA (HCC): ICD-10-CM

## 2022-06-14 DIAGNOSIS — Z99.81 HYPOXEMIA REQUIRING SUPPLEMENTAL OXYGEN: ICD-10-CM

## 2022-06-14 DIAGNOSIS — I50.32 CHRONIC DIASTOLIC CONGESTIVE HEART FAILURE (HCC): ICD-10-CM

## 2022-06-14 DIAGNOSIS — K21.9 GASTROESOPHAGEAL REFLUX DISEASE, UNSPECIFIED WHETHER ESOPHAGITIS PRESENT: ICD-10-CM

## 2022-06-14 PROCEDURE — 99205 OFFICE O/P NEW HI 60 MIN: CPT | Performed by: SURGERY

## 2022-06-14 NOTE — PROGRESS NOTES
Willy Villeda is a 65 y.o. female  Chief Complaint   Patient presents with    Morbid Obesity     patient presents today for bariatric consult. Pt ID confirmed    Weight Loss Metrics 6/14/2022 6/14/2022 4/5/2022 1/25/2022 6/17/2021 2/17/2021 7/29/2020   Pre op / Initial Wt 288 - - - - - -   Today's Wt - 288 lb 292 lb 294 lb 291 lb 287 lb 272 lb   BMI - 45.11 kg/m2 45.73 kg/m2 46.05 kg/m2 45.58 kg/m2 44.95 kg/m2 42.6 kg/m2   Ideal Body Wt 140 - - - - - -   Excess Body Wt 148 - - - - - -   Goal Wt 170 - - - - - -   Wt loss to date 0 - - - - - -   % Wt Loss 0 - - - - - -   80% .4 - - - - - -       Body mass index is 45.11 kg/m².

## 2022-06-14 NOTE — LETTER
6/14/2022    Patient: Roseline Weber   YOB: 1960   Date of Visit: 6/14/2022     Mik Manzo MD  [address]  Via In Basket    Dear Mik Manzo MD,      Thank you for referring Ms. Alma Rosa Veras to [address] for evaluation. My notes for this consultation are attached. If you have questions, please do not hesitate to call me. I look forward to following your patient along with you.       Sincerely,    Les Travis MD

## 2022-06-14 NOTE — PROGRESS NOTES
Bariatric Surgery Consultation    CC: Consultation from Dr. Donn Posada regarding surgical treatment options for morbid obesity and related comorbidities  Subjective: The patient is a 65 y.o. obese  female with a Body mass index is 45.11 kg/m². . They have been considering surgery for some time now. The patient presents to the clinic today to discuss surgical weight loss options. They have made multiple attempts at weight loss over the years without success. They have tried low-carb, low calorie, high-protein diets. Unfortunately, none of these have lead to meaningful, sustained weight loss. They have attended the bariatric seminar before the visit. History of thyroidectomy complicated by vocal cord paresis requiring tracheostomy and PEG. Has chronic tracheostomy due to vocal cord paresis. Denies nausea, vomiting  Has cervical and oropharyngeal dysphagia due to thyroidectomy and subsequent complications   Occasional reflux, fewer than twice per month, self-limited.        Pre op weight: 288  EBW: 148  Goal Weight Calc Women: 169.6  Wt loss to date: 0     Patient Active Problem List    Diagnosis Date Noted    Lymphedema of both lower extremities 12/04/2020    Mild intermittent asthma without complication 52/36/6529    Paralyzed vocal cords 05/12/2020    Tracheostomy dependence ([address] 05/12/2020    Type 2 diabetes with nephropathy ([address] 08/26/2019    Essential hypertension 08/26/2019    Severe obesity ([address] 07/18/2019    Moderate persistent asthma     Insomnia     Vertigo     Panic attacks     Left ear hearing loss     Ringing of ears     Memory difficulty     History of heart failure     History of asthma     Chronic respiratory failure ([address] 06/25/2019    Impaired mobility and ADLs 06/25/2019    Dysphagia 06/25/2019    Hypoxemia requiring supplemental oxygen 06/25/2019    Anxiety 06/25/2019    Status post insertion of percutaneous endoscopic gastrostomy (PEG) tube ([address] 06/20/2019    Fever 06/08/2019    History of tracheostomy 06/03/2019    Stridor 05/22/2019    History of thyroidectomy 04/04/2019    Bilateral vocal cord paralysis 04/04/2019    Type 2 diabetes mellitus with diabetic neuropathy, with long-term current use of insulin (HCC)     Chronic diastolic congestive heart failure ([facility] 08/27/2018    Depression 08/24/2018    History of vitamin D deficiency 08/28/2017    Acquired hypothyroidism 05/17/2017    Hypertensive heart disease without heart failure 05/17/2017    Dyslipidemia 05/17/2017    Diabetic neuropathy associated with type 2 diabetes mellitus ([facility]     Venous stasis dermatitis of both lower extremities 03/01/2017    Lumbar degenerative disc disease 01/26/2017    Left shoulder tendinitis 12/07/2016    Spondylosis of lumbar region without myelopathy or radiculopathy 12/07/2016    Chronic pain of both shoulders 12/07/2016    Chronic pain of both knees 12/07/2016    Chronic pain syndrome 12/07/2016    Encounter for long-term (current) use of medications 12/07/2016    Chronic midline low back pain 12/07/2016    Chronic left shoulder pain 11/11/2016    Obesity, Class II, BMI 35-39.9 11/11/2016    Obstructive sleep apnea 11/06/2015    H/O aortic valve replacement 11/06/2015    History of bicuspid aortic valve 11/06/2015    Chronic back pain 11/06/2015    Senile nuclear sclerosis 04/28/2014    Ascending aorta dilatation (HCC) 12/17/2013    Aortic valve disorder 05/24/2012    Bicuspid aortic valve 05/24/2012    Idiopathic medial aortopathy and arteriopathy ([facility] 05/24/2012    Mixed hyperlipidemia 05/24/2012    Multinodular goiter 04/11/2011    Nephrolithiasis 04/11/2011      Past Medical History:   Diagnosis Date    Acquired hypothyroidism 5/17/2017    Anxiety 6/25/2019    Bilateral vocal cord paralysis 4/4/2019    Bilateral vocal cord paralysis status post thyroidectomy (4/4/2019 - Dr. Xochitl Millard) with residual dysphagia and dysarthria; S/P Tracheostomy (6/3/2019 - Dr. Soto January); S/P PEG tube insertion (6/20/2019) - Dr. Soto January)    Chronic back pain     Lower back pain    Depression     Diabetic neuropathy associated with type 2 diabetes mellitus ([address]     Dysphagia 6/25/2019    S/P Thyroidectomy (4/4/2019 - Dr. Soto January)    History of asthma     History of heart failure     chronic diastolic heart failure    History of vitamin D deficiency 8/28/2017    Hypertensive heart disease without heart failure 5/17/2017    Hypoxemia requiring supplemental oxygen 6/25/2019    Insomnia     Left ear hearing loss     pt states nerve damage--- 25% hearing loss, described as \"muffled\"    Memory difficulty     Moderate persistent asthma     Obesity, Class II, BMI 35-39.9 11/11/2016    Obstructive sleep apnea 11/6/2015    Panic attacks     Ringing of ears     Type 2 diabetes mellitus with diabetic neuropathy, with long-term current use of insulin ([facility])     Vertigo      Past Surgical History:   Procedure Laterality Date    HX HEART VALVE SURGERY  2013    aortic valve repair    HX HYSTERECTOMY      Partial Hysterectomy - removed ovary    HX MYOMECTOMY      HX ORTHOPAEDIC  02/2018    had nerves burned on her right side of back    HX TRACHEOSTOMY  06/03/2019    S/P Tracheostomy (6/3/2019 - Dr. Soto January)   Susan  10/31/2013    open heart    SC EGD PERCUTANEOUS PLACEMENT GASTROSTOMY TUBE N/A 06/20/2019    Dr. Miguel Boykin SC THYROIDECTOMY Bilateral 04/04/2019    Dr. Gita Mcknight History     Tobacco Use    Smoking status: Never Smoker    Smokeless tobacco: Never Used   Substance Use Topics    Alcohol use: No      Family History   Problem Relation Age of Onset    Heart Disease Mother     Diabetes Mother     Stroke Mother     Hypertension Mother     Diabetes Father     Heart Disease Father     Hypertension Father     Stroke Father     Diabetes Brother     Cancer Brother     Hypertension Brother     Stroke Brother     Heart Disease Brother     Diabetes Brother     Hypertension Brother     Stroke Brother     Heart Disease Brother     Diabetes Brother     Hypertension Brother     Hypertension Brother       Prior to Admission medications    Medication Sig Start Date End Date Taking? Authorizing Provider   LORazepam (ATIVAN) 2 mg tablet take 1 tablet by mouth nightly 4/11/22  Yes Andrew Castellano MD   senna-docusate (PERICOLACE) 8.6-50 mg per tablet Take 1 Tablet by mouth daily as needed for Constipation. 4/5/22  Yes Angelito Perkins MD   fexofenadine (Allergy Relief, fexofenadine,) 180 mg tablet Take 1 Tablet by mouth daily. 3/8/22  Yes Andrew Castellano MD   insulin glargine (Lantus U-100 Insulin) 100 unit/mL injection inject 80 units subcutaneously once daily 2/23/22  Yes Angelito Perkins MD   ondansetron hcl (ZOFRAN) 4 mg tablet TAKE 1 TABLET BY MOUTH EVERY 8 HOURS AS NEEDED FOR NAUSEA AND VOMITTING 2/2/22  Yes Angelito Sood MD   albuterol (PROVENTIL HFA, VENTOLIN HFA, PROAIR HFA) 90 mcg/actuation inhaler inhale 2 puffs by mouth and INTO THE LUNGS every 4 hours if needed for wheezing 1/18/22  Yes Andrew Castellano MD   potassium chloride (KLOR-CON M10) 10 mEq tablet take 1 tablet by mouth once daily 1/10/22  Yes Andrew Castellano MD   bumetanide (BUMEX) 1 mg tablet take 1 tablet by mouth twice a day 1/10/22  Yes Andrew Castellano MD   melatonin 1 mg tablet take 1 tablet by mouth nightly 12/29/21  Yes Andrew Castellano MD   omega-3 acid ethyl esters (LOVAZA) 1 gram capsule take 1 capsule by mouth twice a day 12/13/21  Yes Andrew Castellano MD   albuterol (PROVENTIL VENTOLIN) 2.5 mg /3 mL (0.083 %) nebu 3 mL by Nebulization route every four (4) hours as needed (wheeze). Indications: asthma attack 11/17/21  Yes Andrew Castellano MD   Nebulizer & Compressor machine Use for nebulization as needed.  11/17/21  Yes Fredy Perkins MD   Nebulizer Accessories kit Use for nebulization as needed. 11/17/21  Yes Scotty Saleem MD   levothyroxine (SYNTHROID) 200 mcg tablet take 1 tablet by mouth once daily before breakfast  Patient taking differently: 300 mcg. Take with water on an empty stomach. 11/10/21  Yes Angelito Perkins MD   metoprolol succinate (TOPROL-XL) 50 mg XL tablet Take 1 Tablet by mouth daily. 10/22/21  Yes Agustín Cm MD   ergocalciferol (ERGOCALCIFEROL) 1,250 mcg (50,000 unit) capsule Take 1 Cap by mouth every seven (7) days. 5/12/21  Yes Scotty Saleem MD   ibuprofen (MOTRIN) 600 mg tablet take 1 tablet by mouth every 8 hours if needed for pain 6/29/20  Yes Scotty Saleem MD   glucose blood VI test strips (ASCENSIA AUTODISC VI, ONE TOUCH ULTRA TEST VI) strip Check blood glucose 3 times daily 4/16/20  Yes Scotty Saleem MD   lancets misc Check blood glucose 3 times daily 7/17/19  Yes Angelito Peacock MD   BD INSULIN SYRINGE ULTRA-FINE 1 mL 31 gauge x 5/16 syrg use as directed twice a day 10/18/18  Yes Angelito Peacock MD   FREESTYLE LITE STRIPS strip TEST twice a day as directed 11/20/17  Yes Angelito Perkins MD   Aspirin, Buffered 81 mg tab Take 81 mg by mouth daily. Yes Provider, Historical     Allergies   Allergen Reactions    Other Food Other (comments)     Artificial sweeteners- causes headaches    Metformin Other (comments)     Increase pain in feet and swelling in feet  Increased hunger,tired and bilat foot pain    Morphine Other (comments)     headache    Singulair [Montelukast] Other (comments)     hallucinations    Sucrose Other (comments)     Pt. Is not allergic to sucrose. She develops headaches with artificial sweeteners.     Trulicity [Dulaglutide] Other (comments)        Review of Systems:    Constitutional: No fever or chills  Neurologic: No headache  Eyes: No scleral icterus or irritated eyes  Nose: No nasal pain or drainage  Mouth: No oral lesions or sore throat  Cardiac: No palpations or chest pain  Pulmonary: chronic SOB, chronic tracheostomy  Gastrointestinal: No nausea, emesis, diarrhea. Has some constipation, responds to stool softener  Genitourinary: No dysuria  Musculoskeletal: wheelchair bound  Skin: No rashes or lesions  Psychiatric: No anxiety or depressed mood    Pertinent positives: see HPI      Objective:     Physical Exam:  Visit Vitals  /72   Pulse 76   Temp 97.4 °F (36.3 °C) (Temporal)   Resp 20   Ht 5' 7\" (1.702 m)   Wt 130.6 kg (288 lb)   SpO2 98%   BMI 45.11 kg/m²     General: No acute distress, conversant  Eyes: PERRLA, no scleral icterus  HENT: Normocephalic without oral lesions  Neck: Trachea midline  Cardiac: Normal pulse rate and rhythm, no edema, capillary refill normal 1 second  Pulmonary: Symmetric chest rise with normal effort, clear to auscultation though mildly obscured by body habitus  GI: Soft, NT, ND, no hernia, no splenomegaly  Skin: Warm without rash  Extremities: No edema or joint stiffness, moves all extremities symmetrically, steady gait  Psych: Appropriate mood and affect    Assessment:     Morbid obesity with comorbidities  Plan:   The patient presents today with severe obesity and obesity related risks/comorbidities as detailed above. The patient has made multiple unsuccessful attempts at weight loss as detailed above. The patient desires surgical weight loss for a better chance of lifelong weight control and control of co-morbidities. They have attended the bariatric seminar and meet the qualifications for surgery based on the NIH criteria. We have discussed the various surgical options including the sleeve gastrectomy, gastric bypass, duodenal switch/modified duodenal switch. We also discussed non operative alternatives. The patient is currently interested in the gastric bypass vs sleeve gastrectomy. I believe they are a good candidate for this procedure. They will need to a/an UGI and EGD with me to evaluate their anatomy pre operatively.  H pylori antigen/breath test ordered as well.    We have discussed the possible complications of bariatric surgery which include but are not limited to failed weight loss, weight regain, malnutrition, leak, bleed, stricture, gastric ulcer, gastric fistula, gastric bleed, gallstones, new or worsening gastric reflux, nausea, emesis, internal hernia, abdominal wall hernia, gastric perforation, need for revision / conversion / or reversal, pregnancy complications and loss, intestinal ischemia, post operative skin complications, possible thinning of their hair, bowel obstruction, dumping syndrome, wound infection, blood clots (DVT, mesenteric thrombus, pulmonary embolism), increased addictive tendency, risk of anesthesia, and death. The patient understands this is a life altering decision and will require compliance to the program for the remainder of their life in order to be monitored to avoid complication and ensure successful, sustained weight control. They will be placed on a lifelong low carbohydrate and low sugar diet, exercise, and vitamin regimen and will require frequent blood draws and office visits to ensure adequate nutrition and program compliance. Visits and follow up will be in compliance with the guidelines set forth by [facility]. I have specifically mentioned the need to avoid all personal and second-hand tobacco exposure, systemic steroids, and NSAIDS after any bariatric surgery to help avoid the above listed complications. The patient has expressed understanding of the above and would like to enroll in the program. The patient will be submitted for medical and psychological clearance along with establishing with out dietician and joining the pre / post operative support group. They will be screened for depression and sleep apnea and treated pre operatively if needed.  After successful completion of the preoperative regimen the patient will be submitted for insurance approval and pending this will be scheduled for surgery. Tests/clearances ordered:  CBC, CMP, A1c, H pylori, Vitamin D, CXR, EKG, EGD with me, UGI, TSH  Nutrition, support group, PCP clearance, psychological clearance, cardiac clearance, pulmonary clearance      All questions from the patient have been answered and they have demonstrated appropriate understanding of the process. RESULTS:   UGI  IMPRESSION     Unremarkable limited single upper GI.     Signed By: Ana Rankin MD [facility]  Bariatric and General Surgeon  [facility]    June 14, 2022

## 2022-06-21 ENCOUNTER — HOSPITAL ENCOUNTER (OUTPATIENT)
Dept: PHYSICAL THERAPY | Age: 62
Discharge: HOME | End: 2022-06-21
Payer: MEDICAID

## 2022-06-21 PROCEDURE — 97530 THERAPEUTIC ACTIVITIES: CPT

## 2022-06-21 PROCEDURE — 97535 SELF CARE MNGMENT TRAINING: CPT

## 2022-06-21 NOTE — PROGRESS NOTES
PHYSICAL THERAPY - DAILY TREATMENT NOTE    Patient Name: Ama Peace        Date: 2022  : 1960   YES Patient  Verified  Visit #:   2   of   8  Insurance: Payor: Humberto Jay / Plan: [facility] / Product Type: Managed Care Medicaid /      In time: 12:20 Out time: 13:30   Total Treatment Time: 70     TREATMENT AREA =  Lymphedema, not elsewhere classified [I89.0]    SUBJECTIVE    Pain Level (on 0 to 10 scale):  4  / 10   Medication Changes/New allergies or changes in medical history, any new surgeries or procedures? NO     If yes, update Summary List   Subjective Functional Status/Changes:  [x]  No changes reported     Brought all wound supplies from home          OBJECTIVE    8 min Therapeutic Activity: WC<>plinthe transfer, long<>short sit with bandages donned   Rationale: To increase safety and efficiency with ADLs. 52 min Manual Therapy: Non-specific wound care to various superficial wounds using saline wound cleanse. Application of Hydroflor, ABD pads, and Kerlix to affix wound dressings. Multilayer compression bandaging as follows:  - Eucerin  - wound dressings  - Stockinetter  - 0.4 cm soft foam  - 6 cm bandage in modified sandle pattern  - 10 cm + 12 cm bandage in herringbone pattern ankle to popliteus   The manual therapy interventions were performed at a separate and distinct time from the therapeutic activities interventions. Rationale:      increase ROM, increase tissue extensibility and decrease edema  to improve patient's ability to perform ADLs and functional mobility with improved ease and comfort    10 min Self-Care: Bandage wear/care schedule, indications for removal   Rationale: To promote carryover of lymphedema treatment protocol and increase independence with lymphedema management.     Patient Education: Self care   []  Progressed/Changed HEP based on:   Patient reports compliance     Other Objective/Functional Measures:    Compression bandaging initiated    Mild exudate from partial thickness superficial wounds. Post Treatment Pain Level (on 0 to 10) scale:   4  / 10     ASSESSMENT    Assessment/Changes in Function:   Tolerated bandaging without difficulty. Able to transfer to/from plinthe and wheelchair with SPV, patient's personal nurse to be present with her at home. Patient acknowledges all education provided and teaches back. Patient to provide majority of wound dressing materials. []  See Progress Note/Recertification   Patient will continue to benefit from skilled PT services to modify and progress therapeutic interventions, address functional mobility deficits through edema reduction, improve skin integrity for reduced infection risk, procure/train in necessary compression garments, and promote independence in long term management,   Progress toward goals / Updated goals:  Short Term Goals: To be accomplished in 2 weeks:  1. Patient to tolerate bilateral LE compression bandaging to ensure management of symptoms for progress towards ambulatory tolerance. Evaluation: to be initiated  Current: 6/21/2022 - Progressing - initiated     2. Patient to be independent in decongestive HEP to maximize therapeutic benefit of muscle pump with compression bandages donned. Evaluation: to be initiated     Long Term Goals: To be accomplished in 6 weeks:  1. Patient to be independent in self manual lymph drainage (MLD) and/or diaphragmatic breathing for bilateral lower extremities to reroute lymphatic fluid for reduction in symptoms. Evaluation: to be initiated     2. Patient to be fitted for necessary compression garments to ensure independent management of condition following discharge  Evaluation: to be fitted upon plateau of limb decongestion     3. Patient to independently don/doff compression garments for maintenance of symptoms and increase ADL tolerance. Evaluation: to be evaluated upon procurement of gaments     4.  Patient to demonstrate an overall 8% reduction in rick LE for improved standing and walking ability.   Evaluation: baseline measurements gathered; right .3, left .7     PLAN    [x]  Upgrade activities as tolerated YES Continue plan of care   []  Discharge due to :    []  Other:      Therapist: Willy Godinez, PT, PT, DPT, CLT    Date: 6/21/2022 Time: 1:37 PM     Future Appointments   Date Time Provider Mary Alice Randolph   6/22/2022 10:30 AM [facility] DX RM 4 [facility]   6/24/2022 10:30 AM Carmen Fraser, PT [facility]   6/27/2022 10:30 AM Angelito Perkins MD [facility]   6/28/2022 12:45 PM Nancy Fraser, PT [facility]   7/1/2022 10:30 AM Carmen Fraser, PT [facility]

## 2022-06-22 ENCOUNTER — HOSPITAL ENCOUNTER (OUTPATIENT)
Dept: GENERAL RADIOLOGY | Age: 62
Discharge: HOME | End: 2022-06-22
Attending: SURGERY
Payer: MEDICAID

## 2022-06-22 DIAGNOSIS — K21.9 GASTROESOPHAGEAL REFLUX DISEASE, UNSPECIFIED WHETHER ESOPHAGITIS PRESENT: ICD-10-CM

## 2022-06-22 PROCEDURE — 74011000250 HC RX REV CODE- 250: Performed by: SURGERY

## 2022-06-22 PROCEDURE — 74246 X-RAY XM UPR GI TRC 2CNTRST: CPT

## 2022-06-22 RX ADMIN — BARIUM SULFATE 352 G: 960 POWDER, FOR SUSPENSION ORAL at 11:09

## 2022-06-22 RX ADMIN — BARIUM SULFATE 700 MG: 700 TABLET ORAL at 11:09

## 2022-06-23 DIAGNOSIS — E03.4 HYPOTHYROIDISM DUE TO ACQUIRED ATROPHY OF THYROID: ICD-10-CM

## 2022-06-24 ENCOUNTER — HOSPITAL ENCOUNTER (OUTPATIENT)
Dept: PHYSICAL THERAPY | Age: 62
Discharge: HOME | End: 2022-06-24
Payer: MEDICAID

## 2022-06-24 PROCEDURE — 97530 THERAPEUTIC ACTIVITIES: CPT

## 2022-06-24 NOTE — PROGRESS NOTES
PHYSICAL THERAPY - DAILY TREATMENT NOTE    Patient Name: Macario Garcia        Date: 2022  : 1960   YES Patient  Verified  Visit #:   3   of   8  Insurance: Payor: Leslie Quintana Pamella / Plan: [facility] / Product Type: Managed Care Medicaid /      In time: 10:33 Out time: 11:35   Total Treatment Time: 4     TREATMENT AREA =  Lymphedema, not elsewhere classified [I89.0]    SUBJECTIVE    Pain Level (on 0 to 10 scale):  4   10   Medication Changes/New allergies or changes in medical history, any new surgeries or procedures? NO     If yes, update Summary List   Subjective Functional Status/Changes:  [x]  No changes reported     States she began to notice drainage coming from her belly button. Said she also feels a little more swollen in her abdomen       OBJECTIVE    8 min Therapeutic Activity: WC<>plinthe transfer, long<>short sit with bandages donned   Rationale: To increase safety and efficiency with ADLs. 50 min Manual Therapy: Non-specific wound care to various superficial wounds using saline wound cleanser. Aquaphor applied. Application of Hydroflor, ABD pads, and Kerlix to affix wound dressings. Patient's personal bandaging supplies applied to rick LE (cotton, jersey, coban)     The manual therapy interventions were performed at a separate and distinct time from the therapeutic activities interventions.   Rationale:      increase tissue extensibility and decrease edema  to improve patient's ability to perform ADLs and functional mobility with improved ease and comfort    Patient Education:  Options for compression therapy with consideration for abdominal swelling   []  Progressed/Changed HEP based on:   Patient reports compliance     Other Objective/Functional Measures:    Multiple small partial thickness, superficial wounds  No drainage this date though small areas of bleeding  Surface wound present in umbilicus     Post Treatment Pain Level (on 0 to 10) scale:   4 / 10     ASSESSMENT    Assessment/Changes in Function: Compression bandaging not completed this date per patient report of drainage from belly button; per observation, surface wound present. PT choosing to with hold also due to patient report of abdominal swelling; will adjust plan of care/compression application next session. []  See Progress Note/Recertification   Patient will continue to benefit from skilled PT services to modify and progress therapeutic interventions, address functional mobility deficits through edema reduction, improve skin integrity for reduced infection risk, procure/train in necessary compression garments, and promote independence in long term management,   Progress toward goals / Updated goals:    Shot Term Goals: To be accomplished in 2 weeks:  1. Patient to tolerate bilateral LE compression bandaging to ensure management of symptoms for progress towards ambulatory tolerance. Evaluation: to be initiated  Current: 6/24/2022 - MET - tolerated wear for 72 hours     2. Patient to be independent in decongestive HEP to maximize therapeutic benefit of muscle pump with compression bandages donned. Evaluation: to be initiated     Long Term Goals: To be accomplished in 6 weeks:  1. Patient to be independent in self manual lymph drainage (MLD) and/or diaphragmatic breathing for bilateral lower extremities to reroute lymphatic fluid for reduction in symptoms. Evaluation: to be initiated     2. Patient to be fitted for necessary compression garments to ensure independent management of condition following discharge  Evaluation: to be fitted upon plateau of limb decongestion     3. Patient to independently don/doff compression garments for maintenance of symptoms and increase ADL tolerance. Evaluation: to be evaluated upon procurement of gaments     4. Patient to demonstrate an overall 8% reduction in rick LE for improved standing and walking ability.   Evaluation: baseline measurements gathered; right LE 265.3, left LE 260.7     PLAN    [x]  Upgrade activities as tolerated YES Continue plan of care   []  Discharge due to :    []  Other:      Therapist: Annalise Horta, PT, PT, DPT, CLT    Date: 6/24/2022 Time: 7:31 AM     Future Appointments   Date Time Provider Mary Alice Randolph   6/24/2022 10:30 AM Cherie Austin, PT [facility]   6/27/2022 10:30 AM Anil Perkins MD [facility]   6/28/2022 12:45 PM IAM Fraser [facility], PT [facility]   7/1/2022 10:30 AM IAM Fraser [facility], PT [facility]

## 2022-06-25 DIAGNOSIS — E11.40 TYPE 2 DIABETES MELLITUS WITH DIABETIC NEUROPATHY, WITH LONG-TERM CURRENT USE OF INSULIN (HCC): ICD-10-CM

## 2022-06-25 DIAGNOSIS — Z79.4 TYPE 2 DIABETES MELLITUS WITH DIABETIC NEUROPATHY, WITH LONG-TERM CURRENT USE OF INSULIN (HCC): ICD-10-CM

## 2022-06-26 RX ORDER — INSULIN GLARGINE 100 [IU]/ML
INJECTION, SOLUTION SUBCUTANEOUS
Qty: 30 ML | Refills: 2 | Status: SHIPPED | OUTPATIENT
Start: 2022-06-26 | End: 2022-10-17

## 2022-06-26 RX ORDER — LEVOTHYROXINE SODIUM 50 UG/1
TABLET ORAL
Qty: 90 TABLET | Refills: 1 | Status: SHIPPED | OUTPATIENT
Start: 2022-06-26 | End: 2022-08-01 | Stop reason: DRUGHIGH

## 2022-06-28 ENCOUNTER — HOSPITAL ENCOUNTER (OUTPATIENT)
Dept: PHYSICAL THERAPY | Age: 62
Discharge: HOME | End: 2022-06-28
Payer: MEDICAID

## 2022-06-28 PROCEDURE — 97530 THERAPEUTIC ACTIVITIES: CPT

## 2022-06-28 NOTE — PROGRESS NOTES
PHYSICAL THERAPY - DAILY TREATMENT NOTE    Patient Name: Domonique Banda        Date: 2022  : 1960   YES Patient  Verified  Visit #:   4   of   8  Insurance: Payor: Satish File / Plan: [facility] / Product Type: Managed Care Medicaid /      In time: 12:35 Out time: 13:27   Total Treatment Time: 52     TREATMENT AREA =  Lymphedema, not elsewhere classified [I89.0]    SUBJECTIVE    Pain Level (on 0 to 10 scale):  4 / 10   Medication Changes/New allergies or changes in medical history, any new surgeries or procedures? NO     If yes, update Summary List   Subjective Functional Status/Changes:  [x]  No changes reported     No drainage from abdomen or increase in swelling since last visit         OBJECTIVE    8 min Therapeutic Activity: WC<>plinthe transfer, long<>short sit with bandages donned   Rationale:   To increase safety and efficiency with ADLs.      44 min Manual Therapy: Non-specific wound care to various superficial wounds using saline wound cleanser. Aquaphor applied. Skilled girth measurements and measurements for velcro garments. Application of Hydroflor, ABD pads, and Kerlix to affix wound dressings. Patient's personal bandaging supplies applied to rick LE (cotton, jersey, coban)   The manual therapy interventions were performed at a separate and distinct time from the therapeutic activities interventions.   Rationale:      increase tissue extensibility and decrease edema  to improve patient's ability to perform ADLs and functional mobility with improved ease and comfort     min Patient Education:  YES  Reviewed plan of care and garment procurement   []  Progressed/Changed HEP based on:   Patient reports compliance     Other Objective/Functional Measures:    Girth (cm):  Right LE  2022 Left LE    Right LE  2022    MTP: 24.0 22.0 24.6 23.2   Midfoot/navicular: 25.2 23.6 25.4 24.8   Ankle: (7 cm from floor) 26.8 27.5 27.1 26.9   Calf     (15 cm from floor) 36.8 35.7 32.3 36.0   Calf:      (30 cm from floor) 50.3 51.2 47.5 50.0   Knee:    (patella) 46.7 45.7 49.4 51.0   Thigh      55.5 55.0 58.4 57.6   TOTAL 265.3 260.7 264.7 269.5    Abdomen = 148.4     Right LE = -0.6 cm   Left LE = + 8.8 cm    Vitals at beginning of session:  BP = 128/78 mmHG  HR = 68 bpm  SpO2 = 98%     Post Treatment Pain Level (on 0 to 10) scale:   4  / 10     ASSESSMENT    Assessment/Changes in Function: Vitals to be monitored due to increase of abdominal swelling after compression therapy last session. Patient did not bring compression bandaging materials this date; will re-apply next session. Minimal change in edema noted since start of care, slight increased left LE. PT to submit order for velcro compression garments. []  See Progress Note/Recertification   Patient will continue to benefit from skilled PT services to modify and progress therapeutic interventions, address functional mobility deficits through edema reduction, improve skin integrity for reduced infection risk, procure/train in necessary compression garments, and promote independence in long term management,   Progress toward goals / Updated goals:  Shot Term Goals: To be accomplished in 2 weeks:  1. Patient to tolerate bilateral LE compression bandaging to ensure management of symptoms for progress towards ambulatory tolerance. Evaluation: to be initiated  Current: 6/24/2022 - MET - tolerated wear for 72 hours     2. Patient to be independent in decongestive HEP to maximize therapeutic benefit of muscle pump with compression bandages donned. Evaluation: to be initiated     Long Term Goals: To be accomplished in 6 weeks:  1. Patient to be independent in self manual lymph drainage (MLD) and/or diaphragmatic breathing for bilateral lower extremities to reroute lymphatic fluid for reduction in symptoms. Evaluation: to be initiated     2.  Patient to be fitted for necessary compression garments to ensure independent management of condition following discharge  Evaluation: to be fitted upon plateau of limb decongestion     3. Patient to independently don/doff compression garments for maintenance of symptoms and increase ADL tolerance. Evaluation: to be evaluated upon procurement of gaments     4. Patient to demonstrate an overall 8% reduction in rick LE for improved standing and walking ability.   Evaluation: baseline measurements gathered; right LE 265.3, left LE 260.7     PLAN    [x]  Upgrade activities as tolerated YES Continue plan of care   []  Discharge due to :    []  Other:      Therapist: Seven Gonzalez, PT, PT, DPT, CLT    Date: 6/28/2022 Time: 9:40 AM     Future Appointments   Date Time Provider Mary Alice Randolph   6/28/2022 12:45 PM [facility]   7/1/2022 10:30 AM [facility]   8/1/2022 12:15 PM Spenser Perkins MD [facility] AM[facility]

## 2022-06-29 ENCOUNTER — TELEPHONE (OUTPATIENT)
Dept: FAMILY MEDICINE CLINIC | Age: 62
End: 2022-06-29

## 2022-06-29 DIAGNOSIS — Z79.4 TYPE 2 DIABETES MELLITUS WITH DIABETIC NEUROPATHY, WITH LONG-TERM CURRENT USE OF INSULIN (HCC): Primary | ICD-10-CM

## 2022-06-29 DIAGNOSIS — E11.40 TYPE 2 DIABETES MELLITUS WITH DIABETIC NEUROPATHY, WITH LONG-TERM CURRENT USE OF INSULIN (HCC): Primary | ICD-10-CM

## 2022-06-29 RX ORDER — INSULIN PUMP SYRINGE, 3 ML
EACH MISCELLANEOUS
Qty: 1 KIT | Refills: 0 | Status: SHIPPED | OUTPATIENT
Start: 2022-06-29

## 2022-06-29 NOTE — TELEPHONE ENCOUNTER
Dr Anna Cardoso pt is requesting a new blood glucose monitoring kit, Ms Luis Woo stated her current kit is not working properly. Ms Luis Krishnany did not know the name of her previous device.   Please follow up when available

## 2022-07-01 ENCOUNTER — HOSPITAL ENCOUNTER (OUTPATIENT)
Dept: PHYSICAL THERAPY | Age: 62
Discharge: HOME | End: 2022-07-01
Payer: MEDICAID

## 2022-07-01 PROCEDURE — 97530 THERAPEUTIC ACTIVITIES: CPT

## 2022-07-01 NOTE — PROGRESS NOTES
PHYSICAL THERAPY - DAILY TREATMENT NOTE    Patient Name: Malik Che        Date: 2022  : 1960   YES Patient  Verified  Visit #:   5   of   8  Insurance: Payor: Leslie WASSERMAN [facility] / Plan: [address] [facility] / Product Type: Managed Care Medicaid /      In time: 10:08 Out time: 11:05   Total Treatment Time: 57     TREATMENT AREA =  Lymphedema, not elsewhere classified [I89.0]    SUBJECTIVE    Pain Level (on 0 to 10 scale):  4  / 10   Medication Changes/New allergies or changes in medical history, any new surgeries or procedures? NO     If yes, update Summary List   Subjective Functional Status/Changes:  [x]  No changes reported     Needs to cancel next week's appointment so will be out of clinic for at least a week and a half. OBJECTIVE    8 min Therapeutic Activity: WC<>plinthe transfer, long<>short sit with bandages donned     Rationale: To increase safety and efficiency with ADLs. 49 min Manual Therapy: Non-specific wound care to various superficial wounds using saline wound cleanser. Aquaphor applied.     Application of Hydroflor, ABD pads, and Kerlix to affix wound dressings. Coban layered with Tubigrip for low-grade compression. The manual therapy interventions were performed at a separate and distinct time from the therapeutic activities interventions.   Rationale:      decrease pain, increase ROM, increase tissue extensibility and decrease edema  to improve patient's ability to perform ADLs and functional mobility with improved ease and comfort       min Patient Education:  YES  Reviewed wear schedule   []  Progressed/Changed HEP based on:   Patient reports compliance     Other Objective/Functional Measures:    Vitals at beginning of session:  BP = 132/98 mmHg  HR = 72 bpm  SpO2 = 98%       Post Treatment Pain Level (on 0 to 10) scale:    10     ASSESSMENT    Assessment/Changes in Function: Compression bandaging not applied per patient anticipating being out of clinic and out of PT supervision for >1 week. PT recommending close monitoring of vitals and response to compression bandaging per onset of increased proximal swelling and drainage with last attempt; will apply compression next session. []  See Progress Note/Recertification   Patient will continue to benefit from skilled PT services to modify and progress therapeutic interventions, address functional mobility deficits, address ROM deficits, address strength deficits, analyze and address soft tissue restrictions, analyze and cue movement patterns, analyze and modify body mechanics/ergonomics and assess and modify postural abnormalities to attain remaining goals. Progress toward goals / Updated goals:  Shot Term Goals: To be accomplished in 2 weeks:  1. Patient to tolerate bilateral LE compression bandaging to ensure management of symptoms for progress towards ambulatory tolerance. Evaluation: to be initiated  Current: 6/24/2022 - MET - tolerated wear for 72 hours     2. Patient to be independent in decongestive HEP to maximize therapeutic benefit of muscle pump with compression bandages donned. Evaluation: to be initiated     Long Term Goals: To be accomplished in 6 weeks:  1. Patient to be independent in self manual lymph drainage (MLD) and/or diaphragmatic breathing for bilateral lower extremities to reroute lymphatic fluid for reduction in symptoms. Evaluation: to be initiated     2. Patient to be fitted for necessary compression garments to ensure independent management of condition following discharge  Evaluation: to be fitted upon plateau of limb decongestion     3. Patient to independently don/doff compression garments for maintenance of symptoms and increase ADL tolerance. Evaluation: to be evaluated upon procurement of gaments     4. Patient to demonstrate an overall 8% reduction in rick LE for improved standing and walking ability.   Evaluation: baseline measurements gathered; right LE 265.3, left LE 260.7     PLAN    [x]  Upgrade activities as tolerated YES Continue plan of care   []  Discharge due to :    []  Other:      Therapist: Craig Hope, PT, PT, DPT, CLT    Date: 7/1/2022 Time: 8:22 AM     Future Appointments   Date Time Provider Mary Alice Randolph   7/1/2022 10:30 AM Erman Deacon, PT [facility]   7/6/2022  2:15 PM Erman Deacon, PT [facility]   7/12/2022  1:45 PM Erman Deacon, PT [facility]   7/15/2022 11:30 AM Erman Deacon, PT [facility]   7/19/2022 12:45 PM Erman Deacon, PT [facility]   7/22/2022 11:30 AM Erman Deacon, PT [facility]   7/26/2022 12:45 PM Erman Deacon, PT [facility]   7/29/2022 11:30 AM Erman Deacon, PT [facility]   8/1/2022 12:15 PM Angelito Perkins MD [facility] BS AMB

## 2022-07-05 DIAGNOSIS — E55.9 HYPOVITAMINOSIS D: ICD-10-CM

## 2022-07-05 RX ORDER — OMEGA-3-ACID ETHYL ESTERS 1 G/1
1 CAPSULE, LIQUID FILLED ORAL 2 TIMES DAILY
Qty: 60 CAPSULE | Refills: 2 | Status: SHIPPED | OUTPATIENT
Start: 2022-07-05

## 2022-07-05 RX ORDER — ERGOCALCIFEROL 1.25 MG/1
CAPSULE ORAL
Qty: 4 CAPSULE | Refills: 2 | OUTPATIENT
Start: 2022-07-05

## 2022-07-05 RX ORDER — IBUPROFEN 600 MG/1
600 TABLET ORAL
Qty: 30 TABLET | Refills: 0 | Status: SHIPPED | OUTPATIENT
Start: 2022-07-05

## 2022-07-05 NOTE — TELEPHONE ENCOUNTER
This patient contacted office for the following prescriptions to be filled:    Last office visit: 4/5/22  Follow up appointment: 8/1/22  Medication requested :   Requested Prescriptions     Pending Prescriptions Disp Refills    ibuprofen (MOTRIN) 600 mg tablet 90 Tablet 0    omega-3 acid ethyl esters (LOVAZA) 1 gram capsule 60 Capsule 2     Sig: Take 1 Capsule by mouth two (2) times a day.     ergocalciferol (Vitamin D2) 1,250 mcg (50,000 unit) capsule 4 Capsule 2       PCP: Dr. Adam Mckeon  Mail order or Local pharmacy name: [facility]

## 2022-07-05 NOTE — TELEPHONE ENCOUNTER
Please see refill requests    Patient was last seen on 4-5-2022    Ibuprofen last prescribed on 6-  #90 X 0    Omega 3 (Caitlin Gutierrezus) last prescribed on 12-  #60 X 2    Vitamin D last prescribed on 5-  #13 X 4 (Last lab was  5-6-2021  Vitamin D Hydroxy-20.4)    Next scheduled appt is 8-1-2022    Thank you

## 2022-07-05 NOTE — TELEPHONE ENCOUNTER
Please advise to take otc vitamin D 1000 units daily till labs done. Also given 30 tabs of ibuprofen as no renal function since one year on file.

## 2022-07-06 ENCOUNTER — APPOINTMENT (OUTPATIENT)
Dept: PHYSICAL THERAPY | Age: 62
End: 2022-07-06
Payer: MEDICAID

## 2022-07-11 ENCOUNTER — TELEPHONE (OUTPATIENT)
Dept: FAMILY MEDICINE CLINIC | Age: 62
End: 2022-07-11

## 2022-07-11 RX ORDER — BLOOD-GLUCOSE METER
KIT MISCELLANEOUS
Qty: 100 STRIP | Refills: 0 | Status: SHIPPED | OUTPATIENT
Start: 2022-07-11

## 2022-07-12 ENCOUNTER — HOSPITAL ENCOUNTER (OUTPATIENT)
Dept: PHYSICAL THERAPY | Age: 62
Discharge: HOME | End: 2022-07-12
Payer: MEDICAID

## 2022-07-12 PROCEDURE — 97530 THERAPEUTIC ACTIVITIES: CPT

## 2022-07-12 PROCEDURE — 97535 SELF CARE MNGMENT TRAINING: CPT

## 2022-07-12 NOTE — PROGRESS NOTES
PHYSICAL THERAPY - DAILY TREATMENT NOTE    Patient Name: Luis E Burnette        Date: 2022  : 1960   YES Patient  Verified  Visit #:   1   of   8  Insurance: Payor: [facility] MEDICAID / Plan: [facility] / Product Type: Managed Care Medicaid /      In time: 13:38 Out time: 14:35   Total Treatment Time: 57     TREATMENT AREA =  Lymphedema, not elsewhere classified [I89.0]    SUBJECTIVE    Pain Level (on 0 to 10 scale):  4  / 10   Medication Changes/New allergies or changes in medical history, any new surgeries or procedures? NO     If yes, update Summary List   Subjective Functional Status/Changes:  [x]  No changes reported     Has had wraps on since last visit. Feels a little sore in the knees. OBJECTIVE    8 min Therapeutic Activity: WC<>plinthe transfer, long<>short sit with bandages donned     Rationale: To increase safety and efficiency with ADLs. 33 min Manual Therapy: Dressings taken down by PT    Multilayer compression bandaging to rick LE to maximize lymphatic flow as follows:  - Aquaphor  - Stockinette  - toe wraps  - 0.4 cm soft foam  - 6 cm bandage in modified sandle pattern  - 10 cm + 12 cm bandage in herringbone pattern ankle to popliteus   The manual therapy interventions were performed at a separate and distinct time from the therapeutic activities interventions. Rationale:      increase tissue extensibility and decrease edema  to improve patient's ability to perform ADLs and functional mobility with improved ease and comfort    8 min Self-: Review of indications for bandage removal.    Rationale: To promote carryover of lymphedema treatment protocol and increase independence with lymphedema management.      min Patient Education: Self Care   []  Progressed/Changed HEP based on:   Patient reports compliance     Other Objective/Functional Measures:    Vitals at beginning of session:  BP = 128/78 mmHg  HR = 70 bpm  SpO2 = 98%      Girth (cm):  Right LE  6/7/2022 Left LE    Right LE  6/28/2022   Right LE  7/12/2022 Left LE   MTP: 24.0 22.0 24.6 23.2 23.4 27.1   Midfoot/navicular: 25.2 23.6 25.4 24.8 24.0 24.2   Ankle: (7 cm from floor) 26.8 27.5 27.1 26.9 24.5 25.0   Calf     (15 cm from floor) 36.8 35.7 32.3 36.0 29.7 31.0   Calf:      (30 cm from floor) 50.3 51.2 47.5 50.0 44.0 45.5   Knee:    (patella) 46.7 45.7 49.4 51.0 48.5 45.4   Thigh      55.5 55.0 58.4 57.6 57.0 56.5   TOTAL 265.3 260.7 264.7 269.5 251.1 254.7    Abdomen = 149.5     Right LE = 14.2 cm = 5.4% reduction   Left LE =-6 cm = 2.3% reduction     Post Treatment Pain Level (on 0 to 10) scale:   4  / 10     ASSESSMENT    Assessment/Changes in Function: Patient continues with stable vitals through CDT. Abdominal and upper leg girth measurements also stable; will continue to monitor to ensure limiting stasis of lymphatic fluid in abdomen. Otherwise patient with good response to compression therapy. Skin now intact without exudate and reduced edema noted (2-5% from start of care). Will initiated decongestive HEP and diaphragmatic breathing as able now that less time is required for skin care. Order for compression garments submitted. []  See Progress Note/Recertification   Patient will continue to benefit from skilled PT services to modify and progress therapeutic interventions, address functional mobility deficits, address ROM deficits, address strength deficits, analyze and address soft tissue restrictions, analyze and cue movement patterns, analyze and modify body mechanics/ergonomics and assess and modify postural abnormalities to attain remaining goals. Progress toward goals / Updated goals:  Shot Term Goals: To be accomplished in 2 weeks:  1. Patient to tolerate bilateral LE compression bandaging to ensure management of symptoms for progress towards ambulatory tolerance.   Evaluation: to be initiated  Current: 6/24/2022 - MET - tolerated wear for 72 hours     2. Patient to be independent in decongestive HEP to maximize therapeutic benefit of muscle pump with compression bandages donned. Evaluation: to be initiated  Current: 7/12/2022 - To be initiated     Long Term Goals: To be accomplished in 6 weeks:  1. Patient to be independent in self manual lymph drainage (MLD) and/or diaphragmatic breathing for bilateral lower extremities to reroute lymphatic fluid for reduction in symptoms. Evaluation: to be initiated  Current: 7/12/2022 - Unchanged     2. Patient to be fitted for necessary compression garments to ensure independent management of condition following discharge  Evaluation: to be fitted upon plateau of limb decongestion  Current: 7/12/2022 - MET - measured and order submitted     3. Patient to independently don/doff compression garments for maintenance of symptoms and increase ADL tolerance. Evaluation: to be evaluated upon procurement of gaments   Current: 7/12/2022 - Progressing - measured and order submitted     4. Patient to demonstrate an overall 8% reduction in rick LE for improved standing and walking ability.   Evaluation: baseline measurements gathered; right LE 265.3, left LE 260.7  Current: 7/12/2022 - Progressing  Right LE = 14.2 cm = 5.4% reduction   Left LE =-6 cm = 2.3% reduction     PLAN    [x]  Upgrade activities as tolerated YES Continue plan of care   []  Discharge due to :    []  Other:      Therapist: Dayanara Arita, PT, PT, DPT, CLT    Date: 7/12/2022 Time: 11:11 AM     Future Appointments   Date Time Provider Mary Alice Randolph   7/12/2022  1:45 PM Marzetta Cotton, PT [facility]   7/15/2022 11:30 AM Marzetta Cotton, PT [facility]   7/19/2022 12:45 PM Marzetta Cotton, PT [facility]   7/22/2022 11:30 AM Marzetta Cotton, PT [facility]   7/26/2022 12:45 PM Marzetta Cotton, PT [facility]   7/29/2022 11:30 AM Marzetta Cotton, PT [facility]   8/1/2022 12:15 PM Angelito Perkins MD [facility] BS AMB

## 2022-07-12 NOTE — PROGRESS NOTES
[facility]  [address]  [place], [address]  [phone] fax    Physical Therapy Progress Note    Patient name: Heri Morel Start of Care: 2022   Referral source: Dayanara Kennedy MD : 1960                Medical Diagnosis: Lymphedema, not elsewhere classified [I89.0]  Payor: Josué Billings / Plan: [address] [place] [address] / Product Type: Managed Care Medicaid /  Onset Date:2022                Treatment Diagnosis: Lymphedema, bilateral lower extremity   Prior Hospitalization: see medical history Provider#: [id number]   Medications: Verified on Patient summary List    Comorbidities: Tracheostomy with supplemental O2, OA, asthma, DM II, BMI >30, depression, back pain   Prior Level of Function: Edema managed, greater ease with Le elevation. Wheelchair for community integration, ambulating short household distances. Requires supplemental O2 via tracheostomy. Visits from Start of Care: 6    Missed Visits: 0      Established Goals:        Excellent         Good         Limited            None  [] Increased ROM   []  []  []  []  [] Increased Strength  []  []  []  []  [] Increased Mobility  []  []  []  []   [] Decreased Pain   []  []  []  []  [x] Decreased Swelling  []  [x]  []  []    Key Functional Changes: Reduced limb edema, improved skin integrity    Updated Goals: to be achieved in 4 weeks:  1. Patient to be independent in decongestive HEP to maximize therapeutic benefit of muscle pump with compression bandages donned. PN: To be initiated    2. Patient to be independent in self manual lymph drainage (MLD) and/or diaphragmatic breathing for bilateral lower extremities to reroute lymphatic fluid for reduction in symptoms. PN:  to be initiated     3. Patient to independently don/doff compression garments for maintenance of symptoms and increase ADL tolerance. PN:  measured and order submitted     4.  Patient to demonstrate an overall 8% reduction in rick LE for improved standing and walking ability. PN:  Right LE = 14.2 cm = 5.4% reduction , Left LE =-6 cm = 2.3% reduction    ASSESSMENT/RECOMMENDATIONS: Patient continues with stable vitals through CDT. Abdominal and upper leg girth measurements also stable; will continue to monitor to ensure limiting stasis of lymphatic fluid in abdomen. Otherwise patient with good response to compression therapy. Skin now intact without exudate and reduced edema noted (2-5% from start of care). Will initiated decongestive HEP and diaphragmatic breathing as able now that less time is required for skin care. Order for compression garments submitted. []Continue therapy per initial plan/protocol at a frequency of  2 x per week for 4 weeks  []Continue therapy with the following recommended changes:_____________________      _____________________________________________________________________  []Discontinue therapy progressing towards or have reached established goals  []Discontinue therapy due to lack of appreciable progress towards goals  []Discontinue therapy due to lack of attendance or compliance  []Await Physician's recommendations/decisions regarding therapy  []Other:________________________________________________________________    Thank you for this referral.    Kelechi Morris, PT 7/12/2022 11:14 AM  NOTE TO PHYSICIAN:  PLEASE COMPLETE THE ORDERS BELOW AND   FAX TO [facility]: [phone]  If you are unable to process this request in 24 hours please contact our office: [phone]    ? I have read the above report and request that my patient continue as recommended. ? I have read the above report and request that my patient continue therapy with the following changes/special instructions:__________________________________________________________  ? I have read the above report and request that my patient be discharged from therapy.     Physicians signature: ______________________________Date: ______Time:______     Miriam Wasserman MD

## 2022-07-14 ENCOUNTER — TELEPHONE (OUTPATIENT)
Dept: PHYSICAL THERAPY | Age: 62
End: 2022-07-14

## 2022-07-15 ENCOUNTER — APPOINTMENT (OUTPATIENT)
Dept: PHYSICAL THERAPY | Age: 62
End: 2022-07-15
Payer: MEDICAID

## 2022-07-19 ENCOUNTER — HOSPITAL ENCOUNTER (OUTPATIENT)
Dept: PHYSICAL THERAPY | Age: 62
Discharge: HOME | End: 2022-07-19
Payer: MEDICAID

## 2022-07-19 PROCEDURE — 97110 THERAPEUTIC EXERCISES: CPT

## 2022-07-19 PROCEDURE — 97530 THERAPEUTIC ACTIVITIES: CPT

## 2022-07-19 NOTE — PROGRESS NOTES
PHYSICAL THERAPY - DAILY TREATMENT NOTE    Patient Name: Jennifer Amaro        Date: 2022  : 1960   YES Patient  Verified  Visit #:   1   of   8  Insurance: Payor: [facility] MEDICAID / Plan: [facility] [employer] EL[employer] PLUS / Product Type: Managed Care Medicaid /      In time: 12:35 Out time: 13:28   Total Treatment Time: 53     TREATMENT AREA =  Lymphedema, not elsewhere classified [I89.0]    SUBJECTIVE    Pain Level (on 0 to 10 scale):  3  / 10   Medication Changes/New allergies or changes in medical history, any new surgeries or procedures? NO     If yes, update Summary List   Subjective Functional Status/Changes:  [x]  No changes reported     Wraps came off today. Breathing has felt somewhat more labored but not necessarily attributing that to the compression. OBJECTIVE      8 min Therapeutic Exercise:  [x]  See flow sheet : Decongestive exercise to maximize lymphatic return via muscle joint pump action   Rationale:      Decrease edema to improve the patients ability to perform functional mobility with increased independence and safety and perform ADLs with increased ease. 8 min Therapeutic Activity: WC<>plinthe transfer, long<>short sit with bandages donned   Rationale: To increase safety and efficiency with ADLs. 36 min Manual Therapy: Bandages re-rolled    Skilled girth measurements    Multilayer compression bandaging to rick LE to maximize lymphatic flow as follows:  - Aquaphor  - Stockinette  - toe wraps  - 0.4 cm soft foam  - 6 cm bandage in modified sandle pattern  - 10 cm + 12 cm bandage in herringbone pattern ankle to popliteus   The manual therapy interventions were performed at a separate and distinct time from the therapeutic activities interventions.   Rationale:      increase tissue extensibility and decrease edema  to improve patient's ability to perform ADLs and functional mobility with improved ease and comfort      Patient Education: bandage launder schedule prior to next session     Other Objective/Functional Measures:    Vitals at beginning of session:  BP = 132/82 mmHg  HR = 66 bpm  SpO2 = 98%     Girth (cm):  Right LE  6/7/2022 Left LE    Right LE  6/28/2022   Right LE  7/12/2022 Left LE Right LE  7/19/22 Left LE   MTP: 24.0 22.0 24.6 23.2 23.4 27.1 24.5 22.4   Midfoot/navicular: 25.2 23.6 25.4 24.8 24.0 24.2 23.5 23.1   Ankle: (7 cm from floor) 26.8 27.5 27.1 26.9 24.5 25.0 24.3 24.3   Calf     (15 cm from floor) 36.8 35.7 32.3 36.0 29.7 31.0 27.0 26.7   Calf:      (30 cm from floor) 50.3 51.2 47.5 50.0 44.0 45.5 44.1 46.1   Knee:    (patella) 46.7 45.7 49.4 51.0 48.5 45.4 47.3 43.2   Thigh      55.5 55.0 58.4 57.6 57.0 56.5 57.1 56.3   TOTAL 265.3 260.7 264.7 269.5 251.1 254.7 247.8 242. 1    Abdomen = 149.5 cm     Right LE = 17.5 cm = 6.6 % reduction   Left LE =18.6 cm = 7.1 % reduction     Post Treatment Pain Level (on 0 to 10) scale:   3  / 10     ASSESSMENT    Assessment/Changes in Function: Patient continues with excellent response to compression therapy. Upper leg and abdominal measurements stable as well as vitals. Skin totally closed with no open areas or drainage. No wound dressing required this date. Decongestive exercise initiated and modified for patient tolerance. []  See Progress Note/Recertification   Patient will continue to benefit from skilled PT services to modify and progress therapeutic interventions, address functional mobility deficits via edema reduction and AROM/strength improvement, procure/train in necessary compression garments, and facilitate independence in long term management. Progress toward goals / Updated goals:  1. Patient to be independent in decongestive HEP to maximize therapeutic benefit of muscle pump with compression bandages donned. PN: To be initiated  Current: 7/19/2022 - Progressing - initiated this date     2.  Patient to be independent in self manual lymph drainage (MLD) and/or diaphragmatic breathing for bilateral lower extremities to reroute lymphatic fluid for reduction in symptoms. PN:  to be initiated     3. Patient to independently don/doff compression garments for maintenance of symptoms and increase ADL tolerance.    PN:  measured and order submitted     4. Patient to demonstrate an overall 8% reduction in rick LE for improved standing and walking ability.   PN:  Right LE = 14.2 cm = 5.4% reduction , Left LE =-6 cm = 2.3% reduction  Current: 7/19/2022 - Progressing - Right LE = 17.5 cm = 6.6 % reduction , Left LE =18.6 cm = 7.1 % reduction       PLAN    [x]  Upgrade activities as tolerated YES Continue plan of care   []  Discharge due to :    []  Other:      Therapist: Cosme Epstein, PT, PT, DPT, CLT    Date: 7/19/2022 Time: 8:22 AM     Future Appointments   Date Time Provider Mary Alice Randolph   7/19/2022 12:45 PM Lolly [place], PT [facility]   7/22/2022 11:30 AM Lolly [place], PT [facility]   7/26/2022 12:45 PM Lolly [place], PT [facility]   7/29/2022 11:30 AM Lolly [place], PT [facility]   8/1/2022 12:15 PM Yesica Perkins MD [facility]   8/4/2022  1:15 PM Angelo Mcfarlane MD [facility] AM[facility]

## 2022-07-22 ENCOUNTER — HOSPITAL ENCOUNTER (OUTPATIENT)
Dept: PHYSICAL THERAPY | Age: 62
Discharge: HOME | End: 2022-07-22
Payer: MEDICAID

## 2022-07-22 DIAGNOSIS — I10 ESSENTIAL HYPERTENSION: ICD-10-CM

## 2022-07-22 PROCEDURE — 97530 THERAPEUTIC ACTIVITIES: CPT

## 2022-07-22 PROCEDURE — 97110 THERAPEUTIC EXERCISES: CPT

## 2022-07-22 NOTE — TELEPHONE ENCOUNTER
Requested Prescriptions     Pending Prescriptions Disp Refills    metoprolol succinate (TOPROL-XL) 50 mg XL tablet 90 Tablet 1     Sig: Take 1 Tablet by mouth in the morning.

## 2022-07-22 NOTE — PROGRESS NOTES
PHYSICAL THERAPY - DAILY TREATMENT NOTE    Patient Name: Lowell Poag        Date: 2022  : 1960   YES Patient  Verified  Visit #:   2   of   8  Insurance: Payor: [facility] MEDICAID / Plan: [facility] [employer] PLUS / Product Type: Managed Care Medicaid /      In time: 11:33 Out time: 12:20   Total Treatment Time: 6639     TREATMENT AREA =  Lymphedema, not elsewhere classified [I89.0]    SUBJECTIVE    Pain Level (on 0 to 10 scale):  3  / 10   Medication Changes/New allergies or changes in medical history, any new surgeries or procedures? NO     If yes, update Summary List   Subjective Functional Status/Changes:  [x]  No changes reported     States the wraps came off yesterday and her legs swelled back up. OBJECTIVE    8 min Therapeutic Exercise:  [x]  See flow sheet : Decongestive exercise to maximize lymphatic return via muscle joint pump action   Rationale:      Decrease edema to improve the patients ability to perform functional mobility with increased independence and safety and perform ADLs with increased ease. 8 min Therapeutic Activity: WC<>plinthe transfer, long<>short sit with bandages donned   Rationale: To increase safety and efficiency with ADLs. 29 min Manual Therapy: Non-specific skin care, washing with patient's personal Hib[place]    Multilayer compression bandaging to rick LE to maximize lymphatic flow as follows:  - Aquaphor  - Stockinette  - toe wraps  - 0.4 cm soft foam  - 6 cm bandage in modified sandle pattern  - 10 cm + 12 cm bandage in herringbone pattern ankle to popliteus   The manual therapy interventions were performed at a separate and distinct time from the therapeutic activities interventions.   Rationale:      increase tissue extensibility and decrease edema  to improve patient's ability to perform ADLs and functional mobility with improved ease and comfort    Patient Education: review HEP     Other Objective/Functional Measures:    Intermittent small areas of bleeding due to patient scratching     Post Treatment Pain Level (on 0 to 10) scale:   3  / 10     ASSESSMENT    Assessment/Changes in Function: Increased limb edema per visual observation in addition to increased scaly skin. PT attributing this to time spent without compression. Otherwise tolerated session without difficulty. Vitals remain stable suggesting no indication of mechanical overload     []  See Progress Note/Recertification   Patient will continue to benefit from skilled PT services to modify and progress therapeutic interventions, address functional mobility deficits via edema reduction and AROM/strength improvement, procure/train in necessary compression garments, and facilitate independence in long term management. Progress toward goals / Updated goals:    1. Patient to be independent in decongestive HEP to maximize therapeutic benefit of muscle pump with compression bandages donned. PN: To be initiated  Current: 7/19/2022 - Progressing - initiated this date     2. Patient to be independent in self manual lymph drainage (MLD) and/or diaphragmatic breathing for bilateral lower extremities to reroute lymphatic fluid for reduction in symptoms. PN:  to be initiated     3. Patient to independently don/doff compression garments for maintenance of symptoms and increase ADL tolerance. PN:  measured and order submitted     4. Patient to demonstrate an overall 8% reduction in rick LE for improved standing and walking ability.   PN:  Right LE = 14.2 cm = 5.4% reduction , Left LE =-6 cm = 2.3% reduction  Current: 7/19/2022 - Progressing - Right LE = 17.5 cm = 6.6 % reduction , Left LE =18.6 cm = 7.1 % reduction        PLAN    [x]  Upgrade activities as tolerated YES Continue plan of care   []  Discharge due to :    []  Other:      Therapist: Cosme Epstein PT, PT, DPT, CLT    Date: 7/22/2022 Time: 7:30 AM     Future Appointments   Date Time Provider Mary Alice Randolph   7/22/2022 11:30 AM Tarri Backbone, PT [facility]   7/26/2022 12:45 PM Tarri Backbone, PT [facility]   7/29/2022 11:30 AM Tarri Backbone, PT [facility]   8/1/2022 12:15 PM Remington Perkins MD [facility] BS AMB   8/4/2022  1:15 PM Monse Mcintosh MD [facility] AM[facility]

## 2022-07-25 RX ORDER — METOPROLOL SUCCINATE 50 MG/1
50 TABLET, EXTENDED RELEASE ORAL DAILY
Qty: 90 TABLET | Refills: 1 | Status: SHIPPED | OUTPATIENT
Start: 2022-07-25 | End: 2022-08-05

## 2022-07-25 NOTE — TELEPHONE ENCOUNTER
Please see refill request    Patient was last seen on 4-5-2022    Last prescribed on 10-  #90 X 1    Next visit scheduled on 7-    Thank you

## 2022-07-26 ENCOUNTER — HOSPITAL ENCOUNTER (OUTPATIENT)
Dept: PHYSICAL THERAPY | Age: 62
Discharge: HOME | End: 2022-07-26
Payer: MEDICAID

## 2022-07-26 PROCEDURE — 97110 THERAPEUTIC EXERCISES: CPT

## 2022-07-26 PROCEDURE — 97535 SELF CARE MNGMENT TRAINING: CPT

## 2022-07-26 PROCEDURE — 97530 THERAPEUTIC ACTIVITIES: CPT

## 2022-07-29 ENCOUNTER — HOSPITAL ENCOUNTER (OUTPATIENT)
Dept: PHYSICAL THERAPY | Age: 62
Discharge: HOME | End: 2022-07-29
Payer: MEDICAID

## 2022-07-29 PROCEDURE — 97530 THERAPEUTIC ACTIVITIES: CPT

## 2022-07-29 PROCEDURE — 97110 THERAPEUTIC EXERCISES: CPT

## 2022-07-29 NOTE — PROGRESS NOTES
PHYSICAL THERAPY - DAILY TREATMENT NOTE    Patient Name: Mary Jane Hassan        Date: 2022  : 1960   YES Patient  Verified  Visit #:   4   of   8  Insurance: Payor: [facility] MEDICAID / Plan: [facility] [employer] ELITE PLUS / Product Type: Managed Care Medicaid /      In time: 11:30 Out time: 12:09   Total Treatment Time: 39     TREATMENT AREA =  Lymphedema, not elsewhere classified [I89.0]    SUBJECTIVE    Pain Level (on 0 to 10 scale):  3  / 10   Medication Changes/New allergies or changes in medical history, any new surgeries or procedures? NO     If yes, update Summary List   Subjective Functional Status/Changes:  [x]  No changes reported     OBJECTIVE      8 min Therapeutic Exercise:  [x]  See flow sheet : Decongestive exercise to maximize lymphatic return via muscle joint pump action   Rationale:      Decrease edema to improve the patients ability to perform functional mobility with increased independence and safety and perform ADLs with increased ease. 8 min Therapeutic Activity: WC<>plinthe transfer, long<>short sit with bandages donned   Rationale: To increase safety and efficiency with ADLs. 23 min Manual Therapy: Non-specific skin care, washing with patient's personal [place]    Multilayer compression bandaging to rick LE to maximize lymphatic flow as follows:  - Aquaphor  - Stockinette  - toe wraps  - 0.4 cm soft foam  - 6 cm bandage in modified sandle pattern  - 10 cm + 12 cm bandage in herringbone pattern ankle to popliteus   The manual therapy interventions were performed at a separate and distinct time from the therapeutic activities interventions. Rationale:      decrease pain, increase tissue extensibility, and decrease edema  to improve patient's ability to perform ADLs and functional mobility with improved ease and comfort      Patient Education: MLD and initiation next session     Other Objective/Functional Measures:    Skin intact.      Post Treatment Pain Level (on 0 to 10) scale:   3  / 10     ASSESSMENT    Assessment/Changes in Function: Patient continuing with excellent response to compression therapy. No longer requiring wound dressing. To initiate MLD next session     []  See Progress Note/Recertification   Patient will continue to benefit from skilled PT services to modify and progress therapeutic interventions, address functional mobility deficits via edema reduction and AROM/strength improvement, procure/train in necessary compression garments, and facilitate independence in long term management. Progress toward goals / Updated goals:  1. Patient to be independent in decongestive HEP to maximize therapeutic benefit of muscle pump with compression bandages donned. PN: To be initiated  Current: 7/19/2022 - Progressing - initiated this date     2. Patient to be independent in self manual lymph drainage (MLD) and/or diaphragmatic breathing for bilateral lower extremities to reroute lymphatic fluid for reduction in symptoms. PN:  to be initiated     3. Patient to independently don/doff compression garments for maintenance of symptoms and increase ADL tolerance. PN:  measured and order submitted     4. Patient to demonstrate an overall 8% reduction in rick LE for improved standing and walking ability.   PN:  Right LE = 14.2 cm = 5.4% reduction , Left LE =-6 cm = 2.3% reduction  Current: 7/19/2022 - Progressing - Right LE = 17.5 cm = 6.6 % reduction , Left LE =18.6 cm = 7.1 % reduction     PLAN    [x]  Upgrade activities as tolerated YES Continue plan of care   []  Discharge due to :    []  Other:      Therapist: Deborrah Sacks, PT, PT, DPT, CLT    Date: 7/29/2022 Time: 7:30 AM     Future Appointments   Date Time Provider Mary Alice Randolph   7/29/2022 11:30 AM [facility]   8/1/2022 12:15 PM Ibrahima Perkins MD [facility] AMB   8/2/2022 11:15 AM Finn Espinal PT [facility]   8/4/2022  1:15 PM Brittney Simon MD [facility] AMB   8/9/2022 12:45 PM Tarri Backbone, PT [facility]   8/16/2022 12:45 PM Tarri Backbone, PT [facility]   8/19/2022 11:30 AM Tarri Backbone, PT [facility]   8/23/2022 12:45 PM Tarri Backbone, PT [facility]   8/26/2022 11:30 AM Tarri Backbone, PT [facility]   8/30/2022 12:45 PM BoespfluAmanda zambrano, PT [facility]

## 2022-08-01 ENCOUNTER — OFFICE VISIT (OUTPATIENT)
Dept: FAMILY MEDICINE CLINIC | Age: 62
End: 2022-08-01
Payer: MEDICAID

## 2022-08-01 VITALS
SYSTOLIC BLOOD PRESSURE: 135 MMHG | RESPIRATION RATE: 24 BRPM | OXYGEN SATURATION: 98 % | DIASTOLIC BLOOD PRESSURE: 71 MMHG | BODY MASS INDEX: 45.99 KG/M2 | HEART RATE: 64 BPM | WEIGHT: 293 LBS | HEIGHT: 67 IN | TEMPERATURE: 97.4 F

## 2022-08-01 DIAGNOSIS — E11.40 TYPE 2 DIABETES MELLITUS WITH DIABETIC NEUROPATHY, WITH LONG-TERM CURRENT USE OF INSULIN (HCC): Primary | ICD-10-CM

## 2022-08-01 DIAGNOSIS — E55.9 HYPOVITAMINOSIS D: ICD-10-CM

## 2022-08-01 DIAGNOSIS — J96.11 CHRONIC RESPIRATORY FAILURE WITH HYPOXIA (HCC): ICD-10-CM

## 2022-08-01 DIAGNOSIS — F39 MOOD DISORDER (HCC): ICD-10-CM

## 2022-08-01 DIAGNOSIS — I10 ESSENTIAL HYPERTENSION: ICD-10-CM

## 2022-08-01 DIAGNOSIS — E03.4 HYPOTHYROIDISM DUE TO ACQUIRED ATROPHY OF THYROID: ICD-10-CM

## 2022-08-01 DIAGNOSIS — E66.01 MORBID OBESITY (HCC): ICD-10-CM

## 2022-08-01 DIAGNOSIS — I89.0 LYMPHEDEMA: ICD-10-CM

## 2022-08-01 DIAGNOSIS — Z79.4 TYPE 2 DIABETES MELLITUS WITH DIABETIC NEUROPATHY, WITH LONG-TERM CURRENT USE OF INSULIN (HCC): Primary | ICD-10-CM

## 2022-08-01 DIAGNOSIS — J38.00 VOCAL CORD PARALYSIS: ICD-10-CM

## 2022-08-01 DIAGNOSIS — E78.5 DYSLIPIDEMIA: ICD-10-CM

## 2022-08-01 DIAGNOSIS — Z93.0 TRACHEOSTOMY DEPENDENCE (HCC): ICD-10-CM

## 2022-08-01 DIAGNOSIS — R53.81 PHYSICAL DEBILITY: ICD-10-CM

## 2022-08-01 DIAGNOSIS — I87.2 VENOUS STASIS DERMATITIS OF BOTH LOWER EXTREMITIES: ICD-10-CM

## 2022-08-01 LAB — HBA1C MFR BLD HPLC: 10.3 %

## 2022-08-01 PROCEDURE — 83036 HEMOGLOBIN GLYCOSYLATED A1C: CPT | Performed by: FAMILY MEDICINE

## 2022-08-01 PROCEDURE — 99215 OFFICE O/P EST HI 40 MIN: CPT | Performed by: FAMILY MEDICINE

## 2022-08-01 PROCEDURE — 3046F HEMOGLOBIN A1C LEVEL >9.0%: CPT | Performed by: FAMILY MEDICINE

## 2022-08-01 RX ORDER — LEVOTHYROXINE SODIUM 300 UG/1
300 TABLET ORAL
Qty: 90 TABLET | Refills: 1 | Status: SHIPPED | OUTPATIENT
Start: 2022-08-01

## 2022-08-01 RX ORDER — BUMETANIDE 2 MG/1
2 TABLET ORAL DAILY
Qty: 90 TABLET | Refills: 1 | Status: SHIPPED | OUTPATIENT
Start: 2022-08-01

## 2022-08-01 RX ORDER — ACARBOSE 50 MG/1
50 TABLET ORAL 3 TIMES DAILY
COMMUNITY
Start: 2022-07-05

## 2022-08-01 RX ORDER — ASPIRIN 325 MG
50000 TABLET, DELAYED RELEASE (ENTERIC COATED) ORAL
Qty: 13 CAPSULE | Refills: 3 | Status: SHIPPED | OUTPATIENT
Start: 2022-08-01 | End: 2022-10-27 | Stop reason: SDUPTHER

## 2022-08-01 NOTE — PROGRESS NOTES
1. \"Have you been to the ER, urgent care clinic since your last visit? Hospitalized since your last visit? \" No    2. \"Have you seen or consulted any other health care providers outside of the [address] [place] [address] since your last visit? \" No     3. For patients aged 39-70: Has the patient had a colonoscopy / FIT/ Cologuard? No      If the patient is female:    4. For patients aged 41-77: Has the patient had a mammogram within the past 2 years? No      5. For patients aged 21-65: Has the patient had a pap smear?  No

## 2022-08-01 NOTE — PROGRESS NOTES
BETHEL  Orestes Adam comes in for follow-up care. Lymphedema: Patient has lymphedema bilateral lower extremities. She is seen in the lymphedema clinic. Continues to have swelling especially of the thighs and lower abdomen. She is on Bumex 1 mg daily. Has not been taking this regularly. I will increase her Bumex to 2 mg daily and she should be compliant with treatment plan. We will follow-up at next visit. She takes potassium supplementation. Uncontrolled diabetes mellitus type 2: Patient has uncontrolled diabetes mellitus type 2. She is on Lantus and a carbose. She takes 85 units of the Lantus. HbA1c today is 10.2. She should intensify lifestyle and dietary modification. She has not followed up with endocrinologist and I will place a referral for her to get back and be seen by the endocrinologist.  In the meantime I will add Farxiga 10 mg daily to her treatment plan. She should keep a blood glucose log and I will follow-up at next visit. Hypertension: Patient has hypertension. Blood pressure is stable. She is on metoprolol. We will continue with this medication. Hypothyroidism: Patient has hypothyroidism. She had thyroidectomy. She takes levothyroxine. Takes 300 mcg daily. We will recheck labs. Hypovitaminosis D: Patient has a history of hypovitaminosis D. She is on vitamin D replacement therapy 50,000 units weekly. Continue current treatment plan. We will recheck labs. Mood disorder: Patient has anxiety. She is on lorazepam.  We will continue with the current treatment plan. Morbid obesity: Patient has morbid obesity with a BMI of 46.20. She has paperwork to be filled out for bariatric surgery. This will be filled out. Given patient's BMI of 46.20 bariatric surgery would help with weight management and would also help with her diabetes. She is sedentary.   However she has a trach and is on oxygen thus will need clearance from the pulmonologist.  Bilateral vocal cord paralysis: Patient had thyroidectomy and unfortunately ended up with bilateral vocal cord paralysis. She gets hoarseness of voice. She has permanent trach in place. She is followed up by the ENT specialist.  Pulmonary: Patient has a history of asthma. Has been having shortness of breath and wheeze. She is on inhaler medication. She is also on chronic oxygen.   She is followed up by the pulmonologist.      Past Medical History  Past Medical History:   Diagnosis Date    Acquired hypothyroidism 5/17/2017    Anxiety 6/25/2019    Bilateral vocal cord paralysis 4/4/2019    Bilateral vocal cord paralysis status post thyroidectomy (4/4/2019 - Dr. Bethany Dutton) with residual dysphagia and dysarthria; S/P Tracheostomy (6/3/2019 - Dr. Bethany Dutton); S/P PEG tube insertion (6/20/2019) - Dr. Bethany Dutton)    Chronic back pain     Lower back pain    Depression     Diabetic neuropathy associated with type 2 diabetes mellitus ([address]     Dysphagia 6/25/2019    S/P Thyroidectomy (4/4/2019 - Dr. Bethany Duttno)    History of asthma     History of heart failure     chronic diastolic heart failure    History of vitamin D deficiency 8/28/2017    Hypertensive heart disease without heart failure 5/17/2017    Hypoxemia requiring supplemental oxygen 6/25/2019    Insomnia     Left ear hearing loss     pt states nerve damage--- 25% hearing loss, described as \"muffled\"    Memory difficulty     Moderate persistent asthma     Obesity, Class II, BMI 35-39.9 11/11/2016    Obstructive sleep apnea 11/6/2015    Panic attacks     Ringing of ears     Type 2 diabetes mellitus with diabetic neuropathy, with long-term current use of insulin ([address]     Vertigo        Surgical History  Past Surgical History:   Procedure Laterality Date    HX HEART VALVE SURGERY  2013    aortic valve repair    HX HYSTERECTOMY      Partial Hysterectomy - removed ovary, via pf    HX MYOMECTOMY      HX ORTHOPAEDIC  02/2018    had nerves burned on her right side of back    HX TRACHEOSTOMY  06/03/2019    S/P Tracheostomy (6/3/2019 - Dr. Derrek Alcazar)    [facility]  10/31/2013    open heart    [place] EGD PERCUTANEOUS PLACEMENT GASTROSTOMY TUBE N/A 06/20/2019    Dr. Panda Favors Bilateral 04/04/2019    Dr. Jones Comes        Medications  Current Outpatient Medications   Medication Sig Dispense Refill    acarbose (PRECOSE) 50 mg tablet Take 50 mg by mouth three (3) times daily. cholecalciferol (VITAMIN D3) (50,000 UNITS /1250 MCG) capsule Take 1 Capsule by mouth every seven (7) days. 13 Capsule 3    levothyroxine (SYNTHROID) 300 mcg tablet Take 1 Tablet by mouth Daily (before breakfast). 90 Tablet 1    bumetanide (BUMEX) 2 mg tablet Take 1 Tablet by mouth in the morning. 90 Tablet 1    metoprolol succinate (TOPROL-XL) 50 mg XL tablet Take 1 Tablet by mouth in the morning. 90 Tablet 1    glucose blood VI test strips (FreeStyle Lite Strips) strip Check glucose level twice daily as directed 100 Strip 0    ibuprofen (MOTRIN) 600 mg tablet Take 1 Tablet by mouth every six (6) hours as needed for Pain. 30 Tablet 0    omega-3 acid ethyl esters (LOVAZA) 1 gram capsule Take 1 Capsule by mouth two (2) times a day. 60 Capsule 2    Blood-Glucose Meter monitoring kit Check blood glucose twice a day. 1 Kit 0    insulin glargine (Lantus U-100 Insulin) 100 unit/mL injection inject 80 units subcutaneously once daily 30 mL 2    LORazepam (ATIVAN) 2 mg tablet take 1 tablet by mouth nightly 31 Tablet 2    senna-docusate (PERICOLACE) 8.6-50 mg per tablet Take 1 Tablet by mouth daily as needed for Constipation. 90 Tablet 1    fexofenadine (Allergy Relief, fexofenadine,) 180 mg tablet Take 1 Tablet by mouth daily.  90 Tablet 1    ondansetron hcl (ZOFRAN) 4 mg tablet TAKE 1 TABLET BY MOUTH EVERY 8 HOURS AS NEEDED FOR NAUSEA AND VOMITTING 30 Tablet 1    albuterol (PROVENTIL HFA, VENTOLIN HFA, PROAIR HFA) 90 mcg/actuation inhaler inhale 2 puffs by mouth and INTO THE LUNGS every 4 hours if needed for wheezing 18 g 2    potassium chloride (KLOR-CON M10) 10 mEq tablet take 1 tablet by mouth once daily 90 Tablet 1    melatonin 1 mg tablet take 1 tablet by mouth nightly 90 Tablet 1    albuterol (PROVENTIL VENTOLIN) 2.5 mg /3 mL (0.083 %) nebu 3 mL by Nebulization route every four (4) hours as needed (wheeze). Indications: asthma attack 24 Each 5    Nebulizer & Compressor machine Use for nebulization as needed. 1 Each 0    Nebulizer Accessories kit Use for nebulization as needed. 1 Kit 2    glucose blood VI test strips (ASCENSIA AUTODISC VI, ONE TOUCH ULTRA TEST VI) strip Check blood glucose 3 times daily 300 Strip 3    lancets misc Check blood glucose 3 times daily 300 Each 3    BD INSULIN SYRINGE ULTRA-FINE 1 mL 31 gauge x 5/16 syrg use as directed twice a day 200 Syringe 3    FREESTYLE LITE STRIPS strip TEST twice a day as directed 100 Strip 11    Aspirin, Buffered 81 mg tab Take 81 mg by mouth daily. Allergies  Allergies   Allergen Reactions    Other Food Other (comments)     Artificial sweeteners- causes headaches    Metformin Other (comments)     Increase pain in feet and swelling in feet  Increased hunger,tired and bilat foot pain    Morphine Other (comments)     headache    Singulair [Montelukast] Other (comments)     hallucinations    Sucrose Other (comments)     Pt. Is not allergic to sucrose. She develops headaches with artificial sweeteners.     Trulicity [Dulaglutide] Other (comments)       Family History  Family History   Problem Relation Age of Onset    Heart Disease Mother     Diabetes Mother     Stroke Mother     Hypertension Mother     Diabetes Father     Heart Disease Father     Hypertension Father     Stroke Father     Diabetes Brother     Cancer Brother     Hypertension Brother     Stroke Brother     Heart Disease Brother     Diabetes Brother     Hypertension Brother     Stroke Brother     Heart Disease Brother     Diabetes Brother     Hypertension Brother Hypertension Brother        Social History  Social History     Socioeconomic History    Marital status:      Spouse name: Not on file    Number of children: Not on file    Years of education: Not on file    Highest education level: Not on file   Occupational History    Not on file   Tobacco Use    Smoking status: Never    Smokeless tobacco: Never   Vaping Use    Vaping Use: Never used   Substance and Sexual Activity    Alcohol use: No    Drug use: No    Sexual activity: Not Currently   Other Topics Concern     Service No    Blood Transfusions No    Caffeine Concern No    Occupational Exposure No    Hobby Hazards No    Sleep Concern Yes     Comment: Sleep apnea     Stress Concern Yes     Comment: Due to family medical issues. Weight Concern No    Special Diet No    Back Care Yes     Comment: Patient tries to do back care with streches     Exercise No    Bike Helmet Yes    Seat Belt Yes    Self-Exams Yes   Social History Narrative    Not on file     Social Determinants of Health     Financial Resource Strain: Not on file   Food Insecurity: Not on file   Transportation Needs: Not on file   Physical Activity: Not on file   Stress: Not on file   Social Connections: Not on file   Intimate Partner Violence: Not on file   Housing Stability: Not on file       Review of Systems  Review of Systems - Review of all systems is negative except as noted above in the HPI.       Vital Signs  Visit Vitals  /71 (BP 1 Location: Left upper arm, BP Patient Position: Sitting, BP Cuff Size: Adult long)   Pulse 64   Temp 97.4 °F (36.3 °C) (Temporal)   Resp 24   Ht 5' 7\" (1.702 m)   Wt 295 lb (133.8 kg)   LMP  (LMP Unknown)   SpO2 98%   BMI 46.20 kg/m²         Physical Exam  Physical Examination: General appearance - chronically ill appearing  Mental status - affect appropriate to mood  Nose - mucosal congestion  Mouth - mucous membranes moist, pharynx normal without lesions  Neck -patient has trach in place and is on chronic oxygen. Lymphatics - no palpable lymphadenopathy  Chest - decreased air entry noted bilateral lung bases  Heart - S1 and S2 normal  Abdomen - no rebound tenderness noted  Back exam - limited range of motion  Neurological - abnormal neurological exam unchanged from prior examinations  Musculoskeletal - osteoarthritic changes noted in both hands  Extremities - pedal edema 2 +, intact peripheral pulses      Results  Results for orders placed or performed in visit on 04/21/22   AMB EXT HGBA1C   Result Value Ref Range    Hemoglobin A1c, External 9.9 %       ASSESSMENT and PLAN    ICD-10-CM ICD-9-CM    1. Type 2 diabetes mellitus with diabetic neuropathy, with long-term current use of insulin ([facility])  E11.40 250.60 AMB POC HEMOGLOBIN A1C    Z79.4 357.2 LIPID PANEL     V58.67       2. Hypovitaminosis D  E55.9 268.9 cholecalciferol (VITAMIN D3) (50,000 UNITS /1250 MCG) capsule      3. Essential hypertension  I10 401.9       4. Hypothyroidism due to acquired atrophy of thyroid  E03.4 244.8      246.8       5. Tracheostomy dependence ([facility]  Z93.0 V44.0       6. Lymphedema  I89.0 457.1       7. Venous stasis dermatitis of both lower extremities  I87.2 454.1       8. Mood disorder ([facility])  F39 296.90       9. Morbid obesity ([facility]  E66.01 278.01       10. Dyslipidemia  E78.5 272.4       11. Vocal cord paralysis  J38.00 478.30       12. Chronic respiratory failure with hypoxia ([facility])  J96.11 518.83      799.02       13.  Physical debility  R53.81 799.3         lab results and schedule of future lab studies reviewed with patient  reviewed diet, exercise and weight control  cardiovascular risk and specific lipid/LDL goals reviewed  reviewed medications and side effects in detail  specific diabetic recommendations: low cholesterol diet, weight control and daily exercise discussed, home glucose monitoring emphasized, all medications, side effects and compliance discussed carefully, foot care discussed and Podiatry visits discussed, annual eye examinations at Ophthalmology discussed, glycohemoglobin and other lab monitoring discussed, and long term diabetic complications discussed      I have discussed the diagnosis with the patient and the intended plan of care as seen in the above orders. The patient has received an after-visit summary and questions were answered concerning future plans. I have discussed medication, side effects, and warnings with the patient in detail. The patient verbalized understanding and is in agreement with the plan of care. The patient will contact the office with any additional concerns. I spent at least 48 minutes on this visit with this established patient. Thania Morales MD    PLEASE NOTE:   This document has been produced using voice recognition software.  Unrecognized errors in transcription may be present

## 2022-08-02 ENCOUNTER — HOSPITAL ENCOUNTER (OUTPATIENT)
Dept: PHYSICAL THERAPY | Age: 62
Discharge: HOME | End: 2022-08-02
Payer: MEDICAID

## 2022-08-02 PROCEDURE — 97530 THERAPEUTIC ACTIVITIES: CPT

## 2022-08-02 PROCEDURE — 97110 THERAPEUTIC EXERCISES: CPT

## 2022-08-02 PROCEDURE — 97535 SELF CARE MNGMENT TRAINING: CPT

## 2022-08-02 NOTE — PROGRESS NOTES
PHYSICAL THERAPY - DAILY TREATMENT NOTE    Patient Name: Kalin Rivers        Date: 2022  : 1960   YES Patient  Verified  Visit #:   5   of   8  Insurance: Payor: Tori Nayak / Plan: [address] [place] [address] / Product Type: Managed Care Medicaid /      In time: 11:18 Out time: 12:15   Total Treatment Time: 57       TREATMENT AREA =  Lymphedema, not elsewhere classified [I89.0]    SUBJECTIVE    Pain Level (on 0 to 10 scale):  3  / 10   Medication Changes/New allergies or changes in medical history, any new surgeries or procedures? NO     If yes, update Summary List   Subjective Functional Status/Changes:  [x]  No changes reported     Feeling more stiff and swollen in the knees and thighs. OBJECTIVE    8 min Therapeutic Exercise:  [x]  See flow sheet : Decongestive exercise to maximize lymphatic return via muscle joint pump action   Rationale:      Decrease edema to improve the patients ability to perform functional mobility with increased independence and safety and perform ADLs with increased ease. 8 min Therapeutic Activity: WC<>plinthe transfer, long<>short sit with bandages donned, static standing   Rationale: To increase safety and efficiency with ADLs through edema reduction      25 min Manual Therapy: Bandages taken down by PT. Manual lymph drainage to reroute lymphatic fluid from rick LE due to lymphatic blockage and reduced motility, emphasis on knees and upper legs. Non-specific skin care, washing with patient's personal [place]    Multilayer compression bandaging to rick LE to maximize lymphatic flow as follows:  - Aquaphor  - Stockinette  - toe wraps  - 0.4 cm soft foam  - 6 cm bandage in modified sandle pattern  - 10 cm + 12 cm bandage in herringbone pattern ankle to popliteus   The manual therapy interventions were performed at a separate and distinct time from the therapeutic activities interventions.   Rationale:      increase ROM, increase tissue extensibility, and decrease edema  to improve patient's ability to perform ADLs and functional mobility with improved ease and comfort    8 min Self-Care: Diaphragmatic breathing and education on benefit of performing throughout the day     Procuring compression shorts to mitigate edema in upper legs   Rationale: To promote carryover of lymphedema treatment protocol and increase independence with lymphedema management. Patient Education: Self Care     Other Objective/Functional Measures:    Girth (cm): Right LE  6/7/2022 Left LE    Right LE  6/28/2022   Right LE  7/12/2022 Left LE Right LE  7/19/22 Left LE Right LE  7/26/22 Left LE Right LE  8/2/22 Left Le   MTP: 24.0 22.0 24.6 23.2 23.4 27.1 24.5 22.4 23.9 22.4 23.1 21.9   Midfoot/navicular: 25.2 23.6 25.4 24.8 24.0 24.2 23.5 23.1 25.0 24.7 24.2 24.5   Ankle: (7 cm from floor) 26.8 27.5 27.1 26.9 24.5 25.0 24.3 24.3 25.4 25.3 24.3 25.2   Calf     (15 cm from floor) 36.8 35.7 32.3 36.0 29.7 31.0 27.0 26.7 30.3 35.8 29.7 28.9   Calf:      (30 cm from floor) 50.3 51.2 47.5 50.0 44.0 45.5 44.1 46.1 43.9 47.1 43.4 43.8   Knee:    (patella) 46.7 45.7 49.4 51.0 48.5 45.4 47.3 43.2 47.6 45.1 46.5 46.1   Thigh      55.5 55.0 58.4 57.6 57.0 56.5 57.1 56.3 56.1 56.0 57.5 57.6   TOTAL 265.3 260.7 264.7 269.5 251.1 254.7 247.8 242.1 253.2 256.4 248.7 248. 0    Abdomen = 148.8 cm    Right LE = -16.6 cm = 6.3 % reduction  Left LE = -12.7 cm = 4.9 % reduction      Post Treatment Pain Level (on 0 to 10) scale:   2  / 10     ASSESSMENT    Assessment/Changes in Function: Initiated MLD to address edema in knees, thighs, and abdomen. Patient acknowldges understanding to purchasing compression short to maintain upper leg edema. Skin integrity continues to improve in addition to limb decongestion. Continues to await MD signature for compression garments.     []  See Progress Note/Recertification   Patient will continue to benefit from skilled PT services to modify and progress therapeutic interventions, address functional mobility deficits via edema reduction and AROM/strength improvement, procure/train in necessary compression garments, and facilitate independence in long term management. Progress toward goals / Updated goals:  1. Patient to be independent in decongestive HEP to maximize therapeutic benefit of muscle pump with compression bandages donned. PN: To be initiated  Current: 7/19/2022 - Progressing - initiated this date     2. Patient to be independent in self manual lymph drainage (MLD) and/or diaphragmatic breathing for bilateral lower extremities to reroute lymphatic fluid for reduction in symptoms. PN:  to be initiated  Current: 8/2/2022 - Progressing - initiated MLD and diaphragmatic breathing     3. Patient to independently don/doff compression garments for maintenance of symptoms and increase ADL tolerance. PN:  measured and order submitted     4. Patient to demonstrate an overall 8% reduction in rick LE for improved standing and walking ability.   PN:  Right LE = 14.2 cm = 5.4% reduction , Left LE =-6 cm = 2.3% reduction  Current: 8/2/2022 - Progressing - Right LE = -16.6 cm = 6.3 % reduction  Left LE = -12.7 cm = 4.9 % reduction      PLAN    [x]  Upgrade activities as tolerated YES Continue plan of care   []  Discharge due to :    []  Other:      Therapist: Eva Mistry PT, PT, DPT, CLT    Date: 8/2/2022 Time: 8:52 AM     Future Appointments   Date Time Provider Mary Alice Randolph   8/2/2022 11:15 AM Castro Man, PT [facility]   8/4/2022  1:15 PM Gigi Mallory MD [facility] AMB   8/9/2022 12:45 PM Castro Man, PT [facility]   8/16/2022 12:45 PM Castro Man, PT [facility]   8/19/2022 11:30 AM Castro Man, PT [facility]   8/23/2022 12:45 PM Castro Man, PT [facility]   8/26/2022 11:30 AM Castro Man, PT [facility]   8/30/2022 12:45 PM Vera Fraser, PT [facility]   12/1/2022 11:15 AM Angelito Perkins MD [facility] BS AMB

## 2022-08-03 ENCOUNTER — TELEPHONE (OUTPATIENT)
Dept: FAMILY MEDICINE CLINIC | Age: 62
End: 2022-08-03

## 2022-08-03 NOTE — TELEPHONE ENCOUNTER
----- Message from Liz Smith MD sent at 8/1/2022  1:39 PM EDT -----  Please let patient know her HbA1c is 10.3. This is elevated. She needs to follow-up with an endocrinologist.  She should intensify lifestyle and dietary modification. She is on Lantus 85 units daily. I will send in [address] to take 10 mg daily in addition to the Lantus.   I will place a referral for the endocrinologist.  She has been seen by the endocrinologist in the past.  Liz Smith MD

## 2022-08-05 ENCOUNTER — OFFICE VISIT (OUTPATIENT)
Dept: CARDIOLOGY CLINIC | Age: 62
End: 2022-08-05
Payer: MEDICAID

## 2022-08-05 VITALS
OXYGEN SATURATION: 99 % | SYSTOLIC BLOOD PRESSURE: 148 MMHG | WEIGHT: 288 LBS | DIASTOLIC BLOOD PRESSURE: 70 MMHG | BODY MASS INDEX: 45.2 KG/M2 | HEART RATE: 69 BPM | HEIGHT: 67 IN

## 2022-08-05 DIAGNOSIS — I10 ESSENTIAL HYPERTENSION: ICD-10-CM

## 2022-08-05 DIAGNOSIS — I11.9 HYPERTENSIVE HEART DISEASE WITHOUT HEART FAILURE: ICD-10-CM

## 2022-08-05 DIAGNOSIS — Z98.890 H/O AORTIC ROOT REPAIR: ICD-10-CM

## 2022-08-05 DIAGNOSIS — Z95.3 HISTORY OF AORTIC VALVE REPLACEMENT WITH BIOPROSTHETIC VALVE: ICD-10-CM

## 2022-08-05 DIAGNOSIS — E78.5 DYSLIPIDEMIA: ICD-10-CM

## 2022-08-05 DIAGNOSIS — Z01.810 PREOP CARDIOVASCULAR EXAM: ICD-10-CM

## 2022-08-05 DIAGNOSIS — I50.33 ACUTE ON CHRONIC DIASTOLIC CONGESTIVE HEART FAILURE (HCC): Primary | ICD-10-CM

## 2022-08-05 DIAGNOSIS — I35.9 AORTIC VALVE DISORDER: ICD-10-CM

## 2022-08-05 DIAGNOSIS — I44.0 FIRST DEGREE AV BLOCK: ICD-10-CM

## 2022-08-05 PROCEDURE — 93000 ELECTROCARDIOGRAM COMPLETE: CPT | Performed by: INTERNAL MEDICINE

## 2022-08-05 PROCEDURE — 99204 OFFICE O/P NEW MOD 45 MIN: CPT | Performed by: INTERNAL MEDICINE

## 2022-08-05 RX ORDER — METOPROLOL SUCCINATE 25 MG/1
25 TABLET, EXTENDED RELEASE ORAL DAILY
Qty: 90 TABLET | Refills: 1 | Status: SHIPPED | OUTPATIENT
Start: 2022-08-05

## 2022-08-05 NOTE — PATIENT INSTRUCTIONS
Medications Discontinued During This Encounter   Medication Reason    metoprolol succinate (TOPROL-XL) 50 mg XL tablet      After the recommended changes have been made in blood pressure medicines, patient advised to keep BP/HR(pulse rate) chart twice daily and bring us results in next 4 to 5 days. Patient may send the results via \"My Chart\" if desired. Please rest for 5-10 minutes before checking blood pressure. Sit on a comfortable chair without crossing the legs and put your arm on a table. We recommend that you use an upper arm cuff. Check the blood pressure 3 times each time you check the blood pressure and record the lowest reading. If you check the blood pressure in both arms, use the higher reading. Learning About the [facility] Diet  What is the Mediterranean diet? The Mediterranean diet is a style of eating rather than a diet plan. It features foods eaten in [place], [place], [place] and [place], and other countries along the [facility]. It emphasizes eating foods like fish, fruits, vegetables, beans, high-fiber breads and whole grains, nuts, and olive oil. This style of eating includes limited red meat, cheese, and sweets. Why choose the Mediterranean diet? A Mediterranean-style diet may improve heart health. It contains more fat than other heart-healthy diets. But the fats are mainly from nuts, unsaturated oils (such as fish oils and olive oil), and certain nut or seed oils (such as canola, soybean, or flaxseed oil). These fats may help protect the heart and blood vessels. How can you get started on the Mediterranean diet? Here are some things you can do to switch to a more Mediterranean way of eating. What to eat  Eat a variety of fruits and vegetables each day, such as grapes, blueberries, tomatoes, broccoli, peppers, figs, olives, spinach, eggplant, beans, lentils, and chickpeas.   Eat a variety of whole-grain foods each day, such as oats, brown rice, and whole wheat bread, pasta, and couscous. Eat fish at least 2 times a week. Try tuna, salmon, mackerel, lake trout, herring, or sardines. Eat moderate amounts of low-fat dairy products, such as milk, cheese, or yogurt. Eat moderate amounts of poultry and eggs. Choose healthy (unsaturated) fats, such as nuts, olive oil, and certain nut or seed oils like canola, soybean, and flaxseed. Limit unhealthy (saturated) fats, such as butter, palm oil, and coconut oil. And limit fats found in animal products, such as meat and dairy products made with whole milk. Try to eat red meat only a few times a month in very small amounts. Limit sweets and desserts to only a few times a week. This includes sugar-sweetened drinks like soda. The Mediterranean diet may also include red wine with your meal--1 glass each day for women and up to 2 glasses a day for men. Tips for eating at home  Use herbs, spices, garlic, lemon zest, and citrus juice instead of salt to add flavor to foods. Add avocado slices to your sandwich instead of aleman. Have fish for lunch or dinner instead of red meat. Brush the fish with olive oil, and broil or grill it. Sprinkle your salad with seeds or nuts instead of cheese. Cook with olive or canola oil instead of butter or oils that are high in saturated fat. Switch from 2% milk or whole milk to 1% or fat-free milk. Dip raw vegetables in a vinaigrette dressing or hummus instead of dips made from mayonnaise or sour cream.  Have a piece of fruit for dessert instead of a piece of cake. Try baked apples, or have some dried fruit. Tips for eating out  Try broiled, grilled, baked, or poached fish instead of having it fried or breaded. Ask your  to have your meals prepared with olive oil instead of butter. Order dishes made with marinara sauce or sauces made from olive oil. Avoid sauces made from cream or mayonnaise.   Choose whole-grain breads, whole wheat pasta and pizza crust, brown rice, beans, and lentils. Cut back on butter or margarine on bread. Instead, you can dip your bread in a small amount of olive oil. Ask for a side salad or grilled vegetables instead of french fries or chips. Where can you learn more? Go to http://www.samaniego.com/  Enter O407 in the search box to learn more about \"Learning About the Mediterranean Diet. \"  Current as of: September 8, 2021               Content Version: 13.2  © 2006-2022 [employer]. Care instructions adapted under license by [employer] (which disclaims liability or warranty for this information). If you have questions about a medical condition or this instruction, always ask your healthcare professional. [address] any warranty or liability for your use of this information. After the recommended changes have been made in blood pressure medicines, patient advised to keep BP/HR(pulse rate) chart twice daily and bring us results in next 4 to 5 days. Patient may send the results via \"My Chart\" if desired. Please rest for 5-10 minutes before checking blood pressure. Sit on a comfortable chair without crossing the legs and put your arm on a table. We recommend that you use an upper arm cuff. Check the blood pressure 3 times each time you check the blood pressure and record the lowest reading. If you check the blood pressure in both arms, use the higher reading.

## 2022-08-05 NOTE — PROGRESS NOTES
HISTORY OF PRESENT ILLNESS  Mery Schumacher is a 65 y.o. female. 8/22 seen as a new patient after more than 3 years when she was seen by Dr. Jose Francisco Young. History of bicuspid aortic valve and status post AVR and aortic root repair. Now needs a gastric bypass surgery. Has a permanent tracheostomy since 2019 after her thyroid surgery. History of CHF    Shortness of Breath  The history is provided by the Patient. This is a chronic problem. The problem occurs intermittently. The current episode started more than 1 week ago. Precipitated by: bathroom,   New Patient  Associated symptoms include shortness of breath. Leg Swelling  The history is provided by the Patient. This is a chronic problem. The current episode started more than 1 week ago (2013). The problem has been gradually worsening (since lymphedema pump- new blisters). Associated symptoms include shortness of breath. Review of Systems   Respiratory:  Positive for shortness of breath.     Family History   Problem Relation Age of Onset    Heart Disease Mother     Diabetes Mother     Stroke Mother     Hypertension Mother     Diabetes Father     Heart Disease Father     Hypertension Father     Stroke Father     Diabetes Brother     Cancer Brother     Hypertension Brother     Stroke Brother     Heart Disease Brother     Diabetes Brother     Hypertension Brother     Stroke Brother     Heart Disease Brother     Diabetes Brother     Hypertension Brother     Hypertension Brother        Past Medical History:   Diagnosis Date    Acquired hypothyroidism 5/17/2017    Anxiety 6/25/2019    Bilateral vocal cord paralysis 4/4/2019    Bilateral vocal cord paralysis status post thyroidectomy (4/4/2019 - Dr. Maciel Melendez) with residual dysphagia and dysarthria; S/P Tracheostomy (6/3/2019 - Dr. Maciel Melendez); S/P PEG tube insertion (6/20/2019) - Dr. Maciel Melendez)    Chronic back pain     Lower back pain    Depression     Diabetic neuropathy associated with type 2 diabetes mellitus ([address]     Dysphagia 6/25/2019    S/P Thyroidectomy (4/4/2019 - Dr. Star Perdue)    History of asthma     History of heart failure     chronic diastolic heart failure    History of vitamin D deficiency 8/28/2017    Hypertensive heart disease without heart failure 5/17/2017    Hypoxemia requiring supplemental oxygen 6/25/2019    Insomnia     Left ear hearing loss     pt states nerve damage--- 25% hearing loss, described as \"muffled\"    Memory difficulty     Moderate persistent asthma     Obesity, Class II, BMI 35-39.9 11/11/2016    Obstructive sleep apnea 11/6/2015    Panic attacks     Ringing of ears     Type 2 diabetes mellitus with diabetic neuropathy, with long-term current use of insulin ([address]     Vertigo        Past Surgical History:   Procedure Laterality Date    HX HEART VALVE SURGERY  2013    aortic valve repair    HX HYSTERECTOMY      Partial Hysterectomy - removed ovary, via pf    HX MYOMECTOMY      HX ORTHOPAEDIC  02/2018    had nerves burned on her right side of back    HX TRACHEOSTOMY  06/03/2019    S/P Tracheostomy (6/3/2019 - Dr. Star Perdue)    [facility] UNLIST  10/31/2013    open heart    NE EGD PERCUTANEOUS PLACEMENT GASTROSTOMY TUBE N/A 06/20/2019    Dr. Leif Johnson Bilateral 04/04/2019    Dr. Angela Cee History     Tobacco Use    Smoking status: Never    Smokeless tobacco: Never   Substance Use Topics    Alcohol use: No       Allergies   Allergen Reactions    Other Food Other (comments)     Artificial sweeteners- causes headaches    Metformin Other (comments)     Increase pain in feet and swelling in feet  Increased hunger,tired and bilat foot pain    Morphine Other (comments)     headache    Singulair [Montelukast] Other (comments)     hallucinations    Sucrose Other (comments)     Pt. Is not allergic to sucrose. She develops headaches with artificial sweeteners.     Trulicity [Dulaglutide] Other (comments)       Outpatient Medications Marked as Taking for the 8/5/22 encounter (Office Visit) with Shellie Gallagher MD   Medication Sig Dispense Refill    acarbose (PRECOSE) 50 mg tablet Take 50 mg by mouth three (3) times daily. cholecalciferol (VITAMIN D3) (50,000 UNITS /1250 MCG) capsule Take 1 Capsule by mouth every seven (7) days. 13 Capsule 3    levothyroxine (SYNTHROID) 300 mcg tablet Take 1 Tablet by mouth Daily (before breakfast). 90 Tablet 1    bumetanide (BUMEX) 2 mg tablet Take 1 Tablet by mouth in the morning. 90 Tablet 1    dapagliflozin (Farxiga) 10 mg tab tablet Take 1 Tablet by mouth in the morning. 30 Tablet 2    metoprolol succinate (TOPROL-XL) 50 mg XL tablet Take 1 Tablet by mouth in the morning. 90 Tablet 1    glucose blood VI test strips (FreeStyle Lite Strips) strip Check glucose level twice daily as directed 100 Strip 0    ibuprofen (MOTRIN) 600 mg tablet Take 1 Tablet by mouth every six (6) hours as needed for Pain. 30 Tablet 0    omega-3 acid ethyl esters (LOVAZA) 1 gram capsule Take 1 Capsule by mouth two (2) times a day. 60 Capsule 2    Blood-Glucose Meter monitoring kit Check blood glucose twice a day. 1 Kit 0    insulin glargine (Lantus U-100 Insulin) 100 unit/mL injection inject 80 units subcutaneously once daily 30 mL 2    LORazepam (ATIVAN) 2 mg tablet take 1 tablet by mouth nightly 31 Tablet 2    senna-docusate (PERICOLACE) 8.6-50 mg per tablet Take 1 Tablet by mouth daily as needed for Constipation. 90 Tablet 1    fexofenadine (Allergy Relief, fexofenadine,) 180 mg tablet Take 1 Tablet by mouth daily.  90 Tablet 1    ondansetron hcl (ZOFRAN) 4 mg tablet TAKE 1 TABLET BY MOUTH EVERY 8 HOURS AS NEEDED FOR NAUSEA AND VOMITTING 30 Tablet 1    albuterol (PROVENTIL HFA, VENTOLIN HFA, PROAIR HFA) 90 mcg/actuation inhaler inhale 2 puffs by mouth and INTO THE LUNGS every 4 hours if needed for wheezing 18 g 2    potassium chloride (KLOR-CON M10) 10 mEq tablet take 1 tablet by mouth once daily 90 Tablet 1    melatonin 1 mg tablet take 1 tablet by mouth nightly 90 Tablet 1    albuterol (PROVENTIL VENTOLIN) 2.5 mg /3 mL (0.083 %) nebu 3 mL by Nebulization route every four (4) hours as needed (wheeze). Indications: asthma attack 24 Each 5    Nebulizer & Compressor machine Use for nebulization as needed. 1 Each 0    Nebulizer Accessories kit Use for nebulization as needed. 1 Kit 2    glucose blood VI test strips (ASCENSIA AUTODISC VI, ONE TOUCH ULTRA TEST VI) strip Check blood glucose 3 times daily 300 Strip 3    lancets misc Check blood glucose 3 times daily 300 Each 3    BD INSULIN SYRINGE ULTRA-FINE 1 mL 31 gauge x 5/16 syrg use as directed twice a day 200 Syringe 3    FREESTYLE LITE STRIPS strip TEST twice a day as directed 100 Strip 11    Aspirin, Buffered 81 mg tab Take 81 mg by mouth daily. Visit Vitals  BP (!) 148/70 (BP 1 Location: Left upper arm, BP Patient Position: Sitting, BP Cuff Size: Thigh)   Pulse 69   Ht 5' 7\" (1.702 m)   Wt 130.6 kg (288 lb)   LMP  (LMP Unknown)   SpO2 99%   BMI 45.11 kg/m²       Physical Exam  ekg sinus rhythm with no acute st-t changes    Echo 04/2017:  SUMMARY:  Procedure information: Image quality was suboptimal.    Left ventricle: Size was at the upper limits of normal. Systolic function  was at the lower limits of normal by visual assessment. Ejection fraction  was estimated to be 50 %. Suboptimal endocardial visualization limits wall  motion analysis. Wall thickness was mildly increased. Aortic valve: A bioprosthesis was present. No obvious abnormalities. Valve  peak gradient was 13 mmHg. Valve mean gradient was 7 mmHg. Estimated  aortic valve area (by Vmax) was 1.9 cm-sq.  08/22/18   ECHO ADULT COMPLETE 08/22/2018 8/22/2018    Narrative · Estimated left ventricular ejection fraction is 56 - 60%. Left   ventricular mild concentric hypertrophy. Normal left ventricular wall   motion, no regional wall motion abnormality noted. · Right atrial cavity size is mildly dilated.   · Trace mitral valve regurgitation. · Aortic valve is prosthetic. There is a bioprosthetic aortic valve. Prosthesis is normal. Prosthetic valve function is sufficient. Peak   pressure gradient is 17 mmHg. Mean pressure gradient is 10 mmHg. LROE is   1.9 cm2. Signed by: Devin Galvin MD     ASSESSMENT and PLAN      current treatment plan is effective, no change in therapy  reviewed diet, exercise and weight control  cardiovascular risk and specific lipid/LDL goals reviewed  use of aspirin to prevent MI and TIA's discussed. Patient with bicuspid aortic valve- s/p valve replacement in 2013 and aortic root repair- now seen for follow up. Has mild stable dyspnea. NYHA class II/III  Advised to take lasix 40mg po daily  Seen for thyroid nodules and plan for thyroidectomy. Can proceed with to planned procedure with moderate risk. Continue current meds      Diagnoses and all orders for this visit:    1. Acute on chronic diastolic congestive heart failure ([address]  -     ECHO ADULT COMPLETE; Future  -     METABOLIC PANEL, BASIC; Future  -     AMB POC EKG ROUTINE W/ 12 LEADS, INTER & REP    2. Hypertensive heart disease without heart failure    3. Dyslipidemia    4. Aortic valve disorder  -     ECHO ADULT COMPLETE; Future    5. History of aortic valve replacement with bioprosthetic valve  -     CT CHEST W WO CONT; Future    6. H/O aortic root repair  -     CT CHEST W WO CONT; Future    7. Preop cardiovascular exam    8. Essential hypertension  -     metoprolol succinate (TOPROL-XL) 25 mg XL tablet; Take 1 Tablet by mouth in the morning. 9. First degree AV block  Comments:  8/22 -reduce Toprol to 25 mg a day      Pertinent laboratory and test data reviewed and discussed with patient. See patient instructions also for other medical advice given    There are no discontinued medications.     Follow-up and Dispositions    Return in about 3 months (around 11/5/2022), or if symptoms worsen or fail to improve, for post test, can clear for surgery after echo and CAT scan. 8/5/2022 CHF is getting worse as edema is increasing. Agree with doubling of Bumex which she plans to do it from tomorrow. She already has a prescription  Follow-up electrolytes  EKG revealed significant prolongation of the first-degree AV block at 400ms. Reduce Toprol to 25 mg a day  Blood pressure mildly elevated but hopefully reduces with diuresis. Follow home chart. Check echo and CAT scan for aortic valve and previous surgery for aortic root and if these are acceptable, she can be cleared for gastric bypass surgery.

## 2022-08-07 RX ORDER — UREA 10 %
1 LOTION (ML) TOPICAL
Qty: 90 TABLET | Refills: 1 | Status: SHIPPED | OUTPATIENT
Start: 2022-08-07

## 2022-08-09 ENCOUNTER — HOSPITAL ENCOUNTER (OUTPATIENT)
Dept: PHYSICAL THERAPY | Age: 62
End: 2022-08-09
Payer: MEDICAID

## 2022-08-10 DIAGNOSIS — F41.9 ANXIETY: ICD-10-CM

## 2022-08-11 RX ORDER — LORAZEPAM 2 MG/1
TABLET ORAL
Qty: 31 TABLET | Refills: 3 | Status: SHIPPED | OUTPATIENT
Start: 2022-08-11

## 2022-08-12 DIAGNOSIS — I50.33 ACUTE ON CHRONIC DIASTOLIC CONGESTIVE HEART FAILURE (HCC): ICD-10-CM

## 2022-08-12 DIAGNOSIS — R13.10 DYSPHAGIA, UNSPECIFIED TYPE: ICD-10-CM

## 2022-08-15 RX ORDER — ONDANSETRON 4 MG/1
TABLET, FILM COATED ORAL
Qty: 30 TABLET | Refills: 1 | Status: SHIPPED | OUTPATIENT
Start: 2022-08-15 | End: 2022-09-12

## 2022-08-16 ENCOUNTER — HOSPITAL ENCOUNTER (OUTPATIENT)
Dept: PHYSICAL THERAPY | Age: 62
Discharge: HOME | End: 2022-08-16
Payer: MEDICAID

## 2022-08-16 PROCEDURE — 97530 THERAPEUTIC ACTIVITIES: CPT

## 2022-08-16 PROCEDURE — 97110 THERAPEUTIC EXERCISES: CPT

## 2022-08-16 PROCEDURE — 97535 SELF CARE MNGMENT TRAINING: CPT

## 2022-08-16 NOTE — PROGRESS NOTES
PHYSICAL THERAPY - DAILY TREATMENT NOTE    Patient Name: Donnita Castleman        Date: 2022  : 1960   YES Patient  Verified  Visit #:   6   of   8  Insurance: Payor: [facility] MEDICAID / Plan: [facility] [employer] / Product Type: Managed Care Medicaid /      In time: 12:30 Out time: 13:30   Total Treatment Time: 60     TREATMENT AREA =  Lymphedema, not elsewhere classified [I89.0]    SUBJECTIVE    Pain Level (on 0 to 10 scale):  3  / 10   Medication Changes/New allergies or changes in medical history, any new surgeries or procedures? NO     If yes, update Summary List   Subjective Functional Status/Changes:  []  No changes reported     Verba Canavan she had to take the wraps off early as she was feeling short of breath and had the wraps on for over a week due to having to cancel her appointment. OBJECTIVE    8 min Therapeutic Exercise:  [x]  See flow sheet : Decongestive exercise to maximize lymphatic return via muscle joint pump action   Rationale:      Decrease edema to improve the patients ability to perform functional mobility with increased independence and safety and perform ADLs with increased ease. 8 min Therapeutic Activity: WC<>plinthe transfer, long<>short sit with bandages donned, static standing   Rationale: To increase safety and efficiency with ADLs through edema reduction    36 min Manual Therapy: Bandages taken down by PT. Manual lymph drainage to reroute lymphatic fluid from rick LE due to lymphatic blockage and reduced motility, emphasis on knees and upper legs.      Non-specific skin care, washing with patient's personal [place]    Multilayer compression bandaging to rick LE to maximize lymphatic flow as follows:  - Aquaphor  - Stockinette  - toe wraps  - 0.4 cm soft foam  - 6 cm bandage in modified sandle pattern  - 10 cm + 12 cm bandage in herringbone pattern ankle to popliteus   The manual therapy interventions were performed at a separate and distinct time from the therapeutic activities interventions. Rationale:      increase tissue extensibility and decrease edema  to improve patient's ability to perform ADLs and functional mobility with improved ease and comfort    8 min Self-Care: Reviewed indications for bandage removal   Rationale: To promote carryover of lymphedema treatment protocol and increase independence with lymphedema management. Patient Education: Self CAre     Other Objective/Functional Measures:    Limb edema increased per visual observation. Minor area of drainage on anterior aspect of left LE  Abdomen = 149 cm     Post Treatment Pain Level (on 0 to 10) scale:   2  / 10     ASSESSMENT    Assessment/Changes in Function: Reviewed indications for bandage removal per patient subjective report. Abdominal circumference stable with patient reporting respiratory status returning to baseline once bandages were removed. []  See Progress Note/Recertification   Patient will continue to benefit from skilled PT services to modify and progress therapeutic interventions, address functional mobility deficits via edema reduction and AROM/strength improvement, procure/train in necessary compression garments, and facilitate independence in long term management. Progress toward goals / Updated goals:    1. Patient to be independent in decongestive HEP to maximize therapeutic benefit of muscle pump with compression bandages donned. PN: To be initiated  Current: 7/19/2022 - Progressing - initiated this date     2. Patient to be independent in self manual lymph drainage (MLD) and/or diaphragmatic breathing for bilateral lower extremities to reroute lymphatic fluid for reduction in symptoms. PN:  to be initiated  Current: 8/2/2022 - Progressing - initiated MLD and diaphragmatic breathing     3. Patient to independently don/doff compression garments for maintenance of symptoms and increase ADL tolerance. PN:  measured and order submitted     4.  Patient to demonstrate an overall 8% reduction in rick LE for improved standing and walking ability.   PN:  Right LE = 14.2 cm = 5.4% reduction , Left LE =-6 cm = 2.3% reduction  Current: 8/2/2022 - Progressing - Right LE = -16.6 cm = 6.3 % reduction  Left LE = -12.7 cm = 4.9 % reduction      PLAN    [x]  Upgrade activities as tolerated YES Continue plan of care   []  Discharge due to :    []  Other:      Therapist: Vincent Infante, PT, PT, DPT, CLT    Date: 8/16/2022 Time: 7:39 AM     Future Appointments   Date Time Provider Mary Alice Randolph   8/16/2022 12:45 PM Alice Romo, PT [facility]   8/19/2022  9:30 AM Alice Romo, PT [facility]   8/19/2022  1:30 PM [facility] CT RM 1 [facility]   8/23/2022 12:45 PM Claudio Fraser, PT [facility]   8/26/2022 11:30 AM Alice Romo, PT [facility]   8/30/2022 12:45 PM Alice Romo, PT [facility]   11/10/2022 11:45 AM Kb Ospina MD [facility]   12/1/2022 11:15 AM Chaz Perkins MD [facility]

## 2022-08-19 ENCOUNTER — HOSPITAL ENCOUNTER (OUTPATIENT)
Dept: LAB | Age: 62
Discharge: HOME | End: 2022-08-19

## 2022-08-19 ENCOUNTER — HOSPITAL ENCOUNTER (OUTPATIENT)
Dept: PHYSICAL THERAPY | Age: 62
Discharge: HOME | End: 2022-08-19
Payer: MEDICAID

## 2022-08-19 ENCOUNTER — HOSPITAL ENCOUNTER (OUTPATIENT)
Dept: CT IMAGING | Age: 62
Discharge: HOME | End: 2022-08-19
Attending: INTERNAL MEDICINE
Payer: MEDICAID

## 2022-08-19 DIAGNOSIS — Z95.3 HISTORY OF AORTIC VALVE REPLACEMENT WITH BIOPROSTHETIC VALVE: ICD-10-CM

## 2022-08-19 DIAGNOSIS — Z98.890 H/O AORTIC ROOT REPAIR: ICD-10-CM

## 2022-08-19 LAB
CREAT UR-MCNC: 0.7 MG/DL (ref 0.6–1.3)
XX-LABCORP SPECIMEN COL,LCBCF: NORMAL

## 2022-08-19 PROCEDURE — 99001 SPECIMEN HANDLING PT-LAB: CPT

## 2022-08-19 PROCEDURE — 71260 CT THORAX DX C+: CPT

## 2022-08-19 PROCEDURE — 97530 THERAPEUTIC ACTIVITIES: CPT

## 2022-08-19 PROCEDURE — 82565 ASSAY OF CREATININE: CPT

## 2022-08-19 PROCEDURE — 97110 THERAPEUTIC EXERCISES: CPT

## 2022-08-19 PROCEDURE — 74011000636 HC RX REV CODE- 636: Performed by: INTERNAL MEDICINE

## 2022-08-19 RX ADMIN — IOPAMIDOL 80 ML: 612 INJECTION, SOLUTION INTRAVENOUS at 14:06

## 2022-08-19 NOTE — PROGRESS NOTES
PHYSICAL THERAPY - DAILY TREATMENT NOTE    Patient Name: Jennifer Amaro        Date: 2022  : 1960   YES Patient  Verified  Visit #:   7   of   8  Insurance: Payor: [facility] MEDICAID / Plan: [facility] [employer] ELITE PLUS / Product Type: Managed Care Medicaid /      In time: 9:30 Out time: 10:25   Total Treatment Time: 55     TREATMENT AREA =  Lymphedema, not elsewhere classified [I89.0]    SUBJECTIVE    Pain Level (on 0 to 10 scale):  3  / 10   Medication Changes/New allergies or changes in medical history, any new surgeries or procedures? NO     If yes, update Summary List   Subjective Functional Status/Changes:  [x]  No changes reported     Received her compression foot pieces in the mail, still waiting on the leg piece       OBJECTIVE      8 min Therapeutic Exercise:  [x]  See flow sheet : Decongestive exercise to maximize lymphatic return via muscle joint pump action   Rationale:       Reduce edema  to improve the patients ability to perform functional mobility with increased independence and safety and perform ADLs with increased ease. 8 min Therapeutic Activity: WC<>plinthe transfer, long<>short sit with bandages donned, static standing   Rationale: To increase safety and efficiency with ADLs through edema reduction    39 min Manual Therapy: Bandages taken down by PT. Manual lymph drainage to reroute lymphatic fluid from rick LE due to lymphatic blockage and reduced motility, emphasis on knees and upper legs.      Non-specific skin care, washing with patient's personal Hibicleanse    Multilayer compression bandaging to rick LE to maximize lymphatic flow as follows:  - Aquaphor  - Stockinette  - Non adherent foam to anterior left LE and ankle crease  - 0.4 cm soft foam  - 6 cm bandage in modified sandle pattern  - 10 cm + 12 cm bandage in herringbone pattern ankle to popliteus   The manual therapy interventions were performed at a separate and distinct time from the therapeutic activities interventions. Rationale:      increase tissue extensibility and decrease edema  to improve patient's ability to perform ADLs and functional mobility with improved ease and comfort    Patient Education: Review of garments ordered and role in edema management     Other Objective/Functional Measures:    Presents with bandages donned. Mild drainage noted on nonadherent pad placed on right LE in addition to partial thickness abrasion at ankle crease. Post Treatment Pain Level (on 0 to 10) scale:   3  / 10     ASSESSMENT    Assessment/Changes in Function: Visual observation showing reduced limb edema. Will transition to garment trial and training once leg piece is received   []  See Progress Note/Recertification   Patient will continue to benefit from skilled PT services to modify and progress therapeutic interventions, address functional mobility deficits via edema reduction and AROM/strength improvement, procure/train in necessary compression garments, and facilitate independence in long term management. Progress toward goals / Updated goals:  1. Patient to be independent in decongestive HEP to maximize therapeutic benefit of muscle pump with compression bandages donned. PN: To be initiated  Current: 7/19/2022 - Progressing - initiated this date     2. Patient to be independent in self manual lymph drainage (MLD) and/or diaphragmatic breathing for bilateral lower extremities to reroute lymphatic fluid for reduction in symptoms. PN:  to be initiated  Current: 8/2/2022 - Progressing - initiated MLD and diaphragmatic breathing     3. Patient to independently don/doff compression garments for maintenance of symptoms and increase ADL tolerance. PN:  measured and order submitted  Current: 8/19/2022 - Foot piece received, awaiting leg piece     4. Patient to demonstrate an overall 8% reduction in rick LE for improved standing and walking ability.   PN:  Right LE = 14.2 cm = 5.4% reduction , Left LE =-6 cm = 2.3% reduction     PLAN    [x]  Upgrade activities as tolerated YES Continue plan of care   []  Discharge due to :    []  Other:      Therapist: Maite Waller, PT, PT, DPT, CLT    Date: 8/19/2022 Time: 7:35 AM     Future Appointments   Date Time Provider Mary Alice Randolph   8/19/2022  9:30 AM Chun Steele, PT [facility]   8/19/2022  1:30 PM HBV CT RM 1 [facility]   8/23/2022 12:45 PM Ty Fraser, PT [facility]   8/30/2022 12:45 PM Chun Steele, PT [facility]   11/10/2022 11:45 AM Emily Burnett MD [facility]   12/1/2022 11:15 AM Ricky Perkins MD [facility] AM[facility]

## 2022-08-23 ENCOUNTER — HOSPITAL ENCOUNTER (OUTPATIENT)
Dept: PHYSICAL THERAPY | Age: 62
Discharge: HOME | End: 2022-08-23
Payer: MEDICAID

## 2022-08-23 LAB
25(OH)D3+25(OH)D2 SERPL-MCNC: 15.8 NG/ML (ref 30–100)
ALBUMIN SERPL-MCNC: 3.7 G/DL (ref 3.8–4.8)
ALBUMIN/GLOB SERPL: 1.2 {RATIO} (ref 1.2–2.2)
ALP SERPL-CCNC: 107 IU/L (ref 44–121)
ALT SERPL-CCNC: 12 IU/L (ref 0–32)
AST SERPL-CCNC: 13 IU/L (ref 0–40)
BASOPHILS # BLD AUTO: 0 X10E3/UL (ref 0–0.2)
BASOPHILS NFR BLD AUTO: 0 %
BILIRUB SERPL-MCNC: 0.4 MG/DL (ref 0–1.2)
BUN SERPL-MCNC: 9 MG/DL (ref 8–27)
BUN/CREAT SERPL: 15 (ref 12–28)
CALCIUM SERPL-MCNC: 8.7 MG/DL (ref 8.7–10.3)
CHLORIDE SERPL-SCNC: 99 MMOL/L (ref 96–106)
CHOLEST SERPL-MCNC: 192 MG/DL (ref 100–199)
CO2 SERPL-SCNC: 25 MMOL/L (ref 20–29)
CREAT SERPL-MCNC: 0.61 MG/DL (ref 0.57–1)
EGFR: 102 ML/MIN/1.73
EOSINOPHIL # BLD AUTO: 0.1 X10E3/UL (ref 0–0.4)
EOSINOPHIL NFR BLD AUTO: 1 %
ERYTHROCYTE [DISTWIDTH] IN BLOOD BY AUTOMATED COUNT: 14.6 % (ref 11.7–15.4)
EST. AVERAGE GLUCOSE BLD GHB EST-MCNC: 255 MG/DL
GLOBULIN SER CALC-MCNC: 3.1 G/DL (ref 1.5–4.5)
GLUCOSE SERPL-MCNC: 224 MG/DL (ref 65–99)
HBA1C MFR BLD: 10.5 % (ref 4.8–5.6)
HCT VFR BLD AUTO: 43.2 % (ref 34–46.6)
HDLC SERPL-MCNC: 33 MG/DL
HGB BLD-MCNC: 14.1 G/DL (ref 11.1–15.9)
IMM GRANULOCYTES # BLD AUTO: 0.1 X10E3/UL (ref 0–0.1)
IMM GRANULOCYTES NFR BLD AUTO: 1 %
LDLC SERPL CALC-MCNC: 111 MG/DL (ref 0–99)
LYMPHOCYTES # BLD AUTO: 1.5 X10E3/UL (ref 0.7–3.1)
LYMPHOCYTES NFR BLD AUTO: 22 %
MCH RBC QN AUTO: 28.5 PG (ref 26.6–33)
MCHC RBC AUTO-ENTMCNC: 32.6 G/DL (ref 31.5–35.7)
MCV RBC AUTO: 87 FL (ref 79–97)
MONOCYTES # BLD AUTO: 0.4 X10E3/UL (ref 0.1–0.9)
MONOCYTES NFR BLD AUTO: 6 %
NEUTROPHILS # BLD AUTO: 4.8 X10E3/UL (ref 1.4–7)
NEUTROPHILS NFR BLD AUTO: 70 %
PLATELET # BLD AUTO: 200 X10E3/UL (ref 150–450)
POTASSIUM SERPL-SCNC: 3.9 MMOL/L (ref 3.5–5.2)
PROT SERPL-MCNC: 6.8 G/DL (ref 6–8.5)
RBC # BLD AUTO: 4.94 X10E6/UL (ref 3.77–5.28)
SODIUM SERPL-SCNC: 140 MMOL/L (ref 134–144)
TRIGL SERPL-MCNC: 275 MG/DL (ref 0–149)
TSH SERPL DL<=0.005 MIU/L-ACNC: 5.38 UIU/ML (ref 0.45–4.5)
VLDLC SERPL CALC-MCNC: 48 MG/DL (ref 5–40)
WBC # BLD AUTO: 6.9 X10E3/UL (ref 3.4–10.8)

## 2022-08-23 PROCEDURE — 97110 THERAPEUTIC EXERCISES: CPT

## 2022-08-23 PROCEDURE — 97530 THERAPEUTIC ACTIVITIES: CPT

## 2022-08-23 NOTE — PROGRESS NOTES
PHYSICAL THERAPY - DAILY TREATMENT NOTE    Patient Name: Cherrie Sensor        Date: 2022  : 1960   YES Patient  Verified  Visit #:   1  of   8  Insurance: Payor: [facility] MEDICAID / Plan: [facility] [employer] ELITE PLUS / Product Type: Managed Care Medicaid /      In time: 12:40 Out time: 13:38   Total Treatment Time: 58     TREATMENT AREA =  Lymphedema, not elsewhere classified [I89.0]    SUBJECTIVE    Pain Level (on 0 to 10 scale):  4  / 10   Medication Changes/New allergies or changes in medical history, any new surgeries or procedures? NO     If yes, update Summary List   Subjective Functional Status/Changes:  [x]  No changes reported     Says she has lost 13 pounds in water weight after taking increased dose of diuretic. Feeling better and having greater ease of breathing. Also received foot component of garment in mail, awaiting leg portion. OBJECTIVE    8 min Therapeutic Exercise:  [x]  See flow sheet : Decongestive exercise to maximize lymphatic return via muscle joint pump action   Rationale:       Reduce edema  to improve the patients ability to perform functional mobility with increased independence and safety and perform ADLs with increased ease. 8 min Therapeutic Activity: WC<>plinthe transfer, long<>short sit with bandages donned, static standing   Rationale: To increase safety and efficiency with ADLs through edema reduction      42 min Manual Therapy: Bandages taken down by PT. Manual lymph drainage to reroute lymphatic fluid from rick LE due to lymphatic blockage and reduced motility, emphasis on knees and upper legs.      Non-specific skin care, washing with patient's personal Hibicleanse    Multilayer compression bandaging to rick LE to maximize lymphatic flow as follows:  - Aquaphor  - Stockinette  - Non adherent foam to anterior left LE and ankle crease  - 0.4 cm soft foam  - 6 cm bandage in modified sandle pattern  - 10 cm + 12 cm bandage in herringbone pattern ankle to popliteus   The manual therapy interventions were performed at a separate and distinct time from the therapeutic activities interventions. Rationale:      increase tissue extensibility and decrease edema  to improve patient's ability to perform ADLs and functional mobility with improved ease and comfort    Patient Education: review HEP     Other Objective/Functional Measures:    Right LE = -23.6 cm = 8.9 % reduction  Left LE = -18.3 cm = 7.2 % reduction      Post Treatment Pain Level (on 0 to 10) scale:   4  / 10     ASSESSMENT    Assessment/Changes in Function: Patient showing continued decongestion of rick LE (8.9% right LE, 7.2% left LE). Tolerating all therapeutic interventions without difficulty and showing excellent diaphragmatic breathing mechanics for lymphatic stimulation. Skin integrity improving though still with hyperpigmentation. Awaiting delivery of leg portion of garments, will transition to garment training when received. []  See Progress Note/Recertification   Patient will continue to benefit from skilled PT services to modify and progress therapeutic interventions, address functional mobility deficits via edema reduction and AROM/strength improvement, procure/train in necessary compression garments, and facilitate independence in long term management. Progress toward goals / Updated goals:  1. Patient to be independent in decongestive HEP to maximize therapeutic benefit of muscle pump with compression bandages donned. PN: To be initiated  Current: 8/23/2022- Progressing - to provide home copy, performing in clinic     2. Patient to be independent in self manual lymph drainage (MLD) and/or diaphragmatic breathing for bilateral lower extremities to reroute lymphatic fluid for reduction in symptoms. PN:  to be initiated  Current: 8/23/2022 - MET - independent with diaphragmatic breathing     3.  Patient to independently don/doff compression garments for maintenance of symptoms and increase ADL tolerance. PN:  measured and order submitted  Current: 8/23/2022 - Foot piece received, awaiting leg piece     4. Patient to demonstrate an overall 8% reduction in rick LE for improved standing and walking ability.   PN:  Right LE = 14.2 cm = 5.4% reduction , Left LE =-6 cm = 2.3% reduction  Current: 8/23/2022 - Progressing - 8.9% right LE, 7.2% left LE     PLAN    [x]  Upgrade activities as tolerated YES Continue plan of care   []  Discharge due to :    []  Other:      Therapist: Chelsea Mosher, PT, PT, DPT, CLT    Date: 8/23/2022 Time: 12:27 PM     Future Appointments   Date Time Provider Mary Alice Randolph   8/23/2022 12:45 PM [facility]   8/30/2022 12:45 PM Oakwood Motts[facility]   11/10/2022 11:45 AM Yousuf Morales MD [facility]   12/1/2022 11:15 AM Deandre Perkins MD [facility] BS AMB

## 2022-08-23 NOTE — PROGRESS NOTES
[facility]  [address]  [place], [address]  [phone] fax    Physical Therapy Progress Note  Patient name: Domo Ng Start of Care: 2022   Referral source: Najma Zapata MD : 1960                Medical Diagnosis: Lymphedema, not elsewhere classified [I89.0]  Payor: [facility] [employer] [facility] / Plan: [address] [employer] / Product Type: Managed Care Medicaid /  Onset Date:2022                Treatment Diagnosis: Lymphedema, bilateral lower extremity   Prior Hospitalization: see medical history Provider#: [id number]   Medications: Verified on Patient summary List    Comorbidities: Tracheostomy with supplemental O2, OA, asthma, DM II, BMI >30, depression, back pain   Prior Level of Function: Edema managed, greater ease with Le elevation. Wheelchair for community integration, ambulating short household distances. Requires supplemental O2 via tracheostomy. Visits from Start of Care: 14    Missed Visits: 2      Established Goals:        Excellent         Good         Limited            None  [] Increased ROM   []  [x]  []  []  [] Increased Strength  []  []  []  []  [] Increased Mobility  []  [x]  []  []   [] Decreased Pain   []  [x]  []  []  [] Decreased Swelling  []  [x]  []  []    Key Functional Changes: Reduced limb edema, delivery of foot compression garments    Updated Goals: to be achieved in 4 weeks:  1.. Patient to be independent in decongestive HEP to maximize therapeutic benefit of muscle pump with compression bandages donned. PN: to provide home copy, performing in clinic    2. Patient to independently don/doff compression garments for maintenance of symptoms and increase ADL tolerance. PN: Foot piece received, awaiting leg piece     4. Patient to demonstrate an overall 8% reduction in rick LE for improved standing and walking ability.   PN: 8.9% right LE, 7.2% left LE    ASSESSMENT/RECOMMENDATIONS: Patient showing continued decongestion of rick LE (8.9% right LE, 7.2% left LE). Tolerating all therapeutic interventions without difficulty and showing excellent diaphragmatic breathing mechanics for lymphatic stimulation. Skin integrity improving though still with hyperpigmentation. Awaiting delivery of leg portion of garments, will transition to garment training when received. Patient will continue to benefit from skilled PT services to modify and progress therapeutic interventions, address functional mobility deficits via edema reduction and AROM/strength improvement, procure/train in necessary compression garments, and facilitate independence in long term management. [x]Continue therapy per initial plan/protocol at a frequency of  2 x per week for 4 weeks  []Continue therapy with the following recommended changes:_____________________      _____________________________________________________________________  []Discontinue therapy progressing towards or have reached established goals  []Discontinue therapy due to lack of appreciable progress towards goals  []Discontinue therapy due to lack of attendance or compliance  []Await Physician's recommendations/decisions regarding therapy  []Other:________________________________________________________________    Thank you for this referral.    Mejia Skinner, PT 8/23/2022 1:47 PM  NOTE TO PHYSICIAN:  PLEASE COMPLETE THE ORDERS BELOW AND   FAX TO [facility]: [phone]  If you are unable to process this request in 24 hours please contact our office: [phone]    I have read the above report and request that my patient continue as recommended. I have read the above report and request that my patient continue therapy with the following changes/special instructions:__________________________________________________________  I have read the above report and request that my patient be discharged from therapy.     Physicians signature: ______________________________Date: ______Time:______     Maria Guadalupe Santiago MD

## 2022-08-26 ENCOUNTER — APPOINTMENT (OUTPATIENT)
Dept: PHYSICAL THERAPY | Age: 62
End: 2022-08-26
Payer: MEDICAID

## 2022-08-26 NOTE — PROGRESS NOTES
Results have been reviewed and abnormal results noted. Spoke with pt regarding abnormal glucose, A1C, TSH, and vit D. Pt reports PCP is aware and recently adjusted DM and thyroid medication dosages. Has a follow up scheduled in December 2022.

## 2022-08-30 ENCOUNTER — HOSPITAL ENCOUNTER (OUTPATIENT)
Dept: PHYSICAL THERAPY | Age: 62
Discharge: HOME | End: 2022-08-30
Payer: MEDICAID

## 2022-08-30 PROCEDURE — 97530 THERAPEUTIC ACTIVITIES: CPT

## 2022-08-30 PROCEDURE — 97535 SELF CARE MNGMENT TRAINING: CPT

## 2022-08-30 NOTE — PROGRESS NOTES
PHYSICAL THERAPY - DAILY TREATMENT NOTE    Patient Name: Milana Kelly        Date: 2022  : 1960   YES Patient  Verified  Visit #:   1   of   8  Insurance: Payor: [facility] MEDICAID / Plan: [facility] [employer] ELITE PLUS / Product Type: Managed Care Medicaid /      In time: 12:30 Out time: 13:20   Total Treatment Time: 50     TREATMENT AREA =  Lymphedema, not elsewhere classified [I89.0]    SUBJECTIVE    Pain Level (on 0 to 10 scale):  3  / 10   Medication Changes/New allergies or changes in medical history, any new surgeries or procedures? NO     If yes, update Summary List   Subjective Functional Status/Changes:  [x]  No changes reported     Leg pieces have been delivered. Patient reporting ~60% improvement in her legs. OBJECTIVE    29 min Therapeutic Activity: Don/doff of garments with education on technique and Accutab placement to ensure optimal compression   Rationale: To increase safety and efficiency with ADLs. 15 min Manual Therapy: Bandages taken down by PT. Skilled girth measurements. Non-specific skin care, washing with patient's personal Hibicleanse   The manual therapy interventions were performed at a separate and distinct time from the therapeutic activities interventions. Rationale:      increase tissue extensibility and decrease edema  to improve patient's ability to perform ADLs and functional mobility with improved ease and comfort     8 min Self-Care: Garment care and wear schedule   Rationale: To promote carryover of lymphedema treatment protocol and increase independence with lymphedema management. Patient Education: Self Care, garment     Other Objective/Functional Measures:    Right LE = -25.5 cm = 10 % reduction  Left LE = -19.9 cm = 7.6 % reduction      Post Treatment Pain Level (on 0 to 10) scale:   3  / 10     ASSESSMENT    Assessment/Changes in Function: Garments of appropriate fit.  Patient able to verbalize steps of don/doff, care, and appropriate compression level following education. Patient to return in 2 weeks to assess garment effectiveness and follow up on additional concerns. Plan to DC after next visit. []  See Progress Note/Recertification   Patient will continue to benefit from skilled PT services to modify and progress therapeutic interventions, address functional mobility deficits via edema reduction and AROM/strength improvement, procure/train in necessary compression garments, and facilitate independence in long term management. Progress toward goals / Updated goals:  1.. Patient to be independent in decongestive HEP to maximize therapeutic benefit of muscle pump with compression bandages donned. PN: to provide home copy, performing in clinic    2. Patient to independently don/doff compression garments for maintenance of symptoms and increase ADL tolerance. PN: Foot piece received, awaiting leg piece  Current: 8/30/2022 - Progressing - patient able to direct assistance, will not be donning/doffing herself     4. Patient to demonstrate an overall 8% reduction in rick LE for improved standing and walking ability.   PN: 8.9% right LE, 7.2% left LE  Current: 8/30/2022 - MET - 10% right LE, 8% left LE     PLAN    [x]  Upgrade activities as tolerated YES Continue plan of care   []  Discharge due to :    []  Other:      Therapist: Jeffy Frost, PT, PT, DPT, CLT    Date: 8/30/2022 Time: 7:33 AM     Future Appointments   Date Time Provider Mary Alice Randolph   8/30/2022 12:45 PM Teresita Bo, LIAT [facility]   11/10/2022 11:45 AM Jonah Joel MD [facility]   12/1/2022 11:15 AM Elisabet Perkins MD [facility]

## 2022-09-07 DIAGNOSIS — K59.00 CONSTIPATION, UNSPECIFIED CONSTIPATION TYPE: ICD-10-CM

## 2022-09-07 RX ORDER — CETIRIZINE HYDROCHLORIDE, PSEUDOEPHEDRINE HYDROCHLORIDE 5; 120 MG/1; MG/1
TABLET, FILM COATED, EXTENDED RELEASE ORAL
Qty: 90 TABLET | Refills: 1 | Status: SHIPPED | OUTPATIENT
Start: 2022-09-07 | End: 2022-09-14

## 2022-09-11 DIAGNOSIS — R13.10 DYSPHAGIA, UNSPECIFIED TYPE: ICD-10-CM

## 2022-09-12 RX ORDER — ONDANSETRON 4 MG/1
TABLET, FILM COATED ORAL
Qty: 30 TABLET | Refills: 1 | Status: SHIPPED | OUTPATIENT
Start: 2022-09-12 | End: 2022-10-31

## 2022-09-14 ENCOUNTER — HOSPITAL ENCOUNTER (OUTPATIENT)
Dept: PHYSICAL THERAPY | Age: 62
Discharge: HOME | End: 2022-09-14
Payer: MEDICAID

## 2022-09-14 DIAGNOSIS — K59.00 CONSTIPATION, UNSPECIFIED CONSTIPATION TYPE: ICD-10-CM

## 2022-09-14 PROCEDURE — 97535 SELF CARE MNGMENT TRAINING: CPT

## 2022-09-14 PROCEDURE — 97763 ORTHC/PROSTC MGMT SBSQ ENC: CPT

## 2022-09-14 RX ORDER — CETIRIZINE HYDROCHLORIDE, PSEUDOEPHEDRINE HYDROCHLORIDE 5; 120 MG/1; MG/1
TABLET, FILM COATED, EXTENDED RELEASE ORAL
Qty: 90 TABLET | Refills: 1 | Status: SHIPPED | OUTPATIENT
Start: 2022-09-14

## 2022-09-14 NOTE — PROGRESS NOTES
Physical Therapy Discharge Instructions      [facility]  [address]  [place], [address]  [phone] fax    Patient: Laura Alvarez  : 1960      Continue Home Exercise Program daily with compression garments applied. Continue with  Daily skin checks and allowing your legs time without the garments. Follow up with MD:     [] Upon completion of therapy     [x] As needed      Additional Comments: It is recommended you procure new garments every 8-12 months.            Jeffy Frost, PT 2022 2:16 PM

## 2022-09-14 NOTE — PROGRESS NOTES
PHYSICAL THERAPY - DAILY TREATMENT NOTE    Patient Name: Evangelista Memory        Date: 2022  : 1960   YES Patient  Verified  Visit #:   2   of   8  Insurance: Payor: [facility] MEDICAID / Plan: [facility] [employer] ELITE PLUS / Product Type: Managed Care Medicaid /      In time: 13:15 Out time: 14:05   Total Treatment Time: 50     TREATMENT AREA =  Lymphedema, not elsewhere classified [I89.0]    SUBJECTIVE    Pain Level (on 0 to 10 scale):  2  / 10   Medication Changes/New allergies or changes in medical history, any new surgeries or procedures? NO     If yes, update Summary List   Subjective Functional Status/Changes:  []  No changes reported     Wraps have been comfortable, no reported difficulty. Ready for discharge. OBJECTIVE    29 min Therapeutic Activity: Don/doff of garments with education on technique and Accutab placement to ensure optimal compression   Rationale: To increase safety and efficiency with ADLs. 15 min Manual Therapy: Skilled girth measurements. Non-specific skin care, washing with patient's personal Hibicleanse   The manual therapy interventions were performed at a separate and distinct time from the therapeutic activities interventions. Rationale:      increase tissue extensibility and decrease edema  to improve patient's ability to perform ADLs and functional mobility with improved ease and comfort     15 min Self-Care: Reviewed garment care and wear schedule and timeline for procuring new garments. Rationale: To promote carryover of lymphedema treatment protocol and increase independence with lymphedema management.     Patient Education: Self Care     Other Objective/Functional Measures:    Right LE = -21.1 cm = 8 % reduction  Left LE = -13 cm = 4.6 % reduction      Post Treatment Pain Level (on 0 to 10) scale:   2  / 10     ASSESSMENT    Assessment/Changes in Function: Patient with excellent response to decongestive therapy and highly compliant with all interventions. Skin integrity significantly improved and fully intact. Bilateral lower leg edema also reduced (4-8%). Upper legs continue with edema and some skin impairment. Patient to monitor and seek additional treatment should condition worsen/not improve spontaneously. She has been fitted for and trained in wear of compression garments for long term management. Patient appropriate and ready for discharge at this time. Reports all needs met upon departure from clinic. Progress toward goals / Updated goals:  1.. Patient to be independent in decongestive HEP to maximize therapeutic benefit of muscle pump with compression bandages donned. PN: to provide home copy, performing in clinic  Current: 9/14/2022 - MET    2. Patient to independently don/doff compression garments for maintenance of symptoms and increase ADL tolerance. PN: Foot piece received, awaiting leg piece  Current: 9/14/2022 - Partially met - able to direct don/doff     4. Patient to demonstrate an overall 8% reduction in rick LE for improved standing and walking ability.   PN: 8.9% right LE, 7.2% left LE  Current: 8/30/2022 - MET - 10% right LE, 8% left LE     PLAN    []  Upgrade activities as tolerated YES Continue plan of care   [x]  Discharge due to : Goals met, program complete   []  Other:      Therapist: Vincent Infante PT, PT, DPT, CLT    Date: 9/14/2022 Time: 1:04 PM     Future Appointments   Date Time Provider Mary Alice Randolph   9/14/2022  1:15 PM Alice Romo, LIAT [facility]   11/10/2022 11:45 AM Kb Ospina MD [facility]   12/1/2022 11:15 AM Chaz Perkins MD [facility] BS AMB

## 2022-09-14 NOTE — PROGRESS NOTES
[facility]  [address] [facility] [address]  [facility], [address]  [phone] fax    Physical Therapy Discharge Summary  Patient name: Malik Che Start of Care: 2022   Referral source: Jesus Mchugh MD : 1960                Medical Diagnosis: Lymphedema, not elsewhere classified [I89.0]  Payor: [address] [place] [address] Ave / Plan: [facility] / Product Type: Managed Care Medicaid /  Onset Date:2022                Treatment Diagnosis: Lymphedema, bilateral lower extremity   Prior Hospitalization: see medical history Provider#: [id number]   Medications: Verified on Patient summary List    Comorbidities: Tracheostomy with supplemental O2, OA, asthma, DM II, BMI >30, depression, back pain   Prior Level of Function: Edema managed, greater ease with Le elevation. Wheelchair for community integration, ambulating short household distances. Requires supplemental O2 via tracheostomy. Visits from Start of Care: 16    Missed Visits: 2  Reporting Period : 2022 to 2022    Summary of Care:  1.. Patient to be independent in decongestive HEP to maximize therapeutic benefit of muscle pump with compression bandages donned. PN: to provide home copy, performing in clinic  DC:MET    2. Patient to independently don/doff compression garments for maintenance of symptoms and increase ADL tolerance. PN: Foot piece received, awaiting leg piece  DC: Partially met - able to direct don/doff     4. Patient to demonstrate an overall 8% reduction in rick LE for improved standing and walking ability. PN: 8.9% right LE, 7.2% left LE  DC: MET - 10% right LE, 8% left LE    ASSESSMENT/RECOMMENDATIONS:Patient with excellent response to decongestive therapy and highly compliant with all interventions. Skin integrity significantly improved and fully intact. Bilateral lower leg edema also reduced (4-8%). Upper legs continue with edema and some skin impairment.  Patient to monitor and seek additional treatment should condition worsen/not improve spontaneously. She has been fitted for and trained in wear of compression garments for long term management. Patient appropriate and ready for discharge at this time. .  [x]Discontinue therapy: [x]Patient has reached or is progressing toward set goals      []Patient is non-compliant or has abdicated      []Due to lack of appreciable progress towards set goals    Maite Christin, PT 9/14/2022 1:07 PM    NOTE TO PHYSICIAN:  Please complete the following and fax to: [facility] at [facility] at [phone]  . Retain this original for your records. If you are unable to process this request in   24 hours, please contact our office.      [] I have read the above report and request that my patient continue therapy with the following changes/special instructions:  [] I have read the above report and request that my patient be discharged from therapy    Physician's Signature:____________Date:_________TIME:________     Keegan Wong MD  ** Signature, Date and Time must be completed for valid certification **

## 2022-10-14 DIAGNOSIS — Z79.4 TYPE 2 DIABETES MELLITUS WITH DIABETIC NEUROPATHY, WITH LONG-TERM CURRENT USE OF INSULIN (HCC): ICD-10-CM

## 2022-10-14 DIAGNOSIS — E11.40 TYPE 2 DIABETES MELLITUS WITH DIABETIC NEUROPATHY, WITH LONG-TERM CURRENT USE OF INSULIN (HCC): ICD-10-CM

## 2022-10-17 RX ORDER — INSULIN GLARGINE 100 [IU]/ML
INJECTION, SOLUTION SUBCUTANEOUS
Qty: 30 ML | Refills: 2 | Status: SHIPPED | OUTPATIENT
Start: 2022-10-17 | End: 2022-10-27 | Stop reason: SDUPTHER

## 2022-10-25 ENCOUNTER — TELEPHONE (OUTPATIENT)
Dept: FAMILY MEDICINE CLINIC | Age: 62
End: 2022-10-25

## 2022-10-25 ENCOUNTER — HOSPITAL ENCOUNTER (EMERGENCY)
Age: 62
Discharge: HOME | End: 2022-10-26
Attending: STUDENT IN AN ORGANIZED HEALTH CARE EDUCATION/TRAINING PROGRAM
Payer: MEDICAID

## 2022-10-25 VITALS
DIASTOLIC BLOOD PRESSURE: 89 MMHG | OXYGEN SATURATION: 98 % | HEART RATE: 74 BPM | SYSTOLIC BLOOD PRESSURE: 172 MMHG | RESPIRATION RATE: 20 BRPM | TEMPERATURE: 97.8 F

## 2022-10-25 DIAGNOSIS — K56.41 FECAL IMPACTION (HCC): Primary | ICD-10-CM

## 2022-10-25 DIAGNOSIS — R73.9 HYPERGLYCEMIA: ICD-10-CM

## 2022-10-25 LAB
APPEARANCE UR: CLEAR
BACTERIA URNS QL MICRO: ABNORMAL /HPF
BASOPHILS # BLD: 0 K/UL (ref 0–0.1)
BASOPHILS NFR BLD: 1 % (ref 0–2)
BILIRUB UR QL: NEGATIVE
COLOR UR: YELLOW
DIFFERENTIAL METHOD BLD: ABNORMAL
EOSINOPHIL # BLD: 0 K/UL (ref 0–0.4)
EOSINOPHIL NFR BLD: 0 % (ref 0–5)
EPITH CASTS URNS QL MICRO: ABNORMAL /LPF (ref 0–5)
ERYTHROCYTE [DISTWIDTH] IN BLOOD BY AUTOMATED COUNT: 15.8 % (ref 11.6–14.5)
GLUCOSE BLD STRIP.AUTO-MCNC: 306 MG/DL (ref 70–110)
GLUCOSE UR STRIP.AUTO-MCNC: >1000 MG/DL
HCT VFR BLD AUTO: 47.7 % (ref 35–45)
HGB BLD-MCNC: 15.6 G/DL (ref 12–16)
HGB UR QL STRIP: ABNORMAL
IMM GRANULOCYTES # BLD AUTO: 0.1 K/UL (ref 0–0.04)
IMM GRANULOCYTES NFR BLD AUTO: 1 % (ref 0–0.5)
INR PPP: 0.9 (ref 0.8–1.2)
KETONES UR QL STRIP.AUTO: 80 MG/DL
LEUKOCYTE ESTERASE UR QL STRIP.AUTO: NEGATIVE
LYMPHOCYTES # BLD: 0.9 K/UL (ref 0.9–3.6)
LYMPHOCYTES NFR BLD: 12 % (ref 21–52)
MCH RBC QN AUTO: 28.7 PG (ref 24–34)
MCHC RBC AUTO-ENTMCNC: 32.7 G/DL (ref 31–37)
MCV RBC AUTO: 87.8 FL (ref 78–100)
MONOCYTES # BLD: 0.4 K/UL (ref 0.05–1.2)
MONOCYTES NFR BLD: 5 % (ref 3–10)
MUCOUS THREADS URNS QL MICRO: ABNORMAL /LPF
NEUTS SEG # BLD: 6.7 K/UL (ref 1.8–8)
NEUTS SEG NFR BLD: 82 % (ref 40–73)
NITRITE UR QL STRIP.AUTO: NEGATIVE
NRBC # BLD: 0 K/UL (ref 0–0.01)
NRBC BLD-RTO: 0 PER 100 WBC
PH UR STRIP: 6 [PH] (ref 5–8)
PLATELET # BLD AUTO: 206 K/UL (ref 135–420)
PMV BLD AUTO: 9.7 FL (ref 9.2–11.8)
PROT UR STRIP-MCNC: ABNORMAL MG/DL
PROTHROMBIN TIME: 12.9 SEC (ref 11.5–15.2)
RBC # BLD AUTO: 5.43 M/UL (ref 4.2–5.3)
RBC #/AREA URNS HPF: ABNORMAL /HPF (ref 0–5)
SP GR UR REFRACTOMETRY: >1.03 (ref 1–1.03)
UROBILINOGEN UR QL STRIP.AUTO: 0.2 EU/DL (ref 0.2–1)
WBC # BLD AUTO: 8.2 K/UL (ref 4.6–13.2)
WBC URNS QL MICRO: ABNORMAL /HPF (ref 0–4)

## 2022-10-25 PROCEDURE — 81001 URINALYSIS AUTO W/SCOPE: CPT

## 2022-10-25 PROCEDURE — 99284 EMERGENCY DEPT VISIT MOD MDM: CPT

## 2022-10-25 PROCEDURE — 85025 COMPLETE CBC W/AUTO DIFF WBC: CPT

## 2022-10-25 PROCEDURE — 82962 GLUCOSE BLOOD TEST: CPT

## 2022-10-25 PROCEDURE — 85610 PROTHROMBIN TIME: CPT

## 2022-10-25 RX ORDER — DOCUSATE SODIUM 100 MG/1
100 CAPSULE, LIQUID FILLED ORAL 2 TIMES DAILY
Qty: 60 CAPSULE | Refills: 2 | Status: SHIPPED | OUTPATIENT
Start: 2022-10-25 | End: 2023-01-23

## 2022-10-25 RX ORDER — POLYETHYLENE GLYCOL 3350 17 G/17G
17 POWDER, FOR SOLUTION ORAL DAILY
Qty: 595 G | Refills: 0 | Status: SHIPPED | OUTPATIENT
Start: 2022-10-25

## 2022-10-25 RX ORDER — MAGNESIUM CITRATE
296 SOLUTION, ORAL ORAL
Qty: 148 ML | Refills: 0 | Status: SHIPPED | OUTPATIENT
Start: 2022-10-26 | End: 2022-10-26

## 2022-10-25 NOTE — TELEPHONE ENCOUNTER
Pt would like for Dr. Ej Alba to call her as soon as he can. Pt wouldn't discuss any further details with me. Ms. Mary Shantellmaikol can be reached at [phone]. Please advise.  Thank you!!!

## 2022-10-25 NOTE — INTERDISCIPLINARY ROUNDS
[address]  [address] [place] [address], [address]     INPATIENT REHABILITATION  EQUIPMENT SHEET    Date: 6/27/2019     Name: Tayler Esquivel Age / Sex: 63 y.o. / female   CSN: [id number] MRN: [id number]   [facility] Date: 6/25/2019 Discharge Date: 7/12/19        [facility] WALKING AIDS FOLLOW-UP SERVICES      Height  []  Straight cane  [x] DMV Handicap Placard    []  #14 ½ jorge l height  []  Forearm crutches OUTPATIENT    []  Jorge L height  []  Axillary crutches  []  PT    [x]  Standard height  []  LBQC  []  OT    []  Reclining high back  []  SBQC  []  ST       Weight  HOME HEALTH    [x]  Standard weight WALKERS  [x]  PT    []  Lightweight  []  Standard height  [x]  OT    []  High-strength lightweight  []  Small adult  [x]  ST    []  Heavy Duty   []  Tall walker  [x]  Nursing    []  Patient is unable to self-propel a standard weight wheelchair  []  Rolling walker with 5 single fixed wheels  []  Wound care      []  Anticoagulation management       Width  []  Bariatric walker  [x]  Diabetes management    []  Narrow (16)  [x] Rollator walker  [x]  Insulin management    []  Standard (18) ACCESSORIES  []  No insulin management    []  Wide (20)  []  Rear sure glide brakes  []  BP management    [x]  Other: 22\"  []  10 rear wheel brake  [x]  CHF Telehealth       Arms  []  Tall leg extensions  []  Post-CABG care/monitoring    []  Standard  []  Other:  []  Colostomy care    []  Desk   [x]  Tube feeding    []  Removable BATHROOM EQUIPMENT  []  PICC line care      Foot/Leg Rests  []  Bedside commode  []  Barker catheter care    []  Removable  [x]  Extra wide 3 in 1 bedside commode  [x]  Other: Tracheostomy care    []  Elevating  []  3:1 commode W/O drop arms  []  Medical Social Worker      Other  []  3:1 commode WITH drop arms  []  Aide    []  Brake extensions  [x]  Tub transfer bench Trach care/respiratory care    []  Padded gloves  []  Tub chair     []  Antitippers  []  Other:          CUSHIONS OTHER     [x]  Foam cushion  []  Seat belt     []  Gel cushion  []  Gait belt     []  Zuly Eubanks II  []  Transfer board - Size:     []  Roho  [x]  Oxygen     []  Other  []  CPM      []  [address]  []  Ramp     [x]  Hospital bed  []  Hip kit     []  Special mattress  []  Reacher     []  Trapeze bar       Preferred Local Pharmacy (not mail-order): [facility]     Physical Therapy Elliott López, PT, DPT   Occupational Therapy Joaquin Sanchez MSOTR/L   [facility] [address] Pemberton, MSW   Nursing Stephanie Canchola, RN BSN Her/She

## 2022-10-25 NOTE — ED PROVIDER NOTES
HPI   Patient is a 61-year-old female who presents with rectal and vaginal pain. Patient states that she has been constipated since last Friday. She has only gotten stool out by pulling. She does take a stool softener, laxative because use of milk of magnesia and some suppositories with no significant improvement in her symptoms. She is starting to develop with lower abdominal cramping. Some main part of her discomfort is in her rectum and vagina itself. She feels like something is prolapsing out of her vagina. She has multiple hemorrhoids. She has noticed some rectal bleeding.     Past Medical History:   Diagnosis Date    Acquired hypothyroidism 5/17/2017    Anxiety 6/25/2019    Bilateral vocal cord paralysis 4/4/2019    Bilateral vocal cord paralysis status post thyroidectomy (4/4/2019 - Dr. Alexander Leonard) with residual dysphagia and dysarthria; S/P Tracheostomy (6/3/2019 - Dr. Alexander Leonard); S/P PEG tube insertion (6/20/2019) - Dr. Alexander Leonard)    Chronic back pain     Lower back pain    Depression     Diabetic neuropathy associated with type 2 diabetes mellitus ([address]     Dysphagia 6/25/2019    S/P Thyroidectomy (4/4/2019 - Dr. Alexander Leonard)    History of asthma     History of heart failure     chronic diastolic heart failure    History of vitamin D deficiency 8/28/2017    Hypertensive heart disease without heart failure 5/17/2017    Hypoxemia requiring supplemental oxygen 6/25/2019    Insomnia     Left ear hearing loss     pt states nerve damage--- 25% hearing loss, described as \"muffled\"    Memory difficulty     Moderate persistent asthma     Obesity, Class II, BMI 35-39.9 11/11/2016    Obstructive sleep apnea 11/6/2015    Panic attacks     Ringing of ears     Type 2 diabetes mellitus with diabetic neuropathy, with long-term current use of insulin ([address]     Vertigo        Past Surgical History:   Procedure Laterality Date    HX HEART VALVE SURGERY  2013    aortic valve repair    HX HYSTERECTOMY Partial Hysterectomy - removed ovary, via pf    HX MYOMECTOMY      HX ORTHOPAEDIC  02/2018    had nerves burned on her right side of back    HX TRACHEOSTOMY  06/03/2019    S/P Tracheostomy (6/3/2019 - Dr. Candelario Pryor)    [facility]  10/31/2013    open heart    PA EGD PERCUTANEOUS PLACEMENT GASTROSTOMY TUBE N/A 06/20/2019    Dr. Candelario Ding Bilateral 04/04/2019    Dr. Delbert Turpin         Family History:   Problem Relation Age of Onset    Heart Disease Mother     Diabetes Mother     Stroke Mother     Hypertension Mother     Diabetes Father     Heart Disease Father     Hypertension Father     Stroke Father     Diabetes Brother     Cancer Brother     Hypertension Brother     Stroke Brother     Heart Disease Brother     Diabetes Brother     Hypertension Brother     Stroke Brother     Heart Disease Brother     Diabetes Brother     Hypertension Brother     Hypertension Brother        Social History     Socioeconomic History    Marital status:      Spouse name: Not on file    Number of children: Not on file    Years of education: Not on file    Highest education level: Not on file   Occupational History    Not on file   Tobacco Use    Smoking status: Never    Smokeless tobacco: Never   Vaping Use    Vaping Use: Never used   Substance and Sexual Activity    Alcohol use: No    Drug use: No    Sexual activity: Not Currently   Other Topics Concern     Service No    Blood Transfusions No    Caffeine Concern No    Occupational Exposure No    Hobby Hazards No    Sleep Concern Yes     Comment: Sleep apnea     Stress Concern Yes     Comment: Due to family medical issues.      Weight Concern No    Special Diet No    Back Care Yes     Comment: Patient tries to do back care with streches     Exercise No    Bike Helmet Yes    Seat Belt Yes    Self-Exams Yes   Social History Narrative    Not on file     Social Determinants of Health     Financial Resource Strain: Not on file   Food Insecurity: Not on file   Transportation Needs: Not on file   Physical Activity: Not on file   Stress: Not on file   Social Connections: Not on file   Intimate Partner Violence: Not on file   Housing Stability: Not on file         ALLERGIES: Other food, Metformin, Morphine, Singulair [montelukast], Sucrose, and Trulicity [dulaglutide]    Review of Systems  Constitutional: No fever  HENT: No ear pain  Eyes: No change in vision  Respiratory: No SOB  Cardio: No chest pain  GI: Positive for blood in stool  : No hematuria  MSK: No back pain  Skin: No rashes  Neuro: No headache    Vitals:    10/25/22 1724   BP: (!) 172/89   Pulse: 74   Resp: 20   Temp: 97.8 °F (36.6 °C)   SpO2: 98%            Physical Exam   General: No acute distress  Head: Normocephalic, atraumatic  Psych: Cooperative and alert  Eyes: No scleral icterus, normal conjunctiva  ENT: Moist oral mucosa  Neck: Supple  CV: Regular rate and rhythm, no pitting edema, palpable radial pulses  Pulm: Clear breath sounds bilaterally without any wheezing or rhonchi, normal respiratory rate  GI: Normal bowel sounds, soft, non-tender  : No obvious vaginal bleeding, does have a prolapsing stage I rectocele. Does not prolapse out of the vagina. Multiple external hemorrhoids, nonthrombosed, normal rectal tone, large stool burden noted within the vaginal vault, no gross bleeding  MSK: Moves all four extremities  Skin: Large bright red erythematous rash to the inguinal folds bilaterally  Neuro: Alert and conversive    MDM    Patient is 67-year-old female who presents with rectal and vaginal pain. Patient appears to have fecal impaction. No concerning findings of obstruction or acute intra-abdominal process at this time. Does not appear to have gross bleeding although we will check a hemoglobin. I think this patient would benefit from a soapsuds enema and reevaluation. CBC, INR, are within normal limits. BMP was hemolyzed and needed to be redrawn.     UA shows signs of glucose and ketones. Because this we will recheck her BMP. Patient had her soapsuds enema and had a large amount of stool come out. States she had significant relief of her symptoms afterwards. CBC shows an elevated glucose of 341 but without any anion gap or acidosis. I would like to lower this with some insulin and fluids however patient states that she is feeling much better after the soapsuds enema and would like to go home. States that she will deal with her high sugar at home. Discussed with her the importance of monitoring her sugar closely and following up with her PCP or return to the emergency department she is unable to get it down. Patient stable for discharge at this time. Patient is in agreement with the plan to be discharged at this time. All the patient's questions were answered. Patient was given written instructions on the diagnosis, and states understanding of the plan moving forward. We did discuss important signs and symptoms that should prompt quick return to the emergency department. Disposition: Patient was discharged home in stable condition.   They will follow up with their primary care physician    Prescriptions: Colace, magnesium citrate, MiraLAX    Diagnosis: Fecal impaction, acute  Chronic hemorrhoids   Hyperglycemia      Procedures

## 2022-10-26 LAB
ANION GAP SERPL CALC-SCNC: 5 MMOL/L (ref 3–18)
BUN SERPL-MCNC: 13 MG/DL (ref 7–18)
BUN/CREAT SERPL: 15 (ref 12–20)
CALCIUM SERPL-MCNC: 9.1 MG/DL (ref 8.5–10.1)
CHLORIDE SERPL-SCNC: 102 MMOL/L (ref 100–111)
CO2 SERPL-SCNC: 29 MMOL/L (ref 21–32)
CREAT SERPL-MCNC: 0.89 MG/DL (ref 0.6–1.3)
GLUCOSE SERPL-MCNC: 341 MG/DL (ref 74–99)
POTASSIUM SERPL-SCNC: 4.4 MMOL/L (ref 3.5–5.5)
SODIUM SERPL-SCNC: 136 MMOL/L (ref 136–145)

## 2022-10-26 PROCEDURE — 80048 BASIC METABOLIC PNL TOTAL CA: CPT

## 2022-10-26 NOTE — TELEPHONE ENCOUNTER
Spoke with patient and she was extremely constipated-patient went to ED received medications and recommended follow up appt with provider

## 2022-10-26 NOTE — DISCHARGE INSTRUCTIONS
You have a fecal impaction. Would recommend starting a bowel regiment. Recommend adding Colace daily to your current regiment. In addition take MiraLAX once or twice a day until you feel like you are well cleaned out. Use magnesium citrate only as needed. Follow-up with your doctor to make sure this is a good regimen for you.

## 2022-10-27 ENCOUNTER — OFFICE VISIT (OUTPATIENT)
Dept: FAMILY MEDICINE CLINIC | Age: 62
End: 2022-10-27
Payer: MEDICAID

## 2022-10-27 VITALS
TEMPERATURE: 97.9 F | SYSTOLIC BLOOD PRESSURE: 128 MMHG | RESPIRATION RATE: 24 BRPM | HEIGHT: 67 IN | OXYGEN SATURATION: 99 % | DIASTOLIC BLOOD PRESSURE: 70 MMHG | BODY MASS INDEX: 45.99 KG/M2 | WEIGHT: 293 LBS | HEART RATE: 62 BPM

## 2022-10-27 DIAGNOSIS — Z79.4 TYPE 2 DIABETES MELLITUS WITH DIABETIC NEUROPATHY, WITH LONG-TERM CURRENT USE OF INSULIN (HCC): Primary | ICD-10-CM

## 2022-10-27 DIAGNOSIS — E11.40 TYPE 2 DIABETES MELLITUS WITH DIABETIC NEUROPATHY, WITH LONG-TERM CURRENT USE OF INSULIN (HCC): Primary | ICD-10-CM

## 2022-10-27 DIAGNOSIS — E55.9 HYPOVITAMINOSIS D: ICD-10-CM

## 2022-10-27 DIAGNOSIS — E66.01 MORBID OBESITY (HCC): ICD-10-CM

## 2022-10-27 DIAGNOSIS — E78.5 DYSLIPIDEMIA: ICD-10-CM

## 2022-10-27 DIAGNOSIS — Z93.0 TRACHEOSTOMY DEPENDENCE (HCC): ICD-10-CM

## 2022-10-27 DIAGNOSIS — R53.81 PHYSICAL DEBILITY: ICD-10-CM

## 2022-10-27 DIAGNOSIS — F41.9 ANXIETY: ICD-10-CM

## 2022-10-27 DIAGNOSIS — K59.09 CHRONIC CONSTIPATION: ICD-10-CM

## 2022-10-27 DIAGNOSIS — J96.11 CHRONIC RESPIRATORY FAILURE WITH HYPOXIA (HCC): ICD-10-CM

## 2022-10-27 DIAGNOSIS — I50.32 CHRONIC DIASTOLIC CONGESTIVE HEART FAILURE (HCC): ICD-10-CM

## 2022-10-27 DIAGNOSIS — I10 ESSENTIAL HYPERTENSION: ICD-10-CM

## 2022-10-27 DIAGNOSIS — I89.0 LYMPHEDEMA: ICD-10-CM

## 2022-10-27 DIAGNOSIS — E03.4 HYPOTHYROIDISM DUE TO ACQUIRED ATROPHY OF THYROID: ICD-10-CM

## 2022-10-27 PROCEDURE — 3074F SYST BP LT 130 MM HG: CPT | Performed by: FAMILY MEDICINE

## 2022-10-27 PROCEDURE — 99215 OFFICE O/P EST HI 40 MIN: CPT | Performed by: FAMILY MEDICINE

## 2022-10-27 PROCEDURE — 3078F DIAST BP <80 MM HG: CPT | Performed by: FAMILY MEDICINE

## 2022-10-27 PROCEDURE — 3046F HEMOGLOBIN A1C LEVEL >9.0%: CPT | Performed by: FAMILY MEDICINE

## 2022-10-27 RX ORDER — BUDESONIDE 0.5 MG/2ML
0.5 INHALANT ORAL
COMMUNITY
Start: 2022-09-08

## 2022-10-27 RX ORDER — GLIPIZIDE 10 MG/1
10 TABLET ORAL 2 TIMES DAILY
Qty: 60 TABLET | Refills: 2 | Status: SHIPPED | OUTPATIENT
Start: 2022-10-27

## 2022-10-27 RX ORDER — INSULIN GLARGINE 100 [IU]/ML
INJECTION, SOLUTION SUBCUTANEOUS
Qty: 30 ML | Refills: 2 | Status: SHIPPED | OUTPATIENT
Start: 2022-10-27

## 2022-10-27 RX ORDER — ASPIRIN 325 MG
50000 TABLET, DELAYED RELEASE (ENTERIC COATED) ORAL
Qty: 13 CAPSULE | Refills: 3 | Status: SHIPPED | OUTPATIENT
Start: 2022-10-27

## 2022-10-27 NOTE — PROGRESS NOTES
BETHEL  Dax Sol is seen for follow-up care. Patient was recently seen in the emergency room on 10/25/2022 for constipation. Constipation: Patient had fecal impaction and constipation. Seen in the emergency room. At the time had soap leandra enema given. This produced some stool. Since then she has stool softeners that have been prescribed. She has docusate to take twice daily and MiraLAX daily. She is here to  this medication. I did emphasize the need to take medication as prescribed. She has had chronic constipation. Given this I will also place a referral for her to be seen by the gastroenterologist.  Her bowel habits have changed. I will order Linzess to take. Uncontrolled diabetes mellitus: Patient has uncontrolled diabetes mellitus. HbA1c is 10.5. She has not been taking medication as prescribed. I have prescribed Independence but patient has not taken this medication. She states that one of the side effects is yeast infection. This has made her hesitant to take medication. She is on Lantus 85 units daily. At times she misses the medication. I did emphasize the need to take medication as prescribed. I will send in glipizide 10 mg twice a day with meals. She will continue with the Lantus. I will refer her to the endocrinologist.  She needs to be seen by the specialist.  In the meantime she will keep a blood glucose log and I will follow-up in the clinic at next visit. Lymphedema: Patient has lymphedema bilateral lower extremities. She has weeping both legs and is getting wound care/dressing done by home health provider. She will continue current management plan. Morbid obesity: Patient has morbid obesity with a BMI of 46.67. She will intensify lifestyle and dietary modification. Hypothyroidism: Patient has hypothyroidism. She is on Synthroid. We will recheck TSH at next visit. She will continue with the current treatment plan.   Chronic respiratory failure: Patient has chronic respiratory failure. She is on chronic oxygen. She is followed up by the pulmonologist.  Patient is on albuterol inhaler. Patient has inhaler medication. She uses Pulmicort nebulizer and albuterol nebulizer. Diastolic CHF: Patient has diastolic CHF. She is on metoprolol and Bumex. She denies chest pain, shortness of breath or diaphoresis. Most recent echocardiogram was reported as:    Left Ventricle: Normal left ventricular systolic function with a visually estimated EF of 60 - 65%. Left ventricle size is normal. Severely increased wall thickness. Findings consistent with severe concentric hypertrophy. Normal wall motion. Diastolic dysfunction present with normal LV EF. Right Ventricle: Right ventricle is mildly dilated. Aortic Valve: Bioprosthetic valve. No regurgitation. No stenosis. AV mean gradient is 18 mmHg    Right Atrium: Right atrium is mildly dilated. 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 She has been followed up by the ENT specialist.  She will continue with the current management plan. She does gets trach care done. Hypertension: Patient has hypertension. Blood pressure is stable. Patient is on metoprolol. Continue current management plan. Insomnia: Patient has insomnia. Patient is on melatonin. She also takes lorazepam for anxiety but this also helps with calming her down so that she can sleep. Continue current treatment plan. Anxiety: Patient has anxiety. She is on lorazepam.  Stable on medication. We will continue current treatment plan.         Past Medical History  Past Medical History:   Diagnosis Date    Acquired hypothyroidism 5/17/2017    Anxiety 6/25/2019    Bilateral vocal cord paralysis 4/4/2019    Bilateral vocal cord paralysis status post thyroidectomy (4/4/2019 - Dr. Brody Rubi) with residual dysphagia and dysarthria; S/P Tracheostomy (6/3/2019 - Dr. Brody Rubi); S/P PEG tube insertion (6/20/2019) - Dr. Brody Rubi)    Chronic back pain Lower back pain    Depression     Diabetic neuropathy associated with type 2 diabetes mellitus ([address]     Dysphagia 6/25/2019    S/P Thyroidectomy (4/4/2019 - Dr. Ken Graves)    History of asthma     History of heart failure     chronic diastolic heart failure    History of vitamin D deficiency 8/28/2017    Hypertensive heart disease without heart failure 5/17/2017    Hypoxemia requiring supplemental oxygen 6/25/2019    Insomnia     Left ear hearing loss     pt states nerve damage--- 25% hearing loss, described as \"muffled\"    Memory difficulty     Moderate persistent asthma     Obesity, Class II, BMI 35-39.9 11/11/2016    Obstructive sleep apnea 11/6/2015    Panic attacks     Ringing of ears     Type 2 diabetes mellitus with diabetic neuropathy, with long-term current use of insulin ([facility] [address]     Vertigo        Surgical History  Past Surgical History:   Procedure Laterality Date    HX HEART VALVE SURGERY  2013    aortic valve repair    HX HYSTERECTOMY      Partial Hysterectomy - removed ovary, via pf    HX MYOMECTOMY      HX ORTHOPAEDIC  02/2018    had nerves burned on her right side of back    HX TRACHEOSTOMY  06/03/2019    S/P Tracheostomy (6/3/2019 - Dr. Ken Graves)    [facility]  10/31/2013    open heart    AL EGD PERCUTANEOUS PLACEMENT GASTROSTOMY TUBE N/A 06/20/2019    Dr. Mary Jane Coello Bilateral 04/04/2019    Dr. Martha Flores        Medications  Current Outpatient Medications   Medication Sig Dispense Refill    budesonide (PULMICORT) 0.5 mg/2 mL nbsp Take 0.5 mg by inhalation. cholecalciferol (VITAMIN D3) (50,000 UNITS /1250 MCG) capsule Take 1 Capsule by mouth every seven (7) days. 13 Capsule 3    docusate sodium (Colace) 100 mg capsule Take 1 Capsule by mouth two (2) times a day for 90 days. 60 Capsule 2    polyethylene glycol (Miralax) 17 gram/dose powder Take 17 g by mouth daily.  1 tablespoon with 8 oz of water daily 595 g 0    insulin glargine (Lantus U-100 Insulin) 100 unit/mL injection inject 80 units subcutaneously once daily 30 mL 2    Senna Plus 8.6-50 mg per tablet take 1 tablet by mouth daily if needed for constipation 90 Tablet 1    ondansetron hcl (ZOFRAN) 4 mg tablet TAKE 1 TABLET BY MOUTH EVERY 8 HOURS AS NEEDED FOR NAUSEA AND VOMITTING 30 Tablet 1    LORazepam (ATIVAN) 2 mg tablet take 1 tablet by mouth nightly 31 Tablet 3    melatonin 1 mg tablet take 1 tablet by mouth nightly 90 Tablet 1    metoprolol succinate (TOPROL-XL) 25 mg XL tablet Take 1 Tablet by mouth in the morning. 90 Tablet 1    acarbose (PRECOSE) 50 mg tablet Take 50 mg by mouth three (3) times daily. levothyroxine (SYNTHROID) 300 mcg tablet Take 1 Tablet by mouth Daily (before breakfast). 90 Tablet 1    bumetanide (BUMEX) 2 mg tablet Take 1 Tablet by mouth in the morning. 90 Tablet 1    dapagliflozin (Farxiga) 10 mg tab tablet Take 1 Tablet by mouth in the morning. 30 Tablet 2    ibuprofen (MOTRIN) 600 mg tablet Take 1 Tablet by mouth every six (6) hours as needed for Pain. 30 Tablet 0    omega-3 acid ethyl esters (LOVAZA) 1 gram capsule Take 1 Capsule by mouth two (2) times a day. 60 Capsule 2    Blood-Glucose Meter monitoring kit Check blood glucose twice a day. 1 Kit 0    fexofenadine (Allergy Relief, fexofenadine,) 180 mg tablet Take 1 Tablet by mouth daily. 90 Tablet 1    albuterol (PROVENTIL HFA, VENTOLIN HFA, PROAIR HFA) 90 mcg/actuation inhaler inhale 2 puffs by mouth and INTO THE LUNGS every 4 hours if needed for wheezing 18 g 2    potassium chloride (KLOR-CON M10) 10 mEq tablet take 1 tablet by mouth once daily 90 Tablet 1    albuterol (PROVENTIL VENTOLIN) 2.5 mg /3 mL (0.083 %) nebu 3 mL by Nebulization route every four (4) hours as needed (wheeze). Indications: asthma attack 24 Each 5    Nebulizer & Compressor machine Use for nebulization as needed. 1 Each 0    Nebulizer Accessories kit Use for nebulization as needed.  1 Kit 2    glucose blood VI test strips (ASCENSIA AUTODISC VI, ONE TOUCH ULTRA TEST VI) strip Check blood glucose 3 times daily 300 Strip 3    lancets misc Check blood glucose 3 times daily 300 Each 3    BD INSULIN SYRINGE ULTRA-FINE 1 mL 31 gauge x 5/16 syrg use as directed twice a day 200 Syringe 3    Aspirin, Buffered 81 mg tab Take 81 mg by mouth daily. glucose blood VI test strips (FreeStyle Lite Strips) strip Check glucose level twice daily as directed (Patient not taking: Reported on 10/27/2022) 100 Strip 0    FREESTYLE LITE STRIPS strip TEST twice a day as directed (Patient not taking: Reported on 10/27/2022) 100 Strip 11       Allergies  Allergies   Allergen Reactions    Other Food Other (comments)     Artificial sweeteners- causes headaches    Metformin Other (comments)     Increase pain in feet and swelling in feet  Increased hunger,tired and bilat foot pain    Morphine Other (comments)     headache    Singulair [Montelukast] Other (comments)     hallucinations    Sucrose Other (comments)     Pt. Is not allergic to sucrose. She develops headaches with artificial sweeteners.     Trulicity [Dulaglutide] Other (comments)       Family History  Family History   Problem Relation Age of Onset    Heart Disease Mother     Diabetes Mother     Stroke Mother     Hypertension Mother     Diabetes Father     Heart Disease Father     Hypertension Father     Stroke Father     Diabetes Brother     Cancer Brother     Hypertension Brother     Stroke Brother     Heart Disease Brother     Diabetes Brother     Hypertension Brother     Stroke Brother     Heart Disease Brother     Diabetes Brother     Hypertension Brother     Hypertension Brother        Social History  Social History     Socioeconomic History    Marital status:      Spouse name: Not on file    Number of children: Not on file    Years of education: Not on file    Highest education level: Not on file   Occupational History    Not on file   Tobacco Use    Smoking status: Never    Smokeless tobacco: Never   Vaping Use    Vaping Use: Never used   Substance and Sexual Activity    Alcohol use: No    Drug use: No    Sexual activity: Not Currently   Other Topics Concern     Service No    Blood Transfusions No    Caffeine Concern No    Occupational Exposure No    Hobby Hazards No    Sleep Concern Yes     Comment: Sleep apnea     Stress Concern Yes     Comment: Due to family medical issues. Weight Concern No    Special Diet No    Back Care Yes     Comment: Patient tries to do back care with streches     Exercise No    Bike Helmet Yes    Seat Belt Yes    Self-Exams Yes   Social History Narrative    Not on file     Social Determinants of Health     Financial Resource Strain: Not on file   Food Insecurity: Not on file   Transportation Needs: Not on file   Physical Activity: Not on file   Stress: Not on file   Social Connections: Not on file   Intimate Partner Violence: Not on file   Housing Stability: Not on file       Review of Systems  Review of Systems - Review of all systems is negative except as noted above in the HPI.     Vital Signs  Visit Vitals  /70 (BP 1 Location: Left upper arm, BP Patient Position: Sitting, BP Cuff Size: Large adult long)   Pulse 62   Temp 97.9 °F (36.6 °C)   Resp 24   Ht 5' 7\" (1.702 m)   Wt 298 lb (135.2 kg)   LMP  (LMP Unknown)   SpO2 99%   BMI 46.67 kg/m²         Physical Exam  Physical Examination: General appearance - oriented to person, place, and time and overweight  Mental status - affect appropriate to mood  Neck -trach in place  Lymphatics - no palpable lymphadenopathy  Chest - decreased air entry noted bilateral lung bases  Heart - systolic murmur 2/6 at lower left sternal border  Abdomen - no rebound tenderness noted  Back exam - limited range of motion  Neurological - abnormal neurological exam unchanged from prior examinations  Musculoskeletal - osteoarthritic changes noted in both hands  Extremities - pedal edema 2 +, intact peripheral pulses        Results  Results for orders placed or performed during the hospital encounter of 10/25/22   CBC WITH AUTOMATED DIFF   Result Value Ref Range    WBC 8.2 4.6 - 13.2 K/uL    RBC 5.43 (H) 4.20 - 5.30 M/uL    HGB 15.6 12.0 - 16.0 g/dL    HCT 47.7 (H) 35.0 - 45.0 %    MCV 87.8 78.0 - 100.0 FL    MCH 28.7 24.0 - 34.0 PG    MCHC 32.7 31.0 - 37.0 g/dL    RDW 15.8 (H) 11.6 - 14.5 %    PLATELET 674 826 - 604 K/uL    MPV 9.7 9.2 - 11.8 FL    NRBC 0.0 0  WBC    ABSOLUTE NRBC 0.00 0.00 - 0.01 K/uL    NEUTROPHILS 82 (H) 40 - 73 %    LYMPHOCYTES 12 (L) 21 - 52 %    MONOCYTES 5 3 - 10 %    EOSINOPHILS 0 0 - 5 %    BASOPHILS 1 0 - 2 %    IMMATURE GRANULOCYTES 1 (H) 0.0 - 0.5 %    ABS. NEUTROPHILS 6.7 1.8 - 8.0 K/UL    ABS. LYMPHOCYTES 0.9 0.9 - 3.6 K/UL    ABS. MONOCYTES 0.4 0.05 - 1.2 K/UL    ABS. EOSINOPHILS 0.0 0.0 - 0.4 K/UL    ABS. BASOPHILS 0.0 0.0 - 0.1 K/UL    ABS. IMM.  GRANS. 0.1 (H) 0.00 - 0.04 K/UL    DF AUTOMATED     PROTHROMBIN TIME + INR   Result Value Ref Range    Prothrombin time 12.9 11.5 - 15.2 sec    INR 0.9 0.8 - 1.2     URINALYSIS W/ RFLX MICROSCOPIC   Result Value Ref Range    Color YELLOW      Appearance CLEAR      Specific gravity >1.030 (H) 1.003 - 1.030    pH (UA) 6.0 5.0 - 8.0      Protein TRACE (A) NEG mg/dL    Glucose >1,000 (A) NEG mg/dL    Ketone 80 (A) NEG mg/dL    Bilirubin Negative NEG      Blood TRACE (A) NEG      Urobilinogen 0.2 0.2 - 1.0 EU/dL    Nitrites Negative NEG      Leukocyte Esterase Negative NEG     URINE MICROSCOPIC ONLY   Result Value Ref Range    WBC 0 to 1 0 - 4 /hpf    RBC 1 to 4 0 - 5 /hpf    Epithelial cells 1+ 0 - 5 /lpf    Bacteria FEW (A) NEG /hpf    Mucus FEW (A) NEG /lpf   METABOLIC PANEL, BASIC   Result Value Ref Range    Sodium 136 136 - 145 mmol/L    Potassium 4.4 3.5 - 5.5 mmol/L    Chloride 102 100 - 111 mmol/L    CO2 29 21 - 32 mmol/L    Anion gap 5 3.0 - 18 mmol/L    Glucose 341 (H) 74 - 99 mg/dL    BUN 13 7.0 - 18 MG/DL    Creatinine 0.89 0.6 - 1.3 MG/DL    BUN/Creatinine ratio 15 12 - 20      eGFR >60 >60 ml/min/1.73m2    Calcium 9.1 8.5 - 10.1 MG/DL   GLUCOSE, POC   Result Value Ref Range    Glucose (POC) 306 (H) 70 - 110 mg/dL       ASSESSMENT and PLAN    ICD-10-CM ICD-9-CM    1. Type 2 diabetes mellitus with diabetic neuropathy, with long-term current use of insulin ([facility])  E11.40 250.60 REFERRAL TO ENDOCRINOLOGY    Z79.4 357.2 insulin glargine (Lantus U-100 Insulin) 100 unit/mL injection     V58.67 glipiZIDE (GLUCOTROL) 10 mg tablet      2. Hypovitaminosis D  E55.9 268.9 cholecalciferol (VITAMIN D3) (50,000 UNITS /1250 MCG) capsule      3. Chronic constipation  K59.09 564.00 REFERRAL TO GASTROENTEROLOGY      linaCLOtide (Linzess) 145 mcg cap capsule      4. Anxiety  F41.9 300.00       5. Essential hypertension  I10 401.9       6. Hypothyroidism due to acquired atrophy of thyroid  E03.4 244.8      246.8       7. Tracheostomy dependence ([facility] [address]  Z93.0 V44.0       8. Lymphedema  I89.0 457.1       9. Morbid obesity ([facility] [address]  E66.01 278.01       10. Dyslipidemia  E78.5 272.4       11. Chronic respiratory failure with hypoxia ([facility])  J96.11 518.83      799.02       12. Physical debility  R53.81 799.3       13. Chronic diastolic congestive heart failure ([facility])  I50.32 428.32      428.0         lab results and schedule of future lab studies reviewed with patient  reviewed diet, exercise and weight control  cardiovascular risk and specific lipid/LDL goals reviewed  reviewed medications and side effects in detail  specific diabetic recommendations: low cholesterol diet, weight control and daily exercise discussed, home glucose monitoring emphasized, all medications, side effects and compliance discussed carefully, annual eye examinations at Ophthalmology discussed, glycohemoglobin and other lab monitoring discussed, and long term diabetic complications discussed      I have discussed the diagnosis with the patient and the intended plan of care as seen in the above orders.  The patient has received an after-visit summary and questions were answered concerning future plans. I have discussed medication, side effects, and warnings with the patient in detail. The patient verbalized understanding and is in agreement with the plan of care. The patient will contact the office with any additional concerns. I spent at least 44 minutes on this visit with this established patient. Farooq Cummins MD    PLEASE NOTE:   This document has been produced using voice recognition software.  Unrecognized errors in transcription may be present

## 2022-10-27 NOTE — PROGRESS NOTES
1. \"Have you been to the ER, urgent care clinic since your last visit? Hospitalized since your last visit? \" Yes Where: [facility] ED    2. \"Have you seen or consulted any other health care providers outside of the [address] [place] [address] since your last visit? \" No     3. For patients aged 39-70: Has the patient had a colonoscopy / FIT/ Cologuard? Yes - no Care Gap present      If the patient is female:    4. For patients aged 41-77: Has the patient had a mammogram within the past 2 years? No      5. For patients aged 21-65: Has the patient had a pap smear?  NA - based on age or sex

## 2022-10-28 DIAGNOSIS — R13.10 DYSPHAGIA, UNSPECIFIED TYPE: ICD-10-CM

## 2022-10-31 RX ORDER — ONDANSETRON 4 MG/1
TABLET, FILM COATED ORAL
Qty: 30 TABLET | Refills: 1 | Status: SHIPPED | OUTPATIENT
Start: 2022-10-31

## 2022-11-10 ENCOUNTER — OFFICE VISIT (OUTPATIENT)
Dept: CARDIOLOGY CLINIC | Age: 62
End: 2022-11-10
Payer: MEDICAID

## 2022-11-10 VITALS
OXYGEN SATURATION: 99 % | WEIGHT: 293 LBS | DIASTOLIC BLOOD PRESSURE: 83 MMHG | SYSTOLIC BLOOD PRESSURE: 179 MMHG | HEART RATE: 65 BPM | BODY MASS INDEX: 45.99 KG/M2 | HEIGHT: 67 IN

## 2022-11-10 DIAGNOSIS — Z01.810 PREOP CARDIOVASCULAR EXAM: ICD-10-CM

## 2022-11-10 DIAGNOSIS — E66.01 SEVERE OBESITY (BMI >= 40) (HCC): ICD-10-CM

## 2022-11-10 DIAGNOSIS — I50.32 CHRONIC DIASTOLIC CONGESTIVE HEART FAILURE (HCC): Primary | ICD-10-CM

## 2022-11-10 DIAGNOSIS — Z98.890 H/O AORTIC ROOT REPAIR: ICD-10-CM

## 2022-11-10 DIAGNOSIS — E78.5 DYSLIPIDEMIA: ICD-10-CM

## 2022-11-10 DIAGNOSIS — Z95.3 HISTORY OF AORTIC VALVE REPLACEMENT WITH BIOPROSTHETIC VALVE: ICD-10-CM

## 2022-11-10 PROCEDURE — 3078F DIAST BP <80 MM HG: CPT | Performed by: INTERNAL MEDICINE

## 2022-11-10 PROCEDURE — 3074F SYST BP LT 130 MM HG: CPT | Performed by: INTERNAL MEDICINE

## 2022-11-10 PROCEDURE — 99214 OFFICE O/P EST MOD 30 MIN: CPT | Performed by: INTERNAL MEDICINE

## 2022-11-10 NOTE — PATIENT INSTRUCTIONS
There are no discontinued medications. Reduce salt and fluid intake as possible. Learning About the [address] Diet  What is the Mediterranean diet? The Mediterranean diet is a style of eating rather than a diet plan. It features foods eaten in [place], [place], [place] and [place], and other countries along the [facility]. It emphasizes eating foods like fish, fruits, vegetables, beans, high-fiber breads and whole grains, nuts, and olive oil. This style of eating includes limited red meat, cheese, and sweets. Why choose the Mediterranean diet? A Mediterranean-style diet may improve heart health. It contains more fat than other heart-healthy diets. But the fats are mainly from nuts, unsaturated oils (such as fish oils and olive oil), and certain nut or seed oils (such as canola, soybean, or flaxseed oil). These fats may help protect the heart and blood vessels. How can you get started on the Mediterranean diet? Here are some things you can do to switch to a more Mediterranean way of eating. What to eat  Eat a variety of fruits and vegetables each day, such as grapes, blueberries, tomatoes, broccoli, peppers, figs, olives, spinach, eggplant, beans, lentils, and chickpeas. Eat a variety of whole-grain foods each day, such as oats, brown rice, and whole wheat bread, pasta, and couscous. Eat fish at least 2 times a week. Try tuna, salmon, mackerel, lake trout, herring, or sardines. Eat moderate amounts of low-fat dairy products, such as milk, cheese, or yogurt. Eat moderate amounts of poultry and eggs. Choose healthy (unsaturated) fats, such as nuts, olive oil, and certain nut or seed oils like canola, soybean, and flaxseed. Limit unhealthy (saturated) fats, such as butter, palm oil, and coconut oil. And limit fats found in animal products, such as meat and dairy products made with whole milk. Try to eat red meat only a few times a month in very small amounts.   Limit sweets and desserts to only a few times a week. This includes sugar-sweetened drinks like soda. The Mediterranean diet may also include red wine with your meal--1 glass each day for women and up to 2 glasses a day for men. Tips for eating at home  Use herbs, spices, garlic, lemon zest, and citrus juice instead of salt to add flavor to foods. Add avocado slices to your sandwich instead of aleman. Have fish for lunch or dinner instead of red meat. Brush the fish with olive oil, and broil or grill it. Sprinkle your salad with seeds or nuts instead of cheese. Cook with olive or canola oil instead of butter or oils that are high in saturated fat. Switch from 2% milk or whole milk to 1% or fat-free milk. Dip raw vegetables in a vinaigrette dressing or hummus instead of dips made from mayonnaise or sour cream.  Have a piece of fruit for dessert instead of a piece of cake. Try baked apples, or have some dried fruit. Tips for eating out  Try broiled, grilled, baked, or poached fish instead of having it fried or breaded. Ask your  to have your meals prepared with olive oil instead of butter. Order dishes made with marinara sauce or sauces made from olive oil. Avoid sauces made from cream or mayonnaise. Choose whole-grain breads, whole wheat pasta and pizza crust, brown rice, beans, and lentils. Cut back on butter or margarine on bread. Instead, you can dip your bread in a small amount of olive oil. Ask for a side salad or grilled vegetables instead of french fries or chips. Where can you learn more? Go to http://www.samaniego.com/  Enter O407 in the search box to learn more about \"Learning About the Mediterranean Diet. \"  Current as of: May 9, 2022               Content Version: 13.4  © 8147-5747 [employer]. Care instructions adapted under license by [employer] (which disclaims liability or warranty for this information).  If you have questions about a medical condition or this instruction, always ask your healthcare professional. [place] [address] any warranty or liability for your use of this information.

## 2022-11-10 NOTE — PROGRESS NOTES
HISTORY OF PRESENT ILLNESS  Fidelia Michael is a 65 y.o. female. Follow-up of CHF, hypertension, hyperlipidemia, status post AVR-bioprosthetic and status post aortic root replacement, obesity    8/22 seen as a new patient after more than 3 years when she was seen by Dr. Jeff Zavaleta. History of bicuspid aortic valve and status post AVR and aortic root repair. Now needs a gastric bypass surgery. Has a permanent tracheostomy since 2019 after her thyroid surgery. History of CHF    Shortness of Breath  The history is provided by the Patient. This is a chronic problem. The problem occurs intermittently. The current episode started more than 1 week ago. The problem has not changed since onset. Precipitated by: bathroom,   Leg Swelling  The history is provided by the Patient. This is a chronic problem. The current episode started more than 1 week ago (2013). The problem has not changed (since lymphedema pump- new blisters) since onset. Associated symptoms include shortness of breath. Follow-up  Associated symptoms include shortness of breath. Review of Systems   Respiratory:  Positive for shortness of breath.     Family History   Problem Relation Age of Onset    Heart Disease Mother     Diabetes Mother     Stroke Mother     Hypertension Mother     Diabetes Father     Heart Disease Father     Hypertension Father     Stroke Father     Diabetes Brother     Cancer Brother     Hypertension Brother     Stroke Brother     Heart Disease Brother     Diabetes Brother     Hypertension Brother     Stroke Brother     Heart Disease Brother     Diabetes Brother     Hypertension Brother     Hypertension Brother        Past Medical History:   Diagnosis Date    Acquired hypothyroidism 5/17/2017    Anxiety 6/25/2019    Bilateral vocal cord paralysis 4/4/2019    Bilateral vocal cord paralysis status post thyroidectomy (4/4/2019 - Dr. Nell Schaumann) with residual dysphagia and dysarthria; S/P Tracheostomy (6/3/2019 - Dr. Nell Schaumann); S/P PEG tube insertion (6/20/2019) - Dr. Branden Harris)    Chronic back pain     Lower back pain    Depression     Diabetic neuropathy associated with type 2 diabetes mellitus ([facility] [address]     Dysphagia 6/25/2019    S/P Thyroidectomy (4/4/2019 - Dr. Branden Harris)    History of asthma     History of heart failure     chronic diastolic heart failure    History of vitamin D deficiency 8/28/2017    Hypertensive heart disease without heart failure 5/17/2017    Hypoxemia requiring supplemental oxygen 6/25/2019    Insomnia     Left ear hearing loss     pt states nerve damage--- 25% hearing loss, described as \"muffled\"    Memory difficulty     Moderate persistent asthma     Obesity, Class II, BMI 35-39.9 11/11/2016    Obstructive sleep apnea 11/6/2015    Panic attacks     Ringing of ears     Type 2 diabetes mellitus with diabetic neuropathy, with long-term current use of insulin (HCC)     Vertigo        Past Surgical History:   Procedure Laterality Date    HX HEART VALVE SURGERY  2013    aortic valve repair    HX HYSTERECTOMY      Partial Hysterectomy - removed ovary, via pf    HX MYOMECTOMY      HX ORTHOPAEDIC  02/2018    had nerves burned on her right side of back    HX TRACHEOSTOMY  06/03/2019    S/P Tracheostomy (6/3/2019 - Dr. Branden Harris)    [facility]  10/31/2013    open heart    [place] EGD PERCUTANEOUS PLACEMENT GASTROSTOMY TUBE N/A 06/20/2019    Dr. Brittany Burns Bilateral 04/04/2019    Dr. Alexia Acosta History     Tobacco Use    Smoking status: Never    Smokeless tobacco: Never   Substance Use Topics    Alcohol use: No       Allergies   Allergen Reactions    Other Food Other (comments)     Artificial sweeteners- causes headaches    Metformin Other (comments)     Increase pain in feet and swelling in feet  Increased hunger,tired and bilat foot pain    Morphine Other (comments)     headache    Singulair [Montelukast] Other (comments)     hallucinations    Sucrose Other (comments) Pt. Is not allergic to sucrose. She develops headaches with artificial sweeteners. Trulicity [Dulaglutide] Other (comments)       Outpatient Medications Marked as Taking for the 11/10/22 encounter (Office Visit) with Candie Taylor MD   Medication Sig Dispense Refill    ondansetron hcl (ZOFRAN) 4 mg tablet TAKE 1 TABLET BY MOUTH EVERY 8 HOURS AS NEEDED FOR NAUSEA AND VOMITTING 30 Tablet 1    budesonide (PULMICORT) 0.5 mg/2 mL nbsp Take 0.5 mg by inhalation. cholecalciferol (VITAMIN D3) (50,000 UNITS /1250 MCG) capsule Take 1 Capsule by mouth every seven (7) days. 13 Capsule 3    insulin glargine (Lantus U-100 Insulin) 100 unit/mL injection inject 85 units subcutaneously once daily 30 mL 2    glipiZIDE (GLUCOTROL) 10 mg tablet Take 1 Tablet by mouth two (2) times a day. 60 Tablet 2    linaCLOtide (Linzess) 145 mcg cap capsule Take 1 Capsule by mouth Daily (before breakfast). Indications: chronic idiopathic constipation 30 Capsule 2    docusate sodium (Colace) 100 mg capsule Take 1 Capsule by mouth two (2) times a day for 90 days. 60 Capsule 2    polyethylene glycol (Miralax) 17 gram/dose powder Take 17 g by mouth daily. 1 tablespoon with 8 oz of water daily 595 g 0    Senna Plus 8.6-50 mg per tablet take 1 tablet by mouth daily if needed for constipation 90 Tablet 1    LORazepam (ATIVAN) 2 mg tablet take 1 tablet by mouth nightly 31 Tablet 3    melatonin 1 mg tablet take 1 tablet by mouth nightly 90 Tablet 1    metoprolol succinate (TOPROL-XL) 25 mg XL tablet Take 1 Tablet by mouth in the morning. 90 Tablet 1    acarbose (PRECOSE) 50 mg tablet Take 50 mg by mouth three (3) times daily. levothyroxine (SYNTHROID) 300 mcg tablet Take 1 Tablet by mouth Daily (before breakfast). 90 Tablet 1    bumetanide (BUMEX) 2 mg tablet Take 1 Tablet by mouth in the morning.  90 Tablet 1    glucose blood VI test strips (FreeStyle Lite Strips) strip Check glucose level twice daily as directed 100 Strip 0    ibuprofen (MOTRIN) 600 mg tablet Take 1 Tablet by mouth every six (6) hours as needed for Pain. 30 Tablet 0    omega-3 acid ethyl esters (LOVAZA) 1 gram capsule Take 1 Capsule by mouth two (2) times a day. 60 Capsule 2    Blood-Glucose Meter monitoring kit Check blood glucose twice a day. 1 Kit 0    fexofenadine (Allergy Relief, fexofenadine,) 180 mg tablet Take 1 Tablet by mouth daily. 90 Tablet 1    albuterol (PROVENTIL HFA, VENTOLIN HFA, PROAIR HFA) 90 mcg/actuation inhaler inhale 2 puffs by mouth and INTO THE LUNGS every 4 hours if needed for wheezing 18 g 2    potassium chloride (KLOR-CON M10) 10 mEq tablet take 1 tablet by mouth once daily 90 Tablet 1    albuterol (PROVENTIL VENTOLIN) 2.5 mg /3 mL (0.083 %) nebu 3 mL by Nebulization route every four (4) hours as needed (wheeze). Indications: asthma attack 24 Each 5    Nebulizer & Compressor machine Use for nebulization as needed. 1 Each 0    Nebulizer Accessories kit Use for nebulization as needed. 1 Kit 2    glucose blood VI test strips (ASCENSIA AUTODISC VI, ONE TOUCH ULTRA TEST VI) strip Check blood glucose 3 times daily 300 Strip 3    lancets misc Check blood glucose 3 times daily 300 Each 3    BD INSULIN SYRINGE ULTRA-FINE 1 mL 31 gauge x 5/16 syrg use as directed twice a day 200 Syringe 3    FREESTYLE LITE STRIPS strip TEST twice a day as directed 100 Strip 11    Aspirin, Buffered 81 mg tab Take 81 mg by mouth daily. Visit Vitals  BP (!) 179/83 (BP 1 Location: Left upper arm, BP Patient Position: Sitting, BP Cuff Size: Thigh)   Pulse 65   Ht 5' 7\" (1.702 m)   Wt 135.2 kg (298 lb)   LMP  (LMP Unknown)   SpO2 99%   BMI 46.67 kg/m²       Physical Exam  ekg sinus rhythm with no acute st-t changes    Echo 04/2017:  SUMMARY:  Procedure information: Image quality was suboptimal.    Left ventricle: Size was at the upper limits of normal. Systolic function  was at the lower limits of normal by visual assessment. Ejection fraction  was estimated to be 50 %. Suboptimal endocardial visualization limits wall  motion analysis. Wall thickness was mildly increased. Aortic valve: A bioprosthesis was present. No obvious abnormalities. Valve  peak gradient was 13 mmHg. Valve mean gradient was 7 mmHg. Estimated  aortic valve area (by Vmax) was 1.9 cm-sq.  08/22/18   ECHO ADULT COMPLETE 08/22/2018 8/22/2018    Narrative · Estimated left ventricular ejection fraction is 56 - 60%. Left   ventricular mild concentric hypertrophy. Normal left ventricular wall   motion, no regional wall motion abnormality noted. · Right atrial cavity size is mildly dilated. · Trace mitral valve regurgitation. · Aortic valve is prosthetic. There is a bioprosthetic aortic valve. Prosthesis is normal. Prosthetic valve function is sufficient. Peak   pressure gradient is 17 mmHg. Mean pressure gradient is 10 mmHg. LORE is   1.9 cm2. Signed by: Dona Pennington MD   08/08/22    ECHO ADULT COMPLETE 08/08/2022 8/8/2022    Interpretation Summary    Left Ventricle: Normal left ventricular systolic function with a visually estimated EF of 60 - 65%. Left ventricle size is normal. Severely increased wall thickness. Findings consistent with severe concentric hypertrophy. Normal wall motion. Diastolic dysfunction present with normal LV EF. Right Ventricle: Right ventricle is mildly dilated. Aortic Valve: Bioprosthetic valve. No regurgitation. No stenosis. AV mean gradient is 18 mmHg    Right Atrium: Right atrium is mildly dilated.     Signed by: Judie Herman MD on 8/8/2022  2:54 PM      ASSESSMENT and PLAN       Latest Reference Range & Units 8/19/22 00:00   Triglyceride 0 - 149 mg/dL 275 (H)   Cholesterol, total 100 - 199 mg/dL 192   HDL Cholesterol >39 mg/dL 33 (L)   LDL, calculated 0 - 99 mg/dL 111 (H)   VLDL, calculated 5 - 40 mg/dL 48 (H)   (H): Data is abnormally high  (L): Data is abnormally low  current treatment plan is effective, no change in therapy  reviewed diet, exercise and weight control  cardiovascular risk and specific lipid/LDL goals reviewed  use of aspirin to prevent MI and TIA's discussed. Patient with bicuspid aortic valve- s/p valve replacement in 2013 and aortic root repair- now seen for follow up. Has mild stable dyspnea. NYHA class II/III  Advised to take lasix 40mg po daily  Seen for thyroid nodules and plan for thyroidectomy. Can proceed with to planned procedure with moderate risk. Continue current meds      8/5/2022 CHF is getting worse as edema is increasing. Agree with doubling of Bumex which she plans to do it from tomorrow. She already has a prescription  Follow-up electrolytes  EKG revealed significant prolongation of the first-degree AV block at 400ms. Reduce Toprol to 25 mg a day  Blood pressure mildly elevated but hopefully reduces with diuresis. Follow home chart. Check echo and CAT scan for aortic valve and previous surgery for aortic root and if these are acceptable, she can be cleared for gastric bypass surgery. Diagnoses and all orders for this visit:    1. Chronic diastolic congestive heart failure (HCC)  -     METABOLIC PANEL, BASIC; Future  -     MAGNESIUM; Future    2. History of aortic valve replacement with bioprosthetic valve    3. H/O aortic root repair    4. Dyslipidemia    5. Severe obesity (BMI >= 40) (HCC)    6. Preop cardiovascular exam        Pertinent laboratory and test data reviewed and discussed with patient. See patient instructions also for other medical advice given    There are no discontinued medications. Follow-up and Dispositions    Return in about 6 months (around 5/10/2023), or if symptoms worsen or fail to improve, for post test.       11/10/2022 CHF is chronic. And is due to diastolic dysfunction with preserved ejection fraction. She drinks a lot of water for constipation. It has not helped. She has significant edema and instead of increasing Bumex, I recommended to reduce her water intake.   Periodic follow-up of electrolytes as ordered. Aortic valve function is normal with mild aortic stenosis and a mean gradient of 18. Aortic root seems to be normal by CAT scan. Lipids are controlled well. She will be cleared for gastric bypass surgery with low risk.

## 2022-11-10 NOTE — PROGRESS NOTES
1. Have you been to the ER, urgent care clinic since your last visit? Hospitalized since your last visit? Yes, [address]    2. Have you seen or consulted any other health care providers outside of the [address] [place] [address] [place] since your last visit? Include any pap smears or colon screening. No     3. Since your last visit, have you had any of the following symptoms? shortness of breath, dizziness, and swelling in legs/arms. 4.  Have you had any blood work, X-rays or cardiac testing? No     Requested: NO     In [facility]: NO    5. Where do you normally have your labs drawn? PCP    6. Do you need any refills today?    NO

## 2022-11-22 ENCOUNTER — OFFICE VISIT (OUTPATIENT)
Dept: FAMILY MEDICINE CLINIC | Age: 62
End: 2022-11-22
Payer: MEDICAID

## 2022-11-22 VITALS
HEART RATE: 73 BPM | OXYGEN SATURATION: 98 % | HEIGHT: 67 IN | TEMPERATURE: 97.5 F | SYSTOLIC BLOOD PRESSURE: 129 MMHG | RESPIRATION RATE: 19 BRPM | BODY MASS INDEX: 46.67 KG/M2 | DIASTOLIC BLOOD PRESSURE: 64 MMHG

## 2022-11-22 DIAGNOSIS — R19.4 CHANGE IN BOWEL HABIT: Primary | ICD-10-CM

## 2022-11-22 DIAGNOSIS — Z79.4 TYPE 2 DIABETES MELLITUS WITH DIABETIC NEUROPATHY, WITH LONG-TERM CURRENT USE OF INSULIN (HCC): ICD-10-CM

## 2022-11-22 DIAGNOSIS — Z93.0 TRACHEOSTOMY DEPENDENCE (HCC): ICD-10-CM

## 2022-11-22 DIAGNOSIS — I89.0 LYMPHEDEMA: ICD-10-CM

## 2022-11-22 DIAGNOSIS — E78.5 DYSLIPIDEMIA: ICD-10-CM

## 2022-11-22 DIAGNOSIS — E03.4 HYPOTHYROIDISM DUE TO ACQUIRED ATROPHY OF THYROID: ICD-10-CM

## 2022-11-22 DIAGNOSIS — E66.01 MORBID OBESITY (HCC): ICD-10-CM

## 2022-11-22 DIAGNOSIS — F39 MOOD DISORDER (HCC): ICD-10-CM

## 2022-11-22 DIAGNOSIS — E11.40 TYPE 2 DIABETES MELLITUS WITH DIABETIC NEUROPATHY, WITH LONG-TERM CURRENT USE OF INSULIN (HCC): ICD-10-CM

## 2022-11-22 DIAGNOSIS — R10.84 GENERALIZED ABDOMINAL PAIN: ICD-10-CM

## 2022-11-22 DIAGNOSIS — J38.00 VOCAL CORD PARALYSIS: ICD-10-CM

## 2022-11-22 DIAGNOSIS — I10 ESSENTIAL HYPERTENSION: ICD-10-CM

## 2022-11-22 LAB — HBA1C MFR BLD HPLC: 9.8 %

## 2022-11-22 PROCEDURE — 99215 OFFICE O/P EST HI 40 MIN: CPT | Performed by: FAMILY MEDICINE

## 2022-11-22 PROCEDURE — 83036 HEMOGLOBIN GLYCOSYLATED A1C: CPT | Performed by: FAMILY MEDICINE

## 2022-11-22 PROCEDURE — 3078F DIAST BP <80 MM HG: CPT | Performed by: FAMILY MEDICINE

## 2022-11-22 PROCEDURE — 3046F HEMOGLOBIN A1C LEVEL >9.0%: CPT | Performed by: FAMILY MEDICINE

## 2022-11-22 PROCEDURE — 3074F SYST BP LT 130 MM HG: CPT | Performed by: FAMILY MEDICINE

## 2022-11-22 NOTE — LETTER
11/22/2022 11:45 AM    Ms. James Pardo  [address] [id number]      To Whom It May Concern:    James Pardo is currently under the care of [address] [place] [address]. She is a patient with mood disorder and multiple medical conditions. Given her behavioral health disorder patient needs to have an animal  to help with her symptoms and to help her recuperate. She is also on medical therapy. She has a cat that helps with her mood and behavioral health disorder. She should be allowed to keep her cat for behavioral health reasons. If there are questions or concerns please have the patient contact our office.         Sincerely,      Magen Meyer MD

## 2022-11-22 NOTE — PROGRESS NOTES
BETHEL  Anupam León comes in for follow-up care. Change in bowel habit: Patient has noticed change in bowel habit. She has constipation. This has been chronic. She was seen in the emergency room last month and was given enemas. This resulted in small amount of stool. She continues to take stool softeners. Has abdominal distention. States that the she has soreness in the perineum due to chronically using enemas. Currently states that her bowels are dark. She has abdominal distention. No nausea or vomiting. Denies fever or chills. Occasionally has loose stools. Given the change in bowel habit I will refer patient to be seen by the gastroenterologist.  She may benefit from having a colonoscopy done. I will also order a CT scan of the abdomen pelvis. I will follow-up with the results. We discussed various causes of constipation especially patient with uncontrolled diabetes. There is always possibility of gastroparesis. Diabetes mellitus type 2: Patient has type 2 diabetes mellitus. This has been uncontrolled. Her HbA1c is 9.2. This is down from previous 10.7. She is on Lantus 85 units daily. She is on glipizide 10 mg 2 times daily and Precose. I will have her increase the glipizide to 20 mg twice a day. Patient should intensify lifestyle and dietary modification. Mood disorder: Patient has mood disorder. She has anxiety and depression. She is on lorazepam for anxiety. She has a cat at home that helps with her mood. She would like a letter stating that she needs the animal . Letter is written. Hypertension: Patient has hypertension for blood pressure is stable. She is on metoprolol. We will continue current treatment plan. Pedal edema: Patient has bilateral lymphedema. She has been seen in the lymphedema clinic. She needs to elevate her lower extremities. She is on Bumex with potassium supplementation. Continue current management plan. CHF: Patient has a history of CHF. She is being followed up by the cardiologist.  She is on Toprol-XL, Bumex, aspirin. She will continue with these medications. Hypothyroidism: Patient has hypothyroidism. Has had thyroidectomy done. She is on levothyroxine. We will recheck labs. Trach dependent: Patient has history of bilateral vocal cord paralysis. She is trach dependent.   She will follow-up with the ENT specialist.      Past Medical History  Past Medical History:   Diagnosis Date    Acquired hypothyroidism 5/17/2017    Anxiety 6/25/2019    Bilateral vocal cord paralysis 4/4/2019    Bilateral vocal cord paralysis status post thyroidectomy (4/4/2019 - Dr. Jah Villavicencio) with residual dysphagia and dysarthria; S/P Tracheostomy (6/3/2019 - Dr. Jah Villavicencio); S/P PEG tube insertion (6/20/2019) - Dr. Jah Villavicencio)    Chronic back pain     Lower back pain    Depression     Diabetic neuropathy associated with type 2 diabetes mellitus ([address]     Dysphagia 6/25/2019    S/P Thyroidectomy (4/4/2019 - Dr. Jah Villavicencio)    History of asthma     History of heart failure     chronic diastolic heart failure    History of vitamin D deficiency 8/28/2017    Hypertensive heart disease without heart failure 5/17/2017    Hypoxemia requiring supplemental oxygen 6/25/2019    Insomnia     Left ear hearing loss     pt states nerve damage--- 25% hearing loss, described as \"muffled\"    Memory difficulty     Moderate persistent asthma     Obesity, Class II, BMI 35-39.9 11/11/2016    Obstructive sleep apnea 11/6/2015    Panic attacks     Ringing of ears     Type 2 diabetes mellitus with diabetic neuropathy, with long-term current use of insulin ([address]     Vertigo        Surgical History  Past Surgical History:   Procedure Laterality Date    HX HEART VALVE SURGERY  2013    aortic valve repair    HX HYSTERECTOMY      Partial Hysterectomy - removed ovary, via pf    HX MYOMECTOMY      HX ORTHOPAEDIC  02/2018    had nerves burned on her right side of back    HX TRACHEOSTOMY  06/03/2019    S/P Tracheostomy (6/3/2019 - Dr. Alicia Montilla)    [facility]  10/31/2013    open heart    NM EGD PERCUTANEOUS PLACEMENT GASTROSTOMY TUBE N/A 06/20/2019    Dr. Laura Pacheco Bilateral 04/04/2019    Dr. Rashid Domingo        Medications  Current Outpatient Medications   Medication Sig Dispense Refill    ondansetron hcl (ZOFRAN) 4 mg tablet TAKE 1 TABLET BY MOUTH EVERY 8 HOURS AS NEEDED FOR NAUSEA AND VOMITTING 30 Tablet 1    budesonide (PULMICORT) 0.5 mg/2 mL nbsp Take 0.5 mg by inhalation. cholecalciferol (VITAMIN D3) (50,000 UNITS /1250 MCG) capsule Take 1 Capsule by mouth every seven (7) days. 13 Capsule 3    insulin glargine (Lantus U-100 Insulin) 100 unit/mL injection inject 85 units subcutaneously once daily 30 mL 2    glipiZIDE (GLUCOTROL) 10 mg tablet Take 1 Tablet by mouth two (2) times a day. 60 Tablet 2    docusate sodium (Colace) 100 mg capsule Take 1 Capsule by mouth two (2) times a day for 90 days. 60 Capsule 2    LORazepam (ATIVAN) 2 mg tablet take 1 tablet by mouth nightly 31 Tablet 3    melatonin 1 mg tablet take 1 tablet by mouth nightly 90 Tablet 1    metoprolol succinate (TOPROL-XL) 25 mg XL tablet Take 1 Tablet by mouth in the morning. 90 Tablet 1    acarbose (PRECOSE) 50 mg tablet Take 50 mg by mouth three (3) times daily. levothyroxine (SYNTHROID) 300 mcg tablet Take 1 Tablet by mouth Daily (before breakfast). 90 Tablet 1    bumetanide (BUMEX) 2 mg tablet Take 1 Tablet by mouth in the morning. 90 Tablet 1    glucose blood VI test strips (FreeStyle Lite Strips) strip Check glucose level twice daily as directed 100 Strip 0    ibuprofen (MOTRIN) 600 mg tablet Take 1 Tablet by mouth every six (6) hours as needed for Pain. 30 Tablet 0    omega-3 acid ethyl esters (LOVAZA) 1 gram capsule Take 1 Capsule by mouth two (2) times a day. 60 Capsule 2    Blood-Glucose Meter monitoring kit Check blood glucose twice a day.  1 Kit 0 fexofenadine (Allergy Relief, fexofenadine,) 180 mg tablet Take 1 Tablet by mouth daily. 90 Tablet 1    albuterol (PROVENTIL HFA, VENTOLIN HFA, PROAIR HFA) 90 mcg/actuation inhaler inhale 2 puffs by mouth and INTO THE LUNGS every 4 hours if needed for wheezing 18 g 2    potassium chloride (KLOR-CON M10) 10 mEq tablet take 1 tablet by mouth once daily 90 Tablet 1    albuterol (PROVENTIL VENTOLIN) 2.5 mg /3 mL (0.083 %) nebu 3 mL by Nebulization route every four (4) hours as needed (wheeze). Indications: asthma attack 24 Each 5    Nebulizer & Compressor machine Use for nebulization as needed. 1 Each 0    Nebulizer Accessories kit Use for nebulization as needed. 1 Kit 2    glucose blood VI test strips (ASCENSIA AUTODISC VI, ONE TOUCH ULTRA TEST VI) strip Check blood glucose 3 times daily 300 Strip 3    lancets misc Check blood glucose 3 times daily 300 Each 3    BD INSULIN SYRINGE ULTRA-FINE 1 mL 31 gauge x 5/16 syrg use as directed twice a day 200 Syringe 3    FREESTYLE LITE STRIPS strip TEST twice a day as directed 100 Strip 11    Aspirin, Buffered 81 mg tab Take 81 mg by mouth daily. linaCLOtide (Linzess) 145 mcg cap capsule Take 1 Capsule by mouth Daily (before breakfast). Indications: chronic idiopathic constipation (Patient not taking: Reported on 11/22/2022) 30 Capsule 2    polyethylene glycol (Miralax) 17 gram/dose powder Take 17 g by mouth daily.  1 tablespoon with 8 oz of water daily (Patient not taking: Reported on 11/22/2022) 595 g 0    Senna Plus 8.6-50 mg per tablet take 1 tablet by mouth daily if needed for constipation (Patient not taking: Reported on 11/22/2022) 90 Tablet 1       Allergies  Allergies   Allergen Reactions    Other Food Other (comments)     Artificial sweeteners- causes headaches    Metformin Other (comments)     Increase pain in feet and swelling in feet  Increased hunger,tired and bilat foot pain    Morphine Other (comments)     headache    Singulair [Montelukast] Other (comments) hallucinations    Sucrose Other (comments)     Pt. Is not allergic to sucrose. She develops headaches with artificial sweeteners. Trulicity [Dulaglutide] Other (comments)       Family History  Family History   Problem Relation Age of Onset    Heart Disease Mother     Diabetes Mother     Stroke Mother     Hypertension Mother     Diabetes Father     Heart Disease Father     Hypertension Father     Stroke Father     Diabetes Brother     Cancer Brother     Hypertension Brother     Stroke Brother     Heart Disease Brother     Diabetes Brother     Hypertension Brother     Stroke Brother     Heart Disease Brother     Diabetes Brother     Hypertension Brother     Hypertension Brother        Social History  Social History     Socioeconomic History    Marital status:      Spouse name: Not on file    Number of children: Not on file    Years of education: Not on file    Highest education level: Not on file   Occupational History    Not on file   Tobacco Use    Smoking status: Never    Smokeless tobacco: Never   Vaping Use    Vaping Use: Never used   Substance and Sexual Activity    Alcohol use: No    Drug use: No    Sexual activity: Not Currently   Other Topics Concern     Service No    Blood Transfusions No    Caffeine Concern No    Occupational Exposure No    Hobby Hazards No    Sleep Concern Yes     Comment: Sleep apnea     Stress Concern Yes     Comment: Due to family medical issues.      Weight Concern No    Special Diet No    Back Care Yes     Comment: Patient tries to do back care with streches     Exercise No    Bike Helmet Yes    Seat Belt Yes    Self-Exams Yes   Social History Narrative    Not on file     Social Determinants of Health     Financial Resource Strain: Not on file   Food Insecurity: Not on file   Transportation Needs: Not on file   Physical Activity: Not on file   Stress: Not on file   Social Connections: Not on file   Intimate Partner Violence: Not on file   Housing Stability: Not on file       Review of Systems  Review of Systems - Review of all systems is negative except as noted above in the HPI. Vital Signs  Visit Vitals  /64 (BP 1 Location: Left upper arm, BP Patient Position: Sitting, BP Cuff Size: Large adult long)   Pulse 73   Temp 97.5 °F (36.4 °C) (Temporal)   Resp 19   Ht 5' 7\" (1.702 m)   LMP  (LMP Unknown)   SpO2 98%   BMI 46.67 kg/m²         Physical Exam  Physical Examination: General appearance - oriented to person, place, and time and overweight  Mental status - affect appropriate to mood  Mouth - mucous membranes moist, pharynx normal without lesions  Neck -patient has trach in place  Lymphatics - no palpable lymphadenopathy  Chest - decreased air entry noted bilateral lung bases  Heart - S1 and S2 normal  Abdomen -distended, no rebound or guarding. Bowel sounds are normal.  Back exam - limited range of motion  Neurological - abnormal neurological exam unchanged from prior examinations  Musculoskeletal - osteoarthritic changes noted in both hands  Extremities - pedal edema 2 +      Results  Results for orders placed or performed during the hospital encounter of 10/25/22   CBC WITH AUTOMATED DIFF   Result Value Ref Range    WBC 8.2 4.6 - 13.2 K/uL    RBC 5.43 (H) 4.20 - 5.30 M/uL    HGB 15.6 12.0 - 16.0 g/dL    HCT 47.7 (H) 35.0 - 45.0 %    MCV 87.8 78.0 - 100.0 FL    MCH 28.7 24.0 - 34.0 PG    MCHC 32.7 31.0 - 37.0 g/dL    RDW 15.8 (H) 11.6 - 14.5 %    PLATELET 076 823 - 510 K/uL    MPV 9.7 9.2 - 11.8 FL    NRBC 0.0 0  WBC    ABSOLUTE NRBC 0.00 0.00 - 0.01 K/uL    NEUTROPHILS 82 (H) 40 - 73 %    LYMPHOCYTES 12 (L) 21 - 52 %    MONOCYTES 5 3 - 10 %    EOSINOPHILS 0 0 - 5 %    BASOPHILS 1 0 - 2 %    IMMATURE GRANULOCYTES 1 (H) 0.0 - 0.5 %    ABS. NEUTROPHILS 6.7 1.8 - 8.0 K/UL    ABS. LYMPHOCYTES 0.9 0.9 - 3.6 K/UL    ABS. MONOCYTES 0.4 0.05 - 1.2 K/UL    ABS. EOSINOPHILS 0.0 0.0 - 0.4 K/UL    ABS. BASOPHILS 0.0 0.0 - 0.1 K/UL    ABS. IMM.  GRANS. 0.1 (H) 0.00 - 0.04 K/UL    DF AUTOMATED     PROTHROMBIN TIME + INR   Result Value Ref Range    Prothrombin time 12.9 11.5 - 15.2 sec    INR 0.9 0.8 - 1.2     URINALYSIS W/ RFLX MICROSCOPIC   Result Value Ref Range    Color YELLOW      Appearance CLEAR      Specific gravity >1.030 (H) 1.003 - 1.030    pH (UA) 6.0 5.0 - 8.0      Protein TRACE (A) NEG mg/dL    Glucose >1,000 (A) NEG mg/dL    Ketone 80 (A) NEG mg/dL    Bilirubin Negative NEG      Blood TRACE (A) NEG      Urobilinogen 0.2 0.2 - 1.0 EU/dL    Nitrites Negative NEG      Leukocyte Esterase Negative NEG     URINE MICROSCOPIC ONLY   Result Value Ref Range    WBC 0 to 1 0 - 4 /hpf    RBC 1 to 4 0 - 5 /hpf    Epithelial cells 1+ 0 - 5 /lpf    Bacteria FEW (A) NEG /hpf    Mucus FEW (A) NEG /lpf   METABOLIC PANEL, BASIC   Result Value Ref Range    Sodium 136 136 - 145 mmol/L    Potassium 4.4 3.5 - 5.5 mmol/L    Chloride 102 100 - 111 mmol/L    CO2 29 21 - 32 mmol/L    Anion gap 5 3.0 - 18 mmol/L    Glucose 341 (H) 74 - 99 mg/dL    BUN 13 7.0 - 18 MG/DL    Creatinine 0.89 0.6 - 1.3 MG/DL    BUN/Creatinine ratio 15 12 - 20      eGFR >60 >60 ml/min/1.73m2    Calcium 9.1 8.5 - 10.1 MG/DL   GLUCOSE, POC   Result Value Ref Range    Glucose (POC) 306 (H) 70 - 110 mg/dL       ASSESSMENT and PLAN    ICD-10-CM ICD-9-CM    1. Change in bowel habit  R19.4 787.99 REFERRAL TO GASTROENTEROLOGY      CT ABD PELV W CONT      2. Generalized abdominal pain  R10.84 789.07 CT ABD PELV W CONT      3. Type 2 diabetes mellitus with diabetic neuropathy, with long-term current use of insulin (HCC)  E11.40 250.60 AMB POC HEMOGLOBIN A1C    Z79.4 357.2      V58.67       4. Essential hypertension  I10 401.9       5. Hypothyroidism due to acquired atrophy of thyroid  E03.4 244.8      246.8       6. Tracheostomy dependence ([facility]  Z93.0 V44.0       7. Lymphedema  I89.0 457.1       8. Morbid obesity ([facility]  E66.01 278.01       9. Dyslipidemia  E78.5 272.4       10. Mood disorder ([facility]  F39 296.90       11. Vocal cord paralysis  J38.00 478.30         lab results and schedule of future lab studies reviewed with patient  reviewed diet, exercise and weight control  cardiovascular risk and specific lipid/LDL goals reviewed  reviewed medications and side effects in detail  specific diabetic recommendations: low cholesterol diet, weight control and daily exercise discussed, home glucose monitoring emphasized, all medications, side effects and compliance discussed carefully, foot care discussed and Podiatry visits discussed, annual eye examinations at Ophthalmology discussed, glycohemoglobin and other lab monitoring discussed, and long term diabetic complications discussed  use of aspirin to prevent MI and TIA's discussed  radiology results and schedule of future radiology studies reviewed with patient      I have discussed the diagnosis with the patient and the intended plan of care as seen in the above orders. The patient has received an after-visit summary and questions were answered concerning future plans. I have discussed medication, side effects, and warnings with the patient in detail. The patient verbalized understanding and is in agreement with the plan of care. The patient will contact the office with any additional concerns. I spent at least 40 minutes on this visit with this established patient. Cornell Erwin MD    PLEASE NOTE:   This document has been produced using voice recognition software.  Unrecognized errors in transcription may be present

## 2022-11-28 ENCOUNTER — TELEPHONE (OUTPATIENT)
Dept: FAMILY MEDICINE CLINIC | Age: 62
End: 2022-11-28

## 2022-11-28 NOTE — TELEPHONE ENCOUNTER
----- Message from Negin Dwyer MD sent at 11/22/2022 12:47 PM EST -----  Please have patient increase glipizide to 20 mg twice a day with meals. This means she will have to take 2 tablets twice a day with her meals. She should keep a blood glucose log. I will follow-up at her next visit.   She should still set up an appointment to see the endocrinologist.  Negin Dwyer MD

## 2022-12-05 ENCOUNTER — HOSPITAL ENCOUNTER (OUTPATIENT)
Dept: LAB | Age: 62
Discharge: HOME | End: 2022-12-05

## 2022-12-05 ENCOUNTER — HOSPITAL ENCOUNTER (OUTPATIENT)
Dept: CT IMAGING | Age: 62
Discharge: HOME | End: 2022-12-05
Attending: FAMILY MEDICINE
Payer: MEDICAID

## 2022-12-05 DIAGNOSIS — R19.4 CHANGE IN BOWEL HABIT: ICD-10-CM

## 2022-12-05 DIAGNOSIS — R10.84 GENERALIZED ABDOMINAL PAIN: ICD-10-CM

## 2022-12-05 LAB
CREAT UR-MCNC: 0.7 MG/DL (ref 0.6–1.3)
XX-LABCORP SPECIMEN COL,LCBCF: NORMAL

## 2022-12-05 PROCEDURE — 74011000636 HC RX REV CODE- 636: Performed by: FAMILY MEDICINE

## 2022-12-05 PROCEDURE — 82565 ASSAY OF CREATININE: CPT

## 2022-12-05 PROCEDURE — 99001 SPECIMEN HANDLING PT-LAB: CPT

## 2022-12-05 PROCEDURE — 74177 CT ABD & PELVIS W/CONTRAST: CPT

## 2022-12-05 RX ADMIN — IOPAMIDOL 100 ML: 612 INJECTION, SOLUTION INTRAVENOUS at 15:57

## 2022-12-08 ENCOUNTER — TELEPHONE (OUTPATIENT)
Dept: FAMILY MEDICINE CLINIC | Age: 62
End: 2022-12-08

## 2022-12-09 NOTE — TELEPHONE ENCOUNTER
Scan in system and she would like for you to please go ahead and send over the 20 mg glipizide bid to the pharmacy

## 2022-12-12 DIAGNOSIS — Z79.4 TYPE 2 DIABETES MELLITUS WITH DIABETIC NEUROPATHY, WITH LONG-TERM CURRENT USE OF INSULIN (HCC): ICD-10-CM

## 2022-12-12 DIAGNOSIS — E11.40 TYPE 2 DIABETES MELLITUS WITH DIABETIC NEUROPATHY, WITH LONG-TERM CURRENT USE OF INSULIN (HCC): ICD-10-CM

## 2022-12-12 RX ORDER — GLIPIZIDE 10 MG/1
20 TABLET ORAL 2 TIMES DAILY
Qty: 120 TABLET | Refills: 5 | Status: SHIPPED | OUTPATIENT
Start: 2022-12-12

## 2022-12-15 LAB — HBA1C MFR BLD HPLC: 7.8 %

## 2022-12-18 DIAGNOSIS — R13.10 DYSPHAGIA, UNSPECIFIED TYPE: ICD-10-CM

## 2022-12-19 ENCOUNTER — TELEPHONE (OUTPATIENT)
Dept: FAMILY MEDICINE CLINIC | Age: 62
End: 2022-12-19

## 2022-12-19 RX ORDER — ONDANSETRON 4 MG/1
TABLET, FILM COATED ORAL
Qty: 30 TABLET | Refills: 1 | Status: SHIPPED | OUTPATIENT
Start: 2022-12-19

## 2022-12-19 NOTE — TELEPHONE ENCOUNTER
Pt calling because [facility] doesn't have her prescription for Glipizide. Pt stated she has been waiting for 3 weeks and she needs help. Advised pt her medication was sent in on 12/12/22. Ms. Jorge Rosario would like to speak to a nurse. She can be reached at [phone]. Please advise.  Thank you!!!

## 2022-12-26 DIAGNOSIS — R13.10 DYSPHAGIA, UNSPECIFIED TYPE: ICD-10-CM

## 2022-12-27 RX ORDER — ONDANSETRON 4 MG/1
TABLET, FILM COATED ORAL
Qty: 30 TABLET | Refills: 1 | Status: SHIPPED | OUTPATIENT
Start: 2022-12-27

## 2022-12-28 ENCOUNTER — OFFICE VISIT (OUTPATIENT)
Dept: FAMILY MEDICINE CLINIC | Age: 62
End: 2022-12-28
Payer: MEDICAID

## 2022-12-28 VITALS
TEMPERATURE: 97.7 F | BODY MASS INDEX: 45.99 KG/M2 | RESPIRATION RATE: 24 BRPM | WEIGHT: 293 LBS | OXYGEN SATURATION: 100 % | HEART RATE: 73 BPM | HEIGHT: 67 IN | DIASTOLIC BLOOD PRESSURE: 57 MMHG | SYSTOLIC BLOOD PRESSURE: 123 MMHG

## 2022-12-28 DIAGNOSIS — Z79.4 TYPE 2 DIABETES MELLITUS WITH DIABETIC NEUROPATHY, WITH LONG-TERM CURRENT USE OF INSULIN (HCC): ICD-10-CM

## 2022-12-28 DIAGNOSIS — K59.00 CONSTIPATION, UNSPECIFIED CONSTIPATION TYPE: ICD-10-CM

## 2022-12-28 DIAGNOSIS — R60.0 PEDAL EDEMA: ICD-10-CM

## 2022-12-28 DIAGNOSIS — E11.40 TYPE 2 DIABETES MELLITUS WITH DIABETIC NEUROPATHY, WITH LONG-TERM CURRENT USE OF INSULIN (HCC): ICD-10-CM

## 2022-12-28 DIAGNOSIS — R19.5 DARK STOOLS: Primary | ICD-10-CM

## 2022-12-28 DIAGNOSIS — I10 ESSENTIAL HYPERTENSION: ICD-10-CM

## 2022-12-28 DIAGNOSIS — F41.9 ANXIETY: ICD-10-CM

## 2022-12-28 PROCEDURE — 3046F HEMOGLOBIN A1C LEVEL >9.0%: CPT | Performed by: FAMILY MEDICINE

## 2022-12-28 PROCEDURE — 3078F DIAST BP <80 MM HG: CPT | Performed by: FAMILY MEDICINE

## 2022-12-28 PROCEDURE — 3074F SYST BP LT 130 MM HG: CPT | Performed by: FAMILY MEDICINE

## 2022-12-28 PROCEDURE — 99215 OFFICE O/P EST HI 40 MIN: CPT | Performed by: FAMILY MEDICINE

## 2022-12-28 RX ORDER — METOPROLOL SUCCINATE 25 MG/1
25 TABLET, EXTENDED RELEASE ORAL DAILY
Qty: 90 TABLET | Refills: 1 | Status: SHIPPED | OUTPATIENT
Start: 2022-12-28

## 2022-12-28 RX ORDER — LEVOTHYROXINE SODIUM 300 UG/1
300 TABLET ORAL
Qty: 90 TABLET | Refills: 1 | Status: SHIPPED | OUTPATIENT
Start: 2022-12-28

## 2022-12-28 RX ORDER — BUMETANIDE 2 MG/1
2 TABLET ORAL DAILY
Qty: 90 TABLET | Refills: 1 | Status: SHIPPED | OUTPATIENT
Start: 2022-12-28

## 2022-12-28 RX ORDER — LORAZEPAM 2 MG/1
2 TABLET ORAL
Qty: 31 TABLET | Refills: 3 | Status: SHIPPED | OUTPATIENT
Start: 2022-12-28

## 2022-12-28 RX ORDER — POLYETHYLENE GLYCOL 3350 17 G/17G
17 POWDER, FOR SOLUTION ORAL DAILY
Qty: 595 G | Refills: 0 | Status: SHIPPED | OUTPATIENT
Start: 2022-12-28

## 2022-12-28 RX ORDER — INSULIN GLARGINE 100 [IU]/ML
INJECTION, SOLUTION SUBCUTANEOUS
Qty: 30 ML | Refills: 2 | Status: SHIPPED | OUTPATIENT
Start: 2022-12-28

## 2022-12-28 RX ORDER — POTASSIUM CHLORIDE 750 MG/1
10 TABLET, EXTENDED RELEASE ORAL DAILY
Qty: 90 TABLET | Refills: 1 | Status: SHIPPED | OUTPATIENT
Start: 2022-12-28

## 2022-12-28 NOTE — PROGRESS NOTES
BETHEL  Vashti Currie comes in for follow-up care. Dark stools: Patient has dark stools. This has been chronic for her over the past month. She is concerned about anemia. Seen by the gastroenterologist and is scheduled to have colonoscopy done. This has been scheduled for February. She feels that this is too far out and wonders whether she may collapse will be syncopal due to losing blood. We will check a CBC. Previous CBC done 2 months ago was 15.6. We will recheck this. We will try and get her in to see the gastroenterologist sooner. Chronic constipation: Patient has chronic constipation. She has been seen by the gastroenterologist.  She has been given GI regimen to use. She is on MiraLAX and laxatives. Recently had CT scan of the abdomen done. Patient states that she has had a hysterectomy. On the report about atrophic uterus. We will seek confirmation and clarification on this. No acute abnormality noted on the CT scan. Diabetes mellitus type 2: Patient has type 2 diabetes mellitus. She has been referred to the endocrinologist.  Blood glucose numbers have been fluctuating. Her last HbA1c was 9.8. She will intensify lifestyle and dietary modification. Patient is on glargine, glipizide, acarbose. Hypertension: Patient has hypertension. Blood pressure is stable. Denies headache, changes in vision or focal weakness. She is on metoprolol. We will continue current treatment plan. I will send in a refill of medication. Mood disorder: Patient has mood disorder with anxiety. She is on lorazepam with good improvement in symptoms. Needs a refill of medication. Prescription is sent in. Pedal edema: Patient has chronic lymphedema. She is doing supportive care with elevation and the compression dressings. She takes Bumex daily. Hypothyroidism: Patient has hypothyroidism. She is on Synthroid 300 mcg daily. We will recheck TSH levels at next visit.   Bilateral vocal cord paralysis: Patient has history of bilateral vocal cord paralysis following thyroidectomy. She is trach dependent. Has been followed by ENT specialist.  She has history of chronic hypoxemia on supplemental oxygen. Morbid obesity: Patient has a BMI of 46.67. She will intensify lifestyle and dietary modification.       Past Medical History  Past Medical History:   Diagnosis Date    Acquired hypothyroidism 5/17/2017    Anxiety 6/25/2019    Bilateral vocal cord paralysis 4/4/2019    Bilateral vocal cord paralysis status post thyroidectomy (4/4/2019 - Dr. Paola Chatman) with residual dysphagia and dysarthria; S/P Tracheostomy (6/3/2019 - Dr. Paola Chatman); S/P PEG tube insertion (6/20/2019) - Dr. Paola Chatman)    Chronic back pain     Lower back pain    Depression     Diabetic neuropathy associated with type 2 diabetes mellitus ([address]     Dysphagia 6/25/2019    S/P Thyroidectomy (4/4/2019 - Dr. Paola Chatman)    History of asthma     History of heart failure     chronic diastolic heart failure    History of vitamin D deficiency 8/28/2017    Hypertensive heart disease without heart failure 5/17/2017    Hypoxemia requiring supplemental oxygen 6/25/2019    Insomnia     Left ear hearing loss     pt states nerve damage--- 25% hearing loss, described as \"muffled\"    Memory difficulty     Moderate persistent asthma     Obesity, Class II, BMI 35-39.9 11/11/2016    Obstructive sleep apnea 11/6/2015    Panic attacks     Ringing of ears     Type 2 diabetes mellitus with diabetic neuropathy, with long-term current use of insulin ([address]     Vertigo        Surgical History  Past Surgical History:   Procedure Laterality Date    HX HEART VALVE SURGERY  2013    aortic valve repair    HX HYSTERECTOMY      Partial Hysterectomy - removed ovary, via pf    HX MYOMECTOMY      HX ORTHOPAEDIC  02/2018    had nerves burned on her right side of back    HX TRACHEOSTOMY  06/03/2019    S/P Tracheostomy (6/3/2019 - Dr. Paola Chatman)    [address] [place] [address] UNLIST  10/31/2013    open heart    DC EGD PERCUTANEOUS PLACEMENT GASTROSTOMY TUBE N/A 06/20/2019    Dr. Stephens Route Bilateral 04/04/2019    Dr. Tori Ramires        Medications  Current Outpatient Medications   Medication Sig Dispense Refill    bumetanide (BUMEX) 2 mg tablet Take 1 Tablet by mouth daily. 90 Tablet 1    insulin glargine (Lantus U-100 Insulin) 100 unit/mL injection inject 85 units subcutaneously once daily 30 mL 2    levothyroxine (SYNTHROID) 300 mcg tablet Take 1 Tablet by mouth Daily (before breakfast). 90 Tablet 1    LORazepam (ATIVAN) 2 mg tablet Take 1 Tablet by mouth nightly. 31 Tablet 3    metoprolol succinate (TOPROL-XL) 25 mg XL tablet Take 1 Tablet by mouth daily. 90 Tablet 1    potassium chloride (KLOR-CON M10) 10 mEq tablet Take 1 Tablet by mouth daily. 90 Tablet 1    polyethylene glycol (Miralax) 17 gram/dose powder Take 17 g by mouth daily. 1 tablespoon with 8 oz of water daily 595 g 0    ondansetron hcl (ZOFRAN) 4 mg tablet TAKE 1 TABLET BY MOUTH EVERY 8 HOURS AS NEEDED FOR NAUSEA AND VOMITTING 30 Tablet 1    glipiZIDE (GLUCOTROL) 10 mg tablet Take 2 Tablets by mouth two (2) times a day. 120 Tablet 5    budesonide (PULMICORT) 0.5 mg/2 mL nbsp Take 0.5 mg by inhalation. cholecalciferol (VITAMIN D3) (50,000 UNITS /1250 MCG) capsule Take 1 Capsule by mouth every seven (7) days. 13 Capsule 3    docusate sodium (Colace) 100 mg capsule Take 1 Capsule by mouth two (2) times a day for 90 days. 60 Capsule 2    melatonin 1 mg tablet take 1 tablet by mouth nightly 90 Tablet 1    acarbose (PRECOSE) 50 mg tablet Take 50 mg by mouth three (3) times daily. glucose blood VI test strips (FreeStyle Lite Strips) strip Check glucose level twice daily as directed 100 Strip 0    ibuprofen (MOTRIN) 600 mg tablet Take 1 Tablet by mouth every six (6) hours as needed for Pain. 30 Tablet 0    omega-3 acid ethyl esters (LOVAZA) 1 gram capsule Take 1 Capsule by mouth two (2) times a day.  61 Capsule 2    Blood-Glucose Meter monitoring kit Check blood glucose twice a day. 1 Kit 0    fexofenadine (Allergy Relief, fexofenadine,) 180 mg tablet Take 1 Tablet by mouth daily. 90 Tablet 1    albuterol (PROVENTIL HFA, VENTOLIN HFA, PROAIR HFA) 90 mcg/actuation inhaler inhale 2 puffs by mouth and INTO THE LUNGS every 4 hours if needed for wheezing 18 g 2    albuterol (PROVENTIL VENTOLIN) 2.5 mg /3 mL (0.083 %) nebu 3 mL by Nebulization route every four (4) hours as needed (wheeze). Indications: asthma attack 24 Each 5    Nebulizer & Compressor machine Use for nebulization as needed. 1 Each 0    Nebulizer Accessories kit Use for nebulization as needed. 1 Kit 2    glucose blood VI test strips (ASCENSIA AUTODISC VI, ONE TOUCH ULTRA TEST VI) strip Check blood glucose 3 times daily 300 Strip 3    lancets misc Check blood glucose 3 times daily 300 Each 3    BD INSULIN SYRINGE ULTRA-FINE 1 mL 31 gauge x 5/16 syrg use as directed twice a day 200 Syringe 3    FREESTYLE LITE STRIPS strip TEST twice a day as directed 100 Strip 11    Aspirin, Buffered 81 mg tab Take 81 mg by mouth daily. Allergies  Allergies   Allergen Reactions    Other Food Other (comments)     Artificial sweeteners- causes headaches    Metformin Other (comments)     Increase pain in feet and swelling in feet  Increased hunger,tired and bilat foot pain    Morphine Other (comments)     headache    Singulair [Montelukast] Other (comments)     hallucinations    Sucrose Other (comments)     Pt. Is not allergic to sucrose. She develops headaches with artificial sweeteners.     Trulicity [Dulaglutide] Other (comments)       Family History  Family History   Problem Relation Age of Onset    Heart Disease Mother     Diabetes Mother     Stroke Mother     Hypertension Mother     Diabetes Father     Heart Disease Father     Hypertension Father     Stroke Father     Diabetes Brother     Cancer Brother     Hypertension Brother     Stroke Brother     Heart Disease Brother     Diabetes Brother     Hypertension Brother     Stroke Brother     Heart Disease Brother     Diabetes Brother     Hypertension Brother     Hypertension Brother        Social History  Social History     Socioeconomic History    Marital status:      Spouse name: Not on file    Number of children: Not on file    Years of education: Not on file    Highest education level: Not on file   Occupational History    Not on file   Tobacco Use    Smoking status: Never    Smokeless tobacco: Never   Vaping Use    Vaping Use: Never used   Substance and Sexual Activity    Alcohol use: No    Drug use: No    Sexual activity: Not Currently   Other Topics Concern     Service No    Blood Transfusions No    Caffeine Concern No    Occupational Exposure No    Hobby Hazards No    Sleep Concern Yes     Comment: Sleep apnea     Stress Concern Yes     Comment: Due to family medical issues. Weight Concern No    Special Diet No    Back Care Yes     Comment: Patient tries to do back care with streches     Exercise No    Bike Helmet Yes    Seat Belt Yes    Self-Exams Yes   Social History Narrative    Not on file     Social Determinants of Health     Financial Resource Strain: Not on file   Food Insecurity: Not on file   Transportation Needs: Not on file   Physical Activity: Not on file   Stress: Not on file   Social Connections: Not on file   Intimate Partner Violence: Not on file   Housing Stability: Not on file       Review of Systems  Review of Systems - Review of all systems is negative except as noted above in the HPI.     Vital Signs  Visit Vitals  BP (!) 123/57 (BP 1 Location: Left upper arm, BP Patient Position: Sitting, BP Cuff Size: Large adult)   Pulse 73   Temp 97.7 °F (36.5 °C)   Resp 24   Ht 5' 7\" (1.702 m)   Wt 298 lb (135.2 kg)   LMP  (LMP Unknown)   SpO2 100%   BMI 46.67 kg/m²         Physical Exam  Physical Examination: General appearance - chronically ill appearing  Mental status - affect appropriate to mood  Mouth - mucous membranes moist, pharynx normal without lesions  Neck -trach in place  Lymphatics - no palpable lymphadenopathy  Chest - decreased air entry noted bilateral lung bases  Heart - systolic murmur 2/6 at lower left sternal border  Abdomen - no rebound tenderness noted  Back exam - limited range of motion  Neurological - abnormal neurological exam unchanged from prior examinations  Musculoskeletal - osteoarthritic changes noted in both hands  Extremities - intact peripheral pulses, pedal edema 2+        Results  Results for orders placed or performed during the hospital encounter of 12/05/22   [facility] SPECIMEN COL   Result Value Ref Range    [facility] SPEC[facility]. Specimens collected/sent to [facility]. Please direct inquiries to ([phone]). ASSESSMENT and PLAN    ICD-10-CM ICD-9-CM    1. Dark stools  R19.5 792.1 CBC WITH AUTOMATED DIFF      2. Type 2 diabetes mellitus with diabetic neuropathy, with long-term current use of insulin (HCC)  E11.40 250.60 insulin glargine (Lantus U-100 Insulin) 100 unit/mL injection    Z79.4 357.2      V58.67       3. Anxiety  F41.9 300.00 LORazepam (ATIVAN) 2 mg tablet      4. Essential hypertension  I10 401.9 metoprolol succinate (TOPROL-XL) 25 mg XL tablet      CBC WITH AUTOMATED DIFF      5. Pedal edema  R60.0 782.3 bumetanide (BUMEX) 2 mg tablet      potassium chloride (KLOR-CON M10) 10 mEq tablet      CBC WITH AUTOMATED DIFF      6.  Constipation, unspecified constipation type  K59.00 564.00 polyethylene glycol (Miralax) 17 gram/dose powder        lab results and schedule of future lab studies reviewed with patient  reviewed diet, exercise and weight control  cardiovascular risk and specific lipid/LDL goals reviewed  reviewed medications and side effects in detail  specific diabetic recommendations: low cholesterol diet, weight control and daily exercise discussed, home glucose monitoring emphasized, all medications, side effects and compliance discussed carefully, annual eye examinations at Ophthalmology discussed, glycohemoglobin and other lab monitoring discussed, and long term diabetic complications discussed      I have discussed the diagnosis with the patient and the intended plan of care as seen in the above orders. The patient has received an after-visit summary and questions were answered concerning future plans. I have discussed medication, side effects, and warnings with the patient in detail. The patient verbalized understanding and is in agreement with the plan of care. The patient will contact the office with any additional concerns. I spent at least 50 minutes on this visit with this established patient. Negin Dwyer MD    PLEASE NOTE:   This document has been produced using voice recognition software.  Unrecognized errors in transcription may be present

## 2022-12-29 ENCOUNTER — HOSPITAL ENCOUNTER (OUTPATIENT)
Dept: LAB | Age: 62
Discharge: HOME | End: 2022-12-29

## 2022-12-29 LAB — XX-LABCORP SPECIMEN COL,LCBCF: NORMAL

## 2022-12-29 PROCEDURE — 99001 SPECIMEN HANDLING PT-LAB: CPT

## 2022-12-30 LAB
BASOPHILS # BLD AUTO: 0.1 X10E3/UL (ref 0–0.2)
BASOPHILS NFR BLD AUTO: 1 %
EOSINOPHIL # BLD AUTO: 0.1 X10E3/UL (ref 0–0.4)
EOSINOPHIL NFR BLD AUTO: 1 %
ERYTHROCYTE [DISTWIDTH] IN BLOOD BY AUTOMATED COUNT: 16.8 % (ref 11.7–15.4)
HCT VFR BLD AUTO: 25.5 % (ref 34–46.6)
HGB BLD-MCNC: 7.3 G/DL (ref 11.1–15.9)
IMM GRANULOCYTES # BLD AUTO: 0.1 X10E3/UL (ref 0–0.1)
IMM GRANULOCYTES NFR BLD AUTO: 1 %
LYMPHOCYTES # BLD AUTO: 1.2 X10E3/UL (ref 0.7–3.1)
LYMPHOCYTES NFR BLD AUTO: 14 %
MCH RBC QN AUTO: 24.6 PG (ref 26.6–33)
MCHC RBC AUTO-ENTMCNC: 28.6 G/DL (ref 31.5–35.7)
MCV RBC AUTO: 86 FL (ref 79–97)
MONOCYTES # BLD AUTO: 0.6 X10E3/UL (ref 0.1–0.9)
MONOCYTES NFR BLD AUTO: 6 %
NEUTROPHILS # BLD AUTO: 6.6 X10E3/UL (ref 1.4–7)
NEUTROPHILS NFR BLD AUTO: 77 %
PLATELET # BLD AUTO: 250 X10E3/UL (ref 150–450)
RBC # BLD AUTO: 2.97 X10E6/UL (ref 3.77–5.28)
WBC # BLD AUTO: 8.5 X10E3/UL (ref 3.4–10.8)

## 2023-01-04 ENCOUNTER — TELEPHONE (OUTPATIENT)
Dept: FAMILY MEDICINE CLINIC | Age: 63
End: 2023-01-04

## 2023-01-17 ENCOUNTER — OFFICE VISIT (OUTPATIENT)
Dept: FAMILY MEDICINE CLINIC | Age: 63
End: 2023-01-17
Payer: MEDICAID

## 2023-01-17 VITALS
HEART RATE: 77 BPM | SYSTOLIC BLOOD PRESSURE: 134 MMHG | RESPIRATION RATE: 24 BRPM | OXYGEN SATURATION: 100 % | TEMPERATURE: 98.1 F | DIASTOLIC BLOOD PRESSURE: 77 MMHG

## 2023-01-17 DIAGNOSIS — K21.9 GASTROESOPHAGEAL REFLUX DISEASE WITHOUT ESOPHAGITIS: ICD-10-CM

## 2023-01-17 DIAGNOSIS — E03.4 HYPOTHYROIDISM DUE TO ACQUIRED ATROPHY OF THYROID: ICD-10-CM

## 2023-01-17 DIAGNOSIS — E78.5 DYSLIPIDEMIA: ICD-10-CM

## 2023-01-17 DIAGNOSIS — I38 VALVULAR HEART DISEASE: ICD-10-CM

## 2023-01-17 DIAGNOSIS — Z12.31 SCREENING MAMMOGRAM FOR BREAST CANCER: ICD-10-CM

## 2023-01-17 DIAGNOSIS — Z79.4 TYPE 2 DIABETES MELLITUS WITH DIABETIC NEUROPATHY, WITH LONG-TERM CURRENT USE OF INSULIN (HCC): ICD-10-CM

## 2023-01-17 DIAGNOSIS — I50.32 CHRONIC DIASTOLIC CONGESTIVE HEART FAILURE (HCC): ICD-10-CM

## 2023-01-17 DIAGNOSIS — E11.40 TYPE 2 DIABETES MELLITUS WITH DIABETIC NEUROPATHY, WITH LONG-TERM CURRENT USE OF INSULIN (HCC): ICD-10-CM

## 2023-01-17 DIAGNOSIS — R47.1 DYSARTHRIA: ICD-10-CM

## 2023-01-17 DIAGNOSIS — D64.9 ANEMIA, UNSPECIFIED TYPE: Primary | ICD-10-CM

## 2023-01-17 DIAGNOSIS — Z93.0 TRACHEOSTOMY DEPENDENCE (HCC): ICD-10-CM

## 2023-01-17 DIAGNOSIS — I10 ESSENTIAL HYPERTENSION: ICD-10-CM

## 2023-01-17 DIAGNOSIS — K59.00 CONSTIPATION, UNSPECIFIED CONSTIPATION TYPE: ICD-10-CM

## 2023-01-17 DIAGNOSIS — E66.01 MORBID OBESITY (HCC): ICD-10-CM

## 2023-01-17 DIAGNOSIS — Z09 HOSPITAL DISCHARGE FOLLOW-UP: ICD-10-CM

## 2023-01-17 DIAGNOSIS — R53.81 PHYSICAL DEBILITY: ICD-10-CM

## 2023-01-17 RX ORDER — CLOPIDOGREL BISULFATE 75 MG/1
TABLET ORAL
COMMUNITY
Start: 2023-01-13

## 2023-01-17 RX ORDER — AMOXICILLIN 250 MG
1 CAPSULE ORAL 2 TIMES DAILY
COMMUNITY
Start: 2023-01-13

## 2023-01-17 RX ORDER — BISACODYL 5 MG
TABLET, DELAYED RELEASE (ENTERIC COATED) ORAL AS DIRECTED
COMMUNITY
Start: 2022-12-09

## 2023-01-17 RX ORDER — ATORVASTATIN CALCIUM 40 MG/1
TABLET, FILM COATED ORAL
COMMUNITY
Start: 2023-01-13

## 2023-01-17 RX ORDER — OMEPRAZOLE 40 MG/1
CAPSULE, DELAYED RELEASE ORAL
COMMUNITY
Start: 2023-01-15

## 2023-01-17 RX ORDER — OMEPRAZOLE 40 MG/1
40 CAPSULE, DELAYED RELEASE ORAL DAILY
COMMUNITY
Start: 2022-12-08

## 2023-01-17 RX ORDER — AMLODIPINE BESYLATE 5 MG/1
5 TABLET ORAL DAILY
COMMUNITY
Start: 2023-01-14

## 2023-01-17 RX ORDER — DOCUSATE SODIUM 50 MG AND SENNOSIDES 8.6 MG 8.6; 5 MG/1; MG/1
TABLET, FILM COATED ORAL
COMMUNITY
Start: 2023-01-13

## 2023-01-17 NOTE — PROGRESS NOTES
1. \"Have you been to the ER, urgent care clinic since your last visit? Hospitalized since your last visit? \" Yes [facility]: [facility]    2. \"Have you seen or consulted any other health care providers outside of the [address] [place] [address] since your last visit? \" no    3. For patients aged 39-70: Has the patient had a colonoscopy / FIT/ Cologuard? Yes - no Care Gap present      If the patient is female:    4. For patients aged 41-77: Has the patient had a mammogram within the past 2 years? Yes - no Care Gap present      5. For patients aged 21-65: Has the patient had a pap smear?  NA - based on age or sex

## 2023-01-17 NOTE — PROGRESS NOTES
[facility]  Eric Ruvalcaba comes in for transitional care. Patient was admitted at [facility] from 12/30/2022-01/13/2023 for anemia, cause not determined ? Small bowel AVM, severe aortic stenosis status post TAVR, bradycardia improved with holding beta-blocker, constipation, diabetes mellitus type 2. Anemia: Patient was sent to emergency room due to melena stools, dizziness and weakness. Hemoglobin is gone down from 15 to 7. She was transfused. Developed transfusion reaction with wheeze and shortness of breath. This is currently stable. She was later given iron infusions. Hemoglobin has remained stable and most recent was 7.1. We will recheck this today. Melena: Patient had melena stool. She had an EGD done that is stable. She is due to follow-up with a gastroenterologist.  Colonoscopy was not done. Patient may need to have PillCam done. Dysarthria: Patient noted to have some dysarthria. Thought to have CVA. This was after transfusion. Transfusion was discontinued. She had a CT scan done of the head that was negative for acute CVA. Currently stable. Bradycardia: Patient had bradycardia, first and second-degree AV block and sinus pause while on telemetry. Metoprolol was discontinued. Since then patient has been stable. She has an event monitor placed and she will follow-up with a cardiologist.  Valvular heart disease: Patient was noted to have valvular heart disease with 75% severe stenosis of her bioprosthetic aortic valve. She is status post TAVR. She is on aspirin and Plavix. She will continue to follow-up with a cardiologist.    CHF: Patient has chronic diastolic CHF. She has occasional pedal edema. She is on Bumex, Lipitor, aspirin. Patient will continue with the current treatment plan. Insomnia: Patient has insomnia. He is on melatonin. She also takes Xanax at night and this helps with sleep and relaxation. Continue current treatment plan.   GERD: Patient is on omeprazole. Denies hematemesis or epigastric pain. Hypertension: Patient has hypertension. Blood pressures however have been low. She was given Norvasc to take and is yet to take the medication. She comes in with a blood glucose log. Systolics are between 90 and 100. We will hold off on any medications at the moment. She will keep a blood pressure monitor and I will follow-up at next visit. Hypothyroidism: Patient has hypothyroidism. TSH levels are normal.  Patient is on levothyroxine 300 mcg daily. Continue current treatment plan. Diabetes mellitus type 2: Patient has type 2 diabetes mellitus. She is on insulin. Last HbA1c was 7.8. She will intensify lifestyle and dietary modification. She will keep a blood glucose log and I will follow-up at next visit. Vocal cord paralysis: Patient has a history of vocal cord paralysis. This was after thyroidectomy. She has tracheostomy in place. 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 Patient has chronic oxygen. She has been followed up by the ENT. Mood disorder: Patient has mood disorder with anxiety. She is on lorazepam.  Stable on medication. She will continue current treatment plan. Dyslipidemia: Patient has dyslipidemia. She is on fish oil. Advised to take Lipitor.       Past Medical History  Past Medical History:   Diagnosis Date    Acquired hypothyroidism 5/17/2017    Anxiety 6/25/2019    Bilateral vocal cord paralysis 4/4/2019    Bilateral vocal cord paralysis status post thyroidectomy (4/4/2019 - Dr. Reina Kathleen) with residual dysphagia and dysarthria; S/P Tracheostomy (6/3/2019 - Dr. Reina Kathleen); S/P PEG tube insertion (6/20/2019) - Dr. Reina Kathleen)    Chronic back pain     Lower back pain    Depression     Diabetic neuropathy associated with type 2 diabetes mellitus ([facility] [address]     Dysphagia 6/25/2019    S/P Thyroidectomy (4/4/2019 - Dr. Reina Kathleen)    History of asthma     History of heart failure     chronic diastolic heart failure    History of vitamin D deficiency 8/28/2017    Hypertensive heart disease without heart failure 5/17/2017    Hypoxemia requiring supplemental oxygen 6/25/2019    Insomnia     Left ear hearing loss     pt states nerve damage--- 25% hearing loss, described as \"muffled\"    Memory difficulty     Moderate persistent asthma     Obesity, Class II, BMI 35-39.9 11/11/2016    Obstructive sleep apnea 11/6/2015    Panic attacks     Ringing of ears     Type 2 diabetes mellitus with diabetic neuropathy, with long-term current use of insulin ([address]     Vertigo        Surgical History  Past Surgical History:   Procedure Laterality Date    HX HEART VALVE SURGERY  2013    aortic valve repair    HX HYSTERECTOMY      Partial Hysterectomy - removed ovary, via pf    HX MYOMECTOMY      HX ORTHOPAEDIC  02/2018    had nerves burned on her right side of back    HX TRACHEOSTOMY  06/03/2019    S/P Tracheostomy (6/3/2019 - Dr. Shanthi Lopez)    SC EGD PERCUTANEOUS PLACEMENT GASTROSTOMY TUBE N/A 06/20/2019    Dr. Teresa Gonzalez TOTAL/COMPLETE Bilateral 04/04/2019    Dr. Preston Stafford  10/31/2013    open heart        Medications  Current Outpatient Medications   Medication Sig Dispense Refill    amLODIPine (NORVASC) 5 mg tablet Take 5 mg by mouth daily. atorvastatin (LIPITOR) 40 mg tablet       bisacodyL (DULCOLAX) 5 mg EC tablet as directed. clopidogreL (PLAVIX) 75 mg tab       omeprazole (PRILOSEC) 40 mg capsule Take 40 mg by mouth daily. senna-docusate (PERICOLACE) 8.6-50 mg per tablet Take 1 Tablet by mouth two (2) times a day. bumetanide (BUMEX) 2 mg tablet Take 1 Tablet by mouth daily. 90 Tablet 1    insulin glargine (Lantus U-100 Insulin) 100 unit/mL injection inject 85 units subcutaneously once daily 30 mL 2    levothyroxine (SYNTHROID) 300 mcg tablet Take 1 Tablet by mouth Daily (before breakfast).  90 Tablet 1    LORazepam (ATIVAN) 2 mg tablet Take 1 Tablet by mouth nightly. 31 Tablet 3    potassium chloride (KLOR-CON M10) 10 mEq tablet Take 1 Tablet by mouth daily. 90 Tablet 1    polyethylene glycol (Miralax) 17 gram/dose powder Take 17 g by mouth daily. 1 tablespoon with 8 oz of water daily 595 g 0    ondansetron hcl (ZOFRAN) 4 mg tablet TAKE 1 TABLET BY MOUTH EVERY 8 HOURS AS NEEDED FOR NAUSEA AND VOMITTING 30 Tablet 1    glipiZIDE (GLUCOTROL) 10 mg tablet Take 2 Tablets by mouth two (2) times a day. 120 Tablet 5    budesonide (PULMICORT) 0.5 mg/2 mL nbsp Take 0.5 mg by inhalation. cholecalciferol (VITAMIN D3) (50,000 UNITS /1250 MCG) capsule Take 1 Capsule by mouth every seven (7) days. 13 Capsule 3    melatonin 1 mg tablet take 1 tablet by mouth nightly 90 Tablet 1    acarbose (PRECOSE) 50 mg tablet Take 50 mg by mouth three (3) times daily. ibuprofen (MOTRIN) 600 mg tablet Take 1 Tablet by mouth every six (6) hours as needed for Pain. 30 Tablet 0    omega-3 acid ethyl esters (LOVAZA) 1 gram capsule Take 1 Capsule by mouth two (2) times a day. 60 Capsule 2    Blood-Glucose Meter monitoring kit Check blood glucose twice a day. 1 Kit 0    fexofenadine (Allergy Relief, fexofenadine,) 180 mg tablet Take 1 Tablet by mouth daily. 90 Tablet 1    albuterol (PROVENTIL HFA, VENTOLIN HFA, PROAIR HFA) 90 mcg/actuation inhaler inhale 2 puffs by mouth and INTO THE LUNGS every 4 hours if needed for wheezing 18 g 2    albuterol (PROVENTIL VENTOLIN) 2.5 mg /3 mL (0.083 %) nebu 3 mL by Nebulization route every four (4) hours as needed (wheeze). Indications: asthma attack 24 Each 5    Nebulizer & Compressor machine Use for nebulization as needed. 1 Each 0    Nebulizer Accessories kit Use for nebulization as needed.  1 Kit 2    glucose blood VI test strips (ASCENSIA AUTODISC VI, ONE TOUCH ULTRA TEST VI) strip Check blood glucose 3 times daily 300 Strip 3    lancets misc Check blood glucose 3 times daily 300 Each 3    BD INSULIN SYRINGE ULTRA-FINE 1 mL 31 gauge x 5/16 syrg use as directed twice a day 200 Syringe 3    Aspirin, Buffered 81 mg tab Take 81 mg by mouth daily. omeprazole (PRILOSEC) 40 mg capsule       Stimulant Laxative Plus 8.6-50 mg per tablet       docusate sodium (Colace) 100 mg capsule Take 1 Capsule by mouth two (2) times a day for 90 days. (Patient not taking: Reported on 1/17/2023) 60 Capsule 2    glucose blood VI test strips (FreeStyle Lite Strips) strip Check glucose level twice daily as directed (Patient not taking: Reported on 1/17/2023) 100 Strip 0    FREESTYLE LITE STRIPS strip TEST twice a day as directed (Patient not taking: Reported on 1/17/2023) 100 Strip 11       Allergies  Allergies   Allergen Reactions    Other Food Other (comments)     Artificial sweeteners- causes headaches    Metformin Other (comments)     Increase pain in feet and swelling in feet  Increased hunger,tired and bilat foot pain    Morphine Other (comments)     headache    Singulair [Montelukast] Other (comments)     hallucinations    Sucrose Other (comments)     Pt. Is not allergic to sucrose. She develops headaches with artificial sweeteners.     Trulicity [Dulaglutide] Other (comments)       Family History  Family History   Problem Relation Age of Onset    Heart Disease Mother     Diabetes Mother     Stroke Mother     Hypertension Mother     Diabetes Father     Heart Disease Father     Hypertension Father     Stroke Father     Diabetes Brother     Cancer Brother     Hypertension Brother     Stroke Brother     Heart Disease Brother     Diabetes Brother     Hypertension Brother     Stroke Brother     Heart Disease Brother     Diabetes Brother     Hypertension Brother     Hypertension Brother        Social History  Social History     Socioeconomic History    Marital status:      Spouse name: Not on file    Number of children: Not on file    Years of education: Not on file    Highest education level: Not on file   Occupational History    Not on file   Tobacco Use    Smoking status: Never    Smokeless tobacco: Never   Vaping Use    Vaping Use: Never used   Substance and Sexual Activity    Alcohol use: No    Drug use: No    Sexual activity: Not Currently   Other Topics Concern     Service No    Blood Transfusions No    Caffeine Concern No    Occupational Exposure No    Hobby Hazards No    Sleep Concern Yes     Comment: Sleep apnea     Stress Concern Yes     Comment: Due to family medical issues. Weight Concern No    Special Diet No    Back Care Yes     Comment: Patient tries to do back care with streches     Exercise No    Bike Helmet Yes    Seat Belt Yes    Self-Exams Yes   Social History Narrative    Not on file     Social Determinants of Health     Financial Resource Strain: Not on file   Food Insecurity: Not on file   Transportation Needs: Not on file   Physical Activity: Not on file   Stress: Not on file   Social Connections: Not on file   Intimate Partner Violence: Not on file   Housing Stability: Not on file       Review of Systems  Review of Systems - Review of all systems is negative except as noted above in the HPI. Vital Signs  Visit Vitals  /77 (BP 1 Location: Left lower arm, BP Patient Position: Sitting, BP Cuff Size: Adult)   Pulse 77   Temp 98.1 °F (36.7 °C)   Resp 24   LMP  (LMP Unknown)   SpO2 100%         Physical Exam  Physical Examination: General appearance - oriented to person, place, and time, overweight, and acyanotic, in no respiratory distress  Mental status - affect appropriate to mood  Nose - mucosal congestion  Mouth - mucous membranes moist, pharynx normal without lesions  Neck -trach in place  Lymphatics - no palpable lymphadenopathy  Chest -occasional wheeze mid lung zones with reduced air entry bilateral lung bases.   Heart - systolic murmur 3/6 at 2nd right intercostal space  Abdomen - no rebound tenderness noted  Back exam - limited range of motion  Neurological - abnormal neurological exam unchanged from prior examinations  Musculoskeletal - osteoarthritic changes noted in both hands  Extremities - pedal edema 2 +, intact peripheral pulses      Results  Results for orders placed or performed during the hospital encounter of 12/29/22   [facility] SPECIMEN COL   Result Value Ref Range    [facility] SPECIMEN [facility]. Specimens collected/sent to [facility]. Please direct inquiries to ([phone]). ASSESSMENT and PLAN    ICD-10-CM ICD-9-CM    1. Anemia, unspecified type  D64.9 285.9 CBC WITH AUTOMATED DIFF      METABOLIC PANEL, COMPREHENSIVE      2. Type 2 diabetes mellitus with diabetic neuropathy, with long-term current use of insulin (HCC)  E11.40 250.60     Z79.4 357.2      V58.67       3. Essential hypertension  I10 401.9       4. Constipation, unspecified constipation type  K59.00 564.00       5. Hypothyroidism due to acquired atrophy of thyroid  E03.4 244.8      246.8       6. Valvular heart disease  I38 424.90       7. Screening mammogram for breast cancer  Z12.31 V76.12 CORNELIO MAMMO BI SCREENING INCL CAD      8. Dyslipidemia  E78.5 272.4       9. Morbid obesity ([address]  E66.01 278.01       10. Tracheostomy dependence ([facility] [address]  Z93.0 V44.0       11. Chronic diastolic congestive heart failure (HCC)  I50.32 428.32      428.0       12. Physical debility  R53.81 799.3       13. Dysarthria  R47.1 784.51       14. Gastroesophageal reflux disease without esophagitis  K21.9 530.81       15.  Hospital discharge follow-up  Z09 V67.59 IN DISCHRG MEDS RECONCILED W/CURRENT MED LIST        lab results and schedule of future lab studies reviewed with patient  reviewed diet, exercise and weight control  cardiovascular risk and specific lipid/LDL goals reviewed  reviewed medications and side effects in detail  specific diabetic recommendations: low cholesterol diet, weight control and daily exercise discussed, home glucose monitoring emphasized, all medications, side effects and compliance discussed carefully, annual eye examinations at Ophthalmology discussed, glycohemoglobin and other lab monitoring discussed, and long term diabetic complications discussed  radiology results and schedule of future radiology studies reviewed with patient      I have discussed the diagnosis with the patient and the intended plan of care as seen in the above orders. The patient has received an after-visit summary and questions were answered concerning future plans. I have discussed medication, side effects, and warnings with the patient in detail. The patient verbalized understanding and is in agreement with the plan of care. The patient will contact the office with any additional concerns. Cecilia López MD    PLEASE NOTE:   This document has been produced using voice recognition software.  Unrecognized errors in transcription may be present

## 2023-01-19 ENCOUNTER — HOSPITAL ENCOUNTER (OUTPATIENT)
Dept: LAB | Age: 63
Discharge: HOME | End: 2023-01-19

## 2023-01-19 DIAGNOSIS — I50.32 CHRONIC DIASTOLIC CONGESTIVE HEART FAILURE (HCC): ICD-10-CM

## 2023-01-19 LAB — XX-LABCORP SPECIMEN COL,LCBCF: NORMAL

## 2023-01-19 PROCEDURE — 99001 SPECIMEN HANDLING PT-LAB: CPT

## 2023-01-20 LAB
ALBUMIN SERPL-MCNC: 3.5 G/DL (ref 3.8–4.8)
ALBUMIN/GLOB SERPL: 1 {RATIO} (ref 1.2–2.2)
ALP SERPL-CCNC: 102 IU/L (ref 44–121)
ALT SERPL-CCNC: 10 IU/L (ref 0–32)
AST SERPL-CCNC: 9 IU/L (ref 0–40)
BASOPHILS # BLD AUTO: 0.1 X10E3/UL (ref 0–0.2)
BASOPHILS NFR BLD AUTO: 1 %
BILIRUB SERPL-MCNC: 0.5 MG/DL (ref 0–1.2)
BUN SERPL-MCNC: 13 MG/DL (ref 8–27)
BUN/CREAT SERPL: 18 (ref 12–28)
CALCIUM SERPL-MCNC: 9.5 MG/DL (ref 8.7–10.3)
CHLORIDE SERPL-SCNC: 102 MMOL/L (ref 96–106)
CO2 SERPL-SCNC: 26 MMOL/L (ref 20–29)
CREAT SERPL-MCNC: 0.74 MG/DL (ref 0.57–1)
EGFR: 92 ML/MIN/1.73
EOSINOPHIL # BLD AUTO: 0.1 X10E3/UL (ref 0–0.4)
EOSINOPHIL NFR BLD AUTO: 1 %
ERYTHROCYTE [DISTWIDTH] IN BLOOD BY AUTOMATED COUNT: 18.4 % (ref 11.7–15.4)
GLOBULIN SER CALC-MCNC: 3.4 G/DL (ref 1.5–4.5)
GLUCOSE SERPL-MCNC: 167 MG/DL (ref 70–99)
HCT VFR BLD AUTO: 28.7 % (ref 34–46.6)
HGB BLD-MCNC: 8.4 G/DL (ref 11.1–15.9)
IMM GRANULOCYTES # BLD AUTO: 0 X10E3/UL (ref 0–0.1)
IMM GRANULOCYTES NFR BLD AUTO: 0 %
LYMPHOCYTES # BLD AUTO: 1 X10E3/UL (ref 0.7–3.1)
LYMPHOCYTES NFR BLD AUTO: 16 %
MCH RBC QN AUTO: 24.3 PG (ref 26.6–33)
MCHC RBC AUTO-ENTMCNC: 29.3 G/DL (ref 31.5–35.7)
MCV RBC AUTO: 83 FL (ref 79–97)
MONOCYTES # BLD AUTO: 0.5 X10E3/UL (ref 0.1–0.9)
MONOCYTES NFR BLD AUTO: 9 %
NEUTROPHILS # BLD AUTO: 4.6 X10E3/UL (ref 1.4–7)
NEUTROPHILS NFR BLD AUTO: 73 %
PLATELET # BLD AUTO: 299 X10E3/UL (ref 150–450)
POTASSIUM SERPL-SCNC: 4.1 MMOL/L (ref 3.5–5.2)
PROT SERPL-MCNC: 6.9 G/DL (ref 6–8.5)
RBC # BLD AUTO: 3.45 X10E6/UL (ref 3.77–5.28)
SODIUM SERPL-SCNC: 143 MMOL/L (ref 134–144)
WBC # BLD AUTO: 6.3 X10E3/UL (ref 3.4–10.8)

## 2023-02-01 ENCOUNTER — VIRTUAL VISIT (OUTPATIENT)
Dept: FAMILY MEDICINE CLINIC | Age: 63
End: 2023-02-01
Payer: MEDICAID

## 2023-02-01 DIAGNOSIS — Z09 HOSPITAL DISCHARGE FOLLOW-UP: ICD-10-CM

## 2023-02-01 DIAGNOSIS — F39 MOOD DISORDER (HCC): ICD-10-CM

## 2023-02-01 DIAGNOSIS — D64.9 ANEMIA, UNSPECIFIED TYPE: ICD-10-CM

## 2023-02-01 DIAGNOSIS — Z93.0 TRACHEOSTOMY DEPENDENCE (HCC): ICD-10-CM

## 2023-02-01 DIAGNOSIS — I50.32 CHRONIC DIASTOLIC CONGESTIVE HEART FAILURE (HCC): ICD-10-CM

## 2023-02-01 DIAGNOSIS — I10 ESSENTIAL HYPERTENSION: ICD-10-CM

## 2023-02-01 DIAGNOSIS — I38 VALVULAR HEART DISEASE: ICD-10-CM

## 2023-02-01 DIAGNOSIS — E66.01 MORBID OBESITY (HCC): ICD-10-CM

## 2023-02-01 DIAGNOSIS — J96.11 CHRONIC RESPIRATORY FAILURE WITH HYPOXIA (HCC): ICD-10-CM

## 2023-02-01 DIAGNOSIS — I49.9 CARDIAC ARRHYTHMIA, UNSPECIFIED CARDIAC ARRHYTHMIA TYPE: Primary | ICD-10-CM

## 2023-02-01 DIAGNOSIS — E03.4 HYPOTHYROIDISM DUE TO ACQUIRED ATROPHY OF THYROID: ICD-10-CM

## 2023-02-01 DIAGNOSIS — E78.5 DYSLIPIDEMIA: ICD-10-CM

## 2023-02-01 DIAGNOSIS — Z79.4 TYPE 2 DIABETES MELLITUS WITH DIABETIC NEUROPATHY, WITH LONG-TERM CURRENT USE OF INSULIN (HCC): ICD-10-CM

## 2023-02-01 DIAGNOSIS — M25.512 LEFT SHOULDER PAIN, UNSPECIFIED CHRONICITY: ICD-10-CM

## 2023-02-01 DIAGNOSIS — E11.40 TYPE 2 DIABETES MELLITUS WITH DIABETIC NEUROPATHY, WITH LONG-TERM CURRENT USE OF INSULIN (HCC): ICD-10-CM

## 2023-02-01 PROCEDURE — 99496 TRANSJ CARE MGMT HIGH F2F 7D: CPT | Performed by: FAMILY MEDICINE

## 2023-02-01 PROCEDURE — 1111F DSCHRG MED/CURRENT MED MERGE: CPT | Performed by: FAMILY MEDICINE

## 2023-02-01 RX ORDER — TRAMADOL HYDROCHLORIDE 50 MG/1
50 TABLET ORAL
COMMUNITY
Start: 2023-01-28 | End: 2023-02-06 | Stop reason: ALTCHOICE

## 2023-02-01 NOTE — PROGRESS NOTES
1. \"Have you been to the ER, urgent care clinic since your last visit? Hospitalized since your last visit? \" yes    2. \"Have you seen or consulted any other health care providers outside of the [address] [place] [address] since your last visit? \" No     3. For patients aged 39-70: Has the patient had a colonoscopy / FIT/ Cologuard? Yes - no Care Gap present      If the patient is female:    4. For patients aged 41-77: Has the patient had a mammogram within the past 2 years? No      5. For patients aged 21-65: Has the patient had a pap smear?  NA - based on age or sex

## 2023-02-01 NOTE — PROGRESS NOTES
Joao Rodríguez is a 65 y.o. female who was seen by synchronous (real-time) audio-video technology on 2/1/2023 for Hospital Follow Up        [address] [place] [address]:       ICD-10-CM ICD-9-CM    1. Cardiac arrhythmia, unspecified cardiac arrhythmia type  I49.9 427.9       2. Chronic diastolic congestive heart failure (HCC)  I50.32 428.32      428.0       3. Left shoulder pain, unspecified chronicity  M25.512 719.41 HYDROcodone-acetaminophen (NORCO) 5-325 mg per tablet      4. Valvular heart disease  I38 424.90       5. Anemia, unspecified type  D64.9 285.9       6. Type 2 diabetes mellitus with diabetic neuropathy, with long-term current use of insulin ([facility])  E11.40 250.60     Z79.4 357.2      V58.67       7. Hypothyroidism due to acquired atrophy of thyroid  E03.4 244.8      246.8       8. Tracheostomy dependence ([facility] [address]  Z93.0 V44.0       9. Essential hypertension  I10 401.9       10. Mood disorder ([facility] [address]  F39 296.90       11. Morbid obesity ([facility] [address]  E66.01 278.01       12. Dyslipidemia  E78.5 272.4       13. Chronic respiratory failure with hypoxia ([facility])  J96.11 518.83      799.02       14. Hospital discharge follow-up  Z09 V67.59 AR DISCHRG MEDS RECONCILED W/CURRENT MED LIST        Subjective:   Joao Rodríguez is seen for transitional care. Patient was admitted at [facility] from 01/27/2023-01/28/2023 for worsening first-degree AV block post TAVR and intermittent CHB, CHF, severe aortic stenosis, severe anemia, dysarthria, hypertension, diabetes mellitus type 2, hypothyroidism, bilateral vocal cord paralysis. Cardiac dysrhythmia: Patient was admitted after having been noted with worsening first-degree AV block and intermittent CHB. Patient had had TAVR done earlier in the month. Patient had implantation of [employer] dual-chamber PPM with left bundle area pacing. She is now off her beta-blockers. Denies palpitations or syncope.   She is following instructions including restrictive movements left upper extremity. She is due for follow-up care with a cardiologist next week. Incision area been stable. Still having the dressing in place. No drainage or signs of infection noted. Patient has occasional pain at incision site and she is taking tramadol for this. Patient is on aspirin and Plavix. CHF: Patient has a history of chronic diastolic CHF. Patient is on Bumex and takes potassium supplementation. No longer on the beta-blocker. Denies chest pain or diaphoresis. She will continue current treatment plan. Valvular heart disease: Patient has a history of severe aortic stenosis. She is status post TAVR on 01/11/2023. Patient has a prior history of AV replacement with bioprosthetic valve in 2013. Patient also underwent aortic root repair. Patient is on aspirin and Plavix. She is also on statin medication. Anemia: Patient has a history of anemia. Hemoglobin at discharge was 9.0. She had been admitted in December last year with anemia thought to be due to GI blood loss. She has been having dark stools. Had work-up done including EGD. Currently she is stable. Her hemoglobin had gone up to 9.9 but at discharge as noted above was 9.0. She will continue with supportive care. She will follow-up with her specialist.  Hypertension: Patient has hypertension history. Blood pressure has lately been stable. She is no longer taking any blood pressure medications. States that systolics were in the low 100s and occasionally diastolic in the 80X and 22O on amlodipine 5 mg daily. She has discontinued the medication. Diabetes mellitus type 2: Patient has type 2 diabetes mellitus. Blood glucose numbers have been fluctuating. Currently numbers are in the 300s. Her last HbA1c was 7.8. She is on insulin, glipizide. Patient will intensify lifestyle and dietary modification. She will take glipizide 20 mg twice a day and insulin 85 units.   She will keep a blood glucose log and will follow up with endocrinologist.  Hypothyroidism: Patient has hypothyroidism. She is on levothyroxine. Takes 300 mcg daily. Continue current treatment plan. Dyslipidemia: Patient has dyslipidemia. We discussed this. I did emphasize need to take Lipitor. She prefers to take fish oil. I explained the advantage of statin medications. She will take Lipitor. Mood disorder: Patient has mood disorder with anxiety especially in the evenings. She is on lorazepam daily. This helps her sleep and relax. She will continue with the current treatment plan. Morbid obesity: Patient has a BMI of 46.67. She will intensify lifestyle and dietary modification. 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 She has a history of vocal cord paralysis following thyroidectomy. Chronic respiratory failure: Patient has a history of chronic respiratory failure with hypoxia. She has oxygen that she uses as needed at home through the trach. Prior to Admission medications    Medication Sig Start Date End Date Taking? Authorizing Provider   traMADoL (ULTRAM) 50 mg tablet Take 50 mg by mouth every six (6) hours as needed. 1/28/23  Yes Provider, Historical   atorvastatin (LIPITOR) 40 mg tablet  1/13/23  Yes Provider, Historical   bisacodyL (DULCOLAX) 5 mg EC tablet as directed. 12/9/22  Yes Provider, Historical   clopidogreL (PLAVIX) 75 mg tab  1/13/23  Yes Provider, Historical   omeprazole (PRILOSEC) 40 mg capsule  1/15/23  Yes Provider, Historical   Stimulant Laxative Plus 8.6-50 mg per tablet  1/13/23  Yes Provider, Historical   senna-docusate (PERICOLACE) 8.6-50 mg per tablet Take 1 Tablet by mouth two (2) times a day. 1/13/23  Yes Provider, Historical   bumetanide (BUMEX) 2 mg tablet Take 1 Tablet by mouth daily.  12/28/22  Yes Margarita Gay MD   insulin glargine (Lantus U-100 Insulin) 100 unit/mL injection inject 85 units subcutaneously once daily 12/28/22  Yes Angelito Perkins MD   levothyroxine (SYNTHROID) 300 mcg tablet Take 1 Tablet by mouth Daily (before breakfast). 12/28/22  Yes Angelito Perkins MD   LORazepam (ATIVAN) 2 mg tablet Take 1 Tablet by mouth nightly. 12/28/22  Yes Eder Hassan MD   potassium chloride (KLOR-CON M10) 10 mEq tablet Take 1 Tablet by mouth daily. 12/28/22  Yes Eder Hassan MD   polyethylene glycol (Miralax) 17 gram/dose powder Take 17 g by mouth daily. 1 tablespoon with 8 oz of water daily 12/28/22  Yes Angelito Curiel MD   ondansetron hcl (ZOFRAN) 4 mg tablet TAKE 1 TABLET BY MOUTH EVERY 8 HOURS AS NEEDED FOR NAUSEA AND VOMITTING 12/27/22  Yes Angelito Curiel MD   glipiZIDE (GLUCOTROL) 10 mg tablet Take 2 Tablets by mouth two (2) times a day. 12/12/22  Yes Angelito Perkins MD   budesonide (PULMICORT) 0.5 mg/2 mL nbsp Take 0.5 mg by inhalation. 9/8/22  Yes Provider, Historical   cholecalciferol (VITAMIN D3) (50,000 UNITS /1250 MCG) capsule Take 1 Capsule by mouth every seven (7) days. 10/27/22  Yes Eder Hassan MD   melatonin 1 mg tablet take 1 tablet by mouth nightly 8/7/22  Yes Eder Hassan MD   acarbose (PRECOSE) 50 mg tablet Take 50 mg by mouth three (3) times daily. 7/5/22  Yes Provider, Historical   ibuprofen (MOTRIN) 600 mg tablet Take 1 Tablet by mouth every six (6) hours as needed for Pain. 7/5/22  Yes Patsie Closs, MD   omega-3 acid ethyl esters (LOVAZA) 1 gram capsule Take 1 Capsule by mouth two (2) times a day. 7/5/22  Yes Patsie Closs, MD   Blood-Glucose Meter monitoring kit Check blood glucose twice a day. 6/29/22  Yes Karli Tellez MD   fexofenadine (Allergy Relief, fexofenadine,) 180 mg tablet Take 1 Tablet by mouth daily.  3/8/22  Yes Eder Hassan MD   albuterol (PROVENTIL HFA, VENTOLIN HFA, PROAIR HFA) 90 mcg/actuation inhaler inhale 2 puffs by mouth and INTO THE LUNGS every 4 hours if needed for wheezing 1/18/22  Yes Angelito Perkins MD   albuterol (PROVENTIL VENTOLIN) 2.5 mg /3 mL (0.083 %) nebu 3 mL by Nebulization route every four (4) hours as needed (wheeze). Indications: asthma attack 11/17/21  Yes Romeo Talbert MD   Nebulizer & Compressor machine Use for nebulization as needed. 11/17/21  Yes Romeo Talbert MD   Nebulizer Accessories kit Use for nebulization as needed. 11/17/21  Yes Romeo Talbert MD   glucose blood VI test strips (ASCENSIA AUTODISC VI, ONE TOUCH ULTRA TEST VI) strip Check blood glucose 3 times daily 4/16/20  Yes Angelito Lopez MD   lancets misc Check blood glucose 3 times daily 7/17/19  Yes Angelito Lopez MD   BD INSULIN SYRINGE ULTRA-FINE 1 mL 31 gauge x 5/16 syrg use as directed twice a day 10/18/18  Yes Angelito Perkins MD   Aspirin, Buffered 81 mg tab Take 81 mg by mouth daily. Yes Provider, Historical   amLODIPine (NORVASC) 5 mg tablet Take 5 mg by mouth daily. 1/14/23 2/1/23  Provider, Historical   omeprazole (PRILOSEC) 40 mg capsule Take 40 mg by mouth daily. 12/8/22 2/1/23  Provider, Historical   glucose blood VI test strips (FreeStyle Lite Strips) strip Check glucose level twice daily as directed  Patient not taking: No sig reported 7/11/22   Stef Syed MD   FREESTYLE LITE STRIPS strip TEST twice a day as directed  Patient not taking: No sig reported 11/20/17   Romeo Talbert MD     ROS Review of all systems is negative except as noted above in the HPI.     Objective:     Patient-Reported Vitals 3/8/2022   Patient-Reported Weight 294   Patient-Reported Pulse 72   Patient-Reported Temperature 97.3   Patient-Reported SpO2 93   Patient-Reported Systolic  524   Patient-Reported Diastolic 72   Constitutional: [x] No apparent distress      Mental status: [x] Alert and awake  [x] Oriented to person/place/time [x] Able to follow commands     HENT: [x] Normocephalic, atraumatic      Pulmonary/Chest: [x]  No visualized signs of difficulty breathing or respiratory distress             Neurological:        [x] No Facial Asymmetry (Cranial nerve 7 motor function) (limited exam due to video visit) Psychiatric:       [x] Normal Affect       We discussed the expected course, resolution and complications of the diagnosis(es) in detail. Medication risks, benefits, costs, interactions, and alternatives were discussed as indicated. I advised her to contact the office if her condition worsens, changes or fails to improve as anticipated. She expressed understanding with the diagnosis(es) and plan. Roseline Mistry, was evaluated through a synchronous (real-time) audio-video encounter. The patient (or guardian if applicable) is aware that this is a billable service, which includes applicable co-pays. This Virtual Visit was conducted with patient's (and/or legal guardian's) consent. The visit was conducted pursuant to the emergency declaration under the [address] [place] [address] authority and the [employer] General Act. Patient identification was verified, and a caregiver was present when appropriate. The patient was located at: Home: [place] [phone]  The provider was located at:  Facility (Appt Department): [address] MD

## 2023-02-06 ENCOUNTER — TELEPHONE (OUTPATIENT)
Dept: FAMILY MEDICINE CLINIC | Age: 63
End: 2023-02-06

## 2023-02-06 DIAGNOSIS — M25.512 LEFT SHOULDER PAIN, UNSPECIFIED CHRONICITY: Primary | ICD-10-CM

## 2023-02-06 RX ORDER — HYDROCODONE BITARTRATE AND ACETAMINOPHEN 5; 325 MG/1; MG/1
1 TABLET ORAL
Qty: 10 TABLET | Refills: 0 | Status: SHIPPED | OUTPATIENT
Start: 2023-02-06 | End: 2023-02-16

## 2023-02-06 NOTE — TELEPHONE ENCOUNTER
Pt calling because she has not received her tramadol yet. Pt stated it was suppose to be sent to [facility] on 2/1/23. Ms. Alma Rosa Esquivel said Azalea Alejandra is trying to be patient, but she is in pain. \" She can be reached at [phone]. Please advise.  Thank you!!!

## 2023-02-06 NOTE — TELEPHONE ENCOUNTER
----- Message from Mario Mabry sent at 2/6/2023  1:29 PM EST -----  Subject: Refill Request    QUESTIONS  Name of Medication? Other - tramadol  Patient-reported dosage and instructions? 50mg tab  How many days do you have left? 0  Preferred Pharmacy? [id number]  Pharmacy phone number (if available)? [phone]  Additional Information for Provider? Please call pt when refill is ordered  ---------------------------------------------------------------------------  --------------  CALL BACK INFO  What is the best way for the office to contact you? OK to leave message on   voicemail  Preferred Call Back Phone Number? [phone]  ---------------------------------------------------------------------------  --------------  SCRIPT ANSWERS  Relationship to Patient?  Self

## 2023-02-08 RX ORDER — TRAMADOL HYDROCHLORIDE 50 MG/1
50 TABLET ORAL
Status: CANCELLED | OUTPATIENT
Start: 2023-02-08

## 2023-03-14 ENCOUNTER — OFFICE VISIT (OUTPATIENT)
Facility: CLINIC | Age: 63
End: 2023-03-14
Payer: MEDICAID

## 2023-03-14 VITALS
RESPIRATION RATE: 24 BRPM | SYSTOLIC BLOOD PRESSURE: 133 MMHG | HEIGHT: 67 IN | TEMPERATURE: 97.6 F | WEIGHT: 293 LBS | HEART RATE: 78 BPM | BODY MASS INDEX: 45.99 KG/M2 | DIASTOLIC BLOOD PRESSURE: 62 MMHG | OXYGEN SATURATION: 99 %

## 2023-03-14 DIAGNOSIS — I10 ESSENTIAL (PRIMARY) HYPERTENSION: ICD-10-CM

## 2023-03-14 DIAGNOSIS — E78.5 HYPERLIPIDEMIA, UNSPECIFIED HYPERLIPIDEMIA TYPE: ICD-10-CM

## 2023-03-14 DIAGNOSIS — I50.32 CHRONIC DIASTOLIC (CONGESTIVE) HEART FAILURE (HCC): ICD-10-CM

## 2023-03-14 DIAGNOSIS — I89.0 LYMPHEDEMA, NOT ELSEWHERE CLASSIFIED: ICD-10-CM

## 2023-03-14 DIAGNOSIS — I49.9 CARDIAC ARRHYTHMIA, UNSPECIFIED CARDIAC ARRHYTHMIA TYPE: ICD-10-CM

## 2023-03-14 DIAGNOSIS — F41.9 ANXIETY DISORDER, UNSPECIFIED TYPE: ICD-10-CM

## 2023-03-14 DIAGNOSIS — E66.01 MORBID (SEVERE) OBESITY DUE TO EXCESS CALORIES (HCC): ICD-10-CM

## 2023-03-14 DIAGNOSIS — E11.40 TYPE 2 DIABETES MELLITUS WITH DIABETIC NEUROPATHY, WITHOUT LONG-TERM CURRENT USE OF INSULIN (HCC): Primary | ICD-10-CM

## 2023-03-14 DIAGNOSIS — Z93.0 TRACHEOSTOMY STATUS (HCC): ICD-10-CM

## 2023-03-14 DIAGNOSIS — J96.11 CHRONIC RESPIRATORY FAILURE WITH HYPOXIA (HCC): ICD-10-CM

## 2023-03-14 DIAGNOSIS — K21.9 GASTRO-ESOPHAGEAL REFLUX DISEASE WITHOUT ESOPHAGITIS: ICD-10-CM

## 2023-03-14 DIAGNOSIS — D64.9 ANEMIA, UNSPECIFIED TYPE: ICD-10-CM

## 2023-03-14 LAB — HBA1C MFR BLD: 9.1 %

## 2023-03-14 PROCEDURE — 83036 HEMOGLOBIN GLYCOSYLATED A1C: CPT | Performed by: FAMILY MEDICINE

## 2023-03-14 PROCEDURE — 3078F DIAST BP <80 MM HG: CPT | Performed by: FAMILY MEDICINE

## 2023-03-14 PROCEDURE — 99215 OFFICE O/P EST HI 40 MIN: CPT | Performed by: FAMILY MEDICINE

## 2023-03-14 PROCEDURE — 3074F SYST BP LT 130 MM HG: CPT | Performed by: FAMILY MEDICINE

## 2023-03-14 SDOH — ECONOMIC STABILITY: INCOME INSECURITY: HOW HARD IS IT FOR YOU TO PAY FOR THE VERY BASICS LIKE FOOD, HOUSING, MEDICAL CARE, AND HEATING?: NOT VERY HARD

## 2023-03-14 SDOH — ECONOMIC STABILITY: HOUSING INSECURITY
IN THE LAST 12 MONTHS, WAS THERE A TIME WHEN YOU DID NOT HAVE A STEADY PLACE TO SLEEP OR SLEPT IN A SHELTER (INCLUDING NOW)?: NO

## 2023-03-14 SDOH — ECONOMIC STABILITY: FOOD INSECURITY: WITHIN THE PAST 12 MONTHS, YOU WORRIED THAT YOUR FOOD WOULD RUN OUT BEFORE YOU GOT MONEY TO BUY MORE.: SOMETIMES TRUE

## 2023-03-14 SDOH — ECONOMIC STABILITY: FOOD INSECURITY: WITHIN THE PAST 12 MONTHS, THE FOOD YOU BOUGHT JUST DIDN'T LAST AND YOU DIDN'T HAVE MONEY TO GET MORE.: SOMETIMES TRUE

## 2023-03-14 ASSESSMENT — PATIENT HEALTH QUESTIONNAIRE - PHQ9
3. TROUBLE FALLING OR STAYING ASLEEP: 1
9. THOUGHTS THAT YOU WOULD BE BETTER OFF DEAD, OR OF HURTING YOURSELF: 0
6. FEELING BAD ABOUT YOURSELF - OR THAT YOU ARE A FAILURE OR HAVE LET YOURSELF OR YOUR FAMILY DOWN: 0
1. LITTLE INTEREST OR PLEASURE IN DOING THINGS: 1
SUM OF ALL RESPONSES TO PHQ QUESTIONS 1-9: 4
7. TROUBLE CONCENTRATING ON THINGS, SUCH AS READING THE NEWSPAPER OR WATCHING TELEVISION: 0
8. MOVING OR SPEAKING SO SLOWLY THAT OTHER PEOPLE COULD HAVE NOTICED. OR THE OPPOSITE, BEING SO FIGETY OR RESTLESS THAT YOU HAVE BEEN MOVING AROUND A LOT MORE THAN USUAL: 0
4. FEELING TIRED OR HAVING LITTLE ENERGY: 1
2. FEELING DOWN, DEPRESSED OR HOPELESS: 1
10. IF YOU CHECKED OFF ANY PROBLEMS, HOW DIFFICULT HAVE THESE PROBLEMS MADE IT FOR YOU TO DO YOUR WORK, TAKE CARE OF THINGS AT HOME, OR GET ALONG WITH OTHER PEOPLE: 0
SUM OF ALL RESPONSES TO PHQ9 QUESTIONS 1 & 2: 2
5. POOR APPETITE OR OVEREATING: 0
SUM OF ALL RESPONSES TO PHQ QUESTIONS 1-9: 4

## 2023-03-14 NOTE — PROGRESS NOTES
1. \"Have you been to the ER, urgent care clinic since your last visit? Hospitalized since your last visit? \"No    2. \"Have you seen or consulted any other health care providers outside of the [address] [place] [address] since your last visit? \" No    3. For patients aged 39-70: Has the patient had a colonoscopy / FIT/ Cologuard? Yes      If the patient is female:    4. For patients aged 41-77: Has the patient had a mammogram within the past 2 years? No      5. For patients aged 21-65: Has the patient had a pap smear?  No

## 2023-03-14 NOTE — PROGRESS NOTES
HPI  Mando Chua comes in for follow-up care.  Uncontrolled diabetes mellitus: Patient has diabetes mellitus.  It has been uncontrolled.  HbA1c today 9.1.  She is not doing any lifestyle or dietary modification.  Patient is on glargine and glipizide.  We discussed adjustment of medication.  She would prefer to do more lifestyle and dietary modification and continue with the current medication.  She will keep a blood glucose log and I will follow-up at next visit.  She has in the past been referred to see the endocrinologist.  Pulmonary: Patient has a history of mild intermittent asthma.  She has been seen by the pulmonologist.  Advised to take Pulmicort twice a day by the nebulizer.  She is yet to get the Pulmicort and I did emphasize the need to get this medication.  She will pick it up today.  Patient has chronic respiratory failure.  She is on chronic oxygen.  She would like a portable oxygen concentrator to enable her to use oxygen when she is traveling.  This has been requested by the pulmonologist.  She will states that she has been asked to do a 6-minute test to see her oxygenation but states that she is unable to do this.  She will follow-up with her pulmonologist.  I did let her know that I have seen the pulmonologist records that state that they have requested the portable oxygen concentrator.  Patient also states that she needs to get the pulse oximeter.  She will get this through her specialist.  Patient will continue with albuterol inhaler as needed for wheeze and shortness of breath.  Tracheostomy: Patient has chronic tracheostomy tube.  She has a history of vocal cord paralysis following thyroid surgery.  Since then she has chronic trach in place.  She should continue with the chronic trach care.  Cardiac: Patient recently noted to have cardiac dysrhythmia.  She has a pacemaker in place.  At this infection noted at pacemaker site and she was put on cephalexin.  She has completed the antibiotic  dose.  Denies fever or chills. Area is healing well. There was some wound dehiscence in the area but this is currently not infected. Patient recently underwent TAVR. She is stable. Denies chest pain or diaphoresis. Patient has chronic diastolic CHF. She is on metoprolol and Bumex. She takes potassium supplementation. She will continue with management as recommended by her cardiologist.  Hypothyroidism: Patient has hypothyroidism. She is on levothyroxine. Takes 100 mg daily. Stable on medication. Continue current treatment plan. Hypertension: Patient has hypertension. She is on Toprol-XL. She is stable on medication. She will continue with the current treatment plan. Pedal edema: Patient has chronic pedal edema/lymphedema. She is on Bumex. Stable on medication. She will continue current treatment plan. Patient is seen by the specialist in the lymphedema clinic. She has her feet wrapped. Hypovitaminosis D: Patient has a history of hypovitaminosis D. She is on vitamin D replacement therapy. Continue current treatment plan. Insomnia: Patient has insomnia. She is on melatonin. We discussed supportive care. She also take lorazepam at night. Anxiety: Patient has anxiety. She takes lorazepam daily. Continue current treatment plan. Gastroenterology: Patient had dark stools a few months ago. At the time her hemoglobin had dropped significantly. She was seen in the emergency room and admitted. .  Had an EGD done that was nonrevealing. She was referred to follow-up with a gastroenterologist.  Serial hemoglobins subsequently have been stable. She denies any dark stools. She needs to follow-up with a gastroenterologist.  May benefit from getting a colonoscopy done. Morbid obesity: Patient has a BMI of 46.67. She will intensify lifestyle and dietary modification.         Past Medical History  Past Medical History:   Diagnosis Date    Acquired hypothyroidism 5/17/2017    Anxiety 6/25/2019 Bilateral vocal cord paralysis 4/4/2019    Bilateral vocal cord paralysis status post thyroidectomy (4/4/2019 - Dr. Cy Caldwell) with residual dysphagia and dysarthria; S/P Tracheostomy (6/3/2019 - Dr. Cy Caldwell); S/P PEG tube insertion (6/20/2019) - Dr. Cy Caldwell)    Chronic back pain     Lower back pain    Depression     Diabetic neuropathy associated with type 2 diabetes mellitus ([facility]     Dysphagia 6/25/2019    S/P Thyroidectomy (4/4/2019 - Dr. Cy Caldwell)    History of asthma     History of heart failure     chronic diastolic heart failure    History of vitamin D deficiency 8/28/2017    Hypertensive heart disease without heart failure 5/17/2017    Hypoxemia requiring supplemental oxygen 6/25/2019    Insomnia     Left ear hearing loss     pt states nerve damage--- 25% hearing loss, described as \"muffled\"    Memory difficulty     Moderate persistent asthma     Obesity, Class II, BMI 35-39.9 11/11/2016    Obstructive sleep apnea 11/6/2015    Panic attacks     Ringing of ears     Type 2 diabetes mellitus with diabetic neuropathy, with long-term current use of insulin ([facility]     Vertigo        Surgical History  Past Surgical History:   Procedure Laterality Date    CARDIAC VALVE SURGERY  2013    aortic valve repair    EGD PERCUTANEOUS PLACEMENT GASTROSTOMY TUBE N/A 06/20/2019    Dr. Erick Martinze (CERVIX STATUS UNKNOWN)      Partial Hysterectomy - removed ovary, via pf    MYOMECTOMY      ORTHOPEDIC SURGERY  02/2018    had nerves burned on her right side of back    [address]  10/31/2013    open heart    THYROIDECTOMY Bilateral 04/04/2019    Dr. Torres Born  06/03/2019    S/P Tracheostomy (6/3/2019 - Dr. Cy Caldwell)        Medications  Current Outpatient Medications   Medication Sig Dispense Refill    Lancets MISC Check blood glucose 3 times daily      acarbose (PRECOSE) 50 MG tablet Take 50 mg by mouth 3 times daily      albuterol sulfate HFA (PROVENTIL;VENTOLIN;PROAIR) 108 (90 Base) MCG/ACT inhaler inhale 2 puffs by mouth and INTO THE LUNGS every 4 hours if needed for wheezing      albuterol (PROVENTIL) (2.5 MG/3ML) 0.083% nebulizer solution Inhale 2.5 mg into the lungs every 4 hours as needed      budesonide (PULMICORT) 0.5 MG/2ML nebulizer suspension Inhale 0.5 mg into the lungs      bumetanide (BUMEX) 2 MG tablet Take 2 mg by mouth daily      vitamin D (CHOLECALCIFEROL) 59658 UNIT CAPS Take 50,000 Units by mouth every 7 days      fexofenadine (ALLEGRA) 180 MG tablet Take 180 mg by mouth daily      glipiZIDE (GLUCOTROL) 10 MG tablet Take 20 mg by mouth 2 times daily      ibuprofen (ADVIL;MOTRIN) 600 MG tablet Take 600 mg by mouth every 6 hours as needed      insulin glargine (LANTUS) 100 UNIT/ML injection vial inject 85 units subcutaneously once daily      levothyroxine (SYNTHROID) 300 MCG tablet Take 300 mcg by mouth every morning (before breakfast)      LORazepam (ATIVAN) 2 MG tablet Take 2 mg by mouth.      melatonin 1 MG tablet Take 1 mg by mouth      metoprolol succinate (TOPROL XL) 25 MG extended release tablet Take 25 mg by mouth daily      Omega-3 Fatty Acids (FISH OIL) 1000 MG capsule Take 1 capsule by mouth 2 times daily      ondansetron (ZOFRAN) 4 MG tablet TAKE 1 TABLET BY MOUTH EVERY 8 HOURS AS NEEDED FOR NAUSEA AND VOMITTING      polyethylene glycol (GLYCOLAX) 17 GM/SCOOP powder Take 17 g by mouth daily      potassium chloride (KLOR-CON M) 10 MEQ extended release tablet Take 10 mEq by mouth daily       No current facility-administered medications for this visit.        Allergies  Allergies   Allergen Reactions    Dulaglutide Other (See Comments)    Metformin Other (See Comments)     Increase pain in feet and swelling in feet  Increased hunger,tired and bilat foot pain    Montelukast Other (See Comments)     hallucinations    Morphine Other (See Comments)     headache    Other Other (See Comments)     Artificial sweeteners- causes headaches    Sucrose Other (See Comments)     Pt. Is not allergic to sucrose. She develops headaches with artificial sweeteners. Family History  Family History   Problem Relation Age of Onset    Stroke Mother     Hypertension Mother     Heart Disease Mother     Hypertension Brother     Hypertension Brother     Diabetes Brother     Heart Disease Brother     Stroke Brother     Hypertension Brother     Diabetes Brother     Heart Disease Brother     Stroke Brother     Hypertension Brother     Cancer Brother     Diabetes Brother     Stroke Father     Hypertension Father     Heart Disease Father     Diabetes Father     Diabetes Mother        Social History  Social History     Socioeconomic History    Marital status:      Spouse name: Not on file    Number of children: Not on file    Years of education: Not on file    Highest education level: Not on file   Occupational History    Not on file   Tobacco Use    Smoking status: Never    Smokeless tobacco: Never   Substance and Sexual Activity    Alcohol use: No    Drug use: No    Sexual activity: Not on file   Other Topics Concern    Not on file   Social History Narrative    Not on file     Social Determinants of Health     Financial Resource Strain: Low Risk     Difficulty of Paying Living Expenses: Not very hard   Food Insecurity: Food Insecurity Present    Worried About Running Out of Food in the Last Year: Sometimes true    Ran Out of Food in the Last Year: Sometimes true   Transportation Needs: Unknown    Lack of Transportation (Medical): Not on file    Lack of Transportation (Non-Medical):  No   Physical Activity: Not on file   Stress: Not on file   Social Connections: Not on file   Intimate Partner Violence: Not on file   Housing Stability: Unknown    Unable to Pay for Housing in the Last Year: Not on file    Number of Places Lived in the Last Year: Not on file    Unstable Housing in the Last Year: No       Review of Systems  Review of Systems - Review of all systems is negative except as noted above in the HPI. Vital Signs  /62   Pulse 78   Temp 97.6 °F (36.4 °C) (Temporal)   Resp 24   Ht 5' 7\" (1.702 m)   Wt 298 lb (135.2 kg)   SpO2 99%   BMI 46.67 kg/m²       Physical Exam  Physical Examination: General appearance - overweight and in mild to moderate distress  Mental status - affect appropriate to mood  Neck -tracheostomy in place  Lymphatics - no palpable lymphadenopathy  Chest - decreased air entry noted bilateral lung bases with transmitted sounds  Heart - systolic murmur 3/6 at 2nd right intercostal space  Abdomen - bowel sounds normal  Back exam - limited range of motion  Neurological - abnormal neurological exam unchanged from prior examinations  Musculoskeletal - osteoarthritic changes noted in both hands  Extremities - intact peripheral pulses, 2+ edema      Results  No results found for this visit on 03/14/23. ASSESSMENT and PLAN    ICD-10-CM    1. Type 2 diabetes mellitus with diabetic neuropathy, without long-term current use of insulin ([facility])  E11.40 AMB POC HEMOGLOBIN A1C      2. Cardiac arrhythmia, unspecified cardiac arrhythmia type  I49.9       3. Chronic diastolic (congestive) heart failure ([facility])  I50.32       4. Anemia, unspecified type  D64.9       5. Morbid (severe) obesity due to excess calories ([facility])  E66.01       6. Tracheostomy status ([facility]  Z93.0       7. Chronic respiratory failure with hypoxia ([facility])  J96.11       8. Hyperlipidemia, unspecified hyperlipidemia type  E78.5       9. Gastro-esophageal reflux disease without esophagitis  K21.9       10. Essential (primary) hypertension  I10       11. Lymphedema, not elsewhere classified  I89.0       12.  Anxiety disorder, unspecified type  F41.9       lab results and schedule of future lab studies reviewed with patient  reviewed diet, exercise and weight control  cardiovascular risk and specific lipid/LDL goals reviewed  reviewed medications and side effects in detail  specific diabetic recommendations: low cholesterol diet, weight control and daily exercise discussed, home glucose monitoring emphasized, all medications, side effects and compliance discussed carefully, annual eye examinations at Ophthalmology discussed, glycohemoglobin and other lab monitoring discussed, and long term diabetic complications discussed  radiology results and schedule of future radiology studies reviewed with patient      I have discussed the diagnosis with the patient and the intended plan of care as seen in the above orders. The patient has received an after-visit summary and questions were answered concerning future plans. I have discussed medication, side effects, and warnings with the patient in detail. The patient verbalized understanding and is in agreement with the plan of care. The patient will contact the office with any additional concerns. I spent 45 minutes with this established patient. Joselin Downey MD    PLEASE NOTE:   This document has been produced using voice recognition software.  Unrecognized errors in transcription may be present

## 2023-03-21 RX ORDER — UREA 10 %
LOTION (ML) TOPICAL
Qty: 90 TABLET | Refills: 1 | Status: SHIPPED | OUTPATIENT
Start: 2023-03-21

## 2023-05-18 DIAGNOSIS — K59.00 CONSTIPATION, UNSPECIFIED CONSTIPATION TYPE: Primary | ICD-10-CM

## 2023-05-18 RX ORDER — DOCUSATE SODIUM 100 MG/1
100 CAPSULE, LIQUID FILLED ORAL 2 TIMES DAILY
Qty: 180 CAPSULE | Refills: 1 | Status: SHIPPED | OUTPATIENT
Start: 2023-05-18

## 2023-06-01 RX ORDER — IBUPROFEN 600 MG/1
600 TABLET ORAL EVERY 8 HOURS PRN
Qty: 90 TABLET | Refills: 0 | Status: SHIPPED | OUTPATIENT
Start: 2023-06-01

## 2023-06-01 RX ORDER — CLINDAMYCIN HYDROCHLORIDE 300 MG/1
300 CAPSULE ORAL 3 TIMES DAILY
Qty: 21 CAPSULE | Refills: 0 | Status: SHIPPED | OUTPATIENT
Start: 2023-06-01 | End: 2023-06-08

## 2023-06-03 DIAGNOSIS — F41.9 ANXIETY DISORDER, UNSPECIFIED TYPE: Primary | ICD-10-CM

## 2023-06-05 RX ORDER — LORAZEPAM 2 MG/1
TABLET ORAL
Qty: 31 TABLET | Refills: 2 | Status: SHIPPED | OUTPATIENT
Start: 2023-06-05 | End: 2023-09-03

## 2023-06-05 RX ORDER — ONDANSETRON 4 MG/1
TABLET, FILM COATED ORAL
Qty: 60 TABLET | Refills: 2 | Status: SHIPPED | OUTPATIENT
Start: 2023-06-05

## 2023-06-19 ENCOUNTER — TELEPHONE (OUTPATIENT)
Facility: CLINIC | Age: 63
End: 2023-06-19

## 2023-06-19 NOTE — TELEPHONE ENCOUNTER
Spoke with patient and advised of provider message. Patient was advised if she does not hear anything from Endocrinology within 1 week she needs to contact this office for name and number. Patient verbalized understanding    ----- Message from [facility], MD sent at 6/17/2023  9:41 AM EDT -----  Please let patient know that her HbA1c indicates uncontrolled diabetes mellitus. She needs to follow-up with the endocrinologist.  She needs to intensify lifestyle and dietary modification. I have sent in a prescription of Jardiance to take daily. She will continue with the insulin and glipizide. She should keep a blood glucose log and follow-up in the clinic within 4 to 6 weeks. I emphasize that she needs to see the endocrinologist given we are not able to adequately control her blood glucose. She needs specialist evaluation and management.   [facility], MD

## 2023-07-07 RX ORDER — BUMETANIDE 2 MG/1
TABLET ORAL
Qty: 90 TABLET | Refills: 1 | Status: SHIPPED | OUTPATIENT
Start: 2023-07-07

## 2023-07-12 RX ORDER — LEVOTHYROXINE SODIUM 300 UG/1
TABLET ORAL
Qty: 90 TABLET | Refills: 1 | Status: SHIPPED | OUTPATIENT
Start: 2023-07-12

## 2023-07-17 RX ORDER — GLIPIZIDE 10 MG/1
TABLET ORAL
Qty: 120 TABLET | Refills: 5 | Status: SHIPPED | OUTPATIENT
Start: 2023-07-17

## 2023-07-24 RX ORDER — POTASSIUM CHLORIDE 750 MG/1
TABLET, EXTENDED RELEASE ORAL
Qty: 90 TABLET | Refills: 1 | Status: SHIPPED | OUTPATIENT
Start: 2023-07-24

## 2023-08-28 ENCOUNTER — OFFICE VISIT (OUTPATIENT)
Facility: CLINIC | Age: 63
End: 2023-08-28
Payer: MEDICAID

## 2023-08-28 DIAGNOSIS — I10 ESSENTIAL (PRIMARY) HYPERTENSION: ICD-10-CM

## 2023-08-28 DIAGNOSIS — M79.672 PAIN IN BOTH FEET: ICD-10-CM

## 2023-08-28 DIAGNOSIS — T82.7XXA PACEMAKER INFECTION, INITIAL ENCOUNTER (HCC): Primary | ICD-10-CM

## 2023-08-28 DIAGNOSIS — B35.1 ONYCHOMYCOSIS: ICD-10-CM

## 2023-08-28 DIAGNOSIS — E66.01 MORBID OBESITY (HCC): ICD-10-CM

## 2023-08-28 DIAGNOSIS — K21.9 GASTRO-ESOPHAGEAL REFLUX DISEASE WITHOUT ESOPHAGITIS: ICD-10-CM

## 2023-08-28 DIAGNOSIS — Z09 HOSPITAL DISCHARGE FOLLOW-UP: ICD-10-CM

## 2023-08-28 DIAGNOSIS — I50.32 CHRONIC DIASTOLIC (CONGESTIVE) HEART FAILURE (HCC): ICD-10-CM

## 2023-08-28 DIAGNOSIS — M79.671 PAIN IN BOTH FEET: ICD-10-CM

## 2023-08-28 DIAGNOSIS — E78.5 HYPERLIPIDEMIA, UNSPECIFIED HYPERLIPIDEMIA TYPE: ICD-10-CM

## 2023-08-28 DIAGNOSIS — I38 VALVULAR HEART DISEASE: ICD-10-CM

## 2023-08-28 DIAGNOSIS — I89.0 LYMPHEDEMA: ICD-10-CM

## 2023-08-28 DIAGNOSIS — J96.11 CHRONIC RESPIRATORY FAILURE WITH HYPOXIA (HCC): ICD-10-CM

## 2023-08-28 DIAGNOSIS — E11.40 TYPE 2 DIABETES MELLITUS WITH DIABETIC NEUROPATHY, WITHOUT LONG-TERM CURRENT USE OF INSULIN (HCC): ICD-10-CM

## 2023-08-28 DIAGNOSIS — Z93.0 TRACHEOSTOMY STATUS (HCC): ICD-10-CM

## 2023-08-28 PROCEDURE — 1111F DSCHRG MED/CURRENT MED MERGE: CPT | Performed by: FAMILY MEDICINE

## 2023-08-28 PROCEDURE — 99214 OFFICE O/P EST MOD 30 MIN: CPT | Performed by: FAMILY MEDICINE

## 2023-08-28 RX ORDER — BISACODYL 5 MG/1
TABLET, DELAYED RELEASE ORAL
COMMUNITY
Start: 2022-12-09

## 2023-08-28 NOTE — PROGRESS NOTES
1. \"Have you been to the ER, urgent care clinic since your last visit? Hospitalized since your last visit? \"Yes When: [address]    2. \"Have you seen or consulted any other health care providers outside of the [address] [place] [address] since your last visit? \" No    3. For patients aged 43-73: Has the patient had a colonoscopy / FIT/ Cologuard? Yes      If the patient is female:    4. For patients aged 43-66: Has the patient had a mammogram within the past 2 years? No      5. For patients aged 21-65: Has the patient had a pap smear?  Not applicable
glipizide. I have referred her to see an endocrinologist but she is yet to do this. I did emphasize the need to see the endocrinologist especially to help control her blood sugars that have been fluctuating. Lymphedema: Patient has chronic lymphedema. We will place a referral for her to be seen in the lymphedema clinic. She has been seen there in the past and gets regular dressings of the lower extremities. She also has stasis dermatitis bilateral lower extremities. Chronic respiratory failure: Patient has chronic respiratory failure. She is on chronic oxygen. She is followed up with a pulmonologist.  Bilateral vocal cord paralysis: Patient has vocal cord paralysis following thyroidectomy. She has chronic trach in place.       Past Medical History  Past Medical History:   Diagnosis Date    Acquired hypothyroidism 5/17/2017    Anxiety 6/25/2019    Bilateral vocal cord paralysis 4/4/2019    Bilateral vocal cord paralysis status post thyroidectomy (4/4/2019 - Dr. Jelly Storm) with residual dysphagia and dysarthria; S/P Tracheostomy (6/3/2019 - Dr. Jelly Storm); S/P PEG tube insertion (6/20/2019) - Dr. Jelly Storm)    Chronic back pain     Lower back pain    Depression     Diabetic neuropathy associated with type 2 diabetes mellitus ([address])     Dysphagia 6/25/2019    S/P Thyroidectomy (4/4/2019 - Dr. Jelly Storm)    History of asthma     History of heart failure     chronic diastolic heart failure    History of vitamin D deficiency 8/28/2017    Hypertensive heart disease without heart failure 5/17/2017    Hypoxemia requiring supplemental oxygen 6/25/2019    Insomnia     Left ear hearing loss     pt states nerve damage--- 25% hearing loss, described as \"muffled\"    Memory difficulty     Moderate persistent asthma     Obesity, Class II, BMI 35-39.9 11/11/2016    Obstructive sleep apnea 11/6/2015    Panic attacks     Ringing of ears     Type 2 diabetes mellitus with diabetic neuropathy, with long-term

## 2023-08-29 VITALS
OXYGEN SATURATION: 100 % | TEMPERATURE: 98.4 F | SYSTOLIC BLOOD PRESSURE: 132 MMHG | DIASTOLIC BLOOD PRESSURE: 74 MMHG | HEART RATE: 70 BPM | RESPIRATION RATE: 24 BRPM

## 2023-09-12 ENCOUNTER — HOSPITAL ENCOUNTER (OUTPATIENT)
Facility: HOSPITAL | Age: 63
Discharge: HOME | End: 2023-09-15
Payer: MEDICAID

## 2023-09-12 DIAGNOSIS — R10.12 LEFT UPPER QUADRANT ABDOMINAL PAIN: ICD-10-CM

## 2023-09-12 PROCEDURE — 76700 US EXAM ABDOM COMPLETE: CPT

## 2023-09-13 ENCOUNTER — OFFICE VISIT (OUTPATIENT)
Facility: CLINIC | Age: 63
End: 2023-09-13
Payer: MEDICAID

## 2023-09-13 VITALS
TEMPERATURE: 98.2 F | OXYGEN SATURATION: 98 % | DIASTOLIC BLOOD PRESSURE: 77 MMHG | RESPIRATION RATE: 24 BRPM | SYSTOLIC BLOOD PRESSURE: 143 MMHG | HEART RATE: 69 BPM

## 2023-09-13 DIAGNOSIS — R10.12 LUQ PAIN: ICD-10-CM

## 2023-09-13 DIAGNOSIS — F41.9 ANXIETY: ICD-10-CM

## 2023-09-13 DIAGNOSIS — T82.7XXA PACEMAKER INFECTION, INITIAL ENCOUNTER (HCC): ICD-10-CM

## 2023-09-13 DIAGNOSIS — I89.0 LYMPHEDEMA, NOT ELSEWHERE CLASSIFIED: ICD-10-CM

## 2023-09-13 DIAGNOSIS — E11.40 TYPE 2 DIABETES MELLITUS WITH DIABETIC NEUROPATHY, WITHOUT LONG-TERM CURRENT USE OF INSULIN (HCC): Primary | ICD-10-CM

## 2023-09-13 DIAGNOSIS — E66.01 MORBID OBESITY (HCC): ICD-10-CM

## 2023-09-13 DIAGNOSIS — I49.9 CARDIAC ARRHYTHMIA, UNSPECIFIED CARDIAC ARRHYTHMIA TYPE: ICD-10-CM

## 2023-09-13 DIAGNOSIS — J96.11 CHRONIC RESPIRATORY FAILURE WITH HYPOXIA (HCC): ICD-10-CM

## 2023-09-13 DIAGNOSIS — B35.1 ONYCHOMYCOSIS: ICD-10-CM

## 2023-09-13 DIAGNOSIS — Z93.0 TRACHEOSTOMY STATUS (HCC): ICD-10-CM

## 2023-09-13 DIAGNOSIS — K21.9 GASTRO-ESOPHAGEAL REFLUX DISEASE WITHOUT ESOPHAGITIS: ICD-10-CM

## 2023-09-13 DIAGNOSIS — I50.32 CHRONIC DIASTOLIC (CONGESTIVE) HEART FAILURE (HCC): ICD-10-CM

## 2023-09-13 DIAGNOSIS — K59.00 CONSTIPATION, UNSPECIFIED CONSTIPATION TYPE: ICD-10-CM

## 2023-09-13 DIAGNOSIS — I10 ESSENTIAL (PRIMARY) HYPERTENSION: ICD-10-CM

## 2023-09-13 LAB — HBA1C MFR BLD: 11.7 %

## 2023-09-13 PROCEDURE — 3078F DIAST BP <80 MM HG: CPT | Performed by: FAMILY MEDICINE

## 2023-09-13 PROCEDURE — 3077F SYST BP >= 140 MM HG: CPT | Performed by: FAMILY MEDICINE

## 2023-09-13 PROCEDURE — 99215 OFFICE O/P EST HI 40 MIN: CPT | Performed by: FAMILY MEDICINE

## 2023-09-13 PROCEDURE — 83036 HEMOGLOBIN GLYCOSYLATED A1C: CPT | Performed by: FAMILY MEDICINE

## 2023-09-13 SDOH — ECONOMIC STABILITY: FOOD INSECURITY: WITHIN THE PAST 12 MONTHS, THE FOOD YOU BOUGHT JUST DIDN'T LAST AND YOU DIDN'T HAVE MONEY TO GET MORE.: NEVER TRUE

## 2023-09-13 SDOH — ECONOMIC STABILITY: FOOD INSECURITY: WITHIN THE PAST 12 MONTHS, YOU WORRIED THAT YOUR FOOD WOULD RUN OUT BEFORE YOU GOT MONEY TO BUY MORE.: NEVER TRUE

## 2023-09-13 SDOH — ECONOMIC STABILITY: INCOME INSECURITY: HOW HARD IS IT FOR YOU TO PAY FOR THE VERY BASICS LIKE FOOD, HOUSING, MEDICAL CARE, AND HEATING?: NOT VERY HARD

## 2023-09-13 ASSESSMENT — PATIENT HEALTH QUESTIONNAIRE - PHQ9
SUM OF ALL RESPONSES TO PHQ QUESTIONS 1-9: 6
8. MOVING OR SPEAKING SO SLOWLY THAT OTHER PEOPLE COULD HAVE NOTICED. OR THE OPPOSITE, BEING SO FIGETY OR RESTLESS THAT YOU HAVE BEEN MOVING AROUND A LOT MORE THAN USUAL: 0
SUM OF ALL RESPONSES TO PHQ QUESTIONS 1-9: 6
4. FEELING TIRED OR HAVING LITTLE ENERGY: 1
10. IF YOU CHECKED OFF ANY PROBLEMS, HOW DIFFICULT HAVE THESE PROBLEMS MADE IT FOR YOU TO DO YOUR WORK, TAKE CARE OF THINGS AT HOME, OR GET ALONG WITH OTHER PEOPLE: 1
SUM OF ALL RESPONSES TO PHQ9 QUESTIONS 1 & 2: 2
1. LITTLE INTEREST OR PLEASURE IN DOING THINGS: 1
5. POOR APPETITE OR OVEREATING: 1
7. TROUBLE CONCENTRATING ON THINGS, SUCH AS READING THE NEWSPAPER OR WATCHING TELEVISION: 0
2. FEELING DOWN, DEPRESSED OR HOPELESS: 1
9. THOUGHTS THAT YOU WOULD BE BETTER OFF DEAD, OR OF HURTING YOURSELF: 0
6. FEELING BAD ABOUT YOURSELF - OR THAT YOU ARE A FAILURE OR HAVE LET YOURSELF OR YOUR FAMILY DOWN: 1
3. TROUBLE FALLING OR STAYING ASLEEP: 1
SUM OF ALL RESPONSES TO PHQ QUESTIONS 1-9: 6
SUM OF ALL RESPONSES TO PHQ QUESTIONS 1-9: 6

## 2023-09-13 NOTE — PROGRESS NOTES
CARIN  Radha Vasques comes in for follow-up care. Left upper quadrant abdominal pain: Patient has left upper quadrant abdominal pain. She suspects she has a hernia. Had an abdominal ultrasound done. This did not show any hernia. It shows mild splenomegaly. She also has right renal cyst.  No acute abnormality noted. Patient has had a CT scan done in the past.  Given persistence of the abdominal pain I will also order recheck abdominal CT scan. Patient has a referral to be seen by the gastroenterologist.  Diabetes mellitus type 2, patient has type 2 diabetes mellitus. Blood glucose numbers has been elevated and uncontrolled. I have referred her to see the endocrinologist.  She took a number to call to set up an appointment with the endocrinologist.  I did emphasize the need to see the endocrinologist.  She is on insulin and glipizide. She does not want any adjustment of medication. HbA1c is 11.6. I did let her know that her numbers have been uncontrolled over the long duration of time. She should intensify lifestyle and dietary modification. She has episodes of dietary indiscretion. Hypothyroidism: Patient has hypothyroidism. She had thyroidectomy done in the past.  She is on levothyroxine. Takes 300 mcg every morning. She will follow-up with the endocrinologist on this. Cardiac: Patient has chronic diastolic CHF. She has been seen by the cardiologist.  She has a pacemaker in place. She is on Bumex. She also takes aspirin daily. We will continue current treatment plan. She has occasional shortness of breath. Denies diaphoresis or chest pain. Chronic respiratory failure: Patient has chronic respiratory failure. She has been followed up by the pulmonologist.  She is trach dependent. She is on chronic oxygen. She would like to have a pulse oximeter. This has been ordered in the past by her pulmonologist but she did not receive it. She would like us to send in a prescription for the same.

## 2023-09-13 NOTE — PROGRESS NOTES
1. \"Have you been to the ER, urgent care clinic since your last visit? Hospitalized since your last visit? \"No    2. \"Have you seen or consulted any other health care providers outside of the [address] [place] [address] since your last visit? \" No    3. For patients aged 43-73: Has the patient had a colonoscopy / FIT/ Cologuard? Yes      If the patient is female:    4. For patients aged 43-66: Has the patient had a mammogram within the past 2 years? Yes      5. For patients aged 21-65: Has the patient had a pap smear?  Not applicable

## 2023-09-19 ENCOUNTER — TELEPHONE (OUTPATIENT)
Facility: CLINIC | Age: 63
End: 2023-09-19

## 2023-09-19 NOTE — TELEPHONE ENCOUNTER
Pt calling because she was scheduled to see Dr. Alec Teague a podiatrist, but they told the pt that she needs a referral approved by [place]. For more information Ms. Heather Chan can be reached at [phone]. Please advise.  Thank you!!!

## 2023-09-21 NOTE — TELEPHONE ENCOUNTER
Request for authorization faxed to [address] [place] [address] on 9/20/23 for [place]. Awaiting a response. Also faxed referral to [facility]: SAHARA SHEA because I don't believe they require an insurance authorization to be seen.

## 2023-09-25 RX ORDER — ONDANSETRON 4 MG/1
TABLET, FILM COATED ORAL
Qty: 60 TABLET | Refills: 2 | Status: SHIPPED | OUTPATIENT
Start: 2023-09-25

## 2023-10-04 RX ORDER — BLOOD-GLUCOSE METER
1 KIT MISCELLANEOUS DAILY
Qty: 100 EACH | Refills: 3 | Status: SHIPPED | OUTPATIENT
Start: 2023-10-04

## 2023-10-04 NOTE — TELEPHONE ENCOUNTER
----- Message from Jigar Hayden sent at 10/4/2023  9:19 AM EDT -----  Regarding: Prescriptions  Contact: [phone]  I need a refill on my strips for freestyle lite please.  I'm out

## 2023-10-05 ENCOUNTER — HOSPITAL ENCOUNTER (OUTPATIENT)
Facility: HOSPITAL | Age: 63
Discharge: HOME | End: 2023-10-05
Payer: MEDICAID

## 2023-10-05 DIAGNOSIS — R10.12 LUQ PAIN: ICD-10-CM

## 2023-10-05 PROCEDURE — 74177 CT ABD & PELVIS W/CONTRAST: CPT

## 2023-10-05 PROCEDURE — 6360000004 HC RX CONTRAST MEDICATION: Performed by: FAMILY MEDICINE

## 2023-10-05 PROCEDURE — 82565 ASSAY OF CREATININE: CPT

## 2023-10-05 RX ADMIN — IOPAMIDOL 100 ML: 612 INJECTION, SOLUTION INTRAVENOUS at 13:52

## 2023-10-06 LAB — CREAT UR-MCNC: 0.6 MG/DL (ref 0.6–1.3)

## 2023-10-10 DIAGNOSIS — E11.40 TYPE 2 DIABETES MELLITUS WITH DIABETIC NEUROPATHY, WITHOUT LONG-TERM CURRENT USE OF INSULIN (HCC): Primary | ICD-10-CM

## 2023-10-10 RX ORDER — GLUCOSAMINE HCL/CHONDROITIN SU 500-400 MG
CAPSULE ORAL
Qty: 300 STRIP | Refills: 3 | Status: SHIPPED | OUTPATIENT
Start: 2023-10-10

## 2023-10-20 RX ORDER — INSULIN GLARGINE 100 [IU]/ML
INJECTION, SOLUTION SUBCUTANEOUS
Qty: 30 ML | Refills: 2 | Status: SHIPPED | OUTPATIENT
Start: 2023-10-20

## 2023-10-29 DIAGNOSIS — F41.9 ANXIETY DISORDER, UNSPECIFIED TYPE: ICD-10-CM

## 2023-10-30 RX ORDER — LORAZEPAM 2 MG/1
TABLET ORAL
Qty: 31 TABLET | Refills: 2 | Status: SHIPPED | OUTPATIENT
Start: 2023-10-30 | End: 2024-01-28

## 2023-11-02 LAB — HBA1C MFR BLD HPLC: 12.3 %

## 2023-11-15 ENCOUNTER — OFFICE VISIT (OUTPATIENT)
Facility: CLINIC | Age: 63
End: 2023-11-15

## 2023-11-15 VITALS
BODY MASS INDEX: 46.67 KG/M2 | SYSTOLIC BLOOD PRESSURE: 136 MMHG | TEMPERATURE: 98.1 F | HEIGHT: 67 IN | DIASTOLIC BLOOD PRESSURE: 79 MMHG | RESPIRATION RATE: 28 BRPM | OXYGEN SATURATION: 100 % | HEART RATE: 67 BPM

## 2023-11-15 DIAGNOSIS — I89.0 LYMPHEDEMA, NOT ELSEWHERE CLASSIFIED: ICD-10-CM

## 2023-11-15 DIAGNOSIS — E66.01 MORBID OBESITY (HCC): ICD-10-CM

## 2023-11-15 DIAGNOSIS — I10 ESSENTIAL (PRIMARY) HYPERTENSION: ICD-10-CM

## 2023-11-15 DIAGNOSIS — K21.9 GASTRO-ESOPHAGEAL REFLUX DISEASE WITHOUT ESOPHAGITIS: ICD-10-CM

## 2023-11-15 DIAGNOSIS — J96.11 CHRONIC RESPIRATORY FAILURE WITH HYPOXIA (HCC): ICD-10-CM

## 2023-11-15 DIAGNOSIS — F41.9 ANXIETY DISORDER, UNSPECIFIED TYPE: ICD-10-CM

## 2023-11-15 DIAGNOSIS — E78.5 HYPERLIPIDEMIA, UNSPECIFIED HYPERLIPIDEMIA TYPE: ICD-10-CM

## 2023-11-15 DIAGNOSIS — Z93.0 TRACHEOSTOMY STATUS (HCC): ICD-10-CM

## 2023-11-15 DIAGNOSIS — E11.40 TYPE 2 DIABETES MELLITUS WITH DIABETIC NEUROPATHY, WITHOUT LONG-TERM CURRENT USE OF INSULIN (HCC): Primary | ICD-10-CM

## 2023-11-15 DIAGNOSIS — I50.32 CHRONIC DIASTOLIC (CONGESTIVE) HEART FAILURE (HCC): ICD-10-CM

## 2023-11-15 DIAGNOSIS — T82.7XXA PACEMAKER INFECTION, INITIAL ENCOUNTER (HCC): ICD-10-CM

## 2023-11-15 LAB — GLUCOSE, POC: 202 MG/DL

## 2023-11-15 RX ORDER — GLUCOSAMINE HCL/CHONDROITIN SU 500-400 MG
CAPSULE ORAL
Qty: 300 STRIP | Refills: 3 | Status: SHIPPED | OUTPATIENT
Start: 2023-11-15

## 2023-11-15 SDOH — ECONOMIC STABILITY: FOOD INSECURITY: WITHIN THE PAST 12 MONTHS, YOU WORRIED THAT YOUR FOOD WOULD RUN OUT BEFORE YOU GOT MONEY TO BUY MORE.: NEVER TRUE

## 2023-11-15 SDOH — ECONOMIC STABILITY: FOOD INSECURITY: WITHIN THE PAST 12 MONTHS, THE FOOD YOU BOUGHT JUST DIDN'T LAST AND YOU DIDN'T HAVE MONEY TO GET MORE.: NEVER TRUE

## 2023-11-15 SDOH — ECONOMIC STABILITY: INCOME INSECURITY: HOW HARD IS IT FOR YOU TO PAY FOR THE VERY BASICS LIKE FOOD, HOUSING, MEDICAL CARE, AND HEATING?: NOT HARD AT ALL

## 2023-11-15 ASSESSMENT — PATIENT HEALTH QUESTIONNAIRE - PHQ9
SUM OF ALL RESPONSES TO PHQ QUESTIONS 1-9: 0
SUM OF ALL RESPONSES TO PHQ QUESTIONS 1-9: 0
7. TROUBLE CONCENTRATING ON THINGS, SUCH AS READING THE NEWSPAPER OR WATCHING TELEVISION: 0
2. FEELING DOWN, DEPRESSED OR HOPELESS: 0
4. FEELING TIRED OR HAVING LITTLE ENERGY: 0
SUM OF ALL RESPONSES TO PHQ QUESTIONS 1-9: 0
1. LITTLE INTEREST OR PLEASURE IN DOING THINGS: 0
8. MOVING OR SPEAKING SO SLOWLY THAT OTHER PEOPLE COULD HAVE NOTICED. OR THE OPPOSITE, BEING SO FIGETY OR RESTLESS THAT YOU HAVE BEEN MOVING AROUND A LOT MORE THAN USUAL: 0
SUM OF ALL RESPONSES TO PHQ9 QUESTIONS 1 & 2: 0
SUM OF ALL RESPONSES TO PHQ QUESTIONS 1-9: 0
5. POOR APPETITE OR OVEREATING: 0
6. FEELING BAD ABOUT YOURSELF - OR THAT YOU ARE A FAILURE OR HAVE LET YOURSELF OR YOUR FAMILY DOWN: 0
9. THOUGHTS THAT YOU WOULD BE BETTER OFF DEAD, OR OF HURTING YOURSELF: 0
10. IF YOU CHECKED OFF ANY PROBLEMS, HOW DIFFICULT HAVE THESE PROBLEMS MADE IT FOR YOU TO DO YOUR WORK, TAKE CARE OF THINGS AT HOME, OR GET ALONG WITH OTHER PEOPLE: 0
3. TROUBLE FALLING OR STAYING ASLEEP: 0

## 2023-11-15 ASSESSMENT — COLUMBIA-SUICIDE SEVERITY RATING SCALE - C-SSRS
5. HAVE YOU STARTED TO WORK OUT OR WORKED OUT THE DETAILS OF HOW TO KILL YOURSELF? DO YOU INTEND TO CARRY OUT THIS PLAN?: NO
4. HAVE YOU HAD THESE THOUGHTS AND HAD SOME INTENTION OF ACTING ON THEM?: NO
3. HAVE YOU BEEN THINKING ABOUT HOW YOU MIGHT KILL YOURSELF?: NO
7. DID THIS OCCUR IN THE LAST THREE MONTHS: NO

## 2023-11-15 NOTE — PROGRESS NOTES
1. \"Have you been to the ER, urgent care clinic since your last visit? Hospitalized since your last visit? \"No    2. \"Have you seen or consulted any other health care providers outside of the [address] [place] [address] since your last visit? \" No    3. For patients aged 43-73: Has the patient had a colonoscopy / FIT/ Cologuard? Yes      If the patient is female:    4. For patients aged 43-66: Has the patient had a mammogram within the past 2 years? Yes      5. For patients aged 21-65: Has the patient had a pap smear?  Not applicable

## 2023-11-15 NOTE — PROGRESS NOTES
CARIN  Kyra Helton comes in for follow-up care. Uncontrolled diabetes mellitus. Patient has uncontrolled diabetes mellitus. Last HbA1c was 11.7. Patient is not doing much of lifestyle or dietary modification. She has not been checking her blood glucose since she does not have a glucose monitor with the test strips. She has tried to get this from the pharmacy but has been unable to do so. I have sent in a prescription of a glucose monitor and test strips and she should go to the pharmacy to get this. If there is a problem he should let us know. The meantime patient has been on Lantus, glipizide. We have referred her to see the endocrinologist.  She is yet to do so. I gave her the number to call to set up an appointment with the specialist.  She needs to intensify lifestyle and dietary modification. She needs to keep a blood glucose log and following up with us at next visit. Lymphedema: Patient has lymphedema with blisters bilateral lower extremities. She also has panniculitis that was noted to be due to edema on CT scan. Patient has been referred to the lymphedema clinic through [facility]. I will place another referral to be seen by the emotion team for lymphedema. She has been elevating lower extremities. She needs to take the Bumex as prescribed. Hypertension: Patient has hypertension. Blood pressure is stable. She  will take a low-sodium diet. We will recheck blood pressure at next visit. Mood disorder: Patient has mood disorder. She has anxiety. She takes lorazepam for this. She will continue current treatment plan. Chronic respiratory failure: Patient has chronic respiratory failure. She is on oxygen chronically. She should follow-up with the pulmonologist.  She has inhaler medication that she uses. She is on albuterol inhaler and Pulmicort nebulizer and also albuterol nebulizer. We will continue with these medications. Hypothyroidism: Patient has hypothyroidism.   She has had

## 2023-11-17 ENCOUNTER — CLINICAL DOCUMENTATION (OUTPATIENT)
Facility: CLINIC | Age: 63
End: 2023-11-17

## 2023-11-17 NOTE — PROGRESS NOTES
[facility] office notes, face sheet and order for wheelchair \"dougnut\" faxed to [facility]

## 2023-11-27 RX ORDER — OMEGA-3-ACID ETHYL ESTERS 1 G/1
CAPSULE, LIQUID FILLED ORAL
Qty: 180 CAPSULE | Refills: 1 | Status: SHIPPED | OUTPATIENT
Start: 2023-11-27

## 2023-12-12 RX ORDER — UREA 10 %
LOTION (ML) TOPICAL
Qty: 90 TABLET | Refills: 1 | Status: SHIPPED | OUTPATIENT
Start: 2023-12-12

## 2023-12-27 DIAGNOSIS — E11.40 TYPE 2 DIABETES MELLITUS WITH DIABETIC NEUROPATHY, WITHOUT LONG-TERM CURRENT USE OF INSULIN (HCC): ICD-10-CM

## 2024-01-05 RX ORDER — EMPAGLIFLOZIN 25 MG/1
25 TABLET, FILM COATED ORAL DAILY
Qty: 30 TABLET | Refills: 5 | OUTPATIENT
Start: 2024-01-05

## 2024-01-22 ENCOUNTER — OFFICE VISIT (OUTPATIENT)
Facility: CLINIC | Age: 64
End: 2024-01-22
Payer: MEDICAID

## 2024-01-22 VITALS
OXYGEN SATURATION: 99 % | RESPIRATION RATE: 24 BRPM | HEART RATE: 86 BPM | SYSTOLIC BLOOD PRESSURE: 140 MMHG | TEMPERATURE: 98.5 F | DIASTOLIC BLOOD PRESSURE: 66 MMHG

## 2024-01-22 DIAGNOSIS — Z93.0 TRACHEOSTOMY STATUS (HCC): ICD-10-CM

## 2024-01-22 DIAGNOSIS — I89.0 LYMPHEDEMA, NOT ELSEWHERE CLASSIFIED: ICD-10-CM

## 2024-01-22 DIAGNOSIS — J45.20 MILD INTERMITTENT ASTHMA WITHOUT COMPLICATION: ICD-10-CM

## 2024-01-22 DIAGNOSIS — I10 ESSENTIAL (PRIMARY) HYPERTENSION: ICD-10-CM

## 2024-01-22 DIAGNOSIS — I49.9 CARDIAC ARRHYTHMIA, UNSPECIFIED CARDIAC ARRHYTHMIA TYPE: ICD-10-CM

## 2024-01-22 DIAGNOSIS — E03.9 ACQUIRED HYPOTHYROIDISM: ICD-10-CM

## 2024-01-22 DIAGNOSIS — I38 VALVULAR HEART DISEASE: ICD-10-CM

## 2024-01-22 DIAGNOSIS — E11.40 TYPE 2 DIABETES MELLITUS WITH DIABETIC NEUROPATHY, WITHOUT LONG-TERM CURRENT USE OF INSULIN (HCC): Primary | ICD-10-CM

## 2024-01-22 DIAGNOSIS — F41.9 ANXIETY DISORDER, UNSPECIFIED TYPE: ICD-10-CM

## 2024-01-22 DIAGNOSIS — L30.9 DERMATITIS: ICD-10-CM

## 2024-01-22 DIAGNOSIS — E66.01 MORBID OBESITY (HCC): ICD-10-CM

## 2024-01-22 DIAGNOSIS — J96.11 CHRONIC RESPIRATORY FAILURE WITH HYPOXIA (HCC): ICD-10-CM

## 2024-01-22 LAB — HBA1C MFR BLD: 12.3 %

## 2024-01-22 PROCEDURE — 3078F DIAST BP <80 MM HG: CPT | Performed by: FAMILY MEDICINE

## 2024-01-22 PROCEDURE — 83036 HEMOGLOBIN GLYCOSYLATED A1C: CPT | Performed by: FAMILY MEDICINE

## 2024-01-22 PROCEDURE — 3077F SYST BP >= 140 MM HG: CPT | Performed by: FAMILY MEDICINE

## 2024-01-22 PROCEDURE — 99215 OFFICE O/P EST HI 40 MIN: CPT | Performed by: FAMILY MEDICINE

## 2024-01-22 RX ORDER — TRIAMCINOLONE ACETONIDE 0.25 MG/G
CREAM TOPICAL
Qty: 454 G | Refills: 1 | Status: SHIPPED | OUTPATIENT
Start: 2024-01-22

## 2024-01-22 ASSESSMENT — PATIENT HEALTH QUESTIONNAIRE - PHQ9
7. TROUBLE CONCENTRATING ON THINGS, SUCH AS READING THE NEWSPAPER OR WATCHING TELEVISION: 0
3. TROUBLE FALLING OR STAYING ASLEEP: 0
6. FEELING BAD ABOUT YOURSELF - OR THAT YOU ARE A FAILURE OR HAVE LET YOURSELF OR YOUR FAMILY DOWN: 0
SUM OF ALL RESPONSES TO PHQ QUESTIONS 1-9: 0
5. POOR APPETITE OR OVEREATING: 0
SUM OF ALL RESPONSES TO PHQ9 QUESTIONS 1 & 2: 0
9. THOUGHTS THAT YOU WOULD BE BETTER OFF DEAD, OR OF HURTING YOURSELF: 0
SUM OF ALL RESPONSES TO PHQ QUESTIONS 1-9: 0
2. FEELING DOWN, DEPRESSED OR HOPELESS: 0
4. FEELING TIRED OR HAVING LITTLE ENERGY: 0
1. LITTLE INTEREST OR PLEASURE IN DOING THINGS: 0
SUM OF ALL RESPONSES TO PHQ QUESTIONS 1-9: 0
10. IF YOU CHECKED OFF ANY PROBLEMS, HOW DIFFICULT HAVE THESE PROBLEMS MADE IT FOR YOU TO DO YOUR WORK, TAKE CARE OF THINGS AT HOME, OR GET ALONG WITH OTHER PEOPLE: 0
8. MOVING OR SPEAKING SO SLOWLY THAT OTHER PEOPLE COULD HAVE NOTICED. OR THE OPPOSITE, BEING SO FIGETY OR RESTLESS THAT YOU HAVE BEEN MOVING AROUND A LOT MORE THAN USUAL: 0
SUM OF ALL RESPONSES TO PHQ QUESTIONS 1-9: 0

## 2024-01-22 NOTE — PROGRESS NOTES
HPI  Mando Chua comes in for follow-up care.  Diabetes mellitus type 2: Patient has uncontrolled diabetes mellitus.  Last HbA1c was 11.7.  Patient has been referred to the endocrinologist.  States that the endocrinologist did not call her.  I have in the past given her number to call to schedule an appointment with the endocrinologist.  She states that she has not been taking her diabetes medication regularly.  She is on Lantus and glipizide.  States that she gets tired and sleeps and forgets to take her medication.  She also snacks a lot and is not doing any lifestyle or dietary modification.  I did emphasize the need to do lifestyle and dietary modification.  I emphasized the need to take medication as prescribed.  I also did emphasize the need to see the endocrinologist.  Another referral has been placed to see the endocrinologist.  We discussed adjustment in medication.  Patient states that she does not want any adjustment medication because she will also not take this medication.  States that her problem is taking medication regularly as prescribed.  She is on Lantus 85 units and the glipizide 20 mg twice a day.  She would benefit from possible insulin pump.  Lymphedema: Patient has lymphedema bilateral lower extremities.  She has been seen by lymphedema specialist in the past.  She is on Bumex.  Continues to get swelling lower extremities.  She also has lymphedema with fluid accumulation in the lower abdominal wall and panniculus.  She takes 2 mg of the Bumex daily.  Discussed adding metolazone for short time to see if this will help with the edema.  Patient declines this at the moment.  She states that the Bumex makes her go to the bathroom a lot.  She has been having her legs wrapped.  This helped with the lymphedema and the blisters/stasis dermatitis of the legs.  She does not have home health aides who constantly would come in and help her at home.  It has been difficult getting someone from her home

## 2024-01-22 NOTE — PROGRESS NOTES
1. \"Have you been to the ER, urgent care clinic since your last visit?  Hospitalized since your last visit?\"No    2. \"Have you seen or consulted any other health care providers outside of the [facility] since your last visit?\" No    3. For patients aged 45-75: Has the patient had a colonoscopy / FIT/ Cologuard? Yes      If the patient is female:    4. For patients aged 40-74: Has the patient had a mammogram within the past 2 years? Yes      5. For patients aged 21-65: Has the patient had a pap smear? Not applicable

## 2024-02-05 ENCOUNTER — TELEPHONE (OUTPATIENT)
Facility: CLINIC | Age: 64
End: 2024-02-05

## 2024-02-05 DIAGNOSIS — F41.9 ANXIETY: Primary | ICD-10-CM

## 2024-02-05 RX ORDER — LORAZEPAM 1 MG/1
1 TABLET ORAL 2 TIMES DAILY
Qty: 60 TABLET | Refills: 2 | Status: SHIPPED | OUTPATIENT
Start: 2024-02-05 | End: 2024-02-06 | Stop reason: DRUGHIGH

## 2024-02-05 RX ORDER — GLIPIZIDE 10 MG/1
TABLET ORAL
Qty: 120 TABLET | Refills: 5 | Status: SHIPPED | OUTPATIENT
Start: 2024-02-05

## 2024-02-05 RX ORDER — BUDESONIDE 0.5 MG/2ML
INHALANT ORAL
Qty: 360 ML | Refills: 1 | Status: SHIPPED | OUTPATIENT
Start: 2024-02-05

## 2024-02-05 NOTE — TELEPHONE ENCOUNTER
Pt calling because a prescription for LORazepam 1 mg was called in to her pharmacy. Pt stated she needs 2 mg instead. Ms. Chua can be reached at [phone]. Please advise. Thank you!!!

## 2024-02-06 DIAGNOSIS — F41.9 ANXIETY: Primary | ICD-10-CM

## 2024-02-06 RX ORDER — LORAZEPAM 2 MG/1
2 TABLET ORAL NIGHTLY
Qty: 30 TABLET | Refills: 2 | Status: SHIPPED | OUTPATIENT
Start: 2024-02-06 | End: 2024-05-06

## 2024-02-08 ENCOUNTER — HOSPITAL ENCOUNTER (OUTPATIENT)
Facility: HOSPITAL | Age: 64
Discharge: HOME | End: 2024-02-11

## 2024-02-08 LAB — SENTARA SPECIMEN COLLECTION: NORMAL

## 2024-02-09 LAB
A/G RATIO: 1.3 RATIO (ref 1.1–2.6)
ALBUMIN SERPL-MCNC: 4.2 G/DL (ref 3.5–5)
ALP BLD-CCNC: 140 U/L (ref 40–120)
ALT SERPL-CCNC: 16 U/L (ref 5–40)
ANION GAP SERPL CALCULATED.3IONS-SCNC: 15 MMOL/L (ref 3–15)
AST SERPL-CCNC: 12 U/L (ref 10–37)
BASOPHILS # BLD: 1 % (ref 0–2)
BASOPHILS ABSOLUTE: 0.1 K/UL (ref 0–0.2)
BILIRUB SERPL-MCNC: 0.6 MG/DL (ref 0.2–1.2)
BUN BLDV-MCNC: 11 MG/DL (ref 6–22)
CALCIUM SERPL-MCNC: 9.2 MG/DL (ref 8.4–10.5)
CHLORIDE BLD-SCNC: 95 MMOL/L (ref 98–110)
CHOLESTEROL/HDL RATIO: 5.5 (ref 0–5)
CHOLESTEROL: 203 MG/DL (ref 110–200)
CO2: 26 MMOL/L (ref 20–32)
CREAT SERPL-MCNC: 0.7 MG/DL (ref 0.8–1.4)
EOSINOPHIL # BLD: 1 % (ref 0–6)
EOSINOPHILS ABSOLUTE: 0.1 K/UL (ref 0–0.5)
GLOBULIN: 3.3 G/DL (ref 2–4)
GLOMERULAR FILTRATION RATE: >60 ML/MIN/1.73 SQ.M.
GLUCOSE: 276 MG/DL (ref 70–99)
HCT VFR BLD CALC: 45 % (ref 35.1–48)
HDLC SERPL-MCNC: 37 MG/DL
HEMOGLOBIN: 12.6 G/DL (ref 11.7–16)
LDL CHOLESTEROL CALCULATED: 118 MG/DL (ref 50–99)
LDL/HDL RATIO: 3.2
LYMPHOCYTES # BLD: 15 % (ref 20–45)
LYMPHOCYTES ABSOLUTE: 1 K/UL (ref 1–4.8)
MCH RBC QN AUTO: 23 PG (ref 26–34)
MCHC RBC AUTO-ENTMCNC: 28 G/DL (ref 31–36)
MCV RBC AUTO: 83 FL (ref 80–99)
MONOCYTES ABSOLUTE: 0.4 K/UL (ref 0.1–1)
MONOCYTES: 6 % (ref 3–12)
NEUTROPHILS ABSOLUTE: 5.3 K/UL (ref 1.8–7.7)
NEUTROPHILS: 77 % (ref 40–75)
NON-HDL CHOLESTEROL: 166 MG/DL
PDW BLD-RTO: 18.6 % (ref 10–15.5)
PLATELET # BLD: 233 K/UL (ref 140–440)
PMV BLD AUTO: 9.7 FL (ref 9–13)
POTASSIUM SERPL-SCNC: 5 MMOL/L (ref 3.5–5.5)
RBC: 5.44 M/UL (ref 3.8–5.2)
SODIUM BLD-SCNC: 136 MMOL/L (ref 133–145)
TOTAL PROTEIN: 7.5 G/DL (ref 6.2–8.1)
TRIGL SERPL-MCNC: 238 MG/DL (ref 40–149)
TSH SERPL DL<=0.05 MIU/L-ACNC: 2.11 MCU/ML (ref 0.27–4.2)
VLDLC SERPL CALC-MCNC: 48 MG/DL (ref 8–30)
WBC: 6.8 K/UL (ref 4–11)

## 2024-02-11 RX ORDER — ATORVASTATIN CALCIUM 40 MG/1
40 TABLET, FILM COATED ORAL DAILY
Qty: 30 TABLET | Refills: 3 | Status: SHIPPED | OUTPATIENT
Start: 2024-02-11

## 2024-03-13 DIAGNOSIS — K59.00 CONSTIPATION, UNSPECIFIED CONSTIPATION TYPE: ICD-10-CM

## 2024-03-13 NOTE — TELEPHONE ENCOUNTER
melatonin 1 MG tablet   docusate sodium (COLACE) 100 MG capsule    ondansetron (ZOFRAN) 4 MG tablet   [place], [place].

## 2024-03-14 RX ORDER — DOCUSATE SODIUM 100 MG/1
100 CAPSULE, LIQUID FILLED ORAL 2 TIMES DAILY
Qty: 180 CAPSULE | Refills: 1 | Status: SHIPPED | OUTPATIENT
Start: 2024-03-14

## 2024-03-14 RX ORDER — UREA 10 %
1 LOTION (ML) TOPICAL NIGHTLY
Qty: 90 TABLET | Refills: 1 | Status: SHIPPED | OUTPATIENT
Start: 2024-03-14

## 2024-03-14 RX ORDER — ONDANSETRON 4 MG/1
TABLET, FILM COATED ORAL
Qty: 60 TABLET | Refills: 2 | Status: SHIPPED | OUTPATIENT
Start: 2024-03-14

## 2024-03-19 ENCOUNTER — OFFICE VISIT (OUTPATIENT)
Facility: CLINIC | Age: 64
End: 2024-03-19
Payer: MEDICAID

## 2024-03-19 VITALS
OXYGEN SATURATION: 99 % | RESPIRATION RATE: 24 BRPM | HEART RATE: 73 BPM | TEMPERATURE: 97.7 F | DIASTOLIC BLOOD PRESSURE: 72 MMHG | SYSTOLIC BLOOD PRESSURE: 136 MMHG

## 2024-03-19 DIAGNOSIS — Z93.0 TRACHEOSTOMY STATUS (HCC): ICD-10-CM

## 2024-03-19 DIAGNOSIS — E66.01 MORBID OBESITY (HCC): ICD-10-CM

## 2024-03-19 DIAGNOSIS — E61.1 IRON DEFICIENCY: ICD-10-CM

## 2024-03-19 DIAGNOSIS — F41.9 ANXIETY DISORDER, UNSPECIFIED TYPE: ICD-10-CM

## 2024-03-19 DIAGNOSIS — J96.11 CHRONIC RESPIRATORY FAILURE WITH HYPOXIA (HCC): ICD-10-CM

## 2024-03-19 DIAGNOSIS — E03.9 ACQUIRED HYPOTHYROIDISM: ICD-10-CM

## 2024-03-19 DIAGNOSIS — K59.00 CONSTIPATION, UNSPECIFIED CONSTIPATION TYPE: ICD-10-CM

## 2024-03-19 DIAGNOSIS — I10 ESSENTIAL (PRIMARY) HYPERTENSION: ICD-10-CM

## 2024-03-19 DIAGNOSIS — I89.0 LYMPHEDEMA, NOT ELSEWHERE CLASSIFIED: ICD-10-CM

## 2024-03-19 DIAGNOSIS — E11.40 TYPE 2 DIABETES MELLITUS WITH DIABETIC NEUROPATHY, WITHOUT LONG-TERM CURRENT USE OF INSULIN (HCC): Primary | ICD-10-CM

## 2024-03-19 LAB — HBA1C MFR BLD: 11.2 %

## 2024-03-19 PROCEDURE — 3075F SYST BP GE 130 - 139MM HG: CPT | Performed by: FAMILY MEDICINE

## 2024-03-19 PROCEDURE — 3078F DIAST BP <80 MM HG: CPT | Performed by: FAMILY MEDICINE

## 2024-03-19 PROCEDURE — 99215 OFFICE O/P EST HI 40 MIN: CPT | Performed by: FAMILY MEDICINE

## 2024-03-19 PROCEDURE — 83036 HEMOGLOBIN GLYCOSYLATED A1C: CPT | Performed by: FAMILY MEDICINE

## 2024-03-19 RX ORDER — DOCUSATE SODIUM 100 MG/1
100 CAPSULE, LIQUID FILLED ORAL 2 TIMES DAILY
Qty: 180 CAPSULE | Refills: 1 | Status: SHIPPED | OUTPATIENT
Start: 2024-03-19

## 2024-03-19 RX ORDER — UREA 10 %
1 LOTION (ML) TOPICAL NIGHTLY
Qty: 90 TABLET | Refills: 1 | Status: SHIPPED | OUTPATIENT
Start: 2024-03-19 | End: 2024-03-19 | Stop reason: SDUPTHER

## 2024-03-19 RX ORDER — UREA 10 %
1 LOTION (ML) TOPICAL NIGHTLY
Qty: 30 TABLET | Refills: 5 | Status: SHIPPED | OUTPATIENT
Start: 2024-03-19

## 2024-03-19 NOTE — PROGRESS NOTES
\"Have you been to the ER, urgent care clinic since your last visit?  Hospitalized since your last visit?\"    NO    “Have you seen or consulted any other health care providers outside of [facility] since your last visit?”    NO    Have you had a mammogram?”   NO    Date of last Mammogram: 3/6/2020             Click Here for Release of Records Request  
Hypertension Mother     Heart Disease Mother     Hypertension Brother     Hypertension Brother     Diabetes Brother     Heart Disease Brother     Stroke Brother     Hypertension Brother     Diabetes Brother     Heart Disease Brother     Stroke Brother     Hypertension Brother     Cancer Brother     Diabetes Brother     Stroke Father     Hypertension Father     Heart Disease Father     Diabetes Father     Diabetes Mother        Social History  Social History     Socioeconomic History    Marital status:      Spouse name: Not on file    Number of children: Not on file    Years of education: Not on file    Highest education level: Not on file   Occupational History    Not on file   Tobacco Use    Smoking status: Never    Smokeless tobacco: Never   Substance and Sexual Activity    Alcohol use: No    Drug use: No    Sexual activity: Not on file   Other Topics Concern    Not on file   Social History Narrative    Not on file     Social Determinants of Health     Financial Resource Strain: Low Risk  (11/15/2023)    Overall Financial Resource Strain (CARDIA)     Difficulty of Paying Living Expenses: Not hard at all   Food Insecurity: Not on file (11/15/2023)   Transportation Needs: Unknown (11/15/2023)    PRAPARE - Transportation     Lack of Transportation (Medical): Not on file     Lack of Transportation (Non-Medical): No   Physical Activity: Not on file   Stress: Not on file   Social Connections: Not on file   Intimate Partner Violence: Not on file   Housing Stability: Unknown (11/15/2023)    Housing Stability Vital Sign     Unable to Pay for Housing in the Last Year: Not on file     Number of Places Lived in the Last Year: Not on file     Unstable Housing in the Last Year: No       Review of Systems  Review of Systems - Review of all systems is negative except as noted above in the HPI.    Vital Signs  /72   Pulse 73   Temp 97.7 °F (36.5 °C)   Resp 24   SpO2 99%       Physical Exam  Physical Examination:

## 2024-04-02 DIAGNOSIS — L30.9 DERMATITIS: ICD-10-CM

## 2024-04-08 RX ORDER — TRIAMCINOLONE ACETONIDE 0.25 MG/G
CREAM TOPICAL
Qty: 454 G | Refills: 1 | Status: SHIPPED | OUTPATIENT
Start: 2024-04-08

## 2024-04-09 ENCOUNTER — TELEPHONE (OUTPATIENT)
Facility: CLINIC | Age: 64
End: 2024-04-09

## 2024-04-09 NOTE — TELEPHONE ENCOUNTER
Patient is requesting a refill      levothyroxine (SYNTHROID) 300 MCG tablet   ondansetron (ZOFRAN) 4 MG tablet

## 2024-04-10 NOTE — TELEPHONE ENCOUNTER
evothyroxine (SYNTHROID) 300 MCG tablet   ondansetron (ZOFRAN) 4 MG tablet     Patient was last seen on 3-

## 2024-04-11 RX ORDER — LEVOTHYROXINE SODIUM 300 UG/1
300 TABLET ORAL
Qty: 90 TABLET | Refills: 1 | Status: SHIPPED | OUTPATIENT
Start: 2024-04-11

## 2024-04-11 RX ORDER — ONDANSETRON 4 MG/1
TABLET, FILM COATED ORAL
Qty: 60 TABLET | Refills: 2 | Status: SHIPPED | OUTPATIENT
Start: 2024-04-11

## 2024-04-25 ENCOUNTER — TELEPHONE (OUTPATIENT)
Facility: CLINIC | Age: 64
End: 2024-04-25

## 2024-04-25 NOTE — TELEPHONE ENCOUNTER
Care Transitions Initial Follow Up Call    Call within 2 business days of discharge: Yes     Patient: Mando Chua Patient : 1960 MRN: [id number]    [unfilled]    RARS: No data recorded     Spoke with: patient    Discharge department/facility: [facility]    Non-face-to-face services provided:  Scheduled appointment with [id number]    Follow Up  Future Appointments   Date Time Provider Department Center   2024  1:00 PM Jose Dean MD [facility] NATALIE AMB   2024 10:30 AM Jose Dean MD [facility] BS AMB       Miranda Hamm MA

## 2024-05-08 ENCOUNTER — OFFICE VISIT (OUTPATIENT)
Facility: CLINIC | Age: 64
End: 2024-05-08

## 2024-05-08 VITALS
HEART RATE: 79 BPM | BODY MASS INDEX: 45.99 KG/M2 | DIASTOLIC BLOOD PRESSURE: 76 MMHG | WEIGHT: 293 LBS | HEIGHT: 67 IN | TEMPERATURE: 98 F | SYSTOLIC BLOOD PRESSURE: 167 MMHG

## 2024-05-08 DIAGNOSIS — J45.20 MILD INTERMITTENT ASTHMA WITHOUT COMPLICATION: ICD-10-CM

## 2024-05-08 DIAGNOSIS — J96.11 CHRONIC RESPIRATORY FAILURE WITH HYPOXIA (HCC): ICD-10-CM

## 2024-05-08 DIAGNOSIS — E78.5 HYPERLIPIDEMIA, UNSPECIFIED HYPERLIPIDEMIA TYPE: ICD-10-CM

## 2024-05-08 DIAGNOSIS — I89.0 LYMPHEDEMA, NOT ELSEWHERE CLASSIFIED: Primary | ICD-10-CM

## 2024-05-08 DIAGNOSIS — Z93.0 TRACHEOSTOMY STATUS (HCC): ICD-10-CM

## 2024-05-08 DIAGNOSIS — I50.32 CHRONIC DIASTOLIC (CONGESTIVE) HEART FAILURE (HCC): ICD-10-CM

## 2024-05-08 DIAGNOSIS — I49.9 CARDIAC ARRHYTHMIA, UNSPECIFIED CARDIAC ARRHYTHMIA TYPE: ICD-10-CM

## 2024-05-08 DIAGNOSIS — K21.9 GASTRO-ESOPHAGEAL REFLUX DISEASE WITHOUT ESOPHAGITIS: ICD-10-CM

## 2024-05-08 DIAGNOSIS — E03.9 ACQUIRED HYPOTHYROIDISM: ICD-10-CM

## 2024-05-08 DIAGNOSIS — Z09 HOSPITAL DISCHARGE FOLLOW-UP: ICD-10-CM

## 2024-05-08 DIAGNOSIS — I10 ESSENTIAL (PRIMARY) HYPERTENSION: ICD-10-CM

## 2024-05-08 DIAGNOSIS — F41.9 ANXIETY DISORDER, UNSPECIFIED TYPE: ICD-10-CM

## 2024-05-08 DIAGNOSIS — E66.01 MORBID OBESITY (HCC): ICD-10-CM

## 2024-05-08 DIAGNOSIS — E11.40 TYPE 2 DIABETES MELLITUS WITH DIABETIC NEUROPATHY, WITHOUT LONG-TERM CURRENT USE OF INSULIN (HCC): ICD-10-CM

## 2024-05-08 RX ORDER — METOLAZONE 2.5 MG/1
2.5 TABLET ORAL DAILY
Qty: 10 TABLET | Refills: 0 | Status: SHIPPED | OUTPATIENT
Start: 2024-05-08

## 2024-05-08 SDOH — ECONOMIC STABILITY: FOOD INSECURITY: WITHIN THE PAST 12 MONTHS, THE FOOD YOU BOUGHT JUST DIDN'T LAST AND YOU DIDN'T HAVE MONEY TO GET MORE.: NEVER TRUE

## 2024-05-08 SDOH — ECONOMIC STABILITY: INCOME INSECURITY: HOW HARD IS IT FOR YOU TO PAY FOR THE VERY BASICS LIKE FOOD, HOUSING, MEDICAL CARE, AND HEATING?: NOT HARD AT ALL

## 2024-05-08 SDOH — ECONOMIC STABILITY: FOOD INSECURITY: WITHIN THE PAST 12 MONTHS, YOU WORRIED THAT YOUR FOOD WOULD RUN OUT BEFORE YOU GOT MONEY TO BUY MORE.: NEVER TRUE

## 2024-05-08 ASSESSMENT — PATIENT HEALTH QUESTIONNAIRE - PHQ9
SUM OF ALL RESPONSES TO PHQ9 QUESTIONS 1 & 2: 0
SUM OF ALL RESPONSES TO PHQ QUESTIONS 1-9: 0
2. FEELING DOWN, DEPRESSED OR HOPELESS: NOT AT ALL
SUM OF ALL RESPONSES TO PHQ QUESTIONS 1-9: 0
SUM OF ALL RESPONSES TO PHQ QUESTIONS 1-9: 0
1. LITTLE INTEREST OR PLEASURE IN DOING THINGS: NOT AT ALL
SUM OF ALL RESPONSES TO PHQ QUESTIONS 1-9: 0

## 2024-05-08 NOTE — PROGRESS NOTES
\"Have you been to the ER, urgent care clinic since your last visit?  Hospitalized since your last visit?\"    YES - When: approximately 3  weeks ago.  Where and Why: [facility].    “Have you seen or consulted any other health care providers outside of [facility] since your last visit?”    NO    Have you had a mammogram?”   NO    Date of last Mammogram: 3/6/2020             Click Here for Release of Records Request

## 2024-05-26 DIAGNOSIS — F41.9 ANXIETY: ICD-10-CM

## 2024-05-28 RX ORDER — LORAZEPAM 2 MG/1
TABLET ORAL
Qty: 30 TABLET | Refills: 2 | Status: SHIPPED | OUTPATIENT
Start: 2024-05-28 | End: 2024-08-26

## 2024-05-28 NOTE — TELEPHONE ENCOUNTER
Last seen 5/8/2024   Last labs 02/08/2024  Last filled  02/06/2024  Next appointment 6/5/2024     Lab Results   Component Value Date     02/08/2024    K 5.0 02/08/2024    CL 95 (L) 02/08/2024    CO2 26 02/08/2024    BUN 11 02/08/2024    CREATININE 0.7 (L) 02/08/2024    GLUCOSE 276 (H) 02/08/2024    CALCIUM 9.2 02/08/2024    BILITOT 0.6 02/08/2024    ALKPHOS 140 (H) 02/08/2024    AST 12 02/08/2024    ALT 16 02/08/2024    LABGLOM >60.0 02/08/2024    GFRAA >60 08/19/2022    AGRATIO 1.3 02/08/2024    GLOB 3.3 02/08/2024

## 2024-06-01 DIAGNOSIS — L30.9 DERMATITIS: ICD-10-CM

## 2024-06-03 RX ORDER — TRIAMCINOLONE ACETONIDE 0.25 MG/G
CREAM TOPICAL
Qty: 454 G | Refills: 1 | Status: SHIPPED | OUTPATIENT
Start: 2024-06-03

## 2024-06-03 RX ORDER — OMEGA-3-ACID ETHYL ESTERS 1 G/1
CAPSULE, LIQUID FILLED ORAL
Qty: 180 CAPSULE | Refills: 1 | Status: SHIPPED | OUTPATIENT
Start: 2024-06-03

## 2024-06-05 ENCOUNTER — OFFICE VISIT (OUTPATIENT)
Facility: CLINIC | Age: 64
End: 2024-06-05
Payer: MEDICAID

## 2024-06-05 VITALS
HEIGHT: 67 IN | HEART RATE: 84 BPM | DIASTOLIC BLOOD PRESSURE: 66 MMHG | TEMPERATURE: 98 F | BODY MASS INDEX: 45.99 KG/M2 | WEIGHT: 293 LBS | RESPIRATION RATE: 18 BRPM | OXYGEN SATURATION: 94 % | SYSTOLIC BLOOD PRESSURE: 132 MMHG

## 2024-06-05 DIAGNOSIS — J96.11 CHRONIC RESPIRATORY FAILURE WITH HYPOXIA (HCC): ICD-10-CM

## 2024-06-05 DIAGNOSIS — I89.0 LYMPHEDEMA, NOT ELSEWHERE CLASSIFIED: ICD-10-CM

## 2024-06-05 DIAGNOSIS — E78.5 HYPERLIPIDEMIA, UNSPECIFIED HYPERLIPIDEMIA TYPE: ICD-10-CM

## 2024-06-05 DIAGNOSIS — I38 VALVULAR HEART DISEASE: ICD-10-CM

## 2024-06-05 DIAGNOSIS — F41.9 ANXIETY: ICD-10-CM

## 2024-06-05 DIAGNOSIS — Z93.0 TRACHEOSTOMY STATUS (HCC): ICD-10-CM

## 2024-06-05 DIAGNOSIS — E66.01 MORBID OBESITY (HCC): ICD-10-CM

## 2024-06-05 DIAGNOSIS — E03.9 ACQUIRED HYPOTHYROIDISM: ICD-10-CM

## 2024-06-05 DIAGNOSIS — I10 ESSENTIAL (PRIMARY) HYPERTENSION: ICD-10-CM

## 2024-06-05 DIAGNOSIS — T82.7XXA PACEMAKER INFECTION, INITIAL ENCOUNTER (HCC): ICD-10-CM

## 2024-06-05 DIAGNOSIS — E11.40 TYPE 2 DIABETES MELLITUS WITH DIABETIC NEUROPATHY, WITHOUT LONG-TERM CURRENT USE OF INSULIN (HCC): Primary | ICD-10-CM

## 2024-06-05 DIAGNOSIS — I50.32 CHRONIC DIASTOLIC (CONGESTIVE) HEART FAILURE (HCC): ICD-10-CM

## 2024-06-05 PROCEDURE — 3075F SYST BP GE 130 - 139MM HG: CPT | Performed by: FAMILY MEDICINE

## 2024-06-05 PROCEDURE — 3078F DIAST BP <80 MM HG: CPT | Performed by: FAMILY MEDICINE

## 2024-06-05 PROCEDURE — 99215 OFFICE O/P EST HI 40 MIN: CPT | Performed by: FAMILY MEDICINE

## 2024-06-05 SDOH — ECONOMIC STABILITY: FOOD INSECURITY: WITHIN THE PAST 12 MONTHS, THE FOOD YOU BOUGHT JUST DIDN'T LAST AND YOU DIDN'T HAVE MONEY TO GET MORE.: NEVER TRUE

## 2024-06-05 SDOH — ECONOMIC STABILITY: FOOD INSECURITY: WITHIN THE PAST 12 MONTHS, YOU WORRIED THAT YOUR FOOD WOULD RUN OUT BEFORE YOU GOT MONEY TO BUY MORE.: NEVER TRUE

## 2024-06-05 SDOH — ECONOMIC STABILITY: INCOME INSECURITY: HOW HARD IS IT FOR YOU TO PAY FOR THE VERY BASICS LIKE FOOD, HOUSING, MEDICAL CARE, AND HEATING?: NOT HARD AT ALL

## 2024-06-05 ASSESSMENT — PATIENT HEALTH QUESTIONNAIRE - PHQ9
1. LITTLE INTEREST OR PLEASURE IN DOING THINGS: NOT AT ALL
SUM OF ALL RESPONSES TO PHQ QUESTIONS 1-9: 0
2. FEELING DOWN, DEPRESSED OR HOPELESS: NOT AT ALL
SUM OF ALL RESPONSES TO PHQ9 QUESTIONS 1 & 2: 0
SUM OF ALL RESPONSES TO PHQ QUESTIONS 1-9: 0

## 2024-06-05 NOTE — PROGRESS NOTES
[facility]  Mando Chua comes in for follow-up care.  Hypertension: Patient has hypertension.  Blood pressure is stable.  Denies headache, changes in vision or focal weakness.  She will take a low-sodium diet.  Diabetes mellitus type 2: Patient has uncontrolled type 2 diabetes mellitus.  She is on insulin.  States that her blood glucose numbers continue to fluctuate.  Again and number to call to schedule with an endocrinologist was given to the patient.  She promises to call and make an appointment this time.  She is on 85 units of Lantus.  She is not doing any lifestyle or dietary modification.  She is also on glipizide 20 mg twice a day.  Unable to tolerate other medications for diabetes.  She was noted able to tolerate Ozempic due to abdominal pain and nausea.  Unable to tolerate metformin due to diarrhea.  Lymphedema: Patient has lymphedema bilateral lower extremities.  He has been chronic for her.  She is doing elevation and the has wraps and compression stockings for the lower extremities.  She has been on Bumex to help with the swelling of the lower extremities.  I gave her metolazone to take for a short while but she did not tolerate the medication.  Open wound left foot: Patient has an open wound left foot.  This has been seen by the podiatrist.  Debridement was done yesterday.  She is to do change of dressing at least every 48 hours.  She has a home health nurse who will help her do this.  Chronic respiratory failure: Patient has chronic respiratory failure.  She is on oxygen.  She would like to be referred to a pulmonologist.  Previous pulmonologist is not taking her insurance anymore.  Referral will be placed.  She has been on chronic oxygen at 2 L/min.  She has a tracheostomy tube in place.  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  She had vocal cord paralysis following thyroidectomy.  Asthma: Patient has asthma.  She gets wheeze and shortness of breath on and off.  She has used nebulizer treatment for

## 2024-06-05 NOTE — PROGRESS NOTES
\"Have you been to the ER, urgent care clinic since your last visit?  Hospitalized since your last visit?\"    NO    “Have you seen or consulted any other health care providers outside of [facility] since your last visit?”    NO    Have you had a mammogram?”   NO    Date of last Mammogram: 3/6/2020             Click Here for Release of Records Request

## 2024-06-17 ENCOUNTER — TELEPHONE (OUTPATIENT)
Facility: CLINIC | Age: 64
End: 2024-06-17

## 2024-06-17 NOTE — TELEPHONE ENCOUNTER
Allyson Dhaliwal called looking for order for wound care for [facility].  Please call her to advise if this has been done.  Someone was suppose to have done it last week.  [phone]

## 2024-06-19 DIAGNOSIS — I89.0 LYMPHEDEMA, NOT ELSEWHERE CLASSIFIED: ICD-10-CM

## 2024-06-19 DIAGNOSIS — S81.801D WOUND OF RIGHT LOWER EXTREMITY, SUBSEQUENT ENCOUNTER: Primary | ICD-10-CM

## 2024-07-02 DIAGNOSIS — E11.40 TYPE 2 DIABETES MELLITUS WITH DIABETIC NEUROPATHY, WITHOUT LONG-TERM CURRENT USE OF INSULIN (HCC): Primary | ICD-10-CM

## 2024-07-02 RX ORDER — GLIPIZIDE 10 MG/1
20 TABLET ORAL 2 TIMES DAILY
Qty: 360 TABLET | Refills: 1 | Status: SHIPPED | OUTPATIENT
Start: 2024-07-02

## 2024-07-02 RX ORDER — SYRINGE-NEEDLE,INSULIN,0.5 ML 27GX1/2"
1 SYRINGE, EMPTY DISPOSABLE MISCELLANEOUS DAILY
Qty: 100 EACH | Refills: 3 | Status: SHIPPED | OUTPATIENT
Start: 2024-07-02

## 2024-07-31 DIAGNOSIS — L30.9 DERMATITIS: Primary | ICD-10-CM

## 2024-07-31 NOTE — TELEPHONE ENCOUNTER
Last seen 6/5/2024   Last labs 02/08/2024  Last filled  06/03/2024 triamcinolone   02/11/2024 atorvastatin   Next appointment 9/5/2024     Lab Results   Component Value Date     02/08/2024    K 5.0 02/08/2024    CL 95 (L) 02/08/2024    CO2 26 02/08/2024    BUN 11 02/08/2024    CREATININE 0.7 (L) 02/08/2024    GLUCOSE 276 (H) 02/08/2024    CALCIUM 9.2 02/08/2024    BILITOT 0.6 02/08/2024    ALKPHOS 140 (H) 02/08/2024    AST 12 02/08/2024    ALT 16 02/08/2024    LABGLOM >60.0 02/08/2024    GFRAA >60 08/19/2022    AGRATIO 1.3 02/08/2024    GLOB 3.3 02/08/2024

## 2024-08-05 RX ORDER — ATORVASTATIN CALCIUM 40 MG/1
40 TABLET, FILM COATED ORAL DAILY
Qty: 30 TABLET | Refills: 3 | Status: SHIPPED | OUTPATIENT
Start: 2024-08-05

## 2024-08-05 RX ORDER — TRIAMCINOLONE ACETONIDE 0.25 MG/G
CREAM TOPICAL
Qty: 454 G | Refills: 0 | Status: SHIPPED | OUTPATIENT
Start: 2024-08-05

## 2024-09-03 RX ORDER — CHOLECALCIFEROL (VITAMIN D3) 1250 MCG
1 CAPSULE ORAL
Qty: 13 CAPSULE | Refills: 3 | Status: SHIPPED | OUTPATIENT
Start: 2024-09-03

## 2024-09-18 ENCOUNTER — COMMUNITY OUTREACH (OUTPATIENT)
Facility: CLINIC | Age: 64
End: 2024-09-18

## 2024-09-24 ENCOUNTER — OFFICE VISIT (OUTPATIENT)
Facility: CLINIC | Age: 64
End: 2024-09-24

## 2024-09-24 DIAGNOSIS — K02.9 DENTAL CARIES: ICD-10-CM

## 2024-09-24 DIAGNOSIS — E03.9 ACQUIRED HYPOTHYROIDISM: ICD-10-CM

## 2024-09-24 DIAGNOSIS — E53.8 B12 DEFICIENCY: ICD-10-CM

## 2024-09-24 DIAGNOSIS — D64.9 ANEMIA, UNSPECIFIED TYPE: ICD-10-CM

## 2024-09-24 DIAGNOSIS — E61.1 IRON DEFICIENCY: ICD-10-CM

## 2024-09-24 DIAGNOSIS — E11.40 TYPE 2 DIABETES MELLITUS WITH DIABETIC NEUROPATHY, WITHOUT LONG-TERM CURRENT USE OF INSULIN (HCC): ICD-10-CM

## 2024-09-24 DIAGNOSIS — K21.9 GASTROESOPHAGEAL REFLUX DISEASE, UNSPECIFIED WHETHER ESOPHAGITIS PRESENT: ICD-10-CM

## 2024-09-24 DIAGNOSIS — K21.9 GASTROESOPHAGEAL REFLUX DISEASE WITHOUT ESOPHAGITIS: ICD-10-CM

## 2024-09-24 DIAGNOSIS — Z12.31 SCREENING MAMMOGRAM FOR BREAST CANCER: ICD-10-CM

## 2024-09-24 DIAGNOSIS — E55.9 HYPOVITAMINOSIS D: ICD-10-CM

## 2024-09-24 DIAGNOSIS — R10.12 LEFT UPPER QUADRANT ABDOMINAL PAIN: ICD-10-CM

## 2024-09-24 DIAGNOSIS — E11.40 TYPE 2 DIABETES MELLITUS WITH DIABETIC NEUROPATHY, WITHOUT LONG-TERM CURRENT USE OF INSULIN (HCC): Primary | ICD-10-CM

## 2024-09-24 DIAGNOSIS — R11.0 NAUSEA: ICD-10-CM

## 2024-09-24 DIAGNOSIS — R51.9 CHRONIC NONINTRACTABLE HEADACHE, UNSPECIFIED HEADACHE TYPE: ICD-10-CM

## 2024-09-24 DIAGNOSIS — F41.9 ANXIETY: ICD-10-CM

## 2024-09-24 DIAGNOSIS — G89.29 CHRONIC NONINTRACTABLE HEADACHE, UNSPECIFIED HEADACHE TYPE: ICD-10-CM

## 2024-09-24 LAB — HBA1C MFR BLD: 13.3 %

## 2024-09-24 RX ORDER — OMEPRAZOLE 40 MG/1
40 CAPSULE, DELAYED RELEASE ORAL
Qty: 90 CAPSULE | Refills: 1 | Status: SHIPPED | OUTPATIENT
Start: 2024-09-24

## 2024-09-24 RX ORDER — ONDANSETRON 4 MG/1
TABLET, FILM COATED ORAL
Qty: 60 TABLET | Refills: 2 | Status: SHIPPED | OUTPATIENT
Start: 2024-09-24

## 2024-09-24 RX ORDER — BUTALBITAL, ACETAMINOPHEN AND CAFFEINE 50; 325; 40 MG/1; MG/1; MG/1
1 TABLET ORAL EVERY 4 HOURS PRN
Qty: 180 TABLET | Refills: 3 | Status: SHIPPED | OUTPATIENT
Start: 2024-09-24

## 2024-09-24 RX ORDER — LORAZEPAM 2 MG/1
2 TABLET ORAL EVERY 6 HOURS PRN
Qty: 30 TABLET | Refills: 2 | Status: SHIPPED | OUTPATIENT
Start: 2024-09-24 | End: 2024-12-23

## 2024-09-24 RX ORDER — INSULIN GLARGINE 100 [IU]/ML
INJECTION, SOLUTION SUBCUTANEOUS
Qty: 30 ML | Refills: 2 | Status: SHIPPED | OUTPATIENT
Start: 2024-09-24

## 2024-09-24 RX ORDER — CLINDAMYCIN HCL 300 MG
300 CAPSULE ORAL 3 TIMES DAILY
Qty: 21 CAPSULE | Refills: 0 | Status: SHIPPED | OUTPATIENT
Start: 2024-09-24 | End: 2024-10-01

## 2024-09-24 RX ORDER — IBUPROFEN 600 MG/1
600 TABLET, FILM COATED ORAL EVERY 8 HOURS PRN
Qty: 90 TABLET | Refills: 0 | Status: SHIPPED | OUTPATIENT
Start: 2024-09-24

## 2024-09-25 VITALS
WEIGHT: 281 LBS | RESPIRATION RATE: 20 BRPM | SYSTOLIC BLOOD PRESSURE: 135 MMHG | HEART RATE: 60 BPM | OXYGEN SATURATION: 99 % | HEIGHT: 67 IN | TEMPERATURE: 98.1 F | BODY MASS INDEX: 44.1 KG/M2 | DIASTOLIC BLOOD PRESSURE: 67 MMHG

## 2024-09-26 ENCOUNTER — TELEPHONE (OUTPATIENT)
Facility: CLINIC | Age: 64
End: 2024-09-26

## 2024-09-26 DIAGNOSIS — E11.40 TYPE 2 DIABETES MELLITUS WITH DIABETIC NEUROPATHY, WITHOUT LONG-TERM CURRENT USE OF INSULIN (HCC): Primary | ICD-10-CM

## 2024-10-19 DIAGNOSIS — K59.00 CONSTIPATION, UNSPECIFIED CONSTIPATION TYPE: ICD-10-CM

## 2024-10-21 RX ORDER — DOCUSATE SODIUM 100 MG/1
100 CAPSULE, LIQUID FILLED ORAL 2 TIMES DAILY
Qty: 180 CAPSULE | Refills: 1 | Status: SHIPPED | OUTPATIENT
Start: 2024-10-21

## 2024-10-21 NOTE — TELEPHONE ENCOUNTER
Last seen 9/24/2024   Last labs 2/8/2024  Last filled  3/19/2024  Next appointment 10/22/2024     Lab Results   Component Value Date     02/08/2024    K 5.0 02/08/2024    CL 95 (L) 02/08/2024    CO2 26 02/08/2024    BUN 11 02/08/2024    CREATININE 0.7 (L) 02/08/2024    GLUCOSE 276 (H) 02/08/2024    CALCIUM 9.2 02/08/2024    BILITOT 0.6 02/08/2024    ALKPHOS 140 (H) 02/08/2024    AST 12 02/08/2024    ALT 16 02/08/2024    LABGLOM >60.0 02/08/2024    GFRAA >60 08/19/2022    AGRATIO 1.3 02/08/2024    GLOB 3.3 02/08/2024

## 2024-11-05 ENCOUNTER — OFFICE VISIT (OUTPATIENT)
Facility: CLINIC | Age: 64
End: 2024-11-05
Payer: MEDICAID

## 2024-11-05 VITALS
HEART RATE: 76 BPM | DIASTOLIC BLOOD PRESSURE: 75 MMHG | HEIGHT: 67 IN | BODY MASS INDEX: 44.01 KG/M2 | TEMPERATURE: 98 F | OXYGEN SATURATION: 96 % | SYSTOLIC BLOOD PRESSURE: 161 MMHG | RESPIRATION RATE: 20 BRPM

## 2024-11-05 DIAGNOSIS — R10.30 LOWER ABDOMINAL PAIN: ICD-10-CM

## 2024-11-05 DIAGNOSIS — E78.5 HYPERLIPIDEMIA, UNSPECIFIED HYPERLIPIDEMIA TYPE: ICD-10-CM

## 2024-11-05 DIAGNOSIS — I89.0 LYMPHEDEMA, NOT ELSEWHERE CLASSIFIED: ICD-10-CM

## 2024-11-05 DIAGNOSIS — J96.11 CHRONIC RESPIRATORY FAILURE WITH HYPOXIA: ICD-10-CM

## 2024-11-05 DIAGNOSIS — S81.801D WOUND OF RIGHT LOWER EXTREMITY, SUBSEQUENT ENCOUNTER: ICD-10-CM

## 2024-11-05 DIAGNOSIS — E03.9 ACQUIRED HYPOTHYROIDISM: ICD-10-CM

## 2024-11-05 DIAGNOSIS — I10 ESSENTIAL (PRIMARY) HYPERTENSION: ICD-10-CM

## 2024-11-05 DIAGNOSIS — F41.9 ANXIETY: ICD-10-CM

## 2024-11-05 DIAGNOSIS — E11.40 TYPE 2 DIABETES MELLITUS WITH DIABETIC NEUROPATHY, WITHOUT LONG-TERM CURRENT USE OF INSULIN (HCC): Primary | ICD-10-CM

## 2024-11-05 DIAGNOSIS — Z93.0 TRACHEOSTOMY STATUS (HCC): ICD-10-CM

## 2024-11-05 DIAGNOSIS — K59.00 CONSTIPATION, UNSPECIFIED CONSTIPATION TYPE: ICD-10-CM

## 2024-11-05 DIAGNOSIS — K21.9 GASTRO-ESOPHAGEAL REFLUX DISEASE WITHOUT ESOPHAGITIS: ICD-10-CM

## 2024-11-05 PROCEDURE — 3077F SYST BP >= 140 MM HG: CPT | Performed by: FAMILY MEDICINE

## 2024-11-05 PROCEDURE — 3078F DIAST BP <80 MM HG: CPT | Performed by: FAMILY MEDICINE

## 2024-11-05 PROCEDURE — 99215 OFFICE O/P EST HI 40 MIN: CPT | Performed by: FAMILY MEDICINE

## 2024-11-05 NOTE — PROGRESS NOTES
\"Have you been to the ER, urgent care clinic since your last visit?  Hospitalized since your last visit?\"    NO    “Have you seen or consulted any other health care providers outside our system since your last visit?”    NO    Have you had a mammogram?”   NO    Date of last Mammogram: 3/6/2020       “Have you had a diabetic eye exam?”    NO     Date of last diabetic eye exam: 3/2/2020          
Not on file    Years of education: Not on file    Highest education level: Not on file   Occupational History    Not on file   Tobacco Use    Smoking status: Never    Smokeless tobacco: Never   Substance and Sexual Activity    Alcohol use: No    Drug use: No    Sexual activity: Not on file   Other Topics Concern    Not on file   Social History Narrative    Not on file     Social Determinants of Health     Financial Resource Strain: Low Risk  (6/5/2024)    Overall Financial Resource Strain (CARDIA)     Difficulty of Paying Living Expenses: Not hard at all   Food Insecurity: No Food Insecurity (6/5/2024)    Hunger Vital Sign     Worried About Running Out of Food in the Last Year: Never true     Ran Out of Food in the Last Year: Never true   Transportation Needs: Unknown (6/5/2024)    PRAPARE - Transportation     Lack of Transportation (Medical): Not on file     Lack of Transportation (Non-Medical): No   Physical Activity: Not on file   Stress: Not on file   Social Connections: Not on file   Intimate Partner Violence: Not on file   Housing Stability: Unknown (6/5/2024)    Housing Stability Vital Sign     Unable to Pay for Housing in the Last Year: Not on file     Number of Places Lived in the Last Year: Not on file     Unstable Housing in the Last Year: No       Review of Systems  Review of Systems - Review of all systems is negative except as noted above in the HPI.    Vital Signs  BP (!) 161/75   Pulse 76   Temp 98 °F (36.7 °C)   Resp 20   Ht 1.702 m (5' 7\")   SpO2 96%   BMI 44.01 kg/m²       Physical Exam  Physical Examination: General appearance - oriented to person, place, and time and overweight  Mental status - affect appropriate to mood  Mouth - mucous membranes moist, pharynx normal without lesions  Neck -tracheostomy tube in place  Lymphatics - no palpable lymphadenopathy  Chest - decreased air entry noted bilateral lung bases  Heart - S1 and S2 normal  Abdomen - no rebound tenderness noted  bowel

## 2024-11-10 RX ORDER — OLMESARTAN MEDOXOMIL 40 MG/1
40 TABLET ORAL DAILY
Qty: 90 TABLET | Refills: 1 | Status: SHIPPED | OUTPATIENT
Start: 2024-11-10 | End: 2025-11-10

## 2024-11-11 NOTE — PROGRESS NOTES
Trinh Hankins is a 63 year old male who presents for Office Visit, Diabetes (6 months f/u), and Refill Request (Sildanafil)    HPI  The patient presented to clinic today for follow-up of his diabetes and chronic medical concerns.    Since his last visit, he has been under the care of a cardiologist for his history of ventricular tachycardia and is also under the care of an electrophysiologist. He generally has no acute concerns today. He wanted to get his routine labs updated. He feels his blood sugars are stable.     However, he mentioned his insurance will be changing at the new year and will need to find a new provider at [facility]. He just wanted to review how to go about making this switch.     Review of Systems  Cardiovascular problem(s): stable  Respiratory problem(s):stable  Endocrine problem(s):stable/       Objective   Vitals:    11/08/24 1515   BP: 120/78   Pulse: 69   Temp: 98.2 °F (36.8 °C)   TempSrc: Temporal   SpO2: 96%   Weight: 97.5 kg (215 lb)   Height: 5' 9\" (1.753 m)     Physical Exam  Examination of the patient reveals:     GENERAL: appears stated age and well developed and well nourished  SKIN normal color, normal texture, and normal turgor  HEAD: normocephalic  EYES: sclerae and conjunctivae are normal lids and lashes are normal  EARS: pinna and external ear is normal bilaterally and auditory acuity is grossly normal  NOSE: external nose is normal to inspection  CHEST: respiratory effort is not labored  LUNGS: lungs are clear to auscultation with normal inspiratory/expiratory sounds, no rales, no rhonchi, and no wheezes  HEART: normal rate and rhythm, no murmurs, and no extra heart sounds  NEUROLOGIC: coordination normal and no tremor noted  EXTREMITIES: normal muscle tone and development bilaterally            Assessment & Plan   1. Type 2 diabetes mellitus without complication, without long-term current use of insulin  (CMD)  His diabetes was reviewed during the visit. Labs,  Continued teaching regarding diabetes and insulin administration, monitoring blood sugar and what to do if blood sugar is low/high. Also reviewed med administration through PEG tube and feeding schedule/water flushes. Patient is able to verbalize and demonstrate understanding. Will continue review until discharge. Teaching continued throughout day, with patient able to return demonstrate in all areas. Patient refused fingerstick, humalog, Lovenox and Emycin. including an A1c test, were ordered to be done in the near future. Refills for his metformin and lisinopril were provided. He is feeling well today and has no other acute concerns.  -     lisinopril (ZESTRIL) 2.5 MG tablet; Take 1 tablet by mouth daily.  -     metformin (GLUCOPHAGE) 1000 MG tablet; Take 1 tablet by mouth in the morning and 1 tablet in the evening.  -     CBC with Automated Differential; Future  -     Comprehensive Metabolic Panel; Future  -     Glycohemoglobin; Future    2. Ventricular Tachycardia.  He is following up regularly with his cardiology and electrophysiology teams. A message will be sent regarding his insurance change to see if they recommend any new providers at [facility].      3. History of MI:   Is following with cardiology.         Follow up pending above

## 2024-11-14 ENCOUNTER — TELEMEDICINE (OUTPATIENT)
Facility: CLINIC | Age: 64
End: 2024-11-14
Payer: MEDICAID

## 2024-11-14 DIAGNOSIS — R29.898 WEAKNESS OF BOTH LOWER EXTREMITIES: Primary | ICD-10-CM

## 2024-11-14 DIAGNOSIS — R29.6 FALLS: ICD-10-CM

## 2024-11-14 PROCEDURE — 99443 PR PHYS/QHP TELEPHONE EVALUATION 21-30 MIN: CPT | Performed by: FAMILY MEDICINE

## 2024-11-14 SDOH — ECONOMIC STABILITY: FOOD INSECURITY: WITHIN THE PAST 12 MONTHS, YOU WORRIED THAT YOUR FOOD WOULD RUN OUT BEFORE YOU GOT MONEY TO BUY MORE.: NEVER TRUE

## 2024-11-14 SDOH — ECONOMIC STABILITY: FOOD INSECURITY: WITHIN THE PAST 12 MONTHS, THE FOOD YOU BOUGHT JUST DIDN'T LAST AND YOU DIDN'T HAVE MONEY TO GET MORE.: NEVER TRUE

## 2024-11-14 SDOH — ECONOMIC STABILITY: INCOME INSECURITY: HOW HARD IS IT FOR YOU TO PAY FOR THE VERY BASICS LIKE FOOD, HOUSING, MEDICAL CARE, AND HEATING?: NOT HARD AT ALL

## 2024-11-14 ASSESSMENT — PATIENT HEALTH QUESTIONNAIRE - PHQ9
6. FEELING BAD ABOUT YOURSELF - OR THAT YOU ARE A FAILURE OR HAVE LET YOURSELF OR YOUR FAMILY DOWN: NOT AT ALL
SUM OF ALL RESPONSES TO PHQ QUESTIONS 1-9: 0
9. THOUGHTS THAT YOU WOULD BE BETTER OFF DEAD, OR OF HURTING YOURSELF: NOT AT ALL
3. TROUBLE FALLING OR STAYING ASLEEP: NOT AT ALL
8. MOVING OR SPEAKING SO SLOWLY THAT OTHER PEOPLE COULD HAVE NOTICED. OR THE OPPOSITE, BEING SO FIGETY OR RESTLESS THAT YOU HAVE BEEN MOVING AROUND A LOT MORE THAN USUAL: NOT AT ALL
SUM OF ALL RESPONSES TO PHQ QUESTIONS 1-9: 0
SUM OF ALL RESPONSES TO PHQ9 QUESTIONS 1 & 2: 0
10. IF YOU CHECKED OFF ANY PROBLEMS, HOW DIFFICULT HAVE THESE PROBLEMS MADE IT FOR YOU TO DO YOUR WORK, TAKE CARE OF THINGS AT HOME, OR GET ALONG WITH OTHER PEOPLE: NOT DIFFICULT AT ALL
SUM OF ALL RESPONSES TO PHQ QUESTIONS 1-9: 0
7. TROUBLE CONCENTRATING ON THINGS, SUCH AS READING THE NEWSPAPER OR WATCHING TELEVISION: NOT AT ALL
SUM OF ALL RESPONSES TO PHQ QUESTIONS 1-9: 0
1. LITTLE INTEREST OR PLEASURE IN DOING THINGS: NOT AT ALL
2. FEELING DOWN, DEPRESSED OR HOPELESS: NOT AT ALL
5. POOR APPETITE OR OVEREATING: NOT AT ALL
4. FEELING TIRED OR HAVING LITTLE ENERGY: NOT AT ALL

## 2024-11-14 NOTE — PROGRESS NOTES
\"Have you been to the ER, urgent care clinic since your last visit?  Hospitalized since your last visit?\"    YES - When: approximately 8 days ago.  Where and Why: [facility]/fall/dizziness.    “Have you seen or consulted any other health care providers outside our system since your last visit?”    NO    Have you had a mammogram?”   NO    Date of last Mammogram: 3/6/2020       “Have you had a diabetic eye exam?”    NO     Date of last diabetic eye exam: 3/2/2020

## 2024-11-14 NOTE — PROGRESS NOTES
11/14/2024    Documentation:  Mando Chua is a 64 y.o. female evaluated via telephone on 11/14/2024 for .      Leg weakness: Patient has bilateral lower extremity weakness.  States that she fell a week ago and since then has been having weakness in the lower extremities.  She was seen in the emergency room.  She states that her head CT scan was done.  This was negative for acute abnormality.  No other radiological tests were done.  She states that she has trouble trying to walk.  She has to use a walker to support herself.  She has neuropathy lower extremities.  Denies foot drop.  She is concerned that she may have fallen.  Denies pain.  Denies bladder or bowel dysfunction.  I will place a referral to home health for physical therapy.  This will help with strengthening of the lower extremities and a fall prevention measures.  We will follow-up at next visit or sooner as needed.  Patient had a BMP and CBC done.  Her glucose was elevated.  Rest of the labs were reassuring.  Fall: Patient has had a fall.  She did not have any syncope or pass out.  States that she was walking when her hand got entangled on an object and this caused her to fall.  Since then has been having lower extremity weakness.  We will refer to home health physical therapy for fall prevention measures.  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  She has been followed up by the ENT.  She has a history of bilateral vocal cord paralysis following thyroid surgery.  She will continue to follow-up with the specialist.  Diabetes mellitus type 2: Patient has type 2 diabetes mellitus.  Blood glucose have been uncontrolled.  She is yet to call the endocrinologist.  States that she has not had the time to do this.  She needs to intensify lifestyle and dietary modification.  She is on insulin, glipizide.  We will follow-up at next visit.  Lymphedema: Patient has bilateral lymphedema of the lower extremities.  She has chronic wounds on both lower

## 2024-12-03 ENCOUNTER — TELEPHONE (OUTPATIENT)
Facility: CLINIC | Age: 64
End: 2024-12-03

## 2024-12-03 NOTE — TELEPHONE ENCOUNTER
Ale Arjun Bae [facility], started the care of nursing, unable to dress leg wound due to not having the supplies to redress the wound. Contact her at the following number [phone]

## 2024-12-18 RX ORDER — LEVOTHYROXINE SODIUM 300 UG/1
300 TABLET ORAL
Qty: 90 TABLET | Refills: 1 | Status: SHIPPED | OUTPATIENT
Start: 2024-12-18

## 2024-12-18 NOTE — TELEPHONE ENCOUNTER
Arjun Loera [facility] called and stated the following:    Back pain 8 out of 10  Blood Pressure 143/68    Just wanted to call to let you know due to the numbers being out of their perimeter.

## 2024-12-18 NOTE — TELEPHONE ENCOUNTER
Last seen 11/14/2024   Last labs 2/8/2024  Last filled  4/11/2024  Next appointment 1/7/2025     Lab Results   Component Value Date     02/08/2024    K 5.0 02/08/2024    CL 95 (L) 02/08/2024    CO2 26 02/08/2024    BUN 11 02/08/2024    CREATININE 0.7 (L) 02/08/2024    GLUCOSE 276 (H) 02/08/2024    CALCIUM 9.2 02/08/2024    BILITOT 0.6 02/08/2024    ALKPHOS 140 (H) 02/08/2024    AST 12 02/08/2024    ALT 16 02/08/2024    LABGLOM >60.0 02/08/2024    GFRAA >60 08/19/2022    AGRATIO 1.3 02/08/2024    GLOB 3.3 02/08/2024

## 2024-12-20 ENCOUNTER — TELEPHONE (OUTPATIENT)
Facility: CLINIC | Age: 64
End: 2024-12-20

## 2024-12-20 NOTE — TELEPHONE ENCOUNTER
Domitila from [facility] called and stated the pt missed an appointment for wound care on 12/10/24. They wanted to let Dr. Dean know that the pt doing well, she just missed her appointment. Please advise. Thank you!!!!

## 2024-12-26 ENCOUNTER — TELEPHONE (OUTPATIENT)
Facility: CLINIC | Age: 64
End: 2024-12-26

## 2024-12-26 NOTE — TELEPHONE ENCOUNTER
Gerardo from [facility] called to inform Dr Dean that this patient was discharged today from [facility].    Please be advisded

## 2024-12-27 ENCOUNTER — HOSPITAL ENCOUNTER (OUTPATIENT)
Facility: HOSPITAL | Age: 64
Discharge: HOME | End: 2024-12-30
Payer: MEDICAID

## 2024-12-27 DIAGNOSIS — R10.30 LOWER ABDOMINAL PAIN: ICD-10-CM

## 2024-12-27 LAB — CREAT UR-MCNC: 0.7 MG/DL (ref 0.6–1.3)

## 2024-12-27 PROCEDURE — 6360000004 HC RX CONTRAST MEDICATION: Performed by: FAMILY MEDICINE

## 2024-12-27 PROCEDURE — 74177 CT ABD & PELVIS W/CONTRAST: CPT

## 2024-12-27 PROCEDURE — 82565 ASSAY OF CREATININE: CPT

## 2024-12-27 RX ORDER — IOPAMIDOL 612 MG/ML
100 INJECTION, SOLUTION INTRAVASCULAR
Status: COMPLETED | OUTPATIENT
Start: 2024-12-27 | End: 2024-12-27

## 2024-12-27 RX ADMIN — IOPAMIDOL 100 ML: 612 INJECTION, SOLUTION INTRAVENOUS at 11:22

## 2024-12-30 NOTE — TELEPHONE ENCOUNTER
Ale from [facility] called and stated they saw the pt today, and her blood pressure was elevated her bp was 150/80 she stated it was because of pain and she took her meds in the morning. Pts abdomen is distended. Pt does not take her blood sugar but she takes insulin. Ale can be reached at [phone]. Please advise. Thank you!!!

## 2024-12-30 NOTE — TELEPHONE ENCOUNTER
Last seen 11/14/2024   Last labs 2/8/2024  Last filled  6/3/2024  Next appointment 1/7/2025     Lab Results   Component Value Date     02/08/2024    K 5.0 02/08/2024    CL 95 (L) 02/08/2024    CO2 26 02/08/2024    BUN 11 02/08/2024    CREATININE 0.7 12/27/2024    GLUCOSE 276 (H) 02/08/2024    CALCIUM 9.2 02/08/2024    BILITOT 0.6 02/08/2024    ALKPHOS 140 (H) 02/08/2024    AST 12 02/08/2024    ALT 16 02/08/2024    LABGLOM >60.0 02/08/2024    GFRAA >60 08/19/2022    AGRATIO 1.3 02/08/2024    GLOB 3.3 02/08/2024

## 2025-01-03 ENCOUNTER — HOSPITAL ENCOUNTER (OUTPATIENT)
Facility: HOSPITAL | Age: 65
Discharge: HOME | End: 2025-01-03
Payer: MEDICAID

## 2025-01-03 VITALS — WEIGHT: 281 LBS | BODY MASS INDEX: 44.1 KG/M2 | HEIGHT: 67 IN

## 2025-01-03 DIAGNOSIS — Z12.31 SCREENING MAMMOGRAM FOR BREAST CANCER: ICD-10-CM

## 2025-01-03 PROCEDURE — 77067 SCR MAMMO BI INCL CAD: CPT

## 2025-01-05 RX ORDER — OMEGA-3-ACID ETHYL ESTERS 1 G/1
CAPSULE, LIQUID FILLED ORAL
Qty: 180 CAPSULE | Refills: 1 | Status: SHIPPED | OUTPATIENT
Start: 2025-01-05

## 2025-01-23 ENCOUNTER — TELEMEDICINE (OUTPATIENT)
Facility: CLINIC | Age: 65
End: 2025-01-23
Payer: MEDICAID

## 2025-01-23 DIAGNOSIS — J32.4 PANSINUSITIS, UNSPECIFIED CHRONICITY: Primary | ICD-10-CM

## 2025-01-23 DIAGNOSIS — J40 BRONCHITIS: ICD-10-CM

## 2025-01-23 PROCEDURE — 99213 OFFICE O/P EST LOW 20 MIN: CPT | Performed by: FAMILY MEDICINE

## 2025-01-23 RX ORDER — DEXTROMETHORPHAN HYDROBROMIDE AND PROMETHAZINE HYDROCHLORIDE 15; 6.25 MG/5ML; MG/5ML
5 SYRUP ORAL 4 TIMES DAILY PRN
Qty: 240 ML | Refills: 0 | Status: SHIPPED | OUTPATIENT
Start: 2025-01-23

## 2025-01-23 RX ORDER — ACETAMINOPHEN 500 MG
1000 TABLET ORAL 3 TIMES DAILY PRN
Qty: 180 TABLET | Refills: 1 | Status: SHIPPED | OUTPATIENT
Start: 2025-01-23

## 2025-01-23 SDOH — ECONOMIC STABILITY: FOOD INSECURITY: WITHIN THE PAST 12 MONTHS, THE FOOD YOU BOUGHT JUST DIDN'T LAST AND YOU DIDN'T HAVE MONEY TO GET MORE.: NEVER TRUE

## 2025-01-23 SDOH — ECONOMIC STABILITY: FOOD INSECURITY: WITHIN THE PAST 12 MONTHS, YOU WORRIED THAT YOUR FOOD WOULD RUN OUT BEFORE YOU GOT MONEY TO BUY MORE.: NEVER TRUE

## 2025-01-23 ASSESSMENT — PATIENT HEALTH QUESTIONNAIRE - PHQ9
5. POOR APPETITE OR OVEREATING: NOT AT ALL
3. TROUBLE FALLING OR STAYING ASLEEP: NOT AT ALL
SUM OF ALL RESPONSES TO PHQ QUESTIONS 1-9: 0
4. FEELING TIRED OR HAVING LITTLE ENERGY: NOT AT ALL
SUM OF ALL RESPONSES TO PHQ QUESTIONS 1-9: 0
1. LITTLE INTEREST OR PLEASURE IN DOING THINGS: NOT AT ALL
10. IF YOU CHECKED OFF ANY PROBLEMS, HOW DIFFICULT HAVE THESE PROBLEMS MADE IT FOR YOU TO DO YOUR WORK, TAKE CARE OF THINGS AT HOME, OR GET ALONG WITH OTHER PEOPLE: NOT DIFFICULT AT ALL
7. TROUBLE CONCENTRATING ON THINGS, SUCH AS READING THE NEWSPAPER OR WATCHING TELEVISION: NOT AT ALL
SUM OF ALL RESPONSES TO PHQ QUESTIONS 1-9: 0
SUM OF ALL RESPONSES TO PHQ9 QUESTIONS 1 & 2: 0
8. MOVING OR SPEAKING SO SLOWLY THAT OTHER PEOPLE COULD HAVE NOTICED. OR THE OPPOSITE, BEING SO FIGETY OR RESTLESS THAT YOU HAVE BEEN MOVING AROUND A LOT MORE THAN USUAL: NOT AT ALL
2. FEELING DOWN, DEPRESSED OR HOPELESS: NOT AT ALL
6. FEELING BAD ABOUT YOURSELF - OR THAT YOU ARE A FAILURE OR HAVE LET YOURSELF OR YOUR FAMILY DOWN: NOT AT ALL
SUM OF ALL RESPONSES TO PHQ QUESTIONS 1-9: 0
9. THOUGHTS THAT YOU WOULD BE BETTER OFF DEAD, OR OF HURTING YOURSELF: NOT AT ALL

## 2025-01-23 NOTE — PROGRESS NOTES
\"Have you been to the ER, urgent care clinic since your last visit?  Hospitalized since your last visit?\"    NO    “Have you seen or consulted any other health care providers outside our system since your last visit?”    NO      “Have you had a colorectal cancer screening such as a colonoscopy/FIT/Cologuard?    NO    Date of last Colonoscopy: 1/23/2015  No cologuard on file  No FIT/FOBT on file   No flexible sigmoidoscopy on file     “Have you had a diabetic eye exam?”    NO     Date of last diabetic eye exam: 3/2/2020

## 2025-01-23 NOTE — PROGRESS NOTES
2025    TELEHEALTH EVALUATION -- Audio/Visual    HPI:    Mando Chua (:  1960) has requested an audio/video evaluation for the following concern(s):    Pansinusitis: Patient has facial pain with frontal and maxillary sinus pressure.  She has tearing both eyes and nasal congestion.  She has postnasal drainage that is mucopurulent in nature.  She also has a cough that is productive of yellow phlegm.  She has fever and chills.  Patient notes poor appetite.  Symptoms have been ongoing for days.  Has tried supportive care measures and over-the-counter medication without much relief.  She would like some medication sent in.  We discussed supportive care measures.  I will send in Augmentin to take for the pansinusitis.  I will send in Promethazine DM to take for the cough.  I will follow-up at next visit.    Review of Systems Review of all systems is negative except as noted above in the HPI.      Prior to Visit Medications    Medication Sig Taking? Authorizing Provider   promethazine-dextromethorphan (PROMETHAZINE-DM) 6.25-15 MG/5ML syrup Take 5 mLs by mouth 4 times daily as needed for Cough Yes Jose Dean MD   amoxicillin-clavulanate (AUGMENTIN) 875-125 MG per tablet Take 1 tablet by mouth 2 times daily for 10 days Yes Jose Dean MD   acetaminophen (TYLENOL) 500 MG tablet Take 2 tablets by mouth 3 times daily as needed for Pain or Fever Yes Jose Dean MD   omega-3 acid ethyl esters (LOVAZA) 1 g capsule TAKE 1 CAPSULE BY MOUTH TWICE DAILY AT 8 AM AND AT 2 PM Yes Jose Dean MD   levothyroxine (SYNTHROID) 300 MCG tablet TAKE 1 TABLET BY MOUTH EVERY MORNING BEFORE BREAKFAST Yes Jose Dean MD   olmesartan (BENICAR) 40 MG tablet Take 1 tablet by mouth daily Yes Jose Dean MD   docusate sodium (COLACE) 100 MG capsule TAKE 1 CAPSULE BY MOUTH TWICE DAILY Yes Jose Dean MD   ibuprofen (ADVIL;MOTRIN) 600 MG tablet Take 1 tablet by mouth every 8 hours as needed for

## 2025-01-24 ENCOUNTER — TELEPHONE (OUTPATIENT)
Facility: CLINIC | Age: 65
End: 2025-01-24

## 2025-01-24 NOTE — TELEPHONE ENCOUNTER
[facility] office called and wanted to make sure that pt is still clear to start at the [facility]. If so a new referral will need to be out in.

## 2025-01-28 DIAGNOSIS — I89.0 LYMPHEDEMA, NOT ELSEWHERE CLASSIFIED: Primary | ICD-10-CM

## 2025-01-30 ENCOUNTER — OFFICE VISIT (OUTPATIENT)
Facility: CLINIC | Age: 65
End: 2025-01-30

## 2025-01-30 DIAGNOSIS — J96.11 CHRONIC RESPIRATORY FAILURE WITH HYPOXIA: ICD-10-CM

## 2025-01-30 DIAGNOSIS — E11.40 TYPE 2 DIABETES MELLITUS WITH DIABETIC NEUROPATHY, WITHOUT LONG-TERM CURRENT USE OF INSULIN (HCC): Primary | ICD-10-CM

## 2025-01-30 DIAGNOSIS — E03.9 ACQUIRED HYPOTHYROIDISM: ICD-10-CM

## 2025-01-30 DIAGNOSIS — R04.2 HEMOPTYSIS: ICD-10-CM

## 2025-01-30 DIAGNOSIS — I50.32 CHRONIC DIASTOLIC (CONGESTIVE) HEART FAILURE (HCC): ICD-10-CM

## 2025-01-30 DIAGNOSIS — K21.9 GASTRO-ESOPHAGEAL REFLUX DISEASE WITHOUT ESOPHAGITIS: ICD-10-CM

## 2025-01-30 DIAGNOSIS — R06.2 WHEEZE: ICD-10-CM

## 2025-01-30 DIAGNOSIS — K92.1 BLOOD IN STOOL: ICD-10-CM

## 2025-01-30 DIAGNOSIS — I10 ESSENTIAL (PRIMARY) HYPERTENSION: ICD-10-CM

## 2025-01-30 DIAGNOSIS — I89.0 LYMPHEDEMA, NOT ELSEWHERE CLASSIFIED: ICD-10-CM

## 2025-01-30 DIAGNOSIS — E78.5 HYPERLIPIDEMIA, UNSPECIFIED HYPERLIPIDEMIA TYPE: ICD-10-CM

## 2025-01-30 DIAGNOSIS — Z12.11 SCREEN FOR COLON CANCER: ICD-10-CM

## 2025-01-30 LAB — HBA1C MFR BLD: 12.9 %

## 2025-01-30 RX ORDER — BUDESONIDE AND FORMOTEROL FUMARATE DIHYDRATE 160; 4.5 UG/1; UG/1
2 AEROSOL RESPIRATORY (INHALATION) 2 TIMES DAILY
Qty: 30.6 G | Refills: 1 | Status: SHIPPED | OUTPATIENT
Start: 2025-01-30

## 2025-01-30 RX ORDER — ALBUTEROL SULFATE 90 UG/1
2 INHALANT RESPIRATORY (INHALATION) 4 TIMES DAILY PRN
Qty: 18 G | Refills: 0 | Status: SHIPPED | OUTPATIENT
Start: 2025-01-30

## 2025-01-30 NOTE — PROGRESS NOTES
HPI  Mando Chua comes in for follow up care.  DM2: Patient has uncontrolled type 2 diabetes mellitus.  I have referred her to the endocrinologist on several occasions but she has not gone.  She is not taking insulin as prescribed.  She is taking glipizide.  Unable to tolerate other medications.  We have tried other classes of medications which she was unable to tolerate.  I did emphasize the need to go and see an endocrinologist and also take her insulin.  She is on Lantus 85 units daily.  She is not doing lifestyle or dietary modification as discussed.  Her HbA1c today is 12.9.  This is elevated.  She is aware of chronic complications of uncontrolled diabetes.  Another referral has been placed to endocrinology send number to call to schedule an appointment was given to the patient.  If she persists and not being compliant with treatment plan she may have to get a new primary care provider.  I had this discussion with her today.  HTN: Patient has hypertension.  Blood pressure is slightly elevated today at 147/70.  She is on olmesartan 40 mg daily.  She will take a low-sodium diet.  She will intensify lifestyle and dietary modification.  I will recheck at next visit.  Still elevated we may need to adjust her blood pressure medications.  Blood in stool: Patient has noted an episode of blood in stool.  This occurred on 1 occasion.  She also has lower abdominal pain that comes on and off.  Has noted abdominal distention.  Had recent CT scan of the abdomen and pelvis that was noted as:  IMPRESSION:  No acute abnormalities.  Nonobstructive left kidney stone.  Fat stranding and skin thickening at the pannus probably related to venous congestion. Would also correlate for cellulitis.  Splenomegaly.  Hepatic steatosis.  Patient is due to have a colonoscopy done.  Request is placed.  Patient states that she had requested a colonoscopy in the past.  I am not aware that patient had requested me for colonoscopy.  She has

## 2025-01-31 VITALS
DIASTOLIC BLOOD PRESSURE: 70 MMHG | HEART RATE: 66 BPM | SYSTOLIC BLOOD PRESSURE: 147 MMHG | RESPIRATION RATE: 24 BRPM | OXYGEN SATURATION: 95 %

## 2025-02-06 ENCOUNTER — CLINICAL DOCUMENTATION (OUTPATIENT)
Facility: CLINIC | Age: 65
End: 2025-02-06

## 2025-02-06 NOTE — PROGRESS NOTES
Orders for wheel chair, hospital bed, last office notes,, and patient demographics faxed to [facility]

## 2025-02-07 LAB
A/G RATIO: 0.9 RATIO (ref 1.1–2.6)
ALBUMIN: 3.4 G/DL (ref 3.5–5)
ALP BLD-CCNC: 163 U/L (ref 40–120)
ALT SERPL-CCNC: 17 U/L (ref 5–40)
ANION GAP SERPL CALCULATED.3IONS-SCNC: 13 MMOL/L (ref 3–15)
AST SERPL-CCNC: 18 U/L (ref 10–37)
BASOPHILS # BLD: 1 % (ref 0–2)
BASOPHILS ABSOLUTE: 0 K/UL (ref 0–0.2)
BILIRUB SERPL-MCNC: 0.7 MG/DL (ref 0.2–1.2)
BUN BLDV-MCNC: 12 MG/DL (ref 6–22)
CALCIUM SERPL-MCNC: 8.8 MG/DL (ref 8.4–10.5)
CHLORIDE BLD-SCNC: 99 MMOL/L (ref 98–110)
CHOLESTEROL, TOTAL: 219 MG/DL (ref 110–200)
CHOLESTEROL/HDL RATIO: 7.1 (ref 0–5)
CO2: 26 MMOL/L (ref 20–32)
CREAT SERPL-MCNC: 0.7 MG/DL (ref 0.8–1.4)
EOSINOPHIL # BLD: 1 % (ref 0–6)
EOSINOPHILS ABSOLUTE: 0.1 K/UL (ref 0–0.5)
ESTIMATED AVERAGE GLUCOSE: 338 MG/DL (ref 91–123)
FERRITIN: 40 NG/ML (ref 10–291)
FOLATE: 9.7 NG/ML
GFR, ESTIMATED: >60 ML/MIN/1.73 SQ.M.
GLOBULIN: 3.7 G/DL (ref 2–4)
GLUCOSE: 313 MG/DL (ref 70–99)
HBA1C MFR BLD: 13.4 % (ref 4.8–5.6)
HCT VFR BLD CALC: 44.2 % (ref 35.1–48)
HDLC SERPL-MCNC: 31 MG/DL
HEMOGLOBIN: 12.9 G/DL (ref 11.7–16)
IRON % SATURATION: 10 % (ref 20–50)
IRON: 38 MCG/DL (ref 30–160)
LDL CHOLESTEROL: 133 MG/DL (ref 50–99)
LDL/HDL RATIO: 4.3
LYMPHOCYTES # BLD: 19 % (ref 20–45)
LYMPHOCYTES ABSOLUTE: 0.8 K/UL (ref 1–4.8)
MCH RBC QN AUTO: 25 PG (ref 26–34)
MCHC RBC AUTO-ENTMCNC: 29 G/DL (ref 31–36)
MCV RBC AUTO: 85 FL (ref 80–99)
MONOCYTES ABSOLUTE: 0.3 K/UL (ref 0.1–1)
MONOCYTES: 8 % (ref 3–12)
NEUTROPHILS ABSOLUTE: 3 K/UL (ref 1.8–7.7)
NEUTROPHILS SEGMENTED: 71 % (ref 40–75)
NON-HDL CHOLESTEROL: 188 MG/DL
PDW BLD-RTO: 17 % (ref 10–15.5)
PLATELET # BLD: 146 K/UL (ref 140–440)
PMV BLD AUTO: 10.6 FL (ref 9–13)
POTASSIUM SERPL-SCNC: 4.3 MMOL/L (ref 3.5–5.5)
RBC # BLD: 5.18 M/UL (ref 3.8–5.2)
SODIUM BLD-SCNC: 138 MMOL/L (ref 133–145)
T4 FREE: 1 NG/DL (ref 0.9–1.8)
TOTAL IRON BINDING CAPACITY: 394 MCG/DL (ref 228–428)
TOTAL PROTEIN: 7.1 G/DL (ref 6.2–8.1)
TRIGL SERPL-MCNC: 275 MG/DL (ref 40–149)
TSH SERPL DL<=0.05 MIU/L-ACNC: 17.4 MCU/ML (ref 0.27–4.2)
UNSATURATED IRON BINDING CAPACITY: 356 MCG/DL (ref 110–370)
VITAMIN B-12: 316 PG/ML (ref 211–911)
VITAMIN D 25-HYDROXY: 42.5 NG/ML (ref 32–100)
VLDLC SERPL CALC-MCNC: 55 MG/DL (ref 8–30)
WBC # BLD: 4.2 K/UL (ref 4–11)

## 2025-02-07 RX ORDER — LIOTHYRONINE SODIUM 5 UG/1
5 TABLET ORAL DAILY
Qty: 30 TABLET | Refills: 3 | Status: SHIPPED | OUTPATIENT
Start: 2025-02-07

## 2025-02-07 RX ORDER — ATORVASTATIN CALCIUM 40 MG/1
40 TABLET, FILM COATED ORAL DAILY
Qty: 30 TABLET | Refills: 3 | Status: SHIPPED | OUTPATIENT
Start: 2025-02-07

## 2025-02-13 ENCOUNTER — TELEPHONE (OUTPATIENT)
Facility: CLINIC | Age: 65
End: 2025-02-13

## 2025-02-13 DIAGNOSIS — I89.0 LYMPHEDEMA, NOT ELSEWHERE CLASSIFIED: ICD-10-CM

## 2025-02-13 DIAGNOSIS — I51.7 CARDIOMEGALY: Primary | ICD-10-CM

## 2025-02-13 DIAGNOSIS — I50.32 CHRONIC DIASTOLIC (CONGESTIVE) HEART FAILURE: ICD-10-CM

## 2025-02-13 DIAGNOSIS — I50.32 CHRONIC DIASTOLIC (CONGESTIVE) HEART FAILURE (HCC): ICD-10-CM

## 2025-02-13 DIAGNOSIS — I51.7 CARDIOMEGALY: ICD-10-CM

## 2025-02-13 RX ORDER — METOLAZONE 2.5 MG/1
2.5 TABLET ORAL
Qty: 30 TABLET | Refills: 0 | Status: SHIPPED | OUTPATIENT
Start: 2025-02-14

## 2025-02-13 RX ORDER — METOLAZONE 2.5 MG/1
TABLET ORAL
Qty: 38 TABLET | OUTPATIENT
Start: 2025-02-13

## 2025-02-13 NOTE — TELEPHONE ENCOUNTER
Spoke with patient and advised chest x ray results-per provider showing slight heart enlargement and mild congestion in the lungs.  Will order echo, referral tgo cardiology and new Rx for Metolazone to be sent to pharmacy.  Patient was advised to make sure she drinks plenty of fluid to avoid dehydration.    Patient verbalized understanding

## 2025-02-13 NOTE — TELEPHONE ENCOUNTER
Mando Chua had a chest x-ray done that shows pulmonary venous congestion and cardiomegaly.  I will order an echocardiogram.  She is on Bumex 2 mg daily.  I will add metolazone to take 3 times a week.  She has lymphedema/pedal edema.  Patient will be referred to the cardiologist for follow-up care.    Jose Dean MD

## 2025-02-24 ENCOUNTER — HOSPITAL ENCOUNTER (OUTPATIENT)
Facility: HOSPITAL | Age: 65
Setting detail: RECURRING SERIES
Discharge: HOME | End: 2025-02-27
Payer: MEDICAID

## 2025-02-24 PROCEDURE — 97163 PT EVAL HIGH COMPLEX 45 MIN: CPT

## 2025-02-24 NOTE — PROGRESS NOTES
[facility]VID OVIEDO [facility]  [address] [place] [place] [address] Ph:[phone] Fx: [phone]  Plan of Care / Statement of Necessity for Physical Therapy Services     Patient Name: Mando Chua : 1960   Medical   Diagnosis: Lymphedema, not elsewhere classified [I89.0]  Right leg pain [M79.604]  Left leg pain [M79.605] Treatment Diagnosis: M79.604  Pain in right leg and M79.605  Pain in left leg  and I89.0  Lymphedema, not elsewhere classified      Onset Date: 10Years, Worsened last year Payor :  Payor: [facility] MEDICAID / Plan: [employer] [facility] CARDINAL CARE / Product Type: *No Product type* /    Referral Source: Jose Dean MD Start of Care (SOC): 2025   Prior Hospitalization: See medical history Provider #: [id number]   Prior Level of Function: Spends most of the day in her recliner and utilizing WC; will stand with RW to amb tot he rest room; Has interm aide help at home; reports non compliant with medication management    Comorbidities:  Respiratory disorders, Diabetes mellitus, Musculoskeletal disorders, Neurologic condition, and Complications related to surgery (Thyroidectomy  with Trach placed following, On 3L O2, Sedentary, Requires Daily assist, Slow healing wound on left lateral ankle for last year with Hx of infection), LBP     Assessment / key information:  Pt is a 64 year old female who presents to PT today in  with portable oxygen. Pt reports long history ~10 years of LE lymphedema and unknown onset. She did receive OPPT during the summer of  with improvement and was set up with velcro compression to use at home. She reports she has not been compliant with donning these garments due to challenge reaching her legs. C/C at this time is continued wound on her left lateral ankle, bilateral limb edema, and increased pain in her stomach from fibrotic pannus fluid. Pt reports she is challenged with medical management and

## 2025-02-24 NOTE — PROGRESS NOTES
PT DAILY TREATMENT NOTE/LYMPHLEG EVAL 10-18    Patient Name: Mando Chua    Date: 2025    : 1960  Insurance: Payor: [facility] MEDICAID / Plan: [facility] [employer] [facility] CARDINAL CARE / Product Type: *No Product type* /      Patient  verified yes     Visit #   Current / Total 1 8-12   Time   In / Out 1120 1220   Pain   In / Out 7 7   Subjective Functional Status/Changes: See Below     TREATMENT AREA =  Lymphedema, not elsewhere classified [I89.0]  Right leg pain [M79.604]  Left leg pain [M79.605]    SUBJECTIVE  Pain Level (0-10 scale): 7/10  [x]constant []intermittent []improving []worsening []no change since onset    C/C and History: 10Years gradual onset, Hx of infection in her legs causing delirium and hospital stay.   Pt didn't have an aid at home, leg wounds/bandages hadnt been changed in months. Son removed bandages, had an open wound. \"Was talking crazy and throwing up\" was taken to hospital.   On antibiotics, MRI showed mini stroke but unable to confirm when  Wound Care clinic for outside of left ankle, Pt concerned that wound care isn't washing the area prior to re-wrapping.   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   3L of O2  Treatment at [facility]-compression wrapping and obtained velcro- at this point unable to put them on   Lymphedema History: [] Primary [x] Secondary Cause: Unknown     Infection history: [x] Most recent: last year        CONTRAINDICATIONS - MLD (HTN, Hypo/hyperthroidism, renal dysfunction, cardiac edema)  CONTRAINDICATIONS - abdominal (Abdominal pain)  CONTRAINDICATIONS - Bandaging (DM type 2, vascular problems, arterial disease)    PLOF: Most of day spent in WC, able to walk short distance to bathroom with RW, Trach, 3L of O2  Living

## 2025-03-03 DIAGNOSIS — J96.11 CHRONIC RESPIRATORY FAILURE WITH HYPOXIA (HCC): ICD-10-CM

## 2025-03-03 DIAGNOSIS — F41.9 ANXIETY: ICD-10-CM

## 2025-03-03 DIAGNOSIS — E11.40 TYPE 2 DIABETES MELLITUS WITH DIABETIC NEUROPATHY, WITHOUT LONG-TERM CURRENT USE OF INSULIN (HCC): ICD-10-CM

## 2025-03-03 DIAGNOSIS — R06.2 WHEEZE: ICD-10-CM

## 2025-03-03 RX ORDER — LORAZEPAM 2 MG/1
TABLET ORAL
Qty: 31 TABLET | Refills: 2 | Status: SHIPPED | OUTPATIENT
Start: 2025-03-03 | End: 2025-06-01

## 2025-03-03 RX ORDER — INSULIN GLARGINE 100 [IU]/ML
INJECTION, SOLUTION SUBCUTANEOUS
Qty: 30 ML | Refills: 2 | Status: SHIPPED | OUTPATIENT
Start: 2025-03-03

## 2025-03-03 RX ORDER — ALBUTEROL SULFATE 90 UG/1
INHALANT RESPIRATORY (INHALATION)
Qty: 18 G | Refills: 0 | Status: SHIPPED | OUTPATIENT
Start: 2025-03-03

## 2025-03-03 NOTE — TELEPHONE ENCOUNTER
Last seen 1/30/2025   Last labs 2/6/2025  Last filled  1/30/2025 albuterol sulfate HFA                     9/24/2024 insulin glargine                    9/24/2024 LORazepam   Next appointment 3/31/2025     Lab Results   Component Value Date     02/06/2025    K 4.3 02/06/2025    CL 99 02/06/2025    CO2 26 02/06/2025    BUN 12 02/06/2025    CREATININE 0.7 (L) 02/06/2025    GLUCOSE 313 (H) 02/06/2025    CALCIUM 8.8 02/06/2025    BILITOT 0.7 02/06/2025    ALKPHOS 163 (H) 02/06/2025    AST 18 02/06/2025    ALT 17 02/06/2025    LABGLOM >60.0 02/06/2025    GFRAA >60 08/19/2022    AGRATIO 0.9 (L) 02/06/2025    GLOB 3.7 02/06/2025

## 2025-03-04 ENCOUNTER — COMMUNITY OUTREACH (OUTPATIENT)
Facility: CLINIC | Age: 65
End: 2025-03-04

## 2025-03-06 ENCOUNTER — APPOINTMENT (OUTPATIENT)
Facility: HOSPITAL | Age: 65
End: 2025-03-06
Payer: MEDICAID

## 2025-03-10 ENCOUNTER — HOSPITAL ENCOUNTER (OUTPATIENT)
Facility: HOSPITAL | Age: 65
Setting detail: RECURRING SERIES
Discharge: HOME | End: 2025-03-13
Payer: MEDICAID

## 2025-03-10 PROCEDURE — 97110 THERAPEUTIC EXERCISES: CPT

## 2025-03-10 PROCEDURE — 97535 SELF CARE MNGMENT TRAINING: CPT

## 2025-03-10 PROCEDURE — 97112 NEUROMUSCULAR REEDUCATION: CPT

## 2025-03-10 NOTE — PROGRESS NOTES
PHYSICAL / OCCUPATIONAL THERAPY - DAILY TREATMENT NOTE    Patient Name: Mando Chua    Date: 3/10/2025    : 1960  Insurance: Payor: [facility] MEDICAID / Plan: [facility] COMMUNITY PLAN CARDINAL CARE / Product Type: *No Product type* /      Patient  verified yes   Visit #   Current / Total 2 16   Time   In / Out 2:10P 2:53P   Pain   In / Out 8.5/10 10.5/10   Subjective Functional Status/Changes: Patient reports her WC broke that is  why she is late. Patient reports someone came to fit her for a pump on Friday. She goes to wound care but is not sure what to do herself. Someone is supposed to come to her home once a week. She went to therapy for lymphedema 3-4 years ago. She has a velcro wrap at home she got during that time. She has a lot of pain in her back due to \"slipped discs and pinched nerves\". She has a son and brother at home. They do not wash both her legs with wound care. She also has arthritis in her back.She gets swelling to her stomach and will leak when she scratches. She is supposed to get a new WC soon. She has not been to the podiatrist in a while. She does not like that she will be wrapped in therapy and then have them removed soon after for other appointments.      TREATMENT AREA =  Lymphedema, not elsewhere classified  Right leg pain  Left leg pain    OBJECTIVE      Therapeutic Procedures:    Tx Min Billable or 1:1 Min (if diff from Tx Min) Procedure, Rationale, Specifics   15  43673 Self Care/Home Management (timed):  improve patient knowledge and understanding of home injury/symptom/pain management, home safety, activity modification, transfer techniques, and joint protection strategies  to improve patient's ability to progress to PLOF and address remaining functional goals.  (see flow sheet as applicable)     Details if applicable:  progression with POC including garments that can be managed by patient/family/aide as goal vs continued use of wrapping; purpose of wrapping/bandaging to

## 2025-03-20 ENCOUNTER — APPOINTMENT (OUTPATIENT)
Facility: HOSPITAL | Age: 65
End: 2025-03-20
Payer: MEDICAID

## 2025-03-24 ENCOUNTER — HOSPITAL ENCOUNTER (OUTPATIENT)
Facility: HOSPITAL | Age: 65
Setting detail: RECURRING SERIES
Discharge: HOME | End: 2025-03-27
Payer: MEDICAID

## 2025-03-24 PROCEDURE — 97530 THERAPEUTIC ACTIVITIES: CPT

## 2025-03-24 PROCEDURE — 97110 THERAPEUTIC EXERCISES: CPT

## 2025-03-24 PROCEDURE — 97535 SELF CARE MNGMENT TRAINING: CPT

## 2025-03-25 NOTE — PROGRESS NOTES
PHYSICAL / OCCUPATIONAL THERAPY - DAILY TREATMENT NOTE    Patient Name: Mando Chua    Date: 3/24/2025    : 1960  Insurance: Payor: [facility] MEDICAID / Plan: [facility] PLAN CARDINAL CARE / Product Type: *No Product type* /      Patient  verified Yes     Visit #   Current / Total 3 16   Time   In / Out 117 200   Pain   In / Out 8 8   Subjective Functional Status/Changes: \"Since yesterday I have had more trouble breathing and my stomach feels harder. I try rocking back forward and back to apply pressure. I can't fit a pillow under there. My stomach was hurting so I did an enima and it helped a bit but stomach is so hard and my legs are more swollen. I frustrated with the wound care people. She was digging around and it hurt. No body else digs around.\"     TREATMENT AREA =  Lymphedema, not elsewhere classified  Right leg pain  Left leg pain    OBJECTIVE         Therapeutic Procedures:    Tx Min Billable or 1:1 Min (if diff from Tx Min) Procedure, Rationale, Specifics   13  73160 Self Care/Home Management (timed):  improve patient knowledge and understanding of positioning, home safety, and activity modification  to improve patient's ability to progress to PLOF and address remaining functional goals.  (see flow sheet as applicable)     Details if applicable:  reviewed calling MD if stomach and LE swelling worsens, reviewed trialing pillow placement under stomach for sustained compression, encouraged pt to monitor SPO2 when she feels like breathing is difficult      20  53523 Therapeutic Exercise (timed):  increase ROM, strength, coordination, balance, and proprioception to improve patient's ability to progress to PLOF and address remaining functional goals. (see flow sheet as applicable)     Details if applicable:     10  50454 Therapeutic Activity (timed):  use of dynamic activities replicating functional movements to increase ROM, strength, coordination, balance, and proprioception in order to 
34 33 33.5   Small ANkle 26.2 26.7 24.7 26   10 cm proximal to malleoli 37.1 39 35.8 38.4   20 cm proximal to malleoli 47.1 48.3 44.4 48.5   Knee joint line  45.8 46.8 46.7 46     CURRENT STATUS/Progress towards Goals:  Goal:Patient will be independent and compliant with HEP to progress toward goals and restore functional mobility.   Status at evaluation/last progress note: NOT MET    2.   Goal:Pt will be independent with teach back of lymphedema risk reduction techniques to help prevent onset of infection and progressive swelling in order to self manage lymphedema.   Status at evaluation/last progress note: Not Met-Continues to report picking at skin scabs to cause wounds which could increase infection rates    3.   Goal:Pt and/or caregiver will be able to quintin/doff compression wraps for increased independence with Lymphedema management and decrease circumferential measurements to improve dressing.   Status at evaluation/last progress note: Not Met- Pt did not bring to session to review donning     Remaining Goals Unmet:   Pt will decrease  leg circumference measurements by grossly >/= 5  centimeters in order to improve pts ability to quintin compression garments.   Eval Status: Measure at 2nd visit   Circumference measurements Left (cm) Right (cm)   Big toe 8.3 9.5   MET heads 22.3 24.4   Heel/Dorsum 35.9 33   Small ANkle 26.2 24.7   10 cm proximal to malleoli 37.1 35.8   20 cm proximal to malleoli 47.1 44.4   Knee joint line  45.8 46.7   Patella  50.2 52   10cm proximal to knee 58.9 57   20cm proximal to knee  53 57.1   30 cm proximal to knee NT NT   Hip crease  55.5 56   TOTAL 440.3 440.6            Pt will have bilateral hip flex MMT >/= 4/5 to assist in negt lower limbs while donning compression   Eval Status: 2/5     3. Pt will increase knee ext MMT to >/= 4-/5 for increased stability in stance.         Eval Status: 2/5     4. Pt will be able to quintin/doff compression garment with Mod Ind in order to self manage

## 2025-03-27 ENCOUNTER — HOSPITAL ENCOUNTER (OUTPATIENT)
Facility: HOSPITAL | Age: 65
Setting detail: RECURRING SERIES
Discharge: HOME | End: 2025-03-30
Payer: MEDICAID

## 2025-03-27 PROCEDURE — 97535 SELF CARE MNGMENT TRAINING: CPT

## 2025-03-27 PROCEDURE — 97112 NEUROMUSCULAR REEDUCATION: CPT

## 2025-03-27 PROCEDURE — 97530 THERAPEUTIC ACTIVITIES: CPT

## 2025-03-27 NOTE — PROGRESS NOTES
PHYSICAL / OCCUPATIONAL THERAPY - DAILY TREATMENT NOTE    Patient Name: Mando Chua    Date: 3/27/2025    : 1960  Insurance: Payor: [facility] MEDICAID / Plan: [facility] PLAN CARDINAL CARE / Product Type: *No Product type* /      Patient  verified Yes     Visit #   Current / Total 4 16   Time   In / Out 120 200   Pain   In / Out 8 8   Subjective Functional Status/Changes: \"I was in so much pain yesterday and was having a hard time breathing I almost went to the ER. But them I took my fluid pill and it helped relieve it a bit. I didn't take it today since I was coming here\"     TREATMENT AREA =  Lymphedema, not elsewhere classified  Right leg pain  Left leg pain    OBJECTIVE         Therapeutic Procedures:    Tx Min Billable or 1:1 Min (if diff from Tx Min) Procedure, Rationale, Specifics   15  79004 Self Care/Home Management (timed):  improve patient knowledge and understanding of home injury/symptom/pain management, home safety, activity modification, and importance of medicine management  to improve patient's ability to progress to PLOF and address remaining functional goals.  (see flow sheet as applicable)     Details if applicable:  discussion with aid on helping pt enforce medicine management as pt forgets to take it at home. Review of using lotion on dry inner thighs to prevent cracking and wound development      15  88538 Neuromuscular Re-Education (timed):  improve balance, coordination, kinesthetic sense, posture, core stability and proprioception to improve patient's ability to develop conscious control of individual muscles and awareness of position of extremities in order to progress to PLOF and address remaining functional goals. (see flow sheet as applicable)     Details if applicable:  Instruction on donning bilateral velcro wraps for calves and feet. Adjustment to Left calf to make sure edge wasn't crossing left heel wound   10  51723 Therapeutic Activity (timed):  use of dynamic

## 2025-03-31 ENCOUNTER — APPOINTMENT (OUTPATIENT)
Facility: HOSPITAL | Age: 65
End: 2025-03-31
Payer: MEDICAID

## 2025-04-01 RX ORDER — BLOOD SUGAR DIAGNOSTIC
STRIP MISCELLANEOUS
Qty: 300 STRIP | Refills: 3 | Status: SHIPPED | OUTPATIENT
Start: 2025-04-01

## 2025-04-01 NOTE — TELEPHONE ENCOUNTER
Last seen 1/30/2025   Last labs 2/6/2025  Last filled  11/15/2023  Next appointment 5/1/2025     Lab Results   Component Value Date     02/06/2025    K 4.3 02/06/2025    CL 99 02/06/2025    CO2 26 02/06/2025    BUN 12 02/06/2025    CREATININE 0.7 (L) 02/06/2025    GLUCOSE 313 (H) 02/06/2025    CALCIUM 8.8 02/06/2025    BILITOT 0.7 02/06/2025    ALKPHOS 163 (H) 02/06/2025    AST 18 02/06/2025    ALT 17 02/06/2025    LABGLOM >60.0 02/06/2025    GFRAA >60 08/19/2022    AGRATIO 0.9 (L) 02/06/2025    GLOB 3.7 02/06/2025           Wound Care (No Sutures): Petrolatum

## 2025-04-03 ENCOUNTER — HOSPITAL ENCOUNTER (OUTPATIENT)
Facility: HOSPITAL | Age: 65
Setting detail: RECURRING SERIES
Discharge: HOME | End: 2025-04-06
Payer: MEDICAID

## 2025-04-03 PROCEDURE — 97530 THERAPEUTIC ACTIVITIES: CPT

## 2025-04-03 PROCEDURE — 97110 THERAPEUTIC EXERCISES: CPT

## 2025-04-03 PROCEDURE — 97535 SELF CARE MNGMENT TRAINING: CPT

## 2025-04-03 NOTE — PROGRESS NOTES
analyze and address imbalance/dizziness, and instruct in home and community integration to address functional deficits and attain remaining goals.    Progress toward goals / Updated goals:  []  See Progress Note/Recertification    Slow, poor positioning tolerance.   Pt was wearing compression garments today     Next PN/ RC due 4/23/25  Auth due (visit number/ date) 16V. 5/2/25    PLAN  - Continue Plan of Care    Ovi Dominguezpriyanka PT    4/3/2025    1:14 PM  If an interpreting service was utilized for treatment of this patient, the contents of this document represent the material reviewed with the patient via the .     Future Appointments   Date Time Provider Department Center   4/3/2025  1:20 PM Ovi Alvarenga PT [facility]   4/7/2025  1:20 PM Ovi Alvarenga PT [facility]   4/10/2025  1:20 PM Ovi Alvarenga PT [facility]   4/15/2025  1:20 PM Ovi Alvarenga PT [facility]   4/17/2025  1:20 PM Ovi Alvarenga PT [facility]   4/29/2025 11:15 AM Varghese Ortiz MD [facility]   5/1/2025 11:15 AM [facility] Dean MD [facility]

## 2025-04-10 ENCOUNTER — HOSPITAL ENCOUNTER (OUTPATIENT)
Facility: HOSPITAL | Age: 65
Setting detail: RECURRING SERIES
Discharge: HOME | End: 2025-04-13
Payer: MEDICAID

## 2025-04-10 PROCEDURE — 97110 THERAPEUTIC EXERCISES: CPT

## 2025-04-10 PROCEDURE — 97535 SELF CARE MNGMENT TRAINING: CPT

## 2025-04-10 PROCEDURE — 97530 THERAPEUTIC ACTIVITIES: CPT

## 2025-04-10 PROCEDURE — 97112 NEUROMUSCULAR REEDUCATION: CPT

## 2025-04-10 PROCEDURE — 97035 APP MDLTY 1+ULTRASOUND EA 15: CPT

## 2025-04-10 NOTE — PROGRESS NOTES
PHYSICAL / OCCUPATIONAL THERAPY - DAILY TREATMENT NOTE    Patient Name: Mando Chua    Date: 4/10/2025    : 1960  Insurance: Payor: [facility] MEDICAID / Plan: [facility] PLAN CARDINAL CARE / Product Type: *No Product type* /      Patient  verified Yes     Visit #   Current / Total 6 16   Time   In / Out 120 205   Pain   In / Out 5  5   Subjective Functional Status/Changes: \"My pump was delivered but they are going to come next week to set it up.\"     TREATMENT AREA =  Lymphedema, not elsewhere classified  Right leg pain  Left leg pain    OBJECTIVE         Therapeutic Procedures:    Tx Min Billable or 1:1 Min (if diff from Tx Min) Procedure, Rationale, Specifics   18  52492 Neuromuscular Re-Education (timed):  improve balance, coordination, kinesthetic sense, posture, core stability and proprioception to improve patient's ability to develop conscious control of individual muscles and awareness of position of extremities in order to progress to PLOF and address remaining functional goals. (see flow sheet as applicable)     Details if applicable:  Compression wrapping for improved proprioception- toes wrap, stockinette, 10cm comprifoam, x2 8cm and x1 10cm short stretch for right lower limb      10  69285 Self Care/Home Management (timed):  improve patient knowledge and understanding of positioning, home safety, activity modification, and compression  to improve patient's ability to progress to PLOF and address remaining functional goals.  (see flow sheet as applicable)     Details if applicable:  Review of commode set-up next near pump use, importance of skin care routine (discussed with aide also) to make sure pt is gently washing legs with wash clothe to exfoliate and put on lotion to hydrate limbs   10  79403 Therapeutic Exercise (timed):  increase ROM, strength, coordination, balance, and proprioception to improve patient's ability to progress to PLOF and address remaining functional goals. (see

## 2025-04-15 ENCOUNTER — APPOINTMENT (OUTPATIENT)
Facility: HOSPITAL | Age: 65
End: 2025-04-15
Payer: MEDICAID

## 2025-04-17 ENCOUNTER — APPOINTMENT (OUTPATIENT)
Facility: HOSPITAL | Age: 65
End: 2025-04-17
Payer: MEDICAID

## 2025-05-01 ENCOUNTER — HOSPITAL ENCOUNTER (OUTPATIENT)
Facility: HOSPITAL | Age: 65
Setting detail: RECURRING SERIES
Discharge: HOME | End: 2025-05-04
Payer: MEDICAID

## 2025-05-01 PROCEDURE — 97535 SELF CARE MNGMENT TRAINING: CPT

## 2025-05-01 PROCEDURE — 97530 THERAPEUTIC ACTIVITIES: CPT

## 2025-05-01 PROCEDURE — 97110 THERAPEUTIC EXERCISES: CPT

## 2025-05-01 NOTE — PROGRESS NOTES
[facility]ED[facility]  [address] [place] [address] - Ph: [phone]   Fx: [phone]  PHYSICAL THERAPY PROGRESS NOTE  [x] Progress Note  [] Discharge Summary    Patient Name: Mando Chua : 1960   Treatment/Medical Diagnosis: Lymphedema, not elsewhere classified [I89.0]  Right leg pain [M79.604]  Left leg pain [M79.605]   Referral Source: Jose Daen MD     Date of Initial Visit: 25 Attended Visits: 7 Missed Visits: 4       Comorbidities: Respiratory disorders, Diabetes mellitus, Musculoskeletal disorders, Neurologic condition, and Complications related to surgery (Thyroidectomy 2019 with Trach placed following, On 3L O2, Sedentary, Requires Daily assist, Slow healing wound on left lateral ankle for last year with Hx of infection), LBP   Prior Level of Function:Spends most of the day in her recliner and utilizing WC; will stand with RW to amb tot he rest room; Has interm aide help at home; reports non compliant with medication management     SUMMARY OF TREATMENT  Since last Progress note on 3/24/25, pt has been seen for x4 F/Us to include remedial exercises,trial of gentle modified MLD, and initiation of compression wrapping to the right lower limb. Unable to compression wrap left limb due to wound care for left heel.   Since last progress note, pt and care giver have been instructed on donning velcro compression to her lower limbs. She has met with tactile pump company for demo however is challenged with compliance at home due to time constraints with aide. She has utilized ~3/week compared to daily recommendation.   She has shown strength gains in he LE's as noted by being able to hold her LE's up in sitting for wound care ease. He has also demo significant loss in LLE. She cont challenged with medicine management and abdominal swelling.  Last week she experienced rectal bleeding and was encouraged to inform MD however pt

## 2025-05-01 NOTE — PROGRESS NOTES
PHYSICAL / OCCUPATIONAL THERAPY - DAILY TREATMENT NOTE    Patient Name: Mando Chua    Date: 2025    : 1960  Insurance: Payor: [facility] MEDICAID / Plan: [facility] PLAN CARDINAL CARE / Product Type: *No Product type* /      Patient  verified Yes     Visit #   Current / Total 7 16   Time   In / Out 1040 1119   Pain   In / Out 5 5   Subjective Functional Status/Changes: Rectal Bleeding has stopped. Pt believes blood was coming from Hemo. Unable to use pump daily, due to aide not having enough time. Has used it ~ 3days/week   Not wearing garments due to \"someone has been cleaning it for a week now\"   Pt reports she has been taking her pills   Went to wound care-wound is getting smaller      TREATMENT AREA =  Lymphedema, not elsewhere classified  Right leg pain  Left leg pain    OBJECTIVE         Therapeutic Procedures:    Tx Min Billable or 1:1 Min (if diff from Tx Min) Procedure, Rationale, Specifics   10  63019 Therapeutic Activity (timed):  use of dynamic activities replicating functional movements to increase ROM, strength, coordination, balance, and proprioception in order to improve patient's ability to progress to PLOF and address remaining functional goals.  (see flow sheet as applicable)     Details if applicable:  re-measure      21  66536 Self Care/Home Management (timed):  improve patient knowledge and understanding of home injury/symptom/pain management, positioning, and home safety  to improve patient's ability to progress to PLOF and address remaining functional goals.  (see flow sheet as applicable)     Details if applicable:  review of daily garments and slef management    8  03288 Therapeutic Exercise (timed):  increase ROM, strength, coordination, balance, and proprioception to improve patient's ability to progress to PLOF and address remaining functional goals. (see flow sheet as applicable)     Details if applicable:  strength assessment   39  [facility] BC Totals Reminder: bill

## 2025-05-07 ENCOUNTER — OFFICE VISIT (OUTPATIENT)
Facility: CLINIC | Age: 65
End: 2025-05-07
Payer: MEDICAID

## 2025-05-07 VITALS
RESPIRATION RATE: 16 BRPM | SYSTOLIC BLOOD PRESSURE: 132 MMHG | OXYGEN SATURATION: 95 % | WEIGHT: 281.3 LBS | HEART RATE: 86 BPM | HEIGHT: 67 IN | BODY MASS INDEX: 44.15 KG/M2 | DIASTOLIC BLOOD PRESSURE: 64 MMHG | TEMPERATURE: 98.3 F

## 2025-05-07 DIAGNOSIS — K21.9 GASTRO-ESOPHAGEAL REFLUX DISEASE WITHOUT ESOPHAGITIS: ICD-10-CM

## 2025-05-07 DIAGNOSIS — E11.40 TYPE 2 DIABETES MELLITUS WITH DIABETIC NEUROPATHY, WITHOUT LONG-TERM CURRENT USE OF INSULIN (HCC): Primary | ICD-10-CM

## 2025-05-07 DIAGNOSIS — R41.3 MEMORY LOSS: ICD-10-CM

## 2025-05-07 DIAGNOSIS — E03.9 ACQUIRED HYPOTHYROIDISM: ICD-10-CM

## 2025-05-07 DIAGNOSIS — I10 ESSENTIAL (PRIMARY) HYPERTENSION: ICD-10-CM

## 2025-05-07 DIAGNOSIS — I89.0 LYMPHEDEMA, NOT ELSEWHERE CLASSIFIED: ICD-10-CM

## 2025-05-07 DIAGNOSIS — R04.2 HEMOPTYSIS: ICD-10-CM

## 2025-05-07 DIAGNOSIS — E78.5 HYPERLIPIDEMIA, UNSPECIFIED HYPERLIPIDEMIA TYPE: ICD-10-CM

## 2025-05-07 DIAGNOSIS — K92.1 BLOOD IN STOOL: ICD-10-CM

## 2025-05-07 DIAGNOSIS — J96.11 CHRONIC RESPIRATORY FAILURE WITH HYPOXIA (HCC): ICD-10-CM

## 2025-05-07 DIAGNOSIS — I50.32 CHRONIC DIASTOLIC (CONGESTIVE) HEART FAILURE (HCC): ICD-10-CM

## 2025-05-07 LAB — HBA1C MFR BLD: 12.3 %

## 2025-05-07 PROCEDURE — 3075F SYST BP GE 130 - 139MM HG: CPT | Performed by: FAMILY MEDICINE

## 2025-05-07 PROCEDURE — 83036 HEMOGLOBIN GLYCOSYLATED A1C: CPT | Performed by: FAMILY MEDICINE

## 2025-05-07 PROCEDURE — 99215 OFFICE O/P EST HI 40 MIN: CPT | Performed by: FAMILY MEDICINE

## 2025-05-07 PROCEDURE — 3078F DIAST BP <80 MM HG: CPT | Performed by: FAMILY MEDICINE

## 2025-05-07 PROCEDURE — 3046F HEMOGLOBIN A1C LEVEL >9.0%: CPT | Performed by: FAMILY MEDICINE

## 2025-05-07 SDOH — ECONOMIC STABILITY: FOOD INSECURITY: WITHIN THE PAST 12 MONTHS, THE FOOD YOU BOUGHT JUST DIDN'T LAST AND YOU DIDN'T HAVE MONEY TO GET MORE.: NEVER TRUE

## 2025-05-07 SDOH — ECONOMIC STABILITY: FOOD INSECURITY: WITHIN THE PAST 12 MONTHS, YOU WORRIED THAT YOUR FOOD WOULD RUN OUT BEFORE YOU GOT MONEY TO BUY MORE.: NEVER TRUE

## 2025-05-07 ASSESSMENT — PATIENT HEALTH QUESTIONNAIRE - PHQ9
4. FEELING TIRED OR HAVING LITTLE ENERGY: NOT AT ALL
8. MOVING OR SPEAKING SO SLOWLY THAT OTHER PEOPLE COULD HAVE NOTICED. OR THE OPPOSITE, BEING SO FIGETY OR RESTLESS THAT YOU HAVE BEEN MOVING AROUND A LOT MORE THAN USUAL: NOT AT ALL
1. LITTLE INTEREST OR PLEASURE IN DOING THINGS: NOT AT ALL
7. TROUBLE CONCENTRATING ON THINGS, SUCH AS READING THE NEWSPAPER OR WATCHING TELEVISION: NOT AT ALL
9. THOUGHTS THAT YOU WOULD BE BETTER OFF DEAD, OR OF HURTING YOURSELF: NOT AT ALL
3. TROUBLE FALLING OR STAYING ASLEEP: NOT AT ALL
10. IF YOU CHECKED OFF ANY PROBLEMS, HOW DIFFICULT HAVE THESE PROBLEMS MADE IT FOR YOU TO DO YOUR WORK, TAKE CARE OF THINGS AT HOME, OR GET ALONG WITH OTHER PEOPLE: NOT DIFFICULT AT ALL
SUM OF ALL RESPONSES TO PHQ QUESTIONS 1-9: 0
5. POOR APPETITE OR OVEREATING: NOT AT ALL
6. FEELING BAD ABOUT YOURSELF - OR THAT YOU ARE A FAILURE OR HAVE LET YOURSELF OR YOUR FAMILY DOWN: NOT AT ALL
2. FEELING DOWN, DEPRESSED OR HOPELESS: NOT AT ALL

## 2025-05-07 ASSESSMENT — ANXIETY QUESTIONNAIRES
5. BEING SO RESTLESS THAT IT IS HARD TO SIT STILL: NOT AT ALL
6. BECOMING EASILY ANNOYED OR IRRITABLE: NOT AT ALL
GAD7 TOTAL SCORE: 0
IF YOU CHECKED OFF ANY PROBLEMS ON THIS QUESTIONNAIRE, HOW DIFFICULT HAVE THESE PROBLEMS MADE IT FOR YOU TO DO YOUR WORK, TAKE CARE OF THINGS AT HOME, OR GET ALONG WITH OTHER PEOPLE: NOT DIFFICULT AT ALL
3. WORRYING TOO MUCH ABOUT DIFFERENT THINGS: NOT AT ALL
7. FEELING AFRAID AS IF SOMETHING AWFUL MIGHT HAPPEN: NOT AT ALL
1. FEELING NERVOUS, ANXIOUS, OR ON EDGE: NOT AT ALL
4. TROUBLE RELAXING: NOT AT ALL
2. NOT BEING ABLE TO STOP OR CONTROL WORRYING: NOT AT ALL

## 2025-05-07 NOTE — PROGRESS NOTES
Mando Chua presents today for   Chief Complaint   Patient presents with    Follow-up    Diabetes    Hypertension    Memory Loss     Is someone accompanying this pt? No    Is the patient using any DME equipment during OV? No    Depression Screenin/7/2025     1:18 PM 2025     2:11 PM 2024     4:06 PM 2024     1:37 PM 2024     1:22 PM 2024     2:11 PM 11/15/2023    11:02 AM   PHQ-9 Questionaire   Little interest or pleasure in doing things 0 0 0 0 0 0 0   Feeling down, depressed, or hopeless 0 0 0 0 0 0 0   Trouble falling or staying asleep, or sleeping too much 0 0 0   0 0   Feeling tired or having little energy 0 0 0   0 0   Poor appetite or overeating 0 0 0   0 0   Feeling bad about yourself - or that you are a failure or have let yourself or your family down 0 0 0   0 0   Trouble concentrating on things, such as reading the newspaper or watching television 0 0 0   0 0   Moving or speaking so slowly that other people could have noticed. Or the opposite - being so fidgety or restless that you have been moving around a lot more than usual 0 0 0   0 0   Thoughts that you would be better off dead, or of hurting yourself in some way 0 0 0   0 0   PHQ-9 Total Score 0 0 0 0 0 0 0   If you checked off any problems, how difficult have these problems made it for you to do your work, take care of things at home, or get along with other people? 0 0 0   0 0        ANN MARIE 7-Anxiety       2025     1:19 PM   ANN MARIE-7 SCREENING   Feeling nervous, anxious, or on edge Not at all   Not being able to stop or control worrying Not at all   Worrying too much about different things Not at all   Trouble relaxing Not at all   Being so restless that it is hard to sit still Not at all   Becoming easily annoyed or irritable Not at all   Feeling afraid as if something awful might happen Not at all   ANN MARIE-7 Total Score 0   How difficult have these problems made it for you to do your work, take care of things at 
activity: Not on file   Other Topics Concern    Not on file   Social History Narrative    Not on file     Social Drivers of Health     Financial Resource Strain: Low Risk  (11/14/2024)    Overall Financial Resource Strain (CARDIA)     Difficulty of Paying Living Expenses: Not hard at all   Food Insecurity: No Food Insecurity (5/7/2025)    Hunger Vital Sign     Worried About Running Out of Food in the Last Year: Never true     Ran Out of Food in the Last Year: Never true   Transportation Needs: No Transportation Needs (5/7/2025)    PRAPARE - Transportation     Lack of Transportation (Medical): No     Lack of Transportation (Non-Medical): No   Physical Activity: Not on file   Stress: Not on file   Social Connections: Not on file   Intimate Partner Violence: Not on file   Housing Stability: Low Risk  (5/7/2025)    Housing Stability Vital Sign     Unable to Pay for Housing in the Last Year: No     Number of Times Moved in the Last Year: 0     Homeless in the Last Year: No       Review of Systems  Review of Systems - Review of all systems is negative except as noted above in the HPI.    Vital Signs  /64   Pulse 86   Temp 98.3 °F (36.8 °C) (Temporal)   Resp 16   Ht 1.702 m (5' 7\")   Wt 127.6 kg (281 lb 4.8 oz)   SpO2 95%   BMI 44.06 kg/m²       Physical Exam  Physical Examination: General appearance - chronically ill appearing  Mental status - affect appropriate to mood  Neck -tracheostomy in place  Lymphatics - no palpable lymphadenopathy  Chest - decreased air entry noted bilateral lung bases  Heart - systolic murmur 2/6 at lower left sternal border  Abdomen - no rebound tenderness noted  Back exam - limited range of motion  Neurological - abnormal neurological exam unchanged from prior examinations  Musculoskeletal - osteoarthritic changes noted in both hands  Extremities - venous stasis dermatitis with wrappings around both legs        Results  No results found for this visit on 05/07/25.    ASSESSMENT

## 2025-05-15 ENCOUNTER — CLINICAL DOCUMENTATION (OUTPATIENT)
Facility: CLINIC | Age: 65
End: 2025-05-15

## 2025-05-16 DIAGNOSIS — E11.40 TYPE 2 DIABETES MELLITUS WITH DIABETIC NEUROPATHY, WITHOUT LONG-TERM CURRENT USE OF INSULIN (HCC): ICD-10-CM

## 2025-05-16 NOTE — TELEPHONE ENCOUNTER
Last seen 5/7/2025   Last labs 2/6/2025  Last filled  7/2/2024  Next appointment 6/4/2025     Lab Results   Component Value Date     02/06/2025    K 4.3 02/06/2025    CL 99 02/06/2025    CO2 26 02/06/2025    BUN 12 02/06/2025    CREATININE 0.7 (L) 02/06/2025    GLUCOSE 313 (H) 02/06/2025    CALCIUM 8.8 02/06/2025    BILITOT 0.7 02/06/2025    ALKPHOS 163 (H) 02/06/2025    AST 18 02/06/2025    ALT 17 02/06/2025    LABGLOM >60.0 02/06/2025    GFRAA >60 08/19/2022    AGRATIO 0.9 (L) 02/06/2025    GLOB 3.7 02/06/2025

## 2025-05-19 RX ORDER — GLIPIZIDE 10 MG/1
20 TABLET ORAL 2 TIMES DAILY
Qty: 360 TABLET | Refills: 1 | Status: SHIPPED | OUTPATIENT
Start: 2025-05-19

## 2025-05-20 ENCOUNTER — HOSPITAL ENCOUNTER (OUTPATIENT)
Facility: HOSPITAL | Age: 65
Setting detail: RECURRING SERIES
End: 2025-05-20
Payer: MEDICAID

## 2025-05-29 ENCOUNTER — HOSPITAL ENCOUNTER (OUTPATIENT)
Facility: HOSPITAL | Age: 65
Setting detail: RECURRING SERIES
End: 2025-05-29
Payer: MEDICAID

## 2025-05-29 PROCEDURE — 97535 SELF CARE MNGMENT TRAINING: CPT

## 2025-05-29 PROCEDURE — 97112 NEUROMUSCULAR REEDUCATION: CPT

## 2025-05-29 PROCEDURE — 97530 THERAPEUTIC ACTIVITIES: CPT

## 2025-05-29 NOTE — PROGRESS NOTES
[facility]  [address] [place] [address] - Ph: [phone]   Fx: [phone]  PHYSICAL THERAPY PROGRESS NOTE  [x] Progress Note  [] Discharge Summary    Patient Name: Mando Chua : 1960   Treatment/Medical Diagnosis: Lymphedema, not elsewhere classified [I89.0]  Right leg pain [M79.604]  Left leg pain [M79.605]   Referral Source: Jose Dean MD     Date of Initial Visit: 25 Attended Visits: 8 Missed Visits: 1       Comorbidities: Respiratory disorders, Diabetes mellitus, Musculoskeletal disorders, Neurologic condition, and Complications related to surgery (Thyroidectomy 2019 with Trach placed following, On 3L O2, Sedentary, Requires Daily assist, Slow healing wound on left lateral ankle for last year with Hx of infection), LBP   Prior Level of Function:Spends most of the day in her recliner and utilizing WC; will stand with RW to amb tot he rest room; Has interm aide help at home; reports non compliant with medication management     SUMMARY OF TREATMENT  Pt was last seen on 25 with updated reassessment.   Due to unforseen circumstance with poor transportation and time constraints from other appts, pt was unable to attend therapy. Pt presents today for next follow-up with updated auth extension ending 25.   As pt as been unable to attend therapy sessions, pt would benefit from continued skilled PT as prior progress note suggests/recommends.       New Goals to be achieved in __4__ WEEKS  1. Patient will be independent and compliant with HEP to progress toward goals and restore functional mobility.   Status at evaluation/last progress note: NOT MET     2. Goal:Pt will be independent with teach back of lymphedema risk reduction techniques to help prevent onset of infection and progressive swelling in order to self manage lymphedema.   Status at evaluation/last progress note: Not Met-Continues to report picking at

## 2025-05-29 NOTE — PROGRESS NOTES
PHYSICAL / OCCUPATIONAL THERAPY - DAILY TREATMENT NOTE    Patient Name: Mando Chua    Date: 2025    : 1960  Insurance: Payor: [facility] MEDICAID / Plan: [facility] PLAN CARDINAL CARE / Product Type: *No Product type* /      Patient  verified Yes     Visit #   Current / Total 8 16   Time   In / Out 120 200   Pain   In / Out 5 5   Subjective Functional Status/Changes: \"I went to the ER again because of bleeding from my rectum again and the poop doesn't want to come out. Its not hard its soft and if I eat a lot it causes the poop to come down. I try to eat only little bits because it makes be feel to full and bloated.\"     TREATMENT AREA =  Lymphedema, not elsewhere classified  Right leg pain  Left leg pain    OBJECTIVE         Therapeutic Procedures:    Tx Min Billable or 1:1 Min (if diff from Tx Min) Procedure, Rationale, Specifics   15  36253 Self Care/Home Management (timed):  improve patient knowledge and understanding of home injury/symptom/pain management and home safety  to improve patient's ability to progress to PLOF and address remaining functional goals.  (see flow sheet as applicable)     Details if applicable:  review of pt symptoms since last visit to assess status change     15  02918 Neuromuscular Re-Education (timed):  improve balance, coordination, kinesthetic sense, posture, core stability and proprioception to improve patient's ability to develop conscious control of individual muscles and awareness of position of extremities in order to progress to PLOF and address remaining functional goals. (see flow sheet as applicable)     Details if applicable:   Compression wrapping for improved proprioception- stockinette, 10cm comprifoam, x2 8cm and x1 10cm short stretch for right lower limb    10  29979 Therapeutic Activity (timed):  use of dynamic activities replicating functional movements to increase ROM, strength, coordination, balance, and proprioception in order to improve

## 2025-06-02 ENCOUNTER — HOSPITAL ENCOUNTER (OUTPATIENT)
Facility: HOSPITAL | Age: 65
Setting detail: RECURRING SERIES
Discharge: HOME | End: 2025-06-05
Payer: MEDICAID

## 2025-06-02 PROCEDURE — 97535 SELF CARE MNGMENT TRAINING: CPT

## 2025-06-02 PROCEDURE — 97112 NEUROMUSCULAR REEDUCATION: CPT

## 2025-06-02 NOTE — PROGRESS NOTES
PHYSICAL / OCCUPATIONAL THERAPY - DAILY TREATMENT NOTE    Patient Name: Mando Chua    Date: 2025    : 1960  Insurance: Payor: [facility] MEDICAID / Plan: [facility] PLAN CARDINAL CARE / Product Type: *No Product type* /      Patient  verified Yes     Visit #   Current / Total 9 16   Time   In / Out 120 200   Pain   In / Out 5 5   Subjective Functional Status/Changes: \"I didn't want to be kicked out so I came but I don't think I can do much\"     TREATMENT AREA =  Lymphedema, not elsewhere classified  Right leg pain  Left leg pain    OBJECTIVE         Therapeutic Procedures:    Tx Min Billable or 1:1 Min (if diff from Tx Min) Procedure, Rationale, Specifics   30 10 [id number] Self Care/Home Management (timed):  improve patient knowledge and understanding of see below  to improve patient's ability to progress to PLOF and address remaining functional goals.  (see flow sheet as applicable)     Details if applicable:       10 10 96926 Neuromuscular Re-Education (timed):  improve balance, coordination, kinesthetic sense, posture, core stability and proprioception to improve patient's ability to develop conscious control of individual muscles and awareness of position of extremities in order to progress to PLOF and address remaining functional goals. (see flow sheet as applicable)     Details if applicable:  see below    40 20 [facility] Totals Reminder: bill using total billable min of TIMED therapeutic procedures (example: do not include dry needle or estim unattended, both untimed codes, in totals to left)  8-22 min = 1 unit; 23-37 min = 2 units; 38-52 min = 3 units; 53-67 min = 4 units; 68-82 min = 5 units   Total Total     Charge Capture    [x]  Patient Education billed concurrently with other procedures   [x] Review HEP    [] Progressed/Changed HEP, detail:    [] Other detail:       Objective Information/Functional Measures/Assessment  Compression bandages removed on left, with noted decreased volume.

## 2025-06-10 ENCOUNTER — APPOINTMENT (OUTPATIENT)
Facility: HOSPITAL | Age: 65
End: 2025-06-10
Payer: MEDICAID

## 2025-06-15 NOTE — ROUTINE PROCESS
Water flushed 100 ml with prochoice. Patient tolerated procedure well. Medical Assessment Completed on: 15-Miguel-2025 04:07

## 2025-06-26 ENCOUNTER — HOSPITAL ENCOUNTER (OUTPATIENT)
Facility: HOSPITAL | Age: 65
Setting detail: RECURRING SERIES
End: 2025-06-26
Payer: MEDICAID

## 2025-08-05 DIAGNOSIS — K21.9 GASTROESOPHAGEAL REFLUX DISEASE, UNSPECIFIED WHETHER ESOPHAGITIS PRESENT: ICD-10-CM

## 2025-08-05 DIAGNOSIS — R11.0 NAUSEA: ICD-10-CM

## 2025-08-05 DIAGNOSIS — K21.9 GASTROESOPHAGEAL REFLUX DISEASE WITHOUT ESOPHAGITIS: ICD-10-CM

## 2025-08-05 DIAGNOSIS — R10.12 LEFT UPPER QUADRANT ABDOMINAL PAIN: ICD-10-CM

## 2025-08-06 RX ORDER — ONDANSETRON 4 MG/1
4 TABLET, FILM COATED ORAL EVERY 8 HOURS PRN
Qty: 60 TABLET | Refills: 2 | Status: SHIPPED | OUTPATIENT
Start: 2025-08-06

## 2025-08-06 RX ORDER — OMEPRAZOLE 40 MG/1
40 CAPSULE, DELAYED RELEASE ORAL
Qty: 90 CAPSULE | Refills: 1 | Status: SHIPPED | OUTPATIENT
Start: 2025-08-06

## 2025-09-02 ENCOUNTER — TELEMEDICINE (OUTPATIENT)
Facility: CLINIC | Age: 65
End: 2025-09-02
Payer: MEDICAID

## 2025-09-02 DIAGNOSIS — I50.32 CHRONIC DIASTOLIC (CONGESTIVE) HEART FAILURE (HCC): ICD-10-CM

## 2025-09-02 DIAGNOSIS — Z93.0 TRACHEOSTOMY STATUS (HCC): ICD-10-CM

## 2025-09-02 DIAGNOSIS — R11.0 NAUSEA: ICD-10-CM

## 2025-09-02 DIAGNOSIS — E11.40 TYPE 2 DIABETES MELLITUS WITH DIABETIC NEUROPATHY, WITHOUT LONG-TERM CURRENT USE OF INSULIN (HCC): ICD-10-CM

## 2025-09-02 DIAGNOSIS — K74.60 CIRRHOSIS OF LIVER WITH ASCITES, UNSPECIFIED HEPATIC CIRRHOSIS TYPE (HCC): Primary | ICD-10-CM

## 2025-09-02 DIAGNOSIS — E78.5 HYPERLIPIDEMIA, UNSPECIFIED HYPERLIPIDEMIA TYPE: ICD-10-CM

## 2025-09-02 DIAGNOSIS — Z09 HOSPITAL DISCHARGE FOLLOW-UP: ICD-10-CM

## 2025-09-02 DIAGNOSIS — K21.9 GASTROESOPHAGEAL REFLUX DISEASE, UNSPECIFIED WHETHER ESOPHAGITIS PRESENT: ICD-10-CM

## 2025-09-02 DIAGNOSIS — R18.8 CIRRHOSIS OF LIVER WITH ASCITES, UNSPECIFIED HEPATIC CIRRHOSIS TYPE (HCC): Primary | ICD-10-CM

## 2025-09-02 DIAGNOSIS — I10 ESSENTIAL (PRIMARY) HYPERTENSION: ICD-10-CM

## 2025-09-02 DIAGNOSIS — F41.9 ANXIETY: ICD-10-CM

## 2025-09-02 DIAGNOSIS — J96.11 CHRONIC RESPIRATORY FAILURE WITH HYPOXIA (HCC): ICD-10-CM

## 2025-09-02 PROCEDURE — 1111F DSCHRG MED/CURRENT MED MERGE: CPT | Performed by: FAMILY MEDICINE

## 2025-09-02 PROCEDURE — 3046F HEMOGLOBIN A1C LEVEL >9.0%: CPT | Performed by: FAMILY MEDICINE

## 2025-09-02 PROCEDURE — 99215 OFFICE O/P EST HI 40 MIN: CPT | Performed by: FAMILY MEDICINE

## 2025-09-02 RX ORDER — ONDANSETRON 4 MG/1
4 TABLET, FILM COATED ORAL EVERY 8 HOURS PRN
Qty: 60 TABLET | Refills: 2 | Status: SHIPPED | OUTPATIENT
Start: 2025-09-02

## (undated) DEVICE — ELECTRODE ES AD DISPER HYDRGEL THN FOAM ADH SCALLOPED EDGE

## (undated) DEVICE — INTENDED FOR TISSUE SEPARATION, AND OTHER PROCEDURES THAT REQUIRE A SHARP SURGICAL BLADE TO PUNCTURE OR CUT.: Brand: BARD-PARKER SAFETY BLADES SIZE 15, STERILE

## (undated) DEVICE — APPLIER CLP L9.38IN M LIG TI DISP STR RNG HNDL LIGACLP

## (undated) DEVICE — GAUZE,SPONGE,4"X4",16PLY,STRL,LF,10/TRAY: Brand: MEDLINE

## (undated) DEVICE — APPLIER CLP AUTO MED 9.75 IN TI SURGCLP SUPER INTLOK 20 DISP

## (undated) DEVICE — STERILE POLYISOPRENE POWDER-FREE SURGICAL GLOVES: Brand: PROTEXIS

## (undated) DEVICE — SUTURE VCRL SZ 3-0 L27IN ABSRB UD L24MM PS-1 3/8 CIR PRIM J936H

## (undated) DEVICE — TRAY SUPP STD NO DRUG W EXTENSION SET

## (undated) DEVICE — (D)BNDG ADHESIVE FABRIC 3/4X3 -- DISC BY MFR USE ITEM 357960

## (undated) DEVICE — LIGHT HANDLE: Brand: DEVON

## (undated) DEVICE — MIRAGE SWIFT II PILLOW LGE: Brand: MIRAGE SWIFT II

## (undated) DEVICE — 3M™ BAIR PAWS FLEX™ WARMING GOWN, STANDARD, 20 PER CASE 81003: Brand: BAIR PAWS™

## (undated) DEVICE — KENDALL SCD EXPRESS SLEEVES, KNEE LENGTH, MEDIUM: Brand: KENDALL SCD

## (undated) DEVICE — SHEAR HARMONIC FOCUS OEM 9CM --

## (undated) DEVICE — LUB SURG MEDC STRL 2OZ TUBE MC -- MEDICHOICE

## (undated) DEVICE — REM POLYHESIVE ADULT PATIENT RETURN ELECTRODE: Brand: VALLEYLAB

## (undated) DEVICE — KIT CLN UP BON SECOURS MARYV

## (undated) DEVICE — CUFF BLD PRESSURE MONITORING LNG AD 23-33 CM 1 TUBE MY CUF

## (undated) DEVICE — FLEX ADVANTAGE 3000CC: Brand: FLEX ADVANTAGE

## (undated) DEVICE — SUTURE PROL SZ 2-0 L36IN NONABSORBABLE BLU V-7 L26MM 1/2 8977H

## (undated) DEVICE — SLIM BODY SKIN STAPLER: Brand: APPOSE ULC

## (undated) DEVICE — TAPE ADH CLTH SILK H2O REPELLENT CURAD

## (undated) DEVICE — NEEDLE SPNL L3.5IN DIA25GA QNCKE TYP BVL SPINOCAN

## (undated) DEVICE — AMD ANTIMICROBIAL DRAIN SPONGES, 6 PLY, 0.2% POLYHEXAMETHYLENE BIGUANIDE HCI (PHMB): Brand: EXCILON

## (undated) DEVICE — SOLUTION IV 1000ML 0.9% SOD CHL

## (undated) DEVICE — X-RAY SPONGES,12 PLY: Brand: DERMACEA

## (undated) DEVICE — RADIOFREQUENCY SINGLE-USE PROBE

## (undated) DEVICE — Z DISCONTINUED NO SUB IDED ELECTRODE ES L2.8IN S STL INSUL NDL DISP EDGE

## (undated) DEVICE — SPONGE HEMOSTAT CELLULS 4X8IN -- SURGICEL

## (undated) DEVICE — PACK PROCEDURE SURG MAJ W/ BASIN LF

## (undated) DEVICE — SUTURE MCRYL SZ 3-0 L18IN ABSRB UD L19MM PS-2 3/8 CIR PRIM Y497G

## (undated) DEVICE — SYRINGE MED 25GA 3ML L5/8IN SUBQ PLAS W/ DETACH NDL SFTY

## (undated) DEVICE — DRAPE TWL SURG 16X26IN BLU ORB04] ALLCARE INC]

## (undated) DEVICE — 3M™ STERI-STRIP™ COMPOUND BENZOIN TINCTURE 40 BAGS/CARTON 4 CARTONS/CASE C1544: Brand: 3M™ STERI-STRIP™

## (undated) DEVICE — LIMB HOLDER, WRIST/ANKLE: Brand: DEROYAL

## (undated) DEVICE — SUTURE MCRYL SZ 4-0 L18IN ABSRB UD L19MM PS-2 3/8 CIR PRIM Y496G

## (undated) DEVICE — SUTURE VCRL SZ 3-0 L27IN ABSRB UD L26MM SH 1/2 CIR J416H

## (undated) DEVICE — SUT SLK 3-0 30IN SH BLK --

## (undated) DEVICE — TRAY URIN CATH PED 16FR BLLN 5CC INDWL STR TIP INF CTRL

## (undated) DEVICE — Z DISCONTINUED USE (MFG CAT 7984-37) SOLUTION IV SODIUM CHL 0.9% 100 ML INJ

## (undated) DEVICE — SUT PROL 2-0 30IN CT1 BLU --

## (undated) DEVICE — SYR 50ML LR LCK 1ML GRAD NSAF --

## (undated) DEVICE — TELFA NON-ADHERENT PADS PREPAK: Brand: TELFA

## (undated) DEVICE — DRAPE,REIN 53X77,STERILE: Brand: MEDLINE

## (undated) DEVICE — KIT GASTMY PERC PEG PULL 20FR -- ENDOVIVE BX/2

## (undated) DEVICE — TRAY PREP DRY W/ PREM GLV 2 APPL 6 SPNG 2 UNDPD 1 OVERWRAP

## (undated) DEVICE — BITE BLOCK ENDOSCP UNIV AD 6 TO 9.4 MM

## (undated) DEVICE — SPONGE DISSECT PNUT SM 3/8IN -- 5/PK

## (undated) DEVICE — CURVED SHARP RF CANNULA, RADIOPAQUE MARKER: Brand: RADIOPAQUE RADIOFREQUENCY CANNULA

## (undated) DEVICE — THREE-QUARTER SHEET: Brand: CONVERTORS

## (undated) DEVICE — APPLIER CLP L9.375IN APER 2.1MM CLS L3.8MM 20 SM TI CLP

## (undated) DEVICE — SOLUTION IRRIG 1000ML H2O STRL BLT

## (undated) DEVICE — APPLIER CLP SM BLK TI AUTO HNDL DISP W/ 20 CLP PREM SURGICLP

## (undated) DEVICE — SUT PROL 2-0 30IN SH BLU --

## (undated) DEVICE — (D)STRIP SKN CLSR 0.5X4IN WHT --

## (undated) DEVICE — DRAPE: MAGNETIC 12X16 30/CS: Brand: MEDICAL ACTION INDUSTRIES

## (undated) DEVICE — SURGICAL PROCEDURE PACK TISS 3X5 IN ABSORBABLE SEPRAFILM

## (undated) DEVICE — SUTURE PERMA-HAND SZ 3-0 L24IN NONABSORBABLE BLK W/O NDL SA74H

## (undated) DEVICE — GAUZE SPONGES,USP TYPE VII GAUZE, 12 PLY: Brand: CURITY

## (undated) DEVICE — INTENDED FOR TISSUE SEPARATION, AND OTHER PROCEDURES THAT REQUIRE A SHARP SURGICAL BLADE TO PUNCTURE OR CUT.: Brand: BARD-PARKER SAFETY BLADES SIZE 10, STERILE

## (undated) DEVICE — SUTURE PERMA-HAND SZ 2-0 L24IN NONABSORBABLE BLK W/O NDL SA75H

## (undated) DEVICE — AVANOS* SHORT BEVEL NEEDLE: Brand: AVANOS

## (undated) DEVICE — MEDI-VAC NON-CONDUCTIVE SUCTION TUBING: Brand: CARDINAL HEALTH

## (undated) DEVICE — SYR 10ML LUER LOK 1/5ML GRAD --